# Patient Record
Sex: MALE | Race: WHITE | NOT HISPANIC OR LATINO | Employment: FULL TIME | URBAN - METROPOLITAN AREA
[De-identification: names, ages, dates, MRNs, and addresses within clinical notes are randomized per-mention and may not be internally consistent; named-entity substitution may affect disease eponyms.]

---

## 2017-01-03 ENCOUNTER — ONCOLOGY VISIT (OUTPATIENT)
Dept: ONCOLOGY | Facility: CLINIC | Age: 57
End: 2017-01-03
Attending: INTERNAL MEDICINE
Payer: COMMERCIAL

## 2017-01-03 VITALS
SYSTOLIC BLOOD PRESSURE: 128 MMHG | BODY MASS INDEX: 31.17 KG/M2 | OXYGEN SATURATION: 97 % | RESPIRATION RATE: 17 BRPM | HEART RATE: 109 BPM | DIASTOLIC BLOOD PRESSURE: 79 MMHG | WEIGHT: 220.4 LBS | TEMPERATURE: 98.5 F

## 2017-01-03 DIAGNOSIS — C20 MALIGNANT NEOPLASM OF RECTUM (H): Primary | ICD-10-CM

## 2017-01-03 PROCEDURE — 99211 OFF/OP EST MAY X REQ PHY/QHP: CPT

## 2017-01-03 PROCEDURE — 99205 OFFICE O/P NEW HI 60 MIN: CPT | Performed by: INTERNAL MEDICINE

## 2017-01-03 ASSESSMENT — PAIN SCALES - GENERAL: PAINLEVEL: MILD PAIN (2)

## 2017-01-03 NOTE — PROGRESS NOTES
"Louie Greco is a 56 year old male who presents for:  Chief Complaint   Patient presents with     Oncology Clinic Visit     Hx of rectal ca         Initial Vitals:  /79 mmHg  Pulse 109  Temp(Src) 98.5  F (36.9  C) (Oral)  Resp 17  Wt 99.973 kg (220 lb 6.4 oz)  SpO2 97% Estimated body mass index is 31.17 kg/(m^2) as calculated from the following:    Height as of 12/8/16: 1.791 m (5' 10.5\").    Weight as of this encounter: 99.973 kg (220 lb 6.4 oz).. There is no height on file to calculate BSA. BP completed using cuff size: regular  Mild Pain (2) No LMP for male patient. Allergies and medications reviewed.     Medications: Medication refills not needed today.  Pharmacy name entered into [employer]: [facility] [address] [place], [place] - [address]    Comments: None    6 minutes for nursing intake (face to face time)   Gabriella Queen CMA            "

## 2017-01-03 NOTE — PROGRESS NOTES
[facility]    Hematology/Oncology New Patient Note      Today's Date: 01/03/2017    Reason for Consult: rectal cancer      HISTORY OF PRESENT ILLNESS: Louie Greco is a 56 year old male who presents with rectal cancer.  He started having rectal bleeding around beginning of 2016.  He underwent colonoscopy on 7/27/16, which showed a malignant tumor in the rectum involving half of the lumen with oozing, as well as three 4-mm polyps in the ascending and transverse colon.  Pathology showed moderately differentiated adenocarcinoma, ascending colon with sessile serrated adenoma, others were tubular adenomas.  Mismatch repair expression with normal protein expression.  Staging scans showed the rectal mass, indeterminate focus in the left liver thought to be cyst, and multiple bilateral renal cysts.  MRI showed a distal rectal mass measuring 2.7 x 2.4 cm extending to the most proximal region of the anal canal.  There are a few tiny subcentimeter perirectal fat lymph nodes.  He was staged clinically [id number] (stage IIIA).  He was seen by Dr. Lee at [facility], and started neoadjuvant chemoradiation with capecitabine on 8/8/16 and completed on 9/15/16.  He was then seen by Dr. Radford, colorectal surgery at [facility].  His post chemoradiation MRI showed improvement in the size of the tumor and response in the lymph nodes.  On 12/8/16, he underwent flexible sigmoidoscopy and there was no evidence of tumor.  There was only some anterior scarring in the lumen.  Multiple biopsies were taken, which showed no evidence of carcinoma.  At that point, Dr. Radford spoke with the patient's wife, that there appears to be a complete response in the lumen, and that the patient would wish to placed on the watch and wait protocol.  The patient had expressed wishes not to have an APR, and it would not have been possible to grossly clear a margin for LAR.    Louie was advised to  follow-up with medical oncology.  He says that he feels well now.  He still has some fissures from his radiation treatment and from the biopsies.  He takes stool softeners, which helps.  Appetite is good.  He and his wife had many questions regarding his cancer and treatment and surveillance going forward.    He notes that he had a brother who passed away at a young age of 53 from a metastatic cancer, but unknown what type, and passed away within 2 months of diagnosis.  He denies other family history of cancer in the immediate family.  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          REVIEW OF SYSTEMS:   14 point ROS was reviewed and is negative other than as noted above in HPI.       HOME MEDICATIONS:  Current Outpatient Prescriptions   Medication Sig Dispense Refill     IBUPROFEN PO        acetaminophen (TYLENOL) 325 MG tablet Take 2 tablets (650 mg) by mouth every 4 hours as needed for other (mild pain) 100 tablet 0     hydrocortisone 0.5 % ointment Apply topically 2 times daily       diltiazem 2% in PLO cream, FV COMPOUNDED, 2% GEL To anal opening three times daily.  Use a pea-sized amount.  Store at room temperature. 60 g 0         ALLERGIES:  Allergies   Allergen Reactions     Amoxicillin      Ampicillin Diarrhea     Demerol [Meperidine]      Nausea          PAST MEDICAL HISTORY:  Past Medical History   Diagnosis Date     Rectal cancer (H)      low rectal cancer     Childhood asthma      Nephrolithiasis      1990         PAST SURGICAL HISTORY:  Past Surgical History   Procedure Laterality Date     Vasectomy       Colonoscopy N/A 7/27/2016     Procedure: COMBINED COLONOSCOPY, SINGLE OR MULTIPLE BIOPSY/POLYPECTOMY BY BIOPSY;  Surgeon: Chelsea Thompson MD;  Location:  GI     Hc tooth extraction w/forcep       Sigmoidoscopy flexible N/A 12/8/2016     Procedure: SIGMOIDOSCOPY FLEXIBLE;  Surgeon: Felicitas Radford MD;  Location:  OR         SOCIAL HISTORY:  Social History      Social History     Marital Status: Single     Spouse Name: N/A     Number of Children: N/A     Years of Education: N/A     Occupational History     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 No     Social History Narrative    Dad  at age  78   from [place], COPD, & HTN    Mom alive in her 70's.     for 30 years    Two adult children. 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             FAMILY HISTORY:  Family History   Problem Relation Age of Onset     CANCER Brother      primary of unknown origin     Chronic Obstructive Pulmonary Disease Father      Hypertension Father      Family History Negative Mother      Family History Negative Brother      Glaucoma No family hx of      Macular Degeneration No family hx of      DIABETES Maternal Grandfather      Hypertension Paternal Grandmother      Hyperlipidemia Father      Hyperlipidemia Paternal Grandmother      Other Cancer Brother          PHYSICAL EXAM:  Vital signs:  /79 mmHg  Pulse 109  Temp(Src) 98.5  F (36.9  C) (Oral)  Resp 17  Wt 99.973 kg (220 lb 6.4 oz)  SpO2 97%   ECO  GENERAL/CONSTITUTIONAL: No acute distress. Accompanied by wife.  EYES: No scleral icterus.  LYMPH: No anterior cervical, posterior cervical, or supraclavicular adenopathy.   RESPIRATORY: Clear to auscultation bilaterally. No crackles or wheezing.   CARDIOVASCULAR: Regular rate and rhythm without murmurs, gallops, or rubs.  GASTROINTESTINAL: No tenderness. The patient has normal bowel sounds. No guarding.  No distention.  MUSCULOSKELETAL: Warm and well-perfused, no cyanosis,  clubbing, or edema.  NEUROLOGIC: Alert, oriented, answers questions appropriately.  INTEGUMENTARY: No jaundice.  GAIT: Steady, does not use assistive device      LABS:  None today.      PATHOLOGY:  7/27/16:  INTERPRETATION:   <<NORMAL MISMATCH REPAIR PROTEIN EXPRESSION:     hMLH1    hMSH2    hMSH6    hPMS2   Present   Present    Present   Present     Diagnosis:  Normal pattern of mismatch repair protein expression (see   comment).     FINAL DIAGNOSIS:   A: Rectum, biopsy   - Moderately differentiated adenocarcinoma consistent with rectal   primary     B: Large intestine right/ascending colon, polypectomy   - Sessile serrated adenoma without cytologic dysplasia     C: Large intestine transverse colon, polypectomy   - Tubular adenoma without high grade dysplasia or malignancy     12/8/16:  FINAL DIAGNOSIS:   A. RECTUM, PROXIMAL SCAR, BIOPSY:   - Fragments of rectal mucosa with ulceration and granulation tissue   consistent with therapy-related changes, see comment   - No evidence of carcinoma     B. RECTUM, MID SCAR, BIOPSY:   - Ulcerated rectal mucosa with reactive changes   - No evidence of carcinoma     C. RECTUM, DISTAL SCAR, BIOPSY:   - Ulcerated mucosa and granulation tissue with reactive changes   - Strips of acutely inflamed anal squamous mucosa   - No evidence of carcinoma         IMAGING:  MRI pelvis 7/27/16:  FINDINGS: There is a lobulated rectal mass in transverse plane  measuring 2.7 x 2.4 cm series 801 image 17, and craniocaudal length of  4.8 cm series 601 image 16. The mass involves the distal rectum and  extends to the most superior aspect of the anal canal. The mass does  not show any clear areas of complete extension of disease through the  rectal wall. There is no associated colonic obstruction. There are a  few tiny subcentimeter lymph nodes just superior to the level of the  mass in the perirectal fat. For example, one of these is 0.5 x 0.4 cm  posteriorly towards the left series 801 image  7.  There are a few others in this region ranging in size from 0.2-0.3  cm in size. There are no suspicious pelvic sidewall or inguinal lymph  nodes. Bilateral left greater than right fat-containing inguinal  hernias. This measures approximately 3.5 cm on the left, and 2.8 cm on  the right. No herniated bowel. Prominent prostate. Prostatic  hypertrophy changes of the prostate transition zone. The prostate is  5.5 x 3.8 cm series 801 image 13. There are numerous renal cysts  identified. Low T2 signal lesion off of the lower left kidney could be  a hemorrhagic cyst. The renal cysts are suboptimally evaluated,  however.                                                                       IMPRESSION:  1. Prominent lobulated distal rectal mass extending to the most  proximal region of the anal canal. There is no clear tumor extension  through the rectal wall on the provided images.  2. A few tiny subcentimeter perirectal fat lymph nodes are technically  indeterminate.     CT c/a/p 7/27/16:  1. Rectal mass again identified consistent with the primary  malignancy.  2. Indeterminant and is very ill-defined hypodense focus within the  left liver. Cannot exclude a solid hepatic mass, including the  possibility of metastatic disease. Recommend liver MRI for further  assessment. A PET/CT could also provide further assessment.  3. A few small indeterminant pulmonary nodules. Focal irregular  opacity at the lingula is noted and is technically indeterminant,  though could represent focal scarring. A PET/CT could provide further  assessment.  4. Mild areas of bony lucency at the bilateral iliac bones is not  convincing for an aggressive process, but is felt to be technically  indeterminant. Further attention to this region on imaging followup  recommended.  5. Multiple bilateral renal cysts. An exophytic dense lesion off the  lower left kidney could just be a hemorrhagic or proteinaceous cyst.  There are other examples as well. These  are indeterminant but could be  hemorrhagic cysts, but solid lesion is not excluded. Recommend renal  MRI for further assessment.     PET-CT 7/29/16:  1. Hypermetabolic mass centered over the rectum consistent with patient's known rectal malignancy.  2. Overall, no suspicious findings for metastatic disease.  3. Multiple circumscribe low dense lesions in the liver have relative low uptake radiotracer most compatible with benign lesions such as cysts.  4. The lucency within the iliac bones has no significant abnormal uptake and is likely benign in etiology.  5. The multiple hyperdense and cystic lesions within the kidneys demonstrate no significant abnormal uptake.  6. The small opacity in the inferior lingula has low uptake of the radiotracer in is favored to represent atelectasis or scarring.  7. Some mild asymmetric uptake within the left hilum is identified which is felt to be related to a normal variant of blood pool uptake.  Findings of prior granulomatous disease in the left lower lobe are noted.    MRI pelvis 11/1/16:  1. There has been a marked response to treatment, with a corresponding  tumor regression grade of 1.  The previously seen tumor at the left  and anterior distal rectum now has appearance that is entirely from  fibrotic without clear evidence of residual tumor. No convincing  evidence of levator ani or anal sphincter involvement, though the  tumor does extending inferiorly to the level of the puborectalis sling  2. Unchanged small lymph nodes without suspicious characteristics  measuring up to 4 mm. These are indeterminate but favored as benign.      ASSESSMENT/PLAN:  Louie Greco is a 56 year old male with:    1) Rectal cancer: clinical stage [id number] (stage IIIA), s/p chemoradiation with Xeloda, and appears to have achieved complete response on imaging and biopsies.  He opted not to undergo surgery and expressed desire not to undergo APR, as he does not want a colostomy bag.  It was felt  "reasonable to go on the watch and wait protocol with the [facility].  We discussed his pathology and clinical situation in detail today.  This is not considered the standard of care, but there is data that in select patients who do achieve a complete response after chemoradiation and not undergo surgical resection can have good outcomes, although there is no randomized controlled trial.  He will need to go on a strict monitoring protocol with frequent endoscopies and imaging.  With his initial stage and lymph node involvement, I will likely recommend \"adjuvant\" chemotherapy with FOLFOX for 4 months, but considering this is not considered standard of care, I will further consult with my colleagues at the [facility].  Will bring patient back to discuss final plan, labs, and likely to start chemotherapy sometime next week.    I spent a total of 60 minutes with the patient, with over >50% of the time in counseling and/or coordination of care.         Agueda Manley MD  Hematology/Oncology  [facility]      "

## 2017-01-06 DIAGNOSIS — C20 RECTAL CANCER (H): Primary | ICD-10-CM

## 2017-01-10 ENCOUNTER — TELEPHONE (OUTPATIENT)
Dept: ONCOLOGY | Facility: CLINIC | Age: 57
End: 2017-01-10

## 2017-01-10 NOTE — TELEPHONE ENCOUNTER
Patient called stating he has questions regarding a port a cath, care of it and his future chemotherapy. Patient will receive 8 treatments of Folfox q 2 weeks. Patient will be coming in tomorrow to receive education from care coordinator Petrona at 0230 PM. Juani Mcgrath RN

## 2017-01-12 ENCOUNTER — CARE COORDINATION (OUTPATIENT)
Dept: ONCOLOGY | Facility: CLINIC | Age: 57
End: 2017-01-12

## 2017-01-12 NOTE — PROGRESS NOTES
"Chemotherapy Education FOLFOX  Patient is a chemotherapy teaching of FOLFOX here today for chemotherapy education, accompanied by grandson.  Pt has a cancer diagnosis of rectal cancer   Reviewed the following with the patient and their support person:  Treatment Goal/Regimen/Duration; and rationale for strict adherence, specific medication names including pre-treatment medications and at home scheduled or as needed medications, delivery methods, and side effects and management; including skin changes/hand-foot syndrome, anemia, neutropenia, thrombocytopenia, diarrhea/constipation, hair loss syndrome, memory changes/ \"chemobrain\", mouth sores, taste changes, neuropathy, fatigue, infertility, myelosuppression, and risk of extravasation or infiltration.  Infection prevention, and monitoring of lab values, what lab tests and what changes of these values meant, along with the possibility of hydration or blood product transfusion, or the need to defer or hold treatment.    General Chemotherapy Information, including ways it is excreted from the body and cleaning and containment of vomitus or other bodily fluid, use of the bathroom, sexual health and intimacy, what to do if needing to miss a treatment, when to call a provider and the need for staff to wear protective equipment.  Importance of Central line care (port) or IV site care.  Proper use of the take home infusion pump, troubleshooting, and who to call if there is a malfunction.  Written Information: written information including the \"Getting Ready for Chemotherapy: What to Expect, Before, During, and After your Treatment\" booklet, specific drug information guides printed from [employer], [employer] booklets \"Eating Hints: Before, During, and After Cancer Treatment\" and \"Chemotherapy and You\".  Also, a folder with information on when to contact the provider, various programs offered at [facility], and our business card with contact information given; Oncology " Clinic, RN Case Manager, and the after hours Nurse Advise Line.  No barriers to learning identified. Patient and family verbalized understanding of all written and verbal information. All questions answered to patients satisfaction.     Bottle Pump Infuser: Patient education sheet given to patient regarding how to use, care for, and monitor the infuser pump.  Patient also instructed to check to ensure balloon is deflating throughout the day. Model of bottle infuser shown to patient to allow for questions and for patient to become familiar with infuser pump.  Other Concerns:   Pt instructed to call with further questions or concerns.  Patient states understanding and is in agreement with this plan.  Copy of appointments, and after visit summary (AVS) given to patient. Patient discharged to [facility] in stable condition.    Face to Face Time with Patient: 40    Samantha Torres

## 2017-01-16 ENCOUNTER — HOSPITAL ENCOUNTER (OUTPATIENT)
Facility: CLINIC | Age: 57
Setting detail: SPECIMEN
Discharge: HOME | End: 2017-01-16
Attending: INTERNAL MEDICINE | Admitting: INTERNAL MEDICINE
Payer: COMMERCIAL

## 2017-01-16 ENCOUNTER — INFUSION THERAPY VISIT (OUTPATIENT)
Dept: INFUSION THERAPY | Facility: CLINIC | Age: 57
End: 2017-01-16
Attending: INTERNAL MEDICINE
Payer: COMMERCIAL

## 2017-01-16 ENCOUNTER — APPOINTMENT (OUTPATIENT)
Dept: INTERVENTIONAL RADIOLOGY/VASCULAR | Facility: CLINIC | Age: 57
End: 2017-01-16
Attending: INTERNAL MEDICINE
Payer: COMMERCIAL

## 2017-01-16 ENCOUNTER — HOSPITAL ENCOUNTER (OUTPATIENT)
Facility: CLINIC | Age: 57
Discharge: HOME | End: 2017-01-16
Attending: RADIOLOGY | Admitting: RADIOLOGY
Payer: COMMERCIAL

## 2017-01-16 VITALS
BODY MASS INDEX: 30.49 KG/M2 | HEART RATE: 77 BPM | RESPIRATION RATE: 16 BRPM | SYSTOLIC BLOOD PRESSURE: 125 MMHG | HEIGHT: 70 IN | WEIGHT: 213 LBS | OXYGEN SATURATION: 95 % | TEMPERATURE: 96.3 F | DIASTOLIC BLOOD PRESSURE: 68 MMHG

## 2017-01-16 VITALS
SYSTOLIC BLOOD PRESSURE: 117 MMHG | HEART RATE: 91 BPM | WEIGHT: 217.6 LBS | RESPIRATION RATE: 16 BRPM | TEMPERATURE: 96.6 F | DIASTOLIC BLOOD PRESSURE: 67 MMHG | HEIGHT: 70 IN | BODY MASS INDEX: 31.15 KG/M2 | OXYGEN SATURATION: 94 %

## 2017-01-16 DIAGNOSIS — C20 MALIGNANT NEOPLASM OF RECTUM (H): Primary | ICD-10-CM

## 2017-01-16 DIAGNOSIS — C20 RECTAL CANCER (H): ICD-10-CM

## 2017-01-16 LAB
ALBUMIN SERPL-MCNC: 3.5 G/DL (ref 3.4–5)
ALP SERPL-CCNC: 65 U/L (ref 40–150)
ALT SERPL W P-5'-P-CCNC: 29 U/L (ref 0–70)
ANION GAP SERPL CALCULATED.3IONS-SCNC: 7 MMOL/L (ref 3–14)
APTT PPP: 31 SEC (ref 22–37)
AST SERPL W P-5'-P-CCNC: 18 U/L (ref 0–45)
BASOPHILS # BLD AUTO: 0 10E9/L (ref 0–0.2)
BASOPHILS NFR BLD AUTO: 0.9 %
BILIRUB SERPL-MCNC: 0.5 MG/DL (ref 0.2–1.3)
BUN SERPL-MCNC: 22 MG/DL (ref 7–30)
CALCIUM SERPL-MCNC: 8.2 MG/DL (ref 8.5–10.1)
CHLORIDE SERPL-SCNC: 108 MMOL/L (ref 94–109)
CO2 SERPL-SCNC: 27 MMOL/L (ref 20–32)
CREAT SERPL-MCNC: 0.81 MG/DL (ref 0.66–1.25)
DIFFERENTIAL METHOD BLD: ABNORMAL
EOSINOPHIL # BLD AUTO: 0.3 10E9/L (ref 0–0.7)
EOSINOPHIL NFR BLD AUTO: 9.3 %
ERYTHROCYTE [DISTWIDTH] IN BLOOD BY AUTOMATED COUNT: 12.3 % (ref 10–15)
ERYTHROCYTE [DISTWIDTH] IN BLOOD BY AUTOMATED COUNT: 12.4 % (ref 10–15)
GFR SERPL CREATININE-BSD FRML MDRD: ABNORMAL ML/MIN/1.7M2
GLUCOSE SERPL-MCNC: 105 MG/DL (ref 70–99)
HCT VFR BLD AUTO: 42.1 % (ref 40–53)
HCT VFR BLD AUTO: 43.5 % (ref 40–53)
HGB BLD-MCNC: 14.2 G/DL (ref 13.3–17.7)
HGB BLD-MCNC: 15.1 G/DL (ref 13.3–17.7)
IMM GRANULOCYTES # BLD: 0 10E9/L (ref 0–0.4)
IMM GRANULOCYTES NFR BLD: 0 %
INR PPP: 0.92 (ref 0.86–1.14)
LYMPHOCYTES # BLD AUTO: 0.5 10E9/L (ref 0.8–5.3)
LYMPHOCYTES NFR BLD AUTO: 14.6 %
MCH RBC QN AUTO: 29.6 PG (ref 26.5–33)
MCH RBC QN AUTO: 30.3 PG (ref 26.5–33)
MCHC RBC AUTO-ENTMCNC: 33.7 G/DL (ref 31.5–36.5)
MCHC RBC AUTO-ENTMCNC: 34.7 G/DL (ref 31.5–36.5)
MCV RBC AUTO: 87 FL (ref 78–100)
MCV RBC AUTO: 88 FL (ref 78–100)
MONOCYTES # BLD AUTO: 0.4 10E9/L (ref 0–1.3)
MONOCYTES NFR BLD AUTO: 13.1 %
NEUTROPHILS # BLD AUTO: 2.1 10E9/L (ref 1.6–8.3)
NEUTROPHILS NFR BLD AUTO: 62.1 %
NRBC # BLD AUTO: 0 10*3/UL
NRBC BLD AUTO-RTO: 0 /100
PLATELET # BLD AUTO: 181 10E9/L (ref 150–450)
PLATELET # BLD AUTO: 207 10E9/L (ref 150–450)
POTASSIUM SERPL-SCNC: 4.1 MMOL/L (ref 3.4–5.3)
PROT SERPL-MCNC: 6.5 G/DL (ref 6.8–8.8)
RBC # BLD AUTO: 4.8 10E12/L (ref 4.4–5.9)
RBC # BLD AUTO: 4.98 10E12/L (ref 4.4–5.9)
SODIUM SERPL-SCNC: 142 MMOL/L (ref 133–144)
WBC # BLD AUTO: 3.4 10E9/L (ref 4–11)
WBC # BLD AUTO: 4 10E9/L (ref 4–11)

## 2017-01-16 PROCEDURE — C1769 GUIDE WIRE: HCPCS

## 2017-01-16 PROCEDURE — 27210905 ZZH KIT CR7

## 2017-01-16 PROCEDURE — 27211193 ZZ H WOUND GLUE CR1

## 2017-01-16 PROCEDURE — 85025 COMPLETE CBC W/AUTO DIFF WBC: CPT | Performed by: INTERNAL MEDICINE

## 2017-01-16 PROCEDURE — 80053 COMPREHEN METABOLIC PANEL: CPT | Performed by: INTERNAL MEDICINE

## 2017-01-16 PROCEDURE — 85610 PROTHROMBIN TIME: CPT | Performed by: RADIOLOGY

## 2017-01-16 PROCEDURE — 25000125 ZZHC RX 250: Performed by: INTERNAL MEDICINE

## 2017-01-16 PROCEDURE — 96367 TX/PROPH/DG ADDL SEQ IV INF: CPT

## 2017-01-16 PROCEDURE — 85730 THROMBOPLASTIN TIME PARTIAL: CPT | Performed by: RADIOLOGY

## 2017-01-16 PROCEDURE — 96411 CHEMO IV PUSH ADDL DRUG: CPT

## 2017-01-16 PROCEDURE — 96415 CHEMO IV INFUSION ADDL HR: CPT

## 2017-01-16 PROCEDURE — 85027 COMPLETE CBC AUTOMATED: CPT | Performed by: RADIOLOGY

## 2017-01-16 PROCEDURE — S0077 INJECTION, CLINDAMYCIN PHOSP: HCPCS | Performed by: RADIOLOGY

## 2017-01-16 PROCEDURE — 96368 THER/DIAG CONCURRENT INF: CPT

## 2017-01-16 PROCEDURE — 25000125 ZZHC RX 250

## 2017-01-16 PROCEDURE — 25000125 ZZHC RX 250: Performed by: RADIOLOGY

## 2017-01-16 PROCEDURE — 36561 INSERT TUNNELED CV CATH: CPT | Mod: LT

## 2017-01-16 PROCEDURE — 96413 CHEMO IV INFUSION 1 HR: CPT

## 2017-01-16 PROCEDURE — 27210886 ZZH ACCESSORY CR5

## 2017-01-16 PROCEDURE — 40000854 ZZH STATISTIC SIMPLE TUBE INSERTION/CHARGE, PORT, CATH, FISTULOGRAM

## 2017-01-16 PROCEDURE — 25000128 H RX IP 250 OP 636: Mod: JW | Performed by: INTERNAL MEDICINE

## 2017-01-16 PROCEDURE — C1788 PORT, INDWELLING, IMP: HCPCS

## 2017-01-16 PROCEDURE — 96416 CHEMO PROLONG INFUSE W/PUMP: CPT

## 2017-01-16 RX ORDER — LIDOCAINE 40 MG/G
CREAM TOPICAL
Status: DISCONTINUED | OUTPATIENT
Start: 2017-01-16 | End: 2017-01-16 | Stop reason: HOSPADM

## 2017-01-16 RX ORDER — FLUMAZENIL 0.1 MG/ML
0.2 INJECTION, SOLUTION INTRAVENOUS
Status: DISCONTINUED | OUTPATIENT
Start: 2017-01-16 | End: 2017-01-16 | Stop reason: HOSPADM

## 2017-01-16 RX ORDER — ONDANSETRON 2 MG/ML
INJECTION INTRAMUSCULAR; INTRAVENOUS
Status: COMPLETED
Start: 2017-01-16 | End: 2017-01-16

## 2017-01-16 RX ORDER — HEPARIN SODIUM 1000 [USP'U]/ML
INJECTION, SOLUTION INTRAVENOUS; SUBCUTANEOUS
Status: DISCONTINUED
Start: 2017-01-16 | End: 2017-01-16 | Stop reason: HOSPADM

## 2017-01-16 RX ORDER — PROCHLORPERAZINE MALEATE 10 MG
10 TABLET ORAL EVERY 6 HOURS PRN
Qty: 30 TABLET | Refills: 2 | Status: SHIPPED | OUTPATIENT
Start: 2017-01-16 | End: 2017-06-30

## 2017-01-16 RX ORDER — CLINDAMYCIN PHOSPHATE 900 MG/50ML
900 INJECTION, SOLUTION INTRAVENOUS
Status: COMPLETED | OUTPATIENT
Start: 2017-01-16 | End: 2017-01-16

## 2017-01-16 RX ORDER — LIDOCAINE HYDROCHLORIDE 10 MG/ML
1-30 INJECTION, SOLUTION EPIDURAL; INFILTRATION; INTRACAUDAL; PERINEURAL
Status: DISCONTINUED | OUTPATIENT
Start: 2017-01-16 | End: 2017-01-16 | Stop reason: HOSPADM

## 2017-01-16 RX ORDER — NALOXONE HYDROCHLORIDE 0.4 MG/ML
.1-.4 INJECTION, SOLUTION INTRAMUSCULAR; INTRAVENOUS; SUBCUTANEOUS
Status: DISCONTINUED | OUTPATIENT
Start: 2017-01-16 | End: 2017-01-16 | Stop reason: HOSPADM

## 2017-01-16 RX ORDER — HEPARIN SODIUM (PORCINE) LOCK FLUSH IV SOLN 100 UNIT/ML 100 UNIT/ML
500 SOLUTION INTRAVENOUS ONCE
Status: COMPLETED | OUTPATIENT
Start: 2017-01-16 | End: 2017-01-16

## 2017-01-16 RX ORDER — LIDOCAINE HYDROCHLORIDE 10 MG/ML
1-30 INJECTION, SOLUTION EPIDURAL; INFILTRATION; INTRACAUDAL; PERINEURAL
Status: COMPLETED | OUTPATIENT
Start: 2017-01-16 | End: 2017-01-16

## 2017-01-16 RX ORDER — FLUOROURACIL 50 MG/ML
400 INJECTION, SOLUTION INTRAVENOUS ONCE
Status: COMPLETED | OUTPATIENT
Start: 2017-01-16 | End: 2017-01-16

## 2017-01-16 RX ORDER — FENTANYL CITRATE 50 UG/ML
INJECTION, SOLUTION INTRAMUSCULAR; INTRAVENOUS
Status: COMPLETED
Start: 2017-01-16 | End: 2017-01-16

## 2017-01-16 RX ORDER — FENTANYL CITRATE 50 UG/ML
25-50 INJECTION, SOLUTION INTRAMUSCULAR; INTRAVENOUS EVERY 5 MIN PRN
Status: DISCONTINUED | OUTPATIENT
Start: 2017-01-16 | End: 2017-01-16 | Stop reason: HOSPADM

## 2017-01-16 RX ORDER — ONDANSETRON 8 MG/1
8 TABLET, FILM COATED ORAL EVERY 8 HOURS PRN
Qty: 10 TABLET | Refills: 2 | Status: SHIPPED | OUTPATIENT
Start: 2017-01-16 | End: 2017-06-30

## 2017-01-16 RX ORDER — LORAZEPAM 0.5 MG/1
0.5 TABLET ORAL EVERY 4 HOURS PRN
Qty: 30 TABLET | Refills: 2 | Status: SHIPPED | OUTPATIENT
Start: 2017-01-16 | End: 2017-06-30

## 2017-01-16 RX ORDER — ONDANSETRON 2 MG/ML
4 INJECTION INTRAMUSCULAR; INTRAVENOUS ONCE
Status: COMPLETED | OUTPATIENT
Start: 2017-01-16 | End: 2017-01-16

## 2017-01-16 RX ADMIN — DEXTROSE MONOHYDRATE 250 ML: 50 INJECTION, SOLUTION INTRAVENOUS at 12:04

## 2017-01-16 RX ADMIN — FENTANYL CITRATE 50 MCG: 50 INJECTION, SOLUTION INTRAMUSCULAR; INTRAVENOUS at 09:27

## 2017-01-16 RX ADMIN — CLINDAMYCIN PHOSPHATE 900 MG: 18 INJECTION, SOLUTION INTRAVENOUS at 08:33

## 2017-01-16 RX ADMIN — MIDAZOLAM HYDROCHLORIDE 1 MG: 1 INJECTION, SOLUTION INTRAMUSCULAR; INTRAVENOUS at 09:38

## 2017-01-16 RX ADMIN — LEUCOVORIN CALCIUM 800 MG: 200 INJECTION, POWDER, LYOPHILIZED, FOR SOLUTION INTRAMUSCULAR; INTRAVENOUS at 12:28

## 2017-01-16 RX ADMIN — HEPARIN SODIUM (PORCINE) LOCK FLUSH IV SOLN 100 UNIT/ML 500 UNITS: 100 SOLUTION at 09:42

## 2017-01-16 RX ADMIN — MIDAZOLAM HYDROCHLORIDE 1 MG: 1 INJECTION, SOLUTION INTRAMUSCULAR; INTRAVENOUS at 09:12

## 2017-01-16 RX ADMIN — ONDANSETRON HYDROCHLORIDE 4 MG: 2 SOLUTION INTRAMUSCULAR; INTRAVENOUS at 09:20

## 2017-01-16 RX ADMIN — FLUOROURACIL 890 MG: 50 INJECTION, SOLUTION INTRAVENOUS at 14:47

## 2017-01-16 RX ADMIN — FENTANYL CITRATE 50 MCG: 50 INJECTION, SOLUTION INTRAMUSCULAR; INTRAVENOUS at 09:11

## 2017-01-16 RX ADMIN — LIDOCAINE HYDROCHLORIDE 200 MG: 10 INJECTION, SOLUTION EPIDURAL; INFILTRATION; INTRACAUDAL; PERINEURAL at 09:44

## 2017-01-16 RX ADMIN — DEXAMETHASONE SODIUM PHOSPHATE: 10 INJECTION, SOLUTION INTRAMUSCULAR; INTRAVENOUS at 12:04

## 2017-01-16 RX ADMIN — OXALIPLATIN 190 MG: 100 INJECTION, SOLUTION, CONCENTRATE INTRAVENOUS at 12:26

## 2017-01-16 RX ADMIN — ONDANSETRON 4 MG: 2 INJECTION INTRAMUSCULAR; INTRAVENOUS at 09:20

## 2017-01-16 ASSESSMENT — PAIN SCALES - GENERAL: PAINLEVEL: NO PAIN (1)

## 2017-01-16 NOTE — PROGRESS NOTES
Interventional Radiology Pre-Procedure Sedation Assessment   Time of Assessment: 8:55 AM    Expected Level: Moderate Sedation    Indication: Sedation is required for the following type of Procedure: Port a catheter placement    Sedation and procedural consent: Risks, benefits and alternatives were discussed with Patient and Spouse, Nikos LEAL Intake: Appropriately NPO for procedure    ASA Class: Class 2 - MILD SYSTEMIC DISEASE, NO ACUTE PROBLEMS, NO FUNCTIONAL LIMITATIONS.    Mallampati: Grade 2:  Soft palate, base of uvula, tonsillar pillars, and portion of posterior pharyngeal wall visible    Lungs: Lungs Clear with good breath sounds bilaterally    Heart: Normal heart sounds and rate    History and physical reviewed and no updates needed. I have reviewed the lab findings, diagnostic data, medications, and the plan for sedation. I have determined this patient to be an appropriate candidate for the planned sedation and procedure and have reassessed the patient IMMEDIATELY PRIOR to sedation and procedure.    Leonila Hanson, QUINTON CNP

## 2017-01-16 NOTE — PROGRESS NOTES
States tolerated port placement well. Denies any c/o. Good appetite, taking PO fluids well, denies nausea. Right port left accessed from IR for chemo treatment now. Discharge instructions given to pt. Pt ambulatory and appears steady on feet. Discharged per ambulatory with wife to oncologist's office in [facility] for chemo.

## 2017-01-16 NOTE — PROGRESS NOTES
Interventional Radiology Intra-procedural Nursing Note    Patient Name: Louie Greco  Medical Record Number: [id number]  Today's Date: January 16, 2017    Start Time: 0910  End of procedure time: 0950  Procedure: Port Placement   Report given to: Caresuites RN  Time pt departs:  1000  :     Other Notes:  Pt tolerated procedure well.  Right Chest Power port placed per Dr. Walter and in good position.  Port Heparin locked.  Pt remains accessed, pt to go to clinic for treatment following procedure. Pt tolerated procedure well.  VSS.  See MD note for further details.  Transition of care complete.      Sara A. Schoenbauer

## 2017-01-16 NOTE — PROGRESS NOTES
Admitted to [facility] for post placement. Denies any c/o. Plan of care explained. Wife here. Pt states is going to oncologist after this procedure for chemo treatment. Discharge instructions reviewed and given to pt and wife. Very pleasant, cooperative. Serene here to consent pt.

## 2017-01-16 NOTE — PROCEDURES
RADIOLOGY PROCEDURE NOTE  Patient name: Louie Greco  MRN: [id number]  : 1960    Pre-procedure diagnosis: Rectal cancer  Post-procedure diagnosis: Same    Procedure Date/Time: 2017  9:53 AM  Procedure: Right IJ power port and catheter with tip in RA.  Accessed and heparinized.  Ready for immediate use.  No complications.  Estimated blood loss: 15 ml  Specimen(s) collected with description: None  The patient tolerated the procedure well with no immediate complications.  Significant findings:  Please see above.    See imaging dictation for procedural details.    Provider name: Calvin Walter  Assistant(s):None

## 2017-01-16 NOTE — PROGRESS NOTES
"Infusion Nursing Note:  Louie Greco presents today for C1,D1 folfox.    Patient seen by provider today: No    Note: Port placed today. First day of chemo-med handouts given to patient..    Intravenous Access:  Implanted Port.    Treatment Conditions:  HGB     14.2   1/16/2017  WBC      3.4   1/16/2017   ANEU      2.1   1/16/2017  PLT      181   1/16/2017     NA      142   1/16/2017                POTASSIUM      4.1   1/16/2017        No results found for this basename: mag         CR     0.81   1/16/2017                FRANDY      8.2   1/16/2017             BILITOTAL      0.5   1/16/2017        ALBUMIN      3.5   1/16/2017                 ALT       29   1/16/2017        AST       18   1/16/2017  Results reviewed, labs MET treatment parameters, ok to proceed with treatment.      Post Infusion Assessment:  Patient tolerated infusion without incident.  Blood return noted pre and post infusion.  Blood return noted during administration every 2 cc.  Site patent and intact, free from redness, edema or discomfort.  No evidence of extravasations.    Discharge Plan:   Discharge instructions reviewed with: Patient.  Patient and/or family verbalized understanding of discharge instructions and all questions answered.  Copy of AVS reviewed with patient and/or family.  Patient will return 1/18 for next appointment for pump disconnect.  Patient discharged in stable condition accompanied by: wife.  Departure Mode: Ambulatory  Prior to discharge: Port is secured in place with tegaderm and flushed with 10cc NS with positive blood return noted.  Continuous home infusion Dosi-Fuser pump connected.    All connectors secured in place and clamps taped open.    Pump started, \"running\" noted on display (CADD): YES and Not Applicable.  Patient instructed to call our clinic or [facility] with any questions or concerns at home.  Patient verbalized understanding.    Patient set up for pump disconnect at our clinic on 1/18.  "     .    Tracey Lozada RN

## 2017-01-17 ENCOUNTER — TELEPHONE (OUTPATIENT)
Dept: SURGERY | Facility: CLINIC | Age: 57
End: 2017-01-17

## 2017-01-17 ENCOUNTER — TELEPHONE (OUTPATIENT)
Dept: ONCOLOGY | Facility: CLINIC | Age: 57
End: 2017-01-17

## 2017-01-17 DIAGNOSIS — R06.6 HICCUPS: Primary | ICD-10-CM

## 2017-01-17 RX ORDER — METOCLOPRAMIDE 10 MG/1
10 TABLET ORAL
Qty: 30 TABLET | Refills: 0 | Status: SHIPPED | OUTPATIENT
Start: 2017-01-17 | End: 2017-06-30

## 2017-01-17 NOTE — TELEPHONE ENCOUNTER
Patient called stating that he is experiencing constant  hiccups since his Folfox yesterday. Consulted with [facility] and Dr. Manley and hiccups are secondary to corticosteroids that patient received prior to hi Folfox. Called patient and he is aware that hiccups should subside after the steroid wears off. Script for Reglan sent to [facility] in [place] to relieve the symptoms of the hiccups. Juani Mcgrath RN

## 2017-01-17 NOTE — TELEPHONE ENCOUNTER
Louie is calling Dr. Radford. He reports he had surgery yesterday at [facility] with Dr. Manley. He had a few hiccups last evening, but since up at 4:30 this morning has had steady hiccups except for an occasional 10-15 minute breaks after eating.  He would like to know what anesthesia he got?  Also what he can do for this? His ribs are already sore.  He reports in the past after getting wisdom teeth out X2 he had prolonged hiccups for 5 days!  He thinks Dr. Radford may have more experience with this than Dr. Manley.  Hasn't called them yet.    Will route to Lorie, Dr. Radford's clinic nurse.  He should contact Dr. Manley's office too.  Caller agrees with this plan.

## 2017-01-17 NOTE — TELEPHONE ENCOUNTER
Spoke with pt & let him know triage RN had spoken to me.  I suggested he call Dr. Manley's office as she placed the order for the port and they often place orders for ports.  He states he did speak with Dr. Skelton's nurse who was going to speak with Dr. Skelton about these hiccups.  Also suggested the IR dept may be helpful too.  (Pt was hiccupping intermittently throughout conversation).

## 2017-01-18 ENCOUNTER — INFUSION THERAPY VISIT (OUTPATIENT)
Dept: INFUSION THERAPY | Facility: CLINIC | Age: 57
End: 2017-01-18
Attending: INTERNAL MEDICINE
Payer: COMMERCIAL

## 2017-01-18 VITALS
DIASTOLIC BLOOD PRESSURE: 78 MMHG | HEART RATE: 89 BPM | SYSTOLIC BLOOD PRESSURE: 129 MMHG | RESPIRATION RATE: 16 BRPM | TEMPERATURE: 97.8 F

## 2017-01-18 DIAGNOSIS — C20 MALIGNANT NEOPLASM OF RECTUM (H): Primary | ICD-10-CM

## 2017-01-18 PROCEDURE — 25000125 ZZHC RX 250: Performed by: INTERNAL MEDICINE

## 2017-01-18 PROCEDURE — 96523 IRRIG DRUG DELIVERY DEVICE: CPT

## 2017-01-18 RX ORDER — HEPARIN SODIUM (PORCINE) LOCK FLUSH IV SOLN 100 UNIT/ML 100 UNIT/ML
5 SOLUTION INTRAVENOUS EVERY 8 HOURS
Status: DISCONTINUED | OUTPATIENT
Start: 2017-01-18 | End: 2017-01-18 | Stop reason: HOSPADM

## 2017-01-18 RX ADMIN — SODIUM CHLORIDE, PRESERVATIVE FREE 5 ML: 5 INJECTION INTRAVENOUS at 14:32

## 2017-01-18 ASSESSMENT — PAIN SCALES - GENERAL: PAINLEVEL: NO PAIN (0)

## 2017-01-18 NOTE — PROGRESS NOTES
Infusion Nursing Note:  Louie Greco presents today for pump takedown.    Patient seen by provider today: No    Note: 5FU pump empty, disconnected and flushed per protocol.    Intravenous Access:  Implanted Port.    Treatment Conditions:  Not Applicable.      Post Infusion Assessment:  Patient tolerated infusion without incident.  Blood return noted pre and post infusion.  Site patent and intact, free from redness, edema or discomfort.  No evidence of extravasations.  Access discontinued per protocol.    Discharge Plan:   Patient declined prescription refills.  Discharge instructions reviewed with: Patient.  Patient and/or family verbalized understanding of discharge instructions and all questions answered.  AVS to patient via [employer].  Patient will return 1/31/17 for next appointment.   Patient discharged in stable condition accompanied by: self.  Departure Mode: Ambulatory.    Kaylie Timmons RN

## 2017-01-19 NOTE — TELEPHONE ENCOUNTER
I called and spoke with patient to follow up on side effects on chemotherapy. He states hiccups have resolved and that he missed a day of work do to the hiccups. He notes cold sensitivity and is drinking luke warm water and protecting himself from cold temperatures. He is eating  Well, denies pain, vomiting , and or diarrhea. I informed him if he develops nausea that is not controlled with medication, pain, or 4 or more episodes of vomiting / diarrhea and or develops shortness of breath to contact our office. He agreed and verbalized understanding. Samantha Torres

## 2017-01-20 ENCOUNTER — INFUSION THERAPY VISIT (OUTPATIENT)
Dept: INFUSION THERAPY | Facility: CLINIC | Age: 57
End: 2017-01-20
Attending: INTERNAL MEDICINE
Payer: COMMERCIAL

## 2017-01-20 VITALS
TEMPERATURE: 97.3 F | HEART RATE: 86 BPM | SYSTOLIC BLOOD PRESSURE: 131 MMHG | DIASTOLIC BLOOD PRESSURE: 86 MMHG | RESPIRATION RATE: 16 BRPM

## 2017-01-20 DIAGNOSIS — C20 MALIGNANT NEOPLASM OF RECTUM (H): Primary | ICD-10-CM

## 2017-01-20 PROCEDURE — 25000128 H RX IP 250 OP 636: Performed by: INTERNAL MEDICINE

## 2017-01-20 PROCEDURE — 25000125 ZZHC RX 250: Performed by: INTERNAL MEDICINE

## 2017-01-20 PROCEDURE — 96360 HYDRATION IV INFUSION INIT: CPT

## 2017-01-20 RX ORDER — HEPARIN SODIUM (PORCINE) LOCK FLUSH IV SOLN 100 UNIT/ML 100 UNIT/ML
500 SOLUTION INTRAVENOUS ONCE
Status: COMPLETED | OUTPATIENT
Start: 2017-01-20 | End: 2017-01-20

## 2017-01-20 RX ADMIN — SODIUM CHLORIDE, PRESERVATIVE FREE 500 UNITS: 5 INJECTION INTRAVENOUS at 15:46

## 2017-01-20 RX ADMIN — SODIUM CHLORIDE 1000 ML: 9 INJECTION, SOLUTION INTRAVENOUS at 14:39

## 2017-01-20 ASSESSMENT — PAIN SCALES - GENERAL: PAINLEVEL: NO PAIN (0)

## 2017-01-20 NOTE — TELEPHONE ENCOUNTER
Patient called stating he felt like he needed IVF's as he is not able to drink fluids like he should. He has been placed on the schedule for today at 2:30p and orders have been placed. Samantha Torres

## 2017-01-20 NOTE — PROGRESS NOTES
Infusion Nursing Note:  Louie Greco presents today for IVF.    Patient seen by provider today: No   present during visit today: Not Applicable.    Note: N/A.    Intravenous Access:  Implanted Port.    Treatment Conditions:  Not Applicable.      Post Infusion Assessment:  Patient tolerated infusion without incident.  Blood return noted pre and post infusion.  Site patent and intact, free from redness, edema or discomfort.  No evidence of extravasations.  Access discontinued per protocol.    Discharge Plan:   AVS to patient via MYCHART.  Patient will return 1/31/17 for next appointment.   Patient discharged in stable condition accompanied by: self.  Departure Mode: Ambulatory.    MARVIN Valle RN (discharge)

## 2017-01-31 ENCOUNTER — ONCOLOGY VISIT (OUTPATIENT)
Dept: ONCOLOGY | Facility: CLINIC | Age: 57
End: 2017-01-31
Attending: INTERNAL MEDICINE
Payer: COMMERCIAL

## 2017-01-31 ENCOUNTER — INFUSION THERAPY VISIT (OUTPATIENT)
Dept: INFUSION THERAPY | Facility: CLINIC | Age: 57
End: 2017-01-31
Attending: INTERNAL MEDICINE
Payer: COMMERCIAL

## 2017-01-31 ENCOUNTER — HOSPITAL ENCOUNTER (OUTPATIENT)
Facility: CLINIC | Age: 57
Setting detail: SPECIMEN
Discharge: HOME | End: 2017-01-31
Attending: INTERNAL MEDICINE | Admitting: INTERNAL MEDICINE
Payer: COMMERCIAL

## 2017-01-31 VITALS
OXYGEN SATURATION: 97 % | TEMPERATURE: 97.1 F | RESPIRATION RATE: 16 BRPM | SYSTOLIC BLOOD PRESSURE: 137 MMHG | HEART RATE: 69 BPM | HEIGHT: 70 IN | WEIGHT: 212 LBS | BODY MASS INDEX: 30.35 KG/M2 | DIASTOLIC BLOOD PRESSURE: 90 MMHG

## 2017-01-31 VITALS
TEMPERATURE: 97.1 F | RESPIRATION RATE: 16 BRPM | BODY MASS INDEX: 30.35 KG/M2 | SYSTOLIC BLOOD PRESSURE: 130 MMHG | HEIGHT: 70 IN | DIASTOLIC BLOOD PRESSURE: 85 MMHG | WEIGHT: 212 LBS | HEART RATE: 85 BPM | OXYGEN SATURATION: 97 %

## 2017-01-31 DIAGNOSIS — C20 MALIGNANT NEOPLASM OF RECTUM (H): Primary | ICD-10-CM

## 2017-01-31 LAB
ALBUMIN SERPL-MCNC: 3.6 G/DL (ref 3.4–5)
ALP SERPL-CCNC: 65 U/L (ref 40–150)
ALT SERPL W P-5'-P-CCNC: 26 U/L (ref 0–70)
ANION GAP SERPL CALCULATED.3IONS-SCNC: 5 MMOL/L (ref 3–14)
AST SERPL W P-5'-P-CCNC: 18 U/L (ref 0–45)
BASOPHILS # BLD AUTO: 0 10E9/L (ref 0–0.2)
BASOPHILS NFR BLD AUTO: 1.5 %
BILIRUB SERPL-MCNC: 0.5 MG/DL (ref 0.2–1.3)
BUN SERPL-MCNC: 17 MG/DL (ref 7–30)
CALCIUM SERPL-MCNC: 8.5 MG/DL (ref 8.5–10.1)
CHLORIDE SERPL-SCNC: 106 MMOL/L (ref 94–109)
CO2 SERPL-SCNC: 27 MMOL/L (ref 20–32)
CREAT SERPL-MCNC: 0.79 MG/DL (ref 0.66–1.25)
DIFFERENTIAL METHOD BLD: ABNORMAL
EOSINOPHIL # BLD AUTO: 0.1 10E9/L (ref 0–0.7)
EOSINOPHIL NFR BLD AUTO: 5 %
ERYTHROCYTE [DISTWIDTH] IN BLOOD BY AUTOMATED COUNT: 12.7 % (ref 10–15)
GFR SERPL CREATININE-BSD FRML MDRD: NORMAL ML/MIN/1.7M2
GLUCOSE SERPL-MCNC: 99 MG/DL (ref 70–99)
HCT VFR BLD AUTO: 41.8 % (ref 40–53)
HGB BLD-MCNC: 14.5 G/DL (ref 13.3–17.7)
IMM GRANULOCYTES # BLD: 0 10E9/L (ref 0–0.4)
IMM GRANULOCYTES NFR BLD: 0 %
LYMPHOCYTES # BLD AUTO: 0.6 10E9/L (ref 0.8–5.3)
LYMPHOCYTES NFR BLD AUTO: 22.8 %
MCH RBC QN AUTO: 29.5 PG (ref 26.5–33)
MCHC RBC AUTO-ENTMCNC: 34.7 G/DL (ref 31.5–36.5)
MCV RBC AUTO: 85 FL (ref 78–100)
MONOCYTES # BLD AUTO: 0.4 10E9/L (ref 0–1.3)
MONOCYTES NFR BLD AUTO: 16.6 %
NEUTROPHILS # BLD AUTO: 1.4 10E9/L (ref 1.6–8.3)
NEUTROPHILS NFR BLD AUTO: 54.1 %
NRBC # BLD AUTO: 0 10*3/UL
NRBC BLD AUTO-RTO: 0 /100
PLATELET # BLD AUTO: 162 10E9/L (ref 150–450)
POTASSIUM SERPL-SCNC: 4 MMOL/L (ref 3.4–5.3)
PROT SERPL-MCNC: 6.8 G/DL (ref 6.8–8.8)
RBC # BLD AUTO: 4.92 10E12/L (ref 4.4–5.9)
SODIUM SERPL-SCNC: 138 MMOL/L (ref 133–144)
WBC # BLD AUTO: 2.6 10E9/L (ref 4–11)

## 2017-01-31 PROCEDURE — 99211 OFF/OP EST MAY X REQ PHY/QHP: CPT | Mod: 25

## 2017-01-31 PROCEDURE — 96375 TX/PRO/DX INJ NEW DRUG ADDON: CPT

## 2017-01-31 PROCEDURE — 25000128 H RX IP 250 OP 636: Performed by: INTERNAL MEDICINE

## 2017-01-31 PROCEDURE — 25000125 ZZHC RX 250: Performed by: INTERNAL MEDICINE

## 2017-01-31 PROCEDURE — 85025 COMPLETE CBC W/AUTO DIFF WBC: CPT | Performed by: INTERNAL MEDICINE

## 2017-01-31 PROCEDURE — 96413 CHEMO IV INFUSION 1 HR: CPT

## 2017-01-31 PROCEDURE — 99214 OFFICE O/P EST MOD 30 MIN: CPT | Performed by: INTERNAL MEDICINE

## 2017-01-31 PROCEDURE — 96368 THER/DIAG CONCURRENT INF: CPT

## 2017-01-31 PROCEDURE — 96415 CHEMO IV INFUSION ADDL HR: CPT

## 2017-01-31 PROCEDURE — 96416 CHEMO PROLONG INFUSE W/PUMP: CPT

## 2017-01-31 PROCEDURE — 80053 COMPREHEN METABOLIC PANEL: CPT | Performed by: INTERNAL MEDICINE

## 2017-01-31 PROCEDURE — 96411 CHEMO IV PUSH ADDL DRUG: CPT

## 2017-01-31 PROCEDURE — 96367 TX/PROPH/DG ADDL SEQ IV INF: CPT

## 2017-01-31 RX ORDER — METHYLPREDNISOLONE SODIUM SUCCINATE 125 MG/2ML
50 INJECTION, POWDER, LYOPHILIZED, FOR SOLUTION INTRAMUSCULAR; INTRAVENOUS ONCE
Status: COMPLETED | OUTPATIENT
Start: 2017-01-31 | End: 2017-01-31

## 2017-01-31 RX ORDER — FLUOROURACIL 50 MG/ML
400 INJECTION, SOLUTION INTRAVENOUS ONCE
Status: COMPLETED | OUTPATIENT
Start: 2017-01-31 | End: 2017-01-31

## 2017-01-31 RX ADMIN — LEUCOVORIN CALCIUM 800 MG: 100 INJECTION, POWDER, LYOPHILIZED, FOR SOLUTION INTRAMUSCULAR; INTRAVENOUS at 11:58

## 2017-01-31 RX ADMIN — FLUOROURACIL 890 MG: 50 INJECTION, SOLUTION INTRAVENOUS at 14:01

## 2017-01-31 RX ADMIN — DEXTROSE MONOHYDRATE 250 ML: 50 INJECTION, SOLUTION INTRAVENOUS at 11:39

## 2017-01-31 RX ADMIN — OXALIPLATIN 190 MG: 100 INJECTION, SOLUTION, CONCENTRATE INTRAVENOUS at 11:58

## 2017-01-31 RX ADMIN — ONDANSETRON HYDROCHLORIDE 8 MG: 2 INJECTION, SOLUTION INTRAVENOUS at 11:39

## 2017-01-31 RX ADMIN — METHYLPREDNISOLONE SODIUM SUCCINATE 50 MG: 125 INJECTION, POWDER, FOR SOLUTION INTRAMUSCULAR; INTRAVENOUS at 11:38

## 2017-01-31 ASSESSMENT — PAIN SCALES - GENERAL
PAINLEVEL: NO PAIN (0)
PAINLEVEL: NO PAIN (0)

## 2017-01-31 NOTE — PATIENT INSTRUCTIONS
-chemotherapy today  -schedule IV fluids for Friday, 2/3/17  -return to clinic in 2 weeks, and next cycle of chemotherapy

## 2017-01-31 NOTE — PROGRESS NOTES
[facility]    Hematology/Oncology Established Patient Note      Today's Date: 01/31/2017    Reason for Follow-up: rectal cancer      HISTORY OF PRESENT ILLNESS: Louie Greco is a 56 year old male who presents with rectal cancer.  He started having rectal bleeding around beginning of 2016.  He underwent colonoscopy on 7/27/16, which showed a malignant tumor in the rectum involving half of the lumen with oozing, as well as three 4-mm polyps in the ascending and transverse colon.  Pathology showed moderately differentiated adenocarcinoma, ascending colon with sessile serrated adenoma, others were tubular adenomas.  Mismatch repair expression with normal protein expression.  Staging scans showed the rectal mass, indeterminate focus in the left liver thought to be cyst, and multiple bilateral renal cysts.  MRI showed a distal rectal mass measuring 2.7 x 2.4 cm extending to the most proximal region of the anal canal.  There are a few tiny subcentimeter perirectal fat lymph nodes.  He was staged clinically [id number] (stage IIIA).  He was seen by Dr. Lee at [facility], and started neoadjuvant chemoradiation with capecitabine on 8/8/16 and completed on 9/15/16.  He was then seen by Dr. Radford, colorectal surgery at [facility].  His post chemoradiation MRI showed improvement in the size of the tumor and response in the lymph nodes.  On 12/8/16, he underwent flexible sigmoidoscopy and there was no evidence of tumor.  There was only some anterior scarring in the lumen.  Multiple biopsies were taken, which showed no evidence of carcinoma.  At that point, Dr. Radford spoke with the patient's wife, that there appears to be a complete response in the lumen, and that the patient would wish to placed on the watch and wait protocol.  The patient had expressed wishes not to have an APR, and it would not have been possible to grossly clear a margin for LAR.      He had port  placed on 1/16/17.    Current Chemotherapy:  Leucovorin 350 mg/m2 IV IV on day 1  Oxaliplatin 85 mg/m2 IV on day 1  Fluorouracil 400 mg/m2 IV on day 1  Fluorouracil 2400 mg/m2 CIV over 46 hours  Every 2 week cycles    C1D1: 1/16/17  C2D1: 1/31/17      INTERIM HISTORY: Louie comes in for follow-up today.  He says that after the first cycle of chemotherapy, he had hiccups for 3 days, but was unsure if it was from chemotherapy or from sedation from his port placement the same day.  He was prescribed Reglan, but he chose not to take it due to possible side effects.  He says that 4 days after chemotherapy, he was lethargic and felt ill that morning.  That afternoon, he came in for IV fluids, and felt much better.  He did not have any problems the rest of last week.  He denies nausea.        REVIEW OF SYSTEMS:   14 point ROS was reviewed and is negative other than as noted above in HPI.       HOME MEDICATIONS:  Current Outpatient Prescriptions   Medication Sig Dispense Refill     metoclopramide (REGLAN) 10 MG tablet Take 1 tablet (10 mg) by mouth 4 times daily (before meals and nightly) 30 tablet 0     LORazepam (ATIVAN) 0.5 MG tablet Take 1 tablet (0.5 mg) by mouth every 4 hours as needed (Anxiety, Nausea/Vomiting or Sleep) 30 tablet 2     prochlorperazine (COMPAZINE) 10 MG tablet Take 1 tablet (10 mg) by mouth every 6 hours as needed (Nausea/Vomiting) 30 tablet 2     ondansetron (ZOFRAN) 8 MG tablet Take 1 tablet (8 mg) by mouth every 8 hours as needed (Nausea/Vomiting) 10 tablet 2     IBUPROFEN PO        acetaminophen (TYLENOL) 325 MG tablet Take 2 tablets (650 mg) by mouth every 4 hours as needed for other (mild pain) 100 tablet 0     hydrocortisone 0.5 % ointment Apply topically 2 times daily       diltiazem 2% in PLO cream, FV COMPOUNDED, 2% GEL To anal opening three times daily.  Use a pea-sized amount.  Store at room temperature. 60 g 0         ALLERGIES:  Allergies   Allergen Reactions     Amoxicillin       Ampicillin Diarrhea     Demerol [Meperidine]      Nausea          PAST MEDICAL HISTORY:  Past Medical History   Diagnosis Date     Rectal cancer (H)      low rectal cancer     Childhood asthma      Nephrolithiasis               PAST SURGICAL HISTORY:  Past Surgical History   Procedure Laterality Date     Vasectomy       Colonoscopy N/A 2016     Procedure: COMBINED COLONOSCOPY, SINGLE OR MULTIPLE BIOPSY/POLYPECTOMY BY BIOPSY;  Surgeon: Chelsea Thompson MD;  Location:  GI     Hc tooth extraction w/forcep       Sigmoidoscopy flexible N/A 2016     Procedure: SIGMOIDOSCOPY FLEXIBLE;  Surgeon: Felicitas Radford MD;  Location:  OR         SOCIAL HISTORY:  Social History     Social History     Marital Status: Single     Spouse Name: N/A     Number of Children: N/A     Years of Education: N/A     Occupational History     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 No     Social History Narrative    Dad  at age  78   from [place], COPD, & HTN    Mom alive in her 70's.     for 30 years    Two adult children. 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         He notes that he had a brother who passed away at a young age of 53 from a metastatic cancer, but unknown what type, and passed away within 2 months of diagnosis.  He denies other family history of cancer in the immediate family.  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      FAMILY HISTORY:  Family History   Problem Relation Age of Onset     CANCER Brother      primary of unknown origin     Chronic Obstructive Pulmonary  "Disease Father      Hypertension Father      Family History Negative Mother      Family History Negative Brother      Glaucoma No family hx of      Macular Degeneration No family hx of      DIABETES Maternal Grandfather      Hypertension Paternal Grandmother      Hyperlipidemia Father      Hyperlipidemia Paternal Grandmother      Other Cancer Brother          PHYSICAL EXAM:  Vital signs:  /85 mmHg  Pulse 85  Temp(Src) 97.1  F (36.2  C) (Oral)  Resp 16  Ht 1.778 m (5' 10\")  Wt 96.163 kg (212 lb)  BMI 30.42 kg/m2  SpO2 97%   ECO  GENERAL/CONSTITUTIONAL: No acute distress. Accompanied by wife.  EYES: No scleral icterus.  LYMPH: No anterior cervical, posterior cervical, or supraclavicular adenopathy.   RESPIRATORY: Clear to auscultation bilaterally. No crackles or wheezing.   CARDIOVASCULAR: Regular rate and rhythm without murmurs, gallops, or rubs.  GASTROINTESTINAL: No tenderness. The patient has normal bowel sounds. No guarding.  No distention.  MUSCULOSKELETAL: Warm and well-perfused, no cyanosis, clubbing, or edema.  NEUROLOGIC: Alert, oriented, answers questions appropriately.  INTEGUMENTARY: No jaundice.  GAIT: Steady, does not use assistive device  Port in place at right upper chest.    LABS:  CBC RESULTS:   Recent Labs   Lab Test  17   1015   WBC  2.6*   RBC  4.92   HGB  14.5   HCT  41.8   MCV  85   MCH  29.5   MCHC  34.7   RDW  12.7   PLT  162     Recent Labs   Lab Test  17   1015  17   1119   NA  138  142   POTASSIUM  4.0  4.1   CHLORIDE  106  108   CO2  27  27   ANIONGAP  5  7   GLC  99  105*   BUN  17  22   CR  0.79  0.81   FRANDY  8.5  8.2*     AST       18   2017  ALT       26   2017  BILITOTAL      0.5   2017  ALBUMIN      3.6   2017  PROTTOTAL      6.8   2017   ALKPHOS       65   2017        PATHOLOGY:  16:  INTERPRETATION:   <<NORMAL MISMATCH REPAIR PROTEIN EXPRESSION:     hMLH1    hMSH2    hMSH6    hPMS2   Present   Present    Present "   Present     Diagnosis:  Normal pattern of mismatch repair protein expression (see   comment).     FINAL DIAGNOSIS:   A: Rectum, biopsy   - Moderately differentiated adenocarcinoma consistent with rectal   primary     B: Large intestine right/ascending colon, polypectomy   - Sessile serrated adenoma without cytologic dysplasia     C: Large intestine transverse colon, polypectomy   - Tubular adenoma without high grade dysplasia or malignancy     12/8/16:  FINAL DIAGNOSIS:   A. RECTUM, PROXIMAL SCAR, BIOPSY:   - Fragments of rectal mucosa with ulceration and granulation tissue   consistent with therapy-related changes, see comment   - No evidence of carcinoma     B. RECTUM, MID SCAR, BIOPSY:   - Ulcerated rectal mucosa with reactive changes   - No evidence of carcinoma     C. RECTUM, DISTAL SCAR, BIOPSY:   - Ulcerated mucosa and granulation tissue with reactive changes   - Strips of acutely inflamed anal squamous mucosa   - No evidence of carcinoma         IMAGING:  Prior imaging:    MRI pelvis 7/27/16:  FINDINGS: There is a lobulated rectal mass in transverse plane  measuring 2.7 x 2.4 cm series 801 image 17, and craniocaudal length of  4.8 cm series 601 image 16. The mass involves the distal rectum and  extends to the most superior aspect of the anal canal. The mass does  not show any clear areas of complete extension of disease through the  rectal wall. There is no associated colonic obstruction. There are a  few tiny subcentimeter lymph nodes just superior to the level of the  mass in the perirectal fat. For example, one of these is 0.5 x 0.4 cm  posteriorly towards the left series 801 image  7. There are a few others in this region ranging in size from 0.2-0.3  cm in size. There are no suspicious pelvic sidewall or inguinal lymph  nodes. Bilateral left greater than right fat-containing inguinal  hernias. This measures approximately 3.5 cm on the left, and 2.8 cm on  the right. No herniated bowel. Prominent  prostate. Prostatic  hypertrophy changes of the prostate transition zone. The prostate is  5.5 x 3.8 cm series 801 image 13. There are numerous renal cysts  identified. Low T2 signal lesion off of the lower left kidney could be  a hemorrhagic cyst. The renal cysts are suboptimally evaluated,  however.                                                                       IMPRESSION:  1. Prominent lobulated distal rectal mass extending to the most  proximal region of the anal canal. There is no clear tumor extension  through the rectal wall on the provided images.  2. A few tiny subcentimeter perirectal fat lymph nodes are technically  indeterminate.     CT c/a/p 7/27/16:  1. Rectal mass again identified consistent with the primary  malignancy.  2. Indeterminant and is very ill-defined hypodense focus within the  left liver. Cannot exclude a solid hepatic mass, including the  possibility of metastatic disease. Recommend liver MRI for further  assessment. A PET/CT could also provide further assessment.  3. A few small indeterminant pulmonary nodules. Focal irregular  opacity at the lingula is noted and is technically indeterminant,  though could represent focal scarring. A PET/CT could provide further  assessment.  4. Mild areas of bony lucency at the bilateral iliac bones is not  convincing for an aggressive process, but is felt to be technically  indeterminant. Further attention to this region on imaging followup  recommended.  5. Multiple bilateral renal cysts. An exophytic dense lesion off the  lower left kidney could just be a hemorrhagic or proteinaceous cyst.  There are other examples as well. These are indeterminant but could be  hemorrhagic cysts, but solid lesion is not excluded. Recommend renal  MRI for further assessment.     PET-CT 7/29/16:  1. Hypermetabolic mass centered over the rectum consistent with patient's known rectal malignancy.  2. Overall, no suspicious findings for metastatic disease.  3.  Multiple circumscribe low dense lesions in the liver have relative low uptake radiotracer most compatible with benign lesions such as cysts.  4. The lucency within the iliac bones has no significant abnormal uptake and is likely benign in etiology.  5. The multiple hyperdense and cystic lesions within the kidneys demonstrate no significant abnormal uptake.  6. The small opacity in the inferior lingula has low uptake of the radiotracer in is favored to represent atelectasis or scarring.  7. Some mild asymmetric uptake within the left hilum is identified which is felt to be related to a normal variant of blood pool uptake.  Findings of prior granulomatous disease in the left lower lobe are noted.    MRI pelvis 11/1/16:  1. There has been a marked response to treatment, with a corresponding  tumor regression grade of 1.  The previously seen tumor at the left  and anterior distal rectum now has appearance that is entirely from  fibrotic without clear evidence of residual tumor. No convincing  evidence of levator ani or anal sphincter involvement, though the  tumor does extending inferiorly to the level of the puborectalis sling  2. Unchanged small lymph nodes without suspicious characteristics  measuring up to 4 mm. These are indeterminate but favored as benign.      ASSESSMENT/PLAN:  Louie Greco is a 56 year old male with:    1) Rectal cancer: clinical stage [id number] (stage IIIA), s/p chemoradiation with Xeloda, and appears to have achieved complete response on imaging and biopsies.  He opted not to undergo surgery and expressed desire not to undergo APR, as he does not want a colostomy bag.  It was felt reasonable to go on the watch and wait protocol with the [facility].      He will complete 4 additional months (8 cycles) of chemotherapy with FOLFOX.  Will discuss with Dr. Radford regarding endoscopic surveillance, as per the watch and wait protocol.      He tolerated the first cycle of chemotherapy  well.    -C2D1 of FOLFOX today  -schedule IV fluids later this week  -RTC in 2 weeks    2) Hiccups: Occurred for 3 days after chemotherapy.  May have been due to dexamethasone, although he says he has similar symptoms after receiving sedation for dental procedure previously.    -switched steroids to methylprednisolone  -if symptoms recur, can consider cutting back on steroid dose   -he has Reglan, but he chose not to take it due to possible side effects      I spent a total of 25 minutes with the patient, with over >50% of the time in counseling and/or coordination of care.         Agueda Manley MD  Hematology/Oncology  [facility]

## 2017-01-31 NOTE — PROGRESS NOTES
"Louie Greco is a 56 year old male who presents for:  Chief Complaint   Patient presents with     Oncology Clinic Visit     Ret Rectal Ca        Initial Vitals:  /85 mmHg  Pulse 85  Temp(Src) 97.1  F (36.2  C) (Oral)  Resp 16  Ht 1.778 m (5' 10\")  Wt 96.163 kg (212 lb)  BMI 30.42 kg/m2  SpO2 97% Estimated body mass index is 30.42 kg/(m^2) as calculated from the following:    Height as of this encounter: 1.778 m (5' 10\").    Weight as of this encounter: 96.163 kg (212 lb).. Body surface area is 2.18 meters squared. BP completed using cuff size: regular  No Pain (0) No LMP for male patient. Allergies and medications reviewed.     Medications: Medication refills not needed today.  Pharmacy name entered into [employer]:    [facility], [place] - [address] [facility], [place] - [address]    Comments: None    6 minutes for nursing intake (face to face time)   Gabriella Queen CMA    DISCHARGE PLAN:  1.) Patient to be scheduled for pump takedown 2/2/17 and IV fluids 2/3/17.  2.) Patient to be scheduled for C3D1 Folfox with an exam with Dr. Manley 2/14/17.  Next appointments: See patient instruction section  Departure Mode: Ambulatory  Accompanied by: wife  8 minutes for nursing discharge (face to face time)   Juani Mcgrath RN            "

## 2017-01-31 NOTE — PROGRESS NOTES
"Infusion Nursing Note:  Louie Greco presents today for C2D1 Folfox.    Patient seen by provider today: Yes: Dr. Manley   present during visit today: Not Applicable.    Note: N/A.    Intravenous Access:  Labs drawn without difficulty.  Implanted Port.    Treatment Conditions:  HGB     14.5   1/31/2017  WBC      2.6   1/31/2017   ANEU      1.4   1/31/2017  PLT      162   1/31/2017     NA      138   1/31/2017                POTASSIUM      4.0   1/31/2017        No results found for this basename: mag         CR     0.79   1/31/2017                FRANDY      8.5   1/31/2017             BILITOTAL      0.5   1/31/2017        ALBUMIN      3.6   1/31/2017                 ALT       26   1/31/2017        AST       18   1/31/2017  Results reviewed, labs MET treatment parameters, ok to proceed with treatment.      Post Infusion Assessment:  Patient tolerated infusion without incident.  Blood return noted pre and post infusion.  Site patent and intact, free from redness, edema or discomfort.  No evidence of extravasations.    Discharge Plan:   AVS to patient via MYCHART.  Patient will return 2/2/17 for next appointment.   Patient discharged in stable condition accompanied by: self.  Departure Mode: Ambulatory.    Prior to discharge: Port is secured in place with tegaderm and flushed with 10cc NS with positive blood return noted.  Continuous home infusion Dosi-Fuser pump connected.    All connectors secured in place and clamps taped open.    Pump started, \"running\" noted on display (CADD): Not Applicable.  Patient instructed to call our clinic or [facility] with any questions or concerns at home.  Patient verbalized understanding.    Patient set up for pump disconnect at our clinic on 2/2/17.          Chelsea Cm RN                        "

## 2017-01-31 NOTE — MR AVS SNAPSHOT
After Visit Summary   1/31/2017    Louie Greco    MRN: [id number]           Patient Information     Date Of Birth          1960        Visit Information        Provider Department      1/31/2017 10:15 AM Agueda Manley MD [facility] [place], operated [facility]        Care Instructions    -chemotherapy today  -schedule IV fluids for Friday, 2/3/17  -return to clinic in 2 weeks, and next cycle of chemotherapy        Follow-ups after your visit        Your next 10 appointments already scheduled     Feb 02, 2017  2:00 PM   Level 1 with [facility] CHAIR 6   [facility] ([facility])    [facility] [place]  [address]  [place] [address]   [phone]            Feb 03, 2017  2:30 PM   Level 2 with [place] CHAIR 6   [facility] ([facility])    [facility] [place]  [address]  [place] [address]   [phone]            Feb 14, 2017  9:30 AM   Level 5 with NORTH CHAIR 5   [facility] ([facility])    [facility] [place]  [address]  [place] [address] [phone]            Feb 14, 2017  9:45 AM   Return Visit with Agueda Manley MD   [facility] [place] ([facility])    [facility]  [address]  [place] [address]   [phone]            Mar 06, 2017  3:15 PM   RETURN RETINA with Yara Smalls MD   [facility] ([facility])    [address]  [address]  [address]  [facility] [id number] [phone]              Who to contact     If you have questions or need follow up information about today's clinic visit or your schedule please contact [facility] directly at [phone].  Normal or non-critical lab and imaging results will be communicated to you by Ksenia  "letter or phone within 4 business days after the clinic has received the results. If you do not hear from us within 7 days, please contact the clinic through hhgregg or phone. If you have a critical or abnormal lab result, we will notify you by phone as soon as possible.  Submit refill requests through hhgregg or call your pharmacy and they will forward the refill request to us. Please allow 3 business days for your refill to be completed.          Additional Information About Your Visit        [employer] Information     hhgregg gives you secure access to your electronic health record. If you see a primary care provider, you can also send messages to your care team and make appointments. If you have questions, please call your primary care clinic.  If you do not have a primary care provider, please call [phone] and they will assist you.        Care EveryWhere ID     This is your Care EveryWhere ID. This could be used by other organizations to access your [place] medical records  [id number]        Your Vitals Were     Pulse Temperature Respirations Height BMI (Body Mass Index) Pulse Oximetry    85 97.1  F (36.2  C) (Oral) 16 1.778 m (5' 10\") 30.42 kg/m2 97%       Blood Pressure from Last 3 Encounters:   01/31/17 130/85   01/31/17 130/85   01/20/17 131/86    Weight from Last 3 Encounters:   01/31/17 96.163 kg (212 lb)   01/31/17 96.163 kg (212 lb)   01/16/17 98.703 kg (217 lb 9.6 oz)              Today, you had the following     No orders found for display       Primary Care Provider Office Phone # Fax #    Ayden Peralta [phone]       [facility] [address] [facility] [address]        Thank you!     Thank you for choosing [facility]  for your care. Our goal is always to provide you with excellent care. Hearing back from our patients is one way we can continue to improve our services. Please take a few minutes to complete the written survey that you may " receive in the mail after your visit with us. Thank you!             Your Updated Medication List - Protect others around you: Learn how to safely use, store and throw away your medicines at www.disposemymeds.org.          This list is accurate as of: 1/31/17 12:00 PM.  Always use your most recent med list.                   Brand Name Dispense Instructions for use    acetaminophen 325 MG tablet    TYLENOL    100 tablet    Take 2 tablets (650 mg) by mouth every 4 hours as needed for other (mild pain)       diltiazem 2% in PLO cream (FV COMPOUNDED) 2% Gel     60 g    To anal opening three times daily.  Use a pea-sized amount.  Store at room temperature.       hydrocortisone 0.5 % ointment      Apply topically 2 times daily       IBUPROFEN PO          LORazepam 0.5 MG tablet    ATIVAN    30 tablet    Take 1 tablet (0.5 mg) by mouth every 4 hours as needed (Anxiety, Nausea/Vomiting or Sleep)       metoclopramide 10 MG tablet    REGLAN    30 tablet    Take 1 tablet (10 mg) by mouth 4 times daily (before meals and nightly)       ondansetron 8 MG tablet    ZOFRAN    10 tablet    Take 1 tablet (8 mg) by mouth every 8 hours as needed (Nausea/Vomiting)       prochlorperazine 10 MG tablet    COMPAZINE    30 tablet    Take 1 tablet (10 mg) by mouth every 6 hours as needed (Nausea/Vomiting)

## 2017-01-31 NOTE — Clinical Note
"January 31, 2017      RE: Louie Greco  [address] [place] [address]      Louie Greco is a 56 year old male who presents for:  Chief Complaint   Patient presents with     Oncology Clinic Visit     Ret Rectal Ca        Initial Vitals:  /85 mmHg  Pulse 85  Temp(Src) 97.1  F (36.2  C) (Oral)  Resp 16  Ht 1.778 m (5' 10\")  Wt 96.163 kg (212 lb)  BMI 30.42 kg/m2  SpO2 97% Estimated body mass index is 30.42 kg/(m^2) as calculated from the following:    Height as of this encounter: 1.778 m (5' 10\").    Weight as of this encounter: 96.163 kg (212 lb).. Body surface area is 2.18 meters squared. BP completed using cuff size: regular  No Pain (0) No LMP for male patient. Allergies and medications reviewed.     Medications: Medication refills not needed today.  Pharmacy name entered into [employer]:    [facility] [address] [place] [address] - [address] PHARMACY [facility] [place], [place] - [address]    Comments: None    6 minutes for nursing intake (face to face time)   Gabriella Queen CMA              [facility]    Hematology/Oncology Established Patient Note      Today's Date: 01/31/2017    Reason for Follow-up: rectal cancer      HISTORY OF PRESENT ILLNESS: Louie Greco is a 56 year old male who presents with rectal cancer.  He started having rectal bleeding around beginning of 2016.  He underwent colonoscopy on 7/27/16, which showed a malignant tumor in the rectum involving half of the lumen with oozing, as well as three 4-mm polyps in the ascending and transverse colon.  Pathology showed moderately differentiated adenocarcinoma, ascending colon with sessile serrated adenoma, others were tubular adenomas.  Mismatch repair expression with normal protein expression.  Staging scans showed the rectal mass, indeterminate focus in the left liver thought to be cyst, and multiple bilateral renal cysts.  MRI showed a distal rectal mass measuring 2.7 x 2.4 cm " extending to the most proximal region of the anal canal.  There are a few tiny subcentimeter perirectal fat lymph nodes.  He was staged clinically [id number] (stage IIIA).  He was seen by Dr. Lee at [facility], and started neoadjuvant chemoradiation with capecitabine on 8/8/16 and completed on 9/15/16.  He was then seen by Dr. Radford, colorectal surgery at [facility].  His post chemoradiation MRI showed improvement in the size of the tumor and response in the lymph nodes.  On 12/8/16, he underwent flexible sigmoidoscopy and there was no evidence of tumor.  There was only some anterior scarring in the lumen.  Multiple biopsies were taken, which showed no evidence of carcinoma.  At that point, Dr. Radford spoke with the patient's wife, that there appears to be a complete response in the lumen, and that the patient would wish to placed on the watch and wait protocol.  The patient had expressed wishes not to have an APR, and it would not have been possible to grossly clear a margin for LAR.      He had port placed on 1/16/17.    Current Chemotherapy:  Leucovorin 350 mg/m2 IV IV on day 1  Oxaliplatin 85 mg/m2 IV on day 1  Fluorouracil 400 mg/m2 IV on day 1  Fluorouracil 2400 mg/m2 CIV over 46 hours  Every 2 week cycles    C1D1: 1/16/17  C2D1: 1/31/17      INTERIM HISTORY: Louie comes in for follow-up today.  He says that after the first cycle of chemotherapy, he had hiccups for 3 days, but was unsure if it was from chemotherapy or from sedation from his port placement the same day.  He was prescribed Reglan, but he chose not to take it due to possible side effects.  He says that 4 days after chemotherapy, he was lethargic and felt ill that morning.  That afternoon, he came in for IV fluids, and felt much better.  He did not have any problems the rest of last week.  He denies nausea.        REVIEW OF SYSTEMS:   14 point ROS was reviewed and is negative other than as noted above in HPI.        HOME MEDICATIONS:  Current Outpatient Prescriptions   Medication Sig Dispense Refill     metoclopramide (REGLAN) 10 MG tablet Take 1 tablet (10 mg) by mouth 4 times daily (before meals and nightly) 30 tablet 0     LORazepam (ATIVAN) 0.5 MG tablet Take 1 tablet (0.5 mg) by mouth every 4 hours as needed (Anxiety, Nausea/Vomiting or Sleep) 30 tablet 2     prochlorperazine (COMPAZINE) 10 MG tablet Take 1 tablet (10 mg) by mouth every 6 hours as needed (Nausea/Vomiting) 30 tablet 2     ondansetron (ZOFRAN) 8 MG tablet Take 1 tablet (8 mg) by mouth every 8 hours as needed (Nausea/Vomiting) 10 tablet 2     IBUPROFEN PO        acetaminophen (TYLENOL) 325 MG tablet Take 2 tablets (650 mg) by mouth every 4 hours as needed for other (mild pain) 100 tablet 0     hydrocortisone 0.5 % ointment Apply topically 2 times daily       diltiazem 2% in PLO cream, FV COMPOUNDED, 2% GEL To anal opening three times daily.  Use a pea-sized amount.  Store at room temperature. 60 g 0         ALLERGIES:  Allergies   Allergen Reactions     Amoxicillin      Ampicillin Diarrhea     Demerol [Meperidine]      Nausea          PAST MEDICAL HISTORY:  Past Medical History   Diagnosis Date     Rectal cancer (H)      low rectal cancer     Childhood asthma      Nephrolithiasis      1990         PAST SURGICAL HISTORY:  Past Surgical History   Procedure Laterality Date     Vasectomy       Colonoscopy N/A 7/27/2016     Procedure: COMBINED COLONOSCOPY, SINGLE OR MULTIPLE BIOPSY/POLYPECTOMY BY BIOPSY;  Surgeon: Chelsea Thompson MD;  Location:  GI     Hc tooth extraction w/forcep       Sigmoidoscopy flexible N/A 12/8/2016     Procedure: SIGMOIDOSCOPY FLEXIBLE;  Surgeon: Felicitas Radford MD;  Location:  OR         SOCIAL HISTORY:  Social History     Social History     Marital Status: Single     Spouse Name: N/A     Number of Children: N/A     Years of Education: N/A     Occupational History     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 No     Social History Narrative    Dad  at age  78   from B[place], COPD, & HTN    Mom alive in her 70's.     for 30 years    Two adult children. 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         He notes that he had a brother who passed away at a young age of 53 from a metastatic cancer, but unknown what type, and passed away within 2 months of diagnosis.  He denies other family history of cancer in the immediate family.  Louie works as a  at a [employer] school.      FAMILY HISTORY:  Family History   Problem Relation Age of Onset     CANCER Brother      primary of unknown origin     Chronic Obstructive Pulmonary Disease Father      Hypertension Father      Family History Negative Mother      Family History Negative Brother      Glaucoma No family hx of      Macular Degeneration No family hx of      DIABETES Maternal Grandfather      Hypertension Paternal Grandmother      Hyperlipidemia Father      Hyperlipidemia Paternal Grandmother      Other Cancer Brother          PHYSICAL EXAM:  Vital signs:  /85 mmHg  Pulse 85  Temp(Src) 97.1  F (36.2  C) (Oral)  Resp 16  Ht 1.778 m (5' 10\")  Wt 96.163 kg (212 lb)  BMI 30.42 kg/m2  SpO2 97%   ECO  GENERAL/CONSTITUTIONAL: No acute distress. Accompanied by wife.  EYES: No scleral icterus.  LYMPH: No anterior cervical, posterior cervical, or supraclavicular adenopathy.   RESPIRATORY: Clear to auscultation bilaterally. No crackles or wheezing.   CARDIOVASCULAR: Regular rate and rhythm without murmurs, gallops, or rubs.  GASTROINTESTINAL: No " tenderness. The patient has normal bowel sounds. No guarding.  No distention.  MUSCULOSKELETAL: Warm and well-perfused, no cyanosis, clubbing, or edema.  NEUROLOGIC: Alert, oriented, answers questions appropriately.  INTEGUMENTARY: No jaundice.  GAIT: Steady, does not use assistive device  Port in place at right upper chest.    LABS:  CBC RESULTS:   Recent Labs   Lab Test  01/31/17   1015   WBC  2.6*   RBC  4.92   HGB  14.5   HCT  41.8   MCV  85   MCH  29.5   MCHC  34.7   RDW  12.7   PLT  162     Recent Labs   Lab Test  01/31/17   1015  01/16/17   1119   NA  138  142   POTASSIUM  4.0  4.1   CHLORIDE  106  108   CO2  27  27   ANIONGAP  5  7   GLC  99  105*   BUN  17  22   CR  0.79  0.81   FRANDY  8.5  8.2*     AST       18   1/31/2017  ALT       26   1/31/2017  BILITOTAL      0.5   1/31/2017  ALBUMIN      3.6   1/31/2017  PROTTOTAL      6.8   1/31/2017   ALKPHOS       65   1/31/2017        PATHOLOGY:  7/27/16:  INTERPRETATION:   <<NORMAL MISMATCH REPAIR PROTEIN EXPRESSION:     hMLH1    hMSH2    hMSH6    hPMS2   Present   Present    Present   Present     Diagnosis:  Normal pattern of mismatch repair protein expression (see   comment).     FINAL DIAGNOSIS:   A: Rectum, biopsy   - Moderately differentiated adenocarcinoma consistent with rectal   primary     B: Large intestine right/ascending colon, polypectomy   - Sessile serrated adenoma without cytologic dysplasia     C: Large intestine transverse colon, polypectomy   - Tubular adenoma without high grade dysplasia or malignancy     12/8/16:  FINAL DIAGNOSIS:   A. RECTUM, PROXIMAL SCAR, BIOPSY:   - Fragments of rectal mucosa with ulceration and granulation tissue   consistent with therapy-related changes, see comment   - No evidence of carcinoma     B. RECTUM, MID SCAR, BIOPSY:   - Ulcerated rectal mucosa with reactive changes   - No evidence of carcinoma     C. RECTUM, DISTAL SCAR, BIOPSY:   - Ulcerated mucosa and granulation tissue with reactive changes   - Strips of  acutely inflamed anal squamous mucosa   - No evidence of carcinoma         IMAGING:  Prior imaging:    MRI pelvis 7/27/16:  FINDINGS: There is a lobulated rectal mass in transverse plane  measuring 2.7 x 2.4 cm series 801 image 17, and craniocaudal length of  4.8 cm series 601 image 16. The mass involves the distal rectum and  extends to the most superior aspect of the anal canal. The mass does  not show any clear areas of complete extension of disease through the  rectal wall. There is no associated colonic obstruction. There are a  few tiny subcentimeter lymph nodes just superior to the level of the  mass in the perirectal fat. For example, one of these is 0.5 x 0.4 cm  posteriorly towards the left series 801 image  7. There are a few others in this region ranging in size from 0.2-0.3  cm in size. There are no suspicious pelvic sidewall or inguinal lymph  nodes. Bilateral left greater than right fat-containing inguinal  hernias. This measures approximately 3.5 cm on the left, and 2.8 cm on  the right. No herniated bowel. Prominent prostate. Prostatic  hypertrophy changes of the prostate transition zone. The prostate is  5.5 x 3.8 cm series 801 image 13. There are numerous renal cysts  identified. Low T2 signal lesion off of the lower left kidney could be  a hemorrhagic cyst. The renal cysts are suboptimally evaluated,  however.                                                                       IMPRESSION:  1. Prominent lobulated distal rectal mass extending to the most  proximal region of the anal canal. There is no clear tumor extension  through the rectal wall on the provided images.  2. A few tiny subcentimeter perirectal fat lymph nodes are technically  indeterminate.     CT c/a/p 7/27/16:  1. Rectal mass again identified consistent with the primary  malignancy.  2. Indeterminant and is very ill-defined hypodense focus within the  left liver. Cannot exclude a solid hepatic mass, including the  possibility  of metastatic disease. Recommend liver MRI for further  assessment. A PET/CT could also provide further assessment.  3. A few small indeterminant pulmonary nodules. Focal irregular  opacity at the lingula is noted and is technically indeterminant,  though could represent focal scarring. A PET/CT could provide further  assessment.  4. Mild areas of bony lucency at the bilateral iliac bones is not  convincing for an aggressive process, but is felt to be technically  indeterminant. Further attention to this region on imaging followup  recommended.  5. Multiple bilateral renal cysts. An exophytic dense lesion off the  lower left kidney could just be a hemorrhagic or proteinaceous cyst.  There are other examples as well. These are indeterminant but could be  hemorrhagic cysts, but solid lesion is not excluded. Recommend renal  MRI for further assessment.     PET-CT 7/29/16:  1. Hypermetabolic mass centered over the rectum consistent with patient's known rectal malignancy.  2. Overall, no suspicious findings for metastatic disease.  3. Multiple circumscribe low dense lesions in the liver have relative low uptake radiotracer most compatible with benign lesions such as cysts.  4. The lucency within the iliac bones has no significant abnormal uptake and is likely benign in etiology.  5. The multiple hyperdense and cystic lesions within the kidneys demonstrate no significant abnormal uptake.  6. The small opacity in the inferior lingula has low uptake of the radiotracer in is favored to represent atelectasis or scarring.  7. Some mild asymmetric uptake within the left hilum is identified which is felt to be related to a normal variant of blood pool uptake.  Findings of prior granulomatous disease in the left lower lobe are noted.    MRI pelvis 11/1/16:  1. There has been a marked response to treatment, with a corresponding  tumor regression grade of 1.  The previously seen tumor at the left  and anterior distal rectum now has  appearance that is entirely from  fibrotic without clear evidence of residual tumor. No convincing  evidence of levator ani or anal sphincter involvement, though the  tumor does extending inferiorly to the level of the puborectalis sling  2. Unchanged small lymph nodes without suspicious characteristics  measuring up to 4 mm. These are indeterminate but favored as benign.      ASSESSMENT/PLAN:  Louie Greco is a 56 year old male with:    1) Rectal cancer: clinical stage [id number] (stage IIIA), s/p chemoradiation with Xeloda, and appears to have achieved complete response on imaging and biopsies.  He opted not to undergo surgery and expressed desire not to undergo APR, as he does not want a colostomy bag.  It was felt reasonable to go on the watch and wait protocol with the [facility].      He will complete 4 additional months (8 cycles) of chemotherapy with FOLFOX.  Will discuss with Dr. Radford regarding endoscopic surveillance, as per the watch and wait protocol.      He tolerated the first cycle of chemotherapy well.    -C2D1 of FOLFOX today  -schedule IV fluids later this week  -RTC in 2 weeks    2) Hiccups: Occurred for 3 days after chemotherapy.  May have been due to dexamethasone, although he says he has similar symptoms after receiving sedation for dental procedure previously.    -switched steroids to methylprednisolone  -if symptoms recur, can consider cutting back on steroid dose   -he has Reglan, but he chose not to take it due to possible side effects      I spent a total of 25 minutes with the patient, with over >50% of the time in counseling and/or coordination of care.         Agueda Manley MD  Hematology/Oncology  [facility]        Agueda Manley MD

## 2017-02-02 ENCOUNTER — INFUSION THERAPY VISIT (OUTPATIENT)
Dept: INFUSION THERAPY | Facility: CLINIC | Age: 57
End: 2017-02-02
Attending: INTERNAL MEDICINE
Payer: COMMERCIAL

## 2017-02-02 VITALS
DIASTOLIC BLOOD PRESSURE: 85 MMHG | SYSTOLIC BLOOD PRESSURE: 145 MMHG | RESPIRATION RATE: 16 BRPM | TEMPERATURE: 97.1 F | HEART RATE: 87 BPM

## 2017-02-02 DIAGNOSIS — C20 MALIGNANT NEOPLASM OF RECTUM (H): Primary | ICD-10-CM

## 2017-02-02 PROCEDURE — 25000128 H RX IP 250 OP 636: Performed by: INTERNAL MEDICINE

## 2017-02-02 PROCEDURE — 96360 HYDRATION IV INFUSION INIT: CPT

## 2017-02-02 RX ORDER — HEPARIN SODIUM (PORCINE) LOCK FLUSH IV SOLN 100 UNIT/ML 100 UNIT/ML
5 SOLUTION INTRAVENOUS ONCE
Status: COMPLETED | OUTPATIENT
Start: 2017-02-02 | End: 2017-02-02

## 2017-02-02 RX ADMIN — SODIUM CHLORIDE, PRESERVATIVE FREE 5 ML: 5 INJECTION INTRAVENOUS at 15:58

## 2017-02-02 RX ADMIN — SODIUM CHLORIDE 1000 ML: 9 INJECTION, SOLUTION INTRAVENOUS at 14:52

## 2017-02-02 ASSESSMENT — PAIN SCALES - GENERAL: PAINLEVEL: NO PAIN (0)

## 2017-02-02 NOTE — PROGRESS NOTES
Infusion Nursing Note:  Louie Greco presents today for Pump Disconnect and IVF.    Patient seen by provider today: No   present during visit today: Not Applicable.    Note: Pt tolerated pump infusion well with no c/o; Dorsi infuser pump disconnected; positive blood return noted.     Intravenous Access:  Implanted Port.    Treatment Conditions:  Not Applicable.      Post Infusion Assessment:  Patient tolerated infusion without incident.  Blood return noted pre and post infusion.  Site patent and intact, free from redness, edema or discomfort.  No evidence of extravasations.  Access discontinued per protocol.    Discharge Plan:   [facility]  Patient will return 2/3/17 for next appointment.   Patient discharged in stable condition accompanied by: self.  Departure Mode: Ambulatory.    Chelsea Cm RN

## 2017-02-14 ENCOUNTER — ONCOLOGY VISIT (OUTPATIENT)
Dept: ONCOLOGY | Facility: CLINIC | Age: 57
End: 2017-02-14
Attending: INTERNAL MEDICINE
Payer: COMMERCIAL

## 2017-02-14 ENCOUNTER — HOSPITAL ENCOUNTER (OUTPATIENT)
Facility: CLINIC | Age: 57
Setting detail: SPECIMEN
Discharge: HOME | End: 2017-02-14
Attending: INTERNAL MEDICINE | Admitting: INTERNAL MEDICINE
Payer: COMMERCIAL

## 2017-02-14 ENCOUNTER — INFUSION THERAPY VISIT (OUTPATIENT)
Dept: INFUSION THERAPY | Facility: CLINIC | Age: 57
End: 2017-02-14
Attending: INTERNAL MEDICINE
Payer: COMMERCIAL

## 2017-02-14 VITALS
SYSTOLIC BLOOD PRESSURE: 117 MMHG | HEIGHT: 70 IN | WEIGHT: 213.6 LBS | DIASTOLIC BLOOD PRESSURE: 80 MMHG | RESPIRATION RATE: 18 BRPM | TEMPERATURE: 97.9 F | OXYGEN SATURATION: 96 % | HEART RATE: 92 BPM | BODY MASS INDEX: 30.58 KG/M2

## 2017-02-14 VITALS
WEIGHT: 213.6 LBS | SYSTOLIC BLOOD PRESSURE: 133 MMHG | HEART RATE: 78 BPM | HEIGHT: 70 IN | RESPIRATION RATE: 18 BRPM | OXYGEN SATURATION: 96 % | DIASTOLIC BLOOD PRESSURE: 92 MMHG | TEMPERATURE: 98 F | BODY MASS INDEX: 30.58 KG/M2

## 2017-02-14 DIAGNOSIS — C20 MALIGNANT NEOPLASM OF RECTUM (H): Primary | ICD-10-CM

## 2017-02-14 LAB
ALBUMIN SERPL-MCNC: 3.6 G/DL (ref 3.4–5)
ALP SERPL-CCNC: 65 U/L (ref 40–150)
ALT SERPL W P-5'-P-CCNC: 35 U/L (ref 0–70)
ANION GAP SERPL CALCULATED.3IONS-SCNC: 5 MMOL/L (ref 3–14)
AST SERPL W P-5'-P-CCNC: 28 U/L (ref 0–45)
BASOPHILS # BLD AUTO: 0 10E9/L (ref 0–0.2)
BASOPHILS NFR BLD AUTO: 0.8 %
BILIRUB SERPL-MCNC: 0.6 MG/DL (ref 0.2–1.3)
BUN SERPL-MCNC: 20 MG/DL (ref 7–30)
CALCIUM SERPL-MCNC: 8.3 MG/DL (ref 8.5–10.1)
CEA SERPL-MCNC: 1 UG/L (ref 0–2.5)
CHLORIDE SERPL-SCNC: 106 MMOL/L (ref 94–109)
CO2 SERPL-SCNC: 28 MMOL/L (ref 20–32)
CREAT SERPL-MCNC: 0.78 MG/DL (ref 0.66–1.25)
DIFFERENTIAL METHOD BLD: ABNORMAL
EOSINOPHIL # BLD AUTO: 0.1 10E9/L (ref 0–0.7)
EOSINOPHIL NFR BLD AUTO: 5 %
ERYTHROCYTE [DISTWIDTH] IN BLOOD BY AUTOMATED COUNT: 13.5 % (ref 10–15)
GFR SERPL CREATININE-BSD FRML MDRD: ABNORMAL ML/MIN/1.7M2
GLUCOSE SERPL-MCNC: 113 MG/DL (ref 70–99)
HCT VFR BLD AUTO: 39.4 % (ref 40–53)
HGB BLD-MCNC: 13.7 G/DL (ref 13.3–17.7)
IMM GRANULOCYTES # BLD: 0 10E9/L (ref 0–0.4)
IMM GRANULOCYTES NFR BLD: 0 %
LYMPHOCYTES # BLD AUTO: 0.5 10E9/L (ref 0.8–5.3)
LYMPHOCYTES NFR BLD AUTO: 20.2 %
MCH RBC QN AUTO: 29.5 PG (ref 26.5–33)
MCHC RBC AUTO-ENTMCNC: 34.8 G/DL (ref 31.5–36.5)
MCV RBC AUTO: 85 FL (ref 78–100)
MONOCYTES # BLD AUTO: 0.5 10E9/L (ref 0–1.3)
MONOCYTES NFR BLD AUTO: 19 %
NEUTROPHILS # BLD AUTO: 1.3 10E9/L (ref 1.6–8.3)
NEUTROPHILS NFR BLD AUTO: 55 %
NRBC # BLD AUTO: 0 10*3/UL
NRBC BLD AUTO-RTO: 0 /100
PLATELET # BLD AUTO: 111 10E9/L (ref 150–450)
POTASSIUM SERPL-SCNC: 4 MMOL/L (ref 3.4–5.3)
PROT SERPL-MCNC: 6.4 G/DL (ref 6.8–8.8)
RBC # BLD AUTO: 4.65 10E12/L (ref 4.4–5.9)
SODIUM SERPL-SCNC: 139 MMOL/L (ref 133–144)
WBC # BLD AUTO: 2.4 10E9/L (ref 4–11)

## 2017-02-14 PROCEDURE — 25000125 ZZHC RX 250: Performed by: INTERNAL MEDICINE

## 2017-02-14 PROCEDURE — 96415 CHEMO IV INFUSION ADDL HR: CPT

## 2017-02-14 PROCEDURE — 80053 COMPREHEN METABOLIC PANEL: CPT | Performed by: INTERNAL MEDICINE

## 2017-02-14 PROCEDURE — 85025 COMPLETE CBC W/AUTO DIFF WBC: CPT | Performed by: INTERNAL MEDICINE

## 2017-02-14 PROCEDURE — 96375 TX/PRO/DX INJ NEW DRUG ADDON: CPT

## 2017-02-14 PROCEDURE — 25000128 H RX IP 250 OP 636: Performed by: INTERNAL MEDICINE

## 2017-02-14 PROCEDURE — 96413 CHEMO IV INFUSION 1 HR: CPT

## 2017-02-14 PROCEDURE — 99214 OFFICE O/P EST MOD 30 MIN: CPT | Performed by: INTERNAL MEDICINE

## 2017-02-14 PROCEDURE — 96416 CHEMO PROLONG INFUSE W/PUMP: CPT

## 2017-02-14 PROCEDURE — 96411 CHEMO IV PUSH ADDL DRUG: CPT

## 2017-02-14 PROCEDURE — 96367 TX/PROPH/DG ADDL SEQ IV INF: CPT

## 2017-02-14 PROCEDURE — 82378 CARCINOEMBRYONIC ANTIGEN: CPT | Performed by: INTERNAL MEDICINE

## 2017-02-14 PROCEDURE — 96368 THER/DIAG CONCURRENT INF: CPT

## 2017-02-14 RX ORDER — METHYLPREDNISOLONE SODIUM SUCCINATE 125 MG/2ML
50 INJECTION, POWDER, LYOPHILIZED, FOR SOLUTION INTRAMUSCULAR; INTRAVENOUS ONCE
Status: COMPLETED | OUTPATIENT
Start: 2017-02-14 | End: 2017-02-14

## 2017-02-14 RX ORDER — FLUOROURACIL 50 MG/ML
400 INJECTION, SOLUTION INTRAVENOUS ONCE
Status: COMPLETED | OUTPATIENT
Start: 2017-02-14 | End: 2017-02-14

## 2017-02-14 RX ADMIN — DEXTROSE MONOHYDRATE 250 ML: 50 INJECTION, SOLUTION INTRAVENOUS at 11:12

## 2017-02-14 RX ADMIN — METHYLPREDNISOLONE SODIUM SUCCINATE 50 MG: 125 INJECTION, POWDER, FOR SOLUTION INTRAMUSCULAR; INTRAVENOUS at 11:54

## 2017-02-14 RX ADMIN — ONDANSETRON HYDROCHLORIDE 8 MG: 2 INJECTION, SOLUTION INTRAVENOUS at 11:12

## 2017-02-14 RX ADMIN — FLUOROURACIL 890 MG: 50 INJECTION, SOLUTION INTRAVENOUS at 15:01

## 2017-02-14 RX ADMIN — LEUCOVORIN CALCIUM 800 MG: 200 INJECTION, POWDER, LYOPHILIZED, FOR SOLUTION INTRAMUSCULAR; INTRAVENOUS at 12:01

## 2017-02-14 RX ADMIN — OXALIPLATIN 190 MG: 5 INJECTION, SOLUTION, CONCENTRATE INTRAVENOUS at 12:01

## 2017-02-14 ASSESSMENT — PAIN SCALES - GENERAL: PAINLEVEL: NO PAIN (0)

## 2017-02-14 NOTE — LETTER
February 14, 2017      RE: Louie Greco  [address] [address]      [facility]    Hematology/Oncology Established Patient Note      Today's Date: 02/14/2017    Reason for Follow-up: rectal cancer      HISTORY OF PRESENT ILLNESS: Louie Greco is a 56 year old male who presents with rectal cancer.  He started having rectal bleeding around beginning of 2016.  He underwent colonoscopy on 7/27/16, which showed a malignant tumor in the rectum involving half of the lumen with oozing, as well as three 4-mm polyps in the ascending and transverse colon.  Pathology showed moderately differentiated adenocarcinoma, ascending colon with sessile serrated adenoma, others were tubular adenomas.  Mismatch repair expression with normal protein expression.  Staging scans showed the rectal mass, indeterminate focus in the left liver thought to be cyst, and multiple bilateral renal cysts.  MRI showed a distal rectal mass measuring 2.7 x 2.4 cm extending to the most proximal region of the anal canal.  There are a few tiny subcentimeter perirectal fat lymph nodes.  He was staged clinically [id number] (stage IIIA).  He was seen by Dr. Lee at [facility], and started neoadjuvant chemoradiation with capecitabine on 8/8/16 and completed on 9/15/16.  He was then seen by Dr. Radford, colorectal surgery at [facility].  His post chemoradiation MRI showed improvement in the size of the tumor and response in the lymph nodes.  On 12/8/16, he underwent flexible sigmoidoscopy and there was no evidence of tumor.  There was only some anterior scarring in the lumen.  Multiple biopsies were taken, which showed no evidence of carcinoma.  At that point, Dr. Radford spoke with the patient's wife, that there appears to be a complete response in the lumen, and that the patient would wish to placed on the watch and wait protocol.  The patient had expressed wishes not to have an APR, and it would  not have been possible to grossly clear a margin for LAR.      He had port placed on 1/16/17.    Current Chemotherapy:  Leucovorin 350 mg/m2 IV IV on day 1  Oxaliplatin 85 mg/m2 IV on day 1  Fluorouracil 400 mg/m2 IV on day 1  Fluorouracil 2400 mg/m2 CIV over 46 hours  Every 2 week cycles    C1D1: 1/16/17  C2D1: 1/31/17  C3D1: 2/14/17  C4D1: anticipated for 2/28/17      INTERIM HISTORY: Louie comes in for follow-up today.  He did not have hiccups with this last cycle.  He notes a bit more queasiness though, but did not need to take any anti-nausea medications.  He notes less neuropathy this time.  His appetite is good.          REVIEW OF SYSTEMS:   14 point ROS was reviewed and is negative other than as noted above in HPI.       HOME MEDICATIONS:  Current Outpatient Prescriptions   Medication Sig Dispense Refill     Docusate Sodium (COLACE PO)        IBUPROFEN PO        metoclopramide (REGLAN) 10 MG tablet Take 1 tablet (10 mg) by mouth 4 times daily (before meals and nightly) (Patient not taking: Reported on 2/14/2017) 30 tablet 0     LORazepam (ATIVAN) 0.5 MG tablet Take 1 tablet (0.5 mg) by mouth every 4 hours as needed (Anxiety, Nausea/Vomiting or Sleep) (Patient not taking: Reported on 2/14/2017) 30 tablet 2     prochlorperazine (COMPAZINE) 10 MG tablet Take 1 tablet (10 mg) by mouth every 6 hours as needed (Nausea/Vomiting) (Patient not taking: Reported on 2/14/2017) 30 tablet 2     ondansetron (ZOFRAN) 8 MG tablet Take 1 tablet (8 mg) by mouth every 8 hours as needed (Nausea/Vomiting) (Patient not taking: Reported on 2/14/2017) 10 tablet 2     acetaminophen (TYLENOL) 325 MG tablet Take 2 tablets (650 mg) by mouth every 4 hours as needed for other (mild pain) (Patient not taking: Reported on 2/14/2017) 100 tablet 0     hydrocortisone 0.5 % ointment Apply topically 2 times daily Reported on 2/14/2017       diltiazem 2% in PLO cream, FV COMPOUNDED, 2% GEL To anal opening three times daily.  Use a pea-sized  amount.  Store at room temperature. (Patient not taking: Reported on 2017) 60 g 0         ALLERGIES:  Allergies   Allergen Reactions     Amoxicillin      Ampicillin Diarrhea     Demerol [Meperidine]      Nausea          PAST MEDICAL HISTORY:  Past Medical History   Diagnosis Date     Childhood asthma      Nephrolithiasis           Rectal cancer (H)      low rectal cancer         PAST SURGICAL HISTORY:  Past Surgical History   Procedure Laterality Date     Vasectomy       Colonoscopy N/A 2016     Procedure: COMBINED COLONOSCOPY, SINGLE OR MULTIPLE BIOPSY/POLYPECTOMY BY BIOPSY;  Surgeon: Chelsea Thompson MD;  Location:  GI     Hc tooth extraction w/forcep       Sigmoidoscopy flexible N/A 2016     Procedure: SIGMOIDOSCOPY FLEXIBLE;  Surgeon: Felicitas Radford MD;  Location:  OR         SOCIAL HISTORY:  Social History     Social History     Marital status: Single     Spouse name: N/A     Number of children: N/A     Years of education: N/A     Occupational History     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 No     Social History Narrative    Dad  at age  78   from BENNETT, COPD, & HTN    Mom alive in her 70's.     for 30 years    Two adult children. 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         He notes that he had a brother who passed away at a young age of 53 from a metastatic cancer, but unknown what type, and passed away within 2 months of diagnosis.  He denies other family history of cancer in the immediate family.  Louie works as a  at a [employer]  "school.      FAMILY HISTORY:  Family History   Problem Relation Age of Onset     CANCER Brother      primary of unknown origin     Chronic Obstructive Pulmonary Disease Father      Hypertension Father      Family History Negative Mother      Family History Negative Brother      Glaucoma No family hx of      Macular Degeneration No family hx of      DIABETES Maternal Grandfather      Hypertension Paternal Grandmother      Hyperlipidemia Father      Hyperlipidemia Paternal Grandmother      Other Cancer Brother          PHYSICAL EXAM:  Vital signs:  /80 (BP Location: Right arm)  Pulse 92  Temp 97.9  F (36.6  C) (Oral)  Resp 18  Ht 1.778 m (5' 10\")  Wt 96.9 kg (213 lb 9.6 oz)  SpO2 96%  BMI 30.65 kg/m2   ECO  GENERAL/CONSTITUTIONAL: No acute distress. Accompanied by daughter.  EYES: No scleral icterus.  LYMPH: No anterior cervical, posterior cervical, or supraclavicular adenopathy.   RESPIRATORY: Clear to auscultation bilaterally. No crackles or wheezing.   CARDIOVASCULAR: Regular rate and rhythm without murmurs, gallops, or rubs.  GASTROINTESTINAL: No tenderness. The patient has normal bowel sounds. No guarding.  No distention.  MUSCULOSKELETAL: Warm and well-perfused, no cyanosis, clubbing, or edema.  NEUROLOGIC: Alert, oriented, answers questions appropriately.  INTEGUMENTARY: No jaundice.  GAIT: Steady, does not use assistive device  Port in place at right upper chest.    LABS:  .CBC RESULTS:   Recent Labs   Lab Test  17   0955   WBC  2.4*   RBC  4.65   HGB  13.7   HCT  39.4*   MCV  85   MCH  29.5   MCHC  34.8   RDW  13.5   PLT  111*     Recent Labs   Lab Test  17   0955  17   1015   NA  139  138   POTASSIUM  4.0  4.0   CHLORIDE  106  106   CO2  28  27   ANIONGAP  5  5   GLC  113*  99   BUN  20  17   CR  0.78  0.79   FRANDY  8.3*  8.5     Lab Results   Component Value Date    AST 28 2017     Lab Results   Component Value Date    ALT 35 2017     No results found for: BILJEFFERY "   Lab Results   Component Value Date    BILITOTAL 0.6 02/14/2017     Lab Results   Component Value Date    ALBUMIN 3.6 02/14/2017     Lab Results   Component Value Date    PROTTOTAL 6.4 02/14/2017      Lab Results   Component Value Date    ALKPHOS 65 02/14/2017             ASSESSMENT/PLAN:  Louie Greco is a 56 year old male with:    1) Rectal cancer: clinical stage [id number] (stage IIIA), s/p chemoradiation with Xeloda, and appears to have achieved complete response on imaging and biopsies.  He opted not to undergo surgery and expressed desire not to undergo APR, as he does not want a colostomy bag.  It was felt reasonable to go on the watch and wait protocol with the [facility].      He will complete 4 additional months (8 cycles) of chemotherapy with FOLFOX.  Will discuss with Dr. Radford regarding endoscopic surveillance, as per the watch and wait protocol.      He is tolerating chemotherapy well so far.    -C3D1 of FOLFOX today  -schedule IV fluids later this week  -will plan for CT scans and MRI pelvis at the [facility] after 4 cycles of FOLFOX - scheduled for 3/13/17  -RTC in 2 weeks    2) Hiccups: Occurred for 3 days after cycle 1 of chemotherapy.  May have been due to dexamethasone, although he says he has similar symptoms after receiving sedation for dental procedure previously.  He did not get hiccups after cycle 2 of chemotherapy.  -switched steroids to methylprednisolone  -if symptoms recur, can consider cutting back on steroid dose   -he has Reglan, but he chose not to take it due to possible side effects    3) Chemotherapy-induced nausea: Mild, a bit more than cycle 1.  He has not needed to take his home anti-emetics though.  -he has Compazine and Zofran prn      I spent a total of 25 minutes with the patient, with over >50% of the time in counseling and/or coordination of care.         Agueda Manley MD  Hematology/Oncology  [facility]        Louie Greco is a 56  "year old male who presents for:  Chief Complaint   Patient presents with     Oncology Clinic Visit     Ret Rectal Ca         Initial Vitals:  There were no vitals taken for this visit. Estimated body mass index is 30.42 kg/(m^2) as calculated from the following:    Height as of 1/31/17: 1.778 m (5' 10\").    Weight as of 1/31/17: 96.2 kg (212 lb).. There is no height or weight on file to calculate BSA. BP completed using cuff size:   regular  Data Unavailable No LMP for male patient. Allergies and medications reviewed.     Medications: Medication refills not needed today.  Pharmacy name entered into [employer]:    [id number] [place], [place] - [address] AT [address] [place] [address] PHARMACY [facility] [place] - [address]    Comments: None    5   minutes for nursing intake (face to face time)   Gabriella Queen [facility]          Agueda Manley MD  "

## 2017-02-14 NOTE — PROGRESS NOTES
[facility]    Hematology/Oncology Established Patient Note      Today's Date: 02/14/2017    Reason for Follow-up: rectal cancer      HISTORY OF PRESENT ILLNESS: Louie Greco is a 56 year old male who presents with rectal cancer.  He started having rectal bleeding around beginning of 2016.  He underwent colonoscopy on 7/27/16, which showed a malignant tumor in the rectum involving half of the lumen with oozing, as well as three 4-mm polyps in the ascending and transverse colon.  Pathology showed moderately differentiated adenocarcinoma, ascending colon with sessile serrated adenoma, others were tubular adenomas.  Mismatch repair expression with normal protein expression.  Staging scans showed the rectal mass, indeterminate focus in the left liver thought to be cyst, and multiple bilateral renal cysts.  MRI showed a distal rectal mass measuring 2.7 x 2.4 cm extending to the most proximal region of the anal canal.  There are a few tiny subcentimeter perirectal fat lymph nodes.  He was staged clinically [id number] (stage IIIA).  He was seen by Dr. Lee at [facility], and started neoadjuvant chemoradiation with capecitabine on 8/8/16 and completed on 9/15/16.  He was then seen by Dr. Radford, colorectal surgery at [facility].  His post chemoradiation MRI showed improvement in the size of the tumor and response in the lymph nodes.  On 12/8/16, he underwent flexible sigmoidoscopy and there was no evidence of tumor.  There was only some anterior scarring in the lumen.  Multiple biopsies were taken, which showed no evidence of carcinoma.  At that point, Dr. Radford spoke with the patient's wife, that there appears to be a complete response in the lumen, and that the patient would wish to placed on the watch and wait protocol.  The patient had expressed wishes not to have an APR, and it would not have been possible to grossly clear a margin for LAR.      He had port  placed on 1/16/17.    Current Chemotherapy:  Leucovorin 350 mg/m2 IV IV on day 1  Oxaliplatin 85 mg/m2 IV on day 1  Fluorouracil 400 mg/m2 IV on day 1  Fluorouracil 2400 mg/m2 CIV over 46 hours  Every 2 week cycles    C1D1: 1/16/17  C2D1: 1/31/17  C3D1: 2/14/17  C4D1: anticipated for 2/28/17      INTERIM HISTORY: Louie comes in for follow-up today.  He did not have hiccups with this last cycle.  He notes a bit more queasiness though, but did not need to take any anti-nausea medications.  He notes less neuropathy this time.  His appetite is good.          REVIEW OF SYSTEMS:   14 point ROS was reviewed and is negative other than as noted above in HPI.       HOME MEDICATIONS:  Current Outpatient Prescriptions   Medication Sig Dispense Refill     Docusate Sodium (COLACE PO)        IBUPROFEN PO        metoclopramide (REGLAN) 10 MG tablet Take 1 tablet (10 mg) by mouth 4 times daily (before meals and nightly) (Patient not taking: Reported on 2/14/2017) 30 tablet 0     LORazepam (ATIVAN) 0.5 MG tablet Take 1 tablet (0.5 mg) by mouth every 4 hours as needed (Anxiety, Nausea/Vomiting or Sleep) (Patient not taking: Reported on 2/14/2017) 30 tablet 2     prochlorperazine (COMPAZINE) 10 MG tablet Take 1 tablet (10 mg) by mouth every 6 hours as needed (Nausea/Vomiting) (Patient not taking: Reported on 2/14/2017) 30 tablet 2     ondansetron (ZOFRAN) 8 MG tablet Take 1 tablet (8 mg) by mouth every 8 hours as needed (Nausea/Vomiting) (Patient not taking: Reported on 2/14/2017) 10 tablet 2     acetaminophen (TYLENOL) 325 MG tablet Take 2 tablets (650 mg) by mouth every 4 hours as needed for other (mild pain) (Patient not taking: Reported on 2/14/2017) 100 tablet 0     hydrocortisone 0.5 % ointment Apply topically 2 times daily Reported on 2/14/2017       diltiazem 2% in PLO cream, FV COMPOUNDED, 2% GEL To anal opening three times daily.  Use a pea-sized amount.  Store at room temperature. (Patient not taking: Reported on 2/14/2017)  60 g 0         ALLERGIES:  Allergies   Allergen Reactions     Amoxicillin      Ampicillin Diarrhea     Demerol [Meperidine]      Nausea          PAST MEDICAL HISTORY:  Past Medical History   Diagnosis Date     Childhood asthma      Nephrolithiasis           Rectal cancer (H)      low rectal cancer         PAST SURGICAL HISTORY:  Past Surgical History   Procedure Laterality Date     Vasectomy       Colonoscopy N/A 2016     Procedure: COMBINED COLONOSCOPY, SINGLE OR MULTIPLE BIOPSY/POLYPECTOMY BY BIOPSY;  Surgeon: Chelsea Thompson MD;  Location:  GI     Hc tooth extraction w/forcep       Sigmoidoscopy flexible N/A 2016     Procedure: SIGMOIDOSCOPY FLEXIBLE;  Surgeon: Felicitas Radford MD;  Location:  OR         SOCIAL HISTORY:  Social History     Social History     Marital status: Single     Spouse name: N/A     Number of children: N/A     Years of education: N/A     Occupational History     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 No     Social History Narrative    Dad  at age  78   from BENNETT, COPD, & HTN    Mom alive in her 70's.     for 30 years    Two adult children. 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         He notes that he had a brother who passed away at a young age of 53 from a metastatic cancer, but unknown what type, and passed away within 2 months of diagnosis.  He denies other family history of cancer in the immediate family.  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      FAMILY HISTORY:  Family History   Problem Relation Age of Onset      "CANCER Brother      primary of unknown origin     Chronic Obstructive Pulmonary Disease Father      Hypertension Father      Family History Negative Mother      Family History Negative Brother      Glaucoma No family hx of      Macular Degeneration No family hx of      DIABETES Maternal Grandfather      Hypertension Paternal Grandmother      Hyperlipidemia Father      Hyperlipidemia Paternal Grandmother      Other Cancer Brother          PHYSICAL EXAM:  Vital signs:  /80 (BP Location: Right arm)  Pulse 92  Temp 97.9  F (36.6  C) (Oral)  Resp 18  Ht 1.778 m (5' 10\")  Wt 96.9 kg (213 lb 9.6 oz)  SpO2 96%  BMI 30.65 kg/m2   ECO  GENERAL/CONSTITUTIONAL: No acute distress. Accompanied by daughter.  EYES: No scleral icterus.  LYMPH: No anterior cervical, posterior cervical, or supraclavicular adenopathy.   RESPIRATORY: Clear to auscultation bilaterally. No crackles or wheezing.   CARDIOVASCULAR: Regular rate and rhythm without murmurs, gallops, or rubs.  GASTROINTESTINAL: No tenderness. The patient has normal bowel sounds. No guarding.  No distention.  MUSCULOSKELETAL: Warm and well-perfused, no cyanosis, clubbing, or edema.  NEUROLOGIC: Alert, oriented, answers questions appropriately.  INTEGUMENTARY: No jaundice.  GAIT: Steady, does not use assistive device  Port in place at right upper chest.    LABS:  .CBC RESULTS:   Recent Labs   Lab Test  17   0955   WBC  2.4*   RBC  4.65   HGB  13.7   HCT  39.4*   MCV  85   MCH  29.5   MCHC  34.8   RDW  13.5   PLT  111*     Recent Labs   Lab Test  17   0955  17   1015   NA  139  138   POTASSIUM  4.0  4.0   CHLORIDE  106  106   CO2  28  27   ANIONGAP  5  5   GLC  113*  99   BUN  20  17   CR  0.78  0.79   FRANDY  8.3*  8.5     Lab Results   Component Value Date    AST 28 2017     Lab Results   Component Value Date    ALT 35 2017     No results found for: BILICONJ   Lab Results   Component Value Date    BILITOTAL 0.6 2017     Lab Results "   Component Value Date    ALBUMIN 3.6 02/14/2017     Lab Results   Component Value Date    PROTTOTAL 6.4 02/14/2017      Lab Results   Component Value Date    ALKPHOS 65 02/14/2017             ASSESSMENT/PLAN:  Louie Greco is a 56 year old male with:    1) Rectal cancer: clinical stage [id number] (stage IIIA), s/p chemoradiation with Xeloda, and appears to have achieved complete response on imaging and biopsies.  He opted not to undergo surgery and expressed desire not to undergo APR, as he does not want a colostomy bag.  It was felt reasonable to go on the watch and wait protocol with the [facility].      He will complete 4 additional months (8 cycles) of chemotherapy with FOLFOX.  Will discuss with Dr. Radford regarding endoscopic surveillance, as per the watch and wait protocol.      He is tolerating chemotherapy well so far.    -C3D1 of FOLFOX today  -schedule IV fluids later this week  -will plan for CT scans and MRI pelvis at the [place] after 4 cycles of FOLFOX - scheduled for 3/13/17  -RTC in 2 weeks    2) Hiccups: Occurred for 3 days after cycle 1 of chemotherapy.  May have been due to dexamethasone, although he says he has similar symptoms after receiving sedation for dental procedure previously.  He did not get hiccups after cycle 2 of chemotherapy.  -switched steroids to methylprednisolone  -if symptoms recur, can consider cutting back on steroid dose   -he has Reglan, but he chose not to take it due to possible side effects    3) Chemotherapy-induced nausea: Mild, a bit more than cycle 1.  He has not needed to take his home anti-emetics though.  -he has Compazine and Zofran prn      I spent a total of 25 minutes with the patient, with over >50% of the time in counseling and/or coordination of care.         Agueda Manley MD  Hematology/Oncology  [facility]

## 2017-02-14 NOTE — PROGRESS NOTES
"Infusion Nursing Note:  Louie Greco presents today for folfox.    Patient seen by provider today: Yes: Dr. Manley   present during visit today: Not Applicable.    Note: N/A.    Intravenous Access:  Implanted Port.    Treatment Conditions:  Lab Results   Component Value Date    HGB 13.7 02/14/2017     Lab Results   Component Value Date    WBC 2.4 02/14/2017      Lab Results   Component Value Date    ANEU 1.3 02/14/2017     Lab Results   Component Value Date     02/14/2017      Lab Results   Component Value Date     02/14/2017                   Lab Results   Component Value Date    POTASSIUM 4.0 02/14/2017           No results found for: MAG         Lab Results   Component Value Date    CR 0.78 02/14/2017                   Lab Results   Component Value Date    FRANDY 8.3 02/14/2017                Lab Results   Component Value Date    BILITOTAL 0.6 02/14/2017           Lab Results   Component Value Date    ALBUMIN 3.6 02/14/2017                    Lab Results   Component Value Date    ALT 35 02/14/2017           Lab Results   Component Value Date    AST 28 02/14/2017     Results reviewed, labs MET treatment parameters, ok to proceed with treatment.      Post Infusion Assessment:  Patient tolerated infusion without incident.  Blood return noted pre and post infusion.  Site patent and intact, free from redness, edema or discomfort.  No evidence of extravasations.    Prior to discharge: Port is secured in place with tegaderm and flushed with 10cc NS with positive blood return noted.  Continuous home infusion Dosi-Fuser pump connected.    All connectors secured in place and clamps taped open.    Pump started, \"running\" noted on display (CADD): Not Applicable.  Patient instructed to call our clinic or [facility] with any questions or concerns at home.  Patient verbalized understanding.    Patient set up for pump disconnect at our clinic on 2/16/17  2:30pm.        Discharge Plan:   AVS to " patient via MYCHART. Patient discharged in stable condition accompanied by: self.  Departure Mode: Ambulatory.    Emerson Hensley RN

## 2017-02-14 NOTE — PROGRESS NOTES
"Louie Greco is a 56 year old male who presents for:  Chief Complaint   Patient presents with     Oncology Clinic Visit     Ret Rectal Ca         Initial Vitals:  There were no vitals taken for this visit. Estimated body mass index is 30.42 kg/(m^2) as calculated from the following:    Height as of 1/31/17: 1.778 m (5' 10\").    Weight as of 1/31/17: 96.2 kg (212 lb).. There is no height or weight on file to calculate BSA. BP completed using cuff size:   regular  Data Unavailable No LMP for male patient. Allergies and medications reviewed.     Medications: Medication refills not needed today.  Pharmacy name entered into "[employer]:    [facility] [id number] [place] [address], [place] - [address] [facility] [place] [address]    Comments: None    5   minutes for nursing intake (face to face time)   Gabriella Queen CMA      DISCHARGE PLAN:  1.) Patient scheduled for pump takedown and IV fluids 2/16/17.  2.) Patient scheduled for q 2 week Folfox, and exam with Dr. Manley.   3.) CT chest/abd and MRI pelvis scheduled at the [facility] 3/13/17 prior to 3/14/17 exam with Dr. Manley.  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  Next appointments: See patient instruction section  Departure Mode: Ambulatory  Accompanied by: daughter  9 minutes for nursing discharge (face to face time)   Juani Mcgrath RN      "

## 2017-02-14 NOTE — MR AVS SNAPSHOT
After Visit Summary   2/14/2017    Louie Greco    MRN: [id number]           Patient Information     Date Of Birth          1960        Visit Information        Provider Department      2/14/2017 9:30 AM [place] CHAIR 5 [facility]        Today's Diagnoses     Malignant neoplasm of rectum (H)    -  1       Follow-ups after your visit        Your next 10 appointments already scheduled     Feb 28, 2017  9:00 AM CST   Level 5 with NORTH CHAIR 11   [facility] ([facility])    [facility]  [address]  [place] [address]   [phone]            Feb 28, 2017  9:15 AM CST   Return Visit with Agueda Manley MD   [facility] ([facility])    [facility]  [address]  [place] [address]   [phone]            Mar 06, 2017  3:15 PM CST   RETURN RETINA with Yara Smalls MD   [facility] ([facility])    [address]  [address] [facility] [address]  [address] [address]  [facility] [id number] [phone]            Mar 13, 2017  3:00 PM CDT   (Arrive by 2:45 PM)   CT ABDOMEN W CONTRAST with UCCT1   [facility] ([facility])    [address] [facility]  [address] [facility] [id number] [phone]           Please bring any scans or X-rays taken at other hospitals, if similar tests were done. Also bring a list of your medicines, including vitamins, minerals and over-the-counter drugs. It is safest to leave personal items at home.  Be sure to tell your doctor:   If you have any allergies.   If there s any chance you are pregnant.   If you are breastfeeding.   If you have any special needs.  You may have contrast for this exam. To prepare:   Do not eat or drink for 2 hours before your exam. If you need to take medicine, you may take it with small sips of water. (We  may ask you to take liquid medicine as well.)   The day before your exam, drink extra fluids at least six 8-ounce glasses (unless your doctor tells you to restrict your fluids).  Patients over 70 or patients with diabetes or kidney problems:   If you haven t had a blood test (creatinine test) within the last 30 days, go to your clinic or Diagnostic Imaging Department for this test.  If you have diabetes:   If your kidney function is normal, continue taking your metformin (Avandamet, Glucophage, Glucovance, Metaglip) on the day of your exam.   If your kidney function is abnormal, wait 48 hours before restarting this medicine.  You will have oral contrast for this exam:   You will drink the contrast at home. Get this from your clinic or Diagnostic Imaging Department. Please follow the directions given.  Please wear loose clothing, such as a sweat suit or jogging clothes. Avoid snaps, zippers and other metal. We may ask you to undress and put on a hospital gown.  If you have any questions, please call the Imaging Department where you will have your exam.            Mar 13, 2017  3:20 PM CDT   (Arrive by 3:05 PM)   CT CHEST W CONTRAST with UCCT1   [facility] CT ([facility])    [address] [address] [phone]           Please bring any scans or X-rays taken at other hospitals, if similar tests were done. Also bring a list of your medicines, including vitamins, minerals and over-the-counter drugs. It is safest to leave personal items at home.  Be sure to tell your doctor:   If you have any allergies.   If there s any chance you are pregnant.   If you are breastfeeding.   If you have any special needs.  You will have contrast for this exam. To prepare:   Do not eat or drink for 2 hours before your exam. If you need to take medicine, you may take it with small sips of water. (We may ask you to take liquid medicine as well.)   The day before your  exam, drink extra fluids at least six 8-ounce glasses (unless your doctor tells you to restrict your fluids).  Patients over 70 or patients with diabetes or kidney problems:   If you haven t had a blood test (creatinine test) within the last 30 days, go to your clinic or Diagnostic Imaging Department for this test.  If you have diabetes:   If your kidney function is normal, continue taking your metformin (Avandamet, Glucophage, Glucovance, Metaglip) on the day of your exam.   If your kidney function is abnormal, wait 48 hours before restarting this medicine.  Please wear loose clothing, such as a sweat suit or jogging clothes. Avoid snaps, zippers and other metal. We may ask you to undress and put on a hospital gown.  If you have any questions, please call the Imaging Department where you will have your exam.            Mar 13, 2017  4:00 PM CDT   (Arrive by 3:45 PM)   MR PELVIS W/O & W CONTRAST with [id number]   [facility] MRI ([facility])    [address] [address] [phone]           Take your medicines as usual, unless your doctor tells you not to. Bring a list of your current medicines to your exam (including vitamins, minerals and over-the-counter drugs).  You will be given intravenous contrast for this exam. To prepare:   The day before your exam, drink extra fluids at least six 8-ounce glasses (unless your doctor tells you to restrict your fluids).   Have a blood test (creatinine test) within 30 days of your exam. Go to your clinic or Diagnostic Imaging Department for this test.  The MRI machine uses a strong magnet. Please wear clothes without metal (snaps, zippers). A sweatsuit works well, or we may give you a hospital gown.  Please remove any body piercings and hair extensions before you arrive. You will also remove watches, jewelry, hairpins, wallets, dentures, partial dental plates and hearing aids. You may wear contact lenses,  and you may be able to wear your rings. We have a safe place to keep your personal items, but it is safer to leave them at home.   **IMPORTANT** THE INSTRUCTIONS BELOW ARE ONLY FOR THOSE PATIENTS WHO HAVE BEEN TOLD THEY WILL RECEIVE SEDATION OR GENERAL ANESTHESIA DURING THEIR MRI PROCEDURE:  IF YOU WILL RECEIVE SEDATION (take medicine to help you relax during your exam):   You must get the medicine from your doctor before you arrive. Bring the medicine to the exam. Do not take it at home.   Arrive one hour early. Bring someone who can take you home after the test. Your medicine will make you sleepy. After the exam, you may not drive, take a bus or take a taxi by yourself.   No eating 8 hours before your exam. You may have clear liquids up until 4 hours before your exam. (Clear liquids include water, clear tea, black coffee and fruit juice without pulp.)  IF YOU WILL RECEIVE ANESTHESIA (be asleep for your exam):   Arrive 1 1/2 hours early. Bring someone who can take you home after the test. You may not drive, take a bus or take a taxi by yourself.   No eating 8 hours before your exam. You may have clear liquids up until 4 hours before your exam. (Clear liquids include water, clear tea, black coffee and fruit juice without pulp.)  Please call the Imaging Department at your exam site with any questions.            Mar 14, 2017  9:00 AM CDT   Level 5 with [place] CHAIR 5   [facility] ([facility])    [facility]  [address]  [facility] [address]   [phone]            Mar 14, 2017  9:30 AM CDT   Return Visit with Agueda Manley MD   [facility] ([facility])    [facility]  [address]  [facility] [address]   [phone]              Future tests that were ordered for you today     Open Future Orders        Priority Expected Expires Ordered    CT Chest w Contrast  "Routine 3/13/2017 2/14/2018 2/14/2017    CT Abdomen w Contrast Routine 3/13/2017 2/14/2018 2/14/2017    MR Pelvis w/o & w Contrast Routine 3/13/2017 2/14/2018 2/14/2017            Who to contact     If you have questions or need follow up information about today's clinic visit or your schedule please contact [facility] directly at [phone].  Normal or non-critical lab and imaging results will be communicated to you by OpenLabelhart, letter or phone within 4 business days after the clinic has received the results. If you do not hear from us within 7 days, please contact the clinic through Bubblyt or phone. If you have a critical or abnormal lab result, we will notify you by phone as soon as possible.  Submit refill requests through InVision or call your pharmacy and they will forward the refill request to us. Please allow 3 business days for your refill to be completed.          Additional Information About Your Visit        InVision Information     InVision gives you secure access to your electronic health record. If you see a primary care provider, you can also send messages to your care team and make appointments. If you have questions, please call your primary care clinic.  If you do not have a primary care provider, please call [phone] and they will assist you.        Care EveryWhere ID     This is your Care EveryWhere ID. This could be used by other organizations to access your [place] medical records  [id number]        Your Vitals Were     Pulse Temperature Respirations Height Pulse Oximetry BMI (Body Mass Index)    92 97.9  F (36.6  C) (Oral) 18 1.778 m (5' 10\") 96% 30.65 kg/m2       Blood Pressure from Last 3 Encounters:   02/14/17 117/80   02/14/17 117/80   02/02/17 145/85    Weight from Last 3 Encounters:   02/14/17 96.9 kg (213 lb 9.6 oz)   02/14/17 96.9 kg (213 lb 9.6 oz)   01/31/17 96.2 kg (212 lb)              We Performed the Following     CBC with platelets " differential     CEA     Comprehensive metabolic panel     MD INSTRUCTION FOR PROTOCOL     Treatment Conditions        Primary Care Provider Office Phone # Fax #    Ayden Peralta [phone]       [facility] [address] [place] [address]        Thank you!     Thank you for choosing [facility]  for your care. Our goal is always to provide you with excellent care. Hearing back from our patients is one way we can continue to improve our services. Please take a few minutes to complete the written survey that you may receive in the mail after your visit with us. Thank you!             Your Updated Medication List - Protect others around you: Learn how to safely use, store and throw away your medicines at www.disposemymeds.org.          This list is accurate as of: 2/14/17 11:09 AM.  Always use your most recent med list.                   Brand Name Dispense Instructions for use    acetaminophen 325 MG tablet    TYLENOL    100 tablet    Take 2 tablets (650 mg) by mouth every 4 hours as needed for other (mild pain)       COLACE PO          diltiazem 2% in PLO cream (FV COMPOUNDED) 2% Gel     60 g    To anal opening three times daily.  Use a pea-sized amount.  Store at room temperature.       hydrocortisone 0.5 % ointment      Apply topically 2 times daily Reported on 2/14/2017       IBUPROFEN PO          LORazepam 0.5 MG tablet    ATIVAN    30 tablet    Take 1 tablet (0.5 mg) by mouth every 4 hours as needed (Anxiety, Nausea/Vomiting or Sleep)       metoclopramide 10 MG tablet    REGLAN    30 tablet    Take 1 tablet (10 mg) by mouth 4 times daily (before meals and nightly)       ondansetron 8 MG tablet    ZOFRAN    10 tablet    Take 1 tablet (8 mg) by mouth every 8 hours as needed (Nausea/Vomiting)       prochlorperazine 10 MG tablet    COMPAZINE    30 tablet    Take 1 tablet (10 mg) by mouth every 6 hours as needed (Nausea/Vomiting)

## 2017-02-14 NOTE — MR AVS SNAPSHOT
After Visit Summary   2/14/2017    Louie Greco    MRN: [id number]           Patient Information     Date Of Birth          1960        Visit Information        Provider Department      2/14/2017 10:00 AM Agueda Mnaley MD [facility]        Today's Diagnoses     Malignant neoplasm of rectum (H)    -  1      Care Instructions    -refer to genetic counseling  -chemotherapy today  -schedule chemotherapy on 2/28/17 and 3/14/17  -schedule CT chest/abdomen, and MRI pelvis 3T protocol at [place] on 3/13/17 (after 2:30 pm)  -clinic appointments and labs with Dr. Manley on 2/28/17 and 3/14/17        Follow-ups after your visit        Additional Services     CANCER RISK MGMT/CANCER GENETIC COUNSELING REFERRAL       Your provider has referred you to the Cancer Risk Management Program - Cancer Genetic Counseling.    Reason for Referral: rectal cancer    We have a sent a notice to a staff member of the Cancer Risk Management Program to give you a call to assist with scheduling your appointment.  You may also call  [phone] ([phone]) to initiate scheduling.    Please be aware that coverage of these services is subject to the terms and limitations of your health insurance plan.  Call member services at your health plan with any benefit or coverage questions.      Please bring the completed family history sheet to your appointment in addition to any available outside medical records documenting your cancer diagnosis.                  Your next 10 appointments already scheduled     Feb 16, 2017  2:30 PM CST   Level 2 with SOUTH CHAIR 7   [facility] ([facility])    [facility] [place]  [address]  [facility] [address]   [phone]            Feb 28, 2017  9:00 AM CST   Level 5 with NORTH CHAIR 11   [facility] ([facility])    [facility]  [place]  [address]  [place] [address]   [phone]            Feb 28, 2017  9:15 AM CST   Return Visit with Agueda Manley MD   [facility] ([facility])    [facility] [place]  [address]  [place] [address]   [phone]            Mar 06, 2017  3:15 PM CST   RETURN RETINA with Yara Smalls MD   [facility] ([facility])    [address]  [address]  [facility] [address]   [phone]            Mar 13, 2017  3:00 PM CDT   (Arrive by 2:45 PM)   CT ABDOMEN W CONTRAST with UCCT1   [facility] [place] [facility] ([facility])    [address]  1st Floor  [place] [id number] [phone]           Please bring any scans or X-rays taken at other hospitals, if similar tests were done. Also bring a list of your medicines, including vitamins, minerals and over-the-counter drugs. It is safest to leave personal items at home.  Be sure to tell your doctor:   If you have any allergies.   If there s any chance you are pregnant.   If you are breastfeeding.   If you have any special needs.  You may have contrast for this exam. To prepare:   Do not eat or drink for 2 hours before your exam. If you need to take medicine, you may take it with small sips of water. (We may ask you to take liquid medicine as well.)   The day before your exam, drink extra fluids at least six 8-ounce glasses (unless your doctor tells you to restrict your fluids).  Patients over 70 or patients with diabetes or kidney problems:   If you haven t had a blood test (creatinine test) within the last 30 days, go to your clinic or Diagnostic Imaging Department for this test.  If you have diabetes:   If your kidney function is normal, continue taking your metformin (Avandamet, Glucophage, Glucovance, Metaglip) on the day of your exam.   If your kidney function is abnormal, wait  48 hours before restarting this medicine.  You will have oral contrast for this exam:   You will drink the contrast at home. Get this from your clinic or Diagnostic Imaging Department. Please follow the directions given.  Please wear loose clothing, such as a sweat suit or jogging clothes. Avoid snaps, zippers and other metal. We may ask you to undress and put on a hospital gown.  If you have any questions, please call the Imaging Department where you will have your exam.            Mar 13, 2017  3:20 PM CDT   (Arrive by 3:05 PM)   CT CHEST W CONTRAST with UCCT1   [facility] CT ([facility] [place])    [address]  1st Floor  [facility] [address] [phone]           Please bring any scans or X-rays taken at other hospitals, if similar tests were done. Also bring a list of your medicines, including vitamins, minerals and over-the-counter drugs. It is safest to leave personal items at home.  Be sure to tell your doctor:   If you have any allergies.   If there s any chance you are pregnant.   If you are breastfeeding.   If you have any special needs.  You will have contrast for this exam. To prepare:   Do not eat or drink for 2 hours before your exam. If you need to take medicine, you may take it with small sips of water. (We may ask you to take liquid medicine as well.)   The day before your exam, drink extra fluids at least six 8-ounce glasses (unless your doctor tells you to restrict your fluids).  Patients over 70 or patients with diabetes or kidney problems:   If you haven t had a blood test (creatinine test) within the last 30 days, go to your clinic or Diagnostic Imaging Department for this test.  If you have diabetes:   If your kidney function is normal, continue taking your metformin (Avandamet, Glucophage, Glucovance, Metaglip) on the day of your exam.   If your kidney function is abnormal, wait 48 hours before restarting this medicine.  Please wear loose  clothing, such as a sweat suit or jogging clothes. Avoid snaps, zippers and other metal. We may ask you to undress and put on a hospital gown.  If you have any questions, please call the Imaging Department where you will have your exam.            Mar 13, 2017  4:00 PM CDT   (Arrive by 3:45 PM)   MR PELVIS W/O & W CONTRAST with [id number]   [facility] ([facility] [place])    [address]  [facility] [address] [phone]           Take your medicines as usual, unless your doctor tells you not to. Bring a list of your current medicines to your exam (including vitamins, minerals and over-the-counter drugs).  You will be given intravenous contrast for this exam. To prepare:   The day before your exam, drink extra fluids at least six 8-ounce glasses (unless your doctor tells you to restrict your fluids).   Have a blood test (creatinine test) within 30 days of your exam. Go to your clinic or Diagnostic Imaging Department for this test.  The MRI machine uses a strong magnet. Please wear clothes without metal (snaps, zippers). A sweatsuit works well, or we may give you a hospital gown.  Please remove any body piercings and hair extensions before you arrive. You will also remove watches, jewelry, hairpins, wallets, dentures, partial dental plates and hearing aids. You may wear contact lenses, and you may be able to wear your rings. We have a safe place to keep your personal items, but it is safer to leave them at home.   **IMPORTANT** THE INSTRUCTIONS BELOW ARE ONLY FOR THOSE PATIENTS WHO HAVE BEEN TOLD THEY WILL RECEIVE SEDATION OR GENERAL ANESTHESIA DURING THEIR MRI PROCEDURE:  IF YOU WILL RECEIVE SEDATION (take medicine to help you relax during your exam):   You must get the medicine from your doctor before you arrive. Bring the medicine to the exam. Do not take it at home.   Arrive one hour early. Bring someone who can take you home after the test. Your medicine  will make you sleepy. After the exam, you may not drive, take a bus or take a taxi by yourself.   No eating 8 hours before your exam. You may have clear liquids up until 4 hours before your exam. (Clear liquids include water, clear tea, black coffee and fruit juice without pulp.)  IF YOU WILL RECEIVE ANESTHESIA (be asleep for your exam):   Arrive 1 1/2 hours early. Bring someone who can take you home after the test. You may not drive, take a bus or take a taxi by yourself.   No eating 8 hours before your exam. You may have clear liquids up until 4 hours before your exam. (Clear liquids include water, clear tea, black coffee and fruit juice without pulp.)  Please call the Imaging Department at your exam site with any questions.            Mar 14, 2017  9:00 AM CDT   Level 5 with NORTH CHAIR 5   [facility] ([facility])    [facility]  [address]  [facility] [address]   [phone]            Mar 14, 2017  9:30 AM CDT   Return Visit with Agueda Manley MD   [facility] ([facility])    [facility]  [address]  [facility] [address]   [phone]            Mar 30, 2017  9:00 AM CDT   Return Visit with Roxann Guzman GC   Cancer Risk Management Program ([facility])    [facility]  [address]  [facility] [address]   [phone]              Future tests that were ordered for you today     Open Future Orders        Priority Expected Expires Ordered    CT Chest w Contrast Routine 3/13/2017 2/14/2018 2/14/2017    CT Abdomen w Contrast Routine 3/13/2017 2/14/2018 2/14/2017    MR Pelvis w/o & w Contrast Routine 3/13/2017 2/14/2018 2/14/2017            Who to contact     If you have questions or need follow up information about today's clinic visit or your schedule please contact [facility] directly at  "[phone].  Normal or non-critical lab and imaging results will be communicated to you by MyChart, letter or phone within 4 business days after the clinic has received the results. If you do not hear from us within 7 days, please contact the clinic through [employer] or phone. If you have a critical or abnormal lab result, we will notify you by phone as soon as possible.  Submit refill requests through [employer] or call your pharmacy and they will forward the refill request to us. Please allow 3 business days for your refill to be completed.          Additional Information About Your Visit        [employer] Information     [employer] gives you secure access to your electronic health record. If you see a primary care provider, you can also send messages to your care team and make appointments. If you have questions, please call your primary care clinic.  If you do not have a primary care provider, please call [phone] and they will assist you.        Care EveryWhere ID     This is your Care EveryWhere ID. This could be used by other organizations to access your [place] medical records  [id number]        Your Vitals Were     Pulse Temperature Respirations Height Pulse Oximetry BMI (Body Mass Index)    92 97.9  F (36.6  C) (Oral) 18 1.778 m (5' 10\") 96% 30.65 kg/m2       Blood Pressure from Last 3 Encounters:   02/14/17 117/80   02/14/17 117/80   02/02/17 145/85    Weight from Last 3 Encounters:   02/14/17 96.9 kg (213 lb 9.6 oz)   02/14/17 96.9 kg (213 lb 9.6 oz)   01/31/17 96.2 kg (212 lb)              We Performed the Following     CANCER RISK MGMT/CANCER GENETIC COUNSELING REFERRAL        Primary Care Provider Office Phone # Fax #    Ayden Peralta [phone]       [facility] [address] [place] [address]        Thank you!     Thank you for choosing [facility]  for your care. Our goal is always to provide you with excellent care. Hearing back from our " patients is one way we can continue to improve our services. Please take a few minutes to complete the written survey that you may receive in the mail after your visit with us. Thank you!             Your Updated Medication List - Protect others around you: Learn how to safely use, store and throw away your medicines at www.disposemymeds.org.          This list is accurate as of: 2/14/17 11:27 AM.  Always use your most recent med list.                   Brand Name Dispense Instructions for use    acetaminophen 325 MG tablet    TYLENOL    100 tablet    Take 2 tablets (650 mg) by mouth every 4 hours as needed for other (mild pain)       COLACE PO          diltiazem 2% in PLO cream (FV COMPOUNDED) 2% Gel     60 g    To anal opening three times daily.  Use a pea-sized amount.  Store at room temperature.       hydrocortisone 0.5 % ointment      Apply topically 2 times daily Reported on 2/14/2017       IBUPROFEN PO          LORazepam 0.5 MG tablet    ATIVAN    30 tablet    Take 1 tablet (0.5 mg) by mouth every 4 hours as needed (Anxiety, Nausea/Vomiting or Sleep)       metoclopramide 10 MG tablet    REGLAN    30 tablet    Take 1 tablet (10 mg) by mouth 4 times daily (before meals and nightly)       ondansetron 8 MG tablet    ZOFRAN    10 tablet    Take 1 tablet (8 mg) by mouth every 8 hours as needed (Nausea/Vomiting)       prochlorperazine 10 MG tablet    COMPAZINE    30 tablet    Take 1 tablet (10 mg) by mouth every 6 hours as needed (Nausea/Vomiting)

## 2017-02-16 ENCOUNTER — CARE COORDINATION (OUTPATIENT)
Dept: ONCOLOGY | Facility: CLINIC | Age: 57
End: 2017-02-16

## 2017-02-16 ENCOUNTER — INFUSION THERAPY VISIT (OUTPATIENT)
Dept: INFUSION THERAPY | Facility: CLINIC | Age: 57
End: 2017-02-16
Attending: INTERNAL MEDICINE
Payer: COMMERCIAL

## 2017-02-16 VITALS — HEART RATE: 86 BPM | SYSTOLIC BLOOD PRESSURE: 127 MMHG | TEMPERATURE: 97.8 F | DIASTOLIC BLOOD PRESSURE: 85 MMHG

## 2017-02-16 DIAGNOSIS — C20 MALIGNANT NEOPLASM OF RECTUM (H): Primary | ICD-10-CM

## 2017-02-16 PROCEDURE — 25000128 H RX IP 250 OP 636: Performed by: INTERNAL MEDICINE

## 2017-02-16 PROCEDURE — 96360 HYDRATION IV INFUSION INIT: CPT

## 2017-02-16 RX ORDER — HEPARIN SODIUM (PORCINE) LOCK FLUSH IV SOLN 100 UNIT/ML 100 UNIT/ML
5 SOLUTION INTRAVENOUS ONCE
Status: COMPLETED | OUTPATIENT
Start: 2017-02-16 | End: 2017-02-16

## 2017-02-16 RX ORDER — HEPARIN SODIUM (PORCINE) LOCK FLUSH IV SOLN 100 UNIT/ML 100 UNIT/ML
5 SOLUTION INTRAVENOUS ONCE
Status: CANCELLED
Start: 2017-02-16 | End: 2017-02-16

## 2017-02-16 RX ADMIN — SODIUM CHLORIDE 1000 ML: 9 INJECTION, SOLUTION INTRAVENOUS at 14:57

## 2017-02-16 RX ADMIN — SODIUM CHLORIDE, PRESERVATIVE FREE 5 ML: 5 INJECTION INTRAVENOUS at 16:11

## 2017-02-16 NOTE — PROGRESS NOTES
I spoke with patient while he was here receiving IVF's and doing his pump takedown. He has noticed increased fatigue, and neuropathy with this 3rd round.   He is drinking fluids and eating well. Patient complains of cold sensitivity that last around 4-5 days. He does verbalize the symptoms to be cumulative. I informed him to definitely verbalize the neuropathy to his doctor and to monitor it if any changes to call my office. Samantha Torres

## 2017-02-16 NOTE — MR AVS SNAPSHOT
After Visit Summary   2/16/2017    Louie Greco    MRN: [id number]           Patient Information     Date Of Birth          1960        Visit Information        Provider Department      2/16/2017 2:30 PM SOUTH CHAIR 7 [facility]        Today's Diagnoses     Malignant neoplasm of rectum (H)    -  1       Follow-ups after your visit        Your next 10 appointments already scheduled     Feb 28, 2017  9:00 AM CST   Level 5 with NORTH CHAIR 11   [facility] ([facility])    [facility]  [address]  [place] [address]   [phone]            Feb 28, 2017  9:15 AM CST   Return Visit with Agueda Manley MD   [facility] ([facility])    [facility]  [address]  [place] [address]   [phone]            Mar 06, 2017  3:15 PM CST   RETURN RETINA with Yara Smalls MD   [facility] ([facility])    [address]  [address] [facility]  [address] [address]  [facility] [id number] [phone]            Mar 13, 2017  3:00 PM CDT   (Arrive by 2:45 PM)   CT ABDOMEN W CONTRAST with UCCT1   [facility] ([facility])    [address] [facility]  [address] [id number] [phone]           Please bring any scans or X-rays taken at other hospitals, if similar tests were done. Also bring a list of your medicines, including vitamins, minerals and over-the-counter drugs. It is safest to leave personal items at home.  Be sure to tell your doctor:   If you have any allergies.   If there s any chance you are pregnant.   If you are breastfeeding.   If you have any special needs.  You may have contrast for this exam. To prepare:   Do not eat or drink for 2 hours before your exam. If you need to take medicine, you may take it with small sips of water. (We  may ask you to take liquid medicine as well.)   The day before your exam, drink extra fluids at least six 8-ounce glasses (unless your doctor tells you to restrict your fluids).  Patients over 70 or patients with diabetes or kidney problems:   If you haven t had a blood test (creatinine test) within the last 30 days, go to your clinic or Diagnostic Imaging Department for this test.  If you have diabetes:   If your kidney function is normal, continue taking your metformin (Avandamet, Glucophage, Glucovance, Metaglip) on the day of your exam.   If your kidney function is abnormal, wait 48 hours before restarting this medicine.  You will have oral contrast for this exam:   You will drink the contrast at home. Get this from your clinic or Diagnostic Imaging Department. Please follow the directions given.  Please wear loose clothing, such as a sweat suit or jogging clothes. Avoid snaps, zippers and other metal. We may ask you to undress and put on a hospital gown.  If you have any questions, please call the Imaging Department where you will have your exam.            Mar 13, 2017  3:20 PM CDT   (Arrive by 3:05 PM)   CT CHEST W CONTRAST with UCCT1   [facility] CT ([facility])    [address] [address] [phone]           Please bring any scans or X-rays taken at other hospitals, if similar tests were done. Also bring a list of your medicines, including vitamins, minerals and over-the-counter drugs. It is safest to leave personal items at home.  Be sure to tell your doctor:   If you have any allergies.   If there s any chance you are pregnant.   If you are breastfeeding.   If you have any special needs.  You will have contrast for this exam. To prepare:   Do not eat or drink for 2 hours before your exam. If you need to take medicine, you may take it with small sips of water. (We may ask you to take liquid medicine as well.)   The day before your  exam, drink extra fluids at least six 8-ounce glasses (unless your doctor tells you to restrict your fluids).  Patients over 70 or patients with diabetes or kidney problems:   If you haven t had a blood test (creatinine test) within the last 30 days, go to your clinic or Diagnostic Imaging Department for this test.  If you have diabetes:   If your kidney function is normal, continue taking your metformin (Avandamet, Glucophage, Glucovance, Metaglip) on the day of your exam.   If your kidney function is abnormal, wait 48 hours before restarting this medicine.  Please wear loose clothing, such as a sweat suit or jogging clothes. Avoid snaps, zippers and other metal. We may ask you to undress and put on a hospital gown.  If you have any questions, please call the Imaging Department where you will have your exam.            Mar 13, 2017  4:00 PM CDT   (Arrive by 3:45 PM)   MR PELVIS W/O & W CONTRAST with [id number]   [facility] ([facility])    [address] [address] [phone]           Take your medicines as usual, unless your doctor tells you not to. Bring a list of your current medicines to your exam (including vitamins, minerals and over-the-counter drugs).  You will be given intravenous contrast for this exam. To prepare:   The day before your exam, drink extra fluids at least six 8-ounce glasses (unless your doctor tells you to restrict your fluids).   Have a blood test (creatinine test) within 30 days of your exam. Go to your clinic or Diagnostic Imaging Department for this test.  The MRI machine uses a strong magnet. Please wear clothes without metal (snaps, zippers). A sweatsuit works well, or we may give you a hospital gown.  Please remove any body piercings and hair extensions before you arrive. You will also remove watches, jewelry, hairpins, wallets, dentures, partial dental plates and hearing aids. You may wear contact lenses,  and you may be able to wear your rings. We have a safe place to keep your personal items, but it is safer to leave them at home.   **IMPORTANT** THE INSTRUCTIONS BELOW ARE ONLY FOR THOSE PATIENTS WHO HAVE BEEN TOLD THEY WILL RECEIVE SEDATION OR GENERAL ANESTHESIA DURING THEIR MRI PROCEDURE:  IF YOU WILL RECEIVE SEDATION (take medicine to help you relax during your exam):   You must get the medicine from your doctor before you arrive. Bring the medicine to the exam. Do not take it at home.   Arrive one hour early. Bring someone who can take you home after the test. Your medicine will make you sleepy. After the exam, you may not drive, take a bus or take a taxi by yourself.   No eating 8 hours before your exam. You may have clear liquids up until 4 hours before your exam. (Clear liquids include water, clear tea, black coffee and fruit juice without pulp.)  IF YOU WILL RECEIVE ANESTHESIA (be asleep for your exam):   Arrive 1 1/2 hours early. Bring someone who can take you home after the test. You may not drive, take a bus or take a taxi by yourself.   No eating 8 hours before your exam. You may have clear liquids up until 4 hours before your exam. (Clear liquids include water, clear tea, black coffee and fruit juice without pulp.)  Please call the Imaging Department at your exam site with any questions.            Mar 14, 2017  9:00 AM CDT   Level 5 with NORTH CHAIR 5   [facility] ([facility])    [facility]  [address]  [facility] [address]   [phone]            Mar 14, 2017  9:30 AM CDT   Return Visit with Agueda Manley MD   [facility] ([facility])    [facility]  [address]  [facility] [address]   [phone]            Mar 30, 2017  9:00 AM CDT   Return Visit with Roxann Guzman GC   [facility] ([facility])    [facility]  [address]  [place] [id number] [phone]              Who to contact     If you have questions or need follow up information about today's clinic visit or your schedule please contact [facility] directly at [phone].  Normal or non-critical lab and imaging results will be communicated to you by MyChart, letter or phone within 4 business days after the clinic has received the results. If you do not hear from us within 7 days, please contact the clinic through MyChart or phone. If you have a critical or abnormal lab result, we will notify you by phone as soon as possible.  Submit refill requests through [employer] or call your pharmacy and they will forward the refill request to us. Please allow 3 business days for your refill to be completed.          Additional Information About Your Visit        MyChart Information     [employer] gives you secure access to your electronic health record. If you see a primary care provider, you can also send messages to your care team and make appointments. If you have questions, please call your primary care clinic.  If you do not have a primary care provider, please call [phone] and they will assist you.        Care EveryWhere ID     This is your Care EveryWhere ID. This could be used by other organizations to access your [facility] medical records  [id number]        Your Vitals Were     Pulse Temperature                86 97.8  F (36.6  C) (Oral)           Blood Pressure from Last 3 Encounters:   02/16/17 127/85   02/14/17 117/80   02/14/17 (!) 133/92    Weight from Last 3 Encounters:   02/14/17 96.9 kg (213 lb 9.6 oz)   02/14/17 96.9 kg (213 lb 9.6 oz)   01/31/17 96.2 kg (212 lb)              Today, you had the following     No orders found for display       Primary Care Provider Office Phone # Fax #    Ayden Peralta [phone]       [facility] [address]  [facility] [address]        Thank you!     Thank you for choosing [facility]  for your care. Our goal is always to provide you with excellent care. Hearing back from our patients is one way we can continue to improve our services. Please take a few minutes to complete the written survey that you may receive in the mail after your visit with us. Thank you!             Your Updated Medication List - Protect others around you: Learn how to safely use, store and throw away your medicines at www.disposemymeds.org.          This list is accurate as of: 2/16/17  4:42 PM.  Always use your most recent med list.                   Brand Name Dispense Instructions for use    acetaminophen 325 MG tablet    TYLENOL    100 tablet    Take 2 tablets (650 mg) by mouth every 4 hours as needed for other (mild pain)       COLACE PO          diltiazem 2% in PLO cream (FV COMPOUNDED) 2% Gel     60 g    To anal opening three times daily.  Use a pea-sized amount.  Store at room temperature.       hydrocortisone 0.5 % ointment      Apply topically 2 times daily Reported on 2/14/2017       IBUPROFEN PO          LORazepam 0.5 MG tablet    ATIVAN    30 tablet    Take 1 tablet (0.5 mg) by mouth every 4 hours as needed (Anxiety, Nausea/Vomiting or Sleep)       metoclopramide 10 MG tablet    REGLAN    30 tablet    Take 1 tablet (10 mg) by mouth 4 times daily (before meals and nightly)       ondansetron 8 MG tablet    ZOFRAN    10 tablet    Take 1 tablet (8 mg) by mouth every 8 hours as needed (Nausea/Vomiting)       prochlorperazine 10 MG tablet    COMPAZINE    30 tablet    Take 1 tablet (10 mg) by mouth every 6 hours as needed (Nausea/Vomiting)

## 2017-02-17 ENCOUNTER — TELEPHONE (OUTPATIENT)
Dept: NURSING | Facility: CLINIC | Age: 57
End: 2017-02-17

## 2017-02-18 NOTE — TELEPHONE ENCOUNTER
Call Type: Triage Call    Presenting Problem: Pt has colon/rectal cancer and he currently is in  treatment.  He states that his wife came down with influenza and put  on tamiflu today.  Pt does not have any symptoms but it was  recommended that he be placed on the tamiflu also.  Paged vis  switchboard on call MD Dr. Yates at 10:45 P.M.  Triage Note:  Guideline Title: Medication Questions - Adult  Recommended Disposition: Call Dispensing Pharmacy or Provider  Immediately  Original Inclination: Would have called clinic  Override Disposition:  Intended Action: Call PCP/HCP  Physician Contacted: No  Requests refill of prescribed medication that does NOT have a valid refill; lack  of medication may cause clinical risk to patient if not available. ?  YES  Sign(s) or symptom(s) associated with a diagnosed condition or with a new illness  ? NO  Prescription ordered today and not available at pharmacy putting patient at  clinical risk ? NO  Recurrence of a symptom(s) or illness post prescribed medication treatment AND  provider instructed patient to call if symptom(s) returned. ? NO  Unable to obtain prescribed medication related to available resources AND  situation poses immediate clinical risk ? NO  Pharmacy calling to clarify prescription order. ? NO  Physician Instructions:  Care Advice: Have pharmacy phone number and prescription information  available when you speak with provider.  Ask pharmacist if they can do a partial refill.

## 2017-02-21 RX ORDER — SODIUM CHLORIDE 9 MG/ML
1000 INJECTION, SOLUTION INTRAVENOUS CONTINUOUS PRN
Status: CANCELLED
Start: 2017-02-28

## 2017-02-21 RX ORDER — MEPERIDINE HYDROCHLORIDE 25 MG/ML
25 INJECTION INTRAMUSCULAR; INTRAVENOUS; SUBCUTANEOUS EVERY 30 MIN PRN
Status: CANCELLED | OUTPATIENT
Start: 2017-02-28

## 2017-02-21 RX ORDER — METHYLPREDNISOLONE SODIUM SUCCINATE 125 MG/2ML
50 INJECTION, POWDER, LYOPHILIZED, FOR SOLUTION INTRAMUSCULAR; INTRAVENOUS ONCE
Status: CANCELLED
Start: 2017-02-28 | End: 2017-02-28

## 2017-02-21 RX ORDER — FLUOROURACIL 50 MG/ML
400 INJECTION, SOLUTION INTRAVENOUS ONCE
Status: CANCELLED | OUTPATIENT
Start: 2017-02-28

## 2017-02-21 RX ORDER — METHYLPREDNISOLONE SODIUM SUCCINATE 125 MG/2ML
125 INJECTION, POWDER, LYOPHILIZED, FOR SOLUTION INTRAMUSCULAR; INTRAVENOUS
Status: CANCELLED
Start: 2017-02-28

## 2017-02-21 RX ORDER — LORAZEPAM 2 MG/ML
0.5 INJECTION INTRAMUSCULAR EVERY 4 HOURS PRN
Status: CANCELLED
Start: 2017-02-28

## 2017-02-21 RX ORDER — ALBUTEROL SULFATE 0.83 MG/ML
2.5 SOLUTION RESPIRATORY (INHALATION)
Status: CANCELLED | OUTPATIENT
Start: 2017-02-28

## 2017-02-21 RX ORDER — DIPHENHYDRAMINE HYDROCHLORIDE 50 MG/ML
50 INJECTION INTRAMUSCULAR; INTRAVENOUS
Status: CANCELLED
Start: 2017-02-28

## 2017-02-21 RX ORDER — ALBUTEROL SULFATE 90 UG/1
1-2 AEROSOL, METERED RESPIRATORY (INHALATION)
Status: CANCELLED
Start: 2017-02-28

## 2017-02-21 RX ORDER — EPINEPHRINE 0.3 MG/.3ML
0.3 INJECTION SUBCUTANEOUS EVERY 5 MIN PRN
Status: CANCELLED | OUTPATIENT
Start: 2017-02-28

## 2017-02-28 ENCOUNTER — ONCOLOGY VISIT (OUTPATIENT)
Dept: ONCOLOGY | Facility: CLINIC | Age: 57
End: 2017-02-28
Attending: INTERNAL MEDICINE
Payer: COMMERCIAL

## 2017-02-28 ENCOUNTER — INFUSION THERAPY VISIT (OUTPATIENT)
Dept: INFUSION THERAPY | Facility: CLINIC | Age: 57
End: 2017-02-28
Attending: INTERNAL MEDICINE
Payer: COMMERCIAL

## 2017-02-28 ENCOUNTER — HOSPITAL ENCOUNTER (OUTPATIENT)
Facility: CLINIC | Age: 57
Setting detail: SPECIMEN
Discharge: HOME | End: 2017-02-28
Attending: INTERNAL MEDICINE | Admitting: INTERNAL MEDICINE
Payer: COMMERCIAL

## 2017-02-28 VITALS
SYSTOLIC BLOOD PRESSURE: 107 MMHG | TEMPERATURE: 98.3 F | WEIGHT: 212.2 LBS | BODY MASS INDEX: 30.45 KG/M2 | OXYGEN SATURATION: 97 % | HEART RATE: 105 BPM | DIASTOLIC BLOOD PRESSURE: 76 MMHG | RESPIRATION RATE: 16 BRPM

## 2017-02-28 VITALS
DIASTOLIC BLOOD PRESSURE: 79 MMHG | BODY MASS INDEX: 30.38 KG/M2 | HEIGHT: 70 IN | HEART RATE: 65 BPM | OXYGEN SATURATION: 97 % | SYSTOLIC BLOOD PRESSURE: 145 MMHG | TEMPERATURE: 97.1 F | RESPIRATION RATE: 16 BRPM | WEIGHT: 212.2 LBS

## 2017-02-28 DIAGNOSIS — C20 MALIGNANT NEOPLASM OF RECTUM (H): Primary | ICD-10-CM

## 2017-02-28 LAB
ALBUMIN SERPL-MCNC: 3.3 G/DL (ref 3.4–5)
ALP SERPL-CCNC: 72 U/L (ref 40–150)
ALT SERPL W P-5'-P-CCNC: 72 U/L (ref 0–70)
ANION GAP SERPL CALCULATED.3IONS-SCNC: 7 MMOL/L (ref 3–14)
AST SERPL W P-5'-P-CCNC: 49 U/L (ref 0–45)
BASOPHILS # BLD AUTO: 0 10E9/L (ref 0–0.2)
BASOPHILS NFR BLD AUTO: 1 %
BILIRUB SERPL-MCNC: 0.7 MG/DL (ref 0.2–1.3)
BUN SERPL-MCNC: 21 MG/DL (ref 7–30)
CALCIUM SERPL-MCNC: 8.4 MG/DL (ref 8.5–10.1)
CEA SERPL-MCNC: 1.1 UG/L (ref 0–2.5)
CHLORIDE SERPL-SCNC: 108 MMOL/L (ref 94–109)
CO2 SERPL-SCNC: 27 MMOL/L (ref 20–32)
CREAT SERPL-MCNC: 0.71 MG/DL (ref 0.66–1.25)
DIFFERENTIAL METHOD BLD: ABNORMAL
EOSINOPHIL # BLD AUTO: 0.1 10E9/L (ref 0–0.7)
EOSINOPHIL NFR BLD AUTO: 3.8 %
ERYTHROCYTE [DISTWIDTH] IN BLOOD BY AUTOMATED COUNT: 14.9 % (ref 10–15)
GFR SERPL CREATININE-BSD FRML MDRD: ABNORMAL ML/MIN/1.7M2
GLUCOSE SERPL-MCNC: 122 MG/DL (ref 70–99)
HCT VFR BLD AUTO: 37.9 % (ref 40–53)
HGB BLD-MCNC: 13.4 G/DL (ref 13.3–17.7)
IMM GRANULOCYTES # BLD: 0 10E9/L (ref 0–0.4)
IMM GRANULOCYTES NFR BLD: 0 %
LYMPHOCYTES # BLD AUTO: 0.5 10E9/L (ref 0.8–5.3)
LYMPHOCYTES NFR BLD AUTO: 22.4 %
MCH RBC QN AUTO: 29.9 PG (ref 26.5–33)
MCHC RBC AUTO-ENTMCNC: 35.4 G/DL (ref 31.5–36.5)
MCV RBC AUTO: 85 FL (ref 78–100)
MONOCYTES # BLD AUTO: 0.3 10E9/L (ref 0–1.3)
MONOCYTES NFR BLD AUTO: 12.9 %
NEUTROPHILS # BLD AUTO: 1.3 10E9/L (ref 1.6–8.3)
NEUTROPHILS NFR BLD AUTO: 59.9 %
NRBC # BLD AUTO: 0 10*3/UL
NRBC BLD AUTO-RTO: 0 /100
PLATELET # BLD AUTO: 107 10E9/L (ref 150–450)
POTASSIUM SERPL-SCNC: 3.5 MMOL/L (ref 3.4–5.3)
PROT SERPL-MCNC: 6.4 G/DL (ref 6.8–8.8)
RBC # BLD AUTO: 4.48 10E12/L (ref 4.4–5.9)
SODIUM SERPL-SCNC: 142 MMOL/L (ref 133–144)
WBC # BLD AUTO: 2.1 10E9/L (ref 4–11)

## 2017-02-28 PROCEDURE — 25000125 ZZHC RX 250: Performed by: INTERNAL MEDICINE

## 2017-02-28 PROCEDURE — 96375 TX/PRO/DX INJ NEW DRUG ADDON: CPT

## 2017-02-28 PROCEDURE — 96416 CHEMO PROLONG INFUSE W/PUMP: CPT

## 2017-02-28 PROCEDURE — 25000128 H RX IP 250 OP 636: Performed by: INTERNAL MEDICINE

## 2017-02-28 PROCEDURE — 96413 CHEMO IV INFUSION 1 HR: CPT

## 2017-02-28 PROCEDURE — 96411 CHEMO IV PUSH ADDL DRUG: CPT

## 2017-02-28 PROCEDURE — 99214 OFFICE O/P EST MOD 30 MIN: CPT | Performed by: INTERNAL MEDICINE

## 2017-02-28 PROCEDURE — 96368 THER/DIAG CONCURRENT INF: CPT

## 2017-02-28 PROCEDURE — 96367 TX/PROPH/DG ADDL SEQ IV INF: CPT

## 2017-02-28 PROCEDURE — 96415 CHEMO IV INFUSION ADDL HR: CPT

## 2017-02-28 PROCEDURE — 85025 COMPLETE CBC W/AUTO DIFF WBC: CPT | Performed by: INTERNAL MEDICINE

## 2017-02-28 PROCEDURE — 82378 CARCINOEMBRYONIC ANTIGEN: CPT | Performed by: INTERNAL MEDICINE

## 2017-02-28 PROCEDURE — 80053 COMPREHEN METABOLIC PANEL: CPT | Performed by: INTERNAL MEDICINE

## 2017-02-28 RX ORDER — FLUOROURACIL 50 MG/ML
400 INJECTION, SOLUTION INTRAVENOUS ONCE
Status: COMPLETED | OUTPATIENT
Start: 2017-02-28 | End: 2017-02-28

## 2017-02-28 RX ORDER — METHYLPREDNISOLONE SODIUM SUCCINATE 125 MG/2ML
50 INJECTION, POWDER, LYOPHILIZED, FOR SOLUTION INTRAMUSCULAR; INTRAVENOUS ONCE
Status: COMPLETED | OUTPATIENT
Start: 2017-02-28 | End: 2017-02-28

## 2017-02-28 RX ADMIN — METHYLPREDNISOLONE SODIUM SUCCINATE 50 MG: 125 INJECTION, POWDER, FOR SOLUTION INTRAMUSCULAR; INTRAVENOUS at 10:50

## 2017-02-28 RX ADMIN — FLUOROURACIL 890 MG: 50 INJECTION, SOLUTION INTRAVENOUS at 13:20

## 2017-02-28 RX ADMIN — OXALIPLATIN 190 MG: 5 INJECTION, SOLUTION, CONCENTRATE INTRAVENOUS at 11:11

## 2017-02-28 RX ADMIN — LEUCOVORIN CALCIUM 800 MG: 350 INJECTION, POWDER, LYOPHILIZED, FOR SOLUTION INTRAMUSCULAR; INTRAVENOUS at 11:12

## 2017-02-28 RX ADMIN — DEXTROSE MONOHYDRATE 250 ML: 50 INJECTION, SOLUTION INTRAVENOUS at 10:50

## 2017-02-28 RX ADMIN — ONDANSETRON HYDROCHLORIDE 8 MG: 2 INJECTION, SOLUTION INTRAVENOUS at 10:50

## 2017-02-28 ASSESSMENT — PAIN SCALES - GENERAL: PAINLEVEL: NO PAIN (0)

## 2017-02-28 NOTE — LETTER
February 28, 2017      RE: Louie Greco  [address] [address]      [facility]    Hematology/Oncology Established Patient Note      Today's Date: 02/28/2017    Reason for Follow-up: rectal cancer      HISTORY OF PRESENT ILLNESS: Louie Greco is a 56 year old male who presents with rectal cancer.  He started having rectal bleeding around beginning of 2016.  He underwent colonoscopy on 7/27/16, which showed a malignant tumor in the rectum involving half of the lumen with oozing, as well as three 4-mm polyps in the ascending and transverse colon.  Pathology showed moderately differentiated adenocarcinoma, ascending colon with sessile serrated adenoma, others were tubular adenomas.  Mismatch repair expression with normal protein expression.  Staging scans showed the rectal mass, indeterminate focus in the left liver thought to be cyst, and multiple bilateral renal cysts.  MRI showed a distal rectal mass measuring 2.7 x 2.4 cm extending to the most proximal region of the anal canal.  There are a few tiny subcentimeter perirectal fat lymph nodes.  He was staged clinically [id number] (stage IIIA).  He was seen by Dr. Lee at [facility], and started neoadjuvant chemoradiation with capecitabine on 8/8/16 and completed on 9/15/16.  He was then seen by Dr. Radford, colorectal surgery at [facility].  His post chemoradiation MRI showed improvement in the size of the tumor and response in the lymph nodes.  On 12/8/16, he underwent flexible sigmoidoscopy and there was no evidence of tumor.  There was only some anterior scarring in the lumen.  Multiple biopsies were taken, which showed no evidence of carcinoma.  At that point, Dr. Radford spoke with the patient's wife, that there appears to be a complete response in the lumen, and that the patient would wish to placed on the watch and wait protocol.  The patient had expressed wishes not to have an APR, and it would  not have been possible to grossly clear a margin for LAR.      He had port placed on 1/16/17.    Current Chemotherapy:  Leucovorin 350 mg/m2 IV IV on day 1  Oxaliplatin 85 mg/m2 IV on day 1  Fluorouracil 400 mg/m2 IV on day 1  Fluorouracil 2400 mg/m2 CIV over 46 hours  Every 2 week cycles    C1D1: 1/16/17  C2D1: 1/31/17  C3D1: 2/14/17  C4D1: 2/28/17  C5D1 anticipated for 3/14/17      INTERIM HISTORY: Louie comes in for follow-up today.  He notes more cold sensitivity with this last cycle, in his hands and feet.  It is worse during the first week after chemotherapy.  He notes more fatigue as well.  He has had some congestion and cold symptoms the last couple of weeks as well that has improved.  No mouth sores.  He notes a bit of queasiness after chemotherapy that did not require medications.  Appetite is good.  No abdominal pain.  He had a mild nosebleed that resolved.  He also noted mild bleeding in his stools, but attributes to feeling more constipated with chemotherapy.        REVIEW OF SYSTEMS:   14 point ROS was reviewed and is negative other than as noted above in HPI.       HOME MEDICATIONS:  Current Outpatient Prescriptions   Medication Sig Dispense Refill     Docusate Sodium (COLACE PO)        metoclopramide (REGLAN) 10 MG tablet Take 1 tablet (10 mg) by mouth 4 times daily (before meals and nightly) 30 tablet 0     LORazepam (ATIVAN) 0.5 MG tablet Take 1 tablet (0.5 mg) by mouth every 4 hours as needed (Anxiety, Nausea/Vomiting or Sleep) 30 tablet 2     prochlorperazine (COMPAZINE) 10 MG tablet Take 1 tablet (10 mg) by mouth every 6 hours as needed (Nausea/Vomiting) 30 tablet 2     ondansetron (ZOFRAN) 8 MG tablet Take 1 tablet (8 mg) by mouth every 8 hours as needed (Nausea/Vomiting) 10 tablet 2     IBUPROFEN PO        acetaminophen (TYLENOL) 325 MG tablet Take 2 tablets (650 mg) by mouth every 4 hours as needed for other (mild pain) 100 tablet 0     hydrocortisone 0.5 % ointment Apply topically 2 times  daily Reported on 2017       diltiazem 2% in PLO cream, FV COMPOUNDED, 2% GEL To anal opening three times daily.  Use a pea-sized amount.  Store at room temperature. 60 g 0         ALLERGIES:  Allergies   Allergen Reactions     Amoxicillin      Ampicillin Diarrhea     Demerol [Meperidine]      Nausea          PAST MEDICAL HISTORY:  Past Medical History   Diagnosis Date     Childhood asthma      Nephrolithiasis           Rectal cancer (H)      low rectal cancer         PAST SURGICAL HISTORY:  Past Surgical History   Procedure Laterality Date     Vasectomy       Colonoscopy N/A 2016     Procedure: COMBINED COLONOSCOPY, SINGLE OR MULTIPLE BIOPSY/POLYPECTOMY BY BIOPSY;  Surgeon: Chelsea Thompson MD;  Location:  GI     Hc tooth extraction w/forcep       Sigmoidoscopy flexible N/A 2016     Procedure: SIGMOIDOSCOPY FLEXIBLE;  Surgeon: Felicitas Radford MD;  Location:  OR         SOCIAL HISTORY:  Social History     Social History     Marital status: Single     Spouse name: N/A     Number of children: N/A     Years of education: N/A     Occupational History     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 No     Social History Narrative    Dad  at age  78   from BENNETT, COPD, & HTN    Mom alive in her 70's.     for 30 years    Two adult children. 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         He notes that he had a brother who passed away at a young age of 53 from a metastatic cancer, but unknown what type, and passed away within 2 months of diagnosis.  He denies other family  history of cancer in the immediate family.  Louie works as a  at a [employer] school.      FAMILY HISTORY:  Family History   Problem Relation Age of Onset     CANCER Brother      primary of unknown origin     Chronic Obstructive Pulmonary Disease Father      Hypertension Father      Family History Negative Mother      Family History Negative Brother      Glaucoma No family hx of      Macular Degeneration No family hx of      DIABETES Maternal Grandfather      Hypertension Paternal Grandmother      Hyperlipidemia Father      Hyperlipidemia Paternal Grandmother      Other Cancer Brother          PHYSICAL EXAM:  Vital signs:  /76 (BP Location: Right arm, Patient Position: Chair, Cuff Size: Adult Large)  Pulse 105  Temp 98.3  F (36.8  C) (Oral)  Resp 16  Wt 96.3 kg (212 lb 3.2 oz)  SpO2 97%  BMI 30.45 kg/m2   ECO  GENERAL/CONSTITUTIONAL: No acute distress. Accompanied by wife.  EYES: No scleral icterus.  LYMPH: No anterior cervical, posterior cervical, or supraclavicular adenopathy.   RESPIRATORY: Clear to auscultation bilaterally. No crackles or wheezing.   CARDIOVASCULAR: Regular rate and rhythm without murmurs, gallops, or rubs.  GASTROINTESTINAL: No tenderness. The patient has normal bowel sounds. No guarding.  No distention.  MUSCULOSKELETAL: Warm and well-perfused, no cyanosis, clubbing, or edema.  NEUROLOGIC: Alert, oriented, answers questions appropriately.  INTEGUMENTARY: No jaundice.  GAIT: Steady, does not use assistive device  Port in place at right upper chest.    LABS:  CBC RESULTS:   Recent Labs   Lab Test  17   0920   WBC  2.1*   RBC  4.48   HGB  13.4   HCT  37.9*   MCV  85   MCH  29.9   MCHC  35.4   RDW  14.9   PLT  107*     ANC 1300    Component      Latest Ref Rng & Units 2017   CEA      0 - 2.5 ug/L 1.0     Recent Labs   Lab Test  17   0920  17   0955   NA  142  139   POTASSIUM  3.5  4.0   CHLORIDE  108  106   CO2  27  28   ANIONGAP  7  5   GLC  122*   113*   BUN  21  20   CR  0.71  0.78   FRANDY  8.4*  8.3*     Lab Results   Component Value Date    AST 49 02/28/2017     Lab Results   Component Value Date    ALT 72 02/28/2017     No results found for: BILICONJ   Lab Results   Component Value Date    BILITOTAL 0.7 02/28/2017     Lab Results   Component Value Date    ALBUMIN 3.3 02/28/2017     Lab Results   Component Value Date    PROTTOTAL 6.4 02/28/2017      Lab Results   Component Value Date    ALKPHOS 72 02/28/2017         ASSESSMENT/PLAN:  Louie Greco is a 56 year old male with:    1) Rectal cancer: clinical stage [id number] (stage IIIA), s/p chemoradiation with Xeloda, and appears to have achieved complete response on imaging and biopsies.  He opted not to undergo surgery and expressed desire not to undergo APR, as he does not want a colostomy bag.  It was felt reasonable to go on the watch and wait protocol with the [facility].      He will complete 4 additional months (8 cycles) of chemotherapy with FOLFOX.  Will discuss with Dr. Radford regarding endoscopic surveillance, as per the watch and wait protocol.      He is tolerating chemotherapy well so far.    -C4D1 of FOLFOX today  -schedule IV fluids later this week  -will plan for CT scans and MRI pelvis at the [facility] after 4 cycles of FOLFOX - scheduled for 3/13/17  -RTC in 2 weeks     2) Hiccups: Occurred for 3 days after cycle 1 of chemotherapy.  May have been due to dexamethasone, although he says he has similar symptoms after receiving sedation for dental procedure previously.  He did not get hiccups after cycle 2 of chemotherapy.  -switched steroids to methylprednisolone  -if symptoms recur, can consider cutting back on steroid dose   -he has Reglan, but he chose not to take it due to possible side effects    3) Chemotherapy-induced nausea: Mild.  He has not needed to take his home anti-emetics though.  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  No pain.    -keep chemo at full doses for now; if worsens, can consider reducing dose of oxaliplatin    6) Elevated transaminases: slight elevation of AST and ALT, likely due to chemotherapy  -monitor for now      I spent a total of 25 minutes with the patient, with over >50% of the time in counseling and/or coordination of care.         Agueda Manley MD  Hematology/Oncology  [facility]        Louie Greco is a 56 year old male who presents for:  Chief Complaint   Patient presents with     Oncology Clinic Visit     ret rectal ca        Initial Vitals:  There were no vitals taken for this visit. Estimated body mass index is 30.65 kg/(m^2) as calculated from the following:    Height as of 2/14/17: 1.778 m (5' 10\").    Weight as of 2/14/17: 96.9 kg (213 lb 9.6 oz).. There is no height or weight on file to calculate BSA. BP completed using cuff size: regular  Data Unavailable No LMP for male patient. Allergies and medications reviewed.     Medications: Medication refills not needed today.  Pharmacy name entered into [employer]:    [id number], [place] - [address] AT [address] [place] [address] [facility] [place] [address]    Comments: none    6 minutes for nursing intake (face to face time)   Gabriella Queen [facility]          Agueda Manley MD  "

## 2017-02-28 NOTE — PROGRESS NOTES
"Louie Greco is a 56 year old male who presents for:  Chief Complaint   Patient presents with     Oncology Clinic Visit     ret rectal ca        Initial Vitals:  There were no vitals taken for this visit. Estimated body mass index is 30.65 kg/(m^2) as calculated from the following:    Height as of 2/14/17: 1.778 m (5' 10\").    Weight as of 2/14/17: 96.9 kg (213 lb 9.6 oz).. There is no height or weight on file to calculate BSA. BP completed using cuff size: regular  Data Unavailable No LMP for male patient. Allergies and medications reviewed.     Medications: Medication refills not needed today.  Pharmacy name entered into [employer]:    [id number]  [place], [place] - [address] [facility]  [place] - [address]    Comments: none    6 minutes for nursing intake (face to face time)   Gabriella Queen CMA      DISCHARGE PLAN:  1.) Patient to be scheduled for pump takedown, IV fluids on 3/2/17.   2.) Patient already scheduled for  CT scans, C5D1 Folfox, and follow up exam with Dr. Manley.   3.) Patient to be scheduled for C6D1 Folfox 3/28/17, no exam that day.   Next appointments: See patient instruction section  Departure Mode: Ambulatory  Accompanied by: wife   8minutes for nursing discharge (face to face time)   Juani Mcgrath RN      "

## 2017-02-28 NOTE — PROGRESS NOTES
[facility]    Hematology/Oncology Established Patient Note      Today's Date: 02/28/2017    Reason for Follow-up: rectal cancer      HISTORY OF PRESENT ILLNESS: Louie Greco is a 56 year old male who presents with rectal cancer.  He started having rectal bleeding around beginning of 2016.  He underwent colonoscopy on 7/27/16, which showed a malignant tumor in the rectum involving half of the lumen with oozing, as well as three 4-mm polyps in the ascending and transverse colon.  Pathology showed moderately differentiated adenocarcinoma, ascending colon with sessile serrated adenoma, others were tubular adenomas.  Mismatch repair expression with normal protein expression.  Staging scans showed the rectal mass, indeterminate focus in the left liver thought to be cyst, and multiple bilateral renal cysts.  MRI showed a distal rectal mass measuring 2.7 x 2.4 cm extending to the most proximal region of the anal canal.  There are a few tiny subcentimeter perirectal fat lymph nodes.  He was staged clinically [id number] (stage IIIA).  He was seen by Dr. Lee at [facility], and started neoadjuvant chemoradiation with capecitabine on 8/8/16 and completed on 9/15/16.  He was then seen by Dr. Radford, colorectal surgery at [facility].  His post chemoradiation MRI showed improvement in the size of the tumor and response in the lymph nodes.  On 12/8/16, he underwent flexible sigmoidoscopy and there was no evidence of tumor.  There was only some anterior scarring in the lumen.  Multiple biopsies were taken, which showed no evidence of carcinoma.  At that point, Dr. Radford spoke with the patient's wife, that there appears to be a complete response in the lumen, and that the patient would wish to placed on the watch and wait protocol.  The patient had expressed wishes not to have an APR, and it would not have been possible to grossly clear a margin for LAR.      He had port  placed on 1/16/17.    Current Chemotherapy:  Leucovorin 350 mg/m2 IV IV on day 1  Oxaliplatin 85 mg/m2 IV on day 1  Fluorouracil 400 mg/m2 IV on day 1  Fluorouracil 2400 mg/m2 CIV over 46 hours  Every 2 week cycles    C1D1: 1/16/17  C2D1: 1/31/17  C3D1: 2/14/17  C4D1: 2/28/17  C5D1 anticipated for 3/14/17      INTERIM HISTORY: Louie comes in for follow-up today.  He notes more cold sensitivity with this last cycle, in his hands and feet.  It is worse during the first week after chemotherapy.  He notes more fatigue as well.  He has had some congestion and cold symptoms the last couple of weeks as well that has improved.  No mouth sores.  He notes a bit of queasiness after chemotherapy that did not require medications.  Appetite is good.  No abdominal pain.  He had a mild nosebleed that resolved.  He also noted mild bleeding in his stools, but attributes to feeling more constipated with chemotherapy.        REVIEW OF SYSTEMS:   14 point ROS was reviewed and is negative other than as noted above in HPI.       HOME MEDICATIONS:  Current Outpatient Prescriptions   Medication Sig Dispense Refill     Docusate Sodium (COLACE PO)        metoclopramide (REGLAN) 10 MG tablet Take 1 tablet (10 mg) by mouth 4 times daily (before meals and nightly) 30 tablet 0     LORazepam (ATIVAN) 0.5 MG tablet Take 1 tablet (0.5 mg) by mouth every 4 hours as needed (Anxiety, Nausea/Vomiting or Sleep) 30 tablet 2     prochlorperazine (COMPAZINE) 10 MG tablet Take 1 tablet (10 mg) by mouth every 6 hours as needed (Nausea/Vomiting) 30 tablet 2     ondansetron (ZOFRAN) 8 MG tablet Take 1 tablet (8 mg) by mouth every 8 hours as needed (Nausea/Vomiting) 10 tablet 2     IBUPROFEN PO        acetaminophen (TYLENOL) 325 MG tablet Take 2 tablets (650 mg) by mouth every 4 hours as needed for other (mild pain) 100 tablet 0     hydrocortisone 0.5 % ointment Apply topically 2 times daily Reported on 2/14/2017       diltiazem 2% in PLO cream, FV COMPOUNDED,  2% GEL To anal opening three times daily.  Use a pea-sized amount.  Store at room temperature. 60 g 0         ALLERGIES:  Allergies   Allergen Reactions     Amoxicillin      Ampicillin Diarrhea     Demerol [Meperidine]      Nausea          PAST MEDICAL HISTORY:  Past Medical History   Diagnosis Date     Childhood asthma      Nephrolithiasis           Rectal cancer (H)      low rectal cancer         PAST SURGICAL HISTORY:  Past Surgical History   Procedure Laterality Date     Vasectomy       Colonoscopy N/A 2016     Procedure: COMBINED COLONOSCOPY, SINGLE OR MULTIPLE BIOPSY/POLYPECTOMY BY BIOPSY;  Surgeon: Chelsea Thompson MD;  Location:  GI     Hc tooth extraction w/forcep       Sigmoidoscopy flexible N/A 2016     Procedure: SIGMOIDOSCOPY FLEXIBLE;  Surgeon: Felicitas Radford MD;  Location:  OR         SOCIAL HISTORY:  Social History     Social History     Marital status: Single     Spouse name: N/A     Number of children: N/A     Years of education: N/A     Occupational History     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 No     Social History Narrative    Dad  at age  78   from BENNETT, COPD, & HTN    Mom alive in her 70's.     for 30 years    Two adult children. 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         He notes that he had a brother who passed away at a young age of 53 from a metastatic cancer, but unknown what type, and passed away within 2 months of diagnosis.  He denies other family history of cancer in the immediate family.  Louie works as a   at a [employer].      FAMILY HISTORY:  Family History   Problem Relation Age of Onset     CANCER Brother      primary of unknown origin     Chronic Obstructive Pulmonary Disease Father      Hypertension Father      Family History Negative Mother      Family History Negative Brother      Glaucoma No family hx of      Macular Degeneration No family hx of      DIABETES Maternal Grandfather      Hypertension Paternal Grandmother      Hyperlipidemia Father      Hyperlipidemia Paternal Grandmother      Other Cancer Brother          PHYSICAL EXAM:  Vital signs:  /76 (BP Location: Right arm, Patient Position: Chair, Cuff Size: Adult Large)  Pulse 105  Temp 98.3  F (36.8  C) (Oral)  Resp 16  Wt 96.3 kg (212 lb 3.2 oz)  SpO2 97%  BMI 30.45 kg/m2   ECO  GENERAL/CONSTITUTIONAL: No acute distress. Accompanied by wife.  EYES: No scleral icterus.  LYMPH: No anterior cervical, posterior cervical, or supraclavicular adenopathy.   RESPIRATORY: Clear to auscultation bilaterally. No crackles or wheezing.   CARDIOVASCULAR: Regular rate and rhythm without murmurs, gallops, or rubs.  GASTROINTESTINAL: No tenderness. The patient has normal bowel sounds. No guarding.  No distention.  MUSCULOSKELETAL: Warm and well-perfused, no cyanosis, clubbing, or edema.  NEUROLOGIC: Alert, oriented, answers questions appropriately.  INTEGUMENTARY: No jaundice.  GAIT: Steady, does not use assistive device  Port in place at right upper chest.    LABS:  CBC RESULTS:   Recent Labs   Lab Test  17   0920   WBC  2.1*   RBC  4.48   HGB  13.4   HCT  37.9*   MCV  85   MCH  29.9   MCHC  35.4   RDW  14.9   PLT  107*     ANC 1300    Component      Latest Ref Rng & Units 2017   CEA      0 - 2.5 ug/L 1.0     Recent Labs   Lab Test  17   0920  17   0955   NA  142  139   POTASSIUM  3.5  4.0   CHLORIDE  108  106   CO2  27  28   ANIONGAP  7  5   GLC  122*  113*   BUN  21  20   CR  0.71  0.78   FRANDY  8.4*  8.3*     Lab Results   Component  Value Date    AST 49 02/28/2017     Lab Results   Component Value Date    ALT 72 02/28/2017     No results found for: BILICONJ   Lab Results   Component Value Date    BILITOTAL 0.7 02/28/2017     Lab Results   Component Value Date    ALBUMIN 3.3 02/28/2017     Lab Results   Component Value Date    PROTTOTAL 6.4 02/28/2017      Lab Results   Component Value Date    ALKPHOS 72 02/28/2017         ASSESSMENT/PLAN:  Louie Greco is a 56 year old male with:    1) Rectal cancer: clinical stage [id number] (stage IIIA), s/p chemoradiation with Xeloda, and appears to have achieved complete response on imaging and biopsies.  He opted not to undergo surgery and expressed desire not to undergo APR, as he does not want a colostomy bag.  It was felt reasonable to go on the watch and wait protocol with the [facility].      He will complete 4 additional months (8 cycles) of chemotherapy with FOLFOX.  Will discuss with Dr. Radford regarding endoscopic surveillance, as per the watch and wait protocol.      He is tolerating chemotherapy well so far.    -C4D1 of FOLFOX today  -schedule IV fluids later this week  -will plan for CT scans and MRI pelvis at the [facility] after 4 cycles of FOLFOX - scheduled for 3/13/17  -RTC in 2 weeks     2) Hiccups: Occurred for 3 days after cycle 1 of chemotherapy.  May have been due to dexamethasone, although he says he has similar symptoms after receiving sedation for dental procedure previously.  He did not get hiccups after cycle 2 of chemotherapy.  -switched steroids to methylprednisolone  -if symptoms recur, can consider cutting back on steroid dose   -he has Reglan, but he chose not to take it due to possible side effects    3) Chemotherapy-induced nausea: Mild.  He has not needed to take his home anti-emetics though.  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  No pain.     -keep chemo at full doses for now; if worsens, can consider reducing dose of oxaliplatin    6) Elevated transaminases: slight elevation of AST and ALT, likely due to chemotherapy  -monitor for now      I spent a total of 25 minutes with the patient, with over >50% of the time in counseling and/or coordination of care.         Agueda Manley MD  Hematology/Oncology  [facility]

## 2017-02-28 NOTE — PROGRESS NOTES
"Infusion Nursing Note:  Louie Greco presents today for C4D1 Folfox  Patient seen by provider today: Yes: exam with Dr. Manley   present during visit today: Not Applicable.    Note: N/A.    Intravenous Access:  Implanted Port.    Treatment Conditions:  Lab Results   Component Value Date    HGB 13.4 02/28/2017     Lab Results   Component Value Date    WBC 2.1 02/28/2017      Lab Results   Component Value Date    ANEU 1.3 02/28/2017     Lab Results   Component Value Date     02/28/2017      Lab Results   Component Value Date     02/28/2017                   Lab Results   Component Value Date    POTASSIUM 3.5 02/28/2017           No results found for: MAG         Lab Results   Component Value Date    CR 0.71 02/28/2017                   Lab Results   Component Value Date    FRANDY 8.4 02/28/2017                Lab Results   Component Value Date    BILITOTAL 0.7 02/28/2017           Lab Results   Component Value Date    ALBUMIN 3.3 02/28/2017                    Lab Results   Component Value Date    ALT 72 02/28/2017           Lab Results   Component Value Date    AST 49 02/28/2017     Results reviewed, labs MET treatment parameters, ok to proceed with treatment.      Post Infusion Assessment:  Patient tolerated infusion without incident.  Blood return noted pre and post infusion.  Site patent and intact, free from redness, edema or discomfort.  No evidence of extravasations.    Discharge Plan:   [facility]  Patient will return 3/2/17 for next appointment.   Patient discharged in stable condition accompanied by: self and wife.  Departure Mode: Ambulatory.    Prior to discharge: Port is secured in place with tegaderm and flushed with 10cc NS with positive blood return noted.  Continuous home infusion Dosi-Fuser pump connected.    All connectors secured in place and clamps taped open.    Pump started, \"running\" noted on display (CADD): Not Applicable.  Patient instructed to call our " clinic or [facility] with any questions or concerns at home.  Patient verbalized understanding.    Patient set up for pump disconnect at our clinic on 3/2/17.      Paloma Farrell RN

## 2017-02-28 NOTE — PATIENT INSTRUCTIONS
-C4 of chemotherapy today, pending labs  -return to clinic in 2 weeks with scan results and chemotherapy as scheduled

## 2017-02-28 NOTE — MR AVS SNAPSHOT
After Visit Summary   2/28/2017    Louie Greco    MRN: [id number]           Patient Information     Date Of Birth          1960        Visit Information        Provider Department      2/28/2017 9:00 AM  INFUSION CHAIR 17 [facility]        Today's Diagnoses     Malignant neoplasm of rectum (H)    -  1       Follow-ups after your visit        Your next 10 appointments already scheduled     Mar 02, 2017  2:30 PM CST   Level 2 with  INFUSION CHAIR 14   [facility] ([facility])    [facility] [place]  [address]  [facility] [address]   [phone]            Mar 06, 2017  3:15 PM CST   RETURN RETINA with Yara Smalls MD   [facility] Clinic ([facility])    [address]  [address]  [address] [address]  [facility] [id number] [phone]            Mar 13, 2017  3:00 PM CDT   (Arrive by 2:45 PM)   CT ABDOMEN W CONTRAST with UCCT1   [facility] CT ([facility])    [address]  [address] [facility] [address] [phone]           Please bring any scans or X-rays taken at other hospitals, if similar tests were done. Also bring a list of your medicines, including vitamins, minerals and over-the-counter drugs. It is safest to leave personal items at home.  Be sure to tell your doctor:   If you have any allergies.   If there s any chance you are pregnant.   If you are breastfeeding.   If you have any special needs.  You may have contrast for this exam. To prepare:   Do not eat or drink for 2 hours before your exam. If you need to take medicine, you may take it with small sips of water. (We may ask you to take liquid medicine as well.)   The day before your exam, drink extra fluids at least six 8-ounce glasses (unless your doctor tells you to restrict your fluids).  Patients over 70 or patients with diabetes or  kidney problems:   If you haven t had a blood test (creatinine test) within the last 30 days, go to your clinic or Diagnostic Imaging Department for this test.  If you have diabetes:   If your kidney function is normal, continue taking your metformin (Avandamet, Glucophage, Glucovance, Metaglip) on the day of your exam.   If your kidney function is abnormal, wait 48 hours before restarting this medicine.  You will have oral contrast for this exam:   You will drink the contrast at home. Get this from your clinic or Diagnostic Imaging Department. Please follow the directions given.  Please wear loose clothing, such as a sweat suit or jogging clothes. Avoid snaps, zippers and other metal. We may ask you to undress and put on a hospital gown.  If you have any questions, please call the Imaging Department where you will have your exam.            Mar 13, 2017  3:20 PM CDT   (Arrive by 3:05 PM)   CT CHEST W CONTRAST with UCCT1   [facility] CT ([facility])    [address] [address] [phone]           Please bring any scans or X-rays taken at other hospitals, if similar tests were done. Also bring a list of your medicines, including vitamins, minerals and over-the-counter drugs. It is safest to leave personal items at home.  Be sure to tell your doctor:   If you have any allergies.   If there s any chance you are pregnant.   If you are breastfeeding.   If you have any special needs.  You will have contrast for this exam. To prepare:   Do not eat or drink for 2 hours before your exam. If you need to take medicine, you may take it with small sips of water. (We may ask you to take liquid medicine as well.)   The day before your exam, drink extra fluids at least six 8-ounce glasses (unless your doctor tells you to restrict your fluids).  Patients over 70 or patients with diabetes or kidney problems:   If you haven t had a blood test (creatinine  test) within the last 30 days, go to your clinic or Diagnostic Imaging Department for this test.  If you have diabetes:   If your kidney function is normal, continue taking your metformin (Avandamet, Glucophage, Glucovance, Metaglip) on the day of your exam.   If your kidney function is abnormal, wait 48 hours before restarting this medicine.  Please wear loose clothing, such as a sweat suit or jogging clothes. Avoid snaps, zippers and other metal. We may ask you to undress and put on a hospital gown.  If you have any questions, please call the Imaging Department where you will have your exam.            Mar 13, 2017  4:00 PM CDT   (Arrive by 3:45 PM)   MR PELVIS W/O & W CONTRAST with [id number]   [facility] MRI ([facility] [place])    [address] [place] [address] [phone]           Take your medicines as usual, unless your doctor tells you not to. Bring a list of your current medicines to your exam (including vitamins, minerals and over-the-counter drugs).  You will be given intravenous contrast for this exam. To prepare:   The day before your exam, drink extra fluids at least six 8-ounce glasses (unless your doctor tells you to restrict your fluids).   Have a blood test (creatinine test) within 30 days of your exam. Go to your clinic or Diagnostic Imaging Department for this test.  The MRI machine uses a strong magnet. Please wear clothes without metal (snaps, zippers). A sweatsuit works well, or we may give you a hospital gown.  Please remove any body piercings and hair extensions before you arrive. You will also remove watches, jewelry, hairpins, wallets, dentures, partial dental plates and hearing aids. You may wear contact lenses, and you may be able to wear your rings. We have a safe place to keep your personal items, but it is safer to leave them at home.   **IMPORTANT** THE INSTRUCTIONS BELOW ARE ONLY FOR THOSE PATIENTS WHO HAVE BEEN TOLD THEY  WILL RECEIVE SEDATION OR GENERAL ANESTHESIA DURING THEIR MRI PROCEDURE:  IF YOU WILL RECEIVE SEDATION (take medicine to help you relax during your exam):   You must get the medicine from your doctor before you arrive. Bring the medicine to the exam. Do not take it at home.   Arrive one hour early. Bring someone who can take you home after the test. Your medicine will make you sleepy. After the exam, you may not drive, take a bus or take a taxi by yourself.   No eating 8 hours before your exam. You may have clear liquids up until 4 hours before your exam. (Clear liquids include water, clear tea, black coffee and fruit juice without pulp.)  IF YOU WILL RECEIVE ANESTHESIA (be asleep for your exam):   Arrive 1 1/2 hours early. Bring someone who can take you home after the test. You may not drive, take a bus or take a taxi by yourself.   No eating 8 hours before your exam. You may have clear liquids up until 4 hours before your exam. (Clear liquids include water, clear tea, black coffee and fruit juice without pulp.)  Please call the Imaging Department at your exam site with any questions.            Mar 14, 2017  9:00 AM CDT   Level 5 with  INFUSION CHAIR 5   [facility] ([facility])    [facility]  [address]  [facility] [address]   [phone]            Mar 14, 2017  9:30 AM CDT   Return Visit with Agueda Manley MD   [facility] ([facility])    [facility]  [address]  [facility] [address]   [phone]            Mar 28, 2017  8:30 AM CDT   Level 5 with  INFUSION CHAIR 9   [facility] ([facility])    [facility]  [address]  [facility] [address]   [phone]            Mar 30, 2017  9:00 AM CDT   Return Visit with Roxann Guzman GC   [facility] ([place]  "[facility])    [facility] [place]  [address]  [place] [address] [phone]              Who to contact     If you have questions or need follow up information about today's clinic visit or your schedule please contact [facility] directly at [phone].  Normal or non-critical lab and imaging results will be communicated to you by MyChart, letter or phone within 4 business days after the clinic has received the results. If you do not hear from us within 7 days, please contact the clinic through MyScreenhart or phone. If you have a critical or abnormal lab result, we will notify you by phone as soon as possible.  Submit refill requests through Placeword or call your pharmacy and they will forward the refill request to us. Please allow 3 business days for your refill to be completed.          Additional Information About Your Visit        MyScreenharComic Rocket Information     Placeword gives you secure access to your electronic health record. If you see a primary care provider, you can also send messages to your care team and make appointments. If you have questions, please call your primary care clinic.  If you do not have a primary care provider, please call [phone] and they will assist you.        Care EveryWhere ID     This is your Care EveryWhere ID. This could be used by other organizations to access your [facility] medical records  [id number]        Your Vitals Were     Pulse Temperature Respirations Height Pulse Oximetry BMI (Body Mass Index)    65 97.1  F (36.2  C) (Oral) 16 1.778 m (5' 10\") 97% 30.45 kg/m2       Blood Pressure from Last 3 Encounters:   02/28/17 107/76   02/28/17 145/79   02/16/17 127/85    Weight from Last 3 Encounters:   02/28/17 96.3 kg (212 lb 3.2 oz)   02/28/17 96.3 kg (212 lb 3.2 oz)   02/14/17 96.9 kg (213 lb 9.6 oz)              We Performed the Following     CBC with platelets differential     CEA     Comprehensive metabolic " panel     MD INSTRUCTION FOR PROTOCOL     Treatment Conditions        Primary Care Provider Office Phone # Fax #    Ayden Peralta [phone]       [facility] [address] [facility] [address]        Thank you!     Thank you for choosing [facility]  for your care. Our goal is always to provide you with excellent care. Hearing back from our patients is one way we can continue to improve our services. Please take a few minutes to complete the written survey that you may receive in the mail after your visit with us. Thank you!             Your Updated Medication List - Protect others around you: Learn how to safely use, store and throw away your medicines at www.disposemymeds.org.          This list is accurate as of: 2/28/17  3:13 PM.  Always use your most recent med list.                   Brand Name Dispense Instructions for use    acetaminophen 325 MG tablet    TYLENOL    100 tablet    Take 2 tablets (650 mg) by mouth every 4 hours as needed for other (mild pain)       COLACE PO          diltiazem 2% in PLO cream (FV COMPOUNDED) 2% Gel     60 g    To anal opening three times daily.  Use a pea-sized amount.  Store at room temperature.       hydrocortisone 0.5 % ointment      Apply topically 2 times daily Reported on 2/14/2017       IBUPROFEN PO          LORazepam 0.5 MG tablet    ATIVAN    30 tablet    Take 1 tablet (0.5 mg) by mouth every 4 hours as needed (Anxiety, Nausea/Vomiting or Sleep)       metoclopramide 10 MG tablet    REGLAN    30 tablet    Take 1 tablet (10 mg) by mouth 4 times daily (before meals and nightly)       ondansetron 8 MG tablet    ZOFRAN    10 tablet    Take 1 tablet (8 mg) by mouth every 8 hours as needed (Nausea/Vomiting)       prochlorperazine 10 MG tablet    COMPAZINE    30 tablet    Take 1 tablet (10 mg) by mouth every 6 hours as needed (Nausea/Vomiting)

## 2017-03-02 ENCOUNTER — INFUSION THERAPY VISIT (OUTPATIENT)
Dept: INFUSION THERAPY | Facility: CLINIC | Age: 57
End: 2017-03-02
Attending: INTERNAL MEDICINE
Payer: COMMERCIAL

## 2017-03-02 VITALS — HEART RATE: 79 BPM | TEMPERATURE: 98.2 F | SYSTOLIC BLOOD PRESSURE: 136 MMHG | DIASTOLIC BLOOD PRESSURE: 89 MMHG

## 2017-03-02 DIAGNOSIS — C20 MALIGNANT NEOPLASM OF RECTUM (H): Primary | ICD-10-CM

## 2017-03-02 PROCEDURE — 96360 HYDRATION IV INFUSION INIT: CPT

## 2017-03-02 PROCEDURE — 25000128 H RX IP 250 OP 636: Performed by: INTERNAL MEDICINE

## 2017-03-02 PROCEDURE — 96523 IRRIG DRUG DELIVERY DEVICE: CPT

## 2017-03-02 RX ORDER — HEPARIN SODIUM (PORCINE) LOCK FLUSH IV SOLN 100 UNIT/ML 100 UNIT/ML
5 SOLUTION INTRAVENOUS ONCE
Status: CANCELLED
Start: 2017-03-02 | End: 2017-03-02

## 2017-03-02 RX ORDER — HEPARIN SODIUM (PORCINE) LOCK FLUSH IV SOLN 100 UNIT/ML 100 UNIT/ML
5 SOLUTION INTRAVENOUS ONCE
Status: COMPLETED | OUTPATIENT
Start: 2017-03-02 | End: 2017-03-02

## 2017-03-02 RX ADMIN — HEPARIN 5 ML: 100 SYRINGE at 16:23

## 2017-03-02 RX ADMIN — SODIUM CHLORIDE 1000 ML: 9 INJECTION, SOLUTION INTRAVENOUS at 15:08

## 2017-03-02 ASSESSMENT — PAIN SCALES - GENERAL: PAINLEVEL: NO PAIN (0)

## 2017-03-02 NOTE — MR AVS SNAPSHOT
After Visit Summary   3/2/2017    Louie Greco    MRN: [id number]           Patient Information     Date Of Birth          1960        Visit Information        Provider Department      3/2/2017 2:30 PM  INFUSION CHAIR 14 [facility]        Today's Diagnoses     Malignant neoplasm of rectum (H)    -  1       Follow-ups after your visit        Your next 10 appointments already scheduled     Mar 06, 2017  3:15 PM CST   RETURN RETINA with Yara Smalls MD   Eye Clinic ([facility])    [address]  [address]  9th [address] Clin 9a  [facility] [id number] [phone]            Mar 13, 2017  3:00 PM CDT   (Arrive by 2:45 PM)   CT ABDOMEN W CONTRAST with UCCT1   [facility] [place] [facility] ([facility])    [address]  1st Floor  [facility] [address] [phone]           Please bring any scans or X-rays taken at other hospitals, if similar tests were done. Also bring a list of your medicines, including vitamins, minerals and over-the-counter drugs. It is safest to leave personal items at home.  Be sure to tell your doctor:   If you have any allergies.   If there s any chance you are pregnant.   If you are breastfeeding.   If you have any special needs.  You may have contrast for this exam. To prepare:   Do not eat or drink for 2 hours before your exam. If you need to take medicine, you may take it with small sips of water. (We may ask you to take liquid medicine as well.)   The day before your exam, drink extra fluids at least six 8-ounce glasses (unless your doctor tells you to restrict your fluids).  Patients over 70 or patients with diabetes or kidney problems:   If you haven t had a blood test (creatinine test) within the last 30 days, go to your clinic or Diagnostic Imaging Department for this test.  If you have diabetes:   If your kidney function is normal, continue taking your  metformin (Avandamet, Glucophage, Glucovance, Metaglip) on the day of your exam.   If your kidney function is abnormal, wait 48 hours before restarting this medicine.  You will have oral contrast for this exam:   You will drink the contrast at home. Get this from your clinic or Diagnostic Imaging Department. Please follow the directions given.  Please wear loose clothing, such as a sweat suit or jogging clothes. Avoid snaps, zippers and other metal. We may ask you to undress and put on a hospital gown.  If you have any questions, please call the Imaging Department where you will have your exam.            Mar 13, 2017  3:20 PM CDT   (Arrive by 3:05 PM)   CT CHEST W CONTRAST with UCCT1   [facility] [place] [address] ([facility])    [address] [address] [phone]           Please bring any scans or X-rays taken at other hospitals, if similar tests were done. Also bring a list of your medicines, including vitamins, minerals and over-the-counter drugs. It is safest to leave personal items at home.  Be sure to tell your doctor:   If you have any allergies.   If there s any chance you are pregnant.   If you are breastfeeding.   If you have any special needs.  You will have contrast for this exam. To prepare:   Do not eat or drink for 2 hours before your exam. If you need to take medicine, you may take it with small sips of water. (We may ask you to take liquid medicine as well.)   The day before your exam, drink extra fluids at least six 8-ounce glasses (unless your doctor tells you to restrict your fluids).  Patients over 70 or patients with diabetes or kidney problems:   If you haven t had a blood test (creatinine test) within the last 30 days, go to your clinic or Diagnostic Imaging Department for this test.  If you have diabetes:   If your kidney function is normal, continue taking your metformin (Avandamet, Glucophage, Glucovance, Metaglip) on the day  of your exam.   If your kidney function is abnormal, wait 48 hours before restarting this medicine.  Please wear loose clothing, such as a sweat suit or jogging clothes. Avoid snaps, zippers and other metal. We may ask you to undress and put on a hospital gown.  If you have any questions, please call the Imaging Department where you will have your exam.            Mar 13, 2017  4:00 PM CDT   (Arrive by 3:45 PM)   MR PELVIS W/O & W CONTRAST with [id number]   [facility] MRI ([facility] [place])    [address] [address] [phone]           Take your medicines as usual, unless your doctor tells you not to. Bring a list of your current medicines to your exam (including vitamins, minerals and over-the-counter drugs).  You will be given intravenous contrast for this exam. To prepare:   The day before your exam, drink extra fluids at least six 8-ounce glasses (unless your doctor tells you to restrict your fluids).   Have a blood test (creatinine test) within 30 days of your exam. Go to your clinic or Diagnostic Imaging Department for this test.  The MRI machine uses a strong magnet. Please wear clothes without metal (snaps, zippers). A sweatsuit works well, or we may give you a hospital gown.  Please remove any body piercings and hair extensions before you arrive. You will also remove watches, jewelry, hairpins, wallets, dentures, partial dental plates and hearing aids. You may wear contact lenses, and you may be able to wear your rings. We have a safe place to keep your personal items, but it is safer to leave them at home.   **IMPORTANT** THE INSTRUCTIONS BELOW ARE ONLY FOR THOSE PATIENTS WHO HAVE BEEN TOLD THEY WILL RECEIVE SEDATION OR GENERAL ANESTHESIA DURING THEIR MRI PROCEDURE:  IF YOU WILL RECEIVE SEDATION (take medicine to help you relax during your exam):   You must get the medicine from your doctor before you arrive. Bring the medicine to the  exam. Do not take it at home.   Arrive one hour early. Bring someone who can take you home after the test. Your medicine will make you sleepy. After the exam, you may not drive, take a bus or take a taxi by yourself.   No eating 8 hours before your exam. You may have clear liquids up until 4 hours before your exam. (Clear liquids include water, clear tea, black coffee and fruit juice without pulp.)  IF YOU WILL RECEIVE ANESTHESIA (be asleep for your exam):   Arrive 1 1/2 hours early. Bring someone who can take you home after the test. You may not drive, take a bus or take a taxi by yourself.   No eating 8 hours before your exam. You may have clear liquids up until 4 hours before your exam. (Clear liquids include water, clear tea, black coffee and fruit juice without pulp.)  Please call the Imaging Department at your exam site with any questions.            Mar 14, 2017  9:00 AM CDT   Level 5 with  INFUSION CHAIR 5   [facility] ([facility])    [facility] [place] [id number]  [facility] [address]   [phone]            Mar 14, 2017  9:30 AM CDT   Return Visit with Agueda Manley MD   [facility] ([facility])    [facility]  [address]  [facility] [address]   [phone]            Mar 28, 2017  8:30 AM CDT   Level 5 with  INFUSION CHAIR 9   [facility] ([facility])    [facility]  [address]  [place] [address]   [phone]            Mar 30, 2017  9:00 AM CDT   Return Visit with Roxann Guzman GC   Cancer Risk Management Program ([facility])    [facility]  [address]  [facility] [address]   [phone]              Who to contact     If you have questions or need follow up information about today's clinic visit or your schedule  please contact [facility] directly at [phone].  Normal or non-critical lab and imaging results will be communicated to you by MyChart, letter or phone within 4 business days after the clinic has received the results. If you do not hear from us within 7 days, please contact the clinic through Fundbasehart or phone. If you have a critical or abnormal lab result, we will notify you by phone as soon as possible.  Submit refill requests through Scout or call your pharmacy and they will forward the refill request to us. Please allow 3 business days for your refill to be completed.          Additional Information About Your Visit        FundbaseharOneShift Information     Scout gives you secure access to your electronic health record. If you see a primary care provider, you can also send messages to your care team and make appointments. If you have questions, please call your primary care clinic.  If you do not have a primary care provider, please call [phone] and they will assist you.        Care EveryWhere ID     This is your Care EveryWhere ID. This could be used by other organizations to access your [place] medical records  [id number]        Your Vitals Were     Pulse Temperature                79 98.2  F (36.8  C) (Oral)           Blood Pressure from Last 3 Encounters:   03/02/17 136/89   02/28/17 107/76   02/28/17 145/79    Weight from Last 3 Encounters:   02/28/17 96.3 kg (212 lb 3.2 oz)   02/28/17 96.3 kg (212 lb 3.2 oz)   02/14/17 96.9 kg (213 lb 9.6 oz)              Today, you had the following     No orders found for display       Primary Care Provider Office Phone # Fax #    Ayden Peralta [phone]       [facility] [address] [facility] [address]        Thank you!     Thank you for choosing [facility]  for your care. Our goal is always to provide you with excellent care. Hearing back from our patients  is one way we can continue to improve our services. Please take a few minutes to complete the written survey that you may receive in the mail after your visit with us. Thank you!             Your Updated Medication List - Protect others around you: Learn how to safely use, store and throw away your medicines at www.disposemymeds.org.          This list is accurate as of: 3/2/17  4:27 PM.  Always use your most recent med list.                   Brand Name Dispense Instructions for use    acetaminophen 325 MG tablet    TYLENOL    100 tablet    Take 2 tablets (650 mg) by mouth every 4 hours as needed for other (mild pain)       COLACE PO          diltiazem 2% in PLO cream (FV COMPOUNDED) 2% Gel     60 g    To anal opening three times daily.  Use a pea-sized amount.  Store at room temperature.       hydrocortisone 0.5 % ointment      Apply topically 2 times daily Reported on 2/14/2017       IBUPROFEN PO          LORazepam 0.5 MG tablet    ATIVAN    30 tablet    Take 1 tablet (0.5 mg) by mouth every 4 hours as needed (Anxiety, Nausea/Vomiting or Sleep)       metoclopramide 10 MG tablet    REGLAN    30 tablet    Take 1 tablet (10 mg) by mouth 4 times daily (before meals and nightly)       ondansetron 8 MG tablet    ZOFRAN    10 tablet    Take 1 tablet (8 mg) by mouth every 8 hours as needed (Nausea/Vomiting)       prochlorperazine 10 MG tablet    COMPAZINE    30 tablet    Take 1 tablet (10 mg) by mouth every 6 hours as needed (Nausea/Vomiting)

## 2017-03-07 RX ORDER — LORAZEPAM 2 MG/ML
0.5 INJECTION INTRAMUSCULAR EVERY 4 HOURS PRN
Status: CANCELLED
Start: 2017-03-21

## 2017-03-07 RX ORDER — EPINEPHRINE 1 MG/ML
0.3 INJECTION INTRAMUSCULAR; INTRAVENOUS; SUBCUTANEOUS EVERY 5 MIN PRN
Status: CANCELLED | OUTPATIENT
Start: 2017-03-21

## 2017-03-07 RX ORDER — ALBUTEROL SULFATE 90 UG/1
1-2 AEROSOL, METERED RESPIRATORY (INHALATION)
Status: CANCELLED
Start: 2017-03-21

## 2017-03-07 RX ORDER — METHYLPREDNISOLONE SODIUM SUCCINATE 125 MG/2ML
50 INJECTION, POWDER, LYOPHILIZED, FOR SOLUTION INTRAMUSCULAR; INTRAVENOUS ONCE
Status: CANCELLED
Start: 2017-03-21 | End: 2017-03-14

## 2017-03-07 RX ORDER — FLUOROURACIL 50 MG/ML
400 INJECTION, SOLUTION INTRAVENOUS ONCE
Status: CANCELLED | OUTPATIENT
Start: 2017-03-21

## 2017-03-07 RX ORDER — SODIUM CHLORIDE 9 MG/ML
1000 INJECTION, SOLUTION INTRAVENOUS CONTINUOUS PRN
Status: CANCELLED
Start: 2017-03-21

## 2017-03-07 RX ORDER — ALBUTEROL SULFATE 0.83 MG/ML
2.5 SOLUTION RESPIRATORY (INHALATION)
Status: CANCELLED | OUTPATIENT
Start: 2017-03-21

## 2017-03-07 RX ORDER — METHYLPREDNISOLONE SODIUM SUCCINATE 125 MG/2ML
125 INJECTION, POWDER, LYOPHILIZED, FOR SOLUTION INTRAMUSCULAR; INTRAVENOUS
Status: CANCELLED
Start: 2017-03-21

## 2017-03-07 RX ORDER — MEPERIDINE HYDROCHLORIDE 50 MG/ML
25 INJECTION INTRAMUSCULAR; INTRAVENOUS; SUBCUTANEOUS EVERY 30 MIN PRN
Status: CANCELLED | OUTPATIENT
Start: 2017-03-21

## 2017-03-07 RX ORDER — DIPHENHYDRAMINE HYDROCHLORIDE 50 MG/ML
50 INJECTION INTRAMUSCULAR; INTRAVENOUS
Status: CANCELLED
Start: 2017-03-21

## 2017-03-07 RX ORDER — EPINEPHRINE 0.3 MG/.3ML
0.3 INJECTION SUBCUTANEOUS EVERY 5 MIN PRN
Status: CANCELLED | OUTPATIENT
Start: 2017-03-21

## 2017-03-09 ENCOUNTER — DOCUMENTATION ONLY (OUTPATIENT)
Dept: OTHER | Facility: CLINIC | Age: 57
End: 2017-03-09

## 2017-03-09 DIAGNOSIS — Z71.89 ADVANCED DIRECTIVES, COUNSELING/DISCUSSION: Chronic | ICD-10-CM

## 2017-03-13 ENCOUNTER — INFUSION THERAPY VISIT (OUTPATIENT)
Dept: ONCOLOGY | Facility: CLINIC | Age: 57
End: 2017-03-13
Attending: INTERNAL MEDICINE
Payer: COMMERCIAL

## 2017-03-13 DIAGNOSIS — C20 MALIGNANT NEOPLASM OF RECTUM (H): Primary | ICD-10-CM

## 2017-03-13 PROCEDURE — 96523 IRRIG DRUG DELIVERY DEVICE: CPT

## 2017-03-13 PROCEDURE — 25000128 H RX IP 250 OP 636: Mod: ZF | Performed by: INTERNAL MEDICINE

## 2017-03-13 RX ORDER — HEPARIN SODIUM (PORCINE) LOCK FLUSH IV SOLN 100 UNIT/ML 100 UNIT/ML
5 SOLUTION INTRAVENOUS ONCE
Status: CANCELLED
Start: 2017-03-13 | End: 2017-03-13

## 2017-03-13 RX ORDER — HEPARIN SODIUM (PORCINE) LOCK FLUSH IV SOLN 100 UNIT/ML 100 UNIT/ML
5 SOLUTION INTRAVENOUS ONCE
Status: COMPLETED | OUTPATIENT
Start: 2017-03-13 | End: 2017-03-13

## 2017-03-13 RX ADMIN — SODIUM CHLORIDE, PRESERVATIVE FREE 5 ML: 5 INJECTION INTRAVENOUS at 17:15

## 2017-03-13 NOTE — MR AVS SNAPSHOT
After Visit Summary   3/13/2017    Louie Greco    MRN: [id number]           Patient Information     Date Of Birth          1960        Visit Information        Provider Department      3/13/2017 5:00 PM UC 10 ATC; UC ONCOLOGY INFUSION [facility]        Today's Diagnoses     Malignant neoplasm of rectum (H)    -  1       Follow-ups after your visit        Your next 10 appointments already scheduled     Mar 14, 2017  9:00 AM CDT   Level 5 with SH INFUSION CHAIR 5   [facility] ([facility])    [facility]  [address]  [place] [address]   [phone]            Mar 14, 2017  9:30 AM CDT   Return Visit with Agueda Manley MD   [facility] ([facility])    [facility]  [address]  [place] [address]   [phone]            Mar 28, 2017  8:30 AM CDT   Level 5 with  INFUSION CHAIR 9   [facility] ([facility])    [facility]  [address]  [place] [address]   [phone]            Mar 30, 2017  9:00 AM CDT   Return Visit with Roxann Guzman,    Cancer Risk Management Program ([facility])    [facility]  [address]  [place] [address]   [phone]              Who to contact     If you have questions or need follow up information about today's clinic visit or your schedule please contact [facility] directly at [phone].  Normal or non-critical lab and imaging results will be communicated to you by MyChart, letter or phone within 4 business days after the clinic has received the results. If you do not hear from us within 7 days, please contact the clinic through MyChart or phone. If you have a critical or abnormal lab result, we will notify you by phone as soon as  possible.  Submit refill requests through Visual [employer] or call your pharmacy and they will forward the refill request to us. Please allow 3 business days for your refill to be completed.          Additional Information About Your Visit        Plumbrhart Information     [employer] gives you secure access to your electronic health record. If you see a primary care provider, you can also send messages to your care team and make appointments. If you have questions, please call your primary care clinic.  If you do not have a primary care provider, please call [phone] and they will assist you.        Care EveryWhere ID     This is your Care EveryWhere ID. This could be used by other organizations to access your [place] medical records  [id number]         Blood Pressure from Last 3 Encounters:   03/02/17 136/89   02/28/17 107/76   02/28/17 145/79    Weight from Last 3 Encounters:   02/28/17 96.3 kg (212 lb 3.2 oz)   02/28/17 96.3 kg (212 lb 3.2 oz)   02/14/17 96.9 kg (213 lb 9.6 oz)              Today, you had the following     No orders found for display       Primary Care Provider Office Phone # Fax #    Ayden Peralta [phone]       [facility] [address] [place] [address]        Thank you!     Thank you for choosing [facility]  for your care. Our goal is always to provide you with excellent care. Hearing back from our patients is one way we can continue to improve our services. Please take a few minutes to complete the written survey that you may receive in the mail after your visit with us. Thank you!             Your Updated Medication List - Protect others around you: Learn how to safely use, store and throw away your medicines at www.disposemymeds.org.          This list is accurate as of: 3/13/17  6:43 PM.  Always use your most recent med list.                   Brand Name Dispense Instructions for use    acetaminophen 325 MG tablet    TYLENOL    100  tablet    Take 2 tablets (650 mg) by mouth every 4 hours as needed for other (mild pain)       COLACE PO          diltiazem 2% in PLO cream (FV COMPOUNDED) 2% Gel     60 g    To anal opening three times daily.  Use a pea-sized amount.  Store at room temperature.       hydrocortisone 0.5 % ointment      Apply topically 2 times daily Reported on 2/14/2017       IBUPROFEN PO          LORazepam 0.5 MG tablet    ATIVAN    30 tablet    Take 1 tablet (0.5 mg) by mouth every 4 hours as needed (Anxiety, Nausea/Vomiting or Sleep)       metoclopramide 10 MG tablet    REGLAN    30 tablet    Take 1 tablet (10 mg) by mouth 4 times daily (before meals and nightly)       ondansetron 8 MG tablet    ZOFRAN    10 tablet    Take 1 tablet (8 mg) by mouth every 8 hours as needed (Nausea/Vomiting)       prochlorperazine 10 MG tablet    COMPAZINE    30 tablet    Take 1 tablet (10 mg) by mouth every 6 hours as needed (Nausea/Vomiting)

## 2017-03-14 ENCOUNTER — INFUSION THERAPY VISIT (OUTPATIENT)
Dept: INFUSION THERAPY | Facility: CLINIC | Age: 57
End: 2017-03-14
Attending: INTERNAL MEDICINE
Payer: COMMERCIAL

## 2017-03-14 ENCOUNTER — HOSPITAL ENCOUNTER (OUTPATIENT)
Facility: CLINIC | Age: 57
Setting detail: SPECIMEN
Discharge: HOME | End: 2017-03-14
Attending: INTERNAL MEDICINE | Admitting: INTERNAL MEDICINE
Payer: COMMERCIAL

## 2017-03-14 ENCOUNTER — ONCOLOGY VISIT (OUTPATIENT)
Dept: ONCOLOGY | Facility: CLINIC | Age: 57
End: 2017-03-14
Attending: INTERNAL MEDICINE
Payer: COMMERCIAL

## 2017-03-14 VITALS
OXYGEN SATURATION: 97 % | WEIGHT: 209.2 LBS | BODY MASS INDEX: 29.95 KG/M2 | HEART RATE: 88 BPM | TEMPERATURE: 97.7 F | DIASTOLIC BLOOD PRESSURE: 81 MMHG | RESPIRATION RATE: 16 BRPM | SYSTOLIC BLOOD PRESSURE: 126 MMHG | HEIGHT: 70 IN

## 2017-03-14 VITALS
OXYGEN SATURATION: 97 % | SYSTOLIC BLOOD PRESSURE: 126 MMHG | TEMPERATURE: 97.7 F | WEIGHT: 209.2 LBS | HEART RATE: 88 BPM | RESPIRATION RATE: 16 BRPM | DIASTOLIC BLOOD PRESSURE: 81 MMHG | BODY MASS INDEX: 30.02 KG/M2

## 2017-03-14 DIAGNOSIS — C20 MALIGNANT NEOPLASM OF RECTUM (H): Primary | ICD-10-CM

## 2017-03-14 LAB
ALBUMIN SERPL-MCNC: 3.5 G/DL (ref 3.4–5)
ALP SERPL-CCNC: 78 U/L (ref 40–150)
ALT SERPL W P-5'-P-CCNC: 72 U/L (ref 0–70)
ANION GAP SERPL CALCULATED.3IONS-SCNC: 9 MMOL/L (ref 3–14)
AST SERPL W P-5'-P-CCNC: 49 U/L (ref 0–45)
BASOPHILS # BLD AUTO: 0 10E9/L (ref 0–0.2)
BASOPHILS NFR BLD AUTO: 1 %
BILIRUB SERPL-MCNC: 0.9 MG/DL (ref 0.2–1.3)
BUN SERPL-MCNC: 23 MG/DL (ref 7–30)
CALCIUM SERPL-MCNC: 8.5 MG/DL (ref 8.5–10.1)
CEA SERPL-MCNC: 1 UG/L (ref 0–2.5)
CHLORIDE SERPL-SCNC: 105 MMOL/L (ref 94–109)
CO2 SERPL-SCNC: 27 MMOL/L (ref 20–32)
CREAT SERPL-MCNC: 0.75 MG/DL (ref 0.66–1.25)
DIFFERENTIAL METHOD BLD: ABNORMAL
EOSINOPHIL # BLD AUTO: 0 10E9/L (ref 0–0.7)
EOSINOPHIL NFR BLD AUTO: 2 %
ERYTHROCYTE [DISTWIDTH] IN BLOOD BY AUTOMATED COUNT: 16.9 % (ref 10–15)
GFR SERPL CREATININE-BSD FRML MDRD: ABNORMAL ML/MIN/1.7M2
GLUCOSE SERPL-MCNC: 95 MG/DL (ref 70–99)
HCT VFR BLD AUTO: 37.7 % (ref 40–53)
HGB BLD-MCNC: 13.2 G/DL (ref 13.3–17.7)
IMM GRANULOCYTES # BLD: 0 10E9/L (ref 0–0.4)
IMM GRANULOCYTES NFR BLD: 0 %
LYMPHOCYTES # BLD AUTO: 0.8 10E9/L (ref 0.8–5.3)
LYMPHOCYTES NFR BLD AUTO: 39.1 %
MCH RBC QN AUTO: 30.1 PG (ref 26.5–33)
MCHC RBC AUTO-ENTMCNC: 35 G/DL (ref 31.5–36.5)
MCV RBC AUTO: 86 FL (ref 78–100)
MONOCYTES # BLD AUTO: 0.3 10E9/L (ref 0–1.3)
MONOCYTES NFR BLD AUTO: 15.3 %
NEUTROPHILS # BLD AUTO: 0.9 10E9/L (ref 1.6–8.3)
NEUTROPHILS NFR BLD AUTO: 42.6 %
NRBC # BLD AUTO: 0 10*3/UL
NRBC BLD AUTO-RTO: 0 /100
PLATELET # BLD AUTO: 105 10E9/L (ref 150–450)
POTASSIUM SERPL-SCNC: 3.7 MMOL/L (ref 3.4–5.3)
PROT SERPL-MCNC: 6.7 G/DL (ref 6.8–8.8)
RBC # BLD AUTO: 4.38 10E12/L (ref 4.4–5.9)
SODIUM SERPL-SCNC: 141 MMOL/L (ref 133–144)
WBC # BLD AUTO: 2 10E9/L (ref 4–11)

## 2017-03-14 PROCEDURE — 85025 COMPLETE CBC W/AUTO DIFF WBC: CPT | Performed by: INTERNAL MEDICINE

## 2017-03-14 PROCEDURE — 25000128 H RX IP 250 OP 636: Performed by: INTERNAL MEDICINE

## 2017-03-14 PROCEDURE — 99212 OFFICE O/P EST SF 10 MIN: CPT

## 2017-03-14 PROCEDURE — 80053 COMPREHEN METABOLIC PANEL: CPT | Performed by: INTERNAL MEDICINE

## 2017-03-14 PROCEDURE — 36591 DRAW BLOOD OFF VENOUS DEVICE: CPT

## 2017-03-14 PROCEDURE — 99214 OFFICE O/P EST MOD 30 MIN: CPT | Performed by: INTERNAL MEDICINE

## 2017-03-14 PROCEDURE — 82378 CARCINOEMBRYONIC ANTIGEN: CPT | Performed by: INTERNAL MEDICINE

## 2017-03-14 RX ORDER — HEPARIN SODIUM (PORCINE) LOCK FLUSH IV SOLN 100 UNIT/ML 100 UNIT/ML
5 SOLUTION INTRAVENOUS ONCE
Status: COMPLETED | OUTPATIENT
Start: 2017-03-14 | End: 2017-03-14

## 2017-03-14 RX ORDER — HEPARIN SODIUM (PORCINE) LOCK FLUSH IV SOLN 100 UNIT/ML 100 UNIT/ML
5 SOLUTION INTRAVENOUS ONCE
Status: CANCELLED
Start: 2017-03-14 | End: 2017-03-14

## 2017-03-14 RX ADMIN — SODIUM CHLORIDE, PRESERVATIVE FREE 5 ML: 5 INJECTION INTRAVENOUS at 10:51

## 2017-03-14 ASSESSMENT — PAIN SCALES - GENERAL
PAINLEVEL: NO PAIN (0)
PAINLEVEL: NO PAIN (0)

## 2017-03-14 NOTE — PROGRESS NOTES
"Louie Greco is a 56 year old male who presents for:  Chief Complaint   Patient presents with     Oncology Clinic Visit     Ret Rectal Ca         Initial Vitals:  /81 (BP Location: Right arm)  Pulse 88  Temp 97.7  F (36.5  C) (Oral)  Resp 16  Wt 94.9 kg (209 lb 3.2 oz)  SpO2 97%  BMI 30.02 kg/m2 Estimated body mass index is 30.02 kg/(m^2) as calculated from the following:    Height as of 2/28/17: 1.778 m (5' 10\").    Weight as of this encounter: 94.9 kg (209 lb 3.2 oz).. Body surface area is 2.16 meters squared. BP completed using cuff size: regular  Data Unavailable No LMP for male patient. Allergies and medications reviewed.     Medications: Medication refills not needed today.  Pharmacy name entered into Reply[employer]:    [id number], [place] - [address] PHARMACY [facility] [place], [place] - [address]    Comments: None    6 minutes for nursing intake (face to face time)   Gabriella Queen CMA    DISCHARGE PLAN:  1.) Folfox delayed today secondary to Neutropenia.   2.) Folfox rescheduled to next week 3/21/17, with pump takedown and fluids, Neulasta . Patient prefers Neulasta injection versus the On-pro at this time.   3.) Patient to be scheduled in 3 weeks for Folfox and exam with Dr. Manley.   Next appointments: See patient instruction section  Departure Mode: Ambulatory  Accompanied by: wife  12minutes for nursing discharge (face to face time)   Juani Mcgrath RN    Patient given information on Neulasta through [employer]. Patient instructed to take Claritin with the Neulasta for prevention of bone pain. Juani Mcgrath RN        "

## 2017-03-14 NOTE — PROGRESS NOTES
Infusion Nursing Note:  Louie Greco presents today for C5, D1 Folfox  Patient seen by provider today: Yes:    present during visit today: Not Applicable.    Note: Only change to report is MRI/Ct yesterday and awaiting results today.    Intravenous Access:  Labs drawn without difficulty.  Implanted Port.    Treatment Conditions:  Results reviewed, labs did NOT meet treatment parameters: No chemo today. Defer schedule. ANC too low. Will add Neulasta to plan..      Post Infusion Assessment:  Blood return noted pre and post infusion.  Site patent and intact, free from redness, edema or discomfort.  No evidence of extravasations.  Access discontinued per protocol.    Discharge Plan:   Discharge instructions reviewed with: Patient.  Patient and/or family verbalized understanding of discharge instructions and all questions answered.  Copy of AVS reviewed with patient and/or family.  Patient will make appts.-return in one week.  Patient discharged in stable condition accompanied by: self and wife.  Departure Mode: Ambulatory.    Tracey Lozada RN

## 2017-03-14 NOTE — MR AVS SNAPSHOT
After Visit Summary   3/14/2017    Louie Greco    MRN: [id number]           Patient Information     Date Of Birth          1960        Visit Information        Provider Department      3/14/2017 9:00 AM SH INFUSION CHAIR 5 [facility]        Today's Diagnoses     Malignant neoplasm of rectum (H)    -  1       Follow-ups after your visit        Your next 10 appointments already scheduled     Mar 21, 2017  9:00 AM CDT   Level 5 with SH INFUSION CHAIR 8   [facility] ([facility])    [place] [id number]  [place] [address]   [phone]            Mar 23, 2017  2:00 PM CDT   Level 2 with SH INFUSION CHAIR 5   [facility] ([facility])    [facility]  [address]  [place] [address]   [phone]            Mar 24, 2017  2:00 PM CDT   Level 1 with SH INFUSION CHAIR 7   [facility] ([facility])    [facility] [place] [id number]  [place] [address]   [phone]            Mar 30, 2017  9:00 AM CDT   Return Visit with Roxann Guzman GC   Cancer Risk Management Program ([facility])    [facility]  [address]  [place] [address]   [phone]            Apr 04, 2017  8:30 AM CDT   Level 5 with SH INFUSION CHAIR 6   [facility] ([facility])    [facility]  [address]  [place] [address]   [phone]            Apr 04, 2017  9:00 AM CDT   Return Visit with Agueda Manley MD   [facility] ([facility] [place])    [facility]  [address]  [place] [address]   [phone]            Apr 06, 2017  2:30 PM CDT   Level 2 with SH INFUSION  "CHAIR 7   [facility] ([facility])    [facility] [place]  [address]  [place] [id number] [phone]            Apr 07, 2017  2:00 PM CDT   Level 1 with SH INFUSION CHAIR 4   [facility] ([facility])    [facility] [place]  [address]  [place] [address] [phone]              Who to contact     If you have questions or need follow up information about today's clinic visit or your schedule please contact [facility] directly at [phone].  Normal or non-critical lab and imaging results will be communicated to you by [employer], letter or phone within 4 business days after the clinic has received the results. If you do not hear from us within 7 days, please contact the clinic through [employer] or phone. If you have a critical or abnormal lab result, we will notify you by phone as soon as possible.  Submit refill requests through [employer] or call your pharmacy and they will forward the refill request to us. Please allow 3 business days for your refill to be completed.          Additional Information About Your Visit        [employer] Information     [employer] gives you secure access to your electronic health record. If you see a primary care provider, you can also send messages to your care team and make appointments. If you have questions, please call your primary care clinic.  If you do not have a primary care provider, please call [phone] and they will assist you.        Care EveryWhere ID     This is your Care EveryWhere ID. This could be used by other organizations to access your [facility] medical records  [id number]        Your Vitals Were     Pulse Temperature Respirations Height Pulse Oximetry BMI (Body Mass Index)    88 97.7  F (36.5  C) (Oral) 16 1.777 m (5' 9.96\") 97% 30.05 kg/m2       Blood Pressure from Last 3 " Encounters:   03/14/17 126/81   03/14/17 126/81   03/02/17 136/89    Weight from Last 3 Encounters:   03/14/17 94.9 kg (209 lb 3.2 oz)   03/14/17 94.9 kg (209 lb 3.2 oz)   02/28/17 96.3 kg (212 lb 3.2 oz)              We Performed the Following     CBC with platelets differential     CEA     Comprehensive metabolic panel        Primary Care Provider Office Phone # Fax #    Ayden Peralta [phone]       [facility] [address] [place] [phone]        Thank you!     Thank you for choosing [facility]  for your care. Our goal is always to provide you with excellent care. Hearing back from our patients is one way we can continue to improve our services. Please take a few minutes to complete the written survey that you may receive in the mail after your visit with us. Thank you!             Your Updated Medication List - Protect others around you: Learn how to safely use, store and throw away your medicines at www.disposemymeds.org.          This list is accurate as of: 3/14/17 11:38 AM.  Always use your most recent med list.                   Brand Name Dispense Instructions for use    acetaminophen 325 MG tablet    TYLENOL    100 tablet    Take 2 tablets (650 mg) by mouth every 4 hours as needed for other (mild pain)       COLACE PO          diltiazem 2% in PLO cream (FV COMPOUNDED) 2% Gel     60 g    To anal opening three times daily.  Use a pea-sized amount.  Store at room temperature.       hydrocortisone 0.5 % ointment      Apply topically 2 times daily Reported on 2/14/2017       IBUPROFEN PO          LORazepam 0.5 MG tablet    ATIVAN    30 tablet    Take 1 tablet (0.5 mg) by mouth every 4 hours as needed (Anxiety, Nausea/Vomiting or Sleep)       metoclopramide 10 MG tablet    REGLAN    30 tablet    Take 1 tablet (10 mg) by mouth 4 times daily (before meals and nightly)       ondansetron 8 MG tablet    ZOFRAN    10 tablet    Take 1  tablet (8 mg) by mouth every 8 hours as needed (Nausea/Vomiting)       prochlorperazine 10 MG tablet    COMPAZINE    30 tablet    Take 1 tablet (10 mg) by mouth every 6 hours as needed (Nausea/Vomiting)

## 2017-03-14 NOTE — PROGRESS NOTES
[facility]    Hematology/Oncology Established Patient Note      Today's Date: 03/14/2017    Reason for Follow-up: rectal cancer      HISTORY OF PRESENT ILLNESS: Louie Greco is a 56 year old male who presents with rectal cancer.  He started having rectal bleeding around beginning of 2016.  He underwent colonoscopy on 7/27/16, which showed a malignant tumor in the rectum involving half of the lumen with oozing, as well as three 4-mm polyps in the ascending and transverse colon.  Pathology showed moderately differentiated adenocarcinoma, ascending colon with sessile serrated adenoma, others were tubular adenomas.  Mismatch repair expression with normal protein expression.  Staging scans showed the rectal mass, indeterminate focus in the left liver thought to be cyst, and multiple bilateral renal cysts.  MRI showed a distal rectal mass measuring 2.7 x 2.4 cm extending to the most proximal region of the anal canal.  There are a few tiny subcentimeter perirectal fat lymph nodes.  He was staged clinically [id number] (stage IIIA).  He was seen by Dr. Lee at [facility], and started neoadjuvant chemoradiation with capecitabine on 8/8/16 and completed on 9/15/16.  He was then seen by Dr. Radford, colorectal surgery at [facility].  His post chemoradiation MRI showed improvement in the size of the tumor and response in the lymph nodes.  On 12/8/16, he underwent flexible sigmoidoscopy and there was no evidence of tumor.  There was only some anterior scarring in the lumen.  Multiple biopsies were taken, which showed no evidence of carcinoma.  At that point, Dr. Radford spoke with the patient's wife, that there appears to be a complete response in the lumen, and that the patient would wish to placed on the watch and wait protocol.  The patient had expressed wishes not to have an APR, and it would not have been possible to grossly clear a margin for LAR.      He had port  placed on 1/16/17.    Current Chemotherapy:  Leucovorin 350 mg/m2 IV IV on day 1  Oxaliplatin 85 mg/m2 IV on day 1  Fluorouracil 400 mg/m2 IV on day 1  Fluorouracil 2400 mg/m2 CIV over 46 hours  Every 2 week cycles    C1D1: 1/16/17  C2D1: 1/31/17  C3D1: 2/14/17  C4D1: 2/28/17  C5D1: delayed by 1 week to 3/21/17 due to neutropenia; plan to add Neulasta with subsequent cycles  C6D1: anticipated for 4/4/17        INTERIM HISTORY: Louie comes in for follow-up today.  He notes more queasiness with each cycle, but still is not having to take anti-emetics.  She notes more fatigue though.          REVIEW OF SYSTEMS:   14 point ROS was reviewed and is negative other than as noted above in HPI.       HOME MEDICATIONS:  Current Outpatient Prescriptions   Medication Sig Dispense Refill     Docusate Sodium (COLACE PO)        metoclopramide (REGLAN) 10 MG tablet Take 1 tablet (10 mg) by mouth 4 times daily (before meals and nightly) (Patient not taking: Reported on 3/14/2017) 30 tablet 0     LORazepam (ATIVAN) 0.5 MG tablet Take 1 tablet (0.5 mg) by mouth every 4 hours as needed (Anxiety, Nausea/Vomiting or Sleep) (Patient not taking: Reported on 3/14/2017) 30 tablet 2     prochlorperazine (COMPAZINE) 10 MG tablet Take 1 tablet (10 mg) by mouth every 6 hours as needed (Nausea/Vomiting) (Patient not taking: Reported on 3/14/2017) 30 tablet 2     ondansetron (ZOFRAN) 8 MG tablet Take 1 tablet (8 mg) by mouth every 8 hours as needed (Nausea/Vomiting) (Patient not taking: Reported on 3/14/2017) 10 tablet 2     IBUPROFEN PO        acetaminophen (TYLENOL) 325 MG tablet Take 2 tablets (650 mg) by mouth every 4 hours as needed for other (mild pain) (Patient not taking: Reported on 3/14/2017) 100 tablet 0     hydrocortisone 0.5 % ointment Apply topically 2 times daily Reported on 2/14/2017       diltiazem 2% in PLO cream, FV COMPOUNDED, 2% GEL To anal opening three times daily.  Use a pea-sized amount.  Store at room temperature. 60 g 0          ALLERGIES:  Allergies   Allergen Reactions     Amoxicillin      Ampicillin Diarrhea     Demerol [Meperidine]      Nausea          PAST MEDICAL HISTORY:  Past Medical History   Diagnosis Date     Childhood asthma      Nephrolithiasis           Rectal cancer (H)      low rectal cancer         PAST SURGICAL HISTORY:  Past Surgical History   Procedure Laterality Date     Vasectomy       Colonoscopy N/A 2016     Procedure: COMBINED COLONOSCOPY, SINGLE OR MULTIPLE BIOPSY/POLYPECTOMY BY BIOPSY;  Surgeon: Chelsea Thompson MD;  Location:  GI     Hc tooth extraction w/forcep       Sigmoidoscopy flexible N/A 2016     Procedure: SIGMOIDOSCOPY FLEXIBLE;  Surgeon: Felicitas Radford MD;  Location:  OR         SOCIAL HISTORY:  Social History     Social History     Marital status: Single     Spouse name: N/A     Number of children: N/A     Years of education: N/A     Occupational History     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 No     Social History Narrative    Dad  at age  78   from [place], COPD, & HTN    Mom alive in her 70's.     for 30 years    Two adult children. 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         He notes that he had a brother who passed away at a young age of 53 from a metastatic cancer, but unknown what type, and passed away within 2 months of diagnosis.  He denies other family history of cancer in the immediate family.  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      FAMILY HISTORY:  Family History   Problem Relation Age of Onset     CANCER  Brother      primary of unknown origin     Chronic Obstructive Pulmonary Disease Father      Hypertension Father      Family History Negative Mother      Family History Negative Brother      Glaucoma No family hx of      Macular Degeneration No family hx of      DIABETES Maternal Grandfather      Hypertension Paternal Grandmother      Hyperlipidemia Father      Hyperlipidemia Paternal Grandmother      Other Cancer Brother          PHYSICAL EXAM:  Vital signs:  /81 (BP Location: Right arm)  Pulse 88  Temp 97.7  F (36.5  C) (Oral)  Resp 16  Wt 94.9 kg (209 lb 3.2 oz)  SpO2 97%  BMI 30.02 kg/m2   ECO  GENERAL/CONSTITUTIONAL: No acute distress. Accompanied by wife.  EYES: No scleral icterus.  LYMPH: No anterior cervical, posterior cervical, or supraclavicular adenopathy.   RESPIRATORY: Clear to auscultation bilaterally. No crackles or wheezing.   CARDIOVASCULAR: Regular rate and rhythm without murmurs, gallops, or rubs.  GASTROINTESTINAL: No tenderness. The patient has normal bowel sounds. No guarding.  No distention.  MUSCULOSKELETAL: Warm and well-perfused, no cyanosis, clubbing, or edema.  NEUROLOGIC: Alert, oriented, answers questions appropriately.  INTEGUMENTARY: No jaundice.  GAIT: Steady, does not use assistive device  Port in place at right upper chest.    LABS:  CBC RESULTS:   Recent Labs   Lab Test  17   0912   WBC  2.0*   RBC  4.38*   HGB  13.2*   HCT  37.7*   MCV  86   MCH  30.1   MCHC  35.0   RDW  16.9*   PLT  105*     Recent Labs   Lab Test  17   0912  17   0920   NA  141  142   POTASSIUM  3.7  3.5   CHLORIDE  105  108   CO2  27  27   ANIONGAP  9  7   GLC  95  122*   BUN  23  21   CR  0.75  0.71   FRANDY  8.5  8.4*     Lab Results   Component Value Date    AST 49 2017     Lab Results   Component Value Date    ALT 72 2017     No results found for: BILICONJ   Lab Results   Component Value Date    BILITOTAL 0.9 2017     Lab Results   Component Value Date    ALBUMIN  3.5 03/14/2017     Lab Results   Component Value Date    PROTTOTAL 6.7 03/14/2017      Lab Results   Component Value Date    ALKPHOS 78 03/14/2017       IMAGING:  CT C/A/P 3/13/17:  1. No evidence of residual or recurrent disease in chest, abdomen or  pelvis.  2. Multiple small pulmonary nodules, stable since 7/27/2016 and are  likely benign. Attention on follow-up imaging is recommended.  3. Previously described hypodensity in the left lobe of liver is not  conspicuous on the current exam. Suspect this to be due to focal  hepatic steatosis in view of its location adjacent to fissure for  ligamentum teres.    MRI pelvis 3/13/17:  1. No clear evidence of residual or recurrent rectal tumor, with a  corresponding tumor regression grade of 1, unchanged since 11/1/2016.   The tumor previously seen on 7/27/2016 at the left and anterior distal  rectum has an entirely fibrosed appearance. No convincing evidence of  levator ani or anal sphincter involvement, though the tumor does  extending inferiorly to the level of the puborectalis sling  2. Decreased small lymph nodes without suspicious characteristics  measuring up to 4 mm. These are indeterminate but favored as benign.      ASSESSMENT/PLAN:  Louie Greco is a 56 year old male with:    1) Rectal cancer: clinical stage [id number] (stage IIIA), s/p chemoradiation with Xeloda, and appears to have achieved complete response on imaging and biopsies.  He opted not to undergo surgery and expressed desire not to undergo APR, as he does not want a colostomy bag.  It was felt reasonable to go on the watch and wait protocol with the [facility].      He will complete 4 additional months (8 cycles) of chemotherapy with FOLFOX.  Will discuss with Dr. Radford regarding endoscopic surveillance, as per the watch and wait protocol.      He is tolerating chemotherapy well so far.  CT scans and MRI pelvis on 3/13/17 show no evidence of residual or recurrent rectal tumor.    Due  to neutropenia, will delay chemotherapy by 1 week to 3/21/17.    -C5D1 of FOLFOX to be delayed to 3/21/17; will plan to add Neulasta On-Pro  -RTC on 4/4/17, same day as cycle 6 of chemotherapy    2) Hiccups: Occurred for 3 days after cycle 1 of chemotherapy.  May have been due to dexamethasone, although he says he has similar symptoms after receiving sedation for dental procedure previously.  He did not get hiccups after cycle 2 of chemotherapy.  -switched steroids to methylprednisolone  -if symptoms recur, can consider cutting back on steroid dose   -he has Reglan, but he chose not to take it due to possible side effects    3) Chemotherapy-induced nausea: Mild.  He has not needed to take his home anti-emetics though.  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  No pain.    -keep chemo at full doses for now; if worsens, can consider reducing dose of oxaliplatin    6) Elevated transaminases: slight elevation of AST and ALT, likely due to chemotherapy  -monitor for now      I spent a total of 25 minutes with the patient, with over >50% of the time in counseling and/or coordination of care.         Agueda Manley MD  Hematology/Oncology  [facility]

## 2017-03-14 NOTE — LETTER
March 14, 2017      RE: Louie Greco  [address] [address]      [facility]    Hematology/Oncology Established Patient Note      Today's Date: 03/14/2017    Reason for Follow-up: rectal cancer      HISTORY OF PRESENT ILLNESS: Louie Greco is a 56 year old male who presents with rectal cancer.  He started having rectal bleeding around beginning of 2016.  He underwent colonoscopy on 7/27/16, which showed a malignant tumor in the rectum involving half of the lumen with oozing, as well as three 4-mm polyps in the ascending and transverse colon.  Pathology showed moderately differentiated adenocarcinoma, ascending colon with sessile serrated adenoma, others were tubular adenomas.  Mismatch repair expression with normal protein expression.  Staging scans showed the rectal mass, indeterminate focus in the left liver thought to be cyst, and multiple bilateral renal cysts.  MRI showed a distal rectal mass measuring 2.7 x 2.4 cm extending to the most proximal region of the anal canal.  There are a few tiny subcentimeter perirectal fat lymph nodes.  He was staged clinically B1Y3hC3 (stage IIIA).  He was seen by Dr. Lee at [facility], and started neoadjuvant chemoradiation with capecitabine on 8/8/16 and completed on 9/15/16.  He was then seen by Dr. Radford, colorectal surgery at [facility].  His post chemoradiation MRI showed improvement in the size of the tumor and response in the lymph nodes.  On 12/8/16, he underwent flexible sigmoidoscopy and there was no evidence of tumor.  There was only some anterior scarring in the lumen.  Multiple biopsies were taken, which showed no evidence of carcinoma.  At that point, Dr. Radford spoke with the patient's wife, that there appears to be a complete response in the lumen, and that the patient would wish to placed on the watch and wait protocol.  The patient had expressed wishes not to have an APR, and it would not  have been possible to grossly clear a margin for LAR.      He had port placed on 1/16/17.    Current Chemotherapy:  Leucovorin 350 mg/m2 IV IV on day 1  Oxaliplatin 85 mg/m2 IV on day 1  Fluorouracil 400 mg/m2 IV on day 1  Fluorouracil 2400 mg/m2 CIV over 46 hours  Every 2 week cycles    C1D1: 1/16/17  C2D1: 1/31/17  C3D1: 2/14/17  C4D1: 2/28/17  C5D1: delayed by 1 week to 3/21/17 due to neutropenia; plan to add Neulasta with subsequent cycles  C6D1: anticipated for 4/4/17        INTERIM HISTORY: Louie comes in for follow-up today.  He notes more queasiness with each cycle, but still is not having to take anti-emetics.  She notes more fatigue though.          REVIEW OF SYSTEMS:   14 point ROS was reviewed and is negative other than as noted above in HPI.       HOME MEDICATIONS:  Current Outpatient Prescriptions   Medication Sig Dispense Refill     Docusate Sodium (COLACE PO)        metoclopramide (REGLAN) 10 MG tablet Take 1 tablet (10 mg) by mouth 4 times daily (before meals and nightly) (Patient not taking: Reported on 3/14/2017) 30 tablet 0     LORazepam (ATIVAN) 0.5 MG tablet Take 1 tablet (0.5 mg) by mouth every 4 hours as needed (Anxiety, Nausea/Vomiting or Sleep) (Patient not taking: Reported on 3/14/2017) 30 tablet 2     prochlorperazine (COMPAZINE) 10 MG tablet Take 1 tablet (10 mg) by mouth every 6 hours as needed (Nausea/Vomiting) (Patient not taking: Reported on 3/14/2017) 30 tablet 2     ondansetron (ZOFRAN) 8 MG tablet Take 1 tablet (8 mg) by mouth every 8 hours as needed (Nausea/Vomiting) (Patient not taking: Reported on 3/14/2017) 10 tablet 2     IBUPROFEN PO        acetaminophen (TYLENOL) 325 MG tablet Take 2 tablets (650 mg) by mouth every 4 hours as needed for other (mild pain) (Patient not taking: Reported on 3/14/2017) 100 tablet 0     hydrocortisone 0.5 % ointment Apply topically 2 times daily Reported on 2/14/2017       diltiazem 2% in PLO cream, FV COMPOUNDED, 2% GEL To anal opening three  times daily.  Use a pea-sized amount.  Store at room temperature. 60 g 0         ALLERGIES:  Allergies   Allergen Reactions     Amoxicillin      Ampicillin Diarrhea     Demerol [Meperidine]      Nausea          PAST MEDICAL HISTORY:  Past Medical History   Diagnosis Date     Childhood asthma      Nephrolithiasis           Rectal cancer (H)      low rectal cancer         PAST SURGICAL HISTORY:  Past Surgical History   Procedure Laterality Date     Vasectomy       Colonoscopy N/A 2016     Procedure: COMBINED COLONOSCOPY, SINGLE OR MULTIPLE BIOPSY/POLYPECTOMY BY BIOPSY;  Surgeon: Chelsea Thompson MD;  Location:  GI     Hc tooth extraction w/forcep       Sigmoidoscopy flexible N/A 2016     Procedure: SIGMOIDOSCOPY FLEXIBLE;  Surgeon: Felicitas Radford MD;  Location:  OR         SOCIAL HISTORY:  Social History     Social History     Marital status: Single     Spouse name: N/A     Number of children: N/A     Years of education: N/A     Occupational History     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 No     Social History Narrative    Dad  at age  78   from B[place], COPD, & HTN    Mom alive in her 70's.     for 30 years    Two adult children. 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         He notes that he had a brother who passed away at a young age of 53 from a metastatic cancer, but unknown what type, and passed away within 2 months of diagnosis.  He denies other family history of cancer in the immediate family.  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       FAMILY HISTORY:  Family History   Problem Relation Age of Onset     CANCER Brother      primary of unknown origin     Chronic Obstructive Pulmonary Disease Father      Hypertension Father      Family History Negative Mother      Family History Negative Brother      Glaucoma No family hx of      Macular Degeneration No family hx of      DIABETES Maternal Grandfather      Hypertension Paternal Grandmother      Hyperlipidemia Father      Hyperlipidemia Paternal Grandmother      Other Cancer Brother          PHYSICAL EXAM:  Vital signs:  /81 (BP Location: Right arm)  Pulse 88  Temp 97.7  F (36.5  C) (Oral)  Resp 16  Wt 94.9 kg (209 lb 3.2 oz)  SpO2 97%  BMI 30.02 kg/m2   ECO  GENERAL/CONSTITUTIONAL: No acute distress. Accompanied by wife.  EYES: No scleral icterus.  LYMPH: No anterior cervical, posterior cervical, or supraclavicular adenopathy.   RESPIRATORY: Clear to auscultation bilaterally. No crackles or wheezing.   CARDIOVASCULAR: Regular rate and rhythm without murmurs, gallops, or rubs.  GASTROINTESTINAL: No tenderness. The patient has normal bowel sounds. No guarding.  No distention.  MUSCULOSKELETAL: Warm and well-perfused, no cyanosis, clubbing, or edema.  NEUROLOGIC: Alert, oriented, answers questions appropriately.  INTEGUMENTARY: No jaundice.  GAIT: Steady, does not use assistive device  Port in place at right upper chest.    LABS:  CBC RESULTS:   Recent Labs   Lab Test  17   0912   WBC  2.0*   RBC  4.38*   HGB  13.2*   HCT  37.7*   MCV  86   MCH  30.1   MCHC  35.0   RDW  16.9*   PLT  105*     Recent Labs   Lab Test  17   0912  17   0920   NA  141  142   POTASSIUM  3.7  3.5   CHLORIDE  105  108   CO2  27  27   ANIONGAP  9  7   GLC  95  122*   BUN  23  21   CR  0.75  0.71   FRANDY  8.5  8.4*     Lab Results   Component Value Date    AST 49 2017     Lab Results   Component Value Date    ALT 72 2017     No results found for: BILICONJ   Lab Results   Component Value Date     BILITOTAL 0.9 03/14/2017     Lab Results   Component Value Date    ALBUMIN 3.5 03/14/2017     Lab Results   Component Value Date    PROTTOTAL 6.7 03/14/2017      Lab Results   Component Value Date    ALKPHOS 78 03/14/2017       IMAGING:  CT C/A/P 3/13/17:  1. No evidence of residual or recurrent disease in chest, abdomen or  pelvis.  2. Multiple small pulmonary nodules, stable since 7/27/2016 and are  likely benign. Attention on follow-up imaging is recommended.  3. Previously described hypodensity in the left lobe of liver is not  conspicuous on the current exam. Suspect this to be due to focal  hepatic steatosis in view of its location adjacent to fissure for  ligamentum teres.    MRI pelvis 3/13/17:  1. No clear evidence of residual or recurrent rectal tumor, with a  corresponding tumor regression grade of 1, unchanged since 11/1/2016.   The tumor previously seen on 7/27/2016 at the left and anterior distal  rectum has an entirely fibrosed appearance. No convincing evidence of  levator ani or anal sphincter involvement, though the tumor does  extending inferiorly to the level of the puborectalis sling  2. Decreased small lymph nodes without suspicious characteristics  measuring up to 4 mm. These are indeterminate but favored as benign.      ASSESSMENT/PLAN:  Louie Greco is a 56 year old male with:    1) Rectal cancer: clinical stage [id number] (stage IIIA), s/p chemoradiation with Xeloda, and appears to have achieved complete response on imaging and biopsies.  He opted not to undergo surgery and expressed desire not to undergo APR, as he does not want a colostomy bag.  It was felt reasonable to go on the watch and wait protocol with the [facility].      He will complete 4 additional months (8 cycles) of chemotherapy with FOLFOX.  Will discuss with Dr. Radford regarding endoscopic surveillance, as per the watch and wait protocol.      He is tolerating chemotherapy well so far.  CT scans and MRI  "pelvis on 3/13/17 show no evidence of residual or recurrent rectal tumor.    Due to neutropenia, will delay chemotherapy by 1 week to 3/21/17.    -C5D1 of FOLFOX to be delayed to 3/21/17; will plan to add Neulasta On-Pro  -RTC on 4/4/17, same day as cycle 6 of chemotherapy    2) Hiccups: Occurred for 3 days after cycle 1 of chemotherapy.  May have been due to dexamethasone, although he says he has similar symptoms after receiving sedation for dental procedure previously.  He did not get hiccups after cycle 2 of chemotherapy.  -switched steroids to methylprednisolone  -if symptoms recur, can consider cutting back on steroid dose   -he has Reglan, but he chose not to take it due to possible side effects    3) Chemotherapy-induced nausea: Mild.  He has not needed to take his home anti-emetics though.  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  No pain.    -keep chemo at full doses for now; if worsens, can consider reducing dose of oxaliplatin    6) Elevated transaminases: slight elevation of AST and ALT, likely due to chemotherapy  -monitor for now      I spent a total of 25 minutes with the patient, with over >50% of the time in counseling and/or coordination of care.         Agueda Manley MD  Hematology/Oncology  [facility]        Louie Greco is a 56 year old male who presents for:  Chief Complaint   Patient presents with     Oncology Clinic Visit     Ret Rectal Ca         Initial Vitals:  /81 (BP Location: Right arm)  Pulse 88  Temp 97.7  F (36.5  C) (Oral)  Resp 16  Wt 94.9 kg (209 lb 3.2 oz)  SpO2 97%  BMI 30.02 kg/m2 Estimated body mass index is 30.02 kg/(m^2) as calculated from the following:    Height as of 2/28/17: 1.778 m (5' 10\").    Weight as of this encounter: 94.9 kg (209 lb 3.2 oz).. Body surface area is 2.16 meters squared. BP completed using cuff size: " regular  Data Unavailable No LMP for male patient. Allergies and medications reviewed.     Medications: Medication refills not needed today.  Pharmacy name entered into [employer]:    [id number]  [place], [place] - [address] [place] [place], [place] - [address]    Comments: None    6 minutes for nursing intake (face to face time)   Gabriella Queen CMA    DISCHARGE PLAN:  1.) Folfox delayed today secondary to Neutropenia.   2.) Folfox rescheduled to next week 3/21/17, with pump takedown and fluids, Neulasta . Patient prefers Neulasta injection versus the On-pro at this time.   3.) Patient to be scheduled in 3 weeks for Folfox and exam with Dr. Manley.   Next appointments: See patient instruction section  Departure Mode: Ambulatory  Accompanied by: wife  12minutes for nursing discharge (face to face time)   Juani Mcgrath RN    Patient given information on Neulasta through [employer]. Patient instructed to take Claritin with the Neulasta for prevention of bone pain. Juani Manley MD

## 2017-03-14 NOTE — MR AVS SNAPSHOT
After Visit Summary   3/14/2017    Louie Greco    MRN: [id number]           Patient Information     Date Of Birth          1960        Visit Information        Provider Department      3/14/2017 9:30 AM Agueda Manley MD [facility]        Today's Diagnoses     Malignant neoplasm of rectum (H)    -  1      Care Instructions    -delay chemotherapy by 1 week to 3/21/17  -add Neulasta On-Pro to the next cycle of chemotherapy  -return to clinic on 4/4/17        Follow-ups after your visit        Your next 10 appointments already scheduled     Mar 21, 2017  9:00 AM CDT   Level 5 with SH INFUSION CHAIR 8   [facility] ([facility])    [facility] [place] [id number]  [place] [address]   [phone]            Mar 23, 2017  2:00 PM CDT   Level 2 with SH INFUSION CHAIR 5   [facility] ([facility])    [facility] [place] [id number]  [place] [address]   [phone]            Mar 24, 2017  2:00 PM CDT   Level 1 with SH INFUSION CHAIR 7   [facility] ([facility])    [facility] [place] [id number]  [place] [address]   [phone]            Mar 30, 2017  9:00 AM CDT   Return Visit with Roxann Guzman GC   Cancer Risk Management Program ([facility])    [facility] [place] [id number]  [place] [address]   [phone]            Apr 04, 2017  8:30 AM CDT   Level 5 with SH INFUSION CHAIR 6   [facility] ([facility])    [facility]  [address]  [place] [address]   [phone]            Apr 04, 2017  9:00 AM CDT   Return Visit with Agueda Manley MD   [facility] ([facility])    [facility]  [facility]  [address]  [place] [address]   [phone]            Apr 06, 2017  2:30 PM CDT   Level 2 with SH INFUSION CHAIR 7   [facility] ([facility])    [facility] [place]  [address]  [place] [address]   [phone]            Apr 07, 2017  2:00 PM CDT   Level 1 with SH INFUSION CHAIR 4   [facility] ([facility])    [facility] [place] [address]  [place] [address]   [phone]              Who to contact     If you have questions or need follow up information about today's clinic visit or your schedule please contact [facility] directly at [phone].  Normal or non-critical lab and imaging results will be communicated to you by [employer], letter or phone within 4 business days after the clinic has received the results. If you do not hear from us within 7 days, please contact the clinic through [employer] or phone. If you have a critical or abnormal lab result, we will notify you by phone as soon as possible.  Submit refill requests through [employer] or call your pharmacy and they will forward the refill request to us. Please allow 3 business days for your refill to be completed.          Additional Information About Your Visit        [employer] Information     [employer] gives you secure access to your electronic health record. If you see a primary care provider, you can also send messages to your care team and make appointments. If you have questions, please call your primary care clinic.  If you do not have a primary care provider, please call [phone] and they will assist you.        Care EveryWhere ID     This is your Care EveryWhere ID. This could be used by other organizations to access your [facility] medical records  [id number]        Your Vitals Were     Pulse Temperature Respirations Pulse Oximetry BMI (Body Mass  Index)       88 97.7  F (36.5  C) (Oral) 16 97% 30.02 kg/m2        Blood Pressure from Last 3 Encounters:   03/14/17 126/81   03/14/17 126/81   03/02/17 136/89    Weight from Last 3 Encounters:   03/14/17 94.9 kg (209 lb 3.2 oz)   03/14/17 94.9 kg (209 lb 3.2 oz)   02/28/17 96.3 kg (212 lb 3.2 oz)              Today, you had the following     No orders found for display       Primary Care Provider Office Phone # Fax #    Ayden Peralta [phone]       [facility] [address] [place] [address]        Thank you!     Thank you for choosing [facility]  for your care. Our goal is always to provide you with excellent care. Hearing back from our patients is one way we can continue to improve our services. Please take a few minutes to complete the written survey that you may receive in the mail after your visit with us. Thank you!             Your Updated Medication List - Protect others around you: Learn how to safely use, store and throw away your medicines at www.disposemymeds.org.          This list is accurate as of: 3/14/17 12:02 PM.  Always use your most recent med list.                   Brand Name Dispense Instructions for use    acetaminophen 325 MG tablet    TYLENOL    100 tablet    Take 2 tablets (650 mg) by mouth every 4 hours as needed for other (mild pain)       COLACE PO          diltiazem 2% in PLO cream (FV COMPOUNDED) 2% Gel     60 g    To anal opening three times daily.  Use a pea-sized amount.  Store at room temperature.       hydrocortisone 0.5 % ointment      Apply topically 2 times daily Reported on 2/14/2017       IBUPROFEN PO          LORazepam 0.5 MG tablet    ATIVAN    30 tablet    Take 1 tablet (0.5 mg) by mouth every 4 hours as needed (Anxiety, Nausea/Vomiting or Sleep)       metoclopramide 10 MG tablet    REGLAN    30 tablet    Take 1 tablet (10 mg) by mouth 4 times daily (before meals and nightly)       ondansetron 8 MG tablet     ZOFRAN    10 tablet    Take 1 tablet (8 mg) by mouth every 8 hours as needed (Nausea/Vomiting)       prochlorperazine 10 MG tablet    COMPAZINE    30 tablet    Take 1 tablet (10 mg) by mouth every 6 hours as needed (Nausea/Vomiting)

## 2017-03-14 NOTE — PATIENT INSTRUCTIONS
-delay chemotherapy by 1 week to 3/21/17  -add Neulasta On-Pro to the next cycle of chemotherapy  -return to clinic on 4/4/17

## 2017-03-21 ENCOUNTER — HOSPITAL ENCOUNTER (OUTPATIENT)
Facility: CLINIC | Age: 57
Setting detail: SPECIMEN
Discharge: HOME | End: 2017-03-21
Attending: INTERNAL MEDICINE | Admitting: INTERNAL MEDICINE
Payer: COMMERCIAL

## 2017-03-21 ENCOUNTER — INFUSION THERAPY VISIT (OUTPATIENT)
Dept: INFUSION THERAPY | Facility: CLINIC | Age: 57
End: 2017-03-21
Attending: INTERNAL MEDICINE
Payer: COMMERCIAL

## 2017-03-21 VITALS
OXYGEN SATURATION: 96 % | WEIGHT: 211 LBS | HEART RATE: 90 BPM | DIASTOLIC BLOOD PRESSURE: 74 MMHG | SYSTOLIC BLOOD PRESSURE: 129 MMHG | BODY MASS INDEX: 30.21 KG/M2 | HEIGHT: 70 IN | TEMPERATURE: 97.5 F | RESPIRATION RATE: 16 BRPM

## 2017-03-21 DIAGNOSIS — D70.1 CHEMOTHERAPY-INDUCED NEUTROPENIA (H): ICD-10-CM

## 2017-03-21 DIAGNOSIS — T45.1X5A CHEMOTHERAPY-INDUCED NEUTROPENIA (H): ICD-10-CM

## 2017-03-21 DIAGNOSIS — C20 MALIGNANT NEOPLASM OF RECTUM (H): Primary | ICD-10-CM

## 2017-03-21 LAB
ALBUMIN SERPL-MCNC: 3.5 G/DL (ref 3.4–5)
ALP SERPL-CCNC: 82 U/L (ref 40–150)
ALT SERPL W P-5'-P-CCNC: 77 U/L (ref 0–70)
ANION GAP SERPL CALCULATED.3IONS-SCNC: 6 MMOL/L (ref 3–14)
AST SERPL W P-5'-P-CCNC: 44 U/L (ref 0–45)
BASOPHILS # BLD AUTO: 0 10E9/L (ref 0–0.2)
BASOPHILS NFR BLD AUTO: 0 %
BILIRUB SERPL-MCNC: 0.6 MG/DL (ref 0.2–1.3)
BUN SERPL-MCNC: 21 MG/DL (ref 7–30)
CALCIUM SERPL-MCNC: 8.6 MG/DL (ref 8.5–10.1)
CHLORIDE SERPL-SCNC: 107 MMOL/L (ref 94–109)
CO2 SERPL-SCNC: 27 MMOL/L (ref 20–32)
CREAT SERPL-MCNC: 0.77 MG/DL (ref 0.66–1.25)
DIFFERENTIAL METHOD BLD: ABNORMAL
EOSINOPHIL # BLD AUTO: 0.1 10E9/L (ref 0–0.7)
EOSINOPHIL NFR BLD AUTO: 6 %
ERYTHROCYTE [DISTWIDTH] IN BLOOD BY AUTOMATED COUNT: 17.6 % (ref 10–15)
GFR SERPL CREATININE-BSD FRML MDRD: ABNORMAL ML/MIN/1.7M2
GLUCOSE SERPL-MCNC: 100 MG/DL (ref 70–99)
HCT VFR BLD AUTO: 38.3 % (ref 40–53)
HGB BLD-MCNC: 13.4 G/DL (ref 13.3–17.7)
LYMPHOCYTES # BLD AUTO: 0.6 10E9/L (ref 0.8–5.3)
LYMPHOCYTES NFR BLD AUTO: 33 %
MCH RBC QN AUTO: 30.5 PG (ref 26.5–33)
MCHC RBC AUTO-ENTMCNC: 35 G/DL (ref 31.5–36.5)
MCV RBC AUTO: 87 FL (ref 78–100)
MONOCYTES # BLD AUTO: 0.5 10E9/L (ref 0–1.3)
MONOCYTES NFR BLD AUTO: 26 %
NEUTROPHILS # BLD AUTO: 0.6 10E9/L (ref 1.6–8.3)
NEUTROPHILS NFR BLD AUTO: 35 %
PLATELET # BLD AUTO: 160 10E9/L (ref 150–450)
POTASSIUM SERPL-SCNC: 3.8 MMOL/L (ref 3.4–5.3)
PROT SERPL-MCNC: 6.6 G/DL (ref 6.8–8.8)
RBC # BLD AUTO: 4.4 10E12/L (ref 4.4–5.9)
SODIUM SERPL-SCNC: 140 MMOL/L (ref 133–144)
WBC # BLD AUTO: 1.8 10E9/L (ref 4–11)

## 2017-03-21 PROCEDURE — 96372 THER/PROPH/DIAG INJ SC/IM: CPT

## 2017-03-21 PROCEDURE — 85004 AUTOMATED DIFF WBC COUNT: CPT | Performed by: INTERNAL MEDICINE

## 2017-03-21 PROCEDURE — 80053 COMPREHEN METABOLIC PANEL: CPT | Performed by: INTERNAL MEDICINE

## 2017-03-21 PROCEDURE — 85027 COMPLETE CBC AUTOMATED: CPT | Performed by: INTERNAL MEDICINE

## 2017-03-21 PROCEDURE — 25000128 H RX IP 250 OP 636: Performed by: INTERNAL MEDICINE

## 2017-03-21 RX ORDER — DIBUCAINE 0.28 G/28G
OINTMENT TOPICAL 3 TIMES DAILY PRN
COMMUNITY
End: 2020-08-31

## 2017-03-21 RX ORDER — HEPARIN SODIUM (PORCINE) LOCK FLUSH IV SOLN 100 UNIT/ML 100 UNIT/ML
5 SOLUTION INTRAVENOUS ONCE
Status: COMPLETED | OUTPATIENT
Start: 2017-03-21 | End: 2017-03-21

## 2017-03-21 RX ORDER — HEPARIN SODIUM (PORCINE) LOCK FLUSH IV SOLN 100 UNIT/ML 100 UNIT/ML
5 SOLUTION INTRAVENOUS ONCE
Status: CANCELLED
Start: 2017-03-21 | End: 2017-03-21

## 2017-03-21 RX ADMIN — SODIUM CHLORIDE, PRESERVATIVE FREE 5 ML: 5 INJECTION INTRAVENOUS at 11:09

## 2017-03-21 RX ADMIN — FILGRASTIM 480 MCG: 480 INJECTION, SOLUTION INTRAVENOUS; SUBCUTANEOUS at 11:09

## 2017-03-21 ASSESSMENT — PAIN SCALES - GENERAL: PAINLEVEL: NO PAIN (0)

## 2017-03-21 NOTE — PROGRESS NOTES
Infusion Nursing Note:  Louie Greco presents today for C5 D1 FOLFOX-DEFERRED  Patient seen by provider today: No   present during visit today: Not Applicable.    Note: Patient feeling well overall today, no new concerns. Reviewed ANC with Juani Mcgrath RN/Dr. Manley, orders as follows:    Neupogen 480mcg SQ daily x3 days  Defer chemotherapy 1 week to 3/28    Reviewed above with patient, he is agreeable. First neupogen given today, scheduled for next two days (patient declines at home self-injections). Reviewed common side effects of neupogen with patient and given printed info from [employer]. Also reviewed neutropenic precautions with patient, verbalized understanding. Updated schedule given to patient and treatment plan updated.    Intravenous Access:  Labs drawn without difficulty.  Implanted Port.    Treatment Conditions:  Lab Results   Component Value Date    HGB 13.4 03/21/2017     Lab Results   Component Value Date    WBC 1.8 03/21/2017      Lab Results   Component Value Date    ANEU 0.6 03/21/2017     Lab Results   Component Value Date     03/21/2017      Lab Results   Component Value Date     03/21/2017                   Lab Results   Component Value Date    POTASSIUM 3.8 03/21/2017              Lab Results   Component Value Date    CR 0.77 03/21/2017                   Lab Results   Component Value Date    FRANDY 8.6 03/21/2017                Lab Results   Component Value Date    BILITOTAL 0.6 03/21/2017           Lab Results   Component Value Date    ALBUMIN 3.5 03/21/2017                    Lab Results   Component Value Date    ALT 77 03/21/2017           Lab Results   Component Value Date    AST 44 03/21/2017     Results reviewed, labs did NOT meet treatment parameters: see above note.      Post Infusion Assessment:  Patient tolerated injection without incident. Neupogen given SQ in left upper arm.  Site patent and intact, free from redness, edema or discomfort.  No evidence of  extravasations.  Access discontinued per protocol.    Discharge Plan:   Discharge instructions reviewed with: Patient.  Patient and/or family verbalized understanding of discharge instructions and all questions answered.  AVS to patient via [employer].  Patient will return tomorrow for next appointment.   Patient discharged in stable condition accompanied by: wife and daughter.  Departure Mode: Ambulatory.  Nursing face to face time: 15 minutes    Lore Razo RN

## 2017-03-21 NOTE — MR AVS SNAPSHOT
After Visit Summary   3/21/2017    Louie Greco    MRN: [id number]           Patient Information     Date Of Birth          1960        Visit Information        Provider Department      3/21/2017 9:00 AM  INFUSION CHAIR 8 [facility]        Today's Diagnoses     Malignant neoplasm of rectum (H)    -  1    Chemotherapy-induced neutropenia (H)           Follow-ups after your visit        Your next 10 appointments already scheduled     Mar 22, 2017  2:30 PM CDT   Level 1 with SH INFUSION CHAIR 17   [facility] ([facility])    [facility]  [address]  [place] [address]   [phone]            Mar 23, 2017  2:30 PM CDT   Level 1 with SH INFUSION CHAIR 5   [facility] ([facility])    [facility]  [address]  [place] [address]   [phone]            Mar 28, 2017  9:00 AM CDT   Level 5 with  INFUSION CHAIR 6   [facility] ([facility])    [facility]  [address]  [place] [address]   [phone]            Mar 30, 2017  9:00 AM CDT   Return Visit with Roxann Guzman,    Cancer Risk Management Program ([facility])    [facility]  [address]  [place] [address]   [phone]            Mar 30, 2017  2:30 PM CDT   Level 2 with SH INFUSION CHAIR 10   [facility] ([facility])    [facility]  [address]  [place] [address]   [phone]            Mar 31, 2017  2:30 PM CDT   Level 1 with SH INFUSION CHAIR 10   [facility] ([facility])    [facility]  [address]  [place] [address]   [phone]             "Apr 04, 2017  9:00 AM CDT   Return Visit with Agueda Manley MD   [facility] ([facility])    [facility] [place]  [address]  [place] [address] [phone]              Who to contact     If you have questions or need follow up information about today's clinic visit or your schedule please contact [facility] AND [facility] directly at [phone].  Normal or non-critical lab and imaging results will be communicated to you by youcalchart, letter or phone within 4 business days after the clinic has received the results. If you do not hear from us within 7 days, please contact the clinic through [employer] or phone. If you have a critical or abnormal lab result, we will notify you by phone as soon as possible.  Submit refill requests through [employer] or call your pharmacy and they will forward the refill request to us. Please allow 3 business days for your refill to be completed.          Additional Information About Your Visit        youcalcharKAICORE Information     [employer] gives you secure access to your electronic health record. If you see a primary care provider, you can also send messages to your care team and make appointments. If you have questions, please call your primary care clinic.  If you do not have a primary care provider, please call [phone] and they will assist you.        Care EveryWhere ID     This is your Care EveryWhere ID. This could be used by other organizations to access your [facility] medical records  [id number]        Your Vitals Were     Pulse Temperature Respirations Height Pulse Oximetry BMI (Body Mass Index)    90 97.5  F (36.4  C) (Oral) 16 1.777 m (5' 9.96\") 96% 30.31 kg/m2       Blood Pressure from Last 3 Encounters:   03/21/17 129/74   03/14/17 126/81   03/14/17 126/81    Weight from Last 3 Encounters:   03/21/17 95.7 kg (211 lb)   03/14/17 94.9 kg (209 lb 3.2 oz)   03/14/17 94.9 kg (209 lb 3.2 oz)         "      We Performed the Following     CBC with platelets     Comprehensive metabolic panel     WBC Differential        Primary Care Provider Office Phone # Fax #    Ayden Peralta [phone]       [facility] [address] [facility] [address]        Thank you!     Thank you for choosing [facility] AND [facility]ENTER  for your care. Our goal is always to provide you with excellent care. Hearing back from our patients is one way we can continue to improve our services. Please take a few minutes to complete the written survey that you may receive in the mail after your visit with us. Thank you!             Your Updated Medication List - Protect others around you: Learn how to safely use, store and throw away your medicines at www.disposemymeds.org.          This list is accurate as of: 3/21/17 12:19 PM.  Always use your most recent med list.                   Brand Name Dispense Instructions for use    acetaminophen 325 MG tablet    TYLENOL    100 tablet    Take 2 tablets (650 mg) by mouth every 4 hours as needed for other (mild pain)       COLACE PO          dibucaine 1 % Oint ointment    NUPERCAINAL     3 times daily as needed for moderate pain       diltiazem 2% in PLO cream (FV COMPOUNDED) 2% Gel     60 g    To anal opening three times daily.  Use a pea-sized amount.  Store at room temperature.       hydrocortisone 0.5 % ointment      Apply topically 2 times daily Reported on 2/14/2017       IBUPROFEN PO          LORazepam 0.5 MG tablet    ATIVAN    30 tablet    Take 1 tablet (0.5 mg) by mouth every 4 hours as needed (Anxiety, Nausea/Vomiting or Sleep)       metoclopramide 10 MG tablet    REGLAN    30 tablet    Take 1 tablet (10 mg) by mouth 4 times daily (before meals and nightly)       ondansetron 8 MG tablet    ZOFRAN    10 tablet    Take 1 tablet (8 mg) by mouth every 8 hours as needed (Nausea/Vomiting)       prochlorperazine 10 MG tablet    COMPAZINE    30  tablet    Take 1 tablet (10 mg) by mouth every 6 hours as needed (Nausea/Vomiting)

## 2017-03-22 ENCOUNTER — INFUSION THERAPY VISIT (OUTPATIENT)
Dept: INFUSION THERAPY | Facility: CLINIC | Age: 57
End: 2017-03-22
Attending: INTERNAL MEDICINE
Payer: COMMERCIAL

## 2017-03-22 VITALS
DIASTOLIC BLOOD PRESSURE: 84 MMHG | TEMPERATURE: 97.6 F | RESPIRATION RATE: 16 BRPM | SYSTOLIC BLOOD PRESSURE: 142 MMHG | HEART RATE: 104 BPM

## 2017-03-22 DIAGNOSIS — D70.1 CHEMOTHERAPY-INDUCED NEUTROPENIA (H): Primary | ICD-10-CM

## 2017-03-22 DIAGNOSIS — T45.1X5A CHEMOTHERAPY-INDUCED NEUTROPENIA (H): Primary | ICD-10-CM

## 2017-03-22 DIAGNOSIS — C20 MALIGNANT NEOPLASM OF RECTUM (H): ICD-10-CM

## 2017-03-22 PROCEDURE — 96372 THER/PROPH/DIAG INJ SC/IM: CPT

## 2017-03-22 PROCEDURE — 25000128 H RX IP 250 OP 636: Performed by: INTERNAL MEDICINE

## 2017-03-22 RX ORDER — HEPARIN SODIUM (PORCINE) LOCK FLUSH IV SOLN 100 UNIT/ML 100 UNIT/ML
5 SOLUTION INTRAVENOUS ONCE
Status: CANCELLED
Start: 2017-03-22 | End: 2017-03-22

## 2017-03-22 RX ADMIN — FILGRASTIM 480 MCG: 480 INJECTION, SOLUTION INTRAVENOUS; SUBCUTANEOUS at 15:39

## 2017-03-22 ASSESSMENT — PAIN SCALES - GENERAL: PAINLEVEL: NO PAIN (0)

## 2017-03-22 NOTE — MR AVS SNAPSHOT
After Visit Summary   3/22/2017    Louie Greco    MRN: [id number]           Patient Information     Date Of Birth          1960        Visit Information        Provider Department      3/22/2017 2:30 PM  INFUSION CHAIR 17 [facility]        Today's Diagnoses     Chemotherapy-induced neutropenia (H)    -  1    Malignant neoplasm of rectum (H)           Follow-ups after your visit        Your next 10 appointments already scheduled     Mar 23, 2017  3:30 PM CDT   Level 1 with  INFUSION CHAIR 15   [facility] ([facility])    [place] [id number]  [place] [address]   [phone]            Mar 28, 2017  9:00 AM CDT   Level 5 with  INFUSION CHAIR 6   [facility] ([facility])    [facility]  [address]  [place] [address]   [phone]            Mar 30, 2017  9:00 AM CDT   Return Visit with Roxann Guzman GC   Cancer Risk Management Program ([facility])    [facility] [place] [address]  [place] [address]   [phone]            Mar 30, 2017  2:30 PM CDT   Level 2 with  INFUSION CHAIR 10   [facility] ([facility])    [facility]  [address]  [place] [address]   [phone]            Mar 31, 2017  2:30 PM CDT   Level 1 with  INFUSION CHAIR 10   [facility] ([facility])    [facility]  [address]  [place] [address]   [phone]            Apr 04, 2017  9:00 AM CDT   Return Visit with Agueda Manley MD   [facility] ([facility])    [facility]  [address]  [place] [address]   [phone]              Who  to contact     If you have questions or need follow up information about today's clinic visit or your schedule please contact [facility] AND [facility] directly at [phone].  Normal or non-critical lab and imaging results will be communicated to you by MyChart, letter or phone within 4 business days after the clinic has received the results. If you do not hear from us within 7 days, please contact the clinic through MyChart or phone. If you have a critical or abnormal lab result, we will notify you by phone as soon as possible.  Submit refill requests through [employer] or call your pharmacy and they will forward the refill request to us. Please allow 3 business days for your refill to be completed.          Additional Information About Your Visit        [employer] Information     [employer] gives you secure access to your electronic health record. If you see a primary care provider, you can also send messages to your care team and make appointments. If you have questions, please call your primary care clinic.  If you do not have a primary care provider, please call [phone] and they will assist you.        Care EveryWhere ID     This is your Care EveryWhere ID. This could be used by other organizations to access your [place] medical records  [id number]        Your Vitals Were     Pulse Temperature Respirations             104 97.6  F (36.4  C) (Oral) 16          Blood Pressure from Last 3 Encounters:   03/22/17 142/84   03/21/17 129/74   03/14/17 126/81    Weight from Last 3 Encounters:   03/21/17 95.7 kg (211 lb)   03/14/17 94.9 kg (209 lb 3.2 oz)   03/14/17 94.9 kg (209 lb 3.2 oz)              Today, you had the following     No orders found for display       Primary Care Provider Office Phone # Fax #    Ayden Peralta [phone]       [facility] [address] [place] [address]        Thank you!     Thank you for choosing [facility] AND  INFUSION CENTER  for your care. Our goal is always to provide you with excellent care. Hearing back from our patients is one way we can continue to improve our services. Please take a few minutes to complete the written survey that you may receive in the mail after your visit with us. Thank you!             Your Updated Medication List - Protect others around you: Learn how to safely use, store and throw away your medicines at www.disposemymeds.org.          This list is accurate as of: 3/22/17  3:49 PM.  Always use your most recent med list.                   Brand Name Dispense Instructions for use    acetaminophen 325 MG tablet    TYLENOL    100 tablet    Take 2 tablets (650 mg) by mouth every 4 hours as needed for other (mild pain)       COLACE PO          dibucaine 1 % Oint ointment    NUPERCAINAL     3 times daily as needed for moderate pain       diltiazem 2% in PLO cream (FV COMPOUNDED) 2% Gel     60 g    To anal opening three times daily.  Use a pea-sized amount.  Store at room temperature.       hydrocortisone 0.5 % ointment      Apply topically 2 times daily Reported on 2/14/2017       IBUPROFEN PO          LORazepam 0.5 MG tablet    ATIVAN    30 tablet    Take 1 tablet (0.5 mg) by mouth every 4 hours as needed (Anxiety, Nausea/Vomiting or Sleep)       metoclopramide 10 MG tablet    REGLAN    30 tablet    Take 1 tablet (10 mg) by mouth 4 times daily (before meals and nightly)       ondansetron 8 MG tablet    ZOFRAN    10 tablet    Take 1 tablet (8 mg) by mouth every 8 hours as needed (Nausea/Vomiting)       prochlorperazine 10 MG tablet    COMPAZINE    30 tablet    Take 1 tablet (10 mg) by mouth every 6 hours as needed (Nausea/Vomiting)

## 2017-03-22 NOTE — PROGRESS NOTES
Infusion Nursing Note:  Louie Greco presents today for neupogen.    Patient seen by provider today: No   present during visit today: Not Applicable.    Note: No new concerns since yesterday. Tolerated neupogen well.    Intravenous Access:  No Intravenous access/labs at this visit.    Treatment Conditions:  Not Applicable.      Post Infusion Assessment:  Patient tolerated injection without incident. Neupogen given SQ in left upper arm.  Site patent and intact, free from redness, edema or discomfort.    Discharge Plan:   Discharge instructions reviewed with: Patient.  Patient and/or family verbalized understanding of discharge instructions and all questions answered.  AVS to patient via [employer].  Patient will return tomorrow for next appointment.   Patient discharged in stable condition accompanied by: son.  Departure Mode: Ambulatory.    Lore Razo RN

## 2017-03-23 ENCOUNTER — INFUSION THERAPY VISIT (OUTPATIENT)
Dept: INFUSION THERAPY | Facility: CLINIC | Age: 57
End: 2017-03-23
Attending: INTERNAL MEDICINE
Payer: COMMERCIAL

## 2017-03-23 DIAGNOSIS — D70.1 CHEMOTHERAPY-INDUCED NEUTROPENIA (H): Primary | ICD-10-CM

## 2017-03-23 DIAGNOSIS — C20 MALIGNANT NEOPLASM OF RECTUM (H): ICD-10-CM

## 2017-03-23 DIAGNOSIS — T45.1X5A CHEMOTHERAPY-INDUCED NEUTROPENIA (H): Primary | ICD-10-CM

## 2017-03-23 PROCEDURE — 96372 THER/PROPH/DIAG INJ SC/IM: CPT

## 2017-03-23 PROCEDURE — 25000128 H RX IP 250 OP 636: Performed by: INTERNAL MEDICINE

## 2017-03-23 RX ADMIN — FILGRASTIM 480 MCG: 480 INJECTION, SOLUTION INTRAVENOUS; SUBCUTANEOUS at 15:25

## 2017-03-23 NOTE — PROGRESS NOTES
Infusion Nursing Note:  Louie Greco presents today for Neupogen.    Patient seen by provider today: No   present during visit today: Not Applicable.    Note: N/A.    Intravenous Access:  No Intravenous access/labs at this visit.    Treatment Conditions:  Not Applicable.      Post Infusion Assessment:  Patient tolerated injection without incident.  Site patent and intact, free from redness, edema or discomfort.  No evidence of extravasations.    Discharge Plan:   AVS to patient via MYCHART.  Patient will return 3/28/17 for next appointment.   Patient discharged in stable condition accompanied by: self.  Departure Mode: Ambulatory.    Chelsea Cm RN

## 2017-03-23 NOTE — MR AVS SNAPSHOT
After Visit Summary   3/23/2017    Louie Greco    MRN: [id number]           Patient Information     Date Of Birth          1960        Visit Information        Provider Department      3/23/2017 3:30 PM  INFUSION CHAIR 15 [facility]        Today's Diagnoses     Chemotherapy-induced neutropenia (H)    -  1    Malignant neoplasm of rectum (H)           Follow-ups after your visit        Your next 10 appointments already scheduled     Mar 23, 2017  3:30 PM CDT   Level 1 with  INFUSION CHAIR 15   [facility] ([facility])    [facility] [place] [address]  [place] [address]   [phone]            Mar 28, 2017  9:00 AM CDT   Level 5 with  INFUSION CHAIR 6   [facility] ([facility])    [facility] [place] [address]  [place] [address]   [phone]            Mar 30, 2017  9:00 AM CDT   Return Visit with Roxann Guzman GC   Cancer Risk Management Program ([facility])    [facility] [place] [address]  [place] [address]   [phone]            Mar 30, 2017  2:30 PM CDT   Level 2 with  INFUSION CHAIR 10   [facility] ([facility])    [facility]  [address]  [place] [address]   [phone]            Mar 31, 2017  2:30 PM CDT   Level 1 with SH INFUSION CHAIR 10   [facility] ([facility])    [facility]  [address]  [place] [address]   [phone]            Apr 04, 2017  9:00 AM CDT   Return Visit with Agueda Manley MD   [facility] ([facility])    [facility]  [address]  [place] [address]   [phone]              Who  to contact     If you have questions or need follow up information about today's clinic visit or your schedule please contact [facility] [place] directly at [phone].  Normal or non-critical lab and imaging results will be communicated to you by MyChart, letter or phone within 4 business days after the clinic has received the results. If you do not hear from us within 7 days, please contact the clinic through MyChart or phone. If you have a critical or abnormal lab result, we will notify you by phone as soon as possible.  Submit refill requests through [employer]ync or call your pharmacy and they will forward the refill request to us. Please allow 3 business days for your refill to be completed.          Additional Information About Your Visit        [employer] Information     [employer] gives you secure access to your electronic health record. If you see a primary care provider, you can also send messages to your care team and make appointments. If you have questions, please call your primary care clinic.  If you do not have a primary care provider, please call [phone] and they will assist you.        Care EveryWhere ID     This is your Care EveryWhere ID. This could be used by other organizations to access your [place] medical records  [id number]         Blood Pressure from Last 3 Encounters:   03/22/17 142/84   03/21/17 129/74   03/14/17 126/81    Weight from Last 3 Encounters:   03/21/17 95.7 kg (211 lb)   03/14/17 94.9 kg (209 lb 3.2 oz)   03/14/17 94.9 kg (209 lb 3.2 oz)              Today, you had the following     No orders found for display       Primary Care Provider Office Phone # Fax #    Ayden Peralta [phone]       [facility] [address] [place] [address]        Thank you!     Thank you for choosing [facility] [place] AND [facility]  for your care. Our goal is always to provide you with excellent care. Hearing back from  our patients is one way we can continue to improve our services. Please take a few minutes to complete the written survey that you may receive in the mail after your visit with us. Thank you!             Your Updated Medication List - Protect others around you: Learn how to safely use, store and throw away your medicines at www.disposemymeds.org.          This list is accurate as of: 3/23/17  3:28 PM.  Always use your most recent med list.                   Brand Name Dispense Instructions for use    acetaminophen 325 MG tablet    TYLENOL    100 tablet    Take 2 tablets (650 mg) by mouth every 4 hours as needed for other (mild pain)       COLACE PO          dibucaine 1 % Oint ointment    NUPERCAINAL     3 times daily as needed for moderate pain       diltiazem 2% in PLO cream (FV COMPOUNDED) 2% Gel     60 g    To anal opening three times daily.  Use a pea-sized amount.  Store at room temperature.       hydrocortisone 0.5 % ointment      Apply topically 2 times daily Reported on 2/14/2017       IBUPROFEN PO          LORazepam 0.5 MG tablet    ATIVAN    30 tablet    Take 1 tablet (0.5 mg) by mouth every 4 hours as needed (Anxiety, Nausea/Vomiting or Sleep)       metoclopramide 10 MG tablet    REGLAN    30 tablet    Take 1 tablet (10 mg) by mouth 4 times daily (before meals and nightly)       ondansetron 8 MG tablet    ZOFRAN    10 tablet    Take 1 tablet (8 mg) by mouth every 8 hours as needed (Nausea/Vomiting)       prochlorperazine 10 MG tablet    COMPAZINE    30 tablet    Take 1 tablet (10 mg) by mouth every 6 hours as needed (Nausea/Vomiting)

## 2017-03-27 RX ORDER — ALBUTEROL SULFATE 0.83 MG/ML
2.5 SOLUTION RESPIRATORY (INHALATION)
Status: CANCELLED | OUTPATIENT
Start: 2017-03-28

## 2017-03-27 RX ORDER — MEPERIDINE HYDROCHLORIDE 50 MG/ML
25 INJECTION INTRAMUSCULAR; INTRAVENOUS; SUBCUTANEOUS EVERY 30 MIN PRN
Status: CANCELLED | OUTPATIENT
Start: 2017-03-28

## 2017-03-27 RX ORDER — SODIUM CHLORIDE 9 MG/ML
1000 INJECTION, SOLUTION INTRAVENOUS CONTINUOUS PRN
Status: CANCELLED
Start: 2017-03-28

## 2017-03-27 RX ORDER — METHYLPREDNISOLONE SODIUM SUCCINATE 125 MG/2ML
50 INJECTION, POWDER, LYOPHILIZED, FOR SOLUTION INTRAMUSCULAR; INTRAVENOUS ONCE
Status: CANCELLED
Start: 2017-03-28 | End: 2017-03-28

## 2017-03-27 RX ORDER — FLUOROURACIL 50 MG/ML
400 INJECTION, SOLUTION INTRAVENOUS ONCE
Status: CANCELLED | OUTPATIENT
Start: 2017-03-28

## 2017-03-27 RX ORDER — METHYLPREDNISOLONE SODIUM SUCCINATE 125 MG/2ML
125 INJECTION, POWDER, LYOPHILIZED, FOR SOLUTION INTRAMUSCULAR; INTRAVENOUS
Status: CANCELLED
Start: 2017-03-28

## 2017-03-27 RX ORDER — LORAZEPAM 2 MG/ML
0.5 INJECTION INTRAMUSCULAR EVERY 4 HOURS PRN
Status: CANCELLED
Start: 2017-03-28

## 2017-03-27 RX ORDER — EPINEPHRINE 0.3 MG/.3ML
0.3 INJECTION SUBCUTANEOUS EVERY 5 MIN PRN
Status: CANCELLED | OUTPATIENT
Start: 2017-03-28

## 2017-03-27 RX ORDER — DIPHENHYDRAMINE HYDROCHLORIDE 50 MG/ML
50 INJECTION INTRAMUSCULAR; INTRAVENOUS
Status: CANCELLED
Start: 2017-03-28

## 2017-03-27 RX ORDER — EPINEPHRINE 1 MG/ML
0.3 INJECTION INTRAMUSCULAR; INTRAVENOUS; SUBCUTANEOUS EVERY 5 MIN PRN
Status: CANCELLED | OUTPATIENT
Start: 2017-03-28

## 2017-03-27 RX ORDER — ALBUTEROL SULFATE 90 UG/1
1-2 AEROSOL, METERED RESPIRATORY (INHALATION)
Status: CANCELLED
Start: 2017-03-28

## 2017-03-28 ENCOUNTER — INFUSION THERAPY VISIT (OUTPATIENT)
Dept: INFUSION THERAPY | Facility: CLINIC | Age: 57
End: 2017-03-28
Attending: INTERNAL MEDICINE
Payer: COMMERCIAL

## 2017-03-28 ENCOUNTER — HOSPITAL ENCOUNTER (OUTPATIENT)
Facility: CLINIC | Age: 57
Setting detail: SPECIMEN
Discharge: HOME | End: 2017-03-28
Attending: INTERNAL MEDICINE | Admitting: INTERNAL MEDICINE
Payer: COMMERCIAL

## 2017-03-28 VITALS
WEIGHT: 213 LBS | RESPIRATION RATE: 16 BRPM | DIASTOLIC BLOOD PRESSURE: 73 MMHG | BODY MASS INDEX: 30.49 KG/M2 | SYSTOLIC BLOOD PRESSURE: 124 MMHG | TEMPERATURE: 97.8 F | HEART RATE: 86 BPM | HEIGHT: 70 IN | OXYGEN SATURATION: 97 %

## 2017-03-28 DIAGNOSIS — C20 MALIGNANT NEOPLASM OF RECTUM (H): Primary | ICD-10-CM

## 2017-03-28 LAB
ALBUMIN SERPL-MCNC: 3.5 G/DL (ref 3.4–5)
ALP SERPL-CCNC: 134 U/L (ref 40–150)
ALT SERPL W P-5'-P-CCNC: 52 U/L (ref 0–70)
ANION GAP SERPL CALCULATED.3IONS-SCNC: 7 MMOL/L (ref 3–14)
AST SERPL W P-5'-P-CCNC: 28 U/L (ref 0–45)
BASOPHILS # BLD AUTO: 0 10E9/L (ref 0–0.2)
BASOPHILS NFR BLD AUTO: 0 %
BILIRUB SERPL-MCNC: 0.5 MG/DL (ref 0.2–1.3)
BUN SERPL-MCNC: 19 MG/DL (ref 7–30)
CALCIUM SERPL-MCNC: 8.9 MG/DL (ref 8.5–10.1)
CEA SERPL-MCNC: 0.9 UG/L (ref 0–2.5)
CHLORIDE SERPL-SCNC: 108 MMOL/L (ref 94–109)
CO2 SERPL-SCNC: 27 MMOL/L (ref 20–32)
CREAT SERPL-MCNC: 0.75 MG/DL (ref 0.66–1.25)
DIFFERENTIAL METHOD BLD: ABNORMAL
EOSINOPHIL # BLD AUTO: 0.2 10E9/L (ref 0–0.7)
EOSINOPHIL NFR BLD AUTO: 3 %
ERYTHROCYTE [DISTWIDTH] IN BLOOD BY AUTOMATED COUNT: 17.5 % (ref 10–15)
GFR SERPL CREATININE-BSD FRML MDRD: ABNORMAL ML/MIN/1.7M2
GLUCOSE SERPL-MCNC: 100 MG/DL (ref 70–99)
HCT VFR BLD AUTO: 40.9 % (ref 40–53)
HGB BLD-MCNC: 13.9 G/DL (ref 13.3–17.7)
LYMPHOCYTES # BLD AUTO: 1.1 10E9/L (ref 0.8–5.3)
LYMPHOCYTES NFR BLD AUTO: 14 %
MCH RBC QN AUTO: 30.2 PG (ref 26.5–33)
MCHC RBC AUTO-ENTMCNC: 34 G/DL (ref 31.5–36.5)
MCV RBC AUTO: 89 FL (ref 78–100)
MONOCYTES # BLD AUTO: 1.5 10E9/L (ref 0–1.3)
MONOCYTES NFR BLD AUTO: 19 %
MYELOCYTES # BLD: 0.1 10E9/L
MYELOCYTES NFR BLD MANUAL: 1 %
NEUTROPHILS # BLD AUTO: 5.1 10E9/L (ref 1.6–8.3)
NEUTROPHILS NFR BLD AUTO: 63 %
PLATELET # BLD AUTO: 110 10E9/L (ref 150–450)
POTASSIUM SERPL-SCNC: 4 MMOL/L (ref 3.4–5.3)
PROT SERPL-MCNC: 6.8 G/DL (ref 6.8–8.8)
RBC # BLD AUTO: 4.6 10E12/L (ref 4.4–5.9)
SODIUM SERPL-SCNC: 142 MMOL/L (ref 133–144)
WBC # BLD AUTO: 8.1 10E9/L (ref 4–11)

## 2017-03-28 PROCEDURE — 96416 CHEMO PROLONG INFUSE W/PUMP: CPT

## 2017-03-28 PROCEDURE — 96413 CHEMO IV INFUSION 1 HR: CPT

## 2017-03-28 PROCEDURE — 82378 CARCINOEMBRYONIC ANTIGEN: CPT | Performed by: INTERNAL MEDICINE

## 2017-03-28 PROCEDURE — 80053 COMPREHEN METABOLIC PANEL: CPT | Performed by: INTERNAL MEDICINE

## 2017-03-28 PROCEDURE — 96415 CHEMO IV INFUSION ADDL HR: CPT

## 2017-03-28 PROCEDURE — 96368 THER/DIAG CONCURRENT INF: CPT

## 2017-03-28 PROCEDURE — 25000125 ZZHC RX 250: Performed by: INTERNAL MEDICINE

## 2017-03-28 PROCEDURE — 96375 TX/PRO/DX INJ NEW DRUG ADDON: CPT

## 2017-03-28 PROCEDURE — 25000128 H RX IP 250 OP 636: Mod: JW | Performed by: INTERNAL MEDICINE

## 2017-03-28 PROCEDURE — 96367 TX/PROPH/DG ADDL SEQ IV INF: CPT

## 2017-03-28 PROCEDURE — 85025 COMPLETE CBC W/AUTO DIFF WBC: CPT | Performed by: INTERNAL MEDICINE

## 2017-03-28 PROCEDURE — 96411 CHEMO IV PUSH ADDL DRUG: CPT

## 2017-03-28 RX ORDER — METHYLPREDNISOLONE SODIUM SUCCINATE 125 MG/2ML
50 INJECTION, POWDER, LYOPHILIZED, FOR SOLUTION INTRAMUSCULAR; INTRAVENOUS ONCE
Status: COMPLETED | OUTPATIENT
Start: 2017-03-28 | End: 2017-03-28

## 2017-03-28 RX ORDER — FLUOROURACIL 50 MG/ML
400 INJECTION, SOLUTION INTRAVENOUS ONCE
Status: COMPLETED | OUTPATIENT
Start: 2017-03-28 | End: 2017-03-28

## 2017-03-28 RX ADMIN — FLUOROURACIL 890 MG: 50 INJECTION, SOLUTION INTRAVENOUS at 12:01

## 2017-03-28 RX ADMIN — ONDANSETRON HYDROCHLORIDE 8 MG: 2 INJECTION, SOLUTION INTRAVENOUS at 09:22

## 2017-03-28 RX ADMIN — DEXTROSE MONOHYDRATE 250 ML: 50 INJECTION, SOLUTION INTRAVENOUS at 09:22

## 2017-03-28 RX ADMIN — METHYLPREDNISOLONE SODIUM SUCCINATE 50 MG: 125 INJECTION, POWDER, FOR SOLUTION INTRAMUSCULAR; INTRAVENOUS at 09:40

## 2017-03-28 RX ADMIN — OXALIPLATIN 190 MG: 100 INJECTION, SOLUTION, CONCENTRATE INTRAVENOUS at 09:57

## 2017-03-28 RX ADMIN — LEUCOVORIN CALCIUM 800 MG: 350 INJECTION, POWDER, LYOPHILIZED, FOR SOLUTION INTRAMUSCULAR; INTRAVENOUS at 09:57

## 2017-03-28 ASSESSMENT — PAIN SCALES - GENERAL: PAINLEVEL: NO PAIN (0)

## 2017-03-28 NOTE — MR AVS SNAPSHOT
After Visit Summary   3/28/2017    Louie Greco    MRN: [id number]           Patient Information     Date Of Birth          1960        Visit Information        Provider Department      3/28/2017 7:30 AM  INFUSION CHAIR 13 [facility]        Today's Diagnoses     Malignant neoplasm of rectum (H)    -  1       Follow-ups after your visit        Your next 10 appointments already scheduled     Mar 30, 2017  9:00 AM CDT   Return Visit with Roxann Guzman GC   Cancer Risk Management Program ([facility])    [facility]  [address]  [place] [address]   [phone]            Mar 30, 2017 10:30 AM CDT   Level 2 with  INFUSION CHAIR 16   [facility] ([facility])    [facility]  [address]  [place] [address]   [phone]            Mar 31, 2017  2:30 PM CDT   Level 1 with  INFUSION CHAIR 10   [facility] ([facility])    [facility]  [address]  [place] [address]   [phone]            Apr 04, 2017  4:00 PM CDT   Return Visit with Agueda Manley MD   [facility] ([facility])    [facility]  [address]  [place] [address]   [phone]              Who to contact     If you have questions or need follow up information about today's clinic visit or your schedule please contact [facility] [place] directly at [phone].  Normal or non-critical lab and imaging results will be communicated to you by MyChart, letter or phone within 4 business days after the clinic has received the results. If you do not hear from us within 7 days, please contact the clinic through MyChart or phone. If you have a critical or abnormal lab result, we will notify you by phone  "as soon as possible.  Submit refill requests through aWhere or call your pharmacy and they will forward the refill request to us. Please allow 3 business days for your refill to be completed.          Additional Information About Your Visit        ArgantealharGetNotes Information     aWhere gives you secure access to your electronic health record. If you see a primary care provider, you can also send messages to your care team and make appointments. If you have questions, please call your primary care clinic.  If you do not have a primary care provider, please call [phone] and they will assist you.        Care EveryWhere ID     This is your Care EveryWhere ID. This could be used by other organizations to access your [place] medical records  [id number]        Your Vitals Were     Pulse Temperature Respirations Height Pulse Oximetry BMI (Body Mass Index)    86 97.8  F (36.6  C) (Oral) 16 1.777 m (5' 9.96\") 97% 30.6 kg/m2       Blood Pressure from Last 3 Encounters:   03/28/17 124/73   03/22/17 142/84   03/21/17 129/74    Weight from Last 3 Encounters:   03/28/17 96.6 kg (213 lb)   03/21/17 95.7 kg (211 lb)   03/14/17 94.9 kg (209 lb 3.2 oz)              We Performed the Following     CBC with platelets differential     CEA     Comprehensive metabolic panel     MD INSTRUCTION FOR PROTOCOL     Treatment Conditions        Primary Care Provider Office Phone # Fax #    Ayden Peralta [phone]       [facility] [address] [facility] [address]        Thank you!     Thank you for choosing [facility]  for your care. Our goal is always to provide you with excellent care. Hearing back from our patients is one way we can continue to improve our services. Please take a few minutes to complete the written survey that you may receive in the mail after your visit with us. Thank you!             Your Updated Medication List - Protect others around you: Learn how " to safely use, store and throw away your medicines at www.disposemymeds.org.          This list is accurate as of: 3/28/17 12:43 PM.  Always use your most recent med list.                   Brand Name Dispense Instructions for use    acetaminophen 325 MG tablet    TYLENOL    100 tablet    Take 2 tablets (650 mg) by mouth every 4 hours as needed for other (mild pain)       COLACE PO          dibucaine 1 % Oint ointment    NUPERCAINAL     3 times daily as needed for moderate pain       diltiazem 2% in PLO cream (FV COMPOUNDED) 2% Gel     60 g    To anal opening three times daily.  Use a pea-sized amount.  Store at room temperature.       hydrocortisone 0.5 % ointment      Apply topically 2 times daily Reported on 2/14/2017       IBUPROFEN PO          LORazepam 0.5 MG tablet    ATIVAN    30 tablet    Take 1 tablet (0.5 mg) by mouth every 4 hours as needed (Anxiety, Nausea/Vomiting or Sleep)       metoclopramide 10 MG tablet    REGLAN    30 tablet    Take 1 tablet (10 mg) by mouth 4 times daily (before meals and nightly)       ondansetron 8 MG tablet    ZOFRAN    10 tablet    Take 1 tablet (8 mg) by mouth every 8 hours as needed (Nausea/Vomiting)       prochlorperazine 10 MG tablet    COMPAZINE    30 tablet    Take 1 tablet (10 mg) by mouth every 6 hours as needed (Nausea/Vomiting)

## 2017-03-28 NOTE — PROGRESS NOTES
Infusion Nursing Note:  Louie Greco presents today for C5,D1 Folfox.    Patient seen by provider today: No   present during visit today: Not Applicable.    Note: No concerns or changes to report. Has missed last two chemos due to low ANC but has received Neupogen.    Intravenous Access:  Labs drawn without difficulty.  Implanted Port.    Treatment Conditions:  Lab Results   Component Value Date    HGB 13.9 03/28/2017     Lab Results   Component Value Date    WBC 8.1 03/28/2017      Lab Results   Component Value Date    ANEU 5.1 03/28/2017     Lab Results   Component Value Date     03/28/2017      Lab Results   Component Value Date     03/28/2017                   Lab Results   Component Value Date    POTASSIUM 4.0 03/28/2017           No results found for: MAG         Lab Results   Component Value Date    CR 0.75 03/28/2017                   Lab Results   Component Value Date    FRANDY 8.9 03/28/2017                Lab Results   Component Value Date    BILITOTAL 0.5 03/28/2017           Lab Results   Component Value Date    ALBUMIN 3.5 03/28/2017                    Lab Results   Component Value Date    ALT 52 03/28/2017           Lab Results   Component Value Date    AST 28 03/28/2017     Results reviewed, labs MET treatment parameters, ok to proceed with treatment.      Post Infusion Assessment:  Patient tolerated infusion without incident.  Blood return noted pre and post infusion.  Blood return noted during administration every 2 cc during Fluorouracil IV push.  Site patent and intact, free from redness, edema or discomfort.  No evidence of extravasations.    Discharge Plan:   Discharge instructions reviewed with: Patient.  Patient and/or family verbalized understanding of discharge instructions and all questions answered.  Copy of AVS reviewed with patient and/or family.  Patient will return 3/30 for next appointment.  Patient discharged in stable condition accompanied by: self.  Departure  "Mode: Ambulatory.  Prior to discharge: Port is secured in place with tegaderm and flushed with 10cc NS with positive blood return noted.  Continuous home infusion Dosi-Fuser pump connected.    All connectors secured in place and clamps taped open.    Pump started, \"running\" noted on display (CADD): YES.  Patient instructed to call our clinic or [facility] with any questions or concerns at home.  Patient verbalized understanding.    Patient set up for pump disconnect at our clinic on 3/30 at 10:30.            Tracey Lozada RN                        "

## 2017-03-30 ENCOUNTER — ONCOLOGY VISIT (OUTPATIENT)
Dept: ONCOLOGY | Facility: CLINIC | Age: 57
End: 2017-03-30
Attending: GENETIC COUNSELOR, MS
Payer: COMMERCIAL

## 2017-03-30 ENCOUNTER — INFUSION THERAPY VISIT (OUTPATIENT)
Dept: INFUSION THERAPY | Facility: CLINIC | Age: 57
End: 2017-03-30
Attending: INTERNAL MEDICINE
Payer: COMMERCIAL

## 2017-03-30 VITALS
TEMPERATURE: 97.6 F | SYSTOLIC BLOOD PRESSURE: 135 MMHG | RESPIRATION RATE: 14 BRPM | DIASTOLIC BLOOD PRESSURE: 86 MMHG | HEART RATE: 95 BPM

## 2017-03-30 DIAGNOSIS — Z83.719 FAMILY HISTORY OF COLONIC POLYPS: ICD-10-CM

## 2017-03-30 DIAGNOSIS — C20 MALIGNANT NEOPLASM OF RECTUM (H): Primary | ICD-10-CM

## 2017-03-30 PROCEDURE — 96040 ZZH GENETIC COUNSELING, EACH 30 MINUTES: CPT

## 2017-03-30 PROCEDURE — 25000128 H RX IP 250 OP 636: Performed by: INTERNAL MEDICINE

## 2017-03-30 RX ORDER — HEPARIN SODIUM (PORCINE) LOCK FLUSH IV SOLN 100 UNIT/ML 100 UNIT/ML
5 SOLUTION INTRAVENOUS ONCE
Status: COMPLETED | OUTPATIENT
Start: 2017-03-30 | End: 2017-03-30

## 2017-03-30 RX ORDER — HEPARIN SODIUM (PORCINE) LOCK FLUSH IV SOLN 100 UNIT/ML 100 UNIT/ML
5 SOLUTION INTRAVENOUS ONCE
Status: CANCELLED
Start: 2017-03-30 | End: 2017-03-30

## 2017-03-30 RX ADMIN — SODIUM CHLORIDE, PRESERVATIVE FREE 5 ML: 5 INJECTION INTRAVENOUS at 10:43

## 2017-03-30 ASSESSMENT — PAIN SCALES - GENERAL: PAINLEVEL: MODERATE PAIN (4)

## 2017-03-30 NOTE — MR AVS SNAPSHOT
After Visit Summary   3/30/2017    Louie Greco    MRN: [id number]           Patient Information     Date Of Birth          1960        Visit Information        Provider Department      3/30/2017 9:00 AM Roxann Guzman GC Cancer Risk Management Program        Today's Diagnoses     Malignant neoplasm of rectum (H)    -  1    Family history of colonic polyps           Follow-ups after your visit        Your next 10 appointments already scheduled     Apr 04, 2017  4:00 PM CDT   Return Visit with Agueda Manley MD   [facility] ([facility])    [facility]  [address]  [facility] [id number] [phone]              Who to contact     If you have questions or need follow up information about today's clinic visit or your schedule please contact CANCER RISK MANAGEMENT PROGRAM directly at [phone].  Normal or non-critical lab and imaging results will be communicated to you by MyChart, letter or phone within 4 business days after the clinic has received the results. If you do not hear from us within 7 days, please contact the clinic through Notice Kioskhart or phone. If you have a critical or abnormal lab result, we will notify you by phone as soon as possible.  Submit refill requests through Vistronix or call your pharmacy and they will forward the refill request to us. Please allow 3 business days for your refill to be completed.          Additional Information About Your Visit        MyChart Information     Vistronix gives you secure access to your electronic health record. If you see a primary care provider, you can also send messages to your care team and make appointments. If you have questions, please call your primary care clinic.  If you do not have a primary care provider, please call [phone] and they will assist you.        Care EveryWhere ID     This is your Care EveryWhere ID. This could be used by other organizations to access  your [place] medical records  [id number]         Blood Pressure from Last 3 Encounters:   03/30/17 135/86   03/28/17 124/73   03/22/17 142/84    Weight from Last 3 Encounters:   03/28/17 96.6 kg (213 lb)   03/21/17 95.7 kg (211 lb)   03/14/17 94.9 kg (209 lb 3.2 oz)              Today, you had the following     No orders found for display       Primary Care Provider Office Phone # Fax #    Ayden Peralta [phone]       [facility] [address] [facility] [address]        Thank you!     Thank you for choosing CANCER RISK MANAGEMENT PROGRAM  for your care. Our goal is always to provide you with excellent care. Hearing back from our patients is one way we can continue to improve our services. Please take a few minutes to complete the written survey that you may receive in the mail after your visit with us. Thank you!             Your Updated Medication List - Protect others around you: Learn how to safely use, store and throw away your medicines at www.disposemymeds.org.          This list is accurate as of: 3/30/17 11:59 PM.  Always use your most recent med list.                   Brand Name Dispense Instructions for use    acetaminophen 325 MG tablet    TYLENOL    100 tablet    Take 2 tablets (650 mg) by mouth every 4 hours as needed for other (mild pain)       COLACE PO          dibucaine 1 % Oint ointment    NUPERCAINAL     3 times daily as needed for moderate pain       diltiazem 2% in PLO cream (FV COMPOUNDED) 2% Gel     60 g    To anal opening three times daily.  Use a pea-sized amount.  Store at room temperature.       hydrocortisone 0.5 % ointment      Apply topically 2 times daily Reported on 2/14/2017       IBUPROFEN PO          LORazepam 0.5 MG tablet    ATIVAN    30 tablet    Take 1 tablet (0.5 mg) by mouth every 4 hours as needed (Anxiety, Nausea/Vomiting or Sleep)       metoclopramide 10 MG tablet    REGLAN    30 tablet    Take 1 tablet (10 mg) by mouth 4  times daily (before meals and nightly)       ondansetron 8 MG tablet    ZOFRAN    10 tablet    Take 1 tablet (8 mg) by mouth every 8 hours as needed (Nausea/Vomiting)       prochlorperazine 10 MG tablet    COMPAZINE    30 tablet    Take 1 tablet (10 mg) by mouth every 6 hours as needed (Nausea/Vomiting)

## 2017-03-30 NOTE — PROGRESS NOTES
Infusion Nursing Note:  Louie Greco presents today for chemo pump disconnect.    Patient seen by provider today: No   present during visit today: Not Applicable.    Note: Pt hurt his back yesterday and decided he didn't want to stay for fluids today he will stay for fluids tomorrow.     Intravenous Access:  Implanted Port.    Treatment Conditions:  Not Applicable.      Post Infusion Assessment:  Patient tolerated infusion without incident.  Blood return noted pre and post infusion.  Site patent and intact, free from redness, edema or discomfort.  No evidence of extravasations.  Access discontinued per protocol.    Discharge Plan:   Discharge instructions reviewed with: Patient.  Patient and/or family verbalized understanding of discharge instructions and all questions answered.  Copy of AVS reviewed with patient and/or family.  Patient will return 3- for next appointment.  Patient discharged in stable condition accompanied by: self.  Departure Mode: Ambulatory.    Alyssa Richardson RN

## 2017-03-30 NOTE — MR AVS SNAPSHOT
After Visit Summary   3/30/2017    Louie Greco    MRN: [id number]           Patient Information     Date Of Birth          1960        Visit Information        Provider Department      3/30/2017 10:30 AM  INFUSION CHAIR 16 [facility]        Today's Diagnoses     Malignant neoplasm of rectum (H)    -  1       Follow-ups after your visit        Follow-up notes from your care team     Return in 1 day (on 3/31/2017).      Your next 10 appointments already scheduled     Mar 31, 2017  2:30 PM CDT   Level 1 with  INFUSION CHAIR 10   [facility] ([facility])    [facility]  [address]  [place] [address] [phone]            Apr 04, 2017  4:00 PM CDT   Return Visit with Agueda Manley MD   [facility] ([facility])    [facility]  [address]  [facility] [address]   [phone]              Who to contact     If you have questions or need follow up information about today's clinic visit or your schedule please contact [facility] directly at [phone].  Normal or non-critical lab and imaging results will be communicated to you by ImageShackhart, letter or phone within 4 business days after the clinic has received the results. If you do not hear from us within 7 days, please contact the clinic through ImageShackhart or phone. If you have a critical or abnormal lab result, we will notify you by phone as soon as possible.  Submit refill requests through [employer] Hub or call your pharmacy and they will forward the refill request to us. Please allow 3 business days for your refill to be completed.          Additional Information About Your Visit        ImageShackhart Information     [employer] Hub gives you secure access to your electronic health record. If you see a primary care provider, you can also send messages  to your care team and make appointments. If you have questions, please call your primary care clinic.  If you do not have a primary care provider, please call [phone] and they will assist you.        Care EveryWhere ID     This is your Care EveryWhere ID. This could be used by other organizations to access your [place] medical records  [id number]        Your Vitals Were     Pulse Temperature Respirations             95 97.6  F (36.4  C) (Oral) 14          Blood Pressure from Last 3 Encounters:   03/30/17 135/86   03/28/17 124/73   03/22/17 142/84    Weight from Last 3 Encounters:   03/28/17 96.6 kg (213 lb)   03/21/17 95.7 kg (211 lb)   03/14/17 94.9 kg (209 lb 3.2 oz)              Today, you had the following     No orders found for display       Primary Care Provider Office Phone # Fax #    Ayden Peralta [phone]       [facility] [address] [place] [address]        Thank you!     Thank you for choosing [facility]  for your care. Our goal is always to provide you with excellent care. Hearing back from our patients is one way we can continue to improve our services. Please take a few minutes to complete the written survey that you may receive in the mail after your visit with us. Thank you!             Your Updated Medication List - Protect others around you: Learn how to safely use, store and throw away your medicines at www.disposemymeds.org.          This list is accurate as of: 3/30/17 10:47 AM.  Always use your most recent med list.                   Brand Name Dispense Instructions for use    acetaminophen 325 MG tablet    TYLENOL    100 tablet    Take 2 tablets (650 mg) by mouth every 4 hours as needed for other (mild pain)       COLACE PO          dibucaine 1 % Oint ointment    NUPERCAINAL     3 times daily as needed for moderate pain       diltiazem 2% in PLO cream (FV COMPOUNDED) 2% Gel     60 g    To anal opening  three times daily.  Use a pea-sized amount.  Store at room temperature.       hydrocortisone 0.5 % ointment      Apply topically 2 times daily Reported on 2/14/2017       IBUPROFEN PO          LORazepam 0.5 MG tablet    ATIVAN    30 tablet    Take 1 tablet (0.5 mg) by mouth every 4 hours as needed (Anxiety, Nausea/Vomiting or Sleep)       metoclopramide 10 MG tablet    REGLAN    30 tablet    Take 1 tablet (10 mg) by mouth 4 times daily (before meals and nightly)       ondansetron 8 MG tablet    ZOFRAN    10 tablet    Take 1 tablet (8 mg) by mouth every 8 hours as needed (Nausea/Vomiting)       prochlorperazine 10 MG tablet    COMPAZINE    30 tablet    Take 1 tablet (10 mg) by mouth every 6 hours as needed (Nausea/Vomiting)

## 2017-03-30 NOTE — LETTER
Cancer Risk Management  Program Locations    [facility]  [facility]  [facility] [place]  Mailing Address  Cancer Risk Management Program  [facility]  [address]  [facility] [address]  [place] [address]    New patient appointments  [phone]  April 3, 2017    Louie Doed  [address] [facility] [address]      Dear Louie,  It was a pleasure to meet you. This is a summary of your genetic counseling visit.    3/30/2017    Referring Provider: Agueda Manley MD    Presenting Information:   I met with Louie Greco today for genetic counseling at the Cancer Risk Management Program at the [facility] to discuss his personal history of rectal cancer and family history of other cancer.  He is here today to review this history, cancer screening recommendations, and available genetic testing options.    Personal History:  Louie is a 56 year old male.  He was diagnosed with rectal cancer (adenocarcinoma)  at his current age of 56. He had two colon polyps (sessile serrated adenoma and tubular adenoma) identified at the same time as his cancer. Louie had his first colonoscopy at the time of he was diagnosed with cancer.  His treatment included chemotherapy and radiation. No surgery was done. He is currently receiving chemotherapy.   Louie had IHC (immunohistochemistry) screening for Cunningham syndrome and it was normal. This screening test did not detect Cunningham syndrome.       He does not regularly do any other cancer screening at this time.     Family History: (Please see scanned pedigree for detailed family history information)    His brother  at age 53 of cancer, primary unknown.  He passed away 2 weeks after diagnosis of metastatic cancer. No additional information is available regarding his diagnosis of cancer.    His maternal great grandfather through his  maternal grandfather  of stomach cancer at age 63.    His maternal great grandmother through his maternal grandmother  at age 82 of cervical cancer.     There is no family history of cancer on the paternal side of the family.     His maternal ethnicity is English, [place]er, Guinean and Uzbek. His paternal ethnicity is English, Puritan.  There is no known Ashkenazi Adventist ancestry on either side of his family.     Discussion:    Louie's personal and family history of may be suggestive of a hereditary cancer syndrome. It is hard to know since no information is available about his brother who passed away from cancer.    We reviewed the features of sporadic, familial, and hereditary cancers.  In looking at Louie's family history, it is possible that a cancer susceptibility gene is present due to a hereditary colon cancer.    We discussed the natural history and genetics of Cunningham syndrome and some of the other colon cancer syndromes.As many of these genes present with overlapping features in a family, it would be reasonable for Louie to consider panel genetic testing to analyze multiple genes at once.  Genetic testing is available for 17 genes associated with increased risk for colon cancer: ColoNext (APC, BMPR1A, CDH1, CHEK2, GREM1, EPCAM, MLH1, MSH2, MSH6, MUTYH, PMS2, POLD1, POLE, PTEN, SMAD4, STK11, and TP53).  We discussed that some of the genes in the ColoNext panel are associated with specific hereditary cancer syndromes and have published management guidelines::  Cunningham syndrome (MLH1, MSH2, MSH6, PMS2, EPCAM), Familial Adenomatous Polyposis (APC), MUTYH Associated Polyposis (MUTYH), Juvenile Polyposis syndrome (SMAD4, BMPR1A), Peutz-Jeghers syndrome (STK11), Cowden syndrome (PTEN), Li Fraumeni syndrome (TP53), Hereditary Diffuse Gastric Cancer (CDH1).   The CHEK2, GREM1, POLD1, and POLE genes have also been associated with increased colon cancer risk and have published management guidelines.    Louie juli  provided with a detailed brochure from [employer] explaining the ColoNext testing. Topics included: inheritance pattern, cancer risks, cancer screening recommendations, and also risks, benefits and limitations of testing.    No testing was done at this appointment. Louie wanted me to check on insurance coverage for the test,     Medical Management: The information from genetic testing may determine additional cancer screening recommendations (such as more frequent colonoscopies, upper endoscopy )  as well as options for risk reducing surgeries for Louie and his relatives.  These recommendations will be discussed in detail once genetic testing is completed.    Plan:  1) Today Louie decided to have me check on insurance coverage for the ColoNext panel.  2) If insurance will cover the test, Louie will return to clinic to have his blood drawn.  3) My contact information was given to Louie if he needs to reach me.    Face to face time: 45 minutes    Please call me if you have any additional questions.    Sincerely,    Roxann Guzman MS [facility]  Genetic Counselor  [phone] (phone)

## 2017-03-30 NOTE — PROGRESS NOTES
3/30/2017    Referring Provider: Agueda Manley MD    Presenting Information:   I met with Louie Greco today for genetic counseling at the Cancer Risk Management Program at the [facility] to discuss his personal history of rectal cancer and family history of other cancer.  He is here today to review this history, cancer screening recommendations, and available genetic testing options.    Personal History:  Louie is a 56 year old male.  He was diagnosed with rectal cancer (adenocarcinoma)  at his current age of 56. He had two colon polyps (sessile serrated adenoma and tubular adenoma) identified at the same time as his cancer. Louie had his first colonoscopy at the time of he was diagnosed with cancer.  His treatment included chemotherapy and radiation. No surgery was done. He is currently receiving chemotherapy.   Louie had IHC (immunohistochemistry) screening for Cunningham syndrome and it was normal. This screening test did not detect Cunningham syndrome.       He does not regularly do any other cancer screening at this time.     Family History: (Please see scanned pedigree for detailed family history information)    His brother  at age 53 of cancer, primary unknown.  He passed away 2 weeks after diagnosis of metastatic cancer. No additional information is available regarding his diagnosis of cancer.    His maternal great grandfather through his maternal grandfather  of stomach cancer at age 63.    His maternal great grandmother through his maternal grandmother  at age 82 of cervical cancer.     There is no family history of cancer on the paternal side of the family.     His maternal ethnicity is English, Dan[place], [place] and [place] African. His paternal ethnicity is English, Puritan.  There is no known Ashkenazi Sikh ancestry on either side of his family.     Discussion:    Louie's personal and family history of may be suggestive of a hereditary cancer syndrome. It is hard to know since no information is available  about his brother who passed away from cancer.    We reviewed the features of sporadic, familial, and hereditary cancers.  In looking at Louie's family history, it is possible that a cancer susceptibility gene is present due to a hereditary colon cancer.    We discussed the natural history and genetics of Cunningham syndrome and some of the other colon cancer syndromes.As many of these genes present with overlapping features in a family, it would be reasonable for Louie to consider panel genetic testing to analyze multiple genes at once.  Genetic testing is available for 17 genes associated with increased risk for colon cancer: ColoNext (APC, BMPR1A, CDH1, CHEK2, GREM1, EPCAM, MLH1, MSH2, MSH6, MUTYH, PMS2, POLD1, POLE, PTEN, SMAD4, STK11, and TP53).  We discussed that some of the genes in the ColoNext panel are associated with specific hereditary cancer syndromes and have published management guidelines::  Cunningham syndrome (MLH1, MSH2, MSH6, PMS2, EPCAM), Familial Adenomatous Polyposis (APC), MUTYH Associated Polyposis (MUTYH), Juvenile Polyposis syndrome (SMAD4, BMPR1A), Peutz-Jeghers syndrome (STK11), Cowden syndrome (PTEN), Li Fraumeni syndrome (TP53), Hereditary Diffuse Gastric Cancer (CDH1).   The CHEK2, GREM1, POLD1, and POLE genes have also been associated with increased colon cancer risk and have published management guidelines.    Louie was provided with a detailed brochure from [employer] explaining the ColoNext testing. Topics included: inheritance pattern, cancer risks, cancer screening recommendations, and also risks, benefits and limitations of testing.    No testing was done at this appointment. Louie wanted me to check on insurance coverage for the test,     Medical Management: The information from genetic testing may determine additional cancer screening recommendations (such as more frequent colonoscopies, upper endoscopy )  as well as options for risk reducing surgeries for Louie and his relatives.   These recommendations will be discussed in detail once genetic testing is completed.    Plan:  1) Today Louie decided to have me check on insurance coverage for the ColoNext panel.  2) If insurance will cover the test, Louie will return to clinic to have his blood drawn.  3) My contact information was given to Louie if he needs to reach me.    Face to face time: 45 minutes    Roxann Guzman MS [facility]  Genetic Counselor  [phone]

## 2017-03-31 ENCOUNTER — INFUSION THERAPY VISIT (OUTPATIENT)
Dept: INFUSION THERAPY | Facility: CLINIC | Age: 57
End: 2017-03-31
Attending: INTERNAL MEDICINE
Payer: COMMERCIAL

## 2017-03-31 DIAGNOSIS — C20 MALIGNANT NEOPLASM OF RECTUM (H): Primary | ICD-10-CM

## 2017-03-31 PROCEDURE — 25000128 H RX IP 250 OP 636: Performed by: INTERNAL MEDICINE

## 2017-03-31 PROCEDURE — 96372 THER/PROPH/DIAG INJ SC/IM: CPT

## 2017-03-31 RX ADMIN — PEGFILGRASTIM 6 MG: 6 INJECTION SUBCUTANEOUS at 14:21

## 2017-03-31 NOTE — MR AVS SNAPSHOT
After Visit Summary   3/31/2017    Louie Greco    MRN: [id number]           Patient Information     Date Of Birth          1960        Visit Information        Provider Department      3/31/2017 2:30 PM  INFUSION CHAIR 10 [facility] [place]        Today's Diagnoses     Malignant neoplasm of rectum (H)    -  1       Follow-ups after your visit        Your next 10 appointments already scheduled     Mar 31, 2017  2:30 PM CDT   Level 1 with  INFUSION CHAIR 10   [facility] [place] [place] [facility] Center ([facility])    [facility]  [address]  [place] [address]   [phone]            Apr 04, 2017  4:00 PM CDT   Return Visit with Agueda Manley MD   [facility] [place])    [facility]  [address]  [place] [address]   [phone]              Who to contact     If you have questions or need follow up information about today's clinic visit or your schedule please contact [facility] AND [facility] directly at [phone].  Normal or non-critical lab and imaging results will be communicated to you by [employer], letter or phone within 4 business days after the clinic has received the results. If you do not hear from us within 7 days, please contact the clinic through Class[employer] or phone. If you have a critical or abnormal lab result, we will notify you by phone as soon as possible.  Submit refill requests through [employer] or call your pharmacy and they will forward the refill request to us. Please allow 3 business days for your refill to be completed.          Additional Information About Your Visit        MyChart Information     [employer] gives you secure access to your electronic health record. If you see a primary care provider, you can also send messages to your care team and make appointments. If you have questions, please call  your primary care clinic.  If you do not have a primary care provider, please call [phone] and they will assist you.        Care EveryWhere ID     This is your Care EveryWhere ID. This could be used by other organizations to access your [facility] medical records  [id number]         Blood Pressure from Last 3 Encounters:   03/30/17 135/86   03/28/17 124/73   03/22/17 142/84    Weight from Last 3 Encounters:   03/28/17 96.6 kg (213 lb)   03/21/17 95.7 kg (211 lb)   03/14/17 94.9 kg (209 lb 3.2 oz)              Today, you had the following     No orders found for display       Primary Care Provider Office Phone # Fax #    Ayden Peralta [phone]       [facility] [address] [place] [id number]        Thank you!     Thank you for choosing [facility] AND [facility]  for your care. Our goal is always to provide you with excellent care. Hearing back from our patients is one way we can continue to improve our services. Please take a few minutes to complete the written survey that you may receive in the mail after your visit with us. Thank you!             Your Updated Medication List - Protect others around you: Learn how to safely use, store and throw away your medicines at www.disposemymeds.org.          This list is accurate as of: 3/31/17  2:29 PM.  Always use your most recent med list.                   Brand Name Dispense Instructions for use    acetaminophen 325 MG tablet    TYLENOL    100 tablet    Take 2 tablets (650 mg) by mouth every 4 hours as needed for other (mild pain)       COLACE PO          dibucaine 1 % Oint ointment    NUPERCAINAL     3 times daily as needed for moderate pain       diltiazem 2% in PLO cream (FV COMPOUNDED) 2% Gel     60 g    To anal opening three times daily.  Use a pea-sized amount.  Store at room temperature.       hydrocortisone 0.5 % ointment      Apply topically 2 times daily Reported on 2/14/2017       IBUPROFEN  PO          LORazepam 0.5 MG tablet    ATIVAN    30 tablet    Take 1 tablet (0.5 mg) by mouth every 4 hours as needed (Anxiety, Nausea/Vomiting or Sleep)       metoclopramide 10 MG tablet    REGLAN    30 tablet    Take 1 tablet (10 mg) by mouth 4 times daily (before meals and nightly)       ondansetron 8 MG tablet    ZOFRAN    10 tablet    Take 1 tablet (8 mg) by mouth every 8 hours as needed (Nausea/Vomiting)       prochlorperazine 10 MG tablet    COMPAZINE    30 tablet    Take 1 tablet (10 mg) by mouth every 6 hours as needed (Nausea/Vomiting)

## 2017-03-31 NOTE — PROGRESS NOTES
Infusion Nursing Note:  Louie Greco presents today for Neulata and IVF.    Patient seen by provider today: No   present during visit today: Not Applicable.    Note: N/A.    Intravenous Access:  No Intravenous access/labs at this visit.    Treatment Conditions:  Not Applicable.      Post Infusion Assessment:  Patient tolerated injection without incident.  Site patent and intact, free from redness, edema or discomfort.  No evidence of extravasations.  Access discontinued per protocol.    Discharge Plan:   AVS to patient via MYCHART.  Patient will return in 1 week for next appointment.   Patient discharged in stable condition accompanied by: self.  Departure Mode: Ambulatory.    Chelsea Cm RN

## 2017-04-04 ENCOUNTER — ONCOLOGY VISIT (OUTPATIENT)
Dept: ONCOLOGY | Facility: CLINIC | Age: 57
End: 2017-04-04
Attending: INTERNAL MEDICINE
Payer: COMMERCIAL

## 2017-04-04 VITALS
TEMPERATURE: 98.6 F | OXYGEN SATURATION: 96 % | DIASTOLIC BLOOD PRESSURE: 77 MMHG | RESPIRATION RATE: 16 BRPM | WEIGHT: 219 LBS | BODY MASS INDEX: 31.46 KG/M2 | HEART RATE: 65 BPM | SYSTOLIC BLOOD PRESSURE: 132 MMHG

## 2017-04-04 DIAGNOSIS — C20 MALIGNANT NEOPLASM OF RECTUM (H): Primary | ICD-10-CM

## 2017-04-04 PROCEDURE — 99214 OFFICE O/P EST MOD 30 MIN: CPT | Performed by: INTERNAL MEDICINE

## 2017-04-04 PROCEDURE — 99211 OFF/OP EST MAY X REQ PHY/QHP: CPT

## 2017-04-04 ASSESSMENT — PAIN SCALES - GENERAL: PAINLEVEL: NO PAIN (0)

## 2017-04-04 NOTE — MR AVS SNAPSHOT
After Visit Summary   4/4/2017    Louie Greco    MRN: [id number]           Patient Information     Date Of Birth          1960        Visit Information        Provider Department      4/4/2017 4:00 PM Agueda Manley MD [facility]        Today's Diagnoses     Malignant neoplasm of rectum (H)    -  1      Care Instructions    -chemotherapy next week, 4/11/17 (FOLFOX)  -return to clinic on 4/25/17 and next cycle of chemotherapy        Follow-ups after your visit        Your next 10 appointments already scheduled     Apr 11, 2017  8:30 AM CDT   Level 5 with SH INFUSION CHAIR 12   [facility] and Infusion Center ([facility])    [facility]  [address]  [place] [address]   [phone]            Apr 13, 2017  2:30 PM CDT   Level 2 with SH INFUSION CHAIR 16   [facility] and Infusion Center ([facility])    [facility]  [address]  [place] [address]   [phone]            Apr 14, 2017  2:30 PM CDT   Level 1 with SH INFUSION CHAIR 4   [facility] and Infusion Center ([facility])    [facility]  [address]  [place] [address]   [phone]            Apr 25, 2017  8:30 AM CDT   Level 5 with SH INFUSION CHAIR 15   [facility] and Infusion Center ([facility])    [facility]  [address]  [place] [address]   [phone]            Apr 25, 2017  9:00 AM CDT   Return Visit with Agueda Manley MD   [facility] ([facility])    [facility]  [address]  [place] [address]   [phone]              Who to contact     If you have questions or need follow up information about today's clinic visit or your schedule please contact [facility] directly at  [phone].  Normal or non-critical lab and imaging results will be communicated to you by MyChart, letter or phone within 4 business days after the clinic has received the results. If you do not hear from us within 7 days, please contact the clinic through Fantexhart or phone. If you have a critical or abnormal lab result, we will notify you by phone as soon as possible.  Submit refill requests through [employer] or call your pharmacy and they will forward the refill request to us. Please allow 3 business days for your refill to be completed.          Additional Information About Your Visit        Fantexhart Information     [employer] gives you secure access to your electronic health record. If you see a primary care provider, you can also send messages to your care team and make appointments. If you have questions, please call your primary care clinic.  If you do not have a primary care provider, please call [phone] and they will assist you.        Care EveryWhere ID     This is your Care EveryWhere ID. This could be used by other organizations to access your [place] medical records  [id number]        Your Vitals Were     Pulse Temperature Respirations Pulse Oximetry BMI (Body Mass Index)       65 98.6  F (37  C) (Oral) 16 96% 31.46 kg/m2        Blood Pressure from Last 3 Encounters:   04/04/17 132/77   03/30/17 135/86   03/28/17 124/73    Weight from Last 3 Encounters:   04/04/17 99.3 kg (219 lb)   03/28/17 96.6 kg (213 lb)   03/21/17 95.7 kg (211 lb)              Today, you had the following     No orders found for display       Primary Care Provider Office Phone # Fax #    Ayden Peralta [phone]       [facility] [address] [place] [phone]        Thank you!     Thank you for choosing [facility]  for your care. Our goal is always to provide you with excellent care. Hearing back from our patients is one way we can continue to improve our services.  Please take a few minutes to complete the written survey that you may receive in the mail after your visit with us. Thank you!             Your Updated Medication List - Protect others around you: Learn how to safely use, store and throw away your medicines at www.disposemymeds.org.          This list is accurate as of: 4/4/17  6:09 PM.  Always use your most recent med list.                   Brand Name Dispense Instructions for use    acetaminophen 325 MG tablet    TYLENOL    100 tablet    Take 2 tablets (650 mg) by mouth every 4 hours as needed for other (mild pain)       COLACE PO          dibucaine 1 % Oint ointment    NUPERCAINAL     3 times daily as needed for moderate pain       diltiazem 2% in PLO cream (FV COMPOUNDED) 2% Gel     60 g    To anal opening three times daily.  Use a pea-sized amount.  Store at room temperature.       hydrocortisone 0.5 % ointment      Apply topically 2 times daily Reported on 2/14/2017       IBUPROFEN PO          LORazepam 0.5 MG tablet    ATIVAN    30 tablet    Take 1 tablet (0.5 mg) by mouth every 4 hours as needed (Anxiety, Nausea/Vomiting or Sleep)       metoclopramide 10 MG tablet    REGLAN    30 tablet    Take 1 tablet (10 mg) by mouth 4 times daily (before meals and nightly)       ondansetron 8 MG tablet    ZOFRAN    10 tablet    Take 1 tablet (8 mg) by mouth every 8 hours as needed (Nausea/Vomiting)       prochlorperazine 10 MG tablet    COMPAZINE    30 tablet    Take 1 tablet (10 mg) by mouth every 6 hours as needed (Nausea/Vomiting)

## 2017-04-04 NOTE — PATIENT INSTRUCTIONS
-chemotherapy next week, 4/11/17 (FOLFOX)  -return to clinic on 4/25/17 and next cycle of chemotherapy

## 2017-04-04 NOTE — PROGRESS NOTES
"Louie Greco is a 56 year old male who presents for:  Chief Complaint   Patient presents with     Oncology Clinic Visit     ret Rectal Ca        Initial Vitals:  /77  Pulse 65  Temp 98.6  F (37  C) (Oral)  Resp 16  Wt 99.3 kg (219 lb)  SpO2 96%  BMI 31.46 kg/m2 Estimated body mass index is 31.46 kg/(m^2) as calculated from the following:    Height as of 3/28/17: 1.777 m (5' 9.96\").    Weight as of this encounter: 99.3 kg (219 lb).. Body surface area is 2.21 meters squared. BP completed using cuff size: regular  No Pain (0) No LMP for male patient. Allergies and medications reviewed.     Medications: Medication refills not needed today.  Pharmacy name entered into [employer]:    [id number], [place] - [address] PHARMACY [facility], [place] - [address]    Comments: None    6 minutes for nursing intake (face to face time)   Gabriella Queen CMA   DISCHARGE PLAN:  1.) Patient will be contacted with his chemo schedule and follow up with Dr. Manley. Patient left prior to discharge.   Next appointments: See patient instruction section  Departure Mode: Ambulatory  Accompanied by: self  0 minutes for nursing discharge (face to face time)   Juani Mcgrath RN            "

## 2017-04-04 NOTE — PROGRESS NOTES
[facility]    Hematology/Oncology Established Patient Note      Today's Date: 04/04/2017    Reason for Follow-up: rectal cancer      HISTORY OF PRESENT ILLNESS: Louie Greco is a 56 year old male who presents with rectal cancer.  He started having rectal bleeding around beginning of 2016.  He underwent colonoscopy on 7/27/16, which showed a malignant tumor in the rectum involving half of the lumen with oozing, as well as three 4-mm polyps in the ascending and transverse colon.  Pathology showed moderately differentiated adenocarcinoma, ascending colon with sessile serrated adenoma, others were tubular adenomas.  Mismatch repair expression with normal protein expression.  Staging scans showed the rectal mass, indeterminate focus in the left liver thought to be cyst, and multiple bilateral renal cysts.  MRI showed a distal rectal mass measuring 2.7 x 2.4 cm extending to the most proximal region of the anal canal.  There are a few tiny subcentimeter perirectal fat lymph nodes.  He was staged clinically [id number] (stage IIIA).  He was seen by Dr. Lee at [facility], and started neoadjuvant chemoradiation with capecitabine on 8/8/16 and completed on 9/15/16.  He was then seen by Dr. Radford, colorectal surgery at [facility].  His post chemoradiation MRI showed improvement in the size of the tumor and response in the lymph nodes.  On 12/8/16, he underwent flexible sigmoidoscopy and there was no evidence of tumor.  There was only some anterior scarring in the lumen.  Multiple biopsies were taken, which showed no evidence of carcinoma.  At that point, Dr. Radford spoke with the patient's wife, that there appears to be a complete response in the lumen, and that the patient would wish to placed on the watch and wait protocol.  The patient had expressed wishes not to have an APR, and it would not have been possible to grossly clear a margin for LAR.      He had port  placed on 1/16/17.    Current Chemotherapy:  Leucovorin 350 mg/m2 IV IV on day 1  Oxaliplatin 85 mg/m2 IV on day 1  Fluorouracil 400 mg/m2 IV on day 1  Fluorouracil 2400 mg/m2 CIV over 46 hours  Every 2 week cycles    C1D1: 1/16/17  C2D1: 1/31/17  C3D1: 2/14/17  C4D1: 2/28/17  C5D1: delayed by 2 weeks to 3/28/17 due to neutropenia; Neulasta added with subsequent cycles  C6D1: anticipated for 4/11/17  C7D1: anticipated for 4/25/17        INTERIM HISTORY: Louie comes in for follow-up today.  He says that he felt the best he's felt after the last cycle and he thinks the Neulasta helped.  He denies nausea.  His energy level is much better.         REVIEW OF SYSTEMS:   14 point ROS was reviewed and is negative other than as noted above in HPI.       HOME MEDICATIONS:  Current Outpatient Prescriptions   Medication Sig Dispense Refill     dibucaine (NUPERCAINAL) 1 % OINT ointment 3 times daily as needed for moderate pain       Docusate Sodium (COLACE PO)        metoclopramide (REGLAN) 10 MG tablet Take 1 tablet (10 mg) by mouth 4 times daily (before meals and nightly) 30 tablet 0     LORazepam (ATIVAN) 0.5 MG tablet Take 1 tablet (0.5 mg) by mouth every 4 hours as needed (Anxiety, Nausea/Vomiting or Sleep) 30 tablet 2     prochlorperazine (COMPAZINE) 10 MG tablet Take 1 tablet (10 mg) by mouth every 6 hours as needed (Nausea/Vomiting) 30 tablet 2     ondansetron (ZOFRAN) 8 MG tablet Take 1 tablet (8 mg) by mouth every 8 hours as needed (Nausea/Vomiting) 10 tablet 2     IBUPROFEN PO        acetaminophen (TYLENOL) 325 MG tablet Take 2 tablets (650 mg) by mouth every 4 hours as needed for other (mild pain) 100 tablet 0     hydrocortisone 0.5 % ointment Apply topically 2 times daily Reported on 2/14/2017       diltiazem 2% in PLO cream, FV COMPOUNDED, 2% GEL To anal opening three times daily.  Use a pea-sized amount.  Store at room temperature. 60 g 0         ALLERGIES:  Allergies   Allergen Reactions     Amoxicillin       Ampicillin Diarrhea     Demerol [Meperidine]      Nausea          PAST MEDICAL HISTORY:  Past Medical History:   Diagnosis Date     Childhood asthma      Nephrolithiasis          Rectal cancer (H)     low rectal cancer         PAST SURGICAL HISTORY:  Past Surgical History:   Procedure Laterality Date     COLONOSCOPY N/A 2016    Procedure: COMBINED COLONOSCOPY, SINGLE OR MULTIPLE BIOPSY/POLYPECTOMY BY BIOPSY;  Surgeon: Chelsea Thompson MD;  Location: [facility]     HC TOOTH EXTRACTION W/FORCEP       SIGMOIDOSCOPY FLEXIBLE N/A 2016    Procedure: SIGMOIDOSCOPY FLEXIBLE;  Surgeon: Felicitas Radford MD;  Location: [facility] OR     VASECTOMY           SOCIAL HISTORY:  Social History     Social History     Marital status: Single     Spouse name: N/A     Number of children: N/A     Years of education: N/A     Occupational History     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 No     Social History Narrative    Dad  at age  78   from BENNETT, COPD, & HTN    Mom alive in her 70's.     for 30 years    Two adult children. 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         He notes that he had a brother who passed away at a young age of 53 from a metastatic cancer, but unknown what type, and passed away within 2 months of diagnosis.  He denies other family history of cancer in the immediate family.  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      FAMILY HISTORY:  Family History   Problem Relation Age of Onset     CANCER Brother      primary of unknown origin     Other Cancer Brother      Chronic  Obstructive Pulmonary Disease Father      Hypertension Father      Hyperlipidemia Father      Colon Polyps Mother      Number and type of polyps unknown     Family History Negative Brother      negative colonoscopy     DIABETES Maternal Grandfather      Hypertension Paternal Grandmother      Hyperlipidemia Paternal Grandmother      Stomach Cancer Other 63     maternal great grandfather     Glaucoma No family hx of      Macular Degeneration No family hx of          PHYSICAL EXAM:  Vital signs:  /77  Pulse 65  Temp 98.6  F (37  C) (Oral)  Resp 16  Wt 99.3 kg (219 lb)  SpO2 96%  BMI 31.46 kg/m2   ECO  GENERAL/CONSTITUTIONAL: No acute distress. Accompanied by wife.  EYES: No scleral icterus.  LYMPH: No anterior cervical, posterior cervical, or supraclavicular adenopathy.   RESPIRATORY: Clear to auscultation bilaterally. No crackles or wheezing.   CARDIOVASCULAR: Regular rate and rhythm without murmurs, gallops, or rubs.  GASTROINTESTINAL: No tenderness. The patient has normal bowel sounds. No guarding.  No distention.  MUSCULOSKELETAL: Warm and well-perfused, no cyanosis, clubbing, or edema.  NEUROLOGIC: Alert, oriented, answers questions appropriately.  INTEGUMENTARY: No jaundice.  GAIT: Steady, does not use assistive device  Port in place at right upper chest.    LABS:  None today.      IMAGING:  CT C/A/P 3/13/17:  1. No evidence of residual or recurrent disease in chest, abdomen or  pelvis.  2. Multiple small pulmonary nodules, stable since 2016 and are  likely benign. Attention on follow-up imaging is recommended.  3. Previously described hypodensity in the left lobe of liver is not  conspicuous on the current exam. Suspect this to be due to focal  hepatic steatosis in view of its location adjacent to fissure for  ligamentum teres.    MRI pelvis 3/13/17:  1. No clear evidence of residual or recurrent rectal tumor, with a  corresponding tumor regression grade of 1, unchanged since 2016.    The tumor previously seen on 7/27/2016 at the left and anterior distal  rectum has an entirely fibrosed appearance. No convincing evidence of  levator ani or anal sphincter involvement, though the tumor does  extending inferiorly to the level of the puborectalis sling  2. Decreased small lymph nodes without suspicious characteristics  measuring up to 4 mm. These are indeterminate but favored as benign.      ASSESSMENT/PLAN:  Louie Greco is a 56 year old male with:    1) Rectal cancer: clinical stage [id number] (stage IIIA), s/p chemoradiation with Xeloda, and appears to have achieved complete response on imaging and biopsies.  He opted not to undergo surgery and expressed desire not to undergo APR, as he does not want a colostomy bag.  It was felt reasonable to go on the watch and wait protocol with the [facility].      He will complete 4 additional months (8 cycles) of chemotherapy with FOLFOX.  Will discuss with Dr. Radford regarding endoscopic surveillance, as per the watch and wait protocol.      He is tolerating chemotherapy well so far.  CT scans and MRI pelvis on 3/13/17 show no evidence of residual or recurrent rectal tumor.    -C6D1 of FOLFOX to be 4/11/16, with Neulasta   -RTC on 4/25/17, same day as cycle 7 of chemotherapy    2) Hiccups: Occurred for 3 days after cycle 1 of chemotherapy.  May have been due to dexamethasone, although he says he has similar symptoms after receiving sedation for dental procedure previously.  He did not get hiccups after cycle 2 of chemotherapy.  -switched steroids to methylprednisolone  -if symptoms recur, can consider cutting back on steroid dose   -he has Reglan, but he chose not to take it due to possible side effects    3) Chemotherapy-induced nausea: Mild.  He has not needed to take his home anti-emetics though.  -he has Compazine and Zofran prn    4) Genetics: he had appointment on 3/30/17.  He is contemplating sending for genetic testing, but is  checking in insurance first.      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  No pain.    -keep chemo at full doses for now; if worsens, can consider reducing dose of oxaliplatin    6) Elevated transaminases: slight elevation of AST and ALT, likely due to chemotherapy.  It had normalized after chemotherapy was delayed for 2 weeks.  -monitor for now      I spent a total of 25 minutes with the patient, with over >50% of the time in counseling and/or coordination of care.         Agueda Manley MD  Hematology/Oncology  [facility]

## 2017-04-04 NOTE — LETTER
April 4, 2017      RE: Louie Greco  [address] [address]      [facility]    Hematology/Oncology Established Patient Note      Today's Date: 04/04/2017    Reason for Follow-up: rectal cancer      HISTORY OF PRESENT ILLNESS: Louie Greco is a 56 year old male who presents with rectal cancer.  He started having rectal bleeding around beginning of 2016.  He underwent colonoscopy on 7/27/16, which showed a malignant tumor in the rectum involving half of the lumen with oozing, as well as three 4-mm polyps in the ascending and transverse colon.  Pathology showed moderately differentiated adenocarcinoma, ascending colon with sessile serrated adenoma, others were tubular adenomas.  Mismatch repair expression with normal protein expression.  Staging scans showed the rectal mass, indeterminate focus in the left liver thought to be cyst, and multiple bilateral renal cysts.  MRI showed a distal rectal mass measuring 2.7 x 2.4 cm extending to the most proximal region of the anal canal.  There are a few tiny subcentimeter perirectal fat lymph nodes.  He was staged clinically [id number] (stage IIIA).  He was seen by Dr. Lee at [facility], and started neoadjuvant chemoradiation with capecitabine on 8/8/16 and completed on 9/15/16.  He was then seen by Dr. Radford, colorectal surgery at [facility].  His post chemoradiation MRI showed improvement in the size of the tumor and response in the lymph nodes.  On 12/8/16, he underwent flexible sigmoidoscopy and there was no evidence of tumor.  There was only some anterior scarring in the lumen.  Multiple biopsies were taken, which showed no evidence of carcinoma.  At that point, Dr. Radford spoke with the patient's wife, that there appears to be a complete response in the lumen, and that the patient would wish to placed on the watch and wait protocol.  The patient had expressed wishes not to have an APR, and it would not  have been possible to grossly clear a margin for LAR.      He had port placed on 1/16/17.    Current Chemotherapy:  Leucovorin 350 mg/m2 IV IV on day 1  Oxaliplatin 85 mg/m2 IV on day 1  Fluorouracil 400 mg/m2 IV on day 1  Fluorouracil 2400 mg/m2 CIV over 46 hours  Every 2 week cycles    C1D1: 1/16/17  C2D1: 1/31/17  C3D1: 2/14/17  C4D1: 2/28/17  C5D1: delayed by 2 weeks to 3/28/17 due to neutropenia; Neulasta added with subsequent cycles  C6D1: anticipated for 4/11/17  C7D1: anticipated for 4/25/17        INTERIM HISTORY: Louie comes in for follow-up today.  He says that he felt the best he's felt after the last cycle and he thinks the Neulasta helped.  He denies nausea.  His energy level is much better.         REVIEW OF SYSTEMS:   14 point ROS was reviewed and is negative other than as noted above in HPI.       HOME MEDICATIONS:  Current Outpatient Prescriptions   Medication Sig Dispense Refill     dibucaine (NUPERCAINAL) 1 % OINT ointment 3 times daily as needed for moderate pain       Docusate Sodium (COLACE PO)        metoclopramide (REGLAN) 10 MG tablet Take 1 tablet (10 mg) by mouth 4 times daily (before meals and nightly) 30 tablet 0     LORazepam (ATIVAN) 0.5 MG tablet Take 1 tablet (0.5 mg) by mouth every 4 hours as needed (Anxiety, Nausea/Vomiting or Sleep) 30 tablet 2     prochlorperazine (COMPAZINE) 10 MG tablet Take 1 tablet (10 mg) by mouth every 6 hours as needed (Nausea/Vomiting) 30 tablet 2     ondansetron (ZOFRAN) 8 MG tablet Take 1 tablet (8 mg) by mouth every 8 hours as needed (Nausea/Vomiting) 10 tablet 2     IBUPROFEN PO        acetaminophen (TYLENOL) 325 MG tablet Take 2 tablets (650 mg) by mouth every 4 hours as needed for other (mild pain) 100 tablet 0     hydrocortisone 0.5 % ointment Apply topically 2 times daily Reported on 2/14/2017       diltiazem 2% in PLO cream, FV COMPOUNDED, 2% GEL To anal opening three times daily.  Use a pea-sized amount.  Store at room temperature. 60 g 0          ALLERGIES:  Allergies   Allergen Reactions     Amoxicillin      Ampicillin Diarrhea     Demerol [Meperidine]      Nausea          PAST MEDICAL HISTORY:  Past Medical History:   Diagnosis Date     Childhood asthma      Nephrolithiasis          Rectal cancer (H)     low rectal cancer         PAST SURGICAL HISTORY:  Past Surgical History:   Procedure Laterality Date     COLONOSCOPY N/A 2016    Procedure: COMBINED COLONOSCOPY, SINGLE OR MULTIPLE BIOPSY/POLYPECTOMY BY BIOPSY;  Surgeon: Chelsea Thompson MD;  Location:  GI     HC TOOTH EXTRACTION W/FORCEP       SIGMOIDOSCOPY FLEXIBLE N/A 2016    Procedure: SIGMOIDOSCOPY FLEXIBLE;  Surgeon: Felicitas Radford MD;  Location: [facility] OR     VASECTOMY           SOCIAL HISTORY:  Social History     Social History     Marital status: Single     Spouse name: N/A     Number of children: N/A     Years of education: N/A     Occupational History     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 No     Social History Narrative    Dad  at age  78   from BENNETT, COPD, & HTN    Mom alive in her 70's.     for 30 years    Two adult children. 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         He notes that he had a brother who passed away at a young age of 53 from a metastatic cancer, but unknown what type, and passed away within 2 months of diagnosis.  He denies other family history of cancer in the immediate family.  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      FAMILY HISTORY:  Family History   Problem Relation Age of Onset     CANCER  Brother      primary of unknown origin     Other Cancer Brother      Chronic Obstructive Pulmonary Disease Father      Hypertension Father      Hyperlipidemia Father      Colon Polyps Mother      Number and type of polyps unknown     Family History Negative Brother      negative colonoscopy     DIABETES Maternal Grandfather      Hypertension Paternal Grandmother      Hyperlipidemia Paternal Grandmother      Stomach Cancer Other 63     maternal great grandfather     Glaucoma No family hx of      Macular Degeneration No family hx of          PHYSICAL EXAM:  Vital signs:  /77  Pulse 65  Temp 98.6  F (37  C) (Oral)  Resp 16  Wt 99.3 kg (219 lb)  SpO2 96%  BMI 31.46 kg/m2   ECO  GENERAL/CONSTITUTIONAL: No acute distress. Accompanied by wife.  EYES: No scleral icterus.  LYMPH: No anterior cervical, posterior cervical, or supraclavicular adenopathy.   RESPIRATORY: Clear to auscultation bilaterally. No crackles or wheezing.   CARDIOVASCULAR: Regular rate and rhythm without murmurs, gallops, or rubs.  GASTROINTESTINAL: No tenderness. The patient has normal bowel sounds. No guarding.  No distention.  MUSCULOSKELETAL: Warm and well-perfused, no cyanosis, clubbing, or edema.  NEUROLOGIC: Alert, oriented, answers questions appropriately.  INTEGUMENTARY: No jaundice.  GAIT: Steady, does not use assistive device  Port in place at right upper chest.    LABS:  None today.      IMAGING:  CT C/A/P 3/13/17:  1. No evidence of residual or recurrent disease in chest, abdomen or  pelvis.  2. Multiple small pulmonary nodules, stable since 2016 and are  likely benign. Attention on follow-up imaging is recommended.  3. Previously described hypodensity in the left lobe of liver is not  conspicuous on the current exam. Suspect this to be due to focal  hepatic steatosis in view of its location adjacent to fissure for  ligamentum teres.    MRI pelvis 3/13/17:  1. No clear evidence of residual or recurrent rectal tumor, with  a  corresponding tumor regression grade of 1, unchanged since 11/1/2016.   The tumor previously seen on 7/27/2016 at the left and anterior distal  rectum has an entirely fibrosed appearance. No convincing evidence of  levator ani or anal sphincter involvement, though the tumor does  extending inferiorly to the level of the puborectalis sling  2. Decreased small lymph nodes without suspicious characteristics  measuring up to 4 mm. These are indeterminate but favored as benign.      ASSESSMENT/PLAN:  Louie Greco is a 56 year old male with:    1) Rectal cancer: clinical stage [id number] (stage IIIA), s/p chemoradiation with Xeloda, and appears to have achieved complete response on imaging and biopsies.  He opted not to undergo surgery and expressed desire not to undergo APR, as he does not want a colostomy bag.  It was felt reasonable to go on the watch and wait protocol with the [facility].      He will complete 4 additional months (8 cycles) of chemotherapy with FOLFOX.  Will discuss with Dr. Radford regarding endoscopic surveillance, as per the watch and wait protocol.      He is tolerating chemotherapy well so far.  CT scans and MRI pelvis on 3/13/17 show no evidence of residual or recurrent rectal tumor.    -C6D1 of FOLFOX to be 4/11/16, with Neulasta   -RTC on 4/25/17, same day as cycle 7 of chemotherapy    2) Hiccups: Occurred for 3 days after cycle 1 of chemotherapy.  May have been due to dexamethasone, although he says he has similar symptoms after receiving sedation for dental procedure previously.  He did not get hiccups after cycle 2 of chemotherapy.  -switched steroids to methylprednisolone  -if symptoms recur, can consider cutting back on steroid dose   -he has Reglan, but he chose not to take it due to possible side effects    3) Chemotherapy-induced nausea: Mild.  He has not needed to take his home anti-emetics though.  -he has Compazine and Zofran prn    4) Genetics: he had appointment on  "3/30/17.  He is contemplating sending for genetic testing, but is checking in insurance first.      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  No pain.    -keep chemo at full doses for now; if worsens, can consider reducing dose of oxaliplatin    6) Elevated transaminases: slight elevation of AST and ALT, likely due to chemotherapy.  It had normalized after chemotherapy was delayed for 2 weeks.  -monitor for now      I spent a total of 25 minutes with the patient, with over >50% of the time in counseling and/or coordination of care.         Agueda Manley MD  Hematology/Oncology  [facility]        Louie Greco is a 56 year old male who presents for:  Chief Complaint   Patient presents with     Oncology Clinic Visit     ret Rectal Ca        Initial Vitals:  /77  Pulse 65  Temp 98.6  F (37  C) (Oral)  Resp 16  Wt 99.3 kg (219 lb)  SpO2 96%  BMI 31.46 kg/m2 Estimated body mass index is 31.46 kg/(m^2) as calculated from the following:    Height as of 3/28/17: 1.777 m (5' 9.96\").    Weight as of this encounter: 99.3 kg (219 lb).. Body surface area is 2.21 meters squared. BP completed using cuff size: regular  No Pain (0) No LMP for male patient. Allergies and medications reviewed.     Medications: Medication refills not needed today.  Pharmacy name entered into [employer]:    [id number], [place] - [address] AT [address] [place] [address] PHARMACY [facility], [place] - [address]    Comments: None    6 minutes for nursing intake (face to face time)   Gabriella Queen CMA   DISCHARGE PLAN:  1.) Patient will be contacted with his chemo schedule and follow up with Dr. Manley. Patient left prior to discharge.   Next appointments: See patient instruction section  Departure Mode: Ambulatory  Accompanied by: self  0 minutes for nursing discharge (face to face time)   MARVIN Cabrera MD  "

## 2017-04-07 RX ORDER — LORAZEPAM 2 MG/ML
0.5 INJECTION INTRAMUSCULAR EVERY 4 HOURS PRN
Status: CANCELLED
Start: 2017-04-11

## 2017-04-07 RX ORDER — METHYLPREDNISOLONE SODIUM SUCCINATE 125 MG/2ML
125 INJECTION, POWDER, LYOPHILIZED, FOR SOLUTION INTRAMUSCULAR; INTRAVENOUS
Status: CANCELLED
Start: 2017-04-11

## 2017-04-07 RX ORDER — DIPHENHYDRAMINE HYDROCHLORIDE 50 MG/ML
50 INJECTION INTRAMUSCULAR; INTRAVENOUS
Status: CANCELLED
Start: 2017-04-11

## 2017-04-07 RX ORDER — ALBUTEROL SULFATE 90 UG/1
1-2 AEROSOL, METERED RESPIRATORY (INHALATION)
Status: CANCELLED
Start: 2017-04-11

## 2017-04-07 RX ORDER — ALBUTEROL SULFATE 0.83 MG/ML
2.5 SOLUTION RESPIRATORY (INHALATION)
Status: CANCELLED | OUTPATIENT
Start: 2017-04-11

## 2017-04-07 RX ORDER — SODIUM CHLORIDE 9 MG/ML
1000 INJECTION, SOLUTION INTRAVENOUS CONTINUOUS PRN
Status: CANCELLED
Start: 2017-04-11

## 2017-04-07 RX ORDER — EPINEPHRINE 1 MG/ML
0.3 INJECTION INTRAMUSCULAR; INTRAVENOUS; SUBCUTANEOUS EVERY 5 MIN PRN
Status: CANCELLED | OUTPATIENT
Start: 2017-04-11

## 2017-04-07 RX ORDER — FLUOROURACIL 50 MG/ML
400 INJECTION, SOLUTION INTRAVENOUS ONCE
Status: CANCELLED | OUTPATIENT
Start: 2017-04-11

## 2017-04-07 RX ORDER — METHYLPREDNISOLONE SODIUM SUCCINATE 125 MG/2ML
50 INJECTION, POWDER, LYOPHILIZED, FOR SOLUTION INTRAMUSCULAR; INTRAVENOUS ONCE
Status: CANCELLED
Start: 2017-04-11 | End: 2017-04-11

## 2017-04-07 RX ORDER — EPINEPHRINE 0.3 MG/.3ML
0.3 INJECTION SUBCUTANEOUS EVERY 5 MIN PRN
Status: CANCELLED | OUTPATIENT
Start: 2017-04-11

## 2017-04-07 RX ORDER — MEPERIDINE HYDROCHLORIDE 50 MG/ML
25 INJECTION INTRAMUSCULAR; INTRAVENOUS; SUBCUTANEOUS EVERY 30 MIN PRN
Status: CANCELLED | OUTPATIENT
Start: 2017-04-11

## 2017-04-11 ENCOUNTER — INFUSION THERAPY VISIT (OUTPATIENT)
Dept: INFUSION THERAPY | Facility: CLINIC | Age: 57
End: 2017-04-11
Attending: INTERNAL MEDICINE
Payer: COMMERCIAL

## 2017-04-11 ENCOUNTER — HOSPITAL ENCOUNTER (OUTPATIENT)
Facility: CLINIC | Age: 57
Setting detail: SPECIMEN
Discharge: HOME | End: 2017-04-11
Attending: INTERNAL MEDICINE | Admitting: INTERNAL MEDICINE
Payer: COMMERCIAL

## 2017-04-11 VITALS
OXYGEN SATURATION: 97 % | TEMPERATURE: 97.9 F | HEART RATE: 89 BPM | HEIGHT: 70 IN | SYSTOLIC BLOOD PRESSURE: 136 MMHG | WEIGHT: 215.2 LBS | RESPIRATION RATE: 16 BRPM | DIASTOLIC BLOOD PRESSURE: 84 MMHG | BODY MASS INDEX: 30.81 KG/M2

## 2017-04-11 DIAGNOSIS — C20 MALIGNANT NEOPLASM OF RECTUM (H): Primary | ICD-10-CM

## 2017-04-11 LAB
ALBUMIN SERPL-MCNC: 3.4 G/DL (ref 3.4–5)
ALP SERPL-CCNC: 158 U/L (ref 40–150)
ALT SERPL W P-5'-P-CCNC: 44 U/L (ref 0–70)
ANION GAP SERPL CALCULATED.3IONS-SCNC: 8 MMOL/L (ref 3–14)
AST SERPL W P-5'-P-CCNC: 35 U/L (ref 0–45)
BASOPHILS # BLD AUTO: 0 10E9/L (ref 0–0.2)
BASOPHILS NFR BLD AUTO: 0.2 %
BILIRUB SERPL-MCNC: 0.5 MG/DL (ref 0.2–1.3)
BUN SERPL-MCNC: 18 MG/DL (ref 7–30)
CALCIUM SERPL-MCNC: 8.8 MG/DL (ref 8.5–10.1)
CEA SERPL-MCNC: 0.8 UG/L (ref 0–2.5)
CHLORIDE SERPL-SCNC: 108 MMOL/L (ref 94–109)
CO2 SERPL-SCNC: 26 MMOL/L (ref 20–32)
CREAT SERPL-MCNC: 0.72 MG/DL (ref 0.66–1.25)
DIFFERENTIAL METHOD BLD: ABNORMAL
EOSINOPHIL # BLD AUTO: 0.3 10E9/L (ref 0–0.7)
EOSINOPHIL NFR BLD AUTO: 2.2 %
ERYTHROCYTE [DISTWIDTH] IN BLOOD BY AUTOMATED COUNT: 17.5 % (ref 10–15)
GFR SERPL CREATININE-BSD FRML MDRD: ABNORMAL ML/MIN/1.7M2
GLUCOSE SERPL-MCNC: 103 MG/DL (ref 70–99)
HCT VFR BLD AUTO: 35.7 % (ref 40–53)
HGB BLD-MCNC: 12.4 G/DL (ref 13.3–17.7)
IMM GRANULOCYTES # BLD: 0.3 10E9/L (ref 0–0.4)
IMM GRANULOCYTES NFR BLD: 2.8 %
LYMPHOCYTES # BLD AUTO: 0.8 10E9/L (ref 0.8–5.3)
LYMPHOCYTES NFR BLD AUTO: 7.2 %
MCH RBC QN AUTO: 31.2 PG (ref 26.5–33)
MCHC RBC AUTO-ENTMCNC: 34.7 G/DL (ref 31.5–36.5)
MCV RBC AUTO: 90 FL (ref 78–100)
MONOCYTES # BLD AUTO: 0.9 10E9/L (ref 0–1.3)
MONOCYTES NFR BLD AUTO: 7.8 %
NEUTROPHILS # BLD AUTO: 9.3 10E9/L (ref 1.6–8.3)
NEUTROPHILS NFR BLD AUTO: 79.8 %
NRBC # BLD AUTO: 0 10*3/UL
NRBC BLD AUTO-RTO: 0 /100
PLATELET # BLD AUTO: 125 10E9/L (ref 150–450)
POTASSIUM SERPL-SCNC: 3.7 MMOL/L (ref 3.4–5.3)
PROT SERPL-MCNC: 6.7 G/DL (ref 6.8–8.8)
RBC # BLD AUTO: 3.97 10E12/L (ref 4.4–5.9)
SODIUM SERPL-SCNC: 142 MMOL/L (ref 133–144)
WBC # BLD AUTO: 11.6 10E9/L (ref 4–11)

## 2017-04-11 PROCEDURE — 96411 CHEMO IV PUSH ADDL DRUG: CPT

## 2017-04-11 PROCEDURE — 96375 TX/PRO/DX INJ NEW DRUG ADDON: CPT

## 2017-04-11 PROCEDURE — 25000125 ZZHC RX 250: Performed by: INTERNAL MEDICINE

## 2017-04-11 PROCEDURE — 25000128 H RX IP 250 OP 636: Performed by: INTERNAL MEDICINE

## 2017-04-11 PROCEDURE — 96367 TX/PROPH/DG ADDL SEQ IV INF: CPT

## 2017-04-11 PROCEDURE — 96415 CHEMO IV INFUSION ADDL HR: CPT

## 2017-04-11 PROCEDURE — 96413 CHEMO IV INFUSION 1 HR: CPT

## 2017-04-11 PROCEDURE — 80053 COMPREHEN METABOLIC PANEL: CPT | Performed by: INTERNAL MEDICINE

## 2017-04-11 PROCEDURE — 82378 CARCINOEMBRYONIC ANTIGEN: CPT | Performed by: INTERNAL MEDICINE

## 2017-04-11 PROCEDURE — 85025 COMPLETE CBC W/AUTO DIFF WBC: CPT | Performed by: INTERNAL MEDICINE

## 2017-04-11 PROCEDURE — 96368 THER/DIAG CONCURRENT INF: CPT

## 2017-04-11 PROCEDURE — 96416 CHEMO PROLONG INFUSE W/PUMP: CPT

## 2017-04-11 RX ORDER — METHYLPREDNISOLONE SODIUM SUCCINATE 125 MG/2ML
50 INJECTION, POWDER, LYOPHILIZED, FOR SOLUTION INTRAMUSCULAR; INTRAVENOUS ONCE
Status: COMPLETED | OUTPATIENT
Start: 2017-04-11 | End: 2017-04-11

## 2017-04-11 RX ORDER — FLUOROURACIL 50 MG/ML
400 INJECTION, SOLUTION INTRAVENOUS ONCE
Status: COMPLETED | OUTPATIENT
Start: 2017-04-11 | End: 2017-04-11

## 2017-04-11 RX ADMIN — OXALIPLATIN 190 MG: 5 INJECTION, SOLUTION, CONCENTRATE INTRAVENOUS at 10:02

## 2017-04-11 RX ADMIN — ONDANSETRON HYDROCHLORIDE 8 MG: 2 INJECTION, SOLUTION INTRAVENOUS at 09:45

## 2017-04-11 RX ADMIN — FLUOROURACIL 890 MG: 50 INJECTION, SOLUTION INTRAVENOUS at 12:09

## 2017-04-11 RX ADMIN — METHYLPREDNISOLONE SODIUM SUCCINATE 50 MG: 125 INJECTION, POWDER, FOR SOLUTION INTRAMUSCULAR; INTRAVENOUS at 09:43

## 2017-04-11 RX ADMIN — DEXTROSE MONOHYDRATE 250 ML: 50 INJECTION, SOLUTION INTRAVENOUS at 09:43

## 2017-04-11 RX ADMIN — LEUCOVORIN CALCIUM 800 MG: 350 INJECTION, POWDER, LYOPHILIZED, FOR SOLUTION INTRAMUSCULAR; INTRAVENOUS at 10:03

## 2017-04-11 ASSESSMENT — PAIN SCALES - GENERAL: PAINLEVEL: NO PAIN (0)

## 2017-04-11 NOTE — MR AVS SNAPSHOT
After Visit Summary   4/11/2017    Louie Greco    MRN: [id number]           Patient Information     Date Of Birth          1960        Visit Information        Provider Department      4/11/2017 8:30 AM  INFUSION CHAIR 12 [facility]        Today's Diagnoses     Malignant neoplasm of rectum (H)    -  1       Follow-ups after your visit        Your next 10 appointments already scheduled     Apr 13, 2017  2:30 PM CDT   Level 2 with  INFUSION CHAIR 16   [facility] ([facility])    [facility]  [address]  [place] [address]   [phone]            Apr 14, 2017  2:30 PM CDT   Level 1 with  INFUSION CHAIR 4   [facility] ([facility])    [facility]  [address]  [place] [address]   [phone]            Apr 25, 2017  8:30 AM CDT   Level 5 with  INFUSION CHAIR 15   [facility] ([facility])    [facility]  [address]  [place] [address]   [phone]            Apr 25, 2017  9:00 AM CDT   Return Visit with Agueda Manley MD   [facility] ([facility])    [facility]  [address]  [place] [address]   [phone]              Who to contact     If you have questions or need follow up information about today's clinic visit or your schedule please contact [facility] [place] directly at [phone].  Normal or non-critical lab and imaging results will be communicated to you by MyChart, letter or phone within 4 business days after the clinic has received the results. If you do not hear from us within 7 days, please contact the clinic through MyChart or phone. If you have a critical or abnormal lab result, we will notify  "you by phone as soon as possible.  Submit refill requests through Point or call your pharmacy and they will forward the refill request to us. Please allow 3 business days for your refill to be completed.          Additional Information About Your Visit        [email or web address] Information     Point gives you secure access to your electronic health record. If you see a primary care provider, you can also send messages to your care team and make appointments. If you have questions, please call your primary care clinic.  If you do not have a primary care provider, please call [phone] and they will assist you.        Care EveryWhere ID     This is your Care EveryWhere ID. This could be used by other organizations to access your [facility] medical records  [id number]        Your Vitals Were     Pulse Temperature Respirations Height Pulse Oximetry BMI (Body Mass Index)    89 97.9  F (36.6  C) (Oral) 16 1.777 m (5' 9.96\") 97% 30.91 kg/m2       Blood Pressure from Last 3 Encounters:   04/11/17 136/84   04/04/17 132/77   03/30/17 135/86    Weight from Last 3 Encounters:   04/11/17 97.6 kg (215 lb 3.2 oz)   04/04/17 99.3 kg (219 lb)   03/28/17 96.6 kg (213 lb)              We Performed the Following     CBC with platelets differential     CEA     Comprehensive metabolic panel     MD INSTRUCTION FOR PROTOCOL     Treatment Conditions        Primary Care Provider Office Phone # Fax #    Ayden Peralta [phone]       [facility] [address] [place] [address]        Thank you!     Thank you for choosing [facility] AND [facility]  for your care. Our goal is always to provide you with excellent care. Hearing back from our patients is one way we can continue to improve our services. Please take a few minutes to complete the written survey that you may receive in the mail after your visit with us. Thank you!             Your Updated Medication List - Protect others around " you: Learn how to safely use, store and throw away your medicines at www.disposemymeds.org.          This list is accurate as of: 4/11/17 11:59 PM.  Always use your most recent med list.                   Brand Name Dispense Instructions for use    acetaminophen 325 MG tablet    TYLENOL    100 tablet    Take 2 tablets (650 mg) by mouth every 4 hours as needed for other (mild pain)       COLACE PO          dibucaine 1 % Oint ointment    NUPERCAINAL     3 times daily as needed for moderate pain       diltiazem 2% in PLO cream (FV COMPOUNDED) 2% Gel     60 g    To anal opening three times daily.  Use a pea-sized amount.  Store at room temperature.       hydrocortisone 0.5 % ointment      Apply topically 2 times daily Reported on 2/14/2017       IBUPROFEN PO          LORazepam 0.5 MG tablet    ATIVAN    30 tablet    Take 1 tablet (0.5 mg) by mouth every 4 hours as needed (Anxiety, Nausea/Vomiting or Sleep)       metoclopramide 10 MG tablet    REGLAN    30 tablet    Take 1 tablet (10 mg) by mouth 4 times daily (before meals and nightly)       ondansetron 8 MG tablet    ZOFRAN    10 tablet    Take 1 tablet (8 mg) by mouth every 8 hours as needed (Nausea/Vomiting)       prochlorperazine 10 MG tablet    COMPAZINE    30 tablet    Take 1 tablet (10 mg) by mouth every 6 hours as needed (Nausea/Vomiting)

## 2017-04-11 NOTE — PROGRESS NOTES
"Infusion Nursing Note:  Louie Greco presents today for C6 D1 FOLFOX.    Patient seen by provider today: No   present during visit today: Not Applicable.    Note: Patient feeling well, denies any new concerns since exam with Dr. Manley on 4/4/17.    Prior to discharge: Port is secured in place with tegaderm and flushed with 10cc NS with positive blood return noted.  Continuous home infusion Dosi-Fuser pump connected.    All connectors secured in place and clamps taped open.    Pump started, \"running\" noted on display (CADD): Not Applicable. Dosi-fuser preset to run at 5.2ml/hr for 46 hours.  Patient instructed to call our clinic or [facility] with any questions or concerns at home.  Patient verbalized understanding.    Patient set up for pump disconnect at our clinic on 4/13/17 at 2:30pm (patient aware his pump will be completed at 1015am but needs a later appt due to work conflict).        Intravenous Access:  Labs drawn without difficulty.  Implanted Port.    Treatment Conditions:  Lab Results   Component Value Date    HGB 12.4 04/11/2017     Lab Results   Component Value Date    WBC 11.6 04/11/2017      Lab Results   Component Value Date    ANEU 9.3 04/11/2017     Lab Results   Component Value Date     04/11/2017      Lab Results   Component Value Date     04/11/2017                   Lab Results   Component Value Date    POTASSIUM 3.7 04/11/2017              Lab Results   Component Value Date    CR 0.72 04/11/2017                   Lab Results   Component Value Date    FRANDY 8.8 04/11/2017                Lab Results   Component Value Date    BILITOTAL 0.5 04/11/2017           Lab Results   Component Value Date    ALBUMIN 3.4 04/11/2017                    Lab Results   Component Value Date    ALT 44 04/11/2017           Lab Results   Component Value Date    AST 35 04/11/2017     Results reviewed, labs MET treatment parameters, ok to proceed with treatment.      Post Infusion " Assessment:  Patient tolerated infusion without incident.  Blood return noted pre and post infusion.  Blood return noted during administration every 2cc during fluorouracil push per protocol.  Site patent and intact, free from redness, edema or discomfort.  No evidence of extravasations.    Discharge Plan:   Discharge instructions reviewed with: Patient.  Patient and/or family verbalized understanding of discharge instructions and all questions answered.  AVS to patient via InfluAdsHART.  Patient will return 4/13 for next appointment.   Patient discharged in stable condition accompanied by: wife.  Departure Mode: Ambulatory.    Lore Razo RN

## 2017-04-13 ENCOUNTER — INFUSION THERAPY VISIT (OUTPATIENT)
Dept: INFUSION THERAPY | Facility: CLINIC | Age: 57
End: 2017-04-13
Attending: INTERNAL MEDICINE
Payer: COMMERCIAL

## 2017-04-13 DIAGNOSIS — C20 MALIGNANT NEOPLASM OF RECTUM (H): Primary | ICD-10-CM

## 2017-04-13 PROCEDURE — 96523 IRRIG DRUG DELIVERY DEVICE: CPT

## 2017-04-13 PROCEDURE — 25000128 H RX IP 250 OP 636: Performed by: INTERNAL MEDICINE

## 2017-04-13 RX ORDER — HEPARIN SODIUM (PORCINE) LOCK FLUSH IV SOLN 100 UNIT/ML 100 UNIT/ML
5 SOLUTION INTRAVENOUS ONCE
Status: CANCELLED
Start: 2017-04-13 | End: 2017-04-13

## 2017-04-13 RX ORDER — HEPARIN SODIUM (PORCINE) LOCK FLUSH IV SOLN 100 UNIT/ML 100 UNIT/ML
5 SOLUTION INTRAVENOUS ONCE
Status: COMPLETED | OUTPATIENT
Start: 2017-04-13 | End: 2017-04-13

## 2017-04-13 RX ADMIN — SODIUM CHLORIDE, PRESERVATIVE FREE 5 ML: 5 INJECTION INTRAVENOUS at 15:42

## 2017-04-13 NOTE — PROGRESS NOTES
Infusion Nursing Note:  Louie Greco presents today for 5Fu pump disconnect.    Patient seen by provider today: No   present during visit today: Not Applicable.    Note: N/A.    Intravenous Access:  Implanted Port.    Treatment Conditions:  Not Applicable.      Post Infusion Assessment:  Patient tolerated infusion without incident.  Blood return noted pre and post infusion.  Site patent and intact, free from redness, edema or discomfort.  No evidence of extravasations.  Access discontinued per protocol.    Discharge Plan:   Patient declined prescription refills.  Discharge instructions reviewed with: Patient.  Patient and/or family verbalized understanding of discharge instructions and all questions answered.  AVS to patient via CortriumT.  Patient will return 4/14/17 for next appointment.   Patient discharged in stable condition accompanied by: self.  Departure Mode: Ambulatory.    Kaylie Timmons RN

## 2017-04-13 NOTE — MR AVS SNAPSHOT
After Visit Summary   4/13/2017    Louie Greco    MRN: [id number]           Patient Information     Date Of Birth          1960        Visit Information        Provider Department      4/13/2017 2:30 PM  INFUSION CHAIR 16 [facility]        Today's Diagnoses     Malignant neoplasm of rectum (H)    -  1       Follow-ups after your visit        Your next 10 appointments already scheduled     Apr 14, 2017  2:30 PM CDT   Level 1 with  INFUSION CHAIR 4   [facility] ([facility])    [facility]  [address]  [place] [address]   [phone]            Apr 25, 2017  8:30 AM CDT   Level 5 with  INFUSION CHAIR 15   [facility] ([facility])    [facility]  [address]  [place] [address]   [phone]            Apr 25, 2017  9:00 AM CDT   Return Visit with Agueda Manley MD   [facility] ([facility])    [facility]  [address]  [place] [address]   [phone]              Who to contact     If you have questions or need follow up information about today's clinic visit or your schedule please contact [facility] [place] directly at [phone].  Normal or non-critical lab and imaging results will be communicated to you by MyChart, letter or phone within 4 business days after the clinic has received the results. If you do not hear from us within 7 days, please contact the clinic through MyChart or phone. If you have a critical or abnormal lab result, we will notify you by phone as soon as possible.  Submit refill requests through qcue or call your pharmacy and they will forward the refill request to us. Please allow 3 business days for your refill to be completed.          Additional Information About  Your Visit        iPipelinehart Information     [email or web address] gives you secure access to your electronic health record. If you see a primary care provider, you can also send messages to your care team and make appointments. If you have questions, please call your primary care clinic.  If you do not have a primary care provider, please call [phone] and they will assist you.        Care EveryWhere ID     This is your Care EveryWhere ID. This could be used by other organizations to access your [facility] medical records  [id number]         Blood Pressure from Last 3 Encounters:   04/11/17 136/84   04/04/17 132/77   03/30/17 135/86    Weight from Last 3 Encounters:   04/11/17 97.6 kg (215 lb 3.2 oz)   04/04/17 99.3 kg (219 lb)   03/28/17 96.6 kg (213 lb)              Today, you had the following     No orders found for display       Primary Care Provider Office Phone # Fax #    Ayden Peralta [phone]       [facility] [address] [place] [address]        Thank you!     Thank you for choosing [facility]  for your care. Our goal is always to provide you with excellent care. Hearing back from our patients is one way we can continue to improve our services. Please take a few minutes to complete the written survey that you may receive in the mail after your visit with us. Thank you!             Your Updated Medication List - Protect others around you: Learn how to safely use, store and throw away your medicines at www.disposemymeds.org.          This list is accurate as of: 4/13/17  3:41 PM.  Always use your most recent med list.                   Brand Name Dispense Instructions for use    acetaminophen 325 MG tablet    TYLENOL    100 tablet    Take 2 tablets (650 mg) by mouth every 4 hours as needed for other (mild pain)       COLACE PO          dibucaine 1 % Oint ointment    NUPERCAINAL     3 times daily as needed for moderate pain       diltiazem 2%  in PLO cream (FV COMPOUNDED) 2% Gel     60 g    To anal opening three times daily.  Use a pea-sized amount.  Store at room temperature.       hydrocortisone 0.5 % ointment      Apply topically 2 times daily Reported on 2/14/2017       IBUPROFEN PO          LORazepam 0.5 MG tablet    ATIVAN    30 tablet    Take 1 tablet (0.5 mg) by mouth every 4 hours as needed (Anxiety, Nausea/Vomiting or Sleep)       metoclopramide 10 MG tablet    REGLAN    30 tablet    Take 1 tablet (10 mg) by mouth 4 times daily (before meals and nightly)       ondansetron 8 MG tablet    ZOFRAN    10 tablet    Take 1 tablet (8 mg) by mouth every 8 hours as needed (Nausea/Vomiting)       prochlorperazine 10 MG tablet    COMPAZINE    30 tablet    Take 1 tablet (10 mg) by mouth every 6 hours as needed (Nausea/Vomiting)

## 2017-04-14 ENCOUNTER — INFUSION THERAPY VISIT (OUTPATIENT)
Dept: INFUSION THERAPY | Facility: CLINIC | Age: 57
End: 2017-04-14
Attending: INTERNAL MEDICINE
Payer: COMMERCIAL

## 2017-04-14 VITALS
SYSTOLIC BLOOD PRESSURE: 114 MMHG | DIASTOLIC BLOOD PRESSURE: 69 MMHG | HEART RATE: 93 BPM | TEMPERATURE: 97.6 F | RESPIRATION RATE: 14 BRPM

## 2017-04-14 DIAGNOSIS — C20 MALIGNANT NEOPLASM OF RECTUM (H): Primary | ICD-10-CM

## 2017-04-14 PROCEDURE — 96372 THER/PROPH/DIAG INJ SC/IM: CPT

## 2017-04-14 PROCEDURE — 25000128 H RX IP 250 OP 636: Performed by: INTERNAL MEDICINE

## 2017-04-14 RX ADMIN — PEGFILGRASTIM 6 MG: 6 INJECTION SUBCUTANEOUS at 15:30

## 2017-04-14 ASSESSMENT — PAIN SCALES - GENERAL: PAINLEVEL: NO PAIN (0)

## 2017-04-14 NOTE — PROGRESS NOTES
Infusion Nursing Note:  Louie Greco presents today for Neulasta.    Patient seen by provider today: No   present during visit today: Not Applicable.    Note: N/A.    Intravenous Access:  No Intravenous access/labs at this visit.    Treatment Conditions:  Not Applicable.      Post Infusion Assessment:  Patient tolerated injection without incident.  Site patent and intact, free from redness, edema or discomfort.    Discharge Plan:   Discharge instructions reviewed with: Patient.  Patient and/or family verbalized understanding of discharge instructions and all questions answered.  Copy of AVS reviewed with patient and/or family.  Patient will return 4/25/2017 for next appointment.  Patient discharged in stable condition accompanied by: self and grandson.  Departure Mode: Ambulatory.    Alyssa Richardson RN

## 2017-04-14 NOTE — MR AVS SNAPSHOT
After Visit Summary   4/14/2017    Louie Greco    MRN: [id number]           Patient Information     Date Of Birth          1960        Visit Information        Provider Department      4/14/2017 2:30 PM  INFUSION CHAIR 4 [facility]        Today's Diagnoses     Malignant neoplasm of rectum (H)    -  1       Follow-ups after your visit        Follow-up notes from your care team     Return in 11 days (on 4/25/2017).      Your next 10 appointments already scheduled     Apr 25, 2017  8:30 AM CDT   Level 5 with  INFUSION CHAIR 15   [facility] ([facility])    [facility]  [address]  [place] [address] [phone]            Apr 25, 2017  9:00 AM CDT   Return Visit with Agueda Manley MD   [facility] ([facility])    [facility]  [address]  [facility] [address] [phone]              Who to contact     If you have questions or need follow up information about today's clinic visit or your schedule please contact [facility] directly at [phone].  Normal or non-critical lab and imaging results will be communicated to you by Pieholehart, letter or phone within 4 business days after the clinic has received the results. If you do not hear from us within 7 days, please contact the clinic through Pieholehart or phone. If you have a critical or abnormal lab result, we will notify you by phone as soon as possible.  Submit refill requests through [employer] or call your pharmacy and they will forward the refill request to us. Please allow 3 business days for your refill to be completed.          Additional Information About Your Visit        MyChart Information     [employer] gives you secure access to your electronic health record. If you see a primary care provider, you can also send messages  to your care team and make appointments. If you have questions, please call your primary care clinic.  If you do not have a primary care provider, please call [phone] and they will assist you.        Care EveryWhere ID     This is your Care EveryWhere ID. This could be used by other organizations to access your [place] medical records  [id number]        Your Vitals Were     Pulse Temperature Respirations             93 97.6  F (36.4  C) (Oral) 14          Blood Pressure from Last 3 Encounters:   04/14/17 114/69   04/11/17 136/84   04/04/17 132/77    Weight from Last 3 Encounters:   04/11/17 97.6 kg (215 lb 3.2 oz)   04/04/17 99.3 kg (219 lb)   03/28/17 96.6 kg (213 lb)              Today, you had the following     No orders found for display       Primary Care Provider Office Phone # Fax #    Ayden Peralta [phone]       [facility] [address] [place] [address]        Thank you!     Thank you for choosing [facility]  for your care. Our goal is always to provide you with excellent care. Hearing back from our patients is one way we can continue to improve our services. Please take a few minutes to complete the written survey that you may receive in the mail after your visit with us. Thank you!             Your Updated Medication List - Protect others around you: Learn how to safely use, store and throw away your medicines at www.disposemymeds.org.          This list is accurate as of: 4/14/17  3:50 PM.  Always use your most recent med list.                   Brand Name Dispense Instructions for use    acetaminophen 325 MG tablet    TYLENOL    100 tablet    Take 2 tablets (650 mg) by mouth every 4 hours as needed for other (mild pain)       COLACE PO          dibucaine 1 % Oint ointment    NUPERCAINAL     3 times daily as needed for moderate pain       diltiazem 2% in PLO cream (FV COMPOUNDED) 2% Gel     60 g    To anal opening  three times daily.  Use a pea-sized amount.  Store at room temperature.       hydrocortisone 0.5 % ointment      Apply topically 2 times daily Reported on 2/14/2017       IBUPROFEN PO          LORazepam 0.5 MG tablet    ATIVAN    30 tablet    Take 1 tablet (0.5 mg) by mouth every 4 hours as needed (Anxiety, Nausea/Vomiting or Sleep)       metoclopramide 10 MG tablet    REGLAN    30 tablet    Take 1 tablet (10 mg) by mouth 4 times daily (before meals and nightly)       ondansetron 8 MG tablet    ZOFRAN    10 tablet    Take 1 tablet (8 mg) by mouth every 8 hours as needed (Nausea/Vomiting)       prochlorperazine 10 MG tablet    COMPAZINE    30 tablet    Take 1 tablet (10 mg) by mouth every 6 hours as needed (Nausea/Vomiting)

## 2017-04-24 RX ORDER — SODIUM CHLORIDE 9 MG/ML
1000 INJECTION, SOLUTION INTRAVENOUS CONTINUOUS PRN
Status: CANCELLED
Start: 2017-04-25

## 2017-04-24 RX ORDER — METHYLPREDNISOLONE SODIUM SUCCINATE 125 MG/2ML
50 INJECTION, POWDER, LYOPHILIZED, FOR SOLUTION INTRAMUSCULAR; INTRAVENOUS ONCE
Status: CANCELLED
Start: 2017-04-25 | End: 2017-04-25

## 2017-04-24 RX ORDER — EPINEPHRINE 1 MG/ML
0.3 INJECTION INTRAMUSCULAR; INTRAVENOUS; SUBCUTANEOUS EVERY 5 MIN PRN
Status: CANCELLED | OUTPATIENT
Start: 2017-04-25

## 2017-04-24 RX ORDER — MEPERIDINE HYDROCHLORIDE 50 MG/ML
25 INJECTION INTRAMUSCULAR; INTRAVENOUS; SUBCUTANEOUS EVERY 30 MIN PRN
Status: CANCELLED | OUTPATIENT
Start: 2017-04-25

## 2017-04-24 RX ORDER — ALBUTEROL SULFATE 90 UG/1
1-2 AEROSOL, METERED RESPIRATORY (INHALATION)
Status: CANCELLED
Start: 2017-04-25

## 2017-04-24 RX ORDER — FLUOROURACIL 50 MG/ML
400 INJECTION, SOLUTION INTRAVENOUS ONCE
Status: CANCELLED | OUTPATIENT
Start: 2017-04-25

## 2017-04-24 RX ORDER — EPINEPHRINE 0.3 MG/.3ML
0.3 INJECTION SUBCUTANEOUS EVERY 5 MIN PRN
Status: CANCELLED | OUTPATIENT
Start: 2017-04-25

## 2017-04-24 RX ORDER — METHYLPREDNISOLONE SODIUM SUCCINATE 125 MG/2ML
125 INJECTION, POWDER, LYOPHILIZED, FOR SOLUTION INTRAMUSCULAR; INTRAVENOUS
Status: CANCELLED
Start: 2017-04-25

## 2017-04-24 RX ORDER — LORAZEPAM 2 MG/ML
0.5 INJECTION INTRAMUSCULAR EVERY 4 HOURS PRN
Status: CANCELLED
Start: 2017-04-25

## 2017-04-24 RX ORDER — ALBUTEROL SULFATE 0.83 MG/ML
2.5 SOLUTION RESPIRATORY (INHALATION)
Status: CANCELLED | OUTPATIENT
Start: 2017-04-25

## 2017-04-24 RX ORDER — DIPHENHYDRAMINE HYDROCHLORIDE 50 MG/ML
50 INJECTION INTRAMUSCULAR; INTRAVENOUS
Status: CANCELLED
Start: 2017-04-25

## 2017-04-25 ENCOUNTER — INFUSION THERAPY VISIT (OUTPATIENT)
Dept: INFUSION THERAPY | Facility: CLINIC | Age: 57
End: 2017-04-25
Attending: INTERNAL MEDICINE
Payer: COMMERCIAL

## 2017-04-25 ENCOUNTER — ONCOLOGY VISIT (OUTPATIENT)
Dept: ONCOLOGY | Facility: CLINIC | Age: 57
End: 2017-04-25
Attending: INTERNAL MEDICINE
Payer: COMMERCIAL

## 2017-04-25 ENCOUNTER — HOSPITAL ENCOUNTER (OUTPATIENT)
Facility: CLINIC | Age: 57
Setting detail: SPECIMEN
Discharge: HOME | End: 2017-04-25
Attending: INTERNAL MEDICINE | Admitting: INTERNAL MEDICINE
Payer: COMMERCIAL

## 2017-04-25 VITALS
HEART RATE: 85 BPM | BODY MASS INDEX: 30.51 KG/M2 | SYSTOLIC BLOOD PRESSURE: 115 MMHG | WEIGHT: 212.4 LBS | OXYGEN SATURATION: 95 % | RESPIRATION RATE: 16 BRPM | TEMPERATURE: 97.7 F | DIASTOLIC BLOOD PRESSURE: 69 MMHG

## 2017-04-25 VITALS
TEMPERATURE: 97.7 F | SYSTOLIC BLOOD PRESSURE: 114 MMHG | HEART RATE: 65 BPM | OXYGEN SATURATION: 95 % | DIASTOLIC BLOOD PRESSURE: 75 MMHG | RESPIRATION RATE: 16 BRPM | WEIGHT: 212.4 LBS | BODY MASS INDEX: 30.41 KG/M2 | HEIGHT: 70 IN

## 2017-04-25 DIAGNOSIS — C20 MALIGNANT NEOPLASM OF RECTUM (H): Primary | ICD-10-CM

## 2017-04-25 LAB
ALBUMIN SERPL-MCNC: 3.5 G/DL (ref 3.4–5)
ALP SERPL-CCNC: 178 U/L (ref 40–150)
ALT SERPL W P-5'-P-CCNC: 61 U/L (ref 0–70)
ANION GAP SERPL CALCULATED.3IONS-SCNC: 6 MMOL/L (ref 3–14)
AST SERPL W P-5'-P-CCNC: 44 U/L (ref 0–45)
BASOPHILS # BLD AUTO: 0 10E9/L (ref 0–0.2)
BASOPHILS NFR BLD AUTO: 0.2 %
BILIRUB SERPL-MCNC: 0.7 MG/DL (ref 0.2–1.3)
BUN SERPL-MCNC: 18 MG/DL (ref 7–30)
CALCIUM SERPL-MCNC: 8.5 MG/DL (ref 8.5–10.1)
CEA SERPL-MCNC: 1.1 UG/L (ref 0–2.5)
CHLORIDE SERPL-SCNC: 107 MMOL/L (ref 94–109)
CO2 SERPL-SCNC: 27 MMOL/L (ref 20–32)
CREAT SERPL-MCNC: 0.64 MG/DL (ref 0.66–1.25)
DIFFERENTIAL METHOD BLD: ABNORMAL
EOSINOPHIL # BLD AUTO: 0.2 10E9/L (ref 0–0.7)
EOSINOPHIL NFR BLD AUTO: 1.6 %
ERYTHROCYTE [DISTWIDTH] IN BLOOD BY AUTOMATED COUNT: 17.9 % (ref 10–15)
GFR SERPL CREATININE-BSD FRML MDRD: ABNORMAL ML/MIN/1.7M2
GLUCOSE SERPL-MCNC: 116 MG/DL (ref 70–99)
HCT VFR BLD AUTO: 36.2 % (ref 40–53)
HGB BLD-MCNC: 12.2 G/DL (ref 13.3–17.7)
IMM GRANULOCYTES # BLD: 0.5 10E9/L (ref 0–0.4)
IMM GRANULOCYTES NFR BLD: 3.7 %
LYMPHOCYTES # BLD AUTO: 0.8 10E9/L (ref 0.8–5.3)
LYMPHOCYTES NFR BLD AUTO: 6.6 %
MCH RBC QN AUTO: 31.3 PG (ref 26.5–33)
MCHC RBC AUTO-ENTMCNC: 33.7 G/DL (ref 31.5–36.5)
MCV RBC AUTO: 93 FL (ref 78–100)
MONOCYTES # BLD AUTO: 0.8 10E9/L (ref 0–1.3)
MONOCYTES NFR BLD AUTO: 6.5 %
NEUTROPHILS # BLD AUTO: 9.9 10E9/L (ref 1.6–8.3)
NEUTROPHILS NFR BLD AUTO: 81.4 %
NRBC # BLD AUTO: 0 10*3/UL
NRBC BLD AUTO-RTO: 0 /100
PLATELET # BLD AUTO: 82 10E9/L (ref 150–450)
POTASSIUM SERPL-SCNC: 3.7 MMOL/L (ref 3.4–5.3)
PROT SERPL-MCNC: 6.9 G/DL (ref 6.8–8.8)
RBC # BLD AUTO: 3.9 10E12/L (ref 4.4–5.9)
SODIUM SERPL-SCNC: 140 MMOL/L (ref 133–144)
WBC # BLD AUTO: 12.1 10E9/L (ref 4–11)

## 2017-04-25 PROCEDURE — 96367 TX/PROPH/DG ADDL SEQ IV INF: CPT

## 2017-04-25 PROCEDURE — 25000128 H RX IP 250 OP 636: Performed by: INTERNAL MEDICINE

## 2017-04-25 PROCEDURE — 96368 THER/DIAG CONCURRENT INF: CPT

## 2017-04-25 PROCEDURE — 82378 CARCINOEMBRYONIC ANTIGEN: CPT | Performed by: INTERNAL MEDICINE

## 2017-04-25 PROCEDURE — 96411 CHEMO IV PUSH ADDL DRUG: CPT

## 2017-04-25 PROCEDURE — 99215 OFFICE O/P EST HI 40 MIN: CPT | Performed by: INTERNAL MEDICINE

## 2017-04-25 PROCEDURE — 96375 TX/PRO/DX INJ NEW DRUG ADDON: CPT

## 2017-04-25 PROCEDURE — 80053 COMPREHEN METABOLIC PANEL: CPT | Performed by: INTERNAL MEDICINE

## 2017-04-25 PROCEDURE — 25000125 ZZHC RX 250: Performed by: INTERNAL MEDICINE

## 2017-04-25 PROCEDURE — 96416 CHEMO PROLONG INFUSE W/PUMP: CPT

## 2017-04-25 PROCEDURE — 96413 CHEMO IV INFUSION 1 HR: CPT

## 2017-04-25 PROCEDURE — 85025 COMPLETE CBC W/AUTO DIFF WBC: CPT | Performed by: INTERNAL MEDICINE

## 2017-04-25 PROCEDURE — 96415 CHEMO IV INFUSION ADDL HR: CPT

## 2017-04-25 RX ORDER — EPINEPHRINE 0.3 MG/.3ML
0.3 INJECTION SUBCUTANEOUS EVERY 5 MIN PRN
Status: CANCELLED | OUTPATIENT
Start: 2017-05-09

## 2017-04-25 RX ORDER — ALBUTEROL SULFATE 0.83 MG/ML
2.5 SOLUTION RESPIRATORY (INHALATION)
Status: CANCELLED | OUTPATIENT
Start: 2017-05-09

## 2017-04-25 RX ORDER — SODIUM CHLORIDE 9 MG/ML
1000 INJECTION, SOLUTION INTRAVENOUS CONTINUOUS PRN
Status: CANCELLED
Start: 2017-05-09

## 2017-04-25 RX ORDER — FLUOROURACIL 50 MG/ML
400 INJECTION, SOLUTION INTRAVENOUS ONCE
Status: CANCELLED | OUTPATIENT
Start: 2017-05-09

## 2017-04-25 RX ORDER — DIPHENHYDRAMINE HYDROCHLORIDE 50 MG/ML
50 INJECTION INTRAMUSCULAR; INTRAVENOUS
Status: CANCELLED
Start: 2017-05-09

## 2017-04-25 RX ORDER — METHYLPREDNISOLONE SODIUM SUCCINATE 40 MG/ML
50 INJECTION, POWDER, LYOPHILIZED, FOR SOLUTION INTRAMUSCULAR; INTRAVENOUS ONCE
Status: COMPLETED | OUTPATIENT
Start: 2017-04-25 | End: 2017-04-25

## 2017-04-25 RX ORDER — FLUOROURACIL 50 MG/ML
400 INJECTION, SOLUTION INTRAVENOUS ONCE
Status: COMPLETED | OUTPATIENT
Start: 2017-04-25 | End: 2017-04-25

## 2017-04-25 RX ORDER — LORAZEPAM 2 MG/ML
0.5 INJECTION INTRAMUSCULAR EVERY 4 HOURS PRN
Status: CANCELLED
Start: 2017-05-09

## 2017-04-25 RX ORDER — MEPERIDINE HYDROCHLORIDE 50 MG/ML
25 INJECTION INTRAMUSCULAR; INTRAVENOUS; SUBCUTANEOUS EVERY 30 MIN PRN
Status: CANCELLED | OUTPATIENT
Start: 2017-05-09

## 2017-04-25 RX ORDER — METHYLPREDNISOLONE SODIUM SUCCINATE 125 MG/2ML
125 INJECTION, POWDER, LYOPHILIZED, FOR SOLUTION INTRAMUSCULAR; INTRAVENOUS
Status: CANCELLED
Start: 2017-05-09

## 2017-04-25 RX ORDER — METHYLPREDNISOLONE SODIUM SUCCINATE 125 MG/2ML
50 INJECTION, POWDER, LYOPHILIZED, FOR SOLUTION INTRAMUSCULAR; INTRAVENOUS ONCE
Status: CANCELLED
Start: 2017-05-09 | End: 2017-05-09

## 2017-04-25 RX ORDER — EPINEPHRINE 1 MG/ML
0.3 INJECTION INTRAMUSCULAR; INTRAVENOUS; SUBCUTANEOUS EVERY 5 MIN PRN
Status: CANCELLED | OUTPATIENT
Start: 2017-05-09

## 2017-04-25 RX ORDER — ALBUTEROL SULFATE 90 UG/1
1-2 AEROSOL, METERED RESPIRATORY (INHALATION)
Status: CANCELLED
Start: 2017-05-09

## 2017-04-25 RX ADMIN — ONDANSETRON HYDROCHLORIDE 8 MG: 2 INJECTION, SOLUTION INTRAVENOUS at 09:48

## 2017-04-25 RX ADMIN — OXALIPLATIN 190 MG: 5 INJECTION, SOLUTION, CONCENTRATE INTRAVENOUS at 10:11

## 2017-04-25 RX ADMIN — METHYLPREDNISOLONE SODIUM SUCCINATE 50 MG: 40 INJECTION, POWDER, LYOPHILIZED, FOR SOLUTION INTRAMUSCULAR; INTRAVENOUS at 10:07

## 2017-04-25 RX ADMIN — LEUCOVORIN CALCIUM 800 MG: 350 INJECTION, POWDER, LYOPHILIZED, FOR SOLUTION INTRAMUSCULAR; INTRAVENOUS at 10:12

## 2017-04-25 RX ADMIN — FLUOROURACIL 890 MG: 50 INJECTION, SOLUTION INTRAVENOUS at 12:11

## 2017-04-25 RX ADMIN — DEXTROSE MONOHYDRATE 250 ML: 50 INJECTION, SOLUTION INTRAVENOUS at 09:48

## 2017-04-25 ASSESSMENT — PAIN SCALES - GENERAL
PAINLEVEL: NO PAIN (0)
PAINLEVEL: NO PAIN (0)

## 2017-04-25 NOTE — PROGRESS NOTES
"Louie Greco is a 56 year old male who presents for:  Chief Complaint   Patient presents with     Oncology Clinic Visit     Malignant neoplasm of rectum         Initial Vitals:  /69 (BP Location: Right arm)  Pulse 85  Temp 97.7  F (36.5  C) (Oral)  Resp 16  Wt 96.3 kg (212 lb 6.4 oz)  SpO2 95%  BMI 30.51 kg/m2 Estimated body mass index is 30.51 kg/(m^2) as calculated from the following:    Height as of 4/11/17: 1.777 m (5' 9.96\").    Weight as of this encounter: 96.3 kg (212 lb 6.4 oz).. Body surface area is 2.18 meters squared. BP completed using cuff size: large  No Pain (0) No LMP for male patient. Allergies and medications reviewed.     Medications: Medication refills not needed today.  Pharmacy name entered into [employer]:    [id number] [place], [place] - [address] [facility]ARM[facility], [place] - [address]    Comments: Follow up Malignant neoplasm of rectum     5 minutes for nursing intake (face to face time)   Taylor Adams MA    DISCHARGE PLAN:  1.) Patient discharged to infusion for Folfox, report given to Ashvin WONG.   2.) Patient to be scheduled for Folfox in 2 weeks, no exam needed on 5/9/17.   3.) Patient to be scheduled with surgeon at the [facility],Dr. Radford after his last chemo cycle.   4.) Patient to be scheduled for CT C/A/P and MRI pelvis at the [facility] on 6/5/17.   5.) Patient to be scheduled for an exam with Dr. Manley 6/6/17.   4.)   Next appointments: See patient instruction section  Departure Mode: Ambulatory  Accompanied by: wife  8  minutes for nursing discharge (face to face time)   Juani Mcgrath RN      "

## 2017-04-25 NOTE — MR AVS SNAPSHOT
After Visit Summary   4/25/2017    Louie Greco    MRN: [id number]           Patient Information     Date Of Birth          1960        Visit Information        Provider Department      4/25/2017 9:00 AM Agueda Manley MD [facility]        Today's Diagnoses     Malignant neoplasm of rectum (H)    -  1      Care Instructions    -chemotherapy today  -last cycle of chemotherapy to be on 5/9/17  -schedule CT scan and MRI pelvis, labs at the [facility] on 6/5/17  -return to clinic on 6/6/17 to review results  -make appointment with Dr. Radford after you are finished with chemotherapy        Follow-ups after your visit        Your next 10 appointments already scheduled     Apr 27, 2017  2:30 PM CDT   Level 2 with  INFUSION CHAIR 14   [facility] ([facility])    [facility]  [address]  [facility] [address]   [phone]            Apr 28, 2017  2:30 PM CDT   Level 1 with  INFUSION CHAIR 14   [facility] ([facility])    [facility]  [address]  [place] [address]   [phone]            May 09, 2017  8:30 AM CDT   Level 5 with  INFUSION CHAIR 15   [facility] ([facility])    [facility]  [address]  [place] [address]   [phone]            Jun 05, 2017  3:40 PM CDT   (Arrive by 3:25 PM)   CT CHEST/ABDOMEN/PELVIS W CONTRAST with [id number]   [facility] ([facility])    [address] [address] [phone]           Please bring any scans or X-rays taken at other hospitals, if similar tests were done. Also bring a list of your medicines, including vitamins, minerals and over-the-counter drugs. It is safest to leave personal items at home.  Be sure to  tell your doctor:   If you have any allergies.   If there s any chance you are pregnant.   If you are breastfeeding.   If you have any special needs.  You may have contrast for this exam. To prepare:   Do not eat or drink for 2 hours before your exam. If you need to take medicine, you may take it with small sips of water. (We may ask you to take liquid medicine as well.)   The day before your exam, drink extra fluids at least six 8-ounce glasses (unless your doctor tells you to restrict your fluids).  Patients over 70 or patients with diabetes or kidney problems:   If you haven t had a blood test (creatinine test) within the last 30 days, go to your clinic or Diagnostic Imaging Department for this test.  If you have diabetes:   If your kidney function is normal, continue taking your metformin (Avandamet, Glucophage, Glucovance, Metaglip) on the day of your exam.   If your kidney function is abnormal, wait 48 hours before restarting this medicine.  You will have oral contrast for this exam:   You will drink the contrast at home. Get this from your clinic or Diagnostic Imaging Department. Please follow the directions given.  Please wear loose clothing, such as a sweat suit or jogging clothes. Avoid snaps, zippers and other metal. We may ask you to undress and put on a hospital gown.  If you have any questions, please call the Imaging Department where you will have your exam.            Jun 05, 2017  4:00 PM CDT   (Arrive by 3:45 PM)   MR PELVIS W/O & W CONTRAST with [id number]   [facility] ([facility])    [address] [address] [phone]           Take your medicines as usual, unless your doctor tells you not to. Bring a list of your current medicines to your exam (including vitamins, minerals and over-the-counter drugs).  You will be given intravenous contrast for this exam. To prepare:   The day before your exam, drink extra fluids at  least six 8-ounce glasses (unless your doctor tells you to restrict your fluids).   Have a blood test (creatinine test) within 30 days of your exam. Go to your clinic or Diagnostic Imaging Department for this test.  The MRI machine uses a strong magnet. Please wear clothes without metal (snaps, zippers). A sweatsuit works well, or we may give you a hospital gown.  Please remove any body piercings and hair extensions before you arrive. You will also remove watches, jewelry, hairpins, wallets, dentures, partial dental plates and hearing aids. You may wear contact lenses, and you may be able to wear your rings. We have a safe place to keep your personal items, but it is safer to leave them at home.   **IMPORTANT** THE INSTRUCTIONS BELOW ARE ONLY FOR THOSE PATIENTS WHO HAVE BEEN TOLD THEY WILL RECEIVE SEDATION OR GENERAL ANESTHESIA DURING THEIR MRI PROCEDURE:  IF YOU WILL RECEIVE SEDATION (take medicine to help you relax during your exam):   You must get the medicine from your doctor before you arrive. Bring the medicine to the exam. Do not take it at home.   Arrive one hour early. Bring someone who can take you home after the test. Your medicine will make you sleepy. After the exam, you may not drive, take a bus or take a taxi by yourself.   No eating 8 hours before your exam. You may have clear liquids up until 4 hours before your exam. (Clear liquids include water, clear tea, black coffee and fruit juice without pulp.)  IF YOU WILL RECEIVE ANESTHESIA (be asleep for your exam):   Arrive 1 1/2 hours early. Bring someone who can take you home after the test. You may not drive, take a bus or take a taxi by yourself.   No eating 8 hours before your exam. You may have clear liquids up until 4 hours before your exam. (Clear liquids include water, clear tea, black coffee and fruit juice without pulp.)  Please call the Imaging Department at your exam site with any questions.            Jun 09, 2017  4:00 PM CDT   Return Visit  with Agueda Manley MD   [facility] ([facility])    [facility] [place]  [address]  [place] [id number] [phone]              Future tests that were ordered for you today     Open Future Orders        Priority Expected Expires Ordered    CBC with platelets differential Routine 6/5/2017 4/25/2018 4/25/2017    Comprehensive metabolic panel Routine 6/5/2017 4/25/2018 4/25/2017    CEA Routine 6/5/2017 4/25/2018 4/25/2017    CT Chest/Abdomen/Pelvis w Contrast Routine 6/5/2017 4/25/2018 4/25/2017    MR Pelvis w/o & w Contrast Routine 6/5/2017 4/25/2018 4/25/2017            Who to contact     If you have questions or need follow up information about today's clinic visit or your schedule please contact [facility] directly at [phone].  Normal or non-critical lab and imaging results will be communicated to you by MemoryMergehart, letter or phone within 4 business days after the clinic has received the results. If you do not hear from us within 7 days, please contact the clinic through Digital Signal or phone. If you have a critical or abnormal lab result, we will notify you by phone as soon as possible.  Submit refill requests through Digital Signal or call your pharmacy and they will forward the refill request to us. Please allow 3 business days for your refill to be completed.          Additional Information About Your Visit        MemoryMergeharSmartAsset Information     Digital Signal gives you secure access to your electronic health record. If you see a primary care provider, you can also send messages to your care team and make appointments. If you have questions, please call your primary care clinic.  If you do not have a primary care provider, please call [phone] and they will assist you.        Care EveryWhere ID     This is your Care EveryWhere ID. This could be used by other organizations to access your [facility] medical records  [id number]        Your Vitals Were      Pulse Temperature Respirations Pulse Oximetry BMI (Body Mass Index)       85 97.7  F (36.5  C) (Oral) 16 95% 30.51 kg/m2        Blood Pressure from Last 3 Encounters:   04/25/17 115/69   04/25/17 115/69   04/14/17 114/69    Weight from Last 3 Encounters:   04/25/17 96.3 kg (212 lb 6.4 oz)   04/25/17 96.3 kg (212 lb 6.4 oz)   04/11/17 97.6 kg (215 lb 3.2 oz)               Primary Care Provider Office Phone # Fax #    Ayden Peralta [phone]       [facility] [address] [facility] [address]        Thank you!     Thank you for choosing [facility]  for your care. Our goal is always to provide you with excellent care. Hearing back from our patients is one way we can continue to improve our services. Please take a few minutes to complete the written survey that you may receive in the mail after your visit with us. Thank you!             Your Updated Medication List - Protect others around you: Learn how to safely use, store and throw away your medicines at www.disposemymeds.org.          This list is accurate as of: 4/25/17 10:06 AM.  Always use your most recent med list.                   Brand Name Dispense Instructions for use    acetaminophen 325 MG tablet    TYLENOL    100 tablet    Take 2 tablets (650 mg) by mouth every 4 hours as needed for other (mild pain)       COLACE PO          dibucaine 1 % Oint ointment    NUPERCAINAL     3 times daily as needed for moderate pain       diltiazem 2% in PLO cream (FV COMPOUNDED) 2% Gel     60 g    To anal opening three times daily.  Use a pea-sized amount.  Store at room temperature.       hydrocortisone 0.5 % ointment      Apply topically 2 times daily Reported on 2/14/2017       IBUPROFEN PO          LORazepam 0.5 MG tablet    ATIVAN    30 tablet    Take 1 tablet (0.5 mg) by mouth every 4 hours as needed (Anxiety, Nausea/Vomiting or Sleep)       metoclopramide 10 MG tablet    REGLAN    30 tablet    Take 1 tablet (10  mg) by mouth 4 times daily (before meals and nightly)       ondansetron 8 MG tablet    ZOFRAN    10 tablet    Take 1 tablet (8 mg) by mouth every 8 hours as needed (Nausea/Vomiting)       prochlorperazine 10 MG tablet    COMPAZINE    30 tablet    Take 1 tablet (10 mg) by mouth every 6 hours as needed (Nausea/Vomiting)

## 2017-04-25 NOTE — MR AVS SNAPSHOT
After Visit Summary   4/25/2017    Louie Greco    MRN: [id number]           Patient Information     Date Of Birth          1960        Visit Information        Provider Department      4/25/2017 8:30 AM  INFUSION CHAIR 15 [facility]        Today's Diagnoses     Malignant neoplasm of rectum (H)    -  1       Follow-ups after your visit        Your next 10 appointments already scheduled     Apr 27, 2017  2:30 PM CDT   Level 2 with  INFUSION CHAIR 14   [facility] ([facility])    [facility] [place]  [address]  [place] [address]   [phone]            Apr 28, 2017  2:30 PM CDT   Level 1 with  INFUSION CHAIR 14   [facility] ([facility])    [facility] [place]  [address]  [place] [address]   [phone]            May 09, 2017  8:30 AM CDT   Level 5 with  INFUSION CHAIR 15   [facility] ([facility])    [facility] [place]  [address]  [place] [address]   [phone]            Jun 05, 2017  3:40 PM CDT   (Arrive by 3:25 PM)   CT CHEST/ABDOMEN/PELVIS W CONTRAST with [id number]   [facility] ([facility])    [address] [address] [phone]           Please bring any scans or X-rays taken at other hospitals, if similar tests were done. Also bring a list of your medicines, including vitamins, minerals and over-the-counter drugs. It is safest to leave personal items at home.  Be sure to tell your doctor:   If you have any allergies.   If there s any chance you are pregnant.   If you are breastfeeding.   If you have any special needs.  You may have contrast for this exam. To prepare:   Do not eat or drink for 2 hours before your exam. If you need to take  medicine, you may take it with small sips of water. (We may ask you to take liquid medicine as well.)   The day before your exam, drink extra fluids at least six 8-ounce glasses (unless your doctor tells you to restrict your fluids).  Patients over 70 or patients with diabetes or kidney problems:   If you haven t had a blood test (creatinine test) within the last 30 days, go to your clinic or Diagnostic Imaging Department for this test.  If you have diabetes:   If your kidney function is normal, continue taking your metformin (Avandamet, Glucophage, Glucovance, Metaglip) on the day of your exam.   If your kidney function is abnormal, wait 48 hours before restarting this medicine.  You will have oral contrast for this exam:   You will drink the contrast at home. Get this from your clinic or Diagnostic Imaging Department. Please follow the directions given.  Please wear loose clothing, such as a sweat suit or jogging clothes. Avoid snaps, zippers and other metal. We may ask you to undress and put on a hospital gown.  If you have any questions, please call the Imaging Department where you will have your exam.            Jun 05, 2017  4:00 PM CDT   (Arrive by 3:45 PM)   MR PELVIS W/O & W CONTRAST with [id number]   [facility] MRI ([facility])    [address] [address] [phone]           Take your medicines as usual, unless your doctor tells you not to. Bring a list of your current medicines to your exam (including vitamins, minerals and over-the-counter drugs).  You will be given intravenous contrast for this exam. To prepare:   The day before your exam, drink extra fluids at least six 8-ounce glasses (unless your doctor tells you to restrict your fluids).   Have a blood test (creatinine test) within 30 days of your exam. Go to your clinic or Diagnostic Imaging Department for this test.  The MRI machine uses a strong magnet. Please wear clothes  without metal (snaps, zippers). A sweatsuit works well, or we may give you a hospital gown.  Please remove any body piercings and hair extensions before you arrive. You will also remove watches, jewelry, hairpins, wallets, dentures, partial dental plates and hearing aids. You may wear contact lenses, and you may be able to wear your rings. We have a safe place to keep your personal items, but it is safer to leave them at home.   **IMPORTANT** THE INSTRUCTIONS BELOW ARE ONLY FOR THOSE PATIENTS WHO HAVE BEEN TOLD THEY WILL RECEIVE SEDATION OR GENERAL ANESTHESIA DURING THEIR MRI PROCEDURE:  IF YOU WILL RECEIVE SEDATION (take medicine to help you relax during your exam):   You must get the medicine from your doctor before you arrive. Bring the medicine to the exam. Do not take it at home.   Arrive one hour early. Bring someone who can take you home after the test. Your medicine will make you sleepy. After the exam, you may not drive, take a bus or take a taxi by yourself.   No eating 8 hours before your exam. You may have clear liquids up until 4 hours before your exam. (Clear liquids include water, clear tea, black coffee and fruit juice without pulp.)  IF YOU WILL RECEIVE ANESTHESIA (be asleep for your exam):   Arrive 1 1/2 hours early. Bring someone who can take you home after the test. You may not drive, take a bus or take a taxi by yourself.   No eating 8 hours before your exam. You may have clear liquids up until 4 hours before your exam. (Clear liquids include water, clear tea, black coffee and fruit juice without pulp.)  Please call the Imaging Department at your exam site with any questions.            Jun 06, 2017  2:00 PM CDT   Return Visit with Agueda Manley MD   [facility] ([facility])    [facility]  [address]  [facility] [address]   [phone]              Future tests that were ordered for you today     Open Future Orders         "Priority Expected Expires Ordered    CBC with platelets differential Routine 6/5/2017 4/25/2018 4/25/2017    Comprehensive metabolic panel Routine 6/5/2017 4/25/2018 4/25/2017    CEA Routine 6/5/2017 4/25/2018 4/25/2017    CT Chest/Abdomen/Pelvis w Contrast Routine 6/5/2017 4/25/2018 4/25/2017    MR Pelvis w/o & w Contrast Routine 6/5/2017 4/25/2018 4/25/2017            Who to contact     If you have questions or need follow up information about today's clinic visit or your schedule please contact [facility] directly at [phone].  Normal or non-critical lab and imaging results will be communicated to you by Eteloshart, letter or phone within 4 business days after the clinic has received the results. If you do not hear from us within 7 days, please contact the clinic through Ringpayt or phone. If you have a critical or abnormal lab result, we will notify you by phone as soon as possible.  Submit refill requests through Fly Victor or call your pharmacy and they will forward the refill request to us. Please allow 3 business days for your refill to be completed.          Additional Information About Your Visit        EtelosharCombined Power Information     Fly Victor gives you secure access to your electronic health record. If you see a primary care provider, you can also send messages to your care team and make appointments. If you have questions, please call your primary care clinic.  If you do not have a primary care provider, please call [phone] and they will assist you.        Care EveryWhere ID     This is your Care EveryWhere ID. This could be used by other organizations to access your [facility] medical records  [id number]        Your Vitals Were     Pulse Temperature Respirations Height Pulse Oximetry BMI (Body Mass Index)    65 97.7  F (36.5  C) (Oral) 16 1.777 m (5' 9.96\") 95% 30.51 kg/m2       Blood Pressure from Last 3 Encounters:   04/25/17 115/69   04/25/17 114/75   04/14/17 114/69    " Weight from Last 3 Encounters:   04/25/17 96.3 kg (212 lb 6.4 oz)   04/25/17 96.3 kg (212 lb 6.4 oz)   04/11/17 97.6 kg (215 lb 3.2 oz)              We Performed the Following     CBC with platelets differential     CEA     Comprehensive metabolic panel     MD INSTRUCTION FOR PROTOCOL     Treatment Conditions        Primary Care Provider Office Phone # Fax #    Ayden Peralta [phone]       [facility] [address] [place] [address]        Thank you!     Thank you for choosing [facility]  for your care. Our goal is always to provide you with excellent care. Hearing back from our patients is one way we can continue to improve our services. Please take a few minutes to complete the written survey that you may receive in the mail after your visit with us. Thank you!             Your Updated Medication List - Protect others around you: Learn how to safely use, store and throw away your medicines at www.disposemymeds.org.          This list is accurate as of: 4/25/17 12:29 PM.  Always use your most recent med list.                   Brand Name Dispense Instructions for use    acetaminophen 325 MG tablet    TYLENOL    100 tablet    Take 2 tablets (650 mg) by mouth every 4 hours as needed for other (mild pain)       COLACE PO          dibucaine 1 % Oint ointment    NUPERCAINAL     3 times daily as needed for moderate pain       diltiazem 2% in PLO cream (FV COMPOUNDED) 2% Gel     60 g    To anal opening three times daily.  Use a pea-sized amount.  Store at room temperature.       hydrocortisone 0.5 % ointment      Apply topically 2 times daily Reported on 2/14/2017       IBUPROFEN PO          LORazepam 0.5 MG tablet    ATIVAN    30 tablet    Take 1 tablet (0.5 mg) by mouth every 4 hours as needed (Anxiety, Nausea/Vomiting or Sleep)       metoclopramide 10 MG tablet    REGLAN    30 tablet    Take 1 tablet (10 mg) by mouth 4 times daily (before  meals and nightly)       ondansetron 8 MG tablet    ZOFRAN    10 tablet    Take 1 tablet (8 mg) by mouth every 8 hours as needed (Nausea/Vomiting)       prochlorperazine 10 MG tablet    COMPAZINE    30 tablet    Take 1 tablet (10 mg) by mouth every 6 hours as needed (Nausea/Vomiting)

## 2017-04-25 NOTE — LETTER
April 25, 2017      RE: Louie Greco  [address] [address]      [facility]    Hematology/Oncology Established Patient Note      Today's Date: 04/25/2017    Reason for Follow-up: rectal cancer      HISTORY OF PRESENT ILLNESS: Louie Greco is a 56 year old male who presents with rectal cancer.  He started having rectal bleeding around beginning of 2016.  He underwent colonoscopy on 7/27/16, which showed a malignant tumor in the rectum involving half of the lumen with oozing, as well as three 4-mm polyps in the ascending and transverse colon.  Pathology showed moderately differentiated adenocarcinoma, ascending colon with sessile serrated adenoma, others were tubular adenomas.  Mismatch repair expression with normal protein expression.  Staging scans showed the rectal mass, indeterminate focus in the left liver thought to be cyst, and multiple bilateral renal cysts.  MRI showed a distal rectal mass measuring 2.7 x 2.4 cm extending to the most proximal region of the anal canal.  There are a few tiny subcentimeter perirectal fat lymph nodes.  He was staged clinically [id number] (stage IIIA).  He was seen by Dr. Lee at [facility], and started neoadjuvant chemoradiation with capecitabine on 8/8/16 and completed on 9/15/16.  He was then seen by Dr. Radford, colorectal surgery at [facility].  His post chemoradiation MRI showed improvement in the size of the tumor and response in the lymph nodes.  On 12/8/16, he underwent flexible sigmoidoscopy and there was no evidence of tumor.  There was only some anterior scarring in the lumen.  Multiple biopsies were taken, which showed no evidence of carcinoma.  At that point, Dr. Radford spoke with the patient's wife, that there appears to be a complete response in the lumen, and that the patient would wish to placed on the watch and wait protocol.  The patient had expressed wishes not to have an APR, and it would not  have been possible to grossly clear a margin for LAR.      He had port placed on 1/16/17.    Current Chemotherapy:  Leucovorin 350 mg/m2 IV IV on day 1  Oxaliplatin 85 mg/m2 IV on day 1  Fluorouracil 400 mg/m2 IV on day 1  Fluorouracil 2400 mg/m2 CIV over 46 hours  Every 2 week cycles    C1D1: 1/16/17  C2D1: 1/31/17  C3D1: 2/14/17  C4D1: 2/28/17  C5D1: delayed by 2 weeks to 3/28/17 due to neutropenia; Neulasta added with subsequent cycles  C6D1: 4/11/17  C7D1: 4/25/17  C8D1: anticipated for 5/9/17        INTERIM HISTORY: Louie comes in for follow-up today.  He feels well.  He says that the last rounds of chemotherapy gone fine.  He has not needed to use any nausea medications.  He has numbness/tingling on his hands and feet but are tolerable.           REVIEW OF SYSTEMS:   14 point ROS was reviewed and is negative other than as noted above in HPI.       HOME MEDICATIONS:  Current Outpatient Prescriptions   Medication Sig Dispense Refill     dibucaine (NUPERCAINAL) 1 % OINT ointment 3 times daily as needed for moderate pain       Docusate Sodium (COLACE PO)        metoclopramide (REGLAN) 10 MG tablet Take 1 tablet (10 mg) by mouth 4 times daily (before meals and nightly) 30 tablet 0     LORazepam (ATIVAN) 0.5 MG tablet Take 1 tablet (0.5 mg) by mouth every 4 hours as needed (Anxiety, Nausea/Vomiting or Sleep) 30 tablet 2     prochlorperazine (COMPAZINE) 10 MG tablet Take 1 tablet (10 mg) by mouth every 6 hours as needed (Nausea/Vomiting) 30 tablet 2     ondansetron (ZOFRAN) 8 MG tablet Take 1 tablet (8 mg) by mouth every 8 hours as needed (Nausea/Vomiting) 10 tablet 2     IBUPROFEN PO        acetaminophen (TYLENOL) 325 MG tablet Take 2 tablets (650 mg) by mouth every 4 hours as needed for other (mild pain) 100 tablet 0     hydrocortisone 0.5 % ointment Apply topically 2 times daily Reported on 2/14/2017       diltiazem 2% in PLO cream, FV COMPOUNDED, 2% GEL To anal opening three times daily.  Use a pea-sized amount.   Store at room temperature. 60 g 0         ALLERGIES:  Allergies   Allergen Reactions     Amoxicillin      Ampicillin Diarrhea     Demerol [Meperidine]      Nausea          PAST MEDICAL HISTORY:  Past Medical History:   Diagnosis Date     Childhood asthma      Nephrolithiasis          Rectal cancer (H)     low rectal cancer         PAST SURGICAL HISTORY:  Past Surgical History:   Procedure Laterality Date     COLONOSCOPY N/A 2016    Procedure: COMBINED COLONOSCOPY, SINGLE OR MULTIPLE BIOPSY/POLYPECTOMY BY BIOPSY;  Surgeon: Chelsea Thompson MD;  Location:  GI     [facility] TOOTH EXTRACTION W/FORCEP       SIGMOIDOSCOPY FLEXIBLE N/A 2016    Procedure: SIGMOIDOSCOPY FLEXIBLE;  Surgeon: Felicitas Radford MD;  Location: [facility] OR     VASECTOMY           SOCIAL HISTORY:  Social History     Social History     Marital status: Single     Spouse name: N/A     Number of children: N/A     Years of education: N/A     Occupational History     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 No     Social History Narrative    Dad  at age  78   from [place], COPD, & HTN    Mom alive in her 70's.     for 30 years    Two adult children. 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         He notes that he had a brother who passed away at a young age of 53 from a metastatic cancer, but unknown what type, and passed away within 2 months of diagnosis.  He denies other family history of cancer in the immediate family.  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      FAMILY HISTORY:  Family History   Problem  Relation Age of Onset     CANCER Brother      primary of unknown origin     Other Cancer Brother      Chronic Obstructive Pulmonary Disease Father      Hypertension Father      Hyperlipidemia Father      Colon Polyps Mother      Number and type of polyps unknown     Family History Negative Brother      negative colonoscopy     DIABETES Maternal Grandfather      Hypertension Paternal Grandmother      Hyperlipidemia Paternal Grandmother      Stomach Cancer Other 63     maternal great grandfather     Glaucoma No family hx of      Macular Degeneration No family hx of          PHYSICAL EXAM:  Vital signs:  /69 (BP Location: Right arm)  Pulse 85  Temp 97.7  F (36.5  C) (Oral)  Resp 16  Wt 96.3 kg (212 lb 6.4 oz)  SpO2 95%  BMI 30.51 kg/m2   ECO  GENERAL/CONSTITUTIONAL: No acute distress. Accompanied by wife.  EYES: No scleral icterus.  LYMPH: No anterior cervical, posterior cervical, or supraclavicular adenopathy.   RESPIRATORY: Clear to auscultation bilaterally. No crackles or wheezing.   CARDIOVASCULAR: Regular rate and rhythm without murmurs, gallops, or rubs.  GASTROINTESTINAL: No tenderness. The patient has normal bowel sounds. No guarding.  No distention.  MUSCULOSKELETAL: Warm and well-perfused, no cyanosis, clubbing, or edema.  NEUROLOGIC: Alert, oriented, answers questions appropriately.  INTEGUMENTARY: No jaundice.  GAIT: Steady, does not use assistive device  Port in place at right upper chest.    LABS:  CBC RESULTS:   Recent Labs   Lab Test  17   0840   WBC  12.1*   RBC  3.90*   HGB  12.2*   HCT  36.2*   MCV  93   MCH  31.3   MCHC  33.7   RDW  17.9*   PLT  82*     Recent Labs   Lab Test  17   0840  17   0904   NA  140  142   POTASSIUM  3.7  3.7   CHLORIDE  107  108   CO2  27  26   ANIONGAP  6  8   GLC  116*  103*   BUN  18  18   CR  0.64*  0.72   FRANDY  8.5  8.8     Lab Results   Component Value Date    AST 44 2017     Lab Results   Component Value Date    ALT 61  04/25/2017     No results found for: BILICONJ   Lab Results   Component Value Date    BILITOTAL 0.7 04/25/2017     Lab Results   Component Value Date    ALBUMIN 3.5 04/25/2017     Lab Results   Component Value Date    PROTTOTAL 6.9 04/25/2017      Lab Results   Component Value Date    ALKPHOS 178 04/25/2017     Component      Latest Ref Rng & Units 2/14/2017 2/28/2017 3/14/2017 3/28/2017   CEA      0 - 2.5 ug/L 1.0 1.1 1.0 0.9     Component      Latest Ref Rng & Units 4/11/2017   CEA      0 - 2.5 ug/L 0.8         IMAGING:  CT C/A/P 3/13/17:  1. No evidence of residual or recurrent disease in chest, abdomen or  pelvis.  2. Multiple small pulmonary nodules, stable since 7/27/2016 and are  likely benign. Attention on follow-up imaging is recommended.  3. Previously described hypodensity in the left lobe of liver is not  conspicuous on the current exam. Suspect this to be due to focal  hepatic steatosis in view of its location adjacent to fissure for  ligamentum teres.    MRI pelvis 3/13/17:  1. No clear evidence of residual or recurrent rectal tumor, with a  corresponding tumor regression grade of 1, unchanged since 11/1/2016.   The tumor previously seen on 7/27/2016 at the left and anterior distal  rectum has an entirely fibrosed appearance. No convincing evidence of  levator ani or anal sphincter involvement, though the tumor does  extending inferiorly to the level of the puborectalis sling  2. Decreased small lymph nodes without suspicious characteristics  measuring up to 4 mm. These are indeterminate but favored as benign.      ASSESSMENT/PLAN:  Louie Greco is a 56 year old male with:    1) Rectal cancer: clinical stage D0L0wP2 (stage IIIA), s/p chemoradiation with Xeloda, and appears to have achieved complete response on imaging and biopsies.  He opted not to undergo surgery and expressed desire not to undergo APR, as he does not want a colostomy bag.  It was felt reasonable to go on the watch and wait protocol  with the [facility].      He will complete 4 additional months (8 cycles) of chemotherapy with FOLFOX.  Will discuss with Dr. Radford regarding endoscopic surveillance, as per the watch and wait protocol.      He is tolerating chemotherapy well so far.  CT scans and MRI pelvis on 3/13/17 show no evidence of residual or recurrent rectal tumor.    -C7D1 of FOLFOX today, with Neulasta   -C8D1 to be on 5/9/17  -CT c/a/p and MRI pelvis to be on 6/5/17 at the [place], with labs  -RTC on 6/6/17 to review results  -he will make appointment with Dr. Radford's office and discuss endoscopic surveillance    2) Hiccups: Occurred for 3 days after cycle 1 of chemotherapy.  May have been due to dexamethasone, although he says he has similar symptoms after receiving sedation for dental procedure previously.  He did not get hiccups after cycle 2 of chemotherapy.  -switched steroids to methylprednisolone  -if symptoms recur, can consider cutting back on steroid dose   -he has Reglan, but he chose not to take it due to possible side effects    3) Chemotherapy-induced nausea: Mild.  He has not needed to take his home anti-emetics though.  -he has Compazine and Zofran prn    4) Genetics: he had appointment on 3/30/17.  He is contemplating sending for genetic testing, but is checking on insurance first.      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  No pain.    -keep chemo at full doses for now; if worsens, can consider reducing dose of oxaliplatin    6) Elevated transaminases: slight elevation of AST and ALT, likely due to chemotherapy.  It had normalized after chemotherapy was delayed for 2 weeks.  -transaminases remain normal today  -monitor for now      I spent a total of 25 minutes with the patient, with over >50% of the time in counseling and/or coordination of care.         Agueda Manley MD  Hematology/Oncology  [facility]        Louie Greco is a 56  "year old male who presents for:  Chief Complaint   Patient presents with     Oncology Clinic Visit     Malignant neoplasm of rectum         Initial Vitals:  /69 (BP Location: Right arm)  Pulse 85  Temp 97.7  F (36.5  C) (Oral)  Resp 16  Wt 96.3 kg (212 lb 6.4 oz)  SpO2 95%  BMI 30.51 kg/m2 Estimated body mass index is 30.51 kg/(m^2) as calculated from the following:    Height as of 4/11/17: 1.777 m (5' 9.96\").    Weight as of this encounter: 96.3 kg (212 lb 6.4 oz).. Body surface area is 2.18 meters squared. BP completed using cuff size: large  No Pain (0) No LMP for male patient. Allergies and medications reviewed.     Medications: Medication refills not needed today.  Pharmacy name entered into Fio:    [id number]  [place], [place] - [address] AT [address] [place] [address] [facility] [place] [facility] - [id number]    Comments: Follow up Malignant neoplasm of rectum     5 minutes for nursing intake (face to face time)   TIMOTHY Jimenez MD  "

## 2017-04-25 NOTE — PROGRESS NOTES
"Infusion Nursing Note:  Louie Greco presents today for C7D1 Folfox    Patient seen by provider today: Yes: Dr. Manley   present during visit today: Not Applicable.    Note: N/A.    Intravenous Access:  Implanted Port.    Treatment Conditions:  Lab Results   Component Value Date    HGB 12.2 04/25/2017     Lab Results   Component Value Date    WBC 12.1 04/25/2017      Lab Results   Component Value Date    ANEU 9.9 04/25/2017     Lab Results   Component Value Date    PLT 82 04/25/2017      Lab Results   Component Value Date     04/25/2017                   Lab Results   Component Value Date    POTASSIUM 3.7 04/25/2017           No results found for: MAG         Lab Results   Component Value Date    CR 0.64 04/25/2017                   Lab Results   Component Value Date    FRANDY 8.5 04/25/2017                Lab Results   Component Value Date    BILITOTAL 0.7 04/25/2017           Lab Results   Component Value Date    ALBUMIN 3.5 04/25/2017                    Lab Results   Component Value Date    ALT 61 04/25/2017           Lab Results   Component Value Date    AST 44 04/25/2017     Results reviewed, labs MET treatment parameters, ok to proceed with treatment.      Post Infusion Assessment:  Patient tolerated infusion without incident.  Blood return noted pre and post infusion.  Blood return noted during administration every 3-5 cc.  Site patent and intact, free from redness, edema or discomfort.  No evidence of extravasations.    Prior to discharge: Port is secured in place with tegaderm and flushed with 10cc NS with positive blood return noted.  Continuous home infusion CADD pump connected.    All connectors secured in place and clamps taped open.    Pump started, \"running\" noted on display (CADD): Not Applicable.  Patient instructed to call our clinic or [facility] with any questions or concerns at home.  Patient verbalized understanding.    Patient set up for pump disconnect at our clinic on " 4/24/17.          Discharge Plan:   Discharge instructions reviewed with: Patient and Family.  Patient and/or family verbalized understanding of discharge instructions and all questions answered.  AVS to patient via VIRxSYST.  Patient will return 4/27/17 for next appointment.   Patient discharged in stable condition accompanied by: wife.  Departure Mode: Ambulatory.    MARVIN Gayle RN

## 2017-04-25 NOTE — PATIENT INSTRUCTIONS
-chemotherapy today  -last cycle of chemotherapy to be on 5/9/17  -schedule CT scan and MRI pelvis, labs at the [facility] on 6/5/17  -return to clinic on 6/6/17 to review results  -make appointment with Dr. Radford after you are finished with chemotherapy

## 2017-04-25 NOTE — PROGRESS NOTES
[facility]    Hematology/Oncology Established Patient Note      Today's Date: 04/25/2017    Reason for Follow-up: rectal cancer      HISTORY OF PRESENT ILLNESS: Louie Greco is a 56 year old male who presents with rectal cancer.  He started having rectal bleeding around beginning of 2016.  He underwent colonoscopy on 7/27/16, which showed a malignant tumor in the rectum involving half of the lumen with oozing, as well as three 4-mm polyps in the ascending and transverse colon.  Pathology showed moderately differentiated adenocarcinoma, ascending colon with sessile serrated adenoma, others were tubular adenomas.  Mismatch repair expression with normal protein expression.  Staging scans showed the rectal mass, indeterminate focus in the left liver thought to be cyst, and multiple bilateral renal cysts.  MRI showed a distal rectal mass measuring 2.7 x 2.4 cm extending to the most proximal region of the anal canal.  There are a few tiny subcentimeter perirectal fat lymph nodes.  He was staged clinically X2L6hL3 (stage IIIA).  He was seen by Dr. Lee at [facility], and started neoadjuvant chemoradiation with capecitabine on 8/8/16 and completed on 9/15/16.  He was then seen by Dr. Radford, colorectal surgery at [facility].  His post chemoradiation MRI showed improvement in the size of the tumor and response in the lymph nodes.  On 12/8/16, he underwent flexible sigmoidoscopy and there was no evidence of tumor.  There was only some anterior scarring in the lumen.  Multiple biopsies were taken, which showed no evidence of carcinoma.  At that point, Dr. Radford spoke with the patient's wife, that there appears to be a complete response in the lumen, and that the patient would wish to placed on the watch and wait protocol.  The patient had expressed wishes not to have an APR, and it would not have been possible to grossly clear a margin for LAR.      He had port  placed on 1/16/17.    Current Chemotherapy:  Leucovorin 350 mg/m2 IV IV on day 1  Oxaliplatin 85 mg/m2 IV on day 1  Fluorouracil 400 mg/m2 IV on day 1  Fluorouracil 2400 mg/m2 CIV over 46 hours  Every 2 week cycles    C1D1: 1/16/17  C2D1: 1/31/17  C3D1: 2/14/17  C4D1: 2/28/17  C5D1: delayed by 2 weeks to 3/28/17 due to neutropenia; Neulasta added with subsequent cycles  C6D1: 4/11/17  C7D1: 4/25/17  C8D1: anticipated for 5/9/17        INTERIM HISTORY: Louie comes in for follow-up today.  He feels well.  He says that the last rounds of chemotherapy gone fine.  He has not needed to use any nausea medications.  He has numbness/tingling on his hands and feet but are tolerable.           REVIEW OF SYSTEMS:   14 point ROS was reviewed and is negative other than as noted above in HPI.       HOME MEDICATIONS:  Current Outpatient Prescriptions   Medication Sig Dispense Refill     dibucaine (NUPERCAINAL) 1 % OINT ointment 3 times daily as needed for moderate pain       Docusate Sodium (COLACE PO)        metoclopramide (REGLAN) 10 MG tablet Take 1 tablet (10 mg) by mouth 4 times daily (before meals and nightly) 30 tablet 0     LORazepam (ATIVAN) 0.5 MG tablet Take 1 tablet (0.5 mg) by mouth every 4 hours as needed (Anxiety, Nausea/Vomiting or Sleep) 30 tablet 2     prochlorperazine (COMPAZINE) 10 MG tablet Take 1 tablet (10 mg) by mouth every 6 hours as needed (Nausea/Vomiting) 30 tablet 2     ondansetron (ZOFRAN) 8 MG tablet Take 1 tablet (8 mg) by mouth every 8 hours as needed (Nausea/Vomiting) 10 tablet 2     IBUPROFEN PO        acetaminophen (TYLENOL) 325 MG tablet Take 2 tablets (650 mg) by mouth every 4 hours as needed for other (mild pain) 100 tablet 0     hydrocortisone 0.5 % ointment Apply topically 2 times daily Reported on 2/14/2017       diltiazem 2% in PLO cream, FV COMPOUNDED, 2% GEL To anal opening three times daily.  Use a pea-sized amount.  Store at room temperature. 60 g 0         ALLERGIES:  Allergies    Allergen Reactions     Amoxicillin      Ampicillin Diarrhea     Demerol [Meperidine]      Nausea          PAST MEDICAL HISTORY:  Past Medical History:   Diagnosis Date     Childhood asthma      Nephrolithiasis          Rectal cancer (H)     low rectal cancer         PAST SURGICAL HISTORY:  Past Surgical History:   Procedure Laterality Date     COLONOSCOPY N/A 2016    Procedure: COMBINED COLONOSCOPY, SINGLE OR MULTIPLE BIOPSY/POLYPECTOMY BY BIOPSY;  Surgeon: Chelsea Thompson MD;  Location:  GI     HC TOOTH EXTRACTION W/FORCEP       SIGMOIDOSCOPY FLEXIBLE N/A 2016    Procedure: SIGMOIDOSCOPY FLEXIBLE;  Surgeon: Felicitas Radford MD;  Location: [facility] OR     VASECTOMY           SOCIAL HISTORY:  Social History     Social History     Marital status: Single     Spouse name: N/A     Number of children: N/A     Years of education: N/A     Occupational History     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 No     Social History Narrative    Dad  at age  78   from [place], COPD, & HTN    Mom alive in her 70's.     for 30 years    Two adult children. 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         He notes that he had a brother who passed away at a young age of 53 from a metastatic cancer, but unknown what type, and passed away within 2 months of diagnosis.  He denies other family history of cancer in the immediate family.  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      FAMILY HISTORY:  Family History   Problem Relation Age of Onset     CANCER Brother      primary of unknown  origin     Other Cancer Brother      Chronic Obstructive Pulmonary Disease Father      Hypertension Father      Hyperlipidemia Father      Colon Polyps Mother      Number and type of polyps unknown     Family History Negative Brother      negative colonoscopy     DIABETES Maternal Grandfather      Hypertension Paternal Grandmother      Hyperlipidemia Paternal Grandmother      Stomach Cancer Other 63     maternal great grandfather     Glaucoma No family hx of      Macular Degeneration No family hx of          PHYSICAL EXAM:  Vital signs:  /69 (BP Location: Right arm)  Pulse 85  Temp 97.7  F (36.5  C) (Oral)  Resp 16  Wt 96.3 kg (212 lb 6.4 oz)  SpO2 95%  BMI 30.51 kg/m2   ECO  GENERAL/CONSTITUTIONAL: No acute distress. Accompanied by wife.  EYES: No scleral icterus.  LYMPH: No anterior cervical, posterior cervical, or supraclavicular adenopathy.   RESPIRATORY: Clear to auscultation bilaterally. No crackles or wheezing.   CARDIOVASCULAR: Regular rate and rhythm without murmurs, gallops, or rubs.  GASTROINTESTINAL: No tenderness. The patient has normal bowel sounds. No guarding.  No distention.  MUSCULOSKELETAL: Warm and well-perfused, no cyanosis, clubbing, or edema.  NEUROLOGIC: Alert, oriented, answers questions appropriately.  INTEGUMENTARY: No jaundice.  GAIT: Steady, does not use assistive device  Port in place at right upper chest.    LABS:  CBC RESULTS:   Recent Labs   Lab Test  17   0840   WBC  12.1*   RBC  3.90*   HGB  12.2*   HCT  36.2*   MCV  93   MCH  31.3   MCHC  33.7   RDW  17.9*   PLT  82*     Recent Labs   Lab Test  17   0840  17   0904   NA  140  142   POTASSIUM  3.7  3.7   CHLORIDE  107  108   CO2  27  26   ANIONGAP  6  8   GLC  116*  103*   BUN  18  18   CR  0.64*  0.72   FRANDY  8.5  8.8     Lab Results   Component Value Date    AST 44 2017     Lab Results   Component Value Date    ALT 61 2017     No results found for: BILICONTRISHA   Lab Results   Component  Value Date    BILITOTAL 0.7 04/25/2017     Lab Results   Component Value Date    ALBUMIN 3.5 04/25/2017     Lab Results   Component Value Date    PROTTOTAL 6.9 04/25/2017      Lab Results   Component Value Date    ALKPHOS 178 04/25/2017     Component      Latest Ref Rng & Units 2/14/2017 2/28/2017 3/14/2017 3/28/2017   CEA      0 - 2.5 ug/L 1.0 1.1 1.0 0.9     Component      Latest Ref Rng & Units 4/11/2017   CEA      0 - 2.5 ug/L 0.8         IMAGING:  CT C/A/P 3/13/17:  1. No evidence of residual or recurrent disease in chest, abdomen or  pelvis.  2. Multiple small pulmonary nodules, stable since 7/27/2016 and are  likely benign. Attention on follow-up imaging is recommended.  3. Previously described hypodensity in the left lobe of liver is not  conspicuous on the current exam. Suspect this to be due to focal  hepatic steatosis in view of its location adjacent to fissure for  ligamentum teres.    MRI pelvis 3/13/17:  1. No clear evidence of residual or recurrent rectal tumor, with a  corresponding tumor regression grade of 1, unchanged since 11/1/2016.   The tumor previously seen on 7/27/2016 at the left and anterior distal  rectum has an entirely fibrosed appearance. No convincing evidence of  levator ani or anal sphincter involvement, though the tumor does  extending inferiorly to the level of the puborectalis sling  2. Decreased small lymph nodes without suspicious characteristics  measuring up to 4 mm. These are indeterminate but favored as benign.      ASSESSMENT/PLAN:  Louie Greco is a 56 year old male with:    1) Rectal cancer: clinical stage [id number] (stage IIIA), s/p chemoradiation with Xeloda, and appears to have achieved complete response on imaging and biopsies.  He opted not to undergo surgery and expressed desire not to undergo APR, as he does not want a colostomy bag.  It was felt reasonable to go on the watch and wait protocol with the [facility].      He will complete 4 additional months  (8 cycles) of chemotherapy with FOLFOX.  Will discuss with Dr. Radford regarding endoscopic surveillance, as per the watch and wait protocol.      He is tolerating chemotherapy well so far.  CT scans and MRI pelvis on 3/13/17 show no evidence of residual or recurrent rectal tumor.    -C7D1 of FOLFOX today, with Neulasta   -C8D1 to be on 5/9/17  -CT c/a/p and MRI pelvis to be on 6/5/17 at the [facility], with labs  -RTC on 6/6/17 to review results  -he will make appointment with Dr. Radford's office and discuss endoscopic surveillance    2) Hiccups: Occurred for 3 days after cycle 1 of chemotherapy.  May have been due to dexamethasone, although he says he has similar symptoms after receiving sedation for dental procedure previously.  He did not get hiccups after cycle 2 of chemotherapy.  -switched steroids to methylprednisolone  -if symptoms recur, can consider cutting back on steroid dose   -he has Reglan, but he chose not to take it due to possible side effects    3) Chemotherapy-induced nausea: Mild.  He has not needed to take his home anti-emetics though.  -he has Compazine and Zofran prn    4) Genetics: he had appointment on 3/30/17.  He is contemplating sending for genetic testing, but is checking on insurance first.      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  No pain.    -keep chemo at full doses for now; if worsens, can consider reducing dose of oxaliplatin    6) Elevated transaminases: slight elevation of AST and ALT, likely due to chemotherapy.  It had normalized after chemotherapy was delayed for 2 weeks.  -transaminases remain normal today  -monitor for now      I spent a total of 25 minutes with the patient, with over >50% of the time in counseling and/or coordination of care.         Agueda Manley MD  Hematology/Oncology  [facility]

## 2017-04-27 ENCOUNTER — INFUSION THERAPY VISIT (OUTPATIENT)
Dept: INFUSION THERAPY | Facility: CLINIC | Age: 57
End: 2017-04-27
Attending: INTERNAL MEDICINE
Payer: COMMERCIAL

## 2017-04-27 VITALS
DIASTOLIC BLOOD PRESSURE: 81 MMHG | RESPIRATION RATE: 16 BRPM | TEMPERATURE: 98 F | SYSTOLIC BLOOD PRESSURE: 142 MMHG | HEART RATE: 89 BPM

## 2017-04-27 DIAGNOSIS — C20 MALIGNANT NEOPLASM OF RECTUM (H): Primary | ICD-10-CM

## 2017-04-27 PROCEDURE — 40000269 ZZH STATISTIC NO CHARGE FACILITY FEE

## 2017-04-27 PROCEDURE — 25000128 H RX IP 250 OP 636: Performed by: INTERNAL MEDICINE

## 2017-04-27 RX ORDER — HEPARIN SODIUM (PORCINE) LOCK FLUSH IV SOLN 100 UNIT/ML 100 UNIT/ML
5 SOLUTION INTRAVENOUS ONCE
Status: CANCELLED
Start: 2017-04-27 | End: 2017-04-27

## 2017-04-27 RX ORDER — HEPARIN SODIUM (PORCINE) LOCK FLUSH IV SOLN 100 UNIT/ML 100 UNIT/ML
5 SOLUTION INTRAVENOUS ONCE
Status: COMPLETED | OUTPATIENT
Start: 2017-04-27 | End: 2017-04-27

## 2017-04-27 RX ADMIN — SODIUM CHLORIDE, PRESERVATIVE FREE 5 ML: 5 INJECTION INTRAVENOUS at 15:56

## 2017-04-27 RX ADMIN — SODIUM CHLORIDE 1000 ML: 9 INJECTION, SOLUTION INTRAVENOUS at 14:55

## 2017-04-27 ASSESSMENT — PAIN SCALES - GENERAL: PAINLEVEL: NO PAIN (0)

## 2017-04-27 NOTE — MR AVS SNAPSHOT
After Visit Summary   4/27/2017    Louie Greco    MRN: [id number]           Patient Information     Date Of Birth          1960        Visit Information        Provider Department      4/27/2017 2:30 PM  INFUSION CHAIR 14 [facility]        Today's Diagnoses     Malignant neoplasm of rectum (H)    -  1       Follow-ups after your visit        Follow-up notes from your care team     Return in 1 day (on 4/28/2017).      Your next 10 appointments already scheduled     Apr 28, 2017  2:30 PM CDT   Level 1 with  INFUSION CHAIR 14   [facility] ([facility])    [facility]  [address]  [place] [address]   [phone]            May 09, 2017  8:30 AM CDT   Level 5 with  INFUSION CHAIR 15   [facility] ([facility])    [facility]  [address]  [facility] [address]   [phone]            Jun 05, 2017  3:40 PM CDT   (Arrive by 3:25 PM)   CT CHEST/ABDOMEN/PELVIS W CONTRAST with [id number]   [facility] ([facility])    [address] [address] [phone]           Please bring any scans or X-rays taken at other hospitals, if similar tests were done. Also bring a list of your medicines, including vitamins, minerals and over-the-counter drugs. It is safest to leave personal items at home.  Be sure to tell your doctor:   If you have any allergies.   If there s any chance you are pregnant.   If you are breastfeeding.   If you have any special needs.  You may have contrast for this exam. To prepare:   Do not eat or drink for 2 hours before your exam. If you need to take medicine, you may take it with small sips of water. (We may ask you to take liquid medicine as well.)   The day before your exam, drink extra fluids at least six 8-ounce  glasses (unless your doctor tells you to restrict your fluids).  Patients over 70 or patients with diabetes or kidney problems:   If you haven t had a blood test (creatinine test) within the last 30 days, go to your clinic or Diagnostic Imaging Department for this test.  If you have diabetes:   If your kidney function is normal, continue taking your metformin (Avandamet, Glucophage, Glucovance, Metaglip) on the day of your exam.   If your kidney function is abnormal, wait 48 hours before restarting this medicine.  You will have oral contrast for this exam:   You will drink the contrast at home. Get this from your clinic or Diagnostic Imaging Department. Please follow the directions given.  Please wear loose clothing, such as a sweat suit or jogging clothes. Avoid snaps, zippers and other metal. We may ask you to undress and put on a hospital gown.  If you have any questions, please call the Imaging Department where you will have your exam.            Jun 05, 2017  4:00 PM CDT   (Arrive by 3:45 PM)   MR PELVIS W/O & W CONTRAST with [id number]   [facility] ([facility])    [address] [place] [address] [phone]           Take your medicines as usual, unless your doctor tells you not to. Bring a list of your current medicines to your exam (including vitamins, minerals and over-the-counter drugs).  You will be given intravenous contrast for this exam. To prepare:   The day before your exam, drink extra fluids at least six 8-ounce glasses (unless your doctor tells you to restrict your fluids).   Have a blood test (creatinine test) within 30 days of your exam. Go to your clinic or Diagnostic Imaging Department for this test.  The MRI machine uses a strong magnet. Please wear clothes without metal (snaps, zippers). A sweatsuit works well, or we may give you a hospital gown.  Please remove any body piercings and hair extensions before you arrive. You  will also remove watches, jewelry, hairpins, wallets, dentures, partial dental plates and hearing aids. You may wear contact lenses, and you may be able to wear your rings. We have a safe place to keep your personal items, but it is safer to leave them at home.   **IMPORTANT** THE INSTRUCTIONS BELOW ARE ONLY FOR THOSE PATIENTS WHO HAVE BEEN TOLD THEY WILL RECEIVE SEDATION OR GENERAL ANESTHESIA DURING THEIR MRI PROCEDURE:  IF YOU WILL RECEIVE SEDATION (take medicine to help you relax during your exam):   You must get the medicine from your doctor before you arrive. Bring the medicine to the exam. Do not take it at home.   Arrive one hour early. Bring someone who can take you home after the test. Your medicine will make you sleepy. After the exam, you may not drive, take a bus or take a taxi by yourself.   No eating 8 hours before your exam. You may have clear liquids up until 4 hours before your exam. (Clear liquids include water, clear tea, black coffee and fruit juice without pulp.)  IF YOU WILL RECEIVE ANESTHESIA (be asleep for your exam):   Arrive 1 1/2 hours early. Bring someone who can take you home after the test. You may not drive, take a bus or take a taxi by yourself.   No eating 8 hours before your exam. You may have clear liquids up until 4 hours before your exam. (Clear liquids include water, clear tea, black coffee and fruit juice without pulp.)  Please call the Imaging Department at your exam site with any questions.            Jun 06, 2017  2:00 PM CDT   Return Visit with Agueda Manley MD   [facility] ([facility])    [facility]  [address]  [facility] [address]   [phone]              Who to contact     If you have questions or need follow up information about today's clinic visit or your schedule please contact [facility] AND [facility] directly at [phone].  Normal or non-critical lab and imaging  results will be communicated to you by MyChart, letter or phone within 4 business days after the clinic has received the results. If you do not hear from us within 7 days, please contact the clinic through [facility] or phone. If you have a critical or abnormal lab result, we will notify you by phone as soon as possible.  Submit refill requests through [facility] or call your pharmacy and they will forward the refill request to us. Please allow 3 business days for your refill to be completed.          Additional Information About Your Visit        [employer] Information     [facility] gives you secure access to your electronic health record. If you see a primary care provider, you can also send messages to your care team and make appointments. If you have questions, please call your primary care clinic.  If you do not have a primary care provider, please call [phone] and they will assist you.        Care EveryWhere ID     This is your Care EveryWhere ID. This could be used by other organizations to access your [facility] medical records  [id number]        Your Vitals Were     Pulse Temperature Respirations             89 98  F (36.7  C) 16          Blood Pressure from Last 3 Encounters:   04/27/17 142/81   04/25/17 115/69   04/25/17 114/75    Weight from Last 3 Encounters:   04/25/17 96.3 kg (212 lb 6.4 oz)   04/25/17 96.3 kg (212 lb 6.4 oz)   04/11/17 97.6 kg (215 lb 3.2 oz)              Today, you had the following     No orders found for display       Primary Care Provider Office Phone # Fax #    Ayden Peralta [phone]       [facility] [address] [facility] [address]        Thank you!     Thank you for choosing [facility] AND [facility]  for your care. Our goal is always to provide you with excellent care. Hearing back from our patients is one way we can continue to improve our services. Please take a few minutes to complete the written survey that you may  receive in the mail after your visit with us. Thank you!             Your Updated Medication List - Protect others around you: Learn how to safely use, store and throw away your medicines at www.disposemymeds.org.          This list is accurate as of: 4/27/17  4:04 PM.  Always use your most recent med list.                   Brand Name Dispense Instructions for use    acetaminophen 325 MG tablet    TYLENOL    100 tablet    Take 2 tablets (650 mg) by mouth every 4 hours as needed for other (mild pain)       COLACE PO          dibucaine 1 % Oint ointment    NUPERCAINAL     3 times daily as needed for moderate pain       diltiazem 2% in PLO cream (FV COMPOUNDED) 2% Gel     60 g    To anal opening three times daily.  Use a pea-sized amount.  Store at room temperature.       hydrocortisone 0.5 % ointment      Apply topically 2 times daily Reported on 2/14/2017       IBUPROFEN PO          LORazepam 0.5 MG tablet    ATIVAN    30 tablet    Take 1 tablet (0.5 mg) by mouth every 4 hours as needed (Anxiety, Nausea/Vomiting or Sleep)       metoclopramide 10 MG tablet    REGLAN    30 tablet    Take 1 tablet (10 mg) by mouth 4 times daily (before meals and nightly)       ondansetron 8 MG tablet    ZOFRAN    10 tablet    Take 1 tablet (8 mg) by mouth every 8 hours as needed (Nausea/Vomiting)       prochlorperazine 10 MG tablet    COMPAZINE    30 tablet    Take 1 tablet (10 mg) by mouth every 6 hours as needed (Nausea/Vomiting)

## 2017-04-27 NOTE — PROGRESS NOTES
Infusion Nursing Note:  Louie Greco presents today for unhook chemo pump and IVF.    Patient seen by provider today: No   present during visit today: Not Applicable.    Note: N/A.    Intravenous Access:  Implanted Port.    Treatment Conditions:  Not Applicable.      Post Infusion Assessment:  Patient tolerated infusion without incident.  Blood return noted pre and post infusion.  Site patent and intact, free from redness, edema or discomfort.  No evidence of extravasations.  Access discontinued per protocol.    Discharge Plan:   AVS to patient via MYCHART.  Patient will return 4/28/17 for next appointment.   Patient discharged in stable condition accompanied by: self.  Departure Mode: Ambulatory.    Ashvin Martinez RN

## 2017-04-28 ENCOUNTER — INFUSION THERAPY VISIT (OUTPATIENT)
Dept: INFUSION THERAPY | Facility: CLINIC | Age: 57
End: 2017-04-28
Attending: INTERNAL MEDICINE
Payer: COMMERCIAL

## 2017-04-28 VITALS — DIASTOLIC BLOOD PRESSURE: 71 MMHG | TEMPERATURE: 98 F | SYSTOLIC BLOOD PRESSURE: 122 MMHG | HEART RATE: 88 BPM

## 2017-04-28 DIAGNOSIS — C20 MALIGNANT NEOPLASM OF RECTUM (H): Primary | ICD-10-CM

## 2017-04-28 PROCEDURE — 96372 THER/PROPH/DIAG INJ SC/IM: CPT

## 2017-04-28 PROCEDURE — 25000128 H RX IP 250 OP 636: Performed by: INTERNAL MEDICINE

## 2017-04-28 RX ADMIN — PEGFILGRASTIM 6 MG: 6 INJECTION SUBCUTANEOUS at 15:44

## 2017-04-28 ASSESSMENT — PAIN SCALES - GENERAL: PAINLEVEL: NO PAIN (0)

## 2017-04-28 NOTE — PROGRESS NOTES
Infusion Nursing Note:  Louie Greco presents today for Neulasta injection   Patient seen by provider today: No   present during visit today: Not Applicable.    Note: Neulasta Injection on Left Arm.    Intravenous Access:  No Intravenous access/labs at this visit.    Treatment Conditions:  Not Applicable.      Post Infusion Assessment:  Patient tolerated injection without incident.  Site patent and intact, free from redness, edema or discomfort.    Discharge Plan:   Patient and/or family verbalized understanding of discharge instructions and all questions answered.  Copy of AVS reviewed with patient and/or family.  Patient will return 5/9/2017 for next appointment.  Patient discharged in stable condition accompanied by: self.  Departure Mode: Ambulatory.    Roberta De Jesus RN

## 2017-04-28 NOTE — MR AVS SNAPSHOT
After Visit Summary   4/28/2017    Louie Greco    MRN: [id number]           Patient Information     Date Of Birth          1960        Visit Information        Provider Department      4/28/2017 2:30 PM  INFUSION CHAIR 14 [facility]        Today's Diagnoses     Malignant neoplasm of rectum (H)    -  1       Follow-ups after your visit        Your next 10 appointments already scheduled     May 09, 2017  8:30 AM CDT   Level 5 with  INFUSION CHAIR 15   [facility] ([facility])    [facility]  [address]  [facility] [address]   [phone]            Jun 05, 2017  3:40 PM CDT   (Arrive by 3:25 PM)   CT CHEST/ABDOMEN/PELVIS W CONTRAST with UCCT2   [facility] [place] [facility] ([facility])    [address] [address] [phone]           Please bring any scans or X-rays taken at other hospitals, if similar tests were done. Also bring a list of your medicines, including vitamins, minerals and over-the-counter drugs. It is safest to leave personal items at home.  Be sure to tell your doctor:   If you have any allergies.   If there s any chance you are pregnant.   If you are breastfeeding.   If you have any special needs.  You may have contrast for this exam. To prepare:   Do not eat or drink for 2 hours before your exam. If you need to take medicine, you may take it with small sips of water. (We may ask you to take liquid medicine as well.)   The day before your exam, drink extra fluids at least six 8-ounce glasses (unless your doctor tells you to restrict your fluids).  Patients over 70 or patients with diabetes or kidney problems:   If you haven t had a blood test (creatinine test) within the last 30 days, go to your clinic or Diagnostic Imaging Department for this test.  If you have diabetes:   If your kidney function is  normal, continue taking your metformin (Avandamet, Glucophage, Glucovance, Metaglip) on the day of your exam.   If your kidney function is abnormal, wait 48 hours before restarting this medicine.  You will have oral contrast for this exam:   You will drink the contrast at home. Get this from your clinic or Diagnostic Imaging Department. Please follow the directions given.  Please wear loose clothing, such as a sweat suit or jogging clothes. Avoid snaps, zippers and other metal. We may ask you to undress and put on a hospital gown.  If you have any questions, please call the Imaging Department where you will have your exam.            Jun 05, 2017  4:00 PM CDT   (Arrive by 3:45 PM)   MR PELVIS W/O & W CONTRAST with [id number]   [facility] MRI ([facility] [place])    [address] [place] [address] [phone]           Take your medicines as usual, unless your doctor tells you not to. Bring a list of your current medicines to your exam (including vitamins, minerals and over-the-counter drugs).  You will be given intravenous contrast for this exam. To prepare:   The day before your exam, drink extra fluids at least six 8-ounce glasses (unless your doctor tells you to restrict your fluids).   Have a blood test (creatinine test) within 30 days of your exam. Go to your clinic or Diagnostic Imaging Department for this test.  The MRI machine uses a strong magnet. Please wear clothes without metal (snaps, zippers). A sweatsuit works well, or we may give you a hospital gown.  Please remove any body piercings and hair extensions before you arrive. You will also remove watches, jewelry, hairpins, wallets, dentures, partial dental plates and hearing aids. You may wear contact lenses, and you may be able to wear your rings. We have a safe place to keep your personal items, but it is safer to leave them at home.   **IMPORTANT** THE INSTRUCTIONS BELOW ARE ONLY FOR THOSE  PATIENTS WHO HAVE BEEN TOLD THEY WILL RECEIVE SEDATION OR GENERAL ANESTHESIA DURING THEIR MRI PROCEDURE:  IF YOU WILL RECEIVE SEDATION (take medicine to help you relax during your exam):   You must get the medicine from your doctor before you arrive. Bring the medicine to the exam. Do not take it at home.   Arrive one hour early. Bring someone who can take you home after the test. Your medicine will make you sleepy. After the exam, you may not drive, take a bus or take a taxi by yourself.   No eating 8 hours before your exam. You may have clear liquids up until 4 hours before your exam. (Clear liquids include water, clear tea, black coffee and fruit juice without pulp.)  IF YOU WILL RECEIVE ANESTHESIA (be asleep for your exam):   Arrive 1 1/2 hours early. Bring someone who can take you home after the test. You may not drive, take a bus or take a taxi by yourself.   No eating 8 hours before your exam. You may have clear liquids up until 4 hours before your exam. (Clear liquids include water, clear tea, black coffee and fruit juice without pulp.)  Please call the Imaging Department at your exam site with any questions.            Jun 06, 2017  3:00 PM CDT   Return Visit with Agueda Manley MD   [facility] ([facility])    [facility]  [address] [id number] [phone]              Who to contact     If you have questions or need follow up information about today's clinic visit or your schedule please contact [facility] AND [facility] CENTER directly at [phone].  Normal or non-critical lab and imaging results will be communicated to you by MyChart, letter or phone within 4 business days after the clinic has received the results. If you do not hear from us within 7 days, please contact the clinic through MyChart or phone. If you have a critical or abnormal lab result, we will notify you by phone as soon as  possible.  Submit refill requests through [employer] or call your pharmacy and they will forward the refill request to us. Please allow 3 business days for your refill to be completed.          Additional Information About Your Visit        [employer] Information     [employer] gives you secure access to your electronic health record. If you see a primary care provider, you can also send messages to your care team and make appointments. If you have questions, please call your primary care clinic.  If you do not have a primary care provider, please call [phone] and they will assist you.        Care EveryWhere ID     This is your Care EveryWhere ID. This could be used by other organizations to access your [place] medical records  [id number]        Your Vitals Were     Pulse Temperature                88 98  F (36.7  C) (Oral)           Blood Pressure from Last 3 Encounters:   04/28/17 122/71   04/27/17 142/81   04/25/17 115/69    Weight from Last 3 Encounters:   04/25/17 96.3 kg (212 lb 6.4 oz)   04/25/17 96.3 kg (212 lb 6.4 oz)   04/11/17 97.6 kg (215 lb 3.2 oz)              Today, you had the following     No orders found for display       Primary Care Provider Office Phone # Fax #    Ayden Peralta [phone]       [facility] [address] [facility] [address]        Thank you!     Thank you for choosing [facility] CANCER [facility] AND [facility] CENTER  for your care. Our goal is always to provide you with excellent care. Hearing back from our patients is one way we can continue to improve our services. Please take a few minutes to complete the written survey that you may receive in the mail after your visit with us. Thank you!             Your Updated Medication List - Protect others around you: Learn how to safely use, store and throw away your medicines at www.disposemymeds.org.          This list is accurate as of: 4/28/17  3:51 PM.  Always use your most recent med list.                    Brand Name Dispense Instructions for use    acetaminophen 325 MG tablet    TYLENOL    100 tablet    Take 2 tablets (650 mg) by mouth every 4 hours as needed for other (mild pain)       COLACE PO          dibucaine 1 % Oint ointment    NUPERCAINAL     3 times daily as needed for moderate pain       diltiazem 2% in PLO cream (FV COMPOUNDED) 2% Gel     60 g    To anal opening three times daily.  Use a pea-sized amount.  Store at room temperature.       hydrocortisone 0.5 % ointment      Apply topically 2 times daily Reported on 2/14/2017       IBUPROFEN PO          LORazepam 0.5 MG tablet    ATIVAN    30 tablet    Take 1 tablet (0.5 mg) by mouth every 4 hours as needed (Anxiety, Nausea/Vomiting or Sleep)       metoclopramide 10 MG tablet    REGLAN    30 tablet    Take 1 tablet (10 mg) by mouth 4 times daily (before meals and nightly)       ondansetron 8 MG tablet    ZOFRAN    10 tablet    Take 1 tablet (8 mg) by mouth every 8 hours as needed (Nausea/Vomiting)       prochlorperazine 10 MG tablet    COMPAZINE    30 tablet    Take 1 tablet (10 mg) by mouth every 6 hours as needed (Nausea/Vomiting)

## 2017-05-09 ENCOUNTER — INFUSION THERAPY VISIT (OUTPATIENT)
Dept: INFUSION THERAPY | Facility: CLINIC | Age: 57
End: 2017-05-09
Attending: INTERNAL MEDICINE
Payer: COMMERCIAL

## 2017-05-09 ENCOUNTER — HOSPITAL ENCOUNTER (OUTPATIENT)
Facility: CLINIC | Age: 57
Setting detail: SPECIMEN
Discharge: HOME | End: 2017-05-09
Attending: INTERNAL MEDICINE | Admitting: INTERNAL MEDICINE
Payer: COMMERCIAL

## 2017-05-09 VITALS
DIASTOLIC BLOOD PRESSURE: 86 MMHG | TEMPERATURE: 97.9 F | HEART RATE: 87 BPM | HEIGHT: 70 IN | WEIGHT: 209 LBS | SYSTOLIC BLOOD PRESSURE: 131 MMHG | BODY MASS INDEX: 29.92 KG/M2 | RESPIRATION RATE: 20 BRPM

## 2017-05-09 DIAGNOSIS — C20 MALIGNANT NEOPLASM OF RECTUM (H): Primary | ICD-10-CM

## 2017-05-09 LAB
ALBUMIN SERPL-MCNC: 3.5 G/DL (ref 3.4–5)
ALP SERPL-CCNC: 204 U/L (ref 40–150)
ALT SERPL W P-5'-P-CCNC: 53 U/L (ref 0–70)
ANION GAP SERPL CALCULATED.3IONS-SCNC: 6 MMOL/L (ref 3–14)
AST SERPL W P-5'-P-CCNC: 47 U/L (ref 0–45)
BASOPHILS # BLD AUTO: 0 10E9/L (ref 0–0.2)
BASOPHILS NFR BLD AUTO: 0.2 %
BILIRUB SERPL-MCNC: 0.7 MG/DL (ref 0.2–1.3)
BUN SERPL-MCNC: 12 MG/DL (ref 7–30)
CALCIUM SERPL-MCNC: 8.7 MG/DL (ref 8.5–10.1)
CEA SERPL-MCNC: 1.2 UG/L (ref 0–2.5)
CHLORIDE SERPL-SCNC: 107 MMOL/L (ref 94–109)
CO2 SERPL-SCNC: 28 MMOL/L (ref 20–32)
CREAT SERPL-MCNC: 0.66 MG/DL (ref 0.66–1.25)
DIFFERENTIAL METHOD BLD: ABNORMAL
EOSINOPHIL # BLD AUTO: 0.2 10E9/L (ref 0–0.7)
EOSINOPHIL NFR BLD AUTO: 1.3 %
ERYTHROCYTE [DISTWIDTH] IN BLOOD BY AUTOMATED COUNT: 17.9 % (ref 10–15)
GFR SERPL CREATININE-BSD FRML MDRD: ABNORMAL ML/MIN/1.7M2
GLUCOSE SERPL-MCNC: 105 MG/DL (ref 70–99)
HCT VFR BLD AUTO: 34.7 % (ref 40–53)
HGB BLD-MCNC: 11.7 G/DL (ref 13.3–17.7)
IMM GRANULOCYTES # BLD: 0.4 10E9/L (ref 0–0.4)
IMM GRANULOCYTES NFR BLD: 3.9 %
LYMPHOCYTES # BLD AUTO: 0.8 10E9/L (ref 0.8–5.3)
LYMPHOCYTES NFR BLD AUTO: 7.5 %
MCH RBC QN AUTO: 32 PG (ref 26.5–33)
MCHC RBC AUTO-ENTMCNC: 33.7 G/DL (ref 31.5–36.5)
MCV RBC AUTO: 95 FL (ref 78–100)
MONOCYTES # BLD AUTO: 1 10E9/L (ref 0–1.3)
MONOCYTES NFR BLD AUTO: 9 %
NEUTROPHILS # BLD AUTO: 8.7 10E9/L (ref 1.6–8.3)
NEUTROPHILS NFR BLD AUTO: 78.1 %
NRBC # BLD AUTO: 0 10*3/UL
NRBC BLD AUTO-RTO: 0 /100
PLATELET # BLD AUTO: 94 10E9/L (ref 150–450)
POTASSIUM SERPL-SCNC: 3.6 MMOL/L (ref 3.4–5.3)
PROT SERPL-MCNC: 6.8 G/DL (ref 6.8–8.8)
RBC # BLD AUTO: 3.66 10E12/L (ref 4.4–5.9)
SODIUM SERPL-SCNC: 141 MMOL/L (ref 133–144)
WBC # BLD AUTO: 11.1 10E9/L (ref 4–11)

## 2017-05-09 PROCEDURE — 80053 COMPREHEN METABOLIC PANEL: CPT | Performed by: INTERNAL MEDICINE

## 2017-05-09 PROCEDURE — 85025 COMPLETE CBC W/AUTO DIFF WBC: CPT | Performed by: INTERNAL MEDICINE

## 2017-05-09 PROCEDURE — 96375 TX/PRO/DX INJ NEW DRUG ADDON: CPT

## 2017-05-09 PROCEDURE — 25000128 H RX IP 250 OP 636: Performed by: INTERNAL MEDICINE

## 2017-05-09 PROCEDURE — 82378 CARCINOEMBRYONIC ANTIGEN: CPT | Performed by: INTERNAL MEDICINE

## 2017-05-09 PROCEDURE — 96411 CHEMO IV PUSH ADDL DRUG: CPT

## 2017-05-09 PROCEDURE — 96413 CHEMO IV INFUSION 1 HR: CPT

## 2017-05-09 PROCEDURE — 25000125 ZZHC RX 250: Performed by: INTERNAL MEDICINE

## 2017-05-09 PROCEDURE — 96367 TX/PROPH/DG ADDL SEQ IV INF: CPT

## 2017-05-09 PROCEDURE — 96415 CHEMO IV INFUSION ADDL HR: CPT

## 2017-05-09 PROCEDURE — 96416 CHEMO PROLONG INFUSE W/PUMP: CPT

## 2017-05-09 PROCEDURE — 96368 THER/DIAG CONCURRENT INF: CPT

## 2017-05-09 RX ORDER — METHYLPREDNISOLONE SODIUM SUCCINATE 125 MG/2ML
50 INJECTION, POWDER, LYOPHILIZED, FOR SOLUTION INTRAMUSCULAR; INTRAVENOUS ONCE
Status: COMPLETED | OUTPATIENT
Start: 2017-05-09 | End: 2017-05-09

## 2017-05-09 RX ORDER — FLUOROURACIL 50 MG/ML
400 INJECTION, SOLUTION INTRAVENOUS ONCE
Status: COMPLETED | OUTPATIENT
Start: 2017-05-09 | End: 2017-05-09

## 2017-05-09 RX ADMIN — LEUCOVORIN CALCIUM 800 MG: 350 INJECTION, POWDER, LYOPHILIZED, FOR SOLUTION INTRAMUSCULAR; INTRAVENOUS at 10:09

## 2017-05-09 RX ADMIN — OXALIPLATIN 190 MG: 100 INJECTION, SOLUTION, CONCENTRATE INTRAVENOUS at 10:09

## 2017-05-09 RX ADMIN — ONDANSETRON HYDROCHLORIDE 8 MG: 2 INJECTION, SOLUTION INTRAVENOUS at 09:46

## 2017-05-09 RX ADMIN — DEXTROSE MONOHYDRATE 250 ML: 50 INJECTION, SOLUTION INTRAVENOUS at 09:46

## 2017-05-09 RX ADMIN — METHYLPREDNISOLONE SODIUM SUCCINATE 50 MG: 125 INJECTION, POWDER, FOR SOLUTION INTRAMUSCULAR; INTRAVENOUS at 09:46

## 2017-05-09 RX ADMIN — FLUOROURACIL 890 MG: 50 INJECTION, SOLUTION INTRAVENOUS at 12:20

## 2017-05-09 ASSESSMENT — PAIN SCALES - GENERAL: PAINLEVEL: NO PAIN (0)

## 2017-05-09 NOTE — PROGRESS NOTES
"Infusion Nursing Note:  Louie Greco presents today for C8D1 m-folfox 6  Patient seen by provider today: No   present during visit today: Not Applicable.    Note: N/A.    Intravenous Access:  Implanted Port.    Treatment Conditions:  Lab Results   Component Value Date    HGB 11.7 05/09/2017     Lab Results   Component Value Date    WBC 11.1 05/09/2017      Lab Results   Component Value Date    ANEU 8.7 05/09/2017     Lab Results   Component Value Date    PLT 94 05/09/2017      Lab Results   Component Value Date     05/09/2017                   Lab Results   Component Value Date    POTASSIUM 3.6 05/09/2017           No results found for: MAG         Lab Results   Component Value Date    CR 0.66 05/09/2017                   Lab Results   Component Value Date    FRANDY 8.7 05/09/2017                Lab Results   Component Value Date    BILITOTAL 0.7 05/09/2017           Lab Results   Component Value Date    ALBUMIN 3.5 05/09/2017                    Lab Results   Component Value Date    ALT 53 05/09/2017           Lab Results   Component Value Date    AST 47 05/09/2017     Results reviewed, labs MET treatment parameters, ok to proceed with treatment.      Post Infusion Assessment:  Patient tolerated infusion without incident.  Blood return noted pre and post infusion.  Blood return noted during administration every 2-3 cc's with 5Fu push.  Site patent and intact, free from redness, edema or discomfort.  No evidence of extravasations.    Prior to discharge: Port is secured in place with tegaderm and flushed with 10cc NS with positive blood return noted.  Continuous home infusion Dosi-Fuser pump connected.    All connectors secured in place and clamps taped open.    Pump started, \"running\" noted on display (CADD): Not Applicable.  Patient instructed to call our clinic or [facility] with any questions or concerns at home.  Patient verbalized understanding.    Patient set up for pump disconnect at " our clinic on 5/11/17 at 2:30pm (per pt request).            Discharge Plan:   AVS to patient via MYCHART.  Patient will return 5/11/17 for next appointment.   Patient discharged in stable condition accompanied by: wife and daughter.  Departure Mode: Ambulatory.    Ashvin Martinez RN

## 2017-05-09 NOTE — MR AVS SNAPSHOT
After Visit Summary   5/9/2017    Louie Greco    MRN: [id number]           Patient Information     Date Of Birth          1960        Visit Information        Provider Department      5/9/2017 8:30 AM  INFUSION CHAIR 15 [facility]        Today's Diagnoses     Malignant neoplasm of rectum (H)    -  1       Follow-ups after your visit        Follow-up notes from your care team     Return in 2 days (on 5/11/2017).      Your next 10 appointments already scheduled     May 11, 2017  2:30 PM CDT   Level 2 with  INFUSION CHAIR 7   [facility] ([facility])    [facility]  [address]  [place] [address]   [phone]            May 12, 2017  2:30 PM CDT   Level 1 with  INFUSION CHAIR 12   [facility] ([facility])    [facility]  [address]  [facility] [address]   [phone]            Jun 05, 2017  3:40 PM CDT   (Arrive by 3:25 PM)   CT CHEST/ABDOMEN/PELVIS W CONTRAST with UCCT2   [facility] ([facility])    [address] [address] [phone]           Please bring any scans or X-rays taken at other hospitals, if similar tests were done. Also bring a list of your medicines, including vitamins, minerals and over-the-counter drugs. It is safest to leave personal items at home.  Be sure to tell your doctor:   If you have any allergies.   If there s any chance you are pregnant.   If you are breastfeeding.   If you have any special needs.  You may have contrast for this exam. To prepare:   Do not eat or drink for 2 hours before your exam. If you need to take medicine, you may take it with small sips of water. (We may ask you to take liquid medicine as well.)   The day before your exam, drink extra fluids at least six 8-ounce  glasses (unless your doctor tells you to restrict your fluids).  Patients over 70 or patients with diabetes or kidney problems:   If you haven t had a blood test (creatinine test) within the last 30 days, go to your clinic or Diagnostic Imaging Department for this test.  If you have diabetes:   If your kidney function is normal, continue taking your metformin (Avandamet, Glucophage, Glucovance, Metaglip) on the day of your exam.   If your kidney function is abnormal, wait 48 hours before restarting this medicine.  You will have oral contrast for this exam:   You will drink the contrast at home. Get this from your clinic or Diagnostic Imaging Department. Please follow the directions given.  Please wear loose clothing, such as a sweat suit or jogging clothes. Avoid snaps, zippers and other metal. We may ask you to undress and put on a hospital gown.  If you have any questions, please call the Imaging Department where you will have your exam.            Jun 05, 2017  4:00 PM CDT   (Arrive by 3:45 PM)   MR PELVIS W/O & W CONTRAST with [id number]   [facility] ([facility] [place])    [address] [place] [address] [phone]           Take your medicines as usual, unless your doctor tells you not to. Bring a list of your current medicines to your exam (including vitamins, minerals and over-the-counter drugs).  You will be given intravenous contrast for this exam. To prepare:   The day before your exam, drink extra fluids at least six 8-ounce glasses (unless your doctor tells you to restrict your fluids).   Have a blood test (creatinine test) within 30 days of your exam. Go to your clinic or Diagnostic Imaging Department for this test.  The MRI machine uses a strong magnet. Please wear clothes without metal (snaps, zippers). A sweatsuit works well, or we may give you a hospital gown.  Please remove any body piercings and hair extensions before you arrive. You  will also remove watches, jewelry, hairpins, wallets, dentures, partial dental plates and hearing aids. You may wear contact lenses, and you may be able to wear your rings. We have a safe place to keep your personal items, but it is safer to leave them at home.   **IMPORTANT** THE INSTRUCTIONS BELOW ARE ONLY FOR THOSE PATIENTS WHO HAVE BEEN TOLD THEY WILL RECEIVE SEDATION OR GENERAL ANESTHESIA DURING THEIR MRI PROCEDURE:  IF YOU WILL RECEIVE SEDATION (take medicine to help you relax during your exam):   You must get the medicine from your doctor before you arrive. Bring the medicine to the exam. Do not take it at home.   Arrive one hour early. Bring someone who can take you home after the test. Your medicine will make you sleepy. After the exam, you may not drive, take a bus or take a taxi by yourself.   No eating 8 hours before your exam. You may have clear liquids up until 4 hours before your exam. (Clear liquids include water, clear tea, black coffee and fruit juice without pulp.)  IF YOU WILL RECEIVE ANESTHESIA (be asleep for your exam):   Arrive 1 1/2 hours early. Bring someone who can take you home after the test. You may not drive, take a bus or take a taxi by yourself.   No eating 8 hours before your exam. You may have clear liquids up until 4 hours before your exam. (Clear liquids include water, clear tea, black coffee and fruit juice without pulp.)  Please call the Imaging Department at your exam site with any questions.            Jun 06, 2017  3:00 PM CDT   Return Visit with Agueda Manley MD   [facility] ([facility])    [facility]  [address]  [facility] [address]   [phone]              Who to contact     If you have questions or need follow up information about today's clinic visit or your schedule please contact [facility] CANCER CLINIC AND [facility] CENTER directly at [phone].  Normal or non-critical lab and imaging  "results will be communicated to you by MyChart, letter or phone within 4 business days after the clinic has received the results. If you do not hear from us within 7 days, please contact the clinic through [employer] or phone. If you have a critical or abnormal lab result, we will notify you by phone as soon as possible.  Submit refill requests through [employer] or call your pharmacy and they will forward the refill request to us. Please allow 3 business days for your refill to be completed.          Additional Information About Your Visit        [employer] Information     [employer] gives you secure access to your electronic health record. If you see a primary care provider, you can also send messages to your care team and make appointments. If you have questions, please call your primary care clinic.  If you do not have a primary care provider, please call [phone] and they will assist you.        Care EveryWhere ID     This is your Care EveryWhere ID. This could be used by other organizations to access your [place] medical records  [id number]        Your Vitals Were     Pulse Temperature Respirations Height BMI (Body Mass Index)       81 97.9  F (36.6  C) (Oral) 16 1.777 m (5' 9.96\") 30.02 kg/m2        Blood Pressure from Last 3 Encounters:   05/09/17 114/73   04/28/17 122/71   04/27/17 142/81    Weight from Last 3 Encounters:   05/09/17 94.8 kg (209 lb)   04/25/17 96.3 kg (212 lb 6.4 oz)   04/25/17 96.3 kg (212 lb 6.4 oz)              We Performed the Following     CBC with platelets differential     CEA     Comprehensive metabolic panel     Treatment Conditions        Primary Care Provider Office Phone # Fax #    Ayden Peralta [phone]       [facility] [address] [facility] [address]        Thank you!     Thank you for choosing [facility] CANCER [facility] AND [facility]  for your care. Our goal is always to provide you with excellent care. Hearing back from our " patients is one way we can continue to improve our services. Please take a few minutes to complete the written survey that you may receive in the mail after your visit with us. Thank you!             Your Updated Medication List - Protect others around you: Learn how to safely use, store and throw away your medicines at www.disposemymeds.org.          This list is accurate as of: 5/9/17 10:07 AM.  Always use your most recent med list.                   Brand Name Dispense Instructions for use    acetaminophen 325 MG tablet    TYLENOL    100 tablet    Take 2 tablets (650 mg) by mouth every 4 hours as needed for other (mild pain)       COLACE PO          dibucaine 1 % Oint ointment    NUPERCAINAL     3 times daily as needed for moderate pain       diltiazem 2% in PLO cream (FV COMPOUNDED) 2% Gel     60 g    To anal opening three times daily.  Use a pea-sized amount.  Store at room temperature.       hydrocortisone 0.5 % ointment      Apply topically 2 times daily Reported on 2/14/2017       IBUPROFEN PO          LORazepam 0.5 MG tablet    ATIVAN    30 tablet    Take 1 tablet (0.5 mg) by mouth every 4 hours as needed (Anxiety, Nausea/Vomiting or Sleep)       metoclopramide 10 MG tablet    REGLAN    30 tablet    Take 1 tablet (10 mg) by mouth 4 times daily (before meals and nightly)       ondansetron 8 MG tablet    ZOFRAN    10 tablet    Take 1 tablet (8 mg) by mouth every 8 hours as needed (Nausea/Vomiting)       prochlorperazine 10 MG tablet    COMPAZINE    30 tablet    Take 1 tablet (10 mg) by mouth every 6 hours as needed (Nausea/Vomiting)

## 2017-05-11 ENCOUNTER — INFUSION THERAPY VISIT (OUTPATIENT)
Dept: INFUSION THERAPY | Facility: CLINIC | Age: 57
End: 2017-05-11
Attending: INTERNAL MEDICINE
Payer: COMMERCIAL

## 2017-05-11 VITALS
HEART RATE: 83 BPM | SYSTOLIC BLOOD PRESSURE: 131 MMHG | DIASTOLIC BLOOD PRESSURE: 77 MMHG | RESPIRATION RATE: 14 BRPM | TEMPERATURE: 98.2 F

## 2017-05-11 DIAGNOSIS — C20 MALIGNANT NEOPLASM OF RECTUM (H): Primary | ICD-10-CM

## 2017-05-11 PROCEDURE — 96360 HYDRATION IV INFUSION INIT: CPT

## 2017-05-11 PROCEDURE — 40000269 ZZH STATISTIC NO CHARGE FACILITY FEE

## 2017-05-11 PROCEDURE — 25000128 H RX IP 250 OP 636: Performed by: INTERNAL MEDICINE

## 2017-05-11 RX ORDER — HEPARIN SODIUM (PORCINE) LOCK FLUSH IV SOLN 100 UNIT/ML 100 UNIT/ML
5 SOLUTION INTRAVENOUS ONCE
Status: CANCELLED
Start: 2017-05-11 | End: 2017-05-11

## 2017-05-11 RX ORDER — HEPARIN SODIUM (PORCINE) LOCK FLUSH IV SOLN 100 UNIT/ML 100 UNIT/ML
5 SOLUTION INTRAVENOUS ONCE
Status: COMPLETED | OUTPATIENT
Start: 2017-05-11 | End: 2017-05-11

## 2017-05-11 RX ADMIN — SODIUM CHLORIDE 1000 ML: 9 INJECTION, SOLUTION INTRAVENOUS at 14:58

## 2017-05-11 RX ADMIN — SODIUM CHLORIDE, PRESERVATIVE FREE 5 ML: 5 INJECTION INTRAVENOUS at 15:56

## 2017-05-11 ASSESSMENT — PAIN SCALES - GENERAL: PAINLEVEL: NO PAIN (0)

## 2017-05-11 NOTE — MR AVS SNAPSHOT
After Visit Summary   5/11/2017    Louie Greco    MRN: [id number]           Patient Information     Date Of Birth          1960        Visit Information        Provider Department      5/11/2017 2:30 PM  INFUSION CHAIR 7 [facility]        Today's Diagnoses     Malignant neoplasm of rectum (H)    -  1       Follow-ups after your visit        Follow-up notes from your care team     Return in 1 day (on 5/12/2017).      Your next 10 appointments already scheduled     May 12, 2017  2:30 PM CDT   Level 1 with  INFUSION CHAIR 12   [facility] ([facility])    [facility]  [address]  [facility] [address]   [phone]            Jun 05, 2017  3:40 PM CDT   (Arrive by 3:25 PM)   CT CHEST/ABDOMEN/PELVIS W CONTRAST with [id number]   [facility] ([facility])    [address] [address] [phone]           Please bring any scans or X-rays taken at other hospitals, if similar tests were done. Also bring a list of your medicines, including vitamins, minerals and over-the-counter drugs. It is safest to leave personal items at home.  Be sure to tell your doctor:   If you have any allergies.   If there s any chance you are pregnant.   If you are breastfeeding.   If you have any special needs.  You may have contrast for this exam. To prepare:   Do not eat or drink for 2 hours before your exam. If you need to take medicine, you may take it with small sips of water. (We may ask you to take liquid medicine as well.)   The day before your exam, drink extra fluids at least six 8-ounce glasses (unless your doctor tells you to restrict your fluids).  Patients over 70 or patients with diabetes or kidney problems:   If you haven t had a blood test (creatinine test) within the last 30 days, go to your clinic or Diagnostic Imaging  Department for this test.  If you have diabetes:   If your kidney function is normal, continue taking your metformin (Avandamet, Glucophage, Glucovance, Metaglip) on the day of your exam.   If your kidney function is abnormal, wait 48 hours before restarting this medicine.  You will have oral contrast for this exam:   You will drink the contrast at home. Get this from your clinic or Diagnostic Imaging Department. Please follow the directions given.  Please wear loose clothing, such as a sweat suit or jogging clothes. Avoid snaps, zippers and other metal. We may ask you to undress and put on a hospital gown.  If you have any questions, please call the Imaging Department where you will have your exam.            Jun 05, 2017  4:00 PM CDT   (Arrive by 3:45 PM)   MR PELVIS W/O & W CONTRAST with [id number]   [facility] MRI ([facility] [place])    [address] [place] [address] [phone]           Take your medicines as usual, unless your doctor tells you not to. Bring a list of your current medicines to your exam (including vitamins, minerals and over-the-counter drugs).  You will be given intravenous contrast for this exam. To prepare:   The day before your exam, drink extra fluids at least six 8-ounce glasses (unless your doctor tells you to restrict your fluids).   Have a blood test (creatinine test) within 30 days of your exam. Go to your clinic or Diagnostic Imaging Department for this test.  The MRI machine uses a strong magnet. Please wear clothes without metal (snaps, zippers). A sweatsuit works well, or we may give you a hospital gown.  Please remove any body piercings and hair extensions before you arrive. You will also remove watches, jewelry, hairpins, wallets, dentures, partial dental plates and hearing aids. You may wear contact lenses, and you may be able to wear your rings. We have a safe place to keep your personal items, but it is safer to  leave them at home.   **IMPORTANT** THE INSTRUCTIONS BELOW ARE ONLY FOR THOSE PATIENTS WHO HAVE BEEN TOLD THEY WILL RECEIVE SEDATION OR GENERAL ANESTHESIA DURING THEIR MRI PROCEDURE:  IF YOU WILL RECEIVE SEDATION (take medicine to help you relax during your exam):   You must get the medicine from your doctor before you arrive. Bring the medicine to the exam. Do not take it at home.   Arrive one hour early. Bring someone who can take you home after the test. Your medicine will make you sleepy. After the exam, you may not drive, take a bus or take a taxi by yourself.   No eating 8 hours before your exam. You may have clear liquids up until 4 hours before your exam. (Clear liquids include water, clear tea, black coffee and fruit juice without pulp.)  IF YOU WILL RECEIVE ANESTHESIA (be asleep for your exam):   Arrive 1 1/2 hours early. Bring someone who can take you home after the test. You may not drive, take a bus or take a taxi by yourself.   No eating 8 hours before your exam. You may have clear liquids up until 4 hours before your exam. (Clear liquids include water, clear tea, black coffee and fruit juice without pulp.)  Please call the Imaging Department at your exam site with any questions.            Jun 06, 2017  3:00 PM CDT   Return Visit with Agueda Manley MD   [facility]    [facility]  [address] [facility] [address]  [facility] [id number] [phone]              Who to contact     If you have questions or need follow up information about today's clinic visit or your schedule please contact [facility] AND [facility] directly at [phone].  Normal or non-critical lab and imaging results will be communicated to you by MyChart, letter or phone within 4 business days after the clinic has received the results. If you do not hear from us within 7 days, please contact the clinic through MyChart or phone. If you have a  critical or abnormal lab result, we will notify you by phone as soon as possible.  Submit refill requests through [employer] or call your pharmacy and they will forward the refill request to us. Please allow 3 business days for your refill to be completed.          Additional Information About Your Visit        [employer] Information     [employer] gives you secure access to your electronic health record. If you see a primary care provider, you can also send messages to your care team and make appointments. If you have questions, please call your primary care clinic.  If you do not have a primary care provider, please call [phone] and they will assist you.        Care EveryWhere ID     This is your Care EveryWhere ID. This could be used by other organizations to access your [place] medical records  [id number]        Your Vitals Were     Pulse Temperature Respirations             83 98.2  F (36.8  C) (Oral) 14          Blood Pressure from Last 3 Encounters:   05/11/17 131/77   05/09/17 131/86   04/28/17 122/71    Weight from Last 3 Encounters:   05/09/17 94.8 kg (209 lb)   04/25/17 96.3 kg (212 lb 6.4 oz)   04/25/17 96.3 kg (212 lb 6.4 oz)              Today, you had the following     No orders found for display       Primary Care Provider Office Phone # Fax #    Ayden Peralta [phone]       [facility] [address] [facility] [address]        Thank you!     Thank you for choosing [facility]ENTER  for your care. Our goal is always to provide you with excellent care. Hearing back from our patients is one way we can continue to improve our services. Please take a few minutes to complete the written survey that you may receive in the mail after your visit with us. Thank you!             Your Updated Medication List - Protect others around you: Learn how to safely use, store and throw away your medicines at www.disposemymeds.org.          This list is  accurate as of: 5/11/17  4:03 PM.  Always use your most recent med list.                   Brand Name Dispense Instructions for use    acetaminophen 325 MG tablet    TYLENOL    100 tablet    Take 2 tablets (650 mg) by mouth every 4 hours as needed for other (mild pain)       COLACE PO          dibucaine 1 % Oint ointment    NUPERCAINAL     3 times daily as needed for moderate pain       diltiazem 2% in PLO cream (FV COMPOUNDED) 2% Gel     60 g    To anal opening three times daily.  Use a pea-sized amount.  Store at room temperature.       hydrocortisone 0.5 % ointment      Apply topically 2 times daily Reported on 2/14/2017       IBUPROFEN PO          LORazepam 0.5 MG tablet    ATIVAN    30 tablet    Take 1 tablet (0.5 mg) by mouth every 4 hours as needed (Anxiety, Nausea/Vomiting or Sleep)       metoclopramide 10 MG tablet    REGLAN    30 tablet    Take 1 tablet (10 mg) by mouth 4 times daily (before meals and nightly)       ondansetron 8 MG tablet    ZOFRAN    10 tablet    Take 1 tablet (8 mg) by mouth every 8 hours as needed (Nausea/Vomiting)       prochlorperazine 10 MG tablet    COMPAZINE    30 tablet    Take 1 tablet (10 mg) by mouth every 6 hours as needed (Nausea/Vomiting)

## 2017-05-11 NOTE — PROGRESS NOTES
Infusion Nursing Note:  Louie Greco presents today for pump disconnect and IVF's.    Patient seen by provider today: No   present during visit today: Not Applicable.    Note: This is his last treatment .    Intravenous Access:  Implanted Port.    Treatment Conditions:  Not Applicable.      Post Infusion Assessment:  Patient tolerated infusion without incident.  Blood return noted pre and post infusion.  Site patent and intact, free from redness, edema or discomfort.  No evidence of extravasations.  Access discontinued per protocol.    Discharge Plan:   Discharge instructions reviewed with: Patient.  Patient and/or family verbalized understanding of discharge instructions and all questions answered.  Copy of AVS reviewed with patient and/or family.  Patient will return 5/12/2017for next appointment.  Patient discharged in stable condition accompanied by: self.  Departure Mode: Ambulatory.    Alyssa Richardson RN

## 2017-05-12 ENCOUNTER — INFUSION THERAPY VISIT (OUTPATIENT)
Dept: INFUSION THERAPY | Facility: CLINIC | Age: 57
End: 2017-05-12
Attending: INTERNAL MEDICINE
Payer: COMMERCIAL

## 2017-05-12 VITALS — HEART RATE: 65 BPM | TEMPERATURE: 97.8 F | DIASTOLIC BLOOD PRESSURE: 80 MMHG | SYSTOLIC BLOOD PRESSURE: 137 MMHG

## 2017-05-12 DIAGNOSIS — C20 MALIGNANT NEOPLASM OF RECTUM (H): Primary | ICD-10-CM

## 2017-05-12 PROCEDURE — 96372 THER/PROPH/DIAG INJ SC/IM: CPT

## 2017-05-12 PROCEDURE — 25000128 H RX IP 250 OP 636: Performed by: INTERNAL MEDICINE

## 2017-05-12 RX ADMIN — PEGFILGRASTIM 6 MG: 6 INJECTION SUBCUTANEOUS at 15:35

## 2017-05-12 ASSESSMENT — PAIN SCALES - GENERAL: PAINLEVEL: NO PAIN (0)

## 2017-05-12 NOTE — PROGRESS NOTES
Infusion Nursing Note:  Louie Greco presents today for Neulasta.    Patient seen by provider today: No   present during visit today: Not Applicable.    Note: N/A.    Intravenous Access:  No Intravenous access/labs at this visit.    Treatment Conditions:  Not Applicable.      Post Infusion Assessment:  Patient tolerated injection without incident.  Site patent and intact, free from redness, edema or discomfort.  No evidence of extravasations.    Discharge Plan:   AVS to patient via MYCHART.  Patient will return 6/6/17 for next appointment.   Patient discharged in stable condition accompanied by: self and grandson.  Departure Mode: Ambulatory.    Chelsea Cm RN

## 2017-05-12 NOTE — MR AVS SNAPSHOT
After Visit Summary   5/12/2017    Louie Greco    MRN: [id number]           Patient Information     Date Of Birth          1960        Visit Information        Provider Department      5/12/2017 3:30 PM  INFUSION CHAIR 12 [facility]        Today's Diagnoses     Malignant neoplasm of rectum (H)    -  1       Follow-ups after your visit        Your next 10 appointments already scheduled     Jun 05, 2017  3:40 PM CDT   (Arrive by 3:25 PM)   CT CHEST/ABDOMEN/PELVIS W CONTRAST with UCCT2   [facility] [place] [facility] ([facility])    [address] [address] [phone]           Please bring any scans or X-rays taken at other hospitals, if similar tests were done. Also bring a list of your medicines, including vitamins, minerals and over-the-counter drugs. It is safest to leave personal items at home.  Be sure to tell your doctor:   If you have any allergies.   If there s any chance you are pregnant.   If you are breastfeeding.   If you have any special needs.  You may have contrast for this exam. To prepare:   Do not eat or drink for 2 hours before your exam. If you need to take medicine, you may take it with small sips of water. (We may ask you to take liquid medicine as well.)   The day before your exam, drink extra fluids at least six 8-ounce glasses (unless your doctor tells you to restrict your fluids).  Patients over 70 or patients with diabetes or kidney problems:   If you haven t had a blood test (creatinine test) within the last 30 days, go to your clinic or Diagnostic Imaging Department for this test.  If you have diabetes:   If your kidney function is normal, continue taking your metformin (Avandamet, Glucophage, Glucovance, Metaglip) on the day of your exam.   If your kidney function is abnormal, wait 48 hours before restarting this medicine.  You will have oral contrast for this exam:   You will  drink the contrast at home. Get this from your clinic or Diagnostic Imaging Department. Please follow the directions given.  Please wear loose clothing, such as a sweat suit or jogging clothes. Avoid snaps, zippers and other metal. We may ask you to undress and put on a hospital gown.  If you have any questions, please call the Imaging Department where you will have your exam.            Jun 05, 2017  4:00 PM CDT   (Arrive by 3:45 PM)   MR PELVIS W/O & W CONTRAST with [id number]   [facility] MRI ([facility])    [address] [address] [phone]           Take your medicines as usual, unless your doctor tells you not to. Bring a list of your current medicines to your exam (including vitamins, minerals and over-the-counter drugs).  You will be given intravenous contrast for this exam. To prepare:   The day before your exam, drink extra fluids at least six 8-ounce glasses (unless your doctor tells you to restrict your fluids).   Have a blood test (creatinine test) within 30 days of your exam. Go to your clinic or Diagnostic Imaging Department for this test.  The MRI machine uses a strong magnet. Please wear clothes without metal (snaps, zippers). A sweatsuit works well, or we may give you a hospital gown.  Please remove any body piercings and hair extensions before you arrive. You will also remove watches, jewelry, hairpins, wallets, dentures, partial dental plates and hearing aids. You may wear contact lenses, and you may be able to wear your rings. We have a safe place to keep your personal items, but it is safer to leave them at home.   **IMPORTANT** THE INSTRUCTIONS BELOW ARE ONLY FOR THOSE PATIENTS WHO HAVE BEEN TOLD THEY WILL RECEIVE SEDATION OR GENERAL ANESTHESIA DURING THEIR MRI PROCEDURE:  IF YOU WILL RECEIVE SEDATION (take medicine to help you relax during your exam):   You must get the medicine from your doctor before you arrive.  Bring the medicine to the exam. Do not take it at home.   Arrive one hour early. Bring someone who can take you home after the test. Your medicine will make you sleepy. After the exam, you may not drive, take a bus or take a taxi by yourself.   No eating 8 hours before your exam. You may have clear liquids up until 4 hours before your exam. (Clear liquids include water, clear tea, black coffee and fruit juice without pulp.)  IF YOU WILL RECEIVE ANESTHESIA (be asleep for your exam):   Arrive 1 1/2 hours early. Bring someone who can take you home after the test. You may not drive, take a bus or take a taxi by yourself.   No eating 8 hours before your exam. You may have clear liquids up until 4 hours before your exam. (Clear liquids include water, clear tea, black coffee and fruit juice without pulp.)  Please call the Imaging Department at your exam site with any questions.            Jun 06, 2017  3:00 PM CDT   Return Visit with Agueda Manley MD   [facility] ([facility])    [facility]  [address]  [facility] [address] [phone]              Who to contact     If you have questions or need follow up information about today's clinic visit or your schedule please contact [facility] AND [facility]ENTER directly at [phone].  Normal or non-critical lab and imaging results will be communicated to you by MyChart, letter or phone within 4 business days after the clinic has received the results. If you do not hear from us within 7 days, please contact the clinic through [employer] or phone. If you have a critical or abnormal lab result, we will notify you by phone as soon as possible.  Submit refill requests through [employer] or call your pharmacy and they will forward the refill request to us. Please allow 3 business days for your refill to be completed.          Additional Information About Your Visit        [employer] Information      [employer] gives you secure access to your electronic health record. If you see a primary care provider, you can also send messages to your care team and make appointments. If you have questions, please call your primary care clinic.  If you do not have a primary care provider, please call [phone] and they will assist you.        Care EveryWhere ID     This is your Care EveryWhere ID. This could be used by other organizations to access your [place] medical records  [id number]        Your Vitals Were     Pulse Temperature                65 97.8  F (36.6  C) (Oral)           Blood Pressure from Last 3 Encounters:   05/12/17 137/80   05/11/17 131/77   05/09/17 131/86    Weight from Last 3 Encounters:   05/09/17 94.8 kg (209 lb)   04/25/17 96.3 kg (212 lb 6.4 oz)   04/25/17 96.3 kg (212 lb 6.4 oz)              Today, you had the following     No orders found for display       Primary Care Provider Office Phone # Fax #    Ayden Peralta [phone]       [facility] [address] [facility] [address]        Thank you!     Thank you for choosing [facility]ENTER  for your care. Our goal is always to provide you with excellent care. Hearing back from our patients is one way we can continue to improve our services. Please take a few minutes to complete the written survey that you may receive in the mail after your visit with us. Thank you!             Your Updated Medication List - Protect others around you: Learn how to safely use, store and throw away your medicines at www.disposemymeds.org.          This list is accurate as of: 5/12/17  3:41 PM.  Always use your most recent med list.                   Brand Name Dispense Instructions for use    acetaminophen 325 MG tablet    TYLENOL    100 tablet    Take 2 tablets (650 mg) by mouth every 4 hours as needed for other (mild pain)       COLACE PO          dibucaine 1 % Oint ointment    NUPERCAINAL     3  times daily as needed for moderate pain       diltiazem 2% in PLO cream (FV COMPOUNDED) 2% Gel     60 g    To anal opening three times daily.  Use a pea-sized amount.  Store at room temperature.       hydrocortisone 0.5 % ointment      Apply topically 2 times daily Reported on 2/14/2017       IBUPROFEN PO          LORazepam 0.5 MG tablet    ATIVAN    30 tablet    Take 1 tablet (0.5 mg) by mouth every 4 hours as needed (Anxiety, Nausea/Vomiting or Sleep)       metoclopramide 10 MG tablet    REGLAN    30 tablet    Take 1 tablet (10 mg) by mouth 4 times daily (before meals and nightly)       ondansetron 8 MG tablet    ZOFRAN    10 tablet    Take 1 tablet (8 mg) by mouth every 8 hours as needed (Nausea/Vomiting)       prochlorperazine 10 MG tablet    COMPAZINE    30 tablet    Take 1 tablet (10 mg) by mouth every 6 hours as needed (Nausea/Vomiting)

## 2017-05-16 DIAGNOSIS — C20 MALIGNANT NEOPLASM OF RECTUM (H): Primary | ICD-10-CM

## 2017-05-17 ENCOUNTER — TELEPHONE (OUTPATIENT)
Dept: SURGERY | Facility: CLINIC | Age: 57
End: 2017-05-17

## 2017-05-17 NOTE — TELEPHONE ENCOUNTER
----- Message from Chio Berry RN sent at 5/16/2017  9:08 AM CDT -----  Hussein Ospina,    I placed orders for EUA/flex sig.  He has imaging 6/5 so I don't know if there is something early on 6/6 for GMM???    He sees his onc afternoon of 6/6.      Lorie Joyner  ----- Message -----     From: Chio Berry RN     Sent: 5/16/2017       To: Chio Berry RN    Look into his w & w protocol.

## 2017-05-17 NOTE — TELEPHONE ENCOUNTER
Per task, I called Louie to discuss scheduling his surgery. He did not answer so I left my name and number for him to return my call.

## 2017-05-19 ENCOUNTER — TELEPHONE (OUTPATIENT)
Dept: SURGERY | Facility: CLINIC | Age: 57
End: 2017-05-19

## 2017-05-19 NOTE — TELEPHONE ENCOUNTER
Louie returned my call and left a message asking me to call him after 2:30pm. I called him today and there was no answer I left a message offering 06/07/2017 for procedure start time. He called me back and said that week will not work for him. We discussed when would work for him and it sounds like the week of June 19 would work better. He specifically mentioned June 20. I told him that I would have to ask Dr. Radford about this to make sure the timing would work. He also let me know that the best time to reach him would be after 2:45 pm. I also explained what type of procedure he would be having.

## 2017-06-05 ENCOUNTER — CARE COORDINATION (OUTPATIENT)
Dept: SURGERY | Facility: CLINIC | Age: 57
End: 2017-06-05

## 2017-06-05 ENCOUNTER — TELEPHONE (OUTPATIENT)
Dept: ONCOLOGY | Facility: CLINIC | Age: 57
End: 2017-06-05

## 2017-06-05 ENCOUNTER — INFUSION THERAPY VISIT (OUTPATIENT)
Dept: ONCOLOGY | Facility: CLINIC | Age: 57
End: 2017-06-05
Attending: INTERNAL MEDICINE
Payer: COMMERCIAL

## 2017-06-05 DIAGNOSIS — C20 MALIGNANT NEOPLASM OF RECTUM (H): Primary | ICD-10-CM

## 2017-06-05 PROCEDURE — 96523 IRRIG DRUG DELIVERY DEVICE: CPT

## 2017-06-05 PROCEDURE — 25000128 H RX IP 250 OP 636: Mod: ZF | Performed by: INTERNAL MEDICINE

## 2017-06-05 RX ORDER — HEPARIN SODIUM (PORCINE) LOCK FLUSH IV SOLN 100 UNIT/ML 100 UNIT/ML
500 SOLUTION INTRAVENOUS ONCE
Status: COMPLETED | OUTPATIENT
Start: 2017-06-05 | End: 2017-06-05

## 2017-06-05 RX ADMIN — HEPARIN SODIUM (PORCINE) LOCK FLUSH IV SOLN 100 UNIT/ML 500 UNITS: 100 SOLUTION at 17:41

## 2017-06-05 NOTE — PROGRESS NOTES
Pt left  msg & had question re PO contrast for CT today (states in the past he hasn't had to drink anything).  Called pt back, had to lv  msg.  Asked him to call Barbara in cancer center as her name & number on the appt encounter for today's CT scan.  Let him know often times we do have patients do both oral and IV contrast.

## 2017-06-05 NOTE — MR AVS SNAPSHOT
After Visit Summary   6/5/2017    Louie Greco    MRN: [id number]           Patient Information     Date Of Birth          1960        Visit Information        Provider Department      6/5/2017 5:00 PM UC 21 ATC; [facility] ONCOLOGY [facility]        Today's Diagnoses     Malignant neoplasm of rectum (H)    -  1       Follow-ups after your visit        Your next 10 appointments already scheduled     Jun 06, 2017  3:00 PM CDT   Return Visit with Agueda Manley MD   [facility] ([facility])    [facility]  [address]  [facility] [address]   [phone]            Jul 05, 2017   Procedure with Felicitas Radford MD   [facility] ([facility])    [address] [place] [address]  5th [facility] [address] [phone]           Located in the Clinics and Surgery Center at [address] [place] [address].   parking is very convenient and highly recommended.  is a $6 flat rate fee.  Both  and self parkers should enter the main arrival plaza from [facility]; parking attendants will direct you based on your parking preference.              Who to contact     If you have questions or need follow up information about today's clinic visit or your schedule please contact [facility] directly at [phone].  Normal or non-critical lab and imaging results will be communicated to you by MyChart, letter or phone within 4 business days after the clinic has received the results. If you do not hear from us within 7 days, please contact the clinic through MyChart or phone. If you have a critical or abnormal lab result, we will notify you by phone as soon as possible.  Submit refill requests through [employer] or call your pharmacy and they will forward the refill request to us. Please allow 3 business days for your refill to  be completed.          Additional Information About Your Visit        [facility] Information     [employer] gives you secure access to your electronic health record. If you see a primary care provider, you can also send messages to your care team and make appointments. If you have questions, please call your primary care clinic.  If you do not have a primary care provider, please call [phone] and they will assist you.        Care EveryWhere ID     This is your Care EveryWhere ID. This could be used by other organizations to access your [facility] medical records  [id number]         Blood Pressure from Last 3 Encounters:   05/12/17 137/80   05/11/17 131/77   05/09/17 131/86    Weight from Last 3 Encounters:   05/09/17 94.8 kg (209 lb)   04/25/17 96.3 kg (212 lb 6.4 oz)   04/25/17 96.3 kg (212 lb 6.4 oz)              Today, you had the following     No orders found for display       Primary Care Provider Office Phone # Fax #    Ayden Peralta [phone]       [facility] [address] [place] [id number]        Thank you!     Thank you for choosing [facility]  for your care. Our goal is always to provide you with excellent care. Hearing back from our patients is one way we can continue to improve our services. Please take a few minutes to complete the written survey that you may receive in the mail after your visit with us. Thank you!             Your Updated Medication List - Protect others around you: Learn how to safely use, store and throw away your medicines at www.disposemymeds.org.          This list is accurate as of: 6/5/17  5:43 PM.  Always use your most recent med list.                   Brand Name Dispense Instructions for use    acetaminophen 325 MG tablet    TYLENOL    100 tablet    Take 2 tablets (650 mg) by mouth every 4 hours as needed for other (mild pain)       COLACE PO          dibucaine 1 % Oint ointment    NUPERCAINAL     3 times daily  as needed for moderate pain       diltiazem 2% in PLO cream (FV COMPOUNDED) 2% Gel     60 g    To anal opening three times daily.  Use a pea-sized amount.  Store at room temperature.       hydrocortisone 0.5 % ointment      Apply topically 2 times daily Reported on 2/14/2017       IBUPROFEN PO          LORazepam 0.5 MG tablet    ATIVAN    30 tablet    Take 1 tablet (0.5 mg) by mouth every 4 hours as needed (Anxiety, Nausea/Vomiting or Sleep)       metoclopramide 10 MG tablet    REGLAN    30 tablet    Take 1 tablet (10 mg) by mouth 4 times daily (before meals and nightly)       ondansetron 8 MG tablet    ZOFRAN    10 tablet    Take 1 tablet (8 mg) by mouth every 8 hours as needed (Nausea/Vomiting)       prochlorperazine 10 MG tablet    COMPAZINE    30 tablet    Take 1 tablet (10 mg) by mouth every 6 hours as needed (Nausea/Vomiting)

## 2017-06-05 NOTE — TELEPHONE ENCOUNTER
Received voicemail from Marcie-Op Coordinator for [place]Rect[facility] at the [facility], Anai, stating that Pt called her asking about whether or not he is to take the oral contrast for his CT CAP (& MRI).    Attempted to reach Pt but had to [facility] for him to call back.    Attempted to reach Anai but had to [facility] for her of details: that we give Pt the oral contrast instructions in the same ziplock bag as the Omnipaque po contrast & review them with Pt at clinic exam appt discharge scheduling desk.  Reviewed the specific Omnipaque instructions in the VM message including NPO for 2 hours prior to the CT.  Mix Omnipaque with 20 oz water--drink half of mixture 2hrs prior & second half 1hr prior to CT CAP.     Awaiting return call.

## 2017-06-05 NOTE — TELEPHONE ENCOUNTER
Pt called back & stated that he has not had to drink oral contrast for his previous CT CAP at the [facility].  Explained to Pt that it is standard with CT CAP w & w/o contrast orders at [facility] that we do routinely give the Pt po Omnipaque contrast with instructions.    Gave Pt phone # for at the [facility] Radiology Dept: [phone] to check with them as today's CT CAP & MRI's are being done there.

## 2017-06-06 ENCOUNTER — TELEPHONE (OUTPATIENT)
Dept: ONCOLOGY | Facility: CLINIC | Age: 57
End: 2017-06-06

## 2017-06-06 DIAGNOSIS — C20 MALIGNANT NEOPLASM OF RECTUM (H): Primary | ICD-10-CM

## 2017-06-06 DIAGNOSIS — Z91.041 ALLERGY TO CONTRAST MEDIA (USED FOR DIAGNOSTIC X-RAYS): ICD-10-CM

## 2017-06-06 RX ORDER — METHYLPREDNISOLONE 32 MG/1
TABLET ORAL
Qty: 2 TABLET | Refills: 0 | Status: SHIPPED | OUTPATIENT
Start: 2017-06-06 | End: 2017-08-11

## 2017-06-06 NOTE — TELEPHONE ENCOUNTER
Patient arrived in clinic today for an exam with Dr. Manley. Patient's CT at the [facility] was not completed secondary to patient experiencing a sneeze and eye irritation . Medrol was ordered for patient prior to the CT scan. CT scan will be rescheduled at the [facility] and patient will be rescheduled to see Dr. Manley this Friday 6/9/17. Juani Mcgrath RN

## 2017-06-09 ENCOUNTER — ONCOLOGY VISIT (OUTPATIENT)
Dept: ONCOLOGY | Facility: CLINIC | Age: 57
End: 2017-06-09
Attending: INTERNAL MEDICINE
Payer: COMMERCIAL

## 2017-06-09 ENCOUNTER — HOSPITAL ENCOUNTER (OUTPATIENT)
Facility: CLINIC | Age: 57
Setting detail: SPECIMEN
Discharge: HOME | End: 2017-06-09
Attending: INTERNAL MEDICINE | Admitting: INTERNAL MEDICINE
Payer: COMMERCIAL

## 2017-06-09 VITALS
RESPIRATION RATE: 16 BRPM | WEIGHT: 201 LBS | HEART RATE: 95 BPM | DIASTOLIC BLOOD PRESSURE: 64 MMHG | SYSTOLIC BLOOD PRESSURE: 100 MMHG | TEMPERATURE: 98.3 F | BODY MASS INDEX: 28.87 KG/M2 | OXYGEN SATURATION: 94 %

## 2017-06-09 DIAGNOSIS — C20 MALIGNANT NEOPLASM OF RECTUM (H): Primary | ICD-10-CM

## 2017-06-09 LAB
ALBUMIN SERPL-MCNC: 3.5 G/DL (ref 3.4–5)
ALP SERPL-CCNC: 160 U/L (ref 40–150)
ALT SERPL W P-5'-P-CCNC: 55 U/L (ref 0–70)
ANION GAP SERPL CALCULATED.3IONS-SCNC: 8 MMOL/L (ref 3–14)
AST SERPL W P-5'-P-CCNC: 38 U/L (ref 0–45)
BASOPHILS # BLD AUTO: 0 10E9/L (ref 0–0.2)
BASOPHILS NFR BLD AUTO: 0.5 %
BILIRUB SERPL-MCNC: 0.6 MG/DL (ref 0.2–1.3)
BUN SERPL-MCNC: 23 MG/DL (ref 7–30)
CALCIUM SERPL-MCNC: 8.9 MG/DL (ref 8.5–10.1)
CEA SERPL-MCNC: 0.8 UG/L (ref 0–2.5)
CHLORIDE SERPL-SCNC: 109 MMOL/L (ref 94–109)
CO2 SERPL-SCNC: 26 MMOL/L (ref 20–32)
CREAT SERPL-MCNC: 0.79 MG/DL (ref 0.66–1.25)
DIFFERENTIAL METHOD BLD: ABNORMAL
EOSINOPHIL # BLD AUTO: 0.1 10E9/L (ref 0–0.7)
EOSINOPHIL NFR BLD AUTO: 2.4 %
ERYTHROCYTE [DISTWIDTH] IN BLOOD BY AUTOMATED COUNT: 14.9 % (ref 10–15)
GFR SERPL CREATININE-BSD FRML MDRD: ABNORMAL ML/MIN/1.7M2
GLUCOSE SERPL-MCNC: 99 MG/DL (ref 70–99)
HCT VFR BLD AUTO: 32.9 % (ref 40–53)
HGB BLD-MCNC: 11.3 G/DL (ref 13.3–17.7)
IMM GRANULOCYTES # BLD: 0 10E9/L (ref 0–0.4)
IMM GRANULOCYTES NFR BLD: 0.3 %
LYMPHOCYTES # BLD AUTO: 0.4 10E9/L (ref 0.8–5.3)
LYMPHOCYTES NFR BLD AUTO: 9.3 %
MCH RBC QN AUTO: 33.6 PG (ref 26.5–33)
MCHC RBC AUTO-ENTMCNC: 34.3 G/DL (ref 31.5–36.5)
MCV RBC AUTO: 98 FL (ref 78–100)
MONOCYTES # BLD AUTO: 0.6 10E9/L (ref 0–1.3)
MONOCYTES NFR BLD AUTO: 15.1 %
NEUTROPHILS # BLD AUTO: 2.7 10E9/L (ref 1.6–8.3)
NEUTROPHILS NFR BLD AUTO: 72.4 %
NRBC # BLD AUTO: 0 10*3/UL
NRBC BLD AUTO-RTO: 0 /100
PLATELET # BLD AUTO: 161 10E9/L (ref 150–450)
POTASSIUM SERPL-SCNC: 3.7 MMOL/L (ref 3.4–5.3)
PROT SERPL-MCNC: 6.7 G/DL (ref 6.8–8.8)
RBC # BLD AUTO: 3.36 10E12/L (ref 4.4–5.9)
SODIUM SERPL-SCNC: 143 MMOL/L (ref 133–144)
WBC # BLD AUTO: 3.8 10E9/L (ref 4–11)

## 2017-06-09 PROCEDURE — 80053 COMPREHEN METABOLIC PANEL: CPT | Performed by: INTERNAL MEDICINE

## 2017-06-09 PROCEDURE — 85025 COMPLETE CBC W/AUTO DIFF WBC: CPT | Performed by: INTERNAL MEDICINE

## 2017-06-09 PROCEDURE — 99214 OFFICE O/P EST MOD 30 MIN: CPT | Performed by: INTERNAL MEDICINE

## 2017-06-09 PROCEDURE — 36591 DRAW BLOOD OFF VENOUS DEVICE: CPT

## 2017-06-09 PROCEDURE — 82378 CARCINOEMBRYONIC ANTIGEN: CPT | Performed by: INTERNAL MEDICINE

## 2017-06-09 PROCEDURE — 99211 OFF/OP EST MAY X REQ PHY/QHP: CPT

## 2017-06-09 ASSESSMENT — PAIN SCALES - GENERAL: PAINLEVEL: NO PAIN (0)

## 2017-06-09 NOTE — PROGRESS NOTES
"Oncology Rooming Note    June 9, 2017 3:23 PM   Louie Greco is a 56 year old male who presents for:    Chief Complaint   Patient presents with     Oncology Clinic Visit     Malignant neoplasm of rectum      Initial Vitals: /64 (BP Location: Right arm, Patient Position: Chair, Cuff Size: Adult Large)  Pulse 95  Temp 98.3  F (36.8  C) (Oral)  Resp 16  Wt 91.2 kg (201 lb)  SpO2 94%  BMI 28.87 kg/m2 Estimated body mass index is 28.87 kg/(m^2) as calculated from the following:    Height as of 5/9/17: 1.777 m (5' 9.96\").    Weight as of this encounter: 91.2 kg (201 lb). Body surface area is 2.12 meters squared.  No Pain (0) Comment: Data Unavailable   No LMP for male patient.  Allergies reviewed: Yes  Medications reviewed: Yes    Medications: Medication refills not needed today.  Pharmacy name entered into Md7:    [id number], [place] - [address] PHARMACY [facility] [place], [place] - [address]    Clinical concerns: None         5 minutes for nursing intake (face to face time)     Taylor Adams MA        DISCHARGE PLAN:  1.) Labs drawn via port without difficulty, 19 3/4 non coring needle, SD--IVAD Deaccess--Flush with 20cc NS and 500 units Heparin.  2.) Patient to be scheduled for labs and follow up with Dr. Manley in 3 months.  Next appointments: See patient instruction section  Departure Mode: Ambulatory  Accompanied by: self  15 minutes for nursing discharge (face to face time)   Juani Mcgrath RN  "

## 2017-06-09 NOTE — PROGRESS NOTES
[facility]    Hematology/Oncology Established Patient Note      Today's Date: 06/09/2017    Reason for Follow-up: rectal cancer      HISTORY OF PRESENT ILLNESS: Louie Greco is a 56 year old male who presents with rectal cancer.  He started having rectal bleeding around beginning of 2016.  He underwent colonoscopy on 7/27/16, which showed a malignant tumor in the rectum involving half of the lumen with oozing, as well as three 4-mm polyps in the ascending and transverse colon.  Pathology showed moderately differentiated adenocarcinoma, ascending colon with sessile serrated adenoma, others were tubular adenomas.  Mismatch repair expression with normal protein expression.  Staging scans showed the rectal mass, indeterminate focus in the left liver thought to be cyst, and multiple bilateral renal cysts.  MRI showed a distal rectal mass measuring 2.7 x 2.4 cm extending to the most proximal region of the anal canal.  There are a few tiny subcentimeter perirectal fat lymph nodes.  He was staged clinically [id number] (stage IIIA).  He was seen by Dr. Lee at [facility], and started neoadjuvant chemoradiation with capecitabine on 8/8/16 and completed on 9/15/16.  He was then seen by Dr. Radford, colorectal surgery at [facility].  His post chemoradiation MRI showed improvement in the size of the tumor and response in the lymph nodes.  On 12/8/16, he underwent flexible sigmoidoscopy and there was no evidence of tumor.  There was only some anterior scarring in the lumen.  Multiple biopsies were taken, which showed no evidence of carcinoma.  At that point, Dr. Radford spoke with the patient's wife, that there appears to be a complete response in the lumen, and that the patient would wish to placed on the watch and wait protocol.  The patient had expressed wishes not to have an APR, and it would not have been possible to grossly clear a margin for LAR.    He had port placed  on 1/16/17.      He completed FOLFOX x 8 cycles 1/16/17-5/9/17.        INTERIM HISTORY: Louie comes in for follow-up today.  He feels well.  He says that when he went for CT scan earlier this week, he was unable to do CT scan due to him experiencing a sneeze and eye irritation previously.  Therefore, Medrol was ordered prior to the CT scan, and he was able to get it done later in the week.  Currently he feels well.  He still has neuropathy in his hands.  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  He is traveling to [place], [place] with his wife this weekend.      REVIEW OF SYSTEMS:   14 point ROS was reviewed and is negative other than as noted above in HPI.       HOME MEDICATIONS:  Current Outpatient Prescriptions   Medication Sig Dispense Refill     methylPREDNISolone (MEDROL) 32 MG tablet Take 12 hours prior to CT scan and 2 hours prior to CT scan 2 tablet 0     dibucaine (NUPERCAINAL) 1 % OINT ointment 3 times daily as needed for moderate pain       Docusate Sodium (COLACE PO)        metoclopramide (REGLAN) 10 MG tablet Take 1 tablet (10 mg) by mouth 4 times daily (before meals and nightly) 30 tablet 0     LORazepam (ATIVAN) 0.5 MG tablet Take 1 tablet (0.5 mg) by mouth every 4 hours as needed (Anxiety, Nausea/Vomiting or Sleep) 30 tablet 2     prochlorperazine (COMPAZINE) 10 MG tablet Take 1 tablet (10 mg) by mouth every 6 hours as needed (Nausea/Vomiting) 30 tablet 2     ondansetron (ZOFRAN) 8 MG tablet Take 1 tablet (8 mg) by mouth every 8 hours as needed (Nausea/Vomiting) 10 tablet 2     IBUPROFEN PO        acetaminophen (TYLENOL) 325 MG tablet Take 2 tablets (650 mg) by mouth every 4 hours as needed for other (mild pain) 100 tablet 0     hydrocortisone 0.5 % ointment Apply topically 2 times daily Reported on 2/14/2017       diltiazem 2% in PLO cream, FV COMPOUNDED, 2% GEL To anal opening three times daily.  Use a pea-sized amount.  Store at room temperature. 60 g 0         ALLERGIES:  Allergies   Allergen  Reactions     Amoxicillin      Ampicillin Diarrhea     Demerol [Meperidine]      Nausea          PAST MEDICAL HISTORY:  Past Medical History:   Diagnosis Date     Childhood asthma      Nephrolithiasis          Rectal cancer (H)     low rectal cancer         PAST SURGICAL HISTORY:  Past Surgical History:   Procedure Laterality Date     COLONOSCOPY N/A 2016    Procedure: COMBINED COLONOSCOPY, SINGLE OR MULTIPLE BIOPSY/POLYPECTOMY BY BIOPSY;  Surgeon: Chelsea Thompson MD;  Location:  GI     HC TOOTH EXTRACTION W/FORCEP       SIGMOIDOSCOPY FLEXIBLE N/A 2016    Procedure: SIGMOIDOSCOPY FLEXIBLE;  Surgeon: Felicitas Radford MD;  Location: [facility] OR     VASECTOMY           SOCIAL HISTORY:  Social History     Social History     Marital status: Single     Spouse name: N/A     Number of children: N/A     Years of education: N/A     Occupational History     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 No     Social History Narrative    Dad  at age  78   from [place], COPD, & HTN    Mom alive in her 70's.     for 30 years    Two adult children. 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         He notes that he had a brother who passed away at a young age of 53 from a metastatic cancer, but unknown what type, and passed away within 2 months of diagnosis.  He denies other family history of cancer in the immediate family.  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      FAMILY HISTORY:  Family History   Problem Relation Age of Onset     CANCER Brother      primary of unknown origin      Other Cancer Brother      Chronic Obstructive Pulmonary Disease Father      Hypertension Father      Hyperlipidemia Father      Colon Polyps Mother      Number and type of polyps unknown     Family History Negative Brother      negative colonoscopy     DIABETES Maternal Grandfather      Hypertension Paternal Grandmother      Hyperlipidemia Paternal Grandmother      Stomach Cancer Other 63     maternal great grandfather     Glaucoma No family hx of      Macular Degeneration No family hx of          PHYSICAL EXAM:  Vital signs:  /64 (BP Location: Right arm, Patient Position: Chair, Cuff Size: Adult Large)  Pulse 95  Temp 98.3  F (36.8  C) (Oral)  Resp 16  Wt 91.2 kg (201 lb)  SpO2 94%  BMI 28.87 kg/m2   ECO  GENERAL/CONSTITUTIONAL: No acute distress.  EYES: No scleral icterus.  LYMPH: No anterior cervical, posterior cervical, or supraclavicular adenopathy.   RESPIRATORY: Clear to auscultation bilaterally. No crackles or wheezing.   CARDIOVASCULAR: Regular rate and rhythm without murmurs, gallops, or rubs.  GASTROINTESTINAL: No tenderness. The patient has normal bowel sounds. No guarding.  No distention.  MUSCULOSKELETAL: Warm and well-perfused, no cyanosis, clubbing, or edema.  NEUROLOGIC: Alert, oriented, answers questions appropriately.  INTEGUMENTARY: No jaundice.  GAIT: Steady, does not use assistive device  Port in place at right upper chest.      LABS:  CBC RESULTS:   Recent Labs   Lab Test  17   1607   WBC  3.8*   RBC  3.36*   HGB  11.3*   HCT  32.9*   MCV  98   MCH  33.6*   MCHC  34.3   RDW  14.9   PLT  161     Recent Labs   Lab Test  17   1607  17   0845   NA  143  141   POTASSIUM  3.7  3.6   CHLORIDE  109  107   CO2  26  28   ANIONGAP  8  6   GLC  99  105*   BUN  23  12   CR  0.79  0.66   FRANDY  8.9  8.7     Lab Results   Component Value Date    AST 38 2017     Lab Results   Component Value Date    ALT 55 2017     No results found for: MARIO   Lab Results    Component Value Date    BILITOTAL 0.6 06/09/2017     Lab Results   Component Value Date    ALBUMIN 3.5 06/09/2017     Lab Results   Component Value Date    PROTTOTAL 6.7 06/09/2017      Lab Results   Component Value Date    ALKPHOS 160 06/09/2017           IMAGING:  CT c/a/p 6/7/17:  FINDINGS:     Lines and tubes: Right IJ Port-A-Cath tip terminates at the cavoatrial  junction.     Chest:   Mediastinum: No thyroid nodules; hypertrophy of the right thyroid  gland. Central tracheobronchial tree is patent. Heart size is normal.  Trace pericardial effusion.  Normal thoracic vasculature. No thoracic  lymphadenopathy.   Lungs: No consolidation. No pleural effusion or pneumothorax. Stable  large calcified granuloma with an adjacent cluster of noncalcified  pulmonary nodules multiple sub-4 mm pulmonary nodules, unchanged from  prior. No new conspicuous nodules. Mild subsegmental bibasilar  atelectasis. Mild fibrotic changes in the lingula.     Abdomen and pelvis:   Liver: Multiple nonconspicuous, hypodense cysts with fluid density  throughout the liver, stable from prior. No suspicious liver lesions.  Portal veins appear patent.  Gallbladder: The gallbladder is contracted, limiting evaluation.  Spleen: Normal size. Small splenule at the splenic hilum.  Pancreas: Fatty infiltration of the pancreas. No suspicious pancreatic  lesions. The pancreatic duct is not dilated.  Adrenal glands: No adrenal nodules. Nodular thickening of the right  adrenal gland.  Kidneys: No hydronephrosis or obstructing renal stones. Multiple fluid  attenuating cysts are present.  In the inferior pole of left kidney  there is a 4.3 cm density,which has increased in size from 7/27/2016  at which time was 3.7 cm high. It measures 53 Hounsfield units.  The  differential favors hemorrhagic proteinaceous cyst, although slow  growing malignancy is not excluded.   Bladder / Pelvic organs: The bladder is decompressed with a thickened  wall. Prostate  hypertrophy. Coarse prostate calcifications.  Bowel: The mesorectal fat plane and fascia appears normal. No bowel  wall thickening. Hyperdense material within the tip of the appendix,  likely tiny stones versus colonic debris. No evidence of acute  appendicitis.  Lymph nodes: No retroperitoneal, mesenteric, or pelvic  lymphadenopathy.  Peritoneum / Retroperitoneum: No free fluid or air within the abdomen.  Vessels: No infrarenal aortic aneurysm.      Bones and soft tissues: Small irregularity in the anterolateral left  seventh rib (series 3, image 13), stable in appearance with 7/27/2016  and not FDG avid on the PET/CT dated 7/29/2016. Suspicious features.  No suspicious osseous lesions. Mild bilateral gynecomastia.          IMPRESSION: In this patient with a history of rectal cancer:  1. No evidence of malignant recurrence or metastatic disease in the  chest, abdomen or pelvis.  2. Stable hepatic cysts without suspicious features.   3. Stable sub-4 mm pulmonary nodules throughout the lungs.  4. Slowly growing probable proteinaceous or hemorrhagic cyst inferior  pole of left kidney. Although malignancy cannot be excluded. Attention  on follow-up examinations.    MRI pelvis 6/5/17  FINDINGS:     LOCATION/SIZE:  On prior exam dated 3/13/2017 there was a treated tumor identified in  the lower rectum. Previously this treated tumor measured 2.6 cm, and  currently it measures the same. This is best seen on series 5, image  24 and series 6, image 31. The treated tumor again appears entirely  fibrotic with a T2 hypointense signal and is seen along the left  anterolateral aspect of the lower rectum. There is associated delay in  enhancement. No focal nodular enhancement is identified. No restricted  diffusion signal.     TREATMENT RESPONSE:   Approximately 0% of the current mass demonstrates tumour signal  intensity, with the remainder appearing to represent fibrosis.  This  corresponds to a tumour response grade of 1.       EXTENT:  The tumor does not clearly involve the anal sphincters or levator ani  muscle. The treated tumor does extend inferiorly to the puborectalis  sling, however without clear tumor signal involving musculature at  this time. There is unchanged persistent mucosal edema of the mid and  lower rectum seen along the posterior and right lateral wall, likely  representing post therapy changes.     CIRCUMFERENTIAL RESECTION MARGIN (CRM):  In terms of the resection margin, there is not involvement of the  mesorectal fascia      LYMPH NODES:  A few less than 5 mm mesorectal fascia lymph nodes have not  significantly changed and do not have suspicious features.   For example there is a stable left mesorectal fascia lymph node  measuring 0.3 cm on image 9, series 6).      VEINS:  There is not evidence of extramural venous extension.      MISCELLANEOUS FINDINGS:  Enlargement of the prostate gland. Urinary bladder is partially  distended. Other visualized bowel loops appear unremarkable.  There is heterogeneous signal intensity and enhancement of the  visualized pelvic bones and proximal femurs with patchy areas of T2  hypointensity.         IMPRESSION:   1. No evidence of residual or recurrent tumor corresponding to tumor  regression grade of 1 unchanged since 3/13/2017. Treated fibrotic  tumor is again noted along the left anterolateral aspect of the lower  rectum.  2. Stable few tiny mesorectal fascia and lymph nodes without  suspicious features.  3. Heterogeneous marrow signal intensity and enhancement which may  represent marrow reconversion.         ASSESSMENT/PLAN:  Louie Greco is a 56 year old male with:    1) Rectal cancer: clinical stage [id number] (stage IIIA), s/p chemoradiation with Xeloda, and appears to have achieved complete response on imaging and biopsies.  He opted not to undergo surgery and expressed desire not to undergo APR, as he does not want a colostomy bag.  It was felt reasonable to go on the watch  and wait protocol with the [facility].  He has completed 4 additional months (8 cycles) of chemotherapy with FOLFOX.  He will now go on surveillance, as per the watch and wait protocol.    Post-treatment CT scans and MRI pelvis show no evidence of residual or recurrent tumor.      -he has follow-up and sigmoidoscopy with Dr. Radford on 7/5/17  -will check labs and CEA today  -next T3 MRI pelvis would be in 6 months (every 6 months x 2 years)  -next CT c/a/p would be in 1 year (annually for 5 years)  -endoscopic surveillance with Dr. Radford as per protocol  -RTC in 3 months with labs and CEA    2) Genetics: he had appointment on 3/30/17.  He is contemplating sending for genetic testing, but is checking on insurance first.      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  He is now done with chemotherapy.  He still has neuropathy, but we discussed that it may take several months, even a year or two to improve.        4) Elevated transaminases: slight elevation of AST and ALT, likely due to chemotherapy.  He is having his labs repeated today, and the transaminases have normalized.      5) Liver cysts: seen on CT  -monitor    6) Pulmonary nodules: stable sub-4 mm pulmonary nodules  -monitor on future scans    7) Probable kidney cyst:  -monitor on future scans    8) Mild leukopenia and anemia: likely secondary to chemotherapy and recovering.  His platelet count has normalized.  -monitor      Agueda Manley MD  Hematology/Oncology  [facility]

## 2017-06-09 NOTE — LETTER
"June 9, 2017      RE: Louie Greco  [address] [address]      Oncology Rooming Note    June 9, 2017 3:23 PM   Louie Greco is a 56 year old male who presents for:    Chief Complaint   Patient presents with     Oncology Clinic Visit     Malignant neoplasm of rectum      Initial Vitals: /64 (BP Location: Right arm, Patient Position: Chair, Cuff Size: Adult Large)  Pulse 95  Temp 98.3  F (36.8  C) (Oral)  Resp 16  Wt 91.2 kg (201 lb)  SpO2 94%  BMI 28.87 kg/m2 Estimated body mass index is 28.87 kg/(m^2) as calculated from the following:    Height as of 5/9/17: 1.777 m (5' 9.96\").    Weight as of this encounter: 91.2 kg (201 lb). Body surface area is 2.12 meters squared.  No Pain (0) Comment: Data Unavailable   No LMP for male patient.  Allergies reviewed: Yes  Medications reviewed: Yes    Medications: Medication refills not needed today.  Pharmacy name entered into Fluxome:    [id number], [place] - [address] PHARMACY [facility], [place] - [address]    Clinical concerns: None         5 minutes for nursing intake (face to face time)     Taylor Adams MA              [facility]    Hematology/Oncology Established Patient Note      Today's Date: 06/09/2017    Reason for Follow-up: rectal cancer      HISTORY OF PRESENT ILLNESS: Louie Greco is a 56 year old male who presents with rectal cancer.  He started having rectal bleeding around beginning of 2016.  He underwent colonoscopy on 7/27/16, which showed a malignant tumor in the rectum involving half of the lumen with oozing, as well as three 4-mm polyps in the ascending and transverse colon.  Pathology showed moderately differentiated adenocarcinoma, ascending colon with sessile serrated adenoma, others were tubular adenomas.  Mismatch repair expression with normal protein expression.  Staging scans showed the rectal mass, indeterminate focus in the left liver " thought to be cyst, and multiple bilateral renal cysts.  MRI showed a distal rectal mass measuring 2.7 x 2.4 cm extending to the most proximal region of the anal canal.  There are a few tiny subcentimeter perirectal fat lymph nodes.  He was staged clinically [id number] (stage IIIA).  He was seen by Dr. Lee at [facility], and started neoadjuvant chemoradiation with capecitabine on 8/8/16 and completed on 9/15/16.  He was then seen by Dr. Radford, colorectal surgery at [facility].  His post chemoradiation MRI showed improvement in the size of the tumor and response in the lymph nodes.  On 12/8/16, he underwent flexible sigmoidoscopy and there was no evidence of tumor.  There was only some anterior scarring in the lumen.  Multiple biopsies were taken, which showed no evidence of carcinoma.  At that point, Dr. Radford spoke with the patient's wife, that there appears to be a complete response in the lumen, and that the patient would wish to placed on the watch and wait protocol.  The patient had expressed wishes not to have an APR, and it would not have been possible to grossly clear a margin for LAR.    He had port placed on 1/16/17.      He completed FOLFOX x 8 cycles 1/16/17-5/9/17.        INTERIM HISTORY: Louie comes in for follow-up today.  He feels well.  He says that when he went for CT scan earlier this week, he was unable to do CT scan due to him experiencing a sneeze and eye irritation previously.  Therefore, Medrol was ordered prior to the CT scan, and he was able to get it done later in the week.  Currently he feels well.  He still has neuropathy in his hands.  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  He is traveling to [place], [place] with his wife this weekend.      REVIEW OF SYSTEMS:   14 point ROS was reviewed and is negative other than as noted above in HPI.       HOME MEDICATIONS:  Current Outpatient Prescriptions   Medication Sig Dispense Refill     methylPREDNISolone  (MEDROL) 32 MG tablet Take 12 hours prior to CT scan and 2 hours prior to CT scan 2 tablet 0     dibucaine (NUPERCAINAL) 1 % OINT ointment 3 times daily as needed for moderate pain       Docusate Sodium (COLACE PO)        metoclopramide (REGLAN) 10 MG tablet Take 1 tablet (10 mg) by mouth 4 times daily (before meals and nightly) 30 tablet 0     LORazepam (ATIVAN) 0.5 MG tablet Take 1 tablet (0.5 mg) by mouth every 4 hours as needed (Anxiety, Nausea/Vomiting or Sleep) 30 tablet 2     prochlorperazine (COMPAZINE) 10 MG tablet Take 1 tablet (10 mg) by mouth every 6 hours as needed (Nausea/Vomiting) 30 tablet 2     ondansetron (ZOFRAN) 8 MG tablet Take 1 tablet (8 mg) by mouth every 8 hours as needed (Nausea/Vomiting) 10 tablet 2     IBUPROFEN PO        acetaminophen (TYLENOL) 325 MG tablet Take 2 tablets (650 mg) by mouth every 4 hours as needed for other (mild pain) 100 tablet 0     hydrocortisone 0.5 % ointment Apply topically 2 times daily Reported on 2/14/2017       diltiazem 2% in PLO cream, FV COMPOUNDED, 2% GEL To anal opening three times daily.  Use a pea-sized amount.  Store at room temperature. 60 g 0         ALLERGIES:  Allergies   Allergen Reactions     Amoxicillin      Ampicillin Diarrhea     Demerol [Meperidine]      Nausea          PAST MEDICAL HISTORY:  Past Medical History:   Diagnosis Date     Childhood asthma      Nephrolithiasis     1990     Rectal cancer (H)     low rectal cancer         PAST SURGICAL HISTORY:  Past Surgical History:   Procedure Laterality Date     COLONOSCOPY N/A 7/27/2016    Procedure: COMBINED COLONOSCOPY, SINGLE OR MULTIPLE BIOPSY/POLYPECTOMY BY BIOPSY;  Surgeon: Chelsea Thompson MD;  Location:  GI     HC TOOTH EXTRACTION W/FORCEP       SIGMOIDOSCOPY FLEXIBLE N/A 12/8/2016    Procedure: SIGMOIDOSCOPY FLEXIBLE;  Surgeon: Felicitas Radford MD;  Location: [facility] OR     VASECTOMY           SOCIAL HISTORY:  Social History     Social History     Marital status: Single      Spouse name: N/A     Number of children: N/A     Years of education: N/A     Occupational History     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 No     Social History Narrative    Dad  at age  78   from B[place], COPD, & HTN    Mom alive in her 70's.     for 30 years    Two adult children. 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         He notes that he had a brother who passed away at a young age of 53 from a metastatic cancer, but unknown what type, and passed away within 2 months of diagnosis.  He denies other family history of cancer in the immediate family.  Louie works as a  at a [employer].      FAMILY HISTORY:  Family History   Problem Relation Age of Onset     CANCER Brother      primary of unknown origin     Other Cancer Brother      Chronic Obstructive Pulmonary Disease Father      Hypertension Father      Hyperlipidemia Father      Colon Polyps Mother      Number and type of polyps unknown     Family History Negative Brother      negative colonoscopy     DIABETES Maternal Grandfather      Hypertension Paternal Grandmother      Hyperlipidemia Paternal Grandmother      Stomach Cancer Other 63     maternal great grandfather     Glaucoma No family hx of      Macular Degeneration No family hx of          PHYSICAL EXAM:  Vital signs:  /64 (BP Location: Right arm, Patient Position: Chair, Cuff Size: Adult Large)  Pulse 95  Temp 98.3  F (36.8  C) (Oral)  Resp 16  Wt 91.2 kg (201 lb)  SpO2 94%  BMI 28.87 kg/m2   ECO  GENERAL/CONSTITUTIONAL: No acute distress.  EYES: No  scleral icterus.  LYMPH: No anterior cervical, posterior cervical, or supraclavicular adenopathy.   RESPIRATORY: Clear to auscultation bilaterally. No crackles or wheezing.   CARDIOVASCULAR: Regular rate and rhythm without murmurs, gallops, or rubs.  GASTROINTESTINAL: No tenderness. The patient has normal bowel sounds. No guarding.  No distention.  MUSCULOSKELETAL: Warm and well-perfused, no cyanosis, clubbing, or edema.  NEUROLOGIC: Alert, oriented, answers questions appropriately.  INTEGUMENTARY: No jaundice.  GAIT: Steady, does not use assistive device  Port in place at right upper chest.      LABS:  CBC RESULTS:   Recent Labs   Lab Test  06/09/17   1607   WBC  3.8*   RBC  3.36*   HGB  11.3*   HCT  32.9*   MCV  98   MCH  33.6*   MCHC  34.3   RDW  14.9   PLT  161     Recent Labs   Lab Test  06/09/17   1607  05/09/17   0845   NA  143  141   POTASSIUM  3.7  3.6   CHLORIDE  109  107   CO2  26  28   ANIONGAP  8  6   GLC  99  105*   BUN  23  12   CR  0.79  0.66   FRANDY  8.9  8.7     Lab Results   Component Value Date    AST 38 06/09/2017     Lab Results   Component Value Date    ALT 55 06/09/2017     No results found for: BILICONJ   Lab Results   Component Value Date    BILITOTAL 0.6 06/09/2017     Lab Results   Component Value Date    ALBUMIN 3.5 06/09/2017     Lab Results   Component Value Date    PROTTOTAL 6.7 06/09/2017      Lab Results   Component Value Date    ALKPHOS 160 06/09/2017           IMAGING:  CT c/a/p 6/7/17:  FINDINGS:     Lines and tubes: Right IJ Port-A-Cath tip terminates at the cavoatrial  junction.     Chest:   Mediastinum: No thyroid nodules; hypertrophy of the right thyroid  gland. Central tracheobronchial tree is patent. Heart size is normal.  Trace pericardial effusion.  Normal thoracic vasculature. No thoracic  lymphadenopathy.   Lungs: No consolidation. No pleural effusion or pneumothorax. Stable  large calcified granuloma with an adjacent cluster of noncalcified  pulmonary nodules multiple  sub-4 mm pulmonary nodules, unchanged from  prior. No new conspicuous nodules. Mild subsegmental bibasilar  atelectasis. Mild fibrotic changes in the lingula.     Abdomen and pelvis:   Liver: Multiple nonconspicuous, hypodense cysts with fluid density  throughout the liver, stable from prior. No suspicious liver lesions.  Portal veins appear patent.  Gallbladder: The gallbladder is contracted, limiting evaluation.  Spleen: Normal size. Small splenule at the splenic hilum.  Pancreas: Fatty infiltration of the pancreas. No suspicious pancreatic  lesions. The pancreatic duct is not dilated.  Adrenal glands: No adrenal nodules. Nodular thickening of the right  adrenal gland.  Kidneys: No hydronephrosis or obstructing renal stones. Multiple fluid  attenuating cysts are present.  In the inferior pole of left kidney  there is a 4.3 cm density,which has increased in size from 7/27/2016  at which time was 3.7 cm high. It measures 53 Hounsfield units.  The  differential favors hemorrhagic proteinaceous cyst, although slow  growing malignancy is not excluded.   Bladder / Pelvic organs: The bladder is decompressed with a thickened  wall. Prostate hypertrophy. Coarse prostate calcifications.  Bowel: The mesorectal fat plane and fascia appears normal. No bowel  wall thickening. Hyperdense material within the tip of the appendix,  likely tiny stones versus colonic debris. No evidence of acute  appendicitis.  Lymph nodes: No retroperitoneal, mesenteric, or pelvic  lymphadenopathy.  Peritoneum / Retroperitoneum: No free fluid or air within the abdomen.  Vessels: No infrarenal aortic aneurysm.      Bones and soft tissues: Small irregularity in the anterolateral left  seventh rib (series 3, image 13), stable in appearance with 7/27/2016  and not FDG avid on the PET/CT dated 7/29/2016. Suspicious features.  No suspicious osseous lesions. Mild bilateral gynecomastia.          IMPRESSION: In this patient with a history of rectal  cancer:  1. No evidence of malignant recurrence or metastatic disease in the  chest, abdomen or pelvis.  2. Stable hepatic cysts without suspicious features.   3. Stable sub-4 mm pulmonary nodules throughout the lungs.  4. Slowly growing probable proteinaceous or hemorrhagic cyst inferior  pole of left kidney. Although malignancy cannot be excluded. Attention  on follow-up examinations.    MRI pelvis 6/5/17  FINDINGS:     LOCATION/SIZE:  On prior exam dated 3/13/2017 there was a treated tumor identified in  the lower rectum. Previously this treated tumor measured 2.6 cm, and  currently it measures the same. This is best seen on series 5, image  24 and series 6, image 31. The treated tumor again appears entirely  fibrotic with a T2 hypointense signal and is seen along the left  anterolateral aspect of the lower rectum. There is associated delay in  enhancement. No focal nodular enhancement is identified. No restricted  diffusion signal.     TREATMENT RESPONSE:   Approximately 0% of the current mass demonstrates tumour signal  intensity, with the remainder appearing to represent fibrosis.  This  corresponds to a tumour response grade of 1.      EXTENT:  The tumor does not clearly involve the anal sphincters or levator ani  muscle. The treated tumor does extend inferiorly to the puborectalis  sling, however without clear tumor signal involving musculature at  this time. There is unchanged persistent mucosal edema of the mid and  lower rectum seen along the posterior and right lateral wall, likely  representing post therapy changes.     CIRCUMFERENTIAL RESECTION MARGIN (CRM):  In terms of the resection margin, there is not involvement of the  mesorectal fascia      LYMPH NODES:  A few less than 5 mm mesorectal fascia lymph nodes have not  significantly changed and do not have suspicious features.   For example there is a stable left mesorectal fascia lymph node  measuring 0.3 cm on image 9, series 6).      VEINS:  There  is not evidence of extramural venous extension.      MISCELLANEOUS FINDINGS:  Enlargement of the prostate gland. Urinary bladder is partially  distended. Other visualized bowel loops appear unremarkable.  There is heterogeneous signal intensity and enhancement of the  visualized pelvic bones and proximal femurs with patchy areas of T2  hypointensity.         IMPRESSION:   1. No evidence of residual or recurrent tumor corresponding to tumor  regression grade of 1 unchanged since 3/13/2017. Treated fibrotic  tumor is again noted along the left anterolateral aspect of the lower  rectum.  2. Stable few tiny mesorectal fascia and lymph nodes without  suspicious features.  3. Heterogeneous marrow signal intensity and enhancement which may  represent marrow reconversion.         ASSESSMENT/PLAN:  Louie Greco is a 56 year old male with:    1) Rectal cancer: clinical stage [id number] (stage IIIA), s/p chemoradiation with Xeloda, and appears to have achieved complete response on imaging and biopsies.  He opted not to undergo surgery and expressed desire not to undergo APR, as he does not want a colostomy bag.  It was felt reasonable to go on the watch and wait protocol with the [facility].  He has completed 4 additional months (8 cycles) of chemotherapy with FOLFOX.  He will now go on surveillance, as per the watch and wait protocol.    Post-treatment CT scans and MRI pelvis show no evidence of residual or recurrent tumor.      -he has follow-up and sigmoidoscopy with Dr. Radford on 7/5/17  -will check labs and CEA today  -next T3 MRI pelvis would be in 6 months (every 6 months x 2 years)  -next CT c/a/p would be in 1 year (annually for 5 years)  -endoscopic surveillance with Dr. Radford as per protocol  -RTC in 3 months with labs and CEA    2) Genetics: he had appointment on 3/30/17.  He is contemplating sending for genetic testing, but is checking on insurance first.      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  He is now done with chemotherapy.  He still has neuropathy, but we discussed that it may take several months, even a year or two to improve.        4) Elevated transaminases: slight elevation of AST and ALT, likely due to chemotherapy.  He is having his labs repeated today, and the transaminases have normalized.      5) Liver cysts: seen on CT  -monitor    6) Pulmonary nodules: stable sub-4 mm pulmonary nodules  -monitor on future scans    7) Probable kidney cyst:  -monitor on future scans    8) Mild leukopenia and anemia: likely secondary to chemotherapy and recovering.  His platelet count has normalized.  -monitor      Agueda Manley MD  Hematology/Oncology  [facility]        Agueda Manley MD

## 2017-06-09 NOTE — MR AVS SNAPSHOT
After Visit Summary   6/9/2017    Louie Greco    MRN: [id number]           Patient Information     Date Of Birth          1960        Visit Information        Provider Department      6/9/2017 3:00 PM Agueda Manley MD [facility]        Today's Diagnoses     Malignant neoplasm of rectum (H)    -  1      Care Instructions    -labs today  -return to clinic in 3 months with labs a day prior          Follow-ups after your visit        Your next 10 appointments already scheduled     Jul 05, 2017   Procedure with Felicitas Radford MD   [facility] ([facility])    [address] [place] [address]  5th Floor  [employer] [address]   [phone]           Located in the Clinics and Surgery Center at [address] [place] [address].   parking is very convenient and highly recommended.  is a $6 flat rate fee.  Both  and self parkers should enter the main arrival plaza from [facility]; parking attendants will direct you based on your parking preference.            Sep 12, 2017  4:00 PM CDT   Level 1 with  INFUSION CHAIR 17   [facility] ([facility])    [facility]  [address]  [facility] [address]   [phone]            Sep 15, 2017  4:00 PM CDT   Return Visit with Agueda Manley MD   [facility] ([facility])    [facility]  [address]  [facility] [address]   [phone]              Future tests that were ordered for you today     Open Future Orders        Priority Expected Expires Ordered    CBC with platelets differential Routine 9/9/2017 6/9/2018 6/9/2017    Comprehensive metabolic panel Routine 9/9/2017 6/9/2018 6/9/2017    CEA Routine 9/9/2017 6/9/2018 6/9/2017            Who to contact     If you have questions or need follow up information about  today's clinic visit or your schedule please contact [facility] directly at [phone].  Normal or non-critical lab and imaging results will be communicated to you by MyChart, letter or phone within 4 business days after the clinic has received the results. If you do not hear from us within 7 days, please contact the clinic through [email or web address] or phone. If you have a critical or abnormal lab result, we will notify you by phone as soon as possible.  Submit refill requests through [employer] or call your pharmacy and they will forward the refill request to us. Please allow 3 business days for your refill to be completed.          Additional Information About Your Visit        [email or web address] [employer] Information     [employer] gives you secure access to your electronic health record. If you see a primary care provider, you can also send messages to your care team and make appointments. If you have questions, please call your primary care clinic.  If you do not have a primary care provider, please call [phone] and they will assist you.        Care EveryWhere ID     This is your Care EveryWhere ID. This could be used by other organizations to access your [facility] medical records  [id number]        Your Vitals Were     Pulse Temperature Respirations Pulse Oximetry BMI (Body Mass Index)       95 98.3  F (36.8  C) (Oral) 16 94% 28.87 kg/m2        Blood Pressure from Last 3 Encounters:   06/09/17 100/64   05/12/17 137/80   05/11/17 131/77    Weight from Last 3 Encounters:   06/09/17 91.2 kg (201 lb)   05/09/17 94.8 kg (209 lb)   04/25/17 96.3 kg (212 lb 6.4 oz)               Primary Care Provider Office Phone # Fax #    Ayden Peralta [phone]       [facility] [address] [facility] [address]        Thank you!     Thank you for choosing [facility]  for your care. Our goal is always to provide you with excellent care. Hearing back from our patients is one way we can  continue to improve our services. Please take a few minutes to complete the written survey that you may receive in the mail after your visit with us. Thank you!             Your Updated Medication List - Protect others around you: Learn how to safely use, store and throw away your medicines at www.disposemymeds.org.          This list is accurate as of: 6/9/17  4:10 PM.  Always use your most recent med list.                   Brand Name Dispense Instructions for use    acetaminophen 325 MG tablet    TYLENOL    100 tablet    Take 2 tablets (650 mg) by mouth every 4 hours as needed for other (mild pain)       COLACE PO          dibucaine 1 % Oint ointment    NUPERCAINAL     3 times daily as needed for moderate pain       diltiazem 2% in PLO cream (FV COMPOUNDED) 2% Gel     60 g    To anal opening three times daily.  Use a pea-sized amount.  Store at room temperature.       hydrocortisone 0.5 % ointment      Apply topically 2 times daily Reported on 2/14/2017       IBUPROFEN PO          LORazepam 0.5 MG tablet    ATIVAN    30 tablet    Take 1 tablet (0.5 mg) by mouth every 4 hours as needed (Anxiety, Nausea/Vomiting or Sleep)       methylPREDNISolone 32 MG tablet    MEDROL    2 tablet    Take 12 hours prior to CT scan and 2 hours prior to CT scan       metoclopramide 10 MG tablet    REGLAN    30 tablet    Take 1 tablet (10 mg) by mouth 4 times daily (before meals and nightly)       ondansetron 8 MG tablet    ZOFRAN    10 tablet    Take 1 tablet (8 mg) by mouth every 8 hours as needed (Nausea/Vomiting)       prochlorperazine 10 MG tablet    COMPAZINE    30 tablet    Take 1 tablet (10 mg) by mouth every 6 hours as needed (Nausea/Vomiting)

## 2017-06-12 ENCOUNTER — MEDICAL CORRESPONDENCE (OUTPATIENT)
Dept: HEALTH INFORMATION MANAGEMENT | Facility: CLINIC | Age: 57
End: 2017-06-12

## 2017-06-12 ENCOUNTER — HOME INFUSION (PRE-WILLOW HOME INFUSION) (OUTPATIENT)
Dept: PHARMACY | Facility: CLINIC | Age: 57
End: 2017-06-12

## 2017-06-13 ENCOUNTER — TELEPHONE (OUTPATIENT)
Dept: ONCOLOGY | Facility: CLINIC | Age: 57
End: 2017-06-13

## 2017-06-13 DIAGNOSIS — C20 MALIGNANT NEOPLASM OF RECTUM (H): Primary | ICD-10-CM

## 2017-06-13 NOTE — TELEPHONE ENCOUNTER
Patient called clinic 6/12/17 stating that he would like to have his port removed since he has completed his chemotherapy treatment. Patient's port was inserted through Interventional Radiology so order will be placed to have it removed through Interventional Radiology. Juani Mcgrath RN

## 2017-06-28 ENCOUNTER — CARE COORDINATION (OUTPATIENT)
Dept: SURGERY | Facility: CLINIC | Age: 57
End: 2017-06-28

## 2017-06-28 NOTE — PROGRESS NOTES
Pt had questions about prep for flex sig on 7/5.  Spoke to him, answered his questions.  Per RN in PAC, Dr. Manley's exam on 6/9/17 will suffice for preop.

## 2017-06-30 ENCOUNTER — CARE COORDINATION (OUTPATIENT)
Dept: SURGERY | Facility: CLINIC | Age: 57
End: 2017-06-30

## 2017-06-30 NOTE — PROGRESS NOTES
"Pt asked about Suprep vs Fleets enema in preparation for flex sig next Wed.  Let him know we just need the rectum clear of feces, that Suprep would be \"overkill\".  He states he wasn't sure if the tip of Fleets would hurt and if he would be able to hold urges to have BMs on the way to [facility].  He has never done Fleets before so doesn't know how these will be for him.      Explained to pt how Fleets enemas usually work.  Suggested to him that he purchase lubricant to add to enema tip, that he did not have to get every drop of the fluid from the bottle, and to do one the evening before to see how it goes for him.  If ok, to do 2nd one when he wakes up AM of procedure.  He is ok with doing this.    "

## 2017-07-03 ENCOUNTER — ANESTHESIA EVENT (OUTPATIENT)
Dept: SURGERY | Facility: AMBULATORY SURGERY CENTER | Age: 57
End: 2017-07-03

## 2017-07-05 ENCOUNTER — ANESTHESIA (OUTPATIENT)
Dept: SURGERY | Facility: AMBULATORY SURGERY CENTER | Age: 57
End: 2017-07-05

## 2017-07-05 ENCOUNTER — SURGERY (OUTPATIENT)
Age: 57
End: 2017-07-05

## 2017-07-05 ENCOUNTER — HOSPITAL ENCOUNTER (OUTPATIENT)
Facility: AMBULATORY SURGERY CENTER | Age: 57
End: 2017-07-05
Attending: COLON & RECTAL SURGERY

## 2017-07-05 VITALS
BODY MASS INDEX: 28.7 KG/M2 | RESPIRATION RATE: 16 BRPM | OXYGEN SATURATION: 95 % | DIASTOLIC BLOOD PRESSURE: 67 MMHG | SYSTOLIC BLOOD PRESSURE: 107 MMHG | HEART RATE: 75 BPM | HEIGHT: 71 IN | WEIGHT: 205 LBS | TEMPERATURE: 97.4 F

## 2017-07-05 RX ORDER — ACETAMINOPHEN 325 MG/1
975 TABLET ORAL ONCE
Status: DISCONTINUED | OUTPATIENT
Start: 2017-07-05 | End: 2017-07-06 | Stop reason: HOSPADM

## 2017-07-05 RX ORDER — ONDANSETRON 4 MG/1
4 TABLET, ORALLY DISINTEGRATING ORAL EVERY 30 MIN PRN
Status: DISCONTINUED | OUTPATIENT
Start: 2017-07-05 | End: 2017-07-06 | Stop reason: HOSPADM

## 2017-07-05 RX ORDER — ONDANSETRON 2 MG/ML
4 INJECTION INTRAMUSCULAR; INTRAVENOUS EVERY 30 MIN PRN
Status: DISCONTINUED | OUTPATIENT
Start: 2017-07-05 | End: 2017-07-06 | Stop reason: HOSPADM

## 2017-07-05 RX ORDER — FENTANYL CITRATE 50 UG/ML
INJECTION, SOLUTION INTRAMUSCULAR; INTRAVENOUS PRN
Status: DISCONTINUED | OUTPATIENT
Start: 2017-07-05 | End: 2017-07-05

## 2017-07-05 RX ORDER — SODIUM CHLORIDE, SODIUM LACTATE, POTASSIUM CHLORIDE, CALCIUM CHLORIDE 600; 310; 30; 20 MG/100ML; MG/100ML; MG/100ML; MG/100ML
INJECTION, SOLUTION INTRAVENOUS CONTINUOUS
Status: DISCONTINUED | OUTPATIENT
Start: 2017-07-05 | End: 2017-07-06 | Stop reason: HOSPADM

## 2017-07-05 RX ORDER — HEPARIN SODIUM (PORCINE) LOCK FLUSH IV SOLN 100 UNIT/ML 100 UNIT/ML
5 SOLUTION INTRAVENOUS
Status: DISCONTINUED | OUTPATIENT
Start: 2017-07-05 | End: 2017-07-06 | Stop reason: HOSPADM

## 2017-07-05 RX ORDER — PROPOFOL 10 MG/ML
INJECTION, EMULSION INTRAVENOUS CONTINUOUS PRN
Status: DISCONTINUED | OUTPATIENT
Start: 2017-07-05 | End: 2017-07-05

## 2017-07-05 RX ORDER — NALOXONE HYDROCHLORIDE 0.4 MG/ML
.1-.4 INJECTION, SOLUTION INTRAMUSCULAR; INTRAVENOUS; SUBCUTANEOUS
Status: DISCONTINUED | OUTPATIENT
Start: 2017-07-05 | End: 2017-07-06 | Stop reason: HOSPADM

## 2017-07-05 RX ORDER — KETAMINE HYDROCHLORIDE 10 MG/ML
INJECTION, SOLUTION INTRAMUSCULAR; INTRAVENOUS PRN
Status: DISCONTINUED | OUTPATIENT
Start: 2017-07-05 | End: 2017-07-05

## 2017-07-05 RX ORDER — KETOROLAC TROMETHAMINE 30 MG/ML
30 INJECTION, SOLUTION INTRAMUSCULAR; INTRAVENOUS EVERY 6 HOURS PRN
Status: DISCONTINUED | OUTPATIENT
Start: 2017-07-05 | End: 2017-07-06 | Stop reason: HOSPADM

## 2017-07-05 RX ORDER — FENTANYL CITRATE 50 UG/ML
25-50 INJECTION, SOLUTION INTRAMUSCULAR; INTRAVENOUS
Status: DISCONTINUED | OUTPATIENT
Start: 2017-07-05 | End: 2017-07-06 | Stop reason: HOSPADM

## 2017-07-05 RX ORDER — PROPOFOL 10 MG/ML
INJECTION, EMULSION INTRAVENOUS PRN
Status: DISCONTINUED | OUTPATIENT
Start: 2017-07-05 | End: 2017-07-05

## 2017-07-05 RX ORDER — LIDOCAINE 40 MG/G
CREAM TOPICAL
Status: DISCONTINUED | OUTPATIENT
Start: 2017-07-05 | End: 2017-07-06 | Stop reason: HOSPADM

## 2017-07-05 RX ADMIN — HEPARIN SODIUM (PORCINE) LOCK FLUSH IV SOLN 100 UNIT/ML 5 ML: 100 SOLUTION at 08:15

## 2017-07-05 RX ADMIN — ONDANSETRON 4 MG: 2 INJECTION INTRAMUSCULAR; INTRAVENOUS at 07:49

## 2017-07-05 RX ADMIN — PROPOFOL 40 MG: 10 INJECTION, EMULSION INTRAVENOUS at 07:43

## 2017-07-05 RX ADMIN — PROPOFOL 75 MCG/KG/MIN: 10 INJECTION, EMULSION INTRAVENOUS at 07:23

## 2017-07-05 RX ADMIN — SODIUM CHLORIDE, SODIUM LACTATE, POTASSIUM CHLORIDE, CALCIUM CHLORIDE: 600; 310; 30; 20 INJECTION, SOLUTION INTRAVENOUS at 07:14

## 2017-07-05 RX ADMIN — KETAMINE HYDROCHLORIDE 30 MG: 10 INJECTION, SOLUTION INTRAMUSCULAR; INTRAVENOUS at 07:24

## 2017-07-05 RX ADMIN — PROPOFOL 40 MG: 10 INJECTION, EMULSION INTRAVENOUS at 07:23

## 2017-07-05 RX ADMIN — FENTANYL CITRATE 50 MCG: 50 INJECTION, SOLUTION INTRAMUSCULAR; INTRAVENOUS at 07:23

## 2017-07-05 NOTE — IP AVS SNAPSHOT
MRN:[id number]                      After Visit Summary   7/5/2017    Louie Greco    MRN: [id number]           Thank you!     Thank you for choosing [facility] for your care. Our goal is always to provide you with excellent care. Hearing back from our patients is one way we can continue to improve our services. Please take a few minutes to complete the written survey that you may receive in the mail after you visit with us. Thank you!        Patient Information     Date Of Birth          1960        About your hospital stay     You were admitted on:  July 5, 2017 [facility]    You were discharged on:  July 5, 2017       Who to Call     For medical emergencies, please call 911.  For non-urgent questions about your medical care, please call your primary care provider or clinic, [phone]  For questions related to your surgery, please call your surgery clinic        Attending Provider     Provider Specialty    Felicitas Radford MD Colon and Rectal Surgery       Primary Care Provider Office Phone # Fax #    Ayden Peralta [phone]      Your next 10 appointments already scheduled     Jul 10, 2017 10:00 AM CDT   IR PORT REMOVAL RIGHT with SHIR1   [facility] ([facility])    [address] [place] [id number] [phone]           1. Your doctor will need to do a history and physical within 7 days before this procedure. 2. Your doctor decide will which medications should not be taken the morning of the exam. 3. Laboratory tests are to be obtained by your doctor prior to the exam (Basic Metabolic Panel, CBCP, PTT and INR) (No labs needed if you are having a tunneled catheter exchange or removal) 4. If you have allergies to x-ray contrast or iodine, contact your doctor or a Radiology nurse prior to the exam day for instructions. 5. Someone will need  to drive you to and from the hospital. 6. If you are or may be pregnant, contact your doctor or a Radiology nurse prior to the day of the exam. 7. If you have diabetes, check with your doctor or a Radiology nurse to see if your insulin needs to be adjusted for the exam. 8. If you are taking a medication called Glucophage or Glucovance; these medications need to be held the day of the exam and for approximately 48 hours following. A blood sample must be drawn so your creatinine level can be checked before resuming this medication. 9. If you are taking Coumadin (to thin you blood) please contact your doctor or a Radiology nurse at least 3 days before the exam for special instructions. 10. You should not have received contrast within 48 hours of this exam. 11. The day before your exam you may eat your regular diet and are encouraged to drink at least 2 quarts of clear liquids. Drink no alcoholic beverages for 24 hours prior to the exam. 12. If you have a colostomy you will need to irrigate it with tap water at 8PM the evening before and again at 6AM the morning of the exam. 13. Do not smoke for 24 hours prior to the procedure. 14. Birth to 4 years: - Breast feeding must be stopped 4 hours prior to exam - Solid food or formula must be stopped 6 hours prior to exam - Tube feedings must be stopped 6 hours prior to exam 15. 4-10 years old: - Nothing to eat or drink 6 hours prior to exam 16. 10+ years old: - Nothing to eat or drink 8 hours prior to exam 17. The morning of the exam you may brush your teeth and take medications as directed with a sip of water. 18. When discharged, you cannot drive until morning, and an adult must be with you until then. You should stay in the [facility] overnight. 19. Bring a list of all drugs you are taking; include supplements and over-the-counter medications. Wear comfortable clothes and leave your valuables at home.            Sep 12, 2017  4:00 PM CDT   Level 1 with SH INFUSION  CHAIR 17   [facility] ([facility])    [facility] [place]  [address]  [place] [address]   [phone]            Sep 15, 2017  4:00 PM CDT   Return Visit with Agueda Manley MD   [facility] ([facility])    [facility] [place] [address]  [place] [address]   [phone]              Further instructions from your care team       Anorectal Surgery Instructions    What can I expect after anorectal surgery?  Most anorectal procedures are done as outpatient surgery, and you go home the same day as the procedure. A few surgical procedures will require that you stay in the hospital for about one to three days. No matter where the procedure is done or how long or short it takes, these recommendations will help you heal and feel more comfortable.    Medicines:  The anal area is very sensitive; you can expect to have some pain for up to 2-4 weeks after the procedure. Your doctor will give you a prescription for one or more pain medications.    Take naprosyn 500 mg twice a day OR ibuprofen 600 mg four times a day     Take this on a regular basis (not as needed) following your surgery.     The drugs are best taken with food.  Do not take if it causes stomach upset or if you have a history of ulcers or gastritis. You can stop the naprosyn (or ibuprofen) or reduce the dose when you are feeling better.    DO NOT use naprosyn, ibuprofen, or other similar agents (eg. Advil or Aleve) if you have inflammatory bowel disease (Ulcerative Colitis or Crohn's disease) or if your doctor as advised you against using these medications    Take acetominaphen (Tylenol) 650-1000 mg four times a day.     Take this on a regular basis (not as needed) following surgery for pain control.     Take the lower dose if you are >65 years old or have liver disease. The maximum dose of acetominaphen is 4000 mg a  day. You can stop the acetaminophen or reduce the dose when you are feeling better.    It is important to realize that many narcotic pain relievers (including vicodin, percocet, tylenol #3) also have acetaminophen, and excessive doses of acetaminophen can be dangerous, so do not take these in addition to acetominaphen.  You may take narcotics that don't contain acetominaphen such as oxycodone.      Take oxycodone AS NEEDED in addition to the acetominaphen and naprosyn.      Because narcotics have side effects (including constipation), you should reduce your use of these medications as tolerated as your pain improves.    *In general, the best strategy is to take (if you are able to tolerate it) the tylenol and naprosen on a regular basis until your pain has largely gone away. You can take the narcotic pain medicine as needed in addition to the tylenol and ibuprofen. As your pain begins to lessen, you should cut back on your narcotic use while continuing to take your regular tylenol and naprosyn doses.      Refilling prescriptions. If you need additional pain medication, please call the triage nurse at [phone] during normal business hours (8 a.m. to 4 p.m., Monday though Friday) or have your pharmacy fax a refill request to [phone]. If you call after hours or on the weekends, the doctor on call may not know you personally and may not renew narcotic pain medication by phone. Call your primary care provider for all other medication refills.    Perineal care:  Tub baths:    If possible, take a tub bath immediately after each bowel movement.     Baths should be take at least 3 times daily for the first week to 10 days following your procedure. You should soak in the tub for 10 to 15 minutes each time with water as warm as you can tolerate.     Even after you go back to work, it is a good idea to sit in the tub in the morning, after returning from work, and again in the evening before  bedtime.    Bleeding/Infection:    You can expect to have some bleeding after bowel movements, but it should stop soon after you wipe.     Use a wet cloth or perianal pad (Tucks or Preparation H pads) to gently wipe the area after each bowel movement.    Do not rub the anal area or use a lot of pressure.    Using a spray bottle filled with warm water helps loosen any remaining stool. Blot gently with a soft dry cloth or tissue paper.    Infection around the anal opening is not very common. The anal area has excellent blood supply, which helps the area to heal. Bloody discharge after bowel movements is normal and may last 2 to 4 weeks after your surgery. However, if you bleed between bowel movements and cannot get it to stop, call the triage nurse immediately [phone].    Bowel function:  Take a fiber supplement such as Metamucil, which is over the counter. It is important to drink six to eight glasses of water or juice everyday when using fiber products.    If you do not have a bowel movement after 1-2 days:    Take Milk of Magnesia-2 tablespoons.       If there are no results, repeat this or add over the counter Miralax.      If you still do not have results, contact the clinic.     If there are no results, repeat this. Stop taking Milk of Magnesia or other laxatives if you begin to have diarrhea.    * Constipation will cause you to strain when you have a bowel movement. The hard stool will be difficult to pass, will increase pain and bleeding, and will slow down healing.  Try to avoid constipation and/or diarrhea as this can make the pain and bleeding worse.    * It is important to have regular bowel movements at least every other day and to keep your stool soft.  A high fiber diet, including at least four servings of fruits or vegetables daily, will help to keep your bowel movements regular and soft.    Activity:  After your procedure, there are no restrictions on your activity     except restrictions  surrounding being on narcotics and in pain, such as no heavy machine operating or driving.     You may walk, climb stairs, ride in a car, and sit as tolerated.     It is helpful to avoid sitting in one position for long periods (2 or more hours).    After some surgeries, you may be told not to perform any lifting (more than 10 pounds) for several weeks after surgery.    When to call:  When do I need to call the doctor or triage nurse?    If you experience any of the problems listed here, call our triage nurse during business hours ([phone]).     The nurse will help you with your problem or have the doctor call you.     After hours and on weekends, please call the main hospital number ([phone]) and ask for the colon and rectal surgery person on call.     Some is available to help you 24 hours a day, seven days a week.    Call for:   ? Fever greater than 101 degrees   ? Chills   ? Foul-smelling drainage   ? Nausea and vomiting   ? Diarrhea - greater than 3 water stools in 24 hours   ? Constipation - no bowel movement after 3 days   ? Severe bleeding that does not stop soon after a bowel movement   ? Problems with the incision, including increased pain, swelling, or redness    [facility]  Home Care Following Anesthesia  For 24 hours after surgery:  1. Get plenty of rest.  A responsible adult must stay with you for at least 24 hours after you leave the surgery center.  2. Do not drive or use heavy equipment.  If you have weakness or tingling, don't drive or use heavy equipment until this feeling goes away.   3. Do not drink alcohol.   4. Avoid strenuous or risky activities.  Ask for help when climbing stairs.  5. You may feel lightheaded.  IF so, sit for a few minutes before standing.  Have someone help you get up.   6. If you have nausea (feel sick to your stomach): Drink only clear liquids such as apple juice, ginger ale, broth or 7-Up.  Rest may also help.  Be sure to  drink enough fluids.  Move to a regular diet as you feel able.   7. You may have a slight fever.  Call the doctor if your fever is over 100 F (37.7 C) (taken under the tongue) or lasts longer than 24 hours.  8. You may have a dry mouth, a sore throat, muscle aches or trouble sleeping. These should go away after 24 hours.  9. Do not make important or legal decisions.           Tips for taking pain medications  To get the best pain relief possible, remember these points:    Take pain medications as directed, before pain becomes severe.    Pain medication can upset your stomach: taking it with food may help.    Constipation is a common side effect of pain medication. Drink plenty of  fluids.    Eat foods high in fiber. Take a stool softener if recommended by your doctor or pharmacist.    Do not drink alcohol, drive or operate machinery while taking pain medications.    Ask about other ways to control pain, such as with heat, ice or relaxation.    Call a doctor for any of the followin. Signs of infection (fever, growing tenderness at the surgery site, a large amount of drainage or bleeding, severe pain, foul-smelling drainage, redness, swelling).  2. It has been over 8 to 10 hours since surgery and you are still not able to urinate (pass water).  3. Headache for over 24 hours.  Your doctor is:       Dr. Felicitas Radford, Colon Rectal: [phone]               Or dial [phone] and ask for the resident on call for:  Colon Rectal  For emergency care, call the:  [facility]:  [phone] (TTY for hearing impaired: [phone])                  Pending Results     No orders found from 7/3/2017 to 2017.            Admission Information     Date & Time Provider Department Dept. Phone    2017 Felicitas Radford MD [facility] [phone]      Your Vitals Were     Blood Pressure Pulse Temperature Respirations Height Weight    118/90 76 98.3  F (36.8  " C) (Oral) 16 1.791 m (5' 10.5\") 93 kg (205 lb)    Pulse Oximetry BMI (Body Mass Index)                93% 29 kg/m2          [employer] Information     [employer] gives you secure access to your electronic health record. If you see a primary care provider, you can also send messages to your care team and make appointments. If you have questions, please call your primary care clinic.  If you do not have a primary care provider, please call [phone] and they will assist you.      [employer] is an electronic gateway that provides easy, online access to your medical records. With [employer], you can request a clinic appointment, read your test results, renew a prescription or communicate with your care team.     To access your existing account, please contact your [facility] or call [phone] for assistance.        Care EveryWhere ID     This is your Care EveryWhere ID. This could be used by other organizations to access your [facility] medical records  [id number]        Equal Access to Services     BLAISE EDDY AH: Hadii trent daniel hadasho Somario albertoali, waaxda luqadaha, qaybta kaalmada adeegyada, jb pedroza . So [facility] [phone].    ATENCIÓN: Si habla español, tiene a mena disposición servicios gratuitos de asistencia lingüística. Llame al [phone].    We comply with applicable federal civil rights laws and [place] laws. We do not discriminate on the basis of race, color, national origin, age, disability sex, sexual orientation or gender identity.               Review of your medicines      UNREVIEWED medicines. Ask your doctor about these medicines        Dose / Directions    acetaminophen 325 MG tablet   Commonly known as:  TYLENOL   Used for:  Rectal cancer (H)        Dose:  650 mg   Take 2 tablets (650 mg) by mouth every 4 hours as needed for other (mild pain)   Quantity:  100 tablet   Refills:  0       COLACE PO        Dose:  2 capsule   Take 2 capsules by mouth 2 " times daily Unsure of dosage.   Refills:  0       dibucaine 1 % Oint ointment   Commonly known as:  NUPERCAINAL        3 times daily as needed for moderate pain   Refills:  0       diltiazem 2% in PLO cream (FV COMPOUNDED) 2% Gel        To anal opening three times daily.  Use a pea-sized amount.  Store at room temperature.   Quantity:  60 g   Refills:  0       hydrocortisone 0.5 % ointment        Apply topically 2 times daily as needed Reported on 2/14/2017   Refills:  0       IBUPROFEN PO        Dose:  200 mg   Take 200 mg by mouth every 6 hours as needed   Refills:  0       methylPREDNISolone 32 MG tablet   Commonly known as:  MEDROL   Used for:  Malignant neoplasm of rectum (H), Allergy to contrast media (used for diagnostic x-rays)        Take 12 hours prior to CT scan and 2 hours prior to CT scan   Quantity:  2 tablet   Refills:  0                Protect others around you: Learn how to safely use, store and throw away your medicines at www.disposemymeds.org.             Medication List: This is a list of all your medications and when to take them. Check marks below indicate your daily home schedule. Keep this list as a reference.      Medications           Morning Afternoon Evening Bedtime As Needed    acetaminophen 325 MG tablet   Commonly known as:  TYLENOL   Take 2 tablets (650 mg) by mouth every 4 hours as needed for other (mild pain)                                COLACE PO   Take 2 capsules by mouth 2 times daily Unsure of dosage.                                dibucaine 1 % Oint ointment   Commonly known as:  NUPERCAINAL   3 times daily as needed for moderate pain                                diltiazem 2% in PLO cream (FV COMPOUNDED) 2% Gel   To anal opening three times daily.  Use a pea-sized amount.  Store at room temperature.                                hydrocortisone 0.5 % ointment   Apply topically 2 times daily as needed Reported on 2/14/2017                                IBUPROFEN PO   Take  200 mg by mouth every 6 hours as needed                                methylPREDNISolone 32 MG tablet   Commonly known as:  MEDROL   Take 12 hours prior to CT scan and 2 hours prior to CT scan

## 2017-07-05 NOTE — IP AVS SNAPSHOT
[facility]    [address] [place] [address]    Phone:  [phone]    Fax:  [phone]                                       After Visit Summary   7/5/2017    Louie Greco    MRN: [id number]           After Visit Summary Signature Page     I have received my discharge instructions, and my questions have been answered. I have discussed any challenges I see with this plan with the nurse or doctor.    ..........................................................................................................................................  Patient/Patient Representative Signature      ..........................................................................................................................................  Patient Representative Print Name and Relationship to Patient    ..................................................               ................................................  Date                                            Time    ..........................................................................................................................................  Reviewed by Signature/Title    ...................................................              ..............................................  Date                                                            Time

## 2017-07-05 NOTE — ANESTHESIA CARE TRANSFER NOTE
Patient: Louie Greco    Procedure(s):  Examination Under Anesthesia, flex sigmoidoscopy with biopsies and formalin application - Wound Class: II-Clean Contaminated   - Wound Class: II-Clean Contaminated    Diagnosis: Surveillance of Rectal Cancer  Diagnosis Additional Information: No value filed.    Anesthesia Type:   MAC     Note:  Airway :Room Air  Patient transferred to:Phase II  Comments: VSS/WNL. Responds to questions.      Vitals: (Last set prior to Anesthesia Care Transfer)    CRNA VITALS  7/5/2017 0727 - 7/5/2017 0757      7/5/2017             Resp Rate (set): 10                Electronically Signed By: QUINTON Hager CRNA  July 5, 2017  7:57 AM

## 2017-07-05 NOTE — ANESTHESIA PREPROCEDURE EVALUATION
Anesthesia Evaluation     . Pt has had prior anesthetic. Type: General    History of anesthetic complications    hiccups      ROS/MED HX    ENT/Pulmonary:     (+)asthma (childhood) , . .    Neurologic:  - neg neurologic ROS     Cardiovascular:  - neg cardiovascular ROS       METS/Exercise Tolerance:  >4 METS   Hematologic:  - neg hematologic  ROS       Musculoskeletal:  - neg musculoskeletal ROS       GI/Hepatic: Comment: Rectal cancer        Renal/Genitourinary:  - ROS Renal section negative       Endo:  - neg endo ROS       Psychiatric:         Infectious Disease:  - neg infectious disease ROS       Malignancy:         Other:                     Physical Exam  Normal systems: dental    Airway   Mallampati: I  TM distance: >3 FB  Neck ROM: full    Dental     Cardiovascular   Rhythm and rate: regular and normal      Pulmonary    breath sounds clear to auscultation                    Anesthesia Plan      History & Physical Review  History and physical reviewed and following examination; no interval change.    ASA Status:  3 .    NPO Status:  > 2 hours    Plan for MAC with Intravenous induction. Maintenance will be TIVA.  Reason for MAC:  Deep or markedly invasive procedure (G8)  PONV prophylaxis:  Ondansetron (or other 5HT-3) and Dexamethasone or Solumedrol  MAC, ketamine propofol.  Port in place will take care to position comfortably      Postoperative Care  Postoperative pain management:  Oral pain medications and Multi-modal analgesia.      Consents  Anesthetic plan, risks, benefits and alternatives discussed with:  Patient..                          .

## 2017-07-05 NOTE — DISCHARGE INSTRUCTIONS
Anorectal Surgery Instructions    What can I expect after anorectal surgery?  Most anorectal procedures are done as outpatient surgery, and you go home the same day as the procedure. A few surgical procedures will require that you stay in the hospital for about one to three days. No matter where the procedure is done or how long or short it takes, these recommendations will help you heal and feel more comfortable.    Medicines:  The anal area is very sensitive; you can expect to have some pain for up to 2-4 weeks after the procedure. Your doctor will give you a prescription for one or more pain medications.    Take naprosyn 500 mg twice a day OR ibuprofen 600 mg four times a day     Take this on a regular basis (not as needed) following your surgery.     The drugs are best taken with food.  Do not take if it causes stomach upset or if you have a history of ulcers or gastritis. You can stop the naprosyn (or ibuprofen) or reduce the dose when you are feeling better.    DO NOT use naprosyn, ibuprofen, or other similar agents (eg. Advil or Aleve) if you have inflammatory bowel disease (Ulcerative Colitis or Crohn's disease) or if your doctor as advised you against using these medications    Take acetominaphen (Tylenol) 650-1000 mg four times a day.     Take this on a regular basis (not as needed) following surgery for pain control.     Take the lower dose if you are >65 years old or have liver disease. The maximum dose of acetominaphen is 4000 mg a day. You can stop the acetaminophen or reduce the dose when you are feeling better.    It is important to realize that many narcotic pain relievers (including vicodin, percocet, tylenol #3) also have acetaminophen, and excessive doses of acetaminophen can be dangerous, so do not take these in addition to acetominaphen.  You may take narcotics that don't contain acetominaphen such as oxycodone.      Take oxycodone AS NEEDED in addition to the acetominaphen and naprosyn.       Because narcotics have side effects (including constipation), you should reduce your use of these medications as tolerated as your pain improves.    *In general, the best strategy is to take (if you are able to tolerate it) the tylenol and naprosen on a regular basis until your pain has largely gone away. You can take the narcotic pain medicine as needed in addition to the tylenol and ibuprofen. As your pain begins to lessen, you should cut back on your narcotic use while continuing to take your regular tylenol and naprosyn doses.      Refilling prescriptions. If you need additional pain medication, please call the triage nurse at [phone] during normal business hours (8 a.m. to 4 p.m., Monday though Friday) or have your pharmacy fax a refill request to [phone]. If you call after hours or on the weekends, the doctor on call may not know you personally and may not renew narcotic pain medication by phone. Call your primary care provider for all other medication refills.    Perineal care:  Tub baths:    If possible, take a tub bath immediately after each bowel movement.     Baths should be take at least 3 times daily for the first week to 10 days following your procedure. You should soak in the tub for 10 to 15 minutes each time with water as warm as you can tolerate.     Even after you go back to work, it is a good idea to sit in the tub in the morning, after returning from work, and again in the evening before bedtime.    Bleeding/Infection:    You can expect to have some bleeding after bowel movements, but it should stop soon after you wipe.     Use a wet cloth or perianal pad (Tucks or Preparation H pads) to gently wipe the area after each bowel movement.    Do not rub the anal area or use a lot of pressure.    Using a spray bottle filled with warm water helps loosen any remaining stool. Blot gently with a soft dry cloth or tissue paper.    Infection around the anal opening is not very common. The anal  area has excellent blood supply, which helps the area to heal. Bloody discharge after bowel movements is normal and may last 2 to 4 weeks after your surgery. However, if you bleed between bowel movements and cannot get it to stop, call the triage nurse immediately [phone].    Bowel function:  Take a fiber supplement such as Metamucil, which is over the counter. It is important to drink six to eight glasses of water or juice everyday when using fiber products.    If you do not have a bowel movement after 1-2 days:    Take Milk of Magnesia-2 tablespoons.       If there are no results, repeat this or add over the counter Miralax.      If you still do not have results, contact the clinic.     If there are no results, repeat this. Stop taking Milk of Magnesia or other laxatives if you begin to have diarrhea.    * Constipation will cause you to strain when you have a bowel movement. The hard stool will be difficult to pass, will increase pain and bleeding, and will slow down healing.  Try to avoid constipation and/or diarrhea as this can make the pain and bleeding worse.    * It is important to have regular bowel movements at least every other day and to keep your stool soft.  A high fiber diet, including at least four servings of fruits or vegetables daily, will help to keep your bowel movements regular and soft.    Activity:  After your procedure, there are no restrictions on your activity     except restrictions surrounding being on narcotics and in pain, such as no heavy machine operating or driving.     You may walk, climb stairs, ride in a car, and sit as tolerated.     It is helpful to avoid sitting in one position for long periods (2 or more hours).    After some surgeries, you may be told not to perform any lifting (more than 10 pounds) for several weeks after surgery.    When to call:  When do I need to call the doctor or triage nurse?    If you experience any of the problems listed here, call our triage  nurse during business hours ([phone]).     The nurse will help you with your problem or have the doctor call you.     After hours and on weekends, please call the main hospital number ([phone]) and ask for the colon and rectal surgery person on call.     Some is available to help you 24 hours a day, seven days a week.    Call for:   ? Fever greater than 101 degrees   ? Chills   ? Foul-smelling drainage   ? Nausea and vomiting   ? Diarrhea - greater than 3 water stools in 24 hours   ? Constipation - no bowel movement after 3 days   ? Severe bleeding that does not stop soon after a bowel movement   ? Problems with the incision, including increased pain, swelling, or redness    [facility]  Home Care Following Anesthesia  For 24 hours after surgery:  1. Get plenty of rest.  A responsible adult must stay with you for at least 24 hours after you leave the surgery center.  2. Do not drive or use heavy equipment.  If you have weakness or tingling, don't drive or use heavy equipment until this feeling goes away.   3. Do not drink alcohol.   4. Avoid strenuous or risky activities.  Ask for help when climbing stairs.  5. You may feel lightheaded.  IF so, sit for a few minutes before standing.  Have someone help you get up.   6. If you have nausea (feel sick to your stomach): Drink only clear liquids such as apple juice, ginger ale, broth or 7-Up.  Rest may also help.  Be sure to drink enough fluids.  Move to a regular diet as you feel able.   7. You may have a slight fever.  Call the doctor if your fever is over 100 F (37.7 C) (taken under the tongue) or lasts longer than 24 hours.  8. You may have a dry mouth, a sore throat, muscle aches or trouble sleeping. These should go away after 24 hours.  9. Do not make important or legal decisions.           Tips for taking pain medications  To get the best pain relief possible, remember these points:    Take pain medications as  directed, before pain becomes severe.    Pain medication can upset your stomach: taking it with food may help.    Constipation is a common side effect of pain medication. Drink plenty of  fluids.    Eat foods high in fiber. Take a stool softener if recommended by your doctor or pharmacist.    Do not drink alcohol, drive or operate machinery while taking pain medications.    Ask about other ways to control pain, such as with heat, ice or relaxation.    Call a doctor for any of the followin. Signs of infection (fever, growing tenderness at the surgery site, a large amount of drainage or bleeding, severe pain, foul-smelling drainage, redness, swelling).  2. It has been over 8 to 10 hours since surgery and you are still not able to urinate (pass water).  3. Headache for over 24 hours.  Your doctor is:       Dr. Felicitas Radford, Colon Rectal: [phone]               Or dial [phone] and ask for the resident on call for:  Colon Rectal  For emergency care, call the:  [facility]:  [phone] (TTY for hearing impaired: [phone])

## 2017-07-05 NOTE — ANESTHESIA POSTPROCEDURE EVALUATION
Patient: Louie Greco    Procedure(s):  Examination Under Anesthesia, flex sigmoidoscopy with biopsies and formalin application - Wound Class: II-Clean Contaminated   - Wound Class: II-Clean Contaminated    Diagnosis:Surveillance of Rectal Cancer  Diagnosis Additional Information: No value filed.    Anesthesia Type:  MAC    Note:  Anesthesia Post Evaluation    Patient location during evaluation: bedside  Patient participation: Able to fully participate in evaluation  Level of consciousness: awake  Pain management: adequate  Airway patency: patent  Cardiovascular status: acceptable  Respiratory status: acceptable  Hydration status: acceptable  PONV: controlled     Anesthetic complications: None          Last vitals:  Vitals:    07/05/17 0607 07/05/17 0755 07/05/17 0810   BP: 118/90 107/69 107/67   Pulse: 76 87 75   Resp: 16 16 16   Temp: 36.8  C (98.3  F) 36.3  C (97.4  F) 36.3  C (97.4  F)   SpO2: 93% 91% 95%         Electronically Signed By: Philippe Canales MD, MD  July 5, 2017  8:39 AM

## 2017-07-06 LAB — COPATH REPORT: NORMAL

## 2017-07-07 ENCOUNTER — CARE COORDINATION (OUTPATIENT)
Dept: SURGERY | Facility: CLINIC | Age: 57
End: 2017-07-07

## 2017-07-07 NOTE — PROGRESS NOTES
Informed pt of negative path from 7/5/17 flex sig.  Per [place] protocol, will schedule next flex sig early 9/2017.  Let pt know Anai would be in touch to schedule.

## 2017-07-08 NOTE — OP NOTE
SURGEON: Felicitas Radford MD     ASSISTANT: None    PREOPERATIVE DIAGNOSIS: Low rectal cancer on watch and wait protocol.    POSTOPERATIVE DIAGNOSIS: Low rectal cancer on watch and wait protocol. Radiation proctitis.    PROCEDURE: Examination under anesthesia, biopsy of rectum at scar. Flexible sigmoidoscopy. Application of formalin.    INDICATIONS: Louie Greco is a 57 year old male who has a low rectal cancer (originally P4H0bS6 (stage IIIA)) who has had a complete response and has opted for watch and wait protocol under close surveillance. He has fully completed chemoradiotherapy 9/15/16 and then full systemic chemotherapy. His post chemoradiation MRI showed improvement in the size of the tumor and response in the lymph nodes. On 12/8/16, he underwent flexible sigmoidoscopy and there was no evidence of tumor.  There was only some anterior scarring in the lumen and multiple biopsies showed no evidence of carcinoma. Adjuvant chemotherapy consisted of FOLFOX x 8 cycles 1/16/17-5/9/17. He is now here for his first 2 month examination under anesthesia and flexible sigmoidoscopy to assess the primary. The risks and benefits were thoroughly discussed with the patient and he agreed to proceed.     DESCRIPTION OF PROCEDURE: The patient was brought to the operating room, placed prone on the operating room table. Deep sedation was induced with intravenous medicines with deep sedation and monitored anesthesia care. We prepped and draped the area in the usual sterile fashion. We began the procedure with a timeout and performed an anal block using a mixture 50/50 of bupivacaine and lidocaine with epinephrine. A total of 40 mL were infused and following this, we performed anoscopy which demonstrated friability of the anal canal even with the small anoscope. There was some scarring along the anterior aspect of the distal rectum just proximal to the dentate line especially along the left anterior aspect. There was some  subtle nodularity corresponding to the scarring but no mass. Flexible sigmoidoscopy was performed and there were some vascular changes including telangiectasias distally most consistent with radiation proctitis. The same scarring on anoscopy could also be clearly seen. I first biopsied the rectum using the Tischler forceps and got good bites over the left anterior area. I then applied 10% formalin to the rectal lumen using the large proctoswabs in two separate applications. At the end the case, all instruments and sponge counts were correct x2. The patient was emerged from anesthesia and taken to postoperative anesthesia care unit in good condition.     SPECIMEN: Rectum.     ESTIMATED BLOOD LOSS: Minimal.     DRAINS: None.     URINE OUTPUT: Not measured.     AROLDO NAIK MD   Colon and Rectal Surgery Staff  [facility]

## 2017-07-10 ENCOUNTER — APPOINTMENT (OUTPATIENT)
Dept: INTERVENTIONAL RADIOLOGY/VASCULAR | Facility: CLINIC | Age: 57
End: 2017-07-10
Attending: INTERNAL MEDICINE
Payer: COMMERCIAL

## 2017-07-10 ENCOUNTER — HOSPITAL ENCOUNTER (OUTPATIENT)
Facility: CLINIC | Age: 57
Discharge: HOME | End: 2017-07-10
Attending: RADIOLOGY | Admitting: RADIOLOGY
Payer: COMMERCIAL

## 2017-07-10 VITALS
OXYGEN SATURATION: 94 % | RESPIRATION RATE: 20 BRPM | DIASTOLIC BLOOD PRESSURE: 68 MMHG | SYSTOLIC BLOOD PRESSURE: 109 MMHG | HEART RATE: 94 BPM | TEMPERATURE: 97.6 F

## 2017-07-10 DIAGNOSIS — C20 MALIGNANT NEOPLASM OF RECTUM (H): ICD-10-CM

## 2017-07-10 DIAGNOSIS — C20 RECTAL CANCER (H): Primary | ICD-10-CM

## 2017-07-10 LAB
APTT PPP: 31 SEC (ref 22–37)
ERYTHROCYTE [DISTWIDTH] IN BLOOD BY AUTOMATED COUNT: 12.8 % (ref 10–15)
HCT VFR BLD AUTO: 38.1 % (ref 40–53)
HGB BLD-MCNC: 12.8 G/DL (ref 13.3–17.7)
INR PPP: 1.02 (ref 0.86–1.14)
MCH RBC QN AUTO: 31.4 PG (ref 26.5–33)
MCHC RBC AUTO-ENTMCNC: 33.6 G/DL (ref 31.5–36.5)
MCV RBC AUTO: 94 FL (ref 78–100)
PLATELET # BLD AUTO: 170 10E9/L (ref 150–450)
RBC # BLD AUTO: 4.07 10E12/L (ref 4.4–5.9)
WBC # BLD AUTO: 6.7 10E9/L (ref 4–11)

## 2017-07-10 PROCEDURE — 85610 PROTHROMBIN TIME: CPT | Performed by: RADIOLOGY

## 2017-07-10 PROCEDURE — S0077 INJECTION, CLINDAMYCIN PHOSP: HCPCS | Performed by: RADIOLOGY

## 2017-07-10 PROCEDURE — 40000854 ZZH STATISTIC SIMPLE TUBE INSERTION/CHARGE, PORT, CATH, FISTULOGRAM

## 2017-07-10 PROCEDURE — 27211193 ZZ H WOUND GLUE CR1

## 2017-07-10 PROCEDURE — 85027 COMPLETE CBC AUTOMATED: CPT | Performed by: RADIOLOGY

## 2017-07-10 PROCEDURE — 77001 FLUOROGUIDE FOR VEIN DEVICE: CPT

## 2017-07-10 PROCEDURE — 99152 MOD SED SAME PHYS/QHP 5/>YRS: CPT

## 2017-07-10 PROCEDURE — 27210905 ZZH KIT CR7

## 2017-07-10 PROCEDURE — 25000128 H RX IP 250 OP 636

## 2017-07-10 PROCEDURE — 25000125 ZZHC RX 250: Performed by: RADIOLOGY

## 2017-07-10 PROCEDURE — 85730 THROMBOPLASTIN TIME PARTIAL: CPT | Performed by: RADIOLOGY

## 2017-07-10 RX ORDER — CLINDAMYCIN PHOSPHATE 900 MG/50ML
900 INJECTION, SOLUTION INTRAVENOUS
Status: COMPLETED | OUTPATIENT
Start: 2017-07-10 | End: 2017-07-10

## 2017-07-10 RX ORDER — FLUMAZENIL 0.1 MG/ML
0.2 INJECTION, SOLUTION INTRAVENOUS
Status: DISCONTINUED | OUTPATIENT
Start: 2017-07-10 | End: 2017-07-10 | Stop reason: HOSPADM

## 2017-07-10 RX ORDER — FENTANYL CITRATE 50 UG/ML
25-50 INJECTION, SOLUTION INTRAMUSCULAR; INTRAVENOUS EVERY 5 MIN PRN
Status: DISCONTINUED | OUTPATIENT
Start: 2017-07-10 | End: 2017-07-10 | Stop reason: HOSPADM

## 2017-07-10 RX ORDER — NALOXONE HYDROCHLORIDE 0.4 MG/ML
.1-.4 INJECTION, SOLUTION INTRAMUSCULAR; INTRAVENOUS; SUBCUTANEOUS
Status: DISCONTINUED | OUTPATIENT
Start: 2017-07-10 | End: 2017-07-10 | Stop reason: HOSPADM

## 2017-07-10 RX ORDER — ACETAMINOPHEN 325 MG/1
650-975 TABLET ORAL
Status: DISCONTINUED | OUTPATIENT
Start: 2017-07-10 | End: 2017-07-10 | Stop reason: HOSPADM

## 2017-07-10 RX ORDER — LIDOCAINE HYDROCHLORIDE 10 MG/ML
1-30 INJECTION, SOLUTION EPIDURAL; INFILTRATION; INTRACAUDAL; PERINEURAL
Status: DISCONTINUED | OUTPATIENT
Start: 2017-07-10 | End: 2017-07-10 | Stop reason: HOSPADM

## 2017-07-10 RX ORDER — LIDOCAINE 40 MG/G
CREAM TOPICAL
Status: DISCONTINUED | OUTPATIENT
Start: 2017-07-10 | End: 2017-07-10 | Stop reason: HOSPADM

## 2017-07-10 RX ORDER — FENTANYL CITRATE 50 UG/ML
INJECTION, SOLUTION INTRAMUSCULAR; INTRAVENOUS
Status: COMPLETED
Start: 2017-07-10 | End: 2017-07-10

## 2017-07-10 RX ADMIN — FENTANYL CITRATE 50 MCG: 50 INJECTION, SOLUTION INTRAMUSCULAR; INTRAVENOUS at 10:22

## 2017-07-10 RX ADMIN — CLINDAMYCIN PHOSPHATE 900 MG: 18 INJECTION, SOLUTION INTRAVENOUS at 10:01

## 2017-07-10 RX ADMIN — MIDAZOLAM HYDROCHLORIDE 1 MG: 1 INJECTION, SOLUTION INTRAMUSCULAR; INTRAVENOUS at 10:22

## 2017-07-10 NOTE — IP AVS SNAPSHOT
[place] [address]    [place] [address]    Phone:  [phone]                                       After Visit Summary   7/10/2017    Louie Greco    MRN: [id number]           After Visit Summary Signature Page     I have received my discharge instructions, and my questions have been answered. I have discussed any challenges I see with this plan with the nurse or doctor.    ..........................................................................................................................................  Patient/Patient Representative Signature      ..........................................................................................................................................  Patient Representative Print Name and Relationship to Patient    ..................................................               ................................................  Date                                            Time    ..........................................................................................................................................  Reviewed by Signature/Title    ...................................................              ..............................................  Date                                                            Time

## 2017-07-10 NOTE — PROGRESS NOTES
Care Suites Arrival    Reason for Visit: port removal  Arrival interventions:none    Pt resting in bed/recyliner, denies additional needs at this time, call light in reach.  Waiting for procedure.

## 2017-07-10 NOTE — IP AVS SNAPSHOT
MRN:[id number]                      After Visit Summary   7/10/2017    Louie Greco    MRN: [id number]           Visit Information        Department      7/10/2017  9:07 AM [facility]          Review of your medicines      UNREVIEWED medicines. Ask your doctor about these medicines        Dose / Directions    acetaminophen 325 MG tablet   Commonly known as:  TYLENOL   Used for:  Rectal cancer (H)        Dose:  650 mg   Take 2 tablets (650 mg) by mouth every 4 hours as needed for other (mild pain)   Quantity:  100 tablet   Refills:  0       COLACE PO        Dose:  2 capsule   Take 2 capsules by mouth 2 times daily Unsure of dosage.   Refills:  0       dibucaine 1 % Oint ointment   Commonly known as:  NUPERCAINAL        3 times daily as needed for moderate pain   Refills:  0       diltiazem 2% in PLO cream (FV COMPOUNDED) 2% Gel        To anal opening three times daily.  Use a pea-sized amount.  Store at room temperature.   Quantity:  60 g   Refills:  0       hydrocortisone 0.5 % ointment        Apply topically 2 times daily as needed Reported on 2/14/2017   Refills:  0       IBUPROFEN PO        Dose:  200 mg   Take 200 mg by mouth every 6 hours as needed   Refills:  0       methylPREDNISolone 32 MG tablet   Commonly known as:  MEDROL   Used for:  Malignant neoplasm of rectum (H), Allergy to contrast media (used for diagnostic x-rays)        Take 12 hours prior to CT scan and 2 hours prior to CT scan   Quantity:  2 tablet   Refills:  0       VITAMIN D (CHOLECALCIFEROL) PO        Dose:  2000 Units   Take 2,000 Units by mouth   Refills:  0                Protect others around you: Learn how to safely use, store and throw away your medicines at www.disposemymeds.org.         Follow-ups after your visit        Your next 10 appointments already scheduled     Jul 10, 2017 10:00 AM CDT   IR PORT REMOVAL RIGHT with SHIR1   [facility] Interventional Radiology ([facility]  [address] [address] [phone]           1. Your doctor will need to do a history and physical within 7 days before this procedure. 2. Your doctor decide will which medications should not be taken the morning of the exam. 3. Laboratory tests are to be obtained by your doctor prior to the exam (Basic Metabolic Panel, CBCP, PTT and INR) (No labs needed if you are having a tunneled catheter exchange or removal) 4. If you have allergies to x-ray contrast or iodine, contact your doctor or a Radiology nurse prior to the exam day for instructions. 5. Someone will need to drive you to and from the hospital. 6. If you are or may be pregnant, contact your doctor or a Radiology nurse prior to the day of the exam. 7. If you have diabetes, check with your doctor or a Radiology nurse to see if your insulin needs to be adjusted for the exam. 8. If you are taking a medication called Glucophage or Glucovance; these medications need to be held the day of the exam and for approximately 48 hours following. A blood sample must be drawn so your creatinine level can be checked before resuming this medication. 9. If you are taking Coumadin (to thin you blood) please contact your doctor or a Radiology nurse at least 3 days before the exam for special instructions. 10. You should not have received contrast within 48 hours of this exam. 11. The day before your exam you may eat your regular diet and are encouraged to drink at least 2 quarts of clear liquids. Drink no alcoholic beverages for 24 hours prior to the exam. 12. If you have a colostomy you will need to irrigate it with tap water at 8PM the evening before and again at 6AM the morning of the exam. 13. Do not smoke for 24 hours prior to the procedure. 14. Birth to 4 years: - Breast feeding must be stopped 4 hours prior to exam - Solid food or formula must be stopped 6 hours prior to exam - Tube feedings must be stopped 6 hours prior to exam 15.  4-10 years old: - Nothing to eat or drink 6 hours prior to exam 16. 10+ years old: - Nothing to eat or drink 8 hours prior to exam 17. The morning of the exam you may brush your teeth and take medications as directed with a sip of water. 18. When discharged, you cannot drive until morning, and an adult must be with you until then. You should stay in the [facility] overnight. 19. Bring a list of all drugs you are taking; include supplements and over-the-counter medications. Wear comfortable clothes and leave your valuables at home.            Jul 21, 2017  9:00 AM CDT   (Arrive by 8:45 AM)   New Patient Visit with Anson Perez MD   [facility] ([facility])    [address]  [address] Floor  [place] [address]   [phone]            Sep 12, 2017  4:00 PM CDT   Level 1 with  INFUSION CHAIR 17   [facility] ([facility])    [place] [id number]  [facility] [address]   [phone]            Sep 15, 2017  4:00 PM CDT   Return Visit with Agueda Manley MD   [facility] ([facility])    [place] [id number]  [facility] [address]   [phone]               Care Instructions        Further instructions from your care team         Port Removal Discharge Instructions     After you go home:      Have an adult stay with you for the first 6 hours    You may resume your normal diet       For 24 hours - due to the sedation you received:    Relax and take it easy    Do NOT make any important or legal decisions    Do NOT drive or operate machines at home or at work    Do NOT drink alcohol    Care of Puncture Site:      Keep the dressings on your site clean & dry for 3 days. Change it only if it gets wet or dirty.    You may shower after the dressing comes off in 3 days    Do not remove the small white strips of tape,  if present. Allow them to fall off on their own.     You may cover the wound with a bandaid after the dressing is removed if needed for comfort      Activity       Avoid heavy lifting (greater than 10 pounds) or the overuse of your shoulder for 3 days    Bleeding:      If you start bleeding from the incision site in your chest or have swelling in your neck, sit down and press on the site for 5-10 minutes.     If bleeding has not stopped after 10 minutes, call your provider.        Call 911 right away if you have heavy bleeding or bleeding that does not stop.      Medicines:      You may resume all medications    For minor pain, you may take Acetaminophen (Tylenol) or Ibuprofen (Advil)          Call the provider who ordered this procedure if:      You have swelling in your neck or over your port site    The incision area is red, swollen, hot or tender    You have chills or a fever greater than 101 F (38 C)    Any questions or concerns    Call  911 or go to the Emergency Room if you have:      Severe chest pain or trouble breathing    Bleeding that you cannot control    If you have questions call:          [facility] Radiology Dept @ [phone]    The provider who performed your procedure was ____Dr. Valladares_____________.     Additional Information About Your Visit        [email or web address] Information     [employer] gives you secure access to your electronic health record. If you see a primary care provider, you can also send messages to your care team and make appointments. If you have questions, please call your primary care clinic.  If you do not have a primary care provider, please call [phone] and they will assist you.        Care EveryWhere ID     This is your Care EveryWhere ID. This could be used by other organizations to access your [facility] medical records  [id number]        Your Vitals Were     Blood Pressure Pulse Temperature Respirations Pulse Oximetry       126/80 (BP Location: Left arm) 94 97.6  F  (36.4  C) (Oral) 18 98%        Primary Care Provider Office Phone # Fax #    Ayden Peralta [phone]      Equal Access to Services     BLAISE NUNU : Hadii trent daniel kevino Somario albertoali, waaxda luqadaha, qaybta kaalmada ademarcel, jb lópezkatherine coats. So [facility] [phone].    ATENCIÓN: Si habla español, tiene a mena disposición servicios gratuitos de asistencia lingüística. Llame al [phone].    We comply with applicable federal civil rights laws and [place] laws. We do not discriminate on the basis of race, color, national origin, age, disability sex, sexual orientation or gender identity.            Thank you!     Thank you for choosing [facility] for your care. Our goal is always to provide you with excellent care. Hearing back from our patients is one way we can continue to improve our services. Please take a few minutes to complete the written survey that you may receive in the mail after you visit with us. Thank you!             Medication List: This is a list of all your medications and when to take them. Check marks below indicate your daily home schedule. Keep this list as a reference.      Medications           Morning Afternoon Evening Bedtime As Needed    acetaminophen 325 MG tablet   Commonly known as:  TYLENOL   Take 2 tablets (650 mg) by mouth every 4 hours as needed for other (mild pain)                                COLACE PO   Take 2 capsules by mouth 2 times daily Unsure of dosage.                                dibucaine 1 % Oint ointment   Commonly known as:  NUPERCAINAL   3 times daily as needed for moderate pain                                diltiazem 2% in PLO cream (FV COMPOUNDED) 2% Gel   To anal opening three times daily.  Use a pea-sized amount.  Store at room temperature.                                hydrocortisone 0.5 % ointment   Apply topically 2 times daily as needed Reported on 2/14/2017                                IBUPROFEN PO   Take  200 mg by mouth every 6 hours as needed                                methylPREDNISolone 32 MG tablet   Commonly known as:  MEDROL   Take 12 hours prior to CT scan and 2 hours prior to CT scan                                VITAMIN D (CHOLECALCIFEROL) PO   Take 2,000 Units by mouth

## 2017-07-10 NOTE — PROGRESS NOTES
Care Suites Discharge Summary    Discharge Criteria:   Discharge Criteria met per MD orders: Yes.   Vital signs stable.     Pt demonstrates ability to ambulate safely: Yes.  (See discharge questionnaire for additional information)    Discharge instructions & education:   Discharge instructions reviewed with patient . Patient verbalizes  understanding.   Additional patient education provided:  none.     Medications:   Patient will be discharging on new medications- No. Patient verbalizes reason for use, start date, and side effects NA.    Items returned to patient:   Home and hospital acquired medications returned to patient NA   Listed belongings gathered and returned to patient: Yes    Patient discharged to home via wheelchair with wife driving.    Elvia Rawls

## 2017-07-10 NOTE — PROCEDURES
RADIOLOGY PROCEDURE NOTE  Patient name: Louie Greco  MRN: [id number]  : 1960    Pre-procedure diagnosis: completion of therapy  Post-procedure diagnosis: Same    Procedure Date/Time: July 10, 2017  10:42 AM  Procedure: port removal  Estimated blood loss: None  Specimen(s) collected with description: port  The patient tolerated the procedure well with no immediate complications.  Significant findings:none    See imaging dictation for procedural details.    Provider name: Ayden Valladares  Assistant(s):None

## 2017-07-10 NOTE — PROGRESS NOTES
Pt returned to [facility] via cart from IR. Denies pain at port removal site. Pt has baseline rectal pain from recent procedure that is unchanged.  Declines po solids--tolerating po fluids.  Family at bedside. No additional questions at this time.   Port removal site without bleeding or hematoma.

## 2017-07-10 NOTE — PROGRESS NOTES
Pre procedure plan of care reviewed with pt and spouse.  All questions answered and pt appears to accept and understand.  D/C instructions reviewed prior to sedation.

## 2017-07-11 NOTE — PROGRESS NOTES
This is a recent snapshot of the patient's [facility] medical record.  For current drug dose and complete information and questions, call [phone] or In Basket pool, [facility] home infusion ([id number])  CSN Number:  [id number]

## 2017-07-12 ENCOUNTER — CARE COORDINATION (OUTPATIENT)
Dept: SURGERY | Facility: CLINIC | Age: 57
End: 2017-07-12

## 2017-07-12 ENCOUNTER — TELEPHONE (OUTPATIENT)
Dept: SURGERY | Facility: CLINIC | Age: 57
End: 2017-07-12

## 2017-07-12 DIAGNOSIS — K62.89 ANAL PAIN: Primary | ICD-10-CM

## 2017-07-12 RX ORDER — LIDOCAINE 5 G/100G
1 CREAM RECTAL; TOPICAL 3 TIMES DAILY PRN
Qty: 1 TUBE | Refills: 0 | Status: SHIPPED | OUTPATIENT
Start: 2017-07-12 | End: 2020-08-31

## 2017-07-12 NOTE — TELEPHONE ENCOUNTER
I called Louie to discuss scheduling his next flex sig with Dr. Radford. He is unsure if he would be able to make Wednesday 08/30/2017 or Wednesday 09/13/2017 work because he did not have his calendar with him. I have sent him a [employer] message with the Watch and Wait protocol and these two dates. He anticipates contacting me today to let me know what will work. He requested the watch and wait protocol because he wants to know what the expectations are and what he needs to have done in the future. I discussed this request with ROYAL Henriquez before sending it.

## 2017-07-12 NOTE — PROGRESS NOTES
Pt states his rectum is painful and ibuprofen isn't effective enough.  He is asking for a topical analgesic.  Let him know I would relay this information to Dr. Radford and our CNP Lazara, as I will be out of the office tomorrow.

## 2017-07-14 ENCOUNTER — CARE COORDINATION (OUTPATIENT)
Dept: SURGERY | Facility: CLINIC | Age: 57
End: 2017-07-14

## 2017-07-14 NOTE — PROGRESS NOTES
RX for Lidocaine cream was sent to his [facility] pharmacy.  Called pt to see if he knew it was sent; had to lv  msg with this information.

## 2017-07-16 ENCOUNTER — HOSPITAL ENCOUNTER (INPATIENT)
Facility: CLINIC | Age: 57
LOS: 3 days | Discharge: HOME | DRG: 854 | End: 2017-07-20
Attending: EMERGENCY MEDICINE | Admitting: INTERNAL MEDICINE
Payer: COMMERCIAL

## 2017-07-16 ENCOUNTER — APPOINTMENT (OUTPATIENT)
Dept: CT IMAGING | Facility: CLINIC | Age: 57
DRG: 854 | End: 2017-07-16
Attending: EMERGENCY MEDICINE
Payer: COMMERCIAL

## 2017-07-16 DIAGNOSIS — Z79.2 LONG TERM (CURRENT) USE OF ANTIBIOTICS: ICD-10-CM

## 2017-07-16 DIAGNOSIS — L03.311 CELLULITIS OF ABDOMINAL WALL: ICD-10-CM

## 2017-07-16 DIAGNOSIS — A41.9 SEPSIS, DUE TO UNSPECIFIED ORGANISM: ICD-10-CM

## 2017-07-16 DIAGNOSIS — G89.18 ACUTE POST-OPERATIVE PAIN: Primary | ICD-10-CM

## 2017-07-16 LAB
ALBUMIN UR-MCNC: 100 MG/DL
ANION GAP SERPL CALCULATED.3IONS-SCNC: 9 MMOL/L (ref 3–14)
APPEARANCE UR: CLEAR
BASOPHILS # BLD AUTO: 0 10E9/L (ref 0–0.2)
BASOPHILS NFR BLD AUTO: 0 %
BILIRUB UR QL STRIP: NEGATIVE
BUN SERPL-MCNC: 22 MG/DL (ref 7–30)
CALCIUM SERPL-MCNC: 8.2 MG/DL (ref 8.5–10.1)
CHLORIDE SERPL-SCNC: 100 MMOL/L (ref 94–109)
CO2 SERPL-SCNC: 24 MMOL/L (ref 20–32)
COLOR UR AUTO: YELLOW
CREAT SERPL-MCNC: 0.88 MG/DL (ref 0.66–1.25)
DIFFERENTIAL METHOD BLD: ABNORMAL
EOSINOPHIL # BLD AUTO: 0 10E9/L (ref 0–0.7)
EOSINOPHIL NFR BLD AUTO: 0 %
ERYTHROCYTE [DISTWIDTH] IN BLOOD BY AUTOMATED COUNT: 12.9 % (ref 10–15)
GFR SERPL CREATININE-BSD FRML MDRD: 89 ML/MIN/1.7M2
GLUCOSE SERPL-MCNC: 115 MG/DL (ref 70–99)
GLUCOSE UR STRIP-MCNC: NEGATIVE MG/DL
HCT VFR BLD AUTO: 34.8 % (ref 40–53)
HGB BLD-MCNC: 11.7 G/DL (ref 13.3–17.7)
HGB UR QL STRIP: ABNORMAL
IMM GRANULOCYTES # BLD: 0.1 10E9/L (ref 0–0.4)
IMM GRANULOCYTES NFR BLD: 0.5 %
KETONES UR STRIP-MCNC: 5 MG/DL
LEUKOCYTE ESTERASE UR QL STRIP: ABNORMAL
LYMPHOCYTES # BLD AUTO: 0.4 10E9/L (ref 0.8–5.3)
LYMPHOCYTES NFR BLD AUTO: 3.7 %
MCH RBC QN AUTO: 31.2 PG (ref 26.5–33)
MCHC RBC AUTO-ENTMCNC: 33.6 G/DL (ref 31.5–36.5)
MCV RBC AUTO: 93 FL (ref 78–100)
MONOCYTES # BLD AUTO: 0.3 10E9/L (ref 0–1.3)
MONOCYTES NFR BLD AUTO: 3.2 %
MUCOUS THREADS #/AREA URNS LPF: PRESENT /LPF
NEUTROPHILS # BLD AUTO: 9.3 10E9/L (ref 1.6–8.3)
NEUTROPHILS NFR BLD AUTO: 92.6 %
NITRATE UR QL: NEGATIVE
NRBC # BLD AUTO: 0 10*3/UL
NRBC BLD AUTO-RTO: 0 /100
PH UR STRIP: 6 PH (ref 5–7)
PLATELET # BLD AUTO: 181 10E9/L (ref 150–450)
POTASSIUM SERPL-SCNC: 4 MMOL/L (ref 3.4–5.3)
RBC # BLD AUTO: 3.75 10E12/L (ref 4.4–5.9)
RBC #/AREA URNS AUTO: 2 /HPF (ref 0–2)
SODIUM SERPL-SCNC: 133 MMOL/L (ref 133–144)
SP GR UR STRIP: 1.02 (ref 1–1.03)
URN SPEC COLLECT METH UR: ABNORMAL
UROBILINOGEN UR STRIP-MCNC: 2 MG/DL (ref 0–2)
WBC # BLD AUTO: 10 10E9/L (ref 4–11)
WBC #/AREA URNS AUTO: 13 /HPF (ref 0–2)

## 2017-07-16 PROCEDURE — 25000128 H RX IP 250 OP 636: Performed by: EMERGENCY MEDICINE

## 2017-07-16 PROCEDURE — 25000125 ZZHC RX 250: Performed by: EMERGENCY MEDICINE

## 2017-07-16 PROCEDURE — 87086 URINE CULTURE/COLONY COUNT: CPT | Performed by: EMERGENCY MEDICINE

## 2017-07-16 PROCEDURE — 87040 BLOOD CULTURE FOR BACTERIA: CPT | Performed by: EMERGENCY MEDICINE

## 2017-07-16 PROCEDURE — 93005 ELECTROCARDIOGRAM TRACING: CPT

## 2017-07-16 PROCEDURE — 25000132 ZZH RX MED GY IP 250 OP 250 PS 637: Performed by: EMERGENCY MEDICINE

## 2017-07-16 PROCEDURE — 85025 COMPLETE CBC W/AUTO DIFF WBC: CPT | Performed by: EMERGENCY MEDICINE

## 2017-07-16 PROCEDURE — 80048 BASIC METABOLIC PNL TOTAL CA: CPT | Performed by: EMERGENCY MEDICINE

## 2017-07-16 PROCEDURE — 74177 CT ABD & PELVIS W/CONTRAST: CPT

## 2017-07-16 PROCEDURE — 81001 URINALYSIS AUTO W/SCOPE: CPT | Performed by: EMERGENCY MEDICINE

## 2017-07-16 PROCEDURE — 99285 EMERGENCY DEPT VISIT HI MDM: CPT | Mod: 25

## 2017-07-16 PROCEDURE — 96361 HYDRATE IV INFUSION ADD-ON: CPT

## 2017-07-16 RX ORDER — LIDOCAINE 40 MG/G
CREAM TOPICAL
Status: DISCONTINUED | OUTPATIENT
Start: 2017-07-16 | End: 2017-07-17

## 2017-07-16 RX ORDER — IBUPROFEN 600 MG/1
600 TABLET, FILM COATED ORAL ONCE
Status: COMPLETED | OUTPATIENT
Start: 2017-07-16 | End: 2017-07-16

## 2017-07-16 RX ORDER — ACETAMINOPHEN 325 MG/1
975 TABLET ORAL ONCE
Status: COMPLETED | OUTPATIENT
Start: 2017-07-16 | End: 2017-07-16

## 2017-07-16 RX ORDER — IOPAMIDOL 755 MG/ML
107 INJECTION, SOLUTION INTRAVASCULAR ONCE
Status: COMPLETED | OUTPATIENT
Start: 2017-07-16 | End: 2017-07-16

## 2017-07-16 RX ADMIN — SODIUM CHLORIDE 1000 ML: 9 INJECTION, SOLUTION INTRAVENOUS at 22:23

## 2017-07-16 RX ADMIN — SODIUM CHLORIDE 1000 ML: 9 INJECTION, SOLUTION INTRAVENOUS at 23:55

## 2017-07-16 RX ADMIN — ACETAMINOPHEN 975 MG: 325 TABLET, FILM COATED ORAL at 22:42

## 2017-07-16 RX ADMIN — SODIUM CHLORIDE 73 ML: 9 INJECTION, SOLUTION INTRAVENOUS at 23:33

## 2017-07-16 RX ADMIN — IOPAMIDOL 107 ML: 755 INJECTION, SOLUTION INTRAVENOUS at 23:33

## 2017-07-16 RX ADMIN — IBUPROFEN 600 MG: 600 TABLET ORAL at 22:42

## 2017-07-16 ASSESSMENT — ENCOUNTER SYMPTOMS
CONSTIPATION: 0
ABDOMINAL PAIN: 1
DIZZINESS: 1
NAUSEA: 1
DIFFICULTY URINATING: 0
DYSURIA: 0
CHILLS: 1
DIARRHEA: 1
VOMITING: 0
FATIGUE: 1
RHINORRHEA: 0
HEMATURIA: 0
FEVER: 1
COUGH: 0
NUMBNESS: 1

## 2017-07-16 NOTE — IP AVS SNAPSHOT
MRN:[id number]                      After Visit Summary   7/16/2017    Louie Greco    MRN: [id number]           Thank you!     Thank you for choosing [facility] for your care. Our goal is always to provide you with excellent care. Hearing back from our patients is one way we can continue to improve our services. Please take a few minutes to complete the written survey that you may receive in the mail after you visit with us. Thank you!        Patient Information     Date Of Birth          1960        Designated Caregiver       Most Recent Value    Caregiver    Will someone help with your care after discharge? no      About your hospital stay     You were admitted on:  July 17, 2017 You last received care in the:  [place] [address] [facility]    You were discharged on:  July 20, 2017        Reason for your hospital stay       Abdominal pain                  Who to Call     For medical emergencies, please call 911.  For non-urgent questions about your medical care, please call your primary care provider or clinic, None  For questions related to your surgery, please call your surgery clinic        Attending Provider     Provider Specialty    Silviano Pelayo MD Emergency Medicine    Horton, Brett Hendrickson MD Internal Medicine       Primary Care Provider    Physician No Ref-Primary      After Care Instructions     Activity       Your activity upon discharge: activity as tolerated            Diet       Follow this diet upon discharge: Orders Placed This Encounter      Advance Diet as Tolerated: Regular Diet Adult            Discharge Instructions       Avoid lifting over 20 lbs and strenuous physical activity for 3 weeks after surgery.  May resume a regular balanced diet as tolerated.  It is ok to shower, but avoid submersing the incision for 2 weeks after surgery.  May apply ice to operative site as needed for comfort; alternating 10 minutes on/off.                  Follow-up  Appointments     Follow-up and recommended labs and tests        Follow up with Dr. Ferguson or Physician Assistant at [facility] in 1- 2 weeks.  Call [phone] to schedule an appointment.            Follow-up and recommended labs and tests        Follow up with primary care provider, Physician No Ref-Primary, within 7 days for hospital follow- up.  No follow up labs or test are needed.                  Your next 10 appointments already scheduled     Jul 21, 2017  9:00 AM CDT   (Arrive by 8:45 AM)   New Patient Visit with Anson Perez MD   [facility] ([facility])    [address] [place] [address]  2nd Floor  [facility] [address] [phone]            Aug 24, 2017  3:30 PM CDT   [facility] Visit with Philippe Healy MD   [facility]    [address] [facility] [id number] [phone]           Bring a current list of meds and any records pertaining to this visit.  For Physicals, please bring immunization records and any forms needing to be filled out.  Please arrive 10 minutes early to complete paperwork.            Sep 12, 2017  4:00 PM CDT   Level 1 with  INFUSION CHAIR [address] [place] [address] [facility] ([facility])    [facility]  [address] [address] [phone]            Sep 13, 2017   Procedure with Felicitas Radford MD   [facility] ([facility])    [address] [place] [address]  5th Floor  [facility] [id number] [phone]           Located in the Clinics and Surgery Center at [address] [place] [address].   parking is very convenient and highly recommended.  is a $6 flat rate fee.  Both  and self parkers should enter the main arrival plaza from [facility]; parking attendants will direct you based on your parking preference.            Sep 15, 2017   4:00 PM CDT   Return Visit with Agueda Manley MD   [facility] ([facility])    [facility]  [address]  [place] [address] [phone]              Further instructions from your care team       Discharge Instructions following   Appendectomy/Cholecystectomy  [facility]    Diet:    Regular diet as tolerated.  Drink plenty of fluids, especially water.  Activities:    No heavy lifting (greater than 10-15 lbs) or strenuous activity until approved by surgeon.  Bathing/Incision Care    Ok to shower.  Do not submerge incision (no tub baths or swimming) until it s fully healed.  Pat incisions dry.  If present, tape dressing (Steri-Strips) will fall off on their own in 7-10 days.  No lotion, powders, or perfumes near or on the incision.  What to expect:    For laparoscopic surgery, you may have shoulder discomfort due to the gas used in surgery.  This should resolve in 24-48 hours.  Short, frequent walks may help with this.  Call your surgeon if you have these signs or symptoms:    Fever greater than 101 oF or chills.    Severe nausea, vomiting, or constipation.    Signs of infection at incision site: redness, swelling, warmth, foul-smelling drainage.    Increased pain that does not improve with pain medicine.    With any questions or concerns.  Follow up with doctor as ordered.      Pending Results     Date and Time Order Name Status Description    7/17/2017 1251 Fluid Culture Aerobic Bacterial Preliminary     7/17/2017 1251 Anaerobic bacterial culture Preliminary     7/16/2017 2225 Blood culture Preliminary     7/16/2017 2225 Blood culture Preliminary             Statement of Approval     Ordered          07/20/17 1201  I have reviewed and agree with all the recommendations and orders detailed in this document.  EFFECTIVE NOW     Approved and electronically signed by:  Heber Luis MD         "     Admission Information     Date & Time Provider Department Dept. Phone    7/16/2017 Horton, Brett Hendrickson MD [place] [address] [facility] [phone]      Your Vitals Were     Blood Pressure Pulse Temperature Respirations Height Weight    120/73 (BP Location: Right arm) 81 98.1  F (36.7  C) (Oral) 16 1.778 m (5' 10\") 100.8 kg (222 lb 3.6 oz)    Pulse Oximetry BMI (Body Mass Index)                96% 31.89 kg/m2          MyChart Information     [facility] gives you secure access to your electronic health record. If you see a primary care provider, you can also send messages to your care team and make appointments. If you have questions, please call your primary care clinic.  If you do not have a primary care provider, please call [phone] and they will assist you.        Care EveryWhere ID     This is your Care EveryWhere ID. This could be used by other organizations to access your [place] medical records  [id number]        Equal Access to Services     BLAISE EDDY : Hadii trent browno Sokartik, waaxda luqadaha, qaybta kaalmada adetanneryamarilin, jb pedroza . So [facility] [phone].    ATENCIÓN: Si habla español, tiene a mena disposición servicios gratuitos de asistencia lingüística. Llame al [phone].    We comply with applicable federal civil rights laws and [place] laws. We do not discriminate on the basis of race, color, national origin, age, disability sex, sexual orientation or gender identity.               Review of your medicines      START taking        Dose / Directions    levofloxacin 500 MG tablet   Commonly known as:  LEVAQUIN   Indication:  Infection Within the Abdomen, Skin and Soft Tissue Infection   Used for:  Sepsis, due to unspecified organism (H)        Dose:  500 mg   Take 1 tablet (500 mg) by mouth daily for 7 days   Quantity:  7 tablet   Refills:  0       oxyCODONE 5 MG IR tablet   Commonly known as:  ROXICODONE   Used for:  Acute post-operative " pain   Notes to Patient:  You may fill this prescription if needed for pain        Dose:  5-10 mg   Take 1-2 tablets (5-10 mg) by mouth every 3 hours as needed for moderate to severe pain   Quantity:  15 tablet   Refills:  0       saccharomyces boulardii 250 MG capsule   Commonly known as:  FLORASTOR   Used for:  Long term (current) use of antibiotics   Notes to Patient:  Not taken here, may resume as normal        Dose:  250 mg   Take 1 capsule (250 mg) by mouth 2 times daily   Quantity:  14 capsule   Refills:  0         CONTINUE these medicines which may have CHANGED, or have new prescriptions. If we are uncertain of the size of tablets/capsules you have at home, strength may be listed as something that might have changed.        Dose / Directions    ibuprofen 200 MG capsule   This may have changed:    - medication strength  - how much to take   Used for:  Acute post-operative pain   Notes to Patient:  You may take ibuprofen at any time.        Dose:  200-400 mg   Take 200-400 mg by mouth every 6 hours as needed   Quantity:  120 capsule   Refills:  0         CONTINUE these medicines which have NOT CHANGED        Dose / Directions    acetaminophen 325 MG tablet   Commonly known as:  TYLENOL   Used for:  Rectal cancer (H)   Notes to Patient:  You may take tylenol again at any time.        Dose:  650 mg   Take 2 tablets (650 mg) by mouth every 4 hours as needed for other (mild pain)   Quantity:  100 tablet   Refills:  0       ASPIRIN PO   Notes to Patient:  Not taken here, may resume as normal        Take by mouth as needed for moderate pain   Refills:  0       COLACE PO        Dose:  2 capsule   Take 2 capsules by mouth 2 times daily Unsure of dosage.   Refills:  0       dibucaine 1 % Oint ointment   Commonly known as:  NUPERCAINAL   Notes to Patient:  Not taken here, may resume as normal        3 times daily as needed for moderate pain   Refills:  0       hydrocortisone 0.5 % ointment   Notes to Patient:  Not taken  here, may resume as normal        Apply topically 2 times daily as needed Reported on 2/14/2017   Refills:  0       lidocaine (Anorectal) 5 % Crea   Used for:  Anal pain   Notes to Patient:  Not taken here, may resume as normal        Dose:  1 Application   Externally apply 1 Application topically 3 times daily as needed   Quantity:  1 Tube   Refills:  0       methylPREDNISolone 32 MG tablet   Commonly known as:  MEDROL   Used for:  Malignant neoplasm of rectum (H), Allergy to contrast media (used for diagnostic x-rays)        Take 12 hours prior to CT scan and 2 hours prior to CT scan   Quantity:  2 tablet   Refills:  0       VITAMIN D (CHOLECALCIFEROL) PO   Notes to Patient:  Not taken here, may resume as normal        Dose:  2000 Units   Take 2,000 Units by mouth daily   Refills:  0            Where to get your medicines      These medications were sent to [facility] [place], [place] - [address]  [address], [place] [address]     Phone:  [phone]     levofloxacin 500 MG tablet    saccharomyces boulardii 250 MG capsule         Some of these will need a paper prescription and others can be bought over the counter. Ask your nurse if you have questions.     Bring a paper prescription for each of these medications     oxyCODONE 5 MG IR tablet       You don't need a prescription for these medications     ibuprofen 200 MG capsule                Protect others around you: Learn how to safely use, store and throw away your medicines at www.disposemymeds.org.             Medication List: This is a list of all your medications and when to take them. Check marks below indicate your daily home schedule. Keep this list as a reference.      Medications           Morning Afternoon Evening Bedtime As Needed    acetaminophen 325 MG tablet   Commonly known as:  TYLENOL   Take 2 tablets (650 mg) by mouth every 4 hours as needed for other (mild pain)   Last time this was given:  975 mg on  7/20/2017  7:00 AM   Notes to Patient:  You may take tylenol again at any time.                                   ASPIRIN PO   Take by mouth as needed for moderate pain   Notes to Patient:  Not taken here, may resume as normal                                   COLACE PO   Take 2 capsules by mouth 2 times daily Unsure of dosage.   Last time this was given:  200 mg on 7/20/2017  7:00 AM   Next Dose Due:  Last dose this morning at 7am, take next dose tonight                                      dibucaine 1 % Oint ointment   Commonly known as:  NUPERCAINAL   3 times daily as needed for moderate pain   Notes to Patient:  Not taken here, may resume as normal                                   hydrocortisone 0.5 % ointment   Apply topically 2 times daily as needed Reported on 2/14/2017   Notes to Patient:  Not taken here, may resume as normal                                   ibuprofen 200 MG capsule   Take 200-400 mg by mouth every 6 hours as needed   Last time this was given:  400 mg on 7/20/2017 12:23 AM   Notes to Patient:  You may take ibuprofen at any time.                                   levofloxacin 500 MG tablet   Commonly known as:  LEVAQUIN   Take 1 tablet (500 mg) by mouth daily for 7 days   Last time this was given:  500 mg on 7/20/2017  9:51 AM   Next Dose Due:  Tomorrow, Friday, 7/21/17 in the morning                                   lidocaine (Anorectal) 5 % Crea   Externally apply 1 Application topically 3 times daily as needed   Notes to Patient:  Not taken here, may resume as normal                                   methylPREDNISolone 32 MG tablet   Commonly known as:  MEDROL   Take 12 hours prior to CT scan and 2 hours prior to CT scan                                oxyCODONE 5 MG IR tablet   Commonly known as:  ROXICODONE   Take 1-2 tablets (5-10 mg) by mouth every 3 hours as needed for moderate to severe pain   Notes to Patient:  You may fill this prescription if needed for pain                                    saccharomyces boulardii 250 MG capsule   Commonly known as:  FLORASTOR   Take 1 capsule (250 mg) by mouth 2 times daily   Notes to Patient:  Not taken here, may resume as normal                                      VITAMIN D (CHOLECALCIFEROL) PO   Take 2,000 Units by mouth daily   Notes to Patient:  Not taken here, may resume as normal

## 2017-07-16 NOTE — IP AVS SNAPSHOT
[place] [address] [facility]    [address] [place] [address]    Phone:  [phone]                                       After Visit Summary   7/16/2017    Louie Greco    MRN: [id number]           After Visit Summary Signature Page     I have received my discharge instructions, and my questions have been answered. I have discussed any challenges I see with this plan with the nurse or doctor.    ..........................................................................................................................................  Patient/Patient Representative Signature      ..........................................................................................................................................  Patient Representative Print Name and Relationship to Patient    ..................................................               ................................................  Date                                            Time    ..........................................................................................................................................  Reviewed by Signature/Title    ...................................................              ..............................................  Date                                                            Time

## 2017-07-17 ENCOUNTER — APPOINTMENT (OUTPATIENT)
Dept: GENERAL RADIOLOGY | Facility: CLINIC | Age: 57
DRG: 854 | End: 2017-07-17
Attending: INTERNAL MEDICINE
Payer: COMMERCIAL

## 2017-07-17 ENCOUNTER — ANESTHESIA (OUTPATIENT)
Dept: SURGERY | Facility: CLINIC | Age: 57
DRG: 854 | End: 2017-07-17
Payer: COMMERCIAL

## 2017-07-17 ENCOUNTER — ANESTHESIA EVENT (OUTPATIENT)
Dept: SURGERY | Facility: CLINIC | Age: 57
DRG: 854 | End: 2017-07-17
Payer: COMMERCIAL

## 2017-07-17 PROBLEM — K37 APPENDICITIS: Status: ACTIVE | Noted: 2017-07-17

## 2017-07-17 LAB
C DIFF TOX B STL QL: NORMAL
CAMPYLOBACTER GROUP BY NAT: NOT DETECTED
CREAT SERPL-MCNC: 0.78 MG/DL (ref 0.66–1.25)
CRP SERPL-MCNC: 166 MG/L (ref 0–8)
ENTERIC PATHOGEN COMMENT: NORMAL
ERYTHROCYTE [DISTWIDTH] IN BLOOD BY AUTOMATED COUNT: 13 % (ref 10–15)
ERYTHROCYTE [SEDIMENTATION RATE] IN BLOOD BY WESTERGREN METHOD: 34 MM/H (ref 0–20)
GFR SERPL CREATININE-BSD FRML MDRD: NORMAL ML/MIN/1.7M2
GRAM STN SPEC: NORMAL
HCT VFR BLD AUTO: 35.1 % (ref 40–53)
HGB BLD-MCNC: 11.7 G/DL (ref 13.3–17.7)
INTERPRETATION ECG - MUSE: NORMAL
LACTATE BLD-SCNC: 0.8 MMOL/L (ref 0.7–2.1)
MAGNESIUM SERPL-MCNC: 2 MG/DL (ref 1.6–2.3)
MCH RBC QN AUTO: 31.1 PG (ref 26.5–33)
MCHC RBC AUTO-ENTMCNC: 33.3 G/DL (ref 31.5–36.5)
MCV RBC AUTO: 93 FL (ref 78–100)
MICRO REPORT STATUS: NORMAL
NOROVIRUS I AND II BY NAT: NOT DETECTED
PLATELET # BLD AUTO: 122 10E9/L (ref 150–450)
PROCALCITONIN SERPL-MCNC: 2.36 NG/ML
RBC # BLD AUTO: 3.76 10E12/L (ref 4.4–5.9)
ROTAVIRUS A BY NAT: NOT DETECTED
SALMONELLA SPECIES BY NAT: NOT DETECTED
SHIGA TOXIN 1 GENE BY NAT: NOT DETECTED
SHIGA TOXIN 2 GENE BY NAT: NOT DETECTED
SHIGELLA SP+EIEC IPAH STL QL NAA+PROBE: NOT DETECTED
SPECIMEN SOURCE: NORMAL
SPECIMEN SOURCE: NORMAL
VIBRIO GROUP BY NAT: NOT DETECTED
WBC # BLD AUTO: 7.8 10E9/L (ref 4–11)
YERSINIA ENTEROCOLITICA BY NAT: NOT DETECTED

## 2017-07-17 PROCEDURE — 36415 COLL VENOUS BLD VENIPUNCTURE: CPT | Performed by: INTERNAL MEDICINE

## 2017-07-17 PROCEDURE — 25000128 H RX IP 250 OP 636: Performed by: EMERGENCY MEDICINE

## 2017-07-17 PROCEDURE — 86140 C-REACTIVE PROTEIN: CPT | Performed by: INTERNAL MEDICINE

## 2017-07-17 PROCEDURE — S0077 INJECTION, CLINDAMYCIN PHOSP: HCPCS | Performed by: NURSE ANESTHETIST, CERTIFIED REGISTERED

## 2017-07-17 PROCEDURE — 87075 CULTR BACTERIA EXCEPT BLOOD: CPT | Performed by: SURGERY

## 2017-07-17 PROCEDURE — 88304 TISSUE EXAM BY PATHOLOGIST: CPT | Mod: 26 | Performed by: SURGERY

## 2017-07-17 PROCEDURE — 25000128 H RX IP 250 OP 636: Performed by: INTERNAL MEDICINE

## 2017-07-17 PROCEDURE — 36000058 ZZH SURGERY LEVEL 3 EA 15 ADDTL MIN: Performed by: SURGERY

## 2017-07-17 PROCEDURE — 25000125 ZZHC RX 250: Performed by: SURGERY

## 2017-07-17 PROCEDURE — 99223 1ST HOSP IP/OBS HIGH 75: CPT | Mod: AI | Performed by: INTERNAL MEDICINE

## 2017-07-17 PROCEDURE — 27210794 ZZH OR GENERAL SUPPLY STERILE: Performed by: SURGERY

## 2017-07-17 PROCEDURE — 96365 THER/PROPH/DIAG IV INF INIT: CPT

## 2017-07-17 PROCEDURE — 21400002 ZZH R&B CCU CICU CRITICAL

## 2017-07-17 PROCEDURE — 36000056 ZZH SURGERY LEVEL 3 1ST 30 MIN: Performed by: SURGERY

## 2017-07-17 PROCEDURE — 87506 IADNA-DNA/RNA PROBE TQ 6-11: CPT | Performed by: INTERNAL MEDICINE

## 2017-07-17 PROCEDURE — 25000132 ZZH RX MED GY IP 250 OP 250 PS 637: Performed by: ANESTHESIOLOGY

## 2017-07-17 PROCEDURE — 85652 RBC SED RATE AUTOMATED: CPT | Performed by: INTERNAL MEDICINE

## 2017-07-17 PROCEDURE — 88304 TISSUE EXAM BY PATHOLOGIST: CPT | Performed by: SURGERY

## 2017-07-17 PROCEDURE — 99207 ZZC APP CREDIT; MD BILLING SHARED VISIT: CPT | Performed by: INTERNAL MEDICINE

## 2017-07-17 PROCEDURE — 40000884 ZZH STATISTIC STEP DOWN HRS NIGHT

## 2017-07-17 PROCEDURE — 40000885 ZZH STATISTIC STEP DOWN HRS EVENING

## 2017-07-17 PROCEDURE — 37000008 ZZH ANESTHESIA TECHNICAL FEE, 1ST 30 MIN: Performed by: SURGERY

## 2017-07-17 PROCEDURE — 25000125 ZZHC RX 250: Performed by: NURSE ANESTHETIST, CERTIFIED REGISTERED

## 2017-07-17 PROCEDURE — 83735 ASSAY OF MAGNESIUM: CPT | Performed by: INTERNAL MEDICINE

## 2017-07-17 PROCEDURE — 71000012 ZZH RECOVERY PHASE 1 LEVEL 1 FIRST HR: Performed by: SURGERY

## 2017-07-17 PROCEDURE — 87070 CULTURE OTHR SPECIMN AEROBIC: CPT | Performed by: SURGERY

## 2017-07-17 PROCEDURE — 37000009 ZZH ANESTHESIA TECHNICAL FEE, EACH ADDTL 15 MIN: Performed by: SURGERY

## 2017-07-17 PROCEDURE — 44970 LAPAROSCOPY APPENDECTOMY: CPT | Mod: AS | Performed by: PHYSICIAN ASSISTANT

## 2017-07-17 PROCEDURE — 0DTJ4ZZ RESECTION OF APPENDIX, PERCUTANEOUS ENDOSCOPIC APPROACH: ICD-10-PCS | Performed by: SURGERY

## 2017-07-17 PROCEDURE — 82565 ASSAY OF CREATININE: CPT | Performed by: INTERNAL MEDICINE

## 2017-07-17 PROCEDURE — 40000169 ZZH STATISTIC PRE-PROCEDURE ASSESSMENT I: Performed by: SURGERY

## 2017-07-17 PROCEDURE — 25000125 ZZHC RX 250: Performed by: INTERNAL MEDICINE

## 2017-07-17 PROCEDURE — 83605 ASSAY OF LACTIC ACID: CPT | Performed by: EMERGENCY MEDICINE

## 2017-07-17 PROCEDURE — 87493 C DIFF AMPLIFIED PROBE: CPT | Performed by: INTERNAL MEDICINE

## 2017-07-17 PROCEDURE — 84145 PROCALCITONIN (PCT): CPT | Performed by: INTERNAL MEDICINE

## 2017-07-17 PROCEDURE — 71010 XR CHEST PORT 1 VW: CPT

## 2017-07-17 PROCEDURE — 85027 COMPLETE CBC AUTOMATED: CPT | Performed by: INTERNAL MEDICINE

## 2017-07-17 PROCEDURE — 87205 SMEAR GRAM STAIN: CPT | Performed by: SURGERY

## 2017-07-17 PROCEDURE — 25000128 H RX IP 250 OP 636: Performed by: NURSE ANESTHETIST, CERTIFIED REGISTERED

## 2017-07-17 PROCEDURE — 25000566 ZZH SEVOFLURANE, EA 15 MIN: Performed by: SURGERY

## 2017-07-17 RX ORDER — POTASSIUM CL/LIDO/0.9 % NACL 10MEQ/0.1L
10 INTRAVENOUS SOLUTION, PIGGYBACK (ML) INTRAVENOUS
Status: DISCONTINUED | OUTPATIENT
Start: 2017-07-17 | End: 2017-07-20 | Stop reason: HOSPADM

## 2017-07-17 RX ORDER — CEFTRIAXONE 2 G/1
2 INJECTION, POWDER, FOR SOLUTION INTRAMUSCULAR; INTRAVENOUS EVERY 24 HOURS
Status: DISCONTINUED | OUTPATIENT
Start: 2017-07-18 | End: 2017-07-20

## 2017-07-17 RX ORDER — NALOXONE HYDROCHLORIDE 0.4 MG/ML
.1-.4 INJECTION, SOLUTION INTRAMUSCULAR; INTRAVENOUS; SUBCUTANEOUS
Status: DISCONTINUED | OUTPATIENT
Start: 2017-07-17 | End: 2017-07-20 | Stop reason: HOSPADM

## 2017-07-17 RX ORDER — CLINDAMYCIN PHOSPHATE 900 MG/50ML
INJECTION, SOLUTION INTRAVENOUS PRN
Status: DISCONTINUED | OUTPATIENT
Start: 2017-07-17 | End: 2017-07-17

## 2017-07-17 RX ORDER — ONDANSETRON 2 MG/ML
INJECTION INTRAMUSCULAR; INTRAVENOUS PRN
Status: DISCONTINUED | OUTPATIENT
Start: 2017-07-17 | End: 2017-07-17

## 2017-07-17 RX ORDER — METOPROLOL TARTRATE 1 MG/ML
2.5 INJECTION, SOLUTION INTRAVENOUS EVERY 4 HOURS PRN
Status: DISCONTINUED | OUTPATIENT
Start: 2017-07-17 | End: 2017-07-20 | Stop reason: HOSPADM

## 2017-07-17 RX ORDER — POLYETHYLENE GLYCOL 3350 17 G/17G
17 POWDER, FOR SOLUTION ORAL DAILY PRN
Status: DISCONTINUED | OUTPATIENT
Start: 2017-07-17 | End: 2017-07-20 | Stop reason: HOSPADM

## 2017-07-17 RX ORDER — DEXAMETHASONE SODIUM PHOSPHATE 4 MG/ML
INJECTION, SOLUTION INTRA-ARTICULAR; INTRALESIONAL; INTRAMUSCULAR; INTRAVENOUS; SOFT TISSUE PRN
Status: DISCONTINUED | OUTPATIENT
Start: 2017-07-17 | End: 2017-07-17

## 2017-07-17 RX ORDER — LIDOCAINE HYDROCHLORIDE 20 MG/ML
INJECTION, SOLUTION INFILTRATION; PERINEURAL PRN
Status: DISCONTINUED | OUTPATIENT
Start: 2017-07-17 | End: 2017-07-17

## 2017-07-17 RX ORDER — POTASSIUM CHLORIDE 29.8 MG/ML
20 INJECTION INTRAVENOUS
Status: DISCONTINUED | OUTPATIENT
Start: 2017-07-17 | End: 2017-07-20 | Stop reason: HOSPADM

## 2017-07-17 RX ORDER — FENTANYL CITRATE 50 UG/ML
INJECTION, SOLUTION INTRAMUSCULAR; INTRAVENOUS PRN
Status: DISCONTINUED | OUTPATIENT
Start: 2017-07-17 | End: 2017-07-17

## 2017-07-17 RX ORDER — ACETAMINOPHEN 500 MG
1000 TABLET ORAL ONCE
Status: COMPLETED | OUTPATIENT
Start: 2017-07-17 | End: 2017-07-17

## 2017-07-17 RX ORDER — AMOXICILLIN 250 MG
1-2 CAPSULE ORAL 2 TIMES DAILY PRN
Status: DISCONTINUED | OUTPATIENT
Start: 2017-07-17 | End: 2017-07-20 | Stop reason: HOSPADM

## 2017-07-17 RX ORDER — NEOSTIGMINE METHYLSULFATE 1 MG/ML
VIAL (ML) INJECTION PRN
Status: DISCONTINUED | OUTPATIENT
Start: 2017-07-17 | End: 2017-07-17

## 2017-07-17 RX ORDER — BISACODYL 10 MG
10 SUPPOSITORY, RECTAL RECTAL DAILY PRN
Status: DISCONTINUED | OUTPATIENT
Start: 2017-07-17 | End: 2017-07-20 | Stop reason: HOSPADM

## 2017-07-17 RX ORDER — LIDOCAINE 40 MG/G
CREAM TOPICAL
Status: DISCONTINUED | OUTPATIENT
Start: 2017-07-17 | End: 2017-07-17

## 2017-07-17 RX ORDER — GLYCOPYRROLATE 0.2 MG/ML
INJECTION, SOLUTION INTRAMUSCULAR; INTRAVENOUS PRN
Status: DISCONTINUED | OUTPATIENT
Start: 2017-07-17 | End: 2017-07-17

## 2017-07-17 RX ORDER — ACETAMINOPHEN 325 MG/1
650 TABLET ORAL EVERY 4 HOURS PRN
Status: DISCONTINUED | OUTPATIENT
Start: 2017-07-17 | End: 2017-07-17

## 2017-07-17 RX ORDER — DOCUSATE SODIUM 100 MG/1
200 CAPSULE, LIQUID FILLED ORAL 2 TIMES DAILY PRN
Status: DISCONTINUED | OUTPATIENT
Start: 2017-07-17 | End: 2017-07-20 | Stop reason: HOSPADM

## 2017-07-17 RX ORDER — MAGNESIUM SULFATE HEPTAHYDRATE 40 MG/ML
4 INJECTION, SOLUTION INTRAVENOUS EVERY 4 HOURS PRN
Status: DISCONTINUED | OUTPATIENT
Start: 2017-07-17 | End: 2017-07-20 | Stop reason: HOSPADM

## 2017-07-17 RX ORDER — NITROGLYCERIN 0.4 MG/1
0.4 TABLET SUBLINGUAL EVERY 5 MIN PRN
Status: DISCONTINUED | OUTPATIENT
Start: 2017-07-17 | End: 2017-07-18

## 2017-07-17 RX ORDER — PROCHLORPERAZINE 25 MG
25 SUPPOSITORY, RECTAL RECTAL EVERY 12 HOURS PRN
Status: DISCONTINUED | OUTPATIENT
Start: 2017-07-17 | End: 2017-07-20 | Stop reason: HOSPADM

## 2017-07-17 RX ORDER — DIBUCAINE 0.28 G/28G
OINTMENT TOPICAL 3 TIMES DAILY PRN
Status: DISCONTINUED | OUTPATIENT
Start: 2017-07-17 | End: 2017-07-20 | Stop reason: HOSPADM

## 2017-07-17 RX ORDER — LIDOCAINE 40 MG/G
CREAM TOPICAL
Status: DISCONTINUED | OUTPATIENT
Start: 2017-07-17 | End: 2017-07-18

## 2017-07-17 RX ORDER — OXYCODONE HYDROCHLORIDE 5 MG/1
5-10 TABLET ORAL
Status: DISCONTINUED | OUTPATIENT
Start: 2017-07-17 | End: 2017-07-20 | Stop reason: HOSPADM

## 2017-07-17 RX ORDER — POTASSIUM CHLORIDE 7.45 MG/ML
10 INJECTION INTRAVENOUS
Status: DISCONTINUED | OUTPATIENT
Start: 2017-07-17 | End: 2017-07-20 | Stop reason: HOSPADM

## 2017-07-17 RX ORDER — LEVALBUTEROL INHALATION SOLUTION 0.31 MG/3ML
1.25 SOLUTION RESPIRATORY (INHALATION) EVERY 6 HOURS PRN
Status: DISCONTINUED | OUTPATIENT
Start: 2017-07-17 | End: 2017-07-17

## 2017-07-17 RX ORDER — HYDROMORPHONE HYDROCHLORIDE 1 MG/ML
.3-.5 INJECTION, SOLUTION INTRAMUSCULAR; INTRAVENOUS; SUBCUTANEOUS
Status: DISCONTINUED | OUTPATIENT
Start: 2017-07-17 | End: 2017-07-20 | Stop reason: HOSPADM

## 2017-07-17 RX ORDER — ACETAMINOPHEN 325 MG/1
975 TABLET ORAL EVERY 4 HOURS PRN
Status: DISCONTINUED | OUTPATIENT
Start: 2017-07-17 | End: 2017-07-20 | Stop reason: HOSPADM

## 2017-07-17 RX ORDER — SODIUM CHLORIDE, SODIUM LACTATE, POTASSIUM CHLORIDE, CALCIUM CHLORIDE 600; 310; 30; 20 MG/100ML; MG/100ML; MG/100ML; MG/100ML
INJECTION, SOLUTION INTRAVENOUS CONTINUOUS
Status: DISCONTINUED | OUTPATIENT
Start: 2017-07-17 | End: 2017-07-17

## 2017-07-17 RX ORDER — LIDOCAINE 5 G/100G
1 CREAM RECTAL; TOPICAL 3 TIMES DAILY PRN
Status: DISCONTINUED | OUTPATIENT
Start: 2017-07-17 | End: 2017-07-20 | Stop reason: HOSPADM

## 2017-07-17 RX ORDER — POTASSIUM CHLORIDE 1.5 G/1.58G
20-40 POWDER, FOR SOLUTION ORAL
Status: DISCONTINUED | OUTPATIENT
Start: 2017-07-17 | End: 2017-07-20 | Stop reason: HOSPADM

## 2017-07-17 RX ORDER — LEVALBUTEROL INHALATION SOLUTION 0.31 MG/3ML
1.25 SOLUTION RESPIRATORY (INHALATION) EVERY 4 HOURS PRN
Status: DISCONTINUED | OUTPATIENT
Start: 2017-07-17 | End: 2017-07-17

## 2017-07-17 RX ORDER — SODIUM CHLORIDE 9 MG/ML
INJECTION, SOLUTION INTRAVENOUS CONTINUOUS
Status: DISCONTINUED | OUTPATIENT
Start: 2017-07-17 | End: 2017-07-18

## 2017-07-17 RX ORDER — SODIUM CHLORIDE, SODIUM LACTATE, POTASSIUM CHLORIDE, CALCIUM CHLORIDE 600; 310; 30; 20 MG/100ML; MG/100ML; MG/100ML; MG/100ML
INJECTION, SOLUTION INTRAVENOUS CONTINUOUS PRN
Status: DISCONTINUED | OUTPATIENT
Start: 2017-07-17 | End: 2017-07-17

## 2017-07-17 RX ORDER — PROPOFOL 10 MG/ML
INJECTION, EMULSION INTRAVENOUS PRN
Status: DISCONTINUED | OUTPATIENT
Start: 2017-07-17 | End: 2017-07-17

## 2017-07-17 RX ORDER — ONDANSETRON 2 MG/ML
4 INJECTION INTRAMUSCULAR; INTRAVENOUS EVERY 6 HOURS PRN
Status: DISCONTINUED | OUTPATIENT
Start: 2017-07-17 | End: 2017-07-20 | Stop reason: HOSPADM

## 2017-07-17 RX ORDER — LEVALBUTEROL INHALATION SOLUTION 0.31 MG/3ML
0.31 SOLUTION RESPIRATORY (INHALATION) EVERY 4 HOURS PRN
Status: DISCONTINUED | OUTPATIENT
Start: 2017-07-17 | End: 2017-07-17

## 2017-07-17 RX ORDER — PROCHLORPERAZINE MALEATE 5 MG
5-10 TABLET ORAL EVERY 6 HOURS PRN
Status: DISCONTINUED | OUTPATIENT
Start: 2017-07-17 | End: 2017-07-20 | Stop reason: HOSPADM

## 2017-07-17 RX ORDER — CEFTRIAXONE 2 G/1
2 INJECTION, POWDER, FOR SOLUTION INTRAMUSCULAR; INTRAVENOUS ONCE
Status: COMPLETED | OUTPATIENT
Start: 2017-07-17 | End: 2017-07-17

## 2017-07-17 RX ORDER — LEVALBUTEROL 1.25 MG/.5ML
1.25 SOLUTION, CONCENTRATE RESPIRATORY (INHALATION) EVERY 6 HOURS PRN
Status: DISCONTINUED | OUTPATIENT
Start: 2017-07-17 | End: 2017-07-20 | Stop reason: HOSPADM

## 2017-07-17 RX ORDER — POTASSIUM CHLORIDE 1500 MG/1
20-40 TABLET, EXTENDED RELEASE ORAL
Status: DISCONTINUED | OUTPATIENT
Start: 2017-07-17 | End: 2017-07-20 | Stop reason: HOSPADM

## 2017-07-17 RX ORDER — ONDANSETRON 4 MG/1
4 TABLET, ORALLY DISINTEGRATING ORAL EVERY 6 HOURS PRN
Status: DISCONTINUED | OUTPATIENT
Start: 2017-07-17 | End: 2017-07-20 | Stop reason: HOSPADM

## 2017-07-17 RX ADMIN — VANCOMYCIN HYDROCHLORIDE 2000 MG: 1 INJECTION, POWDER, LYOPHILIZED, FOR SOLUTION INTRAVENOUS at 04:41

## 2017-07-17 RX ADMIN — ROCURONIUM BROMIDE 40 MG: 10 INJECTION INTRAVENOUS at 12:05

## 2017-07-17 RX ADMIN — GLYCOPYRROLATE 0.7 MG: 0.2 INJECTION, SOLUTION INTRAMUSCULAR; INTRAVENOUS at 13:14

## 2017-07-17 RX ADMIN — CLINDAMYCIN PHOSPHATE 900 MG: 18 INJECTION, SOLUTION INTRAVENOUS at 12:22

## 2017-07-17 RX ADMIN — FENTANYL CITRATE 50 MCG: 50 INJECTION, SOLUTION INTRAMUSCULAR; INTRAVENOUS at 11:53

## 2017-07-17 RX ADMIN — PHENYLEPHRINE HYDROCHLORIDE 100 MCG: 10 INJECTION, SOLUTION INTRAMUSCULAR; INTRAVENOUS; SUBCUTANEOUS at 12:15

## 2017-07-17 RX ADMIN — SODIUM CHLORIDE, POTASSIUM CHLORIDE, SODIUM LACTATE AND CALCIUM CHLORIDE: 600; 310; 30; 20 INJECTION, SOLUTION INTRAVENOUS at 13:16

## 2017-07-17 RX ADMIN — LIDOCAINE HYDROCHLORIDE 100 MG: 20 INJECTION, SOLUTION INFILTRATION; PERINEURAL at 11:53

## 2017-07-17 RX ADMIN — PROPOFOL 120 MG: 10 INJECTION, EMULSION INTRAVENOUS at 11:53

## 2017-07-17 RX ADMIN — PHENYLEPHRINE HYDROCHLORIDE 100 MCG: 10 INJECTION, SOLUTION INTRAMUSCULAR; INTRAVENOUS; SUBCUTANEOUS at 12:00

## 2017-07-17 RX ADMIN — SODIUM CHLORIDE 1000 ML: 9 INJECTION, SOLUTION INTRAVENOUS at 02:54

## 2017-07-17 RX ADMIN — NEOSTIGMINE METHYLSULFATE 5 MG: 1 INJECTION INTRAMUSCULAR; INTRAVENOUS; SUBCUTANEOUS at 13:14

## 2017-07-17 RX ADMIN — PHENYLEPHRINE HYDROCHLORIDE 100 MCG: 10 INJECTION, SOLUTION INTRAMUSCULAR; INTRAVENOUS; SUBCUTANEOUS at 12:11

## 2017-07-17 RX ADMIN — ROCURONIUM BROMIDE 10 MG: 10 INJECTION INTRAVENOUS at 12:20

## 2017-07-17 RX ADMIN — PHENYLEPHRINE HYDROCHLORIDE 100 MCG: 10 INJECTION, SOLUTION INTRAMUSCULAR; INTRAVENOUS; SUBCUTANEOUS at 12:22

## 2017-07-17 RX ADMIN — PHENYLEPHRINE HYDROCHLORIDE 100 MCG: 10 INJECTION, SOLUTION INTRAMUSCULAR; INTRAVENOUS; SUBCUTANEOUS at 12:08

## 2017-07-17 RX ADMIN — PHENYLEPHRINE HYDROCHLORIDE 100 MCG: 10 INJECTION, SOLUTION INTRAMUSCULAR; INTRAVENOUS; SUBCUTANEOUS at 12:13

## 2017-07-17 RX ADMIN — CEFTRIAXONE 2 G: 2 INJECTION, POWDER, FOR SOLUTION INTRAMUSCULAR; INTRAVENOUS at 01:19

## 2017-07-17 RX ADMIN — SODIUM CHLORIDE: 9 INJECTION, SOLUTION INTRAVENOUS at 09:07

## 2017-07-17 RX ADMIN — DEXTROSE AND SODIUM CHLORIDE: 5; 900 INJECTION, SOLUTION INTRAVENOUS at 04:40

## 2017-07-17 RX ADMIN — PHENYLEPHRINE HYDROCHLORIDE 100 MCG: 10 INJECTION, SOLUTION INTRAMUSCULAR; INTRAVENOUS; SUBCUTANEOUS at 12:24

## 2017-07-17 RX ADMIN — FENTANYL CITRATE 50 MCG: 50 INJECTION, SOLUTION INTRAMUSCULAR; INTRAVENOUS at 12:29

## 2017-07-17 RX ADMIN — DEXAMETHASONE SODIUM PHOSPHATE 4 MG: 4 INJECTION, SOLUTION INTRA-ARTICULAR; INTRALESIONAL; INTRAMUSCULAR; INTRAVENOUS; SOFT TISSUE at 12:27

## 2017-07-17 RX ADMIN — ONDANSETRON 4 MG: 2 INJECTION INTRAMUSCULAR; INTRAVENOUS at 13:11

## 2017-07-17 RX ADMIN — PHENYLEPHRINE HYDROCHLORIDE 100 MCG: 10 INJECTION, SOLUTION INTRAMUSCULAR; INTRAVENOUS; SUBCUTANEOUS at 12:20

## 2017-07-17 RX ADMIN — MIDAZOLAM HYDROCHLORIDE 1 MG: 1 INJECTION, SOLUTION INTRAMUSCULAR; INTRAVENOUS at 11:47

## 2017-07-17 RX ADMIN — SODIUM CHLORIDE, POTASSIUM CHLORIDE, SODIUM LACTATE AND CALCIUM CHLORIDE: 600; 310; 30; 20 INJECTION, SOLUTION INTRAVENOUS at 12:00

## 2017-07-17 RX ADMIN — PHENYLEPHRINE HYDROCHLORIDE 100 MCG: 10 INJECTION, SOLUTION INTRAMUSCULAR; INTRAVENOUS; SUBCUTANEOUS at 12:17

## 2017-07-17 RX ADMIN — SODIUM CHLORIDE, POTASSIUM CHLORIDE, SODIUM LACTATE AND CALCIUM CHLORIDE: 600; 310; 30; 20 INJECTION, SOLUTION INTRAVENOUS at 11:47

## 2017-07-17 RX ADMIN — VANCOMYCIN HYDROCHLORIDE 2000 MG: 5 INJECTION, POWDER, LYOPHILIZED, FOR SOLUTION INTRAVENOUS at 18:16

## 2017-07-17 RX ADMIN — SUCCINYLCHOLINE CHLORIDE 100 MG: 20 INJECTION, SOLUTION INTRAMUSCULAR; INTRAVENOUS at 11:53

## 2017-07-17 RX ADMIN — PHENYLEPHRINE HYDROCHLORIDE 100 MCG: 10 INJECTION, SOLUTION INTRAMUSCULAR; INTRAVENOUS; SUBCUTANEOUS at 12:03

## 2017-07-17 RX ADMIN — ACETAMINOPHEN 1000 MG: 500 TABLET, FILM COATED ORAL at 11:24

## 2017-07-17 RX ADMIN — MIDAZOLAM HYDROCHLORIDE 1 MG: 1 INJECTION, SOLUTION INTRAMUSCULAR; INTRAVENOUS at 11:51

## 2017-07-17 RX ADMIN — FENTANYL CITRATE 50 MCG: 50 INJECTION, SOLUTION INTRAMUSCULAR; INTRAVENOUS at 13:06

## 2017-07-17 NOTE — PROVIDER NOTIFICATION
KARISHMA Luis @ 17:42 on 7/17/17.  Clarification--Patient should be on Vancomcyin for sepsis with goal trough 15 - 20.  Bianca Roberts [facility]

## 2017-07-17 NOTE — OP NOTE
Preop Dx: Acute appendicitis  Postop Dx: Same  Procedure: Laparoscopic Appendectomy  Surgeon: Bryson Ferguson MD.  Assistant: Héctor Iverson PA-C  Anesthesia : General  EBL: minimal    Events:  After induction of general anesthesia, Louie Greco abdomen was prepped and draped in the usual sterile fashion.  Using a Antoni technique on the periumbilical location the abdomen was entered and pneumoperitoneum established.  Abdominal exploration revealed no evidence of injury.  Inflammatory process noted in the right lower quadrant.  Two 5-mm trocars where placed in the lower abdomen.  Some cloudy fluid was noted in the right lower quadrant, this was sample for gram stain and cultures.  The small bowel, stomach, liver, gallbladder and colon were inspected and no additional pathology noted.  Bilateral inguinal hernias noted.  The appendix was identified and a window created in the mesoappendix at the level of the cecal junction; thru this opening endo TONY staplers where used to control and transect the appendix and its blood supply independently.  Specimen placed in endobag, removed from abdomen and sent to pathology.  Staple line hemostasis was secured with clip application.  Copious irrigation performed until effluent was clear and free of debris.  Trocars removed under direct visualization, no bleeding noted.  Pneumoperitoneum released, and abdominal fascia closed at the umbilical port site with interrupted 0 - vycril  sutures. Skin approximated with 4-0 suture, steri-strips and sterile dressing applied.  Counts reported correct.  No immediate complications.

## 2017-07-17 NOTE — PHARMACY-ADMISSION MEDICATION HISTORY
Admission medication history interview status for the 7/16/2017  admission is complete. See EPIC admission navigator for prior to admission medications     Medication history source reliability:Good    Actions taken by pharmacist (provider contacted, etc): spoke to pt     Additional medication history information not noted on PTA med list :None    Medication reconciliation/reorder completed by provider prior to medication history? Yes    Time spent in this activity: 10 minutes    Prior to Admission medications    Medication Sig Last Dose Taking? Auth Provider   ASPIRIN PO Take by mouth as needed for moderate pain prn med Yes Unknown, Entered By History   lidocaine, Anorectal, 5 % CREA Externally apply 1 Application topically 3 times daily as needed prn med Yes Lazara Castañeda, APRN CNP   VITAMIN D, CHOLECALCIFEROL, PO Take 2,000 Units by mouth daily  Past Week at Unknown time Yes Reported, Patient   dibucaine (NUPERCAINAL) 1 % OINT ointment 3 times daily as needed for moderate pain prn med Yes Reported, Patient   Docusate Sodium (COLACE PO) Take 2 capsules by mouth 2 times daily Unsure of dosage. 7/16/2017 at am Yes Reported, Patient   IBUPROFEN PO Take 200 mg by mouth every 6 hours as needed  prn med Yes Reported, Patient   acetaminophen (TYLENOL) 325 MG tablet Take 2 tablets (650 mg) by mouth every 4 hours as needed for other (mild pain) prn med Yes Jarod Salcedo MD   hydrocortisone 0.5 % ointment Apply topically 2 times daily as needed Reported on 2/14/2017 prn med Yes Reported, Patient   methylPREDNISolone (MEDROL) 32 MG tablet Take 12 hours prior to CT scan and 2 hours prior to CT scan   Agueda Manley MD Beth Mulder, PharmD

## 2017-07-17 NOTE — PROGRESS NOTES
Surgery Note    Consult received. Concern for possible early appendicitis in patient who recently completed chemotherapy and radiation for rectal cancer (FOLFOX) and presented with fevers. He also has cellulitis on his abdominal wall. CT reviewed and shows mild angel appendiceal inflammation. This could be early appendicitis; however, pt without abdominal pain per notes. I recommend admission to Hospitalist service, treatment of cellulitis, consider further workup for fever and surgery will perform serial abdominal exams, will possibly proceed with laparoscopic appendectomy tomorrow pending exam.   - NPO, IVF  - abx    Chelsea Silveira MD  [facility]  [phone]

## 2017-07-17 NOTE — ANESTHESIA POSTPROCEDURE EVALUATION
Patient: Louie Greco    Procedure(s):  LAPAROSCOPIC APPENDECTOMY - Wound Class: III-Contaminated    Diagnosis:Appendicitis  Diagnosis Additional Information: No value filed.    Anesthesia Type:  General, ETT, RSI    Note:  Anesthesia Post Evaluation    Patient location during evaluation: PACU  Patient participation: Able to fully participate in evaluation  Level of consciousness: awake  Pain management: adequate  Airway patency: patent  Cardiovascular status: acceptable  Respiratory status: acceptable  Hydration status: acceptable  PONV: controlled     Anesthetic complications: None          Last vitals:  Vitals:    07/17/17 1606 07/17/17 1610 07/17/17 1710   BP: 95/63 95/66 112/62   Pulse:      Resp: 18 18 20   Temp: 36.7  C (98.1  F)     SpO2: 94% 95% 94%         Electronically Signed By: Marcella Delaney MD  July 17, 2017  6:28 PM

## 2017-07-17 NOTE — BRIEF OP NOTE
[facility] Brief Operative Note    Pre-operative diagnosis: Acute Appendicitis   Post-operative diagnosis same   Procedure: Procedure(s):  LAPAROSCOPIC EXPLORATION  LAPAROSCOPIC APPENDECTOMY - Wound Class: III-Contaminated   Surgeon(s): Surgeon(s) and Role:     * Bryson Ferguson MD - Primary     * Héctor Iverson PA-C   Estimated blood loss: 3 mL    Specimens:   ID Type Source Tests Collected by Time Destination   1 : Peritoneal fluid Swab  Fluid Other ANAEROBIC BACTERIAL CULTURE, FLUID CULTURE AEROBIC BACTERIAL, GRAM STAIN Bryson Ferguson MD 7/17/2017 12:49 PM    A : Appendix Tissue Appendix SURGICAL PATHOLOGY EXAM Bryson Ferguson MD 7/17/2017  1:03 PM       Findings: Turbid fluid in LLQ  Appendix abnormal - injected, mild inflammation.   Small meckels  No other intraabdominal pathology encountered  No complications     Héctor Iverson PA-C  Office: [phone]  Pager: [phone]

## 2017-07-17 NOTE — ED NOTES
"[facility]  ED Nurse Handoff Report    ED Chief complaint: Fever (2x multiple surgery in past month. Noticed fever, chills, Bowel incont, problems finding words, and dizziness)      ED Diagnosis:   Final diagnoses:   None       Code Status: Full Code    Allergies:   Allergies   Allergen Reactions     Amoxicillin      Ampicillin Diarrhea     Demerol [Meperidine]      Nausea        Activity level - Baseline/Home:  Independent    Activity Level - Current:   Stand with Assist     Needed?: No    Isolation: No  Infection: Not Applicable    Bariatric?: No    Vital Signs:   Vitals:    07/16/17 2223 07/16/17 2230 07/16/17 2245 07/16/17 2259   BP:  115/64     Pulse:       Resp:       Temp: 103.4  F (39.7  C)      TempSrc: Oral      SpO2:   95% 96%   Weight:       Height:           Cardiac Rhythm: ,   Cardiac  Cardiac Rhythm: Sinus tachycardia    Pain level: 0-10 Pain Scale: 6    Is this patient confused?: No    Patient Report: Initial Complaint: Fever  Focused Assessment: Louie Greco is a 57 year old male who presents to the emergency department today for evaluation of a fever. The patient has a history of rectal cancer in which he was treated with radiation and chemotherapy. He then went in for a sigmoidoscopy nine days ago in which the tumor was not found. He notes tingling and numbness in his right leg for the past week as well, and the numbness is different from which he's had in the past. He has a history of neuropathy. Additionally, since being on chemotherapy the patient has had diarrhea although this morning he \"didn't make it\" which is new for him. He notes his bowels are dark as well. Furthermore, he reports dizziness, mild right sided abdominal pain, and unable to walk secondary to generalized weakness and fatigue. Earlier tonight, he measured his temperature at home to be 103.9 and in addition to his other symptoms he presents to the ED for further evaluation. Upon presentation, his " initial temperature is100.1 then raised to 103.4 and reports associated chills and nausea. On further examination, the patient has a warm raised redness area on his abdomen. He is unaware of this and unsure when it would have started. He denies cough, rhinorrhea, vomiting, and urinary symptoms.   Tests Performed: See Epic.  Abnormal Results: CT indicating possible appendicitis.  Treatments provided: IV fluid 1L bolus x2, 975 mg tylenol, 600 mg ibuprofen    Family Comments: Daughter at bedside.    OBS brochure/video discussed/provided to patient: N/A    ED Medications:   Medications   lidocaine 1 % 1 mL (not administered)   lidocaine (LMX4) cream (not administered)   sodium chloride (PF) 0.9% PF flush 3 mL (not administered)   sodium chloride (PF) 0.9% PF flush 3 mL (not administered)   0.9% sodium chloride BOLUS (0 mLs Intravenous Stopped 7/16/17 2348)   0.9% sodium chloride BOLUS (1,000 mLs Intravenous New Bag 7/16/17 2355)   acetaminophen (TYLENOL) tablet 975 mg (975 mg Oral Given 7/16/17 2242)   ibuprofen (ADVIL/MOTRIN) tablet 600 mg (600 mg Oral Given 7/16/17 2242)   iopamidol (ISOVUE-370) solution 107 mL (107 mLs Intravenous Given 7/16/17 2333)   sodium chloride 0.9 % for CT scan flush dose 73 mL (73 mLs Intravenous Given 7/16/17 2333)       Drips infusing?:  No      ED NURSE PHONE NUMBER: [phone]

## 2017-07-17 NOTE — ED NOTES
Assisted patient to restroom, patient notes pain in abd increased when getting out of bed, no worse with walking.  Unsteady gait, x1 assist.  Urine sample collected and sent.

## 2017-07-17 NOTE — H&P
[facility]    History and Physical  Hospitalist       Date of Admission:  7/16/2017    Assessment & Plan   Louie Greco is a 57 year old male with a medical history complicated by low colorectal surgery status post radiation and chemotherapy completion with recent negative sigmoidoscopy and subsequent port removal who presents with approximately 7 days of feeling generally unwell and some subjective fevers and chills without rigors (prior to port removal), now with interim development of nausea to the point of almost vomiting, change in baseline soft stools to diarrhea, lower abdominal pain, and measured fever to 103 with CT findings concerning for possible appendicitis and possible hemorrhagic renal cyst, laboratory studies notable for abnormal urinalysis, and skin exam notable for what appears to be a cellulitis versus shingles lesion on anterior abdomen.    Sepsis, suspect from early appendicitis: CT with thickening of the appendix, patient with right lower quadrant abdominal discomfort which is worsening over the past 12 hours, nausea, tachycardia, diarrhea, and fever. No leukocytosis, though absolute neutrophilia. Last chemotherapy >1 month ago. Patient does have abdominal erythema concerning for cellulitis versus shingles left of umbilicus, though this is nontender and was actually an incidental finding in the emergency department. Note 6/7/17 CT scan demonstrating possible fecaliths at tip of appendix with no evidence of wall thickening or appendicitis. As patient's symptoms are primarily intra-abdominal including nausea, diarrhea, right lower quadrant pain not at site of questionable cellulitis, I anticipate that patient will require surgical intervention of what appears to be early appendicitis noted on CT scan. Also consider early psoas abscess/inflammation following port removal as possibility, though not noted on CT.  -Gen. surgery consulted, aware of patient from the emergency department  and discussion with Dr. Pelayo, appreciate assistance  -Colorectal surgery has been consulted at request of patient's daughter given patient's history of rectal cancer. If patient requires definitive intervention of appendicitis, request surgical services discuss amongst themselves who would be operating service or if colorectal would like to be present for visualization of external colon.  -Blood culture ×2 pending  -Urine culture pending  -Given unclear nature of patient's sepsis, covering roughly with antibiotics Vancomycin, pharmacy to dose, Ceftriaxone 2 g q.24 hours. Patient with penicillin allergy of Diarrhea. As above, anticipate patient may require intra-abdominal intervention by surgery.  -C. difficile as well as enteric stool panel sent. CT findings and right lower quadrant abdominal pain could also be secondary to an ileitis.  -Appreciate serial exams by surgery.  -ESR and CRP added on  -Repeat CBC in a.m.    Abdominal rash: Cellulitis versus shingles. This was an incidental finding in the emergency department. Patient reports no pain associated with this. No vesicle formation at this time, though of unclear duration. I would think an asymptomatic cellulitis is unlikely to cause a fever of 103, though this is certainly possible with zoster.  -Contact isolation  -Monitor rash for any evidence of vesicle formation, would initiate treatment with valacyclovir if this develops further, though have not initiated at this time as patient is asymptomatic and it is unclear if this represents zoster.  -Patient is covered fairly broadly with vancomycin and ceftriaxone at this time for potential of bacterial skin infection, though again, this seems less likely as patient's primary symptoms are intra-abdominal.     Rectal cancer, currently in remission and on surveillance: Follows with Dr Radford (colorectal surgery) and Dr. Jesus Medina (Onc) (primarily Dr Manley). Moderately differentiated adenocarcinoma,  ascending colon with sessile serrated adenoma, others were tubular adenomas. Underwent chemotherapy as well as radiation therapy with Xeloda. Additional 8 cycles FOLFOX chemotherapy completed as of early June 2017 and has now had port removed from right anterior chest as of July 10. Last sigmoidoscopy July 5, 2017 with no evidence of malignancy.     Renal cysts: Patient with multiple bilateral renal cysts with a left inferior cyst which appears to be hyperdense and suggestive of a hemorrhagic proteinaceous cyst. This was noted on prior CT imaging as well.   -Consider outpatient follow-up with MRI versus serial CTs, though will defer to patient's primary oncologist. Patient is scheduled for q.6 month MRI of pelvis, could consider MRI pelvis and kidney at that time versus ongoing monitoring with serial CT scans as had been previously recommended by oncology.     DVT Prophylaxis: Pneumatic Compression Devices, if no operative intervention, would convert to chemoprophylaxis.    Code Status: Full Code    Disposition: Expected discharge in likely 3 days pending resolution of fevers, potential surgical plan    Brett Hendrickson [place]    Primary Care Physician   Dr Radford (colorectal surgery)  Venkat Manley and Jesus (Onc)    Chief Complaint   lightheadedness    History is obtained from the patient, chart review, discussion with Dr. Pelayo in the emergency apartment, patient's daughter at bedside.    History of Present Illness   Louie Greco is a 57 year old male who presents with nausea to the point where he almost had episodes of emesis, right lower quadrant abdominal discomfort, diarrhea with urgency and incontinence which was notable from his baseline loose mucousy stools (s/p radiation for rectal cancer), fever to 103, and lightheadedness associated with standing found to have tachycardia, fever, left shift without leukocytosis, CT findings concerning for early appendicitis which is an acute change from CT 1 month  ago.    Patient with recent diagnosis of rectal cancer for which he has followed with colorectal surgery and Dr. Manley of [place]. Did not undergo resection, though completed radiation and so low to therapy followed by 8 cycles of FOLFOX which was completed early June 2017. Currently thought to be in remission. Patient underwent sigmoidoscopy 7/5/17 with no evidence of malignancy noted, states that he felt somewhat generally unwell with malaise, possible low-grade fevers and chills without rigors, though no measured fevers, several days after this sigmoidoscopy (around the 8th of July). Underwent right anterior chest wall port removal 7/10. Afebrile at that time. States that he continued to have some general malaise. Over the past 36 hours, however, patient has had interim development of additional symptoms. Patient states that as of 7/15 evening, he developed nausea to the point of nearly vomiting, though he states that he did not, subsequently abdominal pain over the course of 7/16. Patient's daughter, who is present at time of history, states that patient is a quite healthy, and looked unwell as of 7/16 a.m. and appeared to have some mental slowing. Daughter felt patient may have been limping as he left the breakfast table, though patient states that he was having significant lightheadedness when standing resulting in a staggering step. Did have development of lower abdominal pain throughout the day, though he did not believe that that was affecting his gait. Over the past 12 hours, patient believes that his right lower abdominal pain has been worsening. Nausea continues to persist to the point where he was unable to eat 7/17 evening. Patient otherwise states that he has been able to tolerate oral intake including fluids. Describes diarrhea over the course of 7/16 which was different from his typical loose and mucousy stools following radiation as it was more watery, associated with fecal urgency to the  point where he was incontinent of stool. No change in discomfort associated with bowel movement. Patient does not describe periumbilical pain radiating to right lower quadrant, believes his pain has been in right lower quadrant since he initially noted it. Fever of 103+ in the emergency department the first time that patient's temperature had been recorded since periprocedural vitals taken 7/10.    Patient actually presented to the emergency department as he was becoming more lightheaded when standing. No loss of consciousness or fall.    In the emergency department, patient was noted to have a left periumbilical area of erythema by EKG tech. patient was unaware of this. Nontender to palpation. Patient does have a history of neuropathy associated with his prior chemotherapy, though no report of abdominal neuropathy. No prior history of shingles, though did have chickenpox as a child. Has not received the shingles vaccine historically per his report. No vesicle formation.    No recent sick contacts.     Abdominal pain 3-5 out of 10 in the right lower quadrant without radiation. No back pain. No respiratory symptoms.    Gen. surgery was consulted by Dr. Pelayo in the emergency department given CT findings concerning for appendicitis. No plan for surgery currently, and hospitalist service was contacted for admission.    Past Medical History    I have reviewed this patient's medical history and updated it with pertinent information if needed.   Past Medical History:   Diagnosis Date     Childhood asthma      Nephrolithiasis     1990     Rectal cancer (H)     low rectal cancer     Past Surgical History   I have reviewed this patient's surgical history and updated it with pertinent information if needed.  Past Surgical History:   Procedure Laterality Date     COLONOSCOPY N/A 7/27/2016    Procedure: COMBINED COLONOSCOPY, SINGLE OR MULTIPLE BIOPSY/POLYPECTOMY BY BIOPSY;  Surgeon: Chelsea Thompson MD;  Location:  GI      EXAM UNDER ANESTHESIA ANUS N/A 7/5/2017    Procedure: EXAM UNDER ANESTHESIA ANUS;  Examination Under Anesthesia, flex sigmoidoscopy with biopsies and formalin application;  Surgeon: Felicitas Radford MD;  Location: [facility] OR     HC TOOTH EXTRACTION W/FORCEP       SIGMOIDOSCOPY FLEXIBLE N/A 12/8/2016    Procedure: SIGMOIDOSCOPY FLEXIBLE;  Surgeon: Felicitas Radford MD;  Location: [facility] OR     SIGMOIDOSCOPY FLEXIBLE N/A 7/5/2017    Procedure: SIGMOIDOSCOPY FLEXIBLE;;  Surgeon: Felicitas Radford MD;  Location: [facility] OR     VASCULAR SURGERY      Right chest port     VASECTOMY         Prior to Admission Medications   Prior to Admission Medications   Prescriptions Last Dose Informant Patient Reported? Taking?   Docusate Sodium (COLACE PO)   Yes No   Sig: Take 2 capsules by mouth 2 times daily Unsure of dosage.   IBUPROFEN PO   Yes No   Sig: Take 200 mg by mouth every 6 hours as needed    VITAMIN D, CHOLECALCIFEROL, PO   Yes No   Sig: Take 2,000 Units by mouth   acetaminophen (TYLENOL) 325 MG tablet   No No   Sig: Take 2 tablets (650 mg) by mouth every 4 hours as needed for other (mild pain)   dibucaine (NUPERCAINAL) 1 % OINT ointment   Yes No   Sig: 3 times daily as needed for moderate pain   diltiazem 2% in PLO cream, FV COMPOUNDED, 2% GEL   No Yes   Sig: To anal opening three times daily.  Use a pea-sized amount.  Store at room temperature.   Patient taking differently: To anal opening three times daily as needed.  Use a pea-sized amount.  Store at room temperature.   hydrocortisone 0.5 % ointment   Yes No   Sig: Apply topically 2 times daily as needed Reported on 2/14/2017   lidocaine, Anorectal, 5 % CREA   No No   Sig: Externally apply 1 Application topically 3 times daily as needed   methylPREDNISolone (MEDROL) 32 MG tablet   No No   Sig: Take 12 hours prior to CT scan and 2 hours prior to CT scan      Facility-Administered Medications: None     Allergies   Allergies   Allergen Reactions      Amoxicillin      Ampicillin Diarrhea     Demerol [Meperidine]      Nausea        Social History   I have reviewed this patient's social history and updated it with pertinent information if needed. Louie Greco  reports that he has never smoked. He has never used smokeless tobacco. He reports that he drinks alcohol. He reports that he does not use illicit drugs.    Family History   I have reviewed this patient's family history and updated it with pertinent information if needed.   Family History   Problem Relation Age of Onset     CANCER Brother      primary of unknown origin     Other Cancer Brother      Chronic Obstructive Pulmonary Disease Father      Hypertension Father      Hyperlipidemia Father      Colon Polyps Mother      Number and type of polyps unknown     Family History Negative Brother      negative colonoscopy     DIABETES Maternal Grandfather      Hypertension Paternal Grandmother      Hyperlipidemia Paternal Grandmother      Stomach Cancer Other 63     maternal great grandfather     Glaucoma No family hx of      Macular Degeneration No family hx of        Review of Systems   The 10 point Review of Systems is negative other than noted in the HPI or here.   Nausea without emesis  Diarrhea  Patient states that he feels slightly bloated, and daughter notes that his abdomen is slightly more distended than baseline as well.     Physical Exam   Temp: 97.9  F (36.6  C) Temp src: Oral BP: 101/57 Pulse: 97   Resp: 16 SpO2: 95 % O2 Device: None (Room air)    Vital Signs with Ranges  Temp:  [97.9  F (36.6  C)-103.4  F (39.7  C)] 97.9  F (36.6  C)  Pulse:  [] 97  Resp:  [16] 16  BP: (101-124)/(57-68) 101/57  SpO2:  [95 %-98 %] 95 %  211 lbs 6.4 oz    Constitutional: no acute distress, alert, conversant. Does not appear toxic  Eyes: no scleral icterus or injection  HEENT: moist mucous membranes  Respiratory: breath sounds with bibasilar crackles on inspiration, no wheezes, good air movement throughout lung  "fields.   Cardiovascular: Regular rhythm, tachycardia to 100 range, no murmur   GI: abdomen soft, tender to palpation in the right lower quadrant with associated guarding, no peritoneal signs, normoactive bowel sounds, no masses  Lymph/Hematologic: no lower extremity swelling  Genitourinary: not examined  Skin:  patient with patchy erythematous rash left of umbilicus though slightly circles umbilicus. Approximately 3\" x 7\". No vesicle formation, nontender, minimal blanching, not warm. Minimally raised.  Musculoskeletal : muscular tone intact in all extremities. Able to lay flat without significant abdominal discomfort  Neurologic: mental status grossly intact, no focal deficits, alert  Psychiatric: normal affe, very pleasant     Data   Data reviewed today:  I personally reviewed CT abdomen and pelvis demonstrating thickened appendix with periappendiceal fat stranding, multiple renal cysts including left lower lobe cyst which is hyperdense as compared to surrounding simple appearing cysts, some subtle/minimal fat stranding of left abdominal wall at site of rash.    Recent Labs  Lab 07/16/17  2213 07/10/17  0943   WBC 10.0 6.7   HGB 11.7* 12.8*   MCV 93 94    170   INR  --  1.02     --    POTASSIUM 4.0  --    CHLORIDE 100  --    CO2 24  --    BUN 22  --    CR 0.88  --    ANIONGAP 9  --    FRANDY 8.2*  --    *  --        Recent Results (from the past 24 hour(s))   CT Abdomen Pelvis w Contrast    Narrative    CT ABDOMEN PELVIS W CONTRAST   7/16/2017 11:37 PM     HISTORY: Fever and chills. Right lower quadrant pain.    TECHNIQUE: 107mL Isovue-370 IV were administered. After contrast  administration, volumetric helical sections were acquired from the  lung bases to the ischial tuberosities. Coronal images were also  reconstructed. Radiation dose for this scan was reduced using  automated exposure control, adjustment of the mA and/or kV according  to patient size, or iterative reconstruction " technique.    COMPARISON: CT of the chest, abdomen, and pelvis performed 6/7/2017.     FINDINGS: The appendix is mildly thickened, measuring up to 0.9 cm,  and there is mild periappendiceal stranding. No fluid collections are  identified to suggest abscess. No free fluid in the pelvis. No bowel  obstruction. No convincing evidence for colitis or diverticulitis.  Mild atherosclerotic aortoiliac calcification. Scattered hepatic and  bilateral renal cysts are again noted. Hyperdense 4.3 cm lesion in the  lower pole of the left kidney is unchanged and remains indeterminate.  The liver, gallbladder, spleen, adrenal glands, and pancreas are  otherwise unremarkable. Mild diffuse bladder wall thickening is  nonspecific and could be related to lack of distention, although  cystitis could also have this appearance. Calcified granuloma in the  left lower lobe of the lung is unchanged. Mild patchy opacities at the  left lung base could represent atelectasis, although mild pneumonia  could also have this appearance. Degenerative changes are noted at the  lumbosacral interspace.      Impression    IMPRESSION:   1. The appendix is mildly thickened at 0.9 cm, and there is mild  periappendiceal fat stranding. An early or mild appendicitis could  have this appearance. Please clinically correlate.  2. Mild diffuse bladder wall thickening could be related to lack of  distention, although cystitis could also have this appearance.  3. Mild patchy opacity at the left lung base could represent  atelectasis, although a mild pneumonia cannot be excluded.   4. Hyperdense 4.3 cm lesion in the lower pole of the left kidney is  unchanged and may represent a hemorrhagic or proteinaceous renal cyst,  although solid renal neoplasm cannot be excluded. Consider renal MRI  for further characterization.

## 2017-07-17 NOTE — PROGRESS NOTES
Patient's wife updated about patient's condition and regarding delay of transfer due to staffing issue in [facility].

## 2017-07-17 NOTE — PLAN OF CARE
Problem: Goal Outcome Summary  Goal: Goal Outcome Summary  Outcome: No Change  Patient is A&Ox4. VSS. Denied pain. Was having diarrhea overnight with c-diff stool sample sent down to lab. NPO for possible laprascopic appendectomy. Has incision on upper right chest where port was removed, bruises scattered, and raised reddened area on abdomen marked for cellulitis vs shingles rule out. Patient on enteric/contact precautions d/t rule outs. SBA. Surgery and colorectal surgery consulted. LS clear. Will continue to monitor.

## 2017-07-17 NOTE — CONSULTS
[facility]  Colon and Rectal Surgery Consult Note  Name: Louie Greco    MRN: [id number]  YOB: 1960    Age: 57 year old  Date of admission: 7/16/2017  Primary care provider: No Ref-Primary, Physician     Requesting Physician:  Dr. Brett Horton  Reason for consult:  Possible appendicitis, h/o recent low rectal cancer           History of Present Illness:   Louie Greco is a 57 year old male, seen at the request of Dr. Brett Horton, who presents with 1 week history of feeling unwell, including fevers to 103, nausea, watery diarrhea with urgency, lower abdominal pain, and abdominal rash.  He has a recent history of low rectal cancer diagnosed in 2016 with chemorads completed 9/15/16 and 8 cycles of FOLFOX from 1/16-5/9/17.  He follows with Dr. Radford of colorectal at [facility] and Dr. Manley of oncology.  He had a flexible sigmoidoscopy on 7/5/17 that showed no residual tumor.  His chest port was removed 7/10.  Per family, plan is for close surveillance with frequent imaging and scopes.    History is provided mostly by patient's wife and daughter.  Over the past several days, he has had fevers with lower abdominal pain and nausea, but no emesis.  Abdominal pain worsened over past day and he measured a temp of 103 at home, leading patient to present to ED.  At baseline, he has loose, mucousy stools 2-3 times per day since undergoing radiation for his rectal cancer.  This has changed to more watery diarrhea with urgency and occasional incontinence in the past 1-2 days.  He denies blood in the stool or pain with bowel movements.  He has also felt more fatigued and lightheaded with walking.  In the ED, he was febrile and tachycardic with CT showing mild appendiceal thickening and periappendiceal fat stranding c/w early appendicitis.  He was also found to have a rash on left abdomen.  Patient states he was unaware of the rash until seeing it in the ED.  It is not itchy or painful.  No known  exposures.    Colonoscopy History:  7/2016 with rectal mass; flexible sigmoidoscopy 7/5/17 with no evidence of tumor s/p chemorads    Past abdominal surgery: None            Past Medical History:     Past Medical History:   Diagnosis Date     Childhood asthma      Nephrolithiasis     1990     Rectal cancer (H)     low rectal cancer             Past Surgical History:     Past Surgical History:   Procedure Laterality Date     COLONOSCOPY N/A 7/27/2016    Procedure: COMBINED COLONOSCOPY, SINGLE OR MULTIPLE BIOPSY/POLYPECTOMY BY BIOPSY;  Surgeon: Chelsea Thompson MD;  Location: [facility]     EXAM UNDER ANESTHESIA ANUS N/A 7/5/2017    Procedure: EXAM UNDER ANESTHESIA ANUS;  Examination Under Anesthesia, flex sigmoidoscopy with biopsies and formalin application;  Surgeon: Felicitas Radford MD;  Location: [facility]     HC TOOTH EXTRACTION W/FORCEP       SIGMOIDOSCOPY FLEXIBLE N/A 12/8/2016    Procedure: SIGMOIDOSCOPY FLEXIBLE;  Surgeon: Felicitas Radford MD;  Location: [facility]     SIGMOIDOSCOPY FLEXIBLE N/A 7/5/2017    Procedure: SIGMOIDOSCOPY FLEXIBLE;;  Surgeon: Felicitas Radford MD;  Location: [facility]     VASCULAR SURGERY      Right chest port     VASECTOMY                 Social History:     Social History   Substance Use Topics     Smoking status: Never Smoker     Smokeless tobacco: Never Used     Alcohol use 0.0 oz/week      Comment: Very moderate             Family History:     Family History   Problem Relation Age of Onset     CANCER Brother      primary of unknown origin     Other Cancer Brother      Chronic Obstructive Pulmonary Disease Father      Hypertension Father      Hyperlipidemia Father      Colon Polyps Mother      Number and type of polyps unknown     Family History Negative Brother      negative colonoscopy     DIABETES Maternal Grandfather      Hypertension Paternal Grandmother      Hyperlipidemia Paternal Grandmother      Stomach Cancer Other 63     maternal great grandfather      "Glaucoma No family hx of      Macular Degeneration No family hx of              Allergies:     Allergies   Allergen Reactions     Amoxicillin      Ampicillin Diarrhea     Demerol [Meperidine]      Nausea              Medications:       [START ON 7/18/2017] cefTRIAXone  2 g Intravenous Q24H     sodium chloride (PF)  3 mL Intracatheter Q8H             Review of Systems:   A comprehensive greater than 10 system review of systems was carried out.  Pertinent positives and negatives are noted above.  Otherwise negative for contributory info.            Physical Exam:     Blood pressure 141/78, pulse 97, temperature 100.2  F (37.9  C), temperature source Oral, resp. rate 18, height 1.778 m (5' 10\"), weight 95.9 kg (211 lb 6.4 oz), SpO2 92 %.    Intake/Output Summary (Last 24 hours) at 07/17/17 1006  Last data filed at 07/17/17 0700   Gross per 24 hour   Intake             1406 ml   Output              600 ml   Net              806 ml     Exam:  General - Awake alert and oriented, appears stated age  Pulm - Somewhat labored breathing with increased respiratory effort, switched from nasal cannula to oxy-mask  CVS - Tachycardic and reg rhythm, no peripheral edema  Abd - Soft, mild lower abd tenderness, distended. No guarding or rigidity. No peritoneal signs. Rectal exam was not performed.   Neuro - CN II-XII grossly intact  Musculoskeletal - extremities with no clubbing, cyanosis or edema; able to ambulate  Psych - responsive, alert, cooperative; oriented x3; appropriate mood and affect  External/skin - slightly raised erythematous patchy rash on left abdomen; non-tender; borders marked             Data Reviewed:     Results for orders placed or performed during the hospital encounter of 07/16/17   CT Abdomen Pelvis w Contrast    Narrative    CT ABDOMEN PELVIS W CONTRAST   7/16/2017 11:37 PM     HISTORY: Fever and chills. Right lower quadrant pain.    TECHNIQUE: 107mL Isovue-370 IV were administered. After " contrast  administration, volumetric helical sections were acquired from the  lung bases to the ischial tuberosities. Coronal images were also  reconstructed. Radiation dose for this scan was reduced using  automated exposure control, adjustment of the mA and/or kV according  to patient size, or iterative reconstruction technique.    COMPARISON: CT of the chest, abdomen, and pelvis performed 6/7/2017.     FINDINGS: The appendix is mildly thickened, measuring up to 0.9 cm,  and there is mild periappendiceal stranding. No fluid collections are  identified to suggest abscess. No free fluid in the pelvis. No bowel  obstruction. No convincing evidence for colitis or diverticulitis.  Mild atherosclerotic aortoiliac calcification. Scattered hepatic and  bilateral renal cysts are again noted. Hyperdense 4.3 cm lesion in the  lower pole of the left kidney is unchanged and remains indeterminate.  The liver, gallbladder, spleen, adrenal glands, and pancreas are  otherwise unremarkable. Mild diffuse bladder wall thickening is  nonspecific and could be related to lack of distention, although  cystitis could also have this appearance. Calcified granuloma in the  left lower lobe of the lung is unchanged. Mild patchy opacities at the  left lung base could represent atelectasis, although mild pneumonia  could also have this appearance. Degenerative changes are noted at the  lumbosacral interspace.      Impression    IMPRESSION:   1. The appendix is mildly thickened at 0.9 cm, and there is mild  periappendiceal fat stranding. An early or mild appendicitis could  have this appearance. Please clinically correlate.  2. Mild diffuse bladder wall thickening could be related to lack of  distention, although cystitis could also have this appearance.  3. Mild patchy opacity at the left lung base could represent  atelectasis, although a mild pneumonia cannot be excluded.   4. Hyperdense 4.3 cm lesion in the lower pole of the left kidney  is  unchanged and may represent a hemorrhagic or proteinaceous renal cyst,  although solid renal neoplasm cannot be excluded. Consider renal MRI  for further characterization.    AVIS EL MD   XR Chest Port 1 View    Narrative    XR CHEST PORT 1 VW 7/17/2017 10:15 AM    COMPARISON: CT dated 6/7/2017    HISTORY: Shortness of breath      Impression    IMPRESSION: Cardiac silhouette and pulmonary vasculature are within  normal limits. No slight granuloma in the left base. Mild left basilar  atelectasis/consolidation. No pleural effusion or pneumothorax.    FABRIZIO CASANOVA         Recent Labs  Lab 07/17/17  0705 07/16/17  2213   WBC 7.8 10.0   HGB 11.7* 11.7*   HCT 35.1* 34.8*   MCV 93 93   * 181       Recent Labs  Lab 07/16/17  2213      POTASSIUM 4.0   CHLORIDE 100   CO2 24   ANIONGAP 9   *   BUN 22   CR 0.88   GFRESTIMATED 89   GFRESTBLACK >90African American GFR Calc   FRANDY 8.2*     No results for input(s): INR in the last 168 hours.    Recent Labs  Lab 07/16/17  2310   COLOR Yellow   APPEARANCE Clear   URINEGLC Negative   URINEBILI Negative   URINEKETONE 5*   SG 1.024   UBLD Small*   URINEPH 6.0   PROTEIN 100*   NITRITE Negative   LEUKEST Trace*   RBCU 2   WBCU 13*         Assessment and Plan:   Louie Greco is a 57 year old with recent history of low rectal cancer s/p chemorads (done 9/2016) and 8 cycles of FOLFOX (done 5/9/17) who presents with fever, abdominal pain, watery diarrhea and abdominal rash.  CT with mild appendiceal thickening and periappendiceal fat stranding.  Per chart review and Dr. Radford's note from flexible sigmoidoscopy on 7/5/17, no visible rectal tumor and patient felt to be in remission at this time.  Surgical plan for possible appendicitis per general surgery.    Plan:  1. Admit to hospitalist  2. Surgery: Per general surgery.    3. Diet: Per general surgery/hospitalist  4. IV Fluids: Per hospitalist  5. Antibiotics:  Per hospitalist  6. Medications:  Per  hospitalist  7. I&O s:  strict I&O s  8. Labs:   - Reviewed: yes  - Ordered: none   9. Imaging:   - Dr. Thrasher and myself have personally viewed: CT abd/pelvis  - Ordered:  None  10. Activity:  OOB ambulate  11. DVT prophylaxis: SCD s,   12. This plan has been discussed with Dr. Thrasher    Patient specific identified risk factors considered as part of today s evaluation include:  Completion of FOLFOX chemo in May 2017 and chest port removal 7/10/17    Additional history obtained from chart review, patient's family.  Time spent on consultation: 40 minutes, greater than 50% spent on counseling and/or coordination of care.          Araceli Carl PA-C  [facility]  [phone]

## 2017-07-17 NOTE — PHARMACY-VANCOMYCIN DOSING SERVICE
Pharmacy Vancomycin Initial Note  Date of Service 2017  Patient's  1960  57 year old, male    Indication: Sepsis    Current estimated CrCl = Estimated Creatinine Clearance: 107.7 mL/min (based on Cr of 0.88).    Creatinine for last 3 days  2017: 10:13 PM Creatinine 0.88 mg/dL    Recent Vancomycin Level(s) for last 3 days  No results found for requested labs within last 72 hours.      Vancomycin IV Administrations (past 72 hours)                   vancomycin (VANCOCIN) 2,000 mg in NaCl 0.9 % 500 mL intermittent infusion (mg) 2,000 mg New Bag 17 0441                Nephrotoxins and other renal medications (Future)    Start     Dose/Rate Route Frequency Ordered Stop    17 1800  vancomycin (VANCOCIN) 2,000 mg in NaCl 0.9 % 500 mL intermittent infusion      2,000 mg Intravenous EVERY 12 HOURS 17 1746            Contrast Orders - past 72 hours (72h ago through future)    Start     Dose/Rate Route Frequency Ordered Stop    17 2328  iopamidol (ISOVUE-370) solution 107 mL      107 mL Intravenous ONCE 17 2327 17 2333                Plan:  1.  Start vancomycin  2000 mg IV q12h.   2.  Goal Trough Level: 15-20 mg/L   3.  Pharmacy will check trough levels as appropriate in 1-3 Days.    4. Serum creatinine levels will be ordered daily for the first week of therapy and at least twice weekly for subsequent weeks.    5. [place] method utilized to dose vancomycin therapy: Method 2    Bianca Roberts

## 2017-07-17 NOTE — ANESTHESIA CARE TRANSFER NOTE
Patient: Louie Greco    Procedure(s):  LAPAROSCOPIC APPENDECTOMY - Wound Class: III-Contaminated    Diagnosis: Appendicitis  Diagnosis Additional Information: No value filed.    Anesthesia Type:   General, ETT, RSI     Note:  Airway :Face Mask  Patient transferred to:PACU        Vitals: (Last set prior to Anesthesia Care Transfer)    CRNA VITALS  7/17/2017 1302 - 7/17/2017 1337      7/17/2017             Pulse: 119    Ht Rate: 118    SpO2: 94 %    Resp Rate (set):                 Electronically Signed By: QUINTON Holland CRNA  July 17, 2017  1:37 PM

## 2017-07-17 NOTE — ED PROVIDER NOTES
"  History     Chief Complaint:  Fever     HPI   Louie Greco is a 57 year old male who presents to the emergency department today for evaluation of a fever. The patient has a history of rectal cancer in which he was treated with radiation and chemotherapy. He stopped chemotherapy a month ago in June 2017 and is now receiving infusions. He then went in for a sigmoidoscopy nine days ago in which the tumor was not found. He notes tingling and numbness in his right leg for the past week as well, and the numbness is different from which he's had in the past. He has a history of neuropathy. Additionally, since being on chemotherapy the patient has had diarrhea although this morning he \"didn't make it\" which is new for him. He notes his bowels are dark as well. Furthermore, he reports dizziness, mild right sided abdominal pain, and unable to walk secondary to generalized weakness and fatigue. Earlier tonight, he measured his temperature at home to be 103.9 and in addition to his other symptoms he presents to the ED for further evaluation. Upon presentation, his initial temperature is 100.1 then raised to 103.4 and reports associated chills and nausea. On further examination, the patient has a warm raised redness area on his abdomen. He is unaware of this and unsure when it would have started. He denies cough, rhinorrhea, vomiting, and urinary symptoms.     Allergies:  Amoxicillin  Ampicillin (diarrhea)  Demerol [Meperidine] (nausea)     Medications:    Colace  Acetaminophen  ibuprofen  Vitamin D    Past Medical History:    Ca, rectum  Chemotherapy-induced neutropenia  Asthma (childhood)  Nephrolithiasis  Pes anserinus tendinitis   Plantar fasciitis   Neuropathy     Past Surgical History:    Vasectomy  Right chest port placement  Rectal biopsy     Family History:    Cancer (brother)  COPD (father)  Colon polyps (mother)  Ca, gastric (maternal great grandfather)  Hypertension (father, paternal grandmother)  Hyperlipidemia " "(father, paternal grandmother)    Social History:  Non-smoker. Moderate alcohol consumption.  Marital Status:      Presents with family.     Review of Systems   Constitutional: Positive for chills, fatigue and fever.   HENT: Negative for rhinorrhea.    Respiratory: Negative for cough.    Gastrointestinal: Positive for abdominal pain, diarrhea and nausea. Negative for constipation and vomiting.   Genitourinary: Negative for difficulty urinating, dysuria and hematuria.   Musculoskeletal: Positive for gait problem.   Neurological: Positive for dizziness and numbness.   All other systems reviewed and are negative.    Physical Exam     Patient Vitals for the past 24 hrs:   BP Temp Temp src Pulse Resp SpO2 Height Weight   07/16/17 2223 - 103.4  F (39.7  C) Oral - - - - -   07/16/17 2147 124/68 100.1  F (37.8  C) Oral 118 16 98 % 1.803 m (5' 11\") 96.7 kg (213 lb 3.2 oz)      Physical Exam  SKIN:  Warm, dry.  No jaundice.  Hand sized area of blanching erythema of the left lower abdominal wall.  No soft tissue crepitus.  HEMATOLOGIC/IMMUNOLOGIC/LYMPHATIC:  No pallor.  EYES:  Conjunctivae normal.  CARDIOVASCULAR:  Tachycardic rate and regular rhythm.  No murmur.  RESPIRATORY:  No respiratory distress, breath sounds equal and normal.  GASTROINTESTINAL:  Soft tender lower abdomen with active bowel sounds.  No mass or distension.  MUSCULOSKELETAL:  Painless ROM of torso.  NEUROLOGIC:  Alert, conversant.  PSYCHIATRIC:  Normal mood.    Emergency Department Course     ECG:  Completed at 2230.  Read at 2241  Sinus tachycardia  Nonspecific T wave abnormality  Abnormal ECG   Rate 110 bpm. LA interval 128. QRS duration 82. QT/QTc 314/424. P-R-T axes 47 -3 0.    Imaging:  Radiology findings were communicated with the patient and family who voiced understanding of the findings.  CT Abdomen Pelvis w Contrast  IMPRESSION:   1. The appendix is mildly thickened at 0.9 cm, and there is mild  periappendiceal fat stranding. An early or " mild appendicitis could  have this appearance. Please clinically correlate.  2. Mild diffuse bladder wall thickening could be related to lack of  distention, although cystitis could also have this appearance.  3. Mild patchy opacity at the left lung base could represent  atelectasis, although a mild pneumonia cannot be excluded.   4. Hyperdense 4.3 cm lesion in the lower pole of the left kidney is  unchanged and may represent a hemorrhagic or proteinaceous renal cyst,  although solid renal neoplasm cannot be excluded. Consider renal MRI  for further characterization.  Report per radiology     Laboratory:  Laboratory findings were communicated with the patient and family who voiced understanding of the findings.  UA: clear yellow urine, urineketon 5, small blood, protein albumin 100, leukocyte esterase trace, WBC 13, mucous present o/w WNL  CBC: HGB 11.7(L) o/w WNL. (WBC 10.0, )   BMP: Glucose 115(H), Calcium 8.2(L) o/w WNL (Creatinine 0.88)  Lactic Acid: 0.8    Blood cultures: Pending  Urine Culture Aerobic Bacterial: Pending    Interventions:  2223 NS 1,000mL IV  2242 Tylenol 975mg PO  2242 Ibuprofen 600mg PO  2355 NS 1,000mL IV  0119 Rocephin 2g IV     Emergency Department Course:  Nursing notes and vitals reviewed.  IV was inserted and blood was drawn for laboratory testing, results above.  The patient was sent for a CT Abdomen Pelvis w Contrast while in the emergency department, results above.   The patient provided a urine sample here in the emergency department. This was sent for laboratory testing, findings above.  2200: I performed an exam of the patient as documented above.   0021: Patient rechecked and updated.   0035: I spoke with Dr. Silveira of the General Surgery service regarding patient's presentation, findings, and plan of care. She recommends plan for admission and consult in the morning for further investigation of his appendix.  0040: I spoke with Dr. Horton of the hospitalist service  regarding patient's presentation, findings, and plan of care.  I discussed the treatment plan with the patient. They expressed understanding of this plan and consented to admission. I discussed the patient with Dr. Horton, who will admit the patient to a monitored bed for further evaluation and treatment.  I personally reviewed the laboratory and imaging results with the Patient and answered all related questions prior to admission.     Impression & Plan      Medical Decision Making:  This patient presents with a febrile illness with signs of cellulitis to the left lower abdomen. Evaluation revealed findings per imaging that suggested the possibility of appendicitis. I spoke with the on-call general surgeon and she was not convinced at this point in the context of how the imaging looks and the patient's presentation. She advised hospitalist admission for surgical consult in the morning for serial exams to determine the need for surgery within the next 12 hours.     Diagnosis:    ICD-10-CM    1. Cellulitis of abdominal wall L03.311 Urine Culture   2. Sepsis, due to unspecified organism (H) A41.9      Disposition:   Admitted to inpatient [facility] bed under the supervision of Dr. Horton    Scribe Disclosure:  GINGER, Alma Dickinson, am serving as a scribe at 10:13 PM on 7/16/2017 to document services personally performed by Silviano Pelayo MD, based on my observations and the provider's statements to me.    7/16/2017    EMERGENCY DEPARTMENT       Silviano Pelayo MD  07/17/17 [id number]

## 2017-07-17 NOTE — ANESTHESIA PREPROCEDURE EVALUATION
Procedure: Procedure(s):  LAPAROSCOPIC APPENDECTOMY  Preop diagnosis: Appendicitis    Allergies   Allergen Reactions     Amoxicillin      Ampicillin Diarrhea     Demerol [Meperidine]      Nausea      Past Medical History:   Diagnosis Date     Childhood asthma      Nephrolithiasis     1990     Rectal cancer (H)     low rectal cancer     Past Surgical History:   Procedure Laterality Date     COLONOSCOPY N/A 7/27/2016    Procedure: COMBINED COLONOSCOPY, SINGLE OR MULTIPLE BIOPSY/POLYPECTOMY BY BIOPSY;  Surgeon: Chelsea Thompson MD;  Location:  GI     EXAM UNDER ANESTHESIA ANUS N/A 7/5/2017    Procedure: EXAM UNDER ANESTHESIA ANUS;  Examination Under Anesthesia, flex sigmoidoscopy with biopsies and formalin application;  Surgeon: Felicitas Radford MD;  Location: [facility]     HC TOOTH EXTRACTION W/FORCEP       SIGMOIDOSCOPY FLEXIBLE N/A 12/8/2016    Procedure: SIGMOIDOSCOPY FLEXIBLE;  Surgeon: Felicitas Radford MD;  Location: [facility] OR     SIGMOIDOSCOPY FLEXIBLE N/A 7/5/2017    Procedure: SIGMOIDOSCOPY FLEXIBLE;;  Surgeon: Felicitas Radford MD;  Location: [facility] OR     VASCULAR SURGERY      Right chest port     VASECTOMY       Prior to Admission medications    Medication Sig Start Date End Date Taking? Authorizing Provider   ASPIRIN PO Take by mouth as needed for moderate pain   Yes Unknown, Entered By History   lidocaine, Anorectal, 5 % CREA Externally apply 1 Application topically 3 times daily as needed 7/12/17  Yes Lazara Castañeda APRN CNP   VITAMIN D, CHOLECALCIFEROL, PO Take 2,000 Units by mouth daily    Yes Reported, Patient   dibucaine (NUPERCAINAL) 1 % OINT ointment 3 times daily as needed for moderate pain   Yes Reported, Patient   Docusate Sodium (COLACE PO) Take 2 capsules by mouth 2 times daily Unsure of dosage.   Yes Reported, Patient   IBUPROFEN PO Take 200 mg by mouth every 6 hours as needed    Yes Reported, Patient   acetaminophen (TYLENOL) 325 MG tablet Take 2  tablets (650 mg) by mouth every 4 hours as needed for other (mild pain) 12/8/16  Yes Jarod Salcedo MD   hydrocortisone 0.5 % ointment Apply topically 2 times daily as needed Reported on 2/14/2017   Yes Reported, Patient   methylPREDNISolone (MEDROL) 32 MG tablet Take 12 hours prior to CT scan and 2 hours prior to CT scan 6/6/17   Agueda Manley MD     Current Facility-Administered Medications Ordered in Epic   Medication Dose Route Frequency Last Rate Last Dose     [Auto Hold] dibucaine (NUPERCAINAL) 1 % ointment   Rectal TID PRN         [Auto Hold] diltiazem 2% in PLO cream (FV COMPOUNDED)   Topical TID PRN         [Auto Hold] docusate sodium (COLACE) capsule 200 mg  200 mg Oral BID PRN         [Auto Hold] lidocaine (Anorectal) 5 % CREA 1 Application  1 Application Apply externally TID PRN         [Auto Hold] cefTRIAXone (ROCEPHIN) 2 g vial to attach to  ml bag for ADULTS or NS 50 ml bag for PEDS  2 g Intravenous Q24H         [Auto Hold] naloxone (NARCAN) injection 0.1-0.4 mg  0.1-0.4 mg Intravenous Q2 Min PRN         [Auto Hold] lidocaine 1 % 1 mL  1 mL Other Q1H PRN         [Auto Hold] lidocaine (LMX4) cream   Topical Q1H PRN         [Auto Hold] sodium chloride (PF) 0.9% PF flush 3 mL  3 mL Intracatheter Q1H PRN         [Auto Hold] sodium chloride (PF) 0.9% PF flush 3 mL  3 mL Intracatheter Q8H   3 mL at 07/17/17 0254     [Auto Hold] potassium chloride SA (K-DUR/KLOR-CON M) CR tablet 20-40 mEq  20-40 mEq Oral Q2H PRN         [Auto Hold] potassium chloride (KLOR-CON) Packet 20-40 mEq  20-40 mEq Oral or Feeding Tube Q2H PRN         [Auto Hold] potassium chloride 10 mEq in 100 mL intermittent infusion  10 mEq Intravenous Q1H PRN         [Auto Hold] potassium chloride 10 mEq in 100 mL intermittent infusion with 10 mg lidocaine  10 mEq Intravenous Q1H PRN         [Auto Hold] potassium chloride 20 mEq in 50 mL intermittent infusion  20 mEq Intravenous Q1H PRN         [Auto Hold] magnesium sulfate 4 g  in 100 mL sterile water (premade)  4 g Intravenous Q4H PRN         [Auto Hold] oxyCODONE (ROXICODONE) IR tablet 5-10 mg  5-10 mg Oral Q3H PRN         [Auto Hold] senna-docusate (SENOKOT-S;PERICOLACE) 8.6-50 MG per tablet 1-2 tablet  1-2 tablet Oral BID PRN         [Auto Hold] polyethylene glycol (MIRALAX/GLYCOLAX) Packet 17 g  17 g Oral Daily PRN         [Auto Hold] bisacodyl (DULCOLAX) Suppository 10 mg  10 mg Rectal Daily PRN         [Auto Hold] ondansetron (ZOFRAN-ODT) ODT tab 4 mg  4 mg Oral Q6H PRN        Or     [Auto Hold] ondansetron (ZOFRAN) injection 4 mg  4 mg Intravenous Q6H PRN         [Auto Hold] prochlorperazine (COMPAZINE) injection 5-10 mg  5-10 mg Intravenous Q6H PRN        Or     [Auto Hold] prochlorperazine (COMPAZINE) tablet 5-10 mg  5-10 mg Oral Q6H PRN        Or     [Auto Hold] prochlorperazine (COMPAZINE) Suppository 25 mg  25 mg Rectal Q12H PRN         [Auto Hold] HYDROmorphone (PF) (DILAUDID) injection 0.3-0.5 mg  0.3-0.5 mg Intravenous Q2H PRN         0.9% sodium chloride infusion   Intravenous Continuous 100 mL/hr at 07/17/17 0907       [Auto Hold] metoprolol (LOPRESSOR) injection 2.5 mg  2.5 mg Intravenous Q4H PRN         [Auto Hold] acetaminophen (TYLENOL) tablet 975 mg  975 mg Oral Q4H PRN         [Auto Hold] levalbuterol (XOPENEX CONC) neb solution 1.25 mg  1.25 mg Nebulization Q6H PRN         acetaminophen (TYLENOL) tablet 1,000 mg  1,000 mg Oral Once         iopamidol (ISOVUE-370) solution 128 mL  128 mL Intravenous Once         No current Epic-ordered outpatient prescriptions on file.     Wt Readings from Last 1 Encounters:   07/17/17 95.9 kg (211 lb 6.4 oz)     Temp Readings from Last 1 Encounters:   07/17/17 37.9  C (100.2  F) (Oral)     BP Readings from Last 6 Encounters:   07/17/17 141/78   07/10/17 109/68   07/05/17 107/67   06/09/17 100/64   05/12/17 137/80   05/11/17 131/77     Pulse Readings from Last 4 Encounters:   07/17/17 97   07/10/17 94   07/05/17 75   06/09/17 95      Resp Readings from Last 1 Encounters:   07/17/17 18     SpO2 Readings from Last 1 Encounters:   07/17/17 92%     Recent Labs   Lab Test  07/16/17   2213  06/09/17   1607   NA  133  143   POTASSIUM  4.0  3.7   CHLORIDE  100  109   CO2  24  26   ANIONGAP  9  8   GLC  115*  99   BUN  22  23   CR  0.88  0.79   FRANDY  8.2*  8.9     Recent Labs   Lab Test  07/17/17   0705  07/16/17   2213   WBC  7.8  10.0   HGB  11.7*  11.7*   PLT  122*  181     Recent Labs   Lab Test  07/10/17   0943  01/16/17   0800   INR  1.02  0.92        Anesthesia Evaluation     .             ROS/MED HX    ENT/Pulmonary: Comment: SOB  On 4L facemask    (+)asthma (as a child) , . .   (-) sleep apnea   Neurologic:     (+)neuropathy     Cardiovascular:  - neg cardiovascular ROS       METS/Exercise Tolerance:     Hematologic:         Musculoskeletal:         GI/Hepatic:     (+) GERD      (-) liver disease   Renal/Genitourinary:     (+) Nephrolithiasis ,    (-) renal disease   Endo:         Psychiatric:         Infectious Disease:         Malignancy:   (+) Malignancy (rectal) History of GI  GI CA status post Chemo and Radiation,         Other:                     Physical Exam  Normal systems: dental    Airway   Mallampati: I  TM distance: >3 FB  Neck ROM: full    Dental     Cardiovascular   Rhythm and rate: regular      Pulmonary    breath sounds clear to auscultation                    Anesthesia Plan      History & Physical Review  History and physical reviewed and following examination; no interval change.    ASA Status:  3 .    NPO Status:  > 8 hours    Plan for General, ETT and RSI   PONV prophylaxis:  Ondansetron (or other 5HT-3) and Dexamethasone or Solumedrol  Additional equipment: Videolaryngoscope      Postoperative Care      Consents  Anesthetic plan, risks, benefits and alternatives discussed with:  Patient..                          .

## 2017-07-17 NOTE — OR NURSING
DR. White and Dr. Agustin by to see patient.  Doctors assessed prior rash on abdomen.  No change noted.   Due to patients tachycardia, increased RR, and temp doctors would like patient to go to [facility].  Will implement and monitor.

## 2017-07-17 NOTE — PROGRESS NOTES
AM ADMISSION  Chart, labs, imaging and vitals reviewed. Called to see the patient who's oxygen requirement increased  Patient was re-assessed.   Patient found to febrile, tachycardiac, tachypnea and increasing oxygen requirement to keep saturation >90% and distended abdomen.     -On IV antibiotics  -He did received 3L of IVF boluses  -Start CXR was ordered  -D/C D5W and change fluid to  ML/H  -Remote history of asthma  -Will give bronchodilator nebs and monitor response  -Encourage IS use and deep cough  -Supplement oxygen to keep saturation >90%  -Monitor closely   -Surgery to follow  - Will follow      Adis Luis MD  Internal medicine Hospitalist:   Pager [phone]    7/17/2017

## 2017-07-17 NOTE — PROGRESS NOTES
Telephone report given to [facility] RN.  Patient will be transported to [facility] via cart accompanied by NA and RN.  Belongings: none. Family : wife is going to [facility].

## 2017-07-18 LAB
ALBUMIN SERPL-MCNC: 2.4 G/DL (ref 3.4–5)
ALBUMIN UR-MCNC: 30 MG/DL
ALP SERPL-CCNC: 120 U/L (ref 40–150)
ALT SERPL W P-5'-P-CCNC: 43 U/L (ref 0–70)
ANION GAP SERPL CALCULATED.3IONS-SCNC: 8 MMOL/L (ref 3–14)
APPEARANCE UR: ABNORMAL
AST SERPL W P-5'-P-CCNC: 33 U/L (ref 0–45)
BACTERIA #/AREA URNS HPF: ABNORMAL /HPF
BACTERIA SPEC CULT: NO GROWTH
BILIRUB SERPL-MCNC: 0.8 MG/DL (ref 0.2–1.3)
BILIRUB UR QL STRIP: NEGATIVE
BUN SERPL-MCNC: 18 MG/DL (ref 7–30)
CALCIUM SERPL-MCNC: 7.9 MG/DL (ref 8.5–10.1)
CHLORIDE SERPL-SCNC: 110 MMOL/L (ref 94–109)
CO2 SERPL-SCNC: 22 MMOL/L (ref 20–32)
COLOR UR AUTO: YELLOW
COPATH REPORT: NORMAL
CREAT SERPL-MCNC: 0.84 MG/DL (ref 0.66–1.25)
ERYTHROCYTE [DISTWIDTH] IN BLOOD BY AUTOMATED COUNT: 13.2 % (ref 10–15)
GFR SERPL CREATININE-BSD FRML MDRD: ABNORMAL ML/MIN/1.7M2
GLUCOSE SERPL-MCNC: 122 MG/DL (ref 70–99)
GLUCOSE UR STRIP-MCNC: NEGATIVE MG/DL
HCT VFR BLD AUTO: 29 % (ref 40–53)
HGB BLD-MCNC: 9.6 G/DL (ref 13.3–17.7)
HGB UR QL STRIP: ABNORMAL
KETONES UR STRIP-MCNC: NEGATIVE MG/DL
LEUKOCYTE ESTERASE UR QL STRIP: NEGATIVE
Lab: NORMAL
MCH RBC QN AUTO: 31.2 PG (ref 26.5–33)
MCHC RBC AUTO-ENTMCNC: 33.1 G/DL (ref 31.5–36.5)
MCV RBC AUTO: 94 FL (ref 78–100)
MICRO REPORT STATUS: NORMAL
MUCOUS THREADS #/AREA URNS LPF: PRESENT /LPF
NITRATE UR QL: NEGATIVE
PH UR STRIP: 6.5 PH (ref 5–7)
PLATELET # BLD AUTO: 122 10E9/L (ref 150–450)
POTASSIUM SERPL-SCNC: 4.2 MMOL/L (ref 3.4–5.3)
PROT SERPL-MCNC: 6 G/DL (ref 6.8–8.8)
RBC # BLD AUTO: 3.08 10E12/L (ref 4.4–5.9)
RBC #/AREA URNS AUTO: 2 /HPF (ref 0–2)
SODIUM SERPL-SCNC: 140 MMOL/L (ref 133–144)
SP GR UR STRIP: 1.02 (ref 1–1.03)
SPECIMEN SOURCE: NORMAL
URN SPEC COLLECT METH UR: ABNORMAL
UROBILINOGEN UR STRIP-MCNC: NORMAL MG/DL (ref 0–2)
VANCOMYCIN SERPL-MCNC: 11.9 MG/L
WBC # BLD AUTO: 9.1 10E9/L (ref 4–11)
WBC #/AREA URNS AUTO: 3 /HPF (ref 0–2)

## 2017-07-18 PROCEDURE — 36415 COLL VENOUS BLD VENIPUNCTURE: CPT | Performed by: PHYSICIAN ASSISTANT

## 2017-07-18 PROCEDURE — 25000132 ZZH RX MED GY IP 250 OP 250 PS 637: Performed by: INTERNAL MEDICINE

## 2017-07-18 PROCEDURE — 85027 COMPLETE CBC AUTOMATED: CPT | Performed by: PHYSICIAN ASSISTANT

## 2017-07-18 PROCEDURE — 25000128 H RX IP 250 OP 636: Performed by: INTERNAL MEDICINE

## 2017-07-18 PROCEDURE — 40000886 ZZH STATISTIC STEP DOWN HRS DAY

## 2017-07-18 PROCEDURE — 21400002 ZZH R&B CCU CICU CRITICAL

## 2017-07-18 PROCEDURE — 36415 COLL VENOUS BLD VENIPUNCTURE: CPT | Performed by: INTERNAL MEDICINE

## 2017-07-18 PROCEDURE — 81001 URINALYSIS AUTO W/SCOPE: CPT | Performed by: SURGERY

## 2017-07-18 PROCEDURE — 25000132 ZZH RX MED GY IP 250 OP 250 PS 637: Performed by: SURGERY

## 2017-07-18 PROCEDURE — 25000125 ZZHC RX 250: Performed by: PHYSICIAN ASSISTANT

## 2017-07-18 PROCEDURE — 80053 COMPREHEN METABOLIC PANEL: CPT | Performed by: PHYSICIAN ASSISTANT

## 2017-07-18 PROCEDURE — 99232 SBSQ HOSP IP/OBS MODERATE 35: CPT | Performed by: INTERNAL MEDICINE

## 2017-07-18 PROCEDURE — 80202 ASSAY OF VANCOMYCIN: CPT | Performed by: INTERNAL MEDICINE

## 2017-07-18 RX ORDER — IBUPROFEN 400 MG/1
400 TABLET, FILM COATED ORAL EVERY 6 HOURS PRN
Status: DISCONTINUED | OUTPATIENT
Start: 2017-07-18 | End: 2017-07-20 | Stop reason: HOSPADM

## 2017-07-18 RX ORDER — SIMETHICONE 80 MG
160 TABLET,CHEWABLE ORAL 4 TIMES DAILY PRN
Status: DISCONTINUED | OUTPATIENT
Start: 2017-07-18 | End: 2017-07-20 | Stop reason: HOSPADM

## 2017-07-18 RX ADMIN — DOCUSATE SODIUM 200 MG: 100 CAPSULE, LIQUID FILLED ORAL at 08:25

## 2017-07-18 RX ADMIN — SODIUM CHLORIDE: 9 INJECTION, SOLUTION INTRAVENOUS at 05:20

## 2017-07-18 RX ADMIN — PANTOPRAZOLE SODIUM 40 MG: 40 INJECTION, POWDER, FOR SOLUTION INTRAVENOUS at 06:23

## 2017-07-18 RX ADMIN — CEFTRIAXONE 2 G: 2 INJECTION, POWDER, FOR SOLUTION INTRAMUSCULAR; INTRAVENOUS at 00:54

## 2017-07-18 RX ADMIN — VANCOMYCIN HYDROCHLORIDE 2000 MG: 5 INJECTION, POWDER, LYOPHILIZED, FOR SOLUTION INTRAVENOUS at 06:30

## 2017-07-18 RX ADMIN — DOCUSATE SODIUM 200 MG: 100 CAPSULE, LIQUID FILLED ORAL at 21:44

## 2017-07-18 RX ADMIN — VANCOMYCIN HYDROCHLORIDE 2250 MG: 5 INJECTION, POWDER, LYOPHILIZED, FOR SOLUTION INTRAVENOUS at 19:04

## 2017-07-18 RX ADMIN — SIMETHICONE CHEW TAB 80 MG 160 MG: 80 TABLET ORAL at 20:44

## 2017-07-18 RX ADMIN — IBUPROFEN 400 MG: 400 TABLET ORAL at 21:44

## 2017-07-18 NOTE — PLAN OF CARE
Problem: Goal Outcome Summary  Goal: Goal Outcome Summary  Outcome: No Change  Progressing per POC.  LS clear, +BS, +flatus.  Incision CDI.  Pt cont to have localized rash with no changes to left LQ.  Kory PO.   Denies any pain.  Voiding well but dk kelli with burning of urination.  UA sent.  [facility] status.  Tele=ST

## 2017-07-18 NOTE — PROGRESS NOTES
COLON & RECTAL SURGERY  PROGRESS NOTE    July 18, 2017  Post-op Day # 1 appendectomy    SUBJECTIVE:   The patient is doing well. Pain controlled. Looking forward to eating breakfast this morning. No questions for colorectal surgery.     OBJECTIVE:  Temp:  [98.1  F (36.7  C)-101  F (38.3  C)] 99.1  F (37.3  C)  Heart Rate:  [] 98  Resp:  [14-27] 27  BP: ()/(51-81) 107/69  SpO2:  [88 %-98 %] 92 %    Intake/Output Summary (Last 24 hours) at 07/18/17 0750  Last data filed at 07/18/17 0638   Gross per 24 hour   Intake             3928 ml   Output             1003 ml   Net             2925 ml       GENERAL:  Awake, alert, no acute distress,   HEAD - Nomocephalic atraumatic  SCLERA anicteric  EXTREMITIES warm and well perfused      LABS:  Lab Results   Component Value Date    WBC 9.1 07/18/2017     Lab Results   Component Value Date    HGB 9.6 07/18/2017     Lab Results   Component Value Date    HCT 29.0 07/18/2017     Lab Results   Component Value Date     07/18/2017     Last Basic Metabolic Panel:  Lab Results   Component Value Date     07/18/2017      Lab Results   Component Value Date    POTASSIUM 4.2 07/18/2017     Lab Results   Component Value Date    CHLORIDE 110 07/18/2017     Lab Results   Component Value Date    FRANDY 7.9 07/18/2017     Lab Results   Component Value Date    CO2 22 07/18/2017     Lab Results   Component Value Date    BUN 18 07/18/2017     Lab Results   Component Value Date    CR 0.84 07/18/2017     Lab Results   Component Value Date     07/18/2017       ASSESSMENT/PLAN:POD#1 appendectomy by general surgery. H/o of rectal cancer s/p chemorads followed by Dr. Radford at the [facility].   1. Post op cares per general surgery.   2. Have notified Dr. Radford of the patient's status. Patient should follow up at the [facility] for further questions and care about rectal cancer.   3. Will sign off.     Agueda Worthy PA-C  [facility]  Phone: [phone]

## 2017-07-18 NOTE — PROGRESS NOTES
[facility]    Hospitalist Progress note  Heber Luis MD    7/18/2017     ASSESSMENT AN PLAN: Louie Greco is a 57 year old male with a medical history complicated by low colorectal surgery status post radiation and chemotherapy completion with recent negative sigmoidoscopy and subsequent port removal who presents with approximately 7 days of feeling generally unwell and some subjective fevers and chills without rigors (prior to port removal), now with interim development of nausea to the point of almost vomiting, change in baseline soft stools to diarrhea, lower abdominal pain, and measured fever to 103 with CT findings concerning for possible appendicitis and possible hemorrhagic renal cyst, laboratory studies notable for abnormal urinalysis, and skin exam notable for what appears to be a cellulitis versus shingles lesion on anterior abdomen.     Sepsis, suspect intra-abdominal process: CT with thickening of the appendix, patient with right lower quadrant abdominal discomfort which is worsening over the past 12 hours, nausea, tachycardia, diarrhea, and fever. No leukocytosis, though absolute neutrophilia. Last chemotherapy >1 month ago.   -- Hx of recent sigmoidoscopy.   -Gen. surgery consulted, aware of patient from the emergency department and discussion with Dr. Pelayo, appreciate assistance  -Colorectal and general surgery consulted  -- On IV Vancomycin, Ceftriaxone.   --- s/p exp lap. Concerning finding greenish intra-abdominal fluid.     Abdominal rash: Cellulitis. Significant improvement.  -- Continue antibiotics  -- D/C contact isolation       Rectal cancer, currently in remission and on surveillance: Follows with Dr Radford (colorectal surgery) and Dr. Jesus Medina (Onc) (primarily Dr Manley). Moderately differentiated adenocarcinoma, ascending colon with sessile serrated adenoma, others were tubular adenomas. Underwent chemotherapy as well as radiation therapy with  "Xeloda. Additional 8 cycles FOLFOX chemotherapy completed as of early June 2017 and has now had port removed from right anterior chest as of July 10. Last sigmoidoscopy July 5, 2017 with no evidence of malignancy.      Renal cysts: Patient with multiple bilateral renal cysts with a left inferior cyst which appears to be hyperdense and suggestive of a hemorrhagic proteinaceous cyst. This was noted on prior CT imaging as well.   -Consider outpatient follow-up with MRI versus serial CTs, though will defer to patient's primary oncologist. Patient is scheduled for q.6 month MRI of pelvis, could consider MRI pelvis and kidney at that time versus ongoing monitoring with serial CT scans as had been previously recommended by oncology.      DVT Prophylaxis: Pneumatic Compression Devices, if no operative intervention, would convert to chemoprophylaxis.     Code Status: Full Code    Disposition: Depends on the clinical course.      Heber Luis MD  [facility] Hospitalist  Pager # [phone]  7/18/2017      SUBJECTIVE: Patient seen and examine. He look much better today. Less SOB and tachycardia and fever. Abdominal pain report no change.       OBJECTIVE:    /64  Pulse 97  Temp 99.4  F (37.4  C) (Oral)  Resp 16  Ht 1.778 m (5' 10\")  Wt 108 kg (238 lb 1.6 oz)  SpO2 92%  BMI 34.16 kg/m2     GENERAL:  NAD.   HEENT:  Head is normocephalic and atraumatic. PERRL. EOMI. No scleral icterus. No conjunctival erythema. No nasal discharge.  Moist mucous membranes. Pharynx unremarkable.  NECK:  Supple. Non-tender. No LAD or masses.  No carotid bruits.  LUNGS:  Clear to auscultation bilaterally. No wheezing, rhonchi or rales.   HEART:  RRR. S1 S2. No murmurs.  ABD:  Soft, ND, NT, + BS. No masses or HSM.   SKIN:  No ulcers, rashes or lesions.   EXT:  No calf tenderness. Pulses equal throughout.   MSK:  No deformities.  PSYCH: Appropriate mood and affect. Judgement and thought intact.       Labs:    Most Recent 3 " CBC's:  Recent Labs   Lab Test  07/18/17   0630  07/17/17   0705  07/16/17   2213   WBC  9.1  7.8  10.0   HGB  9.6*  11.7*  11.7*   MCV  94  93  93   PLT  122*  122*  181      Most Recent 3 BMP's:  Recent Labs   Lab Test  07/18/17   0630  07/17/17   0705  07/16/17   2213  06/09/17   1607   NA  140   --   133  143   POTASSIUM  4.2   --   4.0  3.7   CHLORIDE  110*   --   100  109   CO2  22   --   24  26   BUN  18   --   22  23   CR  0.84  0.78  0.88  0.79   ANIONGAP  8   --   9  8   FRANDY  7.9*   --   8.2*  8.9   GLC  122*   --   115*  99     Most Recent 3 Troponin's:No lab results found.    Invalid input(s): TROP, TROPONINIES  Most Recent 3 INR's:  Recent Labs   Lab Test  07/10/17   0943  01/16/17   0800  11/23/16   1217   INR  1.02  0.92  1.11     Most Recent 2 LFT's:  Recent Labs   Lab Test  07/18/17   0630  06/09/17   1607   AST  33  38   ALT  43  55   ALKPHOS  120  160*   BILITOTAL  0.8  0.6     Most Recent Cholesterol Panel:No lab results found.  Most Recent 6 Bacteria Isolates From Any Culture (See EPIC Reports for Culture Details):  Recent Labs   Lab Test  07/17/17   1249  07/16/17   2356  07/16/17   2310  07/16/17   2213   CULT  Culture negative monitoring continues  Culture negative monitoring continues  No growth after 1 day  No growth  No growth after 1 day     Most Recent TSH, T4 and HgbA1c: No lab results found.    Prescriptions Prior to Admission   Medication Sig Dispense Refill Last Dose     ASPIRIN PO Take by mouth as needed for moderate pain   prn med     lidocaine, Anorectal, 5 % CREA Externally apply 1 Application topically 3 times daily as needed 1 Tube 0 prn med     VITAMIN D, CHOLECALCIFEROL, PO Take 2,000 Units by mouth daily    Past Week at Unknown time     dibucaine (NUPERCAINAL) 1 % OINT ointment 3 times daily as needed for moderate pain   prn med     Docusate Sodium (COLACE PO) Take 2 capsules by mouth 2 times daily Unsure of dosage.   7/16/2017 at am     IBUPROFEN PO Take 200 mg by mouth  every 6 hours as needed    prn med     acetaminophen (TYLENOL) 325 MG tablet Take 2 tablets (650 mg) by mouth every 4 hours as needed for other (mild pain) 100 tablet 0 prn med     hydrocortisone 0.5 % ointment Apply topically 2 times daily as needed Reported on 2/14/2017   prn med     methylPREDNISolone (MEDROL) 32 MG tablet Take 12 hours prior to CT scan and 2 hours prior to CT scan 2 tablet 0 prn       Scheduled medications:    cefTRIAXone  2 g Intravenous Q24H     pantoprazole  40 mg Intravenous QAM AC     sodium chloride (PF)  3 mL Intracatheter Q8H     vancomycin (VANCOCIN) IV  2,000 mg Intravenous Q12H       I/O last 3 completed shifts:  In: 5334 [P.O.:960; I.V.:4374]  Out: 1003 [Urine:1000; Blood:3]  Data   Data reviewed today:  I personally reviewed no images or EKG's today.    Recent Labs  Lab 07/18/17  0630 07/17/17  0705 07/16/17  2213   WBC 9.1 7.8 10.0   HGB 9.6* 11.7* 11.7*   MCV 94 93 93   * 122* 181     --  133   POTASSIUM 4.2  --  4.0   CHLORIDE 110*  --  100   CO2 22  --  24   BUN 18  --  22   CR 0.84 0.78 0.88   ANIONGAP 8  --  9   FRANDY 7.9*  --  8.2*   *  --  115*   ALBUMIN 2.4*  --   --    PROTTOTAL 6.0*  --   --    BILITOTAL 0.8  --   --    ALKPHOS 120  --   --    ALT 43  --   --    AST 33  --   --        No results found for this or any previous visit (from the past 24 hour(s)).

## 2017-07-18 NOTE — PLAN OF CARE
Problem: Goal Outcome Summary  Goal: Goal Outcome Summary  Outcome: Improving  A&O. T max 99.2, HR 80s-110s, RR 20s, O2 sats stable on 1-2L. C/o 2-3/10 pain, declines interventions. Tele SR. Up SBA. Ambulated in halls x 2. Tolerating regular diet. +BS. +flatus. 4 BMs this shift, mostly mucous. Bladder scanned for 389 mL, then voided 100 mL, and 200 mL. Rash to LLQ with 1 or 2 blisters that appear to be starting to form. IV abx.

## 2017-07-18 NOTE — PLAN OF CARE
Problem: Goal Outcome Summary  Goal: Goal Outcome Summary  Outcome: No Change  Soft BP. Afebrile. Tele:SR. Unable to wean off 2LPM. LS diminished with fine crackles LLL. Reports passing flatus. Tolerating regular diet. Denies nausea. Adequate urine output via BR. CMS intact. Rash remains within marked borders. Incisions c/d/i. Declined pain intervention. Little sleep due to frequent monitoring. Remains on [facility].

## 2017-07-18 NOTE — PROGRESS NOTES
"Surgery    Abdominal pain feels about the same.   Denies incisional pain.   + bowel function. Stools more solid.   Mild dyspnea, but feels better with walking.  Abd rash without pruritis or pain.   Walking.   Tolerating diet.   No nausea.     Gen:  Awake, Alert, NAD  Blood pressure 100/67, pulse 97, temperature 99.1  F (37.3  C), temperature source Oral, resp. rate 16, height 1.778 m (5' 10\"), weight 108 kg (238 lb 1.6 oz), SpO2 89-92 %.  Resp - non labored.  Abdomen - soft, non tender. Incisions healing appropriately without erythema or purulent drainage. Rash unchanged from yesterday.   Extremities - no lower extremity edema.    Hgb 9.6 stable  Wbc 9.1  C. Diff neg  Path/abdominal fluid cx - pending.    S/P laparoscopic appendectomy POD1    - doing well from surgical standpoint.   - Primary indication of intra-abdominal process was the turbid/purulent appearing fluid in the RLQ. Appendiceal inflammation was less than anticipated however this may be due to relatively recent treatment for rectal CA. Significance of the abdominal rash in unclear - he remains on contact precautions.   - pain controlled.  - ADAT  - Ambulate.   - Encourage IS use.   - Will follow along.     Héctor Iverson PA-C  Office: [phone]  Pager: [phone]    "

## 2017-07-18 NOTE — PHARMACY-VANCOMYCIN DOSING SERVICE
Pharmacy Vancomycin Note  Date of Service 2017  Patient's  1960   57 year old, male    Indication: Sepsis  Goal Trough Level: 15-20 mg/L  Day of Therapy: 2  Current Vancomycin regimen:  2000 mg IV q12h    Current estimated CrCl = Estimated Creatinine Clearance: 119.4 mL/min (based on Cr of 0.84).    Creatinine for last 3 days  2017: 10:13 PM Creatinine 0.88 mg/dL  2017:  7:05 AM Creatinine 0.78 mg/dL  2017:  6:30 AM Creatinine 0.84 mg/dL    Recent Vancomycin Levels (past 3 days)  2017:  5:17 PM Vancomycin Level 11.9 mg/L    Vancomycin IV Administrations (past 72 hours)                   vancomycin (VANCOCIN) 2,000 mg in NaCl 0.9 % 500 mL intermittent infusion (mg) 2,000 mg New Bag 17 0630     2,000 mg New Bag 17 1816    vancomycin (VANCOCIN) 2,000 mg in NaCl 0.9 % 500 mL intermittent infusion (mg) 2,000 mg New Bag 17 0441                Nephrotoxins and other renal medications (Future)    Start     Dose/Rate Route Frequency Ordered Stop    17 1845  vancomycin (VANCOCIN) 2,250 mg in NaCl 0.9 % 500 mL intermittent infusion      2,250 mg Intravenous EVERY 12 HOURS 17 1832      17 1621  ibuprofen (ADVIL/MOTRIN) tablet 400 mg      400 mg Oral EVERY 6 HOURS PRN 17 1622               Contrast Orders - past 72 hours (72h ago through future)    Start     Dose/Rate Route Frequency Ordered Stop    17 2328  iopamidol (ISOVUE-370) solution 107 mL      107 mL Intravenous ONCE 17 2327 17 2333          Interpretation of levels and current regimen:  Trough level is  Subtherapeutic    Has serum creatinine changed > 50% in last 72 hours: No    Urine output:  good urine output per RN    Renal Function: Fairly Stable    Plan:  1.  Increase Dose to 2250 mg q12h  2.  Pharmacy will check trough levels as appropriate in 1-3 Days.    3. Serum creatinine levels will be ordered daily for the first week of therapy and at least twice weekly for  subsequent weeks.      Bianca Roberts        .

## 2017-07-19 ENCOUNTER — TELEPHONE (OUTPATIENT)
Dept: SURGERY | Facility: CLINIC | Age: 57
End: 2017-07-19

## 2017-07-19 LAB
CREAT SERPL-MCNC: 0.77 MG/DL (ref 0.66–1.25)
GFR SERPL CREATININE-BSD FRML MDRD: NORMAL ML/MIN/1.7M2

## 2017-07-19 PROCEDURE — 82565 ASSAY OF CREATININE: CPT | Performed by: INTERNAL MEDICINE

## 2017-07-19 PROCEDURE — 36415 COLL VENOUS BLD VENIPUNCTURE: CPT | Performed by: INTERNAL MEDICINE

## 2017-07-19 PROCEDURE — 25000132 ZZH RX MED GY IP 250 OP 250 PS 637: Performed by: INTERNAL MEDICINE

## 2017-07-19 PROCEDURE — 25000132 ZZH RX MED GY IP 250 OP 250 PS 637: Performed by: SURGERY

## 2017-07-19 PROCEDURE — 25000128 H RX IP 250 OP 636: Performed by: INTERNAL MEDICINE

## 2017-07-19 PROCEDURE — 25000125 ZZHC RX 250: Performed by: PHYSICIAN ASSISTANT

## 2017-07-19 PROCEDURE — 99232 SBSQ HOSP IP/OBS MODERATE 35: CPT | Performed by: INTERNAL MEDICINE

## 2017-07-19 PROCEDURE — 12000007 ZZH R&B INTERMEDIATE

## 2017-07-19 RX ADMIN — ACETAMINOPHEN 650 MG: 325 TABLET, FILM COATED ORAL at 21:12

## 2017-07-19 RX ADMIN — SIMETHICONE CHEW TAB 80 MG 160 MG: 80 TABLET ORAL at 07:05

## 2017-07-19 RX ADMIN — PANTOPRAZOLE SODIUM 40 MG: 40 INJECTION, POWDER, FOR SOLUTION INTRAVENOUS at 07:05

## 2017-07-19 RX ADMIN — ACETAMINOPHEN 975 MG: 325 TABLET, FILM COATED ORAL at 13:46

## 2017-07-19 RX ADMIN — SIMETHICONE CHEW TAB 80 MG 160 MG: 80 TABLET ORAL at 01:15

## 2017-07-19 RX ADMIN — IBUPROFEN 400 MG: 400 TABLET ORAL at 18:27

## 2017-07-19 RX ADMIN — IBUPROFEN 400 MG: 400 TABLET ORAL at 10:34

## 2017-07-19 RX ADMIN — ACETAMINOPHEN 975 MG: 325 TABLET, FILM COATED ORAL at 00:41

## 2017-07-19 RX ADMIN — CEFTRIAXONE 2 G: 2 INJECTION, POWDER, FOR SOLUTION INTRAMUSCULAR; INTRAVENOUS at 00:28

## 2017-07-19 RX ADMIN — VANCOMYCIN HYDROCHLORIDE 2250 MG: 5 INJECTION, POWDER, LYOPHILIZED, FOR SOLUTION INTRAVENOUS at 18:29

## 2017-07-19 RX ADMIN — SIMETHICONE CHEW TAB 80 MG 160 MG: 80 TABLET ORAL at 18:44

## 2017-07-19 RX ADMIN — VANCOMYCIN HYDROCHLORIDE 2250 MG: 5 INJECTION, POWDER, LYOPHILIZED, FOR SOLUTION INTRAVENOUS at 07:05

## 2017-07-19 RX ADMIN — ACETAMINOPHEN 975 MG: 325 TABLET, FILM COATED ORAL at 07:05

## 2017-07-19 RX ADMIN — DOCUSATE SODIUM 200 MG: 100 CAPSULE, LIQUID FILLED ORAL at 18:44

## 2017-07-19 RX ADMIN — IBUPROFEN 400 MG: 400 TABLET ORAL at 04:33

## 2017-07-19 NOTE — PLAN OF CARE
Problem: Individualization  Goal: Patient Preferences  Outcome: No Change  Vss, a-febrile, taking tylenol and Ibuprofen for pain, abdomen distended, passing gas, postive bowel sounds, lap sites CDI, no drainage, up in the hallway independently, rash on abdomen improving, transverse incision CDI, stay one more day for anti-biotics, DC to home tomorrow.

## 2017-07-19 NOTE — PLAN OF CARE
Problem: Goal Outcome Summary  Goal: Goal Outcome Summary  Outcome: No Change  VSS, pain managed with tylenol and ibuprofen, gas discomfort managed with mylicon. BS+, unable to pass flatus. Lap sites D/I, incision D/I with steri strips. No change to rash RL abdomen. Voidng. Kory clears.

## 2017-07-19 NOTE — PROGRESS NOTES
Surgery    Patient seen and examined with PA-Student  LEANNA with few bacteria - on abx.  Appears well.  Home soon.  Agree with student note below.    Héctor Iverson PA-C        Surgery- PA Student     Louie Greco is a 58 yo M POD # 2 from a laparoscopic appendectomy. He is well appearing and in no distress. His appetite is improving and he is able to eat small portions throughout the day.  He denies nausea. He report flatulence, bowel movements and normal voiding. He reports surgical pain that has been managed with medication.    T 98.1 F, oral   RR 16  HR 87  /78    Gen: A&O x 3  Cardio: regular rate and rhythm, no rubs murmurs or gallops  Pulm: Clear to auscultation, no rhonchi, rales or wheezing   Abdomen: bowel sounds normal, distended. Rash on LLQ remains erythematous and the same size.   Surgical wound: covered with clean dressing    Plan:   Pain management with tylenol, dilaudid and oxycodone  Continue Rocephin, Vanco  Encourage ambulation and IS use   Continue to monitor    JHON AlvaradoS

## 2017-07-19 NOTE — DISCHARGE INSTRUCTIONS
Discharge Instructions following   Appendectomy/Cholecystectomy  [facility]    Diet:    Regular diet as tolerated.  Drink plenty of fluids, especially water.  Activities:    No heavy lifting (greater than 10-15 lbs) or strenuous activity until approved by surgeon.  Bathing/Incision Care    Ok to shower.  Do not submerge incision (no tub baths or swimming) until it s fully healed.  Pat incisions dry.  If present, tape dressing (Steri-Strips) will fall off on their own in 7-10 days.  No lotion, powders, or perfumes near or on the incision.  What to expect:    For laparoscopic surgery, you may have shoulder discomfort due to the gas used in surgery.  This should resolve in 24-48 hours.  Short, frequent walks may help with this.  Call your surgeon if you have these signs or symptoms:    Fever greater than 101 oF or chills.    Severe nausea, vomiting, or constipation.    Signs of infection at incision site: redness, swelling, warmth, foul-smelling drainage.    Increased pain that does not improve with pain medicine.    With any questions or concerns.  Follow up with doctor as ordered.

## 2017-07-19 NOTE — TELEPHONE ENCOUNTER
I returned a call to Louie. He left a message asking me to call him to discuss his next flex sig. I called him and he let me know he is in the hospital. We spoke briefly about why he was there and I gave him 11/24/2017 as a possible date in November for him. He will call me once he is home to confirm the date.

## 2017-07-19 NOTE — PLAN OF CARE
Problem: Goal Outcome Summary  Goal: Goal Outcome Summary  A&O, up independently. Regular diet, tolerating well.O2 sats 88-95, wearing 2L for sleep. Rash on LLQ unchanged. VSS, HR tachy, CMS intact. IMC status d/c. D/c pending IV antibiotics. Nursing continue to monitor.

## 2017-07-19 NOTE — PROGRESS NOTES
[facility]    Hospitalist Progress note  Heber Luis MD    7/19/2017     ASSESSMENT AN PLAN: Louie Greco is a 57 year old male with a medical history complicated by low colorectal surgery status post radiation and chemotherapy completion with recent negative sigmoidoscopy and subsequent port removal who presents with approximately 7 days of feeling generally unwell and some subjective fevers and chills without rigors (prior to port removal), now with interim development of nausea to the point of almost vomiting, change in baseline soft stools to diarrhea, lower abdominal pain, and measured fever to 103 with CT findings concerning for possible appendicitis and possible hemorrhagic renal cyst, laboratory studies notable for abnormal urinalysis, and skin exam notable for what appears to be a cellulitis versus shingles lesion on anterior abdomen.     Sepsis, suspect intra-abdominal process: CT with thickening of the appendix, patient with right lower quadrant abdominal discomfort, nausea, tachycardia, diarrhea, and fever. No leukocytosis, though absolute neutrophilia. Last chemotherapy >1 month ago.   -- Hx of recent sigmoidoscopy.   -Gen. surgery consulted, aware of patient from the emergency department and discussion with Dr. Pelayo, appreciate assistance  -Colorectal and general surgery consulted  --On IV Vancomycin, Ceftriaxone and will continue for one more day  --S/P exp lap. Concerning finding greenish intra-abdominal fluid. Awaiting culture result    Abdominal wall Cellulitis. Significant improvement after starting abx.   -- Continue antibiotics  -- D/C contact isolation       Rectal cancer, currently in remission and on surveillance: Follows with Dr Radford (colorectal surgery) and Dr. Jesus Medina (Onc) (primarily Dr Manley). Moderately differentiated adenocarcinoma, ascending colon with sessile serrated adenoma, others were tubular adenomas. Underwent chemotherapy as well  "as radiation therapy with Xeloda. Additional 8 cycles FOLFOX chemotherapy completed as of early June 2017 and has now had port removed from right anterior chest as of July 10. Last sigmoidoscopy July 5, 2017 with no evidence of malignancy.      Renal cysts: Patient with multiple bilateral renal cysts with a left inferior cyst which appears to be hyperdense and suggestive of a hemorrhagic proteinaceous cyst. This was noted on prior CT imaging as well.   -Consider outpatient follow-up with MRI versus serial CTs, though will defer to patient's primary oncologist. Patient is scheduled for q.6 month MRI of pelvis, could consider MRI pelvis and kidney at that time versus ongoing monitoring with serial CT scans as had been previously recommended by oncology.      DVT Prophylaxis: Pneumatic Compression Devices, if no operative intervention, would convert to chemoprophylaxis.     Code Status: Full Code    Disposition: Depends on the clinical course.      Heber Luis MD  [facility]ist  Pager # [phone]  7/19/2017      SUBJECTIVE: Patient seen and examine. He look much better today. Less abdominal pan, diarrhea and bloating. Tolerating po intake.       OBJECTIVE:    /75  Pulse 97  Temp 97.9  F (36.6  C) (Oral)  Resp 16  Ht 1.778 m (5' 10\")  Wt 101.2 kg (223 lb 1.7 oz)  SpO2 94%  BMI 32.01 kg/m2     GENERAL:  NAD.   HEENT:  Head is normocephalic and atraumatic. PERRL. EOMI. No scleral icterus. No conjunctival erythema. No nasal discharge.  Moist mucous membranes. Pharynx unremarkable.  NECK:  Supple. Non-tender. No LAD or masses.  No carotid bruits.  LUNGS:  Clear to auscultation bilaterally. No wheezing, rhonchi or rales.   HEART:  RRR. S1 S2. No murmurs.  ABD:  Soft, ND, NT, + BS. No masses or HSM.   SKIN:  No ulcers, rashes or lesions.   EXT:  No calf tenderness. Pulses equal throughout.   MSK:  No deformities.  PSYCH: Appropriate mood and affect. Judgement and thought intact. "       Labs:    Most Recent 3 CBC's:  Recent Labs   Lab Test  07/18/17   0630  07/17/17   0705  07/16/17   2213   WBC  9.1  7.8  10.0   HGB  9.6*  11.7*  11.7*   MCV  94  93  93   PLT  122*  122*  181      Most Recent 3 BMP's:  Recent Labs   Lab Test  07/19/17   0725  07/18/17   0630  07/17/17   0705  07/16/17   2213  06/09/17   1607   NA   --   140   --   133  143   POTASSIUM   --   4.2   --   4.0  3.7   CHLORIDE   --   110*   --   100  109   CO2   --   22   --   24  26   BUN   --   18   --   22  23   CR  0.77  0.84  0.78  0.88  0.79   ANIONGAP   --   8   --   9  8   FRANDY   --   7.9*   --   8.2*  8.9   GLC   --   122*   --   115*  99     Most Recent 3 Troponin's:No lab results found.    Invalid input(s): TROP, TROPONINIES  Most Recent 3 INR's:  Recent Labs   Lab Test  07/10/17   0943  01/16/17   0800  11/23/16   1217   INR  1.02  0.92  1.11     Most Recent 2 LFT's:  Recent Labs   Lab Test  07/18/17   0630  06/09/17   1607   AST  33  38   ALT  43  55   ALKPHOS  120  160*   BILITOTAL  0.8  0.6     Most Recent Cholesterol Panel:No lab results found.  Most Recent 6 Bacteria Isolates From Any Culture (See EPIC Reports for Culture Details):  Recent Labs   Lab Test  07/17/17   1249  07/16/17   2356  07/16/17   2310  07/16/17   2213   CULT  Culture negative monitoring continues  Culture negative monitoring continues  No growth after 2 days  No growth  No growth after 2 days     Most Recent TSH, T4 and HgbA1c: No lab results found.    Prescriptions Prior to Admission   Medication Sig Dispense Refill Last Dose     ASPIRIN PO Take by mouth as needed for moderate pain   prn med     lidocaine, Anorectal, 5 % CREA Externally apply 1 Application topically 3 times daily as needed 1 Tube 0 prn med     VITAMIN D, CHOLECALCIFEROL, PO Take 2,000 Units by mouth daily    Past Week at Unknown time     dibucaine (NUPERCAINAL) 1 % OINT ointment 3 times daily as needed for moderate pain   prn med     Docusate Sodium (COLACE PO) Take 2  capsules by mouth 2 times daily Unsure of dosage.   7/16/2017 at am     IBUPROFEN PO Take 200 mg by mouth every 6 hours as needed    prn med     acetaminophen (TYLENOL) 325 MG tablet Take 2 tablets (650 mg) by mouth every 4 hours as needed for other (mild pain) 100 tablet 0 prn med     hydrocortisone 0.5 % ointment Apply topically 2 times daily as needed Reported on 2/14/2017   prn med     methylPREDNISolone (MEDROL) 32 MG tablet Take 12 hours prior to CT scan and 2 hours prior to CT scan 2 tablet 0 prn       Scheduled medications:    vancomycin (VANCOCIN) IV  2,250 mg Intravenous Q12H     cefTRIAXone  2 g Intravenous Q24H     pantoprazole  40 mg Intravenous QAM AC       I/O last 3 completed shifts:  In: 660 [P.O.:660]  Out: 1750 [Urine:1750]  Data   Data reviewed today:  I personally reviewed no images or EKG's today.    Recent Labs  Lab 07/19/17  0725 07/18/17  0630 07/17/17  0705 07/16/17  2213   WBC  --  9.1 7.8 10.0   HGB  --  9.6* 11.7* 11.7*   MCV  --  94 93 93   PLT  --  122* 122* 181   NA  --  140  --  133   POTASSIUM  --  4.2  --  4.0   CHLORIDE  --  110*  --  100   CO2  --  22  --  24   BUN  --  18  --  22   CR 0.77 0.84 0.78 0.88   ANIONGAP  --  8  --  9   FRANDY  --  7.9*  --  8.2*   GLC  --  122*  --  115*   ALBUMIN  --  2.4*  --   --    PROTTOTAL  --  6.0*  --   --    BILITOTAL  --  0.8  --   --    ALKPHOS  --  120  --   --    ALT  --  43  --   --    AST  --  33  --   --        No results found for this or any previous visit (from the past 24 hour(s)).

## 2017-07-20 VITALS
HEIGHT: 70 IN | OXYGEN SATURATION: 96 % | DIASTOLIC BLOOD PRESSURE: 73 MMHG | RESPIRATION RATE: 16 BRPM | SYSTOLIC BLOOD PRESSURE: 120 MMHG | WEIGHT: 222.22 LBS | HEART RATE: 81 BPM | TEMPERATURE: 98.1 F | BODY MASS INDEX: 31.81 KG/M2

## 2017-07-20 LAB
CREAT SERPL-MCNC: 0.68 MG/DL (ref 0.66–1.25)
GFR SERPL CREATININE-BSD FRML MDRD: NORMAL ML/MIN/1.7M2
VANCOMYCIN SERPL-MCNC: 25.9 MG/L

## 2017-07-20 PROCEDURE — 25000128 H RX IP 250 OP 636: Performed by: INTERNAL MEDICINE

## 2017-07-20 PROCEDURE — 80202 ASSAY OF VANCOMYCIN: CPT | Performed by: INTERNAL MEDICINE

## 2017-07-20 PROCEDURE — 99239 HOSP IP/OBS DSCHRG MGMT >30: CPT | Performed by: INTERNAL MEDICINE

## 2017-07-20 PROCEDURE — 25000132 ZZH RX MED GY IP 250 OP 250 PS 637: Performed by: INTERNAL MEDICINE

## 2017-07-20 PROCEDURE — S0164 INJECTION PANTROPRAZOLE: HCPCS | Performed by: PHYSICIAN ASSISTANT

## 2017-07-20 PROCEDURE — 36415 COLL VENOUS BLD VENIPUNCTURE: CPT | Performed by: INTERNAL MEDICINE

## 2017-07-20 PROCEDURE — 25000125 ZZHC RX 250: Performed by: PHYSICIAN ASSISTANT

## 2017-07-20 PROCEDURE — 82565 ASSAY OF CREATININE: CPT | Performed by: INTERNAL MEDICINE

## 2017-07-20 PROCEDURE — 25000132 ZZH RX MED GY IP 250 OP 250 PS 637: Performed by: SURGERY

## 2017-07-20 RX ORDER — OXYCODONE HYDROCHLORIDE 5 MG/1
5-10 TABLET ORAL
Qty: 15 TABLET | Refills: 0 | Status: SHIPPED | OUTPATIENT
Start: 2017-07-20 | End: 2017-08-24

## 2017-07-20 RX ORDER — PANTOPRAZOLE SODIUM 40 MG/1
40 TABLET, DELAYED RELEASE ORAL EVERY MORNING
Status: DISCONTINUED | OUTPATIENT
Start: 2017-07-21 | End: 2017-07-20 | Stop reason: HOSPADM

## 2017-07-20 RX ORDER — SACCHAROMYCES BOULARDII 250 MG
250 CAPSULE ORAL 2 TIMES DAILY
Qty: 14 CAPSULE | Refills: 0 | Status: SHIPPED | OUTPATIENT
Start: 2017-07-20 | End: 2017-08-24

## 2017-07-20 RX ORDER — LEVOFLOXACIN 500 MG/1
500 TABLET, FILM COATED ORAL DAILY
Status: DISCONTINUED | OUTPATIENT
Start: 2017-07-20 | End: 2017-07-20 | Stop reason: HOSPADM

## 2017-07-20 RX ORDER — LEVOFLOXACIN 500 MG/1
500 TABLET, FILM COATED ORAL DAILY
Qty: 7 TABLET | Refills: 0 | Status: SHIPPED | OUTPATIENT
Start: 2017-07-20 | End: 2017-07-27

## 2017-07-20 RX ORDER — OMEGA-3 FATTY ACIDS/FISH OIL 300-1000MG
200-400 CAPSULE ORAL EVERY 6 HOURS PRN
Qty: 120 CAPSULE | Refills: 0 | COMMUNITY
Start: 2017-07-20 | End: 2020-08-31

## 2017-07-20 RX ADMIN — IBUPROFEN 400 MG: 400 TABLET ORAL at 00:23

## 2017-07-20 RX ADMIN — ACETAMINOPHEN 975 MG: 325 TABLET, FILM COATED ORAL at 07:00

## 2017-07-20 RX ADMIN — SIMETHICONE CHEW TAB 80 MG 160 MG: 80 TABLET ORAL at 07:00

## 2017-07-20 RX ADMIN — VANCOMYCIN HYDROCHLORIDE 2250 MG: 5 INJECTION, POWDER, LYOPHILIZED, FOR SOLUTION INTRAVENOUS at 06:51

## 2017-07-20 RX ADMIN — LEVOFLOXACIN 500 MG: 500 TABLET, FILM COATED ORAL at 09:51

## 2017-07-20 RX ADMIN — PANTOPRAZOLE SODIUM 40 MG: 40 INJECTION, POWDER, FOR SOLUTION INTRAVENOUS at 06:51

## 2017-07-20 RX ADMIN — CEFTRIAXONE 2 G: 2 INJECTION, POWDER, FOR SOLUTION INTRAMUSCULAR; INTRAVENOUS at 00:23

## 2017-07-20 RX ADMIN — DOCUSATE SODIUM 200 MG: 100 CAPSULE, LIQUID FILLED ORAL at 07:00

## 2017-07-20 ASSESSMENT — ENCOUNTER SYMPTOMS
HALLUCINATIONS: 0
FLANK PAIN: 0
DYSURIA: 0
INCREASED ENERGY: 1
POSTURAL DYSPNEA: 1
NAUSEA: 0
FEVER: 0
HEADACHES: 0
HEARTBURN: 0
NUMBNESS: 1
WEAKNESS: 1
JAUNDICE: 0
COUGH DISTURBING SLEEP: 0
CONSTIPATION: 0
DIFFICULTY URINATING: 0
TINGLING: 1
EYE IRRITATION: 0
VOMITING: 0
RESPIRATORY PAIN: 0
ABDOMINAL PAIN: 1
EYE WATERING: 0
WHEEZING: 0
DIZZINESS: 0
DIARRHEA: 0
BLOOD IN STOOL: 0
DYSPNEA ON EXERTION: 1
NAIL CHANGES: 1
FATIGUE: 1
HEMOPTYSIS: 0
HEMATURIA: 0
SKIN CHANGES: 0
SPEECH CHANGE: 1
DOUBLE VISION: 0
PARALYSIS: 0
RECTAL BLEEDING: 0
POOR WOUND HEALING: 0
WEIGHT LOSS: 0
DISTURBANCES IN COORDINATION: 0
EYE PAIN: 0
ALTERED TEMPERATURE REGULATION: 0
RECTAL PAIN: 1
EYE REDNESS: 0
WEIGHT GAIN: 1
POLYPHAGIA: 0
SNORES LOUDLY: 1
BOWEL INCONTINENCE: 1
POLYDIPSIA: 0
CHILLS: 0
SPUTUM PRODUCTION: 0
SEIZURES: 0
BLOATING: 1
DECREASED APPETITE: 1
LOSS OF CONSCIOUSNESS: 0
NIGHT SWEATS: 0
COUGH: 0
MEMORY LOSS: 0
SHORTNESS OF BREATH: 1

## 2017-07-20 NOTE — PROVIDER NOTIFICATION
Notified Pharmacist that critical vanco level was called to floor. Pharmacist instructed to stop the vancomycin that was infusing.

## 2017-07-20 NOTE — PROGRESS NOTES
Feels better nathaniel day, good bowel function  Pain well controlled  Fluid cultures negative so far - gram stain no organism  abd soft, incisions CDI, rash improved  S/p lap appy - appendiceal adenoma, cloudy peritoneal fluid  Could transition to oral antibiotics to complete a week  Home later today if OK all teams  F/U with me in 1-2 weeks

## 2017-07-20 NOTE — PROGRESS NOTES
Medical oncology new patient visit:  July 21, 2017    Diagnosis: [id number] (stage IIIA) adenocarcinoma of the rectum.  Negative for microsatellite instability.    Oncology History:  Patient developed rectal bleeding in early 2016, and underwent a colonoscopy on July 27, 2016. This revealed a malignant tumor in the rectum involving half of the lumen, there were also three  4mm polyps in the ascending and transverse colon.  Pathology showed this to be moderately different adenocarcinoma in the rectum; in the ascending colon, there was sessile suited adenoma, the others were tubular adenomas. Mismatch repair proteins were intact.  Staging MRI and CT scans were performed. Patient was stage clinically as T2 N1b M0 (Stage IIIA).   He sought care with Dr. Lee at [facility], and initiated neoadjuvant intent chemoradiation with 5-FU from August 8, 2016 through September 15, 2016.  Evaluation by colorectal surgeon Dr. Dugals Tuttle here at [facility], post chemoradiation MRI revealed good response in the primary tumor as well as in the surrounding lymph nodes.  December 8, 2016 flexible sigmoidoscopy revealed no pathologic evidence of residual tumor, just anterior scarring in lumen. Considering absence of visible tumor, the watch and wait protocol was discussed, including the fact that it would not be possible to achieve a grossly clear margin with LAR. Thus, the patient opted not to proceed with surgery.  He transferred his care to Dr. Agueda Manley, my colleague at [facility], and he underwent eight cycles of FOLFOX chemotherapy treatment January 16 through May 9, 2017.  March 30, 2017 cancer genetics evaluation completed.  June 5, 2017 MRI pelvis again confirmed no evidence of residual or recurrent tumor, corresponding tumor regression grade 1  June 7, 2017 CT scan showed no evidence of malignant recurrent or metastatic disease in the chest, abdomen or pelvis, there are stable subcm pulmonary  nodules throughout the lungs, of unclear significance. Liver cysts and kinesis were stable as well.  Patient followed up with Dr. Manley June 9, 2017, and the watch and wait protocol was implemented. 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      Interim History/History Of Present Illness:  58 yo gentleman with [id number] (stage IIIA) adenocarcinoma of the rectum.    He was recently hospitalized for appendicitis. He had sx of fever and abdominal pain.  He recently recently underwent an appendectomy as well as drain of peritoneal fluid collection on July 17.   Pathology notable for polyp and no evidence of infection.   Cultures of the peritoneal fluid are still in process.  Currently without growth to date.  He is finishing a course of antibiotics and currently on levaquin with planned stop date of July 27.   During that hospitalization he also had an abdominal wall cellulitis and that has significantly improved on the antibiotics.  Reportedly unclear exactly what was the cause of this.  He is currently feeling much better.  Still has some pain over her abdominal incision sites.  He is having bowel movements are normal.  He denies any more fever, chills, sweats.  He notes no chest pain, but notes a little bit of shortness of breath and that is worse with exertion and has been going on just a little bit since the surgery.  Of note he is at what up quite a bit in weight and this is due to being given quite a bit of fluids associated with the infection.   He denies any nausea, vomiting.  He does have some residual sensory neuropathy in his hands and feet from chemotherapy and intermittently has some difficulty with buttoning his shirts and his wife has to help him out with that.    He is currently being followed on the watch and wait protocol for rectal cancer.  His primary oncologist is Dr. Manley.   He was sent here because the surgeon had requested an appointment earlier with   Ana who is more familiar with this protocol.    Review Of Systems:  Full 12-point ROS reviewed. Pertinent symptoms reviewed above per HPI.    Past medical history:  Past Medical History:   Diagnosis Date     Childhood asthma      Nephrolithiasis          Rectal cancer (H)     low rectal cancer     Past surgical history  Past Surgical History:   Procedure Laterality Date     COLONOSCOPY N/A 2016    Procedure: COMBINED COLONOSCOPY, SINGLE OR MULTIPLE BIOPSY/POLYPECTOMY BY BIOPSY;  Surgeon: Chelsea Thompson MD;  Location:  GI     EXAM UNDER ANESTHESIA ANUS N/A 2017    Procedure: EXAM UNDER ANESTHESIA ANUS;  Examination Under Anesthesia, flex sigmoidoscopy with biopsies and formalin application;  Surgeon: Felicitas Radford MD;  Location: [facility] OR     HC TOOTH EXTRACTION W/FORCEP       LAPAROSCOPIC APPENDECTOMY N/A 2017    Procedure: LAPAROSCOPIC APPENDECTOMY;  LAPAROSCOPIC APPENDECTOMY;  Surgeon: Bryson Ferguson MD;  Location: [facility] OR     SIGMOIDOSCOPY FLEXIBLE N/A 2016    Procedure: SIGMOIDOSCOPY FLEXIBLE;  Surgeon: Felicitas Radford MD;  Location: [facility] OR     SIGMOIDOSCOPY FLEXIBLE N/A 2017    Procedure: SIGMOIDOSCOPY FLEXIBLE;;  Surgeon: Felicitas Radford MD;  Location: [facility] OR     VASCULAR SURGERY      Right chest port     VASECTOMY       Social history  He is a .  He is .  He denies any significant history of smoking or alcohol use. No h/o drug use.    Family history   His brother passed away from metastatic cancer, unknown type.  He  at age 53.  No other known family history of cancers in the immediatefamily    Allergies:  Allergies as of 2017 - Jan as Reviewed 2017   Allergen Reaction Noted     Ampicillin Diarrhea 2016     Demerol [meperidine]  2016       Current Medications:  Current Outpatient Prescriptions   Medication Sig Dispense Refill     ibuprofen 200 MG capsule Take 200-400 mg by mouth every 6 hours as  "needed 120 capsule 0     levofloxacin (LEVAQUIN) 500 MG tablet Take 1 tablet (500 mg) by mouth daily for 7 days 7 tablet 0     saccharomyces boulardii (FLORASTOR) 250 MG capsule Take 1 capsule (250 mg) by mouth 2 times daily 14 capsule 0     ASPIRIN PO Take by mouth as needed for moderate pain       VITAMIN D, CHOLECALCIFEROL, PO Take 2,000 Units by mouth daily        methylPREDNISolone (MEDROL) 32 MG tablet Take 12 hours prior to CT scan and 2 hours prior to CT scan 2 tablet 0     dibucaine (NUPERCAINAL) 1 % OINT ointment 3 times daily as needed for moderate pain       Docusate Sodium (COLACE PO) Take 2 capsules by mouth 2 times daily Unsure of dosage.       hydrocortisone 0.5 % ointment Apply topically 2 times daily as needed Reported on 2/14/2017       oxyCODONE (ROXICODONE) 5 MG IR tablet Take 1-2 tablets (5-10 mg) by mouth every 3 hours as needed for moderate to severe pain (Patient not taking: Reported on 7/21/2017) 15 tablet 0     lidocaine, Anorectal, 5 % CREA Externally apply 1 Application topically 3 times daily as needed (Patient not taking: Reported on 7/21/2017) 1 Tube 0        Physical Exam:  /76 (BP Location: Right arm, Patient Position: Chair, Cuff Size: Adult Large)  Pulse 81  Temp 98.5  F (36.9  C) (Oral)  Resp 16  Ht 1.778 m (5' 10\")  Wt 100.7 kg (222 lb 1.6 oz)  SpO2 95%  BMI 31.87 kg/m2  GENERAL APPEARANCE: healthy, alert and no distress  HEENT: Mouth without ulcers or lesions  NECK: no cervical adenopathy  RESP: lungs clear to auscultation - no rales, rhonchi or wheezes     CARDIOVASCULAR: regular rates and rhythm, normal S1 S2, no S3 or S4 and no murmur  ABDOMEN:  soft, has some tenderness to palpation near incision sites, no HSM or masses and bowel sounds normal.   Surgical scars near her umbilicus and left lower quadrant are well healed and without evidence of drainage.  MUSCULOSKELETAL: extremities normal- no gross deformities noted, no evidence of inflammation in joints, " "  SKIN:   Mild erythema approximately 1 inch in length and slightly curved and 2 of these areas near his left lower quadrant.  Very thin diameter.    PSYCHIATRIC: mentation appears normal and affect normal\"}    Laboratory/Imaging Studies  Oncology Visit on 06/09/2017   Component Date Value Ref Range Status     WBC 06/09/2017 3.8* 4.0 - 11.0 10e9/L Final     RBC Count 06/09/2017 3.36* 4.4 - 5.9 10e12/L Final     Hemoglobin 06/09/2017 11.3* 13.3 - 17.7 g/dL Final     Hematocrit 06/09/2017 32.9* 40.0 - 53.0 % Final     MCV 06/09/2017 98  78 - 100 fl Final     MCH 06/09/2017 33.6* 26.5 - 33.0 pg Final     MCHC 06/09/2017 34.3  31.5 - 36.5 g/dL Final     RDW 06/09/2017 14.9  10.0 - 15.0 % Final     Platelet Count 06/09/2017 161  150 - 450 10e9/L Final     Diff Method 06/09/2017 Automated Method   Final     % Neutrophils 06/09/2017 72.4  % Final     % Lymphocytes 06/09/2017 9.3  % Final     % Monocytes 06/09/2017 15.1  % Final     % Eosinophils 06/09/2017 2.4  % Final     % Basophils 06/09/2017 0.5  % Final     % Immature Granulocytes 06/09/2017 0.3  % Final     Nucleated RBCs 06/09/2017 0  0 /100 Final     Absolute Neutrophil 06/09/2017 2.7  1.6 - 8.3 10e9/L Final     Absolute Lymphocytes 06/09/2017 0.4* 0.8 - 5.3 10e9/L Final     Absolute Monocytes 06/09/2017 0.6  0.0 - 1.3 10e9/L Final     Absolute Eosinophils 06/09/2017 0.1  0.0 - 0.7 10e9/L Final     Absolute Basophils 06/09/2017 0.0  0.0 - 0.2 10e9/L Final     Abs Immature Granulocytes 06/09/2017 0.0  0 - 0.4 10e9/L Final     Absolute Nucleated RBC 06/09/2017 0.0   Final     Sodium 06/09/2017 143  133 - 144 mmol/L Final     Potassium 06/09/2017 3.7  3.4 - 5.3 mmol/L Final     Chloride 06/09/2017 109  94 - 109 mmol/L Final     Carbon Dioxide 06/09/2017 26  20 - 32 mmol/L Final     Anion Gap 06/09/2017 8  3 - 14 mmol/L Final     Glucose 06/09/2017 99  70 - 99 mg/dL Final     Urea Nitrogen 06/09/2017 23  7 - 30 mg/dL Final     Creatinine 06/09/2017 0.79  0.66 - 1.25 " mg/dL Final     GFR Estimate 06/09/2017   >60 mL/min/1.7m2 Final                    Value:>90  Non  GFR Calc       GFR Estimate If Black 06/09/2017   >60 mL/min/1.7m2 Final                    Value:>90   GFR Calc       Calcium 06/09/2017 8.9  8.5 - 10.1 mg/dL Final     Bilirubin Total 06/09/2017 0.6  0.2 - 1.3 mg/dL Final     Albumin 06/09/2017 3.5  3.4 - 5.0 g/dL Final     Protein Total 06/09/2017 6.7* 6.8 - 8.8 g/dL Final     Alkaline Phosphatase 06/09/2017 160* 40 - 150 U/L Final     ALT 06/09/2017 55  0 - 70 U/L Final     AST 06/09/2017 38  0 - 45 U/L Final     CEA 06/09/2017 0.8  0 - 2.5 ug/L Final    Assay Method:  Chemiluminescence using Siemens Centaur XP      Results for NAKIA WEBER (MRN [id number]) as of 7/20/2017 09:16   Ref. Range 5/9/2017 08:45 6/9/2017 16:07   CEA Latest Ref Range: 0 - 2.5 ug/L 1.2 0.8       RADIOLOGY:  CT abdomen pelvis 7/16  IMPRESSION:   1. The appendix is mildly thickened at 0.9 cm, and there is mild  periappendiceal fat stranding. An early or mild appendicitis could  have this appearance. Please clinically correlate.  2. Mild diffuse bladder wall thickening could be related to lack of  distention, although cystitis could also have this appearance.  3. Mild patchy opacity at the left lung base could represent  atelectasis, although a mild pneumonia cannot be excluded.   4. Hyperdense 4.3 cm lesion in the lower pole of the left kidney is  unchanged and may represent a hemorrhagic or proteinaceous renal cyst,  although solid renal neoplasm cannot be excluded. Consider renal MRI  for further characterization.    PATHOLOGY  7/5 rectal bx  RECTAL BIOPSY:   - Benign colorectal mucosa with minimal glandular distortion otherwise   no significant histologic abnormality   - Negative for dysplasia and malignancy     ASSESSMENT/PLAN:  58 yo gentleman with [id number] (stage IIIA) adenocarcinoma of the rectum    # Rectal cancer: clinical stage [id number] (stage  IIIA), s/p chemoradiation with Xeloda, and appears to have achieved complete response on imaging and biopsies.  He opted not to undergo surgery and expressed desire not to undergo APR, as he does not want a colostomy bag. He has completed 4 additional months (8 cycles) of chemotherapy with FOLFOX.  He is currently on active surveillance as per the watch and wait protocol. Post-treatment CT scans and MRI pelvis show no evidence of residual or recurrent tumor.   Most recent rectal biopsy as above on July 5 was negative for any evidence of cancer.     Protocol watch-and-wait as outlined below  Follow-up in clinic (with obligatory endoscopic evaluation [Flex Sig])  -Every 2 months for 6 months after finishing CRT, then:  -Every 3 months for 3 years from CRT, then:  -Every 6 months for 5 years from CRT, then:  -Every year for 10 years from CRT.    Radiological studies  -T3 MRI of pelvis at 4 months after finishing CRT, and then every 6 months for  2 years.  -CT chest, abdomen and pelvis every year for 5 years, after finishing CRT.  -CEA at every clinic or endoscopic visit.    Recs  -he has follow-up and sigmoidoscopy with Dr. Radford on 9/13/17  -next T3 MRI pelvis would be in 6 months (every 6 months x 2 years)  -Pet/ct scan in 6 months  -endoscopic surveillance with Dr. Radford as per protocol    RTC in 6 months with labs and CEA    Ranulfo Gilliland MD   [id number]  [facility] Onc Fellow      MEDICAL ONCOLOGY ATTENDING PHYSICIAN ADDENDUM:    I have seen and evaluated this patient with the medical oncology fellow, Dr Gilliland. I have reviewed and edited his note, and agree with the assessment and plan stated above.     Mr. Greco presents today with his wife, on the kind recommendation of Dr. Felicitas Radford. His primary oncologist is Dr. Agueda Manley at [facility]. Please see the above details in the note. Briefly, the patient underwent neoadjuvant intensive chemoradiation for his localized stage IIIa rectal  adenocarcinoma last August and September 2016, achieving a complete clinical and pathologic response as assessed by flexible sigmoidoscopy in December 2016. He subsequently enrolled in the watch and wait approach and [facility], and underwent FOLFOX chemotherapy through May 9, 2017. 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 He was hospitalized earlier this week for appendicitis, and had appendectomy 4 days ago. The laparoscopy scars seem to have healed well, with no overlying fluctuance, erythema, or tenderness. Remainder of physical exam is unremarkable. He describes having some bloating, but is having regular bowel movements. We'll proceed and continue with the watch and wait protocol, with Dr. Duglas Tuttle performing a flexible sigmoidoscopy next couple of months, and today I have ordered surveillance scans including MRI of the pelvis and PET/CT to be performed 6 months from now, approximately early December 2017, and follow-up with me within one week to review results. We will also do history of physical at time, and check a CEA as well as CBC. All the above was described in detail to the patient and his wife, and they provided verbal consent to proceeding with this plan.    I spent 60 minutes in consultation, including H&P and Discussion. >50% of time was spent in counseling and in coordination of care.    Anson Perez MD, PhD    The above was transcribed using a voice recognition software.  While I reviewed and edited the transcription, I may miss errors.  Please let me know of any of serious errors and I will addend the note.         Answers for HPI/ROS submitted by the patient on 7/20/2017   General Symptoms: Yes  Skin Symptoms: Yes  HENT Symptoms: No  EYE SYMPTOMS: Yes  HEART SYMPTOMS: No  LUNG SYMPTOMS: Yes  INTESTINAL SYMPTOMS: Yes  URINARY SYMPTOMS: Yes  REPRODUCTIVE SYMPTOMS: No  SKELETAL SYMPTOMS:  No  BLOOD SYMPTOMS: No  NERVOUS SYSTEM SYMPTOMS: Yes  MENTAL HEALTH SYMPTOMS: No  Fever: No  Loss of appetite: Yes  Weight loss: No  Weight gain: Yes  Fatigue: Yes  Night sweats: No  Chills: No  Increased stress: No  Excessive hunger: No  Excessive thirst: No  Feeling hot or cold when others believe the temperature is normal: No  Loss of height: No  Post-operative complications: Yes  Surgical site pain: No  Hallucinations: No  Change in or Loss of Energy: Yes  Hyperactivity: No  Confusion: No  Changes in hair: No  Changes in moles/birth marks: No  Itching: No  Rashes: Yes  Changes in nails: Yes  Change in facial hair: No  Warts: No  Non-healing sores: No  Scarring: No  Flaking of skin: No  Color changes of hands/feet in cold : No  Sun sensitivity: No  Skin thickening: No  Eye pain: No  Vision loss: No  Dry eyes: No  Watery eyes: No  Eye bulging: No  Double vision: No  Flashing of lights: No  Spots: No  Floaters: Yes  Redness: No  Crossed eyes: No  Tunnel Vision: No  Yellowing of eyes: No  Eye irritation: No  Cough: No  Sputum or phlegm: No  Coughing up blood: No  Difficulty breating or shortness of breath: Yes  Snoring: Yes  Wheezing: No  Difficulty breathing on exertion: Yes  Respiratory pain: No  Nighttime Cough: No  Difficulty breathing when lying flat: Yes  Heart burn or indigestion: No  Nausea: No  Vomiting: No  Abdominal pain: Yes  Bloating: Yes  Constipation: No  Diarrhea: No  Blood in stool: No  Black stools: No  Rectal or Anal pain: Yes  Fecal incontinence: Yes  Rectal bleeding: No  Yellowing of skin or eyes: No  Vomit with blood: No  Change in stools: Yes  Hemorrhoids: No  Trouble holding urine or incontinence: No  Pain or burning: No  Trouble starting or stopping: No  Increased frequency of urination: No  Blood in urine: No  Decreased frequency of urination: No  Frequent nighttime urination: Yes  Flank pain: No  Difficulty emptying bladder: No  Trouble with coordination: No  Dizziness or trouble with  balance: No  Fainting or black-out spells: No  Memory loss: No  Headache: No  Seizures: No  Speech problems: Yes  Tingling: Yes  Weakness: Yes  Difficulty walking: No  Paralysis: No  Numbness: Yes

## 2017-07-20 NOTE — DISCHARGE SUMMARY
[facility]  Discharge Summary        Louie Greco MRN# [id number]   YOB: 1960 Age: 57 year old     Date of Admission:  7/16/2017  Date of Discharge:  7/20/2017  Admitting Physician:  Brett Horton MD  Discharge Physician: Heber Luis MD  Discharging Service: Hospitalist     Primary Provider: Dr Donovan Ref-Primary, Physician  Primary Care Physician Phone Number: None         Discharge Diagnoses:         Cellulitis of abdominal wall  Sepsis, due to unspecified organism (H)  Acute post-operative pain  Acute appendicitis        Problem Oriented Hospital Course (Providers):    Louie Greco was admitted on 7/16/2017 by Brett Horton MD and I would refer you to their history and physical.  The following problems were addressed during his hospitalization:  Sepsis, suspect intra-abdominal process: CT with thickening of the appendix, patient with right lower quadrant abdominal discomfort, nausea, tachycardia, diarrhea, and fever. No leukocytosis, though absolute neutrophilia. Last chemotherapy >1 month ago.   -- Hx of recent sigmoidoscopy.   -Gen. surgery consulted  --started on IV Vancomycin, Ceftriaxone and continued the same  --S/P exp lap. Concerning finding greenish intra-abdominal fluid. Culture no growth todate  -- IV antibiotics stopped and changed to po Levofloxacin per surgery will continue for 7 days  -- Probiotics was added  -- Out patient follow up as per surgery recommendations     Abdominal wall Cellulitis. Significant improvement after starting abx.       Rectal cancer, currently in remission and on surveillance: Follows with Dr Radford (colorectal surgery) and Dr. Jesus Medina (Onc) (primarily Dr Manley). Moderately differentiated adenocarcinoma, ascending colon with sessile serrated adenoma, others were tubular adenomas. Underwent chemotherapy as well as radiation therapy with Xeloda. Additional 8 cycles FOLFOX chemotherapy completed as of early June  2017 and has now had port removed from right anterior chest as of July 10. Last sigmoidoscopy July 5, 2017 with no evidence of malignancy.       Renal cysts: Patient with multiple bilateral renal cysts with a left inferior cyst which appears to be hyperdense and suggestive of a hemorrhagic proteinaceous cyst. This was noted on prior CT imaging as well.   -Consider outpatient follow-up with MRI versus serial CTs, though will defer to patient's primary oncologist. Patient is scheduled for q.6 month MRI of pelvis, could consider MRI pelvis and kidney at that time versus ongoing monitoring with serial CT scans as had been previously recommended by oncology.         Patient was seen prior to discharge and found to be stable. afebrile and tolerating po intake. He was advised to follow with PCP and surgery. To come to ED if fever or abdominal pain.        Code Status:      Full Code        Brief Hospital Stay Summary Sent Home With Patient in AVS:        Reason for your hospital stay       Abdominal pain                        Important Results:      See below.        Pending Results:        Unresulted Labs Ordered in the Past 30 Days of this Admission     Date and Time Order Name Status Description    7/17/2017 1251 Fluid Culture Aerobic Bacterial Preliminary     7/17/2017 1251 Anaerobic bacterial culture Preliminary     7/16/2017 2225 Blood culture Preliminary     7/16/2017 2225 Blood culture Preliminary             Discharge Instructions and Follow-Up:      Follow-up Appointments     Follow-up and recommended labs and tests        Follow up with Dr. Ferguson or Physician Assistant at [facility]   in 1- 2 weeks.  Call [phone] to schedule an appointment.            Follow-up and recommended labs and tests        Follow up with primary care provider, Physician No Ref-Primary, within 7   days for hospital follow- up.  No follow up labs or test are needed.                      Discharge Disposition:       Discharged to home        Discharge Medications:        Current Discharge Medication List      START taking these medications    Details   oxyCODONE (ROXICODONE) 5 MG IR tablet Take 1-2 tablets (5-10 mg) by mouth every 3 hours as needed for moderate to severe pain  Qty: 15 tablet, Refills: 0    Associated Diagnoses: Acute post-operative pain      levofloxacin (LEVAQUIN) 500 MG tablet Take 1 tablet (500 mg) by mouth daily for 7 days  Qty: 7 tablet, Refills: 0    Associated Diagnoses: Sepsis, due to unspecified organism (H)      saccharomyces boulardii (FLORASTOR) 250 MG capsule Take 1 capsule (250 mg) by mouth 2 times daily  Qty: 14 capsule, Refills: 0    Associated Diagnoses: Long term (current) use of antibiotics         CONTINUE these medications which have CHANGED    Details   ibuprofen 200 MG capsule Take 200-400 mg by mouth every 6 hours as needed  Qty: 120 capsule, Refills: 0    Associated Diagnoses: Acute post-operative pain         CONTINUE these medications which have NOT CHANGED    Details   ASPIRIN PO Take by mouth as needed for moderate pain      lidocaine, Anorectal, 5 % CREA Externally apply 1 Application topically 3 times daily as needed  Qty: 1 Tube, Refills: 0    Associated Diagnoses: Anal pain      VITAMIN D, CHOLECALCIFEROL, PO Take 2,000 Units by mouth daily       dibucaine (NUPERCAINAL) 1 % OINT ointment 3 times daily as needed for moderate pain      Docusate Sodium (COLACE PO) Take 2 capsules by mouth 2 times daily Unsure of dosage.      acetaminophen (TYLENOL) 325 MG tablet Take 2 tablets (650 mg) by mouth every 4 hours as needed for other (mild pain)  Qty: 100 tablet, Refills: 0    Associated Diagnoses: Rectal cancer (H)      hydrocortisone 0.5 % ointment Apply topically 2 times daily as needed Reported on 2/14/2017      methylPREDNISolone (MEDROL) 32 MG tablet Take 12 hours prior to CT scan and 2 hours prior to CT scan  Qty: 2 tablet, Refills: 0    Associated Diagnoses: Malignant neoplasm  "of rectum (H); Allergy to contrast media (used for diagnostic x-rays)         STOP taking these medications       diltiazem 2% in PLO cream, FV COMPOUNDED, 2% GEL Comments:   Reason for Stopping:                 Allergies:         Allergies   Allergen Reactions     Ampicillin Diarrhea     Demerol [Meperidine]      Nausea            Consultations This Hospital Stay:      Consultation during this admission received from surgery        Condition and Physical on Discharge:      Discharge condition: Stable   Vitals: Heart Rate: 77, Blood pressure 120/73, pulse 81, temperature 98.1  F (36.7  C), temperature source Oral, resp. rate 16, height 1.778 m (5' 10\"), weight 100.8 kg (222 lb 3.6 oz), SpO2 96 %.     Constitutional: Awake, alert. No apparent distress   Lungs: Clear to auscultation bilaterally, no crackles or wheezing   Cardiovascular: Regular HR, normal S1 and S2, and no murmur noted   Abdomen: Normal bowel sounds, soft, non-distended, non-tender   Skin: No rashes, no cyanosis.   Other: LE: no edema                  Discharge Time:      Greater than 30 minutes.        Image Results From This Hospital Stay (For Non-EPIC Providers):        Results for orders placed or performed during the hospital encounter of 07/16/17   CT Abdomen Pelvis w Contrast    Narrative    CT ABDOMEN PELVIS W CONTRAST   7/16/2017 11:37 PM     HISTORY: Fever and chills. Right lower quadrant pain.    TECHNIQUE: 107mL Isovue-370 IV were administered. After contrast  administration, volumetric helical sections were acquired from the  lung bases to the ischial tuberosities. Coronal images were also  reconstructed. Radiation dose for this scan was reduced using  automated exposure control, adjustment of the mA and/or kV according  to patient size, or iterative reconstruction technique.    COMPARISON: CT of the chest, abdomen, and pelvis performed 6/7/2017.     FINDINGS: The appendix is mildly thickened, measuring up to 0.9 cm,  and there is mild " periappendiceal stranding. No fluid collections are  identified to suggest abscess. No free fluid in the pelvis. No bowel  obstruction. No convincing evidence for colitis or diverticulitis.  Mild atherosclerotic aortoiliac calcification. Scattered hepatic and  bilateral renal cysts are again noted. Hyperdense 4.3 cm lesion in the  lower pole of the left kidney is unchanged and remains indeterminate.  The liver, gallbladder, spleen, adrenal glands, and pancreas are  otherwise unremarkable. Mild diffuse bladder wall thickening is  nonspecific and could be related to lack of distention, although  cystitis could also have this appearance. Calcified granuloma in the  left lower lobe of the lung is unchanged. Mild patchy opacities at the  left lung base could represent atelectasis, although mild pneumonia  could also have this appearance. Degenerative changes are noted at the  lumbosacral interspace.      Impression    IMPRESSION:   1. The appendix is mildly thickened at 0.9 cm, and there is mild  periappendiceal fat stranding. An early or mild appendicitis could  have this appearance. Please clinically correlate.  2. Mild diffuse bladder wall thickening could be related to lack of  distention, although cystitis could also have this appearance.  3. Mild patchy opacity at the left lung base could represent  atelectasis, although a mild pneumonia cannot be excluded.   4. Hyperdense 4.3 cm lesion in the lower pole of the left kidney is  unchanged and may represent a hemorrhagic or proteinaceous renal cyst,  although solid renal neoplasm cannot be excluded. Consider renal MRI  for further characterization.    AVIS EL MD   XR Chest Port 1 View    Narrative    XR CHEST PORT 1 VW 7/17/2017 10:15 AM    COMPARISON: CT dated 6/7/2017    HISTORY: Shortness of breath      Impression    IMPRESSION: Cardiac silhouette and pulmonary vasculature are within  normal limits. No slight granuloma in the left base. Mild left  basilar  atelectasis/consolidation. No pleural effusion or pneumothorax.    FABRIZIO JOCELYNE           Most Recent Lab Results In EPIC (For Non-EPIC Providers):    Most Recent 3 CBC's:  Recent Labs   Lab Test  07/18/17   0630  07/17/17   0705  07/16/17   2213   WBC  9.1  7.8  10.0   HGB  9.6*  11.7*  11.7*   MCV  94  93  93   PLT  122*  122*  181      Most Recent 3 BMP's:  Recent Labs   Lab Test  07/20/17   0700  07/19/17   0725  07/18/17   0630   07/16/17   2213  06/09/17   1607   NA   --    --   140   --   133  143   POTASSIUM   --    --   4.2   --   4.0  3.7   CHLORIDE   --    --   110*   --   100  109   CO2   --    --   22   --   24  26   BUN   --    --   18   --   22  23   CR  0.68  0.77  0.84   < >  0.88  0.79   ANIONGAP   --    --   8   --   9  8   FRANDY   --    --   7.9*   --   8.2*  8.9   GLC   --    --   122*   --   115*  99    < > = values in this interval not displayed.     Most Recent 3 Troponin's:No lab results found.    Invalid input(s): TROP, TROPONINIES  Most Recent 3 INR's:  Recent Labs   Lab Test  07/10/17   0943  01/16/17   0800  11/23/16   1217   INR  1.02  0.92  1.11     Most Recent 2 LFT's:  Recent Labs   Lab Test  07/18/17   0630  06/09/17   1607   AST  33  38   ALT  43  55   ALKPHOS  120  160*   BILITOTAL  0.8  0.6     Most Recent Cholesterol Panel:No lab results found.  Most Recent 6 Bacteria Isolates From Any Culture (See EPIC Reports for Culture Details):  Recent Labs   Lab Test  07/17/17   1249  07/16/17   2356  07/16/17   2310  07/16/17   2213   CULT  Culture negative monitoring continues  Culture negative monitoring continues  No growth after 3 days  No growth  No growth after 3 days     Most Recent TSH, T4 and HgbA1c: No lab results found.         Adis Luis MD  Internal medicine Hospitalist:   Pager [phone]    7/20/2017

## 2017-07-21 ENCOUNTER — ONCOLOGY VISIT (OUTPATIENT)
Dept: ONCOLOGY | Facility: CLINIC | Age: 57
End: 2017-07-21
Attending: INTERNAL MEDICINE
Payer: COMMERCIAL

## 2017-07-21 ENCOUNTER — TELEPHONE (OUTPATIENT)
Dept: INTERNAL MEDICINE | Facility: CLINIC | Age: 57
End: 2017-07-21

## 2017-07-21 VITALS
OXYGEN SATURATION: 95 % | BODY MASS INDEX: 31.8 KG/M2 | SYSTOLIC BLOOD PRESSURE: 137 MMHG | HEART RATE: 81 BPM | RESPIRATION RATE: 16 BRPM | DIASTOLIC BLOOD PRESSURE: 76 MMHG | HEIGHT: 70 IN | TEMPERATURE: 98.5 F | WEIGHT: 222.1 LBS

## 2017-07-21 DIAGNOSIS — C20 MALIGNANT NEOPLASM OF RECTUM (H): Primary | ICD-10-CM

## 2017-07-21 PROCEDURE — 99212 OFFICE O/P EST SF 10 MIN: CPT | Mod: ZF

## 2017-07-21 PROCEDURE — 99215 OFFICE O/P EST HI 40 MIN: CPT | Mod: GC | Performed by: INTERNAL MEDICINE

## 2017-07-21 ASSESSMENT — PAIN SCALES - GENERAL: PAINLEVEL: MILD PAIN (2)

## 2017-07-21 NOTE — NURSING NOTE
"Oncology Rooming Note    July 21, 2017 8:58 AM   Louie Greco is a 57 year old male who presents for:    Chief Complaint   Patient presents with     Oncology Clinic Visit     Return-Rectal Ca     Initial Vitals: /76 (BP Location: Right arm, Patient Position: Chair, Cuff Size: Adult Large)  Pulse 81  Temp 98.5  F (36.9  C) (Oral)  Resp 16  Ht 1.778 m (5' 10\")  Wt 100.7 kg (222 lb 1.6 oz)  SpO2 95%  BMI 31.87 kg/m2 Estimated body mass index is 31.87 kg/(m^2) as calculated from the following:    Height as of this encounter: 1.778 m (5' 10\").    Weight as of this encounter: 100.7 kg (222 lb 1.6 oz). Body surface area is 2.23 meters squared.  Mild Pain (2) Comment: Data Unavailable   No LMP for male patient.  Allergies reviewed: Yes  Medications reviewed: Yes    Medications: Medication refills not needed today.  Pharmacy name entered into [facility]:    [id number]  [place], [place] - [address] AT [address] [place] [address] [facility] [place]  [place], [place] - [address]    Clinical concerns: Patient was discharged from [facility]    6 minutes for nursing intake (face to face time)     Amanda Negron LPN            "

## 2017-07-21 NOTE — LETTER
7/21/2017       RE: Louie Greco  [address] [address]     Dear Colleague,    Thank you for referring your patient, Louie Greco, to the [facility]. Please see a copy of my visit note below.    Medical oncology new patient visit:  July 21, 2017    Diagnosis: [id number] (stage IIIA) adenocarcinoma of the rectum.  Negative for microsatellite instability.    Oncology History:  Patient developed rectal bleeding in early 2016, and underwent a colonoscopy on July 27, 2016. This revealed a malignant tumor in the rectum involving half of the lumen, there were also three  4mm polyps in the ascending and transverse colon.  Pathology showed this to be moderately different adenocarcinoma in the rectum; in the ascending colon, there was sessile suited adenoma, the others were tubular adenomas. Mismatch repair proteins were intact.  Staging MRI and CT scans were performed. Patient was stage clinically as T2 N1b M0 (Stage IIIA).   He sought care with Dr. Lee at [place] [facility], and initiated neoadjuvant intent chemoradiation with 5-FU from August 8, 2016 through September 15, 2016.  Evaluation by colorectal surgeon Dr. Duglas Tuttle here at [facility], post chemoradiation MRI revealed good response in the primary tumor as well as in the surrounding lymph nodes.  December 8, 2016 flexible sigmoidoscopy revealed no pathologic evidence of residual tumor, just anterior scarring in lumen. Considering absence of visible tumor, the watch and wait protocol was discussed, including the fact that it would not be possible to achieve a grossly clear margin with LAR. Thus, the patient opted not to proceed with surgery.  He transferred his care to Dr. Agueda Manley, my colleague at [facility], and he underwent eight cycles of FOLFOX chemotherapy treatment January 16 through May 9, 2017.  March 30, 2017 cancer genetics evaluation completed.  June 5, 2017 MRI pelvis again confirmed no  evidence of residual or recurrent tumor, corresponding tumor regression grade 1  June 7, 2017 CT scan showed no evidence of malignant recurrent or metastatic disease in the chest, abdomen or pelvis, there are stable subcm pulmonary nodules throughout the lungs, of unclear significance. Liver cysts and kinesis were stable as well.  Patient followed up with Dr. Manley June 9, 2017, and the watch and wait protocol was implemented. 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      Interim History/History Of Present Illness:  58 yo gentleman with [id number] (stage IIIA) adenocarcinoma of the rectum.    He was recently hospitalized for appendicitis. He had sx of fever and abdominal pain.  He recently recently underwent an appendectomy as well as drain of peritoneal fluid collection on July 17.   Pathology notable for polyp and no evidence of infection.   Cultures of the peritoneal fluid are still in process.  Currently without growth to date.  He is finishing a course of antibiotics and currently on levaquin with planned stop date of July 27.   During that hospitalization he also had an abdominal wall cellulitis and that has significantly improved on the antibiotics.  Reportedly unclear exactly what was the cause of this.  He is currently feeling much better.  Still has some pain over her abdominal incision sites.  He is having bowel movements are normal.  He denies any more fever, chills, sweats.  He notes no chest pain, but notes a little bit of shortness of breath and that is worse with exertion and has been going on just a little bit since the surgery.  Of note he is at what up quite a bit in weight and this is due to being given quite a bit of fluids associated with the infection.   He denies any nausea, vomiting.  He does have some residual sensory neuropathy in his hands and feet from chemotherapy and intermittently has some difficulty with buttoning his shirts and his wife has to  help him out with that.    He is currently being followed on the watch and wait protocol for rectal cancer.  His primary oncologist is Dr. Manley.   He was sent here because the surgeon had requested an appointment earlier with Dr. Perez who is more familiar with this protocol.    Review Of Systems:  Full 12-point ROS reviewed. Pertinent symptoms reviewed above per HPI.    Past medical history:  Past Medical History:   Diagnosis Date     Childhood asthma      Nephrolithiasis          Rectal cancer (H)     low rectal cancer     Past surgical history  Past Surgical History:   Procedure Laterality Date     COLONOSCOPY N/A 2016    Procedure: COMBINED COLONOSCOPY, SINGLE OR MULTIPLE BIOPSY/POLYPECTOMY BY BIOPSY;  Surgeon: Cheslea Thompson MD;  Location:  GI     EXAM UNDER ANESTHESIA ANUS N/A 2017    Procedure: EXAM UNDER ANESTHESIA ANUS;  Examination Under Anesthesia, flex sigmoidoscopy with biopsies and formalin application;  Surgeon: Felicitas Radford MD;  Location:  OR     HC TOOTH EXTRACTION W/FORCEP       LAPAROSCOPIC APPENDECTOMY N/A 2017    Procedure: LAPAROSCOPIC APPENDECTOMY;  LAPAROSCOPIC APPENDECTOMY;  Surgeon: Bryson Ferguson MD;  Location:  OR     SIGMOIDOSCOPY FLEXIBLE N/A 2016    Procedure: SIGMOIDOSCOPY FLEXIBLE;  Surgeon: Felicitas Radford MD;  Location: [facility] OR     SIGMOIDOSCOPY FLEXIBLE N/A 2017    Procedure: SIGMOIDOSCOPY FLEXIBLE;;  Surgeon: Felicitas Radford MD;  Location:  OR     VASCULAR SURGERY      Right chest port     VASECTOMY       Social history  He is a .  He is .  He denies any significant history of smoking or alcohol use. No h/o drug use.    Family history   His brother passed away from metastatic cancer, unknown type.  He  at age 53.  No other known family history of cancers in the immediatefamily    Allergies:  Allergies as of 2017 - Jan as Reviewed 2017   Allergen Reaction Noted      "Ampicillin Diarrhea 07/11/2016     Demerol [meperidine]  09/05/2016       Current Medications:  Current Outpatient Prescriptions   Medication Sig Dispense Refill     ibuprofen 200 MG capsule Take 200-400 mg by mouth every 6 hours as needed 120 capsule 0     levofloxacin (LEVAQUIN) 500 MG tablet Take 1 tablet (500 mg) by mouth daily for 7 days 7 tablet 0     saccharomyces boulardii (FLORASTOR) 250 MG capsule Take 1 capsule (250 mg) by mouth 2 times daily 14 capsule 0     ASPIRIN PO Take by mouth as needed for moderate pain       VITAMIN D, CHOLECALCIFEROL, PO Take 2,000 Units by mouth daily        methylPREDNISolone (MEDROL) 32 MG tablet Take 12 hours prior to CT scan and 2 hours prior to CT scan 2 tablet 0     dibucaine (NUPERCAINAL) 1 % OINT ointment 3 times daily as needed for moderate pain       Docusate Sodium (COLACE PO) Take 2 capsules by mouth 2 times daily Unsure of dosage.       hydrocortisone 0.5 % ointment Apply topically 2 times daily as needed Reported on 2/14/2017       oxyCODONE (ROXICODONE) 5 MG IR tablet Take 1-2 tablets (5-10 mg) by mouth every 3 hours as needed for moderate to severe pain (Patient not taking: Reported on 7/21/2017) 15 tablet 0     lidocaine, Anorectal, 5 % CREA Externally apply 1 Application topically 3 times daily as needed (Patient not taking: Reported on 7/21/2017) 1 Tube 0        Physical Exam:  /76 (BP Location: Right arm, Patient Position: Chair, Cuff Size: Adult Large)  Pulse 81  Temp 98.5  F (36.9  C) (Oral)  Resp 16  Ht 1.778 m (5' 10\")  Wt 100.7 kg (222 lb 1.6 oz)  SpO2 95%  BMI 31.87 kg/m2  GENERAL APPEARANCE: healthy, alert and no distress  HEENT: Mouth without ulcers or lesions  NECK: no cervical adenopathy  RESP: lungs clear to auscultation - no rales, rhonchi or wheezes     CARDIOVASCULAR: regular rates and rhythm, normal S1 S2, no S3 or S4 and no murmur  ABDOMEN:  soft, has some tenderness to palpation near incision sites, no HSM or masses and bowel " "sounds normal.   Surgical scars near her umbilicus and left lower quadrant are well healed and without evidence of drainage.  MUSCULOSKELETAL: extremities normal- no gross deformities noted, no evidence of inflammation in joints,   SKIN:   Mild erythema approximately 1 inch in length and slightly curved and 2 of these areas near his left lower quadrant.  Very thin diameter.    PSYCHIATRIC: mentation appears normal and affect normal\"}    Laboratory/Imaging Studies  Oncology Visit on 06/09/2017   Component Date Value Ref Range Status     WBC 06/09/2017 3.8* 4.0 - 11.0 10e9/L Final     RBC Count 06/09/2017 3.36* 4.4 - 5.9 10e12/L Final     Hemoglobin 06/09/2017 11.3* 13.3 - 17.7 g/dL Final     Hematocrit 06/09/2017 32.9* 40.0 - 53.0 % Final     MCV 06/09/2017 98  78 - 100 fl Final     MCH 06/09/2017 33.6* 26.5 - 33.0 pg Final     MCHC 06/09/2017 34.3  31.5 - 36.5 g/dL Final     RDW 06/09/2017 14.9  10.0 - 15.0 % Final     Platelet Count 06/09/2017 161  150 - 450 10e9/L Final     Diff Method 06/09/2017 Automated Method   Final     % Neutrophils 06/09/2017 72.4  % Final     % Lymphocytes 06/09/2017 9.3  % Final     % Monocytes 06/09/2017 15.1  % Final     % Eosinophils 06/09/2017 2.4  % Final     % Basophils 06/09/2017 0.5  % Final     % Immature Granulocytes 06/09/2017 0.3  % Final     Nucleated RBCs 06/09/2017 0  0 /100 Final     Absolute Neutrophil 06/09/2017 2.7  1.6 - 8.3 10e9/L Final     Absolute Lymphocytes 06/09/2017 0.4* 0.8 - 5.3 10e9/L Final     Absolute Monocytes 06/09/2017 0.6  0.0 - 1.3 10e9/L Final     Absolute Eosinophils 06/09/2017 0.1  0.0 - 0.7 10e9/L Final     Absolute Basophils 06/09/2017 0.0  0.0 - 0.2 10e9/L Final     Abs Immature Granulocytes 06/09/2017 0.0  0 - 0.4 10e9/L Final     Absolute Nucleated RBC 06/09/2017 0.0   Final     Sodium 06/09/2017 143  133 - 144 mmol/L Final     Potassium 06/09/2017 3.7  3.4 - 5.3 mmol/L Final     Chloride 06/09/2017 109  94 - 109 mmol/L Final     Carbon Dioxide " 06/09/2017 26  20 - 32 mmol/L Final     Anion Gap 06/09/2017 8  3 - 14 mmol/L Final     Glucose 06/09/2017 99  70 - 99 mg/dL Final     Urea Nitrogen 06/09/2017 23  7 - 30 mg/dL Final     Creatinine 06/09/2017 0.79  0.66 - 1.25 mg/dL Final     GFR Estimate 06/09/2017   >60 mL/min/1.7m2 Final                    Value:>90  Non  GFR Calc       GFR Estimate If Black 06/09/2017   >60 mL/min/1.7m2 Final                    Value:>90   GFR Calc       Calcium 06/09/2017 8.9  8.5 - 10.1 mg/dL Final     Bilirubin Total 06/09/2017 0.6  0.2 - 1.3 mg/dL Final     Albumin 06/09/2017 3.5  3.4 - 5.0 g/dL Final     Protein Total 06/09/2017 6.7* 6.8 - 8.8 g/dL Final     Alkaline Phosphatase 06/09/2017 160* 40 - 150 U/L Final     ALT 06/09/2017 55  0 - 70 U/L Final     AST 06/09/2017 38  0 - 45 U/L Final     CEA 06/09/2017 0.8  0 - 2.5 ug/L Final    Assay Method:  Chemiluminescence using Siemens Centaur XP      Results for NAKIA WEBER (MRN [id number]) as of 7/20/2017 09:16   Ref. Range 5/9/2017 08:45 6/9/2017 16:07   CEA Latest Ref Range: 0 - 2.5 ug/L 1.2 0.8       RADIOLOGY:  CT abdomen pelvis 7/16  IMPRESSION:   1. The appendix is mildly thickened at 0.9 cm, and there is mild  periappendiceal fat stranding. An early or mild appendicitis could  have this appearance. Please clinically correlate.  2. Mild diffuse bladder wall thickening could be related to lack of  distention, although cystitis could also have this appearance.  3. Mild patchy opacity at the left lung base could represent  atelectasis, although a mild pneumonia cannot be excluded.   4. Hyperdense 4.3 cm lesion in the lower pole of the left kidney is  unchanged and may represent a hemorrhagic or proteinaceous renal cyst,  although solid renal neoplasm cannot be excluded. Consider renal MRI  for further characterization.    PATHOLOGY  7/5 rectal bx  RECTAL BIOPSY:   - Benign colorectal mucosa with minimal glandular distortion otherwise   no  significant histologic abnormality   - Negative for dysplasia and malignancy     ASSESSMENT/PLAN:  58 yo gentleman with [id number] (stage IIIA) adenocarcinoma of the rectum    # Rectal cancer: clinical stage [id number] (stage IIIA), s/p chemoradiation with Xeloda, and appears to have achieved complete response on imaging and biopsies.  He opted not to undergo surgery and expressed desire not to undergo APR, as he does not want a colostomy bag. He has completed 4 additional months (8 cycles) of chemotherapy with FOLFOX.  He  is currently on active surveillance as per the watch and wait protocol. Post-treatment CT scans and MRI pelvis show no evidence of residual or recurrent tumor.   Most recent rectal biopsy as above on July 5 was negative for any evidence of cancer.     Protocol watch-and-wait as outlined below  Follow-up in clinic (with obligatory endoscopic evaluation [Flex Sig])  -Every 2 months for 6 months after finishing CRT, then:  -Every 3 months for 3 years from CRT, then:  -Every 6 months for 5 years from CRT, then:  -Every year for 10 years from CRT.    Radiological studies  -T3 MRI of pelvis at 4 months after finishing CRT, and then every 6 months for  2 years.  -CT chest, abdomen and pelvis every year for 5 years, after finishing CRT.  -CEA at every clinic or endoscopic visit.    Recs  -he has follow-up and sigmoidoscopy with Dr. Radford on 9/13/17  -next T3 MRI pelvis would be in 6 months (every 6 months x 2 years)  -Pet/ct scan in 6 months  -endoscopic surveillance with Dr. Radford as per protocol    RTC in 6 months with labs and CEA    Ranulfo Gilliland MD   PGY5  [facility] Onc Fellow      MEDICAL ONCOLOGY ATTENDING PHYSICIAN ADDENDUM:    I have seen and evaluated this patient with the medical oncology fellow, Dr Gilliland. I have reviewed and edited his note, and agree with the assessment and plan stated above.     Mr. Greco presents today with his wife, on the kind recommendation of Dr. Polk  Malina. His primary oncologist is Dr. Agueda Manley at [facility]. Please see the above details in the note. Briefly, the patient underwent neoadjuvant intensive chemoradiation for his localized stage IIIa rectal adenocarcinoma last August and September 2016, achieving a complete clinical and pathologic response as assessed by flexible sigmoidoscopy in December 2016. He subsequently enrolled in the watch and wait approach and protocol per the [place] multidisciplinary colorectal cancer team, and underwent FOLFOX chemotherapy through May 9, 2017. 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 He was hospitalized earlier this week for appendicitis, and had appendectomy 4 days ago. The laparoscopy scars seem to have healed well, with no overlying fluctuance, erythema, or tenderness. Remainder of physical exam is unremarkable. He describes having some bloating, but is having regular bowel movements. We'll proceed and continue with the watch and wait protocol, with Dr. Duglas Tuttle performing a flexible sigmoidoscopy next couple of months, and today I have ordered surveillance scans including MRI of the pelvis and PET/CT to be performed 6 months from now, approximately early December 2017, and follow-up with me within one week to review results. We will also do history of physical at time, and check a CEA as well as CBC. All the above was described in detail to the patient and his wife, and they provided verbal consent to proceeding with this plan.    I spent 60 minutes in consultation, including H&P and Discussion. >50% of time was spent in counseling and in coordination of care.    Anson Perez MD, PhD

## 2017-07-21 NOTE — MR AVS SNAPSHOT
After Visit Summary   7/21/2017    Louie Greco    MRN: [id number]           Patient Information     Date Of Birth          1960        Visit Information        Provider Department      7/21/2017 9:00 AM Anson Perez MD [facility]        Today's Diagnoses     Malignant neoplasm of rectum (H)    -  1       Follow-ups after your visit        Follow-up notes from your care team     Return in about 6 months (around 1/21/2018).      Your next 10 appointments already scheduled     Aug 24, 2017  3:30 PM CDT   Office Visit with Philippe Healy MD   [facility] ([facility])    [address] [facility] [id number] [phone]           Bring a current list of meds and any records pertaining to this visit. For Physicals, please bring immunization records and any forms needing to be filled out. Please arrive 10 minutes early to complete paperwork.            Sep 13, 2017   Procedure with Felicitas Radford MD   [facility] ([facility])    [address] [place] [address]  [address] [facility] [address] [phone]           Located in the Clinics and Surgery Center at [address] [place] [address].   parking is very convenient and highly recommended.  is a $6 flat rate fee.  Both  and self parkers should enter the main arrival plaza from [facility]; parking attendants will direct you based on your parking preference.            Sep 15, 2017  4:00 PM CDT   Return Visit with Agueda Manley MD   [facility] ([facility])    [facility]  [address] [address] [phone]              Who to contact     If you have questions or need follow up information about today's clinic visit or your schedule please contact [facility] directly at [phone].  Normal or non-critical lab and  "imaging results will be communicated to you by MyChart, letter or phone within 4 business days after the clinic has received the results. If you do not hear from us within 7 days, please contact the clinic through [employer] or phone. If you have a critical or abnormal lab result, we will notify you by phone as soon as possible.  Submit refill requests through [employer] or call your pharmacy and they will forward the refill request to us. Please allow 3 business days for your refill to be completed.          Additional Information About Your Visit        [employer] Information     [employer] gives you secure access to your electronic health record. If you see a primary care provider, you can also send messages to your care team and make appointments. If you have questions, please call your primary care clinic.  If you do not have a primary care provider, please call [phone] and they will assist you.        Care EveryWhere ID     This is your Care EveryWhere ID. This could be used by other organizations to access your [facility] medical records  [id number]        Your Vitals Were     Pulse Temperature Respirations Height Pulse Oximetry BMI (Body Mass Index)    81 98.5  F (36.9  C) (Oral) 16 1.778 m (5' 10\") 95% 31.87 kg/m2       Blood Pressure from Last 3 Encounters:   07/26/17 118/78   07/21/17 137/76   07/20/17 120/73    Weight from Last 3 Encounters:   07/21/17 100.7 kg (222 lb 1.6 oz)   07/20/17 100.8 kg (222 lb 3.6 oz)   07/05/17 93 kg (205 lb)                 Today's Medication Changes          These changes are accurate as of: 7/21/17 11:59 PM.  If you have any questions, ask your nurse or doctor.               Stop taking these medicines if you haven't already. Please contact your care team if you have questions.     acetaminophen 325 MG tablet   Commonly known as:  TYLENOL   Stopped by:  Anson Perez MD                    Primary Care Provider    Physician No Ref-Primary       No address on file        Equal Access " to Services     BLAISE EDDY : João Alatorre, wahuyen cullen, qaalexsrinivasan kahaiderjb gonzalez. So [facility] [phone].    ATENCIÓN: Si habla radha, tiene a mena disposición servicios gratuitos de asistencia lingüística. Llame al [phone].    We comply with applicable federal civil rights laws and [place] laws. We do not discriminate on the basis of race, color, national origin, age, disability sex, sexual orientation or gender identity.            Thank you!     Thank you for choosing [facility]  for your care. Our goal is always to provide you with excellent care. Hearing back from our patients is one way we can continue to improve our services. Please take a few minutes to complete the written survey that you may receive in the mail after your visit with us. Thank you!             Your Updated Medication List - Protect others around you: Learn how to safely use, store and throw away your medicines at www.disposemymeds.org.          This list is accurate as of: 7/21/17 11:59 PM.  Always use your most recent med list.                   Brand Name Dispense Instructions for use Diagnosis    ASPIRIN PO      Take by mouth as needed for moderate pain        COLACE PO      Take 2 capsules by mouth 2 times daily Unsure of dosage.        dibucaine 1 % Oint ointment    NUPERCAINAL     3 times daily as needed for moderate pain        hydrocortisone 0.5 % ointment      Apply topically 2 times daily as needed Reported on 2/14/2017        ibuprofen 200 MG capsule     120 capsule    Take 200-400 mg by mouth every 6 hours as needed    Acute post-operative pain       levofloxacin 500 MG tablet    LEVAQUIN    7 tablet    Take 1 tablet (500 mg) by mouth daily for 7 days    Sepsis, due to unspecified organism (H)       lidocaine (Anorectal) 5 % Crea     1 Tube    Externally apply 1 Application topically 3 times daily as needed    Anal pain        methylPREDNISolone 32 MG tablet    MEDROL    2 tablet    Take 12 hours prior to CT scan and 2 hours prior to CT scan    Malignant neoplasm of rectum (H), Allergy to contrast media (used for diagnostic x-rays)       oxyCODONE 5 MG IR tablet    ROXICODONE    15 tablet    Take 1-2 tablets (5-10 mg) by mouth every 3 hours as needed for moderate to severe pain    Acute post-operative pain       saccharomyces boulardii 250 MG capsule    FLORASTOR    14 capsule    Take 1 capsule (250 mg) by mouth 2 times daily    Long term (current) use of antibiotics       VITAMIN D (CHOLECALCIFEROL) PO      Take 2,000 Units by mouth daily

## 2017-07-21 NOTE — PLAN OF CARE
Problem: Goal Outcome Summary  Goal: Goal Outcome Summary  Outcome: Completed Date Met:  07/20/17  VSS. A&O. Up indep. Voiding adequately. Kory reg diet. Pain control with ibuprofen and tylenol PRN. Incision intact, rash remains on abdomen but improving. Abx changed to PO. DC instructions, meds and follow up reviewed with pt and wife. Questions answered. DC to home at 1400.

## 2017-07-22 LAB
BACTERIA SPEC CULT: NO GROWTH
MICRO REPORT STATUS: NORMAL
SPECIMEN SOURCE: NORMAL

## 2017-07-23 LAB
BACTERIA SPEC CULT: NO GROWTH
BACTERIA SPEC CULT: NO GROWTH
Lab: NORMAL
Lab: NORMAL
MICRO REPORT STATUS: NORMAL
MICRO REPORT STATUS: NORMAL
SPECIMEN SOURCE: NORMAL
SPECIMEN SOURCE: NORMAL

## 2017-07-24 LAB
BACTERIA SPEC CULT: NORMAL
Lab: NORMAL
MICRO REPORT STATUS: NORMAL
SPECIMEN SOURCE: NORMAL

## 2017-07-24 NOTE — TELEPHONE ENCOUNTER
Patient is establishing care with a PCP closer to home. Says things are going ok at home. No longer IM patient here.

## 2017-07-26 ENCOUNTER — OFFICE VISIT (OUTPATIENT)
Dept: SURGERY | Facility: CLINIC | Age: 57
End: 2017-07-26
Payer: COMMERCIAL

## 2017-07-26 ENCOUNTER — TELEPHONE (OUTPATIENT)
Dept: ONCOLOGY | Facility: CLINIC | Age: 57
End: 2017-07-26

## 2017-07-26 VITALS — HEART RATE: 97 BPM | SYSTOLIC BLOOD PRESSURE: 118 MMHG | DIASTOLIC BLOOD PRESSURE: 78 MMHG

## 2017-07-26 DIAGNOSIS — Z09 SURGERY FOLLOW-UP EXAMINATION: Primary | ICD-10-CM

## 2017-07-26 PROCEDURE — 99024 POSTOP FOLLOW-UP VISIT: CPT | Performed by: SURGERY

## 2017-07-26 NOTE — MR AVS SNAPSHOT
After Visit Summary   7/26/2017    Louie Greco    MRN: [id number]           Patient Information     Date Of Birth          1960        Visit Information        Provider Department      7/26/2017 1:15 PM Bryson Ferguson MD [facility] General Surgery      Today's Diagnoses     Surgery follow-up examination    -  1       Follow-ups after your visit        Your next 10 appointments already scheduled     Aug 24, 2017  3:30 PM CDT   Office Visit with Philippe Healy MD   [facility] ([facility])    [address] [facility] [address] [phone]           Bring a current list of meds and any records pertaining to this visit. For Physicals, please bring immunization records and any forms needing to be filled out. Please arrive 10 minutes early to complete paperwork.            Sep 12, 2017  4:00 PM CDT   Level 1 with  INFUSION CHAIR 17   [facility] ([facility])    [facility]  [address] [facility] [address]  [facility] [address] [phone]            Sep 13, 2017   Procedure with Felicitas Radford MD   [facility] ([facility])    [address] [place] [address]   [phone]           Located in the Clinics and Surgery Center at [address] [place] [address].   parking is very convenient and highly recommended.  is a $6 flat rate fee.  Both  and self parkers should enter the main arrival plaza from [facility]; parking attendants will direct you based on your parking preference.            Sep 15, 2017  4:00 PM CDT   Return Visit with Agueda Manley MD   [facility] ([facility])    [facility]  [address] [facility] [address]  [facility] [address] [phone]              Who to contact     If  you have questions or need follow up information about today's clinic visit or your schedule please contact [facility] directly at [phone].  Normal or non-critical lab and imaging results will be communicated to you by MyChart, letter or phone within 4 business days after the clinic has received the results. If you do not hear from us within 7 days, please contact the clinic through [employer] or phone. If you have a critical or abnormal lab result, we will notify you by phone as soon as possible.  Submit refill requests through [employer] or call your pharmacy and they will forward the refill request to us. Please allow 3 business days for your refill to be completed.          Additional Information About Your Visit        FingoorooharBuildingeye Information     [employer] gives you secure access to your electronic health record. If you see a primary care provider, you can also send messages to your care team and make appointments. If you have questions, please call your primary care clinic.  If you do not have a primary care provider, please call [phone] and they will assist you.        Care EveryWhere ID     This is your Care EveryWhere ID. This could be used by other organizations to access your [place] medical records  [id number]        Your Vitals Were     Pulse                   97            Blood Pressure from Last 3 Encounters:   07/26/17 118/78   07/21/17 137/76   07/20/17 120/73    Weight from Last 3 Encounters:   07/21/17 222 lb 1.6 oz (100.7 kg)   07/20/17 222 lb 3.6 oz (100.8 kg)   07/05/17 205 lb (93 kg)              Today, you had the following     No orders found for display       Primary Care Provider    Physician No Ref-Primary       No address on file        Equal Access to Services     [facility]: Hadii trent Alatorre, waluanneda lules, qaybta ronaldaljb maldonado. [facility] [phone].    ATENCIÓN: Si habla español, tiene a mena disposición  servicios gratuitos de asistencia lingüística. Keaton salguero [phone].    We comply with applicable federal civil rights laws and [place] laws. We do not discriminate on the basis of race, color, national origin, age, disability sex, sexual orientation or gender identity.            Thank you!     Thank you for choosing [facility]  for your care. Our goal is always to provide you with excellent care. Hearing back from our patients is one way we can continue to improve our services. Please take a few minutes to complete the written survey that you may receive in the mail after your visit with us. Thank you!             Your Updated Medication List - Protect others around you: Learn how to safely use, store and throw away your medicines at www.disposemymeds.org.          This list is accurate as of: 7/26/17  2:08 PM.  Always use your most recent med list.                   Brand Name Dispense Instructions for use Diagnosis    ASPIRIN PO      Take by mouth as needed for moderate pain        COLACE PO      Take 2 capsules by mouth 2 times daily Unsure of dosage.        dibucaine 1 % Oint ointment    NUPERCAINAL     3 times daily as needed for moderate pain        hydrocortisone 0.5 % ointment      Apply topically 2 times daily as needed Reported on 2/14/2017        ibuprofen 200 MG capsule     120 capsule    Take 200-400 mg by mouth every 6 hours as needed    Acute post-operative pain       levofloxacin 500 MG tablet    LEVAQUIN    7 tablet    Take 1 tablet (500 mg) by mouth daily for 7 days    Sepsis, due to unspecified organism (H)       lidocaine (Anorectal) 5 % Crea     1 Tube    Externally apply 1 Application topically 3 times daily as needed    Anal pain       methylPREDNISolone 32 MG tablet    MEDROL    2 tablet    Take 12 hours prior to CT scan and 2 hours prior to CT scan    Malignant neoplasm of rectum (H), Allergy to contrast media (used for diagnostic x-rays)       oxyCODONE 5 MG IR tablet     ROXICODONE    15 tablet    Take 1-2 tablets (5-10 mg) by mouth every 3 hours as needed for moderate to severe pain    Acute post-operative pain       saccharomyces boulardii 250 MG capsule    FLORASTOR    14 capsule    Take 1 capsule (250 mg) by mouth 2 times daily    Long term (current) use of antibiotics       VITAMIN D (CHOLECALCIFEROL) PO      Take 2,000 Units by mouth daily

## 2017-07-26 NOTE — TELEPHONE ENCOUNTER
7/26/17  I left a message on Louie's VM that [employer] has determined that the cost of genetic testing for his colon cancer test was approved and that he needs to return to clinic for an appointment. I asked him to return my call.    Roxann Guzman MS [facility]  Genetic Counselor  [phone]

## 2017-07-27 ENCOUNTER — ONCOLOGY VISIT (OUTPATIENT)
Dept: ONCOLOGY | Facility: CLINIC | Age: 57
End: 2017-07-27
Attending: GENETIC COUNSELOR, MS
Payer: COMMERCIAL

## 2017-07-27 DIAGNOSIS — Z80.0 FAMILY HISTORY OF MALIGNANT NEOPLASM OF GASTROINTESTINAL TRACT: ICD-10-CM

## 2017-07-27 DIAGNOSIS — C20 MALIGNANT NEOPLASM OF RECTUM (H): Primary | ICD-10-CM

## 2017-07-27 DIAGNOSIS — Z80.9 FAMILY HX-MALIGNANCY: ICD-10-CM

## 2017-07-27 LAB — MISCELLANEOUS TEST: NORMAL

## 2017-07-27 PROCEDURE — 36415 COLL VENOUS BLD VENIPUNCTURE: CPT

## 2017-07-27 PROCEDURE — 40000072 ZZH STATISTIC GENETIC COUNSELING, < 16 MIN: Performed by: GENETIC COUNSELOR, MS

## 2017-07-27 NOTE — MR AVS SNAPSHOT
After Visit Summary   7/27/2017    Louie Greco    MRN: [id number]           Patient Information     Date Of Birth          1960        Visit Information        Provider Department      7/27/2017 1:30 PM Roxann Guzman [facility]        Today's Diagnoses     Malignant neoplasm of rectum (H)    -  1    Family history of malignant neoplasm of gastrointestinal tract        Family hx-malignancy           Follow-ups after your visit        Your next 10 appointments already scheduled     Aug 24, 2017  3:30 PM CDT   Office Visit with Philippe Healy MD   [facility] ([facility])    [address] [facility] [id number] [phone]           Bring a current list of meds and any records pertaining to this visit. For Physicals, please bring immunization records and any forms needing to be filled out. Please arrive 10 minutes early to complete paperwork.            Sep 13, 2017   Procedure with Felicitas Radford MD   [facility] ([facility])    [address] [place] [address]  [address] [facility] [address] [phone]           Located in the Clinics and Surgery Center at [address] [place], [place] [id number].   parking is very convenient and highly recommended.  is a $6 flat rate fee.  Both  and self parkers should enter the main arrival plaza from [facility]; parking attendants will direct you based on your parking preference.            Sep 15, 2017  4:00 PM CDT   Return Visit with Agueda Manley MD   [facility] ([facility])    [facility]  [address] [address] [phone]              Who to contact     If you have questions or need follow up information about today's clinic visit or your schedule please contact [facility] directly at [phone].  Normal or non-critical  lab and imaging results will be communicated to you by MyChart, letter or phone within 4 business days after the clinic has received the results. If you do not hear from us within 7 days, please contact the clinic through MumumÃ­ot or phone. If you have a critical or abnormal lab result, we will notify you by phone as soon as possible.  Submit refill requests through [employer] or call your pharmacy and they will forward the refill request to us. Please allow 3 business days for your refill to be completed.          Additional Information About Your Visit        Neverwarehart Information     [employer] gives you secure access to your electronic health record. If you see a primary care provider, you can also send messages to your care team and make appointments. If you have questions, please call your primary care clinic.  If you do not have a primary care provider, please call [phone] and they will assist you.        Care EveryWhere ID     This is your Care EveryWhere ID. This could be used by other organizations to access your [place] medical records  [id number]         Blood Pressure from Last 3 Encounters:   07/26/17 118/78   07/21/17 137/76   07/20/17 120/73    Weight from Last 3 Encounters:   07/21/17 100.7 kg (222 lb 1.6 oz)   07/20/17 100.8 kg (222 lb 3.6 oz)   07/05/17 93 kg (205 lb)              We Performed the Following     115 network disks ColoNext # [id number]: Laboratory Miscellaneous Order        Primary Care Provider    Physician No Ref-Primary       No address on file        Equal Access to Services     [facility]: Hadii trent Alatorre, waaxda lunancyadaha, qaybta kaalmada marc, jb coats. So [facility] [phone].    ATENCIÓN: Si habla español, tiene a mena disposición servicios gratuitos de asistencia lingüística. Llame al [phone].    We comply with applicable federal civil rights laws and [place] laws. We do not discriminate on the basis of race, color, national  origin, age, disability sex, sexual orientation or gender identity.            Thank you!     Thank you for choosing [facility]  for your care. Our goal is always to provide you with excellent care. Hearing back from our patients is one way we can continue to improve our services. Please take a few minutes to complete the written survey that you may receive in the mail after your visit with us. Thank you!             Your Updated Medication List - Protect others around you: Learn how to safely use, store and throw away your medicines at www.disposemymeds.org.          This list is accurate as of: 7/27/17  1:46 PM.  Always use your most recent med list.                   Brand Name Dispense Instructions for use Diagnosis    ASPIRIN PO      Take by mouth as needed for moderate pain        COLACE PO      Take 2 capsules by mouth 2 times daily Unsure of dosage.        dibucaine 1 % Oint ointment    NUPERCAINAL     3 times daily as needed for moderate pain        hydrocortisone 0.5 % ointment      Apply topically 2 times daily as needed Reported on 2/14/2017        ibuprofen 200 MG capsule     120 capsule    Take 200-400 mg by mouth every 6 hours as needed    Acute post-operative pain       levofloxacin 500 MG tablet    LEVAQUIN    7 tablet    Take 1 tablet (500 mg) by mouth daily for 7 days    Sepsis, due to unspecified organism (H)       lidocaine (Anorectal) 5 % Crea     1 Tube    Externally apply 1 Application topically 3 times daily as needed    Anal pain       methylPREDNISolone 32 MG tablet    MEDROL    2 tablet    Take 12 hours prior to CT scan and 2 hours prior to CT scan    Malignant neoplasm of rectum (H), Allergy to contrast media (used for diagnostic x-rays)       oxyCODONE 5 MG IR tablet    ROXICODONE    15 tablet    Take 1-2 tablets (5-10 mg) by mouth every 3 hours as needed for moderate to severe pain    Acute post-operative pain       saccharomyces boulardii 250 MG capsule     FLORASTOR    14 capsule    Take 1 capsule (250 mg) by mouth 2 times daily    Long term (current) use of antibiotics       VITAMIN D (CHOLECALCIFEROL) PO      Take 2,000 Units by mouth daily

## 2017-07-27 NOTE — PROGRESS NOTES
Medical Assistant Note:  Louie Greco presents today for labs.    Patient seen by provider today: Yes   present during visit today: Not Applicable.    Concerns: No Concerns.    Procedure:  Lab draw site: RAC, Needle type: BF, Gauge: 21.    Post Assessment:  Labs drawn without difficulty: Yes.    Discharge Plan:  Departure Mode: Ambulatory.    Face to Face Time: 5 minutes.    Bonnie Rodas MA

## 2017-07-27 NOTE — PROGRESS NOTES
7/27/2017    Referring Provider: Agueda Manley MD    Presenting Information:   Louie Greco came in to clinic for a follow-up visit today. He had previously been seen in the Cancer Risk Management Program at [facility] on 3/30/17. Louie came to clinic today to have his blood drawn for the [facility]oNext panel at [facility]. This panel looks for mutations in genes associated with colon cancer. At the initial appointment, Louie wanted me to verify the cost of the test before he had his blood drawn.  The [facility] said that there would be no out of pocket cost to Louie for this test.     Discussion:    We previously reviewed the details of Louie's family and personal history. See note from previous appointment for details.     Louie was recently hospitalized for appendicitis (July 2017). A sessile serrated adenoma was identified in his appendix.      Consent was obtained for the ColoNext panel of genes (APC, BMPR1A, CDH1, CHEK2, GREM1, EPCAM, MLH1, MSH2, MSH6, MUTYH, PMS2, POLD1, POLE, PTEN, SMAD4, STK11, and TP53) and genetic testing was sent to [facility].     The information from this test may determine the frequency of colon  cancer screening recommendations as well as potential screening for other cancers.  These recommendations will be discussed in detail once genetic testing is completed. The type and frequency of screening recommendations is dependent on the gene with a mutation.    Plan:  1. All of Louie's questions were answered.   2. Louie signed a consent form at the conclusion of this visit and had his blood drawn for the ColoNext test for hereditary colon cancer.  3.  I will call Louie to discuss the results.   Turn around time: 4-5 weeks.    Face to face time 6 minutes.    Roxann Guzman MS [facility]  Genetic Counselor  [phone]

## 2017-07-28 ENCOUNTER — CARE COORDINATION (OUTPATIENT)
Dept: SURGERY | Facility: CLINIC | Age: 57
End: 2017-07-28

## 2017-07-28 DIAGNOSIS — C20 RECTAL CANCER (H): Primary | ICD-10-CM

## 2017-07-28 RX ORDER — SODIUM, POTASSIUM,MAG SULFATES 17.5-3.13G
1 SOLUTION, RECONSTITUTED, ORAL ORAL 2 TIMES DAILY
Qty: 2 BOTTLE | Refills: 0 | Status: SHIPPED | OUTPATIENT
Start: 2017-07-28 | End: 2017-11-30

## 2017-08-01 NOTE — PROGRESS NOTES
First postoperative visit for Mr. Greco after laparoscopic abdominal exploration and appendectomy.  He is doing quite well.  He is returning to work as scheduled in the upcoming weeks.    On exam his incisions are healing nicely, there is no evidence of infection or hernia.  The left lower quadrant abdominal rash is almost gone.    Status post laparoscopic appendectomy   Completely resected serrated adenoma was found within the appendix.  He is having his ongoing follow-up for rectal cancer.  He is also having ongoing follow-up for previous colorectal polyps, he understands the importance of this.    He will return to see us as needed, if question was please call us.    Please route or send letter to:  Primary Care Provider (PCP), Referring Provider, Include Op Note, Include Path and Include Progress Note

## 2017-08-11 ENCOUNTER — TELEPHONE (OUTPATIENT)
Dept: ONCOLOGY | Facility: CLINIC | Age: 57
End: 2017-08-11

## 2017-08-11 ENCOUNTER — DOCUMENTATION ONLY (OUTPATIENT)
Dept: SURGERY | Facility: CLINIC | Age: 57
End: 2017-08-11

## 2017-08-11 DIAGNOSIS — C20 MALIGNANT NEOPLASM OF RECTUM (H): ICD-10-CM

## 2017-08-11 DIAGNOSIS — Z91.041 ALLERGY TO CONTRAST MEDIA (USED FOR DIAGNOSTIC X-RAYS): ICD-10-CM

## 2017-08-11 RX ORDER — METHYLPREDNISOLONE 32 MG/1
TABLET ORAL
Qty: 2 TABLET | Refills: 1 | Status: SHIPPED | OUTPATIENT
Start: 2017-08-11 | End: 2017-09-15

## 2017-08-11 NOTE — TELEPHONE ENCOUNTER
Patient called left message on his voice mail that he can  solumedrol prior to his scan/MRI at [facility]. Juani Mcgrath RN

## 2017-08-11 NOTE — PROGRESS NOTES
I let Louie know in a [email or web address] message that he could contact Dr. Healy's office to schedule a pre-op. I called their clinic to see if he had done that. They updated his appointment with Dr. Healy to include a pre-op.

## 2017-08-15 ENCOUNTER — TELEPHONE (OUTPATIENT)
Dept: ONCOLOGY | Facility: CLINIC | Age: 57
End: 2017-08-15

## 2017-08-15 NOTE — TELEPHONE ENCOUNTER
"8/15/2017    Referring Provider: Agueda Manley MD    Presenting Information:  I spoke to Louie by phone today to discuss his genetic testing results. His blood was drawn on 7/27/17. The ColoNext Panel for hereditary colon cancer was ordered  from [facility]. This testing was done because of Louie's personal history of rectal cancer at age 56 and family history of other cancers.    Genetic Testing Result: NEGATIVE  Louie is negative for mutations in the APC, BMPR1A, CDH1, CHEK2, EPCAM, GREM1, MLH1, MSH2, MSH6, MUTYH, PMS2, POLD1, POLE, PTEN, SMAD4, STK11, and TP53 genes.    No mutations were found in any of the 17 genes analyzed.  This test involved sequencing and deletion/duplication analysis of all genes with the exceptions of EPCAM and GREM1 (deletions and duplications only).    Testing did not detect an identifiable mutation associated with Cunningham syndrome (MLH1, MSH2, MSH6, PMS2, EPCAM), Familial Adenomatous Polyposis (APC), Hereditary Diffuse Gastric Cancer (CDH1), Juvenile Polyposis syndrome (BMPR1A, SMAD4), Cowden syndrome (PTEN), Li Fraumeni syndrome (TP53), Peutz-Jeghers syndrome (STK11), MUTYH Associated Polyposis (MUTYH).     A copy of the test report can be found in the Laboratory tab, dated 7/27/17, and named \"[facility]\". The report is scanned in as a linked document.    Interpretation:  We discussed several different interpretations of this negative test result.    1. One explanation may be that there is a different gene or combination of genes and environment that are associated with the cancer in Louie and  his relatives.    2. There is also a small possibility that there is a mutation in one of these genes, and we could not find it with our current testing methods.     3. It is also possible that Makennas cancer is not associated with a hereditary component.    Screening:  Based on this negative test result, it is important for Louie and his relatives to refer back to the family " history for appropriate cancer screening.      Louie's daughter should begin her colon cancer screening at age 40 because of Louie's diagnosis of rectal cancer.     Inheritance:  We reviewed the autosomal dominant inheritance of the APC, BMPR1A, CDH1, CHEK2, EPCAM, GREM1, MLH1, MSH2, MSH6, MUTYH, PMS2, POLD1, POLE, PTEN, SMAD4, STK11, and TP53 genes.  We discussed that Louie did not pass on an identifiable mutation in these genes to his daughter based on this test result.  Mutations in these gene do not skip generations.      Summary:  We do not have an explanation for Louie's rectal cancer or his family history of cancer.   Genetic testing is rapidly advancing, and new cancer susceptibility genes will most likely be identified in the future.  Therefore, I encouraged Louie to contact me if there are changes in his personal or family history.  This may change how we assess his cancer risk, screening, and the testing we would offer.    Plan:  1.  I will mail Louie  a copy of his test results.  2. He plans to follow-up with Dr. Manley.  3. He should contact me  if his family history changes.    If Louie has any further questions, I encouraged him to contact me at .  Roxann Guzman MS [facility]  Genetic Counselor  [phone]

## 2017-08-20 LAB — LAB SCANNED RESULT: NORMAL

## 2017-08-24 ENCOUNTER — OFFICE VISIT (OUTPATIENT)
Dept: FAMILY MEDICINE | Facility: CLINIC | Age: 57
End: 2017-08-24
Payer: COMMERCIAL

## 2017-08-24 VITALS
DIASTOLIC BLOOD PRESSURE: 66 MMHG | WEIGHT: 201 LBS | RESPIRATION RATE: 18 BRPM | OXYGEN SATURATION: 97 % | HEIGHT: 70 IN | SYSTOLIC BLOOD PRESSURE: 110 MMHG | BODY MASS INDEX: 28.77 KG/M2 | HEART RATE: 89 BPM | TEMPERATURE: 97.8 F

## 2017-08-24 DIAGNOSIS — C20 MALIGNANT NEOPLASM OF RECTUM (H): ICD-10-CM

## 2017-08-24 DIAGNOSIS — Z12.5 SCREENING FOR PROSTATE CANCER: ICD-10-CM

## 2017-08-24 DIAGNOSIS — Z01.818 PREOP GENERAL PHYSICAL EXAM: Primary | ICD-10-CM

## 2017-08-24 DIAGNOSIS — N28.89 RENAL MASS: ICD-10-CM

## 2017-08-24 DIAGNOSIS — Z13.220 SCREENING FOR LIPID DISORDERS: ICD-10-CM

## 2017-08-24 PROCEDURE — 99214 OFFICE O/P EST MOD 30 MIN: CPT | Performed by: INTERNAL MEDICINE

## 2017-08-24 NOTE — NURSING NOTE
"Chief Complaint   Patient presents with     Pre-Op Exam       Initial /66 (BP Location: Right arm, Patient Position: Chair, Cuff Size: Adult Large)  Pulse 89  Temp 97.8  F (36.6  C) (Oral)  Resp 18  Ht 5' 10\" (1.778 m)  Wt 201 lb (91.2 kg)  SpO2 97%  BMI 28.84 kg/m2 Estimated body mass index is 28.84 kg/(m^2) as calculated from the following:    Height as of this encounter: 5' 10\" (1.778 m).    Weight as of this encounter: 201 lb (91.2 kg).  Medication Reconciliation: complete   MaryJ ane Branch CMA (AAMA)      "

## 2017-08-24 NOTE — PROGRESS NOTES
[address] [address]  [phone]  Dept: [phone]    PRE-OP EVALUATION:  Today's date: 2017    Louie Greco (: 1960) presents for pre-operative evaluation assessment as requested by Dr. Ardon.  He requires evaluation and anesthesia risk assessment prior to undergoing surgery/procedure for treatment of Colonoscopy Under Anesthesia .  Proposed procedure: Colonoscopy under Anesthesia    Date of Surgery/ Procedure: 2017  Time of Surgery/ Procedure: 7:15  Hospital/Surgical Facility:  OR  Primary Physician: No Ref-Primary, Physician  Type of Anesthesia Anticipated: to be determined    Patient has a Health Care Directive or Living Will:  YES     1. NO - Do you have a history of heart attack, stroke, stent, bypass or surgery on an artery in the head, neck, heart or legs?  2. NO - Do you ever have any pain or discomfort in your chest?  3. NO - Do you have a history of  Heart Failure?  4. NO - Are you troubled by shortness of breath when: walking on the level, up a slight hill or at night?  5. NO - Do you currently have a cold, bronchitis or other respiratory infection?  6. NO - Do you have a cough, shortness of breath or wheezing?  7. NO - Do you sometimes get pains in the calves of your legs when you walk?  8. NO - Do you or anyone in your family have previous history of blood clots?  9. NO - Do you or does anyone in your family have a serious bleeding problem such as prolonged bleeding following surgeries or cuts?  10. NO - Have you ever had problems with anemia or been told to take iron pills?  11. NO - Have you had any abnormal blood loss such as black, tarry or bloody stools, or abnormal vaginal bleeding?  12. NO - Have you ever had a blood transfusion?  13. NO - Have you or any of your relatives ever had problems with anesthesia?  14. NO - Do you have sleep apnea, excessive snoring or daytime drowsiness?  15. NO - Do you have any prosthetic heart  valves?  16. NO - Do you have prosthetic joints?  17. NO - Is there any chance that you may be pregnant?        HPI:                                                      Brief HPI related to upcoming procedure: Needs anesthesia for colonoscopy due radiation therapy in the past.    He can perform 4 METS of physical activity without chest pain or dyspnea.    MEDICAL HISTORY:                                                    Patient Active Problem List    Diagnosis Date Noted     Appendicitis 07/17/2017     Priority: Medium     Chemotherapy-induced neutropenia (H) 03/21/2017     Priority: Medium     Advance Care Planning 03/09/2017     Priority: Medium     Advance Care Planning 3/9/2017: Receipt of ACP document:  Received: invalid HCD document dated 12/8/2016.  Document not previously scanned. Validation form completed indicating invalid document. Copy sent to client with information and facilitation resources. Validation form sent to be scanned as notation of invalid document received.  Code Status needs to be updated to reflect choices. Confirmed/documented designated decision maker(s).  Added by Araceli Horne MSW Advance Care Planning Liaison with [employer].       Malignant neoplasm of rectum (H) 01/03/2017     Priority: Medium     Rectal bleeding 07/19/2016     Priority: Medium     Plantar fasciitis 11/04/2010     Priority: Medium     Pes anserinus tendinitis 02/08/2010     Priority: Medium      Past Medical History:   Diagnosis Date     Childhood asthma      Nephrolithiasis     1990     Rectal cancer (H)     low rectal cancer     Past Surgical History:   Procedure Laterality Date     COLONOSCOPY N/A 7/27/2016    Procedure: COMBINED COLONOSCOPY, SINGLE OR MULTIPLE BIOPSY/POLYPECTOMY BY BIOPSY;  Surgeon: Chelsea Thompson MD;  Location:  GI     EXAM UNDER ANESTHESIA ANUS N/A 7/5/2017    Procedure: EXAM UNDER ANESTHESIA ANUS;  Examination Under Anesthesia, flex sigmoidoscopy with biopsies and formalin  application;  Surgeon: Felicitas Radford MD;  [facility]     HC TOOTH EXTRACTION W/FORCEP       LAPAROSCOPIC APPENDECTOMY N/A 7/17/2017    Procedure: LAPAROSCOPIC APPENDECTOMY;  LAPAROSCOPIC APPENDECTOMY;  Surgeon: Bryson Ferguson MD;  Location: [facility] OR     SIGMOIDOSCOPY FLEXIBLE N/A 12/8/2016    Procedure: SIGMOIDOSCOPY FLEXIBLE;  Surgeon: Felicitas Radford MD;  [facility] OR     SIGMOIDOSCOPY FLEXIBLE N/A 7/5/2017    Procedure: SIGMOIDOSCOPY FLEXIBLE;;  Surgeon: Felicitas Radford MD;  [facility]: [facility] OR     VASCULAR SURGERY      Right chest port     VASECTOMY       Current Outpatient Prescriptions   Medication Sig Dispense Refill     methylPREDNISolone (MEDROL) 32 MG tablet Take 12 hours prior to CT scan and 2 hours prior to CT scan 2 tablet 1     Na Sulfate-K Sulfate-Mg Sulf (SUPREP BOWEL PREP KIT) solution Take 177 mLs (1 Bottle) by mouth 2 times daily 2 Bottle 0     ibuprofen 200 MG capsule Take 200-400 mg by mouth every 6 hours as needed 120 capsule 0     ASPIRIN PO Take by mouth as needed for moderate pain       lidocaine, Anorectal, 5 % CREA Externally apply 1 Application topically 3 times daily as needed 1 Tube 0     VITAMIN D, CHOLECALCIFEROL, PO Take 2,000 Units by mouth daily        dibucaine (NUPERCAINAL) 1 % OINT ointment 3 times daily as needed for moderate pain       hydrocortisone 0.5 % ointment Apply topically 2 times daily as needed Reported on 2/14/2017         Allergies   Allergen Reactions     Amoxicillin      Ampicillin Diarrhea     Demerol [Meperidine]      Nausea           Social History   Substance Use Topics     Smoking status: Never Smoker     Smokeless tobacco: Never Used     Alcohol use 0.0 oz/week      Comment: Very moderate     History   Drug Use No       REVIEW OF SYSTEMS:                                                    Constitutional, neuro, ENT, endocrine, pulmonary, cardiac, gastrointestinal (recent appendectomy; polyp was noted in appendix which is  "why he is having colonoscopy), genitourinary, musculoskeletal, integument and psychiatric systems are negative, except as otherwise noted.      EXAM:                                                    /66 (BP Location: Right arm, Patient Position: Chair, Cuff Size: Adult Large)  Pulse 89  Temp 97.8  F (36.6  C) (Oral)  Resp 18  Ht 5' 10\" (1.778 m)  Wt 201 lb (91.2 kg)  SpO2 97%  BMI 28.84 kg/m2    GENERAL APPEARANCE: healthy, alert and no distress     EYES: EOMI,  PERRL     HENT: ear canals and TM's normal and nose and mouth without ulcers or lesions     NECK: no adenopathy, no asymmetry, masses, or scars and thyroid normal to palpation     RESP: lungs clear to auscultation - no rales, rhonchi or wheezes     CV: regular rates and rhythm, normal S1 S2, no S3 or S4 and no murmur, click or rub     ABDOMEN:  soft, nontender, no HSM or masses and bowel sounds normal; no rash on abdomen     MS: extremities normal- no gross deformities noted, no evidence of inflammation in joints, FROM in all extremities.     SKIN: no suspicious lesions or rashes     NEURO: Normal strength and tone, sensory exam grossly normal, mentation intact and speech normal     PSYCH: mentation appears normal. and affect normal/bright     LYMPHATICS: No axillary, cervical, or supraclavicular nodes    DIAGNOSTICS:                                                    No labs or EKG required for low risk surgery (cataract, skin procedure, breast biopsy, etc)    Recent Labs   Lab Test  07/20/17   0700  07/19/17   0725  07/18/17   0630  07/17/17   0705  07/16/17   2213  07/10/17   0943   01/16/17   0800   HGB   --    --   9.6*  11.7*  11.7*  12.8*   < >  15.1   PLT   --    --   122*  122*  181  170   < >  207   INR   --    --    --    --    --   1.02   --   0.92   NA   --    --   140   --   133   --    < >   --    POTASSIUM   --    --   4.2   --   4.0   --    < >   --    CR  0.68  0.77  0.84  0.78  0.88   --    < >   --     < > = values in this " interval not displayed.        IMPRESSION:                                                    Reason for surgery/procedure: Colonoscopy for surveillance for colon cancer   Diagnosis/reason for consult: Preoperative evaluation     The proposed surgical procedure is considered LOW risk.    REVISED CARDIAC RISK INDEX  The patient has the following serious cardiovascular risks for perioperative complications such as (MI, PE, VFib and 3  AV Block):  No serious cardiac risks  INTERPRETATION: 0 risks: Class I (very low risk - 0.4% complication rate)    The patient has the following additional risks for perioperative complications:  No identified additional risks      ICD-10-CM    1. Preop general physical exam Z01.818    2. Malignant neoplasm of rectum (H) C20    3. Screening for lipid disorders Z13.220 **Lipid panel reflex to direct LDL FUTURE anytime   4. Screening for prostate cancer Z12.5 PSA, screen   5. Renal mass N28.89 MR Abdomen w/o & w Contrast     He will have screening labs with his next lab draw at the [facility]  I recommended an MRI of the left kidney cyst and I ordered one  RECOMMENDATIONS:                                                      --Patient is to take all scheduled medications on the day of surgery    APPROVAL GIVEN to proceed with proposed procedure, without further diagnostic evaluation       Signed Electronically by: Philippe Healy MD    Copy of this evaluation report is provided to requesting physician.    [place] Preop Guidelines

## 2017-08-24 NOTE — MR AVS SNAPSHOT
After Visit Summary   8/24/2017    Louie Greco    MRN: [id number]           Patient Information     Date Of Birth          1960        Visit Information        Provider Department      8/24/2017 3:30 PM Philippe Healy MD [facility]        Today's Diagnoses     Preop general physical exam    -  1    Malignant neoplasm of rectum (H)        Screening for lipid disorders        Screening for prostate cancer        Renal mass          Care Instructions      Before Your Surgery      Call your surgeon if there is any change in your health. This includes signs of a cold or flu (such as a sore throat, runny nose, cough, rash or fever).    Do not smoke, drink alcohol or take over the counter medicine (unless your surgeon or primary care doctor tells you to) for the 24 hours before and after surgery.    If you take prescribed drugs: Follow your doctor s orders about which medicines to take and which to stop until after surgery.    Eating and drinking prior to surgery: follow the instructions from your surgeon    Take a shower or bath the night before surgery. Use the soap your surgeon gave you to gently clean your skin. If you do not have soap from your surgeon, use your regular soap. Do not shave or scrub the surgery site.  Wear clean pajamas and have clean sheets on your bed.           Follow-ups after your visit        Follow-up notes from your care team     Return in about 1 year (around 8/24/2018) for Routine Visit.      Your next 10 appointments already scheduled     Sep 13, 2017  6:30 AM CDT   LAB with ACUTE CARE LAB [facility], [place], Lab ([facility], [facility])    [address] [place] [id number]              Patient must bring picture ID. Patient should be prepared to give a urine specimen  Please do not eat 10-12 hours before your appointment if you are coming in fasting for labs on lipids, cholesterol, or glucose (sugar). Pregnant women  should follow their Care Team instructions. Water with medications is okay. Do not drink coffee or other fluids. If you have concerns about taking  your medications, please ask at office or if scheduling via [employer], send a message by clicking on Secure Messaging, Message Your Care Team.            Sep 13, 2017   Procedure with Felicitas Radford MD   [facility] ([facility])    [address]  5th Floor  [facility] [address]   [phone]           Located in the Clinics and Surgery Center at [address] [place] [address], [place] [address].   parking is very convenient and highly recommended.  is a $6 flat rate fee.  Both  and self parkers should enter the main arrival plaza from [facility]; parking attendants will direct you based on your parking preference.            Sep 15, 2017  4:00 PM CDT   Return Visit with Agueda Manley MD   [facility] ([facility])    [facility]  [address] [facility] [address]  [facility] [address]   [phone]            Jan 26, 2018  2:15 PM CST   PE NPET ONCOLOGY (EYES TO THIGHS) with UUPET1   [facility] PET CT ([facility])    [address] [facility] [address] [facility] [id number] [phone]           Tell your doctor:   If there is any chance you may be pregnant or if you are breastfeeding.   If you have problems lying in small spaces (claustrophobia). If you do, your doctor may give you medicine to help you relax. If you have diabetes:   Have your exam early in the morning. Your blood glucose will go up as the day goes by.   Your glucose level must be 180 or less at the start of the exam. Please take any medicines you need to ensure this blood glucose level. 24 hours before your scan: Don t do any heavy exercise. (No jogging, aerobics or other workouts.) Exercise will make your pictures less accurate. 6  hours before your scan:   Stop all food and liquids (except water).   Do not chew gum or suck on mints.   If you need to take medicine with food, you may take it with a few crackers.  Please call your Imaging Department at your exam site with any questions.            Jan 26, 2018  5:00 PM CST   MR PELVIS W/O & W CONTRAST with UUMR1   [facility] [place], MRI ([facility])    [address] [facility] [facility] [id number] [phone]           Take your medicines as usual, unless your doctor tells you not to. Bring a list of your current medicines to your exam (including vitamins, minerals and over-the-counter drugs).  You will be given intravenous contrast for this exam. To prepare:   The day before your exam, drink extra fluids at least six 8-ounce glasses (unless your doctor tells you to restrict your fluids).   Have a blood test (creatinine test) within 30 days of your exam. Go to your clinic or Diagnostic Imaging Department for this test.  The MRI machine uses a strong magnet. Please wear clothes without metal (snaps, zippers). A sweatsuit works well, or we may give you a hospital gown.  Please remove any body piercings and hair extensions before you arrive. You will also remove watches, jewelry, hairpins, wallets, dentures, partial dental plates and hearing aids. You may wear contact lenses, and you may be able to wear your rings. We have a safe place to keep your personal items, but it is safer to leave them at home.   **IMPORTANT** THE INSTRUCTIONS BELOW ARE ONLY FOR THOSE PATIENTS WHO HAVE BEEN TOLD THEY WILL RECEIVE SEDATION OR GENERAL ANESTHESIA DURING THEIR MRI PROCEDURE:  IF YOU WILL RECEIVE SEDATION (take medicine to help you relax during your exam):   You must get the medicine from your doctor before you arrive. Bring the medicine to the exam. Do not take it at home.   Arrive one hour early. Bring someone who can take you home after the test. Your  medicine will make you sleepy. After the exam, you may not drive, take a bus or take a taxi by yourself.   No eating 8 hours before your exam. You may have clear liquids up until 4 hours before your exam. (Clear liquids include water, clear tea, black coffee and fruit juice without pulp.)  IF YOU WILL RECEIVE ANESTHESIA (be asleep for your exam):   Arrive 1 1/2 hours early. Bring someone who can take you home after the test. You may not drive, take a bus or take a taxi by yourself.   No eating 8 hours before your exam. You may have clear liquids up until 4 hours before your exam. (Clear liquids include water, clear tea, black coffee and fruit juice without pulp.)  Please call the Imaging Department at your exam site with any questions.            Jan 26, 2018  6:30 PM CST   LAB with ACUTE CARE LAB [facility] [place], Lab ([facility])    [address] [facility] [address]              Patient must bring picture ID. Patient should be prepared to give a urine specimen  Please do not eat 10-12 hours before your appointment if you are coming in fasting for labs on lipids, cholesterol, or glucose (sugar). Pregnant women should follow their Care Team instructions. Water with medications is okay. Do not drink coffee or other fluids. If you have concerns about taking  your medications, please ask at office or if scheduling via [employer], send a message by clicking on Secure Messaging, Message Your Care Team.            Feb 02, 2018  4:00 PM CST   Return Visit with Agueda Manley MD   [facility] ([facility])    [facility]  [address] [place] [address]  [facility] [address]   [phone]              Future tests that were ordered for you today     Open Future Orders        Priority Expected Expires Ordered    MR Abdomen w/o & w Contrast Routine  8/24/2018 8/24/2017    PSA, screen Routine  8/24/2018 8/24/2017     "**Lipid panel reflex to direct LDL FUTURE anytime Routine 8/24/2017 8/24/2018 8/24/2017            Who to contact     If you have questions or need follow up information about today's clinic visit or your schedule please contact [facility] directly at [phone].  Normal or non-critical lab and imaging results will be communicated to you by MyChart, letter or phone within 4 business days after the clinic has received the results. If you do not hear from us within 7 days, please contact the clinic through [employer] or phone. If you have a critical or abnormal lab result, we will notify you by phone as soon as possible.  Submit refill requests through [employer] or call your pharmacy and they will forward the refill request to us. Please allow 3 business days for your refill to be completed.          Additional Information About Your Visit        MyChart Information     [employer] gives you secure access to your electronic health record. If you see a primary care provider, you can also send messages to your care team and make appointments. If you have questions, please call your primary care clinic.  If you do not have a primary care provider, please call [phone] and they will assist you.        Care EveryWhere ID     This is your Care EveryWhere ID. This could be used by other organizations to access your [facility] medical records  [id number]        Your Vitals Were     Pulse Temperature Respirations Height Pulse Oximetry BMI (Body Mass Index)    89 97.8  F (36.6  C) (Oral) 18 5' 10\" (1.778 m) 97% 28.84 kg/m2       Blood Pressure from Last 3 Encounters:   08/24/17 110/66   07/26/17 118/78   07/21/17 137/76    Weight from Last 3 Encounters:   08/24/17 201 lb (91.2 kg)   07/21/17 222 lb 1.6 oz (100.7 kg)   07/20/17 222 lb 3.6 oz (100.8 kg)               Primary Care Provider    Physician No Ref-Primary       No address on file        Equal Access to Services     BLAISE EDDY AH: João Alatorre, " waluanneda lunancyadaha, qaybta kaalmada marc, jb lópezaaarmond ah. So [facility] [phone].    ATENCIÓN: Si jessica garcia, tiene a mena disposición servicios gratuitos de asistencia lingüística. Keaton al [phone].    We comply with applicable federal civil rights laws and [place] laws. We do not discriminate on the basis of race, color, national origin, age, disability sex, sexual orientation or gender identity.            Thank you!     Thank you for choosing [facility]  for your care. Our goal is always to provide you with excellent care. Hearing back from our patients is one way we can continue to improve our services. Please take a few minutes to complete the written survey that you may receive in the mail after your visit with us. Thank you!             Your Updated Medication List - Protect others around you: Learn how to safely use, store and throw away your medicines at www.disposemymeds.org.          This list is accurate as of: 8/24/17  4:30 PM.  Always use your most recent med list.                   Brand Name Dispense Instructions for use Diagnosis    ASPIRIN PO      Take by mouth as needed for moderate pain        dibucaine 1 % Oint ointment    NUPERCAINAL     3 times daily as needed for moderate pain        hydrocortisone 0.5 % ointment      Apply topically 2 times daily as needed Reported on 2/14/2017        ibuprofen 200 MG capsule     120 capsule    Take 200-400 mg by mouth every 6 hours as needed    Acute post-operative pain       lidocaine (Anorectal) 5 % Crea     1 Tube    Externally apply 1 Application topically 3 times daily as needed    Anal pain       methylPREDNISolone 32 MG tablet    MEDROL    2 tablet    Take 12 hours prior to CT scan and 2 hours prior to CT scan    Malignant neoplasm of rectum (H), Allergy to contrast media (used for diagnostic x-rays)       Na Sulfate-K Sulfate-Mg Sulf solution    SUPREP BOWEL PREP KIT    2 Bottle    Take 177 mLs (1  Bottle) by mouth 2 times daily    Rectal cancer (H)       VITAMIN D (CHOLECALCIFEROL) PO      Take 2,000 Units by mouth daily

## 2017-08-25 ENCOUNTER — HOSPITAL ENCOUNTER (OUTPATIENT)
Dept: MRI IMAGING | Facility: CLINIC | Age: 57
Discharge: HOME | End: 2017-08-25
Attending: INTERNAL MEDICINE | Admitting: INTERNAL MEDICINE
Payer: COMMERCIAL

## 2017-08-25 DIAGNOSIS — N28.89 RENAL MASS: ICD-10-CM

## 2017-08-25 PROCEDURE — A9585 GADOBUTROL INJECTION: HCPCS | Performed by: INTERNAL MEDICINE

## 2017-08-25 PROCEDURE — 25000128 H RX IP 250 OP 636: Performed by: INTERNAL MEDICINE

## 2017-08-25 PROCEDURE — 74183 MRI ABD W/O CNTR FLWD CNTR: CPT

## 2017-08-25 RX ORDER — GADOBUTROL 604.72 MG/ML
20 INJECTION INTRAVENOUS ONCE
Status: COMPLETED | OUTPATIENT
Start: 2017-08-25 | End: 2017-08-25

## 2017-08-25 RX ADMIN — GADOBUTROL 20 ML: 604.72 INJECTION INTRAVENOUS at 20:09

## 2017-08-29 NOTE — PROGRESS NOTES
The following letter pertains to your most recent diagnostic tests:    Good news! Your kidney cysts that were noted incidentally on your recent CT scan are demonstrated to be non-cancerous and harmless cysts on MRI.  You don't have to worry about these.              Sincerely,    Dr. Healy

## 2017-09-08 ENCOUNTER — TELEPHONE (OUTPATIENT)
Dept: ONCOLOGY | Facility: CLINIC | Age: 57
End: 2017-09-08

## 2017-09-08 NOTE — TELEPHONE ENCOUNTER
Fernie for the patient to call clinic.  He needs labs prior to seeing Dr. Manley next Friday 09/15/17.  Taylor Adams MA

## 2017-09-11 NOTE — TELEPHONE ENCOUNTER
The patient called back he will have labs drawn at the hospital when he gets his colonoscopy.  Taylor Adams MA

## 2017-09-12 ENCOUNTER — ANESTHESIA EVENT (OUTPATIENT)
Dept: SURGERY | Facility: AMBULATORY SURGERY CENTER | Age: 57
End: 2017-09-12

## 2017-09-13 ENCOUNTER — HOSPITAL ENCOUNTER (OUTPATIENT)
Facility: AMBULATORY SURGERY CENTER | Age: 57
End: 2017-09-13
Attending: COLON & RECTAL SURGERY

## 2017-09-13 ENCOUNTER — ANESTHESIA (OUTPATIENT)
Dept: SURGERY | Facility: AMBULATORY SURGERY CENTER | Age: 57
End: 2017-09-13

## 2017-09-13 ENCOUNTER — SURGERY (OUTPATIENT)
Age: 57
End: 2017-09-13

## 2017-09-13 ENCOUNTER — TELEPHONE (OUTPATIENT)
Dept: ONCOLOGY | Facility: CLINIC | Age: 57
End: 2017-09-13

## 2017-09-13 VITALS
HEIGHT: 71 IN | DIASTOLIC BLOOD PRESSURE: 76 MMHG | RESPIRATION RATE: 16 BRPM | WEIGHT: 200 LBS | SYSTOLIC BLOOD PRESSURE: 115 MMHG | OXYGEN SATURATION: 95 % | HEART RATE: 83 BPM | BODY MASS INDEX: 28 KG/M2 | TEMPERATURE: 97.6 F

## 2017-09-13 DIAGNOSIS — Z13.220 SCREENING FOR LIPID DISORDERS: ICD-10-CM

## 2017-09-13 DIAGNOSIS — C20 MALIGNANT NEOPLASM OF RECTUM (H): ICD-10-CM

## 2017-09-13 DIAGNOSIS — Z12.5 SCREENING FOR PROSTATE CANCER: ICD-10-CM

## 2017-09-13 LAB
ALBUMIN SERPL-MCNC: 4 G/DL (ref 3.4–5)
ALP SERPL-CCNC: 102 U/L (ref 40–150)
ALT SERPL W P-5'-P-CCNC: 47 U/L (ref 0–70)
ANION GAP SERPL CALCULATED.3IONS-SCNC: 7 MMOL/L (ref 3–14)
AST SERPL W P-5'-P-CCNC: 33 U/L (ref 0–45)
BASOPHILS # BLD AUTO: 0 10E9/L (ref 0–0.2)
BASOPHILS NFR BLD AUTO: 0.7 %
BILIRUB SERPL-MCNC: 2 MG/DL (ref 0.2–1.3)
BUN SERPL-MCNC: 16 MG/DL (ref 7–30)
CALCIUM SERPL-MCNC: 9.2 MG/DL (ref 8.5–10.1)
CEA SERPL-MCNC: 0.7 UG/L (ref 0–2.5)
CHLORIDE SERPL-SCNC: 104 MMOL/L (ref 94–109)
CHOLEST SERPL-MCNC: 154 MG/DL
CO2 SERPL-SCNC: 25 MMOL/L (ref 20–32)
CREAT SERPL-MCNC: 0.74 MG/DL (ref 0.66–1.25)
DIFFERENTIAL METHOD BLD: ABNORMAL
EOSINOPHIL # BLD AUTO: 0.2 10E9/L (ref 0–0.7)
EOSINOPHIL NFR BLD AUTO: 5 %
ERYTHROCYTE [DISTWIDTH] IN BLOOD BY AUTOMATED COUNT: 13.3 % (ref 10–15)
GFR SERPL CREATININE-BSD FRML MDRD: >90 ML/MIN/1.7M2
GLUCOSE SERPL-MCNC: 97 MG/DL (ref 70–99)
HCT VFR BLD AUTO: 41.9 % (ref 40–53)
HDLC SERPL-MCNC: 44 MG/DL
HGB BLD-MCNC: 14.4 G/DL (ref 13.3–17.7)
IMM GRANULOCYTES # BLD: 0 10E9/L (ref 0–0.4)
IMM GRANULOCYTES NFR BLD: 0.2 %
LDLC SERPL CALC-MCNC: 95 MG/DL
LYMPHOCYTES # BLD AUTO: 0.6 10E9/L (ref 0.8–5.3)
LYMPHOCYTES NFR BLD AUTO: 13.3 %
MCH RBC QN AUTO: 29.3 PG (ref 26.5–33)
MCHC RBC AUTO-ENTMCNC: 34.4 G/DL (ref 31.5–36.5)
MCV RBC AUTO: 85 FL (ref 78–100)
MONOCYTES # BLD AUTO: 0.6 10E9/L (ref 0–1.3)
MONOCYTES NFR BLD AUTO: 13.6 %
NEUTROPHILS # BLD AUTO: 2.8 10E9/L (ref 1.6–8.3)
NEUTROPHILS NFR BLD AUTO: 67.2 %
NONHDLC SERPL-MCNC: 111 MG/DL
NRBC # BLD AUTO: 0 10*3/UL
NRBC BLD AUTO-RTO: 0 /100
PLATELET # BLD AUTO: 168 10E9/L (ref 150–450)
POTASSIUM SERPL-SCNC: 3.8 MMOL/L (ref 3.4–5.3)
PROT SERPL-MCNC: 7.7 G/DL (ref 6.8–8.8)
PSA SERPL-ACNC: 5.46 UG/L (ref 0–4)
RBC # BLD AUTO: 4.91 10E12/L (ref 4.4–5.9)
SODIUM SERPL-SCNC: 136 MMOL/L (ref 133–144)
TRIGL SERPL-MCNC: 77 MG/DL
WBC # BLD AUTO: 4.2 10E9/L (ref 4–11)

## 2017-09-13 PROCEDURE — 80061 LIPID PANEL: CPT | Performed by: INTERNAL MEDICINE

## 2017-09-13 PROCEDURE — 82378 CARCINOEMBRYONIC ANTIGEN: CPT | Performed by: INTERNAL MEDICINE

## 2017-09-13 PROCEDURE — 80053 COMPREHEN METABOLIC PANEL: CPT | Performed by: INTERNAL MEDICINE

## 2017-09-13 PROCEDURE — 85025 COMPLETE CBC W/AUTO DIFF WBC: CPT | Performed by: INTERNAL MEDICINE

## 2017-09-13 PROCEDURE — G0103 PSA SCREENING: HCPCS | Performed by: INTERNAL MEDICINE

## 2017-09-13 RX ORDER — GABAPENTIN 300 MG/1
300 CAPSULE ORAL ONCE
Status: COMPLETED | OUTPATIENT
Start: 2017-09-13 | End: 2017-09-13

## 2017-09-13 RX ORDER — SODIUM CHLORIDE, SODIUM LACTATE, POTASSIUM CHLORIDE, CALCIUM CHLORIDE 600; 310; 30; 20 MG/100ML; MG/100ML; MG/100ML; MG/100ML
INJECTION, SOLUTION INTRAVENOUS CONTINUOUS
Status: DISCONTINUED | OUTPATIENT
Start: 2017-09-13 | End: 2017-09-14 | Stop reason: HOSPADM

## 2017-09-13 RX ORDER — ACETAMINOPHEN 325 MG/1
975 TABLET ORAL ONCE
Status: COMPLETED | OUTPATIENT
Start: 2017-09-13 | End: 2017-09-13

## 2017-09-13 RX ORDER — LIDOCAINE HYDROCHLORIDE 20 MG/ML
INJECTION, SOLUTION INFILTRATION; PERINEURAL PRN
Status: DISCONTINUED | OUTPATIENT
Start: 2017-09-13 | End: 2017-09-13

## 2017-09-13 RX ORDER — ONDANSETRON 2 MG/ML
4 INJECTION INTRAMUSCULAR; INTRAVENOUS EVERY 30 MIN PRN
Status: DISCONTINUED | OUTPATIENT
Start: 2017-09-13 | End: 2017-09-14 | Stop reason: HOSPADM

## 2017-09-13 RX ORDER — ERYTHROMYCIN 5 MG/G
1 OINTMENT OPHTHALMIC AT BEDTIME
COMMUNITY
End: 2018-01-07

## 2017-09-13 RX ORDER — CHLORHEXIDINE GLUCONATE 40 MG/ML
SOLUTION TOPICAL ONCE
Status: DISCONTINUED | OUTPATIENT
Start: 2017-09-13 | End: 2017-09-14 | Stop reason: HOSPADM

## 2017-09-13 RX ORDER — LIDOCAINE 40 MG/G
CREAM TOPICAL
Status: DISCONTINUED | OUTPATIENT
Start: 2017-09-13 | End: 2017-09-14 | Stop reason: HOSPADM

## 2017-09-13 RX ORDER — KETAMINE HYDROCHLORIDE 10 MG/ML
INJECTION, SOLUTION INTRAMUSCULAR; INTRAVENOUS PRN
Status: DISCONTINUED | OUTPATIENT
Start: 2017-09-13 | End: 2017-09-13

## 2017-09-13 RX ORDER — FENTANYL CITRATE 50 UG/ML
25-50 INJECTION, SOLUTION INTRAMUSCULAR; INTRAVENOUS EVERY 5 MIN PRN
Status: DISCONTINUED | OUTPATIENT
Start: 2017-09-13 | End: 2017-09-14 | Stop reason: HOSPADM

## 2017-09-13 RX ORDER — NALOXONE HYDROCHLORIDE 0.4 MG/ML
.1-.4 INJECTION, SOLUTION INTRAMUSCULAR; INTRAVENOUS; SUBCUTANEOUS
Status: DISCONTINUED | OUTPATIENT
Start: 2017-09-13 | End: 2017-09-14 | Stop reason: HOSPADM

## 2017-09-13 RX ORDER — ACETAMINOPHEN 325 MG/1
975 TABLET ORAL ONCE
Status: DISCONTINUED | OUTPATIENT
Start: 2017-09-13 | End: 2017-09-14 | Stop reason: HOSPADM

## 2017-09-13 RX ORDER — PROPOFOL 10 MG/ML
INJECTION, EMULSION INTRAVENOUS PRN
Status: DISCONTINUED | OUTPATIENT
Start: 2017-09-13 | End: 2017-09-13

## 2017-09-13 RX ORDER — ONDANSETRON 4 MG/1
4 TABLET, ORALLY DISINTEGRATING ORAL EVERY 30 MIN PRN
Status: DISCONTINUED | OUTPATIENT
Start: 2017-09-13 | End: 2017-09-14 | Stop reason: HOSPADM

## 2017-09-13 RX ORDER — GLYCOPYRROLATE 0.2 MG/ML
INJECTION, SOLUTION INTRAMUSCULAR; INTRAVENOUS PRN
Status: DISCONTINUED | OUTPATIENT
Start: 2017-09-13 | End: 2017-09-13

## 2017-09-13 RX ORDER — GRANISETRON HYDROCHLORIDE 1 MG/ML
1 INJECTION INTRAVENOUS ONCE
Status: DISCONTINUED | OUTPATIENT
Start: 2017-09-13 | End: 2017-09-14 | Stop reason: HOSPADM

## 2017-09-13 RX ADMIN — PROPOFOL 20 MG: 10 INJECTION, EMULSION INTRAVENOUS at 07:49

## 2017-09-13 RX ADMIN — PROPOFOL 20 MG: 10 INJECTION, EMULSION INTRAVENOUS at 07:37

## 2017-09-13 RX ADMIN — PROPOFOL 20 MG: 10 INJECTION, EMULSION INTRAVENOUS at 07:35

## 2017-09-13 RX ADMIN — SODIUM CHLORIDE, SODIUM LACTATE, POTASSIUM CHLORIDE, CALCIUM CHLORIDE: 600; 310; 30; 20 INJECTION, SOLUTION INTRAVENOUS at 07:24

## 2017-09-13 RX ADMIN — GABAPENTIN 300 MG: 300 CAPSULE ORAL at 07:11

## 2017-09-13 RX ADMIN — PROPOFOL 20 MG: 10 INJECTION, EMULSION INTRAVENOUS at 07:58

## 2017-09-13 RX ADMIN — PROPOFOL 20 MG: 10 INJECTION, EMULSION INTRAVENOUS at 07:48

## 2017-09-13 RX ADMIN — PROPOFOL 20 MG: 10 INJECTION, EMULSION INTRAVENOUS at 07:38

## 2017-09-13 RX ADMIN — KETAMINE HYDROCHLORIDE 30 MG: 10 INJECTION, SOLUTION INTRAMUSCULAR; INTRAVENOUS at 07:29

## 2017-09-13 RX ADMIN — PROPOFOL 20 MG: 10 INJECTION, EMULSION INTRAVENOUS at 07:39

## 2017-09-13 RX ADMIN — PROPOFOL 20 MG: 10 INJECTION, EMULSION INTRAVENOUS at 07:32

## 2017-09-13 RX ADMIN — PROPOFOL 20 MG: 10 INJECTION, EMULSION INTRAVENOUS at 07:56

## 2017-09-13 RX ADMIN — PROPOFOL 20 MG: 10 INJECTION, EMULSION INTRAVENOUS at 07:44

## 2017-09-13 RX ADMIN — PROPOFOL 20 MG: 10 INJECTION, EMULSION INTRAVENOUS at 07:34

## 2017-09-13 RX ADMIN — ACETAMINOPHEN 975 MG: 325 TABLET ORAL at 07:11

## 2017-09-13 RX ADMIN — PROPOFOL 20 MG: 10 INJECTION, EMULSION INTRAVENOUS at 07:33

## 2017-09-13 RX ADMIN — PROPOFOL 20 MG: 10 INJECTION, EMULSION INTRAVENOUS at 07:30

## 2017-09-13 RX ADMIN — PROPOFOL 20 MG: 10 INJECTION, EMULSION INTRAVENOUS at 07:50

## 2017-09-13 RX ADMIN — PROPOFOL 20 MG: 10 INJECTION, EMULSION INTRAVENOUS at 07:41

## 2017-09-13 RX ADMIN — PROPOFOL 20 MG: 10 INJECTION, EMULSION INTRAVENOUS at 07:43

## 2017-09-13 RX ADMIN — PROPOFOL 20 MG: 10 INJECTION, EMULSION INTRAVENOUS at 07:46

## 2017-09-13 RX ADMIN — PROPOFOL 20 MG: 10 INJECTION, EMULSION INTRAVENOUS at 07:31

## 2017-09-13 RX ADMIN — PROPOFOL 20 MG: 10 INJECTION, EMULSION INTRAVENOUS at 07:47

## 2017-09-13 RX ADMIN — PROPOFOL 20 MG: 10 INJECTION, EMULSION INTRAVENOUS at 07:53

## 2017-09-13 RX ADMIN — GLYCOPYRROLATE 0.2 MG: 0.2 INJECTION, SOLUTION INTRAMUSCULAR; INTRAVENOUS at 07:29

## 2017-09-13 RX ADMIN — PROPOFOL 20 MG: 10 INJECTION, EMULSION INTRAVENOUS at 07:51

## 2017-09-13 RX ADMIN — PROPOFOL 20 MG: 10 INJECTION, EMULSION INTRAVENOUS at 07:55

## 2017-09-13 RX ADMIN — LIDOCAINE HYDROCHLORIDE 60 MG: 20 INJECTION, SOLUTION INFILTRATION; PERINEURAL at 07:30

## 2017-09-13 NOTE — ANESTHESIA POSTPROCEDURE EVALUATION
Patient: Louie Greco    Procedure(s):  Examination Under Anesthesia Anus, Colonoscopy, application of formalin - Wound Class: II-Clean Contaminated   - Wound Class: II-Clean Contaminated    Diagnosis:Rectal Cancer Surveillance  Diagnosis Additional Information: No value filed.    Anesthesia Type:  MAC    Note:  Anesthesia Post Evaluation    Patient location during evaluation: Phase 2  Patient participation: Able to fully participate in evaluation  Level of consciousness: awake and alert  Pain management: adequate  Airway patency: patent  Cardiovascular status: acceptable  Respiratory status: acceptable  Hydration status: acceptable  PONV: none     Anesthetic complications: None          Last vitals:  Vitals:    09/13/17 0807 09/13/17 0815 09/13/17 0830   BP: 113/77 114/77 115/76   Pulse:      Resp: 16 16 16   Temp: 36.1  C (97  F)  36.4  C (97.6  F)   SpO2: 95% 94% 95%         Electronically Signed By: Theodore Adams MD  September 13, 2017  9:44 AM

## 2017-09-13 NOTE — TELEPHONE ENCOUNTER
Patient called he already has a script sent to [facility] in [place] for Solumedrol that he takes 32 mg 12 hours and 2 hours prior to his scans to prevent reaction to the dye. Juani Mcgrath RN

## 2017-09-13 NOTE — IP AVS SNAPSHOT
MRN:[id number]                      After Visit Summary   9/13/2017    Louie Greco    MRN: [id number]           Thank you!     Thank you for choosing [facility] for your care. Our goal is always to provide you with excellent care. Hearing back from our patients is one way we can continue to improve our services. Please take a few minutes to complete the written survey that you may receive in the mail after you visit with us. Thank you!        Patient Information     Date Of Birth          1960        About your hospital stay     You were admitted on:  September 13, 2017 [facility]    You were discharged on:  September 13, 2017       Who to Call     For medical emergencies, please call 911.  For non-urgent questions about your medical care, please call your primary care provider or clinic, [phone]  For questions related to your surgery, please call your surgery clinic        Attending Provider     Provider Specialty    Felicitas Radford MD Colon and Rectal Surgery       Primary Care Provider Office Phone # Fax #    Philippe Healy [phone]      Your next 10 appointments already scheduled     Sep 15, 2017  4:00 PM CDT   Return Visit with Agueda Manley MD   [facility] ([facility])    [facility]  [address] [address] [phone]            Jan 26, 2018  2:15 PM CST   PE NPET ONCOLOGY (EYES TO THIGHS) with UUPET1   [facility] PET [facility] ([facility])    [address] [facility] [address] [facility] [address] [phone]           Tell your doctor:   If there is any chance you may be pregnant or if you are breastfeeding.   If you have problems lying in small spaces (claustrophobia). If you do, your doctor may give you medicine to help you relax. If you have diabetes:   Have your exam  early in the morning. Your blood glucose will go up as the day goes by.   Your glucose level must be 180 or less at the start of the exam. Please take any medicines you need to ensure this blood glucose level. 24 hours before your scan: Don t do any heavy exercise. (No jogging, aerobics or other workouts.) Exercise will make your pictures less accurate. 6 hours before your scan:   Stop all food and liquids (except water).   Do not chew gum or suck on mints.   If you need to take medicine with food, you may take it with a few crackers.  Please call your Imaging Department at your exam site with any questions.            Jan 26, 2018  5:00 PM CST   MR PELVIS W/O & W CONTRAST with UUMR1   [facility], [place], [facility] ([facility])    [address] [address] [phone]           Take your medicines as usual, unless your doctor tells you not to. Bring a list of your current medicines to your exam (including vitamins, minerals and over-the-counter drugs).  You will be given intravenous contrast for this exam. To prepare:   The day before your exam, drink extra fluids at least six 8-ounce glasses (unless your doctor tells you to restrict your fluids).   Have a blood test (creatinine test) within 30 days of your exam. Go to your clinic or Diagnostic Imaging Department for this test.  The MRI machine uses a strong magnet. Please wear clothes without metal (snaps, zippers). A sweatsuit works well, or we may give you a hospital gown.  Please remove any body piercings and hair extensions before you arrive. You will also remove watches, jewelry, hairpins, wallets, dentures, partial dental plates and hearing aids. You may wear contact lenses, and you may be able to wear your rings. We have a safe place to keep your personal items, but it is safer to leave them at home.   **IMPORTANT** THE INSTRUCTIONS BELOW ARE ONLY FOR THOSE PATIENTS WHO HAVE BEEN TOLD THEY  WILL RECEIVE SEDATION OR GENERAL ANESTHESIA DURING THEIR MRI PROCEDURE:  IF YOU WILL RECEIVE SEDATION (take medicine to help you relax during your exam):   You must get the medicine from your doctor before you arrive. Bring the medicine to the exam. Do not take it at home.   Arrive one hour early. Bring someone who can take you home after the test. Your medicine will make you sleepy. After the exam, you may not drive, take a bus or take a taxi by yourself.   No eating 8 hours before your exam. You may have clear liquids up until 4 hours before your exam. (Clear liquids include water, clear tea, black coffee and fruit juice without pulp.)  IF YOU WILL RECEIVE ANESTHESIA (be asleep for your exam):   Arrive 1 1/2 hours early. Bring someone who can take you home after the test. You may not drive, take a bus or take a taxi by yourself.   No eating 8 hours before your exam. You may have clear liquids up until 4 hours before your exam. (Clear liquids include water, clear tea, black coffee and fruit juice without pulp.)  Please call the Imaging Department at your exam site with any questions.            Jan 26, 2018  6:30 PM CST   LAB with ACUTE CARE LAB [facility] [place] ([facility], [facility])    [address] [facility] [address]              Patient must bring picture ID. Patient should be prepared to give a urine specimen  Please do not eat 10-12 hours before your appointment if you are coming in fasting for labs on lipids, cholesterol, or glucose (sugar). Pregnant women should follow their Care Team instructions. Water with medications is okay. Do not drink coffee or other fluids. If you have concerns about taking  your medications, please ask at office or if scheduling via [employer], send a message by clicking on Secure Messaging, Message Your Care Team.            Feb 02, 2018  4:00 PM CST   Return Visit with Agueda Manley MD   [facility] [place]  ([facility])    [facility] [place]  [address]  [place] [address] [phone]              Further instructions from your care team       Anorectal Surgery Instructions    What can I expect after anorectal surgery?  Most anorectal procedures are done as outpatient surgery, and you go home the same day as the procedure. A few surgical procedures will require that you stay in the hospital for about one to three days. No matter where the procedure is done or how long or short it takes, these recommendations will help you heal and feel more comfortable.    Medicines:  The anal area is very sensitive; you can expect to have some pain for up to 2-4 weeks after the procedure. Your doctor will give you a prescription for one or more pain medications.    Take naprosyn 500 mg twice a day OR ibuprofen 600 mg four times a day     Take this on a regular basis (not as needed) following your surgery.     The drugs are best taken with food.  Do not take if it causes stomach upset or if you have a history of ulcers or gastritis. You can stop the naprosyn (or ibuprofen) or reduce the dose when you are feeling better.    DO NOT use naprosyn, ibuprofen, or other similar agents (eg. Advil or Aleve) if you have inflammatory bowel disease (Ulcerative Colitis or Crohn's disease) or if your doctor as advised you against using these medications    Take acetominaphen (Tylenol) 650-1000 mg four times a day.     Take this on a regular basis (not as needed) following surgery for pain control.     Take the lower dose if you are >65 years old or have liver disease. The maximum dose of acetominaphen is 4000 mg a day. You can stop the acetaminophen or reduce the dose when you are feeling better.    It is important to realize that many narcotic pain relievers (including vicodin, percocet, tylenol #3) also have acetaminophen, and excessive doses of acetaminophen can be dangerous, so do not take these in  addition to acetominaphen.  You may take narcotics that don't contain acetominaphen such as oxycodone.      Take oxycodone AS NEEDED in addition to the acetominaphen and naprosyn.      Because narcotics have side effects (including constipation), you should reduce your use of these medications as tolerated as your pain improves.    *In general, the best strategy is to take (if you are able to tolerate it) the tylenol and naprosen on a regular basis until your pain has largely gone away. You can take the narcotic pain medicine as needed in addition to the tylenol and ibuprofen. As your pain begins to lessen, you should cut back on your narcotic use while continuing to take your regular tylenol and naprosyn doses.      Refilling prescriptions. If you need additional pain medication, please call the triage nurse at [phone] during normal business hours (8 a.m. to 4 p.m., Monday though Friday) or have your pharmacy fax a refill request to [phone]. If you call after hours or on the weekends, the doctor on call may not know you personally and may not renew narcotic pain medication by phone. Call your primary care provider for all other medication refills.    Perineal care:  Tub baths:    If possible, take a tub bath immediately after each bowel movement.     Baths should be take at least 3 times daily for the first week to 10 days following your procedure. You should soak in the tub for 10 to 15 minutes each time with water as warm as you can tolerate.     Even after you go back to work, it is a good idea to sit in the tub in the morning, after returning from work, and again in the evening before bedtime.    Bleeding/Infection:    You can expect to have some bleeding after bowel movements, but it should stop soon after you wipe.     Use a wet cloth or perianal pad (Tucks or Preparation H pads) to gently wipe the area after each bowel movement.    Do not rub the anal area or use a lot of pressure.    Using a spray  bottle filled with warm water helps loosen any remaining stool. Blot gently with a soft dry cloth or tissue paper.    Infection around the anal opening is not very common. The anal area has excellent blood supply, which helps the area to heal. Bloody discharge after bowel movements is normal and may last 2 to 4 weeks after your surgery. However, if you bleed between bowel movements and cannot get it to stop, call the triage nurse immediately [phone].    Bowel function:  Take a fiber supplement such as Metamucil, which is over the counter. It is important to drink six to eight glasses of water or juice everyday when using fiber products.    If you do not have a bowel movement after 1-2 days:    Take Milk of Magnesia-2 tablespoons.       If there are no results, repeat this or add over the counter Miralax.      If you still do not have results, contact the clinic.     If there are no results, repeat this. Stop taking Milk of Magnesia or other laxatives if you begin to have diarrhea.    * Constipation will cause you to strain when you have a bowel movement. The hard stool will be difficult to pass, will increase pain and bleeding, and will slow down healing.  Try to avoid constipation and/or diarrhea as this can make the pain and bleeding worse.    * It is important to have regular bowel movements at least every other day and to keep your stool soft.  A high fiber diet, including at least four servings of fruits or vegetables daily, will help to keep your bowel movements regular and soft.    Activity:  After your procedure, there are no restrictions on your activity     except restrictions surrounding being on narcotics and in pain, such as no heavy machine operating or driving.     You may walk, climb stairs, ride in a car, and sit as tolerated.     It is helpful to avoid sitting in one position for long periods (2 or more hours).    After some surgeries, you may be told not to perform any lifting (more than 10  pounds) for several weeks after surgery.    When to call:  When do I need to call the doctor or triage nurse?    If you experience any of the problems listed here, call our triage nurse during business hours ([phone]).     The nurse will help you with your problem or have the doctor call you.     After hours and on weekends, please call the main hospital number ([phone]) and ask for the colon and rectal surgery person on call.     Some is available to help you 24 hours a day, seven days a week.    Call for:   ? Fever greater than 101 degrees   ? Chills   ? Foul-smelling drainage   ? Nausea and vomiting   ? Diarrhea - greater than 3 water stools in 24 hours   ? Constipation - no bowel movement after 3 days   ? Severe bleeding that does not stop soon after a bowel movement   ? Problems with the incision, including increased pain, swelling, or redness  [facility]  Home Care Following Anesthesia  For 24 hours after surgery:  1. Get plenty of rest.  A responsible adult must stay with you for at least 24 hours after you leave the surgery center.  2. Do not drive or use heavy equipment.  If you have weakness or tingling, don't drive or use heavy equipment until this feeling goes away.   3. Do not drink alcohol.   4. Avoid strenuous or risky activities.  Ask for help when climbing stairs.  5. You may feel lightheaded.  IF so, sit for a few minutes before standing.  Have someone help you get up.   6. If you have nausea (feel sick to your stomach): Drink only clear liquids such as apple juice, ginger ale, broth or 7-Up.  Rest may also help.  Be sure to drink enough fluids.  Move to a regular diet as you feel able.   7. You may have a slight fever.  Call the doctor if your fever is over 100 F (37.7 C) (taken under the tongue) or lasts longer than 24 hours.  8. You may have a dry mouth, a sore throat, muscle aches or trouble sleeping. These should go away after 24 hours.  9. Do  not make important or legal decisions.               Tips for taking pain medications  To get the best pain relief possible, remember these points:    Take pain medications as directed, before pain becomes severe.    Pain medication can upset your stomach: taking it with food may help.    Constipation is a common side effect of pain medication. Drink plenty of  fluids.    Eat foods high in fiber. Take a stool softener if recommended by your doctor or pharmacist.    Do not drink alcohol, drive or operate machinery while taking pain medications.    Ask about other ways to control pain, such as with heat, ice or relaxation.    Tylenol/Acetaminophen Consumption  To help encourage the safe use of acetaminophen, the makers of TYLENOL  have lowered the maximum daily dose for single-ingredient Extra Strength TYLENOL  (acetaminophen) products sold in the [place] from 8 pills per day (4,000 mg) to 6 pills per day (3,000 mg). The dosing interval has also changed from 2 pills every 4-6 hours to 2 pills every 6 hours.    If you feel your pain relief is insufficient, you may take Tylenol/Acetaminophen in addition to your narcotic pain medication.     Be careful not to exceed 3,000 mg of Tylenol/Acetaminophen in a 24 hour period from all sources.    If you are taking extra strength Tylenol/acetaminophen (500 mg), the maximum dose is 6 tablets in 24 hours.    If you are taking regular strength acetaminophen (325 mg), the maximum dose is 9 tablets in 24 hours.    Call a doctor for any of the followin. Signs of infection (fever, growing tenderness at the surgery site, a large amount of drainage or bleeding, severe pain, foul-smelling drainage, redness, swelling).  2. It has been over 8 to 10 hours since surgery and you are still not able to urinate (pass water).  3. Headache for over 24 hours.  4. Numbness, tingling or weakness the day after surgery (if you had spinal anesthesia).  Your doctor is:  Dr. Felicitas Radford, Colon  "Rectal: [phone]               Or dial [phone] and ask for the resident on call for:  Colon Rectal  For emergency care, call the:  [facility]:  [phone] (TTY for hearing impaired: [phone])                    Pending Results     Date and Time Order Name Status Description    9/13/2017 0644 CEA In process             Admission Information     Date & Time Provider Department Dept. Phone    9/13/2017 Felicitas Radford MD [facility] [phone]      Your Vitals Were     Blood Pressure Pulse Temperature Respirations Height Weight    114/77 83 97  F (36.1  C) (Oral) 16 1.791 m (5' 10.5\") 90.7 kg (200 lb)    Pulse Oximetry BMI (Body Mass Index)                94% 28.29 kg/m2          [employer] Information     [employer] gives you secure access to your electronic health record. If you see a primary care provider, you can also send messages to your care team and make appointments. If you have questions, please call your primary care clinic.  If you do not have a primary care provider, please call [phone] and they will assist you.      [employer] is an electronic gateway that provides easy, online access to your medical records. With [employer], you can request a clinic appointment, read your test results, renew a prescription or communicate with your care team.     To access your existing account, please contact your [facility] or call [phone] for assistance.        Care EveryWhere ID     This is your Care EveryWhere ID. This could be used by other organizations to access your [place] medical records  [id number]        Equal Access to Services     [facility]: Hadii trent daniel hadasho Soomaali, waaxda luqadaha, qaybta kaalmada lakeshayada, jb coats. So [facility] [phone].    ATENCIÓN: Si habla español, tiene a mena disposición servicios gratuitos de asistencia lingüística. Llame al " [phone].    We comply with applicable federal civil rights laws and [place] laws. We do not discriminate on the basis of race, color, national origin, age, disability sex, sexual orientation or gender identity.               Review of your medicines      UNREVIEWED medicines. Ask your doctor about these medicines        Dose / Directions    ASPIRIN PO        Take by mouth as needed for moderate pain   Refills:  0       dibucaine 1 % Oint ointment   Commonly known as:  NUPERCAINAL        3 times daily as needed for moderate pain   Refills:  0       erythromycin ophthalmic ointment   Commonly known as:  ROMYCIN        Dose:  1 Application   1 Application At Bedtime   Refills:  0       hydrocortisone 0.5 % ointment        Apply topically 2 times daily as needed Reported on 2/14/2017   Refills:  0       ibuprofen 200 MG capsule   Used for:  Acute post-operative pain        Dose:  200-400 mg   Take 200-400 mg by mouth every 6 hours as needed   Quantity:  120 capsule   Refills:  0       lidocaine (Anorectal) 5 % Crea   Used for:  Anal pain        Dose:  1 Application   Externally apply 1 Application topically 3 times daily as needed   Quantity:  1 Tube   Refills:  0       methylPREDNISolone 32 MG tablet   Commonly known as:  MEDROL   Used for:  Malignant neoplasm of rectum (H), Allergy to contrast media (used for diagnostic x-rays)        Take 12 hours prior to CT scan and 2 hours prior to CT scan   Quantity:  2 tablet   Refills:  1       Na Sulfate-K Sulfate-Mg Sulf solution   Commonly known as:  SUPREP BOWEL PREP KIT   Used for:  Rectal cancer (H)        Dose:  1 Bottle   Take 177 mLs (1 Bottle) by mouth 2 times daily   Quantity:  2 Bottle   Refills:  0       VITAMIN D (CHOLECALCIFEROL) PO        Dose:  2000 Units   Take 2,000 Units by mouth daily   Refills:  0                Protect others around you: Learn how to safely use, store and throw away your medicines at www.disposemymeds.org.             Medication  List: This is a list of all your medications and when to take them. Check marks below indicate your daily home schedule. Keep this list as a reference.      Medications           Morning Afternoon Evening Bedtime As Needed    ASPIRIN PO   Take by mouth as needed for moderate pain                                dibucaine 1 % Oint ointment   Commonly known as:  NUPERCAINAL   3 times daily as needed for moderate pain                                erythromycin ophthalmic ointment   Commonly known as:  ROMYCIN   1 Application At Bedtime                                hydrocortisone 0.5 % ointment   Apply topically 2 times daily as needed Reported on 2/14/2017                                ibuprofen 200 MG capsule   Take 200-400 mg by mouth every 6 hours as needed                                lidocaine (Anorectal) 5 % Crea   Externally apply 1 Application topically 3 times daily as needed                                methylPREDNISolone 32 MG tablet   Commonly known as:  MEDROL   Take 12 hours prior to CT scan and 2 hours prior to CT scan                                Na Sulfate-K Sulfate-Mg Sulf solution   Commonly known as:  SUPREP BOWEL PREP KIT   Take 177 mLs (1 Bottle) by mouth 2 times daily                                VITAMIN D (CHOLECALCIFEROL) PO   Take 2,000 Units by mouth daily

## 2017-09-13 NOTE — ANESTHESIA CARE TRANSFER NOTE
Patient: Louie Greco    Procedure(s):  Examination Under Anesthesia Anus, Colonoscopy, application of formalin - Wound Class: II-Clean Contaminated   - Wound Class: II-Clean Contaminated    Diagnosis: Rectal Cancer Surveillance  Diagnosis Additional Information: No value filed.    Anesthesia Type:   MAC     Note:  Airway :Room Air  Patient transferred to:Phase II  Comments: Arrive Phase II, Stable, Airway Intact  113/77, 97,16,95,98;2  All questions answered.      Vitals: (Last set prior to Anesthesia Care Transfer)    CRNA VITALS  9/13/2017 0740 - 9/13/2017 0810      9/13/2017             Resp Rate (set): 10                Electronically Signed By: QUINTON Lehman CRNA  September 13, 2017  8:10 AM

## 2017-09-13 NOTE — DISCHARGE INSTRUCTIONS
Anorectal Surgery Instructions    What can I expect after anorectal surgery?  Most anorectal procedures are done as outpatient surgery, and you go home the same day as the procedure. A few surgical procedures will require that you stay in the hospital for about one to three days. No matter where the procedure is done or how long or short it takes, these recommendations will help you heal and feel more comfortable.    Medicines:  The anal area is very sensitive; you can expect to have some pain for up to 2-4 weeks after the procedure. Your doctor will give you a prescription for one or more pain medications.    Take naprosyn 500 mg twice a day OR ibuprofen 600 mg four times a day     Take this on a regular basis (not as needed) following your surgery.     The drugs are best taken with food.  Do not take if it causes stomach upset or if you have a history of ulcers or gastritis. You can stop the naprosyn (or ibuprofen) or reduce the dose when you are feeling better.    DO NOT use naprosyn, ibuprofen, or other similar agents (eg. Advil or Aleve) if you have inflammatory bowel disease (Ulcerative Colitis or Crohn's disease) or if your doctor as advised you against using these medications    Take acetominaphen (Tylenol) 650-1000 mg four times a day.     Take this on a regular basis (not as needed) following surgery for pain control.     Take the lower dose if you are >65 years old or have liver disease. The maximum dose of acetominaphen is 4000 mg a day. You can stop the acetaminophen or reduce the dose when you are feeling better.    It is important to realize that many narcotic pain relievers (including vicodin, percocet, tylenol #3) also have acetaminophen, and excessive doses of acetaminophen can be dangerous, so do not take these in addition to acetominaphen.  You may take narcotics that don't contain acetominaphen such as oxycodone.      Take oxycodone AS NEEDED in addition to the acetominaphen and naprosyn.       Because narcotics have side effects (including constipation), you should reduce your use of these medications as tolerated as your pain improves.    *In general, the best strategy is to take (if you are able to tolerate it) the tylenol and naprosen on a regular basis until your pain has largely gone away. You can take the narcotic pain medicine as needed in addition to the tylenol and ibuprofen. As your pain begins to lessen, you should cut back on your narcotic use while continuing to take your regular tylenol and naprosyn doses.      Refilling prescriptions. If you need additional pain medication, please call the triage nurse at [phone] during normal business hours (8 a.m. to 4 p.m., Monday though Friday) or have your pharmacy fax a refill request to [phone]. If you call after hours or on the weekends, the doctor on call may not know you personally and may not renew narcotic pain medication by phone. Call your primary care provider for all other medication refills.    Perineal care:  Tub baths:    If possible, take a tub bath immediately after each bowel movement.     Baths should be take at least 3 times daily for the first week to 10 days following your procedure. You should soak in the tub for 10 to 15 minutes each time with water as warm as you can tolerate.     Even after you go back to work, it is a good idea to sit in the tub in the morning, after returning from work, and again in the evening before bedtime.    Bleeding/Infection:    You can expect to have some bleeding after bowel movements, but it should stop soon after you wipe.     Use a wet cloth or perianal pad (Tucks or Preparation H pads) to gently wipe the area after each bowel movement.    Do not rub the anal area or use a lot of pressure.    Using a spray bottle filled with warm water helps loosen any remaining stool. Blot gently with a soft dry cloth or tissue paper.    Infection around the anal opening is not very common. The anal  area has excellent blood supply, which helps the area to heal. Bloody discharge after bowel movements is normal and may last 2 to 4 weeks after your surgery. However, if you bleed between bowel movements and cannot get it to stop, call the triage nurse immediately [phone].    Bowel function:  Take a fiber supplement such as Metamucil, which is over the counter. It is important to drink six to eight glasses of water or juice everyday when using fiber products.    If you do not have a bowel movement after 1-2 days:    Take Milk of Magnesia-2 tablespoons.       If there are no results, repeat this or add over the counter Miralax.      If you still do not have results, contact the clinic.     If there are no results, repeat this. Stop taking Milk of Magnesia or other laxatives if you begin to have diarrhea.    * Constipation will cause you to strain when you have a bowel movement. The hard stool will be difficult to pass, will increase pain and bleeding, and will slow down healing.  Try to avoid constipation and/or diarrhea as this can make the pain and bleeding worse.    * It is important to have regular bowel movements at least every other day and to keep your stool soft.  A high fiber diet, including at least four servings of fruits or vegetables daily, will help to keep your bowel movements regular and soft.    Activity:  After your procedure, there are no restrictions on your activity     except restrictions surrounding being on narcotics and in pain, such as no heavy machine operating or driving.     You may walk, climb stairs, ride in a car, and sit as tolerated.     It is helpful to avoid sitting in one position for long periods (2 or more hours).    After some surgeries, you may be told not to perform any lifting (more than 10 pounds) for several weeks after surgery.    When to call:  When do I need to call the doctor or triage nurse?    If you experience any of the problems listed here, call our triage  nurse during business hours ([phone]).     The nurse will help you with your problem or have the doctor call you.     After hours and on weekends, please call the main hospital number ([phone]) and ask for the colon and rectal surgery person on call.     Some is available to help you 24 hours a day, seven days a week.    Call for:   ? Fever greater than 101 degrees   ? Chills   ? Foul-smelling drainage   ? Nausea and vomiting   ? Diarrhea - greater than 3 water stools in 24 hours   ? Constipation - no bowel movement after 3 days   ? Severe bleeding that does not stop soon after a bowel movement   ? Problems with the incision, including increased pain, swelling, or redness  [facility]  Home Care Following Anesthesia  For 24 hours after surgery:  1. Get plenty of rest.  A responsible adult must stay with you for at least 24 hours after you leave the surgery center.  2. Do not drive or use heavy equipment.  If you have weakness or tingling, don't drive or use heavy equipment until this feeling goes away.   3. Do not drink alcohol.   4. Avoid strenuous or risky activities.  Ask for help when climbing stairs.  5. You may feel lightheaded.  IF so, sit for a few minutes before standing.  Have someone help you get up.   6. If you have nausea (feel sick to your stomach): Drink only clear liquids such as apple juice, ginger ale, broth or 7-Up.  Rest may also help.  Be sure to drink enough fluids.  Move to a regular diet as you feel able.   7. You may have a slight fever.  Call the doctor if your fever is over 100 F (37.7 C) (taken under the tongue) or lasts longer than 24 hours.  8. You may have a dry mouth, a sore throat, muscle aches or trouble sleeping. These should go away after 24 hours.  9. Do not make important or legal decisions.               Tips for taking pain medications  To get the best pain relief possible, remember these points:    Take pain medications as  directed, before pain becomes severe.    Pain medication can upset your stomach: taking it with food may help.    Constipation is a common side effect of pain medication. Drink plenty of  fluids.    Eat foods high in fiber. Take a stool softener if recommended by your doctor or pharmacist.    Do not drink alcohol, drive or operate machinery while taking pain medications.    Ask about other ways to control pain, such as with heat, ice or relaxation.    Tylenol/Acetaminophen Consumption  To help encourage the safe use of acetaminophen, the makers of TYLENOL  have lowered the maximum daily dose for single-ingredient Extra Strength TYLENOL  (acetaminophen) products sold in the [place] from 8 pills per day (4,000 mg) to 6 pills per day (3,000 mg). The dosing interval has also changed from 2 pills every 4-6 hours to 2 pills every 6 hours.    If you feel your pain relief is insufficient, you may take Tylenol/Acetaminophen in addition to your narcotic pain medication.     Be careful not to exceed 3,000 mg of Tylenol/Acetaminophen in a 24 hour period from all sources.    If you are taking extra strength Tylenol/acetaminophen (500 mg), the maximum dose is 6 tablets in 24 hours.    If you are taking regular strength acetaminophen (325 mg), the maximum dose is 9 tablets in 24 hours.    Call a doctor for any of the followin. Signs of infection (fever, growing tenderness at the surgery site, a large amount of drainage or bleeding, severe pain, foul-smelling drainage, redness, swelling).  2. It has been over 8 to 10 hours since surgery and you are still not able to urinate (pass water).  3. Headache for over 24 hours.  4. Numbness, tingling or weakness the day after surgery (if you had spinal anesthesia).  Your doctor is:  Dr. Felicitas Radford, Colon Rectal: [phone]               Or dial [phone] and ask for the resident on call for:  Colon Rectal  For emergency care, call the:  [facility]:   [phone] (TTY for hearing impaired: [phone])

## 2017-09-13 NOTE — ANESTHESIA PREPROCEDURE EVALUATION
Anesthesia Evaluation     . Pt has had prior anesthetic. Type: General    No history of anesthetic complications          ROS/MED HX    ENT/Pulmonary:     (+)asthma (childhood) , . .    Neurologic:  - neg neurologic ROS     Cardiovascular:  - neg cardiovascular ROS       METS/Exercise Tolerance:  >4 METS   Hematologic:  - neg hematologic  ROS       Musculoskeletal:  - neg musculoskeletal ROS       GI/Hepatic: Comment: Rectal cancer        Renal/Genitourinary:  - ROS Renal section negative       Endo:  - neg endo ROS       Psychiatric:         Infectious Disease:  - neg infectious disease ROS       Malignancy:         Other:                     Physical Exam  Normal systems: dental    Airway   Mallampati: II  TM distance: >3 FB  Neck ROM: full    Dental     Cardiovascular   Rhythm and rate: regular and normal      Pulmonary    breath sounds clear to auscultation    Other findings: New implant - R upper molar.                    Anesthesia Plan      History & Physical Review  History and physical reviewed and following examination; no interval change.    ASA Status:  3 .    NPO Status:  > 2 hours    Plan for MAC with Intravenous induction. Maintenance will be TIVA.  Reason for MAC:  Deep or markedly invasive procedure (G8)  PONV prophylaxis:  Ondansetron (or other 5HT-3) and Dexamethasone or Solumedrol  MAC, ketamine propofol.  Port in place will take care to position comfortably      Postoperative Care  Postoperative pain management:  Oral pain medications and Multi-modal analgesia.      Consents  Anesthetic plan, risks, benefits and alternatives discussed with:  Patient..          Theodore Adams MD  6:53 AM September 13, 2017                     .

## 2017-09-13 NOTE — IP AVS SNAPSHOT
[facility]    [address] [place] [id number]    Phone:  [phone]    Fax:  [phone]                                       After Visit Summary   9/13/2017    Louie Greco    MRN: [id number]           After Visit Summary Signature Page     I have received my discharge instructions, and my questions have been answered. I have discussed any challenges I see with this plan with the nurse or doctor.    ..........................................................................................................................................  Patient/Patient Representative Signature      ..........................................................................................................................................  Patient Representative Print Name and Relationship to Patient    ..................................................               ................................................  Date                                            Time    ..........................................................................................................................................  Reviewed by Signature/Title    ...................................................              ..............................................  Date                                                            Time

## 2017-09-13 NOTE — LETTER
[facility]  [address] [facility]  [address]  [place]  [address]  Tel: [phone]    September 18, 2017    Louie Greco  [address] [address]        Dear Mr. Greco,    The following letter pertains to your most recent diagnostic tests:     -Your cholesterol panel looks healthy.     -Your PSA is a little high and should be rechecked in 6 weeks or so.  If the PSA continues to rise, then we should have you see a urologist about it, but if the PSA is stable or lower, then we can simply continue to monitor the test.  Most dangerous prostate cancers present with PSA greater than 10.           Follow up:  Schedule a lab appointment in 6 weeks or so to recheck the PSA     If you have any further questions or problems, please contact our office.      Sincerely,    Philippe Healy MD/ANAHY          Enclosure: Lab Results  Results for orders placed or performed in visit on 09/13/17   CBC with platelets differential   Result Value Ref Range    WBC 4.2 4.0 - 11.0 10e9/L    RBC Count 4.91 4.4 - 5.9 10e12/L    Hemoglobin 14.4 13.3 - 17.7 g/dL    Hematocrit 41.9 40.0 - 53.0 %    MCV 85 78 - 100 fl    MCH 29.3 26.5 - 33.0 pg    MCHC 34.4 31.5 - 36.5 g/dL    RDW 13.3 10.0 - 15.0 %    Platelet Count 168 150 - 450 10e9/L    Diff Method Automated Method     % Neutrophils 67.2 %    % Lymphocytes 13.3 %    % Monocytes 13.6 %    % Eosinophils 5.0 %    % Basophils 0.7 %    % Immature Granulocytes 0.2 %    Nucleated RBCs 0 0 /100    Absolute Neutrophil 2.8 1.6 - 8.3 10e9/L    Absolute Lymphocytes 0.6 (L) 0.8 - 5.3 10e9/L    Absolute Monocytes 0.6 0.0 - 1.3 10e9/L    Absolute Eosinophils 0.2 0.0 - 0.7 10e9/L    Absolute Basophils 0.0 0.0 - 0.2 10e9/L    Abs Immature Granulocytes 0.0 0 - 0.4 10e9/L    Absolute Nucleated RBC 0.0    Comprehensive metabolic panel   Result Value Ref Range    Sodium 136 133 - 144 mmol/L    Potassium 3.8 3.4 - 5.3 mmol/L    Chloride 104 94 - 109 mmol/L    Carbon Dioxide 25 20 - 32 mmol/L     Anion Gap 7 3 - 14 mmol/L    Glucose 97 70 - 99 mg/dL    Urea Nitrogen 16 7 - 30 mg/dL    Creatinine 0.74 0.66 - 1.25 mg/dL    GFR Estimate >90 >60 mL/min/1.7m2    GFR Estimate If Black >90 >60 mL/min/1.7m2    Calcium 9.2 8.5 - 10.1 mg/dL    Bilirubin Total 2.0 (H) 0.2 - 1.3 mg/dL    Albumin 4.0 3.4 - 5.0 g/dL    Protein Total 7.7 6.8 - 8.8 g/dL    Alkaline Phosphatase 102 40 - 150 U/L    ALT 47 0 - 70 U/L    AST 33 0 - 45 U/L   CEA   Result Value Ref Range    CEA 0.7 0 - 2.5 ug/L   PSA, screen   Result Value Ref Range    PSA 5.46 (H) 0 - 4 ug/L   **Lipid panel reflex to direct LDL FUTURE anytime   Result Value Ref Range    Cholesterol 154 <200 mg/dL    Triglycerides 77 <150 mg/dL    HDL Cholesterol 44 >39 mg/dL    LDL Cholesterol Calculated 95 <100 mg/dL    Non HDL Cholesterol 111 <130 mg/dL

## 2017-09-13 NOTE — OP NOTE
SURGEON: Felicitas Radford MD      ASSISTANT: None     PREOPERATIVE DIAGNOSIS: Low rectal cancer on watch and wait protocol. One year follow-up colonoscopy after initial rectal cancer diagnosis. Radiation proctitis previously treated for formalin.     POSTOPERATIVE DIAGNOSIS: Low rectal cancer on watch and wait protocol. One year follow-up colonoscopy after initial rectal cancer diagnosis. Radiation proctitis previously treated for formalin.     PROCEDURE: Colonoscopy with CO2. Examination under anesthesia. Application of formalin.     INDICATIONS: Louie Greco is a 57 year old male who has a low rectal cancer (originally [id number] (stage IIIA)) who has had a complete response and has opted for watch and wait protocol under close surveillance. He has fully completed chemoradiotherapy 9/15/16 and then full systemic chemotherapy. His post chemoradiation MRI showed improvement in the size of the tumor and response in the lymph nodes. On 12/8/16, he underwent flexible sigmoidoscopy and there was no evidence of tumor.  There was only some anterior scarring in the lumen and multiple biopsies showed no evidence of carcinoma. Adjuvant chemotherapy consisted of FOLFOX x 8 cycles 1/16/17-5/9/17. He is now here for his second examination under anesthesia and also a colonoscopy as a one year follow-up after rectal cancer diagnosis. Also, on appendectomy done recently, he had a polyp in the specimen. The risks and benefits were thoroughly discussed with the patient and he agreed to proceed.      DESCRIPTION OF PROCEDURE: The patient was brought to the operating room, placed prone on the operating room table. Deep sedation was induced with intravenous medicines with deep sedation and monitored anesthesia care. We began the procedure with a timeout.   Colonoscopy was performed and the scope was advanced to the cecum and the appendiceal orifice was identified. The preparation was excellent. There were no polyps or other  mucosal changes. There were some vascular changes including telangiectasias distally most consistent with radiation proctitis. We performed anoscopy which demonstrated friability of the anal canal even with the small anoscope. There was some scarring along the anterior aspect of the distal rectum just proximal to the dentate line especially along the left anterior aspect. There was some subtle nodularity corresponding to the scarring but no mass. The some scarring on anoscopy could also be clearly seen. I then applied 10% formalin to the rectal lumen using the large proctoswabs in two separate applications. At the end the case, all instruments and sponge counts were correct x2. The patient was emerged from anesthesia and taken to postoperative anesthesia care unit in good condition.      SPECIMEN: None.      ESTIMATED BLOOD LOSS: Minimal.      DRAINS: None.      URINE OUTPUT: Not measured.      AROLDO NAIK MD   Colon and Rectal Surgery Staff  [facility]

## 2017-09-14 ENCOUNTER — TELEPHONE (OUTPATIENT)
Dept: SURGERY | Facility: CLINIC | Age: 57
End: 2017-09-14

## 2017-09-15 ENCOUNTER — ONCOLOGY VISIT (OUTPATIENT)
Dept: ONCOLOGY | Facility: CLINIC | Age: 57
End: 2017-09-15
Attending: INTERNAL MEDICINE
Payer: COMMERCIAL

## 2017-09-15 VITALS
OXYGEN SATURATION: 100 % | HEART RATE: 87 BPM | BODY MASS INDEX: 28.63 KG/M2 | TEMPERATURE: 98.5 F | WEIGHT: 202.4 LBS | DIASTOLIC BLOOD PRESSURE: 75 MMHG | RESPIRATION RATE: 16 BRPM | SYSTOLIC BLOOD PRESSURE: 112 MMHG

## 2017-09-15 DIAGNOSIS — C20 MALIGNANT NEOPLASM OF RECTUM (H): Primary | ICD-10-CM

## 2017-09-15 PROCEDURE — 99214 OFFICE O/P EST MOD 30 MIN: CPT | Performed by: INTERNAL MEDICINE

## 2017-09-15 PROCEDURE — 99211 OFF/OP EST MAY X REQ PHY/QHP: CPT

## 2017-09-15 ASSESSMENT — PAIN SCALES - GENERAL: PAINLEVEL: NO PAIN (0)

## 2017-09-15 NOTE — NURSING NOTE
DISCHARGE PLAN:  Patient already scheduled for future follow up with Dr. he  Next appointments: See patient instruction section  Departure Mode: Ambulatory  Accompanied by: self  5 minutes for nursing discharge (face to face time)   Juani Mcgrath RN

## 2017-09-15 NOTE — LETTER
9/15/2017        RE: Louie Greco  [address] [address]        [facility]    Hematology/Oncology Established Patient Note      Today's Date: 09/15/2017    Reason for Follow-up: rectal cancer      HISTORY OF PRESENT ILLNESS: Louie Greco is a 57 year old male who presents with rectal cancer.  He started having rectal bleeding around beginning of 2016.  He underwent colonoscopy on 7/27/16, which showed a malignant tumor in the rectum involving half of the lumen with oozing, as well as three 4-mm polyps in the ascending and transverse colon.  Pathology showed moderately differentiated adenocarcinoma, ascending colon with sessile serrated adenoma, others were tubular adenomas.  Mismatch repair expression with normal protein expression.  Staging scans showed the rectal mass, indeterminate focus in the left liver thought to be cyst, and multiple bilateral renal cysts.  MRI showed a distal rectal mass measuring 2.7 x 2.4 cm extending to the most proximal region of the anal canal.  There are a few tiny subcentimeter perirectal fat lymph nodes.  He was staged clinically [id number] (stage IIIA).  He was seen by Dr. Lee at [facility], and started neoadjuvant chemoradiation with capecitabine on 8/8/16 and completed on 9/15/16.  He was then seen by Dr. Radford, colorectal surgery at [facility].  His post chemoradiation MRI showed improvement in the size of the tumor and response in the lymph nodes.  On 12/8/16, he underwent flexible sigmoidoscopy and there was no evidence of tumor.  There was only some anterior scarring in the lumen.  Multiple biopsies were taken, which showed no evidence of carcinoma.  At that point, Dr. Radford spoke with the patient's wife, that there appears to be a complete response in the lumen, and that the patient would wish to placed on the watch and wait protocol.  The patient had expressed wishes not to have an APR, and it  would not have been possible to grossly clear a margin for LAR.    He had port placed on 1/16/17.  It has been removed.    He completed FOLFOX x 8 cycles 1/16/17-5/9/17.        INTERIM HISTORY: Louie comes in for follow-up today.  He was admitted 7/16/17-7/20/17 with sepsis, abdominal wall cellulitis.  He underwent surgery with laparoscopic abdominal exploration and appendectomy.  Pathology found sessile serrated adenoma without dysplasia or malignancy.  He also recently had a colonoscopy with Dr. Radford.        REVIEW OF SYSTEMS:   14 point ROS was reviewed and is negative other than as noted above in HPI.       HOME MEDICATIONS:  Current Outpatient Prescriptions   Medication Sig Dispense Refill     erythromycin (ROMYCIN) ophthalmic ointment 1 Application At Bedtime       Na Sulfate-K Sulfate-Mg Sulf (SUPREP BOWEL PREP KIT) solution Take 177 mLs (1 Bottle) by mouth 2 times daily 2 Bottle 0     ibuprofen 200 MG capsule Take 200-400 mg by mouth every 6 hours as needed 120 capsule 0     ASPIRIN PO Take by mouth as needed for moderate pain       lidocaine, Anorectal, 5 % CREA Externally apply 1 Application topically 3 times daily as needed 1 Tube 0     VITAMIN D, CHOLECALCIFEROL, PO Take 2,000 Units by mouth daily        dibucaine (NUPERCAINAL) 1 % OINT ointment 3 times daily as needed for moderate pain       hydrocortisone 0.5 % ointment Apply topically 2 times daily as needed Reported on 2/14/2017           ALLERGIES:  Allergies   Allergen Reactions     Amoxicillin      Ampicillin Diarrhea     Demerol [Meperidine]      Nausea          PAST MEDICAL HISTORY:  Past Medical History:   Diagnosis Date     Childhood asthma      Nephrolithiasis     1990     Rectal cancer (H)     low rectal cancer         PAST SURGICAL HISTORY:  Past Surgical History:   Procedure Laterality Date     COLONOSCOPY N/A 7/27/2016    Procedure: COMBINED COLONOSCOPY, SINGLE OR MULTIPLE BIOPSY/POLYPECTOMY BY BIOPSY;  Surgeon: Chelsea Thompson  MD Manuela;  Location:  GI     COLONOSCOPY N/A 2017    Procedure: COLONOSCOPY;;  Surgeon: Felicitas Radford MD;  Location: [facility] OR     EXAM UNDER ANESTHESIA ANUS N/A 2017    Procedure: EXAM UNDER ANESTHESIA ANUS;  Examination Under Anesthesia, flex sigmoidoscopy with biopsies and formalin application;  Surgeon: Felicitas Radford MD;  Location: [facility] OR     EXAM UNDER ANESTHESIA ANUS N/A 2017    Procedure: EXAM UNDER ANESTHESIA ANUS;  Examination Under Anesthesia Anus, Colonoscopy, application of formalin;  Surgeon: Felicitas Radford MD;  Location: [facility] OR     HC TOOTH EXTRACTION W/FORCEP       LAPAROSCOPIC APPENDECTOMY N/A 2017    Procedure: LAPAROSCOPIC APPENDECTOMY;  LAPAROSCOPIC APPENDECTOMY;  Surgeon: Bryson Ferguson MD;  Location: [facility] OR     SIGMOIDOSCOPY FLEXIBLE N/A 2016    Procedure: SIGMOIDOSCOPY FLEXIBLE;  Surgeon: Felicitas Radford MD;  Location: [facility] OR     SIGMOIDOSCOPY FLEXIBLE N/A 2017    Procedure: SIGMOIDOSCOPY FLEXIBLE;;  Surgeon: Felicitas Radford MD;  Location: [facility]     VASCULAR SURGERY      Right chest port     VASECTOMY           SOCIAL HISTORY:  Social History     Social History     Marital status:      Spouse name: N/A     Number of children: N/A     Years of education: N/A     Occupational History     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 No     Social History Narrative    Dad  at age  78   from [place], COPD, & HTN    Mom alive in her 70's.     for 30 years    Two adult children. 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         He notes that he had a brother who passed away at a young age of 53 from a metastatic cancer, but unknown what type, and passed away within 2 months of diagnosis.  He denies other family history of cancer in the immediate family.  Louie works as a  at a [employer] school.      FAMILY HISTORY:  Family History   Problem Relation Age of Onset     CANCER Brother      primary of unknown origin     Other Cancer Brother      Chronic Obstructive Pulmonary Disease Father      Hypertension Father      Hyperlipidemia Father      Colon Polyps Mother      Number and type of polyps unknown     Family History Negative Brother      negative colonoscopy     DIABETES Maternal Grandfather      Hypertension Paternal Grandmother      Hyperlipidemia Paternal Grandmother      Stomach Cancer Other 63     maternal great grandfather     Glaucoma No family hx of      Macular Degeneration No family hx of          PHYSICAL EXAM:  Vital signs:  /75 (BP Location: Left arm, Patient Position: Chair, Cuff Size: Adult Large)  Pulse 87  Temp 98.5  F (36.9  C) (Oral)  Resp 16  Wt 91.8 kg (202 lb 6.4 oz)  SpO2 100%  BMI 28.63 kg/m2   ECO  GENERAL/CONSTITUTIONAL: No acute distress.  EYES: No scleral icterus.  LYMPH: No anterior cervical, posterior cervical, or supraclavicular adenopathy.   RESPIRATORY: Clear to auscultation bilaterally. No crackles or wheezing.   CARDIOVASCULAR: Regular rate and rhythm without murmurs, gallops, or rubs.  GASTROINTESTINAL: No tenderness. The patient has normal bowel sounds. No guarding.  No distention.  MUSCULOSKELETAL: Warm and well-perfused, no cyanosis, clubbing, or edema.  NEUROLOGIC: Alert, oriented, answers questions appropriately.  INTEGUMENTARY: No jaundice.  GAIT: Steady, does not use assistive device      LABS:  CBC RESULTS:   Recent Labs   Lab Test  17   0700   WBC  4.2   RBC  4.91   HGB  14.4   HCT  41.9   MCV  85   MCH  29.3   MCHC  34.4   RDW  13.3    PLT  168     Recent Labs   Lab Test  09/13/17   0700  07/20/17   0700   07/18/17   0630   NA  136   --    --   140   POTASSIUM  3.8   --    --   4.2   CHLORIDE  104   --    --   110*   CO2  25   --    --   22   ANIONGAP  7   --    --   8   GLC  97   --    --   122*   BUN  16   --    --   18   CR  0.74  0.68   < >  0.84   FRANDY  9.2   --    --   7.9*    < > = values in this interval not displayed.     Lab Results   Component Value Date    AST 33 09/13/2017     Lab Results   Component Value Date    ALT 47 09/13/2017     No results found for: BILICONJ   Lab Results   Component Value Date    BILITOTAL 2.0 09/13/2017     Lab Results   Component Value Date    ALBUMIN 4.0 09/13/2017     Lab Results   Component Value Date    PROTTOTAL 7.7 09/13/2017      Lab Results   Component Value Date    ALKPHOS 102 09/13/2017     CEA 0.7      IMAGING:  CT c/a/p 6/7/17:  FINDINGS:     Lines and tubes: Right IJ Port-A-Cath tip terminates at the cavoatrial  junction.     Chest:   Mediastinum: No thyroid nodules; hypertrophy of the right thyroid  gland. Central tracheobronchial tree is patent. Heart size is normal.  Trace pericardial effusion.  Normal thoracic vasculature. No thoracic  lymphadenopathy.   Lungs: No consolidation. No pleural effusion or pneumothorax. Stable  large calcified granuloma with an adjacent cluster of noncalcified  pulmonary nodules multiple sub-4 mm pulmonary nodules, unchanged from  prior. No new conspicuous nodules. Mild subsegmental bibasilar  atelectasis. Mild fibrotic changes in the lingula.     Abdomen and pelvis:   Liver: Multiple nonconspicuous, hypodense cysts with fluid density  throughout the liver, stable from prior. No suspicious liver lesions.  Portal veins appear patent.  Gallbladder: The gallbladder is contracted, limiting evaluation.  Spleen: Normal size. Small splenule at the splenic hilum.  Pancreas: Fatty infiltration of the pancreas. No suspicious pancreatic  lesions. The pancreatic duct is  not dilated.  Adrenal glands: No adrenal nodules. Nodular thickening of the right  adrenal gland.  Kidneys: No hydronephrosis or obstructing renal stones. Multiple fluid  attenuating cysts are present.  In the inferior pole of left kidney  there is a 4.3 cm density,which has increased in size from 7/27/2016  at which time was 3.7 cm high. It measures 53 Hounsfield units.  The  differential favors hemorrhagic proteinaceous cyst, although slow  growing malignancy is not excluded.   Bladder / Pelvic organs: The bladder is decompressed with a thickened  wall. Prostate hypertrophy. Coarse prostate calcifications.  Bowel: The mesorectal fat plane and fascia appears normal. No bowel  wall thickening. Hyperdense material within the tip of the appendix,  likely tiny stones versus colonic debris. No evidence of acute  appendicitis.  Lymph nodes: No retroperitoneal, mesenteric, or pelvic  lymphadenopathy.  Peritoneum / Retroperitoneum: No free fluid or air within the abdomen.  Vessels: No infrarenal aortic aneurysm.      Bones and soft tissues: Small irregularity in the anterolateral left  seventh rib (series 3, image 13), stable in appearance with 7/27/2016  and not FDG avid on the PET/CT dated 7/29/2016. Suspicious features.  No suspicious osseous lesions. Mild bilateral gynecomastia.          IMPRESSION: In this patient with a history of rectal cancer:  1. No evidence of malignant recurrence or metastatic disease in the  chest, abdomen or pelvis.  2. Stable hepatic cysts without suspicious features.   3. Stable sub-4 mm pulmonary nodules throughout the lungs.  4. Slowly growing probable proteinaceous or hemorrhagic cyst inferior  pole of left kidney. Although malignancy cannot be excluded. Attention  on follow-up examinations.    MRI pelvis 6/5/17  FINDINGS:     LOCATION/SIZE:  On prior exam dated 3/13/2017 there was a treated tumor identified in  the lower rectum. Previously this treated tumor measured 2.6 cm,  and  currently it measures the same. This is best seen on series 5, image  24 and series 6, image 31. The treated tumor again appears entirely  fibrotic with a T2 hypointense signal and is seen along the left  anterolateral aspect of the lower rectum. There is associated delay in  enhancement. No focal nodular enhancement is identified. No restricted  diffusion signal.     TREATMENT RESPONSE:   Approximately 0% of the current mass demonstrates tumour signal  intensity, with the remainder appearing to represent fibrosis.  This  corresponds to a tumour response grade of 1.      EXTENT:  The tumor does not clearly involve the anal sphincters or levator ani  muscle. The treated tumor does extend inferiorly to the puborectalis  sling, however without clear tumor signal involving musculature at  this time. There is unchanged persistent mucosal edema of the mid and  lower rectum seen along the posterior and right lateral wall, likely  representing post therapy changes.     CIRCUMFERENTIAL RESECTION MARGIN (CRM):  In terms of the resection margin, there is not involvement of the  mesorectal fascia      LYMPH NODES:  A few less than 5 mm mesorectal fascia lymph nodes have not  significantly changed and do not have suspicious features.   For example there is a stable left mesorectal fascia lymph node  measuring 0.3 cm on image 9, series 6).      VEINS:  There is not evidence of extramural venous extension.      MISCELLANEOUS FINDINGS:  Enlargement of the prostate gland. Urinary bladder is partially  distended. Other visualized bowel loops appear unremarkable.  There is heterogeneous signal intensity and enhancement of the  visualized pelvic bones and proximal femurs with patchy areas of T2  hypointensity.         IMPRESSION:   1. No evidence of residual or recurrent tumor corresponding to tumor  regression grade of 1 unchanged since 3/13/2017. Treated fibrotic  tumor is again noted along the left anterolateral aspect of the  lower  rectum.  2. Stable few tiny mesorectal fascia and lymph nodes without  suspicious features.  3. Heterogeneous marrow signal intensity and enhancement which may  represent marrow reconversion.     MRI abdomen 8/25/17:  1. There are two left renal cysts with areas of T1 hyperintensity and  no associated enhancement, likely representing hemorrhagic or  proteinaceous renal cysts.  2. Multiple simple-appearing bilateral renal and hepatic cysts are  again noted.      ASSESSMENT/PLAN:  Louie Greco is a 57 year old male with:    1) Rectal cancer: clinical stage [id number] (stage IIIA), s/p chemoradiation with Xeloda, and appears to have achieved complete response on imaging and biopsies.  He opted not to undergo surgery and expressed desire not to undergo APR, as he does not want a colostomy bag.  It was felt reasonable to go on the watch and wait protocol with the [facility].  He has completed 4 additional months (8 cycles) of chemotherapy with FOLFOX.  He will now go on surveillance, as per the watch and wait protocol.    Post-treatment CT scans and MRI pelvis show no evidence of residual or recurrent tumor.      -he had colonoscopy with Dr. Radford on 9/13/17.  There were no polyps or other mucosal changes.  There were some findings consistent with radiation proctitis.  There was some scarring seen but no mass.    -next T3 MRI pelvis would be in January 2018 (every 6 months x 2 years).  He also has a PET scan then.  -CT c/a/p annually for 5 years  -endoscopic surveillance with Dr. Radford as per protocol  -RTC as already scheduled in February 2018 with labs and CEA    2) Genetics: He underwent genetic testing, which was negative.    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  He is now done with chemotherapy, but it has persisted, maybe even worsened.  He says that he has tried gabapentin in the past, but didn't feel helpful.  I discussed Lyrica, but he wants to wait and observe for now as it  "feels tolerable.          4) Elevated bilirubin: mild.  He is asymptomatic.    -monitor for now    5) Liver cysts: seen on CT  -monitor    6) Pulmonary nodules: stable sub-4 mm pulmonary nodules  -monitor on future scans    7) Kidney cyst:  -monitor on future scans.  He also had an MRI done, which showed the multiple cysts.      8) Appendix polyp: He is now s/p appendectomy.  Pathology found sessile serrated adenoma without dysplasia or malignancy.       Agueda Manley MD  Hematology/Oncology  [facility]        Oncology Rooming Note    September 15, 2017 4:09 PM   Louie Greco is a 57 year old male who presents for:    Chief Complaint   Patient presents with     Oncology Clinic Visit     Initial Vitals: /75 (BP Location: Left arm, Patient Position: Chair, Cuff Size: Adult Large)  Pulse 87  Temp 98.5  F (36.9  C) (Oral)  Resp 16  Wt 91.8 kg (202 lb 6.4 oz)  SpO2 100%  BMI 28.63 kg/m2 Estimated body mass index is 28.63 kg/(m^2) as calculated from the following:    Height as of 9/13/17: 1.791 m (5' 10.5\").    Weight as of this encounter: 91.8 kg (202 lb 6.4 oz). Body surface area is 2.14 meters squared.  No Pain (0) Comment: Data Unavailable   No LMP for male patient.  Allergies reviewed: Yes  Medications reviewed: Yes    Medications: Medication refills not needed today.  Pharmacy name entered into [employer]:    [facility] [address] [place] [address] - [phone] AT [address] [place] [address] PHARMACY [facility] [address]    Clinical concerns: None     5 minutes for nursing intake (face to face time)     Bonnie Rodas [facility]                  Sincerely,        Agueda Manley MD    "

## 2017-09-15 NOTE — PROGRESS NOTES
[facility]    Hematology/Oncology Established Patient Note      Today's Date: 09/15/2017    Reason for Follow-up: rectal cancer      HISTORY OF PRESENT ILLNESS: Louie Greco is a 57 year old male who presents with rectal cancer.  He started having rectal bleeding around beginning of 2016.  He underwent colonoscopy on 7/27/16, which showed a malignant tumor in the rectum involving half of the lumen with oozing, as well as three 4-mm polyps in the ascending and transverse colon.  Pathology showed moderately differentiated adenocarcinoma, ascending colon with sessile serrated adenoma, others were tubular adenomas.  Mismatch repair expression with normal protein expression.  Staging scans showed the rectal mass, indeterminate focus in the left liver thought to be cyst, and multiple bilateral renal cysts.  MRI showed a distal rectal mass measuring 2.7 x 2.4 cm extending to the most proximal region of the anal canal.  There are a few tiny subcentimeter perirectal fat lymph nodes.  He was staged clinically [id number] (stage IIIA).  He was seen by Dr. Lee at [facility], and started neoadjuvant chemoradiation with capecitabine on 8/8/16 and completed on 9/15/16.  He was then seen by Dr. Radford, colorectal surgery at [facility].  His post chemoradiation MRI showed improvement in the size of the tumor and response in the lymph nodes.  On 12/8/16, he underwent flexible sigmoidoscopy and there was no evidence of tumor.  There was only some anterior scarring in the lumen.  Multiple biopsies were taken, which showed no evidence of carcinoma.  At that point, Dr. Radford spoke with the patient's wife, that there appears to be a complete response in the lumen, and that the patient would wish to placed on the watch and wait protocol.  The patient had expressed wishes not to have an APR, and it would not have been possible to grossly clear a margin for LAR.    He had port placed  on 1/16/17.  It has been removed.    He completed FOLFOX x 8 cycles 1/16/17-5/9/17.        INTERIM HISTORY: Louie comes in for follow-up today.  He was admitted 7/16/17-7/20/17 with sepsis, abdominal wall cellulitis.  He underwent surgery with laparoscopic abdominal exploration and appendectomy.  Pathology found sessile serrated adenoma without dysplasia or malignancy.  He also recently had a colonoscopy with Dr. Radford.        REVIEW OF SYSTEMS:   14 point ROS was reviewed and is negative other than as noted above in HPI.       HOME MEDICATIONS:  Current Outpatient Prescriptions   Medication Sig Dispense Refill     erythromycin (ROMYCIN) ophthalmic ointment 1 Application At Bedtime       Na Sulfate-K Sulfate-Mg Sulf (SUPREP BOWEL PREP KIT) solution Take 177 mLs (1 Bottle) by mouth 2 times daily 2 Bottle 0     ibuprofen 200 MG capsule Take 200-400 mg by mouth every 6 hours as needed 120 capsule 0     ASPIRIN PO Take by mouth as needed for moderate pain       lidocaine, Anorectal, 5 % CREA Externally apply 1 Application topically 3 times daily as needed 1 Tube 0     VITAMIN D, CHOLECALCIFEROL, PO Take 2,000 Units by mouth daily        dibucaine (NUPERCAINAL) 1 % OINT ointment 3 times daily as needed for moderate pain       hydrocortisone 0.5 % ointment Apply topically 2 times daily as needed Reported on 2/14/2017           ALLERGIES:  Allergies   Allergen Reactions     Amoxicillin      Ampicillin Diarrhea     Demerol [Meperidine]      Nausea          PAST MEDICAL HISTORY:  Past Medical History:   Diagnosis Date     Childhood asthma      Nephrolithiasis     1990     Rectal cancer (H)     low rectal cancer         PAST SURGICAL HISTORY:  Past Surgical History:   Procedure Laterality Date     COLONOSCOPY N/A 7/27/2016    Procedure: COMBINED COLONOSCOPY, SINGLE OR MULTIPLE BIOPSY/POLYPECTOMY BY BIOPSY;  Surgeon: Chelsea Thompson MD;  Location:  GI     COLONOSCOPY N/A 9/13/2017    Procedure: COLONOSCOPY;;   Surgeon: Felicitas Radford MD;  Location: [facility] OR     EXAM UNDER ANESTHESIA ANUS N/A 2017    Procedure: EXAM UNDER ANESTHESIA ANUS;  Examination Under Anesthesia, flex sigmoidoscopy with biopsies and formalin application;  Surgeon: Felicitas Radford MD;  Location: [facility] OR     EXAM UNDER ANESTHESIA ANUS N/A 2017    Procedure: EXAM UNDER ANESTHESIA ANUS;  Examination Under Anesthesia Anus, Colonoscopy, application of formalin;  Surgeon: Felicitas Radford MD;  Location: [facility] OR     HC TOOTH EXTRACTION W/FORCEP       LAPAROSCOPIC APPENDECTOMY N/A 2017    Procedure: LAPAROSCOPIC APPENDECTOMY;  LAPAROSCOPIC APPENDECTOMY;  Surgeon: Bryson Ferguson MD;  Location: [facility] OR     SIGMOIDOSCOPY FLEXIBLE N/A 2016    Procedure: SIGMOIDOSCOPY FLEXIBLE;  Surgeon: Felicitas Radford MD;  Location: [facility] OR     SIGMOIDOSCOPY FLEXIBLE N/A 2017    Procedure: SIGMOIDOSCOPY FLEXIBLE;;  Surgeon: Felicitas Radford MD;  Location: [facility] OR     VASCULAR SURGERY      Right chest port     VASECTOMY           SOCIAL HISTORY:  Social History     Social History     Marital status:      Spouse name: N/A     Number of children: N/A     Years of education: N/A     Occupational History     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 No     Social History Narrative    Dad  at age  78   from [place], COPD, & HTN    Mom alive in her 70's.     for 30 years    Two adult children. 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         He notes that he had a brother who passed away at a young age of 53  from a metastatic cancer, but unknown what type, and passed away within 2 months of diagnosis.  He denies other family history of cancer in the immediate family.  Louie works as a  at a [employer] school.      FAMILY HISTORY:  Family History   Problem Relation Age of Onset     CANCER Brother      primary of unknown origin     Other Cancer Brother      Chronic Obstructive Pulmonary Disease Father      Hypertension Father      Hyperlipidemia Father      Colon Polyps Mother      Number and type of polyps unknown     Family History Negative Brother      negative colonoscopy     DIABETES Maternal Grandfather      Hypertension Paternal Grandmother      Hyperlipidemia Paternal Grandmother      Stomach Cancer Other 63     maternal great grandfather     Glaucoma No family hx of      Macular Degeneration No family hx of          PHYSICAL EXAM:  Vital signs:  /75 (BP Location: Left arm, Patient Position: Chair, Cuff Size: Adult Large)  Pulse 87  Temp 98.5  F (36.9  C) (Oral)  Resp 16  Wt 91.8 kg (202 lb 6.4 oz)  SpO2 100%  BMI 28.63 kg/m2   ECO  GENERAL/CONSTITUTIONAL: No acute distress.  EYES: No scleral icterus.  LYMPH: No anterior cervical, posterior cervical, or supraclavicular adenopathy.   RESPIRATORY: Clear to auscultation bilaterally. No crackles or wheezing.   CARDIOVASCULAR: Regular rate and rhythm without murmurs, gallops, or rubs.  GASTROINTESTINAL: No tenderness. The patient has normal bowel sounds. No guarding.  No distention.  MUSCULOSKELETAL: Warm and well-perfused, no cyanosis, clubbing, or edema.  NEUROLOGIC: Alert, oriented, answers questions appropriately.  INTEGUMENTARY: No jaundice.  GAIT: Steady, does not use assistive device      LABS:  CBC RESULTS:   Recent Labs   Lab Test  17   0700   WBC  4.2   RBC  4.91   HGB  14.4   HCT  41.9   MCV  85   MCH  29.3   MCHC  34.4   RDW  13.3   PLT  168     Recent Labs   Lab Test  17   0700  17   0700   17   0630    NA  136   --    --   140   POTASSIUM  3.8   --    --   4.2   CHLORIDE  104   --    --   110*   CO2  25   --    --   22   ANIONGAP  7   --    --   8   GLC  97   --    --   122*   BUN  16   --    --   18   CR  0.74  0.68   < >  0.84   FRANDY  9.2   --    --   7.9*    < > = values in this interval not displayed.     Lab Results   Component Value Date    AST 33 09/13/2017     Lab Results   Component Value Date    ALT 47 09/13/2017     No results found for: BILICONJ   Lab Results   Component Value Date    BILITOTAL 2.0 09/13/2017     Lab Results   Component Value Date    ALBUMIN 4.0 09/13/2017     Lab Results   Component Value Date    PROTTOTAL 7.7 09/13/2017      Lab Results   Component Value Date    ALKPHOS 102 09/13/2017     CEA 0.7      IMAGING:  CT c/a/p 6/7/17:  FINDINGS:     Lines and tubes: Right IJ Port-A-Cath tip terminates at the cavoatrial  junction.     Chest:   Mediastinum: No thyroid nodules; hypertrophy of the right thyroid  gland. Central tracheobronchial tree is patent. Heart size is normal.  Trace pericardial effusion.  Normal thoracic vasculature. No thoracic  lymphadenopathy.   Lungs: No consolidation. No pleural effusion or pneumothorax. Stable  large calcified granuloma with an adjacent cluster of noncalcified  pulmonary nodules multiple sub-4 mm pulmonary nodules, unchanged from  prior. No new conspicuous nodules. Mild subsegmental bibasilar  atelectasis. Mild fibrotic changes in the lingula.     Abdomen and pelvis:   Liver: Multiple nonconspicuous, hypodense cysts with fluid density  throughout the liver, stable from prior. No suspicious liver lesions.  Portal veins appear patent.  Gallbladder: The gallbladder is contracted, limiting evaluation.  Spleen: Normal size. Small splenule at the splenic hilum.  Pancreas: Fatty infiltration of the pancreas. No suspicious pancreatic  lesions. The pancreatic duct is not dilated.  Adrenal glands: No adrenal nodules. Nodular thickening of the right  adrenal  gland.  Kidneys: No hydronephrosis or obstructing renal stones. Multiple fluid  attenuating cysts are present.  In the inferior pole of left kidney  there is a 4.3 cm density,which has increased in size from 7/27/2016  at which time was 3.7 cm high. It measures 53 Hounsfield units.  The  differential favors hemorrhagic proteinaceous cyst, although slow  growing malignancy is not excluded.   Bladder / Pelvic organs: The bladder is decompressed with a thickened  wall. Prostate hypertrophy. Coarse prostate calcifications.  Bowel: The mesorectal fat plane and fascia appears normal. No bowel  wall thickening. Hyperdense material within the tip of the appendix,  likely tiny stones versus colonic debris. No evidence of acute  appendicitis.  Lymph nodes: No retroperitoneal, mesenteric, or pelvic  lymphadenopathy.  Peritoneum / Retroperitoneum: No free fluid or air within the abdomen.  Vessels: No infrarenal aortic aneurysm.      Bones and soft tissues: Small irregularity in the anterolateral left  seventh rib (series 3, image 13), stable in appearance with 7/27/2016  and not FDG avid on the PET/CT dated 7/29/2016. Suspicious features.  No suspicious osseous lesions. Mild bilateral gynecomastia.          IMPRESSION: In this patient with a history of rectal cancer:  1. No evidence of malignant recurrence or metastatic disease in the  chest, abdomen or pelvis.  2. Stable hepatic cysts without suspicious features.   3. Stable sub-4 mm pulmonary nodules throughout the lungs.  4. Slowly growing probable proteinaceous or hemorrhagic cyst inferior  pole of left kidney. Although malignancy cannot be excluded. Attention  on follow-up examinations.    MRI pelvis 6/5/17  FINDINGS:     LOCATION/SIZE:  On prior exam dated 3/13/2017 there was a treated tumor identified in  the lower rectum. Previously this treated tumor measured 2.6 cm, and  currently it measures the same. This is best seen on series 5, image  24 and series 6, image 31.  The treated tumor again appears entirely  fibrotic with a T2 hypointense signal and is seen along the left  anterolateral aspect of the lower rectum. There is associated delay in  enhancement. No focal nodular enhancement is identified. No restricted  diffusion signal.     TREATMENT RESPONSE:   Approximately 0% of the current mass demonstrates tumour signal  intensity, with the remainder appearing to represent fibrosis.  This  corresponds to a tumour response grade of 1.      EXTENT:  The tumor does not clearly involve the anal sphincters or levator ani  muscle. The treated tumor does extend inferiorly to the puborectalis  sling, however without clear tumor signal involving musculature at  this time. There is unchanged persistent mucosal edema of the mid and  lower rectum seen along the posterior and right lateral wall, likely  representing post therapy changes.     CIRCUMFERENTIAL RESECTION MARGIN (CRM):  In terms of the resection margin, there is not involvement of the  mesorectal fascia      LYMPH NODES:  A few less than 5 mm mesorectal fascia lymph nodes have not  significantly changed and do not have suspicious features.   For example there is a stable left mesorectal fascia lymph node  measuring 0.3 cm on image 9, series 6).      VEINS:  There is not evidence of extramural venous extension.      MISCELLANEOUS FINDINGS:  Enlargement of the prostate gland. Urinary bladder is partially  distended. Other visualized bowel loops appear unremarkable.  There is heterogeneous signal intensity and enhancement of the  visualized pelvic bones and proximal femurs with patchy areas of T2  hypointensity.         IMPRESSION:   1. No evidence of residual or recurrent tumor corresponding to tumor  regression grade of 1 unchanged since 3/13/2017. Treated fibrotic  tumor is again noted along the left anterolateral aspect of the lower  rectum.  2. Stable few tiny mesorectal fascia and lymph nodes without  suspicious features.  3.  Heterogeneous marrow signal intensity and enhancement which may  represent marrow reconversion.     MRI abdomen 8/25/17:  1. There are two left renal cysts with areas of T1 hyperintensity and  no associated enhancement, likely representing hemorrhagic or  proteinaceous renal cysts.  2. Multiple simple-appearing bilateral renal and hepatic cysts are  again noted.      ASSESSMENT/PLAN:  Louie Greco is a 57 year old male with:    1) Rectal cancer: clinical stage [id number] (stage IIIA), s/p chemoradiation with Xeloda, and appears to have achieved complete response on imaging and biopsies.  He opted not to undergo surgery and expressed desire not to undergo APR, as he does not want a colostomy bag.  It was felt reasonable to go on the watch and wait protocol with the [facility].  He has completed 4 additional months (8 cycles) of chemotherapy with FOLFOX.  He will now go on surveillance, as per the watch and wait protocol.    Post-treatment CT scans and MRI pelvis show no evidence of residual or recurrent tumor.      -he had colonoscopy with Dr. Radford on 9/13/17.  There were no polyps or other mucosal changes.  There were some findings consistent with radiation proctitis.  There was some scarring seen but no mass.    -next T3 MRI pelvis would be in January 2018 (every 6 months x 2 years).  He also has a PET scan then.  -CT c/a/p annually for 5 years  -endoscopic surveillance with Dr. Radford as per protocol  -RTC as already scheduled in February 2018 with labs and CEA    2) Genetics: He underwent genetic testing, which was negative.    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  He is now done with chemotherapy, but it has persisted, maybe even worsened.  He says that he has tried gabapentin in the past, but didn't feel helpful.  I discussed Lyrica, but he wants to wait and observe for now as it feels tolerable.          4) Elevated bilirubin: mild.  He is asymptomatic.    -monitor for now    5) Liver  cysts: seen on CT  -monitor    6) Pulmonary nodules: stable sub-4 mm pulmonary nodules  -monitor on future scans    7) Kidney cyst:  -monitor on future scans.  He also had an MRI done, which showed the multiple cysts.      8) Appendix polyp: He is now s/p appendectomy.  Pathology found sessile serrated adenoma without dysplasia or malignancy.       Agueda Manley MD  Hematology/Oncology  [facility]

## 2017-09-15 NOTE — MR AVS SNAPSHOT
After Visit Summary   9/15/2017    Louie Greco    MRN: [id number]           Patient Information     Date Of Birth          1960        Visit Information        Provider Department      9/15/2017 4:00 PM Agueda Manley MD [facility]        Today's Diagnoses     Malignant neoplasm of rectum (H)    -  1       Follow-ups after your visit        Your next 10 appointments already scheduled     Jan 26, 2018  2:15 PM CST   PE NPET ONCOLOGY (EYES TO THIGHS) with UUPET1   [facility] PET CT ([facility])    [address] [facility] [address] [phone]           Tell your doctor:   If there is any chance you may be pregnant or if you are breastfeeding.   If you have problems lying in small spaces (claustrophobia). If you do, your doctor may give you medicine to help you relax. If you have diabetes:   Have your exam early in the morning. Your blood glucose will go up as the day goes by.   Your glucose level must be 180 or less at the start of the exam. Please take any medicines you need to ensure this blood glucose level. 24 hours before your scan: Don t do any heavy exercise. (No jogging, aerobics or other workouts.) Exercise will make your pictures less accurate. 6 hours before your scan:   Stop all food and liquids (except water).   Do not chew gum or suck on mints.   If you need to take medicine with food, you may take it with a few crackers.  Please call your Imaging Department at your exam site with any questions.            Jan 26, 2018  5:00 PM CST   MR PELVIS W/O & W CONTRAST with UUMR1   [facility], MRI ([facility])    [address] [facility] [id number] [phone]           Take your medicines as usual, unless your doctor tells you not to. Bring a list of your current medicines to your exam (including vitamins, minerals and over-the-counter  drugs).  You will be given intravenous contrast for this exam. To prepare:   The day before your exam, drink extra fluids at least six 8-ounce glasses (unless your doctor tells you to restrict your fluids).   Have a blood test (creatinine test) within 30 days of your exam. Go to your clinic or Diagnostic Imaging Department for this test.  The MRI machine uses a strong magnet. Please wear clothes without metal (snaps, zippers). A sweatsuit works well, or we may give you a hospital gown.  Please remove any body piercings and hair extensions before you arrive. You will also remove watches, jewelry, hairpins, wallets, dentures, partial dental plates and hearing aids. You may wear contact lenses, and you may be able to wear your rings. We have a safe place to keep your personal items, but it is safer to leave them at home.   **IMPORTANT** THE INSTRUCTIONS BELOW ARE ONLY FOR THOSE PATIENTS WHO HAVE BEEN TOLD THEY WILL RECEIVE SEDATION OR GENERAL ANESTHESIA DURING THEIR MRI PROCEDURE:  IF YOU WILL RECEIVE SEDATION (take medicine to help you relax during your exam):   You must get the medicine from your doctor before you arrive. Bring the medicine to the exam. Do not take it at home.   Arrive one hour early. Bring someone who can take you home after the test. Your medicine will make you sleepy. After the exam, you may not drive, take a bus or take a taxi by yourself.   No eating 8 hours before your exam. You may have clear liquids up until 4 hours before your exam. (Clear liquids include water, clear tea, black coffee and fruit juice without pulp.)  IF YOU WILL RECEIVE ANESTHESIA (be asleep for your exam):   Arrive 1 1/2 hours early. Bring someone who can take you home after the test. You may not drive, take a bus or take a taxi by yourself.   No eating 8 hours before your exam. You may have clear liquids up until 4 hours before your exam. (Clear liquids include water, clear tea, black coffee and fruit juice without pulp.)   Please call the Imaging Department at your exam site with any questions.            Jan 26, 2018  6:30 PM CST   LAB with ACUTE CARE LAB [facility] [place], Lab ([facility])    [address] [facility] [address]              Patient must bring picture ID. Patient should be prepared to give a urine specimen  Please do not eat 10-12 hours before your appointment if you are coming in fasting for labs on lipids, cholesterol, or glucose (sugar). Pregnant women should follow their Care Team instructions. Water with medications is okay. Do not drink coffee or other fluids. If you have concerns about taking  your medications, please ask at office or if scheduling via [employer], send a message by clicking on Secure Messaging, Message Your Care Team.            Feb 02, 2018  4:00 PM CST   Return Visit with Agueda Manley MD   [facility] ([facility])    [facility]  [address] [facility] [address]  [facility] [id number] [phone]              Who to contact     If you have questions or need follow up information about today's clinic visit or your schedule please contact [facility] directly at [phone].  Normal or non-critical lab and imaging results will be communicated to you by MyChart, letter or phone within 4 business days after the clinic has received the results. If you do not hear from us within 7 days, please contact the clinic through [employer] or phone. If you have a critical or abnormal lab result, we will notify you by phone as soon as possible.  Submit refill requests through [employer] or call your pharmacy and they will forward the refill request to us. Please allow 3 business days for your refill to be completed.          Additional Information About Your Visit        [employer] Information     [employer] gives you secure access to your electronic health record. If you see a primary care provider,  you can also send messages to your care team and make appointments. If you have questions, please call your primary care clinic.  If you do not have a primary care provider, please call [phone] and they will assist you.        Care EveryWhere ID     This is your Care EveryWhere ID. This could be used by other organizations to access your [facility] medical records  [id number]        Your Vitals Were     Pulse Temperature Respirations Pulse Oximetry BMI (Body Mass Index)       87 98.5  F (36.9  C) (Oral) 16 100% 28.63 kg/m2        Blood Pressure from Last 3 Encounters:   09/15/17 112/75   09/13/17 115/76   08/24/17 110/66    Weight from Last 3 Encounters:   09/15/17 91.8 kg (202 lb 6.4 oz)   09/13/17 90.7 kg (200 lb)   08/24/17 91.2 kg (201 lb)              Today, you had the following     No orders found for display         Today's Medication Changes          These changes are accurate as of: 9/15/17  4:53 PM.  If you have any questions, ask your nurse or doctor.               Stop taking these medicines if you haven't already. Please contact your care team if you have questions.     methylPREDNISolone 32 MG tablet   Commonly known as:  MEDROL                    Primary Care Provider Office Phone # Fax #    Philippe Healy [phone]       [facility] [address] [facility] [address]  [facility] [address]        Equal Access to Services     BLAISE EDDY AH: Hadii trent ku hadasho Somario albertoali, waaxda luqadaha, qaybta kaalmada marc, jb coats. So [facility] [phone].    ATENCIÓN: Si habla español, tiene a mena disposición servicios gratuitos de asistencia lingüística. Llame al [phone].    We comply with applicable federal civil rights laws and [place] laws. We do not discriminate on the basis of race, color, national origin, age, disability sex, sexual orientation or gender identity.            Thank you!     Thank you for choosing [facility]  for  your care. Our goal is always to provide you with excellent care. Hearing back from our patients is one way we can continue to improve our services. Please take a few minutes to complete the written survey that you may receive in the mail after your visit with us. Thank you!             Your Updated Medication List - Protect others around you: Learn how to safely use, store and throw away your medicines at www.disposemymeds.org.          This list is accurate as of: 9/15/17  4:53 PM.  Always use your most recent med list.                   Brand Name Dispense Instructions for use Diagnosis    ASPIRIN PO      Take by mouth as needed for moderate pain        dibucaine 1 % Oint ointment    NUPERCAINAL     3 times daily as needed for moderate pain        erythromycin ophthalmic ointment    ROMYCIN     1 Application At Bedtime        hydrocortisone 0.5 % ointment      Apply topically 2 times daily as needed Reported on 2/14/2017        ibuprofen 200 MG capsule     120 capsule    Take 200-400 mg by mouth every 6 hours as needed    Acute post-operative pain       lidocaine (Anorectal) 5 % Crea     1 Tube    Externally apply 1 Application topically 3 times daily as needed    Anal pain       Na Sulfate-K Sulfate-Mg Sulf solution    SUPREP BOWEL PREP KIT    2 Bottle    Take 177 mLs (1 Bottle) by mouth 2 times daily    Rectal cancer (H)       VITAMIN D (CHOLECALCIFEROL) PO      Take 2,000 Units by mouth daily

## 2017-09-15 NOTE — PROGRESS NOTES
"Oncology Rooming Note    September 15, 2017 4:09 PM   Louie Greco is a 57 year old male who presents for:    Chief Complaint   Patient presents with     Oncology Clinic Visit     Initial Vitals: /75 (BP Location: Left arm, Patient Position: Chair, Cuff Size: Adult Large)  Pulse 87  Temp 98.5  F (36.9  C) (Oral)  Resp 16  Wt 91.8 kg (202 lb 6.4 oz)  SpO2 100%  BMI 28.63 kg/m2 Estimated body mass index is 28.63 kg/(m^2) as calculated from the following:    Height as of 9/13/17: 1.791 m (5' 10.5\").    Weight as of this encounter: 91.8 kg (202 lb 6.4 oz). Body surface area is 2.14 meters squared.  No Pain (0) Comment: Data Unavailable   No LMP for male patient.  Allergies reviewed: Yes  Medications reviewed: Yes    Medications: Medication refills not needed today.  Pharmacy name entered into [facility]:    [id number]  [place], [place] - [address] [facility] - [address]    Clinical concerns: None     5 minutes for nursing intake (face to face time)     Bonnie Rodas CMA              "

## 2017-09-16 NOTE — PROGRESS NOTES
The following letter pertains to your most recent diagnostic tests:    -Your cholesterol panel looks healthy.     -Your PSA is a little high and should be rechecked in 6 weeks or so.  If the PSA continues to rise, then we should have you see a urologist about it, but if the PSA is stable or lower, then we can simply continue to monitor the test.  Most dangerous prostate cancers present with PSA greater than 10.           Follow up:  Schedule a lab appointment in 6 weeks or so to recheck the PSA       Sincerely,    Dr. Healy

## 2017-09-19 DIAGNOSIS — Z12.12 SCREENING FOR MALIGNANT NEOPLASM OF THE RECTUM: Primary | ICD-10-CM

## 2017-09-22 ENCOUNTER — TELEPHONE (OUTPATIENT)
Dept: SURGERY | Facility: CLINIC | Age: 57
End: 2017-09-22

## 2017-09-22 NOTE — TELEPHONE ENCOUNTER
I called Louie to let him know that I would be scheduling his next flex sig for the watch and wait protocol on 11/24/2017. I left a message for him to call me to confirm.

## 2017-09-27 ENCOUNTER — TELEPHONE (OUTPATIENT)
Dept: SURGERY | Facility: CLINIC | Age: 57
End: 2017-09-27

## 2017-09-27 NOTE — TELEPHONE ENCOUNTER
I returned Louie's call and left a message offering 12/08/2017 for his procedure. I let him know that I would leave the current scheduled procedure on 11/24/2017 until I hear from him about 12/08/2017.

## 2017-10-19 DIAGNOSIS — Z12.5 SCREENING FOR PROSTATE CANCER: ICD-10-CM

## 2017-10-19 LAB — PSA SERPL-ACNC: 5 UG/L (ref 0–4)

## 2017-10-19 PROCEDURE — G0103 PSA SCREENING: HCPCS | Performed by: INTERNAL MEDICINE

## 2017-10-21 NOTE — PROGRESS NOTES
The following letter pertains to your most recent diagnostic tests:    Good news! You follow up PSA level is improving toward the normal range.  This makes a dangerous prostate cancer seem very unlikely.  I recommend another check in 2 months to continue to monitor the trend.  You can schedule a lab appointment for that purpose.        Sincerely,    Dr. Healy

## 2017-11-30 ENCOUNTER — OFFICE VISIT (OUTPATIENT)
Dept: FAMILY MEDICINE | Facility: CLINIC | Age: 57
End: 2017-11-30
Payer: COMMERCIAL

## 2017-11-30 VITALS
TEMPERATURE: 97 F | BODY MASS INDEX: 28.49 KG/M2 | DIASTOLIC BLOOD PRESSURE: 79 MMHG | OXYGEN SATURATION: 98 % | WEIGHT: 199 LBS | HEART RATE: 79 BPM | SYSTOLIC BLOOD PRESSURE: 119 MMHG | HEIGHT: 70 IN

## 2017-11-30 DIAGNOSIS — R97.20 ELEVATED PROSTATE SPECIFIC ANTIGEN (PSA): ICD-10-CM

## 2017-11-30 DIAGNOSIS — Z01.818 PREOP GENERAL PHYSICAL EXAM: Primary | ICD-10-CM

## 2017-11-30 DIAGNOSIS — N52.9 MALE ERECTILE DISORDER: ICD-10-CM

## 2017-11-30 DIAGNOSIS — C20 RECTAL CANCER (H): ICD-10-CM

## 2017-11-30 PROCEDURE — 99214 OFFICE O/P EST MOD 30 MIN: CPT | Performed by: INTERNAL MEDICINE

## 2017-11-30 RX ORDER — SODIUM, POTASSIUM,MAG SULFATES 17.5-3.13G
1 SOLUTION, RECONSTITUTED, ORAL ORAL 2 TIMES DAILY
Qty: 2 BOTTLE | Refills: 0 | Status: SHIPPED | OUTPATIENT
Start: 2017-11-30 | End: 2018-01-15

## 2017-11-30 RX ORDER — SILDENAFIL CITRATE 20 MG/1
20 TABLET ORAL DAILY PRN
Qty: 30 TABLET | Refills: 3 | Status: SHIPPED | OUTPATIENT
Start: 2017-11-30 | End: 2018-02-02

## 2017-11-30 NOTE — PROGRESS NOTES
[address] [address]  [phone]  Dept: [phone]    PRE-OP EVALUATION:  Today's date: 2017    Louie Greco (: 1960) presents for pre-operative evaluation assessment as requested by Felicitas Chatterjee MD.  He requires evaluation and anesthesia risk assessment prior to undergoing surgery/procedure for treatment of Rectal Cancer .  Proposed procedure: Examination Under Anesthesia Anus, Colonoscopy    Date of Surgery/ Procedure: 2017  Time of Surgery/ Procedure: 7:15 AM  Hospital/Surgical Facility:  OR  Primary Physician: Philippe Healy  Type of Anesthesia Anticipated: to be determined    Patient has a Health Care Directive or Living Will:  YES on file    1. NO - Do you have a history of heart attack, stroke, stent, bypass or surgery on an artery in the head, neck, heart or legs?  2. NO - Do you ever have any pain or discomfort in your chest?  3. NO - Do you have a history of  Heart Failure?  4. NO - Are you troubled by shortness of breath when: walking on the level, up a slight hill or at night?  5. NO - Do you currently have a cold, bronchitis or other respiratory infection?  6. NO - Do you have a cough, shortness of breath or wheezing?  7. NO - Do you sometimes get pains in the calves of your legs when you walk?  8. NO - Do you or anyone in your family have previous history of blood clots?  9. NO - Do you or does anyone in your family have a serious bleeding problem such as prolonged bleeding following surgeries or cuts?  10. NO - Have you ever had problems with anemia or been told to take iron pills?  11. YES - HAVE YOU HAD ANY ABNORMAL BLOOD LOSS SUCH AS BLACK, TARRY OR BLOODY STOOLS, OR ABNORMAL VAGINAL BLEEDING? Yes, but none since diagnosis and treatment of rectal cancer  12. NO - Have you ever had a blood transfusion?  13. NO - Have you or any of your relatives ever had problems with anesthesia?  14. NO - Do you have sleep  apnea, excessive snoring or daytime drowsiness?  15. NO - Do you have any prosthetic heart valves?  16. NO - Do you have prosthetic joints?  17. NO - Is there any chance that you may be pregnant?        HPI:                                                      Brief HPI related to upcoming procedure: This patient requires sedation for colonoscopy for surveillance of his rectal cancer. He otherwise feels well and has no complaints other than since radiation therapy, he has had trouble maintaining erections. He reports good libido. He does get spontaneous morning erections. He can perform 4 METS of physical activity without chest pain or dyspnea.      MEDICAL HISTORY:                                                    Patient Active Problem List    Diagnosis Date Noted     Appendicitis 07/17/2017     Priority: Medium     Chemotherapy-induced neutropenia (H) 03/21/2017     Priority: Medium     Advance Care Planning 03/09/2017     Priority: Medium     Advance Care Planning 3/9/2017: Receipt of ACP document:  Received: invalid HCD document dated 12/8/2016.  Document not previously scanned. Validation form completed indicating invalid document. Copy sent to client with information and facilitation resources. Validation form sent to be scanned as notation of invalid document received.  Code Status needs to be updated to reflect choices. Confirmed/documented designated decision maker(s).  Added by Araceli Horne [facility] Advance Care Planning Liaison with [employer].       Malignant neoplasm of rectum (H) 01/03/2017     Priority: Medium     Rectal bleeding 07/19/2016     Priority: Medium     Plantar fasciitis 11/04/2010     Priority: Medium     Pes anserinus tendinitis 02/08/2010     Priority: Medium      Past Medical History:   Diagnosis Date     Childhood asthma      Nephrolithiasis     1990     Rectal cancer (H)     low rectal cancer     Past Surgical History:   Procedure Laterality Date     COLONOSCOPY N/A 7/27/2016     Procedure: COMBINED COLONOSCOPY, SINGLE OR MULTIPLE BIOPSY/POLYPECTOMY BY BIOPSY;  Surgeon: Chelsea Thompson MD;  Location: [facility]     COLONOSCOPY N/A 9/13/2017    Procedure: COLONOSCOPY;;  Surgeon: Felicitas Radford MD;  Location: [facility]     EXAM UNDER ANESTHESIA ANUS N/A 7/5/2017    Procedure: EXAM UNDER ANESTHESIA ANUS;  Examination Under Anesthesia, flex sigmoidoscopy with biopsies and formalin application;  Surgeon: Felicitas Radford MD;  Location: [facility]     EXAM UNDER ANESTHESIA ANUS N/A 9/13/2017    Procedure: EXAM UNDER ANESTHESIA ANUS;  Examination Under Anesthesia Anus, Colonoscopy, application of formalin;  Surgeon: Felicitas Radford MD;  Location: [facility] OR     HC TOOTH EXTRACTION W/FORCEP       LAPAROSCOPIC APPENDECTOMY N/A 7/17/2017    Procedure: LAPAROSCOPIC APPENDECTOMY;  LAPAROSCOPIC APPENDECTOMY;  Surgeon: Bryson Ferguson MD;  Location: [facility] OR     SIGMOIDOSCOPY FLEXIBLE N/A 12/8/2016    Procedure: SIGMOIDOSCOPY FLEXIBLE;  Surgeon: Felicitas Radford MD;  [facility]     SIGMOIDOSCOPY FLEXIBLE N/A 7/5/2017    Procedure: SIGMOIDOSCOPY FLEXIBLE;;  Surgeon: Felicitas Radford MD;  Location: [facility]     VASCULAR SURGERY      Right chest port     VASECTOMY       Current Outpatient Prescriptions   Medication Sig Dispense Refill     Na Sulfate-K Sulfate-Mg Sulf (SUPREP BOWEL PREP KIT) solution Take 177 mLs (1 Bottle) by mouth 2 times daily 2 Bottle 0     sildenafil (REVATIO) 20 MG tablet Take 1 tablet (20 mg) by mouth daily as needed 30 tablet 3     erythromycin (ROMYCIN) ophthalmic ointment 1 Application At Bedtime       ibuprofen 200 MG capsule Take 200-400 mg by mouth every 6 hours as needed 120 capsule 0     ASPIRIN PO Take by mouth as needed for moderate pain       lidocaine, Anorectal, 5 % CREA Externally apply 1 Application topically 3 times daily as needed 1 Tube 0     VITAMIN D, CHOLECALCIFEROL, PO Take 2,000 Units by mouth daily        dibucaine  "(NUPERCAINAL) 1 % OINT ointment 3 times daily as needed for moderate pain       hydrocortisone 0.5 % ointment Apply topically 2 times daily as needed Reported on 2/14/2017           Allergies   Allergen Reactions     Amoxicillin      Ampicillin Diarrhea     Demerol [Meperidine]      Nausea         Social History   Substance Use Topics     Smoking status: Never Smoker     Smokeless tobacco: Never Used     Alcohol use 0.0 oz/week      Comment: Very moderate     History   Drug Use No       REVIEW OF SYSTEMS:                                                    Constitutional, neuro, ENT, endocrine, pulmonary, cardiac, gastrointestinal, genitourinary, musculoskeletal, integument and psychiatric systems are negative, except as otherwise noted.      EXAM:                                                    /79 (BP Location: Right arm, Cuff Size: Adult Large)  Pulse 79  Temp 97  F (36.1  C) (Tympanic)  Ht 5' 10\" (1.778 m)  Wt 199 lb (90.3 kg)  SpO2 98%  BMI 28.55 kg/m2    GENERAL APPEARANCE: healthy, alert and no distress     EYES: EOMI,  PERRL     HENT: ear canals and TM's normal and nose and mouth without ulcers or lesions     NECK: no adenopathy, no asymmetry, masses, or scars and thyroid normal to palpation     RESP: lungs clear to auscultation - no rales, rhonchi or wheezes     CV: regular rates and rhythm, normal S1 S2, no S3 or S4 and no murmur, click or rub     ABDOMEN:  soft, nontender, no HSM or masses and bowel sounds normal     MS: extremities normal- no gross deformities noted, no evidence of inflammation in joints, FROM in all extremities.     SKIN: no suspicious lesions or rashes     NEURO: Normal strength and tone, sensory exam grossly normal, mentation intact and speech normal     PSYCH: mentation appears normal. and affect normal/bright     LYMPHATICS: No cervical, or supraclavicular nodes    DIAGNOSTICS:                                                    No labs or EKG required for low risk " surgery (cataract, skin procedure, breast biopsy, etc)    Recent Labs   Lab Test  09/13/17   0700  07/20/17   0700   07/18/17   0630   07/10/17   0943   01/16/17   0800   HGB  14.4   --    --   9.6*   < >  12.8*   < >  15.1   PLT  168   --    --   122*   < >  170   < >  207   INR   --    --    --    --    --   1.02   --   0.92   NA  136   --    --   140   < >   --    < >   --    POTASSIUM  3.8   --    --   4.2   < >   --    < >   --    CR  0.74  0.68   < >  0.84   < >   --    < >   --     < > = values in this interval not displayed.        IMPRESSION:                                                    Reason for surgery/procedure: Rectal cancer  Diagnosis/reason for consult: Preoperative evaluation     The proposed surgical procedure is considered LOW risk.    REVISED CARDIAC RISK INDEX  The patient has the following serious cardiovascular risks for perioperative complications such as (MI, PE, VFib and 3  AV Block):  No serious cardiac risks  INTERPRETATION: 0 risks: Class I (very low risk - 0.4% complication rate)    The patient has the following additional risks for perioperative complications:  No identified additional risks      ICD-10-CM    1. Preop general physical exam Z01.818    2. Rectal cancer (H) C20 Na Sulfate-K Sulfate-Mg Sulf (SUPREP BOWEL PREP KIT) solution   3. Male erectile disorder N52.9 sildenafil (REVATIO) 20 MG tablet     He will return to clinic for a follow-up PSA as previously directed in late December or early January.  RECOMMENDATIONS:                                                          --Patient is to take all scheduled medications on the day of surgerybelow.    APPROVAL GIVEN to proceed with proposed procedure, without further diagnostic evaluation       Signed Electronically by: Philippe Healy MD    Copy of this evaluation report is provided to requesting physician.    [facility] Preop Guidelines

## 2017-11-30 NOTE — MR AVS SNAPSHOT
After Visit Summary   11/30/2017    Louie Greco    MRN: [id number]           Patient Information     Date Of Birth          1960        Visit Information        Provider Department      11/30/2017 6:30 PM Philippe Healy MD [facility]        Today's Diagnoses     Preop general physical exam    -  1    Rectal cancer (H)        Male erectile disorder        Elevated prostate specific antigen (PSA)          Care Instructions      Before Your Surgery      Call your surgeon if there is any change in your health. This includes signs of a cold or flu (such as a sore throat, runny nose, cough, rash or fever).    Do not smoke, drink alcohol or take over the counter medicine (unless your surgeon or primary care doctor tells you to) for the 24 hours before and after surgery.    If you take prescribed drugs: Follow your doctor s orders about which medicines to take and which to stop until after surgery.    Eating and drinking prior to surgery: follow the instructions from your surgeon    Take a shower or bath the night before surgery. Use the soap your surgeon gave you to gently clean your skin. If you do not have soap from your surgeon, use your regular soap. Do not shave or scrub the surgery site.  Wear clean pajamas and have clean sheets on your bed.           Follow-ups after your visit        Your next 10 appointments already scheduled     Dec 08, 2017   Procedure with Felicitas Radford MD   [facility] ([facility])    [address] [place], [place] [id number] [phone]           Located in the Clinics and Surgery Center at [address] [place] [address].   parking is very convenient and highly recommended.  is a $6 flat rate fee.  Both  and self parkers should enter the main arrival plaza from [facility]; parking attendants will direct you based on your parking preference.             Jan 26, 2018  2:15 PM CST   PE NPET ONCOLOGY (EYES TO THIGHS) with UUPET1   [facility] PET CT ([facility])    [address] [place] [id number] [phone]           Tell your doctor:   If there is any chance you may be pregnant or if you are breastfeeding.   If you have problems lying in small spaces (claustrophobia). If you do, your doctor may give you medicine to help you relax. If you have diabetes:   Have your exam early in the morning. Your blood glucose will go up as the day goes by.   Your glucose level must be 180 or less at the start of the exam. Please take any medicines you need to ensure this blood glucose level. 24 hours before your scan: Don t do any heavy exercise. (No jogging, aerobics or other workouts.) Exercise will make your pictures less accurate. 6 hours before your scan:   Stop all food and liquids (except water).   Do not chew gum or suck on mints.   If you need to take medicine with food, you may take it with a few crackers.  Please call your Imaging Department at your exam site with any questions.            Jan 26, 2018  5:00 PM CST   MR PELVIS W/O & W CONTRAST with UUMR1   [facility], MRI ([facility])    [address] [facility] [id number] [phone]           Take your medicines as usual, unless your doctor tells you not to. Bring a list of your current medicines to your exam (including vitamins, minerals and over-the-counter drugs).  You will be given intravenous contrast for this exam. To prepare:   The day before your exam, drink extra fluids at least six 8-ounce glasses (unless your doctor tells you to restrict your fluids).   Have a blood test (creatinine test) within 30 days of your exam. Go to your clinic or Diagnostic Imaging Department for this test.  The MRI machine uses a strong magnet. Please wear clothes without metal (snaps, zippers). A  sweatsuit works well, or we may give you a hospital gown.  Please remove any body piercings and hair extensions before you arrive. You will also remove watches, jewelry, hairpins, wallets, dentures, partial dental plates and hearing aids. You may wear contact lenses, and you may be able to wear your rings. We have a safe place to keep your personal items, but it is safer to leave them at home.   **IMPORTANT** THE INSTRUCTIONS BELOW ARE ONLY FOR THOSE PATIENTS WHO HAVE BEEN TOLD THEY WILL RECEIVE SEDATION OR GENERAL ANESTHESIA DURING THEIR MRI PROCEDURE:  IF YOU WILL RECEIVE SEDATION (take medicine to help you relax during your exam):   You must get the medicine from your doctor before you arrive. Bring the medicine to the exam. Do not take it at home.   Arrive one hour early. Bring someone who can take you home after the test. Your medicine will make you sleepy. After the exam, you may not drive, take a bus or take a taxi by yourself.   No eating 8 hours before your exam. You may have clear liquids up until 4 hours before your exam. (Clear liquids include water, clear tea, black coffee and fruit juice without pulp.)  IF YOU WILL RECEIVE ANESTHESIA (be asleep for your exam):   Arrive 1 1/2 hours early. Bring someone who can take you home after the test. You may not drive, take a bus or take a taxi by yourself.   No eating 8 hours before your exam. You may have clear liquids up until 4 hours before your exam. (Clear liquids include water, clear tea, black coffee and fruit juice without pulp.)  Please call the Imaging Department at your exam site with any questions.            Jan 26, 2018  6:30 PM CST   LAB with ACUTE CARE LAB [facility] [place], Lab ([facility])    [address] [facility] [address]              Please do not eat 10-12 hours before your appointment if you are coming in fasting for labs on lipids, cholesterol, or glucose (sugar). This does not  "apply to pregnant women. Water, hot tea and black coffee (with nothing added) are okay. Do not drink other fluids, diet soda or chew gum.            Feb 02, 2018  4:00 PM CST   Return Visit with Agueda Manley MD   [facility] ([facility])    [facility] [place]  [address]  [facility] [address] [phone]              Who to contact     If you have questions or need follow up information about today's clinic visit or your schedule please contact [facility] directly at [phone].  Normal or non-critical lab and imaging results will be communicated to you by [employer], letter or phone within 4 business days after the clinic has received the results. If you do not hear from us within 7 days, please contact the clinic through [employer] or phone. If you have a critical or abnormal lab result, we will notify you by phone as soon as possible.  Submit refill requests through [employer] or call your pharmacy and they will forward the refill request to us. Please allow 3 business days for your refill to be completed.          Additional Information About Your Visit        [employer] [facility] Information     [employer] gives you secure access to your electronic health record. If you see a primary care provider, you can also send messages to your care team and make appointments. If you have questions, please call your primary care clinic.  If you do not have a primary care provider, please call [phone] and they will assist you.        Care EveryWhere ID     This is your Care EveryWhere ID. This could be used by other organizations to access your [place] medical records  [id number]        Your Vitals Were     Pulse Temperature Height Pulse Oximetry BMI (Body Mass Index)       79 97  F (36.1  C) (Tympanic) 5' 10\" (1.778 m) 98% 28.55 kg/m2        Blood Pressure from Last 3 Encounters:   11/30/17 119/79   09/15/17 112/75   09/13/17 115/76    Weight from Last 3 " Encounters:   11/30/17 199 lb (90.3 kg)   09/15/17 202 lb 6.4 oz (91.8 kg)   09/13/17 200 lb (90.7 kg)              Today, you had the following     No orders found for display         Today's Medication Changes          These changes are accurate as of: 11/30/17  7:35 PM.  If you have any questions, ask your nurse or doctor.               Start taking these medicines.        Dose/Directions    sildenafil 20 MG tablet   Commonly known as:  REVATIO   Used for:  Male erectile disorder   Started by:  Philippe Healy MD        Dose:  20 mg   Take 1 tablet (20 mg) by mouth daily as needed   Quantity:  30 tablet   Refills:  3            Where to get your medicines      These medications were sent to [facility] [id number] [place] [address] [address] & [facility]  [address] [place] [address] [facility] [address]    Hours:  24-hours Phone:  [phone]     Na Sulfate-K Sulfate-Mg Sulf solution         Some of these will need a paper prescription and others can be bought over the counter.  Ask your nurse if you have questions.     Bring a paper prescription for each of these medications     sildenafil 20 MG tablet                Primary Care Provider Office Phone # Fax #    Philippe Healy [phone]       [facility] [address]  [facility] [address]        Equal Access to Services     BLAISE EDDY AH: Hadii aad ku hadasho Soomaali, waaxda luqadaha, qaybta kaalmada adeegyada, jb coats. So [facility] [phone].    ATENCIÓN: Si habla español, tiene a mena disposición servicios gratuitos de asistencia lingüística. Llame al [phone].    We comply with applicable federal civil rights laws and [place] laws. We do not discriminate on the basis of race, color, national origin, age, disability, sex, sexual orientation, or gender identity.            Thank you!     Thank you for choosing [facility]  for your care. Our goal is always to  provide you with excellent care. Hearing back from our patients is one way we can continue to improve our services. Please take a few minutes to complete the written survey that you may receive in the mail after your visit with us. Thank you!             Your Updated Medication List - Protect others around you: Learn how to safely use, store and throw away your medicines at www.disposemymeds.org.          This list is accurate as of: 11/30/17  7:35 PM.  Always use your most recent med list.                   Brand Name Dispense Instructions for use Diagnosis    ASPIRIN PO      Take by mouth as needed for moderate pain        dibucaine 1 % Oint ointment    NUPERCAINAL     3 times daily as needed for moderate pain        erythromycin ophthalmic ointment    ROMYCIN     1 Application At Bedtime        hydrocortisone 0.5 % ointment      Apply topically 2 times daily as needed Reported on 2/14/2017        ibuprofen 200 MG capsule     120 capsule    Take 200-400 mg by mouth every 6 hours as needed    Acute post-operative pain       lidocaine (Anorectal) 5 % Crea     1 Tube    Externally apply 1 Application topically 3 times daily as needed    Anal pain       Na Sulfate-K Sulfate-Mg Sulf solution    SUPREP BOWEL PREP KIT    2 Bottle    Take 177 mLs (1 Bottle) by mouth 2 times daily    Rectal cancer (H)       sildenafil 20 MG tablet    REVATIO    30 tablet    Take 1 tablet (20 mg) by mouth daily as needed    Male erectile disorder       VITAMIN D (CHOLECALCIFEROL) PO      Take 2,000 Units by mouth daily

## 2017-12-01 ENCOUNTER — TELEPHONE (OUTPATIENT)
Dept: SURGERY | Facility: CLINIC | Age: 57
End: 2017-12-01

## 2017-12-01 NOTE — TELEPHONE ENCOUNTER
Due to provider unavailability, patient's procedure has moved to 12/13/17 at 7:15 am.  Called patient and left a message informing him of this.  Requested a call back to confirm new date and time.  Provided my direct number.

## 2017-12-01 NOTE — NURSING NOTE
"Chief Complaint   Patient presents with     Pre-Op Exam       Initial /79 (BP Location: Right arm, Cuff Size: Adult Large)  Pulse 79  Temp 97  F (36.1  C) (Tympanic)  Ht 5' 10\" (1.778 m)  Wt 199 lb (90.3 kg)  SpO2 98%  BMI 28.55 kg/m2 Estimated body mass index is 28.55 kg/(m^2) as calculated from the following:    Height as of this encounter: 5' 10\" (1.778 m).    Weight as of this encounter: 199 lb (90.3 kg).  Medication Reconciliation: complete  Bonnie Keenan MA      "

## 2017-12-05 ENCOUNTER — TELEPHONE (OUTPATIENT)
Dept: SURGERY | Facility: CLINIC | Age: 57
End: 2017-12-05

## 2017-12-05 NOTE — TELEPHONE ENCOUNTER
Patient left a message confirming new surgery date and time.   Left message for patient asking if he'd like me to send out a new packet.  Provided my direct number.

## 2017-12-12 ENCOUNTER — ANESTHESIA EVENT (OUTPATIENT)
Dept: SURGERY | Facility: AMBULATORY SURGERY CENTER | Age: 57
End: 2017-12-12

## 2017-12-13 ENCOUNTER — ANESTHESIA (OUTPATIENT)
Dept: SURGERY | Facility: AMBULATORY SURGERY CENTER | Age: 57
End: 2017-12-13

## 2017-12-13 ENCOUNTER — SURGERY (OUTPATIENT)
Age: 57
End: 2017-12-13

## 2017-12-13 ENCOUNTER — HOSPITAL ENCOUNTER (OUTPATIENT)
Facility: AMBULATORY SURGERY CENTER | Age: 57
End: 2017-12-13
Attending: COLON & RECTAL SURGERY

## 2017-12-13 VITALS
TEMPERATURE: 98.1 F | BODY MASS INDEX: 28.49 KG/M2 | WEIGHT: 199 LBS | DIASTOLIC BLOOD PRESSURE: 72 MMHG | SYSTOLIC BLOOD PRESSURE: 124 MMHG | HEIGHT: 70 IN | RESPIRATION RATE: 14 BRPM | OXYGEN SATURATION: 98 % | HEART RATE: 92 BPM

## 2017-12-13 RX ORDER — ONDANSETRON 2 MG/ML
4 INJECTION INTRAMUSCULAR; INTRAVENOUS EVERY 30 MIN PRN
Status: DISCONTINUED | OUTPATIENT
Start: 2017-12-13 | End: 2017-12-14 | Stop reason: HOSPADM

## 2017-12-13 RX ORDER — NALOXONE HYDROCHLORIDE 0.4 MG/ML
.1-.4 INJECTION, SOLUTION INTRAMUSCULAR; INTRAVENOUS; SUBCUTANEOUS
Status: DISCONTINUED | OUTPATIENT
Start: 2017-12-13 | End: 2017-12-14 | Stop reason: HOSPADM

## 2017-12-13 RX ORDER — GLYCOPYRROLATE 0.2 MG/ML
INJECTION, SOLUTION INTRAMUSCULAR; INTRAVENOUS PRN
Status: DISCONTINUED | OUTPATIENT
Start: 2017-12-13 | End: 2017-12-13

## 2017-12-13 RX ORDER — LIDOCAINE HYDROCHLORIDE 20 MG/ML
INJECTION, SOLUTION INFILTRATION; PERINEURAL PRN
Status: DISCONTINUED | OUTPATIENT
Start: 2017-12-13 | End: 2017-12-13

## 2017-12-13 RX ORDER — PROPOFOL 10 MG/ML
INJECTION, EMULSION INTRAVENOUS CONTINUOUS PRN
Status: DISCONTINUED | OUTPATIENT
Start: 2017-12-13 | End: 2017-12-13

## 2017-12-13 RX ORDER — SODIUM CHLORIDE, SODIUM LACTATE, POTASSIUM CHLORIDE, CALCIUM CHLORIDE 600; 310; 30; 20 MG/100ML; MG/100ML; MG/100ML; MG/100ML
INJECTION, SOLUTION INTRAVENOUS CONTINUOUS
Status: DISCONTINUED | OUTPATIENT
Start: 2017-12-13 | End: 2017-12-14 | Stop reason: HOSPADM

## 2017-12-13 RX ORDER — KETAMINE HYDROCHLORIDE 10 MG/ML
INJECTION, SOLUTION INTRAMUSCULAR; INTRAVENOUS PRN
Status: DISCONTINUED | OUTPATIENT
Start: 2017-12-13 | End: 2017-12-13

## 2017-12-13 RX ORDER — PROPOFOL 10 MG/ML
INJECTION, EMULSION INTRAVENOUS PRN
Status: DISCONTINUED | OUTPATIENT
Start: 2017-12-13 | End: 2017-12-13

## 2017-12-13 RX ORDER — LIDOCAINE HYDROCHLORIDE AND EPINEPHRINE 10; 10 MG/ML; UG/ML
INJECTION, SOLUTION INFILTRATION; PERINEURAL PRN
Status: DISCONTINUED | OUTPATIENT
Start: 2017-12-13 | End: 2017-12-13 | Stop reason: HOSPADM

## 2017-12-13 RX ORDER — GABAPENTIN 300 MG/1
300 CAPSULE ORAL ONCE
Status: COMPLETED | OUTPATIENT
Start: 2017-12-13 | End: 2017-12-13

## 2017-12-13 RX ORDER — ONDANSETRON 4 MG/1
4 TABLET, ORALLY DISINTEGRATING ORAL EVERY 30 MIN PRN
Status: DISCONTINUED | OUTPATIENT
Start: 2017-12-13 | End: 2017-12-14 | Stop reason: HOSPADM

## 2017-12-13 RX ORDER — ACETAMINOPHEN 325 MG/1
975 TABLET ORAL ONCE
Status: COMPLETED | OUTPATIENT
Start: 2017-12-13 | End: 2017-12-13

## 2017-12-13 RX ORDER — BUPIVACAINE HYDROCHLORIDE 2.5 MG/ML
INJECTION, SOLUTION EPIDURAL; INFILTRATION; INTRACAUDAL PRN
Status: DISCONTINUED | OUTPATIENT
Start: 2017-12-13 | End: 2017-12-13 | Stop reason: HOSPADM

## 2017-12-13 RX ADMIN — LIDOCAINE HYDROCHLORIDE 60 MG: 20 INJECTION, SOLUTION INFILTRATION; PERINEURAL at 07:26

## 2017-12-13 RX ADMIN — KETAMINE HYDROCHLORIDE 20 MG: 10 INJECTION, SOLUTION INTRAMUSCULAR; INTRAVENOUS at 07:39

## 2017-12-13 RX ADMIN — ACETAMINOPHEN 975 MG: 325 TABLET ORAL at 06:28

## 2017-12-13 RX ADMIN — PROPOFOL 125 MCG/KG/MIN: 10 INJECTION, EMULSION INTRAVENOUS at 07:25

## 2017-12-13 RX ADMIN — SODIUM CHLORIDE, SODIUM LACTATE, POTASSIUM CHLORIDE, CALCIUM CHLORIDE: 600; 310; 30; 20 INJECTION, SOLUTION INTRAVENOUS at 07:11

## 2017-12-13 RX ADMIN — PROPOFOL 20 MG: 10 INJECTION, EMULSION INTRAVENOUS at 07:28

## 2017-12-13 RX ADMIN — KETAMINE HYDROCHLORIDE 30 MG: 10 INJECTION, SOLUTION INTRAMUSCULAR; INTRAVENOUS at 07:26

## 2017-12-13 RX ADMIN — SODIUM CHLORIDE, SODIUM LACTATE, POTASSIUM CHLORIDE, CALCIUM CHLORIDE: 600; 310; 30; 20 INJECTION, SOLUTION INTRAVENOUS at 06:29

## 2017-12-13 RX ADMIN — GABAPENTIN 300 MG: 300 CAPSULE ORAL at 06:28

## 2017-12-13 RX ADMIN — BUPIVACAINE HYDROCHLORIDE 20 ML: 2.5 INJECTION, SOLUTION EPIDURAL; INFILTRATION; INTRACAUDAL at 08:15

## 2017-12-13 RX ADMIN — LIDOCAINE HYDROCHLORIDE AND EPINEPHRINE 20 ML: 10; 10 INJECTION, SOLUTION INFILTRATION; PERINEURAL at 08:13

## 2017-12-13 RX ADMIN — GLYCOPYRROLATE 0.2 MG: 0.2 INJECTION, SOLUTION INTRAMUSCULAR; INTRAVENOUS at 07:23

## 2017-12-13 NOTE — IP AVS SNAPSHOT
MRN:[id number]                      After Visit Summary   12/13/2017    Louie Greco    MRN: [id number]           Thank you!     Thank you for choosing [facility] for your care. Our goal is always to provide you with excellent care. Hearing back from our patients is one way we can continue to improve our services. Please take a few minutes to complete the written survey that you may receive in the mail after you visit with us. Thank you!        Patient Information     Date Of Birth          1960        About your hospital stay     You were admitted on:  December 13, 2017 [facility]    You were discharged on:  December 13, 2017       Who to Call     For medical emergencies, please call 911.  For non-urgent questions about your medical care, please call your primary care provider or clinic, [phone]  For questions related to your surgery, please call your surgery clinic        Attending Provider     Provider Specialty    Felicitas Radford MD Colon and Rectal Surgery       Primary Care Provider Office Phone # Fax #    Philippe Healy [phone]      Your next 10 appointments already scheduled     Jan 26, 2018  2:15 PM CST   PE NPET ONCOLOGY (EYES TO THIGHS) with UUPET1   [facility] PET CT ([facility])    [address] [address] [phone]           Tell your doctor:   If there is any chance you may be pregnant or if you are breastfeeding.   If you have problems lying in small spaces (claustrophobia). If you do, your doctor may give you medicine to help you relax. If you have diabetes:   Have your exam early in the morning. Your blood glucose will go up as the day goes by.   Your glucose level must be 180 or less at the start of the exam. Please take any medicines you need to ensure this blood glucose level. 24 hours before your scan:  Don t do any heavy exercise. (No jogging, aerobics or other workouts.) Exercise will make your pictures less accurate. 6 hours before your scan:   Stop all food and liquids (except water).   Do not chew gum or suck on mints.   If you need to take medicine with food, you may take it with a few crackers.  Please call your Imaging Department at your exam site with any questions.            Jan 26, 2018  5:00 PM CST   MR PELVIS W/O & W CONTRAST with UUMR1   [facility], [place], [facility] ([facility])    [address] [id number] [phone]           Take your medicines as usual, unless your doctor tells you not to. Bring a list of your current medicines to your exam (including vitamins, minerals and over-the-counter drugs).  You will be given intravenous contrast for this exam. To prepare:   The day before your exam, drink extra fluids at least six 8-ounce glasses (unless your doctor tells you to restrict your fluids).   Have a blood test (creatinine test) within 30 days of your exam. Go to your clinic or Diagnostic Imaging Department for this test.  The MRI machine uses a strong magnet. Please wear clothes without metal (snaps, zippers). A sweatsuit works well, or we may give you a hospital gown.  Please remove any body piercings and hair extensions before you arrive. You will also remove watches, jewelry, hairpins, wallets, dentures, partial dental plates and hearing aids. You may wear contact lenses, and you may be able to wear your rings. We have a safe place to keep your personal items, but it is safer to leave them at home.   **IMPORTANT** THE INSTRUCTIONS BELOW ARE ONLY FOR THOSE PATIENTS WHO HAVE BEEN TOLD THEY WILL RECEIVE SEDATION OR GENERAL ANESTHESIA DURING THEIR MRI PROCEDURE:  IF YOU WILL RECEIVE SEDATION (take medicine to help you relax during your exam):   You must get the medicine from your doctor before you arrive. Bring the medicine to  the exam. Do not take it at home.   Arrive one hour early. Bring someone who can take you home after the test. Your medicine will make you sleepy. After the exam, you may not drive, take a bus or take a taxi by yourself.   No eating 8 hours before your exam. You may have clear liquids up until 4 hours before your exam. (Clear liquids include water, clear tea, black coffee and fruit juice without pulp.)  IF YOU WILL RECEIVE ANESTHESIA (be asleep for your exam):   Arrive 1 1/2 hours early. Bring someone who can take you home after the test. You may not drive, take a bus or take a taxi by yourself.   No eating 8 hours before your exam. You may have clear liquids up until 4 hours before your exam. (Clear liquids include water, clear tea, black coffee and fruit juice without pulp.)  Please call the Imaging Department at your exam site with any questions.            Jan 26, 2018  6:30 PM CST   LAB with ACUTE CARE LAB [facility] [place], Lab ([facility], [facility])    [address] [address]              Please do not eat 10-12 hours before your appointment if you are coming in fasting for labs on lipids, cholesterol, or glucose (sugar). This does not apply to pregnant women. Water, hot tea and black coffee (with nothing added) are okay. Do not drink other fluids, diet soda or chew gum.            Feb 02, 2018  4:00 PM CST   Return Visit with Agueda Manley MD   [facility] ([facility])    [facility]  [address]  [facility] [address]   [phone]              Further instructions from your care team       Anorectal Surgery Instructions    What can I expect after anorectal surgery?  Most anorectal procedures are done as outpatient surgery, and you go home the same day as the procedure. A few surgical procedures will require that you stay in the hospital for about one to three days. No matter  where the procedure is done or how long or short it takes, these recommendations will help you heal and feel more comfortable.    Medicines:  The anal area is very sensitive; you can expect to have some pain for up to 2-4 weeks after the procedure. Your doctor will give you a prescription for one or more pain medications.    Take naprosyn 500 mg twice a day OR ibuprofen 600 mg four times a day     Take this on a regular basis (not as needed) following your surgery.     The drugs are best taken with food.  Do not take if it causes stomach upset or if you have a history of ulcers or gastritis. You can stop the naprosyn (or ibuprofen) or reduce the dose when you are feeling better.    DO NOT use naprosyn, ibuprofen, or other similar agents (eg. Advil or Aleve) if you have inflammatory bowel disease (Ulcerative Colitis or Crohn's disease) or if your doctor as advised you against using these medications    Take acetominaphen (Tylenol) 650-1000 mg four times a day.     Take this on a regular basis (not as needed) following surgery for pain control.     Take the lower dose if you are >65 years old or have liver disease. The maximum dose of acetominaphen is 4000 mg a day. You can stop the acetaminophen or reduce the dose when you are feeling better.    It is important to realize that many narcotic pain relievers (including vicodin, percocet, tylenol #3) also have acetaminophen, and excessive doses of acetaminophen can be dangerous, so do not take these in addition to acetominaphen.  You may take narcotics that don't contain acetominaphen such as oxycodone.      Take oxycodone AS NEEDED in addition to the acetominaphen and naprosyn.      Because narcotics have side effects (including constipation), you should reduce your use of these medications as tolerated as your pain improves.    *In general, the best strategy is to take (if you are able to tolerate it) the tylenol and naprosen on a regular basis until your pain has  largely gone away. You can take the narcotic pain medicine as needed in addition to the tylenol and ibuprofen. As your pain begins to lessen, you should cut back on your narcotic use while continuing to take your regular tylenol and naprosyn doses.      Refilling prescriptions. If you need additional pain medication, please call the triage nurse at [phone] during normal business hours (8 a.m. to 4 p.m., Monday though Friday) or have your pharmacy fax a refill request to [phone]. If you call after hours or on the weekends, the doctor on call may not know you personally and may not renew narcotic pain medication by phone. Call your primary care provider for all other medication refills.    Perineal care:  Tub baths:    If possible, take a tub bath immediately after each bowel movement.     Baths should be take at least 3 times daily for the first week to 10 days following your procedure. You should soak in the tub for 10 to 15 minutes each time with water as warm as you can tolerate.     Even after you go back to work, it is a good idea to sit in the tub in the morning, after returning from work, and again in the evening before bedtime.    Bleeding/Infection:    You can expect to have some bleeding after bowel movements, but it should stop soon after you wipe.     Use a wet cloth or perianal pad (Tucks or Preparation H pads) to gently wipe the area after each bowel movement.    Do not rub the anal area or use a lot of pressure.    Using a spray bottle filled with warm water helps loosen any remaining stool. Blot gently with a soft dry cloth or tissue paper.    Infection around the anal opening is not very common. The anal area has excellent blood supply, which helps the area to heal. Bloody discharge after bowel movements is normal and may last 2 to 4 weeks after your surgery. However, if you bleed between bowel movements and cannot get it to stop, call the triage nurse immediately [phone].    Bowel  function:  Take a fiber supplement such as Metamucil, which is over the counter. It is important to drink six to eight glasses of water or juice everyday when using fiber products.    If you do not have a bowel movement after 1-2 days:    Take Milk of Magnesia-2 tablespoons.       If there are no results, repeat this or add over the counter Miralax.      If you still do not have results, contact the clinic.     If there are no results, repeat this. Stop taking Milk of Magnesia or other laxatives if you begin to have diarrhea.    * Constipation will cause you to strain when you have a bowel movement. The hard stool will be difficult to pass, will increase pain and bleeding, and will slow down healing.  Try to avoid constipation and/or diarrhea as this can make the pain and bleeding worse.    * It is important to have regular bowel movements at least every other day and to keep your stool soft.  A high fiber diet, including at least four servings of fruits or vegetables daily, will help to keep your bowel movements regular and soft.    Activity:  After your procedure, there are no restrictions on your activity     except restrictions surrounding being on narcotics and in pain, such as no heavy machine operating or driving.     You may walk, climb stairs, ride in a car, and sit as tolerated.     It is helpful to avoid sitting in one position for long periods (2 or more hours).    After some surgeries, you may be told not to perform any lifting (more than 10 pounds) for several weeks after surgery.    When to call:  When do I need to call the doctor or triage nurse?    If you experience any of the problems listed here, call our triage nurse during business hours ([phone]).     The nurse will help you with your problem or have the doctor call you.     After hours and on weekends, please call the main hospital number ([phone]) and ask for the colon and rectal surgery person on call.     Some is available to help  you 24 hours a day, seven days a week.    Call for:   ? Fever greater than 101 degrees   ? Chills   ? Foul-smelling drainage   ? Nausea and vomiting   ? Diarrhea - greater than 3 water stools in 24 hours   ? Constipation - no bowel movement after 3 days   ? Severe bleeding that does not stop soon after a bowel movement   ? Problems with the incision, including increased pain, swelling, or redness    [facility]  Home Care Following Anesthesia  For 24 hours after surgery:  1. Get plenty of rest.  A responsible adult must stay with you for at least 24 hours after you leave the surgery center.  2. Do not drive or use heavy equipment.  If you have weakness or tingling, don't drive or use heavy equipment until this feeling goes away.   3. Do not drink alcohol.   4. Avoid strenuous or risky activities.  Ask for help when climbing stairs.  5. You may feel lightheaded.  IF so, sit for a few minutes before standing.  Have someone help you get up.   6. If you have nausea (feel sick to your stomach): Drink only clear liquids such as apple juice, ginger ale, broth or 7-Up.  Rest may also help.  Be sure to drink enough fluids.  Move to a regular diet as you feel able.   7. You may have a slight fever.  Call the doctor if your fever is over 100 F (37.7 C) (taken under the tongue) or lasts longer than 24 hours.  8. You may have a dry mouth, a sore throat, muscle aches or trouble sleeping. These should go away after 24 hours.  9. Do not make important or legal decisions.               Tips for taking pain medications  To get the best pain relief possible, remember these points:    Take pain medications as directed, before pain becomes severe.    Pain medication can upset your stomach: taking it with food may help.    Constipation is a common side effect of pain medication. Drink plenty of  fluids.    Eat foods high in fiber. Take a stool softener if recommended by your doctor or  pharmacist.    Do not drink alcohol, drive or operate machinery while taking pain medications.    Ask about other ways to control pain, such as with heat, ice or relaxation.    Tylenol/Acetaminophen Consumption  To help encourage the safe use of acetaminophen, the makers of TYLENOL  have lowered the maximum daily dose for single-ingredient Extra Strength TYLENOL  (acetaminophen) products sold in the [place] from 8 pills per day (4,000 mg) to 6 pills per day (3,000 mg). The dosing interval has also changed from 2 pills every 4-6 hours to 2 pills every 6 hours.    If you feel your pain relief is insufficient, you may take Tylenol/Acetaminophen in addition to your narcotic pain medication.     Be careful not to exceed 3,000 mg of Tylenol/Acetaminophen in a 24 hour period from all sources.    If you are taking extra strength Tylenol/acetaminophen (500 mg), the maximum dose is 6 tablets in 24 hours.    If you are taking regular strength acetaminophen (325 mg), the maximum dose is 9 tablets in 24 hours.    Call a doctor for any of the followin. Signs of infection (fever, growing tenderness at the surgery site, a large amount of drainage or bleeding, severe pain, foul-smelling drainage, redness, swelling).  2. It has been over 8 to 10 hours since surgery and you are still not able to urinate (pass water).  3. Headache for over 24 hours.  4. Numbness, tingling or weakness the day after surgery (if you had spinal anesthesia).  Your doctor is:       Dr. Felicitas Radford, Colon Rectal: [phone]               Or dial [phone] and ask for the resident on call for:  Colon Rectal  For emergency care, call the:  [facility]:  [phone] (TTY for hearing impaired: [phone])                Pending Results     No orders found from 2017 to 2017.            Admission Information     Date & Time Provider Department Dept. Phone    2017 Felicitas Radford MD [facility]  "Surgery and Procedure Center [phone]      Your Vitals Were     Blood Pressure Pulse Temperature Respirations Height Weight    107/76 92 98.1  F (36.7  C) (Oral) 18 1.778 m (5' 10\") 90.3 kg (199 lb)    Pulse Oximetry BMI (Body Mass Index)                95% 28.55 kg/m2          [employer] Information     [employer] gives you secure access to your electronic health record. If you see a primary care provider, you can also send messages to your care team and make appointments. If you have questions, please call your primary care clinic.  If you do not have a primary care provider, please call [phone] and they will assist you.      [employer] is an electronic gateway that provides easy, online access to your medical records. With [employer], you can request a clinic appointment, read your test results, renew a prescription or communicate with your care team.     To access your existing account, please contact your [facility] or call [phone] for assistance.        Care EveryWhere ID     This is your Care EveryWhere ID. This could be used by other organizations to access your [place] medical records  [id number]        Equal Access to Services     BLAISE EDDY : Haddawit Alatorre, annia cullen, aj tran, jb coats. So [facility] [phone].    ATENCIÓN: Si habla español, tiene a mena disposición servicios gratuitos de asistencia lingüística. Keaton al [phone].    We comply with applicable federal civil rights laws and [place] laws. We do not discriminate on the basis of race, color, national origin, age, disability, sex, sexual orientation, or gender identity.               Review of your medicines      CONTINUE these medicines which have NOT CHANGED        Dose / Directions    ASPIRIN PO        Take by mouth as needed for moderate pain   Refills:  0       dibucaine 1 % Oint ointment   Commonly known as:  NUPERCAINAL        " 3 times daily as needed for moderate pain   Refills:  0       erythromycin ophthalmic ointment   Commonly known as:  ROMYCIN        Dose:  1 Application   1 Application At Bedtime   Refills:  0       hydrocortisone 0.5 % ointment        Apply topically 2 times daily as needed Reported on 2/14/2017   Refills:  0       ibuprofen 200 MG capsule   Used for:  Acute post-operative pain        Dose:  200-400 mg   Take 200-400 mg by mouth every 6 hours as needed   Quantity:  120 capsule   Refills:  0       lidocaine (Anorectal) 5 % Crea   Used for:  Anal pain        Dose:  1 Application   Externally apply 1 Application topically 3 times daily as needed   Quantity:  1 Tube   Refills:  0       Na Sulfate-K Sulfate-Mg Sulf solution   Commonly known as:  SUPREP BOWEL PREP KIT   Used for:  Rectal cancer (H)        Dose:  1 Bottle   Take 177 mLs (1 Bottle) by mouth 2 times daily   Quantity:  2 Bottle   Refills:  0       sildenafil 20 MG tablet   Commonly known as:  REVATIO   Used for:  Male erectile disorder        Dose:  20 mg   Take 1 tablet (20 mg) by mouth daily as needed   Quantity:  30 tablet   Refills:  3       VITAMIN D (CHOLECALCIFEROL) PO        Dose:  2000 Units   Take 2,000 Units by mouth daily   Refills:  0                Protect others around you: Learn how to safely use, store and throw away your medicines at www.disposemymeds.org.             Medication List: This is a list of all your medications and when to take them. Check marks below indicate your daily home schedule. Keep this list as a reference.      Medications           Morning Afternoon Evening Bedtime As Needed    ASPIRIN PO   Take by mouth as needed for moderate pain                                dibucaine 1 % Oint ointment   Commonly known as:  NUPERCAINAL   3 times daily as needed for moderate pain                                erythromycin ophthalmic ointment   Commonly known as:  ROMYCIN   1 Application At Bedtime                                 hydrocortisone 0.5 % ointment   Apply topically 2 times daily as needed Reported on 2/14/2017                                ibuprofen 200 MG capsule   Take 200-400 mg by mouth every 6 hours as needed                                lidocaine (Anorectal) 5 % Crea   Externally apply 1 Application topically 3 times daily as needed                                Na Sulfate-K Sulfate-Mg Sulf solution   Commonly known as:  SUPREP BOWEL PREP KIT   Take 177 mLs (1 Bottle) by mouth 2 times daily                                sildenafil 20 MG tablet   Commonly known as:  REVATIO   Take 1 tablet (20 mg) by mouth daily as needed                                VITAMIN D (CHOLECALCIFEROL) PO   Take 2,000 Units by mouth daily

## 2017-12-13 NOTE — ANESTHESIA CARE TRANSFER NOTE
Patient: Louie Greco    Procedure(s):  Examination Under Anesthesia Anus, Colonoscopy   - Wound Class: II-Clean Contaminated    Diagnosis: Rectal Cancer Surveillance  Diagnosis Additional Information: No value filed.    Anesthesia Type:   MAC     Note:  Airway :Room Air  Patient transferred to:Phase II  Comments: Arrive Phase II, Stable, Airway Intact  115/72, 95,20,95%  All questions answered.  Handoff Report: Identifed the Patient, Identified the Reponsible Provider, Reviewed the pertinent medical history, Discussed the surgical course, Reviewed Intra-OP anesthesia mangement and issues during anesthesia, Set expectations for post-procedure period and Allowed opportunity for questions and acknowledgement of understanding      Vitals: (Last set prior to Anesthesia Care Transfer)    CRNA VITALS  12/13/2017 0729 - 12/13/2017 0802      12/13/2017             Pulse: 116    SpO2: (!)  88 %    Resp Rate (set): 10                Electronically Signed By: QUINTON Fleming CRNA  December 13, 2017  8:02 AM

## 2017-12-13 NOTE — ANESTHESIA PREPROCEDURE EVALUATION
Anesthesia Evaluation     . Pt has had prior anesthetic. Type: General and MAC    No history of anesthetic complications          ROS/MED HX    ENT/Pulmonary:     (+)asthma (childhood) , . .    Neurologic:  - neg neurologic ROS     Cardiovascular:  - neg cardiovascular ROS   (+) ----. : . . . :. . Previous cardiac testing date:results:date: results:ECG reviewed date:07/16/17 results:Sinus tachy (110) date: results:          METS/Exercise Tolerance:  >4 METS   Hematologic:  - neg hematologic  ROS       Musculoskeletal:  - neg musculoskeletal ROS       GI/Hepatic: Comment: Rectal cancer        Renal/Genitourinary:  - ROS Renal section negative       Endo:  - neg endo ROS       Psychiatric:         Infectious Disease:  - neg infectious disease ROS       Malignancy:         Other:                     Physical Exam  Normal systems: cardiovascular, pulmonary and dental    Airway   Mallampati: I  TM distance: >3 FB  Neck ROM: full    Dental     Cardiovascular       Pulmonary     Other findings: 07/17/17; 1154; Mask Ventilation: Not attempted (RSI); Ease of Intubation: Easy; Airway Size: 8;  Cuffed;  Oral;  Blade Type: Glidescope;  Blade Size: 4;  Place by: ;  Insertion Attempts: 1;  Secured at (cm)to lip: 23 cm;  End Tidal CO2: Present;  Dentition: Intact                Anesthesia Plan      History & Physical Review  History and physical reviewed and following examination; no interval change.    ASA Status:  2 .        Plan for MAC with Propofol induction. Maintenance will be TIVA.  Reason for MAC:  Deep or markedly invasive procedure (G8)  PONV prophylaxis:  Dexamethasone or Solumedrol and Ondansetron (or other 5HT-3)       Postoperative Care  Postoperative pain management:  Oral pain medications.      Consents                          .

## 2017-12-13 NOTE — ANESTHESIA POSTPROCEDURE EVALUATION
Patient: Louie Greco    Procedure(s):  Examination Under Anesthesia Anus, Colonoscopy   - Wound Class: II-Clean Contaminated    Diagnosis:Rectal Cancer Surveillance  Diagnosis Additional Information: No value filed.    Anesthesia Type:  MAC    Note:  Anesthesia Post Evaluation    Patient location during evaluation: PACU  Patient participation: Able to fully participate in evaluation  Level of consciousness: awake and alert  Pain management: adequate  Airway patency: patent  Cardiovascular status: hemodynamically stable  Respiratory status: acceptable  Hydration status: stable  PONV: none     Anesthetic complications: None          Last vitals:  Vitals:    12/13/17 0610   BP: 107/76   Pulse: 92   Resp: 18   Temp: 36.7  C (98.1  F)   SpO2: 95%         Electronically Signed By: Hans Miller MD  December 13, 2017  7:59 AM

## 2017-12-13 NOTE — ADDENDUM NOTE
Addendum  created 12/13/17 1050 by Asael Jaquez APRN CRNA    Anesthesia Intra Flowsheets edited

## 2017-12-13 NOTE — DISCHARGE INSTRUCTIONS
Anorectal Surgery Instructions    What can I expect after anorectal surgery?  Most anorectal procedures are done as outpatient surgery, and you go home the same day as the procedure. A few surgical procedures will require that you stay in the hospital for about one to three days. No matter where the procedure is done or how long or short it takes, these recommendations will help you heal and feel more comfortable.    Medicines:  The anal area is very sensitive; you can expect to have some pain for up to 2-4 weeks after the procedure. Your doctor will give you a prescription for one or more pain medications.    Take naprosyn 500 mg twice a day OR ibuprofen 600 mg four times a day     Take this on a regular basis (not as needed) following your surgery.     The drugs are best taken with food.  Do not take if it causes stomach upset or if you have a history of ulcers or gastritis. You can stop the naprosyn (or ibuprofen) or reduce the dose when you are feeling better.    DO NOT use naprosyn, ibuprofen, or other similar agents (eg. Advil or Aleve) if you have inflammatory bowel disease (Ulcerative Colitis or Crohn's disease) or if your doctor as advised you against using these medications    Take acetominaphen (Tylenol) 650-1000 mg four times a day.     Take this on a regular basis (not as needed) following surgery for pain control.     Take the lower dose if you are >65 years old or have liver disease. The maximum dose of acetominaphen is 4000 mg a day. You can stop the acetaminophen or reduce the dose when you are feeling better.    It is important to realize that many narcotic pain relievers (including vicodin, percocet, tylenol #3) also have acetaminophen, and excessive doses of acetaminophen can be dangerous, so do not take these in addition to acetominaphen.  You may take narcotics that don't contain acetominaphen such as oxycodone.      Take oxycodone AS NEEDED in addition to the acetominaphen and naprosyn.       Because narcotics have side effects (including constipation), you should reduce your use of these medications as tolerated as your pain improves.    *In general, the best strategy is to take (if you are able to tolerate it) the tylenol and naprosen on a regular basis until your pain has largely gone away. You can take the narcotic pain medicine as needed in addition to the tylenol and ibuprofen. As your pain begins to lessen, you should cut back on your narcotic use while continuing to take your regular tylenol and naprosyn doses.      Refilling prescriptions. If you need additional pain medication, please call the triage nurse at [phone] during normal business hours (8 a.m. to 4 p.m., Monday though Friday) or have your pharmacy fax a refill request to [phone]. If you call after hours or on the weekends, the doctor on call may not know you personally and may not renew narcotic pain medication by phone. Call your primary care provider for all other medication refills.    Perineal care:  Tub baths:    If possible, take a tub bath immediately after each bowel movement.     Baths should be take at least 3 times daily for the first week to 10 days following your procedure. You should soak in the tub for 10 to 15 minutes each time with water as warm as you can tolerate.     Even after you go back to work, it is a good idea to sit in the tub in the morning, after returning from work, and again in the evening before bedtime.    Bleeding/Infection:    You can expect to have some bleeding after bowel movements, but it should stop soon after you wipe.     Use a wet cloth or perianal pad (Tucks or Preparation H pads) to gently wipe the area after each bowel movement.    Do not rub the anal area or use a lot of pressure.    Using a spray bottle filled with warm water helps loosen any remaining stool. Blot gently with a soft dry cloth or tissue paper.    Infection around the anal opening is not very common. The anal  area has excellent blood supply, which helps the area to heal. Bloody discharge after bowel movements is normal and may last 2 to 4 weeks after your surgery. However, if you bleed between bowel movements and cannot get it to stop, call the triage nurse immediately [phone].    Bowel function:  Take a fiber supplement such as Metamucil, which is over the counter. It is important to drink six to eight glasses of water or juice everyday when using fiber products.    If you do not have a bowel movement after 1-2 days:    Take Milk of Magnesia-2 tablespoons.       If there are no results, repeat this or add over the counter Miralax.      If you still do not have results, contact the clinic.     If there are no results, repeat this. Stop taking Milk of Magnesia or other laxatives if you begin to have diarrhea.    * Constipation will cause you to strain when you have a bowel movement. The hard stool will be difficult to pass, will increase pain and bleeding, and will slow down healing.  Try to avoid constipation and/or diarrhea as this can make the pain and bleeding worse.    * It is important to have regular bowel movements at least every other day and to keep your stool soft.  A high fiber diet, including at least four servings of fruits or vegetables daily, will help to keep your bowel movements regular and soft.    Activity:  After your procedure, there are no restrictions on your activity     except restrictions surrounding being on narcotics and in pain, such as no heavy machine operating or driving.     You may walk, climb stairs, ride in a car, and sit as tolerated.     It is helpful to avoid sitting in one position for long periods (2 or more hours).    After some surgeries, you may be told not to perform any lifting (more than 10 pounds) for several weeks after surgery.    When to call:  When do I need to call the doctor or triage nurse?    If you experience any of the problems listed here, call our triage  nurse during business hours ([phone]).     The nurse will help you with your problem or have the doctor call you.     After hours and on weekends, please call the main hospital number ([phone]) and ask for the colon and rectal surgery person on call.     Some is available to help you 24 hours a day, seven days a week.    Call for:   ? Fever greater than 101 degrees   ? Chills   ? Foul-smelling drainage   ? Nausea and vomiting   ? Diarrhea - greater than 3 water stools in 24 hours   ? Constipation - no bowel movement after 3 days   ? Severe bleeding that does not stop soon after a bowel movement   ? Problems with the incision, including increased pain, swelling, or redness    [facility]  Home Care Following Anesthesia  For 24 hours after surgery:  1. Get plenty of rest.  A responsible adult must stay with you for at least 24 hours after you leave the surgery center.  2. Do not drive or use heavy equipment.  If you have weakness or tingling, don't drive or use heavy equipment until this feeling goes away.   3. Do not drink alcohol.   4. Avoid strenuous or risky activities.  Ask for help when climbing stairs.  5. You may feel lightheaded.  IF so, sit for a few minutes before standing.  Have someone help you get up.   6. If you have nausea (feel sick to your stomach): Drink only clear liquids such as apple juice, ginger ale, broth or 7-Up.  Rest may also help.  Be sure to drink enough fluids.  Move to a regular diet as you feel able.   7. You may have a slight fever.  Call the doctor if your fever is over 100 F (37.7 C) (taken under the tongue) or lasts longer than 24 hours.  8. You may have a dry mouth, a sore throat, muscle aches or trouble sleeping. These should go away after 24 hours.  9. Do not make important or legal decisions.               Tips for taking pain medications  To get the best pain relief possible, remember these points:    Take pain medications as  directed, before pain becomes severe.    Pain medication can upset your stomach: taking it with food may help.    Constipation is a common side effect of pain medication. Drink plenty of  fluids.    Eat foods high in fiber. Take a stool softener if recommended by your doctor or pharmacist.    Do not drink alcohol, drive or operate machinery while taking pain medications.    Ask about other ways to control pain, such as with heat, ice or relaxation.    Tylenol/Acetaminophen Consumption  To help encourage the safe use of acetaminophen, the makers of TYLENOL  have lowered the maximum daily dose for single-ingredient Extra Strength TYLENOL  (acetaminophen) products sold in the [place] from 8 pills per day (4,000 mg) to 6 pills per day (3,000 mg). The dosing interval has also changed from 2 pills every 4-6 hours to 2 pills every 6 hours.    If you feel your pain relief is insufficient, you may take Tylenol/Acetaminophen in addition to your narcotic pain medication.     Be careful not to exceed 3,000 mg of Tylenol/Acetaminophen in a 24 hour period from all sources.    If you are taking extra strength Tylenol/acetaminophen (500 mg), the maximum dose is 6 tablets in 24 hours.    If you are taking regular strength acetaminophen (325 mg), the maximum dose is 9 tablets in 24 hours.    Call a doctor for any of the followin. Signs of infection (fever, growing tenderness at the surgery site, a large amount of drainage or bleeding, severe pain, foul-smelling drainage, redness, swelling).  2. It has been over 8 to 10 hours since surgery and you are still not able to urinate (pass water).  3. Headache for over 24 hours.  4. Numbness, tingling or weakness the day after surgery (if you had spinal anesthesia).  Your doctor is:       Dr. Felicitas Radford, Colon Rectal: [phone]               Or dial [phone] and ask for the resident on call for:  Colon Rectal  For emergency care, call the:  [facility]  Department:  [phone] (TTY for hearing impaired: [phone])

## 2017-12-13 NOTE — IP AVS SNAPSHOT
[facility]    [address] [place] [id number]    Phone:  [phone]    Fax:  [phone]                                       After Visit Summary   12/13/2017    Louie Greco    MRN: [id number]           After Visit Summary Signature Page     I have received my discharge instructions, and my questions have been answered. I have discussed any challenges I see with this plan with the nurse or doctor.    ..........................................................................................................................................  Patient/Patient Representative Signature      ..........................................................................................................................................  Patient Representative Print Name and Relationship to Patient    ..................................................               ................................................  Date                                            Time    ..........................................................................................................................................  Reviewed by Signature/Title    ...................................................              ..............................................  Date                                                            Time

## 2017-12-14 ENCOUNTER — TELEPHONE (OUTPATIENT)
Dept: SURGERY | Facility: CLINIC | Age: 57
End: 2017-12-14

## 2017-12-14 LAB — COPATH REPORT: NORMAL

## 2017-12-21 ENCOUNTER — TELEPHONE (OUTPATIENT)
Dept: SURGERY | Facility: CLINIC | Age: 57
End: 2017-12-21

## 2017-12-21 ENCOUNTER — MYC MEDICAL ADVICE (OUTPATIENT)
Dept: SURGERY | Facility: CLINIC | Age: 57
End: 2017-12-21

## 2017-12-21 NOTE — TELEPHONE ENCOUNTER
After speaking with Dr. Radford this RN relayed the  pathology results below by this RN patient verbalized understanding.        FINAL DIAGNOSIS:   RECTUM, BIOPSY:   - Colonic mucosa with rare reactive stromal cells in submucosa   consistent with radiation effects   - Squamous epithelium with mild chronic inflammation   - No evidence of malignancy     I have personally reviewed all specimens and/or slides, including the   listed special stains, and used them with my medical judgement to   determine or confirm the final diagnosis.     Electronically signed out by:     Maira Arellano M.D., PhD, [facility]

## 2017-12-21 NOTE — TELEPHONE ENCOUNTER
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

## 2017-12-25 NOTE — OP NOTE
SURGEON: Aroldo Radford MD     ASSISTANT: None    PREOPERATIVE DIAGNOSIS: Rectal cancer with watch and wait protocol. Need for surveillance.     POSTOPERATIVE DIAGNOSIS: Rectal cancer with watch and wait protocol. Need for surveillance.     PROCEDURE: Examination under anesthesia with rectal biopsies and CO2 colonoscopy.    INDICATIONS: Louie Greco is a 57 year old male who was found to have a rectal cancer and had a complete response to chemoradiotherapy. He is now on a watch and wait protocol and it has been a year so we are proceeding with colonoscopy. The risks and benefits of surgery were thoroughly discussed with the patient and he agreed to proceed.     DESCRIPTION OF PROCEDURE: The patient was brought to the operating room, placed prone on the operating room table. Deep sedation was induced with intravenous medicines with deep sedation and monitored anesthesia care. We prepped and draped the area in the usual sterile fashion. We began the procedure with a timeout and performed an anal block using a mixture 50/50 of bupivacaine and lidocaine with epinephrine. A total of 40 mL were infused and following this, we performed anoscopy which demonstrated an area of distal scarring and some minimal radiation changes. The scarring was biopsied and sent for permanent section. There was no nodularity or induration. A colonoscopy with CO2 was then performed and the colonoscopy was advanced to the cecum without difficulty. There was diverticulosis and no signs of divertciulitis. There were no polyps or other lesions. On retroflexion, there were some mild radiation changes, but no signs of disease or additional lesions. At the end the case, all instruments and sponge counts were correct x2. The patient was emerged from anesthesia and taken to postoperative anesthesia care unit in good condition.     SPECIMEN: rectal biopsy.     ESTIMATED BLOOD LOSS: Minimal.     URINE OUTPUT: Not measured.     AROLDO VALLES  MD GUMARO   Colon and Rectal Surgery Staff  [facility]

## 2018-01-07 ENCOUNTER — OFFICE VISIT (OUTPATIENT)
Dept: URGENT CARE | Facility: URGENT CARE | Age: 58
End: 2018-01-07
Payer: COMMERCIAL

## 2018-01-07 VITALS
SYSTOLIC BLOOD PRESSURE: 120 MMHG | TEMPERATURE: 98 F | OXYGEN SATURATION: 98 % | HEART RATE: 82 BPM | DIASTOLIC BLOOD PRESSURE: 80 MMHG

## 2018-01-07 DIAGNOSIS — B02.9 HERPES ZOSTER WITHOUT COMPLICATION: Primary | ICD-10-CM

## 2018-01-07 PROCEDURE — 99213 OFFICE O/P EST LOW 20 MIN: CPT | Performed by: FAMILY MEDICINE

## 2018-01-07 RX ORDER — VALACYCLOVIR HYDROCHLORIDE 1 G/1
1000 TABLET, FILM COATED ORAL 3 TIMES DAILY
Qty: 21 TABLET | Refills: 0 | Status: SHIPPED | OUTPATIENT
Start: 2018-01-07 | End: 2018-01-15

## 2018-01-07 NOTE — NURSING NOTE
"Chief Complaint   Patient presents with     Urgent Care     Derm Problem     Rashes on R leg and R foot- patches of small rashes with pustules, did not noticed it until this morning, itchess       Initial /80 (BP Location: Right arm, Patient Position: Chair, Cuff Size: Adult Regular)  Pulse 82  Temp 98  F (36.7  C) (Oral)  SpO2 98% Estimated body mass index is 28.55 kg/(m^2) as calculated from the following:    Height as of 12/13/17: 5' 10\" (1.778 m).    Weight as of 12/13/17: 199 lb (90.3 kg).  Medication Reconciliation: complete     Nay Davila CMA (AAMA)        "

## 2018-01-07 NOTE — MR AVS SNAPSHOT
After Visit Summary   1/7/2018    Louie Greco    MRN: [id number]           Patient Information     Date Of Birth          1960        Visit Information        Provider Department      1/7/2018 11:30 AM Eddie Thompson MD [facility]        Today's Diagnoses     Herpes zoster without complication    -  1      Care Instructions    Take full course of Valtrex for probable shingles.      Shingles  Shingles is a viral infection caused by the same virus as chicken pox. Anyone who has had chicken pox may get shingles later in life. The virus stays in the body, but remains dormant (asleep). Shingles often occurs in older persons or persons with lowered immunity. But it can affect anyone at any age.  Shingles starts as a tingling patch of skin on one side of the body. Small, painful blisters may then appear. The rash does not spread to the rest of the body.  Exposure to shingles cannot cause shingles. However, it can cause chicken pox in anyone who has not had chicken pox or has not been vaccinated. The contagious period ends when all blisters have crusted over (generally about 2 weeks after the illness begins).  After the blisters heal, the affected skin may be sensitive or painful for months (neuralgia). This often gradually goes away.  A shingles vaccine is available. This can help prevent shingles or make it less painful. It is generally recommended for adults over the age of 60 who have had chicken pox in the past, but who have never had shingles. Adults over 60 who have had neither chicken pox nor shingles can prevent both diseases with the chicken pox vaccine. Ask your healthcare provider about these vaccines.  Home care    Medicines may be prescribed to help relieve pain. Take these medicines as directed. Ask your healthcare provider or pharmacist before using over-the-counter medicines for helping treat pain and itching.    In certain cases, antiviral medicines may be prescribed to  reduce pain, shorten the illness, and prevent neuralgia. Take these medicines as directed.    Compresses made from a solution of cool water mixed with cornstarch or baking soda may help relieve pain and itching.     Gently wash skin daily with soap and water to help prevent infection.  Be certain to rinse off all of the soap, which can be irritating.    Trim fingernails and try not to scratch. Scratching the sores may leave scars.    Stay home from work or school until all blisters have formed a crust and you are no longer contagious.  Follow-up care  Follow up with your healthcare provider or as directed by our staff.  When to seek medical advice    Fever of 100.4 F (38 C) or higher, or as directed by your healthcare provider    Affected skin is on the face or neck and any of the following occur:    Headache    Eye pain    Changes in vision    Sores near the eye    Weakness of facial muscles    Pain, redness, or swelling of a joint    Signs of skin infection: colored drainage from the sores, warmth, increasing redness, or increasing pain  Date Last Reviewed: 9/25/2015 2000-2017 [employer]. [address] [place] [address]. All rights reserved. This information is not intended as a substitute for professional medical care. Always follow your healthcare professional's instructions.                Follow-ups after your visit        Your next 10 appointments already scheduled     Jan 26, 2018  2:15 PM CST   PE NPET ONCOLOGY (EYES TO THIGHS) with UUPET1   [facility] [place] [facility] ([facility])    [address] [facility] [address] [phone]           Tell your doctor:   If there is any chance you may be pregnant or if you are breastfeeding.   If you have problems lying in small spaces (claustrophobia). If you do, your doctor may give you medicine to help you relax. If you have diabetes:   Have your exam early in the morning. Your blood  glucose will go up as the day goes by.   Your glucose level must be 180 or less at the start of the exam. Please take any medicines you need to ensure this blood glucose level. 24 hours before your scan: Don t do any heavy exercise. (No jogging, aerobics or other workouts.) Exercise will make your pictures less accurate. 6 hours before your scan:   Stop all food and liquids (except water).   Do not chew gum or suck on mints.   If you need to take medicine with food, you may take it with a few crackers.  Please call your Imaging Department at your exam site with any questions.            Jan 26, 2018  5:00 PM CST   MR PELVIS W/O & W CONTRAST with UUMR2   [facility] [place], [facility] ([facility], [facility])    [address] [facility] [address] [phone]           Take your medicines as usual, unless your doctor tells you not to. Bring a list of your current medicines to your exam (including vitamins, minerals and over-the-counter drugs).  You will be given intravenous contrast for this exam. To prepare:   The day before your exam, drink extra fluids at least six 8-ounce glasses (unless your doctor tells you to restrict your fluids).   Have a blood test (creatinine test) within 30 days of your exam. Go to your clinic or Diagnostic Imaging Department for this test.  The MRI machine uses a strong magnet. Please wear clothes without metal (snaps, zippers). A sweatsuit works well, or we may give you a hospital gown.  Please remove any body piercings and hair extensions before you arrive. You will also remove watches, jewelry, hairpins, wallets, dentures, partial dental plates and hearing aids. You may wear contact lenses, and you may be able to wear your rings. We have a safe place to keep your personal items, but it is safer to leave them at home.   **IMPORTANT** THE INSTRUCTIONS BELOW ARE ONLY FOR THOSE PATIENTS WHO HAVE BEEN TOLD THEY WILL RECEIVE SEDATION OR GENERAL  ANESTHESIA DURING THEIR MRI PROCEDURE:  IF YOU WILL RECEIVE SEDATION (take medicine to help you relax during your exam):   You must get the medicine from your doctor before you arrive. Bring the medicine to the exam. Do not take it at home.   Arrive one hour early. Bring someone who can take you home after the test. Your medicine will make you sleepy. After the exam, you may not drive, take a bus or take a taxi by yourself.   No eating 8 hours before your exam. You may have clear liquids up until 4 hours before your exam. (Clear liquids include water, clear tea, black coffee and fruit juice without pulp.)  IF YOU WILL RECEIVE ANESTHESIA (be asleep for your exam):   Arrive 1 1/2 hours early. Bring someone who can take you home after the test. You may not drive, take a bus or take a taxi by yourself.   No eating 8 hours before your exam. You may have clear liquids up until 4 hours before your exam. (Clear liquids include water, clear tea, black coffee and fruit juice without pulp.)  Please call the Imaging Department at your exam site with any questions.            Jan 26, 2018  6:30 PM CST   LAB with ACUTE CARE LAB [facility] [place], Lab ([facility], [facility])    [address] [facility] [address]              Please do not eat 10-12 hours before your appointment if you are coming in fasting for labs on lipids, cholesterol, or glucose (sugar). This does not apply to pregnant women. Water, hot tea and black coffee (with nothing added) are okay. Do not drink other fluids, diet soda or chew gum.            Feb 02, 2018  4:00 PM CST   Return Visit with Agueda Manley MD   [facility] ([facility])    [facility]  [address]  [facility] [id number] [phone]              Who to contact     If you have questions or need follow up information about today's clinic visit or your schedule please contact  [facility] directly at [phone].  Normal or non-critical lab and imaging results will be communicated to you by MyChart, letter or phone within 4 business days after the clinic has received the results. If you do not hear from us within 7 days, please contact the clinic through [employer] or phone. If you have a critical or abnormal lab result, we will notify you by phone as soon as possible.  Submit refill requests through [employer] or call your pharmacy and they will forward the refill request to us. Please allow 3 business days for your refill to be completed.          Additional Information About Your Visit        [employer] Information     [employer] gives you secure access to your electronic health record. If you see a primary care provider, you can also send messages to your care team and make appointments. If you have questions, please call your primary care clinic.  If you do not have a primary care provider, please call [phone] and they will assist you.        Care EveryWhere ID     This is your Care EveryWhere ID. This could be used by other organizations to access your [facility] medical records  [id number]        Your Vitals Were     Pulse Temperature Pulse Oximetry             82 98  F (36.7  C) (Oral) 98%          Blood Pressure from Last 3 Encounters:   01/07/18 120/80   12/13/17 124/72   11/30/17 119/79    Weight from Last 3 Encounters:   12/13/17 199 lb (90.3 kg)   11/30/17 199 lb (90.3 kg)   09/15/17 202 lb 6.4 oz (91.8 kg)              Today, you had the following     No orders found for display         Today's Medication Changes          These changes are accurate as of: 1/7/18  1:17 PM.  If you have any questions, ask your nurse or doctor.               Start taking these medicines.        Dose/Directions    valACYclovir 1000 mg tablet   Commonly known as:  VALTREX   Used for:  Herpes zoster without complication   Started by:  Eddie Thompson MD        Dose:  1000 mg   Take 1 tablet  (1,000 mg) by mouth 3 times daily   Quantity:  21 tablet   Refills:  0            Where to get your medicines      These medications were sent to [facility] - [place], [place] - [address] AT [address] [place] [address] & [facility]  [address] [place] [address] [address]    Hours:  24-hours Phone:  [phone]     valACYclovir 1000 mg tablet                Primary Care Provider Office Phone # Fax #    Philippe EDELMIRA Healy [phone]       [facility] [address]  [place] [address]        Equal Access to Services     [facility]: Hadii aad ku hadasho Soomaali, waaxda luqadaha, qaybta kaalmada adeegyada, waxay idiin hayaan lakesha pedroza . So [facility] [phone].    ATENCIÓN: Si habla español, tiene a mena disposición servicios gratuitos de asistencia lingüística. Ll[facility] [phone].    We comply with applicable federal civil rights laws and [place] laws. We do not discriminate on the basis of race, color, national origin, age, disability, sex, sexual orientation, or gender identity.            Thank you!     Thank you for choosing [facility]  for your care. Our goal is always to provide you with excellent care. Hearing back from our patients is one way we can continue to improve our services. Please take a few minutes to complete the written survey that you may receive in the mail after your visit with us. Thank you!             Your Updated Medication List - Protect others around you: Learn how to safely use, store and throw away your medicines at www.disposemymeds.org.          This list is accurate as of: 1/7/18  1:17 PM.  Always use your most recent med list.                   Brand Name Dispense Instructions for use Diagnosis    ASPIRIN PO      Take by mouth as needed for moderate pain        dibucaine 1 % Oint ointment    NUPERCAINAL     3 times daily as needed for moderate pain        hydrocortisone 0.5 % ointment      Apply topically 2 times daily  as needed Reported on 2/14/2017        ibuprofen 200 MG capsule     120 capsule    Take 200-400 mg by mouth every 6 hours as needed    Acute post-operative pain       lidocaine (Anorectal) 5 % Crea     1 Tube    Externally apply 1 Application topically 3 times daily as needed    Anal pain       Na Sulfate-K Sulfate-Mg Sulf solution    SUPREP BOWEL PREP KIT    2 Bottle    Take 177 mLs (1 Bottle) by mouth 2 times daily    Rectal cancer (H)       sildenafil 20 MG tablet    REVATIO    30 tablet    Take 1 tablet (20 mg) by mouth daily as needed    Male erectile disorder       valACYclovir 1000 mg tablet    VALTREX    21 tablet    Take 1 tablet (1,000 mg) by mouth 3 times daily    Herpes zoster without complication       VITAMIN D (CHOLECALCIFEROL) PO      Take 2,000 Units by mouth daily

## 2018-01-07 NOTE — PROGRESS NOTES
SUBJECTIVE:  Louie Greco is a 57 year old male who presents to the clinic today for a rash.  Onset of rash was 1 day(s) ago.   Rash is sudden onset.  Location of the rash: right foot and leg.  Quality/symptoms of rash: itching, red and blistering   Symptoms are mild and rash seems to be worsening.  Previous history of a similar rash? No  Recent exposure history: none known    Has neuropathy from chemotherapy and does not necessarily feel pain.  Patient states that family pointed out that he had a rash on his foot.  Endorsed mild itchiness.  No changes in lotions, soaps, or detergents.  Patient with history of rectal cancer, undergone chemotherapy already.  Patient had colonoscopy recently and told that everything looked good.  Denies any fever.  Patient did have chickenpox when younger.    Past Medical History:   Diagnosis Date     Childhood asthma      Nephrolithiasis     1990     Rectal cancer (H)     low rectal cancer     Current Outpatient Prescriptions   Medication Sig Dispense Refill     Na Sulfate-K Sulfate-Mg Sulf (SUPREP BOWEL PREP KIT) solution Take 177 mLs (1 Bottle) by mouth 2 times daily 2 Bottle 0     sildenafil (REVATIO) 20 MG tablet Take 1 tablet (20 mg) by mouth daily as needed 30 tablet 3     ibuprofen 200 MG capsule Take 200-400 mg by mouth every 6 hours as needed 120 capsule 0     ASPIRIN PO Take by mouth as needed for moderate pain       lidocaine, Anorectal, 5 % CREA Externally apply 1 Application topically 3 times daily as needed 1 Tube 0     VITAMIN D, CHOLECALCIFEROL, PO Take 2,000 Units by mouth daily        dibucaine (NUPERCAINAL) 1 % OINT ointment 3 times daily as needed for moderate pain       hydrocortisone 0.5 % ointment Apply topically 2 times daily as needed Reported on 2/14/2017       Social History   Substance Use Topics     Smoking status: Never Smoker     Smokeless tobacco: Never Used     Alcohol use 0.0 oz/week      Comment: Very moderate       ROS:  CONSTITUTIONAL:NEGATIVE for  fever, chills, change in weight  INTEGUMENTARY/SKIN: POSITIVE for pruritis and rash   ENT/MOUTH: NEGATIVE for ear, mouth and throat problems  RESP:NEGATIVE for significant cough or SOB  CV: NEGATIVE for chest pain, palpitations or peripheral edema  GI: NEGATIVE for nausea, abdominal pain, heartburn, or change in bowel habits    EXAM:   /80 (BP Location: Right arm, Patient Position: Chair, Cuff Size: Adult Regular)  Pulse 82  Temp 98  F (36.7  C) (Oral)  SpO2 98%  GENERAL: alert, no acute distress.  SKIN: Rash description:  Right foot and lower leg - 3 clustered patches with vesicular/papules, mildly redden.  No pustules, no scaling.  Extremities: no peripheral edema or tenderness, peripheral pulses normal  PSYCH: mentation appears normal and affect normal/bright    ASSESSMENT/PLAN:  (B02.9) Herpes zoster without complication  (primary encounter diagnosis)  Comment: right leg/foot  Plan: valACYclovir (VALTREX) 1000 mg tablet            Reviewed that probable shingles, RX Valtrex given.  Encourage to monitor for skin infection, discussed possible post shingles neuropathy.    Follow up with primary provider if no resolution of symptoms.    Eddie Thompson MD  January 7, 2018 1:55 PM

## 2018-01-07 NOTE — PATIENT INSTRUCTIONS
Take full course of Valtrex for probable shingles.      Shingles  Shingles is a viral infection caused by the same virus as chicken pox. Anyone who has had chicken pox may get shingles later in life. The virus stays in the body, but remains dormant (asleep). Shingles often occurs in older persons or persons with lowered immunity. But it can affect anyone at any age.  Shingles starts as a tingling patch of skin on one side of the body. Small, painful blisters may then appear. The rash does not spread to the rest of the body.  Exposure to shingles cannot cause shingles. However, it can cause chicken pox in anyone who has not had chicken pox or has not been vaccinated. The contagious period ends when all blisters have crusted over (generally about 2 weeks after the illness begins).  After the blisters heal, the affected skin may be sensitive or painful for months (neuralgia). This often gradually goes away.  A shingles vaccine is available. This can help prevent shingles or make it less painful. It is generally recommended for adults over the age of 60 who have had chicken pox in the past, but who have never had shingles. Adults over 60 who have had neither chicken pox nor shingles can prevent both diseases with the chicken pox vaccine. Ask your healthcare provider about these vaccines.  Home care    Medicines may be prescribed to help relieve pain. Take these medicines as directed. Ask your healthcare provider or pharmacist before using over-the-counter medicines for helping treat pain and itching.    In certain cases, antiviral medicines may be prescribed to reduce pain, shorten the illness, and prevent neuralgia. Take these medicines as directed.    Compresses made from a solution of cool water mixed with cornstarch or baking soda may help relieve pain and itching.     Gently wash skin daily with soap and water to help prevent infection.  Be certain to rinse off all of the soap, which can be irritating.    Trim  fingernails and try not to scratch. Scratching the sores may leave scars.    Stay home from work or school until all blisters have formed a crust and you are no longer contagious.  Follow-up care  Follow up with your healthcare provider or as directed by our staff.  When to seek medical advice    Fever of 100.4 F (38 C) or higher, or as directed by your healthcare provider    Affected skin is on the face or neck and any of the following occur:    Headache    Eye pain    Changes in vision    Sores near the eye    Weakness of facial muscles    Pain, redness, or swelling of a joint    Signs of skin infection: colored drainage from the sores, warmth, increasing redness, or increasing pain  Date Last Reviewed: 9/25/2015 2000-2017 [employer]. [address] [place] [address], [place] [address]. All rights reserved. This information is not intended as a substitute for professional medical care. Always follow your healthcare professional's instructions.

## 2018-01-12 ENCOUNTER — TELEPHONE (OUTPATIENT)
Dept: FAMILY MEDICINE | Facility: CLINIC | Age: 58
End: 2018-01-12

## 2018-01-12 NOTE — TELEPHONE ENCOUNTER
Reason for Call:  Other Shingles not getting better     Detailed comments: The patient was seen in  in [place] on Sunday 1/7/2018  He was diagnosed with Shingles and put on an Anti-viral   Which he started taking on Sunday 1/7 he said since then it is not going away it seems to be getting worst   He said he is not even sure if it is Shingles  Wants Dr Healy to take a look at it   Sooner than first available 1/26/2018    Phone Number Patient can be reached at: Home number on file [phone] (home)    Best Time: anytime    Can we leave a detailed message on this number? YES    Call taken on 1/12/2018 at 2:50 PM by Janelle Martinez

## 2018-01-15 ENCOUNTER — OFFICE VISIT (OUTPATIENT)
Dept: FAMILY MEDICINE | Facility: CLINIC | Age: 58
End: 2018-01-15
Payer: COMMERCIAL

## 2018-01-15 VITALS
WEIGHT: 207 LBS | HEART RATE: 93 BPM | OXYGEN SATURATION: 96 % | HEIGHT: 70 IN | BODY MASS INDEX: 29.63 KG/M2 | SYSTOLIC BLOOD PRESSURE: 126 MMHG | DIASTOLIC BLOOD PRESSURE: 78 MMHG | TEMPERATURE: 97.3 F

## 2018-01-15 DIAGNOSIS — B02.9 HERPES ZOSTER WITHOUT COMPLICATION: Primary | ICD-10-CM

## 2018-01-15 DIAGNOSIS — Z11.59 NEED FOR HEPATITIS C SCREENING TEST: ICD-10-CM

## 2018-01-15 DIAGNOSIS — R97.20 ELEVATED PROSTATE SPECIFIC ANTIGEN (PSA): ICD-10-CM

## 2018-01-15 DIAGNOSIS — C20 MALIGNANT NEOPLASM OF RECTUM (H): ICD-10-CM

## 2018-01-15 PROCEDURE — 99213 OFFICE O/P EST LOW 20 MIN: CPT | Performed by: INTERNAL MEDICINE

## 2018-01-15 NOTE — PROGRESS NOTES
SUBJECTIVE:   Louie Greco is a 57 year old male who presents to clinic today for the following health issues:    ED/UC Followup:    Facility:  [facility]  Date of visit: 1/7/2018  Reason for visit: Shingles - R Leg and Foot  Current Status: Has improved somewhat, but still wants it re evaluated.     57-year-old man with rectal cancer presented to urgent care 1 week ago with mildly painful rash on foot.  Was diagnosed with shingles and treated with Valtrex.  His symptoms were not improving until 2 days ago when his lesions began to dry up and become more crusty.  He denies associated fevers, chills, severe pain, new focal neurological symptoms.  The rash is not itchy.    Problem list and histories reviewed & adjusted, as indicated.  Additional history:     Patient Active Problem List   Diagnosis     Rectal bleeding     Pes anserinus tendinitis     Plantar fasciitis     Malignant neoplasm of rectum (H)     Advance Care Planning     Chemotherapy-induced neutropenia (H)     Appendicitis     Elevated prostate specific antigen (PSA)     Past Surgical History:   Procedure Laterality Date     COLONOSCOPY N/A 7/27/2016    Procedure: COMBINED COLONOSCOPY, SINGLE OR MULTIPLE BIOPSY/POLYPECTOMY BY BIOPSY;  Surgeon: Chelsea Thompson MD;  Location:  GI     COLONOSCOPY N/A 9/13/2017    Procedure: COLONOSCOPY;;  Surgeon: Felicitas Radford MD;  [facility]     COLONOSCOPY N/A 12/13/2017    Procedure: COLONOSCOPY;;  Surgeon: Felicitas Radford MD;  [facility]     EXAM UNDER ANESTHESIA ANUS N/A 7/5/2017    Procedure: EXAM UNDER ANESTHESIA ANUS;  Examination Under Anesthesia, flex sigmoidoscopy with biopsies and formalin application;  Surgeon: Felicitas Radford MD;  [facility]     EXAM UNDER ANESTHESIA ANUS N/A 9/13/2017    Procedure: EXAM UNDER ANESTHESIA ANUS;  Examination Under Anesthesia Anus, Colonoscopy, application of formalin;  Surgeon: Felicitas Radford MD;  [facility]     EXAM UNDER ANESTHESIA ANUS N/A 12/13/2017    Procedure: EXAM UNDER ANESTHESIA ANUS;  Examination Under Anesthesia Anus, Colonoscopy;  Surgeon: Felicitas Radford MD;  [facility]     HC TOOTH EXTRACTION W/FORCEP       LAPAROSCOPIC APPENDECTOMY N/A 7/17/2017    Procedure: LAPAROSCOPIC APPENDECTOMY;  LAPAROSCOPIC APPENDECTOMY;  Surgeon: Bryson Ferguson MD;  Location: [facility] OR     SIGMOIDOSCOPY FLEXIBLE N/A 12/8/2016    Procedure: SIGMOIDOSCOPY FLEXIBLE;  Surgeon: Felicitas Radford MD;  [facility]     SIGMOIDOSCOPY FLEXIBLE N/A 7/5/2017    Procedure: SIGMOIDOSCOPY FLEXIBLE;;  Surgeon: Felicitas Radford MD;  [facility]     VASCULAR SURGERY      Right chest port     VASECTOMY         Social History   Substance Use Topics     Smoking status: Never Smoker     Smokeless tobacco: Never Used     Alcohol use 0.0 oz/week      Comment: Very moderate     Family History   Problem Relation Age of Onset     CANCER Brother      primary of unknown origin     Other Cancer Brother      Chronic Obstructive Pulmonary Disease Father      Hypertension Father      Hyperlipidemia Father      Colon Polyps Mother      Number and type of polyps unknown     Family History Negative Brother      negative colonoscopy     DIABETES Maternal Grandfather      Hypertension Paternal Grandmother      Hyperlipidemia Paternal Grandmother      Stomach Cancer Other 63     maternal great grandfather     Glaucoma No family hx of      Macular Degeneration No family hx of          Current Outpatient Prescriptions   Medication Sig Dispense Refill     diltiazem 2% in PLO cream, FV COMPOUNDED, 2% GEL Apply topically 2 times daily       sildenafil (REVATIO) 20 MG tablet Take 1 tablet (20 mg) by mouth daily as needed 30 tablet 3     ibuprofen 200 MG capsule Take 200-400 mg by mouth every 6 hours as needed 120 capsule 0     ASPIRIN PO Take by mouth as needed for moderate pain       lidocaine, Anorectal, 5 % CREA Externally  "apply 1 Application topically 3 times daily as needed 1 Tube 0     VITAMIN D, CHOLECALCIFEROL, PO Take 2,000 Units by mouth daily        dibucaine (NUPERCAINAL) 1 % OINT ointment 3 times daily as needed for moderate pain       hydrocortisone 0.5 % ointment Apply topically 2 times daily as needed Reported on 2/14/2017       Allergies   Allergen Reactions     Ampicillin Diarrhea     Demerol [Meperidine]      Nausea          Reviewed and updated as needed this visit by clinical staff       Reviewed and updated as needed this visit by Provider         ROS:  Constitutional, HEENT, cardiovascular, pulmonary, gi and gu systems are negative, except as otherwise noted.      OBJECTIVE:   /78 (BP Location: Right arm, Cuff Size: Adult Large)  Pulse 93  Temp 97.3  F (36.3  C) (Tympanic)  Ht 5' 10\" (1.778 m)  Wt 207 lb (93.9 kg)  SpO2 96%  BMI 29.7 kg/m2  Body mass index is 29.7 kg/(m^2).  General: This is a well-appearing middle-aged man in no acute distress.  SKIN:  He has a vesicular rash with crusted skin lesions in a dermatomal distribution overlying the dorsal aspect of his foot.    Diagnostic Test Results:  none     ASSESSMENT/PLAN:       1. Herpes zoster without complication  Improving with appropriate treatment, no significantly symptomatic, continue to monitor for resolution, shingrix in one year     2. Malignant neoplasm of rectum (H)  - Comprehensive metabolic panel; Future  - CBC with platelets; Future  - CEA; Future    3. Elevated prostate specific antigen (PSA)  He declines recheck of PSA today due to recent colonoscopy with biopsies, he is concerned it may yield a falsely elevated PSA  Future order is placed, he will return in Feb for the test     4. Need for hepatitis C screening test  - Hepatitis C Screen Reflex to HCV RNA Quant and Genotype; Future      FUTURE APPOINTMENTS:       - Follow-up visit in pending labs and symptoms     Philippe Healy MD  [facility]  "

## 2018-01-15 NOTE — MR AVS SNAPSHOT
After Visit Summary   1/15/2018    Louie Greco    MRN: [id number]           Patient Information     Date Of Birth          1960        Visit Information        Provider Department      1/15/2018 4:30 PM Philippe Healy MD [facility]        Today's Diagnoses     Herpes zoster without complication    -  1    Malignant neoplasm of rectum (H)        Elevated prostate specific antigen (PSA)        Need for hepatitis C screening test           Follow-ups after your visit        Your next 10 appointments already scheduled     Jan 26, 2018  2:15 PM CST   PE NPET ONCOLOGY (EYES TO THIGHS) with UUPET1   [facility] PET CT ([facility], [facility])    [address] [facility] [address] [phone]           Tell your doctor:   If there is any chance you may be pregnant or if you are breastfeeding.   If you have problems lying in small spaces (claustrophobia). If you do, your doctor may give you medicine to help you relax. If you have diabetes:   Have your exam early in the morning. Your blood glucose will go up as the day goes by.   Your glucose level must be 180 or less at the start of the exam. Please take any medicines you need to ensure this blood glucose level. 24 hours before your scan: Don t do any heavy exercise. (No jogging, aerobics or other workouts.) Exercise will make your pictures less accurate. 6 hours before your scan:   Stop all food and liquids (except water).   Do not chew gum or suck on mints.   If you need to take medicine with food, you may take it with a few crackers.  Please call your Imaging Department at your exam site with any questions.            Jan 26, 2018  5:00 PM CST   MR PELVIS W/O & W CONTRAST with UUMR2   [facility], MRI ([facility], [facility])    [address] [address] [phone]           Take your medicines as usual, unless your  doctor tells you not to. Bring a list of your current medicines to your exam (including vitamins, minerals and over-the-counter drugs).  You will be given intravenous contrast for this exam. To prepare:   The day before your exam, drink extra fluids at least six 8-ounce glasses (unless your doctor tells you to restrict your fluids).   Have a blood test (creatinine test) within 30 days of your exam. Go to your clinic or Diagnostic Imaging Department for this test.  The MRI machine uses a strong magnet. Please wear clothes without metal (snaps, zippers). A sweatsuit works well, or we may give you a hospital gown.  Please remove any body piercings and hair extensions before you arrive. You will also remove watches, jewelry, hairpins, wallets, dentures, partial dental plates and hearing aids. You may wear contact lenses, and you may be able to wear your rings. We have a safe place to keep your personal items, but it is safer to leave them at home.   **IMPORTANT** THE INSTRUCTIONS BELOW ARE ONLY FOR THOSE PATIENTS WHO HAVE BEEN TOLD THEY WILL RECEIVE SEDATION OR GENERAL ANESTHESIA DURING THEIR MRI PROCEDURE:  IF YOU WILL RECEIVE SEDATION (take medicine to help you relax during your exam):   You must get the medicine from your doctor before you arrive. Bring the medicine to the exam. Do not take it at home.   Arrive one hour early. Bring someone who can take you home after the test. Your medicine will make you sleepy. After the exam, you may not drive, take a bus or take a taxi by yourself.   No eating 8 hours before your exam. You may have clear liquids up until 4 hours before your exam. (Clear liquids include water, clear tea, black coffee and fruit juice without pulp.)  IF YOU WILL RECEIVE ANESTHESIA (be asleep for your exam):   Arrive 1 1/2 hours early. Bring someone who can take you home after the test. You may not drive, take a bus or take a taxi by yourself.   No eating 8 hours before your exam. You may have clear  liquids up until 4 hours before your exam. (Clear liquids include water, clear tea, black coffee and fruit juice without pulp.)  Please call the Imaging Department at your exam site with any questions.            Jan 26, 2018  6:30 PM CST   LAB with ACUTE CARE LAB [facility] [place], Lab ([facility], [facility])    [address] [facility] [place] [address]              Please do not eat 10-12 hours before your appointment if you are coming in fasting for labs on lipids, cholesterol, or glucose (sugar). This does not apply to pregnant women. Water, hot tea and black coffee (with nothing added) are okay. Do not drink other fluids, diet soda or chew gum.            Feb 02, 2018  4:00 PM CST   Return Visit with Agueda Manley MD   [facility] ([facility])    [facility] [place]  [address]  [facility] [id number] [phone]              Future tests that were ordered for you today     Open Future Orders        Priority Expected Expires Ordered    Comprehensive metabolic panel Routine  1/15/2019 1/15/2018    CBC with platelets Routine  1/15/2019 1/15/2018    CEA Routine  1/15/2019 1/15/2018    Hepatitis C Screen Reflex to HCV RNA Quant and Genotype Routine  1/15/2019 1/15/2018            Who to contact     If you have questions or need follow up information about today's clinic visit or your schedule please contact [facility] directly at [phone].  Normal or non-critical lab and imaging results will be communicated to you by MyChart, letter or phone within 4 business days after the clinic has received the results. If you do not hear from us within 7 days, please contact the clinic through MyChart or phone. If you have a critical or abnormal lab result, we will notify you by phone as soon as possible.  Submit refill requests through [employer] or call your pharmacy and they will forward the refill request to  "us. Please allow 3 business days for your refill to be completed.          Additional Information About Your Visit        MyChart Information     [employer] gives you secure access to your electronic health record. If you see a primary care provider, you can also send messages to your care team and make appointments. If you have questions, please call your primary care clinic.  If you do not have a primary care provider, please call [phone] and they will assist you.        Care EveryWhere ID     This is your Care EveryWhere ID. This could be used by other organizations to access your [place] medical records  [id number]        Your Vitals Were     Pulse Temperature Height Pulse Oximetry BMI (Body Mass Index)       93 97.3  F (36.3  C) (Tympanic) 5' 10\" (1.778 m) 96% 29.7 kg/m2        Blood Pressure from Last 3 Encounters:   01/15/18 126/78   01/07/18 120/80   12/13/17 124/72    Weight from Last 3 Encounters:   01/15/18 207 lb (93.9 kg)   12/13/17 199 lb (90.3 kg)   11/30/17 199 lb (90.3 kg)               Primary Care Provider Office Phone # Fax #    Philippe HICKEY MD [place] [phone]       [facility] [address] [facility] [address]  [facility] [address]        Equal Access to Services     BLAISE EDDY : Hadii aad ku hadasho Soomaali, waaxda luqadaha, qaybta kaalmada adeegyada, waxay cheli haykatherine pedroza . So [facility] [phone].    ATENCIÓN: Si habla español, tiene a mena disposición servicios gratuitos de asistencia lingüística. Llame al [phone].    We comply with applicable federal civil rights laws and [place] laws. We do not discriminate on the basis of race, color, national origin, age, disability, sex, sexual orientation, or gender identity.            Thank you!     Thank you for choosing [facility]  for your care. Our goal is always to provide you with excellent care. Hearing back from our patients is one way we can continue to improve our services. Please take a " few minutes to complete the written survey that you may receive in the mail after your visit with us. Thank you!             Your Updated Medication List - Protect others around you: Learn how to safely use, store and throw away your medicines at www.disposemymeds.org.          This list is accurate as of: 1/15/18  5:58 PM.  Always use your most recent med list.                   Brand Name Dispense Instructions for use Diagnosis    ASPIRIN PO      Take by mouth as needed for moderate pain        dibucaine 1 % Oint ointment    NUPERCAINAL     3 times daily as needed for moderate pain        diltiazem 2% in PLO cream (FV COMPOUNDED) 2% Gel      Apply topically 2 times daily    Need for hepatitis C screening test       hydrocortisone 0.5 % ointment      Apply topically 2 times daily as needed Reported on 2/14/2017        ibuprofen 200 MG capsule     120 capsule    Take 200-400 mg by mouth every 6 hours as needed    Acute post-operative pain       lidocaine (Anorectal) 5 % Crea     1 Tube    Externally apply 1 Application topically 3 times daily as needed    Anal pain       sildenafil 20 MG tablet    REVATIO    30 tablet    Take 1 tablet (20 mg) by mouth daily as needed    Male erectile disorder       VITAMIN D (CHOLECALCIFEROL) PO      Take 2,000 Units by mouth daily

## 2018-01-22 DIAGNOSIS — Z12.5 SCREENING FOR PROSTATE CANCER: ICD-10-CM

## 2018-01-22 DIAGNOSIS — Z11.59 NEED FOR HEPATITIS C SCREENING TEST: ICD-10-CM

## 2018-01-22 LAB
CEA SERPL-MCNC: 0.6 UG/L (ref 0–2.5)
ERYTHROCYTE [DISTWIDTH] IN BLOOD BY AUTOMATED COUNT: 13.9 % (ref 10–15)
HCT VFR BLD AUTO: 40.5 % (ref 40–53)
HGB BLD-MCNC: 14 G/DL (ref 13.3–17.7)
MCH RBC QN AUTO: 30.7 PG (ref 26.5–33)
MCHC RBC AUTO-ENTMCNC: 34.6 G/DL (ref 31.5–36.5)
MCV RBC AUTO: 89 FL (ref 78–100)
PLATELET # BLD AUTO: 205 10E9/L (ref 150–450)
RBC # BLD AUTO: 4.56 10E12/L (ref 4.4–5.9)
WBC # BLD AUTO: 4.8 10E9/L (ref 4–11)

## 2018-01-22 PROCEDURE — G0103 PSA SCREENING: HCPCS | Performed by: INTERNAL MEDICINE

## 2018-01-22 PROCEDURE — 82378 CARCINOEMBRYONIC ANTIGEN: CPT | Performed by: INTERNAL MEDICINE

## 2018-01-22 PROCEDURE — 80053 COMPREHEN METABOLIC PANEL: CPT | Performed by: INTERNAL MEDICINE

## 2018-01-22 PROCEDURE — 86803 HEPATITIS C AB TEST: CPT | Performed by: INTERNAL MEDICINE

## 2018-01-22 PROCEDURE — 36415 COLL VENOUS BLD VENIPUNCTURE: CPT | Performed by: INTERNAL MEDICINE

## 2018-01-22 PROCEDURE — 85027 COMPLETE CBC AUTOMATED: CPT | Performed by: INTERNAL MEDICINE

## 2018-01-23 LAB
ALBUMIN SERPL-MCNC: 4.1 G/DL (ref 3.4–5)
ALP SERPL-CCNC: 83 U/L (ref 40–150)
ALT SERPL W P-5'-P-CCNC: 42 U/L (ref 0–70)
ANION GAP SERPL CALCULATED.3IONS-SCNC: 11 MMOL/L (ref 3–14)
AST SERPL W P-5'-P-CCNC: 31 U/L (ref 0–45)
BILIRUB SERPL-MCNC: 0.7 MG/DL (ref 0.2–1.3)
BUN SERPL-MCNC: 23 MG/DL (ref 7–30)
CALCIUM SERPL-MCNC: 8.9 MG/DL (ref 8.5–10.1)
CHLORIDE SERPL-SCNC: 105 MMOL/L (ref 94–109)
CO2 SERPL-SCNC: 24 MMOL/L (ref 20–32)
CREAT SERPL-MCNC: 0.76 MG/DL (ref 0.66–1.25)
GFR SERPL CREATININE-BSD FRML MDRD: >90 ML/MIN/1.7M2
GLUCOSE SERPL-MCNC: 81 MG/DL (ref 70–99)
HCV AB SERPL QL IA: NONREACTIVE
POTASSIUM SERPL-SCNC: 4.1 MMOL/L (ref 3.4–5.3)
PROT SERPL-MCNC: 7.2 G/DL (ref 6.8–8.8)
PSA SERPL-ACNC: 3.78 UG/L (ref 0–4)
SODIUM SERPL-SCNC: 140 MMOL/L (ref 133–144)

## 2018-01-23 NOTE — PROGRESS NOTES
The following letter pertains to your most recent diagnostic tests:    -Your hepatitis C screening test is negative.  You do not have hepatitis C.     -Your follow up prostate specific antigen (PSA) test result returned normal.  Good news!     -CEA levels appear stable    -Your complete blood counts including your hemoglobin returned normal for you.         Bottom line:  Labs look good.            Sincerely,    Dr. Healy

## 2018-01-26 ENCOUNTER — HOSPITAL ENCOUNTER (OUTPATIENT)
Dept: MRI IMAGING | Facility: CLINIC | Age: 58
End: 2018-01-26
Attending: INTERNAL MEDICINE
Payer: COMMERCIAL

## 2018-01-26 ENCOUNTER — HOSPITAL ENCOUNTER (OUTPATIENT)
Dept: PET IMAGING | Facility: CLINIC | Age: 58
Discharge: HOME | End: 2018-01-26
Attending: INTERNAL MEDICINE | Admitting: INTERNAL MEDICINE
Payer: COMMERCIAL

## 2018-01-26 DIAGNOSIS — C20 MALIGNANT NEOPLASM OF RECTUM (H): ICD-10-CM

## 2018-01-26 LAB — GLUCOSE BLDC GLUCOMTR-MCNC: 93 MG/DL (ref 70–99)

## 2018-01-26 PROCEDURE — A9575 INJ GADOTERATE MEGLUMI 0.1ML: HCPCS | Performed by: INTERNAL MEDICINE

## 2018-01-26 PROCEDURE — 25000128 H RX IP 250 OP 636: Performed by: INTERNAL MEDICINE

## 2018-01-26 PROCEDURE — 34300033 ZZH RX 343: Performed by: INTERNAL MEDICINE

## 2018-01-26 PROCEDURE — 82962 GLUCOSE BLOOD TEST: CPT

## 2018-01-26 PROCEDURE — 72197 MRI PELVIS W/O & W/DYE: CPT

## 2018-01-26 PROCEDURE — 78816 PET IMAGE W/CT FULL BODY: CPT | Mod: PS

## 2018-01-26 PROCEDURE — 25500064 ZZH RX 255 OP 636: Performed by: INTERNAL MEDICINE

## 2018-01-26 PROCEDURE — A9552 F18 FDG: HCPCS | Performed by: INTERNAL MEDICINE

## 2018-01-26 RX ORDER — IOPAMIDOL 755 MG/ML
45-150 INJECTION, SOLUTION INTRAVASCULAR ONCE
Status: COMPLETED | OUTPATIENT
Start: 2018-01-26 | End: 2018-01-26

## 2018-01-26 RX ORDER — GADOTERATE MEGLUMINE 376.9 MG/ML
10 INJECTION INTRAVENOUS ONCE
Status: COMPLETED | OUTPATIENT
Start: 2018-01-26 | End: 2018-01-26

## 2018-01-26 RX ADMIN — IOPAMIDOL 127 ML: 755 INJECTION, SOLUTION INTRAVENOUS at 15:43

## 2018-01-26 RX ADMIN — FLUDEOXYGLUCOSE F-18 13.13 MCI.: 500 INJECTION, SOLUTION INTRAVENOUS at 14:30

## 2018-01-26 RX ADMIN — GADOTERATE MEGLUMINE 20 ML: 376.9 INJECTION INTRAVENOUS at 17:10

## 2018-02-02 ENCOUNTER — ONCOLOGY VISIT (OUTPATIENT)
Dept: ONCOLOGY | Facility: CLINIC | Age: 58
End: 2018-02-02
Attending: INTERNAL MEDICINE
Payer: COMMERCIAL

## 2018-02-02 VITALS
SYSTOLIC BLOOD PRESSURE: 117 MMHG | RESPIRATION RATE: 16 BRPM | HEART RATE: 82 BPM | OXYGEN SATURATION: 96 % | BODY MASS INDEX: 28.93 KG/M2 | WEIGHT: 201.6 LBS | TEMPERATURE: 97.7 F | DIASTOLIC BLOOD PRESSURE: 76 MMHG

## 2018-02-02 DIAGNOSIS — C20 MALIGNANT NEOPLASM OF RECTUM (H): Primary | ICD-10-CM

## 2018-02-02 PROCEDURE — 99215 OFFICE O/P EST HI 40 MIN: CPT | Performed by: INTERNAL MEDICINE

## 2018-02-02 PROCEDURE — G0463 HOSPITAL OUTPT CLINIC VISIT: HCPCS

## 2018-02-02 ASSESSMENT — PAIN SCALES - GENERAL: PAINLEVEL: NO PAIN (0)

## 2018-02-02 NOTE — LETTER
2/2/2018         RE: Louie Greco  [address] [address]        Dear Colleague,    Thank you for referring your patient, Louie Greco, to the [facility]. Please see a copy of my visit note below.    [facility]    Hematology/Oncology Established Patient Note      Today's Date: 02/02/2018    Reason for Follow-up: rectal cancer      HISTORY OF PRESENT ILLNESS: Louie Greco is a 57 year old male who presents with rectal cancer.  He started having rectal bleeding around beginning of 2016.  He underwent colonoscopy on 7/27/16, which showed a malignant tumor in the rectum involving half of the lumen with oozing, as well as three 4-mm polyps in the ascending and transverse colon.  Pathology showed moderately differentiated adenocarcinoma, ascending colon with sessile serrated adenoma, others were tubular adenomas.  Mismatch repair expression with normal protein expression.  Staging scans showed the rectal mass, indeterminate focus in the left liver thought to be cyst, and multiple bilateral renal cysts.  MRI showed a distal rectal mass measuring 2.7 x 2.4 cm extending to the most proximal region of the anal canal.  There are a few tiny subcentimeter perirectal fat lymph nodes.  He was staged clinically [id number] (stage IIIA).  He was seen by Dr. Lee at [facility], and started neoadjuvant chemoradiation with capecitabine on 8/8/16 and completed on 9/15/16.  He was then seen by Dr. Radford, colorectal surgery at [facility].  His post chemoradiation MRI showed improvement in the size of the tumor and response in the lymph nodes.  On 12/8/16, he underwent flexible sigmoidoscopy and there was no evidence of tumor.  There was only some anterior scarring in the lumen.  Multiple biopsies were taken, which showed no evidence of carcinoma.  At that point, Dr. Radford spoke with the patient's wife, that there appears to be a complete response in the  lumen, and that the patient would wish to placed on the watch and wait protocol.  The patient had expressed wishes not to have an APR, and it would not have been possible to grossly clear a margin for LAR.    He had port placed on 1/16/17.  It has been removed.    He completed FOLFOX x 8 cycles 1/16/17-5/9/17.      He was admitted 7/16/17-7/20/17 with sepsis, abdominal wall cellulitis.  He underwent surgery with laparoscopic abdominal exploration and appendectomy.  Pathology found sessile serrated adenoma without dysplasia or malignancy.        INTERIM HISTORY: Louie comes in for follow-up today.   He has overall been feeling pretty good.  He last had a colonoscopy in December 2017.  He says that every time he has one, it is very painful and it takes 3 days to recover.  He says that he has frequent stools, and he has to take fiber and stool softeners to maintain normal bowel habits.  He was found to have shingles last month.  That is improved now.        REVIEW OF SYSTEMS:   14 point ROS was reviewed and is negative other than as noted above in HPI.       HOME MEDICATIONS:  Current Outpatient Prescriptions   Medication Sig Dispense Refill     diltiazem 2% in PLO cream, FV COMPOUNDED, 2% GEL Apply topically 2 times daily       ibuprofen 200 MG capsule Take 200-400 mg by mouth every 6 hours as needed 120 capsule 0     ASPIRIN PO Take by mouth as needed for moderate pain       lidocaine, Anorectal, 5 % CREA Externally apply 1 Application topically 3 times daily as needed 1 Tube 0     VITAMIN D, CHOLECALCIFEROL, PO Take 2,000 Units by mouth daily        dibucaine (NUPERCAINAL) 1 % OINT ointment 3 times daily as needed for moderate pain       hydrocortisone 0.5 % ointment Apply topically 2 times daily as needed Reported on 2/14/2017           ALLERGIES:  Allergies   Allergen Reactions     Ampicillin Diarrhea     Demerol [Meperidine]      Nausea          PAST MEDICAL HISTORY:  Past Medical History:   Diagnosis Date      Childhood asthma      Nephrolithiasis     1990     Rectal cancer (H)     low rectal cancer         PAST SURGICAL HISTORY:  Past Surgical History:   Procedure Laterality Date     COLONOSCOPY N/A 7/27/2016    Procedure: COMBINED COLONOSCOPY, SINGLE OR MULTIPLE BIOPSY/POLYPECTOMY BY BIOPSY;  Surgeon: Chelsea Thompson MD;  Location: [facility]     COLONOSCOPY N/A 9/13/2017    Procedure: COLONOSCOPY;;  Surgeon: Felicitas Radford MD;  [facility]     COLONOSCOPY N/A 12/13/2017    Procedure: COLONOSCOPY;;  Surgeon: Felicitas Radford MD;  [facility]     EXAM UNDER ANESTHESIA ANUS N/A 7/5/2017    Procedure: EXAM UNDER ANESTHESIA ANUS;  Examination Under Anesthesia, flex sigmoidoscopy with biopsies and formalin application;  Surgeon: Felicitas Radford MD;  [facility]     EXAM UNDER ANESTHESIA ANUS N/A 9/13/2017    Procedure: EXAM UNDER ANESTHESIA ANUS;  Examination Under Anesthesia Anus, Colonoscopy, application of formalin;  Surgeon: Felicitas Radford MD;  [facility]     EXAM UNDER ANESTHESIA ANUS N/A 12/13/2017    Procedure: EXAM UNDER ANESTHESIA ANUS;  Examination Under Anesthesia Anus, Colonoscopy;  Surgeon: Felicitas Radford MD;  [facility]     HC TOOTH EXTRACTION W/FORCEP       LAPAROSCOPIC APPENDECTOMY N/A 7/17/2017    Procedure: LAPAROSCOPIC APPENDECTOMY;  LAPAROSCOPIC APPENDECTOMY;  Surgeon: Bryson Ferguson MD;  Location: [facility]     SIGMOIDOSCOPY FLEXIBLE N/A 12/8/2016    Procedure: SIGMOIDOSCOPY FLEXIBLE;  Surgeon: Felicitas Radford MD;  [facility]     SIGMOIDOSCOPY FLEXIBLE N/A 7/5/2017    Procedure: SIGMOIDOSCOPY FLEXIBLE;;  Surgeon: Felicitas Radford MD;  [facility]     VASCULAR SURGERY      Right chest port     VASECTOMY           SOCIAL HISTORY:  Social History     Social History     Marital status:      Spouse name: N/A     Number of children: N/A     Years of education: N/A     Occupational History      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 No     Social History Narrative    Dad  at age  78   from [place], COPD, & HTN    Mom alive in her 70's.     for 30 years    Two adult children. 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         He notes that he had a brother who passed away at a young age of 53 from a metastatic cancer, but unknown what type, and passed away within 2 months of diagnosis.  He denies other family history of cancer in the immediate family.  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      FAMILY HISTORY:  Family History   Problem Relation Age of Onset     CANCER Brother      primary of unknown origin     Other Cancer Brother      Chronic Obstructive Pulmonary Disease Father      Hypertension Father      Hyperlipidemia Father      Colon Polyps Mother      Number and type of polyps unknown     Family History Negative Brother      negative colonoscopy     DIABETES Maternal Grandfather      Hypertension Paternal Grandmother      Hyperlipidemia Paternal Grandmother      Stomach Cancer Other 63     maternal great grandfather     Glaucoma No family hx of      Macular Degeneration No family hx of          PHYSICAL EXAM:  Vital signs:  /76 (BP Location: Right arm, Patient Position: Sitting, Cuff Size: Adult Large)  Pulse 82  Temp 97.7  F (36.5  C) (Oral)  Resp 16  Wt 91.4 kg (201 lb 9.6 oz)  SpO2 96%  BMI 28.93 kg/m2   ECO  GENERAL/CONSTITUTIONAL: No acute distress.  EYES: No scleral icterus.  LYMPH: No anterior cervical, posterior cervical, or supraclavicular  adenopathy.   RESPIRATORY: Clear to auscultation bilaterally. No crackles or wheezing.   CARDIOVASCULAR: Regular rate and rhythm without murmurs, gallops, or rubs.  GASTROINTESTINAL: No tenderness. The patient has normal bowel sounds. No guarding.  No distention.  MUSCULOSKELETAL: Warm and well-perfused, no cyanosis, clubbing, or edema.  NEUROLOGIC: Alert, oriented, answers questions appropriately.  INTEGUMENTARY: No jaundice.  GAIT: Steady, does not use assistive device      LABS:  CBC RESULTS:   Recent Labs   Lab Test  01/22/18   1557   WBC  4.8   RBC  4.56   HGB  14.0   HCT  40.5   MCV  89   MCH  30.7   MCHC  34.6   RDW  13.9   PLT  205     Recent Labs   Lab Test  01/22/18   1557  09/13/17   0700   NA  140  136   POTASSIUM  4.1  3.8   CHLORIDE  105  104   CO2  24  25   ANIONGAP  11  7   GLC  81  97   BUN  23  16   CR  0.76  0.74   FRANDY  8.9  9.2     Lab Results   Component Value Date    AST 31 01/22/2018     Lab Results   Component Value Date    ALT 42 01/22/2018     No results found for: BILICONJ   Lab Results   Component Value Date    BILITOTAL 0.7 01/22/2018     Lab Results   Component Value Date    ALBUMIN 4.1 01/22/2018     Lab Results   Component Value Date    PROTTOTAL 7.2 01/22/2018      Lab Results   Component Value Date    ALKPHOS 83 01/22/2018     Component      Latest Ref Rng & Units 4/25/2017 5/9/2017 6/9/2017 9/13/2017   CEA      0 - 2.5 ug/L 1.1 1.2 0.8 0.7     Component      Latest Ref Rng & Units 1/22/2018   CEA      0 - 2.5 ug/L 0.6         IMAGING:  PET-CT 1/26/18:  1. No evidence of locally recurrent or metastatic disease in the neck,  chest, abdomen, or pelvis.  2. Unchanged hepatic and renal cysts, including  proteinaceous/hemorrhagic cyst in the inferior left renal pole, as  characterized on MRI to be 8/5/2017.  3. Unchanged sub-4 mm pulmonary nodules.  4. Mild prostatomegaly.  5. Likely artifactual benign focal uptake in left hilum, attention on  f/u.      MRI pelvis 1/26/18:  1. Stable  treated fibrotic tumor in the lower rectum, left  anterolateral aspect with no evidence for residual tumor signal  corresponding to tumor regression grade of 1. Unchanged since  3/13/2017.   2. Stable tiny pelvic lymph nodes without suspicious features.  3. Stable heterogeneous bone marrow signal without suspicious lesions.      ASSESSMENT/PLAN:  Louie Greco is a 57 year old male with:    1) Rectal cancer: clinical stage [id number] (stage IIIA), s/p chemoradiation with Xeloda, and appears to have achieved complete response on imaging and biopsies.  He opted not to undergo surgery and expressed desire not to undergo APR, as he does not want a colostomy bag.  It was felt reasonable to go on the watch and wait protocol with the [facility].  He has completed 4 additional months (8 cycles) of chemotherapy with FOLFOX.  He will now go on surveillance, as per the watch and wait protocol.    We reviewed PET-CT and MRI pelvis from 1/26/18.  There is no evidence of locally recurrent or metastatic disease.  There is stable treated fibrotic tumor in the rectum with no evidence for residual tumor.      -he had colonoscopy with Dr. Radford on 12/13/17.  There was scarring and radiation changes.  There was no evidence of disease recurrence.  Biopsy showed no evidence of malignancy.  -next T3 MRI pelvis would be in July 2018 (every 6 months x 2 years).  He also has a PET scan then.  -CT c/a/p annually for 5 years  -endoscopic surveillance with Dr. Radford as per protocol - next one would be due in March 2018  -RTC in 6 months with labs/CEA    2) Genetics: He underwent genetic testing, which was negative.    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            4) Elevated bilirubin: Recent labs now show normal bilirubin  -monitor for now    5) Liver cysts: seen on CT, stable  -monitor    6) Pulmonary nodules: stable sub-4 mm pulmonary nodules  -monitor on future scans    7) Kidney cyst: stable  -monitor on future  "scans.  He also had an MRI done, which showed the multiple cysts.      8) Appendix polyp: He is now s/p appendectomy.  Pathology found sessile serrated adenoma without dysplasia or malignancy.     9) Shingles: was treated by PCP with Valtrex in January 2018      I spent a total of 40 minutes with the patient, with over >50% of the time in counseling and/or coordination of care.       Agueda Manley MD  Hematology/Oncology  [facility]        Oncology Rooming Note    February 2, 2018 3:55 PM   Louie Greco is a 57 year old male who presents for:    Chief Complaint   Patient presents with     Oncology Clinic Visit     Malignant neoplasm of rectum      Initial Vitals: /76 (BP Location: Right arm, Patient Position: Sitting, Cuff Size: Adult Large)  Pulse 82  Temp 97.7  F (36.5  C) (Oral)  Resp 16  Wt 91.4 kg (201 lb 9.6 oz)  SpO2 96%  BMI 28.93 kg/m2 Estimated body mass index is 28.93 kg/(m^2) as calculated from the following:    Height as of 1/15/18: 1.778 m (5' 10\").    Weight as of this encounter: 91.4 kg (201 lb 9.6 oz). Body surface area is 2.12 meters squared.  No Pain (0) Comment: Data Unavailable   No LMP for male patient.  Allergies reviewed: Yes  Medications reviewed: Yes    Medications: Medication refills not needed today.  Pharmacy name entered into [employer]:    [facility] [address] [place], [place] - [address] PHARMACY [facility] [address]    Clinical concerns: None                      4 minutes for nursing intake (face to face time)     Taylor Adams MA              Again, thank you for allowing me to participate in the care of your patient.        Sincerely,        Agueda Manley MD    "

## 2018-02-02 NOTE — PROGRESS NOTES
"Oncology Rooming Note    February 2, 2018 3:55 PM   Louie Greco is a 57 year old male who presents for:    Chief Complaint   Patient presents with     Oncology Clinic Visit     Malignant neoplasm of rectum      Initial Vitals: /76 (BP Location: Right arm, Patient Position: Sitting, Cuff Size: Adult Large)  Pulse 82  Temp 97.7  F (36.5  C) (Oral)  Resp 16  Wt 91.4 kg (201 lb 9.6 oz)  SpO2 96%  BMI 28.93 kg/m2 Estimated body mass index is 28.93 kg/(m^2) as calculated from the following:    Height as of 1/15/18: 1.778 m (5' 10\").    Weight as of this encounter: 91.4 kg (201 lb 9.6 oz). Body surface area is 2.12 meters squared.  No Pain (0) Comment: Data Unavailable   No LMP for male patient.  Allergies reviewed: Yes  Medications reviewed: Yes    Medications: Medication refills not needed today.  Pharmacy name entered into [facility]:    [facility] [id number] [place] [address] - [address] PHARMACY [facility] - [address]    Clinical concerns: None                      4 minutes for nursing intake (face to face time)     Taylor Adams MA            "

## 2018-02-02 NOTE — MR AVS SNAPSHOT
After Visit Summary   2/2/2018    Louie Greco    MRN: [id number]           Patient Information     Date Of Birth          1960        Visit Information        Provider Department      2/2/2018 4:00 PM Agueda Manley MD [facility]        Today's Diagnoses     Malignant neoplasm of rectum (H)    -  1      Care Instructions    -schedule MRI pelvis and PET-CT scan and labs at the [facility] in 6 months, prior to the next appointment  -return to clinic in 6 months          Follow-ups after your visit        Your next 10 appointments already scheduled     Jul 25, 2018 11:00 AM CDT   (Arrive by 10:45 AM)   MR PELVIS W/O & W CONTRAST with UUMR1   [facility], [place], MRI ([facility])    [address] [facility] [address] [phone]           Take your medicines as usual, unless your doctor tells you not to. Bring a list of your current medicines to your exam (including vitamins, minerals and over-the-counter drugs).  You will be given intravenous contrast for this exam. To prepare:   The day before your exam, drink extra fluids at least six 8-ounce glasses (unless your doctor tells you to restrict your fluids).   Have a blood test (creatinine test) within 30 days of your exam. Go to your clinic or Diagnostic Imaging Department for this test.  The MRI machine uses a strong magnet. Please wear clothes without metal (snaps, zippers). A sweatsuit works well, or we may give you a hospital gown.  Please remove any body piercings and hair extensions before you arrive. You will also remove watches, jewelry, hairpins, wallets, dentures, partial dental plates and hearing aids. You may wear contact lenses, and you may be able to wear your rings. We have a safe place to keep your personal items, but it is safer to leave them at home.   **IMPORTANT** THE INSTRUCTIONS BELOW ARE ONLY FOR THOSE PATIENTS WHO HAVE BEEN TOLD THEY WILL  RECEIVE SEDATION OR GENERAL ANESTHESIA DURING THEIR MRI PROCEDURE:  IF YOU WILL RECEIVE SEDATION (take medicine to help you relax during your exam):   You must get the medicine from your doctor before you arrive. Bring the medicine to the exam. Do not take it at home.   Arrive one hour early. Bring someone who can take you home after the test. Your medicine will make you sleepy. After the exam, you may not drive, take a bus or take a taxi by yourself.   No eating 8 hours before your exam. You may have clear liquids up until 4 hours before your exam. (Clear liquids include water, clear tea, black coffee and fruit juice without pulp.)  IF YOU WILL RECEIVE ANESTHESIA (be asleep for your exam):   Arrive 1 1/2 hours early. Bring someone who can take you home after the test. You may not drive, take a bus or take a taxi by yourself.   No eating 8 hours before your exam. You may have clear liquids up until 4 hours before your exam. (Clear liquids include water, clear tea, black coffee and fruit juice without pulp.)  Please call the Imaging Department at your exam site with any questions.            Jul 25, 2018 12:00 PM CDT   (Arrive by 11:45 AM)   PE NPET ONCOLOGY (EYES TO THIGHS) with UUPET1   [facility], [place] PET [place] ([facility] [place])    [address] [facility] [address] [phone]           Tell your doctor:   If there is any chance you may be pregnant or if you are breastfeeding.   If you have problems lying in small spaces (claustrophobia). If you do, your doctor may give you medicine to help you relax. If you have diabetes:   Have your exam early in the morning. Your blood glucose will go up as the day goes by.   Your glucose level must be 180 or less at the start of the exam. Please take any medicines you need to ensure this blood glucose level. 24 hours before your scan: Don t do any heavy exercise. (No jogging, aerobics or other workouts.) Exercise will make  your pictures less accurate. 6 hours before your scan:   Stop all food and liquids (except water).   Do not chew gum or suck on mints.   If you need to take medicine with food, you may take it with a few crackers.  Please call your Imaging Department at your exam site with any questions.            Jul 31, 2018 10:30 AM CDT   Return Visit with Agueda Manley MD   [facility] ([facility])    [facility]  [address]  [facility] [address]   [phone]              Future tests that were ordered for you today     Open Future Orders        Priority Expected Expires Ordered    CBC with platelets differential Routine 8/2/2018 2/2/2019 2/2/2018    Comprehensive metabolic panel Routine 8/2/2018 2/2/2019 2/2/2018    CEA Routine 8/2/2018 2/2/2019 2/2/2018    PET Oncology (Eyes to Thighs) Routine 8/2/2018 2/2/2019 2/2/2018    MR Pelvis w/o & w Contrast Routine 8/2/2018 2/2/2019 2/2/2018            Who to contact     If you have questions or need follow up information about today's clinic visit or your schedule please contact [facility] directly at [phone].  Normal or non-critical lab and imaging results will be communicated to you by Vidablehart, letter or phone within 4 business days after the clinic has received the results. If you do not hear from us within 7 days, please contact the clinic through MyChart or phone. If you have a critical or abnormal lab result, we will notify you by phone as soon as possible.  Submit refill requests through [employer] or call your pharmacy and they will forward the refill request to us. Please allow 3 business days for your refill to be completed.          Additional Information About Your Visit        VidableharZoomTilt Information     [employer] gives you secure access to your electronic health record. If you see a primary care provider, you can also send messages to your care team and make appointments. If you have  questions, please call your primary care clinic.  If you do not have a primary care provider, please call [phone] and they will assist you.        Care EveryWhere ID     This is your Care EveryWhere ID. This could be used by other organizations to access your [facility] medical records  [id number]        Your Vitals Were     Pulse Temperature Respirations Pulse Oximetry BMI (Body Mass Index)       82 97.7  F (36.5  C) (Oral) 16 96% 28.93 kg/m2        Blood Pressure from Last 3 Encounters:   02/02/18 117/76   01/15/18 126/78   01/07/18 120/80    Weight from Last 3 Encounters:   02/02/18 91.4 kg (201 lb 9.6 oz)   01/15/18 93.9 kg (207 lb)   12/13/17 90.3 kg (199 lb)                 Today's Medication Changes          These changes are accurate as of 2/2/18  4:42 PM.  If you have any questions, ask your nurse or doctor.               Stop taking these medicines if you haven't already. Please contact your care team if you have questions.     sildenafil 20 MG tablet   Commonly known as:  REVATIO                    Primary Care Provider Office Phone # Fax #    Philippe Healy [phone]       [address] [address]        Equal Access to Services     BLAISE EDDY : Hadii aad ku hadasho Soomaali, waaxda luqadaha, qaybta kaalmada adeegyada, jb bower haykatherine pedroza . So [facility] [phone].    ATENCIÓN: Si habla español, tiene a mena disposición servicios gratuitos de asistencia lingüística. Llame al [phone].    We comply with applicable federal civil rights laws and [place] laws. We do not discriminate on the basis of race, color, national origin, age, disability, sex, sexual orientation, or gender identity.            Thank you!     Thank you for choosing [facility]  for your care. Our goal is always to provide you with excellent care. Hearing back from our patients is one way we can continue to improve our services. Please  take a few minutes to complete the written survey that you may receive in the mail after your visit with us. Thank you!             Your Updated Medication List - Protect others around you: Learn how to safely use, store and throw away your medicines at www.disposemymeds.org.          This list is accurate as of 2/2/18  4:42 PM.  Always use your most recent med list.                   Brand Name Dispense Instructions for use Diagnosis    ASPIRIN PO      Take by mouth as needed for moderate pain        dibucaine 1 % Oint ointment    NUPERCAINAL     3 times daily as needed for moderate pain        diltiazem 2% in PLO cream (FV COMPOUNDED) 2% Gel      Apply topically 2 times daily    Need for hepatitis C screening test       hydrocortisone 0.5 % ointment      Apply topically 2 times daily as needed Reported on 2/14/2017        ibuprofen 200 MG capsule     120 capsule    Take 200-400 mg by mouth every 6 hours as needed    Acute post-operative pain       lidocaine (Anorectal) 5 % Crea     1 Tube    Externally apply 1 Application topically 3 times daily as needed    Anal pain       VITAMIN D (CHOLECALCIFEROL) PO      Take 2,000 Units by mouth daily

## 2018-02-02 NOTE — PROGRESS NOTES
[facility]    Hematology/Oncology Established Patient Note      Today's Date: 02/02/2018    Reason for Follow-up: rectal cancer      HISTORY OF PRESENT ILLNESS: Louie Greco is a 57 year old male who presents with rectal cancer.  He started having rectal bleeding around beginning of 2016.  He underwent colonoscopy on 7/27/16, which showed a malignant tumor in the rectum involving half of the lumen with oozing, as well as three 4-mm polyps in the ascending and transverse colon.  Pathology showed moderately differentiated adenocarcinoma, ascending colon with sessile serrated adenoma, others were tubular adenomas.  Mismatch repair expression with normal protein expression.  Staging scans showed the rectal mass, indeterminate focus in the left liver thought to be cyst, and multiple bilateral renal cysts.  MRI showed a distal rectal mass measuring 2.7 x 2.4 cm extending to the most proximal region of the anal canal.  There are a few tiny subcentimeter perirectal fat lymph nodes.  He was staged clinically [id number] (stage IIIA).  He was seen by Dr. Lee at [facility], and started neoadjuvant chemoradiation with capecitabine on 8/8/16 and completed on 9/15/16.  He was then seen by Dr. Radford, colorectal surgery at [facility].  His post chemoradiation MRI showed improvement in the size of the tumor and response in the lymph nodes.  On 12/8/16, he underwent flexible sigmoidoscopy and there was no evidence of tumor.  There was only some anterior scarring in the lumen.  Multiple biopsies were taken, which showed no evidence of carcinoma.  At that point, Dr. Radford spoke with the patient's wife, that there appears to be a complete response in the lumen, and that the patient would wish to placed on the watch and wait protocol.  The patient had expressed wishes not to have an APR, and it would not have been possible to grossly clear a margin for LAR.    He had port placed  on 1/16/17.  It has been removed.    He completed FOLFOX x 8 cycles 1/16/17-5/9/17.      He was admitted 7/16/17-7/20/17 with sepsis, abdominal wall cellulitis.  He underwent surgery with laparoscopic abdominal exploration and appendectomy.  Pathology found sessile serrated adenoma without dysplasia or malignancy.        INTERIM HISTORY: Louie comes in for follow-up today.   He has overall been feeling pretty good.  He last had a colonoscopy in December 2017.  He says that every time he has one, it is very painful and it takes 3 days to recover.  He says that he has frequent stools, and he has to take fiber and stool softeners to maintain normal bowel habits.  He was found to have shingles last month.  That is improved now.        REVIEW OF SYSTEMS:   14 point ROS was reviewed and is negative other than as noted above in HPI.       HOME MEDICATIONS:  Current Outpatient Prescriptions   Medication Sig Dispense Refill     diltiazem 2% in PLO cream, FV COMPOUNDED, 2% GEL Apply topically 2 times daily       ibuprofen 200 MG capsule Take 200-400 mg by mouth every 6 hours as needed 120 capsule 0     ASPIRIN PO Take by mouth as needed for moderate pain       lidocaine, Anorectal, 5 % CREA Externally apply 1 Application topically 3 times daily as needed 1 Tube 0     VITAMIN D, CHOLECALCIFEROL, PO Take 2,000 Units by mouth daily        dibucaine (NUPERCAINAL) 1 % OINT ointment 3 times daily as needed for moderate pain       hydrocortisone 0.5 % ointment Apply topically 2 times daily as needed Reported on 2/14/2017           ALLERGIES:  Allergies   Allergen Reactions     Ampicillin Diarrhea     Demerol [Meperidine]      Nausea          PAST MEDICAL HISTORY:  Past Medical History:   Diagnosis Date     Childhood asthma      Nephrolithiasis     1990     Rectal cancer (H)     low rectal cancer         PAST SURGICAL HISTORY:  Past Surgical History:   Procedure Laterality Date     COLONOSCOPY N/A 7/27/2016    Procedure: COMBINED  COLONOSCOPY, SINGLE OR MULTIPLE BIOPSY/POLYPECTOMY BY BIOPSY;  Surgeon: Chelsea Thompson MD;  Location: [facility]     COLONOSCOPY N/A 9/13/2017    Procedure: COLONOSCOPY;;  Surgeon: Felicitas Radford MD;  Location: [facility]     COLONOSCOPY N/A 12/13/2017    Procedure: COLONOSCOPY;;  Surgeon: Felicitas Radford MD;  [facility]     EXAM UNDER ANESTHESIA ANUS N/A 7/5/2017    Procedure: EXAM UNDER ANESTHESIA ANUS;  Examination Under Anesthesia, flex sigmoidoscopy with biopsies and formalin application;  Surgeon: Felicitas Radford MD;  [facility]     EXAM UNDER ANESTHESIA ANUS N/A 9/13/2017    Procedure: EXAM UNDER ANESTHESIA ANUS;  Examination Under Anesthesia Anus, Colonoscopy, application of formalin;  Surgeon: Felicitas Radford MD;  [facility]     EXAM UNDER ANESTHESIA ANUS N/A 12/13/2017    Procedure: EXAM UNDER ANESTHESIA ANUS;  Examination Under Anesthesia Anus, Colonoscopy;  Surgeon: Felicitas Radford MD;  [facility]     HC TOOTH EXTRACTION W/FORCEP       LAPAROSCOPIC APPENDECTOMY N/A 7/17/2017    Procedure: LAPAROSCOPIC APPENDECTOMY;  LAPAROSCOPIC APPENDECTOMY;  Surgeon: Bryson Ferguson MD;  Location: [facility]     SIGMOIDOSCOPY FLEXIBLE N/A 12/8/2016    Procedure: SIGMOIDOSCOPY FLEXIBLE;  Surgeon: Felicitas Radford MD;  [facility]: [facility]     SIGMOIDOSCOPY FLEXIBLE N/A 7/5/2017    Procedure: SIGMOIDOSCOPY FLEXIBLE;;  Surgeon: Felicitas Radford MD;  [facility]     VASCULAR SURGERY      Right chest port     VASECTOMY           SOCIAL HISTORY:  Social History     Social History     Marital status:      Spouse name: N/A     Number of children: N/A     Years of education: N/A     Occupational History     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 No     Social History Narrative    Dad  at age  78   from [place], COPD, & HTN    Mom alive in her 70's.     for 30 years    Two adult children. 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         He notes that he had a brother who passed away at a young age of 53 from a metastatic cancer, but unknown what type, and passed away within 2 months of diagnosis.  He denies other family history of cancer in the immediate family.  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      FAMILY HISTORY:  Family History   Problem Relation Age of Onset     CANCER Brother      primary of unknown origin     Other Cancer Brother      Chronic Obstructive Pulmonary Disease Father      Hypertension Father      Hyperlipidemia Father      Colon Polyps Mother      Number and type of polyps unknown     Family History Negative Brother      negative colonoscopy     DIABETES Maternal Grandfather      Hypertension Paternal Grandmother      Hyperlipidemia Paternal Grandmother      Stomach Cancer Other 63     maternal great grandfather     Glaucoma No family hx of      Macular Degeneration No family hx of          PHYSICAL EXAM:  Vital signs:  /76 (BP Location: Right arm, Patient Position: Sitting, Cuff Size: Adult Large)  Pulse 82  Temp 97.7  F (36.5  C) (Oral)  Resp 16  Wt 91.4 kg (201 lb 9.6 oz)  SpO2 96%  BMI 28.93 kg/m2   ECO  GENERAL/CONSTITUTIONAL: No acute distress.  EYES: No scleral icterus.  LYMPH: No anterior cervical, posterior cervical, or supraclavicular adenopathy.   RESPIRATORY: Clear to auscultation bilaterally. No crackles or wheezing.   CARDIOVASCULAR: Regular rate and rhythm without murmurs, gallops, or rubs.  GASTROINTESTINAL: No tenderness. The patient has normal bowel  sounds. No guarding.  No distention.  MUSCULOSKELETAL: Warm and well-perfused, no cyanosis, clubbing, or edema.  NEUROLOGIC: Alert, oriented, answers questions appropriately.  INTEGUMENTARY: No jaundice.  GAIT: Steady, does not use assistive device      LABS:  CBC RESULTS:   Recent Labs   Lab Test  01/22/18   1557   WBC  4.8   RBC  4.56   HGB  14.0   HCT  40.5   MCV  89   MCH  30.7   MCHC  34.6   RDW  13.9   PLT  205     Recent Labs   Lab Test  01/22/18   1557  09/13/17   0700   NA  140  136   POTASSIUM  4.1  3.8   CHLORIDE  105  104   CO2  24  25   ANIONGAP  11  7   GLC  81  97   BUN  23  16   CR  0.76  0.74   FRANDY  8.9  9.2     Lab Results   Component Value Date    AST 31 01/22/2018     Lab Results   Component Value Date    ALT 42 01/22/2018     No results found for: BILICONJ   Lab Results   Component Value Date    BILITOTAL 0.7 01/22/2018     Lab Results   Component Value Date    ALBUMIN 4.1 01/22/2018     Lab Results   Component Value Date    PROTTOTAL 7.2 01/22/2018      Lab Results   Component Value Date    ALKPHOS 83 01/22/2018     Component      Latest Ref Rng & Units 4/25/2017 5/9/2017 6/9/2017 9/13/2017   CEA      0 - 2.5 ug/L 1.1 1.2 0.8 0.7     Component      Latest Ref Rng & Units 1/22/2018   CEA      0 - 2.5 ug/L 0.6         IMAGING:  PET-CT 1/26/18:  1. No evidence of locally recurrent or metastatic disease in the neck,  chest, abdomen, or pelvis.  2. Unchanged hepatic and renal cysts, including  proteinaceous/hemorrhagic cyst in the inferior left renal pole, as  characterized on MRI to be 8/5/2017.  3. Unchanged sub-4 mm pulmonary nodules.  4. Mild prostatomegaly.  5. Likely artifactual benign focal uptake in left hilum, attention on  f/u.      MRI pelvis 1/26/18:  1. Stable treated fibrotic tumor in the lower rectum, left  anterolateral aspect with no evidence for residual tumor signal  corresponding to tumor regression grade of 1. Unchanged since  3/13/2017.   2. Stable tiny pelvic lymph nodes  without suspicious features.  3. Stable heterogeneous bone marrow signal without suspicious lesions.      ASSESSMENT/PLAN:  Louie Greco is a 57 year old male with:    1) Rectal cancer: clinical stage [id number] (stage IIIA), s/p chemoradiation with Xeloda, and appears to have achieved complete response on imaging and biopsies.  He opted not to undergo surgery and expressed desire not to undergo APR, as he does not want a colostomy bag.  It was felt reasonable to go on the watch and wait protocol with the [facility].  He has completed 4 additional months (8 cycles) of chemotherapy with FOLFOX.  He will now go on surveillance, as per the watch and wait protocol.    We reviewed PET-CT and MRI pelvis from 1/26/18.  There is no evidence of locally recurrent or metastatic disease.  There is stable treated fibrotic tumor in the rectum with no evidence for residual tumor.      -he had colonoscopy with Dr. Radford on 12/13/17.  There was scarring and radiation changes.  There was no evidence of disease recurrence.  Biopsy showed no evidence of malignancy.  -next T3 MRI pelvis would be in July 2018 (every 6 months x 2 years).  He also has a PET scan then.  -CT c/a/p annually for 5 years  -endoscopic surveillance with Dr. Radford as per protocol - next one would be due in March 2018  -RTC in 6 months with labs/CEA    2) Genetics: He underwent genetic testing, which was negative.    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            4) Elevated bilirubin: Recent labs now show normal bilirubin  -monitor for now    5) Liver cysts: seen on CT, stable  -monitor    6) Pulmonary nodules: stable sub-4 mm pulmonary nodules  -monitor on future scans    7) Kidney cyst: stable  -monitor on future scans.  He also had an MRI done, which showed the multiple cysts.      8) Appendix polyp: He is now s/p appendectomy.  Pathology found sessile serrated adenoma without dysplasia or malignancy.     9) Shingles: was treated by  PCP with Valtrex in January 2018      I spent a total of 40 minutes with the patient, with over >50% of the time in counseling and/or coordination of care.       Agueda Manley MD  Hematology/Oncology  [facility]

## 2018-02-02 NOTE — PATIENT INSTRUCTIONS
-schedule MRI pelvis and PET-CT scan and labs at the [facility] in 6 months, prior to the next appointment  Scheduled/willie  *pt will have labs drawn @ primary or at [facility]  -return to clinic in 6 months  Scheduled/willie      AVS printed & given to patient/willie

## 2018-02-14 ENCOUNTER — HOSPITAL ENCOUNTER (EMERGENCY)
Facility: CLINIC | Age: 58
Discharge: HOME | End: 2018-02-15
Attending: EMERGENCY MEDICINE | Admitting: EMERGENCY MEDICINE
Payer: COMMERCIAL

## 2018-02-14 ENCOUNTER — APPOINTMENT (OUTPATIENT)
Dept: CT IMAGING | Facility: CLINIC | Age: 58
End: 2018-02-14
Attending: EMERGENCY MEDICINE
Payer: COMMERCIAL

## 2018-02-14 DIAGNOSIS — N20.1 CALCULUS OF URETER: ICD-10-CM

## 2018-02-14 LAB
ALBUMIN SERPL-MCNC: 3.9 G/DL (ref 3.4–5)
ALBUMIN UR-MCNC: ABNORMAL MG/DL
ALP SERPL-CCNC: 79 U/L (ref 40–150)
ALT SERPL W P-5'-P-CCNC: 52 U/L (ref 0–70)
ANION GAP SERPL CALCULATED.3IONS-SCNC: 7 MMOL/L (ref 3–14)
APPEARANCE UR: CLEAR
AST SERPL W P-5'-P-CCNC: 31 U/L (ref 0–45)
BASOPHILS # BLD AUTO: 0 10E9/L (ref 0–0.2)
BASOPHILS NFR BLD AUTO: 0.4 %
BILIRUB SERPL-MCNC: 1 MG/DL (ref 0.2–1.3)
BILIRUB UR QL STRIP: NEGATIVE
BUN SERPL-MCNC: 27 MG/DL (ref 7–30)
CALCIUM SERPL-MCNC: 9 MG/DL (ref 8.5–10.1)
CHLORIDE SERPL-SCNC: 106 MMOL/L (ref 94–109)
CO2 SERPL-SCNC: 26 MMOL/L (ref 20–32)
COLOR UR AUTO: YELLOW
CREAT SERPL-MCNC: 0.91 MG/DL (ref 0.66–1.25)
DIFFERENTIAL METHOD BLD: ABNORMAL
EOSINOPHIL # BLD AUTO: 0.3 10E9/L (ref 0–0.7)
EOSINOPHIL NFR BLD AUTO: 5.4 %
ERYTHROCYTE [DISTWIDTH] IN BLOOD BY AUTOMATED COUNT: 13.2 % (ref 10–15)
GFR SERPL CREATININE-BSD FRML MDRD: 86 ML/MIN/1.7M2
GLUCOSE SERPL-MCNC: 106 MG/DL (ref 70–99)
GLUCOSE UR STRIP-MCNC: NEGATIVE MG/DL
HCT VFR BLD AUTO: 41.4 % (ref 40–53)
HGB BLD-MCNC: 14.5 G/DL (ref 13.3–17.7)
HGB UR QL STRIP: ABNORMAL
IMM GRANULOCYTES # BLD: 0 10E9/L (ref 0–0.4)
IMM GRANULOCYTES NFR BLD: 0 %
KETONES UR STRIP-MCNC: NEGATIVE MG/DL
LEUKOCYTE ESTERASE UR QL STRIP: NEGATIVE
LIPASE SERPL-CCNC: 192 U/L (ref 73–393)
LYMPHOCYTES # BLD AUTO: 0.7 10E9/L (ref 0.8–5.3)
LYMPHOCYTES NFR BLD AUTO: 13.9 %
MCH RBC QN AUTO: 30.7 PG (ref 26.5–33)
MCHC RBC AUTO-ENTMCNC: 35 G/DL (ref 31.5–36.5)
MCV RBC AUTO: 88 FL (ref 78–100)
MONOCYTES # BLD AUTO: 0.6 10E9/L (ref 0–1.3)
MONOCYTES NFR BLD AUTO: 12.2 %
MUCOUS THREADS #/AREA URNS LPF: PRESENT /LPF
NEUTROPHILS # BLD AUTO: 3.5 10E9/L (ref 1.6–8.3)
NEUTROPHILS NFR BLD AUTO: 68.1 %
NITRATE UR QL: NEGATIVE
NRBC # BLD AUTO: 0 10*3/UL
NRBC BLD AUTO-RTO: 0 /100
PH UR STRIP: 6 PH (ref 5–7)
PLATELET # BLD AUTO: 176 10E9/L (ref 150–450)
POTASSIUM SERPL-SCNC: 3.9 MMOL/L (ref 3.4–5.3)
PROT SERPL-MCNC: 7.1 G/DL (ref 6.8–8.8)
RBC # BLD AUTO: 4.72 10E12/L (ref 4.4–5.9)
RBC #/AREA URNS AUTO: ABNORMAL /HPF
SODIUM SERPL-SCNC: 139 MMOL/L (ref 133–144)
SOURCE: ABNORMAL
SP GR UR STRIP: 1.02 (ref 1–1.03)
UROBILINOGEN UR STRIP-ACNC: 1 EU/DL (ref 0.2–1)
WBC # BLD AUTO: 5.2 10E9/L (ref 4–11)
WBC #/AREA URNS AUTO: ABNORMAL /HPF

## 2018-02-14 PROCEDURE — 96375 TX/PRO/DX INJ NEW DRUG ADDON: CPT

## 2018-02-14 PROCEDURE — 85025 COMPLETE CBC W/AUTO DIFF WBC: CPT | Performed by: EMERGENCY MEDICINE

## 2018-02-14 PROCEDURE — 25000128 H RX IP 250 OP 636: Performed by: EMERGENCY MEDICINE

## 2018-02-14 PROCEDURE — 74176 CT ABD & PELVIS W/O CONTRAST: CPT

## 2018-02-14 PROCEDURE — 96361 HYDRATE IV INFUSION ADD-ON: CPT

## 2018-02-14 PROCEDURE — 80053 COMPREHEN METABOLIC PANEL: CPT | Performed by: EMERGENCY MEDICINE

## 2018-02-14 PROCEDURE — 81001 URINALYSIS AUTO W/SCOPE: CPT | Performed by: EMERGENCY MEDICINE

## 2018-02-14 PROCEDURE — 83690 ASSAY OF LIPASE: CPT | Performed by: EMERGENCY MEDICINE

## 2018-02-14 PROCEDURE — 96374 THER/PROPH/DIAG INJ IV PUSH: CPT

## 2018-02-14 PROCEDURE — 99285 EMERGENCY DEPT VISIT HI MDM: CPT | Mod: 25

## 2018-02-14 RX ORDER — HYDROMORPHONE HYDROCHLORIDE 1 MG/ML
0.5 INJECTION, SOLUTION INTRAMUSCULAR; INTRAVENOUS; SUBCUTANEOUS
Status: DISCONTINUED | OUTPATIENT
Start: 2018-02-14 | End: 2018-02-15 | Stop reason: HOSPADM

## 2018-02-14 RX ORDER — KETOROLAC TROMETHAMINE 30 MG/ML
30 INJECTION, SOLUTION INTRAMUSCULAR; INTRAVENOUS ONCE
Status: COMPLETED | OUTPATIENT
Start: 2018-02-14 | End: 2018-02-14

## 2018-02-14 RX ADMIN — HYDROMORPHONE HYDROCHLORIDE 0.5 MG: 1 INJECTION, SOLUTION INTRAMUSCULAR; INTRAVENOUS; SUBCUTANEOUS at 22:53

## 2018-02-14 RX ADMIN — SODIUM CHLORIDE 1000 ML: 9 INJECTION, SOLUTION INTRAVENOUS at 22:53

## 2018-02-14 RX ADMIN — KETOROLAC TROMETHAMINE 30 MG: 30 INJECTION, SOLUTION INTRAMUSCULAR at 22:55

## 2018-02-14 ASSESSMENT — ENCOUNTER SYMPTOMS
DIFFICULTY URINATING: 0
HEMATURIA: 0
FREQUENCY: 0
SORE THROAT: 0
CHILLS: 0
DYSURIA: 0
ABDOMINAL PAIN: 1
FEVER: 0
COUGH: 0
RHINORRHEA: 0

## 2018-02-14 NOTE — ED AVS SNAPSHOT
Emergency Department    [address] [facility] [address] [address]    Phone:  [phone]    Fax:  [phone]                                       Louie Greco   MRN: [id number]    Department:   Emergency Department   Date of Visit:  2/14/2018           Patient Information     Date Of Birth          1960        Your diagnoses for this visit were:     Calculus of ureter        You were seen by Oscar Abraham MD.      Follow-up Information     Follow up with [facility]    Contact information:    [address]  [facility] [id number] [phone]          Follow up with  Emergency Department.    Specialty:  EMERGENCY MEDICINE    Why:  As needed    Contact information:    [address] [facility] [address] [phone]        Discharge Instructions       Take the below medications as prescribed.      New Prescriptions    MORPHINE 5 MG SOLU-TAB    Take 1 tablet (5 mg) by mouth every 4 hours as needed for breakthrough pain, shortness of breath / dyspnea or moderate to severe pain    ONDANSETRON (ZOFRAN ODT) 4 MG ODT TAB    Take 1 tablet (4 mg) by mouth every 6 hours as needed    TAMSULOSIN (FLOMAX) 0.4 MG CAPSULE    Take 1 capsule (0.4 mg) by mouth daily for 10 doses      Take ibuprofen 600 to 800 mg by mouth every 6 to 8 hours as needed for pain or fever      Discharge Instructions  Kidney Stones    Kidney stones are a common problem that can cause a lot of pain but fortunately are usually not dangerous. Kidney stones form in the kidney and then can cause a blockage (obstruction) of the flow of urine from the kidney which leads to pain. Most patients can manage kidney stones at home (without a hospital stay).  However, sometimes your condition may be worse than it seemed at first, or may get worse with time. Most kidney stones will pass on their own, but occasionally stones may need to be removed by an urologist.    Generally, every Emergency Department visit  should have a follow-up clinic visit with either a primary or a specialty clinic/provider. Please follow-up as instructed by your emergency provider today.      Return to the Emergency Department if:    Your pain is not controlled despite the medications provided or recommended.    You are vomiting (throwing up) and cannot keep fluids or medications down.    You develop a fever (>100.4 F).    You feel much more ill or develop new symptoms.  What can I do to help myself?    Be sure to drink plenty of fluids.    If instructed to do so, strain your urine (pee) with the urine strainer you were provided with today. Your stone may look like a grain of sand or a small pebble. Collect any stones in the cup provided and bring to your follow-up appointment.    Staying active is good, and may help the stone to pass. You may do whatever you feel up to doing without restrictions.   Treatment:    Non-steroidal anti-inflammatory drugs (NSAIDs). This includes prescription medicines like Toradol  (ketorolac) and non-prescription medicines like Advil  (ibuprofen) and Nuprin  (ibuprofen) and Naproxen. These pain relievers are very effective for kidney stones.    Nausea (sick to your stomach) medication.  Nausea and vomiting are common with kidney stones, so your provider may send you home with medicine for this.     Flomax  (tamsulosin). This medicine is sometimes used for men with prostate problems, but also can help kidney stones to pass. Its effectiveness is controversial or questionable so it is prescribed in certain situations. This medicine can lower blood pressure, and you may feel faint/lightheaded, especially when you first stand up. Be sure to get up gradually, sit down if you feel faint, and avoid activity where feeling faint would be dangerous, such as climbing ladders.  If you were given a prescription for medicine here today, be sure to read all of the information (including the package insert) that comes with your  prescription.  This will include important information about the medicine, its side effects, and any warnings that you need to know about.  The pharmacist who fills the prescription can provide more information and answer questions you may have about the medicine.  If you have questions or concerns that the pharmacist cannot address, please call or return to the Emergency Department.   Remember that you can always come back to the Emergency Department if you are not able to see your regular provider in the amount of time listed above, if you get any new symptoms, or if there is anything that worries you.          Future Appointments        Provider Department Dept Phone Center    7/25/2018 11:00 AM [facility] MRI ROOM 1 [facility] [place], [phone] [place]    7/25/2018 12:00 PM [facility] PET ROOM [facility] [place] PET [phone] [facility]    7/31/2018 10:30 AM Agueda Manley MD [facility] [phone] [facility]      24 Hour Appointment Hotline       To make an appointment at any [facility], call [phone] ([phone]). If you don't have a family doctor or clinic, we will help you find one. [facility] clinics are conveniently located to serve the needs of you and your family.             Review of your medicines      START taking        Dose / Directions Last dose taken    morphine 5 MG solu-tab   Dose:  5 mg   Quantity:  15 tablet        Take 1 tablet (5 mg) by mouth every 4 hours as needed for breakthrough pain, shortness of breath / dyspnea or moderate to severe pain   Refills:  0        ondansetron 4 MG ODT tab   Commonly known as:  ZOFRAN ODT   Dose:  4 mg   Quantity:  20 tablet        Take 1 tablet (4 mg) by mouth every 6 hours as needed   Refills:  0        tamsulosin 0.4 MG capsule   Commonly known as:  FLOMAX   Dose:  0.4 mg   Quantity:  10 capsule        Take 1 capsule (0.4 mg) by mouth daily for 10 doses   Refills:  0          Our records  show that you are taking the medicines listed below. If these are incorrect, please call your family doctor or clinic.        Dose / Directions Last dose taken    ASPIRIN PO        Take by mouth as needed for moderate pain   Refills:  0        dibucaine 1 % Oint ointment   Commonly known as:  NUPERCAINAL        3 times daily as needed for moderate pain   Refills:  0        diltiazem 2% in PLO cream (FV COMPOUNDED) 2% Gel        Apply topically 2 times daily   Refills:  0        hydrocortisone 0.5 % ointment        Apply topically 2 times daily as needed Reported on 2/14/2017   Refills:  0        ibuprofen 200 MG capsule   Dose:  200-400 mg   Quantity:  120 capsule        Take 200-400 mg by mouth every 6 hours as needed   Refills:  0        lidocaine (Anorectal) 5 % Crea   Dose:  1 Application   Quantity:  1 Tube        Externally apply 1 Application topically 3 times daily as needed   Refills:  0        VITAMIN D (CHOLECALCIFEROL) PO   Dose:  2000 Units        Take 2,000 Units by mouth daily   Refills:  0                Prescriptions were sent or printed at these locations (3 Prescriptions)                   Other Prescriptions                Printed at Department/Unit printer (3 of 3)         morphine 5 MG solu-tab               ondansetron (ZOFRAN ODT) 4 MG ODT tab               tamsulosin (FLOMAX) 0.4 MG capsule                Procedures and tests performed during your visit     *UA reflex to Microscopic    Abd/pelvis CT no contrast - Stone Protocol    CBC with platelets differential    Comprehensive metabolic panel    Lipase    Urine Microscopic      Orders Needing Specimen Collection     None      Pending Results     Date and Time Order Name Status Description    2/14/2018 2244 Abd/pelvis CT no contrast - Stone Protocol Preliminary             Pending Culture Results     No orders found for last 3 day(s).            Pending Results Instructions     If you had any lab results that were not finalized at the time  of your Discharge, you can call the ED Lab Result RN at [phone]. You will be contacted by this team for any positive Lab results or changes in treatment. The nurses are available 7 days a week from 10A to 6:30P.  You can leave a message 24 hours per day and they will return your call.        Test Results From Your Hospital Stay        2/14/2018 10:06 PM      Component Results     Component Value Ref Range & Units Status    WBC 5.2 4.0 - 11.0 10e9/L Final    RBC Count 4.72 4.4 - 5.9 10e12/L Final    Hemoglobin 14.5 13.3 - 17.7 g/dL Final    Hematocrit 41.4 40.0 - 53.0 % Final    MCV 88 78 - 100 fl Final    MCH 30.7 26.5 - 33.0 pg Final    MCHC 35.0 31.5 - 36.5 g/dL Final    RDW 13.2 10.0 - 15.0 % Final    Platelet Count 176 150 - 450 10e9/L Final    Diff Method Automated Method  Final    % Neutrophils 68.1 % Final    % Lymphocytes 13.9 % Final    % Monocytes 12.2 % Final    % Eosinophils 5.4 % Final    % Basophils 0.4 % Final    % Immature Granulocytes 0.0 % Final    Nucleated RBCs 0 0 /100 Final    Absolute Neutrophil 3.5 1.6 - 8.3 10e9/L Final    Absolute Lymphocytes 0.7 (L) 0.8 - 5.3 10e9/L Final    Absolute Monocytes 0.6 0.0 - 1.3 10e9/L Final    Absolute Eosinophils 0.3 0.0 - 0.7 10e9/L Final    Absolute Basophils 0.0 0.0 - 0.2 10e9/L Final    Abs Immature Granulocytes 0.0 0 - 0.4 10e9/L Final    Absolute Nucleated RBC 0.0  Final         2/14/2018 10:18 PM      Component Results     Component Value Ref Range & Units Status    Sodium 139 133 - 144 mmol/L Final    Potassium 3.9 3.4 - 5.3 mmol/L Final    Chloride 106 94 - 109 mmol/L Final    Carbon Dioxide 26 20 - 32 mmol/L Final    Anion Gap 7 3 - 14 mmol/L Final    Glucose 106 (H) 70 - 99 mg/dL Final    Urea Nitrogen 27 7 - 30 mg/dL Final    Creatinine 0.91 0.66 - 1.25 mg/dL Final    GFR Estimate 86 >60 mL/min/1.7m2 Final    Non  GFR Calc    GFR Estimate If Black >90 >60 mL/min/1.7m2 Final    African American GFR Calc    Calcium 9.0 8.5 -  10.1 mg/dL Final    Bilirubin Total 1.0 0.2 - 1.3 mg/dL Final    Albumin 3.9 3.4 - 5.0 g/dL Final    Protein Total 7.1 6.8 - 8.8 g/dL Final    Alkaline Phosphatase 79 40 - 150 U/L Final    ALT 52 0 - 70 U/L Final    AST 31 0 - 45 U/L Final         2/14/2018 10:16 PM      Component Results     Component Value Ref Range & Units Status    Lipase 192 73 - 393 U/L Final         2/14/2018 10:51 PM      Component Results     Component Value Ref Range & Units Status    Color Urine Yellow  Final    Appearance Urine Clear  Final    Glucose Urine Negative NEG^Negative mg/dL Final    Bilirubin Urine Negative NEG^Negative Final    Ketones Urine Negative NEG^Negative mg/dL Final    Specific Gravity Urine 1.025 1.003 - 1.035 Final    Blood Urine Large (A) NEG^Negative Final    pH Urine 6.0 5.0 - 7.0 pH Final    Protein Albumin Urine Trace (A) NEG^Negative mg/dL Final    Urobilinogen Urine 1.0 0.2 - 1.0 EU/dL Final    Nitrite Urine Negative NEG^Negative Final    Leukocyte Esterase Urine Negative NEG^Negative Final    Source Midstream Urine  Final         2/15/2018 12:14 AM      Narrative     CT ABDOMEN AND PELVIS WITHOUT CONTRAST  2/14/2018 11:35 PM     HISTORY: Left lower quadrant pain.    COMPARISON: 7/16/2017.    TECHNIQUE: Without intravenous or oral contrast, helical sections were  acquired from the top of the diaphragm through the pubic symphysis.  Coronal reconstructions were generated. Radiation dose for this scan  was reduced using automated exposure control, adjustment of the mA  and/or kV according to the patient's size, or iterative reconstruction  technique. (Renal stone protocol).    FINDINGS:  Right urinary tract: 0.3 cm nonobstructing calculus in the inferior  pole of the kidney. No ureteral calculi. No dilatation of the  intrarenal collecting system or ureter. A few cysts scattered within  the kidney, the largest in the anterior aspect of the inferior pole  and measuring 5.3 cm in diameter.    Left urinary tract:  0.5 cm calculus in the most distal aspect of the  ureter at the ureterovesicular junction. Mild dilatation of the  intrarenal collecting system and ureter. A few tiny less than 0.4 cm  diameter nonobstructing renal calculi. Several cysts scattered within  the kidney, the largest a 4.5 cm cyst in the inferior pole that is of  intermediate attenuation and therefore likely a hemorrhagic cyst. Mild  perinephric stranding.    Urinary bladder: No additional visualized calculi. Mild enlargement of  the prostate gland.    Remainder of the abdomen and pelvis: A few small cysts scattered  within the liver. The liver, spleen, pancreas and adrenal glands are  otherwise unremarkable to the limits of a noncontrast CT scan. The  gallbladder is present. The small and large bowel are normal in  caliber. The appendix is not visualized. No bowel wall thickening,  pneumatosis or free intraperitoneal gas. No enlarged lymph nodes or  free fluid in the abdomen or pelvis. Small left inguinal hernia  containing fat. Atherosclerotic calcification in the abdominal aorta.    Scan through the lower chest is unremarkable.        Impression     IMPRESSION:  1. 0.5 cm distal left ureteral calculus at the ureterovesicular  junction, resulting in mild obstruction.  2. A few tiny nonobstructing bilateral renal calculi.         2/14/2018 10:51 PM      Component Results     Component Value Ref Range & Units Status    WBC Urine 2-5 (A) OTO2^O - 2 /HPF Final    RBC Urine 10-25 (A) OTO2^O - 2 /HPF Final    Mucous Urine Present (A) NEG^Negative /LPF Final                Clinical Quality Measure: Blood Pressure Screening     Your blood pressure was checked while you were in the emergency department today. The last reading we obtained was  BP: 116/84 . Please read the guidelines below about what these numbers mean and what you should do about them.  If your systolic blood pressure (the top number) is less than 120 and your diastolic blood pressure (the  bottom number) is less than 80, then your blood pressure is normal. There is nothing more that you need to do about it.  If your systolic blood pressure (the top number) is 120-139 or your diastolic blood pressure (the bottom number) is 80-89, your blood pressure may be higher than it should be. You should have your blood pressure rechecked within a year by a primary care provider.  If your systolic blood pressure (the top number) is 140 or greater or your diastolic blood pressure (the bottom number) is 90 or greater, you may have high blood pressure. High blood pressure is treatable, but if left untreated over time it can put you at risk for heart attack, stroke, or kidney failure. You should have your blood pressure rechecked by a primary care provider within the next 4 weeks.  If your provider in the emergency department today gave you specific instructions to follow-up with your doctor or provider even sooner than that, you should follow that instruction and not wait for up to 4 weeks for your follow-up visit.        Thank you for choosing [facility]       Thank you for choosing [facility] for your care. Our goal is always to provide you with excellent care. Hearing back from our patients is one way we can continue to improve our services. Please take a few minutes to complete the written survey that you may receive in the mail after you visit with us. Thank you!        [facility] Information     [facility] gives you secure access to your electronic health record. If you see a primary care provider, you can also send messages to your care team and make appointments. If you have questions, please call your primary care clinic.  If you do not have a primary care provider, please call [phone] and they will assist you.        Care EveryWhere ID     This is your Care EveryWhere ID. This could be used by other organizations to access your [facility] medical records  [id number]        Equal Access to Services     BLAISE EDDY  AH: João Alatorre, wahuyen lunancyadaha, qaalexta kajb flowers. So [facility] [phone].    ATENCIÓN: Si habla español, tiene a mena disposición servicios gratuitos de asistencia lingüística. Llame al [phone].    We comply with applicable federal civil rights laws and [place] laws. We do not discriminate on the basis of race, color, national origin, age, disability, sex, sexual orientation, or gender identity.            After Visit Summary       This is your record. Keep this with you and show to your community pharmacist(s) and doctor(s) at your next visit.

## 2018-02-15 VITALS
TEMPERATURE: 99.2 F | HEART RATE: 71 BPM | WEIGHT: 202 LBS | BODY MASS INDEX: 28.92 KG/M2 | OXYGEN SATURATION: 96 % | HEIGHT: 70 IN | DIASTOLIC BLOOD PRESSURE: 84 MMHG | SYSTOLIC BLOOD PRESSURE: 116 MMHG | RESPIRATION RATE: 14 BRPM

## 2018-02-15 RX ORDER — ONDANSETRON 4 MG/1
4 TABLET, ORALLY DISINTEGRATING ORAL EVERY 6 HOURS PRN
Qty: 20 TABLET | Refills: 0 | Status: SHIPPED | OUTPATIENT
Start: 2018-02-15 | End: 2018-03-05

## 2018-02-15 RX ORDER — MORPHINE SULFATE 30 MG/1
5 TABLET ORAL EVERY 4 HOURS PRN
Qty: 15 TABLET | Refills: 0 | Status: SHIPPED | OUTPATIENT
Start: 2018-02-15 | End: 2018-03-05

## 2018-02-15 RX ORDER — TAMSULOSIN HYDROCHLORIDE 0.4 MG/1
0.4 CAPSULE ORAL DAILY
Qty: 10 CAPSULE | Refills: 0 | Status: SHIPPED | OUTPATIENT
Start: 2018-02-15 | End: 2019-02-22

## 2018-02-15 NOTE — DISCHARGE INSTRUCTIONS
Take the below medications as prescribed.      New Prescriptions    MORPHINE 5 MG SOLU-TAB    Take 1 tablet (5 mg) by mouth every 4 hours as needed for breakthrough pain, shortness of breath / dyspnea or moderate to severe pain    ONDANSETRON (ZOFRAN ODT) 4 MG ODT TAB    Take 1 tablet (4 mg) by mouth every 6 hours as needed    TAMSULOSIN (FLOMAX) 0.4 MG CAPSULE    Take 1 capsule (0.4 mg) by mouth daily for 10 doses      Take ibuprofen 600 to 800 mg by mouth every 6 to 8 hours as needed for pain or fever      Discharge Instructions  Kidney Stones    Kidney stones are a common problem that can cause a lot of pain but fortunately are usually not dangerous. Kidney stones form in the kidney and then can cause a blockage (obstruction) of the flow of urine from the kidney which leads to pain. Most patients can manage kidney stones at home (without a hospital stay).  However, sometimes your condition may be worse than it seemed at first, or may get worse with time. Most kidney stones will pass on their own, but occasionally stones may need to be removed by an urologist.    Generally, every Emergency Department visit should have a follow-up clinic visit with either a primary or a specialty clinic/provider. Please follow-up as instructed by your emergency provider today.      Return to the Emergency Department if:    Your pain is not controlled despite the medications provided or recommended.    You are vomiting (throwing up) and cannot keep fluids or medications down.    You develop a fever (>100.4 F).    You feel much more ill or develop new symptoms.  What can I do to help myself?    Be sure to drink plenty of fluids.    If instructed to do so, strain your urine (pee) with the urine strainer you were provided with today. Your stone may look like a grain of sand or a small pebble. Collect any stones in the cup provided and bring to your follow-up appointment.    Staying active is good, and may help the stone to pass. You  may do whatever you feel up to doing without restrictions.   Treatment:    Non-steroidal anti-inflammatory drugs (NSAIDs). This includes prescription medicines like Toradol  (ketorolac) and non-prescription medicines like Advil  (ibuprofen) and Nuprin  (ibuprofen) and Naproxen. These pain relievers are very effective for kidney stones.    Nausea (sick to your stomach) medication.  Nausea and vomiting are common with kidney stones, so your provider may send you home with medicine for this.     Flomax  (tamsulosin). This medicine is sometimes used for men with prostate problems, but also can help kidney stones to pass. Its effectiveness is controversial or questionable so it is prescribed in certain situations. This medicine can lower blood pressure, and you may feel faint/lightheaded, especially when you first stand up. Be sure to get up gradually, sit down if you feel faint, and avoid activity where feeling faint would be dangerous, such as climbing ladders.  If you were given a prescription for medicine here today, be sure to read all of the information (including the package insert) that comes with your prescription.  This will include important information about the medicine, its side effects, and any warnings that you need to know about.  The pharmacist who fills the prescription can provide more information and answer questions you may have about the medicine.  If you have questions or concerns that the pharmacist cannot address, please call or return to the Emergency Department.   Remember that you can always come back to the Emergency Department if you are not able to see your regular provider in the amount of time listed above, if you get any new symptoms, or if there is anything that worries you.

## 2018-02-15 NOTE — ED PROVIDER NOTES
History     Chief Complaint:  Abdominal Pain    HPI   Louie Greco is a 57 year old male with a history of rectal cancer who presents with abdominal pain. The patient reports developing cramping left lower quadrant abdominal pain 3 days ago which initially subsided after a few hours. He noticed that the pain returned around noon today after he ate and again after dinner. After dinner, he stated that the cramping had developed into worsening pain, so he presents to the ED for further evaluation and treatment.  At worst, pain is mod to severe. He denies pain radiation to his back or leg. He notes darker urine but no other urinary changes or symptoms such as dysuria or hematuria. He has a history of rectal cancer and normally has a bowel movement 8 times per day but states only having 3 bowel movements today and none since noon. He is followed closely by his oncologist who he saw earlier this month; his scans were returned as normal at that time. He denies fever, chills, cough, runny nose, or sore throat. He notes no testicular pain. The patient notes known kidney stones on past imaging but they have been non obstructive and not painful in the past.     Allergies:  Ampicillin  Demerol [Meperidine]      Medications:    Aspirin     Past Medical History:    Nephrolithiasis  Rectal cancer    Past Surgical History:    Colonoscopy x3  Anus examination x3  Tooth extraction  Appendectomy  Sigmoidoscopy x2  Vascular surgery  Vasectomy    Family History:    Cancer - brother  COPD - father  Hypertension - father, paternal grandmother  Hyperlipidemia - paternal grandmother, father  Colon polyps - mother  Diabetes - maternal grandfather     Social History:  Smoking status: no  Alcohol use: occasionally  Drug Use: no   PCP: Philippe Healy   Patient presents with son in law.   Marital Status:        Review of Systems   Constitutional: Negative for chills and fever.   HENT: Negative for congestion, rhinorrhea and sore throat.   "  Respiratory: Negative for cough.    Gastrointestinal: Positive for abdominal pain.   Genitourinary: Negative for difficulty urinating, dysuria, frequency, hematuria, testicular pain and urgency.   All other systems reviewed and are negative.    Physical Exam     Patient Vitals for the past 24 hrs:   BP Temp Temp src Pulse Resp SpO2 Height Weight   02/15/18 0059 116/84 - - 71 14 96 % - -   02/14/18 2340 129/78 99.2  F (37.3  C) Oral 80 20 100 % - -   02/14/18 2132 (!) 151/99 98.9  F (37.2  C) Oral 98 16 97 % 1.778 m (5' 10\") 91.6 kg (202 lb)     Physical Exam  Constitutional: Well developed, nontox appearance  Head: Atraumatic.   Mouth/Throat: Oropharynx is clear and moist.   Neck:  no stridor  Eyes: no scleral icterus  Cardiovascular: RRR, 2+ bilat radial pulses  Pulmonary/Chest: nml resp effort, Clear BS bilat  Abdominal: ND, +BS, soft, LLQ tenderness, no rebound or guarding   : no CVA tenderness bilat, no testicular tenderness or swelling, intact cremasteric reflexes, no hernia palpated  Ext: Warm, well perfused, no edema  Neurological: A&O, symmetric facies, moves ext x4  Skin: Skin is warm and dry.   Psychiatric: Behavior is normal. Thought content normal.   Nursing note and vitals reviewed.    Emergency Department Course     Imaging:  Radiographic findings were communicated with the patient who voiced understanding of the findings.    CT-scan Abdomen/Pelvis w/o contrast:  1. 0.5 cm distal left ureteral calculus at the ureterovesicular  junction, resulting in mild obstruction.  2. A few tiny nonobstructing bilateral renal calculi.  Preliminary result per radiology.      Laboratory:  UA: Large blood, trace protein albumin, o/w WNL  Urine microscopic: 2-5 WBC, 10-25 RBC, mucous present    CBC: WNL (WBC 5.2, HGB 14.5, )   CMP: Glucose 106 (H), o/w WNL (Creatinine 0.91)  Lipase: 192    Interventions:  2253: NS 1L IV Bolus   2253: Dilaudid 0.5 mg IV  2255: Toradol 30 mg IV   The patient's symptoms were " improved with parenteral narcotics.    Emergency Department Course:  Past medical records, nursing notes, and vitals reviewed.  2306: I performed an exam of the patient and obtained history, as documented above. GCS 15.   IV inserted and blood drawn.  The patient was taken for CT, see imaging results above.    0040: I rechecked and updated the patient.    Impression & Plan      Medical Decision Making:  Louie Greco is a 57 year old male who presented with lower left quadrant abdominal pain concerning for renal colic/ureteral calculus.  Ddx also includes obstruction, diverticulitis, mass, intraabdominal infection. CT confirms a 0.5cm ureteral stone at the ureterovesicular junction.  Renal function is normal/baseline.  CT and lab workup show no other alternative etiology that could be causing his symptoms (e.g., AAA, appendicitis, pyelonephritis). There is no fever or convincing evidence of a urinary tract infection. On recheck, his pain is controlled with interventions in the ED and he is tolerating POs. I will prescribe supportive medications and Flomax to facilitate stone passage. I have advised him to return for uncontrolled pain, vomiting, fever, or any other concerning symptoms. I also advised to strain his urine to look for a stone and submit it to his primary doctor or urologist for lab analysis.  Finally, I have advised follow up with urology.  Recommendations given regarding return to the emergency department as needed for new or worsening symptoms.  Counseled on all results, disposition and diagnosis.  Pt understanding and agreeable to plan. Patient discharged in improved condition.      Critical Care time:  none    Diagnosis:    ICD-10-CM    1. Calculus of ureter N20.1        Disposition:  discharged to home    Discharge Medications:  New Prescriptions    MORPHINE 5 MG SOLU-TAB    Take 1 tablet (5 mg) by mouth every 4 hours as needed for breakthrough pain, shortness of breath / dyspnea or moderate to severe  pain    ONDANSETRON (ZOFRAN ODT) 4 MG ODT TAB    Take 1 tablet (4 mg) by mouth every 6 hours as needed    TAMSULOSIN (FLOMAX) 0.4 MG CAPSULE    Take 1 capsule (0.4 mg) by mouth daily for 10 doses     Elif Perales  2/14/2018    EMERGENCY DEPARTMENT  I, Elif Perales am serving as a scribe at 11:06 PM on 2/14/2018 to document services personally performed by Oscar Abraham MD based on my observations and the provider's statements to me.        Oscar Abraham MD  02/15/18 1387

## 2018-02-16 ENCOUNTER — ANESTHESIA EVENT (OUTPATIENT)
Dept: SURGERY | Facility: CLINIC | Age: 58
End: 2018-02-16

## 2018-02-16 ENCOUNTER — TELEPHONE (OUTPATIENT)
Dept: FAMILY MEDICINE | Facility: CLINIC | Age: 58
End: 2018-02-16

## 2018-02-16 ENCOUNTER — OFFICE VISIT (OUTPATIENT)
Dept: UROLOGY | Facility: CLINIC | Age: 58
End: 2018-02-16
Payer: COMMERCIAL

## 2018-02-16 ENCOUNTER — HOSPITAL ENCOUNTER (OUTPATIENT)
Facility: CLINIC | Age: 58
End: 2018-02-16
Attending: UROLOGY | Admitting: UROLOGY
Payer: COMMERCIAL

## 2018-02-16 ENCOUNTER — SURGERY (OUTPATIENT)
Age: 58
End: 2018-02-16

## 2018-02-16 ENCOUNTER — HOSPITAL ENCOUNTER (OUTPATIENT)
Facility: CLINIC | Age: 58
Discharge: HOME | End: 2018-02-16
Attending: UROLOGY | Admitting: UROLOGY
Payer: COMMERCIAL

## 2018-02-16 ENCOUNTER — HOSPITAL ENCOUNTER (OUTPATIENT)
Dept: GENERAL RADIOLOGY | Facility: CLINIC | Age: 58
End: 2018-02-16
Attending: UROLOGY
Payer: COMMERCIAL

## 2018-02-16 ENCOUNTER — ANESTHESIA (OUTPATIENT)
Dept: SURGERY | Facility: CLINIC | Age: 58
End: 2018-02-16
Payer: COMMERCIAL

## 2018-02-16 VITALS
SYSTOLIC BLOOD PRESSURE: 130 MMHG | DIASTOLIC BLOOD PRESSURE: 74 MMHG | HEIGHT: 70 IN | BODY MASS INDEX: 28.35 KG/M2 | HEART RATE: 90 BPM | WEIGHT: 198 LBS

## 2018-02-16 VITALS
BODY MASS INDEX: 28.35 KG/M2 | HEIGHT: 70 IN | WEIGHT: 198 LBS | TEMPERATURE: 97 F | RESPIRATION RATE: 16 BRPM | SYSTOLIC BLOOD PRESSURE: 125 MMHG | DIASTOLIC BLOOD PRESSURE: 74 MMHG | OXYGEN SATURATION: 98 %

## 2018-02-16 DIAGNOSIS — N20.0 CALCULUS OF KIDNEY: Primary | ICD-10-CM

## 2018-02-16 DIAGNOSIS — N20.0 KIDNEY STONE: Primary | ICD-10-CM

## 2018-02-16 PROCEDURE — 36000058 ZZH SURGERY LEVEL 3 EA 15 ADDTL MIN: Performed by: UROLOGY

## 2018-02-16 PROCEDURE — 25000128 H RX IP 250 OP 636: Performed by: NURSE ANESTHETIST, CERTIFIED REGISTERED

## 2018-02-16 PROCEDURE — 25000566 ZZH SEVOFLURANE, EA 15 MIN: Performed by: UROLOGY

## 2018-02-16 PROCEDURE — 25000125 ZZHC RX 250: Performed by: NURSE ANESTHETIST, CERTIFIED REGISTERED

## 2018-02-16 PROCEDURE — 36000056 ZZH SURGERY LEVEL 3 1ST 30 MIN: Performed by: UROLOGY

## 2018-02-16 PROCEDURE — 82365 CALCULUS SPECTROSCOPY: CPT | Performed by: UROLOGY

## 2018-02-16 PROCEDURE — 71000012 ZZH RECOVERY PHASE 1 LEVEL 1 FIRST HR: Performed by: UROLOGY

## 2018-02-16 PROCEDURE — 74420 UROGRAPHY RTRGR +-KUB: CPT | Mod: 26 | Performed by: UROLOGY

## 2018-02-16 PROCEDURE — 71000027 ZZH RECOVERY PHASE 2 EACH 15 MINS: Performed by: UROLOGY

## 2018-02-16 PROCEDURE — C2617 STENT, NON-COR, TEM W/O DEL: HCPCS | Performed by: UROLOGY

## 2018-02-16 PROCEDURE — 25000128 H RX IP 250 OP 636: Performed by: UROLOGY

## 2018-02-16 PROCEDURE — 37000008 ZZH ANESTHESIA TECHNICAL FEE, 1ST 30 MIN: Performed by: UROLOGY

## 2018-02-16 PROCEDURE — 40000277 XR SURGERY CARM FLUORO LESS THAN 5 MIN W STILLS

## 2018-02-16 PROCEDURE — 52356 CYSTO/URETERO W/LITHOTRIPSY: CPT | Mod: LT | Performed by: UROLOGY

## 2018-02-16 PROCEDURE — C1769 GUIDE WIRE: HCPCS | Performed by: UROLOGY

## 2018-02-16 PROCEDURE — 27210794 ZZH OR GENERAL SUPPLY STERILE: Performed by: UROLOGY

## 2018-02-16 PROCEDURE — 71000013 ZZH RECOVERY PHASE 1 LEVEL 1 EA ADDTL HR: Performed by: UROLOGY

## 2018-02-16 PROCEDURE — 40000170 ZZH STATISTIC PRE-PROCEDURE ASSESSMENT II: Performed by: UROLOGY

## 2018-02-16 PROCEDURE — 27210995 ZZH RX 272: Performed by: UROLOGY

## 2018-02-16 PROCEDURE — 99203 OFFICE O/P NEW LOW 30 MIN: CPT | Performed by: PHYSICIAN ASSISTANT

## 2018-02-16 PROCEDURE — 25800025 ZZH RX 258: Performed by: UROLOGY

## 2018-02-16 PROCEDURE — 37000009 ZZH ANESTHESIA TECHNICAL FEE, EACH ADDTL 15 MIN: Performed by: UROLOGY

## 2018-02-16 DEVICE — STENT URETERAL DBL PIGTAIL INLAY 6FRX26CM 778626
Type: IMPLANTABLE DEVICE | Site: URETER | Status: NON-FUNCTIONAL
Removed: 2021-06-23

## 2018-02-16 RX ORDER — CEFAZOLIN SODIUM 2 G/100ML
2 INJECTION, SOLUTION INTRAVENOUS
Status: COMPLETED | OUTPATIENT
Start: 2018-02-16 | End: 2018-02-16

## 2018-02-16 RX ORDER — ONDANSETRON 4 MG/1
4 TABLET, ORALLY DISINTEGRATING ORAL EVERY 30 MIN PRN
Status: DISCONTINUED | OUTPATIENT
Start: 2018-02-16 | End: 2018-02-16 | Stop reason: HOSPADM

## 2018-02-16 RX ORDER — MORPHINE SULFATE 30 MG/1
5 TABLET ORAL EVERY 4 HOURS PRN
Status: DISCONTINUED | OUTPATIENT
Start: 2018-02-16 | End: 2018-02-16 | Stop reason: CLARIF

## 2018-02-16 RX ORDER — SENNOSIDES 8.6 MG
1 TABLET ORAL DAILY PRN
COMMUNITY
End: 2018-03-05

## 2018-02-16 RX ORDER — ALBUTEROL SULFATE 0.83 MG/ML
2.5 SOLUTION RESPIRATORY (INHALATION) EVERY 4 HOURS PRN
Status: DISCONTINUED | OUTPATIENT
Start: 2018-02-16 | End: 2018-02-16 | Stop reason: HOSPADM

## 2018-02-16 RX ORDER — EPHEDRINE SULFATE 50 MG/ML
INJECTION, SOLUTION INTRAMUSCULAR; INTRAVENOUS; SUBCUTANEOUS PRN
Status: DISCONTINUED | OUTPATIENT
Start: 2018-02-16 | End: 2018-02-16

## 2018-02-16 RX ORDER — ONDANSETRON 2 MG/ML
INJECTION INTRAMUSCULAR; INTRAVENOUS PRN
Status: DISCONTINUED | OUTPATIENT
Start: 2018-02-16 | End: 2018-02-16

## 2018-02-16 RX ORDER — TAMSULOSIN HYDROCHLORIDE 0.4 MG/1
0.4 CAPSULE ORAL DAILY
Status: DISCONTINUED | OUTPATIENT
Start: 2018-02-16 | End: 2018-02-16 | Stop reason: CLARIF

## 2018-02-16 RX ORDER — FENTANYL CITRATE 50 UG/ML
25-50 INJECTION, SOLUTION INTRAMUSCULAR; INTRAVENOUS
Status: DISCONTINUED | OUTPATIENT
Start: 2018-02-16 | End: 2018-02-16 | Stop reason: HOSPADM

## 2018-02-16 RX ORDER — DEXAMETHASONE SODIUM PHOSPHATE 4 MG/ML
INJECTION, SOLUTION INTRA-ARTICULAR; INTRALESIONAL; INTRAMUSCULAR; INTRAVENOUS; SOFT TISSUE PRN
Status: DISCONTINUED | OUTPATIENT
Start: 2018-02-16 | End: 2018-02-16

## 2018-02-16 RX ORDER — CEFAZOLIN SODIUM 1 G/3ML
1 INJECTION, POWDER, FOR SOLUTION INTRAMUSCULAR; INTRAVENOUS SEE ADMIN INSTRUCTIONS
Status: DISCONTINUED | OUTPATIENT
Start: 2018-02-16 | End: 2018-02-16 | Stop reason: HOSPADM

## 2018-02-16 RX ORDER — HYDROMORPHONE HYDROCHLORIDE 1 MG/ML
.3-.5 INJECTION, SOLUTION INTRAMUSCULAR; INTRAVENOUS; SUBCUTANEOUS EVERY 10 MIN PRN
Status: DISCONTINUED | OUTPATIENT
Start: 2018-02-16 | End: 2018-02-16 | Stop reason: HOSPADM

## 2018-02-16 RX ORDER — PROPOFOL 10 MG/ML
INJECTION, EMULSION INTRAVENOUS PRN
Status: DISCONTINUED | OUTPATIENT
Start: 2018-02-16 | End: 2018-02-16

## 2018-02-16 RX ORDER — LABETALOL HYDROCHLORIDE 5 MG/ML
10 INJECTION, SOLUTION INTRAVENOUS
Status: DISCONTINUED | OUTPATIENT
Start: 2018-02-16 | End: 2018-02-16 | Stop reason: HOSPADM

## 2018-02-16 RX ORDER — OMEGA-3 FATTY ACIDS/FISH OIL 300-1000MG
200-400 CAPSULE ORAL EVERY 6 HOURS PRN
Status: DISCONTINUED | OUTPATIENT
Start: 2018-02-16 | End: 2018-02-16 | Stop reason: CLARIF

## 2018-02-16 RX ORDER — SODIUM CHLORIDE, SODIUM LACTATE, POTASSIUM CHLORIDE, CALCIUM CHLORIDE 600; 310; 30; 20 MG/100ML; MG/100ML; MG/100ML; MG/100ML
INJECTION, SOLUTION INTRAVENOUS CONTINUOUS
Status: DISCONTINUED | OUTPATIENT
Start: 2018-02-16 | End: 2018-02-16 | Stop reason: HOSPADM

## 2018-02-16 RX ORDER — GLYCOPYRROLATE 0.2 MG/ML
INJECTION, SOLUTION INTRAMUSCULAR; INTRAVENOUS PRN
Status: DISCONTINUED | OUTPATIENT
Start: 2018-02-16 | End: 2018-02-16

## 2018-02-16 RX ORDER — FENTANYL CITRATE 50 UG/ML
INJECTION, SOLUTION INTRAMUSCULAR; INTRAVENOUS PRN
Status: DISCONTINUED | OUTPATIENT
Start: 2018-02-16 | End: 2018-02-16

## 2018-02-16 RX ORDER — HYDROMORPHONE HYDROCHLORIDE 1 MG/ML
.3-.5 INJECTION, SOLUTION INTRAMUSCULAR; INTRAVENOUS; SUBCUTANEOUS
Status: DISCONTINUED | OUTPATIENT
Start: 2018-02-16 | End: 2018-02-16 | Stop reason: CLARIF

## 2018-02-16 RX ORDER — ONDANSETRON 4 MG/1
4 TABLET, ORALLY DISINTEGRATING ORAL EVERY 6 HOURS PRN
Status: DISCONTINUED | OUTPATIENT
Start: 2018-02-16 | End: 2018-02-16 | Stop reason: CLARIF

## 2018-02-16 RX ORDER — DIBUCAINE 0.28 G/28G
OINTMENT TOPICAL 3 TIMES DAILY PRN
Status: DISCONTINUED | OUTPATIENT
Start: 2018-02-16 | End: 2018-02-16 | Stop reason: CLARIF

## 2018-02-16 RX ORDER — SODIUM CHLORIDE, SODIUM LACTATE, POTASSIUM CHLORIDE, CALCIUM CHLORIDE 600; 310; 30; 20 MG/100ML; MG/100ML; MG/100ML; MG/100ML
INJECTION, SOLUTION INTRAVENOUS CONTINUOUS PRN
Status: DISCONTINUED | OUTPATIENT
Start: 2018-02-16 | End: 2018-02-16

## 2018-02-16 RX ORDER — LIDOCAINE 5 G/100G
1 CREAM RECTAL; TOPICAL 3 TIMES DAILY PRN
Status: DISCONTINUED | OUTPATIENT
Start: 2018-02-16 | End: 2018-02-16 | Stop reason: CLARIF

## 2018-02-16 RX ORDER — ONDANSETRON 2 MG/ML
4 INJECTION INTRAMUSCULAR; INTRAVENOUS EVERY 30 MIN PRN
Status: DISCONTINUED | OUTPATIENT
Start: 2018-02-16 | End: 2018-02-16 | Stop reason: HOSPADM

## 2018-02-16 RX ORDER — MAGNESIUM HYDROXIDE 1200 MG/15ML
LIQUID ORAL PRN
Status: DISCONTINUED | OUTPATIENT
Start: 2018-02-16 | End: 2018-02-16 | Stop reason: HOSPADM

## 2018-02-16 RX ORDER — NEOSTIGMINE METHYLSULFATE 1 MG/ML
VIAL (ML) INJECTION PRN
Status: DISCONTINUED | OUTPATIENT
Start: 2018-02-16 | End: 2018-02-16

## 2018-02-16 RX ORDER — PROPOFOL 10 MG/ML
INJECTION, EMULSION INTRAVENOUS CONTINUOUS PRN
Status: DISCONTINUED | OUTPATIENT
Start: 2018-02-16 | End: 2018-02-16

## 2018-02-16 RX ORDER — LIDOCAINE HYDROCHLORIDE 20 MG/ML
INJECTION, SOLUTION INFILTRATION; PERINEURAL PRN
Status: DISCONTINUED | OUTPATIENT
Start: 2018-02-16 | End: 2018-02-16

## 2018-02-16 RX ORDER — HYDRALAZINE HYDROCHLORIDE 20 MG/ML
2.5-5 INJECTION INTRAMUSCULAR; INTRAVENOUS EVERY 10 MIN PRN
Status: DISCONTINUED | OUTPATIENT
Start: 2018-02-16 | End: 2018-02-16 | Stop reason: HOSPADM

## 2018-02-16 RX ORDER — NALOXONE HYDROCHLORIDE 0.4 MG/ML
.1-.4 INJECTION, SOLUTION INTRAMUSCULAR; INTRAVENOUS; SUBCUTANEOUS
Status: DISCONTINUED | OUTPATIENT
Start: 2018-02-16 | End: 2018-02-16 | Stop reason: HOSPADM

## 2018-02-16 RX ADMIN — NEOSTIGMINE METHYLSULFATE 1 MG: 1 INJECTION INTRAMUSCULAR; INTRAVENOUS; SUBCUTANEOUS at 19:08

## 2018-02-16 RX ADMIN — DEXAMETHASONE SODIUM PHOSPHATE 4 MG: 4 INJECTION, SOLUTION INTRA-ARTICULAR; INTRALESIONAL; INTRAMUSCULAR; INTRAVENOUS; SOFT TISSUE at 18:39

## 2018-02-16 RX ADMIN — SUCCINYLCHOLINE CHLORIDE 100 MG: 20 INJECTION, SOLUTION INTRAMUSCULAR; INTRAVENOUS; PARENTERAL at 18:26

## 2018-02-16 RX ADMIN — PHENYLEPHRINE HYDROCHLORIDE 100 MCG: 10 INJECTION INTRAVENOUS at 18:30

## 2018-02-16 RX ADMIN — PROPOFOL 150 MG: 10 INJECTION, EMULSION INTRAVENOUS at 18:26

## 2018-02-16 RX ADMIN — ONDANSETRON 4 MG: 2 INJECTION INTRAMUSCULAR; INTRAVENOUS at 18:39

## 2018-02-16 RX ADMIN — Medication 5 MG: at 18:49

## 2018-02-16 RX ADMIN — SODIUM CHLORIDE 1000 ML: 900 IRRIGANT IRRIGATION at 18:38

## 2018-02-16 RX ADMIN — CEFAZOLIN SODIUM 2 G: 2 INJECTION, SOLUTION INTRAVENOUS at 18:33

## 2018-02-16 RX ADMIN — MIDAZOLAM 2 MG: 1 INJECTION INTRAMUSCULAR; INTRAVENOUS at 18:21

## 2018-02-16 RX ADMIN — PHENYLEPHRINE HYDROCHLORIDE 100 MCG: 10 INJECTION INTRAVENOUS at 18:36

## 2018-02-16 RX ADMIN — Medication 5 MG: at 18:42

## 2018-02-16 RX ADMIN — PROPOFOL 50 MCG/KG/MIN: 10 INJECTION, EMULSION INTRAVENOUS at 18:33

## 2018-02-16 RX ADMIN — GLYCOPYRROLATE 0.2 MG: 0.2 INJECTION, SOLUTION INTRAMUSCULAR; INTRAVENOUS at 19:08

## 2018-02-16 RX ADMIN — SODIUM CHLORIDE 3000 ML: 900 IRRIGANT IRRIGATION at 18:38

## 2018-02-16 RX ADMIN — LIDOCAINE HYDROCHLORIDE 100 MG: 20 INJECTION, SOLUTION INFILTRATION; PERINEURAL at 18:26

## 2018-02-16 RX ADMIN — SODIUM CHLORIDE, POTASSIUM CHLORIDE, SODIUM LACTATE AND CALCIUM CHLORIDE: 600; 310; 30; 20 INJECTION, SOLUTION INTRAVENOUS at 18:21

## 2018-02-16 RX ADMIN — ROCURONIUM BROMIDE 5 MG: 10 INJECTION INTRAVENOUS at 18:26

## 2018-02-16 RX ADMIN — FENTANYL CITRATE 100 MCG: 50 INJECTION, SOLUTION INTRAMUSCULAR; INTRAVENOUS at 18:26

## 2018-02-16 ASSESSMENT — ENCOUNTER SYMPTOMS
SEIZURES: 0
DYSRHYTHMIAS: 0

## 2018-02-16 ASSESSMENT — PAIN SCALES - GENERAL: PAINLEVEL: NO PAIN (0)

## 2018-02-16 NOTE — TELEPHONE ENCOUNTER
I don't do that procedure, but I did place a referral to [facility] urology    I would recommend Dr. Sena for the procedure    Is that what he is calling for?

## 2018-02-16 NOTE — IP AVS SNAPSHOT
[address]    [place] [address]    Phone:  [phone]                                       After Visit Summary   2/16/2018    Louie Greco    MRN: [id number]           After Visit Summary Signature Page     I have received my discharge instructions, and my questions have been answered. I have discussed any challenges I see with this plan with the nurse or doctor.    ..........................................................................................................................................  Patient/Patient Representative Signature      ..........................................................................................................................................  Patient Representative Print Name and Relationship to Patient    ..................................................               ................................................  Date                                            Time    ..........................................................................................................................................  Reviewed by Signature/Title    ...................................................              ..............................................  Date                                                            Time

## 2018-02-16 NOTE — NURSING NOTE
Chief Complaint   Patient presents with     Kidney Stone(s) Followup     Patient is here for a stone. He is taking flomax and morphine for the pain.      Mindy Miller LPN 3:03 PM February 16, 2018

## 2018-02-16 NOTE — MR AVS SNAPSHOT
After Visit Summary   2/16/2018    Louie Greco    MRN: [id number]           Patient Information     Date Of Birth          1960        Visit Information        Provider Department      2/16/2018 3:00 PM Azul Pedroza PA-C [facility] [place]        Today's Diagnoses     Kidney stone    -  1       Follow-ups after your visit        Your next 10 appointments already scheduled     Jul 25, 2018 11:00 AM CDT   (Arrive by 10:45 AM)   MR PELVIS W/O & W CONTRAST with UUMR1   [facility] [place] ([facility], [facility])    [address] [id number] [phone]           Take your medicines as usual, unless your doctor tells you not to. Bring a list of your current medicines to your exam (including vitamins, minerals and over-the-counter drugs).  You may or may not receive intravenous (IV) contrast for this exam pending the discretion of the Radiologist.  You do not need to do anything special to prepare.  The MRI machine uses a strong magnet. Please wear clothes without metal (snaps, zippers). A sweatsuit works well, or we may give you a hospital gown.  Please remove any body piercings and hair extensions before you arrive. You will also remove watches, jewelry, hairpins, wallets, dentures, partial dental plates and hearing aids. You may wear contact lenses, and you may be able to wear your rings. We have a safe place to keep your personal items, but it is safer to leave them at home.  **IMPORTANT** THE INSTRUCTIONS BELOW ARE ONLY FOR THOSE PATIENTS WHO HAVE BEEN PRESCRIBED SEDATION OR GENERAL ANESTHESIA DURING THEIR MRI PROCEDURE:  IF YOUR DOCTOR PRESCRIBED ORAL SEDATION (take medicine to help you relax during your exam):   You must get the medicine from your doctor (oral medication) before you arrive. Bring the medicine to the exam. Do not take it at home. You ll be told when to take it upon arriving for  your exam.   Arrive one hour early. Bring someone who can take you home after the test. Your medicine will make you sleepy. After the exam, you may not drive, take a bus or take a taxi by yourself.  IF YOUR DOCTOR PRESCRIBED IV SEDATION:   Arrive one hour early. Bring someone who can take you home after the test. Your medicine will make you sleepy. After the exam, you may not drive, take a bus or take a taxi by yourself.   No eating 6 hours before your exam. You may have clear liquids up until 4 hours before your exam. (Clear liquids include water, clear tea, black coffee and fruit juice without pulp.)  IF YOUR DOCTOR PRESCRIBED ANESTHESIA (be asleep for your exam):   Arrive 1 1/2 hours early. Bring someone who can take you home after the test. You may not drive, take a bus or take a taxi by yourself.   No eating 8 hours before your exam. You may have clear liquids up until 4 hours before your exam. (Clear liquids include water, clear tea, black coffee and fruit juice without pulp.)   You will spend four to five hours in the recovery room.  Please call the Imaging Department at your exam site with any questions.            Jul 25, 2018 12:00 PM CDT   (Arrive by 11:45 AM)   PE NPET ONCOLOGY (EYES TO THIGHS) with UUPET1   [facility] [place] PET CT ([facility] [place])    [address] [address] [phone]           Tell your doctor:   If there is any chance you may be pregnant or if you are breastfeeding.   If you have problems lying in small spaces (claustrophobia). If you do, your doctor may give you medicine to help you relax. If you have diabetes:   Have your exam early in the morning. Your blood glucose will go up as the day goes by.   Your glucose level must be 180 or less at the start of the exam. Please take any medicines you need to ensure this blood glucose level. 24 hours before your scan: Don t do any heavy exercise. (No jogging, aerobics or  other workouts.) Exercise will make your pictures less accurate. 6 hours before your scan:   Stop all food and liquids (except water).   Do not chew gum or suck on mints.   If you need to take medicine with food, you may take it with a few crackers.  Please call your Imaging Department at your exam site with any questions.            Jul 31, 2018 10:30 AM CDT   Return Visit with Agueda Manley MD   [facility] ([facility])    [facility] [place]  [address]  [facility] [address] [phone]              Who to contact     If you have questions or need follow up information about today's clinic visit or your schedule please contact [facility] [place] directly at [phone].  Normal or non-critical lab and imaging results will be communicated to you by MyChart, letter or phone within 4 business days after the clinic has received the results. If you do not hear from us within 7 days, please contact the clinic through [employer] or phone. If you have a critical or abnormal lab result, we will notify you by phone as soon as possible.  Submit refill requests through [employer] or call your pharmacy and they will forward the refill request to us. Please allow 3 business days for your refill to be completed.          Additional Information About Your Visit        [email or web address] Information     [employer] gives you secure access to your electronic health record. If you see a primary care provider, you can also send messages to your care team and make appointments. If you have questions, please call your primary care clinic.  If you do not have a primary care provider, please call [phone] and they will assist you.        Care EveryWhere ID     This is your Care EveryWhere ID. This could be used by other organizations to access your [facility] medical records  [id number]        Your Vitals Were     Pulse Height BMI (Body Mass Index)     "         90 1.778 m (5' 10\") 28.41 kg/m2          Blood Pressure from Last 3 Encounters:   02/16/18 130/74   02/15/18 116/84   02/02/18 117/76    Weight from Last 3 Encounters:   02/16/18 89.8 kg (198 lb)   02/14/18 91.6 kg (202 lb)   02/02/18 91.4 kg (201 lb 9.6 oz)              We Performed the Following     UA without Microscopic        Primary Care Provider Office Phone # Fax #    Philippe Healy [phone]       [facility] [address]  [facility] [address]        Equal Access to Services     BLAISE EDDY : Hadii aad ku hadasho Sokartik, waaxda luqadaha, qaybta kaalmada adeegyada, jb pedroza . So [facility] [phone].    ATENCIÓN: Si habla español, tiene a mena disposición servicios gratuitos de asistencia lingüística. Llame al [phone].    We comply with applicable federal civil rights laws and [place] laws. We do not discriminate on the basis of race, color, national origin, age, disability, sex, sexual orientation, or gender identity.            Thank you!     Thank you for choosing [facility]  for your care. Our goal is always to provide you with excellent care. Hearing back from our patients is one way we can continue to improve our services. Please take a few minutes to complete the written survey that you may receive in the mail after your visit with us. Thank you!             Your Updated Medication List - Protect others around you: Learn how to safely use, store and throw away your medicines at www.disposemymeds.org.          This list is accurate as of 2/16/18  3:42 PM.  Always use your most recent med list.                   Brand Name Dispense Instructions for use Diagnosis    ASPIRIN PO      Take by mouth as needed for moderate pain        dibucaine 1 % Oint ointment    NUPERCAINAL     3 times daily as needed for moderate pain        diltiazem 2% in PLO cream (FV COMPOUNDED) 2% Gel      " Apply topically 2 times daily    Need for hepatitis C screening test       hydrocortisone 0.5 % ointment      Apply topically 2 times daily as needed Reported on 2/14/2017        ibuprofen 200 MG capsule     120 capsule    Take 200-400 mg by mouth every 6 hours as needed    Acute post-operative pain       lidocaine (Anorectal) 5 % Crea     1 Tube    Externally apply 1 Application topically 3 times daily as needed    Anal pain       morphine 5 MG solu-tab     15 tablet    Take 1 tablet (5 mg) by mouth every 4 hours as needed for breakthrough pain, shortness of breath / dyspnea or moderate to severe pain        ondansetron 4 MG ODT tab    ZOFRAN ODT    20 tablet    Take 1 tablet (4 mg) by mouth every 6 hours as needed        tamsulosin 0.4 MG capsule    FLOMAX    10 capsule    Take 1 capsule (0.4 mg) by mouth daily for 10 doses        VITAMIN D (CHOLECALCIFEROL) PO      Take 2,000 Units by mouth daily

## 2018-02-16 NOTE — H&P
H&P: Urology    CC: left UVJ stone.    HPI: It is a pleasure to see . Louie Greco, a 57 year old male seen today in the urology clinic in consultation from Dr. Healy for evaluation of the above. This was first detected on CT in the ED on 2/14/18 after the patient presented complaining of acute renal colic symptoms. The stone measures 5 mm and is located in the left UVJ with associated hydronephrosis. UA in the ED was negative for infection. BMP revealed Cr and Ca to be normal. With pain under good control, the patient was discharged with a prescription for tamsulosin and instructions to follow up with urology.    Currently, the patient reports continued moderate to severe pain with MS q 3-4 hours.  Intermittent nausea.  The patient has been straining their urine with no captured stones thus far. Denies gross hematuria, dysuria, fevers, chills, N/V. No prior history of kidney stones.    RECENT IMAGING:  CT ABDOMEN AND PELVIS WITHOUT CONTRAST  2/14/2018 11:35 PM      HISTORY: Left lower quadrant pain.     COMPARISON: 7/16/2017.     TECHNIQUE: Without intravenous or oral contrast, helical sections were  acquired from the top of the diaphragm through the pubic symphysis.  Coronal reconstructions were generated. Radiation dose for this scan  was reduced using automated exposure control, adjustment of the mA  and/or kV according to the patient's size, or iterative reconstruction  technique. (Renal stone protocol).     FINDINGS:  Right urinary tract: 0.3 cm nonobstructing calculus in the inferior  pole of the kidney. No ureteral calculi. No dilatation of the  intrarenal collecting system or ureter. A few cysts scattered within  the kidney, the largest in the anterior aspect of the inferior pole  and measuring 5.3 cm in diameter.     Left urinary tract: 0.5 cm calculus in the most distal aspect of the  ureter at the ureterovesicular junction. Mild dilatation of the  intrarenal collecting system and ureter. A few tiny less  than 0.4 cm  diameter nonobstructing renal calculi. Several cysts scattered within  the kidney, the largest a 4.5 cm cyst in the inferior pole that is  intermediate attenuation and is therefore likely a hemorrhagic cyst.  Mild perinephric stranding.     Urinary bladder: No additional visualized calculi. Mild enlargement of  the prostate gland.     Remainder of the abdomen and pelvis: A few small cysts scattered  within the liver. The liver, spleen, pancreas and adrenal glands are  otherwise unremarkable to the limits of a noncontrast CT scan. The  gallbladder is present. The small and large bowel are normal in  caliber. The appendix is not visualized. No bowel wall thickening,  pneumatosis or free intraperitoneal gas. No enlarged lymph nodes or  free fluid in the abdomen or pelvis. Small left inguinal hernia  containing fat. Atherosclerotic calcification in the abdominal aorta.     Scan through the lower chest is unremarkable.         IMPRESSION:  1. 0.5 cm distal left ureteral calculus at the ureterovesicular  junction, resulting in mild obstruction.  2. A few tiny nonobstructing bilateral renal calculi.    Past Medical History:   Diagnosis Date     Childhood asthma      Epididymitis, bilateral     18 years old     Inguinal hernia      Mumps      Nephrolithiasis     1990     Rectal cancer (H)     low rectal cancer     Shingles        Past Surgical History:   Procedure Laterality Date     COLONOSCOPY N/A 7/27/2016    Procedure: COMBINED COLONOSCOPY, SINGLE OR MULTIPLE BIOPSY/POLYPECTOMY BY BIOPSY;  Surgeon: Chelsea Thompson MD;  Location: [facility]     COLONOSCOPY N/A 9/13/2017    Procedure: COLONOSCOPY;;  Surgeon: Felicitas Radford MD;  [facility]     COLONOSCOPY N/A 12/13/2017    Procedure: COLONOSCOPY;;  Surgeon: Felicitas Radford MD;  [facility]     EXAM UNDER ANESTHESIA ANUS N/A 7/5/2017    Procedure: EXAM UNDER ANESTHESIA ANUS;  Examination Under Anesthesia, flex sigmoidoscopy with  biopsies and formalin application;  Surgeon: Felicitas Radford MD;  [facility]     EXAM UNDER ANESTHESIA ANUS N/A 9/13/2017    Procedure: EXAM UNDER ANESTHESIA ANUS;  Examination Under Anesthesia Anus, Colonoscopy, application of formalin;  Surgeon: Felicitas Radford MD;  [facility]     EXAM UNDER ANESTHESIA ANUS N/A 12/13/2017    Procedure: EXAM UNDER ANESTHESIA ANUS;  Examination Under Anesthesia Anus, Colonoscopy;  Surgeon: Felicitas Radford MD;  [facility]     HC TOOTH EXTRACTION W/FORCEP       LAPAROSCOPIC APPENDECTOMY N/A 7/17/2017    Procedure: LAPAROSCOPIC APPENDECTOMY;  LAPAROSCOPIC APPENDECTOMY;  Surgeon: Bryson Ferguson MD;  [facility]     SIGMOIDOSCOPY FLEXIBLE N/A 12/8/2016    Procedure: SIGMOIDOSCOPY FLEXIBLE;  Surgeon: Felicitas Radford MD;  [facility]     SIGMOIDOSCOPY FLEXIBLE N/A 7/5/2017    Procedure: SIGMOIDOSCOPY FLEXIBLE;;  Surgeon: Felicitas Radford MD;  [facility]     TESTICLE SURGERY       VASCULAR SURGERY      Right chest port     VASECTOMY       VASECTOMY         Current Outpatient Prescriptions   Medication Sig Dispense Refill     morphine 5 MG solu-tab Take 1 tablet (5 mg) by mouth every 4 hours as needed for breakthrough pain, shortness of breath / dyspnea or moderate to severe pain 15 tablet 0     ondansetron (ZOFRAN ODT) 4 MG ODT tab Take 1 tablet (4 mg) by mouth every 6 hours as needed 20 tablet 0     tamsulosin (FLOMAX) 0.4 MG capsule Take 1 capsule (0.4 mg) by mouth daily for 10 doses 10 capsule 0     diltiazem 2% in PLO cream, FV COMPOUNDED, 2% GEL Apply topically 2 times daily       ibuprofen 200 MG capsule Take 200-400 mg by mouth every 6 hours as needed 120 capsule 0     ASPIRIN PO Take by mouth as needed for moderate pain       lidocaine, Anorectal, 5 % CREA Externally apply 1 Application topically 3 times daily as needed 1 Tube 0     VITAMIN D, CHOLECALCIFEROL, PO Take 2,000 Units by mouth daily         dibucaine (NUPERCAINAL) 1 % OINT ointment 3 times daily as needed for moderate pain       hydrocortisone 0.5 % ointment Apply topically 2 times daily as needed Reported on 2/14/2017         Allergies   Allergen Reactions     Ampicillin Diarrhea     Demerol [Meperidine]      Nausea        FAMILY HISTORY: There is no family h/o  malignancy.  There is no family h/o urolithiasis.     SOCIAL HISTORY: The patient does not smoke cigarettes. Denies EtOH and illicit drug abuse.     ROS: A comprehensive 14 point ROS was obtained and otherwise negative except for that outlined above in the HPI.    GENERAL PHYSICAL EXAM:   Vitals: Stable, afebrile, reviewed in EMR  GENERAL: Well groomed, well developed, well nourished male in NAD.  HEAD: Normocephalic. Atraumatic.  RESPIRATORY: No increased respiratory effort.   GI: Soft, NT  MS: Full ROM in extremities, gait normal, normal muscle tone  SKIN: Warm to touch, dry.  NEURO: Alert and oriented x 3.  PSYCH: Normal mood and affect, pleasant and agreeable during interview and exam.    UA today: unable      ASSESSMENT AND PLAN: 57 year old male with a left-sided UVJ calculus with associated  hydronephrosis.   -Wanting stone procedure due to constant pain and q3-4 hour MS use. Discussed with pt that he made need a 2 step procedure if the laser is needed and not available. Will admit to obs for pain control, NPO after midnight. Early AM KUB. Will update on call MD.   - Continue tamsulosin 0.4 mg daily and Home Meds.   - Continue to push fluids, start IVF  -During counseling for this visit, we covered the natural history of kidney stones, the risk of progression to symptomatic pain/infection, and the possibility of renal failure/kidney damage.  We covered treatment options including observation, medical expulsive therapy, ureteroscopy/laser/stent.    Azul Pedroza PA-C  [facility]    30 minutes were spent with the patient today, > 50% in counseling and coordination of care.

## 2018-02-16 NOTE — LETTER
2/16/2018       RE: Louie Greco  [address] [address]     Dear Colleague,    Thank you for referring your patient, Louie Greco, to the [facility] [place] at [facility]. Please see a copy of my visit note below.    CC: left UVJ stone.    HPI: It is a pleasure to see Mr. Louie Greco, a 57 year old male seen today in the urology clinic in consultation from Dr. Healy for evaluation of the above. This was first detected on CT in the ED on 2/14/18 after the patient presented complaining of acute renal colic symptoms. The stone measures 5 mm and is located in the left UVJ with associated hydronephrosis. UA in the ED was negative for infection. BMP revealed Cr and Ca to be normal. With pain under good control, the patient was discharged with a prescription for tamsulosin and instructions to follow up with urology.    Currently, the patient reports continued moderate to severe pain with MS q 3-4 hours.  Intermittent nausea.  The patient has been straining their urine with no captured stones thus far. Denies gross hematuria, dysuria, fevers, chills, N/V. No prior history of kidney stones.    RECENT IMAGING:  CT ABDOMEN AND PELVIS WITHOUT CONTRAST  2/14/2018 11:35 PM      HISTORY: Left lower quadrant pain.     COMPARISON: 7/16/2017.     TECHNIQUE: Without intravenous or oral contrast, helical sections were  acquired from the top of the diaphragm through the pubic symphysis.  Coronal reconstructions were generated. Radiation dose for this scan  was reduced using automated exposure control, adjustment of the mA  and/or kV according to the patient's size, or iterative reconstruction  technique. (Renal stone protocol).     FINDINGS:  Right urinary tract: 0.3 cm nonobstructing calculus in the inferior  pole of the kidney. No ureteral calculi. No dilatation of the  intrarenal collecting system or ureter. A few cysts scattered within  the kidney, the  largest in the anterior aspect of the inferior pole  and measuring 5.3 cm in diameter.     Left urinary tract: 0.5 cm calculus in the most distal aspect of the  ureter at the ureterovesicular junction. Mild dilatation of the  intrarenal collecting system and ureter. A few tiny less than 0.4 cm  diameter nonobstructing renal calculi. Several cysts scattered within  the kidney, the largest a 4.5 cm cyst in the inferior pole that is  intermediate attenuation and is therefore likely a hemorrhagic cyst.  Mild perinephric stranding.     Urinary bladder: No additional visualized calculi. Mild enlargement of  the prostate gland.     Remainder of the abdomen and pelvis: A few small cysts scattered  within the liver. The liver, spleen, pancreas and adrenal glands are  otherwise unremarkable to the limits of a noncontrast CT scan. The  gallbladder is present. The small and large bowel are normal in  caliber. The appendix is not visualized. No bowel wall thickening,  pneumatosis or free intraperitoneal gas. No enlarged lymph nodes or  free fluid in the abdomen or pelvis. Small left inguinal hernia  containing fat. Atherosclerotic calcification in the abdominal aorta.     Scan through the lower chest is unremarkable.         IMPRESSION:  1. 0.5 cm distal left ureteral calculus at the ureterovesicular  junction, resulting in mild obstruction.  2. A few tiny nonobstructing bilateral renal calculi.    Past Medical History:   Diagnosis Date     Childhood asthma      Epididymitis, bilateral     18 years old     Inguinal hernia      Mumps      Nephrolithiasis     1990     Rectal cancer (H)     low rectal cancer     Shingles        Past Surgical History:   Procedure Laterality Date     COLONOSCOPY N/A 7/27/2016    Procedure: COMBINED COLONOSCOPY, SINGLE OR MULTIPLE BIOPSY/POLYPECTOMY BY BIOPSY;  Surgeon: Chelsea Thompson MD;  Location:  [facility]     COLONOSCOPY N/A 9/13/2017    Procedure: COLONOSCOPY;;  Surgeon: Malina  Felicitas HIDALGO MD;  [facility]     COLONOSCOPY N/A 12/13/2017    Procedure: COLONOSCOPY;;  Surgeon: Felicitas Radford MD;  [facility]     EXAM UNDER ANESTHESIA ANUS N/A 7/5/2017    Procedure: EXAM UNDER ANESTHESIA ANUS;  Examination Under Anesthesia, flex sigmoidoscopy with biopsies and formalin application;  Surgeon: Felicitas Radford MD;  [facility]     EXAM UNDER ANESTHESIA ANUS N/A 9/13/2017    Procedure: EXAM UNDER ANESTHESIA ANUS;  Examination Under Anesthesia Anus, Colonoscopy, application of formalin;  Surgeon: Felicitas Radford MD;  [facility]     EXAM UNDER ANESTHESIA ANUS N/A 12/13/2017    Procedure: EXAM UNDER ANESTHESIA ANUS;  Examination Under Anesthesia Anus, Colonoscopy;  Surgeon: Felicitas Radford MD;  [facility]     HC TOOTH EXTRACTION W/FORCEP       LAPAROSCOPIC APPENDECTOMY N/A 7/17/2017    Procedure: LAPAROSCOPIC APPENDECTOMY;  LAPAROSCOPIC APPENDECTOMY;  Surgeon: Bryson Ferguson MD;  [facility]     SIGMOIDOSCOPY FLEXIBLE N/A 12/8/2016    Procedure: SIGMOIDOSCOPY FLEXIBLE;  Surgeon: Felicitas Radford MD;  [facility]     SIGMOIDOSCOPY FLEXIBLE N/A 7/5/2017    Procedure: SIGMOIDOSCOPY FLEXIBLE;;  Surgeon: Felicitas Radford MD;  [facility]     TESTICLE SURGERY       VASCULAR SURGERY      Right chest port     VASECTOMY       VASECTOMY         Current Outpatient Prescriptions   Medication Sig Dispense Refill     morphine 5 MG solu-tab Take 1 tablet (5 mg) by mouth every 4 hours as needed for breakthrough pain, shortness of breath / dyspnea or moderate to severe pain 15 tablet 0     ondansetron (ZOFRAN ODT) 4 MG ODT tab Take 1 tablet (4 mg) by mouth every 6 hours as needed 20 tablet 0     tamsulosin (FLOMAX) 0.4 MG capsule Take 1 capsule (0.4 mg) by mouth daily for 10 doses 10 capsule 0     diltiazem 2% in PLO cream, FV COMPOUNDED, 2% GEL Apply topically 2 times daily       ibuprofen 200 MG capsule Take 200-400  mg by mouth every 6 hours as needed 120 capsule 0     ASPIRIN PO Take by mouth as needed for moderate pain       lidocaine, Anorectal, 5 % CREA Externally apply 1 Application topically 3 times daily as needed 1 Tube 0     VITAMIN D, CHOLECALCIFEROL, PO Take 2,000 Units by mouth daily        dibucaine (NUPERCAINAL) 1 % OINT ointment 3 times daily as needed for moderate pain       hydrocortisone 0.5 % ointment Apply topically 2 times daily as needed Reported on 2/14/2017         Allergies   Allergen Reactions     Ampicillin Diarrhea     Demerol [Meperidine]      Nausea        FAMILY HISTORY: There is no family h/o  malignancy.  There is no family h/o urolithiasis.     SOCIAL HISTORY: The patient does not smoke cigarettes. Denies EtOH and illicit drug abuse.     ROS: A comprehensive 14 point ROS was obtained and otherwise negative except for that outlined above in the HPI.    GENERAL PHYSICAL EXAM:   Vitals: Stable, afebrile, reviewed in EMR  GENERAL: Well groomed, well developed, well nourished male in NAD.  HEAD: Normocephalic. Atraumatic.  RESPIRATORY: No increased respiratory effort.   GI: Soft, NT  MS: Full ROM in extremities, gait normal, normal muscle tone  SKIN: Warm to touch, dry.  NEURO: Alert and oriented x 3.  PSYCH: Normal mood and affect, pleasant and agreeable during interview and exam.    UA today: unable      ASSESSMENT AND PLAN: 57 year old male with a left-sided UVJ calculus with associated  hydronephrosis.   -Wanting stone procedure due to constant pain and q3-4 hour MS use. Discussed with pt that he made need a 2 step procedure if the laser is needed and not available. Will admit to obs for pain control, NPO after midnight. Early AM KUB. Will update on call MD. - Continue tamsulosin 0.4 mg daily.   - Continue to push fluids  -During counseling for this visit, we covered the natural history of kidney stones, the risk of progression to symptomatic pain/infection, and the possibility of renal  failure/kidney damage.  We covered treatment options including observation, medical expulsive therapy, ureteroscopy/laser/stent.    Azul Pedroza PA-C  [facility]rology    30 minutes were spent with the patient today, > 50% in counseling and coordination of care.

## 2018-02-16 NOTE — IP AVS SNAPSHOT
MRN:[id number]                      After Visit Summary   2/16/2018    Louie Greco    MRN: [id number]           Thank you!     Thank you for choosing [place] for your care. Our goal is always to provide you with excellent care. Hearing back from our patients is one way we can continue to improve our services. Please take a few minutes to complete the written survey that you may receive in the mail after you visit with us. Thank you!        Patient Information     Date Of Birth          1960        About your hospital stay     You were admitted on:  February 16, 2018 You last received care in the:  [facility]    You were discharged on:  February 16, 2018       Who to Call     For medical emergencies, please call 911.  For non-urgent questions about your medical care, please call your primary care provider or clinic, [phone]  For questions related to your surgery, please call your surgery clinic        Attending Provider     Provider Specialty    Bola Worthy MD Urology       Primary Care Provider Office Phone # Fax #    Philippe EDELMIRA Healy [phone]      Your next 10 appointments already scheduled     Jul 25, 2018 11:00 AM CDT   (Arrive by 10:45 AM)   MR PELVIS W/O & W CONTRAST with UUMR1   [facility], [place], [facility] ([facility], [facility])    [address] [facility] [address] [phone]           Take your medicines as usual, unless your doctor tells you not to. Bring a list of your current medicines to your exam (including vitamins, minerals and over-the-counter drugs).  You may or may not receive intravenous (IV) contrast for this exam pending the discretion of the Radiologist.  You do not need to do anything special to prepare.  The MRI machine uses a strong magnet. Please wear clothes without metal (snaps, zippers). A sweatsuit works well, or we may give you a hospital gown.   Please remove any body piercings and hair extensions before you arrive. You will also remove watches, jewelry, hairpins, wallets, dentures, partial dental plates and hearing aids. You may wear contact lenses, and you may be able to wear your rings. We have a safe place to keep your personal items, but it is safer to leave them at home.  **IMPORTANT** THE INSTRUCTIONS BELOW ARE ONLY FOR THOSE PATIENTS WHO HAVE BEEN PRESCRIBED SEDATION OR GENERAL ANESTHESIA DURING THEIR MRI PROCEDURE:  IF YOUR DOCTOR PRESCRIBED ORAL SEDATION (take medicine to help you relax during your exam):   You must get the medicine from your doctor (oral medication) before you arrive. Bring the medicine to the exam. Do not take it at home. You ll be told when to take it upon arriving for your exam.   Arrive one hour early. Bring someone who can take you home after the test. Your medicine will make you sleepy. After the exam, you may not drive, take a bus or take a taxi by yourself.  IF YOUR DOCTOR PRESCRIBED IV SEDATION:   Arrive one hour early. Bring someone who can take you home after the test. Your medicine will make you sleepy. After the exam, you may not drive, take a bus or take a taxi by yourself.   No eating 6 hours before your exam. You may have clear liquids up until 4 hours before your exam. (Clear liquids include water, clear tea, black coffee and fruit juice without pulp.)  IF YOUR DOCTOR PRESCRIBED ANESTHESIA (be asleep for your exam):   Arrive 1 1/2 hours early. Bring someone who can take you home after the test. You may not drive, take a bus or take a taxi by yourself.   No eating 8 hours before your exam. You may have clear liquids up until 4 hours before your exam. (Clear liquids include water, clear tea, black coffee and fruit juice without pulp.)   You will spend four to five hours in the recovery room.  Please call the Imaging Department at your exam site with any questions.            Jul 25, 2018 12:00 PM CDT   (Arrive by  11:45 AM)   PE NPET ONCOLOGY (EYES TO THIGHS) with UUPET1   [facility], [place] [facility] ([facility] [place])    [address] [facility] [address] [phone]           Tell your doctor:   If there is any chance you may be pregnant or if you are breastfeeding.   If you have problems lying in small spaces (claustrophobia). If you do, your doctor may give you medicine to help you relax. If you have diabetes:   Have your exam early in the morning. Your blood glucose will go up as the day goes by.   Your glucose level must be 180 or less at the start of the exam. Please take any medicines you need to ensure this blood glucose level. 24 hours before your scan: Don t do any heavy exercise. (No jogging, aerobics or other workouts.) Exercise will make your pictures less accurate. 6 hours before your scan:   Stop all food and liquids (except water).   Do not chew gum or suck on mints.   If you need to take medicine with food, you may take it with a few crackers.  Please call your Imaging Department at your exam site with any questions.            Jul 31, 2018 10:30 AM CDT   Return Visit with Agueda Manley MD   [facility] ([facility])    [facility]  [address] [place] [place] [id number] [phone]              Further instructions from your care team         Same Day Surgery Discharge Instructions for  Sedation and General Anesthesia       It's not unusual to feel dizzy, light-headed or faint for up to 24 hours after surgery or while taking pain medication.  If you have these symptoms: sit for a few minutes before standing and have someone assist you when you get up to walk or use the bathroom.      You should rest and relax for the next 24 hours. We recommend you make arrangements to have an adult stay with you for at least 24 hours after your discharge.  Avoid hazardous and strenuous activity.      DO NOT  DRIVE any vehicle or operate mechanical equipment for 24 hours following the end of your surgery.  Even though you may feel normal, your reactions may be affected by the medication you have received.      Do not drink alcoholic beverages for 24 hours following surgery.       Slowly progress to your regular diet as you feel able. It's not unusual to feel nauseated and/or vomit after receiving anesthesia.  If you develop these symptoms, drink clear liquids (apple juice, ginger ale, broth, 7-up, etc. ) until you feel better.  If your nausea and vomiting persists for 24 hours, please notify your surgeon.        All narcotic pain medications, along with inactivity and anesthesia, can cause constipation. Drinking plenty of liquids and increasing fiber intake will help.      For any questions of a medical nature, call your surgeon.      Do not make important decisions for 24 hours.      If you had general anesthesia, you may have a sore throat for a couple of days related to the breathing tube used during surgery.  You may use Cepacol lozenges to help with this discomfort.  If it worsens or if you develop a fever, contact your surgeon.       If you feel your pain is not well managed with the pain medications prescribed by your surgeon, please contact your surgeon's office to let them know so they can address your concerns.           Cystoscopy, Holmium Laser with Stent Placement Discharge Instructions    Holmium laser lithotripsy was used to break up your kidney stone(s). It is normal to have visible blood in the urine, burning, urgency and frequency following this procedure. These symptoms may last for a few days to weeks.     Diet:    To help pass stone fragments and clear the blood out of the urine, it is important to drink 6-8 glasses of fluids per day at home - at least 3-4 glasses should be water.       Return to the diet that you were on before the procedure, unless you are given specific diet instructions.     Activity:    Walk short distances and increase as your strength allows.    You may climb stairs.    Do not do strenuous exercise or heavy lifting until approved by surgeon.    Do not drive while taking narcotic pain medications.    Bathing:    You may take a shower.          Stent information    During surgery, a stent was placed in the ureter.  The ureter is the tube that drains urine from the kidney to the bladder.  The stent is placed to dilate (open) the ureter so the stone fragments can pass easily through the ureter or to decrease ureteral swelling after surgery, or to relieve an obstruction. The stent is made of rubber. The upper end of the stent curls in the kidney while the lower end rests in the bladder.    While the stent is in place you may experience the following symptoms:    Blood and/or small blood clots in urine.    Bladder spasm (frequency and urgency of urination).    Discomfort or aching in the back or side where the stent is.    Burning or discomfort at the end of urine stream.    To decrease these symptoms you should:    Take pain medication as prescribed.    Drink plenty of fluids.    If you experience pain at the end of urination try not emptying your bladder completely.    If having discomfort in back or side, decrease activity.    Call your physician if these signs/symptoms are present:    Pain that is not relieved by a short rest or ordered pain medications.    Temperature at or above 101.0 F or chills.    Inability or difficulty urinating.     Excessive blood in urine.    Any questions or concerns.      **If you have questions or concerns about your procedure,  call Dr. Worthy at [phone]**      PLAN: Return in 1-2 weeks for cystoscopy and ureteral stent removal in the office.     Bola Worthy MD  Urology  [facility]  Clinic Phone [phone]    Per Dr. Worthy it's ok to take Ibuprofen 400-600 mg every 6 hours for pain management for the next 2  "days.         Pending Results     Date and Time Order Name Status Description    2/16/2018 1908 XR Surgery MICHAELLE L/T 5 Min Fluoro w Stills In process             Admission Information     Date & Time Provider Department Dept. Phone    2/16/2018 Bola Worthy MD [facility] [phone]      Your Vitals Were     Blood Pressure Temperature Respirations Height Weight Pulse Oximetry    121/77 96.8  F (36  C) 17 1.778 m (5' 10\") 89.8 kg (198 lb) 94%    BMI (Body Mass Index)                   28.41 kg/m2           MyChart Information     [employer] gives you secure access to your electronic health record. If you see a primary care provider, you can also send messages to your care team and make appointments. If you have questions, please call your primary care clinic.  If you do not have a primary care provider, please call [phone] and they will assist you.        Care EveryWhere ID     This is your Care EveryWhere ID. This could be used by other organizations to access your [facility] medical records  [id number]        Equal Access to Services     [facility] : Hadii trent daniel hadkaden Sokartik, waaxda luqadaha, qaybta kaalmada marc, jb pedroza . So [facility] [phone].    ATENCIÓN: Si habla español, tiene a mena disposición servicios gratuitos de asistencia lingüística. Llame al [phone].    We comply with applicable federal civil rights laws and [place] laws. We do not discriminate on the basis of race, color, national origin, age, disability, sex, sexual orientation, or gender identity.               Review of your medicines      UNREVIEWED medicines. Ask your doctor about these medicines        Dose / Directions    ASPIRIN PO        Take by mouth as needed for moderate pain   Refills:  0       dibucaine 1 % Oint ointment   Commonly known as:  NUPERCAINAL        3 times daily as needed for moderate pain   Refills:  0       diltiazem 2% in PLO " cream (FV COMPOUNDED) 2% Gel   Used for:  Need for hepatitis C screening test        Apply topically 2 times daily   Refills:  0       hydrocortisone 0.5 % ointment        Apply topically 2 times daily as needed Reported on 2/14/2017   Refills:  0       ibuprofen 200 MG capsule   Used for:  Acute post-operative pain        Dose:  200-400 mg   Take 200-400 mg by mouth every 6 hours as needed   Quantity:  120 capsule   Refills:  0       lidocaine (Anorectal) 5 % Crea   Used for:  Anal pain        Dose:  1 Application   Externally apply 1 Application topically 3 times daily as needed   Quantity:  1 Tube   Refills:  0       morphine 5 MG solu-tab        Dose:  5 mg   Take 1 tablet (5 mg) by mouth every 4 hours as needed for breakthrough pain, shortness of breath / dyspnea or moderate to severe pain   Quantity:  15 tablet   Refills:  0       ondansetron 4 MG ODT tab   Commonly known as:  ZOFRAN ODT        Dose:  4 mg   Take 1 tablet (4 mg) by mouth every 6 hours as needed   Quantity:  20 tablet   Refills:  0       sennosides 8.6 MG tablet   Commonly known as:  SENOKOT        Dose:  1 tablet   Take 1 tablet by mouth daily as needed for constipation   Refills:  0       tamsulosin 0.4 MG capsule   Commonly known as:  FLOMAX        Dose:  0.4 mg   Take 1 capsule (0.4 mg) by mouth daily for 10 doses   Quantity:  10 capsule   Refills:  0       VITAMIN D (CHOLECALCIFEROL) PO        Dose:  2000 Units   Take 2,000 Units by mouth daily   Refills:  0                Protect others around you: Learn how to safely use, store and throw away your medicines at www.disposemymeds.org.             Medication List: This is a list of all your medications and when to take them. Check marks below indicate your daily home schedule. Keep this list as a reference.      Medications           Morning Afternoon Evening Bedtime As Needed    ASPIRIN PO   Take by mouth as needed for moderate pain                                dibucaine 1 % Oint  ointment   Commonly known as:  NUPERCAINAL   3 times daily as needed for moderate pain                                diltiazem 2% in PLO cream (FV COMPOUNDED) 2% Gel   Apply topically 2 times daily                                hydrocortisone 0.5 % ointment   Apply topically 2 times daily as needed Reported on 2/14/2017                                ibuprofen 200 MG capsule   Take 200-400 mg by mouth every 6 hours as needed                                lidocaine (Anorectal) 5 % Crea   Externally apply 1 Application topically 3 times daily as needed                                morphine 5 MG solu-tab   Take 1 tablet (5 mg) by mouth every 4 hours as needed for breakthrough pain, shortness of breath / dyspnea or moderate to severe pain                                ondansetron 4 MG ODT tab   Commonly known as:  ZOFRAN ODT   Take 1 tablet (4 mg) by mouth every 6 hours as needed                                sennosides 8.6 MG tablet   Commonly known as:  SENOKOT   Take 1 tablet by mouth daily as needed for constipation                                tamsulosin 0.4 MG capsule   Commonly known as:  FLOMAX   Take 1 capsule (0.4 mg) by mouth daily for 10 doses                                VITAMIN D (CHOLECALCIFEROL) PO   Take 2,000 Units by mouth daily

## 2018-02-16 NOTE — TELEPHONE ENCOUNTER
Patient is calling because he states he has a kidney stone that has not passed and would like to schedule the procedure. Patient would like a call back to discuss

## 2018-02-16 NOTE — PROGRESS NOTES
CC: left UVJ stone.    HPI: It is a pleasure to see . Louie Greco, a 57 year old male seen today in the urology clinic in consultation from Dr. Healy for evaluation of the above. This was first detected on CT in the ED on 2/14/18 after the patient presented complaining of acute renal colic symptoms. The stone measures 5 mm and is located in the left UVJ with associated hydronephrosis. UA in the ED was negative for infection. BMP revealed Cr and Ca to be normal. With pain under good control, the patient was discharged with a prescription for tamsulosin and instructions to follow up with urology.    Currently, the patient reports continued moderate to severe pain with MS q 3-4 hours.  Intermittent nausea.  The patient has been straining their urine with no captured stones thus far. Denies gross hematuria, dysuria, fevers, chills, N/V. No prior history of kidney stones.    RECENT IMAGING:  CT ABDOMEN AND PELVIS WITHOUT CONTRAST  2/14/2018 11:35 PM      HISTORY: Left lower quadrant pain.     COMPARISON: 7/16/2017.     TECHNIQUE: Without intravenous or oral contrast, helical sections were  acquired from the top of the diaphragm through the pubic symphysis.  Coronal reconstructions were generated. Radiation dose for this scan  was reduced using automated exposure control, adjustment of the mA  and/or kV according to the patient's size, or iterative reconstruction  technique. (Renal stone protocol).     FINDINGS:  Right urinary tract: 0.3 cm nonobstructing calculus in the inferior  pole of the kidney. No ureteral calculi. No dilatation of the  intrarenal collecting system or ureter. A few cysts scattered within  the kidney, the largest in the anterior aspect of the inferior pole  and measuring 5.3 cm in diameter.     Left urinary tract: 0.5 cm calculus in the most distal aspect of the  ureter at the ureterovesicular junction. Mild dilatation of the  intrarenal collecting system and ureter. A few tiny less than 0.4  cm  diameter nonobstructing renal calculi. Several cysts scattered within  the kidney, the largest a 4.5 cm cyst in the inferior pole that is  intermediate attenuation and is therefore likely a hemorrhagic cyst.  Mild perinephric stranding.     Urinary bladder: No additional visualized calculi. Mild enlargement of  the prostate gland.     Remainder of the abdomen and pelvis: A few small cysts scattered  within the liver. The liver, spleen, pancreas and adrenal glands are  otherwise unremarkable to the limits of a noncontrast CT scan. The  gallbladder is present. The small and large bowel are normal in  caliber. The appendix is not visualized. No bowel wall thickening,  pneumatosis or free intraperitoneal gas. No enlarged lymph nodes or  free fluid in the abdomen or pelvis. Small left inguinal hernia  containing fat. Atherosclerotic calcification in the abdominal aorta.     Scan through the lower chest is unremarkable.         IMPRESSION:  1. 0.5 cm distal left ureteral calculus at the ureterovesicular  junction, resulting in mild obstruction.  2. A few tiny nonobstructing bilateral renal calculi.    Past Medical History:   Diagnosis Date     Childhood asthma      Epididymitis, bilateral     18 years old     Inguinal hernia      Mumps      Nephrolithiasis     1990     Rectal cancer (H)     low rectal cancer     Shingles        Past Surgical History:   Procedure Laterality Date     COLONOSCOPY N/A 7/27/2016    Procedure: COMBINED COLONOSCOPY, SINGLE OR MULTIPLE BIOPSY/POLYPECTOMY BY BIOPSY;  Surgeon: Chelsea Thompson MD;  Location: [facility]     COLONOSCOPY N/A 9/13/2017    Procedure: COLONOSCOPY;;  Surgeon: Felicitas Radford MD;  [facility]     COLONOSCOPY N/A 12/13/2017    Procedure: COLONOSCOPY;;  Surgeon: Felicitas Radford MD;  [facility]     EXAM UNDER ANESTHESIA ANUS N/A 7/5/2017    Procedure: EXAM UNDER ANESTHESIA ANUS;  Examination Under Anesthesia, flex sigmoidoscopy with biopsies  and formalin application;  Surgeon: Felicitas Radford MD;  [facility]     EXAM UNDER ANESTHESIA ANUS N/A 9/13/2017    Procedure: EXAM UNDER ANESTHESIA ANUS;  Examination Under Anesthesia Anus, Colonoscopy, application of formalin;  Surgeon: Felicitas Radford MD;  [facility]     EXAM UNDER ANESTHESIA ANUS N/A 12/13/2017    Procedure: EXAM UNDER ANESTHESIA ANUS;  Examination Under Anesthesia Anus, Colonoscopy;  Surgeon: Felicitas Radford MD;  [facility]     HC TOOTH EXTRACTION W/FORCEP       LAPAROSCOPIC APPENDECTOMY N/A 7/17/2017    Procedure: LAPAROSCOPIC APPENDECTOMY;  LAPAROSCOPIC APPENDECTOMY;  Surgeon: Bryson Ferguson MD;  Location: [facility]     SIGMOIDOSCOPY FLEXIBLE N/A 12/8/2016    Procedure: SIGMOIDOSCOPY FLEXIBLE;  Surgeon: Felicitas Radford MD;  [facility]     SIGMOIDOSCOPY FLEXIBLE N/A 7/5/2017    Procedure: SIGMOIDOSCOPY FLEXIBLE;;  Surgeon: Felicitas Radford MD;  [facility]     TESTICLE SURGERY       VASCULAR SURGERY      Right chest port     VASECTOMY       VASECTOMY         Current Outpatient Prescriptions   Medication Sig Dispense Refill     morphine 5 MG solu-tab Take 1 tablet (5 mg) by mouth every 4 hours as needed for breakthrough pain, shortness of breath / dyspnea or moderate to severe pain 15 tablet 0     ondansetron (ZOFRAN ODT) 4 MG ODT tab Take 1 tablet (4 mg) by mouth every 6 hours as needed 20 tablet 0     tamsulosin (FLOMAX) 0.4 MG capsule Take 1 capsule (0.4 mg) by mouth daily for 10 doses 10 capsule 0     diltiazem 2% in PLO cream, FV COMPOUNDED, 2% GEL Apply topically 2 times daily       ibuprofen 200 MG capsule Take 200-400 mg by mouth every 6 hours as needed 120 capsule 0     ASPIRIN PO Take by mouth as needed for moderate pain       lidocaine, Anorectal, 5 % CREA Externally apply 1 Application topically 3 times daily as needed 1 Tube 0     VITAMIN D, CHOLECALCIFEROL, PO Take 2,000 Units by mouth daily         dibucaine (NUPERCAINAL) 1 % OINT ointment 3 times daily as needed for moderate pain       hydrocortisone 0.5 % ointment Apply topically 2 times daily as needed Reported on 2/14/2017         Allergies   Allergen Reactions     Ampicillin Diarrhea     Demerol [Meperidine]      Nausea        FAMILY HISTORY: There is no family h/o  malignancy.  There is no family h/o urolithiasis.     SOCIAL HISTORY: The patient does not smoke cigarettes. Denies EtOH and illicit drug abuse.     ROS: A comprehensive 14 point ROS was obtained and otherwise negative except for that outlined above in the HPI.    GENERAL PHYSICAL EXAM:   Vitals: Stable, afebrile, reviewed in EMR  GENERAL: Well groomed, well developed, well nourished male in NAD.  HEAD: Normocephalic. Atraumatic.  RESPIRATORY: No increased respiratory effort.   GI: Soft, NT  MS: Full ROM in extremities, gait normal, normal muscle tone  SKIN: Warm to touch, dry.  NEURO: Alert and oriented x 3.  PSYCH: Normal mood and affect, pleasant and agreeable during interview and exam.    UA today: unable      ASSESSMENT AND PLAN: 57 year old male with a left-sided UVJ calculus with associated  hydronephrosis.   -Wanting stone procedure due to constant pain and q3-4 hour MS use. Discussed with pt that he made need a 2 step procedure if the laser is needed and not available. Will admit to obs for pain control, NPO after midnight. Early AM KUB. Will update on call MD. - Continue tamsulosin 0.4 mg daily.   - Continue to push fluids  -During counseling for this visit, we covered the natural history of kidney stones, the risk of progression to symptomatic pain/infection, and the possibility of renal failure/kidney damage.  We covered treatment options including observation, medical expulsive therapy, ureteroscopy/laser/stent.    Azul Pedroza PA-C  [facility]    30 minutes were spent with the patient today, > 50% in counseling and coordination of care.

## 2018-02-16 NOTE — TELEPHONE ENCOUNTER
Patient was hoping to have Stone Busting procedure today.  Very frustrated that he has not passed kidney stone yet and he want to have procedure done today.    Informed him that he would need to meet with urology.  Also if pain is worse or not controlled that ER would be next step.  Patient did not want to go to ER.    Patient states pain is, for the most part, controlled with Morphine but he doesn't want to continue taking it all weekend.    He is frustrated that he is unable to pass kidney stone so far.    Denies any new symptoms such as fever, chills, blood in urine, nausea, or urinary issues.    Writer called [facility] urology and was able to schedule appt for him today at 3:00.  Patient agreeable to plan.    Mela Cohn RN

## 2018-02-17 NOTE — DISCHARGE INSTRUCTIONS
Same Day Surgery Discharge Instructions for  Sedation and General Anesthesia       It's not unusual to feel dizzy, light-headed or faint for up to 24 hours after surgery or while taking pain medication.  If you have these symptoms: sit for a few minutes before standing and have someone assist you when you get up to walk or use the bathroom.      You should rest and relax for the next 24 hours. We recommend you make arrangements to have an adult stay with you for at least 24 hours after your discharge.  Avoid hazardous and strenuous activity.      DO NOT DRIVE any vehicle or operate mechanical equipment for 24 hours following the end of your surgery.  Even though you may feel normal, your reactions may be affected by the medication you have received.      Do not drink alcoholic beverages for 24 hours following surgery.       Slowly progress to your regular diet as you feel able. It's not unusual to feel nauseated and/or vomit after receiving anesthesia.  If you develop these symptoms, drink clear liquids (apple juice, ginger ale, broth, 7-up, etc. ) until you feel better.  If your nausea and vomiting persists for 24 hours, please notify your surgeon.        All narcotic pain medications, along with inactivity and anesthesia, can cause constipation. Drinking plenty of liquids and increasing fiber intake will help.      For any questions of a medical nature, call your surgeon.      Do not make important decisions for 24 hours.      If you had general anesthesia, you may have a sore throat for a couple of days related to the breathing tube used during surgery.  You may use Cepacol lozenges to help with this discomfort.  If it worsens or if you develop a fever, contact your surgeon.       If you feel your pain is not well managed with the pain medications prescribed by your surgeon, please contact your surgeon's office to let them know so they can address your concerns.           Cystoscopy, Holmium Laser with Stent  Placement Discharge Instructions    Holmium laser lithotripsy was used to break up your kidney stone(s). It is normal to have visible blood in the urine, burning, urgency and frequency following this procedure. These symptoms may last for a few days to weeks.     Diet:    To help pass stone fragments and clear the blood out of the urine, it is important to drink 6-8 glasses of fluids per day at home - at least 3-4 glasses should be water.       Return to the diet that you were on before the procedure, unless you are given specific diet instructions.    Activity:    Walk short distances and increase as your strength allows.    You may climb stairs.    Do not do strenuous exercise or heavy lifting until approved by surgeon.    Do not drive while taking narcotic pain medications.    Bathing:    You may take a shower.          Stent information    During surgery, a stent was placed in the ureter.  The ureter is the tube that drains urine from the kidney to the bladder.  The stent is placed to dilate (open) the ureter so the stone fragments can pass easily through the ureter or to decrease ureteral swelling after surgery, or to relieve an obstruction. The stent is made of rubber. The upper end of the stent curls in the kidney while the lower end rests in the bladder.    While the stent is in place you may experience the following symptoms:    Blood and/or small blood clots in urine.    Bladder spasm (frequency and urgency of urination).    Discomfort or aching in the back or side where the stent is.    Burning or discomfort at the end of urine stream.    To decrease these symptoms you should:    Take pain medication as prescribed.    Drink plenty of fluids.    If you experience pain at the end of urination try not emptying your bladder completely.    If having discomfort in back or side, decrease activity.    Call your physician if these signs/symptoms are present:    Pain that is not relieved by a short rest or ordered  pain medications.    Temperature at or above 101.0 F or chills.    Inability or difficulty urinating.     Excessive blood in urine.    Any questions or concerns.      **If you have questions or concerns about your procedure,  call Dr. Worthy at [phone]**      PLAN: Return in 1-2 weeks for cystoscopy and ureteral stent removal in the office.     Bola Worthy MD  Urology  [facility]  Clinic Phone [phone]    Per Dr. Worthy it's ok to take Ibuprofen 400-600 mg every 6 hours for pain management for the next 2 days.

## 2018-02-17 NOTE — ANESTHESIA CARE TRANSFER NOTE
Patient: Louie Greco    Procedure(s):  Cysto, left ureteroscopy, holmium laser, retrogrades, stent placement - Wound Class: II-Clean Contaminated    Diagnosis: unknown  Diagnosis Additional Information: No value filed.    Anesthesia Type:   General, RSI, ETT     Note:  Airway :Face Mask  Patient transferred to:PACU  Comments: Transferred to PACU, spontaneous respirations, 10L oxygen via facemask.  All monitors and alarms on and functioning, VSS.  Patient awake, comfortable.  Report to PACU RN.Handoff Report: Identifed the Patient, Identified the Reponsible Provider, Reviewed the pertinent medical history, Discussed the surgical course, Reviewed Intra-OP anesthesia mangement and issues during anesthesia, Set expectations for post-procedure period and Allowed opportunity for questions and acknowledgement of understanding      Vitals: (Last set prior to Anesthesia Care Transfer)    CRNA VITALS  2/16/2018 1842 - 2/16/2018 1918      2/16/2018             NIBP: 149/84    Pulse: 117    NIBP Mean: 97    SpO2: 95 %    Resp Rate (observed): 8                Electronically Signed By: QUINTON Colvin CRNA  February 16, 2018  7:18 PM

## 2018-02-17 NOTE — ANESTHESIA POSTPROCEDURE EVALUATION
Patient: Louie Greco    Procedure(s):  Cysto, left ureteroscopy, holmium laser, retrogrades, stent placement - Wound Class: II-Clean Contaminated    Diagnosis:unknown  Diagnosis Additional Information: No value filed.    Anesthesia Type:  General, RSI, ETT    Note:  Anesthesia Post Evaluation    Patient location during evaluation: PACU  Patient participation: Able to fully participate in evaluation  Level of consciousness: awake  Pain management: adequate  Airway patency: patent  Cardiovascular status: acceptable  Respiratory status: acceptable  Hydration status: acceptable  PONV: none     Anesthetic complications: None          Last vitals:  Vitals:    02/16/18 2000 02/16/18 2010 02/16/18 2047   BP: 121/77 126/83 125/74   Resp: 17 16 16   Temp: 36  C (96.8  F) 36.1  C (97  F)    SpO2: 94% 95% 98%         Electronically Signed By: Meliza Hamilton  February 16, 2018  9:02 PM

## 2018-02-17 NOTE — OP NOTE
DATE OF SERVICE: 2/16/2018  PREOPERATIVE DIAGNOSIS: Left nephrolithiasis  POSTOPERATIVE DIAGNOSIS: Left nephrolithiasis    PROCEDURES PERFORMED:   1. Cystourethroscopy  2. Left ureteroscopy with holmium laser lithotripsy and stone extraction  3. Left retrograde pyelography with interpretation of intraoperative fluoroscopic imaging  4. Left ureteral stent placement    STAFF SURGEON: Bola Worthy MD  ANESTHESIA: General    ESTIMATED BLOOD LOSS: 1 cc  DRAINS/TUBES:  6 [place] x 26 cm double-J ureteral stent     COMPLICATIONS: None.   SPECIMEN: Stone for analysis  SIGNIFICANT FINDINGS: 5mm left distal ureteral stone. Fragmented and removed. Stone free at end of case.    BRIEF OPERATIVE INDICATIONS: Louie Greco is a 57 year old male who recently presented with left ureteral calculus. After a discussion of all risks, benefits, and alternatives, the patient elected to proceed with definitive stone management. The patient understands the potential need for more than one procedure to eliminate all stone burden.      DESCRIPTION OF PROCEDURE:  After informed consent was verified, the patient was transported to the operating room, placed supine on the table. After adequate anesthesia was induced, he was placed in dorsal lithotomy and prepped and draped in the usual sterile fashion. A timeout was taken to confirm correct patient, procedure and laterality. Pre-operative IV antibiotics were administered.    A 22 [place]an rigid cystoscope was inserted per a well-lubricated urethra. The anterior urethra was unremarkable. The ureteral orifices were orthotopic bilaterally. The left ureteral orifice was identified and cannulated with a Sensor wire and 6-Fr open-ended catheter. The wire passed without resistance into the upper pole.  A retrograde pyelogram showed mild hydronephrosis, but no other filling defects. Semirigid ureteroscope was advanced into distal ureter and a 200 micron laser fiber at settings of 0.6 J and 6 Hz was used to  fragment the stone. A few small pieces were fragmented off stone and majority was pulled out in one piece. Scope reinserted and no additional fragments were noted. Pullback ureteroscopy was performed and showed no retained stone fragments or ureteral injury. A 6 Fr x 26 cm double-J stent was advanced over the Sensor wire, and a good proximal curl was seen in the renal pelvis and the distal curl was seen in the bladder. The bladder was drained.   The patient tolerated the procedure well.  No apparent complications. He was transported to the postanesthesia care unit in stable condition.      PLAN: Return in 1-2 weeks for cystoscopy and ureteral stent removal in the office.    Bola Worthy MD  Urology  [facility]  Clinic Phone [phone]

## 2018-02-17 NOTE — ANESTHESIA PREPROCEDURE EVALUATION
Procedure: Procedure(s):  COMBINED CYSTOSCOPY, RETROGRADES, URETEROSCOPY, LASER HOLMIUM LITHOTRIPSY URETER(S), INSERT STENT  Preop diagnosis: unknown    Allergies   Allergen Reactions     Ampicillin Diarrhea     Demerol [Meperidine]      Nausea      Past Medical History:   Diagnosis Date     Childhood asthma      Epididymitis, bilateral     18 years old     Inguinal hernia      Mumps      Nephrolithiasis     1990     Rectal cancer (H)     low rectal cancer     Shingles      Past Surgical History:   Procedure Laterality Date     COLONOSCOPY N/A 7/27/2016    Procedure: COMBINED COLONOSCOPY, SINGLE OR MULTIPLE BIOPSY/POLYPECTOMY BY BIOPSY;  Surgeon: Chelsea Thompson MD;  Location: [facility]     COLONOSCOPY N/A 9/13/2017    Procedure: COLONOSCOPY;;  Surgeon: Felicitas Radford MD;  Location: [facility]     COLONOSCOPY N/A 12/13/2017    Procedure: COLONOSCOPY;;  Surgeon: Felicitas Radford MD;  Location: [facility]     EXAM UNDER ANESTHESIA ANUS N/A 7/5/2017    Procedure: EXAM UNDER ANESTHESIA ANUS;  Examination Under Anesthesia, flex sigmoidoscopy with biopsies and formalin application;  Surgeon: Felicitas Radford MD;  Location: [facility]     EXAM UNDER ANESTHESIA ANUS N/A 9/13/2017    Procedure: EXAM UNDER ANESTHESIA ANUS;  Examination Under Anesthesia Anus, Colonoscopy, application of formalin;  Surgeon: Felicitas Radford MD;  Location: [facility]     EXAM UNDER ANESTHESIA ANUS N/A 12/13/2017    Procedure: EXAM UNDER ANESTHESIA ANUS;  Examination Under Anesthesia Anus, Colonoscopy;  Surgeon: Felicitas Radford MD;  Location: [facility]     HC TOOTH EXTRACTION W/FORCEP       LAPAROSCOPIC APPENDECTOMY N/A 7/17/2017    Procedure: LAPAROSCOPIC APPENDECTOMY;  LAPAROSCOPIC APPENDECTOMY;  Surgeon: Bryson Ferguson MD;  Location:  OR     SIGMOIDOSCOPY FLEXIBLE N/A 12/8/2016    Procedure: SIGMOIDOSCOPY FLEXIBLE;  Surgeon: Felicitas Radford MD;  Location: [facility]     SIGMOIDOSCOPY FLEXIBLE N/A  7/5/2017    Procedure: SIGMOIDOSCOPY FLEXIBLE;;  Surgeon: Felicitas Radford MD;  [facility]     TESTICLE SURGERY       VASCULAR SURGERY      Right chest port     VASECTOMY       VASECTOMY       Prior to Admission medications    Medication Sig Start Date End Date Taking? Authorizing Provider   sennosides (SENOKOT) 8.6 MG tablet Take 1 tablet by mouth daily as needed for constipation   Yes Reported, Patient   morphine 5 MG solu-tab Take 1 tablet (5 mg) by mouth every 4 hours as needed for breakthrough pain, shortness of breath / dyspnea or moderate to severe pain 2/15/18  Yes Oscar Abraham MD   tamsulosin (FLOMAX) 0.4 MG capsule Take 1 capsule (0.4 mg) by mouth daily for 10 doses 2/15/18 2/25/18 Yes Oscar Abraham MD   diltiazem 2% in PLO cream, FV COMPOUNDED, 2% GEL Apply topically 2 times daily   Yes Reported, Patient   ibuprofen 200 MG capsule Take 200-400 mg by mouth every 6 hours as needed 7/20/17  Yes Heber Luis MD   ASPIRIN PO Take by mouth as needed for moderate pain   Yes Unknown, Entered By History   VITAMIN D, CHOLECALCIFEROL, PO Take 2,000 Units by mouth daily    Yes Reported, Patient   ondansetron (ZOFRAN ODT) 4 MG ODT tab Take 1 tablet (4 mg) by mouth every 6 hours as needed 2/15/18   Oscar Abraham MD   lidocaine, Anorectal, 5 % CREA Externally apply 1 Application topically 3 times daily as needed 7/12/17   Lazara Castañeda APRN CNP   dibucaine (NUPERCAINAL) 1 % OINT ointment 3 times daily as needed for moderate pain    Reported, Patient   hydrocortisone 0.5 % ointment Apply topically 2 times daily as needed Reported on 2/14/2017    Reported, Patient     No current Epic-ordered facility-administered medications on file.      Current Outpatient Prescriptions Ordered in Epic   Medication     sennosides (SENOKOT) 8.6 MG tablet     morphine 5 MG solu-tab     tamsulosin (FLOMAX) 0.4 MG capsule     diltiazem 2% in PLO cream, FV COMPOUNDED,  2% GEL     ibuprofen 200 MG capsule     ASPIRIN PO     VITAMIN D, CHOLECALCIFEROL, PO     ondansetron (ZOFRAN ODT) 4 MG ODT tab     lidocaine, Anorectal, 5 % CREA     dibucaine (NUPERCAINAL) 1 % OINT ointment     hydrocortisone 0.5 % ointment     Wt Readings from Last 1 Encounters:   02/16/18 89.8 kg (198 lb)     Temp Readings from Last 1 Encounters:   02/14/18 37.3  C (99.2  F) (Oral)     BP Readings from Last 6 Encounters:   02/16/18 130/74   02/15/18 116/84   02/02/18 117/76   01/15/18 126/78   01/07/18 120/80   12/13/17 124/72     Pulse Readings from Last 4 Encounters:   02/16/18 90   02/15/18 71   02/02/18 82   01/15/18 93     Resp Readings from Last 1 Encounters:   02/15/18 14     SpO2 Readings from Last 1 Encounters:   02/15/18 96%     Recent Labs   Lab Test  02/14/18   2143  01/22/18   1557   NA  139  140   POTASSIUM  3.9  4.1   CHLORIDE  106  105   CO2  26  24   ANIONGAP  7  11   GLC  106*  81   BUN  27  23   CR  0.91  0.76   FRANDY  9.0  8.9     Recent Labs   Lab Test  02/14/18   2143  01/22/18   1557   WBC  5.2  4.8   HGB  14.5  14.0   PLT  176  205     Recent Labs   Lab Test  07/10/17   0943  01/16/17   0800       Anesthesia Evaluation     .             ROS/MED HX    ENT/Pulmonary:     (+)Intermittent asthma , . .   (-) sleep apnea   Neurologic:      (-) seizures, CVA and migraines   Cardiovascular:        (-) hypertension, CAD, SANTIAGO, arrhythmias, valvular problems/murmurs and dyslipidemia   METS/Exercise Tolerance:     Hematologic:        (-) history of blood clots, anemia and other hematologic disorder   Musculoskeletal:  - neg musculoskeletal ROS       GI/Hepatic:         Renal/Genitourinary:     (+) Nephrolithiasis ,    (-) renal disease   Endo:      (-) Type I DM, Type II DM, thyroid disease and obesity   Psychiatric:  - neg psychiatric ROS       Infectious Disease:        (-) Recent Fever   Malignancy:   (+) Malignancy History of GI  GI CA status post Chemo,         Other:                     Physical  Exam  Normal systems: cardiovascular, pulmonary and dental    Airway   Mallampati: II  TM distance: >3 FB  Neck ROM: full    Dental     Cardiovascular   Rhythm and rate: regular and normal  (-) no murmur    Pulmonary    breath sounds clear to auscultation                    Anesthesia Plan      History & Physical Review      ASA Status:  2 .    NPO Status:  > 6 hours    Plan for General, RSI and ETT with Propofol induction. Maintenance will be Balanced.    PONV prophylaxis:  Ondansetron (or other 5HT-3) and Dexamethasone or Solumedrol       Postoperative Care  Postoperative pain management:  Multi-modal analgesia.      Consents  Anesthetic plan, risks, benefits and alternatives discussed with:  Patient..                          .

## 2018-02-19 ENCOUNTER — TELEPHONE (OUTPATIENT)
Dept: FAMILY MEDICINE | Facility: CLINIC | Age: 58
End: 2018-02-19

## 2018-02-19 LAB — COPATH REPORT: NORMAL

## 2018-02-19 NOTE — TELEPHONE ENCOUNTER
ED / Discharge Outreach Protocol    Patient Contact    Attempt # 1    Was call answered?  No.  Left message on voicemail with information to call me back.    Smita Stratton RN

## 2018-02-20 NOTE — TELEPHONE ENCOUNTER
ED / Discharge Outreach Protocol    Patient Contact    Attempt # 2    Was call answered?  No.  Unable to leave message.        Call your physician if these signs/symptoms are present:    Pain that is not relieved by a short rest or ordered pain medications.    Temperature at or above 101.0 F or chills.    Inability or difficulty urinating.     Excessive blood in urine.    Any questions or concerns.  **If you have questions or concerns about your procedure,  call Dr. Worthy at [phone]**        PLAN: Return in 1-2 weeks for cystoscopy and ureteral stent removal in the office.

## 2018-02-23 LAB
APPEARANCE STONE: NORMAL
COMPN STONE: NORMAL
NUMBER STONE: 1
SIZE STONE: NORMAL MM
WT STONE: 28 MG

## 2018-02-28 ENCOUNTER — TELEPHONE (OUTPATIENT)
Dept: UROLOGY | Facility: CLINIC | Age: 58
End: 2018-02-28

## 2018-02-28 NOTE — TELEPHONE ENCOUNTER
Louie has a indwelling stent in place. His urine was grossly bloody after surgery but then cleared. Today while working he urinating an dis grossly bloody again. He has no pain and no difficulty urinating.Adviesed him that he may see intermittent gross hematuria  while the stent in place. Instructed him to decrease activity and push water. If pain or trouble voiding he is to call back  Or if urine does not clear. Margarita Woodward

## 2018-03-05 ENCOUNTER — OFFICE VISIT (OUTPATIENT)
Dept: UROLOGY | Facility: CLINIC | Age: 58
End: 2018-03-05
Payer: COMMERCIAL

## 2018-03-05 VITALS
SYSTOLIC BLOOD PRESSURE: 126 MMHG | WEIGHT: 198 LBS | HEIGHT: 70 IN | DIASTOLIC BLOOD PRESSURE: 76 MMHG | HEART RATE: 88 BPM | BODY MASS INDEX: 28.35 KG/M2

## 2018-03-05 DIAGNOSIS — N20.1 URETERAL STONE: Primary | ICD-10-CM

## 2018-03-05 PROCEDURE — 52310 CYSTOSCOPY AND TREATMENT: CPT | Performed by: UROLOGY

## 2018-03-05 RX ORDER — CIPROFLOXACIN 500 MG/1
500 TABLET, FILM COATED ORAL ONCE
Qty: 1 TABLET | Refills: 0 | Status: SHIPPED | OUTPATIENT
Start: 2018-03-05 | End: 2019-02-22

## 2018-03-05 ASSESSMENT — PAIN SCALES - GENERAL: PAINLEVEL: NO PAIN (1)

## 2018-03-05 NOTE — MR AVS SNAPSHOT
"              After Visit Summary   3/5/2018    Louie Greco    MRN: [id number]           Patient Information     Date Of Birth          1960        Visit Information        Provider Department      3/5/2018 2:30 PM Bola Worthy MD; [facility] [place]        Today's Diagnoses     Ureteral stone    -  1      Care Instructions         AFTER YOUR CYSTOSCOPY         You have just completed a cystoscopy, or \"cysto\", which allowed your physician to learn more about your bladder (or to remove a stent placed after surgery). We suggest that you continue to avoid caffeine, fruit juice, and alcohol for the next 24 hours, however, you are encouraged to return to your normal activities.       A few things that are considered normal after your cystoscopy:    * small amount of bleeding (or spotting) that clears within the next 24 hours    * slight burning sensation with urination    * sensation to of needing to avoid more frequently    * the feeling of \"air\" in your urine    * mild discomfort that is relieved with Tylonol        Please contact our office promptly if you:    * develop a fever above 101 degrees    * are unable to urinate    * develop bright red blood that does not stop    * severe pain or swelling        And of course, please contact our office with any concerns or questions [phone]      Evi Sharma LPN      AFTER YOUR CYSTOSCOPY        You have just completed a cystoscopy, or \"cysto\", which allowed your physician to learn more about your bladder (or to remove a stent placed after surgery). We suggest that you continue to avoid caffeine, fruit juice, and alcohol for the next 24 hours, however, you are encouraged to return to your normal activities.         A few things that are considered normal after your cystoscopy:     * Small amount of bleeding (or spotting) that clears within the next 24 hours     * Slight burning sensation with urination     * " "Sensation to of needing to avoid more frequently     * The feeling of \"air\" in your urine     * Mild discomfort that is relieved with Tylenol        Please contact our office promptly if you:     * Develop a fever above 101 degrees     * Are unable to urinate     * Develop bright red blood that does not stop     * Severe pain or swelling         Please contact our office with any concerns or questions @[facility].          Follow-ups after your visit        Follow-up notes from your care team     Return if symptoms worsen or fail to improve.      Your next 10 appointments already scheduled     Mar 08, 2018  3:00 PM CST   Cystoscopy with Bola Worthy MD,  CYF   [facility] [place] ([facility])    [address] [facility]  [address] [place] [id number] [phone]            Jul 25, 2018 11:00 AM CDT   (Arrive by 10:45 AM)   MR PELVIS W/O & W CONTRAST with UUMR1   [facility], [place], [facility] ([facility])    [address] [facility] [id number] [phone]           Take your medicines as usual, unless your doctor tells you not to. Bring a list of your current medicines to your exam (including vitamins, minerals and over-the-counter drugs).  You may or may not receive intravenous (IV) contrast for this exam pending the discretion of the Radiologist.  You do not need to do anything special to prepare.  The MRI machine uses a strong magnet. Please wear clothes without metal (snaps, zippers). A sweatsuit works well, or we may give you a hospital gown.  Please remove any body piercings and hair extensions before you arrive. You will also remove watches, jewelry, hairpins, wallets, dentures, partial dental plates and hearing aids. You may wear contact lenses, and you may be able to wear your rings. We have a safe place to keep your personal items, but it is safer to leave them at home.  **IMPORTANT** THE " INSTRUCTIONS BELOW ARE ONLY FOR THOSE PATIENTS WHO HAVE BEEN PRESCRIBED SEDATION OR GENERAL ANESTHESIA DURING THEIR MRI PROCEDURE:  IF YOUR DOCTOR PRESCRIBED ORAL SEDATION (take medicine to help you relax during your exam):   You must get the medicine from your doctor (oral medication) before you arrive. Bring the medicine to the exam. Do not take it at home. You ll be told when to take it upon arriving for your exam.   Arrive one hour early. Bring someone who can take you home after the test. Your medicine will make you sleepy. After the exam, you may not drive, take a bus or take a taxi by yourself.  IF YOUR DOCTOR PRESCRIBED IV SEDATION:   Arrive one hour early. Bring someone who can take you home after the test. Your medicine will make you sleepy. After the exam, you may not drive, take a bus or take a taxi by yourself.   No eating 6 hours before your exam. You may have clear liquids up until 4 hours before your exam. (Clear liquids include water, clear tea, black coffee and fruit juice without pulp.)  IF YOUR DOCTOR PRESCRIBED ANESTHESIA (be asleep for your exam):   Arrive 1 1/2 hours early. Bring someone who can take you home after the test. You may not drive, take a bus or take a taxi by yourself.   No eating 8 hours before your exam. You may have clear liquids up until 4 hours before your exam. (Clear liquids include water, clear tea, black coffee and fruit juice without pulp.)   You will spend four to five hours in the recovery room.  Please call the Imaging Department at your exam site with any questions.            Jul 25, 2018 12:00 PM CDT   (Arrive by 11:45 AM)   PE NPET ONCOLOGY (EYES TO THIGHS) with UUPET1   [facility] [place] PET CT ([facility], [place])    [address] [facility] [address] [phone]           Tell your doctor:   If there is any chance you may be pregnant or if you are breastfeeding.   If you have problems lying in small spaces  (claustrophobia). If you do, your doctor may give you medicine to help you relax. If you have diabetes:   Have your exam early in the morning. Your blood glucose will go up as the day goes by.   Your glucose level must be 180 or less at the start of the exam. Please take any medicines you need to ensure this blood glucose level. 24 hours before your scan: Don t do any heavy exercise. (No jogging, aerobics or other workouts.) Exercise will make your pictures less accurate. 6 hours before your scan:   Stop all food and liquids (except water).   Do not chew gum or suck on mints.   If you need to take medicine with food, you may take it with a few crackers.  Please call your Imaging Department at your exam site with any questions.            Jul 31, 2018 10:30 AM CDT   Return Visit with Agueda Manley MD   [facility] ([facility])    [facility]  [address] [facility] [address]  [facility] [address] [phone]              Who to contact     If you have questions or need follow up information about today's clinic visit or your schedule please contact [facility] [place] directly at [phone].  Normal or non-critical lab and imaging results will be communicated to you by Axedahart, letter or phone within 4 business days after the clinic has received the results. If you do not hear from us within 7 days, please contact the clinic through Axedahart or phone. If you have a critical or abnormal lab result, we will notify you by phone as soon as possible.  Submit refill requests through LIKECHARITY or call your pharmacy and they will forward the refill request to us. Please allow 3 business days for your refill to be completed.          Additional Information About Your Visit        LIKECHARITY Information     LIKECHARITY gives you secure access to your electronic health record. If you see a primary care provider, you can also send messages to your care  "team and make appointments. If you have questions, please call your primary care clinic.  If you do not have a primary care provider, please call [phone] and they will assist you.        Care EveryWhere ID     This is your Care EveryWhere ID. This could be used by other organizations to access your [place] medical records  [id number]        Your Vitals Were     Pulse Height BMI (Body Mass Index)             88 1.778 m (5' 10\") 28.41 kg/m2          Blood Pressure from Last 3 Encounters:   03/05/18 126/76   02/16/18 125/74   02/16/18 130/74    Weight from Last 3 Encounters:   03/05/18 89.8 kg (198 lb)   02/16/18 89.8 kg (198 lb)   02/16/18 89.8 kg (198 lb)              We Performed the Following     CYSTOSCOPY W FOREIGN BODY REMOVAL, SIMPLE          Today's Medication Changes          These changes are accurate as of 3/5/18  3:38 PM.  If you have any questions, ask your nurse or doctor.               Start taking these medicines.        Dose/Directions    ciprofloxacin 500 MG tablet   Commonly known as:  CIPRO   Used for:  Ureteral stone   Started by:  Bola Worthy MD        Dose:  500 mg   Take 1 tablet (500 mg) by mouth once for 1 dose   Quantity:  1 tablet   Refills:  0            Where to get your medicines      [facility] [address] [place] [address] [address] AT [address] [place] [address] [address], [facility] [address]    Hours:  24-hours Phone:  [phone]     ciprofloxacin 500 MG tablet                Primary Care Provider Office Phone # Fax #    Philippe Healy [phone]       [facility] [place] [address]  [place] [address]        Equal Access to Services     BLAISE EDDY AH: João Alatorre, annia cullen, aj cardenasalhans tran, jb coats. So [facility] [phone].    ATENCIÓN: Si habla español, tiene a mena disposición servicios gratuitos de asistencia " lingüística. Keaton al [phone].    We comply with applicable federal civil rights laws and [place] laws. We do not discriminate on the basis of race, color, national origin, age, disability, sex, sexual orientation, or gender identity.            Thank you!     Thank you for choosing [facility] [place]  for your care. Our goal is always to provide you with excellent care. Hearing back from our patients is one way we can continue to improve our services. Please take a few minutes to complete the written survey that you may receive in the mail after your visit with us. Thank you!             Your Updated Medication List - Protect others around you: Learn how to safely use, store and throw away your medicines at www.disposemymeds.org.          This list is accurate as of 3/5/18  3:38 PM.  Always use your most recent med list.                   Brand Name Dispense Instructions for use Diagnosis    ASPIRIN PO      Take by mouth as needed for moderate pain        ciprofloxacin 500 MG tablet    CIPRO    1 tablet    Take 1 tablet (500 mg) by mouth once for 1 dose    Ureteral stone       dibucaine 1 % Oint ointment    NUPERCAINAL     3 times daily as needed for moderate pain        diltiazem 2% in PLO cream (FV COMPOUNDED) 2% Gel      Apply topically 2 times daily    Need for hepatitis C screening test       hydrocortisone 0.5 % ointment      Apply topically 2 times daily as needed Reported on 2/14/2017        ibuprofen 200 MG capsule     120 capsule    Take 200-400 mg by mouth every 6 hours as needed    Acute post-operative pain       lidocaine (Anorectal) 5 % Crea     1 Tube    Externally apply 1 Application topically 3 times daily as needed    Anal pain       VITAMIN D (CHOLECALCIFEROL) PO      Take 2,000 Units by mouth daily

## 2018-03-05 NOTE — PATIENT INSTRUCTIONS
"     AFTER YOUR CYSTOSCOPY         You have just completed a cystoscopy, or \"cysto\", which allowed your physician to learn more about your bladder (or to remove a stent placed after surgery). We suggest that you continue to avoid caffeine, fruit juice, and alcohol for the next 24 hours, however, you are encouraged to return to your normal activities.       A few things that are considered normal after your cystoscopy:    * small amount of bleeding (or spotting) that clears within the next 24 hours    * slight burning sensation with urination    * sensation to of needing to avoid more frequently    * the feeling of \"air\" in your urine    * mild discomfort that is relieved with Tylonol        Please contact our office promptly if you:    * develop a fever above 101 degrees    * are unable to urinate    * develop bright red blood that does not stop    * severe pain or swelling        And of course, please contact our office with any concerns or questions [phone]      Evi Sharma LPN      AFTER YOUR CYSTOSCOPY        You have just completed a cystoscopy, or \"cysto\", which allowed your physician to learn more about your bladder (or to remove a stent placed after surgery). We suggest that you continue to avoid caffeine, fruit juice, and alcohol for the next 24 hours, however, you are encouraged to return to your normal activities.         A few things that are considered normal after your cystoscopy:     * Small amount of bleeding (or spotting) that clears within the next 24 hours     * Slight burning sensation with urination     * Sensation to of needing to avoid more frequently     * The feeling of \"air\" in your urine     * Mild discomfort that is relieved with Tylenol        Please contact our office promptly if you:     * Develop a fever above 101 degrees     * Are unable to urinate     * Develop bright red blood that does not stop     * Severe pain or swelling         Please contact our office with any concerns " or questions @[facility] beds at the [facility].

## 2018-03-05 NOTE — NURSING NOTE
Prior to the start of the procedure and with procedural staff participation, I verbally confirmed the patient s identity using two indicators, relevant allergies, that the procedure was appropriate and matched the consent or emergent situation, and that the correct equipment/implants were available. Immediately prior to starting the procedure I conducted the Time Out with the procedural staff and re-confirmed the patient s name, procedure, and site/side. (The Joint Commission universal protocol was followed.)  yes    Sedation (Moderate or Deep): none    Evi Sharma LPN

## 2018-03-05 NOTE — PROGRESS NOTES
Louie Greco is a 57 year old male with an indwelling ureteral stent in need of removal.    CYSTOSCOPY PROCEDURE:  After sterile preparation and draping of the patient,  a 17-Tajik flexible cystoscope was introduced via the urethra.  It was passed without difficulty into the bladder.  The urethra was open without evidence of stricture.  The ureteral orifices were orthotopic.  The double J stent was seen coming out the left side.  It was grasped with an alligator forceps and extracted intact without difficulty.  The patient tolerated the procedure well    A/P Successful stent removal  Prophylactic antibiotic ordered  Stone prevention counseling provided  Follow-up prn    Watch for any new onset fevers, signs of UTI.  May expect some pain after removal.  If this is severe, or last many hours, you may need to return for replacement of stent.    Bola Worthy MD  Urology  [facility]

## 2018-03-30 DIAGNOSIS — C20 MALIGNANT NEOPLASM OF RECTUM (H): Primary | ICD-10-CM

## 2018-04-17 ENCOUNTER — TELEPHONE (OUTPATIENT)
Dept: SURGERY | Facility: CLINIC | Age: 58
End: 2018-04-17

## 2018-04-17 NOTE — TELEPHONE ENCOUNTER
Called patient to schedule OR procedure with Dr. Radford.  Patient is scheduled 5/11/18.  Patient will have local pre-op.  Patient states he cannot do two fleet enemas, as they don't work d/t scar tissue.  Message sent to RN for advisement.

## 2018-05-02 NOTE — PROGRESS NOTES
[address] [address]  [phone]  Dept: [phone]    PRE-OP EVALUATION:  Today's date: 5/3/2018    Louie Greco (: 1960) presents for pre-operative evaluation assessment as requested by Felicitas Chatterjee MD.  He requires evaluation and anesthesia risk assessment prior to undergoing Colonoscopy.    Proposed Surgery/ Procedure: Colonoscopy  Date of Surgery/ Procedure: 18  Time of Surgery/ Procedure: 9:10  Hospital/Surgical Facility: [facility]  Primary Physician: Philippe Healy  Type of Anesthesia Anticipated: general    Patient has a Health Care Directive or Living Will:  NO    1. NO - Do you have a history of heart attack, stroke, stent, bypass or surgery on an artery in the head, neck, heart or legs?  2. NO - Do you ever have any pain or discomfort in your chest?  3. NO - Do you have a history of  Heart Failure?  4. NO - Are you troubled by shortness of breath when: walking on the level, up a slight hill or at night?  5. NO - Do you currently have a cold, bronchitis or other respiratory infection?  6. NO - Do you have a cough, shortness of breath or wheezing?  7. NO - Do you sometimes get pains in the calves of your legs when you walk?  8. NO - Do you or anyone in your family have previous history of blood clots?  9. NO - Do you or does anyone in your family have a serious bleeding problem such as prolonged bleeding following surgeries or cuts?  10. NO - Have you ever had problems with anemia or been told to take iron pills?  11. NO - Have you had any abnormal blood loss such as black, tarry or bloody stools, or abnormal vaginal bleeding?  12. NO - Have you ever had a blood transfusion?  13. NO - Have you or any of your relatives ever had problems with anesthesia?  14. NO - Do you have sleep apnea, excessive snoring or daytime drowsiness?  15. NO - Do you have any prosthetic heart valves?  16. NO - Do you have prosthetic  joints?  17. NO - Is there any chance that you may be pregnant?      HPI:     HPI related to upcoming procedure: Patient Louie Greco is a very pleasant 57 year old male with a history of rectal cancer who presents to internal medicine clinic today for a pre op cardiac evaluation for upcoming colonoscopy procedure for treatment of rectal cancer. Patient has tolerated general anesthesia in the past for colonoscopy. Patient denies any known allergies to anesthesia agents. Patient denies any previous history of CAD, CVA or Type 2 Diabetes.         MEDICAL HISTORY:     Patient Active Problem List    Diagnosis Date Noted     Kidney stone 02/16/2018     Priority: Medium     Elevated prostate specific antigen (PSA) 11/30/2017     Priority: Medium     Appendicitis 07/17/2017     Priority: Medium     Chemotherapy-induced neutropenia (H) 03/21/2017     Priority: Medium     Advance Care Planning 03/09/2017     Priority: Medium     Advance Care Planning 3/9/2017: Receipt of ACP document:  Received: invalid HCD document dated 12/8/2016.  Document not previously scanned. Validation form completed indicating invalid document. Copy sent to client with information and facilitation resources. Validation form sent to be scanned as notation of invalid document received.  Code Status needs to be updated to reflect choices. Confirmed/documented designated decision maker(s).  Added by Araceli Horne [facility] Advance Care Planning Liaison with [employer].       Malignant neoplasm of rectum (H) 01/03/2017     Priority: Medium     Rectal bleeding 07/19/2016     Priority: Medium     Plantar fasciitis 11/04/2010     Priority: Medium     Pes anserinus tendinitis 02/08/2010     Priority: Medium      Past Medical History:   Diagnosis Date     Childhood asthma      Epididymitis, bilateral     18 years old     Inguinal hernia      Mumps      Nephrolithiasis     1990     Rectal cancer (H)     low rectal cancer     Shingles      Past Surgical History:    Procedure Laterality Date     COLONOSCOPY N/A 7/27/2016    Procedure: COMBINED COLONOSCOPY, SINGLE OR MULTIPLE BIOPSY/POLYPECTOMY BY BIOPSY;  Surgeon: Chelsea Thompson MD;  Location: [facility]     COLONOSCOPY N/A 9/13/2017    Procedure: COLONOSCOPY;;  Surgeon: Felicitas Radford MD;  [facility]     COLONOSCOPY N/A 12/13/2017    Procedure: COLONOSCOPY;;  Surgeon: Felicitas Radford MD;  [facility]     COMBINED CYSTOSCOPY, RETROGRADES, URETEROSCOPY, LASER HOLMIUM LITHOTRIPSY URETER(S), INSERT STENT Left 2/16/2018    Procedure: COMBINED CYSTOSCOPY, RETROGRADES, URETEROSCOPY, LASER HOLMIUM LITHOTRIPSY URETER(S), INSERT STENT;  Cysto, left ureteroscopy, holmium laser, retrogrades, stent placement;  Surgeon: Bola Worthy MD;  Location: [facility]     EXAM UNDER ANESTHESIA ANUS N/A 7/5/2017    Procedure: EXAM UNDER ANESTHESIA ANUS;  Examination Under Anesthesia, flex sigmoidoscopy with biopsies and formalin application;  Surgeon: Felicitas Radford MD;  [facility]     EXAM UNDER ANESTHESIA ANUS N/A 9/13/2017    Procedure: EXAM UNDER ANESTHESIA ANUS;  Examination Under Anesthesia Anus, Colonoscopy, application of formalin;  Surgeon: Felicitas Radford MD;  [facility]     EXAM UNDER ANESTHESIA ANUS N/A 12/13/2017    Procedure: EXAM UNDER ANESTHESIA ANUS;  Examination Under Anesthesia Anus, Colonoscopy;  Surgeon: Felicitas Radford MD;  [facility]     HC TOOTH EXTRACTION W/FORCEP       LAPAROSCOPIC APPENDECTOMY N/A 7/17/2017    Procedure: LAPAROSCOPIC APPENDECTOMY;  LAPAROSCOPIC APPENDECTOMY;  Surgeon: Bryson Ferguson MD;  [facility]     SIGMOIDOSCOPY FLEXIBLE N/A 12/8/2016    Procedure: SIGMOIDOSCOPY FLEXIBLE;  Surgeon: Felicitas Radford MD;  [facility]     SIGMOIDOSCOPY FLEXIBLE N/A 7/5/2017    Procedure: SIGMOIDOSCOPY FLEXIBLE;;  Surgeon: Felicitas Radford MD;  [facility]     TESTICLE SURGERY       VASCULAR  "SURGERY      Right chest port     VASECTOMY       VASECTOMY       Current Outpatient Prescriptions   Medication Sig Dispense Refill     ASPIRIN PO Take by mouth as needed for moderate pain       dibucaine (NUPERCAINAL) 1 % OINT ointment 3 times daily as needed for moderate pain       diltiazem 2% in PLO cream, FV COMPOUNDED, 2% GEL Apply topically 2 times daily       hydrocortisone 0.5 % ointment Apply topically 2 times daily as needed Reported on 2/14/2017       ibuprofen 200 MG capsule Take 200-400 mg by mouth every 6 hours as needed 120 capsule 0     lidocaine, Anorectal, 5 % CREA Externally apply 1 Application topically 3 times daily as needed 1 Tube 0     VITAMIN D, CHOLECALCIFEROL, PO Take 2,000 Units by mouth daily        OTC products: None, except as noted above    Allergies   Allergen Reactions     Ampicillin Diarrhea     Demerol [Meperidine]      Nausea       Latex Allergy: NO    Social History   Substance Use Topics     Smoking status: Never Smoker     Smokeless tobacco: Never Used     Alcohol use 0.0 oz/week      Comment: Very moderate     History   Drug Use No       REVIEW OF SYSTEMS:   Constitutional, neuro, ENT, endocrine, pulmonary, cardiac, gastrointestinal, genitourinary, musculoskeletal, integument and psychiatric systems are negative, except as otherwise noted.    EXAM:   /79 (BP Location: Left arm, Cuff Size: Adult Large)  Pulse 87  Temp 98.4  F (36.9  C) (Oral)  Ht 5' 10\" (1.778 m)  Wt 196 lb 14.4 oz (89.3 kg)  SpO2 95%  BMI 28.25 kg/m2    GENERAL APPEARANCE: healthy, alert and no distress     EYES: EOMI,  PERRL     HENT: ear canals and TM's normal and nose and mouth without ulcers or lesions     NECK: no adenopathy, no asymmetry, masses, or scars and thyroid normal to palpation     RESP: lungs clear to auscultation - no rales, rhonchi or wheezes     CV: regular rates and rhythm, normal S1 S2, no S3 or S4 and no murmur, click or rub     ABDOMEN:  soft, nontender, no HSM or masses " and bowel sounds normal     MS: extremities normal- no gross deformities noted, no evidence of inflammation in joints, FROM in all extremities.     SKIN: no suspicious lesions or rashes     NEURO: Normal strength and tone, sensory exam grossly normal, mentation intact and speech normal     PSYCH: mentation appears normal. and affect normal/bright     LYMPHATICS: No cervical adenopathy    DIAGNOSTICS:   No labs or EKG required for low risk colonoscopy procedure.    Recent Labs   Lab Test  02/14/18   2143  01/22/18   1557   07/10/17   0943   01/16/17   0800   HGB  14.5  14.0   < >  12.8*   < >  15.1   PLT  176  205   < >  170   < >  207   INR   --    --    --   1.02   --   0.92   NA  139  140   < >   --    < >   --    POTASSIUM  3.9  4.1   < >   --    < >   --    CR  0.91  0.76   < >   --    < >   --     < > = values in this interval not displayed.        IMPRESSION:   Reason for surgery/procedure:  rectal cancer  Diagnosis/reason for consult: pre op cardiac evaluation for upcoming colonoscopy procedure for treatment of rectal cancer    The proposed surgical procedure is considered LOW risk.    REVISED CARDIAC RISK INDEX  The patient has the following serious cardiovascular risks for perioperative complications such as (MI, PE, VFib and 3  AV Block):  No serious cardiac risks  INTERPRETATION: 0 risks: Class I (very low risk - 0.4% complication rate)    The patient has the following additional risks for perioperative complications:  No identified additional risks      RECOMMENDATIONS:       --Patient is to take all scheduled medications on the day of surgery EXCEPT for modifications listed below.    Anticoagulant or Antiplatelet Medication Use  ASPIRIN: Discontinue ASA 7-10 days prior to procedure to reduce bleeding risk.         APPROVAL GIVEN to proceed with proposed procedure, without further diagnostic evaluation       Signed Electronically by: Daphne Mo MD    Copy of this evaluation report is provided to  requesting physician.    [facility] Preop Guidelines    Revised Cardiac Risk Index

## 2018-05-03 ENCOUNTER — OFFICE VISIT (OUTPATIENT)
Dept: FAMILY MEDICINE | Facility: CLINIC | Age: 58
End: 2018-05-03
Payer: COMMERCIAL

## 2018-05-03 VITALS
WEIGHT: 196.9 LBS | DIASTOLIC BLOOD PRESSURE: 79 MMHG | OXYGEN SATURATION: 95 % | BODY MASS INDEX: 28.19 KG/M2 | HEIGHT: 70 IN | TEMPERATURE: 98.4 F | SYSTOLIC BLOOD PRESSURE: 113 MMHG | HEART RATE: 87 BPM

## 2018-05-03 DIAGNOSIS — Z23 NEED FOR VACCINATION WITH 13-POLYVALENT PNEUMOCOCCAL CONJUGATE VACCINE: ICD-10-CM

## 2018-05-03 DIAGNOSIS — Z01.818 PREOP GENERAL PHYSICAL EXAM: Primary | ICD-10-CM

## 2018-05-03 DIAGNOSIS — Z23 NEED FOR VACCINATION: ICD-10-CM

## 2018-05-03 DIAGNOSIS — C20 RECTAL CANCER (H): ICD-10-CM

## 2018-05-03 PROCEDURE — 99214 OFFICE O/P EST MOD 30 MIN: CPT | Mod: 25 | Performed by: INTERNAL MEDICINE

## 2018-05-03 PROCEDURE — 90471 IMMUNIZATION ADMIN: CPT | Performed by: INTERNAL MEDICINE

## 2018-05-03 PROCEDURE — 90670 PCV13 VACCINE IM: CPT | Performed by: INTERNAL MEDICINE

## 2018-05-03 RX ORDER — SODIUM, POTASSIUM,MAG SULFATES 17.5-3.13G
1 SOLUTION, RECONSTITUTED, ORAL ORAL 2 TIMES DAILY
Qty: 2 BOTTLE | Refills: 2 | Status: SHIPPED | OUTPATIENT
Start: 2018-05-03 | End: 2018-06-15

## 2018-05-03 RX ORDER — SODIUM, POTASSIUM,MAG SULFATES 17.5-3.13G
1 SOLUTION, RECONSTITUTED, ORAL ORAL 2 TIMES DAILY
Qty: 2 BOTTLE | Refills: 2 | Status: SHIPPED | OUTPATIENT
Start: 2018-05-03 | End: 2018-05-03

## 2018-05-03 NOTE — NURSING NOTE
Screening Questionnaire for Adult Immunization    Are you sick today?   No   Do you have allergies to medications, food, a vaccine component or latex?   Yes   Have you ever had a serious reaction after receiving a vaccination?   No   Do you have a long-term health problem with heart disease, lung disease, asthma, kidney disease, metabolic disease (e.g. diabetes), anemia, or other blood disorder?   Yes   Do you have cancer, leukemia, HIV/AIDS, or any other immune system problem?   No   In the past 3 months, have you taken medications that affect  your immune system, such as prednisone, other steroids, or anticancer drugs; drugs for the treatment of rheumatoid arthritis, Crohn s disease, or psoriasis; or have you had radiation treatments?   No   Have you had a seizure, or a brain or other nervous system problem?   No   During the past year, have you received a transfusion of blood or blood     products, or been given immune (gamma) globulin or antiviral drug?   No   For women: Are you pregnant or is there a chance you could become        pregnant during the next month?   No   Have you received any vaccinations in the past 4 weeks?   No     Immunization questionnaire was positive for at least one answer.  Notified Dr. Mo.        Per orders of Dr. Mo, injection of Prevnar 13 given by Fe Freed. Patient instructed to remain in clinic for 15 minutes afterwards, and to report any adverse reaction to me immediately.  Prior to injection verified patient identity using patient's name and date of birth.  Due to injection administration, patient instructed to remain in clinic for 15 minutes  afterwards, and to report any adverse reaction to me immediately.         Screening performed by Fe Freed on 5/3/2018 at 6:41 PM.

## 2018-05-03 NOTE — MR AVS SNAPSHOT
After Visit Summary   5/3/2018    Louie Greco    MRN: [id number]           Patient Information     Date Of Birth          1960        Visit Information        Provider Department      5/3/2018 5:40 PM Daphne Mo MD [facility]        Today's Diagnoses     Preop general physical exam    -  1    Rectal cancer (H)        Need for vaccination with 13-polyvalent pneumococcal conjugate vaccine        Need for vaccination          Care Instructions      Before Your Surgery      Call your surgeon if there is any change in your health. This includes signs of a cold or flu (such as a sore throat, runny nose, cough, rash or fever).    Do not smoke, drink alcohol or take over the counter medicine (unless your surgeon or primary care doctor tells you to) for the 24 hours before and after surgery.    If you take prescribed drugs: Follow your doctor s orders about which medicines to take and which to stop until after surgery.    Eating and drinking prior to surgery: follow the instructions from your surgeon    Take a shower or bath the night before surgery. Use the soap your surgeon gave you to gently clean your skin. If you do not have soap from your surgeon, use your regular soap. Do not shave or scrub the surgery site.  Wear clean pajamas and have clean sheets on your bed.           Follow-ups after your visit        Your next 10 appointments already scheduled     May 11, 2018   Procedure with Felicitas Radford MD   [facility] ([facility])    [address] [place], [place] [id number]   [phone]           Located in the Clinics and Surgery Center at [address] [place] [address].   parking is very convenient and highly recommended.  is a $6 flat rate fee.  Both  and self parkers should enter the main arrival plaza from [facility]; parking attendants will direct you based on your  parking preference.            Jul 25, 2018 11:00 AM CDT   MR PELVIS W/O & W CONTRAST with UUMR1   [facility] ([facility])    [address] [facility] [address] [phone]           Take your medicines as usual, unless your doctor tells you not to. Bring a list of your current medicines to your exam (including vitamins, minerals and over-the-counter drugs).  You may or may not receive intravenous (IV) contrast for this exam pending the discretion of the Radiologist.  You do not need to do anything special to prepare.  The MRI machine uses a strong magnet. Please wear clothes without metal (snaps, zippers). A sweatsuit works well, or we may give you a hospital gown.  Please remove any body piercings and hair extensions before you arrive. You will also remove watches, jewelry, hairpins, wallets, dentures, partial dental plates and hearing aids. You may wear contact lenses, and you may be able to wear your rings. We have a safe place to keep your personal items, but it is safer to leave them at home.  **IMPORTANT** THE INSTRUCTIONS BELOW ARE ONLY FOR THOSE PATIENTS WHO HAVE BEEN PRESCRIBED SEDATION OR GENERAL ANESTHESIA DURING THEIR MRI PROCEDURE:  IF YOUR DOCTOR PRESCRIBED ORAL SEDATION (take medicine to help you relax during your exam):   You must get the medicine from your doctor (oral medication) before you arrive. Bring the medicine to the exam. Do not take it at home. You ll be told when to take it upon arriving for your exam.   Arrive one hour early. Bring someone who can take you home after the test. Your medicine will make you sleepy. After the exam, you may not drive, take a bus or take a taxi by yourself.  IF YOUR DOCTOR PRESCRIBED IV SEDATION:   Arrive one hour early. Bring someone who can take you home after the test. Your medicine will make you sleepy. After the exam, you may not drive, take a bus or take a taxi by yourself.    No eating 6 hours before your exam. You may have clear liquids up until 4 hours before your exam. (Clear liquids include water, clear tea, black coffee and fruit juice without pulp.)  IF YOUR DOCTOR PRESCRIBED ANESTHESIA (be asleep for your exam):   Arrive 1 1/2 hours early. Bring someone who can take you home after the test. You may not drive, take a bus or take a taxi by yourself.   No eating 8 hours before your exam. You may have clear liquids up until 4 hours before your exam. (Clear liquids include water, clear tea, black coffee and fruit juice without pulp.)   You will spend four to five hours in the recovery room.  Please call the Imaging Department at your exam site with any questions.            Jul 25, 2018 12:00 PM CDT   PE NPET ONCOLOGY (EYES TO THIGHS) with UUPET1   [facility] ([facility])    [address] [facility] [address] [phone]           Tell your doctor:   If there is any chance you may be pregnant or if you are breastfeeding.   If you have problems lying in small spaces (claustrophobia). If you do, your doctor may give you medicine to help you relax. If you have diabetes:   Have your exam early in the morning. Your blood glucose will go up as the day goes by.   Your glucose level must be 180 or less at the start of the exam. Please take any medicines you need to ensure this blood glucose level. 24 hours before your scan: Don t do any heavy exercise. (No jogging, aerobics or other workouts.) Exercise will make your pictures less accurate. 6 hours before your scan:   Stop all food and liquids (except water).   Do not chew gum or suck on mints.   If you need to take medicine with food, you may take it with a few crackers.  Please call your Imaging Department at your exam site with any questions.            Jul 31, 2018 10:30 AM CDT   Return Visit with Agueda Manley MD   [facility])    [facility]  [address]  [place] [address] [phone]              Who to contact     If you have questions or need follow up information about today's clinic visit or your schedule please contact [facility] directly at [phone].  Normal or non-critical lab and imaging results will be communicated to you by MyChart, letter or phone within 4 business days after the clinic has received the results. If you do not hear from us within 7 days, please contact the clinic through [employer] or phone. If you have a critical or abnormal lab result, we will notify you by phone as soon as possible.  Submit refill requests through [employer] or call your pharmacy and they will forward the refill request to us. Please allow 3 business days for your refill to be completed.          Additional Information About Your Visit        [employer] Information     [employer] gives you secure access to your electronic health record. If you see a primary care provider, you can also send messages to your care team and make appointments. If you have questions, please call your primary care clinic.  If you do not have a primary care provider, please call [phone] and they will assist you.        Care EveryWhere ID     This is your Care EveryWhere ID. This could be used by other organizations to access your [facility] medical records  [id number]        Your Vitals Were     Pulse Temperature Height Pulse Oximetry BMI (Body Mass Index)       87 98.4  F (36.9  C) (Oral) 5' 10\" (1.778 m) 95% 28.25 kg/m2        Blood Pressure from Last 3 Encounters:   05/03/18 113/79   03/05/18 126/76   02/16/18 125/74    Weight from Last 3 Encounters:   05/03/18 196 lb 14.4 oz (89.3 kg)   03/05/18 198 lb (89.8 kg)   02/16/18 198 lb (89.8 kg)              We Performed the Following     1st  Administration  [48092]     Pneumococcal vaccine 13 valent PCV13 IM (Prevnar) [39500]          Today's " Medication Changes          These changes are accurate as of 5/3/18  8:27 PM.  If you have any questions, ask your nurse or doctor.               Start taking these medicines.        Dose/Directions    Na Sulfate-K Sulfate-Mg Sulf solution   Commonly known as:  SUPREP BOWEL PREP KIT   Used for:  Rectal cancer (H)   Started by:  Daphne Mo MD        Dose:  1 Bottle   Take 177 mLs (1 Bottle) by mouth 2 times daily   Quantity:  2 Bottle   Refills:  2            Where to get your medicines      Some of these will need a paper prescription and others can be bought over the counter.  Ask your nurse if you have questions.     Bring a paper prescription for each of these medications     Na Sulfate-K Sulfate-Mg Sulf solution                Primary Care Provider Office Phone # Fax #    Philippe Healy [phone] [address] [address]        Equal Access to Services     [facility]: Hadii trent daniel hadasho Soomaali, waaxda luqadaha, qaybta kaalmada adetanneryada, jb pedroza . So [facility] [phone].    ATENCIÓN: Si habla español, tiene a mena disposición servicios gratuitos de asistencia lingüística. Llame al [phone].    We comply with applicable federal civil rights laws and [place] laws. We do not discriminate on the basis of race, color, national origin, age, disability, sex, sexual orientation, or gender identity.            Thank you!     Thank you for choosing [facility]  for your care. Our goal is always to provide you with excellent care. Hearing back from our patients is one way we can continue to improve our services. Please take a few minutes to complete the written survey that you may receive in the mail after your visit with us. Thank you!             Your Updated Medication List - Protect others around you: Learn how to safely use, store and throw away your medicines at www.disposemymeds.org.          This list is  accurate as of 5/3/18  8:27 PM.  Always use your most recent med list.                   Brand Name Dispense Instructions for use Diagnosis    ASPIRIN PO      Take by mouth as needed for moderate pain        dibucaine 1 % Oint ointment    NUPERCAINAL     3 times daily as needed for moderate pain        diltiazem 2% in PLO cream (FV COMPOUNDED) 2% Gel      Apply topically 2 times daily    Need for hepatitis C screening test       hydrocortisone 0.5 % ointment      Apply topically 2 times daily as needed Reported on 2/14/2017        ibuprofen 200 MG capsule     120 capsule    Take 200-400 mg by mouth every 6 hours as needed    Acute post-operative pain       lidocaine (Anorectal) 5 % Crea     1 Tube    Externally apply 1 Application topically 3 times daily as needed    Anal pain       Na Sulfate-K Sulfate-Mg Sulf solution    SUPREP BOWEL PREP KIT    2 Bottle    Take 177 mLs (1 Bottle) by mouth 2 times daily    Rectal cancer (H)       VITAMIN D (CHOLECALCIFEROL) PO      Take 2,000 Units by mouth daily

## 2018-05-07 ENCOUNTER — TELEPHONE (OUTPATIENT)
Dept: SURGERY | Facility: CLINIC | Age: 58
End: 2018-05-07

## 2018-05-07 NOTE — TELEPHONE ENCOUNTER
----- Message from Caity Garcia sent at 5/7/2018  1:42 PM CDT -----  Regarding: Out Patient Flex Sig under General surgery   Hussein Gerard,     Can you touch base with this patient, he is circling the phone lines.  He has an appt for Friday 05/11 at 9:10 with [facility] and he states he  Is having a colonoscopy so he is being transferred everywhere but where he needs to be, he is frustrated.  He states that he has never received any information about this appt, no prep material he does not know where to go and at what time?  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     Thanks,     [employer]

## 2018-05-07 NOTE — TELEPHONE ENCOUNTER
Per task, called and spoke with patient.  Informed patient that his packet was sent out 5/2/18.  Patient states he has not received his packet yet.  I went over procedure details with patient.  sent copy of procedure packet via [employer].  Patient confirms he will contact me via [employer] with additional questions or concerns.

## 2018-05-10 ENCOUNTER — ANESTHESIA EVENT (OUTPATIENT)
Dept: SURGERY | Facility: AMBULATORY SURGERY CENTER | Age: 58
End: 2018-05-10

## 2018-05-11 ENCOUNTER — SURGERY (OUTPATIENT)
Age: 58
End: 2018-05-11

## 2018-05-11 ENCOUNTER — HOSPITAL ENCOUNTER (OUTPATIENT)
Facility: AMBULATORY SURGERY CENTER | Age: 58
End: 2018-05-11
Attending: COLON & RECTAL SURGERY
Payer: COMMERCIAL

## 2018-05-11 ENCOUNTER — ANESTHESIA (OUTPATIENT)
Dept: SURGERY | Facility: AMBULATORY SURGERY CENTER | Age: 58
End: 2018-05-11

## 2018-05-11 VITALS
RESPIRATION RATE: 18 BRPM | SYSTOLIC BLOOD PRESSURE: 102 MMHG | TEMPERATURE: 97.3 F | DIASTOLIC BLOOD PRESSURE: 63 MMHG | OXYGEN SATURATION: 95 % | WEIGHT: 195 LBS | BODY MASS INDEX: 25.84 KG/M2 | HEIGHT: 73 IN

## 2018-05-11 DIAGNOSIS — K60.2 ANAL FISSURE: Primary | ICD-10-CM

## 2018-05-11 RX ORDER — PROPOFOL 10 MG/ML
INJECTION, EMULSION INTRAVENOUS CONTINUOUS PRN
Status: DISCONTINUED | OUTPATIENT
Start: 2018-05-11 | End: 2018-05-11

## 2018-05-11 RX ORDER — ACETAMINOPHEN 325 MG/1
975 TABLET ORAL ONCE
Status: COMPLETED | OUTPATIENT
Start: 2018-05-11 | End: 2018-05-11

## 2018-05-11 RX ORDER — FENTANYL CITRATE 50 UG/ML
25-50 INJECTION, SOLUTION INTRAMUSCULAR; INTRAVENOUS
Status: DISCONTINUED | OUTPATIENT
Start: 2018-05-11 | End: 2018-05-11 | Stop reason: HOSPADM

## 2018-05-11 RX ORDER — GABAPENTIN 300 MG/1
300 CAPSULE ORAL ONCE
Status: COMPLETED | OUTPATIENT
Start: 2018-05-11 | End: 2018-05-11

## 2018-05-11 RX ORDER — KETAMINE HYDROCHLORIDE 10 MG/ML
INJECTION INTRAMUSCULAR; INTRAVENOUS PRN
Status: DISCONTINUED | OUTPATIENT
Start: 2018-05-11 | End: 2018-05-11

## 2018-05-11 RX ORDER — NALOXONE HYDROCHLORIDE 0.4 MG/ML
.1-.4 INJECTION, SOLUTION INTRAMUSCULAR; INTRAVENOUS; SUBCUTANEOUS
Status: DISCONTINUED | OUTPATIENT
Start: 2018-05-11 | End: 2018-05-12 | Stop reason: HOSPADM

## 2018-05-11 RX ORDER — OXYCODONE HCL 5 MG/5 ML
5 SOLUTION, ORAL ORAL EVERY 4 HOURS PRN
Status: DISCONTINUED | OUTPATIENT
Start: 2018-05-11 | End: 2018-05-12 | Stop reason: HOSPADM

## 2018-05-11 RX ORDER — LIDOCAINE 40 MG/G
CREAM TOPICAL
Status: DISCONTINUED | OUTPATIENT
Start: 2018-05-11 | End: 2018-05-11 | Stop reason: HOSPADM

## 2018-05-11 RX ORDER — BUPIVACAINE HYDROCHLORIDE 5 MG/ML
INJECTION, SOLUTION PERINEURAL PRN
Status: DISCONTINUED | OUTPATIENT
Start: 2018-05-11 | End: 2018-05-11 | Stop reason: HOSPADM

## 2018-05-11 RX ORDER — ACETAMINOPHEN 325 MG/1
975 TABLET ORAL ONCE
Status: DISCONTINUED | OUTPATIENT
Start: 2018-05-11 | End: 2018-05-11 | Stop reason: HOSPADM

## 2018-05-11 RX ORDER — GLYCOPYRROLATE 0.2 MG/ML
INJECTION, SOLUTION INTRAMUSCULAR; INTRAVENOUS PRN
Status: DISCONTINUED | OUTPATIENT
Start: 2018-05-11 | End: 2018-05-11

## 2018-05-11 RX ORDER — LIDOCAINE HYDROCHLORIDE 20 MG/ML
INJECTION, SOLUTION INFILTRATION; PERINEURAL PRN
Status: DISCONTINUED | OUTPATIENT
Start: 2018-05-11 | End: 2018-05-11

## 2018-05-11 RX ORDER — MENTHOL AND ZINC OXIDE .44; 20.625 G/100G; G/100G
OINTMENT TOPICAL 4 TIMES DAILY PRN
Qty: 71 G | Refills: 3 | Status: SHIPPED | OUTPATIENT
Start: 2018-05-11 | End: 2018-06-15

## 2018-05-11 RX ORDER — SODIUM CHLORIDE, SODIUM LACTATE, POTASSIUM CHLORIDE, CALCIUM CHLORIDE 600; 310; 30; 20 MG/100ML; MG/100ML; MG/100ML; MG/100ML
INJECTION, SOLUTION INTRAVENOUS CONTINUOUS
Status: DISCONTINUED | OUTPATIENT
Start: 2018-05-11 | End: 2018-05-11 | Stop reason: HOSPADM

## 2018-05-11 RX ORDER — ONDANSETRON 2 MG/ML
4 INJECTION INTRAMUSCULAR; INTRAVENOUS EVERY 30 MIN PRN
Status: DISCONTINUED | OUTPATIENT
Start: 2018-05-11 | End: 2018-05-12 | Stop reason: HOSPADM

## 2018-05-11 RX ORDER — LIDOCAINE HYDROCHLORIDE 10 MG/ML
INJECTION, SOLUTION INFILTRATION; PERINEURAL PRN
Status: DISCONTINUED | OUTPATIENT
Start: 2018-05-11 | End: 2018-05-11 | Stop reason: HOSPADM

## 2018-05-11 RX ORDER — SODIUM CHLORIDE, SODIUM LACTATE, POTASSIUM CHLORIDE, CALCIUM CHLORIDE 600; 310; 30; 20 MG/100ML; MG/100ML; MG/100ML; MG/100ML
INJECTION, SOLUTION INTRAVENOUS CONTINUOUS
Status: DISCONTINUED | OUTPATIENT
Start: 2018-05-11 | End: 2018-05-12 | Stop reason: HOSPADM

## 2018-05-11 RX ORDER — ONDANSETRON 2 MG/ML
INJECTION INTRAMUSCULAR; INTRAVENOUS PRN
Status: DISCONTINUED | OUTPATIENT
Start: 2018-05-11 | End: 2018-05-11

## 2018-05-11 RX ORDER — MEPERIDINE HYDROCHLORIDE 25 MG/ML
12.5 INJECTION INTRAMUSCULAR; INTRAVENOUS; SUBCUTANEOUS
Status: DISCONTINUED | OUTPATIENT
Start: 2018-05-11 | End: 2018-05-12 | Stop reason: HOSPADM

## 2018-05-11 RX ORDER — ONDANSETRON 4 MG/1
4 TABLET, ORALLY DISINTEGRATING ORAL EVERY 30 MIN PRN
Status: DISCONTINUED | OUTPATIENT
Start: 2018-05-11 | End: 2018-05-12 | Stop reason: HOSPADM

## 2018-05-11 RX ORDER — MENTHOL AND ZINC OXIDE .44; 20.625 G/100G; G/100G
OINTMENT TOPICAL 4 TIMES DAILY PRN
Qty: 71 G | Refills: 3 | Status: SHIPPED | OUTPATIENT
Start: 2018-05-11 | End: 2018-05-11

## 2018-05-11 RX ADMIN — KETAMINE HYDROCHLORIDE 50 MG: 10 INJECTION INTRAMUSCULAR; INTRAVENOUS at 09:03

## 2018-05-11 RX ADMIN — PROPOFOL 100 MCG/KG/MIN: 10 INJECTION, EMULSION INTRAVENOUS at 08:54

## 2018-05-11 RX ADMIN — SODIUM CHLORIDE, SODIUM LACTATE, POTASSIUM CHLORIDE, CALCIUM CHLORIDE: 600; 310; 30; 20 INJECTION, SOLUTION INTRAVENOUS at 08:14

## 2018-05-11 RX ADMIN — GLYCOPYRROLATE 0.1 MG: 0.2 INJECTION, SOLUTION INTRAMUSCULAR; INTRAVENOUS at 08:54

## 2018-05-11 RX ADMIN — GABAPENTIN 300 MG: 300 CAPSULE ORAL at 08:13

## 2018-05-11 RX ADMIN — BUPIVACAINE HYDROCHLORIDE 20 ML: 5 INJECTION, SOLUTION PERINEURAL at 09:12

## 2018-05-11 RX ADMIN — LIDOCAINE HYDROCHLORIDE 30 MG: 20 INJECTION, SOLUTION INFILTRATION; PERINEURAL at 08:49

## 2018-05-11 RX ADMIN — ONDANSETRON 4 MG: 2 INJECTION INTRAMUSCULAR; INTRAVENOUS at 08:54

## 2018-05-11 RX ADMIN — LIDOCAINE HYDROCHLORIDE 20 ML: 10 INJECTION, SOLUTION INFILTRATION; PERINEURAL at 09:11

## 2018-05-11 RX ADMIN — ACETAMINOPHEN 975 MG: 325 TABLET ORAL at 08:13

## 2018-05-11 NOTE — DISCHARGE INSTRUCTIONS
Anorectal Surgery Instructions    What can I expect after anorectal surgery?  Most anorectal procedures are done as outpatient surgery, and you go home the same day as the procedure. A few surgical procedures will require that you stay in the hospital for about one to three days. No matter where the procedure is done or how long or short it takes, these recommendations will help you heal and feel more comfortable.    Medicines:  The anal area is very sensitive; you can expect to have some pain for up to 2-4 weeks after the procedure. Your doctor will give you a prescription for one or more pain medications.    Take naprosyn 500 mg twice a day OR ibuprofen 600 mg four times a day     Take this on a regular basis (not as needed) following your surgery.     The drugs are best taken with food.  Do not take if it causes stomach upset or if you have a history of ulcers or gastritis. You can stop the naprosyn (or ibuprofen) or reduce the dose when you are feeling better.    DO NOT use naprosyn, ibuprofen, or other similar agents (eg. Advil or Aleve) if you have inflammatory bowel disease (Ulcerative Colitis or Crohn's disease) or if your doctor as advised you against using these medications    Take acetominaphen (Tylenol) 650-1000 mg four times a day.     Take this on a regular basis (not as needed) following surgery for pain control.     Take the lower dose if you are >65 years old or have liver disease. The maximum dose of acetominaphen is 4000 mg a day. You can stop the acetaminophen or reduce the dose when you are feeling better.    It is important to realize that many narcotic pain relievers (including vicodin, percocet, tylenol #3) also have acetaminophen, and excessive doses of acetaminophen can be dangerous, so do not take these in addition to acetominaphen.  You may take narcotics that don't contain acetominaphen such as oxycodone.      Take oxycodone AS NEEDED in addition to the acetominaphen and naprosyn.       Because narcotics have side effects (including constipation), you should reduce your use of these medications as tolerated as your pain improves.    *In general, the best strategy is to take (if you are able to tolerate it) the tylenol and naprosen on a regular basis until your pain has largely gone away. You can take the narcotic pain medicine as needed in addition to the tylenol and ibuprofen. As your pain begins to lessen, you should cut back on your narcotic use while continuing to take your regular tylenol and naprosyn doses.      Refilling prescriptions. If you need additional pain medication, please call the triage nurse at [phone] during normal business hours (8 a.m. to 4 p.m., Monday though Friday) or have your pharmacy fax a refill request to [phone]. If you call after hours or on the weekends, the doctor on call may not know you personally and may not renew narcotic pain medication by phone. Call your primary care provider for all other medication refills.    Perineal care:  External gauze dressing can be removed the morning after surgery. If you have an adhesive dressing stuck to the incision, DO NOT remove this.   Tub baths:    If possible, take a tub bath immediately after each bowel movement.     Baths should be take at least 3 times daily for the first week to 10 days following your procedure. You should soak in the tub for 10 to 15 minutes each time with water as warm as you can tolerate.     Even after you go back to work, it is a good idea to sit in the tub in the morning, after returning from work, and again in the evening before bedtime.    Bleeding/Infection:    You can expect to have some bleeding after bowel movements, but it should stop soon after you wipe.     Use a wet cloth or perianal pad (Tucks or Preparation H pads) to gently wipe the area after each bowel movement.    Do not rub the anal area or use a lot of pressure.    Using a spray bottle filled with warm water helps  loosen any remaining stool. Blot gently with a soft dry cloth or tissue paper.    Infection around the anal opening is not very common. The anal area has excellent blood supply, which helps the area to heal. Bloody discharge after bowel movements is normal and may last 2 to 4 weeks after your surgery. However, if you bleed between bowel movements and cannot get it to stop, call the triage nurse immediately [phone].    Bowel function:  Take a fiber supplement such as Metamucil, which is over the counter. It is important to drink six to eight glasses of water or juice everyday when using fiber products.    If you do not have a bowel movement after 1-2 days:    Take Milk of Magnesia-2 tablespoons.       If there are no results, repeat this or add over the counter Miralax.      If you still do not have results, contact the clinic.     If there are no results, repeat this. Stop taking Milk of Magnesia or other laxatives if you begin to have diarrhea.    * Constipation will cause you to strain when you have a bowel movement. The hard stool will be difficult to pass, will increase pain and bleeding, and will slow down healing.  Try to avoid constipation and/or diarrhea as this can make the pain and bleeding worse.    * It is important to have regular bowel movements at least every other day and to keep your stool soft.  A high fiber diet, including at least four servings of fruits or vegetables daily, will help to keep your bowel movements regular and soft.    Activity:  After your procedure, there are no restrictions on your activity     except restrictions surrounding being on narcotics and in pain, such as no heavy machine operating or driving.     You may walk, climb stairs, ride in a car, and sit as tolerated.     It is helpful to avoid sitting in one position for long periods (2 or more hours).    After some surgeries, you may be told not to perform any lifting (more than 10 pounds) for several weeks after  surgery.    When to call:  When do I need to call the doctor or triage nurse?    If you experience any of the problems listed here, call our triage nurse during business hours ([phone]).     The nurse will help you with your problem or have the doctor call you.     After hours and on weekends, please call the main hospital number ([phone]) and ask for the colon and rectal surgery person on call.     Some is available to help you 24 hours a day, seven days a week.    Call for:   ? Fever greater than 101 degrees   ? Chills   ? Foul-smelling drainage   ? Nausea and vomiting   ? Diarrhea - greater than 3 water stools in 24 hours   ? Constipation - no bowel movement after 3 days   ? Severe bleeding that does not stop soon after a bowel movement   ? Problems with the incision, including increased pain, swelling, or redness    [facility]  Home Care Following Anesthesia  For 24 hours after surgery:  1. Get plenty of rest.  A responsible adult must stay with you for at least 24 hours after you leave the surgery center.  2. Do not drive or use heavy equipment.  If you have weakness or tingling, don't drive or use heavy equipment until this feeling goes away.   3. Do not drink alcohol.   4. Avoid strenuous or risky activities.  Ask for help when climbing stairs.  5. You may feel lightheaded.  IF so, sit for a few minutes before standing.  Have someone help you get up.   6. If you have nausea (feel sick to your stomach): Drink only clear liquids such as apple juice, ginger ale, broth or 7-Up.  Rest may also help.  Be sure to drink enough fluids.  Move to a regular diet as you feel able.   7. You may have a slight fever.  Call the doctor if your fever is over 100 F (37.7 C) (taken under the tongue) or lasts longer than 24 hours.  8. You may have a dry mouth, a sore throat, muscle aches or trouble sleeping. These should go away after 24 hours.  9. Do not make important or legal  decisions.             Tips for taking pain medications  To get the best pain relief possible, remember these points:    Take pain medications as directed, before pain becomes severe.    Pain medication can upset your stomach: taking it with food may help.    Constipation is a common side effect of pain medication. Drink plenty of  fluids.    Eat foods high in fiber. Take a stool softener if recommended by your doctor or pharmacist.    Do not drink alcohol, drive or operate machinery while taking pain medications.    Ask about other ways to control pain, such as with heat, ice or relaxation.    Tylenol/Acetaminophen Consumption  To help encourage the safe use of acetaminophen, the makers of TYLENOL  have lowered the maximum daily dose for single-ingredient Extra Strength TYLENOL  (acetaminophen) products sold in the [place] from 8 pills per day (4,000 mg) to 6 pills per day (3,000 mg). The dosing interval has also changed from 2 pills every 4-6 hours to 2 pills every 6 hours.    If you feel your pain relief is insufficient, you may take Tylenol/Acetaminophen in addition to your narcotic pain medication.     Be careful not to exceed 3,000 mg of Tylenol/Acetaminophen in a 24 hour period from all sources.    If you are taking extra strength Tylenol/acetaminophen (500 mg), the maximum dose is 6 tablets in 24 hours.    If you are taking regular strength acetaminophen (325 mg), the maximum dose is 9 tablets in 24 hours.    Call a doctor for any of the followin. Signs of infection (fever, growing tenderness at the surgery site, a large amount of drainage or bleeding, severe pain, foul-smelling drainage, redness, swelling).  2. It has been over 8 to 10 hours since surgery and you are still not able to urinate (pass water).  3. Headache for over 24 hours.    Your doctor is:       Dr. Felicitas Radford, Colon Rectal: [phone]               Or dial [phone] and ask for the resident on call for:  Colon  Rectal  For emergency care, call the:  [place] [facility]:  [phone] (TTY for hearing impaired: [phone])

## 2018-05-11 NOTE — ANESTHESIA CARE TRANSFER NOTE
Patient: Louie Greco    Procedure(s):  Examination Under Anesthesia Anus, Interoperative Flexible Sigmoidoscopy, Application of Formalin   - Wound Class: II-Clean Contaminated    Diagnosis: Surveillance of Rectal Cancer  Diagnosis Additional Information: No value filed.    Anesthesia Type:   MAC     Note:  Airway :Room Air  Patient transferred to:Phase II  Comments: Arrive Phase II, Stable, Airway Intact  11/71, 89,24,93%,97.5  All questions answered.  Handoff Report: Identifed the Patient, Identified the Reponsible Provider, Reviewed the pertinent medical history, Discussed the surgical course, Reviewed Intra-OP anesthesia mangement and issues during anesthesia, Set expectations for post-procedure period and Allowed opportunity for questions and acknowledgement of understanding      Vitals: (Last set prior to Anesthesia Care Transfer)    [facility] VITALS  5/11/2018 0851 - 5/11/2018 0924      5/11/2018             Pulse: 62    SpO2: 91 %    Resp Rate (set): 10                Electronically Signed By: QUINTON Fleming CRNA  May 11, 2018  9:24 AM

## 2018-05-11 NOTE — OP NOTE
SURGEON: Felicitas Radford MD      ASSISTANT: Ayad Robin MD, Surgery Resident     PREOPERATIVE DIAGNOSIS: Rectal cancer with watch and wait protocol. Need for surveillance.      POSTOPERATIVE DIAGNOSIS: Rectal cancer with watch and wait protocol. Need for surveillance. Radiation proctitis.     PROCEDURE: Examination under anesthesia, flexible sigmoidoscopy, application of formalin.     INDICATIONS: Louie Greco is a 57 year old male who was found to have a rectal cancer and had a complete response to chemoradiotherapy. He is now on a watch and wait protocol and we are examining the area under anesthesia because of the friability and pain in the area. He also has some radiation proctitis that has responded to formalin in the past. The risks and benefits of surgery were thoroughly discussed with the patient and he agreed to proceed.      DESCRIPTION OF PROCEDURE: The patient was brought to the operating room, placed prone on the operating room table. Deep sedation was induced with intravenous medicines with deep sedation and monitored anesthesia care. We prepped and draped the area in the usual sterile fashion. We began the procedure with a timeout and performed an anal block using a mixture 50/50 of bupivacaine and lidocaine with epinephrine. A total of 40 mL were infused and following this, we performed digital rectal examination and anoscopy which demonstrated an area of distal anterior scarring and some minimal radiation changes. Despite using the small anoscope, there was tearing of the distal posterior anoderm. There were no palpable abnormalities. There was no nodularity or induration. A flexible sigmoidoscopywith CO2 was then performed and the scope was advanced to the descending colon without difficulty. There was diverticulosis and no signs of divertciulitis. There were no polyps or other lesions. On retroflexion, there were some mild radiation changes and the scar was visible, but no signs of  disease or additional lesions. The anoscope was reinserted and using large swabs, 10% formalin was applied to the distal rectum to treat the radiation proctitis. At the end the case, all instruments and sponge counts were correct x2. The patient was emerged from anesthesia and taken to postoperative anesthesia care unit in good condition.      SPECIMEN: None.     ESTIMATED BLOOD LOSS: Minimal.      URINE OUTPUT: Not measured.      AROLDO NAIK MD   Colon and Rectal Surgery Staff  [facility]

## 2018-05-11 NOTE — IP AVS SNAPSHOT
MRN:[id number]                      After Visit Summary   5/11/2018    Louie Greco    MRN: [id number]           Thank you!     Thank you for choosing [facility] for your care. Our goal is always to provide you with excellent care. Hearing back from our patients is one way we can continue to improve our services. Please take a few minutes to complete the written survey that you may receive in the mail after you visit with us. Thank you!        Patient Information     Date Of Birth          1960        About your hospital stay     You were admitted on:  May 11, 2018 [facility]    You were discharged on:  May 11, 2018       Who to Call     For medical emergencies, please call 911.  For non-urgent questions about your medical care, please call your primary care provider or clinic, [phone]  For questions related to your surgery, please call your surgery clinic        Attending Provider     Provider Specialty    Felicitas Radford MD Colon and Rectal Surgery       Primary Care Provider Office Phone # Fax #    Philippe Healy [phone]      After Care Instructions     Diet Instructions       Resume pre-procedure diet            Discharge Instructions       Follow up appointment as instructed by Surgeon and or RN  Continue watch and wait protocol                  Your next 10 appointments already scheduled     Jul 25, 2018 11:00 AM CDT   MR PELVIS W/O & W CONTRAST with UUMR1   [facility], [place], MRI ([facility], [place])    [address] [facility] [id number] [phone]           Take your medicines as usual, unless your doctor tells you not to. Bring a list of your current medicines to your exam (including vitamins, minerals and over-the-counter drugs).  You may or may not receive intravenous (IV) contrast for this exam pending the discretion of the  Radiologist.  You do not need to do anything special to prepare.  The MRI machine uses a strong magnet. Please wear clothes without metal (snaps, zippers). A sweatsuit works well, or we may give you a hospital gown.  Please remove any body piercings and hair extensions before you arrive. You will also remove watches, jewelry, hairpins, wallets, dentures, partial dental plates and hearing aids. You may wear contact lenses, and you may be able to wear your rings. We have a safe place to keep your personal items, but it is safer to leave them at home.  **IMPORTANT** THE INSTRUCTIONS BELOW ARE ONLY FOR THOSE PATIENTS WHO HAVE BEEN PRESCRIBED SEDATION OR GENERAL ANESTHESIA DURING THEIR MRI PROCEDURE:  IF YOUR DOCTOR PRESCRIBED ORAL SEDATION (take medicine to help you relax during your exam):   You must get the medicine from your doctor (oral medication) before you arrive. Bring the medicine to the exam. Do not take it at home. You ll be told when to take it upon arriving for your exam.   Arrive one hour early. Bring someone who can take you home after the test. Your medicine will make you sleepy. After the exam, you may not drive, take a bus or take a taxi by yourself.  IF YOUR DOCTOR PRESCRIBED IV SEDATION:   Arrive one hour early. Bring someone who can take you home after the test. Your medicine will make you sleepy. After the exam, you may not drive, take a bus or take a taxi by yourself.   No eating 6 hours before your exam. You may have clear liquids up until 4 hours before your exam. (Clear liquids include water, clear tea, black coffee and fruit juice without pulp.)  IF YOUR DOCTOR PRESCRIBED ANESTHESIA (be asleep for your exam):   Arrive 1 1/2 hours early. Bring someone who can take you home after the test. You may not drive, take a bus or take a taxi by yourself.   No eating 8 hours before your exam. You may have clear liquids up until 4 hours before your exam. (Clear liquids include water, clear tea, black  coffee and fruit juice without pulp.)   You will spend four to five hours in the recovery room.  Please call the Imaging Department at your exam site with any questions.            Jul 25, 2018 12:00 PM CDT   PE NPET ONCOLOGY (EYES TO THIGHS) with UUPET1   [facility], [place] [facility] ([facility], [facility])    [address] [facility] [address]   [phone]           Tell your doctor:   If there is any chance you may be pregnant or if you are breastfeeding.   If you have problems lying in small spaces (claustrophobia). If you do, your doctor may give you medicine to help you relax. If you have diabetes:   Have your exam early in the morning. Your blood glucose will go up as the day goes by.   Your glucose level must be 180 or less at the start of the exam. Please take any medicines you need to ensure this blood glucose level. 24 hours before your scan: Don t do any heavy exercise. (No jogging, aerobics or other workouts.) Exercise will make your pictures less accurate. 6 hours before your scan:   Stop all food and liquids (except water).   Do not chew gum or suck on mints.   If you need to take medicine with food, you may take it with a few crackers.  Please call your Imaging Department at your exam site with any questions.            Jul 31, 2018 10:30 AM CDT   Return Visit with Agueda Manley MD   [facility] ([facility])    [facility]  [address] [facility] [address]  [facility] [address]   [phone]              Further instructions from your care team       Anorectal Surgery Instructions    What can I expect after anorectal surgery?  Most anorectal procedures are done as outpatient surgery, and you go home the same day as the procedure. A few surgical procedures will require that you stay in the hospital for about one to three days. No matter where the procedure is done or how long or short it takes, these  recommendations will help you heal and feel more comfortable.    Medicines:  The anal area is very sensitive; you can expect to have some pain for up to 2-4 weeks after the procedure. Your doctor will give you a prescription for one or more pain medications.    Take naprosyn 500 mg twice a day OR ibuprofen 600 mg four times a day     Take this on a regular basis (not as needed) following your surgery.     The drugs are best taken with food.  Do not take if it causes stomach upset or if you have a history of ulcers or gastritis. You can stop the naprosyn (or ibuprofen) or reduce the dose when you are feeling better.    DO NOT use naprosyn, ibuprofen, or other similar agents (eg. Advil or Aleve) if you have inflammatory bowel disease (Ulcerative Colitis or Crohn's disease) or if your doctor as advised you against using these medications    Take acetominaphen (Tylenol) 650-1000 mg four times a day.     Take this on a regular basis (not as needed) following surgery for pain control.     Take the lower dose if you are >65 years old or have liver disease. The maximum dose of acetominaphen is 4000 mg a day. You can stop the acetaminophen or reduce the dose when you are feeling better.    It is important to realize that many narcotic pain relievers (including vicodin, percocet, tylenol #3) also have acetaminophen, and excessive doses of acetaminophen can be dangerous, so do not take these in addition to acetominaphen.  You may take narcotics that don't contain acetominaphen such as oxycodone.      Take oxycodone AS NEEDED in addition to the acetominaphen and naprosyn.      Because narcotics have side effects (including constipation), you should reduce your use of these medications as tolerated as your pain improves.    *In general, the best strategy is to take (if you are able to tolerate it) the tylenol and naprosen on a regular basis until your pain has largely gone away. You can take the narcotic pain medicine as needed  in addition to the tylenol and ibuprofen. As your pain begins to lessen, you should cut back on your narcotic use while continuing to take your regular tylenol and naprosyn doses.      Refilling prescriptions. If you need additional pain medication, please call the triage nurse at [phone] during normal business hours (8 a.m. to 4 p.m., Monday though Friday) or have your pharmacy fax a refill request to [phone]. If you call after hours or on the weekends, the doctor on call may not know you personally and may not renew narcotic pain medication by phone. Call your primary care provider for all other medication refills.    Perineal care:  External gauze dressing can be removed the morning after surgery. If you have an adhesive dressing stuck to the incision, DO NOT remove this.   Tub baths:    If possible, take a tub bath immediately after each bowel movement.     Baths should be take at least 3 times daily for the first week to 10 days following your procedure. You should soak in the tub for 10 to 15 minutes each time with water as warm as you can tolerate.     Even after you go back to work, it is a good idea to sit in the tub in the morning, after returning from work, and again in the evening before bedtime.    Bleeding/Infection:    You can expect to have some bleeding after bowel movements, but it should stop soon after you wipe.     Use a wet cloth or perianal pad (Tucks or Preparation H pads) to gently wipe the area after each bowel movement.    Do not rub the anal area or use a lot of pressure.    Using a spray bottle filled with warm water helps loosen any remaining stool. Blot gently with a soft dry cloth or tissue paper.    Infection around the anal opening is not very common. The anal area has excellent blood supply, which helps the area to heal. Bloody discharge after bowel movements is normal and may last 2 to 4 weeks after your surgery. However, if you bleed between bowel movements and cannot  get it to stop, call the triage nurse immediately [phone].    Bowel function:  Take a fiber supplement such as Metamucil, which is over the counter. It is important to drink six to eight glasses of water or juice everyday when using fiber products.    If you do not have a bowel movement after 1-2 days:    Take Milk of Magnesia-2 tablespoons.       If there are no results, repeat this or add over the counter Miralax.      If you still do not have results, contact the clinic.     If there are no results, repeat this. Stop taking Milk of Magnesia or other laxatives if you begin to have diarrhea.    * Constipation will cause you to strain when you have a bowel movement. The hard stool will be difficult to pass, will increase pain and bleeding, and will slow down healing.  Try to avoid constipation and/or diarrhea as this can make the pain and bleeding worse.    * It is important to have regular bowel movements at least every other day and to keep your stool soft.  A high fiber diet, including at least four servings of fruits or vegetables daily, will help to keep your bowel movements regular and soft.    Activity:  After your procedure, there are no restrictions on your activity     except restrictions surrounding being on narcotics and in pain, such as no heavy machine operating or driving.     You may walk, climb stairs, ride in a car, and sit as tolerated.     It is helpful to avoid sitting in one position for long periods (2 or more hours).    After some surgeries, you may be told not to perform any lifting (more than 10 pounds) for several weeks after surgery.    When to call:  When do I need to call the doctor or triage nurse?    If you experience any of the problems listed here, call our triage nurse during business hours ([phone]).     The nurse will help you with your problem or have the doctor call you.     After hours and on weekends, please call the main hospital number ([phone]) and ask for  the colon and rectal surgery person on call.     Some is available to help you 24 hours a day, seven days a week.    Call for:   ? Fever greater than 101 degrees   ? Chills   ? Foul-smelling drainage   ? Nausea and vomiting   ? Diarrhea - greater than 3 water stools in 24 hours   ? Constipation - no bowel movement after 3 days   ? Severe bleeding that does not stop soon after a bowel movement   ? Problems with the incision, including increased pain, swelling, or redness    [facility]  Home Care Following Anesthesia  For 24 hours after surgery:  1. Get plenty of rest.  A responsible adult must stay with you for at least 24 hours after you leave the surgery center.  2. Do not drive or use heavy equipment.  If you have weakness or tingling, don't drive or use heavy equipment until this feeling goes away.   3. Do not drink alcohol.   4. Avoid strenuous or risky activities.  Ask for help when climbing stairs.  5. You may feel lightheaded.  IF so, sit for a few minutes before standing.  Have someone help you get up.   6. If you have nausea (feel sick to your stomach): Drink only clear liquids such as apple juice, ginger ale, broth or 7-Up.  Rest may also help.  Be sure to drink enough fluids.  Move to a regular diet as you feel able.   7. You may have a slight fever.  Call the doctor if your fever is over 100 F (37.7 C) (taken under the tongue) or lasts longer than 24 hours.  8. You may have a dry mouth, a sore throat, muscle aches or trouble sleeping. These should go away after 24 hours.  9. Do not make important or legal decisions.             Tips for taking pain medications  To get the best pain relief possible, remember these points:    Take pain medications as directed, before pain becomes severe.    Pain medication can upset your stomach: taking it with food may help.    Constipation is a common side effect of pain medication. Drink plenty of  fluids.    Eat foods high in  fiber. Take a stool softener if recommended by your doctor or pharmacist.    Do not drink alcohol, drive or operate machinery while taking pain medications.    Ask about other ways to control pain, such as with heat, ice or relaxation.    Tylenol/Acetaminophen Consumption  To help encourage the safe use of acetaminophen, the makers of TYLENOL  have lowered the maximum daily dose for single-ingredient Extra Strength TYLENOL  (acetaminophen) products sold in the [place] from 8 pills per day (4,000 mg) to 6 pills per day (3,000 mg). The dosing interval has also changed from 2 pills every 4-6 hours to 2 pills every 6 hours.    If you feel your pain relief is insufficient, you may take Tylenol/Acetaminophen in addition to your narcotic pain medication.     Be careful not to exceed 3,000 mg of Tylenol/Acetaminophen in a 24 hour period from all sources.    If you are taking extra strength Tylenol/acetaminophen (500 mg), the maximum dose is 6 tablets in 24 hours.    If you are taking regular strength acetaminophen (325 mg), the maximum dose is 9 tablets in 24 hours.    Call a doctor for any of the followin. Signs of infection (fever, growing tenderness at the surgery site, a large amount of drainage or bleeding, severe pain, foul-smelling drainage, redness, swelling).  2. It has been over 8 to 10 hours since surgery and you are still not able to urinate (pass water).  3. Headache for over 24 hours.    Your doctor is:       Dr. Felicitas Radford, Colon Rectal: [phone]               Or dial [phone] and ask for the resident on call for:  Colon Rectal  For emergency care, call the:  [facility]:  [phone] (TTY for hearing impaired: [phone])                  Pending Results     No orders found from 2018 to 2018.            Admission Information     Date & Time Provider Department Dept. Phone    2018 Felicitas Radford MD [facility]  "[phone]      Your Vitals Were     Blood Pressure Temperature Respirations Height Weight Pulse Oximetry    102/63 97.3  F (36.3  C) (Temporal) 18 1.842 m (6' 0.5\") 88.5 kg (195 lb) 95%    BMI (Body Mass Index)                   26.08 kg/m2           [employer] Information     [employer] gives you secure access to your electronic health record. If you see a primary care provider, you can also send messages to your care team and make appointments. If you have questions, please call your primary care clinic.  If you do not have a primary care provider, please call [phone] and they will assist you.      [employer] is an electronic gateway that provides easy, online access to your medical records. With [employer], you can request a clinic appointment, read your test results, renew a prescription or communicate with your care team.     To access your existing account, please contact your [facility] or call [phone] for assistance.        Care EveryWhere ID     This is your Care EveryWhere ID. This could be used by other organizations to access your [facility] medical records  [id number]        Equal Access to Services     BLAISE EDDY : Haddawit Alatorre, annia cullen, aj tran, jb coats. So [facility] [phone].    ATENCIÓN: Si habla español, tiene a mena disposición servicios gratuitos de asistencia lingüística. Bradame al [phone].    We comply with applicable federal civil rights laws and [place] laws. We do not discriminate on the basis of race, color, national origin, age, disability, sex, sexual orientation, or gender identity.               Review of your medicines      CONTINUE these medicines which have NOT CHANGED        Dose / Directions    ASPIRIN PO        Take by mouth as needed for moderate pain   Refills:  0       dibucaine 1 % Oint ointment   Commonly known as:  NUPERCAINAL        3 times daily as needed for " moderate pain   Refills:  0       diltiazem 2% in PLO cream (FV COMPOUNDED) 2% Gel   Used for:  Need for hepatitis C screening test        Apply topically 2 times daily   Refills:  0       hydrocortisone 0.5 % ointment        Apply topically 2 times daily as needed Reported on 2/14/2017   Refills:  0       ibuprofen 200 MG capsule   Used for:  Acute post-operative pain        Dose:  200-400 mg   Take 200-400 mg by mouth every 6 hours as needed   Quantity:  120 capsule   Refills:  0       lidocaine (Anorectal) 5 % Crea   Used for:  Anal pain        Dose:  1 Application   Externally apply 1 Application topically 3 times daily as needed   Quantity:  1 Tube   Refills:  0       Na Sulfate-K Sulfate-Mg Sulf solution   Commonly known as:  SUPREP BOWEL PREP KIT   Used for:  Rectal cancer (H)        Dose:  1 Bottle   Take 177 mLs (1 Bottle) by mouth 2 times daily   Quantity:  2 Bottle   Refills:  2       VITAMIN D (CHOLECALCIFEROL) PO        Dose:  2000 Units   Take 2,000 Units by mouth daily   Refills:  0                Protect others around you: Learn how to safely use, store and throw away your medicines at www.disposemymeds.org.             Medication List: This is a list of all your medications and when to take them. Check marks below indicate your daily home schedule. Keep this list as a reference.      Medications           Morning Afternoon Evening Bedtime As Needed    ASPIRIN PO   Take by mouth as needed for moderate pain                                dibucaine 1 % Oint ointment   Commonly known as:  NUPERCAINAL   3 times daily as needed for moderate pain                                diltiazem 2% in PLO cream (FV COMPOUNDED) 2% Gel   Apply topically 2 times daily                                hydrocortisone 0.5 % ointment   Apply topically 2 times daily as needed Reported on 2/14/2017                                ibuprofen 200 MG capsule   Take 200-400 mg by mouth every 6 hours as needed                                 lidocaine (Anorectal) 5 % Crea   Externally apply 1 Application topically 3 times daily as needed                                Na Sulfate-K Sulfate-Mg Sulf solution   Commonly known as:  SUPREP BOWEL PREP KIT   Take 177 mLs (1 Bottle) by mouth 2 times daily                                VITAMIN D (CHOLECALCIFEROL) PO   Take 2,000 Units by mouth daily

## 2018-05-11 NOTE — ANESTHESIA POSTPROCEDURE EVALUATION
Patient: Louie Greco    Procedure(s):  Examination Under Anesthesia Anus, Interoperative Flexible Sigmoidoscopy, Application of Formalin   - Wound Class: II-Clean Contaminated    Diagnosis:Surveillance of Rectal Cancer  Diagnosis Additional Information: No value filed.    Anesthesia Type:  MAC    Note:  Anesthesia Post Evaluation    Patient location during evaluation: PACU  Patient participation: Able to fully participate in evaluation  Level of consciousness: awake and alert  Pain management: adequate  Airway patency: patent  Cardiovascular status: acceptable  Respiratory status: acceptable  Hydration status: acceptable  PONV: none             Last vitals:  Vitals:    05/11/18 0925 05/11/18 0930 05/11/18 0955   BP: 111/71 104/65 102/63   Resp: 24 24 18   Temp: 36.4  C (97.5  F)  36.3  C (97.3  F)   SpO2: 93% 93% 95%         Electronically Signed By: Shubham Teague MD  May 11, 2018  10:09 AM

## 2018-05-11 NOTE — IP AVS SNAPSHOT
[facility]    [address] [place] [address]    Phone:  [phone]    Fax:  [phone]                                       After Visit Summary   5/11/2018    Louie Greco    MRN: [id number]           After Visit Summary Signature Page     I have received my discharge instructions, and my questions have been answered. I have discussed any challenges I see with this plan with the nurse or doctor.    ..........................................................................................................................................  Patient/Patient Representative Signature      ..........................................................................................................................................  Patient Representative Print Name and Relationship to Patient    ..................................................               ................................................  Date                                            Time    ..........................................................................................................................................  Reviewed by Signature/Title    ...................................................              ..............................................  Date                                                            Time

## 2018-05-15 DIAGNOSIS — K60.2 ANAL FISSURE: ICD-10-CM

## 2018-05-15 NOTE — TELEPHONE ENCOUNTER
"Pharmacy request- follow up needed.    Lidocaine 2% Gel:   Fax received from [facility] that reads, \"the 2% gel and jelly are unavailable from the . May we change to the 2% viscous solution? If so, may we dispense a whole bottle of 100 mL? Please advise\".     [facility] phone #: [phone]   "

## 2018-06-15 ENCOUNTER — OFFICE VISIT (OUTPATIENT)
Dept: FAMILY MEDICINE | Facility: CLINIC | Age: 58
End: 2018-06-15
Payer: COMMERCIAL

## 2018-06-15 VITALS
WEIGHT: 199.1 LBS | HEART RATE: 102 BPM | DIASTOLIC BLOOD PRESSURE: 65 MMHG | HEIGHT: 71 IN | OXYGEN SATURATION: 96 % | SYSTOLIC BLOOD PRESSURE: 112 MMHG | BODY MASS INDEX: 27.87 KG/M2 | TEMPERATURE: 98 F

## 2018-06-15 DIAGNOSIS — L91.8 SKIN TAG: ICD-10-CM

## 2018-06-15 DIAGNOSIS — D23.5 BENIGN NEOPLASM OF SKIN OF TRUNK, EXCEPT SCROTUM: Primary | ICD-10-CM

## 2018-06-15 DIAGNOSIS — Z23 NEED FOR VACCINATION: ICD-10-CM

## 2018-06-15 DIAGNOSIS — M79.645 THUMB PAIN, LEFT: ICD-10-CM

## 2018-06-15 DIAGNOSIS — Z23 NEED FOR SHINGLES VACCINE: ICD-10-CM

## 2018-06-15 PROCEDURE — 99213 OFFICE O/P EST LOW 20 MIN: CPT | Mod: 25 | Performed by: INTERNAL MEDICINE

## 2018-06-15 PROCEDURE — 90750 HZV VACC RECOMBINANT IM: CPT | Performed by: INTERNAL MEDICINE

## 2018-06-15 PROCEDURE — 90471 IMMUNIZATION ADMIN: CPT | Performed by: INTERNAL MEDICINE

## 2018-06-15 NOTE — MR AVS SNAPSHOT
After Visit Summary   6/15/2018    Louie Greco    MRN: [id number]           Patient Information     Date Of Birth          1960        Visit Information        Provider Department      6/15/2018 3:40 PM Daphne Mo MD [facility] [place]        Today's Diagnoses     Benign neoplasm of skin of trunk, except scrotum    -  1    Skin tag        Thumb pain, left        Need for shingles vaccine           Follow-ups after your visit        Additional Services     SKIN CARE REFERRAL       Your provider has referred you to: [facility]: [facility] [phone] (Dr. Jan Iverson Jr.)   [email or web address]  [facility]: [facility] [place] [phone]  [email or web address]     Please be aware that coverage of these services is subject to the terms and limitations of your health insurance plan.  Please check with your insurance prior to the appointment to ensure appropriate coverage for any services considered cosmetic in nature or not medically necessary.    Please bring the following with you to your appointment:    (1) Any X-Rays, CTs or MRIs which have been performed.  Contact the facility where they were done to arrange for  prior to your scheduled appointment.  Any new CT, MRI or other procedures ordered by your specialist must be performed at a [facility] facility or coordinated by your clinic's referral office.  (2) List of current medications  (3) This referral request   (4) Any documents/labs given to you for this referral                  Your next 10 appointments already scheduled     Jul 25, 2018 11:00 AM CDT   MR PELVIS W/O & W CONTRAST with UUMR1   [facility], MRI ([facility])    [address] [address] [phone]           Take your medicines as usual, unless your doctor tells you not to.  Bring a list of your current medicines to your exam (including vitamins, minerals and over-the-counter drugs).  You may or may not receive intravenous (IV) contrast for this exam pending the discretion of the Radiologist.  You do not need to do anything special to prepare.  The MRI machine uses a strong magnet. Please wear clothes without metal (snaps, zippers). A sweatsuit works well, or we may give you a hospital gown.  Please remove any body piercings and hair extensions before you arrive. You will also remove watches, jewelry, hairpins, wallets, dentures, partial dental plates and hearing aids. You may wear contact lenses, and you may be able to wear your rings. We have a safe place to keep your personal items, but it is safer to leave them at home.  **IMPORTANT** THE INSTRUCTIONS BELOW ARE ONLY FOR THOSE PATIENTS WHO HAVE BEEN PRESCRIBED SEDATION OR GENERAL ANESTHESIA DURING THEIR MRI PROCEDURE:  IF YOUR DOCTOR PRESCRIBED ORAL SEDATION (take medicine to help you relax during your exam):   You must get the medicine from your doctor (oral medication) before you arrive. Bring the medicine to the exam. Do not take it at home. You ll be told when to take it upon arriving for your exam.   Arrive one hour early. Bring someone who can take you home after the test. Your medicine will make you sleepy. After the exam, you may not drive, take a bus or take a taxi by yourself.  IF YOUR DOCTOR PRESCRIBED IV SEDATION:   Arrive one hour early. Bring someone who can take you home after the test. Your medicine will make you sleepy. After the exam, you may not drive, take a bus or take a taxi by yourself.   No eating 6 hours before your exam. You may have clear liquids up until 4 hours before your exam. (Clear liquids include water, clear tea, black coffee and fruit juice without pulp.)  IF YOUR DOCTOR PRESCRIBED ANESTHESIA (be asleep for your exam):   Arrive 1 1/2 hours early. Bring someone who can take you home after the test.  You may not drive, take a bus or take a taxi by yourself.   No eating 8 hours before your exam. You may have clear liquids up until 4 hours before your exam. (Clear liquids include water, clear tea, black coffee and fruit juice without pulp.)   You will spend four to five hours in the recovery room.  Please call the Imaging Department at your exam site with any questions.            Jul 25, 2018 12:00 PM CDT   PE NPET ONCOLOGY (EYES TO THIGHS) with UUPET1   [facility] [place] PET CT ([facility])    [address] [address] [phone]           Tell your doctor:   If there is any chance you may be pregnant or if you are breastfeeding.   If you have problems lying in small spaces (claustrophobia). If you do, your doctor may give you medicine to help you relax. If you have diabetes:   Have your exam early in the morning. Your blood glucose will go up as the day goes by.   Your glucose level must be 180 or less at the start of the exam. Please take any medicines you need to ensure this blood glucose level. 24 hours before your scan: Don t do any heavy exercise. (No jogging, aerobics or other workouts.) Exercise will make your pictures less accurate. 6 hours before your scan:   Stop all food and liquids (except water).   Do not chew gum or suck on mints.   If you need to take medicine with food, you may take it with a few crackers.  Please call your Imaging Department at your exam site with any questions.            Jul 31, 2018 10:30 AM CDT   Return Visit with Agueda Manley MD   [facility] ([facility])    [facility] [place]  [address]  [facility] [address] [phone]              Who to contact     If you have questions or need follow up information about today's clinic visit or your schedule please contact [facility] [place] directly at [phone].  Normal or non-critical lab  "and imaging results will be communicated to you by MyChart, letter or phone within 4 business days after the clinic has received the results. If you do not hear from us within 7 days, please contact the clinic through [employer] or phone. If you have a critical or abnormal lab result, we will notify you by phone as soon as possible.  Submit refill requests through [employer] or call your pharmacy and they will forward the refill request to us. Please allow 3 business days for your refill to be completed.          Additional Information About Your Visit        [employer] Information     [employer] gives you secure access to your electronic health record. If you see a primary care provider, you can also send messages to your care team and make appointments. If you have questions, please call your primary care clinic.  If you do not have a primary care provider, please call [phone] and they will assist you.        Care EveryWhere ID     This is your Care EveryWhere ID. This could be used by other organizations to access your [place] medical records  [id number]        Your Vitals Were     Pulse Temperature Height Pulse Oximetry BMI (Body Mass Index)       102 98  F (36.7  C) (Oral) 5' 10.5\" (1.791 m) 96% 28.16 kg/m2        Blood Pressure from Last 3 Encounters:   06/15/18 112/65   05/11/18 102/63   05/03/18 113/79    Weight from Last 3 Encounters:   06/15/18 199 lb 1.6 oz (90.3 kg)   05/11/18 195 lb (88.5 kg)   05/03/18 196 lb 14.4 oz (89.3 kg)              We Performed the Following     SKIN CARE REFERRAL        Primary Care Provider Office Phone # Fax #    Philippe Healy [phone] [address]  [place] [address]        Equal Access to Services     BLAISE EDDY AH: João Alatorre, annia cullen, aj kaalmada marc, jb coats. So [facility] [phone].    ATENCIÓN: Si habla español, tiene a mena disposición servicios gratuitos de asistencia " lingüística. Keaton al [phone].    We comply with applicable federal civil rights laws and [place] laws. We do not discriminate on the basis of race, color, national origin, age, disability, sex, sexual orientation, or gender identity.            Thank you!     Thank you for choosing [facility]  for your care. Our goal is always to provide you with excellent care. Hearing back from our patients is one way we can continue to improve our services. Please take a few minutes to complete the written survey that you may receive in the mail after your visit with us. Thank you!             Your Updated Medication List - Protect others around you: Learn how to safely use, store and throw away your medicines at www.disposemymeds.org.          This list is accurate as of 6/15/18  4:21 PM.  Always use your most recent med list.                   Brand Name Dispense Instructions for use Diagnosis    ASPIRIN PO      Take by mouth as needed for moderate pain        dibucaine 1 % Oint ointment    NUPERCAINAL     3 times daily as needed for moderate pain        diltiazem 2% in PLO cream (FV COMPOUNDED) 2% Gel      Apply topically 2 times daily    Need for hepatitis C screening test       ibuprofen 200 MG capsule     120 capsule    Take 200-400 mg by mouth every 6 hours as needed    Acute post-operative pain       lidocaine (Anorectal) 5 % Crea     1 Tube    Externally apply 1 Application topically 3 times daily as needed    Anal pain       lidocaine 2 % topical gel    XYLOCAINE    30 mL    Apply topically 2 times daily as needed for moderate pain    Anal fissure       VITAMIN D (CHOLECALCIFEROL) PO      Take 2,000 Units by mouth daily

## 2018-06-15 NOTE — TELEPHONE ENCOUNTER
7/26/17  Louie returned the call and will come into clinic to have his blood drawn. Emme E2[facility] said that there was no cost to Louie for the test.  Roxann Guzman MS [facility]  Genetic Counselor  [phone]   07:30

## 2018-06-15 NOTE — NURSING NOTE
Screening Questionnaire for Adult Immunization    Are you sick today?   No   Do you have allergies to medications, food, a vaccine component or latex?   No   Have you ever had a serious reaction after receiving a vaccination?   No   Do you have a long-term health problem with heart disease, lung disease, asthma, kidney disease, metabolic disease (e.g. diabetes), anemia, or other blood disorder?   No   Do you have cancer, leukemia, HIV/AIDS, or any other immune system problem?   No   In the past 3 months, have you taken medications that affect  your immune system, such as prednisone, other steroids, or anticancer drugs; drugs for the treatment of rheumatoid arthritis, Crohn s disease, or psoriasis; or have you had radiation treatments?   No   Have you had a seizure, or a brain or other nervous system problem?   No   During the past year, have you received a transfusion of blood or blood     products, or been given immune (gamma) globulin or antiviral drug?   No   For women: Are you pregnant or is there a chance you could become        pregnant during the next month?   No   Have you received any vaccinations in the past 4 weeks?   No     Immunization questionnaire answers were all negative.        Per orders of Dr. Mo, injection of Shingrix (1st series) given by Oscar Pro. Patient instructed to remain in clinic for 15 minutes afterwards, and to report any adverse reaction to me immediately.       Screening performed by Oscar Pro on 6/15/2018 at 4:39 PM.

## 2018-06-15 NOTE — PROGRESS NOTES
SUBJECTIVE:   Louie Greco is a 58 year old male who presents to clinic today for the following health issues:      Chief Complaint   Patient presents with     Derm Problem     sebaceous cyst, mid-upper back        Thumb Pain    Duration:     Since: chronic            Specific cause: none    Description:      Location of pain: left thumb     Character of pain: dull     Pain radiation: none    Intensity: mild    History:      Pain interferes with job: yes     History of similar pain problems: no     Any previous MRI or X-rays: none     Therapies tried without relief: none    Alleviating factors:      Improved by: none    Precipitating factors:    Worsened by: none    Accompanying Signs & Symptoms: none            Current Medications:     Current Outpatient Prescriptions   Medication Sig Dispense Refill     ASPIRIN PO Take by mouth as needed for moderate pain       dibucaine (NUPERCAINAL) 1 % OINT ointment 3 times daily as needed for moderate pain       diltiazem 2% in PLO cream, FV COMPOUNDED, 2% GEL Apply topically 2 times daily       ibuprofen 200 MG capsule Take 200-400 mg by mouth every 6 hours as needed 120 capsule 0     lidocaine (XYLOCAINE) 2 % topical gel Apply topically 2 times daily as needed for moderate pain 30 mL 3     lidocaine, Anorectal, 5 % CREA Externally apply 1 Application topically 3 times daily as needed 1 Tube 0     VITAMIN D, CHOLECALCIFEROL, PO Take 2,000 Units by mouth daily            Allergies:      Allergies   Allergen Reactions     Ampicillin Diarrhea     Demerol [Meperidine]      Nausea             Past Medical History:     Past Medical History:   Diagnosis Date     Childhood asthma      Epididymitis, bilateral     18 years old     Inguinal hernia      Mumps      Nephrolithiasis     1990     Rectal cancer (H)     low rectal cancer     Shingles          Past Surgical History:     Past Surgical History:   Procedure Laterality Date     COLONOSCOPY N/A 7/27/2016    Procedure: COMBINED  COLONOSCOPY, SINGLE OR MULTIPLE BIOPSY/POLYPECTOMY BY BIOPSY;  Surgeon: Chelsea Thompson MD;  Location: [facility]     COLONOSCOPY N/A 9/13/2017    Procedure: COLONOSCOPY;;  Surgeon: Felicitas Radford MD;  [facility]     COLONOSCOPY N/A 12/13/2017    Procedure: COLONOSCOPY;;  Surgeon: Felicitas Radford MD;  [facility]     COMBINED CYSTOSCOPY, RETROGRADES, URETEROSCOPY, LASER HOLMIUM LITHOTRIPSY URETER(S), INSERT STENT Left 2/16/2018    Procedure: COMBINED CYSTOSCOPY, RETROGRADES, URETEROSCOPY, LASER HOLMIUM LITHOTRIPSY URETER(S), INSERT STENT;  Cysto, left ureteroscopy, holmium laser, retrogrades, stent placement;  Surgeon: Bola Worthy MD;  Location: [facility]     EXAM UNDER ANESTHESIA ANUS N/A 7/5/2017    Procedure: EXAM UNDER ANESTHESIA ANUS;  Examination Under Anesthesia, flex sigmoidoscopy with biopsies and formalin application;  Surgeon: Felicitas Radford MD;  [facility]     EXAM UNDER ANESTHESIA ANUS N/A 9/13/2017    Procedure: EXAM UNDER ANESTHESIA ANUS;  Examination Under Anesthesia Anus, Colonoscopy, application of formalin;  Surgeon: Felicitas Radford MD;  [facility]     EXAM UNDER ANESTHESIA ANUS N/A 12/13/2017    Procedure: EXAM UNDER ANESTHESIA ANUS;  Examination Under Anesthesia Anus, Colonoscopy;  Surgeon: Felicitas Radford MD;  [facility]     EXAM UNDER ANESTHESIA ANUS N/A 5/11/2018    Procedure: EXAM UNDER ANESTHESIA ANUS;  Examination Under Anesthesia Anus, Interoperative Flexible Sigmoidoscopy, Application of Formalin;  Surgeon: Felicitas Radford MD;  [facility]     HC TOOTH EXTRACTION W/FORCEP       LAPAROSCOPIC APPENDECTOMY N/A 7/17/2017    Procedure: LAPAROSCOPIC APPENDECTOMY;  LAPAROSCOPIC APPENDECTOMY;  Surgeon: Bryson Ferguson MD;  [facility]     SIGMOIDOSCOPY FLEXIBLE N/A 12/8/2016    Procedure: SIGMOIDOSCOPY FLEXIBLE;  Surgeon: Felicitas Radford MD;  [facility]      SIGMOIDOSCOPY FLEXIBLE N/A 2017    Procedure: SIGMOIDOSCOPY FLEXIBLE;;  Surgeon: Felicitas Radford MD;  [facility]     SIGMOIDOSCOPY FLEXIBLE N/A 2018    Procedure: SIGMOIDOSCOPY FLEXIBLE;;  Surgeon: Felicitas Radford MD;  [facility]     TESTICLE SURGERY       VASCULAR SURGERY      Right chest port     VASECTOMY       VASECTOMY           Family Medical History:     Family History   Problem Relation Age of Onset     CANCER Brother      primary of unknown origin     Other Cancer Brother      Chronic Obstructive Pulmonary Disease Father      Hypertension Father      Hyperlipidemia Father      Colon Polyps Mother      Number and type of polyps unknown     Family History Negative Brother      negative colonoscopy     DIABETES Maternal Grandfather      Hypertension Paternal Grandmother      Hyperlipidemia Paternal Grandmother      Stomach Cancer Other 63     maternal great grandfather     Glaucoma No family hx of      Macular Degeneration No family hx of          Social History:     Social History     Social History     Marital status:      Spouse name: N/A     Number of children: N/A     Years of education: N/A     Occupational History     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 No     Social History Narrative    Dad  at age  78   from [place], COPD, & HTN    Mom alive in her 70's.     for 30 years    Two adult children. 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               Review of System:     Constitutional: Negative for fever or chills  Skin: Positive for cyst on  "upper back and skin tags of the axilla  Ears/Nose/Throat: Negative for nasal congestion, sore throat  Respiratory: No shortness of breath, dyspnea on exertion, cough, or hemoptysis  Cardiovascular: Negative for chest pain  Gastrointestinal: Negative for nausea, vomiting  Genitourinary: Negative for dysuria, hematuria  Musculoskeletal: Positive for left thumb pain  Neurologic: Negative for headaches  Psychiatric: Negative for depression, anxiety  Hematologic/Lymphatic/Immunologic: Negative  Endocrine: Negative  Behavioral: Negative for tobacco use       Physical Exam:   /65  Pulse 102  Temp 98  F (36.7  C) (Oral)  Ht 5' 10.5\" (1.791 m)  Wt 199 lb 1.6 oz (90.3 kg)  SpO2 96%  BMI 28.16 kg/m2    GENERAL: alert and no distress  EYES: eyes grossly normal to inspection, and conjunctivae and sclerae normal  HENT: Normocephalic atraumatic. Nose and mouth without ulcers or lesions  NECK: supple  RESP: lungs clear to auscultation   CV: regular rate and rhythm, normal S1 S2  LYMPH: no peripheral edema   ABDOMEN: nondistended  MS: no gross musculoskeletal defects noted  SKIN: skin tag present of the right axilla and sebaceous cyst of the upper back present  NEURO: Alert & Oriented x 3.   PSYCH: mentation appears normal, affect normal        Diagnostic Test Results:     Lab Results   Component Value Date    WBC 5.2 02/14/2018     Lab Results   Component Value Date    RBC 4.72 02/14/2018     Lab Results   Component Value Date    HGB 14.5 02/14/2018     Lab Results   Component Value Date    HCT 41.4 02/14/2018     No components found for: MCT  Lab Results   Component Value Date    MCV 88 02/14/2018     Lab Results   Component Value Date    MCH 30.7 02/14/2018     Lab Results   Component Value Date    MCHC 35.0 02/14/2018     Lab Results   Component Value Date    RDW 13.2 02/14/2018     Lab Results   Component Value Date     02/14/2018     Last Basic Metabolic Panel:  Lab Results   Component Value Date     " 02/14/2018      Lab Results   Component Value Date    POTASSIUM 3.9 02/14/2018     Lab Results   Component Value Date    CHLORIDE 106 02/14/2018     Lab Results   Component Value Date    FRANDY 9.0 02/14/2018     Lab Results   Component Value Date    CO2 26 02/14/2018     Lab Results   Component Value Date    BUN 27 02/14/2018     Lab Results   Component Value Date    CR 0.91 02/14/2018     Lab Results   Component Value Date     02/14/2018         ASSESSMENT/PLAN:       (D23.5) Benign neoplasm of skin of trunk, except scrotum  (primary encounter diagnosis)  (L91.8) Skin tag  Comment: chronic skin cyst of the mid upper back with skin tag of the right axilla  Plan: I have ordered SKIN CARE REFERRAL with the [place] [facility] for further evaluation and management going forward.      (M79.272) Thumb pain, left  Comment: patient is complaining of chronic left thumb pains without recent new trauma or injury to the left thumb or hand, likely due to arthritis  Plan: I have ordered Ortho referral with the [facility] ortho specialist clinic hand clinic going forward.        Follow Up Plan:     Patient is instructed to return to Internal Medicine clinic for follow-up visit in 1 week.        Daphne Mo MD  Internal Medicine  [facility]

## 2018-06-21 DIAGNOSIS — C20 MALIGNANT NEOPLASM OF RECTUM (H): Primary | ICD-10-CM

## 2018-06-21 NOTE — PROGRESS NOTES
a. Does the patient take five or more prescription medications? No  b. Does patient have difficulty walking up two flights of stairs? No  c. Is patient s BMI 35 or greater? No  d. Is patient an insulin dependent diabetic? No  e. Does patient have a history of having a heart attack or a heart surgery of any kind? No  f. Does patient have a history of heart failure? No  g. Does the patient have a history of any type of transplant? No  h. Does the patient use inhalers on a daily basis? no  i. Does the patient have a history of pulmonary hypertension? No  Once the patient is evaluated by PAC contact (ex: Surgery schedulers name and number, RN's name and number, etc..);    Vicki Ramirez RNCC- Colon and Rectal Surgery  Name of planned surgery: EUA with flexible sigmoidoscopy

## 2018-06-28 ENCOUNTER — TELEPHONE (OUTPATIENT)
Dept: SURGERY | Facility: CLINIC | Age: 58
End: 2018-06-28

## 2018-06-28 NOTE — TELEPHONE ENCOUNTER
Called patient to schedule OR procedure with Dr. Radford in July.  Patient states he thought he was supposed to schedule every three months, which would push his procedure to August. Patient asked that we get clarification on timing before he schedules.  Confirmed with patient.  Message sent to Dr. Radford for scheduling clarification.

## 2018-07-09 ENCOUNTER — OFFICE VISIT (OUTPATIENT)
Dept: DERMATOLOGY | Facility: CLINIC | Age: 58
End: 2018-07-09
Payer: COMMERCIAL

## 2018-07-09 VITALS — DIASTOLIC BLOOD PRESSURE: 82 MMHG | OXYGEN SATURATION: 98 % | SYSTOLIC BLOOD PRESSURE: 126 MMHG | HEART RATE: 81 BPM

## 2018-07-09 DIAGNOSIS — L91.8 ST (SKIN TAG): ICD-10-CM

## 2018-07-09 DIAGNOSIS — D48.5 NEOPLASM OF UNCERTAIN BEHAVIOR OF SKIN: Primary | ICD-10-CM

## 2018-07-09 DIAGNOSIS — D22.9 MULTIPLE BENIGN NEVI: ICD-10-CM

## 2018-07-09 DIAGNOSIS — L72.0 EPIDERMAL CYST: ICD-10-CM

## 2018-07-09 DIAGNOSIS — I78.1 TELANGIECTASIA: ICD-10-CM

## 2018-07-09 PROCEDURE — 11100 HC DRAIN SKIN ABSCESS SIMPLE/SINGLE: CPT | Mod: 59 | Performed by: PHYSICIAN ASSISTANT

## 2018-07-09 PROCEDURE — 99203 OFFICE O/P NEW LOW 30 MIN: CPT | Mod: 25 | Performed by: PHYSICIAN ASSISTANT

## 2018-07-09 PROCEDURE — 10060 I&D ABSCESS SIMPLE/SINGLE: CPT | Mod: 59 | Performed by: PHYSICIAN ASSISTANT

## 2018-07-09 PROCEDURE — 88305 TISSUE EXAM BY PATHOLOGIST: CPT | Mod: TC | Performed by: PHYSICIAN ASSISTANT

## 2018-07-09 PROCEDURE — 11200 RMVL SKIN TAGS UP TO&INC 15: CPT | Mod: 51 | Performed by: PHYSICIAN ASSISTANT

## 2018-07-09 NOTE — LETTER
7/9/2018         RE: Louie Greco  [address] [address]        Dear Colleague,    Thank you for referring your patient, Louie Greco, to the [facility]. Please see a copy of my visit note below.    HPI:  I was asked to see pt by Dr. Mo. Louie Greco is a 58 year old year old male patient here today for cyst on back   Duration: years  Symptoms:  Painful at times     Previous treatments: drained at home, keeps refilling     Alleviating/aggravating factors: none    Associated symptoms: none  Additional findings:none  Patient has no other skin complaints today.  Remainder of the HPI, Meds, PMH, Allergies, FH, and SH was reviewed in chart.    Pertinent Hx:   Rectal cancer  Past Medical History:   Diagnosis Date     Childhood asthma      Epididymitis, bilateral     18 years old     Inguinal hernia      Mumps      Nephrolithiasis     1990     Rectal cancer (H)     low rectal cancer     Shingles        Past Surgical History:   Procedure Laterality Date     COLONOSCOPY N/A 7/27/2016    Procedure: COMBINED COLONOSCOPY, SINGLE OR MULTIPLE BIOPSY/POLYPECTOMY BY BIOPSY;  Surgeon: Chelsea Thompson MD;  Location:  GI     COLONOSCOPY N/A 9/13/2017    Procedure: COLONOSCOPY;;  Surgeon: Felicitas Radford MD;  [facility]     COLONOSCOPY N/A 12/13/2017    Procedure: COLONOSCOPY;;  Surgeon: Felicitas Radford MD;  [facility]     COMBINED CYSTOSCOPY, RETROGRADES, URETEROSCOPY, LASER HOLMIUM LITHOTRIPSY URETER(S), INSERT STENT Left 2/16/2018    Procedure: COMBINED CYSTOSCOPY, RETROGRADES, URETEROSCOPY, LASER HOLMIUM LITHOTRIPSY URETER(S), INSERT STENT;  Cysto, left ureteroscopy, holmium laser, retrogrades, stent placement;  Surgeon: Bola Worthy MD;  Location:  OR     EXAM UNDER ANESTHESIA ANUS N/A 7/5/2017    Procedure: EXAM UNDER ANESTHESIA ANUS;  Examination Under Anesthesia, flex sigmoidoscopy with biopsies and formalin application;  Surgeon:  Felicitas Radford MD;  [facility]     EXAM UNDER ANESTHESIA ANUS N/A 9/13/2017    Procedure: EXAM UNDER ANESTHESIA ANUS;  Examination Under Anesthesia Anus, Colonoscopy, application of formalin;  Surgeon: Felicitas Radford MD;  [facility]     EXAM UNDER ANESTHESIA ANUS N/A 12/13/2017    Procedure: EXAM UNDER ANESTHESIA ANUS;  Examination Under Anesthesia Anus, Colonoscopy;  Surgeon: Felicitas Radford MD;  [facility]     EXAM UNDER ANESTHESIA ANUS N/A 5/11/2018    Procedure: EXAM UNDER ANESTHESIA ANUS;  Examination Under Anesthesia Anus, Interoperative Flexible Sigmoidoscopy, Application of Formalin;  Surgeon: Felicitas Radford MD;  [facility]     HC TOOTH EXTRACTION W/FORCEP       LAPAROSCOPIC APPENDECTOMY N/A 7/17/2017    Procedure: LAPAROSCOPIC APPENDECTOMY;  LAPAROSCOPIC APPENDECTOMY;  Surgeon: Bryson Ferguson MD;  Location: [facility] OR     SIGMOIDOSCOPY FLEXIBLE N/A 12/8/2016    Procedure: SIGMOIDOSCOPY FLEXIBLE;  Surgeon: Felicitas Radford MD;  [facility] OR     SIGMOIDOSCOPY FLEXIBLE N/A 7/5/2017    Procedure: SIGMOIDOSCOPY FLEXIBLE;;  Surgeon: Felicitas Radford MD;  [facility]     SIGMOIDOSCOPY FLEXIBLE N/A 5/11/2018    Procedure: SIGMOIDOSCOPY FLEXIBLE;;  Surgeon: Felicitas Radford MD;  [facility]     TESTICLE SURGERY       VASCULAR SURGERY      Right chest port     VASECTOMY       VASECTOMY          Family History   Problem Relation Age of Onset     Cancer Brother      primary of unknown origin     Other Cancer Brother      Chronic Obstructive Pulmonary Disease Father      Hypertension Father      Hyperlipidemia Father      Colon Polyps Mother      Number and type of polyps unknown     Family History Negative Brother      negative colonoscopy     Diabetes Maternal Grandfather      Hypertension Paternal Grandmother      Hyperlipidemia Paternal Grandmother      Stomach Cancer Other 63     maternal great grandfather      Glaucoma No family hx of      Macular Degeneration No family hx of        Social History     Social History     Marital status:      Spouse name: N/A     Number of children: N/A     Years of education: N/A     Occupational History     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 No     Social History Narrative    Dad  at age  78   from [place], COPD, & HTN    Mom alive in her 70's.     for 30 years    Two adult children. 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           Outpatient Encounter Prescriptions as of 2018   Medication Sig Dispense Refill     ASPIRIN PO Take by mouth as needed for moderate pain       dibucaine (NUPERCAINAL) 1 % OINT ointment 3 times daily as needed for moderate pain       diltiazem 2% in PLO cream, FV COMPOUNDED, 2% GEL Apply topically 2 times daily       ibuprofen 200 MG capsule Take 200-400 mg by mouth every 6 hours as needed (Patient not taking: Reported on 2018) 120 capsule 0     lidocaine (XYLOCAINE) 2 % topical gel Apply topically 2 times daily as needed for moderate pain (Patient not taking: Reported on 2018) 30 mL 3     lidocaine, Anorectal, 5 % CREA Externally apply 1 Application topically 3 times daily as needed (Patient not taking: Reported on 2018) 1 Tube 0     VITAMIN D, CHOLECALCIFEROL, PO Take 2,000 Units by mouth daily        No facility-administered encounter medications on file as of 2018.        Review Of Systems:  Skin: As above  Eyes: negative  Ears/Nose/Throat: negative  Respiratory: No shortness of breath, dyspnea on exertion, cough, or  hemoptysis  Cardiovascular: negative  Gastrointestinal: negative  Genitourinary: negative  Musculoskeletal: negative  Neurologic: negative  Psychiatric: negative  Hematologic/Lymphatic/Immunologic: negative  Endocrine: negative      Objective:     /82  Pulse 81  SpO2 98%  Eyes: Conjunctivae/lids: Normal   ENT: Lips:  Normal  MSK: Normal  Cardiovascular: Peripheral edema none  Pulm: Breathing Normal  Neuro/Psych: Orientation: Normal; Mood/Affect: Normal, NAD, WDWN  Following areas examined:  Scalp, face, eyelids, lips, neck, chest, abdomen, back, and R&L upper and lower extremities (pt wearing socks).    Lopez skin type:II   Findings:    1)irregularly shaped light and medium brown macule 0.6cm on left medial mid back  2) firm smooth mobile nodule with central punctum on mid upper back  3) Skin colored smooth pedunculated papule on left axilla  4)Well circumscribed macules with symmetric color distribution on trunk and extremities.  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  No complications and routine wound care.  May grow back and will get a scar. Based on lesion type may need to completely remove lesion. Patient will be notified in 7-10 days of results. Wound care directions given.  2) epidermal cyst  I&D: Epidermal cyst was cleansed with surgical cleanser. It was infiltrated with buffered solution of 1% lidocaine with epinephrine. An incision was made with a number 15 blade over the top of the incision and yellow keratinous foul smelling drainage with minimal blood was expressed.   Dressing was applied. Patient was discharged in satisfactory condition.  3) irritated skin tag x 1  SNIP REMOVAL: After consent, anesthesia with LEC and prep, snip excision performed.  No complications and routine wound care.   May grow back and will get a scar. Wound care directions given.  4) benign nevi and telangiectasia  I discussed the specifics of tumor, prognosis, and genetics of benign lesions.  I explained that treatment of these lesions would be purely cosmetic and not medically neccessary.  I discussed with patient different removal options including excision, cryotherapy, cautery and /or laser.      Signs and Symptoms of non-melanoma skin cancer and ABCDEs of melanoma reviewed with patient. Patient encouraged to perform monthly self skin exams and educated on how to perform them. UV precautions reviewed with patient. Patient was asked about new or changing moles/lesions on body.       Follow up in yearly FBE      Again, thank you for allowing me to participate in the care of your patient.        Sincerely,        Margarita Brock PA-C

## 2018-07-09 NOTE — MR AVS SNAPSHOT
After Visit Summary   7/9/2018    Louie Greco    MRN: [id number]           Patient Information     Date Of Birth          1960        Visit Information        Provider Department      7/9/2018 11:20 AM Margarita Brock PA-C [facility]        Today's Diagnoses     Neoplasm of uncertain behavior of skin    -  1      Care Instructions                 Wound Care Instructions     FOR SUPERFICIAL WOUNDS     [facility] [phone]    [facility] [phone]          AFTER 24 HOURS YOU SHOULD REMOVE THE BANDAGE AND BEGIN DAILY DRESSING CHANGES AS FOLLOWS:     1) Remove Dressing.     2) Clean and dry the area with tap water using a Q-tip or sterile gauze pad.     3) Apply Vaseline, Aquaphor, Polysporin ointment or Bacitracin ointment over entire wound.  Do NOT use Neosporin ointment.     4) Cover the wound with a band-aid, or a sterile non-stick gauze pad and micropore paper tape      REPEAT THESE INSTRUCTIONS AT LEAST ONCE A DAY UNTIL THE WOUND HAS COMPLETELY HEALED.    It is an old wives tale that a wound heals better when it is exposed to air and allowed to dry out. The wound will heal faster with a better cosmetic result if it is kept moist with ointment and covered with a bandage.    **Do not let the wound dry out.**      Supplies Needed:      *Cotton tipped applicators (Q-tips)    *Polysporin Ointment or Bacitracin Ointment (NOT NEOSPORIN)    *Band-aids or non-stick gauze pads and micropore paper tape.      PATIENT INFORMATION:    During the healing process you will notice a number of changes. All wounds develop a small halo of redness surrounding the wound.  This means healing is occurring. Severe itching with extensive redness usually indicates sensitivity to the ointment or bandage tape used to dress the wound.  You should call our office if this develops.      Swelling  and/or discoloration around your surgical site is common,  particularly when performed around the eye.    All wounds normally drain.  The larger the wound the more drainage there will be.  After 7-10 days, you will notice the wound beginning to shrink and new skin will begin to grow.  The wound is healed when you can see skin has formed over the entire area.  A healed wound has a healthy, shiny look to the surface and is red to dark pink in color to normalize.  Wounds may take approximately 4-6 weeks to heal.  Larger wounds may take 6-8 weeks.  After the wound is healed you may discontinue dressing changes.    You may experience a sensation of tightness as your wound heals. This is normal and will gradually subside.    Your healed wound may be sensitive to temperature changes. This sensitivity improves with time, but if you re having a lot of discomfort, try to avoid temperature extremes.    Patients frequently experience itching after their wound appears to have healed because of the continue healing under the skin.  Plain Vaseline will help relieve the itching.        POSSIBLE COMPLICATIONS    BLEEDIN. Leave the bandage in place.  2. Use tightly rolled up gauze or a cloth to apply direct pressure over the bandage for 30  minutes.  3. Reapply pressure for an additional 30 minutes if necessary  4. Use additional gauze and tape to maintain pressure once the bleeding has stopped.    Proper skin care from [facility]:    -Eliminate harsh soaps as they strip the natural oils from the skin, often resulting in dry itchy skin ( i.e. Dial, Zest, Daniella Spring)  -Use mild soaps such as Cetaphil or Dove Sensitive Skin in the shower. You do not need to use soap on arms, legs, and trunk every time you shower unless visibly soiled.   -Avoid hot or cold showers.  -After showering, lightly dry off and apply moisturizing within 2-3 minutes. This will help trap moisture in the skin.   -Aggressive use of a moisturizer at least 1-2 times a day to the entire body (including  -Vanicream, Cetaphil, Aquaphor or Cerave) and moisturize hands after every washing.  -We recommend using moisturizers that come in a tub that needs to be scooped out, not a pump. This has more of an oil base. It will hold moisture in your skin much better than a water base moisturizer. The above recommended are non-pore clogging.       Wear a sunscreen with at least SPF 30 on your face, ears, neck and V of the chest daily. Wear sunscreen on other areas of the body if those areas are exposed to the sun throughout the day. Sunscreens can contain physical and/or chemical blockers. Physical blockers are less likely to clog pores, these include zinc oxide and titanium dioxide. Reapply every two hour and after swimming. Sunscreen examples include Neutrogena, CeraVe, Curtis Lizard, Elta MD and many others.    UV radiation  UVA radiation remains constant throughout the day and throughout the year. It is a longer wavelength than UVB and therefore penetrates deeper into the skin leading to immediate and delayed tanning, photoaging, and skin cancer. 70-80% of UVA and UVB radiation occurs between the hours of 10am-2pm.  UVB radiation  UVB radiation causes the most harmful effects and is more significant during the summer months. However, snow and ice can reflect UVB radiation leading to skin damage during the winter months as well. UVB radiation is responsible for tanning, burning, inflammation, delayed erythema (pinkness), pigmentation (brown spots), and skin cancer.   Just because you do not burn or are not developing a tan does not mean that you are not damaging your skin. A 15 minute drive to and from work for 30 years an lead to chronic sun damage of the skin. It is important to wear a broad spectrum (both UVA and UVB) sunscreen EVERY day with at least 30 SPF. Apply to face, ears, neck and v of the chest as this is where most of our sun exposure is. Reapply sunscreen every two hours if you plan on being outside.   Habif,  Nitin PARRY. Clinical Dermatology: A Color Guide to Diagnosis and Therapy. Elsevier, 2016.               Follow-ups after your visit        Your next 10 appointments already scheduled     Jul 25, 2018 11:00 AM CDT   MR PELVIS W/O & W CONTRAST with UUMR1   [facility], [place], MRI ([facility] [place])    [address] [address] [phone]           Take your medicines as usual, unless your doctor tells you not to. Bring a list of your current medicines to your exam (including vitamins, minerals and over-the-counter drugs).  You may or may not receive intravenous (IV) contrast for this exam pending the discretion of the Radiologist.  You do not need to do anything special to prepare.  The MRI machine uses a strong magnet. Please wear clothes without metal (snaps, zippers). A sweatsuit works well, or we may give you a hospital gown.  Please remove any body piercings and hair extensions before you arrive. You will also remove watches, jewelry, hairpins, wallets, dentures, partial dental plates and hearing aids. You may wear contact lenses, and you may be able to wear your rings. We have a safe place to keep your personal items, but it is safer to leave them at home.  **IMPORTANT** THE INSTRUCTIONS BELOW ARE ONLY FOR THOSE PATIENTS WHO HAVE BEEN PRESCRIBED SEDATION OR GENERAL ANESTHESIA DURING THEIR MRI PROCEDURE:  IF YOUR DOCTOR PRESCRIBED ORAL SEDATION (take medicine to help you relax during your exam):   You must get the medicine from your doctor (oral medication) before you arrive. Bring the medicine to the exam. Do not take it at home. You ll be told when to take it upon arriving for your exam.   Arrive one hour early. Bring someone who can take you home after the test. Your medicine will make you sleepy. After the exam, you may not drive, take a bus or take a taxi by yourself.  IF YOUR DOCTOR PRESCRIBED IV SEDATION:   Arrive one hour early. Bring  someone who can take you home after the test. Your medicine will make you sleepy. After the exam, you may not drive, take a bus or take a taxi by yourself.   No eating 6 hours before your exam. You may have clear liquids up until 4 hours before your exam. (Clear liquids include water, clear tea, black coffee and fruit juice without pulp.)  IF YOUR DOCTOR PRESCRIBED ANESTHESIA (be asleep for your exam):   Arrive 1 1/2 hours early. Bring someone who can take you home after the test. You may not drive, take a bus or take a taxi by yourself.   No eating 8 hours before your exam. You may have clear liquids up until 4 hours before your exam. (Clear liquids include water, clear tea, black coffee and fruit juice without pulp.)   You will spend four to five hours in the recovery room.  Please call the Imaging Department at your exam site with any questions.            Jul 25, 2018 12:00 PM CDT   PE NPET ONCOLOGY (EYES TO THIGHS) with UUPET1   [facility] [place] PET CT ([facility])    [address] [address] [phone]           Tell your doctor:   If there is any chance you may be pregnant or if you are breastfeeding.   If you have problems lying in small spaces (claustrophobia). If you do, your doctor may give you medicine to help you relax. If you have diabetes:   Have your exam early in the morning. Your blood glucose will go up as the day goes by.   Your glucose level must be 180 or less at the start of the exam. Please take any medicines you need to ensure this blood glucose level. 24 hours before your scan: Don t do any heavy exercise. (No jogging, aerobics or other workouts.) Exercise will make your pictures less accurate. 6 hours before your scan:   Stop all food and liquids (except water).   Do not chew gum or suck on mints.   If you need to take medicine with food, you may take it with a few crackers.  Please call your Imaging Department at your  exam site with any questions.            Jul 31, 2018 10:30 AM CDT   Return Visit with Agueda Manley MD   [facility] ([facility])    [facility] [place]  [address]  [place] [address] [phone]              Who to contact     If you have questions or need follow up information about today's clinic visit or your schedule please contact [facility] directly at [phone].  Normal or non-critical lab and imaging results will be communicated to you by [facility] [employer], letter or phone within 4 business days after the clinic has received the results. If you do not hear from us within 7 days, please contact the clinic through Fly[employer] or phone. If you have a critical or abnormal lab result, we will notify you by phone as soon as possible.  Submit refill requests through [facility] or call your pharmacy and they will forward the refill request to us. Please allow 3 business days for your refill to be completed.          Additional Information About Your Visit        [employer] Information     [facility] gives you secure access to your electronic health record. If you see a primary care provider, you can also send messages to your care team and make appointments. If you have questions, please call your primary care clinic.  If you do not have a primary care provider, please call [phone] and they will assist you.        Care EveryWhere ID     This is your Care EveryWhere ID. This could be used by other organizations to access your [facility] medical records  [id number]        Your Vitals Were     Pulse Pulse Oximetry                81 98%           Blood Pressure from Last 3 Encounters:   07/09/18 126/82   06/15/18 112/65   05/11/18 102/63    Weight from Last 3 Encounters:   06/15/18 90.3 kg (199 lb 1.6 oz)   05/11/18 88.5 kg (195 lb)   05/03/18 89.3 kg (196 lb 14.4 oz)              We Performed the Following     BIOPSY SKIN/SUBQ/MUC MEM,  SINGLE LESION     Dermatological path order and indications        Primary Care Provider Office Phone # Fax #    Philippe Healy [phone] [address] [address]        Equal Access to Services     BLAISE EDDY : Hadii aad ku hadasho Soomaali, waaxda luqadaha, qaybta kaalmada adeegyada, waxay idiin haymacarenan adeeg michelle lascottarmond coats. So [facility] [phone].    ATENCIÓN: Si habla español, tiene a mena disposición servicios gratuitos de asistencia lingüística. Llame al [phone].    We comply with applicable federal civil rights laws and [place] laws. We do not discriminate on the basis of race, color, national origin, age, disability, sex, sexual orientation, or gender identity.            Thank you!     Thank you for choosing [facility]  for your care. Our goal is always to provide you with excellent care. Hearing back from our patients is one way we can continue to improve our services. Please take a few minutes to complete the written survey that you may receive in the mail after your visit with us. Thank you!             Your Updated Medication List - Protect others around you: Learn how to safely use, store and throw away your medicines at www.disposemymeds.org.          This list is accurate as of 7/9/18 11:51 AM.  Always use your most recent med list.                   Brand Name Dispense Instructions for use Diagnosis    ASPIRIN PO      Take by mouth as needed for moderate pain        dibucaine 1 % Oint ointment    NUPERCAINAL     3 times daily as needed for moderate pain        diltiazem 2% in PLO cream (FV COMPOUNDED) 2% Gel      Apply topically 2 times daily    Need for hepatitis C screening test       ibuprofen 200 MG capsule     120 capsule    Take 200-400 mg by mouth every 6 hours as needed    Acute post-operative pain       lidocaine (Anorectal) 5 % Crea     1 Tube    Externally apply 1 Application topically 3 times daily as needed     Anal pain       lidocaine 2 % topical gel    XYLOCAINE    30 mL    Apply topically 2 times daily as needed for moderate pain    Anal fissure       VITAMIN D (CHOLECALCIFEROL) PO      Take 2,000 Units by mouth daily

## 2018-07-09 NOTE — PATIENT INSTRUCTIONS
Wound Care Instructions     FOR SUPERFICIAL WOUNDS     [facility] [phone]    [facility] [phone]          AFTER 24 HOURS YOU SHOULD REMOVE THE BANDAGE AND BEGIN DAILY DRESSING CHANGES AS FOLLOWS:     1) Remove Dressing.     2) Clean and dry the area with tap water using a Q-tip or sterile gauze pad.     3) Apply Vaseline, Aquaphor, Polysporin ointment or Bacitracin ointment over entire wound.  Do NOT use Neosporin ointment.     4) Cover the wound with a band-aid, or a sterile non-stick gauze pad and micropore paper tape      REPEAT THESE INSTRUCTIONS AT LEAST ONCE A DAY UNTIL THE WOUND HAS COMPLETELY HEALED.    It is an old wives tale that a wound heals better when it is exposed to air and allowed to dry out. The wound will heal faster with a better cosmetic result if it is kept moist with ointment and covered with a bandage.    **Do not let the wound dry out.**      Supplies Needed:      *Cotton tipped applicators (Q-tips)    *Polysporin Ointment or Bacitracin Ointment (NOT NEOSPORIN)    *Band-aids or non-stick gauze pads and micropore paper tape.      PATIENT INFORMATION:    During the healing process you will notice a number of changes. All wounds develop a small halo of redness surrounding the wound.  This means healing is occurring. Severe itching with extensive redness usually indicates sensitivity to the ointment or bandage tape used to dress the wound.  You should call our office if this develops.      Swelling  and/or discoloration around your surgical site is common, particularly when performed around the eye.    All wounds normally drain.  The larger the wound the more drainage there will be.  After 7-10 days, you will notice the wound beginning to shrink and new skin will begin to grow.  The wound is healed when you can see skin has formed over the entire area.  A healed wound has a healthy, shiny look to the surface and is red to dark pink in color to  normalize.  Wounds may take approximately 4-6 weeks to heal.  Larger wounds may take 6-8 weeks.  After the wound is healed you may discontinue dressing changes.    You may experience a sensation of tightness as your wound heals. This is normal and will gradually subside.    Your healed wound may be sensitive to temperature changes. This sensitivity improves with time, but if you re having a lot of discomfort, try to avoid temperature extremes.    Patients frequently experience itching after their wound appears to have healed because of the continue healing under the skin.  Plain Vaseline will help relieve the itching.        POSSIBLE COMPLICATIONS    BLEEDIN. Leave the bandage in place.  2. Use tightly rolled up gauze or a cloth to apply direct pressure over the bandage for 30  minutes.  3. Reapply pressure for an additional 30 minutes if necessary  4. Use additional gauze and tape to maintain pressure once the bleeding has stopped.    Proper skin care from [facility]:    -Eliminate harsh soaps as they strip the natural oils from the skin, often resulting in dry itchy skin ( i.e. Dial, Zest, [place] Spring)  -Use mild soaps such as Cetaphil or Dove Sensitive Skin in the shower. You do not need to use soap on arms, legs, and trunk every time you shower unless visibly soiled.   -Avoid hot or cold showers.  -After showering, lightly dry off and apply moisturizing within 2-3 minutes. This will help trap moisture in the skin.   -Aggressive use of a moisturizer at least 1-2 times a day to the entire body (including -Vanicream, Cetaphil, Aquaphor or Cerave) and moisturize hands after every washing.  -We recommend using moisturizers that come in a tub that needs to be scooped out, not a pump. This has more of an oil base. It will hold moisture in your skin much better than a water base moisturizer. The above recommended are non-pore clogging.       Wear a sunscreen with at least SPF 30 on your face, ears,  neck and V of the chest daily. Wear sunscreen on other areas of the body if those areas are exposed to the sun throughout the day. Sunscreens can contain physical and/or chemical blockers. Physical blockers are less likely to clog pores, these include zinc oxide and titanium dioxide. Reapply every two hour and after swimming. Sunscreen examples include Neutrogena, CeraVe, Blue Lizard, Elta MD and many others.    UV radiation  UVA radiation remains constant throughout the day and throughout the year. It is a longer wavelength than UVB and therefore penetrates deeper into the skin leading to immediate and delayed tanning, photoaging, and skin cancer. 70-80% of UVA and UVB radiation occurs between the hours of 10am-2pm.  UVB radiation  UVB radiation causes the most harmful effects and is more significant during the summer months. However, snow and ice can reflect UVB radiation leading to skin damage during the winter months as well. UVB radiation is responsible for tanning, burning, inflammation, delayed erythema (pinkness), pigmentation (brown spots), and skin cancer.   Just because you do not burn or are not developing a tan does not mean that you are not damaging your skin. A 15 minute drive to and from work for 30 years an lead to chronic sun damage of the skin. It is important to wear a broad spectrum (both UVA and UVB) sunscreen EVERY day with at least 30 SPF. Apply to face, ears, neck and v of the chest as this is where most of our sun exposure is. Reapply sunscreen every two hours if you plan on being outside.   Nitin Aparicio. Clinical Dermatology: A Color Guide to Diagnosis and Therapy. Else[employer], 2016.

## 2018-07-09 NOTE — PROGRESS NOTES
HPI:  I was asked to see pt by Dr. Mo. Louie Greco is a 58 year old year old male patient here today for cyst on back   Duration: years  Symptoms:  Painful at times     Previous treatments: drained at home, keeps refilling     Alleviating/aggravating factors: none    Associated symptoms: none  Additional findings:none  Patient has no other skin complaints today.  Remainder of the HPI, Meds, PMH, Allergies, FH, and SH was reviewed in chart.    Pertinent Hx:   Rectal cancer  Past Medical History:   Diagnosis Date     Childhood asthma      Epididymitis, bilateral     18 years old     Inguinal hernia      Mumps      Nephrolithiasis     1990     Rectal cancer (H)     low rectal cancer     Shingles        Past Surgical History:   Procedure Laterality Date     COLONOSCOPY N/A 7/27/2016    Procedure: COMBINED COLONOSCOPY, SINGLE OR MULTIPLE BIOPSY/POLYPECTOMY BY BIOPSY;  Surgeon: Chelsea Thompson MD;  Location: [facility]     COLONOSCOPY N/A 9/13/2017    Procedure: COLONOSCOPY;;  Surgeon: Felicitas Radford MD;  [facility]     COLONOSCOPY N/A 12/13/2017    Procedure: COLONOSCOPY;;  Surgeon: Felicitas Radford MD;  [facility]     COMBINED CYSTOSCOPY, RETROGRADES, URETEROSCOPY, LASER HOLMIUM LITHOTRIPSY URETER(S), INSERT STENT Left 2/16/2018    Procedure: COMBINED CYSTOSCOPY, RETROGRADES, URETEROSCOPY, LASER HOLMIUM LITHOTRIPSY URETER(S), INSERT STENT;  Cysto, left ureteroscopy, holmium laser, retrogrades, stent placement;  Surgeon: Bola Worthy MD;  Location: [facility]     EXAM UNDER ANESTHESIA ANUS N/A 7/5/2017    Procedure: EXAM UNDER ANESTHESIA ANUS;  Examination Under Anesthesia, flex sigmoidoscopy with biopsies and formalin application;  Surgeon: Felicitas Radford MD;  [facility]     EXAM UNDER ANESTHESIA ANUS N/A 9/13/2017    Procedure: EXAM UNDER ANESTHESIA ANUS;  Examination Under Anesthesia Anus, Colonoscopy, application of formalin;  Surgeon: Malina  Felicitas HIDALGO MD;  [facility]     EXAM UNDER ANESTHESIA ANUS N/A 12/13/2017    Procedure: EXAM UNDER ANESTHESIA ANUS;  Examination Under Anesthesia Anus, Colonoscopy;  Surgeon: Felicitas Radford MD;  [facility]     EXAM UNDER ANESTHESIA ANUS N/A 5/11/2018    Procedure: EXAM UNDER ANESTHESIA ANUS;  Examination Under Anesthesia Anus, Interoperative Flexible Sigmoidoscopy, Application of Formalin;  Surgeon: Felicitas Radford MD;  [facility]     HC TOOTH EXTRACTION W/FORCEP       LAPAROSCOPIC APPENDECTOMY N/A 7/17/2017    Procedure: LAPAROSCOPIC APPENDECTOMY;  LAPAROSCOPIC APPENDECTOMY;  Surgeon: Bryson Ferguson MD;  Location: [facility]     SIGMOIDOSCOPY FLEXIBLE N/A 12/8/2016    Procedure: SIGMOIDOSCOPY FLEXIBLE;  Surgeon: Felicitas Radford MD;  [facility]     SIGMOIDOSCOPY FLEXIBLE N/A 7/5/2017    Procedure: SIGMOIDOSCOPY FLEXIBLE;;  Surgeon: Felicitas Radford MD;  [facility]     SIGMOIDOSCOPY FLEXIBLE N/A 5/11/2018    Procedure: SIGMOIDOSCOPY FLEXIBLE;;  Surgeon: Felicitas Radford MD;  [facility]     TESTICLE SURGERY       VASCULAR SURGERY      Right chest port     VASECTOMY       VASECTOMY          Family History   Problem Relation Age of Onset     Cancer Brother      primary of unknown origin     Other Cancer Brother      Chronic Obstructive Pulmonary Disease Father      Hypertension Father      Hyperlipidemia Father      Colon Polyps Mother      Number and type of polyps unknown     Family History Negative Brother      negative colonoscopy     Diabetes Maternal Grandfather      Hypertension Paternal Grandmother      Hyperlipidemia Paternal Grandmother      Stomach Cancer Other 63     maternal great grandfather     Glaucoma No family hx of      Macular Degeneration No family hx of        Social History     Social History     Marital status:      Spouse name: N/A     Number of children: N/A     Years of education: N/A     Occupational  History     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 No     Social History Narrative    Dad  at age  78   from [place], COPD, & HTN    Mom alive in her 70's.     for 30 years    Two adult children. 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           Outpatient Encounter Prescriptions as of 2018   Medication Sig Dispense Refill     ASPIRIN PO Take by mouth as needed for moderate pain       dibucaine (NUPERCAINAL) 1 % OINT ointment 3 times daily as needed for moderate pain       diltiazem 2% in PLO cream, FV COMPOUNDED, 2% GEL Apply topically 2 times daily       ibuprofen 200 MG capsule Take 200-400 mg by mouth every 6 hours as needed (Patient not taking: Reported on 2018) 120 capsule 0     lidocaine (XYLOCAINE) 2 % topical gel Apply topically 2 times daily as needed for moderate pain (Patient not taking: Reported on 2018) 30 mL 3     lidocaine, Anorectal, 5 % CREA Externally apply 1 Application topically 3 times daily as needed (Patient not taking: Reported on 2018) 1 Tube 0     VITAMIN D, CHOLECALCIFEROL, PO Take 2,000 Units by mouth daily        No facility-administered encounter medications on file as of 2018.        Review Of Systems:  Skin: As above  Eyes: negative  Ears/Nose/Throat: negative  Respiratory: No shortness of breath, dyspnea on exertion, cough, or hemoptysis  Cardiovascular: negative  Gastrointestinal: negative  Genitourinary: negative  Musculoskeletal: negative  Neurologic: negative  Psychiatric: negative  Hematologic/Lymphatic/Immunologic: negative  Endocrine:  negative      Objective:     /82  Pulse 81  SpO2 98%  Eyes: Conjunctivae/lids: Normal   ENT: Lips:  Normal  MSK: Normal  Cardiovascular: Peripheral edema none  Pulm: Breathing Normal  Neuro/Psych: Orientation: Normal; Mood/Affect: Normal, NAD, WDWN  Following areas examined:  Scalp, face, eyelids, lips, neck, chest, abdomen, back, and R&L upper and lower extremities (pt wearing socks).    Lopez skin type:II   Findings:    1)irregularly shaped light and medium brown macule 0.6cm on left medial mid back  2) firm smooth mobile nodule with central punctum on mid upper back  3) Skin colored smooth pedunculated papule on left axilla  4)Well circumscribed macules with symmetric color distribution on trunk and extremities.  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  No complications and routine wound care.  May grow back and will get a scar. Based on lesion type may need to completely remove lesion. Patient will be notified in 7-10 days of results. Wound care directions given.  2) epidermal cyst  I&D: Epidermal cyst was cleansed with surgical cleanser. It was infiltrated with buffered solution of 1% lidocaine with epinephrine. An incision was made with a number 15 blade over the top of the incision and yellow keratinous foul smelling drainage with minimal blood was expressed.   Dressing was applied. Patient was discharged in satisfactory condition.  3) irritated skin tag x 1  SNIP REMOVAL: After consent, anesthesia with LEC and prep, snip excision performed.  No complications and routine wound care.  May grow back and will get a scar. Wound care directions given.  4) benign nevi and telangiectasia  I discussed the specifics of tumor, prognosis, and genetics of benign lesions.  I explained that treatment of these  lesions would be purely cosmetic and not medically neccessary.  I discussed with patient different removal options including excision, cryotherapy, cautery and /or laser.      Signs and Symptoms of non-melanoma skin cancer and ABCDEs of melanoma reviewed with patient. Patient encouraged to perform monthly self skin exams and educated on how to perform them. UV precautions reviewed with patient. Patient was asked about new or changing moles/lesions on body.       Follow up in yearly FBE

## 2018-07-16 ENCOUNTER — MYC MEDICAL ADVICE (OUTPATIENT)
Dept: SURGERY | Facility: CLINIC | Age: 58
End: 2018-07-16

## 2018-07-17 ENCOUNTER — MYC MEDICAL ADVICE (OUTPATIENT)
Dept: SURGERY | Facility: CLINIC | Age: 58
End: 2018-07-17

## 2018-07-17 LAB — COPATH REPORT: NORMAL

## 2018-07-17 NOTE — TELEPHONE ENCOUNTER
Patient called in. Stated he would like to move forward with the removal of his epidermal cyst. Educated patient on what the procedure entails and scheduled appointment. Patient voiced understanding.    MARVIN Davis-BSN  [facility]  [phone]

## 2018-07-17 NOTE — TELEPHONE ENCOUNTER
Called patient to finalize OR procedure.  Offered 7/20 or 7/27.  Patient states he needs to confirm with this wife, but would tentatively like to schedule for 7/20/18.  Informed patient he will need a pre-op physical prior to his procedure Friday.  Offered to schedule PAC.  Patient states he will get his pre-op done with his PCP.  Patient states he will send a xTV message, once he confirms date with his wife.  Confirmed with patient.

## 2018-07-18 ENCOUNTER — OFFICE VISIT (OUTPATIENT)
Dept: FAMILY MEDICINE | Facility: CLINIC | Age: 58
End: 2018-07-18
Payer: COMMERCIAL

## 2018-07-18 VITALS
BODY MASS INDEX: 28.7 KG/M2 | HEART RATE: 95 BPM | DIASTOLIC BLOOD PRESSURE: 70 MMHG | OXYGEN SATURATION: 96 % | HEIGHT: 71 IN | SYSTOLIC BLOOD PRESSURE: 117 MMHG | WEIGHT: 205 LBS | TEMPERATURE: 97.6 F

## 2018-07-18 DIAGNOSIS — R97.20 ELEVATED PROSTATE SPECIFIC ANTIGEN (PSA): ICD-10-CM

## 2018-07-18 DIAGNOSIS — Z01.818 PREOP GENERAL PHYSICAL EXAM: Primary | ICD-10-CM

## 2018-07-18 DIAGNOSIS — Z13.220 LIPID SCREENING: ICD-10-CM

## 2018-07-18 DIAGNOSIS — Z12.5 PROSTATE CANCER SCREENING: ICD-10-CM

## 2018-07-18 DIAGNOSIS — C20 MALIGNANT NEOPLASM OF RECTUM (H): ICD-10-CM

## 2018-07-18 PROCEDURE — 99214 OFFICE O/P EST MOD 30 MIN: CPT | Performed by: INTERNAL MEDICINE

## 2018-07-18 NOTE — NURSING NOTE
"Chief Complaint   Patient presents with     Pre-Op Exam        Initial /70 (BP Location: Right arm, Patient Position: Chair, Cuff Size: Adult Large)  Pulse 95  Temp 97.6  F (36.4  C) (Tympanic)  Ht 5' 10.5\" (1.791 m)  Wt 205 lb (93 kg)  SpO2 96%  BMI 29 kg/m2 Estimated body mass index is 29 kg/(m^2) as calculated from the following:    Height as of this encounter: 5' 10.5\" (1.791 m).    Weight as of this encounter: 205 lb (93 kg)..    BP completed using cuff size: large  MEDICATIONS REVIEWED  SOCIAL AND FAMILY HX REVIEWED  Na Bernstein CMA  "

## 2018-07-18 NOTE — MR AVS SNAPSHOT
After Visit Summary   7/18/2018    Louie Greco    MRN: [id number]           Patient Information     Date Of Birth          1960        Visit Information        Provider Department      7/18/2018 11:00 AM Philippe Healy MD [facility]        Today's Diagnoses     Preop general physical exam    -  1    Malignant neoplasm of rectum (H)        Prostate cancer screening        Elevated prostate specific antigen (PSA)        Lipid screening          Care Instructions      Before Your Surgery      Call your surgeon if there is any change in your health. This includes signs of a cold or flu (such as a sore throat, runny nose, cough, rash or fever).    Do not smoke, drink alcohol or take over the counter medicine (unless your surgeon or primary care doctor tells you to) for the 24 hours before and after surgery.    If you take prescribed drugs: Follow your doctor s orders about which medicines to take and which to stop until after surgery.    Eating and drinking prior to surgery: follow the instructions from your surgeon    Take a shower or bath the night before surgery. Use the soap your surgeon gave you to gently clean your skin. If you do not have soap from your surgeon, use your regular soap. Do not shave or scrub the surgery site.  Wear clean pajamas and have clean sheets on your bed.           Follow-ups after your visit        Your next 10 appointments already scheduled     Jul 19, 2018  9:00 AM CDT   PROCEDURE with Margarita Brock PA-C   [facility] [place] ([address] [address] [phone]            Jul 20, 2018   Procedure with Felicitas Radford MD   [facility] ([facility])    [address] [place] [address]  5th [facility] [address] [phone]           Located in the Clinics and Surgery Center at [address] [place] [address]  [id number].   parking is very convenient and highly recommended.  is a $6 flat rate fee.  Both  and self parkers should enter the main arrival plaza from [facility]; parking attendants will direct you based on your parking preference.            Jul 25, 2018 11:00 AM CDT   MR PELVIS BONE WO & W CONTRAST with UUMR2   [facility] [place], [facility] ([facility])    [address] [facility] [address]   [phone]           Take your medicines as usual, unless your doctor tells you not to. Bring a list of your current medicines to your exam (including vitamins, minerals and over-the-counter drugs).  You may or may not receive intravenous (IV) contrast for this exam pending the discretion of the Radiologist.  You do not need to do anything special to prepare.  The MRI machine uses a strong magnet. Please wear clothes without metal (snaps, zippers). A sweatsuit works well, or we may give you a hospital gown.  Please remove any body piercings and hair extensions before you arrive. You will also remove watches, jewelry, hairpins, wallets, dentures, partial dental plates and hearing aids. You may wear contact lenses, and you may be able to wear your rings. We have a safe place to keep your personal items, but it is safer to leave them at home.  **IMPORTANT** THE INSTRUCTIONS BELOW ARE ONLY FOR THOSE PATIENTS WHO HAVE BEEN PRESCRIBED SEDATION OR GENERAL ANESTHESIA DURING THEIR MRI PROCEDURE:  IF YOUR DOCTOR PRESCRIBED ORAL SEDATION (take medicine to help you relax during your exam):   You must get the medicine from your doctor (oral medication) before you arrive. Bring the medicine to the exam. Do not take it at home. You ll be told when to take it upon arriving for your exam.   Arrive one hour early. Bring someone who can take you home after the test. Your medicine will make you sleepy. After the exam, you may not drive, take a bus or take a taxi by  yourself.  IF YOUR DOCTOR PRESCRIBED IV SEDATION:   Arrive one hour early. Bring someone who can take you home after the test. Your medicine will make you sleepy. After the exam, you may not drive, take a bus or take a taxi by yourself.   No eating 6 hours before your exam. You may have clear liquids up until 4 hours before your exam. (Clear liquids include water, clear tea, black coffee and fruit juice without pulp.)  IF YOUR DOCTOR PRESCRIBED ANESTHESIA (be asleep for your exam):   Arrive 1 1/2 hours early. Bring someone who can take you home after the test. You may not drive, take a bus or take a taxi by yourself.   No eating 8 hours before your exam. You may have clear liquids up until 4 hours before your exam. (Clear liquids include water, clear tea, black coffee and fruit juice without pulp.)   You will spend four to five hours in the recovery room.  Please call the Imaging Department at your exam site with any questions.            Jul 25, 2018 12:00 PM CDT   PE NPET ONCOLOGY (EYES TO THIGHS) with UUPET1   [facility] [place] PET CT ([facility])    [address] [facility] [address] [phone]           Tell your doctor:   If there is any chance you may be pregnant or if you are breastfeeding.   If you have problems lying in small spaces (claustrophobia). If you do, your doctor may give you medicine to help you relax. If you have diabetes:   Have your exam early in the morning. Your blood glucose will go up as the day goes by.   Your glucose level must be 180 or less at the start of the exam. Please take any medicines you need to ensure this blood glucose level. 24 hours before your scan: Don t do any heavy exercise. (No jogging, aerobics or other workouts.) Exercise will make your pictures less accurate. 6 hours before your scan:   Stop all food and liquids (except water).   Do not chew gum or suck on mints.   If you need to take medicine with food,  you may take it with a few crackers.  Please call your Imaging Department at your exam site with any questions.            Jul 31, 2018 10:30 AM CDT   Return Visit with Agueda Manley MD   [facility] ([facility])    [facility]  [address]  [place] [address] [phone]              Future tests that were ordered for you today     Open Future Orders        Priority Expected Expires Ordered    Prostate spec antigen screen Routine  7/18/2019 7/18/2018    Lipid panel reflex to direct LDL Fasting Routine  7/18/2019 7/18/2018            Who to contact     If you have questions or need follow up information about today's clinic visit or your schedule please contact [facility] directly at [phone].  Normal or non-critical lab and imaging results will be communicated to you by [employer], letter or phone within 4 business days after the clinic has received the results. If you do not hear from us within 7 days, please contact the clinic through [employer] or phone. If you have a critical or abnormal lab result, we will notify you by phone as soon as possible.  Submit refill requests through [employer] or call your pharmacy and they will forward the refill request to us. Please allow 3 business days for your refill to be completed.          Additional Information About Your Visit        [employer] Information     [employer] gives you secure access to your electronic health record. If you see a primary care provider, you can also send messages to your care team and make appointments. If you have questions, please call your primary care clinic.  If you do not have a primary care provider, please call [phone] and they will assist you.        Care EveryWhere ID     This is your Care EveryWhere ID. This could be used by other organizations to access your [facility] medical records  [id number]        Your Vitals Were     Pulse Temperature Height  "Pulse Oximetry BMI (Body Mass Index)       95 97.6  F (36.4  C) (Tympanic) 5' 10.5\" (1.791 m) 96% 29 kg/m2        Blood Pressure from Last 3 Encounters:   07/18/18 117/70   07/09/18 126/82   06/15/18 112/65    Weight from Last 3 Encounters:   07/18/18 205 lb (93 kg)   06/15/18 199 lb 1.6 oz (90.3 kg)   05/11/18 195 lb (88.5 kg)               Primary Care Provider Office Phone # Fax #    Philippe EDELMIRA Healy [phone] [address] [facility] [address]  [facility] [address]        Equal Access to Services     NIGEL EDDY : Hadii trent browno Sokartik, waaxda luqadaha, qaybta kaalmada adeegyada, waxelsie pedroza . So [facility] [phone].    ATENCIÓN: Si habla español, tiene a mena disposición servicios gratuitos de asistencia lingüística. [facility] [phone].    We comply with applicable federal civil rights laws and [place] laws. We do not discriminate on the basis of race, color, national origin, age, disability, sex, sexual orientation, or gender identity.            Thank you!     Thank you for choosing [facility]  for your care. Our goal is always to provide you with excellent care. Hearing back from our patients is one way we can continue to improve our services. Please take a few minutes to complete the written survey that you may receive in the mail after your visit with us. Thank you!             Your Updated Medication List - Protect others around you: Learn how to safely use, store and throw away your medicines at www.disposemymeds.org.          This list is accurate as of 7/18/18 11:51 AM.  Always use your most recent med list.                   Brand Name Dispense Instructions for use Diagnosis    ASPIRIN PO      Take by mouth as needed for moderate pain        dibucaine 1 % Oint ointment    NUPERCAINAL     3 times daily as needed for moderate pain        diltiazem 2% in PLO cream (FV COMPOUNDED) 2% Gel      Apply topically 2 times daily    Need for hepatitis C " screening test       ibuprofen 200 MG capsule     120 capsule    Take 200-400 mg by mouth every 6 hours as needed    Acute post-operative pain       lidocaine (Anorectal) 5 % Crea     1 Tube    Externally apply 1 Application topically 3 times daily as needed    Anal pain       lidocaine 2 % topical gel    XYLOCAINE    30 mL    Apply topically 2 times daily as needed for moderate pain    Anal fissure       VITAMIN D (CHOLECALCIFEROL) PO      Take 2,000 Units by mouth daily

## 2018-07-18 NOTE — PROGRESS NOTES
[address] [address]  [phone]  Dept: [phone]    PRE-OP EVALUATION:  Today's date: 2018    Louie Greco (: 1960) presents for pre-operative evaluation assessment as requested by Dr. Radford.  He requires evaluation and anesthesia risk assessment prior to undergoing surgery/procedure for treatment of SIGMOIDOSCOPY FLEXIBLE .    Proposed Surgery/ Procedure: SIGMOIDOSCOPY FLEXIBLE  Date of Surgery/ Procedure: 18  Time of Surgery/ Procedure: 9:55  Hospital/Surgical Facility: [facility]  Fax number for surgical facility: In [facility]  Primary Physician: Philippe Healy  Type of Anesthesia Anticipated: General    Patient has a Health Care Directive or Living Will:  NO    1. NO - Do you have a history of heart attack, stroke, stent, bypass or surgery on an artery in the head, neck, heart or legs?  2. NO - Do you ever have any pain or discomfort in your chest?  3. NO - Do you have a history of  Heart Failure?  4. NO - Are you troubled by shortness of breath when: walking on the level, up a slight hill or at night?  5. NO - Do you currently have a cold, bronchitis or other respiratory infection?  6. NO - Do you have a cough, shortness of breath or wheezing?  7. NO - Do you sometimes get pains in the calves of your legs when you walk?  8. NO - Do you or anyone in your family have previous history of blood clots?  9. NO - Do you or does anyone in your family have a serious bleeding problem such as prolonged bleeding following surgeries or cuts?  10. NO - Have you ever had problems with anemia or been told to take iron pills?  11. NO - Have you had any abnormal blood loss such as black, tarry or bloody stools, or abnormal vaginal bleeding?  12. NO - Have you ever had a blood transfusion?  13. NO - Have you or any of your relatives ever had problems with anesthesia?  14. NO - Do you have sleep apnea, excessive snoring or daytime drowsiness?  15. NO - Do you have  any prosthetic heart valves?  16. NO - Do you have prosthetic joints?  17. NO - Is there any chance that you may be pregnant?      HPI:     HPI related to upcoming procedure: History of rectal cancer with scar tissue in ano-rectal area necessitating surveillance flexible sigmoidoscopy under sedation.   He feels well and is without specific complaints.  He can preform 4 METS of physical activity without chest pain or dyspnea.  He saw derm for cyst that was incised, but it continues to drain, so he is going to have it excised.  He saw hand surgery and was diagnosed with base of thumb arthritis that is tolerable for now.      MEDICAL HISTORY:     Patient Active Problem List    Diagnosis Date Noted     Kidney stone 02/16/2018     Priority: Medium     Elevated prostate specific antigen (PSA) 11/30/2017     Priority: Medium     Appendicitis 07/17/2017     Priority: Medium     Chemotherapy-induced neutropenia (H) 03/21/2017     Priority: Medium     Advance Care Planning 03/09/2017     Priority: Medium     Advance Care Planning 3/9/2017: Receipt of ACP document:  Received: invalid HCD document dated 12/8/2016.  Document not previously scanned. Validation form completed indicating invalid document. Copy sent to client with information and facilitation resources. Validation form sent to be scanned as notation of invalid document received.  Code Status needs to be updated to reflect choices. Confirmed/documented designated decision maker(s).  Added by Araceli Horne MSW Advance Care Planning Liaison with [employer].       Malignant neoplasm of rectum (H) 01/03/2017     Priority: Medium     Rectal bleeding 07/19/2016     Priority: Medium     Plantar fasciitis 11/04/2010     Priority: Medium     Pes anserinus tendinitis 02/08/2010     Priority: Medium      Past Medical History:   Diagnosis Date     Childhood asthma      Epididymitis, bilateral     18 years old     Inguinal hernia      Mumps      Nephrolithiasis     1990      Rectal cancer (H)     low rectal cancer     Shingles      Past Surgical History:   Procedure Laterality Date     COLONOSCOPY N/A 7/27/2016    Procedure: COMBINED COLONOSCOPY, SINGLE OR MULTIPLE BIOPSY/POLYPECTOMY BY BIOPSY;  Surgeon: Chelsea Thompson MD;  Location: [facility]     COLONOSCOPY N/A 9/13/2017    Procedure: COLONOSCOPY;;  Surgeon: Felicitas Radford MD;  [facility]     COLONOSCOPY N/A 12/13/2017    Procedure: COLONOSCOPY;;  Surgeon: Felicitas Radford MD;  [facility]     COMBINED CYSTOSCOPY, RETROGRADES, URETEROSCOPY, LASER HOLMIUM LITHOTRIPSY URETER(S), INSERT STENT Left 2/16/2018    Procedure: COMBINED CYSTOSCOPY, RETROGRADES, URETEROSCOPY, LASER HOLMIUM LITHOTRIPSY URETER(S), INSERT STENT;  Cysto, left ureteroscopy, holmium laser, retrogrades, stent placement;  Surgeon: Bola Worthy MD;  Location: [facility]     EXAM UNDER ANESTHESIA ANUS N/A 7/5/2017    Procedure: EXAM UNDER ANESTHESIA ANUS;  Examination Under Anesthesia, flex sigmoidoscopy with biopsies and formalin application;  Surgeon: Felicitas Radford MD;  [facility]     EXAM UNDER ANESTHESIA ANUS N/A 9/13/2017    Procedure: EXAM UNDER ANESTHESIA ANUS;  Examination Under Anesthesia Anus, Colonoscopy, application of formalin;  Surgeon: Felicitas Radford MD;  [facility]     EXAM UNDER ANESTHESIA ANUS N/A 12/13/2017    Procedure: EXAM UNDER ANESTHESIA ANUS;  Examination Under Anesthesia Anus, Colonoscopy;  Surgeon: Felicitas Radford MD;  [facility]     EXAM UNDER ANESTHESIA ANUS N/A 5/11/2018    Procedure: EXAM UNDER ANESTHESIA ANUS;  Examination Under Anesthesia Anus, Interoperative Flexible Sigmoidoscopy, Application of Formalin;  Surgeon: Felicitas Radford MD;  [facility]     HC TOOTH EXTRACTION W/FORCEP       LAPAROSCOPIC APPENDECTOMY N/A 7/17/2017    Procedure: LAPAROSCOPIC APPENDECTOMY;  LAPAROSCOPIC APPENDECTOMY;  Surgeon: Bryson Ferguson MD;   "Location: [facility]     SIGMOIDOSCOPY FLEXIBLE N/A 12/8/2016    Procedure: SIGMOIDOSCOPY FLEXIBLE;  Surgeon: Felicitas Radford MD;  [facility]     SIGMOIDOSCOPY FLEXIBLE N/A 7/5/2017    Procedure: SIGMOIDOSCOPY FLEXIBLE;;  Surgeon: Felicitas Radford MD;  [facility]     SIGMOIDOSCOPY FLEXIBLE N/A 5/11/2018    Procedure: SIGMOIDOSCOPY FLEXIBLE;;  Surgeon: Felicitas Radford MD;  [facility]     TESTICLE SURGERY       VASCULAR SURGERY      Right chest port     VASECTOMY       VASECTOMY       Current Outpatient Prescriptions   Medication Sig Dispense Refill     ASPIRIN PO Take by mouth as needed for moderate pain       dibucaine (NUPERCAINAL) 1 % OINT ointment 3 times daily as needed for moderate pain       diltiazem 2% in PLO cream, FV COMPOUNDED, 2% GEL Apply topically 2 times daily       ibuprofen 200 MG capsule Take 200-400 mg by mouth every 6 hours as needed 120 capsule 0     lidocaine (XYLOCAINE) 2 % topical gel Apply topically 2 times daily as needed for moderate pain 30 mL 3     lidocaine, Anorectal, 5 % CREA Externally apply 1 Application topically 3 times daily as needed 1 Tube 0     VITAMIN D, CHOLECALCIFEROL, PO Take 2,000 Units by mouth daily        OTC products: None, except as noted above    Allergies   Allergen Reactions     Ampicillin Diarrhea     Demerol [Meperidine]      Nausea       Latex Allergy: NO    Social History   Substance Use Topics     Smoking status: Never Smoker     Smokeless tobacco: Never Used     Alcohol use 0.0 oz/week      Comment: 1 drink a week     History   Drug Use No       REVIEW OF SYSTEMS:   Constitutional, neuro, ENT, endocrine, pulmonary, cardiac, gastrointestinal, genitourinary, musculoskeletal, integument and psychiatric systems are negative, except as otherwise noted.    EXAM:   /70 (BP Location: Right arm, Patient Position: Chair, Cuff Size: Adult Large)  Pulse 95  Temp 97.6  F (36.4  C) (Tympanic)  Ht 5' 10.5\" (1.791 m)  Wt " 205 lb (93 kg)  SpO2 96%  BMI 29 kg/m2    GENERAL APPEARANCE: healthy, alert and no distress     EYES: EOMI,  PERRL     HENT: ear canals and TM's normal and nose and mouth without ulcers or lesions     NECK: no adenopathy, no asymmetry, masses, or scars and thyroid normal to palpation     RESP: lungs clear to auscultation - no rales, rhonchi or wheezes     CV: regular rates and rhythm, normal S1 S2, no S3 or S4 and no murmur, click or rub     ABDOMEN:  soft, nontender, no HSM or masses and bowel sounds normal     MS: extremities normal- no gross deformities noted, no evidence of inflammation in joints, FROM in all extremities.     SKIN: no suspicious lesions or rashes     NEURO: Normal strength and tone, sensory exam grossly normal, mentation intact and speech normal     PSYCH: mentation appears normal. and affect normal/bright     LYMPHATICS: No cervical adenopathy    DIAGNOSTICS:   No labs or EKG required for low risk surgery (cataract, skin procedure, breast biopsy, etc)    Recent Labs   Lab Test  02/14/18   2143  01/22/18   1557   07/10/17   0943   01/16/17   0800   HGB  14.5  14.0   < >  12.8*   < >  15.1   PLT  176  205   < >  170   < >  207   INR   --    --    --   1.02   --   0.92   NA  139  140   < >   --    < >   --    POTASSIUM  3.9  4.1   < >   --    < >   --    CR  0.91  0.76   < >   --    < >   --     < > = values in this interval not displayed.        IMPRESSION:   Reason for surgery/procedure: Rectal cancer   Diagnosis/reason for consult: Preoperative evaluation     The proposed surgical procedure is considered LOW risk.    REVISED CARDIAC RISK INDEX  The patient has the following serious cardiovascular risks for perioperative complications such as (MI, PE, VFib and 3  AV Block):  No serious cardiac risks  INTERPRETATION: 0 risks: Class I (very low risk - 0.4% complication rate)    The patient has the following additional risks for perioperative complications:  No identified additional risks       ICD-10-CM    1. Preop general physical exam Z01.818    2. Malignant neoplasm of rectum (H) C20    3. Prostate cancer screening Z12.5 Prostate spec antigen screen   4. Elevated prostate specific antigen (PSA) R97.20    5. Lipid screening Z13.220 Lipid panel reflex to direct LDL Fasting       RECOMMENDATIONS:         --Patient is to take all scheduled medications on the day of surgery     APPROVAL GIVEN to proceed with proposed procedure, without further diagnostic evaluation       Signed Electronically by: Philippe Healy MD    Copy of this evaluation report is provided to requesting physician.    [facility] Preop Guidelines    Revised Cardiac Risk Index

## 2018-07-19 ENCOUNTER — ANESTHESIA EVENT (OUTPATIENT)
Dept: SURGERY | Facility: AMBULATORY SURGERY CENTER | Age: 58
End: 2018-07-19

## 2018-07-19 ENCOUNTER — OFFICE VISIT (OUTPATIENT)
Dept: FAMILY MEDICINE | Facility: CLINIC | Age: 58
End: 2018-07-19
Payer: COMMERCIAL

## 2018-07-19 VITALS — SYSTOLIC BLOOD PRESSURE: 118 MMHG | HEART RATE: 85 BPM | DIASTOLIC BLOOD PRESSURE: 70 MMHG

## 2018-07-19 DIAGNOSIS — D48.5 NEOPLASM OF UNCERTAIN BEHAVIOR OF SKIN: Primary | ICD-10-CM

## 2018-07-19 PROCEDURE — 11401 EXC TR-EXT B9+MARG 0.6-1 CM: CPT | Performed by: PHYSICIAN ASSISTANT

## 2018-07-19 PROCEDURE — 88305 TISSUE EXAM BY PATHOLOGIST: CPT | Mod: TC | Performed by: PHYSICIAN ASSISTANT

## 2018-07-19 NOTE — MR AVS SNAPSHOT
After Visit Summary   2018    Louie Greco    MRN: [id number]           Patient Information     Date Of Birth          1960        Visit Information        Provider Department      2018 9:00 AM Margarita Brock PA-C [facility]        Today's Diagnoses     Neoplasm of uncertain behavior of skin    -  1      Care Instructions      Sutured Wound Care     [facility]: [phone]    [facility]: [phone]          ? No strenuous activity for 48 hours. Resume moderate activity in 48 hours. No heavy exercising until you are seen for follow up in one week.     ? Take Tylenol as needed for discomfort.                         ? Do not drink alcoholic beverages for 48 hours.     ? Keep the pressure bandage in place for 24 hours. If the bandage becomes blood tinged or loose, reinforce it with gauze and tape.        (Refer to the reverse side of this page for management of bleeding).    ? Remove pressure bandage in 24 hours     ? Leave the flat bandage in place until your follow up appointment.    ? Keep the bandage dry. Wash around it carefully.    ? If the tape becomes soiled or starts to come off, reinforce it with additional paper tape.    ? Do not smoke for 3 weeks; smoking is detrimental to wound healing.    ? It is normal to have swelling and bruising around the surgical site. The bruising will fade in approximately 10-14 days. Elevate the area to reduce swelling.    ? Numbness, itchiness and sensitivity to temperature changes can occur after surgery and may take up to 18 months to normalize.      POSSIBLE COMPLICATIONS    BLEEDIN. Leave the bandage in place.  2. Use tightly rolled up gauze or a cloth to apply direct pressure over the bandage for 20   minutes.  3. Reapply pressure for an additional 20 minutes if necessary  4. Call the office or go to the nearest emergency room if pressure fails to stop the bleeding.  5. Use additional  gauze and tape to maintain pressure once the bleeding has stopped.        PAIN:    1. Post operative pain should slowly get better, never worse.  2. A severe increase in pain may indicate a problem. Call the office if this occurs.    In case of emergency phone: [phone]              Follow-ups after your visit        Your next 10 appointments already scheduled     Jul 20, 2018   Procedure with Felicitas Radford MD   [facility] ([facility] [place])    [address]  5th Floor  [facility] [address] [phone]           Located in the Clinics and Surgery Center at [address] [place] [address].   parking is very convenient and highly recommended.  is a $6 flat rate fee.  Both  and self parkers should enter the main arrival plaza from [facility]; parking attendants will direct you based on your parking preference.            Jul 25, 2018 11:00 AM CDT   MR PELVIS BONE WO & W CONTRAST with UUMR2   [facility], MRI ([facility])    [address] [id number] [phone]           Take your medicines as usual, unless your doctor tells you not to. Bring a list of your current medicines to your exam (including vitamins, minerals and over-the-counter drugs).  You may or may not receive intravenous (IV) contrast for this exam pending the discretion of the Radiologist.  You do not need to do anything special to prepare.  The MRI machine uses a strong magnet. Please wear clothes without metal (snaps, zippers). A sweatsuit works well, or we may give you a hospital gown.  Please remove any body piercings and hair extensions before you arrive. You will also remove watches, jewelry, hairpins, wallets, dentures, partial dental plates and hearing aids. You may wear contact lenses, and you may be able to wear your rings. We have a safe place to keep your  personal items, but it is safer to leave them at home.  **IMPORTANT** THE INSTRUCTIONS BELOW ARE ONLY FOR THOSE PATIENTS WHO HAVE BEEN PRESCRIBED SEDATION OR GENERAL ANESTHESIA DURING THEIR MRI PROCEDURE:  IF YOUR DOCTOR PRESCRIBED ORAL SEDATION (take medicine to help you relax during your exam):   You must get the medicine from your doctor (oral medication) before you arrive. Bring the medicine to the exam. Do not take it at home. You ll be told when to take it upon arriving for your exam.   Arrive one hour early. Bring someone who can take you home after the test. Your medicine will make you sleepy. After the exam, you may not drive, take a bus or take a taxi by yourself.  IF YOUR DOCTOR PRESCRIBED IV SEDATION:   Arrive one hour early. Bring someone who can take you home after the test. Your medicine will make you sleepy. After the exam, you may not drive, take a bus or take a taxi by yourself.   No eating 6 hours before your exam. You may have clear liquids up until 4 hours before your exam. (Clear liquids include water, clear tea, black coffee and fruit juice without pulp.)  IF YOUR DOCTOR PRESCRIBED ANESTHESIA (be asleep for your exam):   Arrive 1 1/2 hours early. Bring someone who can take you home after the test. You may not drive, take a bus or take a taxi by yourself.   No eating 8 hours before your exam. You may have clear liquids up until 4 hours before your exam. (Clear liquids include water, clear tea, black coffee and fruit juice without pulp.)   You will spend four to five hours in the recovery room.  Please call the Imaging Department at your exam site with any questions.            Jul 25, 2018 12:00 PM CDT   PE NPET ONCOLOGY (EYES TO THIGHS) with UUPET1   [facility] [place] PET CT ([facility])    [address] [address] [phone]           Tell your doctor:   If there is any chance you may be pregnant or if you are breastfeeding.    If you have problems lying in small spaces (claustrophobia). If you do, your doctor may give you medicine to help you relax. If you have diabetes:   Have your exam early in the morning. Your blood glucose will go up as the day goes by.   Your glucose level must be 180 or less at the start of the exam. Please take any medicines you need to ensure this blood glucose level. 24 hours before your scan: Don t do any heavy exercise. (No jogging, aerobics or other workouts.) Exercise will make your pictures less accurate. 6 hours before your scan:   Stop all food and liquids (except water).   Do not chew gum or suck on mints.   If you need to take medicine with food, you may take it with a few crackers.  Please call your Imaging Department at your exam site with any questions.            Jul 31, 2018 10:30 AM CDT   Return Visit with Aguead Manley MD   [facility] ([facility])    [facility]  [address] [facility] [address]  [facility] [address]   [phone]              Future tests that were ordered for you today     Open Future Orders        Priority Expected Expires Ordered    Prostate spec antigen screen Routine  7/18/2019 7/18/2018    Lipid panel reflex to direct LDL Fasting Routine  7/18/2019 7/18/2018            Who to contact     If you have questions or need follow up information about today's clinic visit or your schedule please contact [facility] directly at [phone].  Normal or non-critical lab and imaging results will be communicated to you by MyChart, letter or phone within 4 business days after the clinic has received the results. If you do not hear from us within 7 days, please contact the clinic through [employer] or phone. If you have a critical or abnormal lab result, we will notify you by phone as soon as possible.  Submit refill requests through [employer] or call your pharmacy and they will forward the refill request to us. Please  allow 3 business days for your refill to be completed.          Additional Information About Your Visit        MyChart Information     [employer] gives you secure access to your electronic health record. If you see a primary care provider, you can also send messages to your care team and make appointments. If you have questions, please call your primary care clinic.  If you do not have a primary care provider, please call [phone] and they will assist you.        Care EveryWhere ID     This is your Care EveryWhere ID. This could be used by other organizations to access your [facility] medical records  [id number]        Your Vitals Were     Pulse                   85            Blood Pressure from Last 3 Encounters:   07/19/18 118/70   07/18/18 117/70   07/09/18 126/82    Weight from Last 3 Encounters:   07/18/18 205 lb (93 kg)   06/15/18 199 lb 1.6 oz (90.3 kg)   05/11/18 195 lb (88.5 kg)              We Performed the Following     97442 - EXC BENIGN SKIN LESION TRUNK/ARM/LEG 0.6-1.0 CM     Dermatological path order and indications        Primary Care Provider Office Phone # Fax #    Philippe EDELMIRA Heayl [phone] [address]  [place] [address]        Equal Access to Services     BLAISE EDDY : Hadii aad ku hadasho Soomaali, waaxda luqadaha, qaybta kaalmada adeegyada, waxay cheli coats. So [facility] [phone].    ATENCIÓN: Si habla español, tiene a mena disposición servicios gratuitos de asistencia lingüística. Llame al [phone].    We comply with applicable federal civil rights laws and [place] laws. We do not discriminate on the basis of race, color, national origin, age, disability, sex, sexual orientation, or gender identity.            Thank you!     Thank you for choosing [facility]  for your care. Our goal is always to provide you with excellent care. Hearing back from our patients is one way we can continue to improve our services.  Please take a few minutes to complete the written survey that you may receive in the mail after your visit with us. Thank you!             Your Updated Medication List - Protect others around you: Learn how to safely use, store and throw away your medicines at www.disposemymeds.org.          This list is accurate as of 7/19/18  9:56 AM.  Always use your most recent med list.                   Brand Name Dispense Instructions for use Diagnosis    ASPIRIN PO      Take by mouth as needed for moderate pain        dibucaine 1 % Oint ointment    NUPERCAINAL     3 times daily as needed for moderate pain        diltiazem 2% in PLO cream (FV COMPOUNDED) 2% Gel      Apply topically 2 times daily    Need for hepatitis C screening test       ibuprofen 200 MG capsule     120 capsule    Take 200-400 mg by mouth every 6 hours as needed    Acute post-operative pain       lidocaine (Anorectal) 5 % Crea     1 Tube    Externally apply 1 Application topically 3 times daily as needed    Anal pain       lidocaine 2 % topical gel    XYLOCAINE    30 mL    Apply topically 2 times daily as needed for moderate pain    Anal fissure       VITAMIN D (CHOLECALCIFEROL) PO      Take 2,000 Units by mouth daily

## 2018-07-19 NOTE — PROGRESS NOTES
HPI:  Louie Greco is a 58 year old male patient here today for epidermal cyst removal on mid upper back  Duration: years?  Symptoms:  Growing, draining, and tender     Previous treatments: at-home draining     Alleviating/aggravating factors: pressure worsens pain    Associated symptoms: none  Additional findings:none  Patient has no other skin complaints today.  Remainder of the HPI, Meds, PMH, Allergies, FH, and SH was reviewed in chart.    Pertinent Hx:   Rectal cancer  Past Medical History:   Diagnosis Date     Childhood asthma      Epididymitis, bilateral     18 years old     Inguinal hernia      Mumps      Nephrolithiasis     1990     Rectal cancer (H)     low rectal cancer     Shingles        Past Surgical History:   Procedure Laterality Date     COLONOSCOPY N/A 7/27/2016    Procedure: COMBINED COLONOSCOPY, SINGLE OR MULTIPLE BIOPSY/POLYPECTOMY BY BIOPSY;  Surgeon: Chelsea Thompson MD;  Location: [facility]     COLONOSCOPY N/A 9/13/2017    Procedure: COLONOSCOPY;;  Surgeon: Felicitas Radford MD;  [facility]     COLONOSCOPY N/A 12/13/2017    Procedure: COLONOSCOPY;;  Surgeon: Felicitas Radford MD;  [facility]     COMBINED CYSTOSCOPY, RETROGRADES, URETEROSCOPY, LASER HOLMIUM LITHOTRIPSY URETER(S), INSERT STENT Left 2/16/2018    Procedure: COMBINED CYSTOSCOPY, RETROGRADES, URETEROSCOPY, LASER HOLMIUM LITHOTRIPSY URETER(S), INSERT STENT;  Cysto, left ureteroscopy, holmium laser, retrogrades, stent placement;  Surgeon: Bola Worthy MD;  Location: [facility] OR     EXAM UNDER ANESTHESIA ANUS N/A 7/5/2017    Procedure: EXAM UNDER ANESTHESIA ANUS;  Examination Under Anesthesia, flex sigmoidoscopy with biopsies and formalin application;  Surgeon: Felicitas Radford MD;  [facility]     EXAM UNDER ANESTHESIA ANUS N/A 9/13/2017    Procedure: EXAM UNDER ANESTHESIA ANUS;  Examination Under Anesthesia Anus, Colonoscopy, application of formalin;  Surgeon: Malina  Felicitas HIDALGO MD;  [facility]     EXAM UNDER ANESTHESIA ANUS N/A 12/13/2017    Procedure: EXAM UNDER ANESTHESIA ANUS;  Examination Under Anesthesia Anus, Colonoscopy;  Surgeon: Felicitas Radford MD;  [facility]     EXAM UNDER ANESTHESIA ANUS N/A 5/11/2018    Procedure: EXAM UNDER ANESTHESIA ANUS;  Examination Under Anesthesia Anus, Interoperative Flexible Sigmoidoscopy, Application of Formalin;  Surgeon: Felicitas Radford MD;  [facility]     HC TOOTH EXTRACTION W/FORCEP       LAPAROSCOPIC APPENDECTOMY N/A 7/17/2017    Procedure: LAPAROSCOPIC APPENDECTOMY;  LAPAROSCOPIC APPENDECTOMY;  Surgeon: Bryson Ferguson MD;  Location: [facility]     SIGMOIDOSCOPY FLEXIBLE N/A 12/8/2016    Procedure: SIGMOIDOSCOPY FLEXIBLE;  Surgeon: Felicitas Radford MD;  [facility]     SIGMOIDOSCOPY FLEXIBLE N/A 7/5/2017    Procedure: SIGMOIDOSCOPY FLEXIBLE;;  Surgeon: Felicitas Radford MD;  [facility]     SIGMOIDOSCOPY FLEXIBLE N/A 5/11/2018    Procedure: SIGMOIDOSCOPY FLEXIBLE;;  Surgeon: Felicitas Radford MD;  [facility]     TESTICLE SURGERY       VASCULAR SURGERY      Right chest port     VASECTOMY       VASECTOMY          Family History   Problem Relation Age of Onset     Cancer Brother      primary of unknown origin     Other Cancer Brother      Chronic Obstructive Pulmonary Disease Father      Hypertension Father      Hyperlipidemia Father      Colon Polyps Mother      Number and type of polyps unknown     Family History Negative Brother      negative colonoscopy     Diabetes Maternal Grandfather      Hypertension Paternal Grandmother      Hyperlipidemia Paternal Grandmother      Stomach Cancer Other 63     maternal great grandfather     Glaucoma No family hx of      Macular Degeneration No family hx of        Social History     Social History     Marital status:      Spouse name: N/A     Number of children: N/A     Years of education: N/A     Occupational  History     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 No     Social History Narrative    Dad  at age  78   from [place], COPD, & HTN    Mom alive in her 70's.     for 30 years    Two adult children. 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           Outpatient Encounter Prescriptions as of 2018   Medication Sig Dispense Refill     VITAMIN D, CHOLECALCIFEROL, PO Take 2,000 Units by mouth daily        ASPIRIN PO Take by mouth as needed for moderate pain       dibucaine (NUPERCAINAL) 1 % OINT ointment 3 times daily as needed for moderate pain       diltiazem 2% in PLO cream, FV COMPOUNDED, 2% GEL Apply topically 2 times daily       ibuprofen 200 MG capsule Take 200-400 mg by mouth every 6 hours as needed (Patient not taking: Reported on 2018) 120 capsule 0     lidocaine (XYLOCAINE) 2 % topical gel Apply topically 2 times daily as needed for moderate pain (Patient not taking: Reported on 2018) 30 mL 3     lidocaine, Anorectal, 5 % CREA Externally apply 1 Application topically 3 times daily as needed (Patient not taking: Reported on 2018) 1 Tube 0     No facility-administered encounter medications on file as of 2018.        Review Of Systems:  Skin: As above  Eyes: negative  Ears/Nose/Throat: negative  Respiratory: No shortness of breath, dyspnea on exertion, cough, or hemoptysis  Cardiovascular: negative  Gastrointestinal: negative  Genitourinary: negative  Musculoskeletal: negative  Neurologic: negative  Psychiatric: negative  Hematologic/Lymphatic/Immunologic: negative  Endocrine:  negative      Objective:     /70  Pulse 85  Eyes: Conjunctivae/lids: Normal   ENT: Lips:  Normal  MSK: Normal  Cardiovascular: Peripheral edema none  Pulm: Breathing Normal  Neuro/Psych: Orientation: Normal; Mood/Affect: Normal, NAD, WDWN  Following areas examined: face, neck, back  Lopez skin type:l   Findings:    1)Soft mobile smooth nodule on mid upper back 1.0cm    Assessment and Plan:  1) Neoplasm of uncertain behavior on mid upper back 1.0cm  Rule out cyst  EXCISIONAL BIOPSY AND COMPLEX:  After thorough discussion of PGACAC, consent obtained, anesthesia with LEC and prep, the margins of the lesion were identified and an elliptical incision was made encompassing the lesion with 2 mm margin.  The incisions were made through to include the mid-subcutaneous tissue.  The lesion was removed en bloc and submitted for derm pathologic review. Because of the full-thickness nature of the wound and to avoid standing cone deformities, a complex linear repair was planned.  The wound edges were widely undermined until adequate tissue mobility was obtained.  Hemostasis was achieved.  The wound edges were then closed in a layered fashion, using Vicryl for subcutaneous stitches and FAPG sutures for running top stitches.  Postoperative length was 2 cm.  Patient will be contacted with result.  EBL minimal; complications none; wound care routine.  The patient was discharged in good condition and will return in one week for wound evaluation.    Follow up in 1 week for bandage change.

## 2018-07-19 NOTE — LETTER
7/19/2018         RE: Louie Greco  [address] [address]        Dear Colleague,    Thank you for referring your patient, Louie Greco, to the [facility]. Please see a copy of my visit note below.    HPI:  Louie Greco is a 58 year old male patient here today for epidermal cyst removal on mid upper back  Duration: years?  Symptoms:  Growing, draining, and tender     Previous treatments: at-home draining     Alleviating/aggravating factors: pressure worsens pain    Associated symptoms: none  Additional findings:none  Patient has no other skin complaints today.  Remainder of the HPI, Meds, PMH, Allergies, FH, and SH was reviewed in chart.    Pertinent Hx:   Rectal cancer  Past Medical History:   Diagnosis Date     Childhood asthma      Epididymitis, bilateral     18 years old     Inguinal hernia      Mumps      Nephrolithiasis     1990     Rectal cancer (H)     low rectal cancer     Shingles        Past Surgical History:   Procedure Laterality Date     COLONOSCOPY N/A 7/27/2016    Procedure: COMBINED COLONOSCOPY, SINGLE OR MULTIPLE BIOPSY/POLYPECTOMY BY BIOPSY;  Surgeon: Chelsea Thompson MD;  Location:  GI     COLONOSCOPY N/A 9/13/2017    Procedure: COLONOSCOPY;;  Surgeon: Felicitas Radford MD;  Location: [facility]     COLONOSCOPY N/A 12/13/2017    Procedure: COLONOSCOPY;;  Surgeon: Felicitas Radford MD;  [facility]     COMBINED CYSTOSCOPY, RETROGRADES, URETEROSCOPY, LASER HOLMIUM LITHOTRIPSY URETER(S), INSERT STENT Left 2/16/2018    Procedure: COMBINED CYSTOSCOPY, RETROGRADES, URETEROSCOPY, LASER HOLMIUM LITHOTRIPSY URETER(S), INSERT STENT;  Cysto, left ureteroscopy, holmium laser, retrogrades, stent placement;  Surgeon: Bola Worthy MD;  Location:  OR     EXAM UNDER ANESTHESIA ANUS N/A 7/5/2017    Procedure: EXAM UNDER ANESTHESIA ANUS;  Examination Under Anesthesia, flex sigmoidoscopy with biopsies and formalin application;  Surgeon:  Felicitas Radford MD;  [facility]     EXAM UNDER ANESTHESIA ANUS N/A 9/13/2017    Procedure: EXAM UNDER ANESTHESIA ANUS;  Examination Under Anesthesia Anus, Colonoscopy, application of formalin;  Surgeon: Felicitas Radford MD;  [facility]     EXAM UNDER ANESTHESIA ANUS N/A 12/13/2017    Procedure: EXAM UNDER ANESTHESIA ANUS;  Examination Under Anesthesia Anus, Colonoscopy;  Surgeon: Felicitas Radford MD;  [facility]     EXAM UNDER ANESTHESIA ANUS N/A 5/11/2018    Procedure: EXAM UNDER ANESTHESIA ANUS;  Examination Under Anesthesia Anus, Interoperative Flexible Sigmoidoscopy, Application of Formalin;  Surgeon: Felicitas Radford MD;  [facility]     HC TOOTH EXTRACTION W/FORCEP       LAPAROSCOPIC APPENDECTOMY N/A 7/17/2017    Procedure: LAPAROSCOPIC APPENDECTOMY;  LAPAROSCOPIC APPENDECTOMY;  Surgeon: Bryson Ferguson MD;  Location: [facility]     SIGMOIDOSCOPY FLEXIBLE N/A 12/8/2016    Procedure: SIGMOIDOSCOPY FLEXIBLE;  Surgeon: Felicitas Radford MD;  Location: [facility]     SIGMOIDOSCOPY FLEXIBLE N/A 7/5/2017    Procedure: SIGMOIDOSCOPY FLEXIBLE;;  Surgeon: Felicitas Radford MD;  [facility]     SIGMOIDOSCOPY FLEXIBLE N/A 5/11/2018    Procedure: SIGMOIDOSCOPY FLEXIBLE;;  Surgeon: Felicitas Radford MD;  [facility]     TESTICLE SURGERY       VASCULAR SURGERY      Right chest port     VASECTOMY       VASECTOMY          Family History   Problem Relation Age of Onset     Cancer Brother      primary of unknown origin     Other Cancer Brother      Chronic Obstructive Pulmonary Disease Father      Hypertension Father      Hyperlipidemia Father      Colon Polyps Mother      Number and type of polyps unknown     Family History Negative Brother      negative colonoscopy     Diabetes Maternal Grandfather      Hypertension Paternal Grandmother      Hyperlipidemia Paternal Grandmother      Stomach Cancer Other 63     maternal great grandfather      Glaucoma No family hx of      Macular Degeneration No family hx of        Social History     Social History     Marital status:      Spouse name: N/A     Number of children: N/A     Years of education: N/A     Occupational History     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 No     Social History Narrative    Dad  at age  78   from [place], COPD, & HTN    Mom alive in her 70's.     for 30 years    Two adult children. 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           Outpatient Encounter Prescriptions as of 2018   Medication Sig Dispense Refill     VITAMIN D, CHOLECALCIFEROL, PO Take 2,000 Units by mouth daily        ASPIRIN PO Take by mouth as needed for moderate pain       dibucaine (NUPERCAINAL) 1 % OINT ointment 3 times daily as needed for moderate pain       diltiazem 2% in PLO cream, FV COMPOUNDED, 2% GEL Apply topically 2 times daily       ibuprofen 200 MG capsule Take 200-400 mg by mouth every 6 hours as needed (Patient not taking: Reported on 2018) 120 capsule 0     lidocaine (XYLOCAINE) 2 % topical gel Apply topically 2 times daily as needed for moderate pain (Patient not taking: Reported on 2018) 30 mL 3     lidocaine, Anorectal, 5 % CREA Externally apply 1 Application topically 3 times daily as needed (Patient not taking: Reported on 2018) 1 Tube 0     No facility-administered encounter medications on file as of 2018.        Review Of Systems:  Skin: As above  Eyes: negative  Ears/Nose/Throat: negative  Respiratory: No shortness of breath, dyspnea on exertion,  cough, or hemoptysis  Cardiovascular: negative  Gastrointestinal: negative  Genitourinary: negative  Musculoskeletal: negative  Neurologic: negative  Psychiatric: negative  Hematologic/Lymphatic/Immunologic: negative  Endocrine: negative      Objective:     /70  Pulse 85  Eyes: Conjunctivae/lids: Normal   ENT: Lips:  Normal  MSK: Normal  Cardiovascular: Peripheral edema none  Pulm: Breathing Normal  Neuro/Psych: Orientation: Normal; Mood/Affect: Normal, NAD, WDWN  Following areas examined: face, neck, back  Lopez skin type:l   Findings:    1)Soft mobile smooth nodule on mid upper back 1.0cm    Assessment and Plan:  1) Neoplasm of uncertain behavior on mid upper back 1.0cm  Rule out cyst  EXCISIONAL BIOPSY AND COMPLEX:  After thorough discussion of PGACAC, consent obtained, anesthesia with LEC and prep, the margins of the lesion were identified and an elliptical incision was made encompassing the lesion with 2 mm margin.  The incisions were made through to include the mid-subcutaneous tissue.  The lesion was removed en bloc and submitted for derm pathologic review. Because of the full-thickness nature of the wound and to avoid standing cone deformities, a complex linear repair was planned.  The wound edges were widely undermined until adequate tissue mobility was obtained.  Hemostasis was achieved.  The wound edges were then closed in a layered fashion, using Vicryl for subcutaneous stitches and FAPG sutures for running top stitches.  Postoperative length was 2 cm.  Patient will be contacted with result.  EBL minimal; complications none; wound care routine.  The patient was discharged in good condition and will return in one week for wound evaluation.    Follow up in 1 week for bandage change.       Again, thank you for allowing me to participate in the care of your patient.        Sincerely,        Margarita Brock PA-C

## 2018-07-19 NOTE — PATIENT INSTRUCTIONS
Sutured Wound Care     [facility]: [phone]    [facility]: [phone]          ? No strenuous activity for 48 hours. Resume moderate activity in 48 hours. No heavy exercising until you are seen for follow up in one week.     ? Take Tylenol as needed for discomfort.                         ? Do not drink alcoholic beverages for 48 hours.     ? Keep the pressure bandage in place for 24 hours. If the bandage becomes blood tinged or loose, reinforce it with gauze and tape.        (Refer to the reverse side of this page for management of bleeding).    ? Remove pressure bandage in 24 hours     ? Leave the flat bandage in place until your follow up appointment.    ? Keep the bandage dry. Wash around it carefully.    ? If the tape becomes soiled or starts to come off, reinforce it with additional paper tape.    ? Do not smoke for 3 weeks; smoking is detrimental to wound healing.    ? It is normal to have swelling and bruising around the surgical site. The bruising will fade in approximately 10-14 days. Elevate the area to reduce swelling.    ? Numbness, itchiness and sensitivity to temperature changes can occur after surgery and may take up to 18 months to normalize.      POSSIBLE COMPLICATIONS    BLEEDIN. Leave the bandage in place.  2. Use tightly rolled up gauze or a cloth to apply direct pressure over the bandage for 20   minutes.  3. Reapply pressure for an additional 20 minutes if necessary  4. Call the office or go to the nearest emergency room if pressure fails to stop the bleeding.  5. Use additional gauze and tape to maintain pressure once the bleeding has stopped.        PAIN:    1. Post operative pain should slowly get better, never worse.  2. A severe increase in pain may indicate a problem. Call the office if this occurs.    In case of emergency phone: [phone]

## 2018-07-20 ENCOUNTER — HOSPITAL ENCOUNTER (OUTPATIENT)
Facility: AMBULATORY SURGERY CENTER | Age: 58
End: 2018-07-20
Attending: COLON & RECTAL SURGERY
Payer: COMMERCIAL

## 2018-07-20 ENCOUNTER — ANESTHESIA (OUTPATIENT)
Dept: SURGERY | Facility: AMBULATORY SURGERY CENTER | Age: 58
End: 2018-07-20

## 2018-07-20 ENCOUNTER — SURGERY (OUTPATIENT)
Age: 58
End: 2018-07-20

## 2018-07-20 VITALS
BODY MASS INDEX: 28.7 KG/M2 | RESPIRATION RATE: 16 BRPM | HEIGHT: 71 IN | OXYGEN SATURATION: 97 % | DIASTOLIC BLOOD PRESSURE: 71 MMHG | WEIGHT: 205 LBS | TEMPERATURE: 98 F | SYSTOLIC BLOOD PRESSURE: 119 MMHG

## 2018-07-20 DIAGNOSIS — Z13.220 LIPID SCREENING: ICD-10-CM

## 2018-07-20 DIAGNOSIS — Z12.5 PROSTATE CANCER SCREENING: ICD-10-CM

## 2018-07-20 DIAGNOSIS — C20 MALIGNANT NEOPLASM OF RECTUM (H): ICD-10-CM

## 2018-07-20 LAB
ALBUMIN SERPL-MCNC: 4 G/DL (ref 3.4–5)
ALP SERPL-CCNC: 79 U/L (ref 40–150)
ALT SERPL W P-5'-P-CCNC: 38 U/L (ref 0–70)
ANION GAP SERPL CALCULATED.3IONS-SCNC: 7 MMOL/L (ref 3–14)
AST SERPL W P-5'-P-CCNC: 24 U/L (ref 0–45)
BASOPHILS # BLD AUTO: 0 10E9/L (ref 0–0.2)
BASOPHILS NFR BLD AUTO: 1 %
BILIRUB SERPL-MCNC: 1.4 MG/DL (ref 0.2–1.3)
BUN SERPL-MCNC: 15 MG/DL (ref 7–30)
CALCIUM SERPL-MCNC: 9 MG/DL (ref 8.5–10.1)
CEA SERPL-MCNC: 2.5 UG/L (ref 0–2.5)
CHLORIDE SERPL-SCNC: 104 MMOL/L (ref 94–109)
CHOLEST SERPL-MCNC: 143 MG/DL
CO2 SERPL-SCNC: 27 MMOL/L (ref 20–32)
CREAT SERPL-MCNC: 0.83 MG/DL (ref 0.66–1.25)
DIFFERENTIAL METHOD BLD: ABNORMAL
EOSINOPHIL # BLD AUTO: 0.2 10E9/L (ref 0–0.7)
EOSINOPHIL NFR BLD AUTO: 4.4 %
ERYTHROCYTE [DISTWIDTH] IN BLOOD BY AUTOMATED COUNT: 13.2 % (ref 10–15)
GFR SERPL CREATININE-BSD FRML MDRD: >90 ML/MIN/1.7M2
GLUCOSE SERPL-MCNC: 94 MG/DL (ref 70–99)
HCT VFR BLD AUTO: 45.5 % (ref 40–53)
HDLC SERPL-MCNC: 40 MG/DL
HGB BLD-MCNC: 15 G/DL (ref 13.3–17.7)
IMM GRANULOCYTES # BLD: 0 10E9/L (ref 0–0.4)
IMM GRANULOCYTES NFR BLD: 0.2 %
LDLC SERPL CALC-MCNC: 84 MG/DL
LYMPHOCYTES # BLD AUTO: 0.7 10E9/L (ref 0.8–5.3)
LYMPHOCYTES NFR BLD AUTO: 17.3 %
MCH RBC QN AUTO: 29.7 PG (ref 26.5–33)
MCHC RBC AUTO-ENTMCNC: 33 G/DL (ref 31.5–36.5)
MCV RBC AUTO: 90 FL (ref 78–100)
MONOCYTES # BLD AUTO: 0.5 10E9/L (ref 0–1.3)
MONOCYTES NFR BLD AUTO: 11.9 %
NEUTROPHILS # BLD AUTO: 2.6 10E9/L (ref 1.6–8.3)
NEUTROPHILS NFR BLD AUTO: 65.2 %
NONHDLC SERPL-MCNC: 103 MG/DL
NRBC # BLD AUTO: 0 10*3/UL
NRBC BLD AUTO-RTO: 0 /100
PLATELET # BLD AUTO: 183 10E9/L (ref 150–450)
POTASSIUM SERPL-SCNC: 4.1 MMOL/L (ref 3.4–5.3)
PROT SERPL-MCNC: 7.1 G/DL (ref 6.8–8.8)
PSA SERPL-ACNC: 3.74 UG/L (ref 0–4)
RBC # BLD AUTO: 5.05 10E12/L (ref 4.4–5.9)
SODIUM SERPL-SCNC: 138 MMOL/L (ref 133–144)
TRIGL SERPL-MCNC: 94 MG/DL
WBC # BLD AUTO: 4.1 10E9/L (ref 4–11)

## 2018-07-20 RX ORDER — ALBUTEROL SULFATE 0.83 MG/ML
2.5 SOLUTION RESPIRATORY (INHALATION) EVERY 4 HOURS PRN
Status: DISCONTINUED | OUTPATIENT
Start: 2018-07-20 | End: 2018-07-21 | Stop reason: HOSPADM

## 2018-07-20 RX ORDER — GLYCOPYRROLATE 0.2 MG/ML
INJECTION, SOLUTION INTRAMUSCULAR; INTRAVENOUS PRN
Status: DISCONTINUED | OUTPATIENT
Start: 2018-07-20 | End: 2018-07-20

## 2018-07-20 RX ORDER — KETAMINE HYDROCHLORIDE 10 MG/ML
INJECTION INTRAMUSCULAR; INTRAVENOUS PRN
Status: DISCONTINUED | OUTPATIENT
Start: 2018-07-20 | End: 2018-07-20

## 2018-07-20 RX ORDER — FENTANYL CITRATE 50 UG/ML
25-50 INJECTION, SOLUTION INTRAMUSCULAR; INTRAVENOUS
Status: DISCONTINUED | OUTPATIENT
Start: 2018-07-20 | End: 2018-07-21 | Stop reason: HOSPADM

## 2018-07-20 RX ORDER — SODIUM CHLORIDE, SODIUM LACTATE, POTASSIUM CHLORIDE, CALCIUM CHLORIDE 600; 310; 30; 20 MG/100ML; MG/100ML; MG/100ML; MG/100ML
INJECTION, SOLUTION INTRAVENOUS CONTINUOUS
Status: DISCONTINUED | OUTPATIENT
Start: 2018-07-20 | End: 2018-07-21 | Stop reason: HOSPADM

## 2018-07-20 RX ORDER — OXYCODONE HCL 5 MG/5 ML
5-10 SOLUTION, ORAL ORAL EVERY 4 HOURS PRN
Status: DISCONTINUED | OUTPATIENT
Start: 2018-07-20 | End: 2018-07-21 | Stop reason: HOSPADM

## 2018-07-20 RX ORDER — LIDOCAINE 40 MG/G
CREAM TOPICAL
Status: DISCONTINUED | OUTPATIENT
Start: 2018-07-20 | End: 2018-07-21 | Stop reason: HOSPADM

## 2018-07-20 RX ORDER — DEXAMETHASONE SODIUM PHOSPHATE 4 MG/ML
4 INJECTION, SOLUTION INTRA-ARTICULAR; INTRALESIONAL; INTRAMUSCULAR; INTRAVENOUS; SOFT TISSUE EVERY 10 MIN PRN
Status: DISCONTINUED | OUTPATIENT
Start: 2018-07-20 | End: 2018-07-21 | Stop reason: HOSPADM

## 2018-07-20 RX ORDER — KETOROLAC TROMETHAMINE 30 MG/ML
30 INJECTION, SOLUTION INTRAMUSCULAR; INTRAVENOUS EVERY 6 HOURS PRN
Status: DISCONTINUED | OUTPATIENT
Start: 2018-07-20 | End: 2018-07-21 | Stop reason: HOSPADM

## 2018-07-20 RX ORDER — NALOXONE HYDROCHLORIDE 0.4 MG/ML
.1-.4 INJECTION, SOLUTION INTRAMUSCULAR; INTRAVENOUS; SUBCUTANEOUS
Status: DISCONTINUED | OUTPATIENT
Start: 2018-07-20 | End: 2018-07-21 | Stop reason: HOSPADM

## 2018-07-20 RX ORDER — ONDANSETRON 4 MG/1
4 TABLET, ORALLY DISINTEGRATING ORAL EVERY 30 MIN PRN
Status: DISCONTINUED | OUTPATIENT
Start: 2018-07-20 | End: 2018-07-21 | Stop reason: HOSPADM

## 2018-07-20 RX ORDER — PROPOFOL 10 MG/ML
INJECTION, EMULSION INTRAVENOUS CONTINUOUS PRN
Status: DISCONTINUED | OUTPATIENT
Start: 2018-07-20 | End: 2018-07-20

## 2018-07-20 RX ORDER — HYDROMORPHONE HYDROCHLORIDE 1 MG/ML
.3-.5 INJECTION, SOLUTION INTRAMUSCULAR; INTRAVENOUS; SUBCUTANEOUS EVERY 10 MIN PRN
Status: DISCONTINUED | OUTPATIENT
Start: 2018-07-20 | End: 2018-07-21 | Stop reason: HOSPADM

## 2018-07-20 RX ORDER — ONDANSETRON 2 MG/ML
4 INJECTION INTRAMUSCULAR; INTRAVENOUS EVERY 30 MIN PRN
Status: DISCONTINUED | OUTPATIENT
Start: 2018-07-20 | End: 2018-07-21 | Stop reason: HOSPADM

## 2018-07-20 RX ORDER — GABAPENTIN 300 MG/1
300 CAPSULE ORAL ONCE
Status: COMPLETED | OUTPATIENT
Start: 2018-07-20 | End: 2018-07-20

## 2018-07-20 RX ORDER — ACETAMINOPHEN 325 MG/1
975 TABLET ORAL ONCE
Status: COMPLETED | OUTPATIENT
Start: 2018-07-20 | End: 2018-07-20

## 2018-07-20 RX ADMIN — GLYCOPYRROLATE 0.2 MG: 0.2 INJECTION, SOLUTION INTRAMUSCULAR; INTRAVENOUS at 11:03

## 2018-07-20 RX ADMIN — ACETAMINOPHEN 975 MG: 325 TABLET ORAL at 09:32

## 2018-07-20 RX ADMIN — SODIUM CHLORIDE, SODIUM LACTATE, POTASSIUM CHLORIDE, CALCIUM CHLORIDE: 600; 310; 30; 20 INJECTION, SOLUTION INTRAVENOUS at 09:34

## 2018-07-20 RX ADMIN — KETAMINE HYDROCHLORIDE 20 MG: 10 INJECTION INTRAMUSCULAR; INTRAVENOUS at 11:04

## 2018-07-20 RX ADMIN — GABAPENTIN 300 MG: 300 CAPSULE ORAL at 09:32

## 2018-07-20 RX ADMIN — PROPOFOL 125 MCG/KG/MIN: 10 INJECTION, EMULSION INTRAVENOUS at 11:03

## 2018-07-20 NOTE — IP AVS SNAPSHOT
[facility]    [address] [place] [address]    Phone:  [phone]    Fax:  [phone]                                       After Visit Summary   7/20/2018    Louie Greco    MRN: [id number]           After Visit Summary Signature Page     I have received my discharge instructions, and my questions have been answered. I have discussed any challenges I see with this plan with the nurse or doctor.    ..........................................................................................................................................  Patient/Patient Representative Signature      ..........................................................................................................................................  Patient Representative Print Name and Relationship to Patient    ..................................................               ................................................  Date                                            Time    ..........................................................................................................................................  Reviewed by Signature/Title    ...................................................              ..............................................  Date                                                            Time

## 2018-07-20 NOTE — IP AVS SNAPSHOT
MRN:[id number]                      After Visit Summary   7/20/2018    Louie Greco    MRN: [id number]           Thank you!     Thank you for choosing [facility] for your care. Our goal is always to provide you with excellent care. Hearing back from our patients is one way we can continue to improve our services. Please take a few minutes to complete the written survey that you may receive in the mail after you visit with us. Thank you!        Patient Information     Date Of Birth          1960        About your hospital stay     You were admitted on:  July 20, 2018 [facility]    You were discharged on:  July 20, 2018       Who to Call     For medical emergencies, please call 911.  For non-urgent questions about your medical care, please call your primary care provider or clinic, [phone]  For questions related to your surgery, please call your surgery clinic        Attending Provider     Provider Specialty    Felicitas Radford MD Colon and Rectal Surgery       Primary Care Provider Office Phone # Fax #    Philippe Healy [phone]      Your next 10 appointments already scheduled     Jul 25, 2018 11:00 AM CDT   MR PELVIS BONE WO & W CONTRAST with UUMR2   [facility], [facility], [facility] ([facility], [facility])    [address] [facility] [address] [phone]           Take your medicines as usual, unless your doctor tells you not to. Bring a list of your current medicines to your exam (including vitamins, minerals and over-the-counter drugs).  You may or may not receive intravenous (IV) contrast for this exam pending the discretion of the Radiologist.  You do not need to do anything special to prepare.  The MRI machine uses a strong magnet. Please wear clothes without metal (snaps, zippers). A sweatsuit works well, or we may give you a hospital gown.  Please  remove any body piercings and hair extensions before you arrive. You will also remove watches, jewelry, hairpins, wallets, dentures, partial dental plates and hearing aids. You may wear contact lenses, and you may be able to wear your rings. We have a safe place to keep your personal items, but it is safer to leave them at home.  **IMPORTANT** THE INSTRUCTIONS BELOW ARE ONLY FOR THOSE PATIENTS WHO HAVE BEEN PRESCRIBED SEDATION OR GENERAL ANESTHESIA DURING THEIR MRI PROCEDURE:  IF YOUR DOCTOR PRESCRIBED ORAL SEDATION (take medicine to help you relax during your exam):   You must get the medicine from your doctor (oral medication) before you arrive. Bring the medicine to the exam. Do not take it at home. You ll be told when to take it upon arriving for your exam.   Arrive one hour early. Bring someone who can take you home after the test. Your medicine will make you sleepy. After the exam, you may not drive, take a bus or take a taxi by yourself.  IF YOUR DOCTOR PRESCRIBED IV SEDATION:   Arrive one hour early. Bring someone who can take you home after the test. Your medicine will make you sleepy. After the exam, you may not drive, take a bus or take a taxi by yourself.   No eating 6 hours before your exam. You may have clear liquids up until 4 hours before your exam. (Clear liquids include water, clear tea, black coffee and fruit juice without pulp.)  IF YOUR DOCTOR PRESCRIBED ANESTHESIA (be asleep for your exam):   Arrive 1 1/2 hours early. Bring someone who can take you home after the test. You may not drive, take a bus or take a taxi by yourself.   No eating 8 hours before your exam. You may have clear liquids up until 4 hours before your exam. (Clear liquids include water, clear tea, black coffee and fruit juice without pulp.)   You will spend four to five hours in the recovery room.  Please call the Imaging Department at your exam site with any questions.            Jul 25, 2018 12:00 PM CDT   PE NPET ONCOLOGY  (EYES TO THIGHS) with UUPET1   [facility], [place] [facility] ([facility], [facility])    [address] [facility] [address] [phone]           Tell your doctor:   If there is any chance you may be pregnant or if you are breastfeeding.   If you have problems lying in small spaces (claustrophobia). If you do, your doctor may give you medicine to help you relax. If you have diabetes:   Have your exam early in the morning. Your blood glucose will go up as the day goes by.   Your glucose level must be 180 or less at the start of the exam. Please take any medicines you need to ensure this blood glucose level. 24 hours before your scan: Don t do any heavy exercise. (No jogging, aerobics or other workouts.) Exercise will make your pictures less accurate. 6 hours before your scan:   Stop all food and liquids (except water).   Do not chew gum or suck on mints.   If you need to take medicine with food, you may take it with a few crackers.  Please call your Imaging Department at your exam site with any questions.            Jul 26, 2018  9:30 AM CDT   Office Visit with Ec Skin Nurse   [facility] ([address] [address] [phone]           Bring a current list of meds and any records pertaining to this visit. For Physicals, please bring immunization records and any forms needing to be filled out. Please arrive 10 minutes early to complete paperwork.            Jul 31, 2018 10:30 AM CDT   Return Visit with Agueda Manley MD   [facility] ([facility])    [facility]  [address]  [facility] [address]   [phone]              Further instructions from your care team       [facility]  Home Care Following Anesthesia  For 24 hours after surgery:  1. Get plenty of rest.  A responsible adult must  "stay with you for at least 24 hours after you leave the surgery center.  2. Do not drive or use heavy equipment.  If you have weakness or tingling, don't drive or use heavy equipment until this feeling goes away.   3. Do not drink alcohol.   4. Avoid strenuous or risky activities.  Ask for help when climbing stairs.  5. You may feel lightheaded.  IF so, sit for a few minutes before standing.  Have someone help you get up.   6. If you have nausea (feel sick to your stomach): Drink only clear liquids such as apple juice, ginger ale, broth or 7-Up.  Rest may also help.  Be sure to drink enough fluids.  Move to a regular diet as you feel able.   7. You may have a slight fever.  Call the doctor if your fever is over 100 F (37.7 C) (taken under the tongue) or lasts longer than 24 hours.  8. You may have a dry mouth, a sore throat, muscle aches or trouble sleeping. These should go away after 24 hours.  9. Do not make important or legal decisions.        Today you received a Marcaine or bupivacaine block to numb the nerves near your surgery site.  This is a block using local anesthetic or \"numbing\" medication injected around the nerves to anesthetize or \"numb\" the area supplied by those nerves.  This block is injected into the muscle layer near your surgical site.  The medication may numb the location where you had surgery for 6-18 hours, but may last up to 24 hours.  If your surgical site is an arm or leg you should be careful with your affected limb, since it is possible to injure your limb without being aware of it due to the numbing.  Until full feeling returns, you should guard against bumping or hitting your limb, and avoid extreme hot or cold temperatures on the skin.  As the block wears off, the feeling will return as a tingling or prickly sensation near your surgical site.  You will experience more discomfort from your incision as the feeling returns.  You may want to take a pain pill (a narcotic or Tylenol if this " was prescribed by your surgeon) when you start to experience mild pain before the pain beccomes more severe.  If your pain medications do not control your pain you should notifiy your surgeon.    Tips for taking pain medications  To get the best pain relief possible, remember these points:    Take pain medications as directed, before pain becomes severe.    Pain medication can upset your stomach: taking it with food may help.    Constipation is a common side effect of pain medication. Drink plenty of  fluids.    Eat foods high in fiber. Take a stool softener if recommended by your doctor or pharmacist.    Do not drink alcohol, drive or operate machinery while taking pain medications.    Ask about other ways to control pain, such as with heat, ice or relaxation.    Tylenol/Acetaminophen Consumption  To help encourage the safe use of acetaminophen, the makers of TYLENOL  have lowered the maximum daily dose for single-ingredient Extra Strength TYLENOL  (acetaminophen) products sold in the [place] from 8 pills per day (4,000 mg) to 6 pills per day (3,000 mg). The dosing interval has also changed from 2 pills every 4-6 hours to 2 pills every 6 hours.    If you feel your pain relief is insufficient, you may take Tylenol/Acetaminophen in addition to your narcotic pain medication.     Be careful not to exceed 3,000 mg of Tylenol/Acetaminophen in a 24 hour period from all sources.    If you are taking extra strength Tylenol/acetaminophen (500 mg), the maximum dose is 6 tablets in 24 hours.    If you are taking regular strength acetaminophen (325 mg), the maximum dose is 9 tablets in 24 hours.    Call a doctor for any of the followin. Signs of infection (fever, growing tenderness at the surgery site, a large amount of drainage or bleeding, severe pain, foul-smelling drainage, redness, swelling).  2. It has been over 8 to 10 hours since surgery and you are still not able to urinate (pass water).  3. Headache for over 24  hours.  4. Numbness, tingling or weakness the day after surgery (if you had spinal anesthesia).  Your doctor is:       Dr. Felicitas Radford, Colon Rectal: [phone]               Or dial [phone] and ask for the resident on call for:  Prostate Urology  For emergency care, call the:  [facility]:  [phone] (TTY for hearing impaired: [phone])    Discharge Instructions after Colonoscopy or Sigmoidoscopy       Today you had a Sigmoidoscopy       Activity and Diet   You were given medicine for pain. You may be dizzy or sleepy.   For 24 hours:     Do not drive or use heavy equipment.     Do not make important decisions.     Do not drink any alcohol.   You may return to your normal diet and medicines.       Discomfort     Air was placed in your colon during the exam in order to see it. Walking helps to pass the air.     You may take Tylenol (acetaminophen) for pain unless your doctor has told you not to.       Follow-up   ____ We took small tissue samples or polyps to study. Your doctor will call you with the results within two weeks.       When to call:       Call right away if you have:     Unusual pain in belly or chest pain not relieved with passing air.     More than 1 to 2 Tablespoons of bleeding from your rectum.     Fever above 100.6  F (37.5  C).       If you have severe pain, bleeding, or shortness of breath, go to an emergency room.       If you have questions, call:   Monday to Friday, 7 a.m. to 4:30 p.m.   Endoscopy: [phone] (We may have to call you back)       After hours   Hospital: [phone] (Ask for the GI fellow on call)                 Pending Results     Date and Time Order Name Status Description    7/20/2018 0824 CEA In process     7/19/2018 1153 DERMATOPATHOLOGY In process             Admission Information     Date & Time Provider Department Dept. Phone    7/20/2018 Felicitas Radford MD [facility] [phone]     "  Your Vitals Were     Blood Pressure Temperature Respirations Height Weight Pulse Oximetry    98/57 97.7  F (36.5  C) (Temporal) 16 1.791 m (5' 10.5\") 93 kg (205 lb) 95%    BMI (Body Mass Index)                   29 kg/m2           MyChart Information     [facility] gives you secure access to your electronic health record. If you see a primary care provider, you can also send messages to your care team and make appointments. If you have questions, please call your primary care clinic.  If you do not have a primary care provider, please call [phone] and they will assist you.      [facility] is an electronic gateway that provides easy, online access to your medical records. With [facility], you can request a clinic appointment, read your test results, renew a prescription or communicate with your care team.     To access your existing account, please contact your [facility] or call [phone] for assistance.        Care EveryWhere ID     This is your Care EveryWhere ID. This could be used by other organizations to access your [facility] medical records  [id number]        Equal Access to Services     BLAISE EDDY : Hadii trent Alatorre, wahuyen cullen, qachastity tran, jb coats. So [facility] [phone].    ATENCIÓN: Si habla español, tiene a mena disposición servicios gratuitos de asistencia lingüística. Llrose al [phone].    We comply with applicable federal civil rights laws and [place] laws. We do not discriminate on the basis of race, color, national origin, age, disability, sex, sexual orientation, or gender identity.               Review of your medicines      CONTINUE these medicines which have NOT CHANGED        Dose / Directions    ASPIRIN PO        Take by mouth as needed for moderate pain   Refills:  0       dibucaine 1 % Oint ointment   Commonly known as:  NUPERCAINAL        3 times daily as needed for moderate pain "   Refills:  0       diltiazem 2% in PLO cream (FV COMPOUNDED) 2% Gel   Used for:  Need for hepatitis C screening test        Apply topically 2 times daily   Refills:  0       ibuprofen 200 MG capsule   Used for:  Acute post-operative pain        Dose:  200-400 mg   Take 200-400 mg by mouth every 6 hours as needed   Quantity:  120 capsule   Refills:  0       lidocaine (Anorectal) 5 % Crea   Used for:  Anal pain        Dose:  1 Application   Externally apply 1 Application topically 3 times daily as needed   Quantity:  1 Tube   Refills:  0       lidocaine 2 % topical gel   Commonly known as:  XYLOCAINE   Used for:  Anal fissure        Apply topically 2 times daily as needed for moderate pain   Quantity:  30 mL   Refills:  3       VITAMIN D (CHOLECALCIFEROL) PO        Dose:  2000 Units   Take 2,000 Units by mouth daily   Refills:  0                Protect others around you: Learn how to safely use, store and throw away your medicines at www.disposemymeds.org.             Medication List: This is a list of all your medications and when to take them. Check marks below indicate your daily home schedule. Keep this list as a reference.      Medications           Morning Afternoon Evening Bedtime As Needed    ASPIRIN PO   Take by mouth as needed for moderate pain                                dibucaine 1 % Oint ointment   Commonly known as:  NUPERCAINAL   3 times daily as needed for moderate pain                                diltiazem 2% in PLO cream (FV COMPOUNDED) 2% Gel   Apply topically 2 times daily                                ibuprofen 200 MG capsule   Take 200-400 mg by mouth every 6 hours as needed                                lidocaine (Anorectal) 5 % Crea   Externally apply 1 Application topically 3 times daily as needed                                lidocaine 2 % topical gel   Commonly known as:  XYLOCAINE   Apply topically 2 times daily as needed for moderate pain                                VITAMIN D  (CHOLECALCIFEROL) PO   Take 2,000 Units by mouth daily

## 2018-07-20 NOTE — ANESTHESIA POSTPROCEDURE EVALUATION
Patient: Louie Greco    Procedure(s):  Examination Under Anesthesia Anus, Flexible Sigmoidoscopy    - Wound Class: II-Clean Contaminated    Diagnosis:History of Rectal Cancer  Diagnosis Additional Information: No value filed.    Anesthesia Type:  MAC    Note:  Anesthesia Post Evaluation    Patient location during evaluation: Phase 2  Patient participation: Able to fully participate in evaluation  Level of consciousness: awake and alert  Pain management: adequate  Airway patency: patent  Cardiovascular status: acceptable  Respiratory status: acceptable  Hydration status: acceptable  PONV: none     Anesthetic complications: None          Last vitals:  Vitals:    07/20/18 0919 07/20/18 1118 07/20/18 1138   BP: 129/79 98/57 119/71   Resp: 16 16 16   Temp: 37.1  C (98.7  F) 36.5  C (97.7  F) 36.7  C (98  F)   SpO2: 96% 95% 97%         Electronically Signed By: Jaspreet Branch DO  July 20, 2018  2:15 PM

## 2018-07-20 NOTE — DISCHARGE INSTRUCTIONS
"[facility]  Home Care Following Anesthesia  For 24 hours after surgery:  1. Get plenty of rest.  A responsible adult must stay with you for at least 24 hours after you leave the surgery center.  2. Do not drive or use heavy equipment.  If you have weakness or tingling, don't drive or use heavy equipment until this feeling goes away.   3. Do not drink alcohol.   4. Avoid strenuous or risky activities.  Ask for help when climbing stairs.  5. You may feel lightheaded.  IF so, sit for a few minutes before standing.  Have someone help you get up.   6. If you have nausea (feel sick to your stomach): Drink only clear liquids such as apple juice, ginger ale, broth or 7-Up.  Rest may also help.  Be sure to drink enough fluids.  Move to a regular diet as you feel able.   7. You may have a slight fever.  Call the doctor if your fever is over 100 F (37.7 C) (taken under the tongue) or lasts longer than 24 hours.  8. You may have a dry mouth, a sore throat, muscle aches or trouble sleeping. These should go away after 24 hours.  9. Do not make important or legal decisions.        Today you received a Marcaine or bupivacaine block to numb the nerves near your surgery site.  This is a block using local anesthetic or \"numbing\" medication injected around the nerves to anesthetize or \"numb\" the area supplied by those nerves.  This block is injected into the muscle layer near your surgical site.  The medication may numb the location where you had surgery for 6-18 hours, but may last up to 24 hours.  If your surgical site is an arm or leg you should be careful with your affected limb, since it is possible to injure your limb without being aware of it due to the numbing.  Until full feeling returns, you should guard against bumping or hitting your limb, and avoid extreme hot or cold temperatures on the skin.  As the block wears off, the feeling will return as a tingling or prickly sensation near your " surgical site.  You will experience more discomfort from your incision as the feeling returns.  You may want to take a pain pill (a narcotic or Tylenol if this was prescribed by your surgeon) when you start to experience mild pain before the pain beccomes more severe.  If your pain medications do not control your pain you should notifiy your surgeon.    Tips for taking pain medications  To get the best pain relief possible, remember these points:    Take pain medications as directed, before pain becomes severe.    Pain medication can upset your stomach: taking it with food may help.    Constipation is a common side effect of pain medication. Drink plenty of  fluids.    Eat foods high in fiber. Take a stool softener if recommended by your doctor or pharmacist.    Do not drink alcohol, drive or operate machinery while taking pain medications.    Ask about other ways to control pain, such as with heat, ice or relaxation.    Tylenol/Acetaminophen Consumption  To help encourage the safe use of acetaminophen, the makers of TYLENOL  have lowered the maximum daily dose for single-ingredient Extra Strength TYLENOL  (acetaminophen) products sold in the [place] from 8 pills per day (4,000 mg) to 6 pills per day (3,000 mg). The dosing interval has also changed from 2 pills every 4-6 hours to 2 pills every 6 hours.    If you feel your pain relief is insufficient, you may take Tylenol/Acetaminophen in addition to your narcotic pain medication.     Be careful not to exceed 3,000 mg of Tylenol/Acetaminophen in a 24 hour period from all sources.    If you are taking extra strength Tylenol/acetaminophen (500 mg), the maximum dose is 6 tablets in 24 hours.    If you are taking regular strength acetaminophen (325 mg), the maximum dose is 9 tablets in 24 hours.    Call a doctor for any of the followin. Signs of infection (fever, growing tenderness at the surgery site, a large amount of drainage or bleeding, severe pain, foul-smelling  drainage, redness, swelling).  2. It has been over 8 to 10 hours since surgery and you are still not able to urinate (pass water).  3. Headache for over 24 hours.  4. Numbness, tingling or weakness the day after surgery (if you had spinal anesthesia).  Your doctor is:       Dr. Felicitas Radford, Colon Rectal: [phone]               Or dial [phone] and ask for the resident on call for:  Prostate Urology  For emergency care, call the:  [facility]:  [phone] (TTY for hearing impaired: [phone])    Discharge Instructions after Colonoscopy or Sigmoidoscopy       Today you had a Sigmoidoscopy       Activity and Diet   You were given medicine for pain. You may be dizzy or sleepy.   For 24 hours:     Do not drive or use heavy equipment.     Do not make important decisions.     Do not drink any alcohol.   You may return to your normal diet and medicines.       Discomfort     Air was placed in your colon during the exam in order to see it. Walking helps to pass the air.     You may take Tylenol (acetaminophen) for pain unless your doctor has told you not to.       Follow-up   ____ We took small tissue samples or polyps to study. Your doctor will call you with the results within two weeks.       When to call:       Call right away if you have:     Unusual pain in belly or chest pain not relieved with passing air.     More than 1 to 2 Tablespoons of bleeding from your rectum.     Fever above 100.6  F (37.5  C).       If you have severe pain, bleeding, or shortness of breath, go to an emergency room.       If you have questions, call:   Monday to Friday, 7 a.m. to 4:30 p.m.   Endoscopy: [phone] (We may have to call you back)       After hours   Hospital: [phone] (Ask for the GI fellow on call)

## 2018-07-20 NOTE — ANESTHESIA PREPROCEDURE EVALUATION
Anesthesia Evaluation     .             ROS/MED HX    ENT/Pulmonary:     (+)Intermittent asthma Treatment: Inhaler prn,  , . .    Neurologic:  - neg neurologic ROS     Cardiovascular:  - neg cardiovascular ROS       METS/Exercise Tolerance:     Hematologic:  - neg hematologic  ROS       Musculoskeletal:   (+) , , other musculoskeletal- Inguinal hernia      GI/Hepatic:  - neg GI/hepatic ROS       Renal/Genitourinary:     (+) Nephrolithiasis , Other Renal/ Genitourinary, Epididymitis      Endo:  - neg endo ROS       Psychiatric:  - neg psychiatric ROS       Infectious Disease:  - neg infectious disease ROS       Malignancy:   (+) Malignancy History of GI          Other:                     Physical Exam  Normal systems: cardiovascular, pulmonary and dental    Airway   Mallampati: I  TM distance: >3 FB  Neck ROM: full    Dental     Cardiovascular   Rhythm and rate: regular and normal      Pulmonary    breath sounds clear to auscultation                    Anesthesia Plan      History & Physical Review  History and physical reviewed and following examination; no interval change.    ASA Status:  2 .    NPO Status:  > 8 hours    Plan for MAC Reason for MAC:  Deep or markedly invasive procedure (G8)  PONV prophylaxis:  Ondansetron (or other 5HT-3)       Postoperative Care  Postoperative pain management:  Multi-modal analgesia.      Consents  Anesthetic plan, risks, benefits and alternatives discussed with:  Patient.  Use of blood products discussed: No .   .                          .

## 2018-07-21 NOTE — OP NOTE
SURGEON: Felicitas Radford MD       ASSISTANT: Zelda Pardo MD, Surgery Resident      PREOPERATIVE DIAGNOSIS: Rectal cancer with watch and wait protocol. Need for surveillance. Radiation proctitis.      POSTOPERATIVE DIAGNOSIS: Rectal cancer with watch and wait protocol. Need for surveillance. Radiation proctitis which appears improved.      PROCEDURE: Examination under anesthesia, flexible sigmoidoscopy.      INDICATIONS: Louie Greco is a 58 year old male who was found to have a rectal cancer and had a complete response to chemoradiotherapy. He is now on a watch and wait protocol and we are examining the area under anesthesia because of the friability and pain in the area. He also has some radiation proctitis that has responded to formalin in the past including his last examination May 2018.  His symptoms continue to improve including dates of a pain in the perianal area but he does continue to have some urgency with bowel movements and frequency with his bowel habits.  The risks and benefits of surgery were thoroughly discussed with the patient and he agreed to proceed.      DESCRIPTION OF PROCEDURE: The patient was brought to the operating room, placed in left lateral decubitus. Deep sedation was induced with intravenous medicines with deep sedation and monitored anesthesia care. We prepped and draped the area in the usual fashion. We performed digital rectal examination and anoscopy which demonstrated a moderately stenotic anal canal and an area of distal anterior scarring and some minimal radiation changes. Despite using the small anoscope, there was tearing of the distal posterior anoderm. There were no palpable abnormalities and the prostate by palpation  was normal. There was no nodularity or induration. A flexible sigmoidoscopywith CO2 was then performed and the scope was advanced to the descending colon without difficulty. There was diverticulosis and no signs of divertciulitis. There were no  polyps or other lesions. On retroflexion, there were some mild radiation changes and the scar was visible, but no signs of disease or additional lesions.  The radiation changes were significantly improved. At the end the case, all instruments and sponge counts were correct x2. The patient was emerged from anesthesia and taken to postoperative anesthesia care unit in good condition.       SPECIMEN: None.      ESTIMATED BLOOD LOSS: Minimal.       URINE OUTPUT: Not measured.       AROLDO NAIK MD   Colon and Rectal Surgery Staff  [facility]

## 2018-07-24 NOTE — PROGRESS NOTES
Infusion Nursing Note:  Louie Greco presents today for pump disconnect and IVF.    Patient seen by provider today: No   present during visit today: Not Applicable.    Note: N/A.    Intravenous Access:  Implanted Port.    Treatment Conditions:  Not Applicable.      Post Infusion Assessment:  Patient tolerated infusion without incident.  Site patent and intact, free from redness, edema or discomfort.  No evidence of extravasations.  Access discontinued per protocol.    Discharge Plan:   Discharge instructions reviewed with: Patient.  Patient and/or family verbalized understanding of discharge instructions and all questions answered.  Copy of AVS reviewed with patient and/or family.  Patient will return 2/28 for next appointment.  Patient discharged in stable condition accompanied by: self.  Departure Mode: Ambulatory.    Tracey Lozada RN                          None

## 2018-07-25 ENCOUNTER — HOSPITAL ENCOUNTER (OUTPATIENT)
Dept: PET IMAGING | Facility: CLINIC | Age: 58
End: 2018-07-25
Attending: INTERNAL MEDICINE
Payer: COMMERCIAL

## 2018-07-25 ENCOUNTER — HOSPITAL ENCOUNTER (OUTPATIENT)
Dept: MRI IMAGING | Facility: CLINIC | Age: 58
Discharge: HOME | End: 2018-07-25
Attending: INTERNAL MEDICINE | Admitting: INTERNAL MEDICINE
Payer: COMMERCIAL

## 2018-07-25 DIAGNOSIS — C20 MALIGNANT NEOPLASM OF RECTUM (H): ICD-10-CM

## 2018-07-25 LAB — GLUCOSE BLDC GLUCOMTR-MCNC: 60 MG/DL (ref 70–99)

## 2018-07-25 PROCEDURE — 25000128 H RX IP 250 OP 636: Performed by: INTERNAL MEDICINE

## 2018-07-25 PROCEDURE — 34300033 ZZH RX 343: Performed by: INTERNAL MEDICINE

## 2018-07-25 PROCEDURE — 82962 GLUCOSE BLOOD TEST: CPT

## 2018-07-25 PROCEDURE — A9585 GADOBUTROL INJECTION: HCPCS | Performed by: INTERNAL MEDICINE

## 2018-07-25 PROCEDURE — 74177 CT ABD & PELVIS W/CONTRAST: CPT

## 2018-07-25 PROCEDURE — 72197 MRI PELVIS W/O & W/DYE: CPT

## 2018-07-25 PROCEDURE — 25000128 H RX IP 250 OP 636: Performed by: PEDIATRICS

## 2018-07-25 PROCEDURE — A9552 F18 FDG: HCPCS | Performed by: INTERNAL MEDICINE

## 2018-07-25 RX ORDER — GADOBUTROL 604.72 MG/ML
10 INJECTION INTRAVENOUS ONCE
Status: COMPLETED | OUTPATIENT
Start: 2018-07-25 | End: 2018-07-25

## 2018-07-25 RX ORDER — IOPAMIDOL 755 MG/ML
45-150 INJECTION, SOLUTION INTRAVASCULAR ONCE
Status: COMPLETED | OUTPATIENT
Start: 2018-07-25 | End: 2018-07-25

## 2018-07-25 RX ADMIN — IOPAMIDOL 126 ML: 755 INJECTION, SOLUTION INTRAVENOUS at 13:02

## 2018-07-25 RX ADMIN — GADOBUTROL 10 ML: 604.72 INJECTION INTRAVENOUS at 11:00

## 2018-07-25 RX ADMIN — FLUDEOXYGLUCOSE F-18 12.73 MCI.: 500 INJECTION, SOLUTION INTRAVENOUS at 12:15

## 2018-07-25 RX ADMIN — GLUCAGON HYDROCHLORIDE 0.5 MG: 1 INJECTION, POWDER, FOR SOLUTION INTRAMUSCULAR; INTRAVENOUS; SUBCUTANEOUS at 11:09

## 2018-07-25 RX ADMIN — GLUCAGON HYDROCHLORIDE 0.5 MG: 1 INJECTION, POWDER, FOR SOLUTION INTRAMUSCULAR; INTRAVENOUS; SUBCUTANEOUS at 11:26

## 2018-07-26 ENCOUNTER — OFFICE VISIT (OUTPATIENT)
Dept: NURSING | Facility: CLINIC | Age: 58
End: 2018-07-26
Payer: COMMERCIAL

## 2018-07-26 DIAGNOSIS — Z48.00 ENCOUNTER FOR CHANGE OF DRESSING: Primary | ICD-10-CM

## 2018-07-26 PROCEDURE — 99207 ZZC NO CHARGE NURSE ONLY: CPT

## 2018-07-26 NOTE — NURSING NOTE
Patient returned to clinic for post surgery 1 week follow up bandage change. Patient has no complaints, denies pain. Bandage removed from  back, area cleansed with wound cleanser. Site is healing and wound edges approximating well. Reapplied new steri strips and paper tape.     Advised to watch for signs and sx of infection; spreading redness, drainage, odor, fever. Call or report promptly to clinic if any of these symptoms are present.  Patient verbalized understanding.     Sol Catalan RN BSN  [facility]  [phone]

## 2018-07-26 NOTE — PATIENT INSTRUCTIONS
WOUND CARE INSTRUCTIONS  for  ONE WEEK AFTER SURGERY              1. Leave flat bandage on your skin for one week after today s bandage change.  2. In one week when you remove the bandage, you may resume your regular skin care routine, including washing with mild soap and water, applying moisturizer, make-up and sunscreen.     3. If there are any open or bleeding areas at the incision/graft site you should begin to cover the area with a bandage daily as follows:     1. Clean and dry the area with plain tap water using a Q-tip or sterile gauze pad.  2. Apply Polysporin or Bacitracin ointment to the open area.  3. Cover the wound with a band-aid or a sterile non-stick gauze pad and micropore paper tape.                  *Once the bandages are removed, the scar will be red and firm (especially in the lip/chin area). This is normal and will fade in time. It might take 6-12 months for this to happen.      *Massaging the area will help the scar soften and fade quicker. Begin to massage the area one month after the bandages have been removed. To massage apply pressure directly and firmly over the scar with the fingertips and move in a circular motion. Massage the area for a few minutes several times a day. Continue to massage the site for several months.     *Approximately 6-8 weeks after surgery it is not uncommon to see the formation of  tender pimple-like  bump along the scar. This is normal. As the scar continues to mature and the stitches underneath the skin begin to dissolve, this might occur. Do not pick or squeeze, this will resolve on it s own. Should one break open producing a small amount of drainage, apply Polysporin or Bacitracin ointment a few times a day until the wound is completely healed.     *Numbness in the surgical area is expected. It might take 12-18 months for the feeling to return to normal. During this time sensations of itchiness, tingling and occasional sharp pains might be noted. These feelings  are normal and will subside once the nerves have completely healed.

## 2018-07-26 NOTE — MR AVS SNAPSHOT
After Visit Summary   7/26/2018    Louie Greco    MRN: [id number]           Patient Information     Date Of Birth          1960        Visit Information        Provider Department      7/26/2018 9:30 AM [facility] Instructions    WOUND CARE INSTRUCTIONS  for  ONE WEEK AFTER SURGERY              1. Leave flat bandage on your skin for one week after today s bandage change.  2. In one week when you remove the bandage, you may resume your regular skin care routine, including washing with mild soap and water, applying moisturizer, make-up and sunscreen.     3. If there are any open or bleeding areas at the incision/graft site you should begin to cover the area with a bandage daily as follows:     1. Clean and dry the area with plain tap water using a Q-tip or sterile gauze pad.  2. Apply Polysporin or Bacitracin ointment to the open area.  3. Cover the wound with a band-aid or a sterile non-stick gauze pad and micropore paper tape.                  *Once the bandages are removed, the scar will be red and firm (especially in the lip/chin area). This is normal and will fade in time. It might take 6-12 months for this to happen.      *Massaging the area will help the scar soften and fade quicker. Begin to massage the area one month after the bandages have been removed. To massage apply pressure directly and firmly over the scar with the fingertips and move in a circular motion. Massage the area for a few minutes several times a day. Continue to massage the site for several months.     *Approximately 6-8 weeks after surgery it is not uncommon to see the formation of  tender pimple-like  bump along the scar. This is normal. As the scar continues to mature and the stitches underneath the skin begin to dissolve, this might occur. Do not pick or squeeze, this will resolve on it s own. Should one break open producing a small amount of drainage, apply Polysporin or  Bacitracin ointment a few times a day until the wound is completely healed.     *Numbness in the surgical area is expected. It might take 12-18 months for the feeling to return to normal. During this time sensations of itchiness, tingling and occasional sharp pains might be noted. These feelings are normal and will subside once the nerves have completely healed.                       Follow-ups after your visit        Your next 10 appointments already scheduled     Jul 31, 2018 10:30 AM CDT   Return Visit with Agueda Manley MD   [facility] ([facility])    [facility]  [address]  [place] [address]   [phone]              Future tests that were ordered for you today     Open Future Orders        Priority Expected Expires Ordered    PET Oncology Whole Body Routine 8/2/2018 2/2/2019 2/2/2018            Who to contact     If you have questions or need follow up information about today's clinic visit or your schedule please contact [facility] [place] directly at [phone].  Normal or non-critical lab and imaging results will be communicated to you by [employer], letter or phone within 4 business days after the clinic has received the results. If you do not hear from us within 7 days, please contact the clinic through [employer] or phone. If you have a critical or abnormal lab result, we will notify you by phone as soon as possible.  Submit refill requests through [employer] or call your pharmacy and they will forward the refill request to us. Please allow 3 business days for your refill to be completed.          Additional Information About Your Visit        [employer] Information     [employer] gives you secure access to your electronic health record. If you see a primary care provider, you can also send messages to your care team and make appointments. If you have questions, please call your primary care clinic.  If you do not have a  primary care provider, please call [phone] and they will assist you.        Care EveryWhere ID     This is your Care EveryWhere ID. This could be used by other organizations to access your [facility] medical records  [id number]         Blood Pressure from Last 3 Encounters:   07/20/18 119/71   07/19/18 118/70   07/18/18 117/70    Weight from Last 3 Encounters:   07/20/18 205 lb (93 kg)   07/18/18 205 lb (93 kg)   06/15/18 199 lb 1.6 oz (90.3 kg)              Today, you had the following     No orders found for display       Primary Care Provider Office Phone # Fax #    Philippe Healy [phone] [address] [place] [address] [place] [address]        Equal Access to Services     [facility]: Hadii aad ku hadasho Soomaali, waaxda luqadaha, qaybta kaalmada adeegyada, jb pedroza . So [facility] [phone].    ATENCIÓN: Si habla español, tiene a mena disposición servicios gratuitos de asistencia lingüística. Keaton al [phone].    We comply with applicable federal civil rights laws and [place] laws. We do not discriminate on the basis of race, color, national origin, age, disability, sex, sexual orientation, or gender identity.            Thank you!     Thank you for choosing [facility]  for your care. Our goal is always to provide you with excellent care. Hearing back from our patients is one way we can continue to improve our services. Please take a few minutes to complete the written survey that you may receive in the mail after your visit with us. Thank you!             Your Updated Medication List - Protect others around you: Learn how to safely use, store and throw away your medicines at www.disposemymeds.org.          This list is accurate as of 7/26/18  9:36 AM.  Always use your most recent med list.                   Brand Name Dispense Instructions for use Diagnosis    ASPIRIN PO      Take by mouth as needed for moderate pain         dibucaine 1 % Oint ointment    NUPERCAINAL     3 times daily as needed for moderate pain        diltiazem 2% in PLO cream (FV COMPOUNDED) 2% Gel      Apply topically 2 times daily    Need for hepatitis C screening test       ibuprofen 200 MG capsule     120 capsule    Take 200-400 mg by mouth every 6 hours as needed    Acute post-operative pain       lidocaine (Anorectal) 5 % Crea     1 Tube    Externally apply 1 Application topically 3 times daily as needed    Anal pain       lidocaine 2 % topical gel    XYLOCAINE    30 mL    Apply topically 2 times daily as needed for moderate pain    Anal fissure       VITAMIN D (CHOLECALCIFEROL) PO      Take 2,000 Units by mouth daily

## 2018-07-27 ENCOUNTER — TELEPHONE (OUTPATIENT)
Dept: ONCOLOGY | Facility: CLINIC | Age: 58
End: 2018-07-27

## 2018-07-27 LAB — COPATH REPORT: NORMAL

## 2018-07-27 NOTE — TELEPHONE ENCOUNTER
Called patient, left message that his scan results look fine per Dr. Manley and that they will be released to [facility].

## 2018-07-30 ENCOUNTER — TELEPHONE (OUTPATIENT)
Dept: FAMILY MEDICINE | Facility: CLINIC | Age: 58
End: 2018-07-30

## 2018-07-30 NOTE — TELEPHONE ENCOUNTER
Left message on answering machine for patient to call back.    Sol Catalan,RN BSN  [facility]  [phone]      7/26/17  Notes Recorded by Margarita Brock PA-C on 7/27/2018 at 12:37 PM  Skin, mid upper back:   - Features consistent with dilated pore of Missy, inflamed - (see   description) (large pore that can drain keratin just like a cyst)     This is a benign lesion that does not need any additional treatment. Please let me know if you have any questions.

## 2018-07-31 ENCOUNTER — ONCOLOGY VISIT (OUTPATIENT)
Dept: ONCOLOGY | Facility: CLINIC | Age: 58
End: 2018-07-31
Attending: INTERNAL MEDICINE
Payer: COMMERCIAL

## 2018-07-31 VITALS
WEIGHT: 208 LBS | SYSTOLIC BLOOD PRESSURE: 126 MMHG | DIASTOLIC BLOOD PRESSURE: 74 MMHG | RESPIRATION RATE: 16 BRPM | TEMPERATURE: 98.1 F | HEART RATE: 74 BPM | BODY MASS INDEX: 29.42 KG/M2 | OXYGEN SATURATION: 96 %

## 2018-07-31 DIAGNOSIS — C20 MALIGNANT NEOPLASM OF RECTUM (H): Primary | ICD-10-CM

## 2018-07-31 PROCEDURE — 99214 OFFICE O/P EST MOD 30 MIN: CPT | Performed by: INTERNAL MEDICINE

## 2018-07-31 PROCEDURE — G0463 HOSPITAL OUTPT CLINIC VISIT: HCPCS

## 2018-07-31 ASSESSMENT — PAIN SCALES - GENERAL: PAINLEVEL: NO PAIN (0)

## 2018-07-31 NOTE — PROGRESS NOTES
"Oncology Rooming Note    July 31, 2018 10:30 AM   Louie Greco is a 58 year old male who presents for:    Chief Complaint   Patient presents with     Oncology Clinic Visit     Initial Vitals: /74 (BP Location: Right arm, Patient Position: Chair, Cuff Size: Adult Regular)  Pulse 74  Temp 98.1  F (36.7  C) (Oral)  Resp 16  Wt 94.3 kg (208 lb)  SpO2 96%  BMI 29.42 kg/m2 Estimated body mass index is 29.42 kg/(m^2) as calculated from the following:    Height as of 7/20/18: 1.791 m (5' 10.5\").    Weight as of this encounter: 94.3 kg (208 lb). Body surface area is 2.17 meters squared.  No Pain (0) Comment: Data Unavailable   No LMP for male patient.  Allergies reviewed: Yes  Medications reviewed: Yes    Medications: Medication refills not needed today.  Pharmacy name entered into [employer]:    [id number]  [place], [place] - [address] [facility] - [address]    Clinical concerns: None     5 minutes for nursing intake (face to face time)     Bonnie Rodas CMA              "

## 2018-07-31 NOTE — TELEPHONE ENCOUNTER
Sent results via [employer].    Sol Catalan,RN BSN  [facility]  [phone]

## 2018-07-31 NOTE — MR AVS SNAPSHOT
After Visit Summary   7/31/2018    Louie Greco    MRN: [id number]           Patient Information     Date Of Birth          1960        Visit Information        Provider Department      7/31/2018 10:30 AM Agueda Manley MD [facility]        Today's Diagnoses     Malignant neoplasm of rectum (H)    -  1      Care Instructions    -schedule MRI pelvis and labs at the [place] in 6 months, a few days prior to the next appointment  Scheduled - Maxine  -return to clinic with Dr. Manley in 6 months   Scheduled - Maxine    Patient declined AVS- Maxine          Follow-ups after your visit        Your next 10 appointments already scheduled     Dec 28, 2018  7:00 AM CST   MR PELVIS (INTRAPELVIC ORGANS) WO&W CONTRAST with [id number]   [facility] ([facility])    [address] [address] [phone]           Take your medicines as usual, unless your doctor tells you not to. Bring a list of your current medicines to your exam (including vitamins, minerals and over-the-counter drugs).  You may or may not receive intravenous (IV) contrast for this exam pending the discretion of the Radiologist.  You do not need to do anything special to prepare.  The MRI machine uses a strong magnet. Please wear clothes without metal (snaps, zippers). A sweatsuit works well, or we may give you a hospital gown.  Please remove any body piercings and hair extensions before you arrive. You will also remove watches, jewelry, hairpins, wallets, dentures, partial dental plates and hearing aids. You may wear contact lenses, and you may be able to wear your rings. We have a safe place to keep your personal items, but it is safer to leave them at home.  **IMPORTANT** THE INSTRUCTIONS BELOW ARE ONLY FOR THOSE PATIENTS WHO HAVE BEEN PRESCRIBED SEDATION OR GENERAL ANESTHESIA DURING THEIR MRI PROCEDURE:  IF YOUR DOCTOR PRESCRIBED ORAL SEDATION (take  medicine to help you relax during your exam):   You must get the medicine from your doctor (oral medication) before you arrive. Bring the medicine to the exam. Do not take it at home. You ll be told when to take it upon arriving for your exam.   Arrive one hour early. Bring someone who can take you home after the test. Your medicine will make you sleepy. After the exam, you may not drive, take a bus or take a taxi by yourself.  IF YOUR DOCTOR PRESCRIBED IV SEDATION:   Arrive one hour early. Bring someone who can take you home after the test. Your medicine will make you sleepy. After the exam, you may not drive, take a bus or take a taxi by yourself.   No eating 6 hours before your exam. You may have clear liquids up until 4 hours before your exam. (Clear liquids include water, clear tea, black coffee and fruit juice without pulp.)  IF YOUR DOCTOR PRESCRIBED ANESTHESIA (be asleep for your exam):   Arrive 1 1/2 hours early. Bring someone who can take you home after the test. You may not drive, take a bus or take a taxi by yourself.   No eating 8 hours before your exam. You may have clear liquids up until 4 hours before your exam. (Clear liquids include water, clear tea, black coffee and fruit juice without pulp.)   You will spend four to five hours in the recovery room.  Please call the Imaging Department at your exam site with any questions.            Jan 04, 2019 10:00 AM CST   Return Visit with Agueda Manley MD   [facility] ([facility])    [facility]  [address] [facility] [address]  [facility] [address]   [phone]              Future tests that were ordered for you today     Open Future Orders        Priority Expected Expires Ordered    MR Pelvis (Intrapelvic Organs) wo&w Contrast Routine  7/31/2019 7/31/2018    CBC with platelets differential Routine 1/31/2019 7/31/2019 7/31/2018    Comprehensive metabolic panel Routine 1/31/2019 7/31/2019 7/31/2018     CEA Routine 1/31/2019 7/31/2019 7/31/2018            Who to contact     If you have questions or need follow up information about today's clinic visit or your schedule please contact [facility] directly at [phone].  Normal or non-critical lab and imaging results will be communicated to you by MyChart, letter or phone within 4 business days after the clinic has received the results. If you do not hear from us within 7 days, please contact the clinic through [facility] or phone. If you have a critical or abnormal lab result, we will notify you by phone as soon as possible.  Submit refill requests through [employer] or call your pharmacy and they will forward the refill request to us. Please allow 3 business days for your refill to be completed.          Additional Information About Your Visit        [facility] Information     [employer] gives you secure access to your electronic health record. If you see a primary care provider, you can also send messages to your care team and make appointments. If you have questions, please call your primary care clinic.  If you do not have a primary care provider, please call [phone] and they will assist you.        Care EveryWhere ID     This is your Care EveryWhere ID. This could be used by other organizations to access your [place] medical records  [id number]        Your Vitals Were     Pulse Temperature Respirations Pulse Oximetry BMI (Body Mass Index)       74 98.1  F (36.7  C) (Oral) 16 96% 29.42 kg/m2        Blood Pressure from Last 3 Encounters:   07/31/18 126/74   07/20/18 119/71   07/19/18 118/70    Weight from Last 3 Encounters:   07/31/18 94.3 kg (208 lb)   07/20/18 93 kg (205 lb)   07/18/18 93 kg (205 lb)               Primary Care Provider Office Phone # Fax #    Philippe Healy [phone] [address] [facility]  [place] [phone]        Equal Access to Services     BLAISE EDDY AH: annia Rocha,  aj ronaldtaylor lucjb rod avilain hayaan adeeg kharash la'aan ah. So [facility] [phone].    ATENCIÓN: Si jessica garcia, tiene a mena disposición servicios gratuitos de asistencia lingüística. Keaton al [phone].    We comply with applicable federal civil rights laws and [place] laws. We do not discriminate on the basis of race, color, national origin, age, disability, sex, sexual orientation, or gender identity.            Thank you!     Thank you for choosing [facility]  for your care. Our goal is always to provide you with excellent care. Hearing back from our patients is one way we can continue to improve our services. Please take a few minutes to complete the written survey that you may receive in the mail after your visit with us. Thank you!             Your Updated Medication List - Protect others around you: Learn how to safely use, store and throw away your medicines at www.disposemymeds.org.          This list is accurate as of 7/31/18 12:11 PM.  Always use your most recent med list.                   Brand Name Dispense Instructions for use Diagnosis    ASPIRIN PO      Take by mouth as needed for moderate pain        dibucaine 1 % Oint ointment    NUPERCAINAL     3 times daily as needed for moderate pain        diltiazem 2% in PLO cream (FV COMPOUNDED) 2% Gel      Apply topically 2 times daily    Need for hepatitis C screening test       ibuprofen 200 MG capsule     120 capsule    Take 200-400 mg by mouth every 6 hours as needed    Acute post-operative pain       lidocaine (Anorectal) 5 % Crea     1 Tube    Externally apply 1 Application topically 3 times daily as needed    Anal pain       lidocaine 2 % topical gel    XYLOCAINE    30 mL    Apply topically 2 times daily as needed for moderate pain    Anal fissure       VITAMIN D (CHOLECALCIFEROL) PO      Take 2,000 Units by mouth daily

## 2018-07-31 NOTE — PATIENT INSTRUCTIONS
-schedule MRI pelvis and labs at the [facility] in 6 months, a few days prior to the next appointment  Scheduled - Maxine  -return to clinic with Dr. Manley in 6 months   Scheduled - Maxine    Patient declined AVS- Maxine

## 2018-07-31 NOTE — LETTER
"    7/31/2018         RE: Louie Greco  [address] [address]        Dear Colleague,    Thank you for referring your patient, Louie Greco, to the [facility]. Please see a copy of my visit note below.    Oncology Rooming Note    July 31, 2018 10:30 AM   Louie Greco is a 58 year old male who presents for:    Chief Complaint   Patient presents with     Oncology Clinic Visit     Initial Vitals: /74 (BP Location: Right arm, Patient Position: Chair, Cuff Size: Adult Regular)  Pulse 74  Temp 98.1  F (36.7  C) (Oral)  Resp 16  Wt 94.3 kg (208 lb)  SpO2 96%  BMI 29.42 kg/m2 Estimated body mass index is 29.42 kg/(m^2) as calculated from the following:    Height as of 7/20/18: 1.791 m (5' 10.5\").    Weight as of this encounter: 94.3 kg (208 lb). Body surface area is 2.17 meters squared.  No Pain (0) Comment: Data Unavailable   No LMP for male patient.  Allergies reviewed: Yes  Medications reviewed: Yes    Medications: Medication refills not needed today.  Pharmacy name entered into Ryla:    [facility] [address] [place] [address] - [address] [facility] [place] [facility] [address]    Clinical concerns: None     5 minutes for nursing intake (face to face time)     Bonnie Rodas CMA                [facility]    Hematology/Oncology Established Patient Note      Today's Date: 07/31/2018    Reason for Follow-up: rectal cancer      HISTORY OF PRESENT ILLNESS: Louie Greco is a 58 year old male who presents with rectal cancer.  He started having rectal bleeding around beginning of 2016.  He underwent colonoscopy on 7/27/16, which showed a malignant tumor in the rectum involving half of the lumen with oozing, as well as three 4-mm polyps in the ascending and transverse colon.  Pathology showed moderately differentiated adenocarcinoma, ascending colon with sessile serrated adenoma, others were tubular adenomas.  " Mismatch repair expression with normal protein expression.  Staging scans showed the rectal mass, indeterminate focus in the left liver thought to be cyst, and multiple bilateral renal cysts.  MRI showed a distal rectal mass measuring 2.7 x 2.4 cm extending to the most proximal region of the anal canal.  There are a few tiny subcentimeter perirectal fat lymph nodes.  He was staged clinically N2H1fH1 (stage IIIA).  He was seen by Dr. Lee at [facility], and started neoadjuvant chemoradiation with capecitabine on 8/8/16 and completed on 9/15/16.  He was then seen by Dr. Radford, colorectal surgery at [facility].  His post chemoradiation MRI showed improvement in the size of the tumor and response in the lymph nodes.  On 12/8/16, he underwent flexible sigmoidoscopy and there was no evidence of tumor.  There was only some anterior scarring in the lumen.  Multiple biopsies were taken, which showed no evidence of carcinoma.  At that point, Dr. Radford spoke with the patient's wife, that there appears to be a complete response in the lumen, and that the patient would wish to placed on the watch and wait protocol.  The patient had expressed wishes not to have an APR, and it would not have been possible to grossly clear a margin for LAR.    He had port placed on 1/16/17.  It has been removed.    He completed FOLFOX x 8 cycles 1/16/17-5/9/17.      He was admitted 7/16/17-7/20/17 with sepsis, abdominal wall cellulitis.  He underwent surgery with laparoscopic abdominal exploration and appendectomy.  Pathology found sessile serrated adenoma without dysplasia or malignancy.        INTERIM HISTORY: Louie comes in for follow-up today.   He is feeling well.  He denies any new symptoms.  He denies new bowel changes or pain.  He underwent flex sig with  Dr. Radford, and says that it has been less painful.  He underwent excision of a sebaceous cyst recently.        REVIEW OF SYSTEMS:   14 point  ROS was reviewed and is negative other than as noted above in HPI.       HOME MEDICATIONS:  Current Outpatient Prescriptions   Medication Sig Dispense Refill     ASPIRIN PO Take by mouth as needed for moderate pain       dibucaine (NUPERCAINAL) 1 % OINT ointment 3 times daily as needed for moderate pain       diltiazem 2% in PLO cream, FV COMPOUNDED, 2% GEL Apply topically 2 times daily       ibuprofen 200 MG capsule Take 200-400 mg by mouth every 6 hours as needed (Patient not taking: Reported on 7/19/2018) 120 capsule 0     lidocaine (XYLOCAINE) 2 % topical gel Apply topically 2 times daily as needed for moderate pain (Patient not taking: Reported on 7/19/2018) 30 mL 3     lidocaine, Anorectal, 5 % CREA Externally apply 1 Application topically 3 times daily as needed (Patient not taking: Reported on 7/19/2018) 1 Tube 0     VITAMIN D, CHOLECALCIFEROL, PO Take 2,000 Units by mouth daily            ALLERGIES:  Allergies   Allergen Reactions     Ampicillin Diarrhea     Demerol [Meperidine]      Nausea          PAST MEDICAL HISTORY:  Past Medical History:   Diagnosis Date     Childhood asthma      Epididymitis, bilateral     18 years old     Inguinal hernia      Mumps      Nephrolithiasis     1990     Rectal cancer (H)     low rectal cancer     Shingles          PAST SURGICAL HISTORY:  Past Surgical History:   Procedure Laterality Date     COLONOSCOPY N/A 7/27/2016    Procedure: COMBINED COLONOSCOPY, SINGLE OR MULTIPLE BIOPSY/POLYPECTOMY BY BIOPSY;  Surgeon: Chelsea Thompson MD;  Location: [facility]     COLONOSCOPY N/A 9/13/2017    Procedure: COLONOSCOPY;;  Surgeon: Felicitas Radford MD;  [facility]     COLONOSCOPY N/A 12/13/2017    Procedure: COLONOSCOPY;;  Surgeon: Felicitas Radford MD;  [facility]     COMBINED CYSTOSCOPY, RETROGRADES, URETEROSCOPY, LASER HOLMIUM LITHOTRIPSY URETER(S), INSERT STENT Left 2/16/2018    Procedure: COMBINED CYSTOSCOPY, RETROGRADES, URETEROSCOPY, LASER HOLMIUM  LITHOTRIPSY URETER(S), INSERT STENT;  Cysto, left ureteroscopy, holmium laser, retrogrades, stent placement;  Surgeon: Bola Worthy MD;  Location: [facility]     EXAM UNDER ANESTHESIA ANUS N/A 7/5/2017    Procedure: EXAM UNDER ANESTHESIA ANUS;  Examination Under Anesthesia, flex sigmoidoscopy with biopsies and formalin application;  Surgeon: Felicitas Radford MD;  [facility]     EXAM UNDER ANESTHESIA ANUS N/A 9/13/2017    Procedure: EXAM UNDER ANESTHESIA ANUS;  Examination Under Anesthesia Anus, Colonoscopy, application of formalin;  Surgeon: Felicitas Radford MD;  [facility]     EXAM UNDER ANESTHESIA ANUS N/A 12/13/2017    Procedure: EXAM UNDER ANESTHESIA ANUS;  Examination Under Anesthesia Anus, Colonoscopy;  Surgeon: Felicitas Radford MD;  [facility]     EXAM UNDER ANESTHESIA ANUS N/A 5/11/2018    Procedure: EXAM UNDER ANESTHESIA ANUS;  Examination Under Anesthesia Anus, Interoperative Flexible Sigmoidoscopy, Application of Formalin;  Surgeon: Felicitas Radford MD;  [facility]     EXAM UNDER ANESTHESIA ANUS N/A 7/20/2018    Procedure: EXAM UNDER ANESTHESIA ANUS;  Examination Under Anesthesia Anus, Flexible Sigmoidoscopy ;  Surgeon: Felicitas Radford MD;  [facility]     HC TOOTH EXTRACTION W/FORCEP       LAPAROSCOPIC APPENDECTOMY N/A 7/17/2017    Procedure: LAPAROSCOPIC APPENDECTOMY;  LAPAROSCOPIC APPENDECTOMY;  Surgeon: Bryson Ferguson MD;  [facility]     SIGMOIDOSCOPY FLEXIBLE N/A 12/8/2016    Procedure: SIGMOIDOSCOPY FLEXIBLE;  Surgeon: Felicitas Radford MD;  [facility]     SIGMOIDOSCOPY FLEXIBLE N/A 7/5/2017    Procedure: SIGMOIDOSCOPY FLEXIBLE;;  Surgeon: Felicitas Radford MD;  [facility]     SIGMOIDOSCOPY FLEXIBLE N/A 5/11/2018    Procedure: SIGMOIDOSCOPY FLEXIBLE;;  Surgeon: Felicitas Radford MD;  [facility]     SIGMOIDOSCOPY FLEXIBLE N/A 7/20/2018    Procedure: SIGMOIDOSCOPY  FLEXIBLE;;  Surgeon: Felicitas Radford MD;  [facility]     TESTICLE SURGERY       VASCULAR SURGERY      Right chest port     VASECTOMY       VASECTOMY           SOCIAL HISTORY:  Social History     Social History     Marital status:      Spouse name: N/A     Number of children: N/A     Years of education: N/A     Occupational History     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 No     Social History Narrative    Dad  at age  78   from [place], COPD, & HTN    Mom alive in her 70's.     for 30 years    Two adult children. 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         He notes that he had a brother who passed away at a young age of 53 from a metastatic cancer, but unknown what type, and passed away within 2 months of diagnosis.  He denies other family history of cancer in the immediate family.  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      FAMILY HISTORY:  Family History   Problem Relation Age of Onset     Cancer Brother      primary of unknown origin     Other Cancer Brother      Chronic Obstructive Pulmonary Disease Father      Hypertension Father      Hyperlipidemia Father      Colon Polyps Mother      Number and type of polyps unknown     Family History Negative Brother      negative colonoscopy     Diabetes Maternal Grandfather      Hypertension Paternal Grandmother      Hyperlipidemia Paternal Grandmother      Stomach Cancer Other 63     maternal great grandfather     Glaucoma No family hx of      Macular Degeneration No family hx of          PHYSICAL EXAM:  Vital  signs:  /74 (BP Location: Right arm, Patient Position: Chair, Cuff Size: Adult Regular)  Pulse 74  Temp 98.1  F (36.7  C) (Oral)  Resp 16  Wt 94.3 kg (208 lb)  SpO2 96%  BMI 29.42 kg/m2   ECO  GENERAL/CONSTITUTIONAL: No acute distress.  EYES: No scleral icterus.  LYMPH: No anterior cervical, posterior cervical, or supraclavicular adenopathy.   RESPIRATORY: Clear to auscultation bilaterally. No crackles or wheezing.   CARDIOVASCULAR: Regular rate and rhythm without murmurs, gallops, or rubs.  GASTROINTESTINAL: No tenderness. The patient has normal bowel sounds. No guarding.  No distention.  MUSCULOSKELETAL: Warm and well-perfused, no cyanosis, clubbing, or edema.  NEUROLOGIC: Alert, oriented, answers questions appropriately.  INTEGUMENTARY: No jaundice.  GAIT: Steady, does not use assistive device      LABS:  CBC RESULTS:   Recent Labs   Lab Test  18   0842   WBC  4.1   RBC  5.05   HGB  15.0   HCT  45.5   MCV  90   MCH  29.7   MCHC  33.0   RDW  13.2   PLT  183     Recent Labs   Lab Test  18   0842  18   2143   NA  138  139   POTASSIUM  4.1  3.9   CHLORIDE  104  106   CO2  27  26   ANIONGAP  7  7   GLC  94  106*   BUN  15  27   CR  0.83  0.91   FRANDY  9.0  9.0     Lab Results   Component Value Date    AST 24 2018     Lab Results   Component Value Date    ALT 38 2018     No results found for: BILICONJ   Lab Results   Component Value Date    BILITOTAL 1.4 2018     Lab Results   Component Value Date    ALBUMIN 4.0 2018     Lab Results   Component Value Date    PROTTOTAL 7.1 2018      Lab Results   Component Value Date    ALKPHOS 79 2018     CEA 2.5      IMAGING:  PET-CT 18:  IMPRESSION: In this patient with a history of rectal cancer status  post chemoradiation:  1. No evidence of local recurrence or metastatic disease .  2. Stable liver and kidney cysts.  3. Stable left lower lobe granuloma and accompanying tiny scattered  nodules less than 4  mm.  4. Unchanged prostatomegaly.  5. New tiny focus of mild FDG uptake with nodularity in the upper back  likely representing inflammatory sebaceous cyst.    MRI pelvis 7/25/18:  1. Stable treated fibrotic tumor in the lower rectum on the left  anterior lateral aspect. No evidence of recurrence, corresponding  tumor regression grade of mr-TRG1.    2. Decreased posttreatment changes in the mid lower rectum.   3. Unchanged tiny pelvic lymph nodes.  4. Stable heterogeneous bone marrow signal without focal lesion.      ASSESSMENT/PLAN:  Louie Greco is a 58 year old male with:    1) Rectal cancer: clinical stage [id number] (stage IIIA), s/p chemoradiation with Xeloda, and appears to have achieved complete response on imaging and biopsies.  He opted not to undergo surgery and expressed desire not to undergo APR, as he does not want a colostomy bag.  It was felt reasonable to go on the watch and wait protocol with the [facility].  He has completed 4 additional months (8 cycles) of chemotherapy with FOLFOX.  He will now go on surveillance, as per the watch and wait protocol.    We reviewed PET-CT and MRI pelvis from 7/25/18.  There is no evidence of locally recurrent or metastatic disease.  There is stable treated fibrotic tumor in the rectum with no evidence for recurrence.   There are post-treatment changes.  Unchanged tiny pelvic lymph nodes.  She has stable liver and kidney cysts, and stable left lower lobe granuloma tiny scattered nodules less than 4 mm.      -he had flex sig with Dr. Radford on 5/11/18 and 7/21/18.  -next T3 MRI pelvis would be in January 2019 (every 6 months x 2 years).    -CT c/a/p annually for 5 years; next PET scan in July 2019  -endoscopic surveillance with Dr. Radford as per protocol  -RTC in 6 months with labs/CEA and results of MRI pelvis    2) Genetics: He underwent genetic testing, which was negative.    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            4) Elevated  bilirubin: mild.  -monitor for now    5) Liver cysts: seen on CT, stable  -monitor    6) Pulmonary nodules: stable sub-4 mm pulmonary nodules  -monitor on future scans    7) Kidney cyst: stable  -monitor on future scans.  He also had an MRI done, which showed the multiple cysts.      8) Appendix polyp: He is now s/p appendectomy.  Pathology found sessile serrated adenoma without dysplasia or malignancy.       I spent a total of 25 minutes with the patient, with over >50% of the time in counseling and/or coordination of care.       Agueda Manley MD  Hematology/Oncology  [facility]        Again, thank you for allowing me to participate in the care of your patient.        Sincerely,        Agueda Manley MD

## 2018-07-31 NOTE — PROGRESS NOTES
[facility]    Hematology/Oncology Established Patient Note      Today's Date: 07/31/2018    Reason for Follow-up: rectal cancer      HISTORY OF PRESENT ILLNESS: Louie Greco is a 58 year old male who presents with rectal cancer.  He started having rectal bleeding around beginning of 2016.  He underwent colonoscopy on 7/27/16, which showed a malignant tumor in the rectum involving half of the lumen with oozing, as well as three 4-mm polyps in the ascending and transverse colon.  Pathology showed moderately differentiated adenocarcinoma, ascending colon with sessile serrated adenoma, others were tubular adenomas.  Mismatch repair expression with normal protein expression.  Staging scans showed the rectal mass, indeterminate focus in the left liver thought to be cyst, and multiple bilateral renal cysts.  MRI showed a distal rectal mass measuring 2.7 x 2.4 cm extending to the most proximal region of the anal canal.  There are a few tiny subcentimeter perirectal fat lymph nodes.  He was staged clinically [id number] (stage IIIA).  He was seen by Dr. Lee at [facility], and started neoadjuvant chemoradiation with capecitabine on 8/8/16 and completed on 9/15/16.  He was then seen by Dr. Radford, colorectal surgery at [facility].  His post chemoradiation MRI showed improvement in the size of the tumor and response in the lymph nodes.  On 12/8/16, he underwent flexible sigmoidoscopy and there was no evidence of tumor.  There was only some anterior scarring in the lumen.  Multiple biopsies were taken, which showed no evidence of carcinoma.  At that point, Dr. Radford spoke with the patient's wife, that there appears to be a complete response in the lumen, and that the patient would wish to placed on the watch and wait protocol.  The patient had expressed wishes not to have an APR, and it would not have been possible to grossly clear a margin for LAR.    He had port placed  on 1/16/17.  It has been removed.    He completed FOLFOX x 8 cycles 1/16/17-5/9/17.      He was admitted 7/16/17-7/20/17 with sepsis, abdominal wall cellulitis.  He underwent surgery with laparoscopic abdominal exploration and appendectomy.  Pathology found sessile serrated adenoma without dysplasia or malignancy.        INTERIM HISTORY: Louie comes in for follow-up today.   He is feeling well.  He denies any new symptoms.  He denies new bowel changes or pain.  He underwent flex sig with  Dr. Radford, and says that it has been less painful.  He underwent excision of a sebaceous cyst recently.        REVIEW OF SYSTEMS:   14 point ROS was reviewed and is negative other than as noted above in HPI.       HOME MEDICATIONS:  Current Outpatient Prescriptions   Medication Sig Dispense Refill     ASPIRIN PO Take by mouth as needed for moderate pain       dibucaine (NUPERCAINAL) 1 % OINT ointment 3 times daily as needed for moderate pain       diltiazem 2% in PLO cream, FV COMPOUNDED, 2% GEL Apply topically 2 times daily       ibuprofen 200 MG capsule Take 200-400 mg by mouth every 6 hours as needed (Patient not taking: Reported on 7/19/2018) 120 capsule 0     lidocaine (XYLOCAINE) 2 % topical gel Apply topically 2 times daily as needed for moderate pain (Patient not taking: Reported on 7/19/2018) 30 mL 3     lidocaine, Anorectal, 5 % CREA Externally apply 1 Application topically 3 times daily as needed (Patient not taking: Reported on 7/19/2018) 1 Tube 0     VITAMIN D, CHOLECALCIFEROL, PO Take 2,000 Units by mouth daily            ALLERGIES:  Allergies   Allergen Reactions     Ampicillin Diarrhea     Demerol [Meperidine]      Nausea          PAST MEDICAL HISTORY:  Past Medical History:   Diagnosis Date     Childhood asthma      Epididymitis, bilateral     18 years old     Inguinal hernia      Mumps      Nephrolithiasis     1990     Rectal cancer (H)     low rectal cancer     Shingles          PAST SURGICAL  HISTORY:  Past Surgical History:   Procedure Laterality Date     COLONOSCOPY N/A 7/27/2016    Procedure: COMBINED COLONOSCOPY, SINGLE OR MULTIPLE BIOPSY/POLYPECTOMY BY BIOPSY;  Surgeon: Chelsea Thompson MD;  [facility]     COLONOSCOPY N/A 9/13/2017    Procedure: COLONOSCOPY;;  Surgeon: Felicitas Radford MD;  [facility]     COLONOSCOPY N/A 12/13/2017    Procedure: COLONOSCOPY;;  Surgeon: Felicitas Radford MD;  [facility]     COMBINED CYSTOSCOPY, RETROGRADES, URETEROSCOPY, LASER HOLMIUM LITHOTRIPSY URETER(S), INSERT STENT Left 2/16/2018    Procedure: COMBINED CYSTOSCOPY, RETROGRADES, URETEROSCOPY, LASER HOLMIUM LITHOTRIPSY URETER(S), INSERT STENT;  Cysto, left ureteroscopy, holmium laser, retrogrades, stent placement;  Surgeon: Bola Worthy MD;  [facility]     EXAM UNDER ANESTHESIA ANUS N/A 7/5/2017    Procedure: EXAM UNDER ANESTHESIA ANUS;  Examination Under Anesthesia, flex sigmoidoscopy with biopsies and formalin application;  Surgeon: Felicitas Radford MD;  [facility]     EXAM UNDER ANESTHESIA ANUS N/A 9/13/2017    Procedure: EXAM UNDER ANESTHESIA ANUS;  Examination Under Anesthesia Anus, Colonoscopy, application of formalin;  Surgeon: Felicitas Radford MD;  [facility]     EXAM UNDER ANESTHESIA ANUS N/A 12/13/2017    Procedure: EXAM UNDER ANESTHESIA ANUS;  Examination Under Anesthesia Anus, Colonoscopy;  Surgeon: Felicitas Radford MD;  [facility]     EXAM UNDER ANESTHESIA ANUS N/A 5/11/2018    Procedure: EXAM UNDER ANESTHESIA ANUS;  Examination Under Anesthesia Anus, Interoperative Flexible Sigmoidoscopy, Application of Formalin;  Surgeon: Felicitas Radford MD;  [facility]     EXAM UNDER ANESTHESIA ANUS N/A 7/20/2018    Procedure: EXAM UNDER ANESTHESIA ANUS;  Examination Under Anesthesia Anus, Flexible Sigmoidoscopy ;  Surgeon: Felicitas Radford MD;  [facility]     HC TOOTH EXTRACTION  W/FORCEP       LAPAROSCOPIC APPENDECTOMY N/A 2017    Procedure: LAPAROSCOPIC APPENDECTOMY;  LAPAROSCOPIC APPENDECTOMY;  Surgeon: Bryson Ferguson MD;  [facility]     SIGMOIDOSCOPY FLEXIBLE N/A 2016    Procedure: SIGMOIDOSCOPY FLEXIBLE;  Surgeon: Felicitas Radford MD;  [facility]     SIGMOIDOSCOPY FLEXIBLE N/A 2017    Procedure: SIGMOIDOSCOPY FLEXIBLE;;  Surgeon: Felicitas Radford MD;  [facility]     SIGMOIDOSCOPY FLEXIBLE N/A 2018    Procedure: SIGMOIDOSCOPY FLEXIBLE;;  Surgeon: Felicitas Radford MD;  [facility]     SIGMOIDOSCOPY FLEXIBLE N/A 2018    Procedure: SIGMOIDOSCOPY FLEXIBLE;;  Surgeon: Felicitas Radford MD;  [facility]     TESTICLE SURGERY       VASCULAR SURGERY      Right chest port     VASECTOMY       VASECTOMY           SOCIAL HISTORY:  Social History     Social History     Marital status:      Spouse name: N/A     Number of children: N/A     Years of education: N/A     Occupational History     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 No     Social History Narrative    Dad  at age  78   from [place], COPD, & HTN    Mom alive in her 70's.     for 30 years    Two adult children. 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         He notes that he had a brother who passed away at a young age of 53 from a metastatic cancer, but unknown what type, and passed away within 2 months of diagnosis.  He denies other family history of cancer in the immediate family.  Louie works as a  at a [employer]  school.      FAMILY HISTORY:  Family History   Problem Relation Age of Onset     Cancer Brother      primary of unknown origin     Other Cancer Brother      Chronic Obstructive Pulmonary Disease Father      Hypertension Father      Hyperlipidemia Father      Colon Polyps Mother      Number and type of polyps unknown     Family History Negative Brother      negative colonoscopy     Diabetes Maternal Grandfather      Hypertension Paternal Grandmother      Hyperlipidemia Paternal Grandmother      Stomach Cancer Other 63     maternal great grandfather     Glaucoma No family hx of      Macular Degeneration No family hx of          PHYSICAL EXAM:  Vital signs:  /74 (BP Location: Right arm, Patient Position: Chair, Cuff Size: Adult Regular)  Pulse 74  Temp 98.1  F (36.7  C) (Oral)  Resp 16  Wt 94.3 kg (208 lb)  SpO2 96%  BMI 29.42 kg/m2   ECO  GENERAL/CONSTITUTIONAL: No acute distress.  EYES: No scleral icterus.  LYMPH: No anterior cervical, posterior cervical, or supraclavicular adenopathy.   RESPIRATORY: Clear to auscultation bilaterally. No crackles or wheezing.   CARDIOVASCULAR: Regular rate and rhythm without murmurs, gallops, or rubs.  GASTROINTESTINAL: No tenderness. The patient has normal bowel sounds. No guarding.  No distention.  MUSCULOSKELETAL: Warm and well-perfused, no cyanosis, clubbing, or edema.  NEUROLOGIC: Alert, oriented, answers questions appropriately.  INTEGUMENTARY: No jaundice.  GAIT: Steady, does not use assistive device      LABS:  CBC RESULTS:   Recent Labs   Lab Test  18   0842   WBC  4.1   RBC  5.05   HGB  15.0   HCT  45.5   MCV  90   MCH  29.7   MCHC  33.0   RDW  13.2   PLT  183     Recent Labs   Lab Test  18   0842  18   2143   NA  138  139   POTASSIUM  4.1  3.9   CHLORIDE  104  106   CO2  27  26   ANIONGAP  7  7   GLC  94  106*   BUN  15  27   CR  0.83  0.91   FRANDY  9.0  9.0     Lab Results   Component Value Date    AST 24 2018     Lab Results    Component Value Date    ALT 38 07/20/2018     No results found for: BILICONJ   Lab Results   Component Value Date    BILITOTAL 1.4 07/20/2018     Lab Results   Component Value Date    ALBUMIN 4.0 07/20/2018     Lab Results   Component Value Date    PROTTOTAL 7.1 07/20/2018      Lab Results   Component Value Date    ALKPHOS 79 07/20/2018     CEA 2.5      IMAGING:  PET-CT 7/25/18:  IMPRESSION: In this patient with a history of rectal cancer status  post chemoradiation:  1. No evidence of local recurrence or metastatic disease .  2. Stable liver and kidney cysts.  3. Stable left lower lobe granuloma and accompanying tiny scattered  nodules less than 4 mm.  4. Unchanged prostatomegaly.  5. New tiny focus of mild FDG uptake with nodularity in the upper back  likely representing inflammatory sebaceous cyst.    MRI pelvis 7/25/18:  1. Stable treated fibrotic tumor in the lower rectum on the left  anterior lateral aspect. No evidence of recurrence, corresponding  tumor regression grade of mr-TRG1.    2. Decreased posttreatment changes in the mid lower rectum.   3. Unchanged tiny pelvic lymph nodes.  4. Stable heterogeneous bone marrow signal without focal lesion.      ASSESSMENT/PLAN:  Louie Greco is a 58 year old male with:    1) Rectal cancer: clinical stage [id number] (stage IIIA), s/p chemoradiation with Xeloda, and appears to have achieved complete response on imaging and biopsies.  He opted not to undergo surgery and expressed desire not to undergo APR, as he does not want a colostomy bag.  It was felt reasonable to go on the watch and wait protocol with the [facility].  He has completed 4 additional months (8 cycles) of chemotherapy with FOLFOX.  He will now go on surveillance, as per the watch and wait protocol.    We reviewed PET-CT and MRI pelvis from 7/25/18.  There is no evidence of locally recurrent or metastatic disease.  There is stable treated fibrotic tumor in the rectum with no evidence for  recurrence.   There are post-treatment changes.  Unchanged tiny pelvic lymph nodes.  She has stable liver and kidney cysts, and stable left lower lobe granuloma tiny scattered nodules less than 4 mm.      -he had flex sig with Dr. Radford on 5/11/18 and 7/21/18.  -next T3 MRI pelvis would be in January 2019 (every 6 months x 2 years).    -CT c/a/p annually for 5 years; next PET scan in July 2019  -endoscopic surveillance with Dr. Radford as per protocol  -RTC in 6 months with labs/CEA and results of MRI pelvis    2) Genetics: He underwent genetic testing, which was negative.    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            4) Elevated bilirubin: mild.  -monitor for now    5) Liver cysts: seen on CT, stable  -monitor    6) Pulmonary nodules: stable sub-4 mm pulmonary nodules  -monitor on future scans    7) Kidney cyst: stable  -monitor on future scans.  He also had an MRI done, which showed the multiple cysts.      8) Appendix polyp: He is now s/p appendectomy.  Pathology found sessile serrated adenoma without dysplasia or malignancy.       I spent a total of 25 minutes with the patient, with over >50% of the time in counseling and/or coordination of care.       Agueda Manley MD  Hematology/Oncology  [facility]

## 2018-11-15 ENCOUNTER — OFFICE VISIT (OUTPATIENT)
Dept: FAMILY MEDICINE | Facility: CLINIC | Age: 58
End: 2018-11-15
Payer: COMMERCIAL

## 2018-11-15 VITALS
WEIGHT: 208 LBS | OXYGEN SATURATION: 96 % | HEART RATE: 81 BPM | BODY MASS INDEX: 29.12 KG/M2 | DIASTOLIC BLOOD PRESSURE: 72 MMHG | HEIGHT: 71 IN | TEMPERATURE: 98.1 F | SYSTOLIC BLOOD PRESSURE: 126 MMHG

## 2018-11-15 DIAGNOSIS — C20 MALIGNANT NEOPLASM OF RECTUM (H): ICD-10-CM

## 2018-11-15 DIAGNOSIS — Z01.818 PREOP GENERAL PHYSICAL EXAM: Primary | ICD-10-CM

## 2018-11-15 PROCEDURE — 99213 OFFICE O/P EST LOW 20 MIN: CPT | Performed by: INTERNAL MEDICINE

## 2018-11-15 NOTE — PROGRESS NOTES
[address] [address]  [phone]  Dept: [phone]    PRE-OP EVALUATION:  Today's date: 11/15/2018    Louie Greco (: 1960) presents for pre-operative evaluation assessment as requested by Dr. Radford.  He requires evaluation and anesthesia risk assessment prior to undergoing surgery/procedure for treatment of possible sigmoid biopsy .    Proposed Surgery/ Procedure: Examination under anesthesia, possible biopsies, flexible sigmoidoscopy  Date of Surgery/ Procedure: 18  Time of Surgery/ Procedure: 715 am  Hospital/Surgical Facility:  OR  Fax number for surgical facility:   Primary Physician: Philippe Healy  Type of Anesthesia Anticipated: Monitor anesthesia care    Patient has a Health Care Directive or Living Will:  NO    1. NO - Do you have a history of heart attack, stroke, stent, bypass or surgery on an artery in the head, neck, heart or legs?  2. NO - Do you ever have any pain or discomfort in your chest?  3. NO - Do you have a history of  Heart Failure?  4. NO - Are you troubled by shortness of breath when: walking on the level, up a slight hill or at night?  5. NO - Do you currently have a cold, bronchitis or other respiratory infection?  6. NO - Do you have a cough, shortness of breath or wheezing?  7. NO - Do you sometimes get pains in the calves of your legs when you walk?  8. NO - Do you or anyone in your family have previous history of blood clots?  9. NO - Do you or does anyone in your family have a serious bleeding problem such as prolonged bleeding following surgeries or cuts?  10. NO - Have you ever had problems with anemia or been told to take iron pills?  11. NO - Have you had any abnormal blood loss such as black, tarry or bloody stools, or abnormal vaginal bleeding?  12. NO - Have you ever had a blood transfusion?  13. NO - Have you or any of your relatives ever had problems with anesthesia?  14. NO - Do you have sleep apnea,  excessive snoring or daytime drowsiness?  15. NO - Do you have any prosthetic heart valves?  16. NO - Do you have prosthetic joints?  17. NO - Is there any chance that you may be pregnant?      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  Here for preoperative evaluation.  He exercises regularly without chest pains or shortness of breath.  He otherwise feels well and has no other specific complaints.          MEDICAL HISTORY:     Patient Active Problem List    Diagnosis Date Noted     Kidney stone 02/16/2018     Priority: Medium     Elevated prostate specific antigen (PSA) 11/30/2017     Priority: Medium     Appendicitis 07/17/2017     Priority: Medium     Chemotherapy-induced neutropenia (H) 03/21/2017     Priority: Medium     Advance Care Planning 03/09/2017     Priority: Medium     Advance Care Planning 3/9/2017: Receipt of ACP document:  Received: invalid HCD document dated 12/8/2016.  Document not previously scanned. Validation form completed indicating invalid document. Copy sent to client with information and facilitation resources. Validation form sent to be scanned as notation of invalid document received.  Code Status needs to be updated to reflect choices. Confirmed/documented designated decision maker(s).  Added by Araceli Horne MSW Advance Care Planning Liaison with [employer].       Malignant neoplasm of rectum (H) 01/03/2017     Priority: Medium     Rectal bleeding 07/19/2016     Priority: Medium     Plantar fasciitis 11/04/2010     Priority: Medium     Pes anserinus tendinitis 02/08/2010     Priority: Medium      Past Medical History:   Diagnosis Date     Childhood asthma      Epididymitis, bilateral     18 years old     Inguinal hernia      Mumps      Nephrolithiasis     1990     Rectal cancer (H)     low rectal cancer     Shingles      Past Surgical History:    Procedure Laterality Date     COLONOSCOPY N/A 7/27/2016    Procedure: COMBINED COLONOSCOPY, SINGLE OR MULTIPLE BIOPSY/POLYPECTOMY BY BIOPSY;  Surgeon: Chelsea Thompson MD;  [facility]     COLONOSCOPY N/A 9/13/2017    Procedure: COLONOSCOPY;;  Surgeon: Felicitas Radford MD;  [facility]     COLONOSCOPY N/A 12/13/2017    Procedure: COLONOSCOPY;;  Surgeon: Felicitas Radford MD;  [facility]     COMBINED CYSTOSCOPY, RETROGRADES, URETEROSCOPY, LASER HOLMIUM LITHOTRIPSY URETER(S), INSERT STENT Left 2/16/2018    Procedure: COMBINED CYSTOSCOPY, RETROGRADES, URETEROSCOPY, LASER HOLMIUM LITHOTRIPSY URETER(S), INSERT STENT;  Cysto, left ureteroscopy, holmium laser, retrogrades, stent placement;  Surgeon: Bola Worthy MD;  [facility]     EXAM UNDER ANESTHESIA ANUS N/A 7/5/2017    Procedure: EXAM UNDER ANESTHESIA ANUS;  Examination Under Anesthesia, flex sigmoidoscopy with biopsies and formalin application;  Surgeon: Felicitas Radford MD;  [facility]     EXAM UNDER ANESTHESIA ANUS N/A 9/13/2017    Procedure: EXAM UNDER ANESTHESIA ANUS;  Examination Under Anesthesia Anus, Colonoscopy, application of formalin;  Surgeon: Felicitas Radford MD;  [facility]     EXAM UNDER ANESTHESIA ANUS N/A 12/13/2017    Procedure: EXAM UNDER ANESTHESIA ANUS;  Examination Under Anesthesia Anus, Colonoscopy;  Surgeon: Felicitas Radford MD;  [facility]     EXAM UNDER ANESTHESIA ANUS N/A 5/11/2018    Procedure: EXAM UNDER ANESTHESIA ANUS;  Examination Under Anesthesia Anus, Interoperative Flexible Sigmoidoscopy, Application of Formalin;  Surgeon: Felicitas Radford MD;  [facility]     EXAM UNDER ANESTHESIA ANUS N/A 7/20/2018    Procedure: EXAM UNDER ANESTHESIA ANUS;  Examination Under Anesthesia Anus, Flexible Sigmoidoscopy ;  Surgeon: Felicitas Radford MD;  [facility]     HC TOOTH EXTRACTION W/FORCEP       LAPAROSCOPIC  APPENDECTOMY N/A 7/17/2017    Procedure: LAPAROSCOPIC APPENDECTOMY;  LAPAROSCOPIC APPENDECTOMY;  Surgeon: Bryson Ferguson MD;  [facility]     SIGMOIDOSCOPY FLEXIBLE N/A 12/8/2016    Procedure: SIGMOIDOSCOPY FLEXIBLE;  Surgeon: Felicitas Radford MD;  [facility]     SIGMOIDOSCOPY FLEXIBLE N/A 7/5/2017    Procedure: SIGMOIDOSCOPY FLEXIBLE;;  Surgeon: Felicitas Radford MD;  [facility]     SIGMOIDOSCOPY FLEXIBLE N/A 5/11/2018    Procedure: SIGMOIDOSCOPY FLEXIBLE;;  Surgeon: Felicitas Radford MD;  [facility]     SIGMOIDOSCOPY FLEXIBLE N/A 7/20/2018    Procedure: SIGMOIDOSCOPY FLEXIBLE;;  Surgeon: Felicitas Radford MD;  [facility]     TESTICLE SURGERY       VASCULAR SURGERY      Right chest port     VASECTOMY       VASECTOMY       Current Outpatient Prescriptions   Medication Sig Dispense Refill     ASPIRIN PO Take by mouth as needed for moderate pain       dibucaine (NUPERCAINAL) 1 % OINT ointment 3 times daily as needed for moderate pain       diltiazem 2% in PLO cream, FV COMPOUNDED, 2% GEL Apply topically 2 times daily       VITAMIN D, CHOLECALCIFEROL, PO Take 2,000 Units by mouth daily        ibuprofen 200 MG capsule Take 200-400 mg by mouth every 6 hours as needed (Patient not taking: Reported on 7/19/2018) 120 capsule 0     lidocaine (XYLOCAINE) 2 % topical gel Apply topically 2 times daily as needed for moderate pain (Patient not taking: Reported on 7/19/2018) 30 mL 3     lidocaine, Anorectal, 5 % CREA Externally apply 1 Application topically 3 times daily as needed (Patient not taking: Reported on 7/19/2018) 1 Tube 0         Allergies   Allergen Reactions     Ampicillin Diarrhea     Demerol [Meperidine]      Nausea         Social History   Substance Use Topics     Smoking status: Never Smoker     Smokeless tobacco: Never Used     Alcohol use 0.0 oz/week      Comment: 1 drink a week     History   Drug Use No       REVIEW OF SYSTEMS:   Constitutional,  "neuro, ENT, endocrine, pulmonary, cardiac, gastrointestinal, genitourinary, musculoskeletal, integument and psychiatric systems are negative, except as otherwise noted.    EXAM:   /72 (BP Location: Right arm, Patient Position: Sitting, Cuff Size: Adult Regular)  Pulse 81  Temp 98.1  F (36.7  C) (Oral)  Ht 5' 10.5\" (1.791 m)  Wt 208 lb (94.3 kg)  SpO2 96%  BMI 29.42 kg/m2    GENERAL APPEARANCE: healthy, alert and no distress     EYES: EOMI,  PERRL     HENT: ear canals and TM's normal and nose and mouth without ulcers or lesions     NECK: no adenopathy, no asymmetry, masses, or scars and thyroid normal to palpation     RESP: lungs clear to auscultation - no rales, rhonchi or wheezes     CV: regular rates and rhythm, normal S1 S2, no S3 or S4 and no murmur, click or rub     ABDOMEN:  soft, nontender, no HSM or masses and bowel sounds normal     MS: extremities normal- no gross deformities noted, no evidence of inflammation in joints, FROM in all extremities.     SKIN: no suspicious lesions or rashes     NEURO: Normal strength and tone, sensory exam grossly normal, mentation intact and speech normal     PSYCH: mentation appears normal. and affect normal/bright     LYMPHATICS: No cervical adenopathy    DIAGNOSTICS:   No labs or EKG required for low risk surgery (cataract, skin procedure, breast biopsy, etc)    Recent Labs   Lab Test  07/20/18   0842  02/14/18   2143   07/10/17   0943   01/16/17   0800   HGB  15.0  14.5   < >  12.8*   < >  15.1   PLT  183  176   < >  170   < >  207   INR   --    --    --   1.02   --   0.92   NA  138  139   < >   --    < >   --    POTASSIUM  4.1  3.9   < >   --    < >   --    CR  0.83  0.91   < >   --    < >   --     < > = values in this interval not displayed.        IMPRESSION:   Reason for surgery/procedure: Rectal cancer  Diagnosis/reason for consult: Preoperative evaluation    The proposed surgical procedure is considered LOW risk.    REVISED CARDIAC RISK INDEX  The " patient has the following serious cardiovascular risks for perioperative complications such as (MI, PE, VFib and 3  AV Block):  No serious cardiac risks  INTERPRETATION: 0 risks: Class I (very low risk - 0.4% complication rate)    The patient has the following additional risks for perioperative complications:  No identified additional risks      ICD-10-CM    1. Preop general physical exam Z01.818    2. Malignant neoplasm of rectum (H) C20        RECOMMENDATIONS:       Proceed with surveillance sigmoidoscopy under monitored anesthesia control as planned.  He does require every 3-4-month sigmoidoscopies in this fashion.  It is my opinion, that it is not necessary for him to return in 3-4 months for another preoperative evaluation in anticipation of his next procedure unless new symptoms or new health concerns arise.  He does plan to return to see me in July 2019 for routine preventative exam.    APPROVAL GIVEN to proceed with proposed procedure, without further diagnostic evaluation       Signed Electronically by: Philippe Healy MD    Copy of this evaluation report is provided to requesting physician.    [facility] Preop Guidelines    Revised Cardiac Risk Index

## 2018-11-15 NOTE — MR AVS SNAPSHOT
After Visit Summary   11/15/2018    Louie Greco    MRN: [id number]           Patient Information     Date Of Birth          1960        Visit Information        Provider Department      11/15/2018 5:00 PM Philippe Healy MD [facility]        Today's Diagnoses     Preop general physical exam    -  1    Malignant neoplasm of rectum (H)          Care Instructions      Before Your Surgery      Call your surgeon if there is any change in your health. This includes signs of a cold or flu (such as a sore throat, runny nose, cough, rash or fever).    Do not smoke, drink alcohol or take over the counter medicine (unless your surgeon or primary care doctor tells you to) for the 24 hours before and after surgery.    If you take prescribed drugs: Follow your doctor s orders about which medicines to take and which to stop until after surgery.    Eating and drinking prior to surgery: follow the instructions from your surgeon    Take a shower or bath the night before surgery. Use the soap your surgeon gave you to gently clean your skin. If you do not have soap from your surgeon, use your regular soap. Do not shave or scrub the surgery site.  Wear clean pajamas and have clean sheets on your bed.           Follow-ups after your visit        Your next 10 appointments already scheduled     Nov 30, 2018   Procedure with Felicitas Radford MD   [facility] ([facility])    [address] [place] [address]   [phone]           Located in the Clinics and Surgery Center at [address] [place] [address].   parking is very convenient and highly recommended.  is a $6 flat rate fee.  Both  and self parkers should enter the main arrival plaza from [facility]; parking attendants will direct you based on your parking preference.            Dec 28, 2018  7:00 AM CST   MR PELVIS (INTRAPELVIC ORGANS) WO&W  CONTRAST with [id number]   [facility] ([facility])    [address]  1st Floor  [facility] [address] [phone]           How do I prepare for my exam? (Food and drink instructions) **If you will be receiving sedation or general anesthesia, please see special notes below.**  How do I prepare for my exam? (Other instructions) Take your medicines as usual, unless your doctor tells you not to. You may or may not receive intravenous (IV) contrast for this exam pending the discretion of the Radiologist.  You do not need to do anything special to prepare.  **If you will be receiving sedation or general anesthesia, please see special notes below.**  What should I wear: The MRI machine uses a strong magnet. Please wear clothes without metal (snaps, zippers). A sweatsuit works well, or we may give you a hospital gown. Please remove any body piercings and hair extensions before you arrive. You will also remove watches, jewelry, hairpins, wallets, dentures, partial dental plates and hearing aids. You may wear contact lenses, and you may be able to wear your rings. We have a safe place to keep your personal items, but it is safer to leave them at home.  How long does the exam take: Most tests take 30 to 60 minutes.  HOWEVER, IF YOUR DOCTOR PRESCRIBES ANESTHESIA please plan on spending four to five hours in the recovery room.  What should I bring:  Bring a list of your current medicines to your exam (including vitamins, minerals and over-the-counter drugs).  Do I need a :  **If you will be receiving sedation or general anesthesia, please see special notes below.**  What should I do after the exam: No Restrictions, You may resume normal activities.  What is this test: MRI (magnetic resonance imaging) uses a strong magnet and radio waves to look inside the body. An MRA (magnetic resonance angiogram) does the same thing, but it lets us look at your blood vessels. A  computer turns the radio waves into pictures showing cross sections of the body, much like slices of bread. This helps us see any problems more clearly. You may receive fluid (called  contrast ) before or during your scan. The fluid helps us see the pictures better. We give the fluid through an IV (small needle in your arm).  Who should I call with questions:  Please call the Imaging Department at your exam site with any questions. Directions, parking instructions, and other information is available on our website, [email or web address].  How do I prepare if I m having sedation or anesthesia? **IMPORTANT** THE INSTRUCTIONS BELOW ARE ONLY FOR THOSE PATIENTS WHO HAVE BEEN TOLD THEY WILL RECEIVE SEDATION OR GENERAL ANESTHESIA DURING THEIR MRI PROCEDURE:  IF YOU WILL RECEIVE SEDATION (take medicine to help you relax during your exam): You must get the medicine from your doctor before you arrive. Bring the medicine to the exam. Do not take it at home. Arrive one hour early. Bring someone who can take you home after the test. Your medicine will make you sleepy. After the exam, you may not drive, take a bus or take a taxi by yourself. No eating 8 hours before your exam. You may have clear liquids up until 4 hours before your exam. (Clear liquids include water, clear tea, black coffee and fruit juice without pulp.)  IF YOU WILL RECEIVE ANESTHESIA (be asleep for your exam): Arrive 1 1/2 hours early. Bring someone who can take you home after the test. You may not drive, take a bus or take a taxi by yourself. No eating 8 hours before your exam. You may have clear liquids up until 4 hours before your exam. (Clear liquids include water, clear tea, black coffee and fruit juice without pulp.)            Jan 04, 2019 10:00 AM CST   Return Visit with Agueda Manley MD   [facility] ([facility])    [facility]  [address]  [facility] [address]   [phone]        "       Who to contact     If you have questions or need follow up information about today's clinic visit or your schedule please contact [facility] directly at [phone].  Normal or non-critical lab and imaging results will be communicated to you by MyChart, letter or phone within 4 business days after the clinic has received the results. If you do not hear from us within 7 days, please contact the clinic through MyChart or phone. If you have a critical or abnormal lab result, we will notify you by phone as soon as possible.  Submit refill requests through [employer] or call your pharmacy and they will forward the refill request to us. Please allow 3 business days for your refill to be completed.          Additional Information About Your Visit        [employer] Information     [employer] gives you secure access to your electronic health record. If you see a primary care provider, you can also send messages to your care team and make appointments. If you have questions, please call your primary care clinic.  If you do not have a primary care provider, please call [phone] and they will assist you.        Care EveryWhere ID     This is your Care EveryWhere ID. This could be used by other organizations to access your [place] medical records  [id number]        Your Vitals Were     Pulse Temperature Height Pulse Oximetry BMI (Body Mass Index)       81 98.1  F (36.7  C) (Oral) 5' 10.5\" (1.791 m) 96% 29.42 kg/m2        Blood Pressure from Last 3 Encounters:   11/15/18 126/72   07/31/18 126/74   07/20/18 119/71    Weight from Last 3 Encounters:   11/15/18 208 lb (94.3 kg)   07/31/18 208 lb (94.3 kg)   07/20/18 205 lb (93 kg)              Today, you had the following     No orders found for display       Primary Care Provider Office Phone # Fax #    Philippe Healy [phone] [address]  [place] [address]        Equal Access to Services     BLAISE EDDY AH: João gilbert " Celi, parisada luqadaha, qaalexta kataylor tran, jb avilain hayaan luctanner janiyaleelee laTeresakatherine pauly. So [facility] [phone].    ATENCIÓN: Si jessica garcia, tiene a mena disposición servicios gratuitos de asistencia lingüística. Keaton al [phone].    We comply with applicable federal civil rights laws and [place] laws. We do not discriminate on the basis of race, color, national origin, age, disability, sex, sexual orientation, or gender identity.            Thank you!     Thank you for choosing [facility]  for your care. Our goal is always to provide you with excellent care. Hearing back from our patients is one way we can continue to improve our services. Please take a few minutes to complete the written survey that you may receive in the mail after your visit with us. Thank you!             Your Updated Medication List - Protect others around you: Learn how to safely use, store and throw away your medicines at www.disposemymeds.org.          This list is accurate as of 11/15/18  5:51 PM.  Always use your most recent med list.                   Brand Name Dispense Instructions for use Diagnosis    ASPIRIN PO      Take by mouth as needed for moderate pain        dibucaine 1 % Oint ointment    NUPERCAINAL     3 times daily as needed for moderate pain        diltiazem 2% in PLO cream (FV COMPOUNDED) 2% Gel      Apply topically 2 times daily    Need for hepatitis C screening test       ibuprofen 200 MG capsule     120 capsule    Take 200-400 mg by mouth every 6 hours as needed    Acute post-operative pain       lidocaine (Anorectal) 5 % Crea     1 Tube    Externally apply 1 Application topically 3 times daily as needed    Anal pain       lidocaine 2 % topical gel    XYLOCAINE    30 mL    Apply topically 2 times daily as needed for moderate pain    Anal fissure       VITAMIN D (CHOLECALCIFEROL) PO      Take 2,000 Units by mouth daily

## 2018-11-29 ENCOUNTER — ANESTHESIA EVENT (OUTPATIENT)
Dept: SURGERY | Facility: AMBULATORY SURGERY CENTER | Age: 58
End: 2018-11-29

## 2018-11-30 ENCOUNTER — ANESTHESIA (OUTPATIENT)
Dept: SURGERY | Facility: AMBULATORY SURGERY CENTER | Age: 58
End: 2018-11-30

## 2018-11-30 ENCOUNTER — HOSPITAL ENCOUNTER (OUTPATIENT)
Facility: AMBULATORY SURGERY CENTER | Age: 58
End: 2018-11-30
Attending: COLON & RECTAL SURGERY
Payer: COMMERCIAL

## 2018-11-30 ENCOUNTER — OFFICE VISIT (OUTPATIENT)
Dept: FAMILY MEDICINE | Facility: CLINIC | Age: 58
End: 2018-11-30
Payer: COMMERCIAL

## 2018-11-30 VITALS
TEMPERATURE: 97.4 F | BODY MASS INDEX: 28 KG/M2 | RESPIRATION RATE: 16 BRPM | HEIGHT: 71 IN | DIASTOLIC BLOOD PRESSURE: 79 MMHG | OXYGEN SATURATION: 99 % | HEART RATE: 78 BPM | SYSTOLIC BLOOD PRESSURE: 130 MMHG | WEIGHT: 200 LBS

## 2018-11-30 VITALS
TEMPERATURE: 98.2 F | HEIGHT: 71 IN | WEIGHT: 208 LBS | DIASTOLIC BLOOD PRESSURE: 80 MMHG | BODY MASS INDEX: 29.12 KG/M2 | OXYGEN SATURATION: 95 % | SYSTOLIC BLOOD PRESSURE: 134 MMHG | HEART RATE: 103 BPM

## 2018-11-30 DIAGNOSIS — H60.393 INFECTIVE OTITIS EXTERNA, BILATERAL: Primary | ICD-10-CM

## 2018-11-30 DIAGNOSIS — H61.23 BILATERAL IMPACTED CERUMEN: ICD-10-CM

## 2018-11-30 PROCEDURE — 69210 REMOVE IMPACTED EAR WAX UNI: CPT | Mod: LT | Performed by: INTERNAL MEDICINE

## 2018-11-30 PROCEDURE — 99212 OFFICE O/P EST SF 10 MIN: CPT | Mod: 25 | Performed by: INTERNAL MEDICINE

## 2018-11-30 PROCEDURE — 69209 REMOVE IMPACTED EAR WAX UNI: CPT | Mod: RT | Performed by: INTERNAL MEDICINE

## 2018-11-30 RX ORDER — ACETAMINOPHEN 325 MG/1
650 TABLET ORAL
Status: DISCONTINUED | OUTPATIENT
Start: 2018-11-30 | End: 2018-12-01 | Stop reason: HOSPADM

## 2018-11-30 RX ORDER — DEXAMETHASONE SODIUM PHOSPHATE 4 MG/ML
INJECTION, SOLUTION INTRA-ARTICULAR; INTRALESIONAL; INTRAMUSCULAR; INTRAVENOUS; SOFT TISSUE PRN
Status: DISCONTINUED | OUTPATIENT
Start: 2018-11-30 | End: 2018-11-30

## 2018-11-30 RX ORDER — ONDANSETRON 4 MG/1
4 TABLET, ORALLY DISINTEGRATING ORAL EVERY 30 MIN PRN
Status: DISCONTINUED | OUTPATIENT
Start: 2018-11-30 | End: 2018-12-01 | Stop reason: HOSPADM

## 2018-11-30 RX ORDER — LIDOCAINE 40 MG/G
CREAM TOPICAL
Status: DISCONTINUED | OUTPATIENT
Start: 2018-11-30 | End: 2018-11-30 | Stop reason: HOSPADM

## 2018-11-30 RX ORDER — ONDANSETRON 2 MG/ML
4 INJECTION INTRAMUSCULAR; INTRAVENOUS EVERY 30 MIN PRN
Status: DISCONTINUED | OUTPATIENT
Start: 2018-11-30 | End: 2018-12-01 | Stop reason: HOSPADM

## 2018-11-30 RX ORDER — PROPOFOL 10 MG/ML
INJECTION, EMULSION INTRAVENOUS CONTINUOUS PRN
Status: DISCONTINUED | OUTPATIENT
Start: 2018-11-30 | End: 2018-11-30

## 2018-11-30 RX ORDER — KETOROLAC TROMETHAMINE 30 MG/ML
INJECTION, SOLUTION INTRAMUSCULAR; INTRAVENOUS PRN
Status: DISCONTINUED | OUTPATIENT
Start: 2018-11-30 | End: 2018-11-30

## 2018-11-30 RX ORDER — PROPOFOL 10 MG/ML
INJECTION, EMULSION INTRAVENOUS PRN
Status: DISCONTINUED | OUTPATIENT
Start: 2018-11-30 | End: 2018-11-30

## 2018-11-30 RX ORDER — SODIUM CHLORIDE, SODIUM LACTATE, POTASSIUM CHLORIDE, CALCIUM CHLORIDE 600; 310; 30; 20 MG/100ML; MG/100ML; MG/100ML; MG/100ML
INJECTION, SOLUTION INTRAVENOUS CONTINUOUS
Status: DISCONTINUED | OUTPATIENT
Start: 2018-11-30 | End: 2018-12-01 | Stop reason: HOSPADM

## 2018-11-30 RX ORDER — GABAPENTIN 300 MG/1
300 CAPSULE ORAL ONCE
Status: COMPLETED | OUTPATIENT
Start: 2018-11-30 | End: 2018-11-30

## 2018-11-30 RX ORDER — KETAMINE HYDROCHLORIDE 10 MG/ML
INJECTION, SOLUTION INTRAMUSCULAR; INTRAVENOUS PRN
Status: DISCONTINUED | OUTPATIENT
Start: 2018-11-30 | End: 2018-11-30

## 2018-11-30 RX ORDER — NALOXONE HYDROCHLORIDE 0.4 MG/ML
.1-.4 INJECTION, SOLUTION INTRAMUSCULAR; INTRAVENOUS; SUBCUTANEOUS
Status: DISCONTINUED | OUTPATIENT
Start: 2018-11-30 | End: 2018-12-01 | Stop reason: HOSPADM

## 2018-11-30 RX ORDER — FENTANYL CITRATE 50 UG/ML
25-50 INJECTION, SOLUTION INTRAMUSCULAR; INTRAVENOUS
Status: DISCONTINUED | OUTPATIENT
Start: 2018-11-30 | End: 2018-11-30 | Stop reason: HOSPADM

## 2018-11-30 RX ORDER — LIDOCAINE HYDROCHLORIDE 20 MG/ML
INJECTION, SOLUTION INFILTRATION; PERINEURAL PRN
Status: DISCONTINUED | OUTPATIENT
Start: 2018-11-30 | End: 2018-11-30

## 2018-11-30 RX ORDER — MEPERIDINE HYDROCHLORIDE 25 MG/ML
12.5 INJECTION INTRAMUSCULAR; INTRAVENOUS; SUBCUTANEOUS
Status: DISCONTINUED | OUTPATIENT
Start: 2018-11-30 | End: 2018-12-01 | Stop reason: HOSPADM

## 2018-11-30 RX ORDER — OXYCODONE HYDROCHLORIDE 5 MG/1
5 TABLET ORAL EVERY 4 HOURS PRN
Status: DISCONTINUED | OUTPATIENT
Start: 2018-11-30 | End: 2018-12-01 | Stop reason: HOSPADM

## 2018-11-30 RX ORDER — ACETAMINOPHEN 325 MG/1
975 TABLET ORAL ONCE
Status: COMPLETED | OUTPATIENT
Start: 2018-11-30 | End: 2018-11-30

## 2018-11-30 RX ORDER — ONDANSETRON 2 MG/ML
INJECTION INTRAMUSCULAR; INTRAVENOUS PRN
Status: DISCONTINUED | OUTPATIENT
Start: 2018-11-30 | End: 2018-11-30

## 2018-11-30 RX ORDER — NEOMYCIN SULFATE, POLYMYXIN B SULFATE AND HYDROCORTISONE 10; 3.5; 1 MG/ML; MG/ML; [USP'U]/ML
4 SUSPENSION/ DROPS AURICULAR (OTIC) 4 TIMES DAILY
Qty: 10 ML | Refills: 0 | Status: SHIPPED | OUTPATIENT
Start: 2018-11-30 | End: 2020-08-31

## 2018-11-30 RX ORDER — SODIUM CHLORIDE, SODIUM LACTATE, POTASSIUM CHLORIDE, CALCIUM CHLORIDE 600; 310; 30; 20 MG/100ML; MG/100ML; MG/100ML; MG/100ML
INJECTION, SOLUTION INTRAVENOUS CONTINUOUS
Status: DISCONTINUED | OUTPATIENT
Start: 2018-11-30 | End: 2018-11-30 | Stop reason: HOSPADM

## 2018-11-30 RX ADMIN — ONDANSETRON 4 MG: 2 INJECTION INTRAMUSCULAR; INTRAVENOUS at 07:41

## 2018-11-30 RX ADMIN — GABAPENTIN 300 MG: 300 CAPSULE ORAL at 06:26

## 2018-11-30 RX ADMIN — DEXAMETHASONE SODIUM PHOSPHATE 4 MG: 4 INJECTION, SOLUTION INTRA-ARTICULAR; INTRALESIONAL; INTRAMUSCULAR; INTRAVENOUS; SOFT TISSUE at 07:41

## 2018-11-30 RX ADMIN — ACETAMINOPHEN 975 MG: 325 TABLET ORAL at 06:26

## 2018-11-30 RX ADMIN — PROPOFOL 50 MG: 10 INJECTION, EMULSION INTRAVENOUS at 07:38

## 2018-11-30 RX ADMIN — PROPOFOL 150 MCG/KG/MIN: 10 INJECTION, EMULSION INTRAVENOUS at 07:38

## 2018-11-30 RX ADMIN — LIDOCAINE HYDROCHLORIDE 60 MG: 20 INJECTION, SOLUTION INFILTRATION; PERINEURAL at 07:38

## 2018-11-30 RX ADMIN — KETAMINE HYDROCHLORIDE 30 MG: 10 INJECTION, SOLUTION INTRAMUSCULAR; INTRAVENOUS at 07:38

## 2018-11-30 RX ADMIN — SODIUM CHLORIDE, SODIUM LACTATE, POTASSIUM CHLORIDE, CALCIUM CHLORIDE: 600; 310; 30; 20 INJECTION, SOLUTION INTRAVENOUS at 06:26

## 2018-11-30 RX ADMIN — KETOROLAC TROMETHAMINE 15 MG: 30 INJECTION, SOLUTION INTRAMUSCULAR; INTRAVENOUS at 07:55

## 2018-11-30 NOTE — IP AVS SNAPSHOT
[facility]    [address] [place] [address]    Phone:  [phone]    Fax:  [phone]                                       After Visit Summary   11/30/2018    Louie Greco    MRN: [id number]           After Visit Summary Signature Page     I have received my discharge instructions, and my questions have been answered. I have discussed any challenges I see with this plan with the nurse or doctor.    ..........................................................................................................................................  Patient/Patient Representative Signature      ..........................................................................................................................................  Patient Representative Print Name and Relationship to Patient    ..................................................               ................................................  Date                                   Time    ..........................................................................................................................................  Reviewed by Signature/Title    ...................................................              ..............................................  Date                                               Time          22EPIC Rev 08/18

## 2018-11-30 NOTE — IP AVS SNAPSHOT
MRN:[id number]                      After Visit Summary   11/30/2018    Louie Greco    MRN: [id number]           Thank you!     Thank you for choosing [facility] for your care. Our goal is always to provide you with excellent care. Hearing back from our patients is one way we can continue to improve our services. Please take a few minutes to complete the written survey that you may receive in the mail after you visit with us. Thank you!        Patient Information     Date Of Birth          1960        About your hospital stay     You were admitted on:  November 30, 2018 You last received care in the:  [facility]    You were discharged on:  November 30, 2018       Who to Call     For medical emergencies, please call 911.  For non-urgent questions about your medical care, please call your primary care provider or clinic, [phone]  For questions related to your surgery, please call your surgery clinic        Attending Provider     Provider Specialty    Felicitas Radford MD Colon and Rectal Surgery       Primary Care Provider Office Phone # Fax #    Philippe Healy [phone]      Your next 10 appointments already scheduled     Dec 28, 2018  7:00 AM CST   MR PELVIS (INTRAPELVIC ORGANS) WO&W CONTRAST with [id number]   [facility] [place] [facility] ([facility])    [address] [address] [phone]           How do I prepare for my exam? (Food and drink instructions) **If you will be receiving sedation or general anesthesia, please see special notes below.**  How do I prepare for my exam? (Other instructions) Take your medicines as usual, unless your doctor tells you not to. You may or may not receive intravenous (IV) contrast for this exam pending the discretion of the Radiologist.  You do not need to do anything special to prepare.  **If you will be receiving sedation or general  anesthesia, please see special notes below.**  What should I wear: The MRI machine uses a strong magnet. Please wear clothes without metal (snaps, zippers). A sweatsuit works well, or we may give you a hospital gown. Please remove any body piercings and hair extensions before you arrive. You will also remove watches, jewelry, hairpins, wallets, dentures, partial dental plates and hearing aids. You may wear contact lenses, and you may be able to wear your rings. We have a safe place to keep your personal items, but it is safer to leave them at home.  How long does the exam take: Most tests take 30 to 60 minutes.  HOWEVER, IF YOUR DOCTOR PRESCRIBES ANESTHESIA please plan on spending four to five hours in the recovery room.  What should I bring:  Bring a list of your current medicines to your exam (including vitamins, minerals and over-the-counter drugs).  Do I need a :  **If you will be receiving sedation or general anesthesia, please see special notes below.**  What should I do after the exam: No Restrictions, You may resume normal activities.  What is this test: MRI (magnetic resonance imaging) uses a strong magnet and radio waves to look inside the body. An MRA (magnetic resonance angiogram) does the same thing, but it lets us look at your blood vessels. A computer turns the radio waves into pictures showing cross sections of the body, much like slices of bread. This helps us see any problems more clearly. You may receive fluid (called  contrast ) before or during your scan. The fluid helps us see the pictures better. We give the fluid through an IV (small needle in your arm).  Who should I call with questions:  Please call the Imaging Department at your exam site with any questions. Directions, parking instructions, and other information is available on our website, [email or web address].  How do I prepare if I m having sedation or anesthesia? **IMPORTANT** THE INSTRUCTIONS BELOW ARE ONLY FOR THOSE PATIENTS  WHO HAVE BEEN TOLD THEY WILL RECEIVE SEDATION OR GENERAL ANESTHESIA DURING THEIR MRI PROCEDURE:  IF YOU WILL RECEIVE SEDATION (take medicine to help you relax during your exam): You must get the medicine from your doctor before you arrive. Bring the medicine to the exam. Do not take it at home. Arrive one hour early. Bring someone who can take you home after the test. Your medicine will make you sleepy. After the exam, you may not drive, take a bus or take a taxi by yourself. No eating 8 hours before your exam. You may have clear liquids up until 4 hours before your exam. (Clear liquids include water, clear tea, black coffee and fruit juice without pulp.)  IF YOU WILL RECEIVE ANESTHESIA (be asleep for your exam): Arrive 1 1/2 hours early. Bring someone who can take you home after the test. You may not drive, take a bus or take a taxi by yourself. No eating 8 hours before your exam. You may have clear liquids up until 4 hours before your exam. (Clear liquids include water, clear tea, black coffee and fruit juice without pulp.)            Jan 04, 2019 10:00 AM CST   Return Visit with Agueda Manley MD   [facility] ([facility])    [facility]  [address] [facility] [address]  [facility] [place] [address]   [phone]              Further instructions from your care team       Anorectal Surgery Instructions    What can I expect after anorectal surgery?  Most anorectal procedures are done as outpatient surgery, and you go home the same day as the procedure. A few surgical procedures will require that you stay in the hospital for about one to three days. No matter where the procedure is done or how long or short it takes, these recommendations will help you heal and feel more comfortable.    Medicines:  The anal area is very sensitive; you can expect to have some pain for up to 2-4 weeks after the procedure. Your doctor will give you a prescription for one or more pain  medications.    Take naprosyn 500 mg twice a day OR ibuprofen 600 mg four times a day     Take this on a regular basis (not as needed) following your surgery.     The drugs are best taken with food.  Do not take if it causes stomach upset or if you have a history of ulcers or gastritis. You can stop the naprosyn (or ibuprofen) or reduce the dose when you are feeling better.    DO NOT use naprosyn, ibuprofen, or other similar agents (eg. Advil or Aleve) if you have inflammatory bowel disease (Ulcerative Colitis or Crohn's disease) or if your doctor as advised you against using these medications    Take acetominaphen (Tylenol) 650-1000 mg four times a day.     Take this on a regular basis (not as needed) following surgery for pain control.     Take the lower dose if you are >65 years old or have liver disease. The maximum dose of acetominaphen is 4000 mg a day. You can stop the acetaminophen or reduce the dose when you are feeling better.    It is important to realize that many narcotic pain relievers (including vicodin, percocet, tylenol #3) also have acetaminophen, and excessive doses of acetaminophen can be dangerous, so do not take these in addition to acetominaphen.  You may take narcotics that don't contain acetominaphen such as oxycodone.      Take oxycodone AS NEEDED in addition to the acetominaphen and naprosyn.      Because narcotics have side effects (including constipation), you should reduce your use of these medications as tolerated as your pain improves.    *In general, the best strategy is to take (if you are able to tolerate it) the tylenol and naprosen on a regular basis until your pain has largely gone away. You can take the narcotic pain medicine as needed in addition to the tylenol and ibuprofen. As your pain begins to lessen, you should cut back on your narcotic use while continuing to take your regular tylenol and naprosyn doses.      Refilling prescriptions. If you need additional pain  medication, please call the triage nurse at [phone] during normal business hours (8 a.m. to 4 p.m., Monday though Friday) or have your pharmacy fax a refill request to [phone]. If you call after hours or on the weekends, the doctor on call may not know you personally and may not renew narcotic pain medication by phone. Call your primary care provider for all other medication refills.    Perineal care:  External gauze dressing can be removed the morning after surgery. If you have an adhesive dressing stuck to the incision, DO NOT remove this.   Tub baths:    If possible, take a tub bath immediately after each bowel movement.     Baths should be take at least 3 times daily for the first week to 10 days following your procedure. You should soak in the tub for 10 to 15 minutes each time with water as warm as you can tolerate.     Even after you go back to work, it is a good idea to sit in the tub in the morning, after returning from work, and again in the evening before bedtime.    Bleeding/Infection:    You can expect to have some bleeding after bowel movements, but it should stop soon after you wipe.     Use a wet cloth or perianal pad (Tucks or Preparation H pads) to gently wipe the area after each bowel movement.    Do not rub the anal area or use a lot of pressure.    Using a spray bottle filled with warm water helps loosen any remaining stool. Blot gently with a soft dry cloth or tissue paper.    Infection around the anal opening is not very common. The anal area has excellent blood supply, which helps the area to heal. Bloody discharge after bowel movements is normal and may last 2 to 4 weeks after your surgery. However, if you bleed between bowel movements and cannot get it to stop, call the triage nurse immediately [phone].    Bowel function:  Take a fiber supplement such as Metamucil, which is over the counter. It is important to drink six to eight glasses of water or juice everyday when using  fiber products.    If you do not have a bowel movement after 1-2 days:    Take Milk of Magnesia-2 tablespoons.       If there are no results, repeat this or add over the counter Miralax.      If you still do not have results, contact the clinic.     If there are no results, repeat this. Stop taking Milk of Magnesia or other laxatives if you begin to have diarrhea.    * Constipation will cause you to strain when you have a bowel movement. The hard stool will be difficult to pass, will increase pain and bleeding, and will slow down healing.  Try to avoid constipation and/or diarrhea as this can make the pain and bleeding worse.    * It is important to have regular bowel movements at least every other day and to keep your stool soft.  A high fiber diet, including at least four servings of fruits or vegetables daily, will help to keep your bowel movements regular and soft.    Activity:  After your procedure, there are no restrictions on your activity     except restrictions surrounding being on narcotics and in pain, such as no heavy machine operating or driving.     You may walk, climb stairs, ride in a car, and sit as tolerated.     It is helpful to avoid sitting in one position for long periods (2 or more hours).    After some surgeries, you may be told not to perform any lifting (more than 10 pounds) for several weeks after surgery.    When to call:  When do I need to call the doctor or triage nurse?    If you experience any of the problems listed here, call our triage nurse during business hours ([phone]).     The nurse will help you with your problem or have the doctor call you.     After hours and on weekends, please call the main hospital number ([phone]) and ask for the colon and rectal surgery person on call.     Some is available to help you 24 hours a day, seven days a week.    Call for:   ? Fever greater than 101 degrees   ? Chills   ? Foul-smelling drainage   ? Nausea and vomiting   ? Diarrhea -  greater than 3 water stools in 24 hours   ? Constipation - no bowel movement after 3 days   ? Severe bleeding that does not stop soon after a bowel movement   ? Problems with the incision, including increased pain, swelling, or redness    [facility]  Home Care Following Anesthesia  For 24 hours after surgery:  1. Get plenty of rest.  A responsible adult must stay with you for at least 24 hours after you leave the surgery center.  2. Do not drive or use heavy equipment.  If you have weakness or tingling, don't drive or use heavy equipment until this feeling goes away.   3. Do not drink alcohol.   4. Avoid strenuous or risky activities.  Ask for help when climbing stairs.  5. You may feel lightheaded.  IF so, sit for a few minutes before standing.  Have someone help you get up.   6. If you have nausea (feel sick to your stomach): Drink only clear liquids such as apple juice, ginger ale, broth or 7-Up.  Rest may also help.  Be sure to drink enough fluids.  Move to a regular diet as you feel able.   7. You may have a slight fever.  Call the doctor if your fever is over 100 F (37.7 C) (taken under the tongue) or lasts longer than 24 hours.  8. You may have a dry mouth, a sore throat, muscle aches or trouble sleeping. These should go away after 24 hours.  9. Do not make important or legal decisions.        Tips for taking pain medications  To get the best pain relief possible, remember these points:    Take pain medications as directed, before pain becomes severe.    Pain medication can upset your stomach: taking it with food may help.    Constipation is a common side effect of pain medication. Drink plenty of  fluids.    Eat foods high in fiber. Take a stool softener if recommended by your doctor or pharmacist.    Do not drink alcohol, drive or operate machinery while taking pain medications.    Ask about other ways to control pain, such as with heat, ice or  "relaxation.    Tylenol/Acetaminophen Consumption  To help encourage the safe use of acetaminophen, the makers of TYLENOL  have lowered the maximum daily dose for single-ingredient Extra Strength TYLENOL  (acetaminophen) products sold in the [place] from 8 pills per day (4,000 mg) to 6 pills per day (3,000 mg). The dosing interval has also changed from 2 pills every 4-6 hours to 2 pills every 6 hours.    If you feel your pain relief is insufficient, you may take Tylenol/Acetaminophen in addition to your narcotic pain medication.     Be careful not to exceed 3,000 mg of Tylenol/Acetaminophen in a 24 hour period from all sources.    If you are taking extra strength Tylenol/acetaminophen (500 mg), the maximum dose is 6 tablets in 24 hours.    If you are taking regular strength acetaminophen (325 mg), the maximum dose is 9 tablets in 24 hours.    Call a doctor for any of the followin. Signs of infection (fever, growing tenderness at the surgery site, a large amount of drainage or bleeding, severe pain, foul-smelling drainage, redness, swelling).  2. It has been over 8 to 10 hours since surgery and you are still not able to urinate (pass water).  3. Headache for over 24 hours.  Your doctor is:       Dr. Felicitas Radford, Colon Rectal: [phone]               Or dial [phone] and ask for the resident on call for:  Colon Rectal  For emergency care, call the:  [facility]:  [phone] (TTY for hearing impaired: [phone])                  Pending Results     Date and Time Order Name Status Description    2018 0753 Surgical pathology exam In process             Admission Information     Date & Time Provider Department Dept. Phone    2018 Felicitas Radford MD [facility] [phone]      Your Vitals Were     Blood Pressure Pulse Temperature Respirations Height Weight    130/79 78 97.4  F (36.3  C) (Temporal) 16 1.791 m (5' 10.5\") 90.7 kg " (200 lb)    Pulse Oximetry BMI (Body Mass Index)                99% 28.29 kg/m2          [email or web address] Information     [email or web address] gives you secure access to your electronic health record. If you see a primary care provider, you can also send messages to your care team and make appointments. If you have questions, please call your primary care clinic.  If you do not have a primary care provider, please call [phone] and they will assist you.      [email or web address] is an electronic gateway that provides easy, online access to your medical records. With [email or web address], you can request a clinic appointment, read your test results, renew a prescription or communicate with your care team.     To access your existing account, please contact your [facility] or call [phone] for assistance.        Care EveryWhere ID     This is your Care EveryWhere ID. This could be used by other organizations to access your [facility] medical records  [id number]        Equal Access to Services     BLAISE EDDY : João Alatorre, annia cullen, aj tran, jb pedroza . So [facility] [phone].    ATENCIÓN: Si habla español, tiene a mena disposición servicios gratuitos de asistencia lingüística. Llame al [phone].    We comply with applicable federal civil rights laws and [place] laws. We do not discriminate on the basis of race, color, national origin, age, disability, sex, sexual orientation, or gender identity.               Review of your medicines      CONTINUE these medicines which have NOT CHANGED        Dose / Directions    ASPIRIN PO        Take by mouth as needed for moderate pain   Refills:  0       dibucaine 1 % external ointment   Commonly known as:  NUPERCAINAL        3 times daily as needed for moderate pain   Refills:  0       diltiazem 2% in PLO cream (FV COMPOUNDED) 2% Gel   Used for:  Need for hepatitis C screening test        Apply topically 2 times  daily   Refills:  0       ibuprofen 200 MG capsule   Commonly known as:  ADVIL/MOTRIN   Used for:  Acute post-operative pain        Dose:  200-400 mg   Take 200-400 mg by mouth every 6 hours as needed   Quantity:  120 capsule   Refills:  0       lidocaine (Anorectal) 5 % Crea   Used for:  Anal pain        Dose:  1 Application   Externally apply 1 Application topically 3 times daily as needed   Quantity:  1 Tube   Refills:  0       lidocaine 2 % external gel   Commonly known as:  XYLOCAINE   Used for:  Anal fissure        Apply topically 2 times daily as needed for moderate pain   Quantity:  30 mL   Refills:  3       VITAMIN D (CHOLECALCIFEROL) PO        Dose:  2000 Units   Take 2,000 Units by mouth daily   Refills:  0                Protect others around you: Learn how to safely use, store and throw away your medicines at www.disposemymeds.org.             Medication List: This is a list of all your medications and when to take them. Check marks below indicate your daily home schedule. Keep this list as a reference.      Medications           Morning Afternoon Evening Bedtime As Needed    ASPIRIN PO   Take by mouth as needed for moderate pain                                dibucaine 1 % external ointment   Commonly known as:  NUPERCAINAL   3 times daily as needed for moderate pain                                diltiazem 2% in PLO cream (FV COMPOUNDED) 2% Gel   Apply topically 2 times daily                                ibuprofen 200 MG capsule   Commonly known as:  ADVIL/MOTRIN   Take 200-400 mg by mouth every 6 hours as needed                                lidocaine (Anorectal) 5 % Crea   Externally apply 1 Application topically 3 times daily as needed                                lidocaine 2 % external gel   Commonly known as:  XYLOCAINE   Apply topically 2 times daily as needed for moderate pain                                VITAMIN D (CHOLECALCIFEROL) PO   Take 2,000 Units by mouth daily

## 2018-11-30 NOTE — ANESTHESIA CARE TRANSFER NOTE
Patient: Louie Greco    Procedure(s):  Examination Under Anesthesia, application of formalin to rectum, polypectomy  Flexible Sigmoidoscopy    Diagnosis: Malignant Neoplasm of Rectum  Diagnosis Additional Information: No value filed.    Anesthesia Type:   No value filed.     Note:  Airway :Room Air  Patient transferred to:Phase II  Comments: Pt to Phase 2 recovery.   Awake, drowsy,  Oriented / verbally responding appropriately.   VSS  C/o minor discomfort at surgical site.          Vitals: (Last set prior to Anesthesia Care Transfer)    CRNA VITALS  11/30/2018 0732 - 11/30/2018 0813      11/30/2018             NIBP: 113/76    Pulse: 71    NIBP Mean: 87    SpO2: (!)  88 %    Resp Rate (observed): (!)  2    Resp Rate (set): 10                Electronically Signed By: QUINTON Bell CRNA  November 30, 2018  8:13 AM

## 2018-11-30 NOTE — ANESTHESIA POSTPROCEDURE EVALUATION
Anesthesia POST Procedure Evaluation    Patient: Louie Greco   MRN:     [id number] Gender:   male   Age:    58 year old :      1960        Preoperative Diagnosis: Malignant Neoplasm of Rectum   Procedure(s):  Examination Under Anesthesia, application of formalin to rectum, polypectomy  Flexible Sigmoidoscopy   Postop Comments: No value filed.       Anesthesia Type:  MAC    Reportable Event: NO     PAIN: Uncomplicated   Sign Out status: Comfortable, Well controlled pain     PONV: No PONV   Sign Out status:  No Nausea or Vomiting     Neuro/Psych: Uneventful perioperative course   Sign Out Status: Preoperative baseline; Age appropriate mentation     Airway/Resp.: Uneventful perioperative course   Sign Out Status: Non labored breathing, age appropriate RR; Resp. Status within EXPECTED Parameters     CV: Uneventful perioperative course   Sign Out status: Appropriate BP and perfusion indices; Appropriate HR/Rhythm     Disposition:   Sign Out in:  PACU  Disposition:  Phase II; Home  Recovery Course: Uneventful  Follow-Up: Not required           Last Anesthesia Record Vitals:  CRNA VITALS  2018 0732 - 2018 0832      2018             NIBP: 113/76    Pulse: 71    NIBP Mean: 87    SpO2: (!)  88 %    Resp Rate (observed): (!)  2    Resp Rate (set): 10          Last PACU/Preop Vitals:  Vitals:    18 0600 18 0806 18 0826   BP: 111/81 107/72 130/79   Pulse: 78     Resp: 16 16 16   Temp: 36.6  C (97.9  F) 36.4  C (97.5  F) 36.3  C (97.4  F)   SpO2: 95% 94% 99%         Electronically Signed By: Kerri White MD, 2018, 11:33 AM

## 2018-11-30 NOTE — PROGRESS NOTES
SUBJECTIVE:   Louie Greco is a 58 year old male who presents to clinic today for the following health issues:      Ear problem/wax      1 day history of severe plugging in right ear.  Moderate itching that preceded the plugging for several weeks.  Intermittent plugging in left ear are present as well without pain.  No fevers or chills.  No new nasal congestion or rhinorrhea.      Problem list and histories reviewed & adjusted, as indicated.  Additional history: as documented    Patient Active Problem List   Diagnosis     Rectal bleeding     Pes anserinus tendinitis     Plantar fasciitis     Malignant neoplasm of rectum (H)     Advance Care Planning     Chemotherapy-induced neutropenia (H)     Appendicitis     Elevated prostate specific antigen (PSA)     Kidney stone     Past Surgical History:   Procedure Laterality Date     COLONOSCOPY N/A 7/27/2016    Procedure: COMBINED COLONOSCOPY, SINGLE OR MULTIPLE BIOPSY/POLYPECTOMY BY BIOPSY;  Surgeon: Chelsea Thompson MD;  Location:  GI     COLONOSCOPY N/A 9/13/2017    Procedure: COLONOSCOPY;;  Surgeon: Felicitas Radford MD;  [facility]     COLONOSCOPY N/A 12/13/2017    Procedure: COLONOSCOPY;;  Surgeon: Felicitas Radford MD;  [facility]     COMBINED CYSTOSCOPY, RETROGRADES, URETEROSCOPY, LASER HOLMIUM LITHOTRIPSY URETER(S), INSERT STENT Left 2/16/2018    Procedure: COMBINED CYSTOSCOPY, RETROGRADES, URETEROSCOPY, LASER HOLMIUM LITHOTRIPSY URETER(S), INSERT STENT;  Cysto, left ureteroscopy, holmium laser, retrogrades, stent placement;  Surgeon: Bola Worthy MD;  Location:  OR     EXAM UNDER ANESTHESIA ANUS N/A 7/5/2017    Procedure: EXAM UNDER ANESTHESIA ANUS;  Examination Under Anesthesia, flex sigmoidoscopy with biopsies and formalin application;  Surgeon: Felicitas Radford MD;  Location:  OR     EXAM UNDER ANESTHESIA ANUS N/A 9/13/2017    Procedure: EXAM UNDER ANESTHESIA ANUS;  Examination Under Anesthesia  Anus, Colonoscopy, application of formalin;  Surgeon: Felicitas Radford MD;  [facility]     EXAM UNDER ANESTHESIA ANUS N/A 12/13/2017    Procedure: EXAM UNDER ANESTHESIA ANUS;  Examination Under Anesthesia Anus, Colonoscopy;  Surgeon: Felicitas Radford MD;  [facility]     EXAM UNDER ANESTHESIA ANUS N/A 5/11/2018    Procedure: EXAM UNDER ANESTHESIA ANUS;  Examination Under Anesthesia Anus, Interoperative Flexible Sigmoidoscopy, Application of Formalin;  Surgeon: Felicitas Radford MD;  [facility]     EXAM UNDER ANESTHESIA ANUS N/A 7/20/2018    Procedure: EXAM UNDER ANESTHESIA ANUS;  Examination Under Anesthesia Anus, Flexible Sigmoidoscopy ;  Surgeon: Felicitas Radford MD;  [facility]     HC TOOTH EXTRACTION W/FORCEP       LAPAROSCOPIC APPENDECTOMY N/A 7/17/2017    Procedure: LAPAROSCOPIC APPENDECTOMY;  LAPAROSCOPIC APPENDECTOMY;  Surgeon: Bryson Ferguson MD;  Location: [facility]     SIGMOIDOSCOPY FLEXIBLE N/A 12/8/2016    Procedure: SIGMOIDOSCOPY FLEXIBLE;  Surgeon: Felicitas Radford MD;  [facility]     SIGMOIDOSCOPY FLEXIBLE N/A 7/5/2017    Procedure: SIGMOIDOSCOPY FLEXIBLE;;  Surgeon: Felicitas Radford MD;  [facility]     SIGMOIDOSCOPY FLEXIBLE N/A 5/11/2018    Procedure: SIGMOIDOSCOPY FLEXIBLE;;  Surgeon: Felicitas Radford MD;  [facility]     SIGMOIDOSCOPY FLEXIBLE N/A 7/20/2018    Procedure: SIGMOIDOSCOPY FLEXIBLE;;  Surgeon: Felicitas Radford MD;  [facility]     TESTICLE SURGERY       VASCULAR SURGERY      Right chest port     VASECTOMY       VASECTOMY         Social History   Substance Use Topics     Smoking status: Never Smoker     Smokeless tobacco: Never Used     Alcohol use 0.0 oz/week      Comment: 1 drink a week     Family History   Problem Relation Age of Onset     Cancer Brother      primary of unknown origin     Other Cancer Brother      Chronic Obstructive Pulmonary Disease Father       "Hypertension Father      Hyperlipidemia Father      Colon Polyps Mother      Number and type of polyps unknown     Family History Negative Brother      negative colonoscopy     Diabetes Maternal Grandfather      Hypertension Paternal Grandmother      Hyperlipidemia Paternal Grandmother      Stomach Cancer Other 63     maternal great grandfather     Glaucoma No family hx of      Macular Degeneration No family hx of          Current Outpatient Prescriptions   Medication Sig Dispense Refill     ASPIRIN PO Take by mouth as needed for moderate pain       dibucaine (NUPERCAINAL) 1 % OINT ointment 3 times daily as needed for moderate pain       diltiazem 2% in PLO cream, FV COMPOUNDED, 2% GEL Apply topically 2 times daily       ibuprofen 200 MG capsule Take 200-400 mg by mouth every 6 hours as needed 120 capsule 0     lidocaine (XYLOCAINE) 2 % topical gel Apply topically 2 times daily as needed for moderate pain 30 mL 3     lidocaine, Anorectal, 5 % CREA Externally apply 1 Application topically 3 times daily as needed 1 Tube 0     neomycin-polymyxin-hydrocortisone (CORTISPORIN) 3.5-51064-9 otic suspension Place 4 drops into both ears 4 times daily 10 mL 0     VITAMIN D, CHOLECALCIFEROL, PO Take 2,000 Units by mouth daily        Allergies   Allergen Reactions     Ampicillin Diarrhea     Demerol [Meperidine]      Nausea        Reviewed and updated as needed this visit by clinical staff  Allergies  Meds       Reviewed and updated as needed this visit by Provider         ROS:      OBJECTIVE:     /80 (BP Location: Right arm, Patient Position: Sitting, Cuff Size: Adult Regular)  Pulse 103  Temp 98.2  F (36.8  C) (Oral)  Ht 5' 10.5\" (1.791 m)  Wt 208 lb (94.3 kg)  SpO2 95%  BMI 29.42 kg/m2  Body mass index is 29.42 kg/(m^2).  General: This is a well-appearing young man in no acute distress.  HEENT: There is a thick plug of cerumen or exudates in the right canal that occluded the canal, after lavage, the right " tympanic membrane appeared intact, there was erythema and exudate on the lining of the right external auditory canal.  There was a nonocclusive cerumen plug in the left external auditory canal that was removed using a curette by Dr. Healy.  Following cerumen removal, the left tympanic membrane was intact and normal.    Diagnostic Test Results:  none     ASSESSMENT/PLAN:         ICD-10-CM    1. Infective otitis externa, bilateral H60.393 neomycin-polymyxin-hydrocortisone (CORTISPORIN) 3.5-44637-8 otic suspension   2. Bilateral impacted cerumen H61.23 REMOVE IMPACTED CERUMEN     This patient has otitis externa in the right external auditory canal, he is at risk for external otitis in the left external canal due to removal of a large cerumen plug, his tympanic membranes are intact, he should use Cortisporin otic as above to treat the external otitis.          Philippe Healy MD  [employer]

## 2018-11-30 NOTE — DISCHARGE INSTRUCTIONS
Anorectal Surgery Instructions    What can I expect after anorectal surgery?  Most anorectal procedures are done as outpatient surgery, and you go home the same day as the procedure. A few surgical procedures will require that you stay in the hospital for about one to three days. No matter where the procedure is done or how long or short it takes, these recommendations will help you heal and feel more comfortable.    Medicines:  The anal area is very sensitive; you can expect to have some pain for up to 2-4 weeks after the procedure. Your doctor will give you a prescription for one or more pain medications.    Take naprosyn 500 mg twice a day OR ibuprofen 600 mg four times a day     Take this on a regular basis (not as needed) following your surgery.     The drugs are best taken with food.  Do not take if it causes stomach upset or if you have a history of ulcers or gastritis. You can stop the naprosyn (or ibuprofen) or reduce the dose when you are feeling better.    DO NOT use naprosyn, ibuprofen, or other similar agents (eg. Advil or Aleve) if you have inflammatory bowel disease (Ulcerative Colitis or Crohn's disease) or if your doctor as advised you against using these medications    Take acetominaphen (Tylenol) 650-1000 mg four times a day.     Take this on a regular basis (not as needed) following surgery for pain control.     Take the lower dose if you are >65 years old or have liver disease. The maximum dose of acetominaphen is 4000 mg a day. You can stop the acetaminophen or reduce the dose when you are feeling better.    It is important to realize that many narcotic pain relievers (including vicodin, percocet, tylenol #3) also have acetaminophen, and excessive doses of acetaminophen can be dangerous, so do not take these in addition to acetominaphen.  You may take narcotics that don't contain acetominaphen such as oxycodone.      Take oxycodone AS NEEDED in addition to the acetominaphen and naprosyn.       Because narcotics have side effects (including constipation), you should reduce your use of these medications as tolerated as your pain improves.    *In general, the best strategy is to take (if you are able to tolerate it) the tylenol and naprosen on a regular basis until your pain has largely gone away. You can take the narcotic pain medicine as needed in addition to the tylenol and ibuprofen. As your pain begins to lessen, you should cut back on your narcotic use while continuing to take your regular tylenol and naprosyn doses.      Refilling prescriptions. If you need additional pain medication, please call the triage nurse at [phone] during normal business hours (8 a.m. to 4 p.m., Monday though Friday) or have your pharmacy fax a refill request to [phone]. If you call after hours or on the weekends, the doctor on call may not know you personally and may not renew narcotic pain medication by phone. Call your primary care provider for all other medication refills.    Perineal care:  External gauze dressing can be removed the morning after surgery. If you have an adhesive dressing stuck to the incision, DO NOT remove this.   Tub baths:    If possible, take a tub bath immediately after each bowel movement.     Baths should be take at least 3 times daily for the first week to 10 days following your procedure. You should soak in the tub for 10 to 15 minutes each time with water as warm as you can tolerate.     Even after you go back to work, it is a good idea to sit in the tub in the morning, after returning from work, and again in the evening before bedtime.    Bleeding/Infection:    You can expect to have some bleeding after bowel movements, but it should stop soon after you wipe.     Use a wet cloth or perianal pad (Tucks or Preparation H pads) to gently wipe the area after each bowel movement.    Do not rub the anal area or use a lot of pressure.    Using a spray bottle filled with warm water helps  loosen any remaining stool. Blot gently with a soft dry cloth or tissue paper.    Infection around the anal opening is not very common. The anal area has excellent blood supply, which helps the area to heal. Bloody discharge after bowel movements is normal and may last 2 to 4 weeks after your surgery. However, if you bleed between bowel movements and cannot get it to stop, call the triage nurse immediately [phone].    Bowel function:  Take a fiber supplement such as Metamucil, which is over the counter. It is important to drink six to eight glasses of water or juice everyday when using fiber products.    If you do not have a bowel movement after 1-2 days:    Take Milk of Magnesia-2 tablespoons.       If there are no results, repeat this or add over the counter Miralax.      If you still do not have results, contact the clinic.     If there are no results, repeat this. Stop taking Milk of Magnesia or other laxatives if you begin to have diarrhea.    * Constipation will cause you to strain when you have a bowel movement. The hard stool will be difficult to pass, will increase pain and bleeding, and will slow down healing.  Try to avoid constipation and/or diarrhea as this can make the pain and bleeding worse.    * It is important to have regular bowel movements at least every other day and to keep your stool soft.  A high fiber diet, including at least four servings of fruits or vegetables daily, will help to keep your bowel movements regular and soft.    Activity:  After your procedure, there are no restrictions on your activity     except restrictions surrounding being on narcotics and in pain, such as no heavy machine operating or driving.     You may walk, climb stairs, ride in a car, and sit as tolerated.     It is helpful to avoid sitting in one position for long periods (2 or more hours).    After some surgeries, you may be told not to perform any lifting (more than 10 pounds) for several weeks after  surgery.    When to call:  When do I need to call the doctor or triage nurse?    If you experience any of the problems listed here, call our triage nurse during business hours ([phone]).     The nurse will help you with your problem or have the doctor call you.     After hours and on weekends, please call the main hospital number ([phone]) and ask for the colon and rectal surgery person on call.     Some is available to help you 24 hours a day, seven days a week.    Call for:   ? Fever greater than 101 degrees   ? Chills   ? Foul-smelling drainage   ? Nausea and vomiting   ? Diarrhea - greater than 3 water stools in 24 hours   ? Constipation - no bowel movement after 3 days   ? Severe bleeding that does not stop soon after a bowel movement   ? Problems with the incision, including increased pain, swelling, or redness    [facility]  Home Care Following Anesthesia  For 24 hours after surgery:  1. Get plenty of rest.  A responsible adult must stay with you for at least 24 hours after you leave the surgery center.  2. Do not drive or use heavy equipment.  If you have weakness or tingling, don't drive or use heavy equipment until this feeling goes away.   3. Do not drink alcohol.   4. Avoid strenuous or risky activities.  Ask for help when climbing stairs.  5. You may feel lightheaded.  IF so, sit for a few minutes before standing.  Have someone help you get up.   6. If you have nausea (feel sick to your stomach): Drink only clear liquids such as apple juice, ginger ale, broth or 7-Up.  Rest may also help.  Be sure to drink enough fluids.  Move to a regular diet as you feel able.   7. You may have a slight fever.  Call the doctor if your fever is over 100 F (37.7 C) (taken under the tongue) or lasts longer than 24 hours.  8. You may have a dry mouth, a sore throat, muscle aches or trouble sleeping. These should go away after 24 hours.  9. Do not make important or legal  decisions.        Tips for taking pain medications  To get the best pain relief possible, remember these points:    Take pain medications as directed, before pain becomes severe.    Pain medication can upset your stomach: taking it with food may help.    Constipation is a common side effect of pain medication. Drink plenty of  fluids.    Eat foods high in fiber. Take a stool softener if recommended by your doctor or pharmacist.    Do not drink alcohol, drive or operate machinery while taking pain medications.    Ask about other ways to control pain, such as with heat, ice or relaxation.    Tylenol/Acetaminophen Consumption  To help encourage the safe use of acetaminophen, the makers of TYLENOL  have lowered the maximum daily dose for single-ingredient Extra Strength TYLENOL  (acetaminophen) products sold in the [place] from 8 pills per day (4,000 mg) to 6 pills per day (3,000 mg). The dosing interval has also changed from 2 pills every 4-6 hours to 2 pills every 6 hours.    If you feel your pain relief is insufficient, you may take Tylenol/Acetaminophen in addition to your narcotic pain medication.     Be careful not to exceed 3,000 mg of Tylenol/Acetaminophen in a 24 hour period from all sources.    If you are taking extra strength Tylenol/acetaminophen (500 mg), the maximum dose is 6 tablets in 24 hours.    If you are taking regular strength acetaminophen (325 mg), the maximum dose is 9 tablets in 24 hours.    Call a doctor for any of the followin. Signs of infection (fever, growing tenderness at the surgery site, a large amount of drainage or bleeding, severe pain, foul-smelling drainage, redness, swelling).  2. It has been over 8 to 10 hours since surgery and you are still not able to urinate (pass water).  3. Headache for over 24 hours.  Your doctor is:       Dr. Felicitas Radford, Colon Rectal: [phone]               Or dial [phone] and ask for the resident on call for:  Colon Rectal  For  emergency care, call the:  [facility]:  [phone] (TTY for hearing impaired: [phone])

## 2018-11-30 NOTE — MR AVS SNAPSHOT
After Visit Summary   11/30/2018    Louie Greco    MRN: [id number]           Patient Information     Date Of Birth          1960        Visit Information        Provider Department      11/30/2018 4:45 PM Philippe Healy MD [facility]        Today's Diagnoses     Infective otitis externa, bilateral    -  1    Bilateral impacted cerumen           Follow-ups after your visit        Your next 10 appointments already scheduled     Dec 28, 2018  7:00 AM CST   MR PELVIS (INTRAPELVIC ORGANS) WO&W CONTRAST with [id number]   [facility] [place] [facility] ([facility] [place])    [address] [place] [address] [phone]           How do I prepare for my exam? (Food and drink instructions) **If you will be receiving sedation or general anesthesia, please see special notes below.**  How do I prepare for my exam? (Other instructions) Take your medicines as usual, unless your doctor tells you not to. You may or may not receive intravenous (IV) contrast for this exam pending the discretion of the Radiologist.  You do not need to do anything special to prepare.  **If you will be receiving sedation or general anesthesia, please see special notes below.**  What should I wear: The MRI machine uses a strong magnet. Please wear clothes without metal (snaps, zippers). A sweatsuit works well, or we may give you a hospital gown. Please remove any body piercings and hair extensions before you arrive. You will also remove watches, jewelry, hairpins, wallets, dentures, partial dental plates and hearing aids. You may wear contact lenses, and you may be able to wear your rings. We have a safe place to keep your personal items, but it is safer to leave them at home.  How long does the exam take: Most tests take 30 to 60 minutes.  HOWEVER, IF YOUR DOCTOR PRESCRIBES ANESTHESIA please plan on spending four to five hours in the recovery room.  What should I bring:  Bring a  list of your current medicines to your exam (including vitamins, minerals and over-the-counter drugs).  Do I need a :  **If you will be receiving sedation or general anesthesia, please see special notes below.**  What should I do after the exam: No Restrictions, You may resume normal activities.  What is this test: MRI (magnetic resonance imaging) uses a strong magnet and radio waves to look inside the body. An MRA (magnetic resonance angiogram) does the same thing, but it lets us look at your blood vessels. A computer turns the radio waves into pictures showing cross sections of the body, much like slices of bread. This helps us see any problems more clearly. You may receive fluid (called  contrast ) before or during your scan. The fluid helps us see the pictures better. We give the fluid through an IV (small needle in your arm).  Who should I call with questions:  Please call the Imaging Department at your exam site with any questions. Directions, parking instructions, and other information is available on our website, [email or web address].  How do I prepare if I m having sedation or anesthesia? **IMPORTANT** THE INSTRUCTIONS BELOW ARE ONLY FOR THOSE PATIENTS WHO HAVE BEEN TOLD THEY WILL RECEIVE SEDATION OR GENERAL ANESTHESIA DURING THEIR MRI PROCEDURE:  IF YOU WILL RECEIVE SEDATION (take medicine to help you relax during your exam): You must get the medicine from your doctor before you arrive. Bring the medicine to the exam. Do not take it at home. Arrive one hour early. Bring someone who can take you home after the test. Your medicine will make you sleepy. After the exam, you may not drive, take a bus or take a taxi by yourself. No eating 8 hours before your exam. You may have clear liquids up until 4 hours before your exam. (Clear liquids include water, clear tea, black coffee and fruit juice without pulp.)  IF YOU WILL RECEIVE ANESTHESIA (be asleep for your exam): Arrive 1 1/2 hours early. Bring someone  "who can take you home after the test. You may not drive, take a bus or take a taxi by yourself. No eating 8 hours before your exam. You may have clear liquids up until 4 hours before your exam. (Clear liquids include water, clear tea, black coffee and fruit juice without pulp.)            Jan 04, 2019 10:00 AM CST   Return Visit with Agueda Manley MD   [facility] ([facility])    [facility]  [address]  [facility] [address] [phone]              Who to contact     If you have questions or need follow up information about today's clinic visit or your schedule please contact [facility] directly at [phone].  Normal or non-critical lab and imaging results will be communicated to you by MyChart, letter or phone within 4 business days after the clinic has received the results. If you do not hear from us within 7 days, please contact the clinic through [employer] or phone. If you have a critical or abnormal lab result, we will notify you by phone as soon as possible.  Submit refill requests through [employer] or call your pharmacy and they will forward the refill request to us. Please allow 3 business days for your refill to be completed.          Additional Information About Your Visit        [employer] Information     [employer] gives you secure access to your electronic health record. If you see a primary care provider, you can also send messages to your care team and make appointments. If you have questions, please call your primary care clinic.  If you do not have a primary care provider, please call [phone] and they will assist you.        Care EveryWhere ID     This is your Care EveryWhere ID. This could be used by other organizations to access your [place] medical records  [id number]        Your Vitals Were     Pulse Temperature Height Pulse Oximetry BMI (Body Mass Index)       103 98.2  F (36.8  C) (Oral) 5' 10.5\" (1.791 " m) 95% 29.42 kg/m2        Blood Pressure from Last 3 Encounters:   11/30/18 134/80   11/30/18 130/79   11/15/18 126/72    Weight from Last 3 Encounters:   11/30/18 208 lb (94.3 kg)   11/30/18 200 lb (90.7 kg)   11/15/18 208 lb (94.3 kg)              We Performed the Following     REMOVE IMPACTED CERUMEN          Today's Medication Changes          These changes are accurate as of 11/30/18  6:29 PM.  If you have any questions, ask your nurse or doctor.               Start taking these medicines.        Dose/Directions    neomycin-polymyxin-hydrocortisone 3.5-72582-6 otic suspension   Commonly known as:  CORTISPORIN   Used for:  Infective otitis externa, bilateral   Started by:  Philippe Healy MD        Dose:  4 drop   Place 4 drops into both ears 4 times daily   Quantity:  10 mL   Refills:  0            Where to get your medicines      These medications were sent to [place], [place] - [address], [address]  [address], [address], [facility] [address]     Phone:  [phone]     neomycin-polymyxin-hydrocortisone 3.5-80715-3 otic suspension                Primary Care Provider Office Phone # Fax #    Philippe Healy [phone] [address]  [facility] [address]        Equal Access to Services     [facility] : Hadii aad ku hadasho Soomaali, waaxda luqadaha, qaybta kaalmada adeegyada, jb pedroza . So [facility] [phone].    ATENCIÓN: Si habla español, tiene a mena disposición servicios gratuitos de asistencia lingüística. Keaton al [phone].    We comply with applicable federal civil rights laws and [place] laws. We do not discriminate on the basis of race, color, national origin, age, disability, sex, sexual orientation, or gender identity.            Thank you!     Thank you for choosing [facility]  for your care. Our goal is always to provide you with excellent care. Hearing back from our patients is  one way we can continue to improve our services. Please take a few minutes to complete the written survey that you may receive in the mail after your visit with us. Thank you!             Your Updated Medication List - Protect others around you: Learn how to safely use, store and throw away your medicines at www.disposemymeds.org.          This list is accurate as of 11/30/18  6:29 PM.  Always use your most recent med list.                   Brand Name Dispense Instructions for use Diagnosis    ASPIRIN PO      Take by mouth as needed for moderate pain        dibucaine 1 % external ointment    NUPERCAINAL     3 times daily as needed for moderate pain        diltiazem 2% in PLO cream (FV COMPOUNDED) 2% Gel      Apply topically 2 times daily    Need for hepatitis C screening test       ibuprofen 200 MG capsule    ADVIL/MOTRIN    120 capsule    Take 200-400 mg by mouth every 6 hours as needed    Acute post-operative pain       lidocaine (Anorectal) 5 % Crea     1 Tube    Externally apply 1 Application topically 3 times daily as needed    Anal pain       lidocaine 2 % external gel    XYLOCAINE    30 mL    Apply topically 2 times daily as needed for moderate pain    Anal fissure       neomycin-polymyxin-hydrocortisone 3.5-37577-6 otic suspension    CORTISPORIN    10 mL    Place 4 drops into both ears 4 times daily    Infective otitis externa, bilateral       VITAMIN D (CHOLECALCIFEROL) PO      Take 2,000 Units by mouth daily

## 2018-11-30 NOTE — ANESTHESIA PREPROCEDURE EVALUATION
Anesthesia Pre-Procedure Evaluation    Patient: Louie Greco   MRN:     [id number] Gender:   male   Age:    58 year old :      1960        Preoperative Diagnosis: Malignant Neoplasm of Rectum   Procedure(s):  Examination Under Anesthesia, Possible Biopsies  Flexible Sigmoidoscopy     Past Medical History:   Diagnosis Date     Childhood asthma      Epididymitis, bilateral     18 years old     Inguinal hernia      Mumps      Nephrolithiasis          Rectal cancer (H)     low rectal cancer     Shingles       Past Surgical History:   Procedure Laterality Date     COLONOSCOPY N/A 2016    Procedure: COMBINED COLONOSCOPY, SINGLE OR MULTIPLE BIOPSY/POLYPECTOMY BY BIOPSY;  Surgeon: Chelsea Thompson MD;  Location: [facility]     COLONOSCOPY N/A 2017    Procedure: COLONOSCOPY;;  Surgeon: Felicitas Radford MD;  [facility]     COLONOSCOPY N/A 2017    Procedure: COLONOSCOPY;;  Surgeon: Felicitas Radford MD;  [facility]     COMBINED CYSTOSCOPY, RETROGRADES, URETEROSCOPY, LASER HOLMIUM LITHOTRIPSY URETER(S), INSERT STENT Left 2018    Procedure: COMBINED CYSTOSCOPY, RETROGRADES, URETEROSCOPY, LASER HOLMIUM LITHOTRIPSY URETER(S), INSERT STENT;  Cysto, left ureteroscopy, holmium laser, retrogrades, stent placement;  Surgeon: Bola Worthy MD;  Location: [facility]     EXAM UNDER ANESTHESIA ANUS N/A 2017    Procedure: EXAM UNDER ANESTHESIA ANUS;  Examination Under Anesthesia, flex sigmoidoscopy with biopsies and formalin application;  Surgeon: Felicitas Radford MD;  [facility]     EXAM UNDER ANESTHESIA ANUS N/A 2017    Procedure: EXAM UNDER ANESTHESIA ANUS;  Examination Under Anesthesia Anus, Colonoscopy, application of formalin;  Surgeon: Felicitas Radford MD;  [facility]     EXAM UNDER ANESTHESIA ANUS N/A 2017    Procedure: EXAM UNDER ANESTHESIA ANUS;  Examination Under Anesthesia Anus, Colonoscopy;  Surgeon: Malina  Felicitas HIDALGO MD;  [facility]     EXAM UNDER ANESTHESIA ANUS N/A 5/11/2018    Procedure: EXAM UNDER ANESTHESIA ANUS;  Examination Under Anesthesia Anus, Interoperative Flexible Sigmoidoscopy, Application of Formalin;  Surgeon: Felicitas Radford MD;  [facility]     EXAM UNDER ANESTHESIA ANUS N/A 7/20/2018    Procedure: EXAM UNDER ANESTHESIA ANUS;  Examination Under Anesthesia Anus, Flexible Sigmoidoscopy ;  Surgeon: Felicitas Radford MD;  [facility]     HC TOOTH EXTRACTION W/FORCEP       LAPAROSCOPIC APPENDECTOMY N/A 7/17/2017    Procedure: LAPAROSCOPIC APPENDECTOMY;  LAPAROSCOPIC APPENDECTOMY;  Surgeon: Bryson Ferguson MD;  [facility]     SIGMOIDOSCOPY FLEXIBLE N/A 12/8/2016    Procedure: SIGMOIDOSCOPY FLEXIBLE;  Surgeon: Felicitas Radford MD;  [facility]     SIGMOIDOSCOPY FLEXIBLE N/A 7/5/2017    Procedure: SIGMOIDOSCOPY FLEXIBLE;;  Surgeon: Felicitas Radford MD;  [facility]     SIGMOIDOSCOPY FLEXIBLE N/A 5/11/2018    Procedure: SIGMOIDOSCOPY FLEXIBLE;;  Surgeon: Felicitas Radford MD;  [facility]     SIGMOIDOSCOPY FLEXIBLE N/A 7/20/2018    Procedure: SIGMOIDOSCOPY FLEXIBLE;;  Surgeon: Felicitas Radford MD;  [facility]     TESTICLE SURGERY       VASCULAR SURGERY      Right chest port     VASECTOMY       VASECTOMY                 JZG F[facility] AN PHYSICAL EXAM    Lab Results   Component Value Date    WBC 4.1 07/20/2018    HGB 15.0 07/20/2018    HCT 45.5 07/20/2018     07/20/2018    .0 (H) 07/17/2017    SED 34 (H) 07/17/2017     07/20/2018    POTASSIUM 4.1 07/20/2018    CHLORIDE 104 07/20/2018    CO2 27 07/20/2018    BUN 15 07/20/2018    CR 0.83 07/20/2018    GLC 94 07/20/2018    FRANDY 9.0 07/20/2018    MAG 2.0 07/17/2017    ALBUMIN 4.0 07/20/2018    PROTTOTAL 7.1 07/20/2018    ALT 38 07/20/2018    AST 24 07/20/2018    ALKPHOS 79 07/20/2018    BILITOTAL 1.4 (H) 07/20/2018    LIPASE 192 02/14/2018    PTT 31  "07/10/2017    INR 1.02 07/10/2017       Preop Vitals  BP Readings from Last 3 Encounters:   11/15/18 126/72   07/31/18 126/74   07/20/18 119/71    Pulse Readings from Last 3 Encounters:   11/15/18 81   07/31/18 74   07/19/18 85      Resp Readings from Last 3 Encounters:   07/31/18 16   07/20/18 16   05/11/18 18    SpO2 Readings from Last 3 Encounters:   11/15/18 96%   07/31/18 96%   07/20/18 97%      Temp Readings from Last 1 Encounters:   11/15/18 36.7  C (98.1  F) (Oral)    Ht Readings from Last 1 Encounters:   11/15/18 1.791 m (5' 10.5\")      Wt Readings from Last 1 Encounters:   11/15/18 94.3 kg (208 lb)    Estimated body mass index is 29.42 kg/(m^2) as calculated from the following:    Height as of 11/15/18: 1.791 m (5' 10.5\").    Weight as of 11/15/18: 94.3 kg (208 lb).     LDA:  Peripheral IV 02/16/18 (Active)   Number of days:286       Peripheral IV 07/20/18 Right Hand (Active)   Number of days:132       Ureteral Drain/Stent Left ureter 6 fr (Active)   Number of days:286            Assessment:   ASA SCORE: 2    NPO Status: > 6 hours since completed [employer]   Documentation: H&P complete; Preop Testing complete; Consents complete   Proceeding: Proceed without further delay  Tobacco Use:  NO Active use of Tobacco/UNKNOWN Tobacco use status     Plan:   Anes. Type:  MAC      Induction:  IV (Standard)   Airway: Native Airway   Access/Monitoring: PIV   Maintenance: Propofol; IV   Emergence: Procedure Site   Logistics: Same Day Surgery     Postop Pain/Sedation Strategy:  Standard-Options: Opioids PRN     PONV Management:  Adult Risk Factors:, Non-Smoker, Postop Opioids  Prevention: Propofol Infusion; Ondansetron     CONSENT: Direct conversation   Plan and risks discussed with: Patient   Blood Products: Consented (ALL Blood Products)                         Kerri White MD  "

## 2018-12-03 LAB — COPATH REPORT: NORMAL

## 2018-12-14 NOTE — OP NOTE
SURGEON: Felicitas Radford MD     ASSISTANT: Abrahan Salgado MD Surgery Resident    PREOPERATIVE DIAGNOSIS: Rectal cancer with watch and wait protocol. Need for surveillance. Radiation proctitis.      POSTOPERATIVE DIAGNOSIS: Rectal cancer with watch and wait protocol. Need for surveillance. Radiation proctitis.      PROCEDURE: Examination under anesthesia, flexible sigmoidoscopy, polypectomy in rectum, application of formalin to rectum.      INDICATIONS: Louie Greco is a 58 year old male who was found to have a rectal cancer and had a complete response to chemoradiotherapy. He is now on a watch and wait protocol and we are examining the area under anesthesia because of the friability and pain in the area. He also has some radiation proctitis that has responded to formalin in the past.  He does continue to have some urgency with bowel movements and frequency with his bowel habits.  The risks and benefits of surgery were thoroughly discussed with the patient and he agreed to proceed.      DESCRIPTION OF PROCEDURE: The patient was brought to the operating room, placed in left lateral decubitus. Deep sedation was induced with intravenous medicines with deep sedation and monitored anesthesia care. We prepped and draped the area in the usual fashion. We performed digital rectal examination and anoscopy which demonstrated a moderately stenotic anal canal and an area of distal anterior scarring and some minimal radiation changes. There were no palpable abnormalities and the prostate by palpation  was normal. There was no nodularity or induration. A flexible sigmoidoscopy with CO2 was then performed and the scope was advanced to the descending colon without difficulty. There was diverticulosis and no signs of divertciulitis. There was a mid rectal polyp that was flat and likely hyperplastic that was removed with a biopsy forceps. There were no other polyps or other lesions. On retroflexion, there were some moderate  radiation changes and the scar was visible, but no signs of disease or additional lesions.  The radiation changes treated with an application of 10% formalin using large swabs with several applications. At the end the case, all instruments and sponge counts were correct x2. The patient was emerged from anesthesia and taken to postoperative anesthesia care unit in good condition.       SPECIMEN: Rectal polyp.      ESTIMATED BLOOD LOSS: Minimal.       URINE OUTPUT: Not measured.       AROLDO NAIK MD   Colon and Rectal Surgery Staff  [facility]

## 2018-12-28 ENCOUNTER — ANCILLARY PROCEDURE (OUTPATIENT)
Dept: MRI IMAGING | Facility: CLINIC | Age: 58
End: 2018-12-28
Attending: INTERNAL MEDICINE
Payer: COMMERCIAL

## 2018-12-28 DIAGNOSIS — C20 MALIGNANT NEOPLASM OF RECTUM (H): ICD-10-CM

## 2018-12-28 RX ORDER — GADOBUTROL 604.72 MG/ML
10 INJECTION INTRAVENOUS ONCE
Status: COMPLETED | OUTPATIENT
Start: 2018-12-28 | End: 2018-12-28

## 2018-12-28 RX ADMIN — GADOBUTROL 9.5 ML: 604.72 INJECTION INTRAVENOUS at 07:12

## 2018-12-28 NOTE — DISCHARGE INSTRUCTIONS
MRI Contrast Discharge Instructions    The IV contrast you received today will pass out of your body in your  urine. This will happen in the next 24 hours. You will not feel this process.  Your urine will not change color.    Drink at least 4 extra glasses of water or juice today (unless your doctor  has restricted your fluids). This reduces the stress on your kidneys.  You may take your regular medicines.    If you are on dialysis: It is best to have dialysis today.    If you have a reaction: Most reactions happen right away. If you have  any new symptoms after leaving the hospital (such as hives or swelling),  call your hospital at the correct number below. Or call your family doctor.  If you have breathing distress or wheezing, call 911.    Special instructions: ***    I have read and understand the above information.    Signature:______________________________________ Date:___________    Staff:__________________________________________ Date:___________     Time:__________    [facility] Departments:    ___[place]: [phone]  ___[facility]: [phone]  ___[place]: [phone] ___[facility]: [phone]  ___[facility]: [phone]  ___[facility]: [phone]  ___[phone]  ___[facility]: [phone]  ___[phone]

## 2019-01-04 ENCOUNTER — ONCOLOGY VISIT (OUTPATIENT)
Dept: ONCOLOGY | Facility: CLINIC | Age: 59
End: 2019-01-04
Attending: INTERNAL MEDICINE
Payer: COMMERCIAL

## 2019-01-04 ENCOUNTER — INFUSION THERAPY VISIT (OUTPATIENT)
Dept: INFUSION THERAPY | Facility: CLINIC | Age: 59
End: 2019-01-04
Attending: INTERNAL MEDICINE
Payer: COMMERCIAL

## 2019-01-04 ENCOUNTER — HOSPITAL ENCOUNTER (OUTPATIENT)
Facility: CLINIC | Age: 59
Setting detail: SPECIMEN
Discharge: HOME | End: 2019-01-04
Attending: INTERNAL MEDICINE | Admitting: INTERNAL MEDICINE
Payer: COMMERCIAL

## 2019-01-04 VITALS
HEART RATE: 78 BPM | HEIGHT: 71 IN | OXYGEN SATURATION: 95 % | TEMPERATURE: 98.6 F | SYSTOLIC BLOOD PRESSURE: 116 MMHG | WEIGHT: 209.3 LBS | DIASTOLIC BLOOD PRESSURE: 81 MMHG | BODY MASS INDEX: 29.3 KG/M2

## 2019-01-04 DIAGNOSIS — C20 MALIGNANT NEOPLASM OF RECTUM (H): ICD-10-CM

## 2019-01-04 DIAGNOSIS — C20 MALIGNANT NEOPLASM OF RECTUM (H): Primary | ICD-10-CM

## 2019-01-04 DIAGNOSIS — N52.9 MALE ERECTILE DISORDER: ICD-10-CM

## 2019-01-04 LAB
ALBUMIN SERPL-MCNC: 3.6 G/DL (ref 3.4–5)
ALP SERPL-CCNC: 69 U/L (ref 40–150)
ALT SERPL W P-5'-P-CCNC: 36 U/L (ref 0–70)
ANION GAP SERPL CALCULATED.3IONS-SCNC: 6 MMOL/L (ref 3–14)
AST SERPL W P-5'-P-CCNC: 18 U/L (ref 0–45)
BASOPHILS # BLD AUTO: 0 10E9/L (ref 0–0.2)
BASOPHILS NFR BLD AUTO: 0.7 %
BILIRUB SERPL-MCNC: 1 MG/DL (ref 0.2–1.3)
BUN SERPL-MCNC: 20 MG/DL (ref 7–30)
CALCIUM SERPL-MCNC: 8.8 MG/DL (ref 8.5–10.1)
CEA SERPL-MCNC: 1.7 UG/L (ref 0–2.5)
CHLORIDE SERPL-SCNC: 107 MMOL/L (ref 94–109)
CO2 SERPL-SCNC: 26 MMOL/L (ref 20–32)
CREAT SERPL-MCNC: 0.75 MG/DL (ref 0.66–1.25)
DIFFERENTIAL METHOD BLD: NORMAL
EOSINOPHIL # BLD AUTO: 0.2 10E9/L (ref 0–0.7)
EOSINOPHIL NFR BLD AUTO: 5.4 %
ERYTHROCYTE [DISTWIDTH] IN BLOOD BY AUTOMATED COUNT: 13.5 % (ref 10–15)
GFR SERPL CREATININE-BSD FRML MDRD: >90 ML/MIN/{1.73_M2}
GLUCOSE SERPL-MCNC: 90 MG/DL (ref 70–99)
HCT VFR BLD AUTO: 42.9 % (ref 40–53)
HGB BLD-MCNC: 14.5 G/DL (ref 13.3–17.7)
IMM GRANULOCYTES # BLD: 0 10E9/L (ref 0–0.4)
IMM GRANULOCYTES NFR BLD: 0.2 %
LYMPHOCYTES # BLD AUTO: 0.8 10E9/L (ref 0.8–5.3)
LYMPHOCYTES NFR BLD AUTO: 19.8 %
MCH RBC QN AUTO: 29.3 PG (ref 26.5–33)
MCHC RBC AUTO-ENTMCNC: 33.8 G/DL (ref 31.5–36.5)
MCV RBC AUTO: 87 FL (ref 78–100)
MONOCYTES # BLD AUTO: 0.5 10E9/L (ref 0–1.3)
MONOCYTES NFR BLD AUTO: 11.8 %
NEUTROPHILS # BLD AUTO: 2.6 10E9/L (ref 1.6–8.3)
NEUTROPHILS NFR BLD AUTO: 62.1 %
PLATELET # BLD AUTO: 195 10E9/L (ref 150–450)
POTASSIUM SERPL-SCNC: 4.1 MMOL/L (ref 3.4–5.3)
PROT SERPL-MCNC: 6.9 G/DL (ref 6.8–8.8)
RBC # BLD AUTO: 4.95 10E12/L (ref 4.4–5.9)
SODIUM SERPL-SCNC: 139 MMOL/L (ref 133–144)
WBC # BLD AUTO: 4.3 10E9/L (ref 4–11)

## 2019-01-04 PROCEDURE — 80053 COMPREHEN METABOLIC PANEL: CPT | Performed by: INTERNAL MEDICINE

## 2019-01-04 PROCEDURE — 99214 OFFICE O/P EST MOD 30 MIN: CPT | Performed by: INTERNAL MEDICINE

## 2019-01-04 PROCEDURE — G0463 HOSPITAL OUTPT CLINIC VISIT: HCPCS

## 2019-01-04 PROCEDURE — 36415 COLL VENOUS BLD VENIPUNCTURE: CPT

## 2019-01-04 PROCEDURE — 82378 CARCINOEMBRYONIC ANTIGEN: CPT | Performed by: INTERNAL MEDICINE

## 2019-01-04 PROCEDURE — 85025 COMPLETE CBC W/AUTO DIFF WBC: CPT | Performed by: INTERNAL MEDICINE

## 2019-01-04 RX ORDER — SILDENAFIL CITRATE 20 MG/1
20 TABLET ORAL DAILY PRN
Qty: 30 TABLET | Refills: 3 | Status: SHIPPED | OUTPATIENT
Start: 2019-01-04 | End: 2020-01-04

## 2019-01-04 ASSESSMENT — PAIN SCALES - GENERAL: PAINLEVEL: NO PAIN (0)

## 2019-01-04 ASSESSMENT — MIFFLIN-ST. JEOR: SCORE: 1783.57

## 2019-01-04 NOTE — LETTER
1/4/2019         RE: Louie Greco  [address] [address]        Dear Colleague,    Thank you for referring your patient, Louie Greco, to the [facility]. Please see a copy of my visit note below.        [facility]    Hematology/Oncology Established Patient Note      Today's Date: 1/04/2019    Reason for Follow-up: rectal cancer      HISTORY OF PRESENT ILLNESS: Louie Greco is a 58 year old male who presents with rectal cancer.  He started having rectal bleeding around beginning of 2016.  He underwent colonoscopy on 7/27/16, which showed a malignant tumor in the rectum involving half of the lumen with oozing, as well as three 4-mm polyps in the ascending and transverse colon.  Pathology showed moderately differentiated adenocarcinoma, ascending colon with sessile serrated adenoma, others were tubular adenomas.  Mismatch repair expression with normal protein expression.  Staging scans showed the rectal mass, indeterminate focus in the left liver thought to be cyst, and multiple bilateral renal cysts.  MRI showed a distal rectal mass measuring 2.7 x 2.4 cm extending to the most proximal region of the anal canal.  There are a few tiny subcentimeter perirectal fat lymph nodes.  He was staged clinically [id number] (stage IIIA).  He was seen by Dr. Lee at [facility], and started neoadjuvant chemoradiation with capecitabine on 8/8/16 and completed on 9/15/16.  He was then seen by Dr. Radford, colorectal surgery at [facility].  His post chemoradiation MRI showed improvement in the size of the tumor and response in the lymph nodes.  On 12/8/16, he underwent flexible sigmoidoscopy and there was no evidence of tumor.  There was only some anterior scarring in the lumen.  Multiple biopsies were taken, which showed no evidence of carcinoma.  At that point, Dr. Radford spoke with the patient's wife, that there appears to be a complete response  in the lumen, and that the patient would wish to placed on the watch and wait protocol.  The patient had expressed wishes not to have an APR, and it would not have been possible to grossly clear a margin for LAR.    He had port placed on 1/16/17.  It has been removed.    He completed FOLFOX x 8 cycles 1/16/17-5/9/17.      He was admitted 7/16/17-7/20/17 with sepsis, abdominal wall cellulitis.  He underwent surgery with laparoscopic abdominal exploration and appendectomy.  Pathology found sessile serrated adenoma without dysplasia or malignancy.        INTERIM HISTORY: Louie comes in for follow-up today.   He is doing well.  He underwent flex sig by Dr. Radford on 11/30/18 and there was no evidence of recurrence.  A hyperplastic polyp was removed.        REVIEW OF SYSTEMS:   14 point ROS was reviewed and is negative other than as noted above in HPI.       HOME MEDICATIONS:  Current Outpatient Medications   Medication Sig Dispense Refill     ASPIRIN PO Take by mouth as needed for moderate pain       dibucaine (NUPERCAINAL) 1 % OINT ointment 3 times daily as needed for moderate pain       diltiazem 2% in PLO cream, FV COMPOUNDED, 2% GEL Apply topically 2 times daily       ibuprofen 200 MG capsule Take 200-400 mg by mouth every 6 hours as needed 120 capsule 0     lidocaine (XYLOCAINE) 2 % topical gel Apply topically 2 times daily as needed for moderate pain 30 mL 3     lidocaine, Anorectal, 5 % CREA Externally apply 1 Application topically 3 times daily as needed 1 Tube 0     neomycin-polymyxin-hydrocortisone (CORTISPORIN) 3.5-44644-6 otic suspension Place 4 drops into both ears 4 times daily 10 mL 0     VITAMIN D, CHOLECALCIFEROL, PO Take 2,000 Units by mouth daily            ALLERGIES:  Allergies   Allergen Reactions     Ampicillin Diarrhea     Demerol [Meperidine]      Nausea          PAST MEDICAL HISTORY:  Past Medical History:   Diagnosis Date     Childhood asthma      Epididymitis, bilateral     18 years old      Inguinal hernia      Mumps      Nephrolithiasis     1990     Rectal cancer (H)     low rectal cancer     Shingles          PAST SURGICAL HISTORY:  Past Surgical History:   Procedure Laterality Date     COLONOSCOPY N/A 7/27/2016    Procedure: COMBINED COLONOSCOPY, SINGLE OR MULTIPLE BIOPSY/POLYPECTOMY BY BIOPSY;  Surgeon: Chelsea Thompson MD;  Location: [facility]     COLONOSCOPY N/A 9/13/2017    Procedure: COLONOSCOPY;;  Surgeon: Felicitas Radford MD;  [facility]     COLONOSCOPY N/A 12/13/2017    Procedure: COLONOSCOPY;;  Surgeon: Felicitas Radford MD;  [facility]     COMBINED CYSTOSCOPY, RETROGRADES, URETEROSCOPY, LASER HOLMIUM LITHOTRIPSY URETER(S), INSERT STENT Left 2/16/2018    Procedure: COMBINED CYSTOSCOPY, RETROGRADES, URETEROSCOPY, LASER HOLMIUM LITHOTRIPSY URETER(S), INSERT STENT;  Cysto, left ureteroscopy, holmium laser, retrogrades, stent placement;  Surgeon: Bola Worthy MD;  Location: [facility]     EXAM UNDER ANESTHESIA ANUS N/A 7/5/2017    Procedure: EXAM UNDER ANESTHESIA ANUS;  Examination Under Anesthesia, flex sigmoidoscopy with biopsies and formalin application;  Surgeon: Felicitas Radford MD;  [facility]     EXAM UNDER ANESTHESIA ANUS N/A 9/13/2017    Procedure: EXAM UNDER ANESTHESIA ANUS;  Examination Under Anesthesia Anus, Colonoscopy, application of formalin;  Surgeon: Felicitas Radford MD;  [facility]     EXAM UNDER ANESTHESIA ANUS N/A 12/13/2017    Procedure: EXAM UNDER ANESTHESIA ANUS;  Examination Under Anesthesia Anus, Colonoscopy;  Surgeon: Felicitas Radford MD;  [facility]     EXAM UNDER ANESTHESIA ANUS N/A 5/11/2018    Procedure: EXAM UNDER ANESTHESIA ANUS;  Examination Under Anesthesia Anus, Interoperative Flexible Sigmoidoscopy, Application of Formalin;  Surgeon: Felicitas Radford MD;  [facility]     EXAM UNDER ANESTHESIA ANUS N/A 7/20/2018    Procedure: EXAM UNDER ANESTHESIA ANUS;   Examination Under Anesthesia Anus, Flexible Sigmoidoscopy ;  Surgeon: Felicitas Radford MD;  [facility]     EXAM UNDER ANESTHESIA ANUS N/A 11/30/2018    Procedure: Examination Under Anesthesia, application of formalin to rectum, polypectomy;  Surgeon: Felicitas Radford MD;  [facility]     HC TOOTH EXTRACTION W/FORCEP       LAPAROSCOPIC APPENDECTOMY N/A 7/17/2017    Procedure: LAPAROSCOPIC APPENDECTOMY;  LAPAROSCOPIC APPENDECTOMY;  Surgeon: Bryson Ferguson MD;  [facility]     SIGMOIDOSCOPY FLEXIBLE N/A 12/8/2016    Procedure: SIGMOIDOSCOPY FLEXIBLE;  Surgeon: Felicitas Radford MD;  [facility]     SIGMOIDOSCOPY FLEXIBLE N/A 7/5/2017    Procedure: SIGMOIDOSCOPY FLEXIBLE;;  Surgeon: Felicitas Radford MD;  [facility]     SIGMOIDOSCOPY FLEXIBLE N/A 5/11/2018    Procedure: SIGMOIDOSCOPY FLEXIBLE;;  Surgeon: Felicitas Radford MD;  [facility]     SIGMOIDOSCOPY FLEXIBLE N/A 7/20/2018    Procedure: SIGMOIDOSCOPY FLEXIBLE;;  Surgeon: Felicitas Radford MD;  [facility]     SIGMOIDOSCOPY FLEXIBLE N/A 11/30/2018    Procedure: Flexible Sigmoidoscopy;  Surgeon: Felicitas Radford MD;  [facility]     TESTICLE SURGERY       VASCULAR SURGERY      Right chest port     VASECTOMY       VASECTOMY           SOCIAL HISTORY:  Social History     Socioeconomic History     Marital status:      Spouse name: Not on file     Number of children: Not on file     Years of education: Not on file     Highest education level: Not on file   Social Needs     Financial resource strain: Not on file     Food insecurity - worry: Not on file     Food insecurity - inability: Not on file     Transportation needs - medical: Not on file     Transportation needs - non-medical: Not on file   Occupational History     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 No   Social History Narrative    Dad  at age  78   from [place], COPD, & HTN    Mom alive in her 70's.     for 30 years    Two adult children. 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     He notes that he had a brother who passed away at a young age of 53 from a metastatic cancer, but unknown what type, and passed away within 2 months of diagnosis.  He denies other family history of cancer in the immediate family.  Louie works as a  at a [employer].      FAMILY HISTORY:  Family History   Problem Relation Age of Onset     Cancer Brother         primary of unknown origin     Other Cancer Brother      Chronic Obstructive Pulmonary Disease Father      Hypertension Father      Hyperlipidemia Father      Colon Polyps Mother         Number and type of polyps unknown     Family History Negative Brother         negative colonoscopy     Diabetes Maternal Grandfather      Hypertension Paternal Grandmother      Hyperlipidemia Paternal Grandmother      Stomach Cancer Other 63        maternal great grandfather     Glaucoma No family hx of      Macular Degeneration No family hx of          PHYSICAL EXAM:  Vital signs:  /81   Pulse 78   Temp 98.6  F (37  C) (Oral)   Ht 1.791 m (5' 10.5\")   Wt 94.9 kg (209 lb 4.8 oz)   SpO2 95%   BMI 29.61 kg/m      ECO  GENERAL/CONSTITUTIONAL: No acute distress.  EYES: No scleral icterus.  LYMPH: No anterior cervical, posterior cervical, or supraclavicular adenopathy.   RESPIRATORY: Clear to auscultation bilaterally. No crackles or wheezing.   CARDIOVASCULAR: Regular rate and rhythm without murmurs, " gallops, or rubs.  GASTROINTESTINAL: No tenderness. The patient has normal bowel sounds. No guarding.  No distention.  MUSCULOSKELETAL: Warm and well-perfused, no cyanosis, clubbing, or edema.  NEUROLOGIC: Alert, oriented, answers questions appropriately.  INTEGUMENTARY: No jaundice.  GAIT: Steady, does not use assistive device      LABS:  CBC RESULTS:   Recent Labs   Lab Test 01/04/19  1017   WBC 4.3   RBC 4.95   HGB 14.5   HCT 42.9   MCV 87   MCH 29.3   MCHC 33.8   RDW 13.5        Recent Labs   Lab Test 01/04/19  1017 07/20/18  0842    138   POTASSIUM 4.1 4.1   CHLORIDE 107 104   CO2 26 27   ANIONGAP 6 7   GLC 90 94   BUN 20 15   CR 0.75 0.83   FRANDY 8.8 9.0     Lab Results   Component Value Date    AST 18 01/04/2019     Lab Results   Component Value Date    ALT 36 01/04/2019     No results found for: BILICONJ   Lab Results   Component Value Date    BILITOTAL 1.0 01/04/2019     Lab Results   Component Value Date    ALBUMIN 3.6 01/04/2019     Lab Results   Component Value Date    PROTTOTAL 6.9 01/04/2019      Lab Results   Component Value Date    ALKPHOS 69 01/04/2019     Component      Latest Ref Rng & Units 6/9/2017 9/13/2017 1/22/2018 7/20/2018   CEA      0 - 2.5 ug/L 0.8 0.7 0.6 2.5         IMAGING:  MRI pelvis 12/28/18:  Stable posttreatment changes of the distal rectum without  evidence of local recurrence, corresponding to a tumor progression  grade of mrTRG-1. Decreased submucosal edema in the distal rectum most  likely represents post-radiation changes.        ASSESSMENT/PLAN:  Louie Greco is a 58 year old male with:    1) Rectal cancer: clinical stage [id number] (stage IIIA), s/p chemoradiation with Xeloda, and appears to have achieved complete response on imaging and biopsies.  He opted not to undergo surgery and expressed desire not to undergo APR, as he does not want a colostomy bag.  It was felt reasonable to go on the watch and wait protocol with the [facility].  He has completed  4 additional months (8 cycles) of chemotherapy with FOLFOX.  He will now go on surveillance, as per the watch and wait protocol.    We reviewed MRI pelvis from 12/28/18.  There are stable post-treatment changes of the distal rectum without evidence of local recurrence.        -he had flex sig with Dr. Radford on 11/30/18.  -next T3 MRI pelvis would be in July 2019 (every 6 months x 2 years).  Ordered.  -CT c/a/p annually for 5 years; next PET scan in July 2019 - ordered  -endoscopic surveillance with Dr. Radford as per protocol  -RTC in 6 months with labs/CEA and results of imaging    2) Genetics: He underwent genetic testing, which was negative.    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            4) Elevated bilirubin: mild.  Bilirubin is normal today.  -monitor for now    5) Liver cysts: seen on CT, stable  -monitor    6) Pulmonary nodules: stable sub-4 mm pulmonary nodules  -monitor on future scans    7) Kidney cyst: stable  -monitor on future scans.  He also had an MRI done, which showed the multiple cysts.      8) Appendix polyp: He is now s/p appendectomy.  Pathology found sessile serrated adenoma without dysplasia or malignancy.       I spent a total of 25 minutes with the patient, with over >50% of the time in counseling and/or coordination of care.       Aguead Manley MD  Hematology/Oncology  [facility]             Medical Assistant Note:  Louie Greco presents today for yes.    Patient seen by provider today: Yes: Dr Manley.   present during visit today: Not Applicable.    Concerns: No Concerns.    Procedure:  Lab draw site: left Hand Draw, Needle type: BF, Gauge: 21. Guaze and coban applied    Post Assessment:  Labs drawn without difficulty: Yes.    Discharge Plan:  Departure Mode: Ambulatory.    Face to Face Time: 5.    Rossana leahy, thank you for allowing me to participate in the care of your patient.         Sincerely,        Agueda Manley MD

## 2019-01-04 NOTE — PROGRESS NOTES
Medical Assistant Note:  Louie Greco presents today for yes.    Patient seen by provider today: Yes: Dr Manley.   present during visit today: Not Applicable.    Concerns: No Concerns.    Procedure:  Lab draw site: left Hand Draw, Needle type: BF, Gauge: 21. Sandra and coban applied    Post Assessment:  Labs drawn without difficulty: Yes.    Discharge Plan:  Departure Mode: Ambulatory.    Face to Face Time: 5.    Rossana Xie MA

## 2019-01-04 NOTE — PROGRESS NOTES
[facility]    Hematology/Oncology Established Patient Note      Today's Date: 1/04/2019    Reason for Follow-up: rectal cancer      HISTORY OF PRESENT ILLNESS: Louie Greco is a 58 year old male who presents with rectal cancer.  He started having rectal bleeding around beginning of 2016.  He underwent colonoscopy on 7/27/16, which showed a malignant tumor in the rectum involving half of the lumen with oozing, as well as three 4-mm polyps in the ascending and transverse colon.  Pathology showed moderately differentiated adenocarcinoma, ascending colon with sessile serrated adenoma, others were tubular adenomas.  Mismatch repair expression with normal protein expression.  Staging scans showed the rectal mass, indeterminate focus in the left liver thought to be cyst, and multiple bilateral renal cysts.  MRI showed a distal rectal mass measuring 2.7 x 2.4 cm extending to the most proximal region of the anal canal.  There are a few tiny subcentimeter perirectal fat lymph nodes.  He was staged clinically [id number] (stage IIIA).  He was seen by Dr. Lee at [facility], and started neoadjuvant chemoradiation with capecitabine on 8/8/16 and completed on 9/15/16.  He was then seen by Dr. Radford, colorectal surgery at [facility].  His post chemoradiation MRI showed improvement in the size of the tumor and response in the lymph nodes.  On 12/8/16, he underwent flexible sigmoidoscopy and there was no evidence of tumor.  There was only some anterior scarring in the lumen.  Multiple biopsies were taken, which showed no evidence of carcinoma.  At that point, Dr. Radford spoke with the patient's wife, that there appears to be a complete response in the lumen, and that the patient would wish to placed on the watch and wait protocol.  The patient had expressed wishes not to have an APR, and it would not have been possible to grossly clear a margin for LAR.    He had port placed  on 1/16/17.  It has been removed.    He completed FOLFOX x 8 cycles 1/16/17-5/9/17.      He was admitted 7/16/17-7/20/17 with sepsis, abdominal wall cellulitis.  He underwent surgery with laparoscopic abdominal exploration and appendectomy.  Pathology found sessile serrated adenoma without dysplasia or malignancy.        INTERIM HISTORY: Louie comes in for follow-up today.   He is doing well.  He underwent flex sig by Dr. Radford on 11/30/18 and there was no evidence of recurrence.  A hyperplastic polyp was removed.        REVIEW OF SYSTEMS:   14 point ROS was reviewed and is negative other than as noted above in HPI.       HOME MEDICATIONS:  Current Outpatient Medications   Medication Sig Dispense Refill     ASPIRIN PO Take by mouth as needed for moderate pain       dibucaine (NUPERCAINAL) 1 % OINT ointment 3 times daily as needed for moderate pain       diltiazem 2% in PLO cream, FV COMPOUNDED, 2% GEL Apply topically 2 times daily       ibuprofen 200 MG capsule Take 200-400 mg by mouth every 6 hours as needed 120 capsule 0     lidocaine (XYLOCAINE) 2 % topical gel Apply topically 2 times daily as needed for moderate pain 30 mL 3     lidocaine, Anorectal, 5 % CREA Externally apply 1 Application topically 3 times daily as needed 1 Tube 0     neomycin-polymyxin-hydrocortisone (CORTISPORIN) 3.5-38386-3 otic suspension Place 4 drops into both ears 4 times daily 10 mL 0     VITAMIN D, CHOLECALCIFEROL, PO Take 2,000 Units by mouth daily            ALLERGIES:  Allergies   Allergen Reactions     Ampicillin Diarrhea     Demerol [Meperidine]      Nausea          PAST MEDICAL HISTORY:  Past Medical History:   Diagnosis Date     Childhood asthma      Epididymitis, bilateral     18 years old     Inguinal hernia      Mumps      Nephrolithiasis     1990     Rectal cancer (H)     low rectal cancer     Shingles          PAST SURGICAL HISTORY:  Past Surgical History:   Procedure Laterality Date     COLONOSCOPY N/A 7/27/2016     Procedure: COMBINED COLONOSCOPY, SINGLE OR MULTIPLE BIOPSY/POLYPECTOMY BY BIOPSY;  Surgeon: Chelsea Thompson MD;  Location: [facility]     COLONOSCOPY N/A 9/13/2017    Procedure: COLONOSCOPY;;  Surgeon: Felicitas Radford MD;  [facility]     COLONOSCOPY N/A 12/13/2017    Procedure: COLONOSCOPY;;  Surgeon: Felicitas Radford MD;  [facility]     COMBINED CYSTOSCOPY, RETROGRADES, URETEROSCOPY, LASER HOLMIUM LITHOTRIPSY URETER(S), INSERT STENT Left 2/16/2018    Procedure: COMBINED CYSTOSCOPY, RETROGRADES, URETEROSCOPY, LASER HOLMIUM LITHOTRIPSY URETER(S), INSERT STENT;  Cysto, left ureteroscopy, holmium laser, retrogrades, stent placement;  Surgeon: Bola Worthy MD;  Location: [facility]     EXAM UNDER ANESTHESIA ANUS N/A 7/5/2017    Procedure: EXAM UNDER ANESTHESIA ANUS;  Examination Under Anesthesia, flex sigmoidoscopy with biopsies and formalin application;  Surgeon: Felicitas Radford MD;  [facility]     EXAM UNDER ANESTHESIA ANUS N/A 9/13/2017    Procedure: EXAM UNDER ANESTHESIA ANUS;  Examination Under Anesthesia Anus, Colonoscopy, application of formalin;  Surgeon: Felicitas Radford MD;  [facility]     EXAM UNDER ANESTHESIA ANUS N/A 12/13/2017    Procedure: EXAM UNDER ANESTHESIA ANUS;  Examination Under Anesthesia Anus, Colonoscopy;  Surgeon: Felicitas Radford MD;  [facility]     EXAM UNDER ANESTHESIA ANUS N/A 5/11/2018    Procedure: EXAM UNDER ANESTHESIA ANUS;  Examination Under Anesthesia Anus, Interoperative Flexible Sigmoidoscopy, Application of Formalin;  Surgeon: Felicitas Radford MD;  [facility]     EXAM UNDER ANESTHESIA ANUS N/A 7/20/2018    Procedure: EXAM UNDER ANESTHESIA ANUS;  Examination Under Anesthesia Anus, Flexible Sigmoidoscopy ;  Surgeon: Felicitas Radford MD;  [facility]     EXAM UNDER ANESTHESIA ANUS N/A 11/30/2018    Procedure: Examination Under Anesthesia, application of formalin to  rectum, polypectomy;  Surgeon: Felicitas Radford MD;  [facility]     HC TOOTH EXTRACTION W/FORCEP       LAPAROSCOPIC APPENDECTOMY N/A 7/17/2017    Procedure: LAPAROSCOPIC APPENDECTOMY;  LAPAROSCOPIC APPENDECTOMY;  Surgeon: Bryson Ferguson MD;  [facility]     SIGMOIDOSCOPY FLEXIBLE N/A 12/8/2016    Procedure: SIGMOIDOSCOPY FLEXIBLE;  Surgeon: Felicitas Radford MD;  [facility]     SIGMOIDOSCOPY FLEXIBLE N/A 7/5/2017    Procedure: SIGMOIDOSCOPY FLEXIBLE;;  Surgeon: Felicitas Radford MD;  [facility]     SIGMOIDOSCOPY FLEXIBLE N/A 5/11/2018    Procedure: SIGMOIDOSCOPY FLEXIBLE;;  Surgeon: Felicitas Radford MD;  [facility]     SIGMOIDOSCOPY FLEXIBLE N/A 7/20/2018    Procedure: SIGMOIDOSCOPY FLEXIBLE;;  Surgeon: Felicitas Radford MD;  [facility]     SIGMOIDOSCOPY FLEXIBLE N/A 11/30/2018    Procedure: Flexible Sigmoidoscopy;  Surgeon: Felicitas Radford MD;  [facility]     TESTICLE SURGERY       VASCULAR SURGERY      Right chest port     VASECTOMY       VASECTOMY           SOCIAL HISTORY:  Social History     Socioeconomic History     Marital status:      Spouse name: Not on file     Number of children: Not on file     Years of education: Not on file     Highest education level: Not on file   Social Needs     Financial resource strain: Not on file     Food insecurity - worry: Not on file     Food insecurity - inability: Not on file     Transportation needs - medical: Not on file     Transportation needs - non-medical: Not on file   Occupational History     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 No   Social History Narrative    Dad  at age  78   from [place], COPD, & HTN    Mom alive in her 70's.     for 30 years    Two adult children. 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     He notes that he had a brother who passed away at a young age of 53 from a metastatic cancer, but unknown what type, and passed away within 2 months of diagnosis.  He denies other family history of cancer in the immediate family.  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      FAMILY HISTORY:  Family History   Problem Relation Age of Onset     Cancer Brother         primary of unknown origin     Other Cancer Brother      Chronic Obstructive Pulmonary Disease Father      Hypertension Father      Hyperlipidemia Father      Colon Polyps Mother         Number and type of polyps unknown     Family History Negative Brother         negative colonoscopy     Diabetes Maternal Grandfather      Hypertension Paternal Grandmother      Hyperlipidemia Paternal Grandmother      Stomach Cancer Other 63        maternal great grandfather     Glaucoma No family hx of      Macular Degeneration No family hx of          PHYSICAL EXAM:  Vital signs:  /81   Pulse 78   Temp 98.6  F (37  C) (Oral)   Ht 1.791 m (5' 10.5\")   Wt 94.9 kg (209 lb 4.8 oz)   SpO2 95%   BMI 29.61 kg/m     ECO  GENERAL/CONSTITUTIONAL: No acute distress.  EYES: No scleral icterus.  LYMPH: No anterior cervical, posterior cervical, or supraclavicular adenopathy.   RESPIRATORY: Clear to auscultation bilaterally. No crackles or wheezing.   CARDIOVASCULAR: Regular rate and rhythm without murmurs, gallops, or rubs.  GASTROINTESTINAL: No tenderness. The patient has normal bowel sounds. No guarding.  No distention.  MUSCULOSKELETAL: Warm and well-perfused, no cyanosis, clubbing, or edema.  NEUROLOGIC: Alert, oriented, answers questions " appropriately.  INTEGUMENTARY: No jaundice.  GAIT: Steady, does not use assistive device      LABS:  CBC RESULTS:   Recent Labs   Lab Test 01/04/19  1017   WBC 4.3   RBC 4.95   HGB 14.5   HCT 42.9   MCV 87   MCH 29.3   MCHC 33.8   RDW 13.5        Recent Labs   Lab Test 01/04/19  1017 07/20/18  0842    138   POTASSIUM 4.1 4.1   CHLORIDE 107 104   CO2 26 27   ANIONGAP 6 7   GLC 90 94   BUN 20 15   CR 0.75 0.83   FRANDY 8.8 9.0     Lab Results   Component Value Date    AST 18 01/04/2019     Lab Results   Component Value Date    ALT 36 01/04/2019     No results found for: BILICONJ   Lab Results   Component Value Date    BILITOTAL 1.0 01/04/2019     Lab Results   Component Value Date    ALBUMIN 3.6 01/04/2019     Lab Results   Component Value Date    PROTTOTAL 6.9 01/04/2019      Lab Results   Component Value Date    ALKPHOS 69 01/04/2019     Component      Latest Ref Rng & Units 6/9/2017 9/13/2017 1/22/2018 7/20/2018   CEA      0 - 2.5 ug/L 0.8 0.7 0.6 2.5         IMAGING:  MRI pelvis 12/28/18:  Stable posttreatment changes of the distal rectum without  evidence of local recurrence, corresponding to a tumor progression  grade of mrTRG-1. Decreased submucosal edema in the distal rectum most  likely represents post-radiation changes.        ASSESSMENT/PLAN:  Louie Greco is a 58 year old male with:    1) Rectal cancer: clinical stage [id number] (stage IIIA), s/p chemoradiation with Xeloda, and appears to have achieved complete response on imaging and biopsies.  He opted not to undergo surgery and expressed desire not to undergo APR, as he does not want a colostomy bag.  It was felt reasonable [facility]  He has completed 4 additional months (8 cycles) of chemotherapy with FOLFOX.  He will now go on surveillance, as per the watch and wait protocol.    We reviewed MRI pelvis from 12/28/18.  There are stable post-treatment changes of the distal rectum without  evidence of local recurrence.        -he had flex sig with Dr. Radford on 11/30/18.  -next T3 MRI pelvis would be in July 2019 (every 6 months x 2 years).  Ordered.  -CT c/a/p annually for 5 years; next PET scan in July 2019 - ordered  -endoscopic surveillance with Dr. Radford as per protocol  -RTC in 6 months with labs/CEA and results of imaging    2) Genetics: He underwent genetic testing, which was negative.    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            4) Elevated bilirubin: mild.  Bilirubin is normal today.  -monitor for now    5) Liver cysts: seen on CT, stable  -monitor    6) Pulmonary nodules: stable sub-4 mm pulmonary nodules  -monitor on future scans    7) Kidney cyst: stable  -monitor on future scans.  He also had an MRI done, which showed the multiple cysts.      8) Appendix polyp: He is now s/p appendectomy.  Pathology found sessile serrated adenoma without dysplasia or malignancy.       I spent a total of 25 minutes with the patient, with over >50% of the time in counseling and/or coordination of care.       Agueda Manley MD  Hematology/Oncology  [facility]

## 2019-01-04 NOTE — PATIENT INSTRUCTIONS
schedule MRI pelvis and PET scan and labs in 6 months, a few days prior to the next appointment, Scheduled/ Tia  -return to clinic with Dr. Manley in 6 months, Scheduled/Tia      avs printed and  Given to patient

## 2019-01-07 ENCOUNTER — TELEPHONE (OUTPATIENT)
Dept: FAMILY MEDICINE | Facility: CLINIC | Age: 59
End: 2019-01-07

## 2019-01-07 ENCOUNTER — TELEPHONE (OUTPATIENT)
Dept: SURGERY | Facility: CLINIC | Age: 59
End: 2019-01-07

## 2019-01-07 DIAGNOSIS — C20 RECTAL CANCER (H): Primary | ICD-10-CM

## 2019-01-07 NOTE — TELEPHONE ENCOUNTER
Please do not close this encounter until this has been addressed.  (prior auth approved/denied, prescriber refusal to complete prior auth or medication changed/discontinued)    Prior Authorization needed on: Sildenafil 20 mg  Drug NDC: [address]-01     Insurance: [facility] / [employer]  Member ID: [id number]   Insurance phone #: [phone]    Pharmacy NPI: [id number]   Pharmacy Phone #: [phone]   Pharmacy Fax #: [phone]    Please let us know if the PA gets approved or denied or if medication is changed

## 2019-01-07 NOTE — TELEPHONE ENCOUNTER
Prior Authorization Retail Medication Request    Medication/Dose: Sildenafil  ICD code (if different than what is on RX):  N52.9  Previously Tried and Failed:  None  Rationale:  Patient stable on PRN medication    Insurance Name:  [facility]  Insurance ID:  [id number]      Pharmacy Information (if different than what is on RX)  Name:  [facility]  Phone:  [phone]

## 2019-01-07 NOTE — TELEPHONE ENCOUNTER
Patient is scheduled for surgery with Dr. Radford      Spoke or left message with: I left the patient a detailed message with surgery information listed below asked for a call back to confirm surgery date    Date of Surgery: 02/22/19    [facility]    H&P: Scheduled with pcp    Post op: NA    Additional imaging/appointments: NA    Surgery packet sent: will mail out closer to surgery date     Additional comments:  The patient is aware they need a pre-op at least a couple of weeks before surgery date. We went over the patient needing a  and someone to stay with them for 24 hours after the surgery. The patient was given my direct number for any questions [phone]

## 2019-01-10 DIAGNOSIS — C20 RECTAL CANCER (H): Primary | ICD-10-CM

## 2019-01-10 NOTE — TELEPHONE ENCOUNTER
Central Prior Authorization Team   Phone: [phone]      PA Initiation    Medication: Sildenafil  Insurance Company: [employer] - Phone [phone] Fax [phone]  Pharmacy Filling the Rx: [place], [place] - [address], [address]  Filling Pharmacy Phone: [phone]  Filling Pharmacy Fax:    Start Date: 1/10/2019

## 2019-01-16 NOTE — TELEPHONE ENCOUNTER
PRIOR AUTHORIZATION DENIED    Medication: Sildenafil    Denial Date: 1/9/2019    Denial Rational:  Per insurance, medication is excluded from patients benefit plan and will not be covered. Review and appeal are not available because of this exclusion.      Appeal Information:  N/A

## 2019-01-21 NOTE — TELEPHONE ENCOUNTER
Placed call to patient in regards to prior authorization denial of sildenafil. Informed patient that GoodRx is a good avenue as the insurance company will not cover the mediation as it is not a covered benefit. Patient will look into this as it is an affordable option for a medication that isn't covered by his insurance.    Ousmane Martinez, CMA on 1/21/2019 at 12:07 PM

## 2019-02-20 ENCOUNTER — OFFICE VISIT (OUTPATIENT)
Dept: FAMILY MEDICINE | Facility: CLINIC | Age: 59
End: 2019-02-20
Payer: COMMERCIAL

## 2019-02-20 VITALS
TEMPERATURE: 96 F | WEIGHT: 214.7 LBS | HEART RATE: 68 BPM | HEIGHT: 71 IN | OXYGEN SATURATION: 96 % | SYSTOLIC BLOOD PRESSURE: 116 MMHG | DIASTOLIC BLOOD PRESSURE: 76 MMHG | BODY MASS INDEX: 30.06 KG/M2

## 2019-02-20 DIAGNOSIS — K40.90 LEFT INGUINAL HERNIA: ICD-10-CM

## 2019-02-20 DIAGNOSIS — Z01.818 PREOP GENERAL PHYSICAL EXAM: Primary | ICD-10-CM

## 2019-02-20 PROCEDURE — 99213 OFFICE O/P EST LOW 20 MIN: CPT | Performed by: INTERNAL MEDICINE

## 2019-02-20 ASSESSMENT — MIFFLIN-ST. JEOR: SCORE: 1808.06

## 2019-02-20 NOTE — PROGRESS NOTES
[facility]  [address] [facility] [address]  [phone]  Dept: [phone]    PRE-OP EVALUATION:  Today's date: 2019    Louie Greco (: 1960) presents for pre-operative evaluation assessment as requested by Dr. Radford.  He requires evaluation and anesthesia risk assessment prior to undergoing surgery/procedure for treatment of Examination Under AnesthesiaFlexible Sigmoidoscopy.    Proposed Surgery/ Procedure: Examination Under AnesthesiaFlexible Sigmoidoscopy.  Date of Surgery/ Procedure: 2019  Time of Surgery/ Procedure: 8:40 AM  Hospital/Surgical Facility: [facility]   Fax number for surgical facility: [facility]   Primary Physician: Philippe Healy  Type of Anesthesia Anticipated: Monitor anesthesia care    Patient has a Health Care Directive or Living Will:  NO    1. NO - Do you have a history of heart attack, stroke, stent, bypass or surgery on an artery in the head, neck, heart or legs?  2. NO - Do you ever have any pain or discomfort in your chest?  3. NO - Do you have a history of  Heart Failure?  4. NO - Are you troubled by shortness of breath when: walking on the level, up a slight hill or at night?  5. NO - Do you currently have a cold, bronchitis or other respiratory infection?  6. NO - Do you have a cough, shortness of breath or wheezing?  7. NO - Do you sometimes get pains in the calves of your legs when you walk?  8. NO - Do you or anyone in your family have previous history of blood clots?  9. NO - Do you or does anyone in your family have a serious bleeding problem such as prolonged bleeding following surgeries or cuts?  10. NO - Have you ever had problems with anemia or been told to take iron pills?  11. NO - Have you had any abnormal blood loss such as black, tarry or bloody stools, or abnormal vaginal bleeding?  12. NO - Have you ever had a blood transfusion?  13. NO - Have you or any of your relatives ever had problems with  anesthesia?  14. NO - Do you have sleep apnea, excessive snoring or daytime drowsiness?  15. NO - Do you have any prosthetic heart valves?  16. NO - Do you have prosthetic joints?  17. NO - Is there any chance that you may be pregnant?      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  He feels well with the exception of intermittent pain that is mild related to a known left inguinal hernia.  This pain seems to be increasing frequency.  He denies nausea or vomiting.  He is able to perform 4 METS of physical activity without chest pain or dyspnea.  He did have his appendix removed by Dr. Ferguson.      MEDICAL HISTORY:     Patient Active Problem List    Diagnosis Date Noted     Kidney stone 02/16/2018     Priority: Medium     Elevated prostate specific antigen (PSA) 11/30/2017     Priority: Medium     Appendicitis 07/17/2017     Priority: Medium     Chemotherapy-induced neutropenia (H) 03/21/2017     Priority: Medium     Advance Care Planning 03/09/2017     Priority: Medium     Advance Care Planning 3/9/2017: Receipt of ACP document:  Received: invalid HCD document dated 12/8/2016.  Document not previously scanned. Validation form completed indicating invalid document. Copy sent to client with information and facilitation resources. Validation form sent to be scanned as notation of invalid document received.  Code Status needs to be updated to reflect choices. Confirmed/documented designated decision maker(s).  Added by Araceli Horne MSW Advance Care Planning Liaison with [employer].       Malignant neoplasm of rectum (H) 01/03/2017     Priority: Medium     Rectal bleeding 07/19/2016     Priority: Medium     Plantar fasciitis 11/04/2010     Priority: Medium     Pes anserinus tendinitis 02/08/2010     Priority:  Medium      Past Medical History:   Diagnosis Date     Childhood asthma      Epididymitis, bilateral     18 years old     Inguinal hernia      Mumps      Nephrolithiasis     1990     Rectal cancer (H)     low rectal cancer     Shingles      Past Surgical History:   Procedure Laterality Date     COLONOSCOPY N/A 7/27/2016    Procedure: COMBINED COLONOSCOPY, SINGLE OR MULTIPLE BIOPSY/POLYPECTOMY BY BIOPSY;  Surgeon: Chelsea Thompson MD;  Location: [facility]     COLONOSCOPY N/A 9/13/2017    Procedure: COLONOSCOPY;;  Surgeon: Felicitas Radford MD;  [facility]     COLONOSCOPY N/A 12/13/2017    Procedure: COLONOSCOPY;;  Surgeon: Felicitas Radford MD;  [facility]     COMBINED CYSTOSCOPY, RETROGRADES, URETEROSCOPY, LASER HOLMIUM LITHOTRIPSY URETER(S), INSERT STENT Left 2/16/2018    Procedure: COMBINED CYSTOSCOPY, RETROGRADES, URETEROSCOPY, LASER HOLMIUM LITHOTRIPSY URETER(S), INSERT STENT;  Cysto, left ureteroscopy, holmium laser, retrogrades, stent placement;  Surgeon: Bola Worthy MD;  Location: [facility]     EXAM UNDER ANESTHESIA ANUS N/A 7/5/2017    Procedure: EXAM UNDER ANESTHESIA ANUS;  Examination Under Anesthesia, flex sigmoidoscopy with biopsies and formalin application;  Surgeon: Felicitas Radford MD;  [facility]     EXAM UNDER ANESTHESIA ANUS N/A 9/13/2017    Procedure: EXAM UNDER ANESTHESIA ANUS;  Examination Under Anesthesia Anus, Colonoscopy, application of formalin;  Surgeon: Felicitas Radford MD;  [facility]     EXAM UNDER ANESTHESIA ANUS N/A 12/13/2017    Procedure: EXAM UNDER ANESTHESIA ANUS;  Examination Under Anesthesia Anus, Colonoscopy;  Surgeon: Felicitas Radford MD;  [facility]     EXAM UNDER ANESTHESIA ANUS N/A 5/11/2018    Procedure: EXAM UNDER ANESTHESIA ANUS;  Examination Under Anesthesia Anus, Interoperative Flexible Sigmoidoscopy, Application of Formalin;  Surgeon: Felicitas Radford MD;  [facility]      EXAM UNDER ANESTHESIA ANUS N/A 7/20/2018    Procedure: EXAM UNDER ANESTHESIA ANUS;  Examination Under Anesthesia Anus, Flexible Sigmoidoscopy ;  Surgeon: Felicitas Radford MD;  [facility]     EXAM UNDER ANESTHESIA ANUS N/A 11/30/2018    Procedure: Examination Under Anesthesia, application of formalin to rectum, polypectomy;  Surgeon: Felicitas Radford MD;  [facility]     HC TOOTH EXTRACTION W/FORCEP       LAPAROSCOPIC APPENDECTOMY N/A 7/17/2017    Procedure: LAPAROSCOPIC APPENDECTOMY;  LAPAROSCOPIC APPENDECTOMY;  Surgeon: Bryson Ferguson MD;  [facility]     SIGMOIDOSCOPY FLEXIBLE N/A 12/8/2016    Procedure: SIGMOIDOSCOPY FLEXIBLE;  Surgeon: Felicitas Radford MD;  [facility]     SIGMOIDOSCOPY FLEXIBLE N/A 7/5/2017    Procedure: SIGMOIDOSCOPY FLEXIBLE;;  Surgeon: Felicitas Radford MD;  [facility]     SIGMOIDOSCOPY FLEXIBLE N/A 5/11/2018    Procedure: SIGMOIDOSCOPY FLEXIBLE;;  Surgeon: Felicitas Radford MD;  [facility]     SIGMOIDOSCOPY FLEXIBLE N/A 7/20/2018    Procedure: SIGMOIDOSCOPY FLEXIBLE;;  Surgeon: Felicitas Radford MD;  [facility]     SIGMOIDOSCOPY FLEXIBLE N/A 11/30/2018    Procedure: Flexible Sigmoidoscopy;  Surgeon: Felicitas Radford MD;  [facility]     TESTICLE SURGERY       VASCULAR SURGERY      Right chest port     VASECTOMY       VASECTOMY       Current Outpatient Medications   Medication Sig Dispense Refill     ASPIRIN PO Take by mouth as needed for moderate pain       dibucaine (NUPERCAINAL) 1 % OINT ointment 3 times daily as needed for moderate pain       diltiazem 2% in PLO cream, FV COMPOUNDED, 2% GEL Apply topically 2 times daily       ibuprofen 200 MG capsule Take 200-400 mg by mouth every 6 hours as needed 120 capsule 0     lidocaine (XYLOCAINE) 2 % topical gel Apply topically 2 times daily as needed for moderate pain 30 mL 3     lidocaine, Anorectal, 5 % CREA Externally apply 1  "Application topically 3 times daily as needed 1 Tube 0     neomycin-polymyxin-hydrocortisone (CORTISPORIN) 3.5-62145-3 otic suspension Place 4 drops into both ears 4 times daily 10 mL 0     sildenafil (REVATIO) 20 MG tablet Take 1 tablet (20 mg) by mouth daily as needed 30 tablet 3     VITAMIN D, CHOLECALCIFEROL, PO Take 2,000 Units by mouth daily            Allergies   Allergen Reactions     Ampicillin Diarrhea     Demerol [Meperidine]      Nausea           Social History     Tobacco Use     Smoking status: Never Smoker     Smokeless tobacco: Never Used   Substance Use Topics     Alcohol use: Yes     Alcohol/week: 0.0 oz     Comment: 1 drink a week     History   Drug Use No       REVIEW OF SYSTEMS:   Constitutional, neuro, ENT, endocrine, pulmonary, cardiac, gastrointestinal, genitourinary, musculoskeletal, integument and psychiatric systems are negative, except as otherwise noted.    EXAM:   /76 (BP Location: Right arm, Cuff Size: Adult Large)   Pulse 68   Temp 96  F (35.6  C) (Oral)   Ht 1.791 m (5' 10.5\")   Wt 97.4 kg (214 lb 11.2 oz)   SpO2 96%   BMI 30.37 kg/m      GENERAL APPEARANCE: healthy, alert and no distress     EYES: EOMI,  PERRL     HENT: ear canals and TM's normal and nose and mouth without ulcers or lesions     NECK: no adenopathy, no asymmetry, masses, or scars and thyroid normal to palpation     RESP: lungs clear to auscultation - no rales, rhonchi or wheezes     CV: regular rates and rhythm, normal S1 S2, no S3 or S4 and no murmur, click or rub     ABDOMEN:  soft, nontender, no HSM or masses and bowel sounds normal     GU_male: Moderate to large size left inguinal hernia noted     MS: extremities normal- no gross deformities noted, no evidence of inflammation in joints, FROM in all extremities.     SKIN: no suspicious lesions or rashes     NEURO: Normal strength and tone, sensory exam grossly normal, mentation intact and speech normal     PSYCH: mentation appears normal. and affect " normal/bright     LYMPHATICS: No cervical adenopathy    DIAGNOSTICS:   No labs or EKG required for low risk surgery (cataract, skin procedure, breast biopsy, etc)    Recent Labs   Lab Test 01/04/19  1017 07/20/18  0842  07/10/17  0943  01/16/17  0800   HGB 14.5 15.0   < > 12.8*   < > 15.1    183   < > 170   < > 207   INR  --   --   --  1.02  --  0.92    138   < >  --    < >  --    POTASSIUM 4.1 4.1   < >  --    < >  --    CR 0.75 0.83   < >  --    < >  --     < > = values in this interval not displayed.        IMPRESSION:   Reason for surgery/procedure: Rectal cancer  Diagnosis/reason for consult: Preoperative evaluation    The proposed surgical procedure is considered LOW risk.    REVISED CARDIAC RISK INDEX  The patient has the following serious cardiovascular risks for perioperative complications such as (MI, PE, VFib and 3  AV Block):  No serious cardiac risks  INTERPRETATION: 0 risks: Class I (very low risk - 0.4% complication rate)    The patient has the following additional risks for perioperative complications:  No identified additional risks      ICD-10-CM    1. Preop general physical exam Z01.818    2. Left inguinal hernia K40.90 GENERAL SURG ADULT REFERRAL     He can proceed with his surveillance flexible sigmoidoscopy.  He should see a general surgeon regarding his left inguinal hernia.  This should be repaired.  Since he has a relationship with Dr. Ferguson from his appendectomy, he would like to see Dr. Ferguson to discuss repair of his left inguinal hernia.  RECOMMENDATIONS:         --Patient is on no chronic medications    APPROVAL GIVEN to proceed with proposed procedure, without further diagnostic evaluation       Signed Electronically by: Philippe Healy MD    Copy of this evaluation report is provided to requesting physician.    [place] Preop Guidelines    Revised Cardiac Risk Index

## 2019-02-21 ENCOUNTER — ANESTHESIA EVENT (OUTPATIENT)
Dept: SURGERY | Facility: AMBULATORY SURGERY CENTER | Age: 59
End: 2019-02-21

## 2019-02-21 RX ORDER — GABAPENTIN 300 MG/1
300 CAPSULE ORAL ONCE
Status: CANCELLED | OUTPATIENT
Start: 2019-02-21 | End: 2019-02-21

## 2019-02-22 ENCOUNTER — HOSPITAL ENCOUNTER (OUTPATIENT)
Facility: AMBULATORY SURGERY CENTER | Age: 59
End: 2019-02-22
Attending: COLON & RECTAL SURGERY
Payer: COMMERCIAL

## 2019-02-22 ENCOUNTER — ANESTHESIA (OUTPATIENT)
Dept: SURGERY | Facility: AMBULATORY SURGERY CENTER | Age: 59
End: 2019-02-22

## 2019-02-22 VITALS
RESPIRATION RATE: 14 BRPM | SYSTOLIC BLOOD PRESSURE: 116 MMHG | WEIGHT: 215 LBS | BODY MASS INDEX: 30.78 KG/M2 | DIASTOLIC BLOOD PRESSURE: 51 MMHG | OXYGEN SATURATION: 98 % | HEIGHT: 70 IN | TEMPERATURE: 97.5 F

## 2019-02-22 RX ORDER — ACETAMINOPHEN 325 MG/1
975 TABLET ORAL ONCE
Status: COMPLETED | OUTPATIENT
Start: 2019-02-22 | End: 2019-02-22

## 2019-02-22 RX ORDER — SODIUM CHLORIDE, SODIUM LACTATE, POTASSIUM CHLORIDE, CALCIUM CHLORIDE 600; 310; 30; 20 MG/100ML; MG/100ML; MG/100ML; MG/100ML
INJECTION, SOLUTION INTRAVENOUS CONTINUOUS
Status: DISCONTINUED | OUTPATIENT
Start: 2019-02-22 | End: 2019-02-23 | Stop reason: HOSPADM

## 2019-02-22 RX ORDER — SODIUM CHLORIDE, SODIUM LACTATE, POTASSIUM CHLORIDE, CALCIUM CHLORIDE 600; 310; 30; 20 MG/100ML; MG/100ML; MG/100ML; MG/100ML
INJECTION, SOLUTION INTRAVENOUS CONTINUOUS
Status: DISCONTINUED | OUTPATIENT
Start: 2019-02-22 | End: 2019-02-22 | Stop reason: HOSPADM

## 2019-02-22 RX ORDER — PROPOFOL 10 MG/ML
INJECTION, EMULSION INTRAVENOUS CONTINUOUS PRN
Status: DISCONTINUED | OUTPATIENT
Start: 2019-02-22 | End: 2019-02-22

## 2019-02-22 RX ORDER — NALOXONE HYDROCHLORIDE 0.4 MG/ML
.1-.4 INJECTION, SOLUTION INTRAMUSCULAR; INTRAVENOUS; SUBCUTANEOUS
Status: DISCONTINUED | OUTPATIENT
Start: 2019-02-22 | End: 2019-02-23 | Stop reason: HOSPADM

## 2019-02-22 RX ORDER — GLYCOPYRROLATE 0.2 MG/ML
INJECTION, SOLUTION INTRAMUSCULAR; INTRAVENOUS PRN
Status: DISCONTINUED | OUTPATIENT
Start: 2019-02-22 | End: 2019-02-22

## 2019-02-22 RX ORDER — ONDANSETRON 4 MG/1
4 TABLET, ORALLY DISINTEGRATING ORAL EVERY 30 MIN PRN
Status: DISCONTINUED | OUTPATIENT
Start: 2019-02-22 | End: 2019-02-23 | Stop reason: HOSPADM

## 2019-02-22 RX ORDER — ONDANSETRON 4 MG/1
4 TABLET, ORALLY DISINTEGRATING ORAL
Status: DISCONTINUED | OUTPATIENT
Start: 2019-02-22 | End: 2019-02-23 | Stop reason: HOSPADM

## 2019-02-22 RX ORDER — OXYCODONE HYDROCHLORIDE 5 MG/1
5 TABLET ORAL EVERY 4 HOURS PRN
Status: DISCONTINUED | OUTPATIENT
Start: 2019-02-22 | End: 2019-02-23 | Stop reason: HOSPADM

## 2019-02-22 RX ORDER — ONDANSETRON 2 MG/ML
4 INJECTION INTRAMUSCULAR; INTRAVENOUS EVERY 30 MIN PRN
Status: DISCONTINUED | OUTPATIENT
Start: 2019-02-22 | End: 2019-02-23 | Stop reason: HOSPADM

## 2019-02-22 RX ORDER — PROPOFOL 10 MG/ML
INJECTION, EMULSION INTRAVENOUS PRN
Status: DISCONTINUED | OUTPATIENT
Start: 2019-02-22 | End: 2019-02-22

## 2019-02-22 RX ORDER — ACETAMINOPHEN 325 MG/1
650 TABLET ORAL
Status: DISCONTINUED | OUTPATIENT
Start: 2019-02-22 | End: 2019-02-23 | Stop reason: HOSPADM

## 2019-02-22 RX ORDER — KETAMINE HYDROCHLORIDE 10 MG/ML
INJECTION, SOLUTION INTRAMUSCULAR; INTRAVENOUS PRN
Status: DISCONTINUED | OUTPATIENT
Start: 2019-02-22 | End: 2019-02-22

## 2019-02-22 RX ORDER — LIDOCAINE 40 MG/G
CREAM TOPICAL
Status: DISCONTINUED | OUTPATIENT
Start: 2019-02-22 | End: 2019-02-22 | Stop reason: HOSPADM

## 2019-02-22 RX ADMIN — GLYCOPYRROLATE 0.2 MG: 0.2 INJECTION, SOLUTION INTRAMUSCULAR; INTRAVENOUS at 08:25

## 2019-02-22 RX ADMIN — PROPOFOL 40 MG: 10 INJECTION, EMULSION INTRAVENOUS at 08:29

## 2019-02-22 RX ADMIN — PROPOFOL 150 MCG/KG/MIN: 10 INJECTION, EMULSION INTRAVENOUS at 08:23

## 2019-02-22 RX ADMIN — KETAMINE HYDROCHLORIDE 30 MG: 10 INJECTION, SOLUTION INTRAMUSCULAR; INTRAVENOUS at 08:26

## 2019-02-22 RX ADMIN — SODIUM CHLORIDE, SODIUM LACTATE, POTASSIUM CHLORIDE, CALCIUM CHLORIDE: 600; 310; 30; 20 INJECTION, SOLUTION INTRAVENOUS at 07:47

## 2019-02-22 RX ADMIN — ACETAMINOPHEN 975 MG: 325 TABLET ORAL at 07:47

## 2019-02-22 ASSESSMENT — LIFESTYLE VARIABLES: TOBACCO_USE: 0

## 2019-02-22 ASSESSMENT — MIFFLIN-ST. JEOR: SCORE: 1801.48

## 2019-02-22 NOTE — OP NOTE
SURGEON: Felicitas Radford MD      ASSISTANT: Theodore Richardson MD Surgery Resident     PREOPERATIVE DIAGNOSIS: Rectal cancer with watch and wait protocol. Need for surveillance. Radiation proctitis.      POSTOPERATIVE DIAGNOSIS: Rectal cancer with watch and wait protocol. Need for surveillance. Radiation proctitis.      PROCEDURE: Examination under anesthesia, flexible sigmoidoscopy, application of formalin to rectum.      INDICATIONS: Louie Greco is a 58 year old male who was found to have a rectal cancer and had a complete response to chemoradiotherapy. He is now on a watch and wait protocol and we are examining the area under anesthesia because of the friability and pain in the area. He also has some radiation proctitis that has responded to formalin in the past.  He does continue to have some urgency with bowel movements and frequency with his bowel habits as well as mucous.  The risks and benefits of surgery were thoroughly discussed with the patient and he agreed to proceed.      DESCRIPTION OF PROCEDURE: The patient was brought to the operating room, placed in left lateral decubitus. Deep sedation was induced with intravenous medicines with deep sedation and monitored anesthesia care. We prepped and draped the area in the usual fashion. We performed digital rectal examination and anoscopy which demonstrated a somewhat stenotic anal canal and an area of distal anterior scarring and some minimal radiation changes. There were no palpable abnormalities and the prostate by palpation was normal. There was no nodularity or induration. A flexible sigmoidoscopy with CO2 was then performed and the scope was advanced to the proximal descending colon without difficulty. There was diverticulosis and no signs of divertciulitis. There were no other polyps or other lesions. There were some moderate radiation changes and the scar was visible, but no signs of disease or additional lesions.  The radiation changes  treated with an application of 10% formalin using large swabs with several applications. At the end the case, all instruments and sponge counts were correct x2. Retroflexion was not performed. The patient was emerged from anesthesia and taken to postoperative anesthesia care unit in good condition.       SPECIMEN: None      ESTIMATED BLOOD LOSS: Minimal.       URINE OUTPUT: Not measured.       AROLDO NAIK MD   Colon and Rectal Surgery Staff  [facility]

## 2019-02-22 NOTE — BRIEF OP NOTE
[facility]    Brief Operative Note    Pre-operative diagnosis: Rectal Cancer  Post-operative diagnosis * No post-op diagnosis entered *  Procedure: Procedure(s):  Examination Under Anesthesia  Intraoperative Flexible Sigmoidoscopy, Application of Formalin to rectum  Surgeon: Surgeon(s) and Role:     * Felicitas Radford MD - Primary  Anesthesia: Monitor Anesthesia Care   Estimated blood loss: Minimal  Drains: None  Specimens: * No specimens in log *  Findings:   Anal stenosis and narrow anal canal.  Complications: None.  Implants: None.      Theodore Richardson MD  General Surgery PGY-1  Pager [phone]

## 2019-02-22 NOTE — DISCHARGE INSTRUCTIONS
Anorectal Surgery Instructions    What can I expect after anorectal surgery?  Most anorectal procedures are done as outpatient surgery, and you go home the same day as the procedure. A few surgical procedures will require that you stay in the hospital for about one to three days. No matter where the procedure is done or how long or short it takes, these recommendations will help you heal and feel more comfortable.    Medicines:  The anal area is very sensitive; you can expect to have some pain for up to 2-4 weeks after the procedure. Your doctor will give you a prescription for one or more pain medications.    Take naprosyn 500 mg twice a day OR ibuprofen 600 mg four times a day     Take this on a regular basis (not as needed) following your surgery.     The drugs are best taken with food.  Do not take if it causes stomach upset or if you have a history of ulcers or gastritis. You can stop the naprosyn (or ibuprofen) or reduce the dose when you are feeling better.    DO NOT use naprosyn, ibuprofen, or other similar agents (eg. Advil or Aleve) if you have inflammatory bowel disease (Ulcerative Colitis or Crohn's disease) or if your doctor as advised you against using these medications    Take acetominaphen (Tylenol) 650-1000 mg four times a day.     Take this on a regular basis (not as needed) following surgery for pain control.     Take the lower dose if you are >65 years old or have liver disease. The maximum dose of acetominaphen is 4000 mg a day. You can stop the acetaminophen or reduce the dose when you are feeling better.    It is important to realize that many narcotic pain relievers (including vicodin, percocet, tylenol #3) also have acetaminophen, and excessive doses of acetaminophen can be dangerous, so do not take these in addition to acetominaphen.  You may take narcotics that don't contain acetominaphen such as oxycodone.      Take oxycodone AS NEEDED in addition to the acetominaphen and naprosyn.       Because narcotics have side effects (including constipation), you should reduce your use of these medications as tolerated as your pain improves.    *In general, the best strategy is to take (if you are able to tolerate it) the tylenol and naprosen on a regular basis until your pain has largely gone away. You can take the narcotic pain medicine as needed in addition to the tylenol and ibuprofen. As your pain begins to lessen, you should cut back on your narcotic use while continuing to take your regular tylenol and naprosyn doses.      Refilling prescriptions. If you need additional pain medication, please call the triage nurse at [phone] during normal business hours (8 a.m. to 4 p.m., Monday though Friday) or have your pharmacy fax a refill request to [phone]. If you call after hours or on the weekends, the doctor on call may not know you personally and may not renew narcotic pain medication by phone. Call your primary care provider for all other medication refills.    Perineal care:  External gauze dressing can be removed the morning after surgery. If you have an adhesive dressing stuck to the incision, DO NOT remove this.   Tub baths:    If possible, take a tub bath immediately after each bowel movement.     Baths should be take at least 3 times daily for the first week to 10 days following your procedure. You should soak in the tub for 10 to 15 minutes each time with water as warm as you can tolerate.     Even after you go back to work, it is a good idea to sit in the tub in the morning, after returning from work, and again in the evening before bedtime.    Bleeding/Infection:    You can expect to have some bleeding after bowel movements, but it should stop soon after you wipe.     Use a wet cloth or perianal pad (Tucks or Preparation H pads) to gently wipe the area after each bowel movement.    Do not rub the anal area or use a lot of pressure.    Using a spray bottle filled with warm water helps  loosen any remaining stool. Blot gently with a soft dry cloth or tissue paper.    Infection around the anal opening is not very common. The anal area has excellent blood supply, which helps the area to heal. Bloody discharge after bowel movements is normal and may last 2 to 4 weeks after your surgery. However, if you bleed between bowel movements and cannot get it to stop, call the triage nurse immediately [phone].    Bowel function:  Take a fiber supplement such as Metamucil, which is over the counter. It is important to drink six to eight glasses of water or juice everyday when using fiber products.    If you do not have a bowel movement after 1-2 days:    Take Milk of Magnesia-2 tablespoons.       If there are no results, repeat this or add over the counter Miralax.      If you still do not have results, contact the clinic.     If there are no results, repeat this. Stop taking Milk of Magnesia or other laxatives if you begin to have diarrhea.    * Constipation will cause you to strain when you have a bowel movement. The hard stool will be difficult to pass, will increase pain and bleeding, and will slow down healing.  Try to avoid constipation and/or diarrhea as this can make the pain and bleeding worse.    * It is important to have regular bowel movements at least every other day and to keep your stool soft.  A high fiber diet, including at least four servings of fruits or vegetables daily, will help to keep your bowel movements regular and soft.    Activity:  After your procedure, there are no restrictions on your activity     except restrictions surrounding being on narcotics and in pain, such as no heavy machine operating or driving.     You may walk, climb stairs, ride in a car, and sit as tolerated.     It is helpful to avoid sitting in one position for long periods (2 or more hours).    After some surgeries, you may be told not to perform any lifting (more than 10 pounds) for several weeks after  surgery.    When to call:  When do I need to call the doctor or triage nurse?    If you experience any of the problems listed here, call our triage nurse during business hours ([phone]).     The nurse will help you with your problem or have the doctor call you.     After hours and on weekends, please call the main hospital number ([phone]) and ask for the colon and rectal surgery person on call.     Some is available to help you 24 hours a day, seven days a week.    Call for:   ? Fever greater than 101 degrees   ? Chills   ? Foul-smelling drainage   ? Nausea and vomiting   ? Diarrhea - greater than 3 water stools in 24 hours   ? Constipation - no bowel movement after 3 days   ? Severe bleeding that does not stop soon after a bowel movement   ? Problems with the incision, including increased pain, swelling, or redness      [facility]  Home Care Following Anesthesia  For 24 hours after surgery:  1. Get plenty of rest.  A responsible adult must stay with you for at least 24 hours after you leave the surgery center.  2. Do not drive or use heavy equipment.  If you have weakness or tingling, don't drive or use heavy equipment until this feeling goes away.   3. Do not drink alcohol.   4. Avoid strenuous or risky activities.  Ask for help when climbing stairs.  5. You may feel lightheaded.  IF so, sit for a few minutes before standing.  Have someone help you get up.   6. If you have nausea (feel sick to your stomach): Drink only clear liquids such as apple juice, ginger ale, broth or 7-Up.  Rest may also help.  Be sure to drink enough fluids.  Move to a regular diet as you feel able.   7. You may have a slight fever.  Call the doctor if your fever is over 100 F (37.7 C) (taken under the tongue) or lasts longer than 24 hours.  8. You may have a dry mouth, a sore throat, muscle aches or trouble sleeping. These should go away after 24 hours.  9. Do not make important or legal  decisions.               Tips for taking pain medications  To get the best pain relief possible, remember these points:    Take pain medications as directed, before pain becomes severe.    Pain medication can upset your stomach: taking it with food may help.    Constipation is a common side effect of pain medication. Drink plenty of  fluids.    Eat foods high in fiber. Take a stool softener if recommended by your doctor or pharmacist.    Do not drink alcohol, drive or operate machinery while taking pain medications.    Ask about other ways to control pain, such as with heat, ice or relaxation.    Tylenol/Acetaminophen Consumption  To help encourage the safe use of acetaminophen, the makers of TYLENOL  have lowered the maximum daily dose for single-ingredient Extra Strength TYLENOL  (acetaminophen) products sold in the [place] from 8 pills per day (4,000 mg) to 6 pills per day (3,000 mg). The dosing interval has also changed from 2 pills every 4-6 hours to 2 pills every 6 hours.    If you feel your pain relief is insufficient, you may take Tylenol/Acetaminophen in addition to your narcotic pain medication.     Be careful not to exceed 3,000 mg of Tylenol/Acetaminophen in a 24 hour period from all sources.    If you are taking extra strength Tylenol/acetaminophen (500 mg), the maximum dose is 6 tablets in 24 hours.    If you are taking regular strength acetaminophen (325 mg), the maximum dose is 9 tablets in 24 hours.    Call a doctor for any of the followin. Signs of infection (fever, growing tenderness at the surgery site, a large amount of drainage or bleeding, severe pain, foul-smelling drainage, redness, swelling).  2. It has been over 8 to 10 hours since surgery and you are still not able to urinate (pass water).  3. Headache for over 24 hours.    Your doctor is:       Dr. Felicitas Radford, Colon Rectal: [phone]               Or dial [phone] and ask for the resident on call for:  Colon  Rectal  For emergency care, call the:  [facility]:  [phone] (TTY for hearing impaired: [phone])

## 2019-02-22 NOTE — ANESTHESIA POSTPROCEDURE EVALUATION
Anesthesia POST Procedure Evaluation    Patient: Louie Greco   MRN:     [id number] Gender:   male   Age:    58 year old :      1960        Preoperative Diagnosis: Rectal Cancer   Procedure(s):  Examination Under Anesthesia  Intraoperative Flexible Sigmoidoscopy, Application of Formalin to rectum   Postop Comments: No value filed.       Anesthesia Type:  MAC    Reportable Event: NO     PAIN: Uncomplicated   Sign Out status: Comfortable, Well controlled pain     PONV: No PONV   Sign Out status:  No Nausea or Vomiting     Neuro/Psych: Uneventful perioperative course   Sign Out Status: Preoperative baseline; Age appropriate mentation     Airway/Resp.: Uneventful perioperative course   Sign Out Status: Non labored breathing, age appropriate RR; Resp. Status within EXPECTED Parameters     CV: Uneventful perioperative course   Sign Out status: Appropriate BP and perfusion indices; Appropriate HR/Rhythm     Disposition:   Sign Out in:  PACU  Disposition:  Phase II; Home  Recovery Course: Uneventful  Follow-Up: Not required           Last Anesthesia Record Vitals:  CRNA VITALS  2019 0815 - 2019 0915      2019             Pulse:  80    SpO2:  95 %    Resp Rate (set):  10          Last PACU/Preop Vitals:  Vitals:    19 0845 19 0900 19 0915   BP: 106/66 117/72 116/51   Resp: 14 14 14   Temp: 36.4  C (97.6  F)  36.4  C (97.5  F)   SpO2: 96% 100% 98%         Electronically Signed By: Robbie Cortez MD, 2019, 2:13 PM

## 2019-02-22 NOTE — ANESTHESIA CARE TRANSFER NOTE
Patient: Louie Gerco    Procedure(s):  Examination Under Anesthesia  Intraoperative Flexible Sigmoidoscopy, Application of Formalin to rectum    Diagnosis: Rectal Cancer  Diagnosis Additional Information: No value filed.    Anesthesia Type:   No value filed.     Note:  Airway :Room Air  Patient transferred to:Phase II  Handoff Report: Identifed the Patient, Identified the Reponsible Provider, Reviewed the pertinent medical history, Discussed the surgical course, Reviewed Intra-OP anesthesia mangement and issues during anesthesia, Set expectations for post-procedure period and Allowed opportunity for questions and acknowledgement of understanding      Vitals: (Last set prior to Anesthesia Care Transfer)    CRNA VITALS  2/22/2019 0815 - 2/22/2019 0850      2/22/2019             Pulse:  80    SpO2:  95 %    Resp Rate (set):  10                Electronically Signed By: QUINTON Bae CRNA  February 22, 2019  8:50 AM

## 2019-02-22 NOTE — ANESTHESIA PREPROCEDURE EVALUATION
Anesthesia Pre-Procedure Evaluation    Patient: Louie Greco   MRN:     [id number] Gender:   male   Age:    58 year old :      1960        Preoperative Diagnosis: Malignant Neoplasm of Rectum   Procedure(s):  Examination Under Anesthesia, Possible Biopsies  Flexible Sigmoidoscopy     Past Medical History:   Diagnosis Date     Childhood asthma      Epididymitis, bilateral     18 years old     Inguinal hernia      Mumps      Nephrolithiasis          Rectal cancer (H)     low rectal cancer     Shingles       Past Surgical History:   Procedure Laterality Date     COLONOSCOPY N/A 2016    Procedure: COMBINED COLONOSCOPY, SINGLE OR MULTIPLE BIOPSY/POLYPECTOMY BY BIOPSY;  Surgeon: Chelsea Thompson MD;  Location: [facility]     COLONOSCOPY N/A 2017    Procedure: COLONOSCOPY;;  Surgeon: Felicitas Radford MD;  [facility]     COLONOSCOPY N/A 2017    Procedure: COLONOSCOPY;;  Surgeon: Felicitas Radford MD;  [facility]     COMBINED CYSTOSCOPY, RETROGRADES, URETEROSCOPY, LASER HOLMIUM LITHOTRIPSY URETER(S), INSERT STENT Left 2018    Procedure: COMBINED CYSTOSCOPY, RETROGRADES, URETEROSCOPY, LASER HOLMIUM LITHOTRIPSY URETER(S), INSERT STENT;  Cysto, left ureteroscopy, holmium laser, retrogrades, stent placement;  Surgeon: Bola Worthy MD;  Location: [facility]     EXAM UNDER ANESTHESIA ANUS N/A 2017    Procedure: EXAM UNDER ANESTHESIA ANUS;  Examination Under Anesthesia, flex sigmoidoscopy with biopsies and formalin application;  Surgeon: Felicitas Radford MD;  [facility]     EXAM UNDER ANESTHESIA ANUS N/A 2017    Procedure: EXAM UNDER ANESTHESIA ANUS;  Examination Under Anesthesia Anus, Colonoscopy, application of formalin;  Surgeon: Felicitas Radford MD;  [facility]     EXAM UNDER ANESTHESIA ANUS N/A 2017    Procedure: EXAM UNDER ANESTHESIA ANUS;  Examination Under Anesthesia Anus, Colonoscopy;  Surgeon: Malina  Felicitas HIDALGO MD;  [facility]     EXAM UNDER ANESTHESIA ANUS N/A 5/11/2018    Procedure: EXAM UNDER ANESTHESIA ANUS;  Examination Under Anesthesia Anus, Interoperative Flexible Sigmoidoscopy, Application of Formalin;  Surgeon: Felicitas Radfrod MD;  [facility]     EXAM UNDER ANESTHESIA ANUS N/A 7/20/2018    Procedure: EXAM UNDER ANESTHESIA ANUS;  Examination Under Anesthesia Anus, Flexible Sigmoidoscopy ;  Surgeon: Felicitas Radford MD;  [facility]     EXAM UNDER ANESTHESIA ANUS N/A 11/30/2018    Procedure: Examination Under Anesthesia, application of formalin to rectum, polypectomy;  Surgeon: Felicitas Radford MD;  [facility]     HC TOOTH EXTRACTION W/FORCEP       LAPAROSCOPIC APPENDECTOMY N/A 7/17/2017    Procedure: LAPAROSCOPIC APPENDECTOMY;  LAPAROSCOPIC APPENDECTOMY;  Surgeon: Bryson Ferguson MD;  [facility]     SIGMOIDOSCOPY FLEXIBLE N/A 12/8/2016    Procedure: SIGMOIDOSCOPY FLEXIBLE;  Surgeon: Felicitas Radford MD;  [facility]     SIGMOIDOSCOPY FLEXIBLE N/A 7/5/2017    Procedure: SIGMOIDOSCOPY FLEXIBLE;;  Surgeon: Felicitas Radford MD;  [facility]     SIGMOIDOSCOPY FLEXIBLE N/A 5/11/2018    Procedure: SIGMOIDOSCOPY FLEXIBLE;;  Surgeon: Felicitas Radford MD;  [facility]     SIGMOIDOSCOPY FLEXIBLE N/A 7/20/2018    Procedure: SIGMOIDOSCOPY FLEXIBLE;;  Surgeon: Felicitas Radford MD;  [facility]     SIGMOIDOSCOPY FLEXIBLE N/A 11/30/2018    Procedure: Flexible Sigmoidoscopy;  Surgeon: Felicitas Radford MD;  [facility]     TESTICLE SURGERY       VASCULAR SURGERY      Right chest port     VASECTOMY       VASECTOMY            Anesthesia Evaluation     . Pt has had prior anesthetic.     No history of anesthetic complications          ROS/MED HX    ENT/Pulmonary:     (+)Intermittent asthma Treatment: Inhaler prn,  , . .   (-) tobacco use   Neurologic:  - neg neurologic ROS     Cardiovascular:  - neg  cardiovascular ROS   (+) ----. : . . . :. . Previous cardiac testing date:results:date: results:ECG reviewed date:2017 results:Sinus tachycardia, non specific T wave abnormaility date: results:          METS/Exercise Tolerance:  >4 METS   Hematologic:         Musculoskeletal:         GI/Hepatic:         Renal/Genitourinary:     (+) Nephrolithiasis ,       Endo:  - neg endo ROS       Psychiatric:  - neg psychiatric ROS       Infectious Disease:  - neg infectious disease ROS       Malignancy:   (+) Malignancy History of GI          Other:    (+) no H/O Chronic Pain,                       PHYSICAL EXAM:   Mental Status/Neuro:    Airway: Facies: Feasible  Mallampati: I  Mouth/Opening: Full  TM distance: > 6 cm  Neck ROM: Full   Respiratory: Auscultation: CTAB     Resp. Rate: Normal     Resp. Effort: Normal      CV: Rhythm: Regular  Rate: Age appropriate  Heart: Normal Sounds   Comments:      Dental: Normal                  Lab Results   Component Value Date    WBC 4.3 01/04/2019    HGB 14.5 01/04/2019    HCT 42.9 01/04/2019     01/04/2019    .0 (H) 07/17/2017    SED 34 (H) 07/17/2017     01/04/2019    POTASSIUM 4.1 01/04/2019    CHLORIDE 107 01/04/2019    CO2 26 01/04/2019    BUN 20 01/04/2019    CR 0.75 01/04/2019    GLC 90 01/04/2019    FRANDY 8.8 01/04/2019    MAG 2.0 07/17/2017    ALBUMIN 3.6 01/04/2019    PROTTOTAL 6.9 01/04/2019    ALT 36 01/04/2019    AST 18 01/04/2019    ALKPHOS 69 01/04/2019    BILITOTAL 1.0 01/04/2019    LIPASE 192 02/14/2018    PTT 31 07/10/2017    INR 1.02 07/10/2017       Preop Vitals  BP Readings from Last 3 Encounters:   02/20/19 116/76   01/04/19 116/81   11/30/18 130/79    Pulse Readings from Last 3 Encounters:   02/20/19 68   01/04/19 78   11/30/18 78      Resp Readings from Last 3 Encounters:   11/30/18 16   07/31/18 16   07/20/18 16    SpO2 Readings from Last 3 Encounters:   02/20/19 96%   01/04/19 95%   11/30/18 99%      Temp Readings from Last 1 Encounters:  "  02/20/19 35.6  C (96  F) (Oral)    Ht Readings from Last 1 Encounters:   02/20/19 1.791 m (5' 10.5\")      Wt Readings from Last 1 Encounters:   02/20/19 97.4 kg (214 lb 11.2 oz)    Estimated body mass index is 30.37 kg/m  as calculated from the following:    Height as of 2/20/19: 1.791 m (5' 10.5\").    Weight as of 2/20/19: 97.4 kg (214 lb 11.2 oz).     LDA:  Peripheral IV 02/16/18 (Active)   Number of days: 371       Peripheral IV 07/20/18 Right Hand (Active)   Number of days: 217       Peripheral IV 02/22/19 Left Hand (Active)   Site Assessment WDL 2/22/2019  7:54 AM   Line Status Infusing 2/22/2019  7:54 AM   Phlebitis Scale 0-->no symptoms 2/22/2019  7:54 AM   Number of days: 0       Ureteral Drain/Stent Left ureter 6 fr (Active)   Number of days: 371            Assessment:   ASA SCORE: 2    NPO Status: > 6 hours since completed Solid [employer]   Documentation: H&P complete; Preop Testing complete; Consents complete   Proceeding: Proceed without further delay  Tobacco Use:  NO Active use of Tobacco/UNKNOWN Tobacco use status     Plan:   Anes. Type:  MAC      Induction:  IV (Standard)   Airway: Native Airway   Access/Monitoring: PIV   Maintenance: Propofol; IV   Emergence: Procedure Site   Logistics: Same Day Surgery     Postop Pain/Sedation Strategy:  Standard-Options: Opioids PRN     PONV Management:  Adult Risk Factors:, Non-Smoker, Postop Opioids  Prevention: Propofol Infusion; Ondansetron     CONSENT: Direct conversation   Plan and risks discussed with: Patient                                Robbie Cortez MD  "

## 2019-03-29 ENCOUNTER — TELEPHONE (OUTPATIENT)
Dept: SURGERY | Facility: CLINIC | Age: 59
End: 2019-03-29

## 2019-05-02 ENCOUNTER — HOSPITAL ENCOUNTER (OUTPATIENT)
Facility: CLINIC | Age: 59
End: 2019-05-02
Attending: COLON & RECTAL SURGERY | Admitting: COLON & RECTAL SURGERY

## 2019-05-02 ENCOUNTER — HOSPITAL ENCOUNTER (OUTPATIENT)
Facility: CLINIC | Age: 59
End: 2019-05-02
Attending: COLON & RECTAL SURGERY | Admitting: COLON & RECTAL SURGERY
Payer: COMMERCIAL

## 2019-05-02 DIAGNOSIS — C20 RECTAL CANCER (H): Primary | ICD-10-CM

## 2019-05-07 ENCOUNTER — TELEPHONE (OUTPATIENT)
Dept: SURGERY | Facility: CLINIC | Age: 59
End: 2019-05-07

## 2019-05-07 NOTE — TELEPHONE ENCOUNTER
[facility] Call Center    Phone Message    May a detailed message be left on voicemail: yes    Reason for Call: Other: Pt had called to reschedule surgery with provider on 6/14. Writer had spoken to Lanie in surgery clinic, who stated they would return call to pt.  Pt had wanted to mention his preferred dates for rescheduling. He was hoping for 6/21 or any time that week, preferably in the early morning. Please call when available.    Action Taken: Message routed to:  [facility] ([facility] S[facility]

## 2019-05-14 ENCOUNTER — TELEPHONE (OUTPATIENT)
Dept: SURGERY | Facility: CLINIC | Age: 59
End: 2019-05-14

## 2019-05-14 DIAGNOSIS — C20 RECTAL CANCER (H): Primary | ICD-10-CM

## 2019-05-14 NOTE — TELEPHONE ENCOUNTER
Patient is scheduled for surgery with Dr. Duglas Tuttle      Spoke or left message with: Louie    Date of Surgery: 6/28/2019    Location: [facility]    H&P: Scheduled with PCP    Additional imaging/appointments: NA    Surgery packet: Will mail

## 2019-06-24 ENCOUNTER — OFFICE VISIT (OUTPATIENT)
Dept: FAMILY MEDICINE | Facility: CLINIC | Age: 59
End: 2019-06-24
Payer: COMMERCIAL

## 2019-06-24 VITALS
BODY MASS INDEX: 29.96 KG/M2 | DIASTOLIC BLOOD PRESSURE: 71 MMHG | TEMPERATURE: 97.8 F | HEART RATE: 82 BPM | OXYGEN SATURATION: 96 % | WEIGHT: 214 LBS | HEIGHT: 71 IN | SYSTOLIC BLOOD PRESSURE: 109 MMHG

## 2019-06-24 DIAGNOSIS — R97.20 ELEVATED PROSTATE SPECIFIC ANTIGEN (PSA): ICD-10-CM

## 2019-06-24 DIAGNOSIS — C20 MALIGNANT NEOPLASM OF RECTUM (H): ICD-10-CM

## 2019-06-24 DIAGNOSIS — Z01.818 PREOP GENERAL PHYSICAL EXAM: Primary | ICD-10-CM

## 2019-06-24 DIAGNOSIS — Z00.00 ROUTINE GENERAL MEDICAL EXAMINATION AT A HEALTH CARE FACILITY: ICD-10-CM

## 2019-06-24 LAB
ALBUMIN SERPL-MCNC: 4 G/DL (ref 3.4–5)
ALP SERPL-CCNC: 68 U/L (ref 40–150)
ALT SERPL W P-5'-P-CCNC: 30 U/L (ref 0–70)
ANION GAP SERPL CALCULATED.3IONS-SCNC: 8 MMOL/L (ref 3–14)
AST SERPL W P-5'-P-CCNC: 20 U/L (ref 0–45)
BILIRUB SERPL-MCNC: 1.6 MG/DL (ref 0.2–1.3)
BUN SERPL-MCNC: 17 MG/DL (ref 7–30)
CALCIUM SERPL-MCNC: 8.9 MG/DL (ref 8.5–10.1)
CEA SERPL-MCNC: 1.5 UG/L (ref 0–2.5)
CHLORIDE SERPL-SCNC: 106 MMOL/L (ref 94–109)
CHOLEST SERPL-MCNC: 161 MG/DL
CO2 SERPL-SCNC: 26 MMOL/L (ref 20–32)
CREAT SERPL-MCNC: 0.76 MG/DL (ref 0.66–1.25)
ERYTHROCYTE [DISTWIDTH] IN BLOOD BY AUTOMATED COUNT: 13.4 % (ref 10–15)
GFR SERPL CREATININE-BSD FRML MDRD: >90 ML/MIN/{1.73_M2}
GLUCOSE SERPL-MCNC: 89 MG/DL (ref 70–99)
HCT VFR BLD AUTO: 44.4 % (ref 40–53)
HDLC SERPL-MCNC: 41 MG/DL
HGB BLD-MCNC: 15.3 G/DL (ref 13.3–17.7)
LDLC SERPL CALC-MCNC: 96 MG/DL
MCH RBC QN AUTO: 30.3 PG (ref 26.5–33)
MCHC RBC AUTO-ENTMCNC: 34.5 G/DL (ref 31.5–36.5)
MCV RBC AUTO: 88 FL (ref 78–100)
NONHDLC SERPL-MCNC: 120 MG/DL
PLATELET # BLD AUTO: 186 10E9/L (ref 150–450)
POTASSIUM SERPL-SCNC: 4.4 MMOL/L (ref 3.4–5.3)
PROT SERPL-MCNC: 6.9 G/DL (ref 6.8–8.8)
PSA SERPL-ACNC: 5.61 UG/L (ref 0–4)
RBC # BLD AUTO: 5.05 10E12/L (ref 4.4–5.9)
SODIUM SERPL-SCNC: 140 MMOL/L (ref 133–144)
TRIGL SERPL-MCNC: 119 MG/DL
WBC # BLD AUTO: 4.7 10E9/L (ref 4–11)

## 2019-06-24 PROCEDURE — 36415 COLL VENOUS BLD VENIPUNCTURE: CPT | Performed by: INTERNAL MEDICINE

## 2019-06-24 PROCEDURE — 85027 COMPLETE CBC AUTOMATED: CPT | Performed by: INTERNAL MEDICINE

## 2019-06-24 PROCEDURE — 80053 COMPREHEN METABOLIC PANEL: CPT | Performed by: INTERNAL MEDICINE

## 2019-06-24 PROCEDURE — 80061 LIPID PANEL: CPT | Performed by: INTERNAL MEDICINE

## 2019-06-24 PROCEDURE — G0103 PSA SCREENING: HCPCS | Performed by: INTERNAL MEDICINE

## 2019-06-24 PROCEDURE — 99214 OFFICE O/P EST MOD 30 MIN: CPT | Performed by: INTERNAL MEDICINE

## 2019-06-24 PROCEDURE — 82378 CARCINOEMBRYONIC ANTIGEN: CPT | Performed by: INTERNAL MEDICINE

## 2019-06-24 ASSESSMENT — MIFFLIN-ST. JEOR: SCORE: 1799.89

## 2019-06-24 NOTE — PROGRESS NOTES
[facility]  [address] [facility] [address]  [phone]  Dept: [phone]    PRE-OP EVALUATION:  Today's date: 2019    Louie Greco (: 1960) presents for pre-operative evaluation assessment as requested by Dr. Radford, Felicitas HIDALGO MD.  He requires evaluation and anesthesia risk assessment prior to undergoing surgery/procedure for treatment of Rectal Cancer.    Proposed Surgery/ Procedure: Flexible Sigmoidoscopy, Examination Under Anesthesia  Date of Surgery/ Procedure: 2019  Time of Surgery/ Procedure: 7:15 AM  Hospital/Surgical Facility:  OR  Fax number for surgical facility:   Primary Physician: Philippe Healy  Type of Anesthesia Anticipated: Monitor Anesthesia Care    Patient has a Health Care Directive or Living Will:  YES FULL on file    Preop Questions 2019   Who is doing your surgery? Dr. Radford   What are you having done? Sigmoidoscopy   Date of Surgery/Procedure: 2019   Facility or Hospital where procedure/surgery will be performed: [facility]   1.  Do you have a history of Heart attack, stroke, stent, coronary bypass surgery, or other heart surgery? No   2.  Do you ever have any pain or discomfort in your chest? No   3.  Do you have a history of  Heart Failure? No   4.   Are you troubled by shortness of breath when:  walking on a level surface, or up a slight hill, or at night? No   5.  Do you currently have a cold, bronchitis or other respiratory infection? No   6.  Do you have a cough, shortness of breath, or wheezing? No   7.  Do you sometimes get pains in the calves of your legs when you walk? No   8. Do you or anyone in your family have previous history of blood clots? No   9.  Do you or does anyone in your family have a serious bleeding problem such as prolonged bleeding following surgeries or cuts? No   10. Have you ever had problems with anemia or been told to take iron pills? No   11. Have you had any  abnormal blood loss such as black, tarry or bloody stools? No   12. Have you ever had a blood transfusion? No   13. Have you or any of your relatives ever had problems with anesthesia? No   14. Do you have sleep apnea, excessive snoring or daytime drowsiness? No   15. Do you have any prosthetic heart valves? No   16. Do you have prosthetic joints? No         HPI:     HPI related to upcoming procedure: 59 year old man with rectal cancer who needs a surveillance flex sig under anesthesia.  He feels well and is without complaints.  He can perform 4 METS of physical activity without chest pain or dyspnea.            MEDICAL HISTORY:     Patient Active Problem List    Diagnosis Date Noted     Kidney stone 02/16/2018     Priority: Medium     Elevated prostate specific antigen (PSA) 11/30/2017     Priority: Medium     Appendicitis 07/17/2017     Priority: Medium     Chemotherapy-induced neutropenia (H) 03/21/2017     Priority: Medium     Advance Care Planning 03/09/2017     Priority: Medium     Advance Care Planning 3/9/2017: Receipt of ACP document:  Received: invalid HCD document dated 12/8/2016.  Document not previously scanned. Validation form completed indicating invalid document. Copy sent to client with information and facilitation resources. Validation form sent to be scanned as notation of invalid document received.  Code Status needs to be updated to reflect choices. Confirmed/documented designated decision maker(s).  Added by Araceli Horne MSW Advance Care Planning Liaison with [employer].       Malignant neoplasm of rectum (H) 01/03/2017     Priority: Medium     Rectal bleeding 07/19/2016     Priority: Medium     Plantar fasciitis 11/04/2010     Priority: Medium     Pes anserinus tendinitis 02/08/2010     Priority: Medium      Past Medical History:   Diagnosis Date     Childhood asthma      Epididymitis, bilateral     18 years old     Inguinal hernia      Mumps      Nephrolithiasis     1990     Rectal  cancer (H)     low rectal cancer     Shingles      Past Surgical History:   Procedure Laterality Date     COLONOSCOPY N/A 7/27/2016    Procedure: COMBINED COLONOSCOPY, SINGLE OR MULTIPLE BIOPSY/POLYPECTOMY BY BIOPSY;  Surgeon: Chelsea Thompson MD;  Location: [facility]     COLONOSCOPY N/A 9/13/2017    Procedure: COLONOSCOPY;;  Surgeon: Felicitas Radford MD;  [facility]     COLONOSCOPY N/A 12/13/2017    Procedure: COLONOSCOPY;;  Surgeon: Felicitas Radford MD;  [facility]     COMBINED CYSTOSCOPY, RETROGRADES, URETEROSCOPY, LASER HOLMIUM LITHOTRIPSY URETER(S), INSERT STENT Left 2/16/2018    Procedure: COMBINED CYSTOSCOPY, RETROGRADES, URETEROSCOPY, LASER HOLMIUM LITHOTRIPSY URETER(S), INSERT STENT;  Cysto, left ureteroscopy, holmium laser, retrogrades, stent placement;  Surgeon: Bola Worthy MD;  Location: [facility]     EXAM UNDER ANESTHESIA ANUS N/A 7/5/2017    Procedure: EXAM UNDER ANESTHESIA ANUS;  Examination Under Anesthesia, flex sigmoidoscopy with biopsies and formalin application;  Surgeon: Felicitas Radford MD;  [facility]     EXAM UNDER ANESTHESIA ANUS N/A 9/13/2017    Procedure: EXAM UNDER ANESTHESIA ANUS;  Examination Under Anesthesia Anus, Colonoscopy, application of formalin;  Surgeon: Felicitas Radford MD;  [facility]     EXAM UNDER ANESTHESIA ANUS N/A 12/13/2017    Procedure: EXAM UNDER ANESTHESIA ANUS;  Examination Under Anesthesia Anus, Colonoscopy;  Surgeon: Felicitas Rafdord MD;  [facility]     EXAM UNDER ANESTHESIA ANUS N/A 5/11/2018    Procedure: EXAM UNDER ANESTHESIA ANUS;  Examination Under Anesthesia Anus, Interoperative Flexible Sigmoidoscopy, Application of Formalin;  Surgeon: Felicitas Radford MD;  [facility]     EXAM UNDER ANESTHESIA ANUS N/A 7/20/2018    Procedure: EXAM UNDER ANESTHESIA ANUS;  Examination Under Anesthesia Anus, Flexible Sigmoidoscopy ;  Surgeon: Felicitas Radford MD;   [facility]     EXAM UNDER ANESTHESIA ANUS N/A 11/30/2018    Procedure: Examination Under Anesthesia, application of formalin to rectum, polypectomy;  Surgeon: Felicitas Radford MD;  [facility]     EXAM UNDER ANESTHESIA ANUS N/A 2/22/2019    Procedure: Examination Under Anesthesia;  Surgeon: Felicitas Radford MD;  [facility]     HC TOOTH EXTRACTION W/FORCEP       LAPAROSCOPIC APPENDECTOMY N/A 7/17/2017    Procedure: LAPAROSCOPIC APPENDECTOMY;  LAPAROSCOPIC APPENDECTOMY;  Surgeon: Bryson Ferguson MD;  [facility]     SIGMOIDOSCOPY FLEXIBLE N/A 12/8/2016    Procedure: SIGMOIDOSCOPY FLEXIBLE;  Surgeon: Felicitas Radford MD;  [facility]     SIGMOIDOSCOPY FLEXIBLE N/A 7/5/2017    Procedure: SIGMOIDOSCOPY FLEXIBLE;;  Surgeon: Felicitas Radford MD;  [facility]     SIGMOIDOSCOPY FLEXIBLE N/A 5/11/2018    Procedure: SIGMOIDOSCOPY FLEXIBLE;;  Surgeon: Felicitas Radford MD;  [facility]     SIGMOIDOSCOPY FLEXIBLE N/A 7/20/2018    Procedure: SIGMOIDOSCOPY FLEXIBLE;;  Surgeon: Felicitas Radford MD;  [facility]     SIGMOIDOSCOPY FLEXIBLE N/A 11/30/2018    Procedure: Flexible Sigmoidoscopy;  Surgeon: Felicitas Radford MD;  [facility]     SIGMOIDOSCOPY FLEXIBLE N/A 2/22/2019    Procedure: Intraoperative Flexible Sigmoidoscopy, Application of Formalin to rectum;  Surgeon: Felicitas Radford MD;  [facility]     TESTICLE SURGERY       VASCULAR SURGERY      Right chest port     VASECTOMY       VASECTOMY       Current Outpatient Medications   Medication Sig Dispense Refill     ASPIRIN PO Take by mouth as needed for moderate pain       dibucaine (NUPERCAINAL) 1 % OINT ointment 3 times daily as needed for moderate pain       diltiazem 2% in PLO cream, FV COMPOUNDED, 2% GEL Apply topically 2 times daily       ibuprofen 200 MG capsule Take 200-400 mg by mouth every 6 hours as needed 120 capsule 0     lidocaine (XYLOCAINE) 2  "% topical gel Apply topically 2 times daily as needed for moderate pain 30 mL 3     lidocaine, Anorectal, 5 % CREA Externally apply 1 Application topically 3 times daily as needed 1 Tube 0     neomycin-polymyxin-hydrocortisone (CORTISPORIN) 3.5-31886-7 otic suspension Place 4 drops into both ears 4 times daily 10 mL 0     sildenafil (REVATIO) 20 MG tablet Take 1 tablet (20 mg) by mouth daily as needed 30 tablet 3     VITAMIN D, CHOLECALCIFEROL, PO Take 2,000 Units by mouth daily            Allergies   Allergen Reactions     Ampicillin Diarrhea     Demerol [Meperidine]      Nausea        Social History     Tobacco Use     Smoking status: Never Smoker     Smokeless tobacco: Never Used   Substance Use Topics     Alcohol use: Yes     Alcohol/week: 0.0 oz     Comment: 1 drink a week     History   Drug Use No       REVIEW OF SYSTEMS:   Constitutional, neuro, ENT, endocrine, pulmonary, cardiac, gastrointestinal, genitourinary, musculoskeletal, integument and psychiatric systems are negative, except as otherwise noted.    EXAM:   /71 (BP Location: Right arm, Cuff Size: Adult Large)   Pulse 82   Temp 97.8  F (36.6  C) (Tympanic)   Ht 1.791 m (5' 10.5\")   Wt 97.1 kg (214 lb)   SpO2 96%   BMI 30.27 kg/m      GENERAL APPEARANCE: healthy, alert and no distress     EYES: EOMI,  PERRL     HENT: ear canals and TM's normal and nose and mouth without ulcers or lesions     NECK: no adenopathy, no asymmetry, masses, or scars and thyroid normal to palpation     RESP: lungs clear to auscultation - no rales, rhonchi or wheezes     CV: regular rates and rhythm, normal S1 S2, no S3 or S4 and no murmur, click or rub     ABDOMEN:  soft, nontender, no HSM or masses and bowel sounds normal     MS: extremities normal- no gross deformities noted, no evidence of inflammation in joints, FROM in all extremities.     SKIN: no suspicious lesions or rashes     NEURO: Normal strength and tone, sensory exam grossly normal, mentation intact " and speech normal     PSYCH: mentation appears normal. and affect normal/bright     LYMPHATICS: No cervical adenopathy    DIAGNOSTICS:   No labs or EKG required for low risk surgery (cataract, skin procedure, breast biopsy, etc)    Recent Labs   Lab Test 06/24/19  0828 01/04/19  1017  07/10/17  0943  01/16/17  0800   HGB 15.3 14.5   < > 12.8*   < > 15.1    195   < > 170   < > 207   INR  --   --   --  1.02  --  0.92    139   < >  --    < >  --    POTASSIUM 4.4 4.1   < >  --    < >  --    CR 0.76 0.75   < >  --    < >  --     < > = values in this interval not displayed.        IMPRESSION:   Reason for surgery/procedure: rectal cancer   Diagnosis/reason for consult: Preoperative evaluation     The proposed surgical procedure is considered LOW risk.    REVISED CARDIAC RISK INDEX  The patient has the following serious cardiovascular risks for perioperative complications such as (MI, PE, VFib and 3  AV Block):  No serious cardiac risks  INTERPRETATION: 0 risks: Class I (very low risk - 0.4% complication rate)    The patient has the following additional risks for perioperative complications:  No identified additional risks      ICD-10-CM    1. Preop general physical exam Z01.818    2. Malignant neoplasm of rectum (H) C20    3. Elevated prostate specific antigen (PSA) R97.20 UROLOGY ADULT REFERRAL     OK to proceed with surveillance flex sig  I think he should consult with urology regarding his PSA which is back up again, he has seen Dr. Worthy in the past.  I recommend that he return to Dr. Worthy to discuss further risk stratification of his elevated PSA level.       RECOMMENDATIONS:         --Patient is to take all scheduled medications on the day of surgery EXCEPT for modifications listed below.    APPROVAL GIVEN to proceed with proposed procedure, without further diagnostic evaluation       Signed Electronically by: Philippe Healy MD    Copy of this evaluation report is provided to requesting  physician.    [facility] Preop Guidelines    Revised Cardiac Risk Index

## 2019-06-24 NOTE — LETTER
[address] [facility]  [address]  [place]  [address]  Tel: [phone]    June 25, 2019    Louie Greco  [address] [address]        Dear Luna Greco,    The following letter pertains to your most recent diagnostic tests:     Good news! CEA looks stable.    Resulted Orders   CEA   Result Value Ref Range    CEA 1.5 0 - 2.5 ug/L      Comment:      Assay Method:  Chemiluminescence using Siemens Centaur XP   Lipid panel reflex to direct LDL Fasting   Result Value Ref Range    Cholesterol 161 <200 mg/dL    Triglycerides 119 <150 mg/dL    HDL Cholesterol 41 >39 mg/dL    LDL Cholesterol Calculated 96 <100 mg/dL      Comment:      Desirable:       <100 mg/dl    Non HDL Cholesterol 120 <130 mg/dL   Prostate spec antigen screen   Result Value Ref Range    PSA 5.61 (H) 0 - 4 ug/L      Comment:      Assay Method:  Chemiluminescence using Siemens Vista analyzer   Comprehensive metabolic panel   Result Value Ref Range    Sodium 140 133 - 144 mmol/L    Potassium 4.4 3.4 - 5.3 mmol/L    Chloride 106 94 - 109 mmol/L    Carbon Dioxide 26 20 - 32 mmol/L    Anion Gap 8 3 - 14 mmol/L    Glucose 89 70 - 99 mg/dL    Urea Nitrogen 17 7 - 30 mg/dL    Creatinine 0.76 0.66 - 1.25 mg/dL    GFR Estimate >90 >60 mL/min/[1.73_m2]      Comment:      Non  GFR Calc  Starting 12/18/2018, serum creatinine based estimated GFR (eGFR) will be   calculated using the Chronic Kidney Disease Epidemiology Collaboration   (CKD-EPI) equation.      GFR Estimate If Black >90 >60 mL/min/[1.73_m2]      Comment:       GFR Calc  Starting 12/18/2018, serum creatinine based estimated GFR (eGFR) will be   calculated using the Chronic Kidney Disease Epidemiology Collaboration   (CKD-EPI) equation.      Calcium 8.9 8.5 - 10.1 mg/dL    Bilirubin Total 1.6 (H) 0.2 - 1.3 mg/dL    Albumin 4.0 3.4 - 5.0 g/dL    Protein Total 6.9 6.8 - 8.8 g/dL    Alkaline Phosphatase 68 40 - 150 U/L    ALT 30 0 - 70 U/L    AST  20 0 - 45 U/L   CBC with platelets   Result Value Ref Range    WBC 4.7 4.0 - 11.0 10e9/L    RBC Count 5.05 4.4 - 5.9 10e12/L    Hemoglobin 15.3 13.3 - 17.7 g/dL    Hematocrit 44.4 40.0 - 53.0 %    MCV 88 78 - 100 fl    MCH 30.3 26.5 - 33.0 pg    MCHC 34.5 31.5 - 36.5 g/dL    RDW 13.4 10.0 - 15.0 %    Platelet Count 186 150 - 450 10e9/L       If you have any further questions or problems, please contact our office.      Sincerely,    Philippe Healy MD / kim

## 2019-06-25 NOTE — RESULT ENCOUNTER NOTE
The following letter pertains to your most recent diagnostic tests:    Good news! CEA looks stable.        Sincerely,    Dr. Healy

## 2019-06-26 ENCOUNTER — ANCILLARY PROCEDURE (OUTPATIENT)
Dept: MRI IMAGING | Facility: CLINIC | Age: 59
End: 2019-06-26
Attending: INTERNAL MEDICINE
Payer: COMMERCIAL

## 2019-06-26 ENCOUNTER — ANCILLARY PROCEDURE (OUTPATIENT)
Dept: PET IMAGING | Facility: CLINIC | Age: 59
End: 2019-06-26
Attending: INTERNAL MEDICINE
Payer: COMMERCIAL

## 2019-06-26 DIAGNOSIS — C20 MALIGNANT NEOPLASM OF RECTUM (H): ICD-10-CM

## 2019-06-26 LAB — GLUCOSE SERPL-MCNC: 80 MG/DL (ref 70–99)

## 2019-06-26 RX ORDER — GADOBUTROL 604.72 MG/ML
10 INJECTION INTRAVENOUS ONCE
Status: COMPLETED | OUTPATIENT
Start: 2019-06-26 | End: 2019-06-26

## 2019-06-26 RX ORDER — FUROSEMIDE 10 MG/ML
40 INJECTION INTRAMUSCULAR; INTRAVENOUS ONCE
Status: COMPLETED | OUTPATIENT
Start: 2019-06-26 | End: 2019-06-26

## 2019-06-26 RX ADMIN — GADOBUTROL 10 ML: 604.72 INJECTION INTRAVENOUS at 13:12

## 2019-06-26 RX ADMIN — FUROSEMIDE 40 MG: 10 INJECTION INTRAMUSCULAR; INTRAVENOUS at 08:45

## 2019-06-26 NOTE — DISCHARGE INSTRUCTIONS
MRI Contrast Discharge Instructions    The IV contrast you received today will pass out of your body in your  urine. This will happen in the next 24 hours. You will not feel this process.  Your urine will not change color.    Drink at least 4 extra glasses of water or juice today (unless your doctor  has restricted your fluids). This reduces the stress on your kidneys.  You may take your regular medicines.    If you are on dialysis: It is best to have dialysis today.    If you have a reaction: Most reactions happen right away. If you have  any new symptoms after leaving the hospital (such as hives or swelling),  call your hospital at the correct number below. Or call your family doctor.  If you have breathing distress or wheezing, call 911.    Special instructions: ***    I have read and understand the above information.    Signature:______________________________________ Date:___________    Staff:__________________________________________ Date:___________     Time:__________    [place] [facility] Departments:    ___[place]: [phone]  ___[facility]: [phone]  ___[place]: [phone] ___[facility]: [phone]  ___[facility]: [phone]  ___[facility]: [phone]  ___[phone]  ___[facility]: [phone]  ___[phone]

## 2019-06-27 ENCOUNTER — ANESTHESIA EVENT (OUTPATIENT)
Dept: SURGERY | Facility: AMBULATORY SURGERY CENTER | Age: 59
End: 2019-06-27

## 2019-06-28 ENCOUNTER — HOSPITAL ENCOUNTER (OUTPATIENT)
Facility: AMBULATORY SURGERY CENTER | Age: 59
End: 2019-06-28
Attending: COLON & RECTAL SURGERY
Payer: COMMERCIAL

## 2019-06-28 ENCOUNTER — ANESTHESIA (OUTPATIENT)
Dept: SURGERY | Facility: AMBULATORY SURGERY CENTER | Age: 59
End: 2019-06-28

## 2019-06-28 VITALS
RESPIRATION RATE: 14 BRPM | DIASTOLIC BLOOD PRESSURE: 64 MMHG | TEMPERATURE: 98.1 F | WEIGHT: 210 LBS | BODY MASS INDEX: 29.4 KG/M2 | SYSTOLIC BLOOD PRESSURE: 105 MMHG | OXYGEN SATURATION: 98 % | HEIGHT: 71 IN | HEART RATE: 74 BPM

## 2019-06-28 RX ORDER — ONDANSETRON 4 MG/1
4 TABLET, ORALLY DISINTEGRATING ORAL EVERY 30 MIN PRN
Status: DISCONTINUED | OUTPATIENT
Start: 2019-06-28 | End: 2019-06-29 | Stop reason: HOSPADM

## 2019-06-28 RX ORDER — LIDOCAINE 40 MG/G
CREAM TOPICAL
Status: DISCONTINUED | OUTPATIENT
Start: 2019-06-28 | End: 2019-06-28 | Stop reason: HOSPADM

## 2019-06-28 RX ORDER — PROPOFOL 10 MG/ML
INJECTION, EMULSION INTRAVENOUS PRN
Status: DISCONTINUED | OUTPATIENT
Start: 2019-06-28 | End: 2019-06-28

## 2019-06-28 RX ORDER — SODIUM CHLORIDE, SODIUM LACTATE, POTASSIUM CHLORIDE, CALCIUM CHLORIDE 600; 310; 30; 20 MG/100ML; MG/100ML; MG/100ML; MG/100ML
INJECTION, SOLUTION INTRAVENOUS CONTINUOUS
Status: DISCONTINUED | OUTPATIENT
Start: 2019-06-28 | End: 2019-06-29 | Stop reason: HOSPADM

## 2019-06-28 RX ORDER — FENTANYL CITRATE 50 UG/ML
25-50 INJECTION, SOLUTION INTRAMUSCULAR; INTRAVENOUS EVERY 5 MIN PRN
Status: DISCONTINUED | OUTPATIENT
Start: 2019-06-28 | End: 2019-06-28 | Stop reason: HOSPADM

## 2019-06-28 RX ORDER — ACETAMINOPHEN 325 MG/1
975 TABLET ORAL ONCE
Status: COMPLETED | OUTPATIENT
Start: 2019-06-28 | End: 2019-06-28

## 2019-06-28 RX ORDER — GABAPENTIN 300 MG/1
300 CAPSULE ORAL ONCE
Status: COMPLETED | OUTPATIENT
Start: 2019-06-28 | End: 2019-06-28

## 2019-06-28 RX ORDER — PROPOFOL 10 MG/ML
INJECTION, EMULSION INTRAVENOUS CONTINUOUS PRN
Status: DISCONTINUED | OUTPATIENT
Start: 2019-06-28 | End: 2019-06-28

## 2019-06-28 RX ORDER — NALOXONE HYDROCHLORIDE 0.4 MG/ML
.1-.4 INJECTION, SOLUTION INTRAMUSCULAR; INTRAVENOUS; SUBCUTANEOUS
Status: DISCONTINUED | OUTPATIENT
Start: 2019-06-28 | End: 2019-06-29 | Stop reason: HOSPADM

## 2019-06-28 RX ORDER — OXYCODONE HYDROCHLORIDE 5 MG/1
5 TABLET ORAL EVERY 4 HOURS PRN
Status: DISCONTINUED | OUTPATIENT
Start: 2019-06-28 | End: 2019-06-29 | Stop reason: HOSPADM

## 2019-06-28 RX ORDER — SODIUM CHLORIDE, SODIUM LACTATE, POTASSIUM CHLORIDE, CALCIUM CHLORIDE 600; 310; 30; 20 MG/100ML; MG/100ML; MG/100ML; MG/100ML
INJECTION, SOLUTION INTRAVENOUS CONTINUOUS
Status: DISCONTINUED | OUTPATIENT
Start: 2019-06-28 | End: 2019-06-28 | Stop reason: HOSPADM

## 2019-06-28 RX ORDER — KETOROLAC TROMETHAMINE 30 MG/ML
30 INJECTION, SOLUTION INTRAMUSCULAR; INTRAVENOUS EVERY 6 HOURS PRN
Status: DISCONTINUED | OUTPATIENT
Start: 2019-06-28 | End: 2019-06-29 | Stop reason: HOSPADM

## 2019-06-28 RX ORDER — ONDANSETRON 2 MG/ML
4 INJECTION INTRAMUSCULAR; INTRAVENOUS EVERY 30 MIN PRN
Status: DISCONTINUED | OUTPATIENT
Start: 2019-06-28 | End: 2019-06-29 | Stop reason: HOSPADM

## 2019-06-28 RX ORDER — KETAMINE HYDROCHLORIDE 10 MG/ML
INJECTION, SOLUTION INTRAMUSCULAR; INTRAVENOUS PRN
Status: DISCONTINUED | OUTPATIENT
Start: 2019-06-28 | End: 2019-06-28

## 2019-06-28 RX ADMIN — SODIUM CHLORIDE, SODIUM LACTATE, POTASSIUM CHLORIDE, CALCIUM CHLORIDE: 600; 310; 30; 20 INJECTION, SOLUTION INTRAVENOUS at 06:57

## 2019-06-28 RX ADMIN — ACETAMINOPHEN 975 MG: 325 TABLET ORAL at 06:40

## 2019-06-28 RX ADMIN — PROPOFOL 50 MG: 10 INJECTION, EMULSION INTRAVENOUS at 07:35

## 2019-06-28 RX ADMIN — PROPOFOL 150 MCG/KG/MIN: 10 INJECTION, EMULSION INTRAVENOUS at 07:35

## 2019-06-28 RX ADMIN — KETAMINE HYDROCHLORIDE 20 MG: 10 INJECTION, SOLUTION INTRAMUSCULAR; INTRAVENOUS at 07:38

## 2019-06-28 RX ADMIN — GABAPENTIN 300 MG: 300 CAPSULE ORAL at 06:40

## 2019-06-28 ASSESSMENT — MIFFLIN-ST. JEOR: SCORE: 1781.74

## 2019-06-28 ASSESSMENT — LIFESTYLE VARIABLES: TOBACCO_USE: 0

## 2019-06-28 NOTE — BRIEF OP NOTE
[facility]    Brief Operative Note    Pre-operative diagnosis: History of Rectal Cancer  Post-operative diagnosis Same  Procedure: Procedure(s):  Examination Under Anesthesia  Flexible Sigmoidoscopy  Surgeon: Surgeon(s) and Role:     * Felicitas Radford MD - Primary  Anesthesia: Monitor Anesthesia Care   Estimated blood loss: None  Drains: None  Specimens: * No specimens in log *  Findings:   Stenotic anal opening. Scar at the site of previous cancer. No other lesions or masses identified. No biopsies taken..  Complications: None.  Implants:  * No implants in log *

## 2019-06-28 NOTE — ANESTHESIA POSTPROCEDURE EVALUATION
Anesthesia POST Procedure Evaluation    Patient: Louie Greco   MRN:     [id number] Gender:   male   Age:    59 year old :      1960        Preoperative Diagnosis: History of Rectal Cancer   Procedure(s):  Examination Under Anesthesia  Flexible Sigmoidoscopy   Postop Comments: No value filed.       Anesthesia Type:  MAC  No value filed.    Reportable Event: NO     PAIN: Uncomplicated   Sign Out status: Comfortable, Well controlled pain     PONV: No PONV   Sign Out status:  No Nausea or Vomiting     Neuro/Psych: Uneventful perioperative course   Sign Out Status: Preoperative baseline; Age appropriate mentation     Airway/Resp.: Uneventful perioperative course   Sign Out Status: Non labored breathing, age appropriate RR; Resp. Status within EXPECTED Parameters     CV: Uneventful perioperative course   Sign Out status: Appropriate BP and perfusion indices; Appropriate HR/Rhythm     Disposition:   Sign Out in:  Phase II  Disposition:  Home  Recovery Course: Uneventful  Follow-Up: Not required           Last Anesthesia Record Vitals:  CRNA VITALS  2019 0720 - 2019 0820      2019             Pulse:  72    Ht Rate:  69    SpO2:  93 %    Resp Rate (set):  10          Last PACU Vitals:  Vitals Value Taken Time   BP     Temp     Pulse     Resp     SpO2     Temp src Available 2019  7:45 AM   NIBP 103/68 2019  7:45 AM   Pulse 72 2019  7:50 AM   SpO2 93 % 2019  7:50 AM   Resp     Temp     Ht Rate 69 2019  7:50 AM   Temp 2           Electronically Signed By: Yves Farris MD, 2019, 9:32 AM

## 2019-06-28 NOTE — ANESTHESIA PREPROCEDURE EVALUATION
Anesthesia Pre-Procedure Evaluation    Patient: Louie Greco   MRN:     [id number] Gender:   male   Age:    58 year old :      1960        Preoperative Diagnosis: Malignant Neoplasm of Rectum   Procedure(s):  Examination Under Anesthesia, Possible Biopsies  Flexible Sigmoidoscopy     Past Medical History:   Diagnosis Date     Childhood asthma      Epididymitis, bilateral     18 years old     Inguinal hernia      Mumps      Nephrolithiasis          Rectal cancer (H)     low rectal cancer     Shingles       Past Surgical History:   Procedure Laterality Date     COLONOSCOPY N/A 2016    Procedure: COMBINED COLONOSCOPY, SINGLE OR MULTIPLE BIOPSY/POLYPECTOMY BY BIOPSY;  Surgeon: Chelsea Thompson MD;  Location: [facility]     COLONOSCOPY N/A 2017    Procedure: COLONOSCOPY;;  Surgeon: Felicitas Radford MD;  [facility]     COLONOSCOPY N/A 2017    Procedure: COLONOSCOPY;;  Surgeon: Felicitas Radford MD;  [facility]     COMBINED CYSTOSCOPY, RETROGRADES, URETEROSCOPY, LASER HOLMIUM LITHOTRIPSY URETER(S), INSERT STENT Left 2018    Procedure: COMBINED CYSTOSCOPY, RETROGRADES, URETEROSCOPY, LASER HOLMIUM LITHOTRIPSY URETER(S), INSERT STENT;  Cysto, left ureteroscopy, holmium laser, retrogrades, stent placement;  Surgeon: Bola Worthy MD;  Location: [facility]     EXAM UNDER ANESTHESIA ANUS N/A 2017    Procedure: EXAM UNDER ANESTHESIA ANUS;  Examination Under Anesthesia, flex sigmoidoscopy with biopsies and formalin application;  Surgeon: Felicitas Radford MD;  [facility]     EXAM UNDER ANESTHESIA ANUS N/A 2017    Procedure: EXAM UNDER ANESTHESIA ANUS;  Examination Under Anesthesia Anus, Colonoscopy, application of formalin;  Surgeon: Felicitas Radford MD;  [facility]     EXAM UNDER ANESTHESIA ANUS N/A 2017    Procedure: EXAM UNDER ANESTHESIA ANUS;  Examination Under Anesthesia Anus, Colonoscopy;  Surgeon: Malina  Felicitas HIDALGO MD;  [facility]     EXAM UNDER ANESTHESIA ANUS N/A 5/11/2018    Procedure: EXAM UNDER ANESTHESIA ANUS;  Examination Under Anesthesia Anus, Interoperative Flexible Sigmoidoscopy, Application of Formalin;  Surgeon: Felicitas Radford MD;  [facility]     EXAM UNDER ANESTHESIA ANUS N/A 7/20/2018    Procedure: EXAM UNDER ANESTHESIA ANUS;  Examination Under Anesthesia Anus, Flexible Sigmoidoscopy ;  Surgeon: Felicitas Radford MD;  [facility]     EXAM UNDER ANESTHESIA ANUS N/A 11/30/2018    Procedure: Examination Under Anesthesia, application of formalin to rectum, polypectomy;  Surgeon: Felicitas Radford MD;  [facility]     EXAM UNDER ANESTHESIA ANUS N/A 2/22/2019    Procedure: Examination Under Anesthesia;  Surgeon: Felicitas Radford MD;  [facility]     HC TOOTH EXTRACTION W/FORCEP       LAPAROSCOPIC APPENDECTOMY N/A 7/17/2017    Procedure: LAPAROSCOPIC APPENDECTOMY;  LAPAROSCOPIC APPENDECTOMY;  Surgeon: Bryson Ferguson MD;  [facility]     SIGMOIDOSCOPY FLEXIBLE N/A 12/8/2016    Procedure: SIGMOIDOSCOPY FLEXIBLE;  Surgeon: Felicitas Radford MD;  [facility]     SIGMOIDOSCOPY FLEXIBLE N/A 7/5/2017    Procedure: SIGMOIDOSCOPY FLEXIBLE;;  Surgeon: Felicitas Radford MD;  [facility]     SIGMOIDOSCOPY FLEXIBLE N/A 5/11/2018    Procedure: SIGMOIDOSCOPY FLEXIBLE;;  Surgeon: Felicitas Radford MD;  [facility]     SIGMOIDOSCOPY FLEXIBLE N/A 7/20/2018    Procedure: SIGMOIDOSCOPY FLEXIBLE;;  Surgeon: Felicitas Radford MD;  [facility]     SIGMOIDOSCOPY FLEXIBLE N/A 11/30/2018    Procedure: Flexible Sigmoidoscopy;  Surgeon: Felicitas Radford MD;  [facility]     SIGMOIDOSCOPY FLEXIBLE N/A 2/22/2019    Procedure: Intraoperative Flexible Sigmoidoscopy, Application of Formalin to rectum;  Surgeon: Felicitas Radford MD;  [facility]     TESTICLE SURGERY       VASCULAR SURGERY       Right chest port     VASECTOMY       VASECTOMY            Anesthesia Evaluation     . Pt has had prior anesthetic.     No history of anesthetic complications          ROS/MED HX    ENT/Pulmonary:  - neg pulmonary ROS    (-) tobacco use   Neurologic:  - neg neurologic ROS     Cardiovascular:  - neg cardiovascular ROS   (+) ----. : . . . :. . Previous cardiac testing date:results:date: results:ECG reviewed date:2017 results:Sinus tachycardia, non specific T wave abnormaility date: results:          METS/Exercise Tolerance:  >4 METS   Hematologic:         Musculoskeletal:         GI/Hepatic:         Renal/Genitourinary:     (+) Nephrolithiasis ,       Endo:  - neg endo ROS       Psychiatric:  - neg psychiatric ROS       Infectious Disease:  - neg infectious disease ROS       Malignancy:   (+) Malignancy History of GI          Other:    (+) no H/O Chronic Pain,                       PHYSICAL EXAM:   Mental Status/Neuro:    Airway: Facies: Feasible  Mallampati: I  Mouth/Opening: Full  TM distance: > 6 cm  Neck ROM: Full   Respiratory: Auscultation: CTAB     Resp. Rate: Normal     Resp. Effort: Normal      CV: Rhythm: Regular  Rate: Age appropriate  Heart: Normal Sounds   Comments:      Dental:  Dental Comments: implant                Lab Results   Component Value Date    WBC 4.7 06/24/2019    HGB 15.3 06/24/2019    HCT 44.4 06/24/2019     06/24/2019    .0 (H) 07/17/2017    SED 34 (H) 07/17/2017     06/24/2019    POTASSIUM 4.4 06/24/2019    CHLORIDE 106 06/24/2019    CO2 26 06/24/2019    BUN 17 06/24/2019    CR 0.76 06/24/2019    GLC 80 06/26/2019    FRANDY 8.9 06/24/2019    MAG 2.0 07/17/2017    ALBUMIN 4.0 06/24/2019    PROTTOTAL 6.9 06/24/2019    ALT 30 06/24/2019    AST 20 06/24/2019    ALKPHOS 68 06/24/2019    BILITOTAL 1.6 (H) 06/24/2019    LIPASE 192 02/14/2018    PTT 31 07/10/2017    INR 1.02 07/10/2017       Preop Vitals  BP Readings from Last 3 Encounters:   06/28/19 108/75   06/24/19 109/71  "  02/22/19 116/51    Pulse Readings from Last 3 Encounters:   06/28/19 74   06/24/19 82   02/20/19 68      Resp Readings from Last 3 Encounters:   06/28/19 16   02/22/19 14   11/30/18 16    SpO2 Readings from Last 3 Encounters:   06/28/19 94%   06/24/19 96%   02/22/19 98%      Temp Readings from Last 1 Encounters:   06/28/19 36.9  C (98.5  F) (Oral)    Ht Readings from Last 1 Encounters:   06/28/19 1.791 m (5' 10.5\")      Wt Readings from Last 1 Encounters:   06/28/19 95.3 kg (210 lb)    Estimated body mass index is 29.71 kg/m  as calculated from the following:    Height as of an earlier encounter on 6/28/19: 1.791 m (5' 10.5\").    Weight as of an earlier encounter on 6/28/19: 95.3 kg (210 lb).     LDA:  Peripheral IV 02/16/18 (Active)   Number of days: 497       Peripheral IV 07/20/18 Right Hand (Active)   Number of days: 343       Peripheral IV 06/26/19 Right (Active)   Number of days: 2       Ureteral Drain/Stent Left ureter 6 fr (Active)   Number of days: 497            Assessment:   ASA SCORE: 2    NPO Status: > 6 hours since completed Solid Foods   Documentation: H&P complete; Preop Testing complete; Consents complete   Proceeding: Proceed without further delay  Tobacco Use:  NO Active use of Tobacco/UNKNOWN Tobacco use status     Plan:   Anes. Type:  MAC      Induction:  IV (Standard)   Airway: Native Airway   Access/Monitoring: PIV   Maintenance: Propofol; IV   Emergence: Recovery Site (PACU/ICU)   Logistics: Same Day Surgery     Postop Pain/Sedation Strategy:  Standard-Options: Opioids PRN     PONV Management:  Adult Risk Factors:, Non-Smoker, Postop Opioids  Prevention: Propofol Infusion; Ondansetron     CONSENT: Direct conversation   Plan and risks discussed with: Patient   Blood Products: Consent Deferred (Minimal Blood Loss)                             Yves Farris MD  "

## 2019-06-28 NOTE — DISCHARGE INSTRUCTIONS
[facility]  Home Care Following Anesthesia  For 24 hours after surgery:  1. Get plenty of rest.  A responsible adult must stay with you for at least 24 hours after you leave the surgery center.  2. Do not drive or use heavy equipment.  If you have weakness or tingling, don't drive or use heavy equipment until this feeling goes away.   3. Do not drink alcohol.   4. Avoid strenuous or risky activities.  Ask for help when climbing stairs.  5. You may feel lightheaded.  IF so, sit for a few minutes before standing.  Have someone help you get up.   6. If you have nausea (feel sick to your stomach): Drink only clear liquids such as apple juice, ginger ale, broth or 7-Up.  Rest may also help.  Be sure to drink enough fluids.  Move to a regular diet as you feel able.   7. You may have a slight fever.  Call the doctor if your fever is over 100 F (37.7 C) (taken under the tongue) or lasts longer than 24 hours.  8. You may have a dry mouth, a sore throat, muscle aches or trouble sleeping. These should go away after 24 hours.  9. Do not make important or legal decisions.               Tips for taking pain medications  To get the best pain relief possible, remember these points:    Take pain medications as directed, before pain becomes severe.    Pain medication can upset your stomach: taking it with food may help.    Constipation is a common side effect of pain medication. Drink plenty of  fluids.    Eat foods high in fiber. Take a stool softener if recommended by your doctor or pharmacist.    Do not drink alcohol, drive or operate machinery while taking pain medications.    Ask about other ways to control pain, such as with heat, ice or relaxation.    Tylenol/Acetaminophen Consumption  To help encourage the safe use of acetaminophen, the makers of TYLENOL  have lowered the maximum daily dose for single-ingredient Extra Strength TYLENOL  (acetaminophen) products sold in the [place] from 8  pills per day (4,000 mg) to 6 pills per day (3,000 mg). The dosing interval has also changed from 2 pills every 4-6 hours to 2 pills every 6 hours.    If you feel your pain relief is insufficient, you may take Tylenol/Acetaminophen in addition to your narcotic pain medication.     Be careful not to exceed 3,000 mg of Tylenol/Acetaminophen in a 24 hour period from all sources.    If you are taking extra strength Tylenol/acetaminophen (500 mg), the maximum dose is 6 tablets in 24 hours.    If you are taking regular strength acetaminophen (325 mg), the maximum dose is 9 tablets in 24 hours.    Call a doctor for any of the followin. Signs of infection (fever, growing tenderness at the surgery site, a large amount of drainage or bleeding, severe pain, foul-smelling drainage, redness, swelling).  2. It has been over 8 to 10 hours since surgery and you are still not able to urinate (pass water).  3. Headache for over 24 hours.  4. Numbness, tingling or weakness the day after surgery (if you had spinal anesthesia).  Your doctor is:       Dr. Felicitas Radford, Colon Rectal: [phone]               Or dial [phone] and ask for the resident on call for:  Colon Rectal  For emergency care, call the:  [place] [facility]:  [phone] (TTY for hearing impaired: [phone])            Anorectal Surgery Instructions    What can I expect after anorectal surgery?  Most anorectal procedures are done as outpatient surgery, and you go home the same day as the procedure. A few surgical procedures will require that you stay in the hospital for about one to three days. No matter where the procedure is done or how long or short it takes, these recommendations will help you heal and feel more comfortable.    Medicines:  The anal area is very sensitive; you can expect to have some pain for up to 2-4 weeks after the procedure. Your doctor will give you a prescription for one or more pain medications.    Take  naprosyn 500 mg twice a day OR ibuprofen 600 mg four times a day     Take this on a regular basis (not as needed) following your surgery.     The drugs are best taken with food.  Do not take if it causes stomach upset or if you have a history of ulcers or gastritis. You can stop the naprosyn (or ibuprofen) or reduce the dose when you are feeling better.    DO NOT use naprosyn, ibuprofen, or other similar agents (eg. Advil or Aleve) if you have inflammatory bowel disease (Ulcerative Colitis or Crohn's disease) or if your doctor as advised you against using these medications    Take acetominaphen (Tylenol) 650-1000 mg four times a day.     Take this on a regular basis (not as needed) following surgery for pain control.     Take the lower dose if you are >65 years old or have liver disease. The maximum dose of acetominaphen is 4000 mg a day. You can stop the acetaminophen or reduce the dose when you are feeling better.    It is important to realize that many narcotic pain relievers (including vicodin, percocet, tylenol #3) also have acetaminophen, and excessive doses of acetaminophen can be dangerous, so do not take these in addition to acetominaphen.  You may take narcotics that don't contain acetominaphen such as oxycodone.      Take oxycodone AS NEEDED in addition to the acetominaphen and naprosyn.      Because narcotics have side effects (including constipation), you should reduce your use of these medications as tolerated as your pain improves.    *In general, the best strategy is to take (if you are able to tolerate it) the tylenol and naprosen on a regular basis until your pain has largely gone away. You can take the narcotic pain medicine as needed in addition to the tylenol and ibuprofen. As your pain begins to lessen, you should cut back on your narcotic use while continuing to take your regular tylenol and naprosyn doses.      Refilling prescriptions. If you need additional pain medication, please call the  triage nurse at [phone] during normal business hours (8 a.m. to 4 p.m., Monday though Friday) or have your pharmacy fax a refill request to [phone]. If you call after hours or on the weekends, the doctor on call may not know you personally and may not renew narcotic pain medication by phone. Call your primary care provider for all other medication refills.    Perineal care:  External gauze dressing can be removed the morning after surgery. If you have an adhesive dressing stuck to the incision, DO NOT remove this.   Tub baths:    If possible, take a tub bath immediately after each bowel movement.     Baths should be take at least 3 times daily for the first week to 10 days following your procedure. You should soak in the tub for 10 to 15 minutes each time with water as warm as you can tolerate.     Even after you go back to work, it is a good idea to sit in the tub in the morning, after returning from work, and again in the evening before bedtime.    Bleeding/Infection:    You can expect to have some bleeding after bowel movements, but it should stop soon after you wipe.     Use a wet cloth or perianal pad (Tucks or Preparation H pads) to gently wipe the area after each bowel movement.    Do not rub the anal area or use a lot of pressure.    Using a spray bottle filled with warm water helps loosen any remaining stool. Blot gently with a soft dry cloth or tissue paper.    Infection around the anal opening is not very common. The anal area has excellent blood supply, which helps the area to heal. Bloody discharge after bowel movements is normal and may last 2 to 4 weeks after your surgery. However, if you bleed between bowel movements and cannot get it to stop, call the triage nurse immediately [phone].    Bowel function:  Take a fiber supplement such as Metamucil, which is over the counter. It is important to drink six to eight glasses of water or juice everyday when using fiber products.    If you do  not have a bowel movement after 1-2 days:    Take Milk of Magnesia-2 tablespoons.       If there are no results, repeat this or add over the counter Miralax.      If you still do not have results, contact the clinic.     If there are no results, repeat this. Stop taking Milk of Magnesia or other laxatives if you begin to have diarrhea.    * Constipation will cause you to strain when you have a bowel movement. The hard stool will be difficult to pass, will increase pain and bleeding, and will slow down healing.  Try to avoid constipation and/or diarrhea as this can make the pain and bleeding worse.    * It is important to have regular bowel movements at least every other day and to keep your stool soft.  A high fiber diet, including at least four servings of fruits or vegetables daily, will help to keep your bowel movements regular and soft.    Activity:  After your procedure, there are no restrictions on your activity     except restrictions surrounding being on narcotics and in pain, such as no heavy machine operating or driving.     You may walk, climb stairs, ride in a car, and sit as tolerated.     It is helpful to avoid sitting in one position for long periods (2 or more hours).    After some surgeries, you may be told not to perform any lifting (more than 10 pounds) for several weeks after surgery.    When to call:  When do I need to call the doctor or triage nurse?    If you experience any of the problems listed here, call our triage nurse during business hours ([phone]).     The nurse will help you with your problem or have the doctor call you.     After hours and on weekends, please call the main hospital number ([phone]) and ask for the colon and rectal surgery person on call.     Some is available to help you 24 hours a day, seven days a week.    Call for:   ? Fever greater than 101 degrees   ? Chills   ? Foul-smelling drainage   ? Nausea and vomiting   ? Diarrhea - greater than 3 water stools  in 24 hours   ? Constipation - no bowel movement after 3 days   ? Severe bleeding that does not stop soon after a bowel movement   ? Problems with the incision, including increased pain, swelling, or redness

## 2019-06-28 NOTE — ANESTHESIA CARE TRANSFER NOTE
Patient: Louie Greco    Procedure(s):  Examination Under Anesthesia  Flexible Sigmoidoscopy    Diagnosis: History of Rectal Cancer  Diagnosis Additional Information: No value filed.    Anesthesia Type:   No value filed.     Note:  Airway :Room Air  Patient transferred to:Phase II  Comments: Uneventful transport to Phase 2; IV patent; Pt responds appropriately to command; Pt comfortable; VSS: Report to RNHandoff Report: Identifed the Patient, Identified the Reponsible Provider, Reviewed the pertinent medical history, Discussed the surgical course, Reviewed Intra-OP anesthesia mangement and issues during anesthesia, Set expectations for post-procedure period and Allowed opportunity for questions and acknowledgement of understanding      Vitals: (Last set prior to Anesthesia Care Transfer)    CRNA VITALS  6/28/2019 0720 - 6/28/2019 0754      6/28/2019             Pulse:  72    Ht Rate:  69    SpO2:  93 %    Resp Rate (set):  10                Electronically Signed By: QUINTON DAVIS CRNA  June 28, 2019  7:54 AM

## 2019-06-29 NOTE — OP NOTE
SURGEON: Felicitas Radford MD      ASSISTANT: Kristy Mejía MD Surgery Resident     PREOPERATIVE DIAGNOSIS: Rectal cancer with watch and wait protocol. Need for surveillance. Radiation proctitis.      POSTOPERATIVE DIAGNOSIS: Rectal cancer with watch and wait protocol. Need for surveillance. No significant radiation proctitis.      PROCEDURE: Examination under anesthesia, flexible sigmoidoscopy.      INDICATIONS: Louie Greco is a 59 year old male who was found to have a rectal cancer and had a complete response to chemoradiotherapy. He is now on a watch and wait protocol and we are examining the area under anesthesia because of the friability and pain in the area. He also has some radiation proctitis that has responded to formalin in the past.  Formalin was applied in February at the last examination under anesthesia.  He does continue to have some urgency with bowel movements and frequency with his bowel habits as well as mucous.  The risks and benefits of surgery were thoroughly discussed with the patient and he agreed to proceed.      DESCRIPTION OF PROCEDURE: The patient was brought to the operating room, placed in left lateral decubitus. Deep sedation was induced with intravenous medicines with deep sedation and monitored anesthesia care. We prepped and draped the area in the usual fashion. We performed digital rectal examination and anoscopy which demonstrated a somewhat stenotic anal canal and an area of distal anterior scarring and some minimal radiation changes. There were no palpable abnormalities and the prostate by palpation was normal. There was no nodularity or induration. A flexible sigmoidoscopy with CO2 was then performed and the scope was advanced to the proximal descending colon without difficulty to 35 cm and stopped because of poor preparation proximal to this. There was diverticulosis and no signs of divertciulitis. There were no other polyps or other lesions. The radiation changes  were improved and the scar was visible, but no signs of disease or additional lesions.  At the end the case, all instruments and sponge counts were correct x2. Retroflexion was not performed. The patient was emerged from anesthesia and taken to postoperative anesthesia care unit in good condition.       SPECIMEN: None      ESTIMATED BLOOD LOSS: Minimal.       URINE OUTPUT: Not measured.       AROLDO NAIK MD   Colon and Rectal Surgery Staff  [facility]

## 2019-07-01 ENCOUNTER — TELEPHONE (OUTPATIENT)
Dept: SURGERY | Facility: CLINIC | Age: 59
End: 2019-07-01

## 2019-07-01 NOTE — TELEPHONE ENCOUNTER
Called patient to see how he is doing after his procedure with Dr. Radford. No answer, LM for patient with direct number for patient to call back.     Florence Joiner LPN

## 2019-07-05 ENCOUNTER — ONCOLOGY VISIT (OUTPATIENT)
Dept: ONCOLOGY | Facility: CLINIC | Age: 59
End: 2019-07-05
Attending: INTERNAL MEDICINE
Payer: COMMERCIAL

## 2019-07-05 VITALS
BODY MASS INDEX: 30.83 KG/M2 | DIASTOLIC BLOOD PRESSURE: 77 MMHG | HEART RATE: 75 BPM | OXYGEN SATURATION: 96 % | TEMPERATURE: 98.7 F | WEIGHT: 220.2 LBS | HEIGHT: 71 IN | SYSTOLIC BLOOD PRESSURE: 116 MMHG

## 2019-07-05 DIAGNOSIS — C20 MALIGNANT NEOPLASM OF RECTUM (H): Primary | ICD-10-CM

## 2019-07-05 PROCEDURE — G0463 HOSPITAL OUTPT CLINIC VISIT: HCPCS

## 2019-07-05 PROCEDURE — 99214 OFFICE O/P EST MOD 30 MIN: CPT | Performed by: INTERNAL MEDICINE

## 2019-07-05 ASSESSMENT — PAIN SCALES - GENERAL: PAINLEVEL: NO PAIN (0)

## 2019-07-05 ASSESSMENT — MIFFLIN-ST. JEOR: SCORE: 1828.01

## 2019-07-05 NOTE — LETTER
"    7/5/2019         RE: Louie Greco  [address] [address]        Dear Colleague,    Thank you for referring your patient, Louie Greco, to the [facility]. Please see a copy of my visit note below.    Oncology Rooming Note    July 5, 2019 10:26 AM   Louie Greco is a 59 year old male who presents for:    Chief Complaint   Patient presents with     Oncology Clinic Visit     Initial Vitals: /77   Pulse 75   Temp 98.7  F (37.1  C) (Oral)   Ht 1.791 m (5' 10.5\")   Wt 99.9 kg (220 lb 3.2 oz)   SpO2 96%   BMI 31.15 kg/m    Estimated body mass index is 31.15 kg/m  as calculated from the following:    Height as of this encounter: 1.791 m (5' 10.5\").    Weight as of this encounter: 99.9 kg (220 lb 3.2 oz). Body surface area is 2.23 meters squared.  No Pain (0) Comment: Data Unavailable   No LMP for male patient.  Allergies reviewed: Yes  Medications reviewed: Yes    Medications: Medication refills not needed today.  Pharmacy name entered into Influitive:    [facility], [place] - [address]  [facility], [place] - [address]    Clinical concerns: no      Tiffanie Noel, [facility]    Hematology/Oncology Established Patient Note      Today's Date: 7/05/2019    Reason for Follow-up: rectal cancer      HISTORY OF PRESENT ILLNESS: Louie Greco is a 59 year old male who presents with rectal cancer.  He started having rectal bleeding around beginning of 2016.  He underwent colonoscopy on 7/27/16, which showed a malignant tumor in the rectum involving half of the lumen with oozing, as well as three 4-mm polyps in the ascending and transverse colon.  Pathology showed moderately differentiated adenocarcinoma, ascending colon with sessile serrated adenoma, others were tubular adenomas.  Mismatch repair expression with normal protein expression.  Staging scans showed the rectal mass, " indeterminate focus in the left liver thought to be cyst, and multiple bilateral renal cysts.  MRI showed a distal rectal mass measuring 2.7 x 2.4 cm extending to the most proximal region of the anal canal.  There are a few tiny subcentimeter perirectal fat lymph nodes.  He was staged clinically [id number] (stage IIIA).  He was seen by Dr. Lee at [facility], and started neoadjuvant chemoradiation with capecitabine on 8/8/16 and completed on 9/15/16.  He was then seen by Dr. Radford, colorectal surgery at [facility].  His post chemoradiation MRI showed improvement in the size of the tumor and response in the lymph nodes.  On 12/8/16, he underwent flexible sigmoidoscopy and there was no evidence of tumor.  There was only some anterior scarring in the lumen.  Multiple biopsies were taken, which showed no evidence of carcinoma.  At that point, Dr. Radford spoke with the patient's wife, that there appears to be a complete response in the lumen, and that the patient would wish to placed on the watch and wait protocol.  The patient had expressed wishes not to have an APR, and it would not have been possible to grossly clear a margin for LAR.    He had port placed on 1/16/17.  It has been removed.    He completed FOLFOX x 8 cycles 1/16/17-5/9/17.      He was admitted 7/16/17-7/20/17 with sepsis, abdominal wall cellulitis.  He underwent surgery with laparoscopic abdominal exploration and appendectomy.  Pathology found sessile serrated adenoma without dysplasia or malignancy.        INTERIM HISTORY: Louie comes in for follow-up today.   He is feeling well overall.  He just had a flex sig done with Dr. Radford on 6/28/19, which went well.        REVIEW OF SYSTEMS:   14 point ROS was reviewed and is negative other than as noted above in HPI.       HOME MEDICATIONS:  Current Outpatient Medications   Medication Sig Dispense Refill     ASPIRIN PO Take by mouth as needed for moderate pain        dibucaine (NUPERCAINAL) 1 % OINT ointment 3 times daily as needed for moderate pain       diltiazem 2% in PLO cream, FV COMPOUNDED, 2% GEL Apply topically 2 times daily       ibuprofen 200 MG capsule Take 200-400 mg by mouth every 6 hours as needed 120 capsule 0     lidocaine (XYLOCAINE) 2 % topical gel Apply topically 2 times daily as needed for moderate pain 30 mL 3     lidocaine, Anorectal, 5 % CREA Externally apply 1 Application topically 3 times daily as needed 1 Tube 0     neomycin-polymyxin-hydrocortisone (CORTISPORIN) 3.5-02992-5 otic suspension Place 4 drops into both ears 4 times daily 10 mL 0     sildenafil (REVATIO) 20 MG tablet Take 1 tablet (20 mg) by mouth daily as needed 30 tablet 3     VITAMIN D, CHOLECALCIFEROL, PO Take 2,000 Units by mouth daily            ALLERGIES:  Allergies   Allergen Reactions     Ampicillin Diarrhea     Demerol [Meperidine]      Nausea          PAST MEDICAL HISTORY:  Past Medical History:   Diagnosis Date     Childhood asthma      Epididymitis, bilateral     18 years old     Inguinal hernia      Mumps      Nephrolithiasis     1990     Rectal cancer (H)     low rectal cancer     Shingles          PAST SURGICAL HISTORY:  Past Surgical History:   Procedure Laterality Date     COLONOSCOPY N/A 7/27/2016    Procedure: COMBINED COLONOSCOPY, SINGLE OR MULTIPLE BIOPSY/POLYPECTOMY BY BIOPSY;  Surgeon: Chelsea Thompson MD;  Location: [facility]     COLONOSCOPY N/A 9/13/2017    Procedure: COLONOSCOPY;;  Surgeon: Felicitas Radford MD;  [facility]     COLONOSCOPY N/A 12/13/2017    Procedure: COLONOSCOPY;;  Surgeon: Felicitas Radford MD;  [facility]     COMBINED CYSTOSCOPY, RETROGRADES, URETEROSCOPY, LASER HOLMIUM LITHOTRIPSY URETER(S), INSERT STENT Left 2/16/2018    Procedure: COMBINED CYSTOSCOPY, RETROGRADES, URETEROSCOPY, LASER HOLMIUM LITHOTRIPSY URETER(S), INSERT STENT;  Cysto, left ureteroscopy, holmium laser, retrogrades, stent placement;  Surgeon:  Bola Worthy MD;  Location: [facility]     EXAM UNDER ANESTHESIA ANUS N/A 7/5/2017    Procedure: EXAM UNDER ANESTHESIA ANUS;  Examination Under Anesthesia, flex sigmoidoscopy with biopsies and formalin application;  Surgeon: Felicitas Radford MD;  [facility]     EXAM UNDER ANESTHESIA ANUS N/A 9/13/2017    Procedure: EXAM UNDER ANESTHESIA ANUS;  Examination Under Anesthesia Anus, Colonoscopy, application of formalin;  Surgeon: Felicitas Radford MD;  [facility]     EXAM UNDER ANESTHESIA ANUS N/A 12/13/2017    Procedure: EXAM UNDER ANESTHESIA ANUS;  Examination Under Anesthesia Anus, Colonoscopy;  Surgeon: Felicitas Radford MD;  [facility]     EXAM UNDER ANESTHESIA ANUS N/A 5/11/2018    Procedure: EXAM UNDER ANESTHESIA ANUS;  Examination Under Anesthesia Anus, Interoperative Flexible Sigmoidoscopy, Application of Formalin;  Surgeon: Felicitas Radford MD;  [facility]     EXAM UNDER ANESTHESIA ANUS N/A 7/20/2018    Procedure: EXAM UNDER ANESTHESIA ANUS;  Examination Under Anesthesia Anus, Flexible Sigmoidoscopy ;  Surgeon: Felicitas Radford MD;  [facility]     EXAM UNDER ANESTHESIA ANUS N/A 11/30/2018    Procedure: Examination Under Anesthesia, application of formalin to rectum, polypectomy;  Surgeon: Felicitas Radford MD;  [facility]     EXAM UNDER ANESTHESIA ANUS N/A 2/22/2019    Procedure: Examination Under Anesthesia;  Surgeon: Felicitas Radford MD;  [facility]     EXAM UNDER ANESTHESIA ANUS N/A 6/28/2019    Procedure: Examination Under Anesthesia;  Surgeon: Felicitas Radford MD;  [facility]     HC TOOTH EXTRACTION W/FORCEP       LAPAROSCOPIC APPENDECTOMY N/A 7/17/2017    Procedure: LAPAROSCOPIC APPENDECTOMY;  LAPAROSCOPIC APPENDECTOMY;  Surgeon: Bryson Ferguson MD;  [facility]     SIGMOIDOSCOPY FLEXIBLE N/A 12/8/2016    Procedure: SIGMOIDOSCOPY FLEXIBLE;  Surgeon: Felicitas Radford  MD GWEN;  [facility]     SIGMOIDOSCOPY FLEXIBLE N/A 7/5/2017    Procedure: SIGMOIDOSCOPY FLEXIBLE;;  Surgeon: Felicitas Radford MD;  [facility]     SIGMOIDOSCOPY FLEXIBLE N/A 5/11/2018    Procedure: SIGMOIDOSCOPY FLEXIBLE;;  Surgeon: Felicitas Radford MD;  [facility]     SIGMOIDOSCOPY FLEXIBLE N/A 7/20/2018    Procedure: SIGMOIDOSCOPY FLEXIBLE;;  Surgeon: Felicitas Radford MD;  [facility]     SIGMOIDOSCOPY FLEXIBLE N/A 11/30/2018    Procedure: Flexible Sigmoidoscopy;  Surgeon: Felicitas Radford MD;  [facility]     SIGMOIDOSCOPY FLEXIBLE N/A 2/22/2019    Procedure: Intraoperative Flexible Sigmoidoscopy, Application of Formalin to rectum;  Surgeon: Felicitas Radford MD;  [facility]     SIGMOIDOSCOPY FLEXIBLE N/A 6/28/2019    Procedure: Flexible Sigmoidoscopy;  Surgeon: Felicitas Radford MD;  [facility]     TESTICLE SURGERY       VASCULAR SURGERY      Right chest port     VASECTOMY       VASECTOMY           SOCIAL HISTORY:  Social History     Socioeconomic History     Marital status:      Spouse name: Not on file     Number of children: Not on file     Years of education: Not on file     Highest education level: Not on file   Occupational History     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[employer]     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   Social Needs     Financial resource strain: Not on file     Food insecurity:     Worry: Not on file     Inability: Not on file     Transportation needs:     Medical: Not on file     Non-medical: Not on file   Tobacco Use     Smoking status: Never Smoker     Smokeless tobacco: Never Used   Substance and Sexual Activity     Alcohol use: Yes     Alcohol/week: 0.0 oz     Comment: 1 drink a week     Drug use: No     Sexual activity: Yes     Partners: Female     Birth control/protection: Male Surgical   Lifestyle     Physical activity:     Days per week: Not on file     Minutes per session: Not on file     Stress: Not on  "file   Relationships     Social connections:     Talks on phone: Not on file     Gets together: Not on file     Attends Congregational service: Not on file     Active member of club or organization: Not on file     Attends meetings of clubs or organizations: Not on file     Relationship status: Not on file     Intimate partner violence:     Fear of current or ex partner: Not on file     Emotionally abused: Not on file     Physically abused: Not on file     Forced sexual activity: Not on file   Other Topics Concern     Parent/sibling w/ CABG, MI or angioplasty before 65F 55M? No   Social History Narrative    Dad  at age  78   from [place], COPD, & HTN    Mom alive in her 70's.     for 30 years    Two adult children. 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     He notes that he had a brother who passed away at a young age of 53 from a metastatic cancer, but unknown what type, and passed away within 2 months of diagnosis.  He denies other family history of cancer in the immediate family.  Louie works as a  at a [employer].      FAMILY HISTORY:  Family History   Problem Relation Age of Onset     Cancer Brother         primary of unknown origin     Other Cancer Brother      Chronic Obstructive Pulmonary Disease Father      Hypertension Father      Hyperlipidemia Father      Colon Polyps Mother         Number and type of polyps unknown     Family History Negative Brother         negative colonoscopy     Diabetes Maternal Grandfather      Hypertension Paternal Grandmother      Hyperlipidemia Paternal Grandmother      Stomach Cancer Other 63        maternal great grandfather     Glaucoma No family hx of      Macular Degeneration No family hx of          PHYSICAL EXAM:  Vital signs:  /77   Pulse 75   Temp 98.7  F (37.1  C) (Oral)   Ht 1.791 m (5' 10.5\")   Wt 99.9 kg (220 lb 3.2 oz)   SpO2 96%   BMI 31.15 kg/m      ECOG: " 0  GENERAL/CONSTITUTIONAL: No acute distress.  EYES: No scleral icterus.  LYMPH: No anterior cervical, posterior cervical, or supraclavicular adenopathy.   RESPIRATORY: Clear to auscultation bilaterally. No crackles or wheezing.   CARDIOVASCULAR: Regular rate and rhythm without murmurs, gallops, or rubs.  GASTROINTESTINAL: No tenderness. The patient has normal bowel sounds. No guarding.  No distention.  MUSCULOSKELETAL: Warm and well-perfused, no cyanosis, clubbing, or edema.  NEUROLOGIC: Alert, oriented, answers questions appropriately.  INTEGUMENTARY: No jaundice.  GAIT: Steady, does not use assistive device      LABS:  CBC RESULTS:   Recent Labs   Lab Test 06/24/19  0828   WBC 4.7   RBC 5.05   HGB 15.3   HCT 44.4   MCV 88   MCH 30.3   MCHC 34.5   RDW 13.4        Recent Labs   Lab Test 06/26/19  0725 06/24/19  0828 01/04/19  1017   NA  --  140 139   POTASSIUM  --  4.4 4.1   CHLORIDE  --  106 107   CO2  --  26 26   ANIONGAP  --  8 6   GLC 80 89 90   BUN  --  17 20   CR  --  0.76 0.75   FRANDY  --  8.9 8.8     Lab Results   Component Value Date    AST 20 06/24/2019     Lab Results   Component Value Date    ALT 30 06/24/2019     No results found for: BILICONJ   Lab Results   Component Value Date    BILITOTAL 1.6 06/24/2019     Lab Results   Component Value Date    ALBUMIN 4.0 06/24/2019     Lab Results   Component Value Date    PROTTOTAL 6.9 06/24/2019      Lab Results   Component Value Date    ALKPHOS 68 06/24/2019       CEA 1.5      IMAGING:  PET-CT 6/26/19:  IMPRESSION: In this patient with a history of rectal cancer s/p  chemoradiotherapy:   1. No evidence of metastatic disease in the head and neck, chest,  abdomen, or pelvis.  2. Multiple bilateral hepatic and renal cysts, unchanged.     MRI pelvis 6/26/19:  There has been a complete response to treatment, with a corresponding  tumor regression grade of mrTRG-1.      ASSESSMENT/PLAN:  Louie Greco is a 59 year old male with:    1) Rectal cancer: clinical stage  [id number] (stage IIIA), s/p chemoradiation with Xeloda, and appears to have achieved complete response on imaging and biopsies.  He opted not to undergo surgery and expressed desire not to undergo APR, as he does not want a colostomy bag.  It was felt reasonable to go on the watch and wait protocol with the [facility].  He has completed 4 additional months (8 cycles) of chemotherapy with FOLFOX.  He will now go on surveillance, as per the watch and wait protocol.    We reviewed MRI pelvis from 6/26/19.  There has been a complete response to treatment.  PET-CT on 6/26/19 also reviewed with patient.  There is no evidence of metastatic disease on the scan.  There are multiple bilateral hepatic and renal cysts, unchanged.          -he had flex sig with Dr. Radford on 6/28/19  -T3 MRI pelvis would every 6 months x 2 years, which he has completed.  Patient is uncomfortable stopping MRI's for now.  We agreed to do them every 6 months until year 3.    -CT c/a/p annually for 5 years; next PET scan in July 2020 - will order at the next visit.  -endoscopic surveillance with Dr. Radford as per protocol  -RTC in 6 months with labs/CEA and results of imaging    2) Genetics: He underwent genetic testing, which was negative.    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            4) Elevated bilirubin: mild.  Bilirubin has been mildly elevated in the past.    -monitor for now    5) Liver cysts: seen on CT, stable  -monitor    6) Pulmonary nodules: stable sub-4 mm pulmonary nodules  -monitor on future scans    7) Kidney cyst: stable  -monitor on future scans.      8) Appendix polyp: He is now s/p appendectomy.  Pathology found sessile serrated adenoma without dysplasia or malignancy.     9) Elevated PSA: MRI showed enlarged prostate gland with BPH changes.  -he is going to see urology - he has seen Dr. Worthy in the past.      I spent a total of 25 minutes with the patient, with over >50% of the time in  counseling and/or coordination of care.       Agueda Manley MD  Hematology/Oncology  [facility]        Again, thank you for allowing me to participate in the care of your patient.        Sincerely,        Agueda Manley MD

## 2019-07-05 NOTE — PROGRESS NOTES
"Oncology Rooming Note    July 5, 2019 10:26 AM   Louie Greco is a 59 year old male who presents for:    Chief Complaint   Patient presents with     Oncology Clinic Visit     Initial Vitals: /77   Pulse 75   Temp 98.7  F (37.1  C) (Oral)   Ht 1.791 m (5' 10.5\")   Wt 99.9 kg (220 lb 3.2 oz)   SpO2 96%   BMI 31.15 kg/m   Estimated body mass index is 31.15 kg/m  as calculated from the following:    Height as of this encounter: 1.791 m (5' 10.5\").    Weight as of this encounter: 99.9 kg (220 lb 3.2 oz). Body surface area is 2.23 meters squared.  No Pain (0) Comment: Data Unavailable   No LMP for male patient.  Allergies reviewed: Yes  Medications reviewed: Yes    Medications: Medication refills not needed today.  Pharmacy name entered into EPIC:    [facility], [place] - [address], [address]  [facility] [place], [place] - [address]    Clinical concerns: no      Tiffanie Noel CMA            "

## 2019-07-05 NOTE — PROGRESS NOTES
[facility]    Hematology/Oncology Established Patient Note      Today's Date: 7/05/2019    Reason for Follow-up: rectal cancer      HISTORY OF PRESENT ILLNESS: Louie Greco is a 59 year old male who presents with rectal cancer.  He started having rectal bleeding around beginning of 2016.  He underwent colonoscopy on 7/27/16, which showed a malignant tumor in the rectum involving half of the lumen with oozing, as well as three 4-mm polyps in the ascending and transverse colon.  Pathology showed moderately differentiated adenocarcinoma, ascending colon with sessile serrated adenoma, others were tubular adenomas.  Mismatch repair expression with normal protein expression.  Staging scans showed the rectal mass, indeterminate focus in the left liver thought to be cyst, and multiple bilateral renal cysts.  MRI showed a distal rectal mass measuring 2.7 x 2.4 cm extending to the most proximal region of the anal canal.  There are a few tiny subcentimeter perirectal fat lymph nodes.  He was staged clinically [id number] (stage IIIA).  He was seen by Dr. Lee at [facility], and started neoadjuvant chemoradiation with capecitabine on 8/8/16 and completed on 9/15/16.  He was then seen by Dr. Radford, colorectal surgery at [facility].  His post chemoradiation MRI showed improvement in the size of the tumor and response in the lymph nodes.  On 12/8/16, he underwent flexible sigmoidoscopy and there was no evidence of tumor.  There was only some anterior scarring in the lumen.  Multiple biopsies were taken, which showed no evidence of carcinoma.  At that point, Dr. Radford spoke with the patient's wife, that there appears to be a complete response in the lumen, and that the patient would wish to placed on the watch and wait protocol.  The patient had expressed wishes not to have an APR, and it would not have been possible to grossly clear a margin for LAR.    He had port placed  on 1/16/17.  It has been removed.    He completed FOLFOX x 8 cycles 1/16/17-5/9/17.      He was admitted 7/16/17-7/20/17 with sepsis, abdominal wall cellulitis.  He underwent surgery with laparoscopic abdominal exploration and appendectomy.  Pathology found sessile serrated adenoma without dysplasia or malignancy.        INTERIM HISTORY: Louie comes in for follow-up today.   He is feeling well overall.  He just had a flex sig done with Dr. Radford on 6/28/19, which went well.        REVIEW OF SYSTEMS:   14 point ROS was reviewed and is negative other than as noted above in HPI.       HOME MEDICATIONS:  Current Outpatient Medications   Medication Sig Dispense Refill     ASPIRIN PO Take by mouth as needed for moderate pain       dibucaine (NUPERCAINAL) 1 % OINT ointment 3 times daily as needed for moderate pain       diltiazem 2% in PLO cream, FV COMPOUNDED, 2% GEL Apply topically 2 times daily       ibuprofen 200 MG capsule Take 200-400 mg by mouth every 6 hours as needed 120 capsule 0     lidocaine (XYLOCAINE) 2 % topical gel Apply topically 2 times daily as needed for moderate pain 30 mL 3     lidocaine, Anorectal, 5 % CREA Externally apply 1 Application topically 3 times daily as needed 1 Tube 0     neomycin-polymyxin-hydrocortisone (CORTISPORIN) 3.5-48952-0 otic suspension Place 4 drops into both ears 4 times daily 10 mL 0     sildenafil (REVATIO) 20 MG tablet Take 1 tablet (20 mg) by mouth daily as needed 30 tablet 3     VITAMIN D, CHOLECALCIFEROL, PO Take 2,000 Units by mouth daily            ALLERGIES:  Allergies   Allergen Reactions     Ampicillin Diarrhea     Demerol [Meperidine]      Nausea          PAST MEDICAL HISTORY:  Past Medical History:   Diagnosis Date     Childhood asthma      Epididymitis, bilateral     18 years old     Inguinal hernia      Mumps      Nephrolithiasis     1990     Rectal cancer (H)     low rectal cancer     Shingles          PAST SURGICAL HISTORY:  Past Surgical History:    Procedure Laterality Date     COLONOSCOPY N/A 7/27/2016    Procedure: COMBINED COLONOSCOPY, SINGLE OR MULTIPLE BIOPSY/POLYPECTOMY BY BIOPSY;  Surgeon: Chelsea Thompson MD;  Location: [facility]     COLONOSCOPY N/A 9/13/2017    Procedure: COLONOSCOPY;;  Surgeon: Felicitas Radford MD;  [facility]     COLONOSCOPY N/A 12/13/2017    Procedure: COLONOSCOPY;;  Surgeon: Felicitas Radford MD;  [facility]     COMBINED CYSTOSCOPY, RETROGRADES, URETEROSCOPY, LASER HOLMIUM LITHOTRIPSY URETER(S), INSERT STENT Left 2/16/2018    Procedure: COMBINED CYSTOSCOPY, RETROGRADES, URETEROSCOPY, LASER HOLMIUM LITHOTRIPSY URETER(S), INSERT STENT;  Cysto, left ureteroscopy, holmium laser, retrogrades, stent placement;  Surgeon: Bola Worthy MD;  Location: [facility]     EXAM UNDER ANESTHESIA ANUS N/A 7/5/2017    Procedure: EXAM UNDER ANESTHESIA ANUS;  Examination Under Anesthesia, flex sigmoidoscopy with biopsies and formalin application;  Surgeon: Felicitas Radford MD;  [facility]     EXAM UNDER ANESTHESIA ANUS N/A 9/13/2017    Procedure: EXAM UNDER ANESTHESIA ANUS;  Examination Under Anesthesia Anus, Colonoscopy, application of formalin;  Surgeon: Felicitas Radford MD;  [facility]     EXAM UNDER ANESTHESIA ANUS N/A 12/13/2017    Procedure: EXAM UNDER ANESTHESIA ANUS;  Examination Under Anesthesia Anus, Colonoscopy;  Surgeon: Felicitas Radford MD;  [facility]     EXAM UNDER ANESTHESIA ANUS N/A 5/11/2018    Procedure: EXAM UNDER ANESTHESIA ANUS;  Examination Under Anesthesia Anus, Interoperative Flexible Sigmoidoscopy, Application of Formalin;  Surgeon: Felicitas Radford MD;  [facility]     EXAM UNDER ANESTHESIA ANUS N/A 7/20/2018    Procedure: EXAM UNDER ANESTHESIA ANUS;  Examination Under Anesthesia Anus, Flexible Sigmoidoscopy ;  Surgeon: Felicitas Radford MD;  [facility]     EXAM UNDER ANESTHESIA ANUS N/A 11/30/2018     Procedure: Examination Under Anesthesia, application of formalin to rectum, polypectomy;  Surgeon: Felicitas Radford MD;  [facility]     EXAM UNDER ANESTHESIA ANUS N/A 2/22/2019    Procedure: Examination Under Anesthesia;  Surgeon: Felicitas Radford MD;  [facility]     EXAM UNDER ANESTHESIA ANUS N/A 6/28/2019    Procedure: Examination Under Anesthesia;  Surgeon: Felicitas Radford MD;  [facility]     HC TOOTH EXTRACTION W/FORCEP       LAPAROSCOPIC APPENDECTOMY N/A 7/17/2017    Procedure: LAPAROSCOPIC APPENDECTOMY;  LAPAROSCOPIC APPENDECTOMY;  Surgeon: Bryson Ferguson MD;  [facility]     SIGMOIDOSCOPY FLEXIBLE N/A 12/8/2016    Procedure: SIGMOIDOSCOPY FLEXIBLE;  Surgeon: Felicitas Radford MD;  [facility]     SIGMOIDOSCOPY FLEXIBLE N/A 7/5/2017    Procedure: SIGMOIDOSCOPY FLEXIBLE;;  Surgeon: Felicitas Radford MD;  [facility]     SIGMOIDOSCOPY FLEXIBLE N/A 5/11/2018    Procedure: SIGMOIDOSCOPY FLEXIBLE;;  Surgeon: Felicitas Radford MD;  [facility]     SIGMOIDOSCOPY FLEXIBLE N/A 7/20/2018    Procedure: SIGMOIDOSCOPY FLEXIBLE;;  Surgeon: Felicitas Radfodr MD;  [facility]     SIGMOIDOSCOPY FLEXIBLE N/A 11/30/2018    Procedure: Flexible Sigmoidoscopy;  Surgeon: Felicitas Radford MD;  [facility]     SIGMOIDOSCOPY FLEXIBLE N/A 2/22/2019    Procedure: Intraoperative Flexible Sigmoidoscopy, Application of Formalin to rectum;  Surgeon: Felicitas Radford MD;  [facility]     SIGMOIDOSCOPY FLEXIBLE N/A 6/28/2019    Procedure: Flexible Sigmoidoscopy;  Surgeon: Felicitas Radford MD;  [facility]     TESTICLE SURGERY       VASCULAR SURGERY      Right chest port     VASECTOMY       VASECTOMY           SOCIAL HISTORY:  Social History     Socioeconomic History     Marital status:      Spouse name: Not on file     Number of children: Not on file     Years of education: Not on file      Highest education level: Not on file   Occupational History     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[employer]     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   Social Needs     Financial resource strain: Not on file     Food insecurity:     Worry: Not on file     Inability: Not on file     Transportation needs:     Medical: Not on file     Non-medical: Not on file   Tobacco Use     Smoking status: Never Smoker     Smokeless tobacco: Never Used   Substance and Sexual Activity     Alcohol use: Yes     Alcohol/week: 0.0 oz     Comment: 1 drink a week     Drug use: No     Sexual activity: Yes     Partners: Female     Birth control/protection: Male Surgical   Lifestyle     Physical activity:     Days per week: Not on file     Minutes per session: Not on file     Stress: Not on file   Relationships     Social connections:     Talks on phone: Not on file     Gets together: Not on file     Attends Sikhism service: Not on file     Active member of club or organization: Not on file     Attends meetings of clubs or organizations: Not on file     Relationship status: Not on file     Intimate partner violence:     Fear of current or ex partner: Not on file     Emotionally abused: Not on file     Physically abused: Not on file     Forced sexual activity: Not on file   Other Topics Concern     Parent/sibling w/ CABG, MI or angioplasty before 65F 55M? No   Social History Narrative    Dad  at age  78   from [place], COPD, & HTN    Mom alive in her 70's.     for 30 years    Two adult children. 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     He notes that he had a brother who passed away at a young age of 53 from a metastatic cancer, but unknown what type, and passed away within 2 months of diagnosis.  He denies other family history of cancer in the immediate family.  Louie works as a  at a [employer] school.      FAMILY HISTORY:  Family History   Problem Relation Age of Onset     Cancer  "Brother         primary of unknown origin     Other Cancer Brother      Chronic Obstructive Pulmonary Disease Father      Hypertension Father      Hyperlipidemia Father      Colon Polyps Mother         Number and type of polyps unknown     Family History Negative Brother         negative colonoscopy     Diabetes Maternal Grandfather      Hypertension Paternal Grandmother      Hyperlipidemia Paternal Grandmother      Stomach Cancer Other 63        maternal great grandfather     Glaucoma No family hx of      Macular Degeneration No family hx of          PHYSICAL EXAM:  Vital signs:  /77   Pulse 75   Temp 98.7  F (37.1  C) (Oral)   Ht 1.791 m (5' 10.5\")   Wt 99.9 kg (220 lb 3.2 oz)   SpO2 96%   BMI 31.15 kg/m     ECO  GENERAL/CONSTITUTIONAL: No acute distress.  EYES: No scleral icterus.  LYMPH: No anterior cervical, posterior cervical, or supraclavicular adenopathy.   RESPIRATORY: Clear to auscultation bilaterally. No crackles or wheezing.   CARDIOVASCULAR: Regular rate and rhythm without murmurs, gallops, or rubs.  GASTROINTESTINAL: No tenderness. The patient has normal bowel sounds. No guarding.  No distention.  MUSCULOSKELETAL: Warm and well-perfused, no cyanosis, clubbing, or edema.  NEUROLOGIC: Alert, oriented, answers questions appropriately.  INTEGUMENTARY: No jaundice.  GAIT: Steady, does not use assistive device      LABS:  CBC RESULTS:   Recent Labs   Lab Test 19  0828   WBC 4.7   RBC 5.05   HGB 15.3   HCT 44.4   MCV 88   MCH 30.3   MCHC 34.5   RDW 13.4        Recent Labs   Lab Test 19  0725 19  0828 19  1017   NA  --  140 139   POTASSIUM  --  4.4 4.1   CHLORIDE  --  106 107   CO2  --  26 26   ANIONGAP  --  8 6   GLC 80 89 90   BUN  --  17 20   CR  --  0.76 0.75   FRANDY  --  8.9 8.8     Lab Results   Component Value Date    AST 2019     Lab Results   Component Value Date    ALT 2019     No results found for: MARIO   Lab Results   Component " Value Date    BILITOTAL 1.6 06/24/2019     Lab Results   Component Value Date    ALBUMIN 4.0 06/24/2019     Lab Results   Component Value Date    PROTTOTAL 6.9 06/24/2019      Lab Results   Component Value Date    ALKPHOS 68 06/24/2019       CEA 1.5      IMAGING:  PET-CT 6/26/19:  IMPRESSION: In this patient with a history of rectal cancer s/p  chemoradiotherapy:   1. No evidence of metastatic disease in the head and neck, chest,  abdomen, or pelvis.  2. Multiple bilateral hepatic and renal cysts, unchanged.     MRI pelvis 6/26/19:  There has been a complete response to treatment, with a corresponding  tumor regression grade of mrTRG-1.      ASSESSMENT/PLAN:  Louie Greco is a 59 year old male with:    1) Rectal cancer: clinical stage [id number] (stage IIIA), s/p chemoradiation with Xeloda, and appears to have achieved complete response on imaging and biopsies.  He opted not to undergo surgery and expressed desire not to undergo APR, as he does not want a colostomy bag.  It was felt reasonable to go on the watch and wait protocol with the [facility].  He has completed 4 additional months (8 cycles) of chemotherapy with FOLFOX.  He will now go on surveillance, as per the watch and wait protocol.    We reviewed MRI pelvis from 6/26/19.  There has been a complete response to treatment.  PET-CT on 6/26/19 also reviewed with patient.  There is no evidence of metastatic disease on the scan.  There are multiple bilateral hepatic and renal cysts, unchanged.          -he had flex sig with Dr. Radford on 6/28/19  -T3 MRI pelvis would every 6 months x 2 years, which he has completed.  Patient is uncomfortable stopping MRI's for now.  We agreed to do them every 6 months until year 3.    -CT c/a/p annually for 5 years; next PET scan in July 2020 - will order at the next visit.  -endoscopic surveillance with Dr. Radford as per protocol  -RTC in 6 months with labs/CEA and results of imaging    2) Genetics: He  underwent genetic testing, which was negative.    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            4) Elevated bilirubin: mild.  Bilirubin has been mildly elevated in the past.    -monitor for now    5) Liver cysts: seen on CT, stable  -monitor    6) Pulmonary nodules: stable sub-4 mm pulmonary nodules  -monitor on future scans    7) Kidney cyst: stable  -monitor on future scans.      8) Appendix polyp: He is now s/p appendectomy.  Pathology found sessile serrated adenoma without dysplasia or malignancy.     9) Elevated PSA: MRI showed enlarged prostate gland with BPH changes.  -he is going to see urology - he has seen Dr. Worthy in the past.      I spent a total of 25 minutes with the patient, with over >50% of the time in counseling and/or coordination of care.       Agueda Manley MD  Hematology/Oncology  [facility]

## 2019-07-09 ENCOUNTER — PATIENT OUTREACH (OUTPATIENT)
Dept: SURGERY | Facility: CLINIC | Age: 59
End: 2019-07-09

## 2019-07-09 DIAGNOSIS — Z85.048 HISTORY OF RECTAL CANCER: Primary | ICD-10-CM

## 2019-07-09 NOTE — PROGRESS NOTES
Called and discussed patient's watch and wait protocol with him. Orders placed for next flex sig procedure.

## 2019-07-11 ENCOUNTER — TELEPHONE (OUTPATIENT)
Dept: SURGERY | Facility: CLINIC | Age: 59
End: 2019-07-11

## 2019-07-12 ENCOUNTER — TELEPHONE (OUTPATIENT)
Dept: SURGERY | Facility: CLINIC | Age: 59
End: 2019-07-12

## 2019-07-12 NOTE — TELEPHONE ENCOUNTER
Patient is scheduled for surgery with Dr. Radford    Spoke with Louie    Date of Surgery: 10/18/2019    Location: [facility]    Informed patient they will need an adult  YES    H&P: Scheduled with PCP    Surgery packet: Will Mail and MyChart     Additional comments: patient wants to schedule next same procedure 6 months out on April 10, 2019. Also wants to know if he can get an Rx for Suprep sent to [facility]. Sent in-basket message to MARVIN Murcia about these questions. I told patient I would call him back next week to update him.

## 2019-07-24 ENCOUNTER — TELEPHONE (OUTPATIENT)
Dept: SURGERY | Facility: CLINIC | Age: 59
End: 2019-07-24

## 2019-07-24 DIAGNOSIS — C20 RECTAL CANCER (H): Primary | ICD-10-CM

## 2019-07-24 RX ORDER — SODIUM, POTASSIUM,MAG SULFATES 17.5-3.13G
1 SOLUTION, RECONSTITUTED, ORAL ORAL ONCE
Qty: 177 ML | Refills: 1 | Status: SHIPPED | OUTPATIENT
Start: 2019-07-24 | End: 2019-07-24

## 2019-07-24 NOTE — TELEPHONE ENCOUNTER
[facility] Call Center    Phone Message    May a detailed message be left on voicemail: yes    Reason for Call: Other: Suze calling to request clarification on Na Sulfate-K Sulfate-Mg Sulf (SUPREP BOWEL PREP KIT) solution. Please call her back to discuss.      Action Taken: Message routed to:  [facility]

## 2019-07-26 ENCOUNTER — MYC MEDICAL ADVICE (OUTPATIENT)
Dept: SURGERY | Facility: CLINIC | Age: 59
End: 2019-07-26

## 2019-10-09 ENCOUNTER — TELEPHONE (OUTPATIENT)
Dept: UROLOGY | Facility: CLINIC | Age: 59
End: 2019-10-09

## 2019-10-09 NOTE — TELEPHONE ENCOUNTER
MEDICAL RECORDS REQUEST   [facility]  Urology Clinic  [address] [place] [address]  PHONE: [phone]  Fax: [phone]        FUTURE VISIT INFORMATION                                                   DIMITRY Zimmerman: 1960 scheduled for future visit at [facility]    APPOINTMENT INFORMATION:    Date: 2019    Provider:  ADIA VELASCO    Reason for Visit/Diagnosis: ELEVATED PSA    REFERRAL INFORMATION:    Referring provider:  JAROD GAITAN    Specialty: MD     Referring providers clinic:      Clinic contact number:  [phone]    RECORDS REQUESTED FOR VISIT                                                     NOTES  STATUS/DETAILS   OFFICE NOTE from referring provider  yes   OFFICE NOTE from other specialist  yes   DISCHARGE SUMMARY from hospital  yes   DISCHARGE REPORT from the ER  no   OPERATIVE REPORT  yes   MEDICATION LIST  yes   PSA (LAB)  yes       PRE-VISIT CHECKLIST      Record collection complete Yes   Appointment appropriately scheduled           (right time/right provider) Yes   MyChart activation Yes   Questionnaire complete If no, please explain IN PROCESS     Completed by: Astrid Cerna

## 2019-10-14 ENCOUNTER — TELEPHONE (OUTPATIENT)
Dept: UROLOGY | Facility: CLINIC | Age: 59
End: 2019-10-14

## 2019-10-24 ENCOUNTER — OFFICE VISIT (OUTPATIENT)
Dept: FAMILY MEDICINE | Facility: CLINIC | Age: 59
End: 2019-10-24
Payer: COMMERCIAL

## 2019-10-24 VITALS
SYSTOLIC BLOOD PRESSURE: 122 MMHG | WEIGHT: 213 LBS | DIASTOLIC BLOOD PRESSURE: 76 MMHG | HEART RATE: 92 BPM | TEMPERATURE: 97 F | HEIGHT: 71 IN | OXYGEN SATURATION: 95 % | BODY MASS INDEX: 29.82 KG/M2

## 2019-10-24 DIAGNOSIS — Z01.818 PREOP GENERAL PHYSICAL EXAM: Primary | ICD-10-CM

## 2019-10-24 DIAGNOSIS — R97.20 ELEVATED PROSTATE SPECIFIC ANTIGEN (PSA): ICD-10-CM

## 2019-10-24 DIAGNOSIS — C20 MALIGNANT NEOPLASM OF RECTUM (H): ICD-10-CM

## 2019-10-24 PROCEDURE — 99213 OFFICE O/P EST LOW 20 MIN: CPT | Performed by: INTERNAL MEDICINE

## 2019-10-24 ASSESSMENT — MIFFLIN-ST. JEOR: SCORE: 1795.35

## 2019-10-24 NOTE — PROGRESS NOTES
[address] [address]  [phone]  Dept: [phone]    PRE-OP EVALUATION:  Today's date: 10/24/2019Felicitas Radford MD    Louie Greco (: 1960) presents for pre-operative evaluation assessment as requested by Felicitas Chatterjee MD.  He requires evaluation and anesthesia risk assessment prior to undergoing surgery/procedure for treatment of rectal cancer  .    Proposed Surgery/ Procedure:Examination Under Anesthesia, Flexible Sigmoidoscopy  Date of Surgery/ Procedure: 2019  Time of Surgery/ Procedure: 7:15am  Hospital/Surgical Facility:  OR  Fax number for surgical facility:   Primary Physician: Philippe Healy  Type of Anesthesia Anticipated: MAC    Patient has a Health Care Directive or Living Will:  NO    1. NO - Do you have a history of heart attack, stroke, stent, bypass or surgery on an artery in the head, neck, heart or legs?  2. NO - Do you ever have any pain or discomfort in your chest?  3. NO - Do you have a history of  Heart Failure?  4. NO - Are you troubled by shortness of breath when: walking on the level, up a slight hill or at night?  5. NO - Do you currently have a cold, bronchitis or other respiratory infection?  6. NO - Do you have a cough, shortness of breath or wheezing?  7. NO - Do you sometimes get pains in the calves of your legs when you walk?  8. NO - Do you or anyone in your family have previous history of blood clots?  9. NO - Do you or does anyone in your family have a serious bleeding problem such as prolonged bleeding following surgeries or cuts?  10. NO - Have you ever had problems with anemia or been told to take iron pills?  11. NO - Have you had any abnormal blood loss such as black, tarry or bloody stools, or abnormal vaginal bleeding?  12. NO - Have you ever had a blood transfusion?  13. NO - Have you or any of your relatives ever had problems with anesthesia?  14. NO - Do you have sleep apnea,  excessive snoring or daytime drowsiness?  15. NO - Do you have any prosthetic heart valves?  16. NO - Do you have prosthetic joints?  17. NO - Is there any chance that you may be pregnant?      HPI:     HPI related to upcoming procedure: 59 year old man with rectal cancer who needs a surveillance flex sig under anesthesia.  He feels well and is without complaints.  He can perform 4 METS of physical activity without chest pain or dyspnea.            MEDICAL HISTORY:     Patient Active Problem List    Diagnosis Date Noted     Kidney stone 02/16/2018     Priority: Medium     Elevated prostate specific antigen (PSA) 11/30/2017     Priority: Medium     Appendicitis 07/17/2017     Priority: Medium     Chemotherapy-induced neutropenia (H) 03/21/2017     Priority: Medium     Advance Care Planning 03/09/2017     Priority: Medium     Advance Care Planning 3/9/2017: Receipt of ACP document:  Received: invalid HCD document dated 12/8/2016.  Document not previously scanned. Validation form completed indicating invalid document. Copy sent to client with information and facilitation resources. Validation form sent to be scanned as notation of invalid document received.  Code Status needs to be updated to reflect choices. Confirmed/documented designated decision maker(s).  Added by Araceli Horne [facility] Advance Care Planning Liaison with [employer].       Malignant neoplasm of rectum (H) 01/03/2017     Priority: Medium     Rectal bleeding 07/19/2016     Priority: Medium     Plantar fasciitis 11/04/2010     Priority: Medium     Pes anserinus tendinitis 02/08/2010     Priority: Medium      Past Medical History:   Diagnosis Date     Childhood asthma      Epididymitis, bilateral     18 years old     Inguinal hernia      Mumps      Nephrolithiasis     1990     Rectal cancer (H)     low rectal cancer     Shingles      Past Surgical History:   Procedure Laterality Date     COLONOSCOPY N/A 7/27/2016    Procedure: COMBINED COLONOSCOPY,  SINGLE OR MULTIPLE BIOPSY/POLYPECTOMY BY BIOPSY;  Surgeon: Chelsea Thompson MD;  [facility]     COLONOSCOPY N/A 9/13/2017    Procedure: COLONOSCOPY;;  Surgeon: Felicitas Radford MD;  [facility]     COLONOSCOPY N/A 12/13/2017    Procedure: COLONOSCOPY;;  Surgeon: Felicitas Radford MD;  [facility]     COMBINED CYSTOSCOPY, RETROGRADES, URETEROSCOPY, LASER HOLMIUM LITHOTRIPSY URETER(S), INSERT STENT Left 2/16/2018    Procedure: COMBINED CYSTOSCOPY, RETROGRADES, URETEROSCOPY, LASER HOLMIUM LITHOTRIPSY URETER(S), INSERT STENT;  Cysto, left ureteroscopy, holmium laser, retrogrades, stent placement;  Surgeon: Bola Worthy MD;  [facility]     EXAM UNDER ANESTHESIA ANUS N/A 7/5/2017    Procedure: EXAM UNDER ANESTHESIA ANUS;  Examination Under Anesthesia, flex sigmoidoscopy with biopsies and formalin application;  Surgeon: Felicitas Radford MD;  [facility]     EXAM UNDER ANESTHESIA ANUS N/A 9/13/2017    Procedure: EXAM UNDER ANESTHESIA ANUS;  Examination Under Anesthesia Anus, Colonoscopy, application of formalin;  Surgeon: Felicitas Radford MD;  [facility]     EXAM UNDER ANESTHESIA ANUS N/A 12/13/2017    Procedure: EXAM UNDER ANESTHESIA ANUS;  Examination Under Anesthesia Anus, Colonoscopy;  Surgeon: Felicitas Radford MD;  [facility]     EXAM UNDER ANESTHESIA ANUS N/A 5/11/2018    Procedure: EXAM UNDER ANESTHESIA ANUS;  Examination Under Anesthesia Anus, Interoperative Flexible Sigmoidoscopy, Application of Formalin;  Surgeon: Felicitas Radford MD;  [facility]     EXAM UNDER ANESTHESIA ANUS N/A 7/20/2018    Procedure: EXAM UNDER ANESTHESIA ANUS;  Examination Under Anesthesia Anus, Flexible Sigmoidoscopy ;  Surgeon: Felicitas Radford MD;  [facility]     EXAM UNDER ANESTHESIA ANUS N/A 11/30/2018    Procedure: Examination Under Anesthesia, application of formalin to rectum, polypectomy;  Surgeon:  Felicitas Radford MD;  [facility]     EXAM UNDER ANESTHESIA ANUS N/A 2/22/2019    Procedure: Examination Under Anesthesia;  Surgeon: Felicitas Radford MD;  [facility]     EXAM UNDER ANESTHESIA ANUS N/A 6/28/2019    Procedure: Examination Under Anesthesia;  Surgeon: Felicitas Radford MD;  [facility]     HC TOOTH EXTRACTION W/FORCEP       LAPAROSCOPIC APPENDECTOMY N/A 7/17/2017    Procedure: LAPAROSCOPIC APPENDECTOMY;  LAPAROSCOPIC APPENDECTOMY;  Surgeon: Bryson Ferguson MD;  [facility]     SIGMOIDOSCOPY FLEXIBLE N/A 12/8/2016    Procedure: SIGMOIDOSCOPY FLEXIBLE;  Surgeon: Felicitas Radford MD;  [facility]     SIGMOIDOSCOPY FLEXIBLE N/A 7/5/2017    Procedure: SIGMOIDOSCOPY FLEXIBLE;;  Surgeon: Felicitas Radford MD;  [facility]     SIGMOIDOSCOPY FLEXIBLE N/A 5/11/2018    Procedure: SIGMOIDOSCOPY FLEXIBLE;;  Surgeon: Felicitas Radford MD;  [facility]     SIGMOIDOSCOPY FLEXIBLE N/A 7/20/2018    Procedure: SIGMOIDOSCOPY FLEXIBLE;;  Surgeon: Felicitas Radford MD;  [facility]     SIGMOIDOSCOPY FLEXIBLE N/A 11/30/2018    Procedure: Flexible Sigmoidoscopy;  Surgeon: Felicitas Radford MD;  [facility]     SIGMOIDOSCOPY FLEXIBLE N/A 2/22/2019    Procedure: Intraoperative Flexible Sigmoidoscopy, Application of Formalin to rectum;  Surgeon: Felicitas Radford MD;  [facility]     SIGMOIDOSCOPY FLEXIBLE N/A 6/28/2019    Procedure: Flexible Sigmoidoscopy;  Surgeon: Felicitas Radford MD;  [facility]     TESTICLE SURGERY       VASCULAR SURGERY      Right chest port     VASECTOMY       VASECTOMY       Current Outpatient Medications   Medication Sig Dispense Refill     ASPIRIN PO Take by mouth as needed for moderate pain       dibucaine (NUPERCAINAL) 1 % OINT ointment 3 times daily as needed for moderate pain       diltiazem 2% in PLO cream, FV COMPOUNDED, 2% GEL Apply topically 2 times daily    "    ibuprofen 200 MG capsule Take 200-400 mg by mouth every 6 hours as needed 120 capsule 0     lidocaine (XYLOCAINE) 2 % topical gel Apply topically 2 times daily as needed for moderate pain 30 mL 3     lidocaine, Anorectal, 5 % CREA Externally apply 1 Application topically 3 times daily as needed 1 Tube 0     sildenafil (REVATIO) 20 MG tablet Take 1 tablet (20 mg) by mouth daily as needed 30 tablet 3     VITAMIN D, CHOLECALCIFEROL, PO Take 2,000 Units by mouth daily        neomycin-polymyxin-hydrocortisone (CORTISPORIN) 3.5-38435-4 otic suspension Place 4 drops into both ears 4 times daily (Patient not taking: Reported on 10/24/2019) 10 mL 0     OTC products: None, except as noted above    Allergies   Allergen Reactions     Ampicillin Diarrhea     Demerol [Meperidine]      Nausea       Latex Allergy: NO    Social History     Tobacco Use     Smoking status: Never Smoker     Smokeless tobacco: Never Used   Substance Use Topics     Alcohol use: Yes     Alcohol/week: 0.0 standard drinks     Comment: 1 drink a week     History   Drug Use No       REVIEW OF SYSTEMS:   Constitutional, neuro, ENT, endocrine, pulmonary, cardiac, gastrointestinal, genitourinary (he has an appointment to see urologist re elevated PSA), musculoskeletal, integument and psychiatric systems are negative, except as otherwise noted.    EXAM:   /76 (BP Location: Right arm, Cuff Size: Adult Large)   Pulse 92   Temp 97  F (36.1  C) (Oral)   Ht 1.791 m (5' 10.5\")   Wt 96.6 kg (213 lb)   SpO2 95%   BMI 30.13 kg/m      GENERAL APPEARANCE: healthy, alert and no distress     EYES: EOMI,  PERRL     HENT: ear canals and TM's normal and nose and mouth without ulcers or lesions     NECK: no adenopathy, no asymmetry, masses, or scars and thyroid normal to palpation     RESP: lungs clear to auscultation - no rales, rhonchi or wheezes     CV: regular rates and rhythm, normal S1 S2, no S3 or S4 and no murmur, click or rub     ABDOMEN:  soft, " nontender, no HSM or masses and bowel sounds normal     MS: extremities normal- no gross deformities noted, no evidence of inflammation in joints, FROM in all extremities.     SKIN: no suspicious lesions or rashes     NEURO: Normal strength and tone, sensory exam grossly normal, mentation intact and speech normal     PSYCH: mentation appears normal. and affect normal/bright     LYMPHATICS: No cervical adenopathy    DIAGNOSTICS:       Recent Labs   Lab Test 06/24/19  0828 01/04/19  1017  07/10/17  0943  01/16/17  0800   HGB 15.3 14.5   < > 12.8*   < > 15.1    195   < > 170   < > 207   INR  --   --   --  1.02  --  0.92    139   < >  --    < >  --    POTASSIUM 4.4 4.1   < >  --    < >  --    CR 0.76 0.75   < >  --    < >  --     < > = values in this interval not displayed.        IMPRESSION:   Reason for surgery/procedure: Rectal cancer   Diagnosis/reason for consult: Preoperative evaluation     The proposed surgical procedure is considered LOW risk.    REVISED CARDIAC RISK INDEX  The patient has the following serious cardiovascular risks for perioperative complications such as (MI, PE, VFib and 3  AV Block):  No serious cardiac risks  INTERPRETATION: 0 risks: Class I (very low risk - 0.4% complication rate)    The patient has the following additional risks for perioperative complications:  No identified additional risks      ICD-10-CM    1. Preop general physical exam Z01.818        RECOMMENDATIONS:         --Patient is to take all scheduled medications on the day of surgery EXCEPT for modifications listed below.    APPROVAL GIVEN to proceed with proposed procedure, without further diagnostic evaluation       Signed Electronically by: Philippe Healy MD    Copy of this evaluation report is provided to requesting physician.    [facility] Preop Guidelines    Revised Cardiac Risk Index

## 2019-10-31 ENCOUNTER — ANESTHESIA EVENT (OUTPATIENT)
Dept: SURGERY | Facility: AMBULATORY SURGERY CENTER | Age: 59
End: 2019-10-31

## 2019-10-31 ASSESSMENT — LIFESTYLE VARIABLES: TOBACCO_USE: 0

## 2019-10-31 NOTE — ANESTHESIA PREPROCEDURE EVALUATION
Anesthesia Pre-Procedure Evaluation    Patient: Louie Greco   MRN:     [id number] Gender:   male   Age:    58 year old :      1960        Preoperative Diagnosis: Malignant Neoplasm of Rectum   Procedure(s):  Examination Under Anesthesia, Possible Biopsies  Flexible Sigmoidoscopy     Past Medical History:   Diagnosis Date     Childhood asthma      Epididymitis, bilateral     18 years old     Inguinal hernia      Mumps      Nephrolithiasis          Rectal cancer (H)     low rectal cancer     Shingles       Past Surgical History:   Procedure Laterality Date     COLONOSCOPY N/A 2016    Procedure: COMBINED COLONOSCOPY, SINGLE OR MULTIPLE BIOPSY/POLYPECTOMY BY BIOPSY;  Surgeon: Chelsea Thompson MD;  [facility]     COLONOSCOPY N/A 2017    Procedure: COLONOSCOPY;;  Surgeon: Felicitas Radford MD;  [facility]     COLONOSCOPY N/A 2017    Procedure: COLONOSCOPY;;  Surgeon: Felicitas Radford MD;  [facility]     COMBINED CYSTOSCOPY, RETROGRADES, URETEROSCOPY, LASER HOLMIUM LITHOTRIPSY URETER(S), INSERT STENT Left 2018    Procedure: COMBINED CYSTOSCOPY, RETROGRADES, URETEROSCOPY, LASER HOLMIUM LITHOTRIPSY URETER(S), INSERT STENT;  Cysto, left ureteroscopy, holmium laser, retrogrades, stent placement;  Surgeon: Bola Worthy MD;  [facility]     EXAM UNDER ANESTHESIA ANUS N/A 2017    Procedure: EXAM UNDER ANESTHESIA ANUS;  Examination Under Anesthesia, flex sigmoidoscopy with biopsies and formalin application;  Surgeon: Felicitas Radford MD;  [facility]     EXAM UNDER ANESTHESIA ANUS N/A 2017    Procedure: EXAM UNDER ANESTHESIA ANUS;  Examination Under Anesthesia Anus, Colonoscopy, application of formalin;  Surgeon: Felicitas Radford MD;  [facility]     EXAM UNDER ANESTHESIA ANUS N/A 2017    Procedure: EXAM UNDER ANESTHESIA ANUS;  Examination Under Anesthesia Anus, Colonoscopy;  Surgeon: Malina  Felicitas HIDALGO MD;  [facility]     EXAM UNDER ANESTHESIA ANUS N/A 5/11/2018    Procedure: EXAM UNDER ANESTHESIA ANUS;  Examination Under Anesthesia Anus, Interoperative Flexible Sigmoidoscopy, Application of Formalin;  Surgeon: Felicitas Radford MD;  [facility]     EXAM UNDER ANESTHESIA ANUS N/A 7/20/2018    Procedure: EXAM UNDER ANESTHESIA ANUS;  Examination Under Anesthesia Anus, Flexible Sigmoidoscopy ;  Surgeon: Felicitas Radford MD;  [facility]     EXAM UNDER ANESTHESIA ANUS N/A 11/30/2018    Procedure: Examination Under Anesthesia, application of formalin to rectum, polypectomy;  Surgeon: Felicitas Radford MD;  [facility]     EXAM UNDER ANESTHESIA ANUS N/A 2/22/2019    Procedure: Examination Under Anesthesia;  Surgeon: Felicitas Radford MD;  [facility]     EXAM UNDER ANESTHESIA ANUS N/A 6/28/2019    Procedure: Examination Under Anesthesia;  Surgeon: Felicitas Radford MD;  [facility]     HC TOOTH EXTRACTION W/FORCEP       LAPAROSCOPIC APPENDECTOMY N/A 7/17/2017    Procedure: LAPAROSCOPIC APPENDECTOMY;  LAPAROSCOPIC APPENDECTOMY;  Surgeon: Bryson Ferguson MD;  [facility]     SIGMOIDOSCOPY FLEXIBLE N/A 12/8/2016    Procedure: SIGMOIDOSCOPY FLEXIBLE;  Surgeon: Felicitas Radford MD;  [facility]     SIGMOIDOSCOPY FLEXIBLE N/A 7/5/2017    Procedure: SIGMOIDOSCOPY FLEXIBLE;;  Surgeon: Felicitas Radford MD;  [facility]     SIGMOIDOSCOPY FLEXIBLE N/A 5/11/2018    Procedure: SIGMOIDOSCOPY FLEXIBLE;;  Surgeon: Felicitas Radford MD;  [facility]     SIGMOIDOSCOPY FLEXIBLE N/A 7/20/2018    Procedure: SIGMOIDOSCOPY FLEXIBLE;;  Surgeon: Felicitas Radford MD;  [facility]     SIGMOIDOSCOPY FLEXIBLE N/A 11/30/2018    Procedure: Flexible Sigmoidoscopy;  Surgeon: Felicitas Radford MD;  [facility]     SIGMOIDOSCOPY FLEXIBLE N/A 2/22/2019    Procedure: Intraoperative Flexible  Sigmoidoscopy, Application of Formalin to rectum;  Surgeon: Felicitas Radford MD;  [facility]     SIGMOIDOSCOPY FLEXIBLE N/A 6/28/2019    Procedure: Flexible Sigmoidoscopy;  Surgeon: Felicitas Radford MD;  [facility]     TESTICLE SURGERY       VASCULAR SURGERY      Right chest port     VASECTOMY       VASECTOMY            Anesthesia Evaluation     . Pt has had prior anesthetic.     No history of anesthetic complications          ROS/MED HX    ENT/Pulmonary:  - neg pulmonary ROS    (-) tobacco use   Neurologic:  - neg neurologic ROS     Cardiovascular:  - neg cardiovascular ROS   (+) ----. : . . . :. . Previous cardiac testing date:results:date: results:ECG reviewed date:2017 results:Sinus tachycardia, non specific T wave abnormaility date: results:          METS/Exercise Tolerance:  >4 METS   Hematologic:         Musculoskeletal:         GI/Hepatic:         Renal/Genitourinary:     (+) Nephrolithiasis ,       Endo:  - neg endo ROS       Psychiatric:  - neg psychiatric ROS       Infectious Disease:  - neg infectious disease ROS       Malignancy:   (+) Malignancy History of GI          Other:    (+) no H/O Chronic Pain,                       PHYSICAL EXAM:   Mental Status/Neuro:    Airway: Facies: Feasible  Mallampati: I  Mouth/Opening: Full  TM distance: > 6 cm  Neck ROM: Full   Respiratory: Auscultation: CTAB     Resp. Rate: Normal     Resp. Effort: Normal      CV: Rhythm: Regular  Rate: Age appropriate  Heart: Normal Sounds   Comments:      Dental: Details                Lab Results   Component Value Date    WBC 4.7 06/24/2019    HGB 15.3 06/24/2019    HCT 44.4 06/24/2019     06/24/2019    .0 (H) 07/17/2017    SED 34 (H) 07/17/2017     06/24/2019    POTASSIUM 4.4 06/24/2019    CHLORIDE 106 06/24/2019    CO2 26 06/24/2019    BUN 17 06/24/2019    CR 0.76 06/24/2019    GLC 80 06/26/2019    FRANDY 8.9 06/24/2019    MAG 2.0 07/17/2017    ALBUMIN 4.0 06/24/2019    PROTTOTAL  "6.9 06/24/2019    ALT 30 06/24/2019    AST 20 06/24/2019    ALKPHOS 68 06/24/2019    BILITOTAL 1.6 (H) 06/24/2019    LIPASE 192 02/14/2018    PTT 31 07/10/2017    INR 1.02 07/10/2017       Preop Vitals  BP Readings from Last 3 Encounters:   10/24/19 122/76   07/05/19 116/77   06/28/19 105/64    Pulse Readings from Last 3 Encounters:   10/24/19 92   07/05/19 75   06/28/19 74      Resp Readings from Last 3 Encounters:   06/28/19 14   02/22/19 14   11/30/18 16    SpO2 Readings from Last 3 Encounters:   10/24/19 95%   07/05/19 96%   06/28/19 98%      Temp Readings from Last 1 Encounters:   10/24/19 36.1  C (97  F) (Oral)    Ht Readings from Last 1 Encounters:   10/24/19 1.791 m (5' 10.5\")      Wt Readings from Last 1 Encounters:   10/24/19 96.6 kg (213 lb)    Estimated body mass index is 30.13 kg/m  as calculated from the following:    Height as of 10/24/19: 1.791 m (5' 10.5\").    Weight as of 10/24/19: 96.6 kg (213 lb).     LDA:  Peripheral IV 02/16/18 (Active)   Number of days: 622       Peripheral IV 07/20/18 Right Hand (Active)   Number of days: 468       Peripheral IV 06/26/19 Right (Active)   Number of days: 127       Ureteral Drain/Stent Left ureter 6 fr (Active)   Number of days: 622            Assessment: Elective due to   ASA SCORE: 2           - H&P complete; Preop Testing complete; Consents complete  Smoking Status:  NO Active use of Tobacco/UNKNOWN Tobacco use status   NPO Status: > 6 hours since completed Solid Foods     Plan:   Anes. Type: (MAC)      Induction:  IV (Standard)   Airway: Native Airway   Access/Monitoring: PIV   Maintenance: Propofol; IV     Postop Plan:   Postop Pain: Opioids PRN  Postop Sedation/Airway: Recovery Site (PACU/ICU)  Disposition: Same Day Surgery     PONV Management:   Adult Risk Factors:, Non-Smoker, Postop Opioids   Prevention: Ondansetron     CONSENT: Direct conversation   Plan and risks discussed with: Patient   Blood Products: Consent Deferred (Minimal Blood Loss) "                             Yves Farris MD

## 2019-11-01 ENCOUNTER — ANESTHESIA (OUTPATIENT)
Dept: SURGERY | Facility: AMBULATORY SURGERY CENTER | Age: 59
End: 2019-11-01

## 2019-11-01 ENCOUNTER — HOSPITAL ENCOUNTER (OUTPATIENT)
Facility: AMBULATORY SURGERY CENTER | Age: 59
End: 2019-11-01
Attending: COLON & RECTAL SURGERY
Payer: COMMERCIAL

## 2019-11-01 VITALS
DIASTOLIC BLOOD PRESSURE: 71 MMHG | HEART RATE: 75 BPM | SYSTOLIC BLOOD PRESSURE: 105 MMHG | OXYGEN SATURATION: 94 % | BODY MASS INDEX: 29.12 KG/M2 | HEIGHT: 71 IN | WEIGHT: 208 LBS | RESPIRATION RATE: 16 BRPM | TEMPERATURE: 98.1 F

## 2019-11-01 RX ORDER — ONDANSETRON 2 MG/ML
4 INJECTION INTRAMUSCULAR; INTRAVENOUS EVERY 30 MIN PRN
Status: DISCONTINUED | OUTPATIENT
Start: 2019-11-01 | End: 2019-11-02 | Stop reason: HOSPADM

## 2019-11-01 RX ORDER — SODIUM CHLORIDE, SODIUM LACTATE, POTASSIUM CHLORIDE, CALCIUM CHLORIDE 600; 310; 30; 20 MG/100ML; MG/100ML; MG/100ML; MG/100ML
INJECTION, SOLUTION INTRAVENOUS CONTINUOUS PRN
Status: DISCONTINUED | OUTPATIENT
Start: 2019-11-01 | End: 2019-11-01

## 2019-11-01 RX ORDER — ACETAMINOPHEN 325 MG/1
975 TABLET ORAL ONCE
Status: COMPLETED | OUTPATIENT
Start: 2019-11-01 | End: 2019-11-01

## 2019-11-01 RX ORDER — GLYCOPYRROLATE 0.2 MG/ML
INJECTION, SOLUTION INTRAMUSCULAR; INTRAVENOUS PRN
Status: DISCONTINUED | OUTPATIENT
Start: 2019-11-01 | End: 2019-11-01

## 2019-11-01 RX ORDER — PROPOFOL 10 MG/ML
INJECTION, EMULSION INTRAVENOUS CONTINUOUS PRN
Status: DISCONTINUED | OUTPATIENT
Start: 2019-11-01 | End: 2019-11-01

## 2019-11-01 RX ORDER — KETAMINE HYDROCHLORIDE 10 MG/ML
INJECTION, SOLUTION INTRAMUSCULAR; INTRAVENOUS PRN
Status: DISCONTINUED | OUTPATIENT
Start: 2019-11-01 | End: 2019-11-01

## 2019-11-01 RX ORDER — PROPOFOL 10 MG/ML
INJECTION, EMULSION INTRAVENOUS PRN
Status: DISCONTINUED | OUTPATIENT
Start: 2019-11-01 | End: 2019-11-01

## 2019-11-01 RX ORDER — ONDANSETRON 4 MG/1
4 TABLET, ORALLY DISINTEGRATING ORAL EVERY 30 MIN PRN
Status: DISCONTINUED | OUTPATIENT
Start: 2019-11-01 | End: 2019-11-02 | Stop reason: HOSPADM

## 2019-11-01 RX ORDER — ONDANSETRON 2 MG/ML
INJECTION INTRAMUSCULAR; INTRAVENOUS PRN
Status: DISCONTINUED | OUTPATIENT
Start: 2019-11-01 | End: 2019-11-01

## 2019-11-01 RX ORDER — NALOXONE HYDROCHLORIDE 0.4 MG/ML
.1-.4 INJECTION, SOLUTION INTRAMUSCULAR; INTRAVENOUS; SUBCUTANEOUS
Status: DISCONTINUED | OUTPATIENT
Start: 2019-11-01 | End: 2019-11-02 | Stop reason: HOSPADM

## 2019-11-01 RX ORDER — LIDOCAINE 40 MG/G
CREAM TOPICAL
Status: DISCONTINUED | OUTPATIENT
Start: 2019-11-01 | End: 2019-11-01 | Stop reason: HOSPADM

## 2019-11-01 RX ORDER — LIDOCAINE HYDROCHLORIDE 20 MG/ML
INJECTION, SOLUTION INFILTRATION; PERINEURAL PRN
Status: DISCONTINUED | OUTPATIENT
Start: 2019-11-01 | End: 2019-11-01

## 2019-11-01 RX ORDER — SODIUM CHLORIDE, SODIUM LACTATE, POTASSIUM CHLORIDE, CALCIUM CHLORIDE 600; 310; 30; 20 MG/100ML; MG/100ML; MG/100ML; MG/100ML
INJECTION, SOLUTION INTRAVENOUS CONTINUOUS
Status: DISCONTINUED | OUTPATIENT
Start: 2019-11-01 | End: 2019-11-02 | Stop reason: HOSPADM

## 2019-11-01 RX ORDER — GABAPENTIN 300 MG/1
300 CAPSULE ORAL ONCE
Status: COMPLETED | OUTPATIENT
Start: 2019-11-01 | End: 2019-11-01

## 2019-11-01 RX ADMIN — GLYCOPYRROLATE 0.2 MG: 0.2 INJECTION, SOLUTION INTRAMUSCULAR; INTRAVENOUS at 07:35

## 2019-11-01 RX ADMIN — PROPOFOL 50 MG: 10 INJECTION, EMULSION INTRAVENOUS at 07:38

## 2019-11-01 RX ADMIN — LIDOCAINE HYDROCHLORIDE 100 MG: 20 INJECTION, SOLUTION INFILTRATION; PERINEURAL at 07:35

## 2019-11-01 RX ADMIN — ONDANSETRON 4 MG: 2 INJECTION INTRAMUSCULAR; INTRAVENOUS at 07:36

## 2019-11-01 RX ADMIN — KETAMINE HYDROCHLORIDE 20 MG: 10 INJECTION, SOLUTION INTRAMUSCULAR; INTRAVENOUS at 07:35

## 2019-11-01 RX ADMIN — PROPOFOL 100 MCG/KG/MIN: 10 INJECTION, EMULSION INTRAVENOUS at 07:35

## 2019-11-01 RX ADMIN — SODIUM CHLORIDE, SODIUM LACTATE, POTASSIUM CHLORIDE, CALCIUM CHLORIDE: 600; 310; 30; 20 INJECTION, SOLUTION INTRAVENOUS at 07:04

## 2019-11-01 RX ADMIN — GABAPENTIN 300 MG: 300 CAPSULE ORAL at 06:14

## 2019-11-01 RX ADMIN — ACETAMINOPHEN 975 MG: 325 TABLET ORAL at 06:14

## 2019-11-01 ASSESSMENT — MIFFLIN-ST. JEOR: SCORE: 1772.67

## 2019-11-01 NOTE — ANESTHESIA CARE TRANSFER NOTE
Patient: Louie Greco    Procedure(s):  Examination Under Anesthesia  Flexible Sigmoidoscopy    Diagnosis: History of Rectal Cancer  Diagnosis Additional Information: No value filed.    Anesthesia Type:   No value filed.     Note:  Airway :Room Air  Patient transferred to:Phase II  Comments: VSS and WNL, comfortable, no PONV, report to Cooper RN Handoff Report: Identifed the Patient, Identified the Reponsible Provider, Reviewed the pertinent medical history, Discussed the surgical course, Reviewed Intra-OP anesthesia mangement and issues during anesthesia, Set expectations for post-procedure period and Allowed opportunity for questions and acknowledgement of understanding      Vitals: (Last set prior to Anesthesia Care Transfer)    CRNA VITALS  11/1/2019 0720 - 11/1/2019 0758      11/1/2019             Pulse:  67    Ht Rate:  66    SpO2:  98 %    Resp Rate (set):  10                Electronically Signed By: QUINTON Daley CRNA  November 1, 2019  7:58 AM

## 2019-11-01 NOTE — ANESTHESIA POSTPROCEDURE EVALUATION
Anesthesia POST Procedure Evaluation    Patient: Louie Greco   MRN:     [id number] Gender:   male   Age:    59 year old :      1960        Preoperative Diagnosis: History of Rectal Cancer   Procedure(s):  Examination Under Anesthesia  Flexible Sigmoidoscopy   Postop Comments: No value filed.       Anesthesia Type:  MAC  No value filed.    Reportable Event: NO     PAIN: Uncomplicated   Sign Out status: Comfortable, Well controlled pain     PONV: No PONV   Sign Out status:  No Nausea or Vomiting     Neuro/Psych: Uneventful perioperative course   Sign Out Status: Preoperative baseline; Age appropriate mentation     Airway/Resp.: Uneventful perioperative course   Sign Out Status: Non labored breathing, age appropriate RR; Resp. Status within EXPECTED Parameters     CV: Uneventful perioperative course   Sign Out status: Appropriate BP and perfusion indices; Appropriate HR/Rhythm     Disposition:   Sign Out in:  Phase II  Disposition:  Home  Recovery Course: Uneventful  Follow-Up: Not required           Last Anesthesia Record Vitals:  CRNA VITALS  2019 0720 - 2019 0820      2019             Pulse:  67    Ht Rate:  66    SpO2:  98 %    Resp Rate (set):  10          Last PACU Vitals:  Vitals Value Taken Time   /63 2019  7:55 AM   Temp 36.9  C (98.4  F) 2019  7:55 AM   Pulse     Resp 16 2019  7:55 AM   SpO2 94 % 2019  7:55 AM   Temp src Available 2019  7:45 AM   NIBP 104/66 2019  7:46 AM   Pulse 67 2019  7:51 AM   SpO2 98 % 2019  7:51 AM   Resp     Temp     Ht Rate 66 2019  7:51 AM   Temp 2           Electronically Signed By: Yevs Farris MD, 2019, 9:17 AM

## 2019-11-01 NOTE — DISCHARGE INSTRUCTIONS
Anorectal Surgery Instructions    What can I expect after anorectal surgery?  Most anorectal procedures are done as outpatient surgery, and you go home the same day as the procedure. A few surgical procedures will require that you stay in the hospital for about one to three days. No matter where the procedure is done or how long or short it takes, these recommendations will help you heal and feel more comfortable.    Medicines:  The anal area is very sensitive; you can expect to have some pain for up to 2-4 weeks after the procedure. Your doctor will give you a prescription for one or more pain medications.    Take naprosyn 500 mg twice a day OR ibuprofen 600 mg four times a day     Take this on a regular basis (not as needed) following your surgery.     The drugs are best taken with food.  Do not take if it causes stomach upset or if you have a history of ulcers or gastritis. You can stop the naprosyn (or ibuprofen) or reduce the dose when you are feeling better.    DO NOT use naprosyn, ibuprofen, or other similar agents (eg. Advil or Aleve) if you have inflammatory bowel disease (Ulcerative Colitis or Crohn's disease) or if your doctor as advised you against using these medications    Take acetominaphen (Tylenol) 650-1000 mg four times a day.     Take this on a regular basis (not as needed) following surgery for pain control.     Take the lower dose if you are >65 years old or have liver disease. The maximum dose of acetominaphen is 4000 mg a day. You can stop the acetaminophen or reduce the dose when you are feeling better.    It is important to realize that many narcotic pain relievers (including vicodin, percocet, tylenol #3) also have acetaminophen, and excessive doses of acetaminophen can be dangerous, so do not take these in addition to acetominaphen.  You may take narcotics that don't contain acetominaphen such as oxycodone.      Take oxycodone AS NEEDED in addition to the acetominaphen and naprosyn.       Because narcotics have side effects (including constipation), you should reduce your use of these medications as tolerated as your pain improves.    *In general, the best strategy is to take (if you are able to tolerate it) the tylenol and naprosen on a regular basis until your pain has largely gone away. You can take the narcotic pain medicine as needed in addition to the tylenol and ibuprofen. As your pain begins to lessen, you should cut back on your narcotic use while continuing to take your regular tylenol and naprosyn doses.      Refilling prescriptions. If you need additional pain medication, please call the triage nurse at [phone] during normal business hours (8 a.m. to 4 p.m., Monday though Friday) or have your pharmacy fax a refill request to [phone]. If you call after hours or on the weekends, the doctor on call may not know you personally and may not renew narcotic pain medication by phone. Call your primary care provider for all other medication refills.    Perineal care:  External gauze dressing can be removed the morning after surgery. If you have an adhesive dressing stuck to the incision, DO NOT remove this.   Tub baths:    If possible, take a tub bath immediately after each bowel movement.     Baths should be take at least 3 times daily for the first week to 10 days following your procedure. You should soak in the tub for 10 to 15 minutes each time with water as warm as you can tolerate.     Even after you go back to work, it is a good idea to sit in the tub in the morning, after returning from work, and again in the evening before bedtime.    Bleeding/Infection:    You can expect to have some bleeding after bowel movements, but it should stop soon after you wipe.     Use a wet cloth or perianal pad (Tucks or Preparation H pads) to gently wipe the area after each bowel movement.    Do not rub the anal area or use a lot of pressure.    Using a spray bottle filled with warm water helps  loosen any remaining stool. Blot gently with a soft dry cloth or tissue paper.    Infection around the anal opening is not very common. The anal area has excellent blood supply, which helps the area to heal. Bloody discharge after bowel movements is normal and may last 2 to 4 weeks after your surgery. However, if you bleed between bowel movements and cannot get it to stop, call the triage nurse immediately [phone].    Bowel function:  Take a fiber supplement such as Metamucil, which is over the counter. It is important to drink six to eight glasses of water or juice everyday when using fiber products.    If you do not have a bowel movement after 1-2 days:    Take Milk of Magnesia-2 tablespoons.       If there are no results, repeat this or add over the counter Miralax.      If you still do not have results, contact the clinic.     If there are no results, repeat this. Stop taking Milk of Magnesia or other laxatives if you begin to have diarrhea.    * Constipation will cause you to strain when you have a bowel movement. The hard stool will be difficult to pass, will increase pain and bleeding, and will slow down healing.  Try to avoid constipation and/or diarrhea as this can make the pain and bleeding worse.    * It is important to have regular bowel movements at least every other day and to keep your stool soft.  A high fiber diet, including at least four servings of fruits or vegetables daily, will help to keep your bowel movements regular and soft.    Activity:  After your procedure, there are no restrictions on your activity     except restrictions surrounding being on narcotics and in pain, such as no heavy machine operating or driving.     You may walk, climb stairs, ride in a car, and sit as tolerated.     It is helpful to avoid sitting in one position for long periods (2 or more hours).    After some surgeries, you may be told not to perform any lifting (more than 10 pounds) for several weeks after  surgery.    When to call:  When do I need to call the doctor or triage nurse?    If you experience any of the problems listed here, call our triage nurse during business hours ([phone]).     The nurse will help you with your problem or have the doctor call you.     After hours and on weekends, please call the main hospital number ([phone]) and ask for the colon and rectal surgery person on call.     Some is available to help you 24 hours a day, seven days a week.    Call for:   ? Fever greater than 101 degrees   ? Chills   ? Foul-smelling drainage   ? Nausea and vomiting   ? Diarrhea - greater than 3 water stools in 24 hours   ? Constipation - no bowel movement after 3 days   ? Severe bleeding that does not stop soon after a bowel movement   ? Problems with the incision, including increased pain, swelling, or redness    [facility]  Home Care Following Anesthesia  For 24 hours after surgery:  1. Get plenty of rest.  A responsible adult must stay with you for at least 24 hours after you leave the surgery center.  2. Do not drive or use heavy equipment.  If you have weakness or tingling, don't drive or use heavy equipment until this feeling goes away.   3. Do not drink alcohol.   4. Avoid strenuous or risky activities.  Ask for help when climbing stairs.  5. You may feel lightheaded.  IF so, sit for a few minutes before standing.  Have someone help you get up.   6. If you have nausea (feel sick to your stomach): Drink only clear liquids such as apple juice, ginger ale, broth or 7-Up.  Rest may also help.  Be sure to drink enough fluids.  Move to a regular diet as you feel able.   7. You may have a slight fever.  Call the doctor if your fever is over 100 F (37.7 C) (taken under the tongue) or lasts longer than 24 hours.  8. You may have a dry mouth, a sore throat, muscle aches or trouble sleeping. These should go away after 24 hours.  9. Do not make important or legal  decisions.               Tips for taking pain medications  To get the best pain relief possible, remember these points:    Take pain medications as directed, before pain becomes severe.    Pain medication can upset your stomach: taking it with food may help.    Constipation is a common side effect of pain medication. Drink plenty of  fluids.    Eat foods high in fiber. Take a stool softener if recommended by your doctor or pharmacist.    Do not drink alcohol, drive or operate machinery while taking pain medications.    Ask about other ways to control pain, such as with heat, ice or relaxation.    Tylenol/Acetaminophen Consumption  To help encourage the safe use of acetaminophen, the makers of TYLENOL  have lowered the maximum daily dose for single-ingredient Extra Strength TYLENOL  (acetaminophen) products sold in the [place] from 8 pills per day (4,000 mg) to 6 pills per day (3,000 mg). The dosing interval has also changed from 2 pills every 4-6 hours to 2 pills every 6 hours.    If you feel your pain relief is insufficient, you may take Tylenol/Acetaminophen in addition to your narcotic pain medication.     Be careful not to exceed 3,000 mg of Tylenol/Acetaminophen in a 24 hour period from all sources.    If you are taking extra strength Tylenol/acetaminophen (500 mg), the maximum dose is 6 tablets in 24 hours.    If you are taking regular strength acetaminophen (325 mg), the maximum dose is 9 tablets in 24 hours.  975 mg of Tylenol given at 6:15 am next dose may be given after 12:15pm    Call a doctor for any of the followin. Signs of infection (fever, growing tenderness at the surgery site, a large amount of drainage or bleeding, severe pain, foul-smelling drainage, redness, swelling).  2. It has been over 8 to 10 hours since surgery and you are still not able to urinate (pass water).  3. Headache for over 24 hours.    Your doctor is:       Dr. Felicitas Radford, Colon Rectal: [phone]                Or dial [phone] and ask for the resident on call for:  Colon Rectal  For emergency care, call the:  [facility] [facility]:  [phone] (TTY for hearing impaired: [phone])

## 2019-11-10 NOTE — OP NOTE
SURGEON: Felicitas Radford MD       PREOPERATIVE DIAGNOSIS: Rectal cancer with watch and wait protocol. Need for surveillance.       POSTOPERATIVE DIAGNOSIS: Rectal cancer with watch and wait protocol. Need for surveillance. No significant radiation proctitis. Some evidence of anal stenosis.      PROCEDURE: Examination under anesthesia, flexible sigmoidoscopy.      INDICATIONS: Louie Greco is a 59 year old male who was found to have a rectal cancer and had a complete response to chemoradiotherapy. He is now on a watch and wait protocol and we are examining the area under anesthesia because of the friability and pain in the area and relative stenosis of the anal canal. He also has some radiation proctitis that has responded to formalin in the past.  Formalin was applied in 2/2019 but not 6/2019. He does continue to have some urgency with bowel movements and frequency with his bowel habits but overall improved from when he initially underwent treatment.  The risks and benefits of surgery were thoroughly discussed with the patient and he agreed to proceed.      DESCRIPTION OF PROCEDURE: The patient was brought to the operating room, placed in left lateral decubitus. Deep sedation was induced with intravenous medicines with deep sedation and monitored anesthesia care. We prepped and draped the area in the usual fashion. We performed digital rectal examination and anoscopy which demonstrated a somewhat stenotic anal canal and introduction of the small anoscope resulted in some mild tearing of the anal canal. There were no palpable abnormalities on digital rectal examination and the prostate by palpation was normal. There was no nodularity or induration. A flexible sigmoidoscopy with CO2 was then performed and the scope was advanced to the proximal descending colon without difficulty to 40 cm and stopped because of poor preparation proximal to this. There was diverticulosis and no signs of divertciulitis. There  were no other polyps or other lesions. The radiation changes were improved and the scar was visible, but no signs of disease or additional lesions.  At the end the case, all instruments and sponge counts were correct x2. Retroflexion was not performed. The patient was emerged from anesthesia and taken to postoperative anesthesia care unit in good condition.       SPECIMEN: None      ESTIMATED BLOOD LOSS: Minimal.       URINE OUTPUT: Not measured.       AROLDO NAIK MD   Colon and Rectal Surgery Staff  [facility]

## 2019-11-12 ENCOUNTER — PRE VISIT (OUTPATIENT)
Dept: UROLOGY | Facility: CLINIC | Age: 59
End: 2019-11-12

## 2019-11-12 NOTE — TELEPHONE ENCOUNTER
Reason for Visit: Consult    Diagnosis: elevated PSA    Orders/Procedures/Records: in system    Contact Patient: n/a    Rooming Requirements: normal      uYmiko Cox LPN  11/12/19  8:03 AM

## 2019-11-18 ENCOUNTER — OFFICE VISIT (OUTPATIENT)
Dept: UROLOGY | Facility: CLINIC | Age: 59
End: 2019-11-18
Payer: COMMERCIAL

## 2019-11-18 ENCOUNTER — PRE VISIT (OUTPATIENT)
Dept: UROLOGY | Facility: CLINIC | Age: 59
End: 2019-11-18

## 2019-11-18 VITALS
HEART RATE: 101 BPM | SYSTOLIC BLOOD PRESSURE: 136 MMHG | WEIGHT: 205 LBS | HEIGHT: 70 IN | DIASTOLIC BLOOD PRESSURE: 81 MMHG | BODY MASS INDEX: 29.35 KG/M2

## 2019-11-18 DIAGNOSIS — R97.20 ELEVATED PROSTATE SPECIFIC ANTIGEN (PSA): Primary | ICD-10-CM

## 2019-11-18 ASSESSMENT — PAIN SCALES - GENERAL: PAINLEVEL: NO PAIN (0)

## 2019-11-18 ASSESSMENT — MIFFLIN-ST. JEOR: SCORE: 1751.12

## 2019-11-18 NOTE — PATIENT INSTRUCTIONS
Set up telephone visit in February with PSA prior.        It was a pleasure meeting with you today.  Thank you for allowing me and my team the privilege of caring for you today.  YOU are the reason we are here, and I truly hope we provided you with the excellent service you deserve.  Please let us know if there is anything else we can do for you so that we can be sure you are leaving completely satisfied with your care experience.

## 2019-11-18 NOTE — LETTER
2019       RE: Louie Greco  [address] [address]     Dear Colleague,    Thank you for referring your patient, Louie Greco, to the [facility] AND [facility] PROST[facility] UROLOGIC CANCERS at [facility]. Please see a copy of my visit note below.    Service Date: 2019      REASON FOR VISIT TODAY:  Elevated PSA.      HISTORY OF PRESENT ILLNESS:  Mr. Greco is a 59-year-old gentleman who comes to see us for history of an elevated PSA.  The patient's PSA was most recently noted to be 5.61 on 2019.  Previous values including 3.74 on 2018, 3.78 on 2018, 5.0 on 10/19/2017 and 5.46 on 2017.  The patient has a history of rectal cancer for which he underwent chemo rads but ultimately did not undergo resection and is on surveillance for his disease.  The patient was diagnosed in 2016.  He has had no evidence of recurrence following his chemoradiation therapy.  The treatment did, however, result in some rectal stenosis and currently the patient requires the pediatric anoscope in order to undergo his surveillance examinations.  The patient has not had a prostate biopsy to date.  Per Dr. Radford's recent exam, digital rectal exam was purportedly normal.      PAST MEDICAL HISTORY:  Significant for history of kidney stones and erectile dysfunction, in addition to the rectal cancer.      PAST SURGICAL HISTORY:  Includes ureteroscopy and appendectomy.      MEDICATIONS:  Sildenafil as needed.      ALLERGIES:  Ampicillin, Demerol.      FAMILY HISTORY:  Significant for a brother who  of some form of metastatic cancer but the primary is unclear.      SOCIAL HISTORY:  Negative for tobacco, alcohol occasionally.      REVIEW OF SYSTEMS:  Negative for fevers, chills, sweats, nausea, vomiting, unexplained weight changes.      PHYSICAL EXAMINATION:  On exam today, the patient's blood pressure is 136/81, pulse is 101.  He is in no acute distress.         PSAs are as noted above.  The patient voided 200 cc with a 15 cc postvoid residual.      ASSESSMENT AND PLAN:  Over half of today's 45-minute visit was spent counseling the patient regarding his elevated PSA.  I suggested to Mr. Greco that his PSA, while mildly elevated, has fluctuated in the past and is at least perhaps mildly elevated but stable thus far.  We discussed that there can be multiple etiologies for an elevated PSA, and certainly, the patient's history of radiation to this region could cause some inflammation within the prostate as well.  The patient's pelvic MRIs which he has had to follow his rectal cancer do show some diffuse hypointensity in the peripheral zone, on my brief read, consistent with possible prostatitis which again could support the mildly elevated PSA.  We discussed options at this point including continued observation versus an MRI of the prostate versus moving forward with a transrectal ultrasound-guided biopsy.  I did  the patient that I was concerned about attempting a transrectal biopsy in him given the rectal stenosis as well as the concern about possibly causing a fistula in performing a biopsy across this post-radiated and purportedly friable tissue per Dr. Radford's note.  We discussed that it is possible transperineal biopsy would be required if the patient needed a prostate biopsy.  Given all these concerns, I suggested to the patient that we will want to be fairly confident that the biopsy is justified before considering to do so.  After a thorough discussion of the risks and benefits and alternatives, the patient wishes to simply repeat the PSA to make sure that it actually is persistently elevated or rising and does not come back down as it has done in the past.  Toward this end, we will recheck a PSA in the next couple of months, which will be several months from the patient's last sigmoidoscopy which was just on 11/01/2019.  Thus, we would not want to  recheck a PSA now in any case.  We will have a phone appointment to go over the results of the PSA in February.  If the PSA remains elevated or is further elevated, then certainly an MRI of the prostate would be the next step.  The patient is in agreement with the plan.         ADIA VELASCO MD             D: 2019   T: 2019   MT: eliana      Name:     NAKIA WEBER   MRN:      [id number]        Account:      [id number]   :      1960           Service Date: 2019      Document: [id number]

## 2019-11-19 NOTE — PROGRESS NOTES
Service Date: 2019      REASON FOR VISIT TODAY:  Elevated PSA.      HISTORY OF PRESENT ILLNESS:  Mr. Greco is a 59-year-old gentleman who comes to see us for history of an elevated PSA.  The patient's PSA was most recently noted to be 5.61 on 2019.  Previous values including 3.74 on 2018, 3.78 on 2018, 5.0 on 10/19/2017 and 5.46 on 2017.  The patient has a history of rectal cancer for which he underwent chemo rads but ultimately did not undergo resection and is on surveillance for his disease.  The patient was diagnosed in 2016.  He has had no evidence of recurrence following his chemoradiation therapy.  The treatment did, however, result in some rectal stenosis and currently the patient requires the pediatric anoscope in order to undergo his surveillance examinations.  The patient has not had a prostate biopsy to date.  Per Dr. Radford's recent exam, digital rectal exam was purportedly normal.      PAST MEDICAL HISTORY:  Significant for history of kidney stones and erectile dysfunction, in addition to the rectal cancer.      PAST SURGICAL HISTORY:  Includes ureteroscopy and appendectomy.      MEDICATIONS:  Sildenafil as needed.      ALLERGIES:  Ampicillin, Demerol.      FAMILY HISTORY:  Significant for a brother who  of some form of metastatic cancer but the primary is unclear.      SOCIAL HISTORY:  Negative for tobacco, alcohol occasionally.      REVIEW OF SYSTEMS:  Negative for fevers, chills, sweats, nausea, vomiting, unexplained weight changes.      PHYSICAL EXAMINATION:  On exam today, the patient's blood pressure is 136/81, pulse is 101.  He is in no acute distress.        PSAs are as noted above.  The patient voided 200 cc with a 15 cc postvoid residual.      ASSESSMENT AND PLAN:  Over half of today's 45-minute visit was spent counseling the patient regarding his elevated PSA.  I suggested to Mr. Greco that his PSA, while mildly elevated, has fluctuated in the past and is  at least perhaps mildly elevated but stable thus far.  We discussed that there can be multiple etiologies for an elevated PSA, and certainly, the patient's history of radiation to this region could cause some inflammation within the prostate as well.  The patient's pelvic MRIs which he has had to follow his rectal cancer do show some diffuse hypointensity in the peripheral zone, on my brief read, consistent with possible prostatitis which again could support the mildly elevated PSA.  We discussed options at this point including continued observation versus an MRI of the prostate versus moving forward with a transrectal ultrasound-guided biopsy.  I did  the patient that I was concerned about attempting a transrectal biopsy in him given the rectal stenosis as well as the concern about possibly causing a fistula in performing a biopsy across this post-radiated and purportedly friable tissue per Dr. Radford's note.  We discussed that it is possible transperineal biopsy would be required if the patient needed a prostate biopsy.  Given all these concerns, I suggested to the patient that we will want to be fairly confident that the biopsy is justified before considering to do so.  After a thorough discussion of the risks and benefits and alternatives, the patient wishes to simply repeat the PSA to make sure that it actually is persistently elevated or rising and does not come back down as it has done in the past.  Toward this end, we will recheck a PSA in the next couple of months, which will be several months from the patient's last sigmoidoscopy which was just on 11/01/2019.  Thus, we would not want to recheck a PSA now in any case.  We will have a phone appointment to go over the results of the PSA in February.  If the PSA remains elevated or is further elevated, then certainly an MRI of the prostate would be the next step.  The patient is in agreement with the plan.         ADIA VELASCO MD              D: 2019   T: 2019   MT: eliana      Name:     NAKIA WEBER   MRN:      [id number]        Account:      [id number]   :      1960           Service Date: 2019      Document: [id number]

## 2019-12-10 NOTE — LETTER
[facility]  [address] [facility]  [address]  [place]  [address]  Tel: [phone]    August 29, 2017    Louie Greco  [address] [address]        Dear Mr. Greco,    The following letter pertains to your most recent diagnostic tests:    Good news! Your kidney cysts that were noted incidentally on your recent CT scan are demonstrated to be non-cancerous and harmless cysts on MRI.  You don't have to worry about these.    If you have any further questions or problems, please contact our office.      Sincerely,    Philippe Healy MD          Enclosure: Lab Results  Results for orders placed or performed during the hospital encounter of 08/25/17   MR Abdomen w/o & w Contrast    Narrative    MR ABDOMEN WITHOUT AND WITH CONTRAST  8/25/2017 8:05 PM     HISTORY: Renal lesion noted on CT.    TECHNIQUE: Multisequence, multiplanar imaging of the abdomen was  performed without IV gadolinium contrast. A total of 20 mL Gadavist  gadolinium contrast was then administered intravenously. Additional  dynamic postcontrast T1 fat-sat sequences were performed.     COMPARISON: CT of the abdomen and pelvis performed 7/16/2017.    FINDINGS: A 4.4 cm exophytic lesion in the lower pole of the left  kidney demonstrates mild T1 hyperintensity, mild T2 hyperintensity,  and no definite enhancement on the post-gadolinium sequences. Findings  are consistent with a hemorrhagic or proteinaceous renal cyst. An  exophytic 2.9 cm lesion in the lower pole of the left kidney  anteriorly has similar signal and enhancement characteristics, and  also likely represents a hemorrhagic or proteinaceous renal cyst.  There are numerous simple-appearing cysts also seen in both kidneys,  with the largest an exophytic cyst in the lower pole of the left  kidney anteriorly measuring 5 cm. The kidneys are otherwise  unremarkable. No hydronephrosis.    No evidence for fatty infiltration of the liver. There are scattered  hepatic cysts noted, with  the largest in the lateral segment of the  left hepatic lobe anteriorly measuring 2.2 cm. The liver, spleen,  gallbladder, adrenal glands, and pancreas are otherwise unremarkable.  Visualized loops of small bowel and colon are of normal caliber. No  enlarged lymph nodes are identified in the abdomen.       Impression    IMPRESSION:   1. There are two left renal cysts with areas of T1 hyperintensity and  no associated enhancement, likely representing hemorrhagic or  proteinaceous renal cysts.  2. Multiple simple-appearing bilateral renal and hepatic cysts are  again noted.    AVIS EL MD          10-Dec-2019 23:34

## 2019-12-29 NOTE — DISCHARGE INSTRUCTIONS
Port Removal Discharge Instructions     After you go home:      Have an adult stay with you for the first 6 hours    You may resume your normal diet       For 24 hours - due to the sedation you received:    Relax and take it easy    Do NOT make any important or legal decisions    Do NOT drive or operate machines at home or at work    Do NOT drink alcohol    Care of Puncture Site:      Keep the dressings on your site clean & dry for 3 days. Change it only if it gets wet or dirty.    You may shower after the dressing comes off in 3 days    Do not remove the small white strips of tape, if present. Allow them to fall off on their own.     You may cover the wound with a bandaid after the dressing is removed if needed for comfort      Activity       Avoid heavy lifting (greater than 10 pounds) or the overuse of your shoulder for 3 days    Bleeding:      If you start bleeding from the incision site in your chest or have swelling in your neck, sit down and press on the site for 5-10 minutes.     If bleeding has not stopped after 10 minutes, call your provider.        Call 911 right away if you have heavy bleeding or bleeding that does not stop.      Medicines:      You may resume all medications    For minor pain, you may take Acetaminophen (Tylenol) or Ibuprofen (Advil)          Call the provider who ordered this procedure if:      You have swelling in your neck or over your port site    The incision area is red, swollen, hot or tender    You have chills or a fever greater than 101 F (38 C)    Any questions or concerns    Call  911 or go to the Emergency Room if you have:      Severe chest pain or trouble breathing    Bleeding that you cannot control    If you have questions call:          [facility] Radiology Dept @ [phone]    The provider who performed your procedure was ____Dr. Valladares_____________.   No

## 2020-01-04 ENCOUNTER — MYC REFILL (OUTPATIENT)
Dept: FAMILY MEDICINE | Facility: CLINIC | Age: 60
End: 2020-01-04

## 2020-01-04 DIAGNOSIS — N52.9 MALE ERECTILE DISORDER: ICD-10-CM

## 2020-01-06 RX ORDER — SILDENAFIL CITRATE 20 MG/1
20 TABLET ORAL DAILY PRN
Qty: 30 TABLET | Refills: 11 | Status: SHIPPED | OUTPATIENT
Start: 2020-01-06 | End: 2021-02-28

## 2020-01-06 NOTE — TELEPHONE ENCOUNTER
"sildenafil (REVATIO) 20 MG tablet    Last Written Prescription Date:  01/04/2019  Last Fill Quantity: 30,  # refills: 3   Last office visit: 10/24/2019 with prescribing provider:  [place]   Future Office Visit:   Next 5 appointments (look out 90 days)    Feb 17, 2020  9:10 AM CST  Return Visit with  LAB DRAW 1  [facility] ([facility]  [address]  [facility] [address]  [phone]   Feb 19, 2020  4:00 PM CST  Return Visit with Agueda Manley MD  [facility]  [address]  [facility] [id number] [phone]           Requested Prescriptions   Pending Prescriptions Disp Refills     sildenafil (REVATIO) 20 MG tablet 30 tablet 3     Sig: Take 1 tablet (20 mg) by mouth daily as needed       Erectile Dysfuction Protocol Passed - 1/4/2020 12:54 AM        Passed - Absence of nitrates on medication list        Passed - Absence of Alpha Blockers on Med list        Passed - Recent (12 mo) or future (30 days) visit within the authorizing provider's specialty     Patient has had an office visit with the authorizing provider or a provider within the authorizing providers department within the previous 12 mos or has a future within next 30 days. See \"Patient Info\" tab in inbasket, or \"Choose Columns\" in Meds & Orders section of the refill encounter.              Passed - Medication is active on med list        Passed - Patient is age 18 or older          "

## 2020-01-06 NOTE — TELEPHONE ENCOUNTER
Routing refill request to provider for review/approval because:  Pharm needs PRN reason added to sig.  Please authorize if appropriate.  Thanks,  Lubna Navarro RN

## 2020-02-03 ENCOUNTER — PRE VISIT (OUTPATIENT)
Dept: UROLOGY | Facility: CLINIC | Age: 60
End: 2020-02-03

## 2020-02-03 NOTE — TELEPHONE ENCOUNTER
Chief Complaint : PSA check     Records/Orders: PSA    Pt Contacted: called patient and left a message to please get PSA done 1 week before appointment and fax it clinic     At Rooming: phone appointment

## 2020-02-17 ENCOUNTER — HOSPITAL ENCOUNTER (OUTPATIENT)
Facility: CLINIC | Age: 60
Setting detail: SPECIMEN
Discharge: HOME | End: 2020-02-17
Attending: INTERNAL MEDICINE | Admitting: UROLOGY
Payer: COMMERCIAL

## 2020-02-17 ENCOUNTER — TELEPHONE (OUTPATIENT)
Dept: SURGERY | Facility: CLINIC | Age: 60
End: 2020-02-17

## 2020-02-17 ENCOUNTER — ANCILLARY PROCEDURE (OUTPATIENT)
Dept: MRI IMAGING | Facility: CLINIC | Age: 60
End: 2020-02-17
Attending: INTERNAL MEDICINE
Payer: COMMERCIAL

## 2020-02-17 ENCOUNTER — INFUSION THERAPY VISIT (OUTPATIENT)
Dept: INFUSION THERAPY | Facility: CLINIC | Age: 60
End: 2020-02-17
Attending: INTERNAL MEDICINE
Payer: COMMERCIAL

## 2020-02-17 DIAGNOSIS — R97.20 ELEVATED PROSTATE SPECIFIC ANTIGEN (PSA): ICD-10-CM

## 2020-02-17 DIAGNOSIS — C20 MALIGNANT NEOPLASM OF RECTUM (H): ICD-10-CM

## 2020-02-17 LAB
ALBUMIN SERPL-MCNC: 3.6 G/DL (ref 3.4–5)
ALP SERPL-CCNC: 60 U/L (ref 40–150)
ALT SERPL W P-5'-P-CCNC: 40 U/L (ref 0–70)
ANION GAP SERPL CALCULATED.3IONS-SCNC: 6 MMOL/L (ref 3–14)
AST SERPL W P-5'-P-CCNC: 34 U/L (ref 0–45)
BASOPHILS # BLD AUTO: 0 10E9/L (ref 0–0.2)
BASOPHILS NFR BLD AUTO: 0.6 %
BILIRUB SERPL-MCNC: 0.8 MG/DL (ref 0.2–1.3)
BUN SERPL-MCNC: 17 MG/DL (ref 7–30)
CALCIUM SERPL-MCNC: 8.6 MG/DL (ref 8.5–10.1)
CEA SERPL-MCNC: <0.5 UG/L (ref 0–2.5)
CHLORIDE SERPL-SCNC: 109 MMOL/L (ref 94–109)
CO2 SERPL-SCNC: 24 MMOL/L (ref 20–32)
CREAT SERPL-MCNC: 0.68 MG/DL (ref 0.66–1.25)
DIFFERENTIAL METHOD BLD: ABNORMAL
EOSINOPHIL # BLD AUTO: 0.2 10E9/L (ref 0–0.7)
EOSINOPHIL NFR BLD AUTO: 5.7 %
ERYTHROCYTE [DISTWIDTH] IN BLOOD BY AUTOMATED COUNT: 13.1 % (ref 10–15)
GFR SERPL CREATININE-BSD FRML MDRD: >90 ML/MIN/{1.73_M2}
GLUCOSE SERPL-MCNC: 95 MG/DL (ref 70–99)
HCT VFR BLD AUTO: 43.4 % (ref 40–53)
HGB BLD-MCNC: 14.5 G/DL (ref 13.3–17.7)
IMM GRANULOCYTES # BLD: 0 10E9/L (ref 0–0.4)
IMM GRANULOCYTES NFR BLD: 0.3 %
LYMPHOCYTES # BLD AUTO: 0.8 10E9/L (ref 0.8–5.3)
LYMPHOCYTES NFR BLD AUTO: 23.6 %
MCH RBC QN AUTO: 29.4 PG (ref 26.5–33)
MCHC RBC AUTO-ENTMCNC: 33.4 G/DL (ref 31.5–36.5)
MCV RBC AUTO: 88 FL (ref 78–100)
MONOCYTES # BLD AUTO: 0.4 10E9/L (ref 0–1.3)
MONOCYTES NFR BLD AUTO: 10.8 %
NEUTROPHILS # BLD AUTO: 2.1 10E9/L (ref 1.6–8.3)
NEUTROPHILS NFR BLD AUTO: 59 %
NRBC # BLD AUTO: 0 10*3/UL
NRBC BLD AUTO-RTO: 0 /100
PLATELET # BLD AUTO: 164 10E9/L (ref 150–450)
POTASSIUM SERPL-SCNC: 3.9 MMOL/L (ref 3.4–5.3)
PROT SERPL-MCNC: 6.4 G/DL (ref 6.8–8.8)
PSA SERPL-MCNC: 5.76 UG/L (ref 0–4)
RBC # BLD AUTO: 4.93 10E12/L (ref 4.4–5.9)
SODIUM SERPL-SCNC: 139 MMOL/L (ref 133–144)
WBC # BLD AUTO: 3.5 10E9/L (ref 4–11)

## 2020-02-17 PROCEDURE — 80053 COMPREHEN METABOLIC PANEL: CPT | Performed by: UROLOGY

## 2020-02-17 PROCEDURE — 82378 CARCINOEMBRYONIC ANTIGEN: CPT | Performed by: UROLOGY

## 2020-02-17 PROCEDURE — 36415 COLL VENOUS BLD VENIPUNCTURE: CPT

## 2020-02-17 PROCEDURE — 85025 COMPLETE CBC W/AUTO DIFF WBC: CPT | Performed by: UROLOGY

## 2020-02-17 PROCEDURE — 84153 ASSAY OF PSA TOTAL: CPT | Performed by: UROLOGY

## 2020-02-17 RX ORDER — GADOBUTROL 604.72 MG/ML
10 INJECTION INTRAVENOUS ONCE
Status: COMPLETED | OUTPATIENT
Start: 2020-02-17 | End: 2020-02-17

## 2020-02-17 RX ADMIN — GADOBUTROL 10 ML: 604.72 INJECTION INTRAVENOUS at 11:27

## 2020-02-17 NOTE — PROGRESS NOTES
Medical Assistant Note:  Louie Greco presents today for blood draw.    Patient seen by provider today: No.   present during visit today: Not Applicable.    Concerns: No Concerns.    Procedure:  Lab draw site: Left Hand, Needle type: bf, Gauge: 23.    Post Assessment:  Labs drawn without difficulty: Yes.    Discharge Plan:  Departure Mode: Ambulatory.    Face to Face Time: 5 min  .    Fabienne Mojica, CMA

## 2020-02-17 NOTE — DISCHARGE INSTRUCTIONS
MRI Contrast Discharge Instructions    The IV contrast you received today will pass out of your body in your  urine. This will happen in the next 24 hours. You will not feel this process.  Your urine will not change color.    Drink at least 4 extra glasses of water or juice today (unless your doctor  has restricted your fluids). This reduces the stress on your kidneys.  You may take your regular medicines.    If you are on dialysis: It is best to have dialysis today.    If you have a reaction: Most reactions happen right away. If you have  any new symptoms after leaving the hospital (such as hives or swelling),  call your hospital at the correct number below. Or call your family doctor.  If you have breathing distress or wheezing, call 911.    Special instructions: ***    I have read and understand the above information.    Signature:______________________________________ Date:___________    Staff:__________________________________________ Date:___________     Time:__________    [place] Radiology Departments:    ___[place]: [phone]  ___[facility]: [phone]  ___[place]: [phone] ___[facility]: [phone]  ___[facility]: [phone]  ___[facility]: [phone]  ___[phone]  ___[facility]: [phone]  ___[phone]

## 2020-02-17 NOTE — TELEPHONE ENCOUNTER
Pt walked in and spoke to the schedule care coordinator. Pt had a question regarding his upcoming procedure. He received a call stating that now the colonoscopy is no longer in the OR, but in clinic. Pt wants to know if he can reschedule and has questions regarding the prep.    Attempted to call patient. No answer. Left VM for pt to call me back.     Costa Singh LPN

## 2020-02-19 ENCOUNTER — PATIENT OUTREACH (OUTPATIENT)
Dept: ONCOLOGY | Facility: CLINIC | Age: 60
End: 2020-02-19

## 2020-02-19 NOTE — PROGRESS NOTES
Louie called back, he is aware of his imaging and lab results. He will reschedule his appointment to next Wednesday at 0400 PM to see Dr. Manley. Juani Mcgrath RN,BSN,OCN

## 2020-02-19 NOTE — PROGRESS NOTES
Called Louie regarding his exam today that needs to be rescheduled since his provider is out ill today. Louie can be seen by Dr. Manley next Wednesday 2/26/20 at 0330 PM.

## 2020-02-26 ENCOUNTER — VIRTUAL VISIT (OUTPATIENT)
Dept: UROLOGY | Facility: CLINIC | Age: 60
End: 2020-02-26
Payer: COMMERCIAL

## 2020-02-26 ENCOUNTER — ONCOLOGY VISIT (OUTPATIENT)
Dept: ONCOLOGY | Facility: CLINIC | Age: 60
End: 2020-02-26
Attending: INTERNAL MEDICINE
Payer: COMMERCIAL

## 2020-02-26 VITALS
DIASTOLIC BLOOD PRESSURE: 84 MMHG | HEART RATE: 83 BPM | BODY MASS INDEX: 28.76 KG/M2 | OXYGEN SATURATION: 100 % | RESPIRATION RATE: 18 BRPM | WEIGHT: 205.4 LBS | HEIGHT: 71 IN | SYSTOLIC BLOOD PRESSURE: 123 MMHG | TEMPERATURE: 97.7 F

## 2020-02-26 DIAGNOSIS — R97.20 ELEVATED PROSTATE SPECIFIC ANTIGEN (PSA): Primary | ICD-10-CM

## 2020-02-26 DIAGNOSIS — C20 MALIGNANT NEOPLASM OF RECTUM (H): Primary | ICD-10-CM

## 2020-02-26 PROCEDURE — 99214 OFFICE O/P EST MOD 30 MIN: CPT | Performed by: INTERNAL MEDICINE

## 2020-02-26 PROCEDURE — G0463 HOSPITAL OUTPT CLINIC VISIT: HCPCS

## 2020-02-26 ASSESSMENT — MIFFLIN-ST. JEOR: SCORE: 1760.88

## 2020-02-26 ASSESSMENT — PAIN SCALES - GENERAL: PAINLEVEL: NO PAIN (0)

## 2020-02-26 NOTE — PROGRESS NOTES
"Oncology Rooming Note    February 26, 2020 3:56 PM   Louie Greco is a 59 year old male who presents for:    Chief Complaint   Patient presents with     Oncology Clinic Visit     Malignant neoplasm of rectum (H)     Initial Vitals: /84 (BP Location: Left arm, Patient Position: Sitting, Cuff Size: Adult Large)   Pulse 83   Temp 97.7  F (36.5  C) (Oral)   Resp 18   Ht 1.791 m (5' 10.5\")   Wt 93.2 kg (205 lb 6.4 oz)   SpO2 100%   BMI 29.06 kg/m   Estimated body mass index is 29.06 kg/m  as calculated from the following:    Height as of this encounter: 1.791 m (5' 10.5\").    Weight as of this encounter: 93.2 kg (205 lb 6.4 oz). Body surface area is 2.15 meters squared.  No Pain (0) Comment: Data Unavailable   No LMP for male patient.  Allergies reviewed: Yes  Medications reviewed: Yes    Medications: Medication refills not needed today.  Pharmacy name entered into [facility]:    [facility], [place] - [address], [address]  [facility], [place] - [address] [facility]    Clinical concerns: no          Rossana Xie CMA            "

## 2020-02-26 NOTE — LETTER
2/26/2020         RE: Louie Greco  [address] [facility] [address]        Dear Colleague,    Thank you for referring your patient, Louie Greco, to the [facility]. Please see a copy of my visit note below.    [facility]    Hematology/Oncology Established Patient Note      Today's Date: 2/26/2020    Reason for Follow-up: rectal cancer      HISTORY OF PRESENT ILLNESS: Louie Greco is a 59 year old male who presents with rectal cancer.  He started having rectal bleeding around beginning of 2016.  He underwent colonoscopy on 7/27/16, which showed a malignant tumor in the rectum involving half of the lumen with oozing, as well as three 4-mm polyps in the ascending and transverse colon.  Pathology showed moderately differentiated adenocarcinoma, ascending colon with sessile serrated adenoma, others were tubular adenomas.  Mismatch repair expression with normal protein expression.  Staging scans showed the rectal mass, indeterminate focus in the left liver thought to be cyst, and multiple bilateral renal cysts.  MRI showed a distal rectal mass measuring 2.7 x 2.4 cm extending to the most proximal region of the anal canal.  There are a few tiny subcentimeter perirectal fat lymph nodes.  He was staged clinically [id number] (stage IIIA).  He was seen by Dr. Lee at [facility], and started neoadjuvant chemoradiation with capecitabine on 8/8/16 and completed on 9/15/16.  He was then seen by Dr. Radford, colorectal surgery at [facility].  His post chemoradiation MRI showed improvement in the size of the tumor and response in the lymph nodes.  On 12/8/16, he underwent flexible sigmoidoscopy and there was no evidence of tumor.  There was only some anterior scarring in the lumen.  Multiple biopsies were taken, which showed no evidence of carcinoma.  At that point, Dr. Radford spoke with the patient's wife, that there appears to be a complete response in the  lumen, and that the patient would wish to placed on the watch and wait protocol.  The patient had expressed wishes not to have an APR, and it would not have been possible to grossly clear a margin for LAR.    He had port placed on 1/16/17.  It has been removed.    He completed FOLFOX x 8 cycles 1/16/17-5/9/17.      He was admitted 7/16/17-7/20/17 with sepsis, abdominal wall cellulitis.  He underwent surgery with laparoscopic abdominal exploration and appendectomy.  Pathology found sessile serrated adenoma without dysplasia or malignancy.        INTERIM HISTORY: Louie comes in for follow-up today.   He is doing well.  He has been seeing urology for an elevated PSA.  It is being monitored.        REVIEW OF SYSTEMS:   14 point ROS was reviewed and is negative other than as noted above in HPI.       HOME MEDICATIONS:  Current Outpatient Medications   Medication Sig Dispense Refill     ASPIRIN PO Take by mouth as needed for moderate pain       dibucaine (NUPERCAINAL) 1 % OINT ointment 3 times daily as needed for moderate pain       diltiazem 2% in PLO cream, FV COMPOUNDED, 2% GEL Apply topically 2 times daily       ibuprofen 200 MG capsule Take 200-400 mg by mouth every 6 hours as needed 120 capsule 0     lidocaine (XYLOCAINE) 2 % topical gel Apply topically 2 times daily as needed for moderate pain 30 mL 3     lidocaine, Anorectal, 5 % CREA Externally apply 1 Application topically 3 times daily as needed 1 Tube 0     neomycin-polymyxin-hydrocortisone (CORTISPORIN) 3.5-27726-1 otic suspension Place 4 drops into both ears 4 times daily 10 mL 0     sildenafil (REVATIO) 20 MG tablet Take 1 tablet (20 mg) by mouth daily as needed 30 tablet 11     VITAMIN D, CHOLECALCIFEROL, PO Take 2,000 Units by mouth daily            ALLERGIES:  Allergies   Allergen Reactions     Ampicillin Diarrhea     Demerol [Meperidine]      Nausea          PAST MEDICAL HISTORY:  Past Medical History:   Diagnosis Date     Childhood asthma       Epididymitis, bilateral     18 years old     Inguinal hernia      Mumps      Nephrolithiasis     1990     Rectal cancer (H)     low rectal cancer     Shingles          PAST SURGICAL HISTORY:  Past Surgical History:   Procedure Laterality Date     COLONOSCOPY N/A 7/27/2016    Procedure: COMBINED COLONOSCOPY, SINGLE OR MULTIPLE BIOPSY/POLYPECTOMY BY BIOPSY;  Surgeon: Chelsea Thompson MD;  Location: [facility]     COLONOSCOPY N/A 9/13/2017    Procedure: COLONOSCOPY;;  Surgeon: Felicitas Radford MD;  [facility]     COLONOSCOPY N/A 12/13/2017    Procedure: COLONOSCOPY;;  Surgeon: Felicitas Radford MD;  [facility]     COMBINED CYSTOSCOPY, RETROGRADES, URETEROSCOPY, LASER HOLMIUM LITHOTRIPSY URETER(S), INSERT STENT Left 2/16/2018    Procedure: COMBINED CYSTOSCOPY, RETROGRADES, URETEROSCOPY, LASER HOLMIUM LITHOTRIPSY URETER(S), INSERT STENT;  Cysto, left ureteroscopy, holmium laser, retrogrades, stent placement;  Surgeon: Bola Worthy MD;  Location: [facility]     EXAM UNDER ANESTHESIA ANUS N/A 7/5/2017    Procedure: EXAM UNDER ANESTHESIA ANUS;  Examination Under Anesthesia, flex sigmoidoscopy with biopsies and formalin application;  Surgeon: Felicitas Radford MD;  [facility]     EXAM UNDER ANESTHESIA ANUS N/A 9/13/2017    Procedure: EXAM UNDER ANESTHESIA ANUS;  Examination Under Anesthesia Anus, Colonoscopy, application of formalin;  Surgeon: Felicitas Radford MD;  [facility]     EXAM UNDER ANESTHESIA ANUS N/A 12/13/2017    Procedure: EXAM UNDER ANESTHESIA ANUS;  Examination Under Anesthesia Anus, Colonoscopy;  Surgeon: Felicitas Radford MD;  [facility]     EXAM UNDER ANESTHESIA ANUS N/A 5/11/2018    Procedure: EXAM UNDER ANESTHESIA ANUS;  Examination Under Anesthesia Anus, Interoperative Flexible Sigmoidoscopy, Application of Formalin;  Surgeon: Felicitas Radford MD;  [facility]     EXAM UNDER ANESTHESIA ANUS N/A 7/20/2018     Procedure: EXAM UNDER ANESTHESIA ANUS;  Examination Under Anesthesia Anus, Flexible Sigmoidoscopy ;  Surgeon: Felicitas Radford MD;  [facility]     EXAM UNDER ANESTHESIA ANUS N/A 11/30/2018    Procedure: Examination Under Anesthesia, application of formalin to rectum, polypectomy;  Surgeon: Felicitas Radford MD;  [facility]     EXAM UNDER ANESTHESIA ANUS N/A 2/22/2019    Procedure: Examination Under Anesthesia;  Surgeon: Felicitas Radford MD;  [facility]     EXAM UNDER ANESTHESIA ANUS N/A 6/28/2019    Procedure: Examination Under Anesthesia;  Surgeon: Felicitas Radford MD;  [facility]     EXAM UNDER ANESTHESIA ANUS N/A 11/1/2019    Procedure: Examination Under Anesthesia;  Surgeon: Felicitas Radford MD;  [facility]     HC TOOTH EXTRACTION W/FORCEP       LAPAROSCOPIC APPENDECTOMY N/A 7/17/2017    Procedure: LAPAROSCOPIC APPENDECTOMY;  LAPAROSCOPIC APPENDECTOMY;  Surgeon: Bryson Ferguson MD;  [facility]     SIGMOIDOSCOPY FLEXIBLE N/A 12/8/2016    Procedure: SIGMOIDOSCOPY FLEXIBLE;  Surgeon: Felicitas Radford MD;  [facility]     SIGMOIDOSCOPY FLEXIBLE N/A 7/5/2017    Procedure: SIGMOIDOSCOPY FLEXIBLE;;  Surgeon: Felicitas Radford MD;  [facility]     SIGMOIDOSCOPY FLEXIBLE N/A 5/11/2018    Procedure: SIGMOIDOSCOPY FLEXIBLE;;  Surgeon: Felicitas Radford MD;  [facility]     SIGMOIDOSCOPY FLEXIBLE N/A 7/20/2018    Procedure: SIGMOIDOSCOPY FLEXIBLE;;  Surgeon: Felicitas Radford MD;  [facility]     SIGMOIDOSCOPY FLEXIBLE N/A 11/30/2018    Procedure: Flexible Sigmoidoscopy;  Surgeon: Felicitas Radford MD;  [facility]     SIGMOIDOSCOPY FLEXIBLE N/A 2/22/2019    Procedure: Intraoperative Flexible Sigmoidoscopy, Application of Formalin to rectum;  Surgeon: Felicitas Radford MD;  [facility]     SIGMOIDOSCOPY FLEXIBLE N/A 6/28/2019    Procedure: Flexible Sigmoidoscopy;   Surgeon: Felicitas Radford MD;  [facility]     SIGMOIDOSCOPY FLEXIBLE N/A 2019    Procedure: Flexible Sigmoidoscopy;  Surgeon: Felicitas Radford MD;  [facility]     TESTICLE SURGERY       VASCULAR SURGERY      Right chest port     VASECTOMY       VASECTOMY           SOCIAL HISTORY:  Social History     Socioeconomic History     Marital status:      Spouse name: Not on file     Number of children: Not on file     Years of education: Not on file     Highest education level: Not on file   Occupational History     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[employer]     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   Social Needs     Financial resource strain: Not on file     Food insecurity:     Worry: Not on file     Inability: Not on file     Transportation needs:     Medical: Not on file     Non-medical: Not on file   Tobacco Use     Smoking status: Never Smoker     Smokeless tobacco: Never Used   Substance and Sexual Activity     Alcohol use: Yes     Alcohol/week: 0.0 standard drinks     Comment: 1 drink a week     Drug use: No     Sexual activity: Yes     Partners: Female     Birth control/protection: Male Surgical   Lifestyle     Physical activity:     Days per week: Not on file     Minutes per session: Not on file     Stress: Not on file   Relationships     Social connections:     Talks on phone: Not on file     Gets together: Not on file     Attends Jain service: Not on file     Active member of club or organization: Not on file     Attends meetings of clubs or organizations: Not on file     Relationship status: Not on file     Intimate partner violence:     Fear of current or ex partner: Not on file     Emotionally abused: Not on file     Physically abused: Not on file     Forced sexual activity: Not on file   Other Topics Concern     Parent/sibling w/ CABG, MI or angioplasty before 65F 55M? No   Social History Narrative    Dad  at age  78   from [place], COPD, & HTN    Mom alive in her 70's.     for  "30 years    Two adult children. 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     He notes that he had a brother who passed away at a young age of 53 from a metastatic cancer, but unknown what type, and passed away within 2 months of diagnosis.  He denies other family history of cancer in the immediate family.  Louie works as a  at a [employer] school.      FAMILY HISTORY:  Family History   Problem Relation Age of Onset     Cancer Brother         primary of unknown origin     Other Cancer Brother      Chronic Obstructive Pulmonary Disease Father      Hypertension Father      Hyperlipidemia Father      Colon Polyps Mother         Number and type of polyps unknown     Family History Negative Brother         negative colonoscopy     Diabetes Maternal Grandfather      Hypertension Paternal Grandmother      Hyperlipidemia Paternal Grandmother      Stomach Cancer Other 63        maternal great grandfather     Glaucoma No family hx of      Macular Degeneration No family hx of          PHYSICAL EXAM:  Vital signs:  /84 (BP Location: Left arm, Patient Position: Sitting, Cuff Size: Adult Large)   Pulse 83   Temp 97.7  F (36.5  C) (Oral)   Resp 18   Ht 1.791 m (5' 10.5\")   Wt 93.2 kg (205 lb 6.4 oz)   SpO2 100%   BMI 29.06 kg/m      ECO  GENERAL/CONSTITUTIONAL: No acute distress.  EYES: No scleral icterus.  NEUROLOGIC: Alert, oriented, answers questions appropriately.  INTEGUMENTARY: No jaundice.  GAIT: Steady, does not use assistive device      LABS:  CBC RESULTS:   Recent Labs   Lab Test 20  0932   WBC 3.5*   RBC 4.93   HGB 14.5   HCT 43.4   MCV 88   MCH 29.4   MCHC 33.4   RDW 13.1        Recent Labs   Lab Test 20  0932 19  0725 19  0828     --  140   POTASSIUM 3.9  --  4.4   CHLORIDE 109  --  106   CO2 24  --  26   ANIONGAP 6  --  8   GLC 95 80 89   BUN 17  --  17   CR 0.68  --  0.76   FRANDY 8.6  --  8.9 "     Lab Results   Component Value Date    AST 34 02/17/2020     Lab Results   Component Value Date    ALT 40 02/17/2020     No results found for: BILICONJ   Lab Results   Component Value Date    BILITOTAL 0.8 02/17/2020     Lab Results   Component Value Date    ALBUMIN 3.6 02/17/2020     Lab Results   Component Value Date    PROTTOTAL 6.4 02/17/2020      Lab Results   Component Value Date    ALKPHOS 60 02/17/2020     CEA <0.5      IMAGING:  MRI pelvis 2/17/20:  There has been a complete response to treatment, with a corresponding  tumor regression grade of mrTRG-1.      ASSESSMENT/PLAN:  Louie Greco is a 59 year old male with:    1) Rectal cancer: clinical stage [id number] (stage IIIA), s/p chemoradiation with Xeloda, and appears to have achieved complete response on imaging and biopsies.  He opted not to undergo surgery and expressed desire not to undergo APR, as he does not want a colostomy bag.  It was felt reasonable to go on the watch and wait protocol with the [facility].  He has completed 4 additional months (8 cycles) of chemotherapy with FOLFOX.  He will now go on surveillance, as per the watch and wait protocol.    We reviewed MRI pelvis from 2/17/20.  There has been a complete response to treatment.          -he had flex sig with Dr. Radford on 11/1/19; next one is in April 2020  -T3 MRI pelvis would every 6 months x 2 years, which he has completed.  Patient is uncomfortable stopping MRI's for now.  We agreed to do them every 6 months until year 3.    -CT c/a/p annually for 5 years; next PET scan in in 6 months  -endoscopic surveillance with Dr. Radford as per protocol  -PET scan and MRI pelvis in 6 months  -RTC in 6 months with labs/CEA and results of imaging    2) Genetics: He underwent genetic testing, which was negative.    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            4) Elevated bilirubin: mild.  Bilirubin has been mildly elevated in the past.  It is normal this time.   "  -monitor for now    5) Liver cysts: seen on CT, stable  -monitor    6) Pulmonary nodules: stable sub-4 mm pulmonary nodules  -monitor on future scans    7) Kidney cyst: stable  -monitor on future scans.      8) Appendix polyp: He is now s/p appendectomy.  Pathology found sessile serrated adenoma without dysplasia or malignancy.     9) Elevated PSA: MRI showed enlarged prostate gland with BPH changes.  -he is seeing urology - Dr. Segura      I spent a total of 25 minutes with the patient, with over >50% of the time in counseling and/or coordination of care.       Agueda Manley MD  Hematology/Oncology  [facility]        Oncology Rooming Note    February 26, 2020 3:56 PM   Louie Greco is a 59 year old male who presents for:    Chief Complaint   Patient presents with     Oncology Clinic Visit     Malignant neoplasm of rectum (H)     Initial Vitals: /84 (BP Location: Left arm, Patient Position: Sitting, Cuff Size: Adult Large)   Pulse 83   Temp 97.7  F (36.5  C) (Oral)   Resp 18   Ht 1.791 m (5' 10.5\")   Wt 93.2 kg (205 lb 6.4 oz)   SpO2 100%   BMI 29.06 kg/m    Estimated body mass index is 29.06 kg/m  as calculated from the following:    Height as of this encounter: 1.791 m (5' 10.5\").    Weight as of this encounter: 93.2 kg (205 lb 6.4 oz). Body surface area is 2.15 meters squared.  No Pain (0) Comment: Data Unavailable   No LMP for male patient.  Allergies reviewed: Yes  Medications reviewed: Yes    Medications: Medication refills not needed today.  Pharmacy name entered into [facility]:    [facility], [place] - [address]  [facility], [place] - [address] [facility] [address]    Clinical concerns: no          Rossana Xie [facility]              Again, thank you for allowing me to participate in the care of your patient.        Sincerely,        Agueda Manley MD    "

## 2020-02-26 NOTE — PROGRESS NOTES
[facility]    Hematology/Oncology Established Patient Note      Today's Date: 2/26/2020    Reason for Follow-up: rectal cancer      HISTORY OF PRESENT ILLNESS: Louie Greco is a 59 year old male who presents with rectal cancer.  He started having rectal bleeding around beginning of 2016.  He underwent colonoscopy on 7/27/16, which showed a malignant tumor in the rectum involving half of the lumen with oozing, as well as three 4-mm polyps in the ascending and transverse colon.  Pathology showed moderately differentiated adenocarcinoma, ascending colon with sessile serrated adenoma, others were tubular adenomas.  Mismatch repair expression with normal protein expression.  Staging scans showed the rectal mass, indeterminate focus in the left liver thought to be cyst, and multiple bilateral renal cysts.  MRI showed a distal rectal mass measuring 2.7 x 2.4 cm extending to the most proximal region of the anal canal.  There are a few tiny subcentimeter perirectal fat lymph nodes.  He was staged clinically [id number] (stage IIIA).  He was seen by Dr. Lee at [facility], and started neoadjuvant chemoradiation with capecitabine on 8/8/16 and completed on 9/15/16.  He was then seen by Dr. Radford, colorectal surgery at [facility].  His post chemoradiation MRI showed improvement in the size of the tumor and response in the lymph nodes.  On 12/8/16, he underwent flexible sigmoidoscopy and there was no evidence of tumor.  There was only some anterior scarring in the lumen.  Multiple biopsies were taken, which showed no evidence of carcinoma.  At that point, Dr. Radford spoke with the patient's wife, that there appears to be a complete response in the lumen, and that the patient would wish to placed on the watch and wait protocol.  The patient had expressed wishes not to have an APR, and it would not have been possible to grossly clear a margin for LAR.    He had port placed  on 1/16/17.  It has been removed.    He completed FOLFOX x 8 cycles 1/16/17-5/9/17.      He was admitted 7/16/17-7/20/17 with sepsis, abdominal wall cellulitis.  He underwent surgery with laparoscopic abdominal exploration and appendectomy.  Pathology found sessile serrated adenoma without dysplasia or malignancy.        INTERIM HISTORY: Louie comes in for follow-up today.   He is doing well.  He has been seeing urology for an elevated PSA.  It is being monitored.        REVIEW OF SYSTEMS:   14 point ROS was reviewed and is negative other than as noted above in HPI.       HOME MEDICATIONS:  Current Outpatient Medications   Medication Sig Dispense Refill     ASPIRIN PO Take by mouth as needed for moderate pain       dibucaine (NUPERCAINAL) 1 % OINT ointment 3 times daily as needed for moderate pain       diltiazem 2% in PLO cream, FV COMPOUNDED, 2% GEL Apply topically 2 times daily       ibuprofen 200 MG capsule Take 200-400 mg by mouth every 6 hours as needed 120 capsule 0     lidocaine (XYLOCAINE) 2 % topical gel Apply topically 2 times daily as needed for moderate pain 30 mL 3     lidocaine, Anorectal, 5 % CREA Externally apply 1 Application topically 3 times daily as needed 1 Tube 0     neomycin-polymyxin-hydrocortisone (CORTISPORIN) 3.5-55268-3 otic suspension Place 4 drops into both ears 4 times daily 10 mL 0     sildenafil (REVATIO) 20 MG tablet Take 1 tablet (20 mg) by mouth daily as needed 30 tablet 11     VITAMIN D, CHOLECALCIFEROL, PO Take 2,000 Units by mouth daily            ALLERGIES:  Allergies   Allergen Reactions     Ampicillin Diarrhea     Demerol [Meperidine]      Nausea          PAST MEDICAL HISTORY:  Past Medical History:   Diagnosis Date     Childhood asthma      Epididymitis, bilateral     18 years old     Inguinal hernia      Mumps      Nephrolithiasis     1990     Rectal cancer (H)     low rectal cancer     Shingles          PAST SURGICAL HISTORY:  Past Surgical History:   Procedure  Laterality Date     COLONOSCOPY N/A 7/27/2016    Procedure: COMBINED COLONOSCOPY, SINGLE OR MULTIPLE BIOPSY/POLYPECTOMY BY BIOPSY;  Surgeon: Chelsea Thompson MD;  Location: [facility]     COLONOSCOPY N/A 9/13/2017    Procedure: COLONOSCOPY;;  Surgeon: Felicitas Radford MD;  [facility]     COLONOSCOPY N/A 12/13/2017    Procedure: COLONOSCOPY;;  Surgeon: Felicitas Radford MD;  [facility]     COMBINED CYSTOSCOPY, RETROGRADES, URETEROSCOPY, LASER HOLMIUM LITHOTRIPSY URETER(S), INSERT STENT Left 2/16/2018    Procedure: COMBINED CYSTOSCOPY, RETROGRADES, URETEROSCOPY, LASER HOLMIUM LITHOTRIPSY URETER(S), INSERT STENT;  Cysto, left ureteroscopy, holmium laser, retrogrades, stent placement;  Surgeon: Bola Worthy MD;  Location: [facility]     EXAM UNDER ANESTHESIA ANUS N/A 7/5/2017    Procedure: EXAM UNDER ANESTHESIA ANUS;  Examination Under Anesthesia, flex sigmoidoscopy with biopsies and formalin application;  Surgeon: Felicitas Radford MD;  [facility]     EXAM UNDER ANESTHESIA ANUS N/A 9/13/2017    Procedure: EXAM UNDER ANESTHESIA ANUS;  Examination Under Anesthesia Anus, Colonoscopy, application of formalin;  Surgeon: Felicitas Radford MD;  [facility]     EXAM UNDER ANESTHESIA ANUS N/A 12/13/2017    Procedure: EXAM UNDER ANESTHESIA ANUS;  Examination Under Anesthesia Anus, Colonoscopy;  Surgeon: Felicitas Radford MD;  [facility]     EXAM UNDER ANESTHESIA ANUS N/A 5/11/2018    Procedure: EXAM UNDER ANESTHESIA ANUS;  Examination Under Anesthesia Anus, Interoperative Flexible Sigmoidoscopy, Application of Formalin;  Surgeon: Felicitas Radford MD;  [facility]     EXAM UNDER ANESTHESIA ANUS N/A 7/20/2018    Procedure: EXAM UNDER ANESTHESIA ANUS;  Examination Under Anesthesia Anus, Flexible Sigmoidoscopy ;  Surgeon: Felicitas Radford MD;  [facility]     EXAM UNDER ANESTHESIA ANUS N/A 11/30/2018    Procedure:  Examination Under Anesthesia, application of formalin to rectum, polypectomy;  Surgeon: Felicitas Radford MD;  [facility]     EXAM UNDER ANESTHESIA ANUS N/A 2/22/2019    Procedure: Examination Under Anesthesia;  Surgeon: Felicitas Radford MD;  [facility]     EXAM UNDER ANESTHESIA ANUS N/A 6/28/2019    Procedure: Examination Under Anesthesia;  Surgeon: Felicitas Radford MD;  [facility]     EXAM UNDER ANESTHESIA ANUS N/A 11/1/2019    Procedure: Examination Under Anesthesia;  Surgeon: Felicitas Radford MD;  [facility]     HC TOOTH EXTRACTION W/FORCEP       LAPAROSCOPIC APPENDECTOMY N/A 7/17/2017    Procedure: LAPAROSCOPIC APPENDECTOMY;  LAPAROSCOPIC APPENDECTOMY;  Surgeon: Bryson Ferguson MD;  [facility]     SIGMOIDOSCOPY FLEXIBLE N/A 12/8/2016    Procedure: SIGMOIDOSCOPY FLEXIBLE;  Surgeon: Felicitas Radford MD;  [facility]     SIGMOIDOSCOPY FLEXIBLE N/A 7/5/2017    Procedure: SIGMOIDOSCOPY FLEXIBLE;;  Surgeon: Felicitas Radford MD;  [facility]     SIGMOIDOSCOPY FLEXIBLE N/A 5/11/2018    Procedure: SIGMOIDOSCOPY FLEXIBLE;;  Surgeon: Felicitas Radford MD;  [facility]     SIGMOIDOSCOPY FLEXIBLE N/A 7/20/2018    Procedure: SIGMOIDOSCOPY FLEXIBLE;;  Surgeon: Felicitas Radford MD;  [facility]     SIGMOIDOSCOPY FLEXIBLE N/A 11/30/2018    Procedure: Flexible Sigmoidoscopy;  Surgeon: Felicitas Radford MD;  [facility]     SIGMOIDOSCOPY FLEXIBLE N/A 2/22/2019    Procedure: Intraoperative Flexible Sigmoidoscopy, Application of Formalin to rectum;  Surgeon: Felicitas Radford MD;  [facility]     SIGMOIDOSCOPY FLEXIBLE N/A 6/28/2019    Procedure: Flexible Sigmoidoscopy;  Surgeon: Felicitas Radford MD;  [facility]     SIGMOIDOSCOPY FLEXIBLE N/A 11/1/2019    Procedure: Flexible Sigmoidoscopy;  Surgeon: Felicitas Radford MD;  [facility]     TESTICLE SURGERY        VASCULAR SURGERY      Right chest port     VASECTOMY       VASECTOMY           SOCIAL HISTORY:  Social History     Socioeconomic History     Marital status:      Spouse name: Not on file     Number of children: Not on file     Years of education: Not on file     Highest education level: Not on file   Occupational History     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[employer]     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   Social Needs     Financial resource strain: Not on file     Food insecurity:     Worry: Not on file     Inability: Not on file     Transportation needs:     Medical: Not on file     Non-medical: Not on file   Tobacco Use     Smoking status: Never Smoker     Smokeless tobacco: Never Used   Substance and Sexual Activity     Alcohol use: Yes     Alcohol/week: 0.0 standard drinks     Comment: 1 drink a week     Drug use: No     Sexual activity: Yes     Partners: Female     Birth control/protection: Male Surgical   Lifestyle     Physical activity:     Days per week: Not on file     Minutes per session: Not on file     Stress: Not on file   Relationships     Social connections:     Talks on phone: Not on file     Gets together: Not on file     Attends Quaker service: Not on file     Active member of club or organization: Not on file     Attends meetings of clubs or organizations: Not on file     Relationship status: Not on file     Intimate partner violence:     Fear of current or ex partner: Not on file     Emotionally abused: Not on file     Physically abused: Not on file     Forced sexual activity: Not on file   Other Topics Concern     Parent/sibling w/ CABG, MI or angioplasty before 65F 55M? No   Social History Narrative    Dad  at age  78   from [place], COPD, & HTN    Mom alive in her 70's.     for 30 years    Two adult children. 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     He notes that he had a brother who passed away at a young age of 53 from a metastatic  "cancer, but unknown what type, and passed away within 2 months of diagnosis.  He denies other family history of cancer in the immediate family.  Louie works as a  at a [employer].      FAMILY HISTORY:  Family History   Problem Relation Age of Onset     Cancer Brother         primary of unknown origin     Other Cancer Brother      Chronic Obstructive Pulmonary Disease Father      Hypertension Father      Hyperlipidemia Father      Colon Polyps Mother         Number and type of polyps unknown     Family History Negative Brother         negative colonoscopy     Diabetes Maternal Grandfather      Hypertension Paternal Grandmother      Hyperlipidemia Paternal Grandmother      Stomach Cancer Other 63        maternal great grandfather     Glaucoma No family hx of      Macular Degeneration No family hx of          PHYSICAL EXAM:  Vital signs:  /84 (BP Location: Left arm, Patient Position: Sitting, Cuff Size: Adult Large)   Pulse 83   Temp 97.7  F (36.5  C) (Oral)   Resp 18   Ht 1.791 m (5' 10.5\")   Wt 93.2 kg (205 lb 6.4 oz)   SpO2 100%   BMI 29.06 kg/m     ECO  GENERAL/CONSTITUTIONAL: No acute distress.  EYES: No scleral icterus.  NEUROLOGIC: Alert, oriented, answers questions appropriately.  INTEGUMENTARY: No jaundice.  GAIT: Steady, does not use assistive device      LABS:  CBC RESULTS:   Recent Labs   Lab Test 20  0932   WBC 3.5*   RBC 4.93   HGB 14.5   HCT 43.4   MCV 88   MCH 29.4   MCHC 33.4   RDW 13.1        Recent Labs   Lab Test 20  0932 19  0725 19  0828     --  140   POTASSIUM 3.9  --  4.4   CHLORIDE 109  --  106   CO2 24  --  26   ANIONGAP 6  --  8   GLC 95 80 89   BUN 17  --  17   CR 0.68  --  0.76   FRANDY 8.6  --  8.9     Lab Results   Component Value Date    AST 34 2020     Lab Results   Component Value Date    ALT 40 2020     No results found for: BILICONJ   Lab Results   Component Value Date    BILITOTAL 0.8 2020 "     Lab Results   Component Value Date    ALBUMIN 3.6 02/17/2020     Lab Results   Component Value Date    PROTTOTAL 6.4 02/17/2020      Lab Results   Component Value Date    ALKPHOS 60 02/17/2020     CEA <0.5      IMAGING:  MRI pelvis 2/17/20:  There has been a complete response to treatment, with a corresponding  tumor regression grade of mrTRG-1.      ASSESSMENT/PLAN:  Louie Greco is a 59 year old male with:    1) Rectal cancer: clinical stage [id number] (stage IIIA), s/p chemoradiation with Xeloda, and appears to have achieved complete response on imaging and biopsies.  He opted not to undergo surgery and expressed desire not to undergo APR, as he does not want a colostomy bag.  It was felt reasonable to go on the watch and wait protocol with the [facility].  He has completed 4 additional months (8 cycles) of chemotherapy with FOLFOX.  He will now go on surveillance, as per the watch and wait protocol.    We reviewed MRI pelvis from 2/17/20.  There has been a complete response to treatment.          -he had flex sig with Dr. Radford on 11/1/19; next one is in April 2020  -T3 MRI pelvis would every 6 months x 2 years, which he has completed.  Patient is uncomfortable stopping MRI's for now.  We agreed to do them every 6 months until year 3.    -CT c/a/p annually for 5 years; next PET scan in in 6 months  -endoscopic surveillance with Dr. Radford as per protocol  -PET scan and MRI pelvis in 6 months  -RTC in 6 months with labs/CEA and results of imaging    2) Genetics: He underwent genetic testing, which was negative.    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            4) Elevated bilirubin: mild.  Bilirubin has been mildly elevated in the past.  It is normal this time.    -monitor for now    5) Liver cysts: seen on CT, stable  -monitor    6) Pulmonary nodules: stable sub-4 mm pulmonary nodules  -monitor on future scans    7) Kidney cyst: stable  -monitor on future scans.      8) Appendix  polyp: He is now s/p appendectomy.  Pathology found sessile serrated adenoma without dysplasia or malignancy.     9) Elevated PSA: MRI showed enlarged prostate gland with BPH changes.  -he is seeing urology - Dr. Segura      I spent a total of 25 minutes with the patient, with over >50% of the time in counseling and/or coordination of care.       Agueda Manley MD  Hematology/Oncology  [facility]

## 2020-02-26 NOTE — PROGRESS NOTES
REASON FOR VISIT TODAY:  Elevated PSA.      HISTORY OF PRESENT ILLNESS:  Mr. Greco is a 59-year-old gentleman who comes to see us for history of an elevated PSA.  The patient's PSA was most recently noted to be 5.61 on 06/24/2019.  Previous values including 3.74 on 07/20/2018, 3.78 on 01/22/2018, 5.0 on 10/19/2017 and 5.46 on 09/13/2017.  The patient has a history of rectal cancer for which he underwent chemo rads but ultimately did not undergo resection and is on surveillance for his disease.  The patient was diagnosed in 2016.  He has had no evidence of recurrence following his chemoradiation therapy.  The treatment did, however, result in some rectal stenosis and currently the patient requires the pediatric anoscope in order to undergo his surveillance examinations.  The patient has not had a prostate biopsy to date.  Per Dr. Radford's recent exam, digital rectal exam was purportedly normal.  The patient recently underwent another pelvic MRI for rectal cancer recurrence and a PSA.    OBJECTIVE:     PSA from 2/17/20 is 5.76 ng/mL  MRI from 2/17/20 shows no obvious rectal CA reucrrence and no obvious prostate cancer suspicious lesions.    ASSESSMENT AND PLAN: Over half of today's 10-minute visit was spent counseling the patient regarding his PSA and paMRI.  PSA is stable but elevated.  We will check with radiology to see if we need a dedicated prostate MRI.  The patient will call us next week to find out if we need to re-image him.

## 2020-02-27 ENCOUNTER — TELEPHONE (OUTPATIENT)
Dept: ONCOLOGY | Facility: CLINIC | Age: 60
End: 2020-02-27

## 2020-02-27 NOTE — TELEPHONE ENCOUNTER
Left voicemail for patient requesting a return call regarding future scheduling of appointments. Patient is scheduled at [facility] on Aug. 13, 2020 for PET Scan at 9:00am followed by MRI at [facility] at 1:45pm

## 2020-03-10 ENCOUNTER — HEALTH MAINTENANCE LETTER (OUTPATIENT)
Age: 60
End: 2020-03-10

## 2020-04-10 ENCOUNTER — PATIENT OUTREACH (OUTPATIENT)
Dept: SURGERY | Facility: CLINIC | Age: 60
End: 2020-04-10

## 2020-04-10 NOTE — PROGRESS NOTES
Called patient after his Flexible Sigmoidoscopy was cancelled with Dr. Radford due to Covid-19. Requested a callback at his earliest convenience.

## 2020-04-10 NOTE — PROGRESS NOTES
Patient called back stating he wants to try doing a scope in clinic with a Peds scope. Patient is scheduled to see her for a flex on 4/29.

## 2020-04-28 ENCOUNTER — TELEPHONE (OUTPATIENT)
Dept: SURGERY | Facility: CLINIC | Age: 60
End: 2020-04-28

## 2020-04-28 NOTE — TELEPHONE ENCOUNTER
Called patient and moved him to 4:00 pm as our sterile processors for our flexible sigmoidoscope close at 5:00 pm. Confirmed with patient that he is willing to try in clinic. Patient stated he will prep at home. Talked through procedure with patient. Patient in agreement with plan.    Mauro Madrigal, EMT  Colon and Rectal Surgery

## 2020-04-29 ENCOUNTER — OFFICE VISIT (OUTPATIENT)
Dept: SURGERY | Facility: CLINIC | Age: 60
End: 2020-04-29
Payer: COMMERCIAL

## 2020-04-29 VITALS
SYSTOLIC BLOOD PRESSURE: 128 MMHG | DIASTOLIC BLOOD PRESSURE: 80 MMHG | BODY MASS INDEX: 29.47 KG/M2 | OXYGEN SATURATION: 98 % | HEART RATE: 87 BPM | HEIGHT: 70 IN

## 2020-04-29 DIAGNOSIS — C20 RECTAL CANCER (H): Primary | ICD-10-CM

## 2020-04-29 NOTE — PATIENT INSTRUCTIONS
Follow up:    Follow up per Watch and Wait Protocol:   Completed CRT: 9/15/16    Flexible sigmoidoscopy   ? Every 2 months for 6 months: Completed  ? Every 3 months for 3 years: Completed  ? Every 6 months for 5 years: Until 9/2021-DUE 4/2021   ? Every year for 10 years: Until 9/2026       3T MRI of pelvis  ? 6-8 weeks after CRT:  ? Every 4 months for 2 years: Completed  ? Every 6 months for 2 years: Until 9/2020-DUE 8/2020  ? Yearly for 2 years: Until 9/2022       CT CAP  ? Every year for 6-8 years: Until 9/2024-PET due 6/2020       Colonoscopy   ? Last 12/13/17- Due 10/2020   Please call with any questions or concerns regarding your clinic visit today.    It is a pleasure being involved in your health care.    Contacts post-consultation depending on your need:    Radiology Appointments [phone]    Schedule Clinic Appointments [phone] # 1   M-F 7:30 - 5 pm    MARVIN Calzada [phone]    Clinic Fax Number [phone]    Surgery Scheduling [phone]    My Chart is available 24 hours a day and is a secure way to access your records and communicate with your care team.  I strongly recommend signing up if you haven't already done so, if you are comfortable with computers.  If you would like to inquire about this or are having problems with My Chart access, you may call [phone] or go online at [email or web address].  Please allow at least 24 hours for a response and extra time on weekends and Holidays.

## 2020-04-29 NOTE — LETTER
2020       RE: Louie Greco  [address] [facility] [id number]     Dear Colleague,    Thank you for referring your patient, Louie Greco, to the [facility] COLON AND RECTAL SURGERY at [facility]. Please see a copy of my visit note below.    Colon and Rectal Surgery Follow-up Clinic Note    Referring provider:  Agueda Manley MD  [address] [place] [address]       RE: Louie Greco  : 1960  LOULOU: 2020      Review of history:     He started having rectal bleeding around beginning of .  He underwent colonoscopy on 16 with Dr. Chelsea Thompson, which showed a malignant tumor in the rectum involving half of the lumen with oozing, as well as three 4-mm polyps in the ascending and transverse colon.  Pathology showed moderately differentiated adenocarcinoma, ascending colon with sessile serrated adenoma, others were tubular adenomas.  Mismatch repair expression with normal protein expression.      Staging scans showed the rectal mass, indeterminate focus in the left liver thought to be cyst, and multiple bilateral renal cysts.  MRI showed a distal rectal mass measuring 2.7 x 2.4 cm extending to the most proximal region of the anal canal.  There are a few tiny subcentimeter perirectal fat lymph nodes.  He was staged clinically [id number] (stage IIIA).      He was seen by Dr. Lee at [facility], and started neoadjuvant chemoradiation with capecitabine on 16 and completed on 9/15/16.     His post chemoradiation MRI showed improvement in the size of the tumor and response in the lymph nodes.      On 16, he underwent flexible sigmoidoscopy and there was no evidence of tumor.  There was only some anterior scarring in the lumen.  Multiple biopsies were taken, which showed no evidence of carcinoma. He was felt to have a complete clinical response and opted to go on the Watch and Wait Protocol rather than have APR.    He subsequently underwent  consolidation FOLFOX X 8 cycles and completed this on 5/9/2017    He was admitted 7/16/17-7/20/17 with sepsis, abdominal wall cellulitis.  He underwent surgery with laparoscopic abdominal exploration and appendectomy.  Pathology found sessile serrated adenoma without dysplasia or malignancy.      Last flexible sigmoidoscopy 11/1/19 with me without evidence of recurrence    Last colonoscopy 12/13/17    Most recent imaging:    MR Pelvis 2/17/20:  IMPRESSION:   There has been a complete response to treatment, with a corresponding  tumor regression grade of mrTRG-1.    CT AP 2/14/2018:  IMPRESSION:  1. 0.5 cm distal left ureteral calculus at the ureterovesicular  junction, resulting in mild obstruction.  2. A few tiny nonobstructing bilateral renal calculi.    PET 6/26/19:  IMPRESSION: In this patient with a history of rectal cancer s/p  chemoradiotherapy:   1. No evidence of metastatic disease in the head and neck, chest,  abdomen, or pelvis.  2. Multiple bilateral hepatic and renal cysts, unchanged.      1/22/2018 15:57 7/20/2018 08:42 1/4/2019 10:17 6/24/2019 08:28 2/17/2020 09:32   CEA 0.6 2.5 1.7 1.5 <0.5     Interval history: Overall, the patient reports doing well.  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  This is his first time undergoing examination and flexible sigmoidoscopy in clinic.  He brings lidocaine with him today.  Patient does have discomfort with bowel movements that is more so on the left side in the anorectal canal.  He still has more frequent bowel movements ongoing.    PLEASE SEE NOTE BELOW FOR PHYSICAL EXAMINATION, REVIEW OF SYSTEMS, AND OTHER HISTORY.    Assessment/Plan: 59 year old male with rectal cancer status post total neoadjuvant chemoradiation with complete clinical response now on the Watch and Wait Protocol.  We were successfully able to complete his direct examination today and flexible sigmoidoscopy in clinic with the aid of lidocaine.  His watch and wait  protocol follow-up as outlined below.    Follow up per Watch and Wait Protocol:   Completed CRT: 9/15/16    Flexible sigmoidoscopy     Every 2 months for 6 months: Completed    Every 3 months for 3 years: Completed    Every 6 months for 5 years: Until 9/2021-DUE 11/2020 - see below, plan colonoscopy    Every year for 10 years: Until 9/2026      3T MRI of pelvis    6-8 weeks after CRT:    Every 4 months for 2 years: Completed    Every 6 months for 2 years: Until 9/2020- DUE 8/2020    Yearly for 2 years: Until 9/2022      CT CAP    Every year for 6-8 years: Until 9/2024- PET due 6/2020      Colonoscopy     Last 12/13/17- Due 12/2020 - combine with flexible sigmoidoscopy listed above      CEA at every clinic or endoscopic visit    Total time was 15 minutes, over 50% was spent counseling the patient.     PLEASE SEE NOTE BELOW FOR PHYSICAL EXAMINATION, REVIEW OF SYSTEMS, AND OTHER HISTORY.      Felicitas Radford MD  Colon and Rectal Surgery Staff  [facility]    -------------------------------------------------------------------------------------------------------------------    Medical history:  Past Medical History:   Diagnosis Date     Childhood asthma      Epididymitis, bilateral     18 years old     Inguinal hernia      Mumps      Nephrolithiasis     1990     Rectal cancer (H)     low rectal cancer     Shingles        Surgical history:  Past Surgical History:   Procedure Laterality Date     COLONOSCOPY N/A 7/27/2016    Procedure: COMBINED COLONOSCOPY, SINGLE OR MULTIPLE BIOPSY/POLYPECTOMY BY BIOPSY;  Surgeon: Chelsea Thompson MD;  Location: [facility]     COLONOSCOPY N/A 9/13/2017    Procedure: COLONOSCOPY;;  Surgeon: Felicitas Radford MD;  [facility]     COLONOSCOPY N/A 12/13/2017    Procedure: COLONOSCOPY;;  Surgeon: Felicitas Radford MD;  [facility]     COMBINED CYSTOSCOPY, RETROGRADES, URETEROSCOPY, LASER HOLMIUM LITHOTRIPSY URETER(S), INSERT STENT  Left 2/16/2018    Procedure: COMBINED CYSTOSCOPY, RETROGRADES, URETEROSCOPY, LASER HOLMIUM LITHOTRIPSY URETER(S), INSERT STENT;  Cysto, left ureteroscopy, holmium laser, retrogrades, stent placement;  Surgeon: Bola Worthy MD;  Location: [facility]     EXAM UNDER ANESTHESIA ANUS N/A 7/5/2017    Procedure: EXAM UNDER ANESTHESIA ANUS;  Examination Under Anesthesia, flex sigmoidoscopy with biopsies and formalin application;  Surgeon: Felicitas Radford MD;  [facility]     EXAM UNDER ANESTHESIA ANUS N/A 9/13/2017    Procedure: EXAM UNDER ANESTHESIA ANUS;  Examination Under Anesthesia Anus, Colonoscopy, application of formalin;  Surgeon: Felicitas Radford MD;  [facility]     EXAM UNDER ANESTHESIA ANUS N/A 12/13/2017    Procedure: EXAM UNDER ANESTHESIA ANUS;  Examination Under Anesthesia Anus, Colonoscopy;  Surgeon: Felicitas Radford MD;  [facility]     EXAM UNDER ANESTHESIA ANUS N/A 5/11/2018    Procedure: EXAM UNDER ANESTHESIA ANUS;  Examination Under Anesthesia Anus, Interoperative Flexible Sigmoidoscopy, Application of Formalin;  Surgeon: Felicitas Radford MD;  [facility]     EXAM UNDER ANESTHESIA ANUS N/A 7/20/2018    Procedure: EXAM UNDER ANESTHESIA ANUS;  Examination Under Anesthesia Anus, Flexible Sigmoidoscopy ;  Surgeon: Felicitas Radford MD;  [facility]     EXAM UNDER ANESTHESIA ANUS N/A 11/30/2018    Procedure: Examination Under Anesthesia, application of formalin to rectum, polypectomy;  Surgeon: Felicitas Radford MD;  [facility]     EXAM UNDER ANESTHESIA ANUS N/A 2/22/2019    Procedure: Examination Under Anesthesia;  Surgeon: Felicitas Radford MD;  [facility]     EXAM UNDER ANESTHESIA ANUS N/A 6/28/2019    Procedure: Examination Under Anesthesia;  Surgeon: Felicitas Radford MD;  [facility]     EXAM UNDER ANESTHESIA ANUS N/A 11/1/2019    Procedure: Examination Under Anesthesia;  Surgeon:  Felicitas Radford MD;  [facility]     HC TOOTH EXTRACTION W/FORCEP       LAPAROSCOPIC APPENDECTOMY N/A 7/17/2017    Procedure: LAPAROSCOPIC APPENDECTOMY;  LAPAROSCOPIC APPENDECTOMY;  Surgeon: Bryson Ferguson MD;  [facility]     SIGMOIDOSCOPY FLEXIBLE N/A 12/8/2016    Procedure: SIGMOIDOSCOPY FLEXIBLE;  Surgeon: Felicitas Radford MD;  [facility]     SIGMOIDOSCOPY FLEXIBLE N/A 7/5/2017    Procedure: SIGMOIDOSCOPY FLEXIBLE;;  Surgeon: Felicitas Radford MD;  [facility]     SIGMOIDOSCOPY FLEXIBLE N/A 5/11/2018    Procedure: SIGMOIDOSCOPY FLEXIBLE;;  Surgeon: Felicitas Radford MD;  [facility]     SIGMOIDOSCOPY FLEXIBLE N/A 7/20/2018    Procedure: SIGMOIDOSCOPY FLEXIBLE;;  Surgeon: Felicitas Radford MD;  [facility]     SIGMOIDOSCOPY FLEXIBLE N/A 11/30/2018    Procedure: Flexible Sigmoidoscopy;  Surgeon: Felicitas Radford MD;  [facility]     SIGMOIDOSCOPY FLEXIBLE N/A 2/22/2019    Procedure: Intraoperative Flexible Sigmoidoscopy, Application of Formalin to rectum;  Surgeon: Felicitas Radford MD;  [facility]     SIGMOIDOSCOPY FLEXIBLE N/A 6/28/2019    Procedure: Flexible Sigmoidoscopy;  Surgeon: Felicitas Radford MD;  [facility]     SIGMOIDOSCOPY FLEXIBLE N/A 11/1/2019    Procedure: Flexible Sigmoidoscopy;  Surgeon: Felicitas Radford MD;  [facility]     TESTICLE SURGERY       VASCULAR SURGERY      Right chest port     VASECTOMY       VASECTOMY         Problem list:    Patient Active Problem List    Diagnosis Date Noted     Kidney stone 02/16/2018     Priority: Medium     Elevated prostate specific antigen (PSA) 11/30/2017     Priority: Medium     Appendicitis 07/17/2017     Priority: Medium     Chemotherapy-induced neutropenia (H) 03/21/2017     Priority: Medium     Advance Care Planning 03/09/2017     Priority: Medium     Advance Care Planning 3/9/2017: Receipt of ACP document:  Received:  invalid HCD document dated 12/8/2016.  Document not previously scanned. Validation form completed indicating invalid document. Copy sent to client with information and facilitation resources. Validation form sent to be scanned as notation of invalid document received.  Code Status needs to be updated to reflect choices. Confirmed/documented designated decision maker(s).  Added by Araceli Horne MSW Advance Care Planning Liaison with [employer].       Malignant neoplasm of rectum (H) 01/03/2017     Priority: Medium     Rectal bleeding 07/19/2016     Priority: Medium     Plantar fasciitis 11/04/2010     Priority: Medium     Pes anserinus tendinitis 02/08/2010     Priority: Medium       Medications:  Current Outpatient Medications   Medication Sig Dispense Refill     ASPIRIN PO Take by mouth as needed for moderate pain       dibucaine (NUPERCAINAL) 1 % OINT ointment 3 times daily as needed for moderate pain       diltiazem 2% in PLO cream, FV COMPOUNDED, 2% GEL Apply topically 2 times daily       ibuprofen 200 MG capsule Take 200-400 mg by mouth every 6 hours as needed 120 capsule 0     lidocaine (XYLOCAINE) 2 % topical gel Apply topically 2 times daily as needed for moderate pain 30 mL 3     lidocaine, Anorectal, 5 % CREA Externally apply 1 Application topically 3 times daily as needed 1 Tube 0     neomycin-polymyxin-hydrocortisone (CORTISPORIN) 3.5-97151-4 otic suspension Place 4 drops into both ears 4 times daily 10 mL 0     sildenafil (REVATIO) 20 MG tablet Take 1 tablet (20 mg) by mouth daily as needed (Patient not taking: Reported on 4/29/2020) 30 tablet 11     VITAMIN D, CHOLECALCIFEROL, PO Take 2,000 Units by mouth daily          Allergies:  Allergies   Allergen Reactions     Ampicillin Diarrhea     Demerol [Meperidine]      Nausea        Family history:  Family History   Problem Relation Age of Onset     Cancer Brother         primary of unknown origin     Other Cancer Brother      Chronic Obstructive  Pulmonary Disease Father      Hypertension Father      Hyperlipidemia Father      Colon Polyps Mother         Number and type of polyps unknown     Family History Negative Brother         negative colonoscopy     Diabetes Maternal Grandfather      Hypertension Paternal Grandmother      Hyperlipidemia Paternal Grandmother      Stomach Cancer Other 63        maternal great grandfather     Glaucoma No family hx of      Macular Degeneration No family hx of        Social history:  Social History     Socioeconomic History     Marital status:      Spouse name: Not on file     Number of children: Not on file     Years of education: Not on file     Highest education level: Not on file   Occupational History     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[employer]     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   Social Needs     Financial resource strain: Not on file     Food insecurity     Worry: Not on file     Inability: Not on file     Transportation needs     Medical: Not on file     Non-medical: Not on file   Tobacco Use     Smoking status: Never Smoker     Smokeless tobacco: Never Used   Substance and Sexual Activity     Alcohol use: Yes     Alcohol/week: 0.0 standard drinks     Comment: 1 drink a week     Drug use: No     Sexual activity: Yes     Partners: Female     Birth control/protection: Male Surgical   Lifestyle     Physical activity     Days per week: Not on file     Minutes per session: Not on file     Stress: Not on file   Relationships     Social connections     Talks on phone: Not on file     Gets together: Not on file     Attends Orthodoxy service: Not on file     Active member of club or organization: Not on file     Attends meetings of clubs or organizations: Not on file     Relationship status: Not on file     Intimate partner violence     Fear of current or ex partner: Not on file     Emotionally abused: Not on file     Physically abused: Not on file     Forced sexual activity: Not on file   Other Topics Concern     Parent/sibling  "w/ CABG, MI or angioplasty before 65F 55M? No   Social History Narrative    Dad  at age  78   from [place], COPD, & HTN    Mom alive in her 70's.     for 30 years    Two adult children. 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         Nursing Notes:   Mauro Madrigal, EMT  2020  3:55 PM  Signed  Chief Complaint   Patient presents with     Procedure     Flexible Sigmoidoscopy       Vitals:    20 1551   BP: 128/80   BP Location: Left arm   Patient Position: Sitting   Cuff Size: Adult Large   Pulse: 87   SpO2: 98%   Height: 5' 10\"       Body mass index is 29.47 kg/m .      IVONNE Yee                         Physical Examination:  /80 (BP Location: Left arm, Patient Position: Sitting, Cuff Size: Adult Large)   Pulse 87   Ht 1.778 m (5' 10\")   SpO2 98%   BMI 29.47 kg/m    General: Pleasant, no acute distress     Prior to examination the patient had undergone a full bowel prep per his own preference as opposed to enemas.  I then applied lidocaine jelly externally and into the anal canal prior to doing any examination.  This was left in place for approximately 5 minutes.    Perianal external examination:  Perianal skin: Intact with no excoriation or lichenification.  Lesions: No evidence of an external lesion, nodularity, or induration in the perianal region.  Eversion of buttocks: There was evidence of an anal fissure. Details: directly posterior (also evident on anoscopy)  Skin tags or external hemorrhoids: Yes: right posterior anal skin tag.    Digital rectal examination: Was performed.   Sphincter tone: Good.  Palpable lesions: No.  Prostate: Normal.  Other: No discomfort on palpation of the levators bilaterally.    Anoscopy: Was performed.   Hemorrhoids: No significant internal hemorrhoids.  Lesions: No. There was evidence of an anal fissure directly posteriorly with introduction of the small white obturator " anoscope.      Procedures:  After informed consent was obtained and the patient had previously undergone a full bowel prep (per the patient's preference over enemas) and application of lidocaine jelly, a flexible sigmoidoscopy was performed.  The flexible scope was advanced to 35 cm without difficulty and then carefully withdrawn.  The scar from the previous tumor could be easily seen.  There is no evidence of a mass or any sign of recurrence.  There was minimal stigmata of radiation therapy.  The fissure could be seen on removal through the anal canal.  No retroflexion was performed.  The patient tolerated the procedure well.    Again, thank you for allowing me to participate in the care of your patient.      Sincerely,    Felicitas Radford MD

## 2020-04-29 NOTE — NURSING NOTE
"Chief Complaint   Patient presents with     Procedure     Flexible Sigmoidoscopy       Vitals:    04/29/20 1551   BP: 128/80   BP Location: Left arm   Patient Position: Sitting   Cuff Size: Adult Large   Pulse: 87   SpO2: 98%   Height: 5' 10\"       Body mass index is 29.47 kg/m .      Mauro Madrigal, EMT                      "

## 2020-04-29 NOTE — PROGRESS NOTES
Colon and Rectal Surgery Follow-up Clinic Note    Referring provider:  Agueda Manley MD  [address] [place] [address]       RE: Louie Greco  : 1960  LOULOU: 2020      Review of history:     He started having rectal bleeding around beginning of .  He underwent colonoscopy on 16 with Dr. Chelsea Thompson, which showed a malignant tumor in the rectum involving half of the lumen with oozing, as well as three 4-mm polyps in the ascending and transverse colon.  Pathology showed moderately differentiated adenocarcinoma, ascending colon with sessile serrated adenoma, others were tubular adenomas.  Mismatch repair expression with normal protein expression.      Staging scans showed the rectal mass, indeterminate focus in the left liver thought to be cyst, and multiple bilateral renal cysts.  MRI showed a distal rectal mass measuring 2.7 x 2.4 cm extending to the most proximal region of the anal canal.  There are a few tiny subcentimeter perirectal fat lymph nodes.  He was staged clinically [id number] (stage IIIA).      He was seen by Dr. Lee at [facility], and started neoadjuvant chemoradiation with capecitabine on 16 and completed on 9/15/16.     His post chemoradiation MRI showed improvement in the size of the tumor and response in the lymph nodes.      On 16, he underwent flexible sigmoidoscopy and there was no evidence of tumor.  There was only some anterior scarring in the lumen.  Multiple biopsies were taken, which showed no evidence of carcinoma. He was felt to have a complete clinical response and opted to go on the Watch and Wait Protocol rather than have APR.    He subsequently underwent consolidation FOLFOX X 8 cycles and completed this on 2017    He was admitted 17-17 with sepsis, abdominal wall cellulitis.  He underwent surgery with laparoscopic abdominal exploration and appendectomy.  Pathology found sessile serrated adenoma without dysplasia or  malignancy.      Last flexible sigmoidoscopy 11/1/19 with me without evidence of recurrence    Last colonoscopy 12/13/17    Most recent imaging:    MR Pelvis 2/17/20:  IMPRESSION:   There has been a complete response to treatment, with a corresponding  tumor regression grade of mrTRG-1.    CT AP 2/14/2018:  IMPRESSION:  1. 0.5 cm distal left ureteral calculus at the ureterovesicular  junction, resulting in mild obstruction.  2. A few tiny nonobstructing bilateral renal calculi.    PET 6/26/19:  IMPRESSION: In this patient with a history of rectal cancer s/p  chemoradiotherapy:   1. No evidence of metastatic disease in the head and neck, chest,  abdomen, or pelvis.  2. Multiple bilateral hepatic and renal cysts, unchanged.      1/22/2018 15:57 7/20/2018 08:42 1/4/2019 10:17 6/24/2019 08:28 2/17/2020 09:32   CEA 0.6 2.5 1.7 1.5 <0.5     Interval history: Overall, the patient reports doing well.  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  This is his first time undergoing examination and flexible sigmoidoscopy in clinic.  He brings lidocaine with him today.  Patient does have discomfort with bowel movements that is more so on the left side in the anorectal canal.  He still has more frequent bowel movements ongoing.    PLEASE SEE NOTE BELOW FOR PHYSICAL EXAMINATION, REVIEW OF SYSTEMS, AND OTHER HISTORY.    Assessment/Plan: 59 year old male with rectal cancer status post total neoadjuvant chemoradiation with complete clinical response now on the Watch and Wait Protocol.  We were successfully able to complete his direct examination today and flexible sigmoidoscopy in clinic with the aid of lidocaine.  His watch and wait protocol follow-up as outlined below.    Follow up per Watch and Wait Protocol:   Completed CRT: 9/15/16    Flexible sigmoidoscopy     Every 2 months for 6 months: Completed    Every 3 months for 3 years: Completed    Every 6 months for 5 years: Until 9/2021-DUE 11/2020 - see below, plan  colonoscopy    Every year for 10 years: Until 9/2026      3T MRI of pelvis    6-8 weeks after CRT:    Every 4 months for 2 years: Completed    Every 6 months for 2 years: Until 9/2020- DUE 8/2020    Yearly for 2 years: Until 9/2022      CT CAP    Every year for 6-8 years: Until 9/2024- PET due 6/2020      Colonoscopy     Last 12/13/17- Due 12/2020 - combine with flexible sigmoidoscopy listed above      CEA at every clinic or endoscopic visit    Total time was 15 minutes, over 50% was spent counseling the patient.     PLEASE SEE NOTE BELOW FOR PHYSICAL EXAMINATION, REVIEW OF SYSTEMS, AND OTHER HISTORY.      Felicitas Radford MD  Colon and Rectal Surgery Staff  [facility]    -------------------------------------------------------------------------------------------------------------------    Medical history:  Past Medical History:   Diagnosis Date     Childhood asthma      Epididymitis, bilateral     18 years old     Inguinal hernia      Mumps      Nephrolithiasis     1990     Rectal cancer (H)     low rectal cancer     Shingles        Surgical history:  Past Surgical History:   Procedure Laterality Date     COLONOSCOPY N/A 7/27/2016    Procedure: COMBINED COLONOSCOPY, SINGLE OR MULTIPLE BIOPSY/POLYPECTOMY BY BIOPSY;  Surgeon: Chelsea Thompson MD;  Location: [facility]     COLONOSCOPY N/A 9/13/2017    Procedure: COLONOSCOPY;;  Surgeon: Felicitas Radford MD;  [facility]     COLONOSCOPY N/A 12/13/2017    Procedure: COLONOSCOPY;;  Surgeon: Felicitas Radford MD;  [facility]     COMBINED CYSTOSCOPY, RETROGRADES, URETEROSCOPY, LASER HOLMIUM LITHOTRIPSY URETER(S), INSERT STENT Left 2/16/2018    Procedure: COMBINED CYSTOSCOPY, RETROGRADES, URETEROSCOPY, LASER HOLMIUM LITHOTRIPSY URETER(S), INSERT STENT;  Cysto, left ureteroscopy, holmium laser, retrogrades, stent placement;  Surgeon: Bola Worthy MD;  Location:  OR     EXAM UNDER ANESTHESIA ANUS  N/A 7/5/2017    Procedure: EXAM UNDER ANESTHESIA ANUS;  Examination Under Anesthesia, flex sigmoidoscopy with biopsies and formalin application;  Surgeon: Felicitas Radford MD;  [facility]     EXAM UNDER ANESTHESIA ANUS N/A 9/13/2017    Procedure: EXAM UNDER ANESTHESIA ANUS;  Examination Under Anesthesia Anus, Colonoscopy, application of formalin;  Surgeon: Felicitas Radford MD;  [facility]     EXAM UNDER ANESTHESIA ANUS N/A 12/13/2017    Procedure: EXAM UNDER ANESTHESIA ANUS;  Examination Under Anesthesia Anus, Colonoscopy;  Surgeon: Felicitas Radford MD;  [facility]     EXAM UNDER ANESTHESIA ANUS N/A 5/11/2018    Procedure: EXAM UNDER ANESTHESIA ANUS;  Examination Under Anesthesia Anus, Interoperative Flexible Sigmoidoscopy, Application of Formalin;  Surgeon: Felicitas Radford MD;  [facility]     EXAM UNDER ANESTHESIA ANUS N/A 7/20/2018    Procedure: EXAM UNDER ANESTHESIA ANUS;  Examination Under Anesthesia Anus, Flexible Sigmoidoscopy ;  Surgeon: Felicitas Radford MD;  [facility]     EXAM UNDER ANESTHESIA ANUS N/A 11/30/2018    Procedure: Examination Under Anesthesia, application of formalin to rectum, polypectomy;  Surgeon: Felicitas Radford MD;  [facility]     EXAM UNDER ANESTHESIA ANUS N/A 2/22/2019    Procedure: Examination Under Anesthesia;  Surgeon: Felicitas Radford MD;  [facility]     EXAM UNDER ANESTHESIA ANUS N/A 6/28/2019    Procedure: Examination Under Anesthesia;  Surgeon: Felicitas Radford MD;  [facility]     EXAM UNDER ANESTHESIA ANUS N/A 11/1/2019    Procedure: Examination Under Anesthesia;  Surgeon: Felicitas Radford MD;  [facility]     HC TOOTH EXTRACTION W/FORCEP       LAPAROSCOPIC APPENDECTOMY N/A 7/17/2017    Procedure: LAPAROSCOPIC APPENDECTOMY;  LAPAROSCOPIC APPENDECTOMY;  Surgeon: Bryson Ferguson MD;  [facility]     SIGMOIDOSCOPY FLEXIBLE N/A 12/8/2016     Procedure: SIGMOIDOSCOPY FLEXIBLE;  Surgeon: Felicitas Radford MD;  [facility]     SIGMOIDOSCOPY FLEXIBLE N/A 7/5/2017    Procedure: SIGMOIDOSCOPY FLEXIBLE;;  Surgeon: Felicitas Radford MD;  [facility]     SIGMOIDOSCOPY FLEXIBLE N/A 5/11/2018    Procedure: SIGMOIDOSCOPY FLEXIBLE;;  Surgeon: Felicitas Radford MD;  [facility]     SIGMOIDOSCOPY FLEXIBLE N/A 7/20/2018    Procedure: SIGMOIDOSCOPY FLEXIBLE;;  Surgeon: Felicitas Radford MD;  [facility]     SIGMOIDOSCOPY FLEXIBLE N/A 11/30/2018    Procedure: Flexible Sigmoidoscopy;  Surgeon: Felicitas Radford MD;  [facility]     SIGMOIDOSCOPY FLEXIBLE N/A 2/22/2019    Procedure: Intraoperative Flexible Sigmoidoscopy, Application of Formalin to rectum;  Surgeon: Felicitas Radford MD;  [facility]     SIGMOIDOSCOPY FLEXIBLE N/A 6/28/2019    Procedure: Flexible Sigmoidoscopy;  Surgeon: Felicitas Radford MD;  [facility]     SIGMOIDOSCOPY FLEXIBLE N/A 11/1/2019    Procedure: Flexible Sigmoidoscopy;  Surgeon: Felicitas Radford MD;  [facility]     TESTICLE SURGERY       VASCULAR SURGERY      Right chest port     VASECTOMY       VASECTOMY         Problem list:    Patient Active Problem List    Diagnosis Date Noted     Kidney stone 02/16/2018     Priority: Medium     Elevated prostate specific antigen (PSA) 11/30/2017     Priority: Medium     Appendicitis 07/17/2017     Priority: Medium     Chemotherapy-induced neutropenia (H) 03/21/2017     Priority: Medium     Advance Care Planning 03/09/2017     Priority: Medium     Advance Care Planning 3/9/2017: Receipt of ACP document:  Received: invalid HCD document dated 12/8/2016.  Document not previously scanned. Validation form completed indicating invalid document. Copy sent to client with information and facilitation resources. Validation form sent to be scanned as notation of invalid document received.  Code Status needs to be  updated to reflect choices. Confirmed/documented designated decision maker(s).  Added by Araceli Horne MSW Advance Care Planning Liaison with [employer].       Malignant neoplasm of rectum (H) 01/03/2017     Priority: Medium     Rectal bleeding 07/19/2016     Priority: Medium     Plantar fasciitis 11/04/2010     Priority: Medium     Pes anserinus tendinitis 02/08/2010     Priority: Medium       Medications:  Current Outpatient Medications   Medication Sig Dispense Refill     ASPIRIN PO Take by mouth as needed for moderate pain       dibucaine (NUPERCAINAL) 1 % OINT ointment 3 times daily as needed for moderate pain       diltiazem 2% in PLO cream, FV COMPOUNDED, 2% GEL Apply topically 2 times daily       ibuprofen 200 MG capsule Take 200-400 mg by mouth every 6 hours as needed 120 capsule 0     lidocaine (XYLOCAINE) 2 % topical gel Apply topically 2 times daily as needed for moderate pain 30 mL 3     lidocaine, Anorectal, 5 % CREA Externally apply 1 Application topically 3 times daily as needed 1 Tube 0     neomycin-polymyxin-hydrocortisone (CORTISPORIN) 3.5-15623-1 otic suspension Place 4 drops into both ears 4 times daily 10 mL 0     sildenafil (REVATIO) 20 MG tablet Take 1 tablet (20 mg) by mouth daily as needed (Patient not taking: Reported on 4/29/2020) 30 tablet 11     VITAMIN D, CHOLECALCIFEROL, PO Take 2,000 Units by mouth daily          Allergies:  Allergies   Allergen Reactions     Ampicillin Diarrhea     Demerol [Meperidine]      Nausea        Family history:  Family History   Problem Relation Age of Onset     Cancer Brother         primary of unknown origin     Other Cancer Brother      Chronic Obstructive Pulmonary Disease Father      Hypertension Father      Hyperlipidemia Father      Colon Polyps Mother         Number and type of polyps unknown     Family History Negative Brother         negative colonoscopy     Diabetes Maternal Grandfather      Hypertension Paternal Grandmother       Hyperlipidemia Paternal Grandmother      Stomach Cancer Other 63        maternal great grandfather     Glaucoma No family hx of      Macular Degeneration No family hx of        Social history:  Social History     Socioeconomic History     Marital status:      Spouse name: Not on file     Number of children: Not on file     Years of education: Not on file     Highest education level: Not on file   Occupational History     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   Social Needs     Financial resource strain: Not on file     Food insecurity     Worry: Not on file     Inability: Not on file     Transportation needs     Medical: Not on file     Non-medical: Not on file   Tobacco Use     Smoking status: Never Smoker     Smokeless tobacco: Never Used   Substance and Sexual Activity     Alcohol use: Yes     Alcohol/week: 0.0 standard drinks     Comment: 1 drink a week     Drug use: No     Sexual activity: Yes     Partners: Female     Birth control/protection: Male Surgical   Lifestyle     Physical activity     Days per week: Not on file     Minutes per session: Not on file     Stress: Not on file   Relationships     Social connections     Talks on phone: Not on file     Gets together: Not on file     Attends Baptism service: Not on file     Active member of club or organization: Not on file     Attends meetings of clubs or organizations: Not on file     Relationship status: Not on file     Intimate partner violence     Fear of current or ex partner: Not on file     Emotionally abused: Not on file     Physically abused: Not on file     Forced sexual activity: Not on file   Other Topics Concern     Parent/sibling w/ CABG, MI or angioplasty before 65F 55M? No   Social History Narrative    Dad  at age  78   from [place], COPD, & HTN    Mom alive in her 70's.     for 30 years    Two adult children. 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         Nursing Notes:   Mauro Madrigal, EMT  4/29/2020  3:55 PM  Signed  Chief Complaint   Patient presents with     Procedure     Flexible Sigmoidoscopy       Vitals:    04/29/20 1551   BP: 128/80   BP Location: Left arm   Patient Position: Sitting   Cuff Size: Adult Large   Pulse: 87   SpO2: 98%   Height: 5' 10\"       Body mass index is 29.47 kg/m .      Mauro Madrigal, IVONNE                         Physical Examination:  /80 (BP Location: Left arm, Patient Position: Sitting, Cuff Size: Adult Large)   Pulse 87   Ht 1.778 m (5' 10\")   SpO2 98%   BMI 29.47 kg/m    General: Pleasant, no acute distress     Prior to examination the patient had undergone a full bowel prep per his own preference as opposed to enemas.  I then applied lidocaine jelly externally and into the anal canal prior to doing any examination.  This was left in place for approximately 5 minutes.    Perianal external examination:  Perianal skin: Intact with no excoriation or lichenification.  Lesions: No evidence of an external lesion, nodularity, or induration in the perianal region.  Eversion of buttocks: There was evidence of an anal fissure. Details: directly posterior (also evident on anoscopy)  Skin tags or external hemorrhoids: Yes: right posterior anal skin tag.    Digital rectal examination: Was performed.   Sphincter tone: Good.  Palpable lesions: No.  Prostate: Normal.  Other: No discomfort on palpation of the levators bilaterally.    Anoscopy: Was performed.   Hemorrhoids: No significant internal hemorrhoids.  Lesions: No. There was evidence of an anal fissure directly posteriorly with introduction of the small white obturator anoscope.      Procedures:  After informed consent was obtained and the patient had previously undergone a full bowel prep (per the patient's preference over enemas) and application of lidocaine jelly, a flexible sigmoidoscopy was performed.  The flexible scope was advanced to 35 cm without " difficulty and then carefully withdrawn.  The scar from the previous tumor could be easily seen.  There is no evidence of a mass or any sign of recurrence.  There was minimal stigmata of radiation therapy.  The fissure could be seen on removal through the anal canal.  No retroflexion was performed.  The patient tolerated the procedure well.

## 2020-08-12 DIAGNOSIS — R97.20 ELEVATED PROSTATE SPECIFIC ANTIGEN (PSA): Primary | ICD-10-CM

## 2020-08-13 ENCOUNTER — ANCILLARY PROCEDURE (OUTPATIENT)
Dept: MRI IMAGING | Facility: CLINIC | Age: 60
End: 2020-08-13
Attending: INTERNAL MEDICINE
Payer: COMMERCIAL

## 2020-08-13 ENCOUNTER — ANCILLARY PROCEDURE (OUTPATIENT)
Dept: PET IMAGING | Facility: CLINIC | Age: 60
End: 2020-08-13
Attending: INTERNAL MEDICINE
Payer: COMMERCIAL

## 2020-08-13 DIAGNOSIS — C20 MALIGNANT NEOPLASM OF RECTUM (H): ICD-10-CM

## 2020-08-13 DIAGNOSIS — Z13.220 LIPID SCREENING: ICD-10-CM

## 2020-08-13 DIAGNOSIS — R97.20 ELEVATED PROSTATE SPECIFIC ANTIGEN (PSA): ICD-10-CM

## 2020-08-13 DIAGNOSIS — E55.9 VITAMIN D DEFICIENCY: ICD-10-CM

## 2020-08-13 LAB
CREAT BLD-MCNC: 0.8 MG/DL (ref 0.5–1.2)
GFR SERPL CREATININE-BSD FRML MDRD: >90 ML/MIN/{1.73_M2}
GFRB: NORMAL

## 2020-08-13 PROCEDURE — 85025 COMPLETE CBC W/AUTO DIFF WBC: CPT | Performed by: INTERNAL MEDICINE

## 2020-08-13 PROCEDURE — 82378 CARCINOEMBRYONIC ANTIGEN: CPT | Performed by: INTERNAL MEDICINE

## 2020-08-13 PROCEDURE — 80053 COMPREHEN METABOLIC PANEL: CPT | Performed by: INTERNAL MEDICINE

## 2020-08-13 PROCEDURE — 81539 ONCOLOGY PROSTATE PROB SCORE: CPT | Performed by: INTERNAL MEDICINE

## 2020-08-13 PROCEDURE — 80061 LIPID PANEL: CPT | Performed by: INTERNAL MEDICINE

## 2020-08-13 PROCEDURE — 82306 VITAMIN D 25 HYDROXY: CPT | Performed by: INTERNAL MEDICINE

## 2020-08-13 PROCEDURE — 84153 ASSAY OF PSA TOTAL: CPT | Performed by: INTERNAL MEDICINE

## 2020-08-13 RX ORDER — GADOBUTROL 604.72 MG/ML
10 INJECTION INTRAVENOUS ONCE
Status: COMPLETED | OUTPATIENT
Start: 2020-08-13 | End: 2020-08-13

## 2020-08-13 RX ORDER — FUROSEMIDE 10 MG/ML
40 INJECTION INTRAMUSCULAR; INTRAVENOUS ONCE
Status: COMPLETED | OUTPATIENT
Start: 2020-08-13 | End: 2020-08-13

## 2020-08-13 RX ADMIN — FUROSEMIDE 40 MG: 10 INJECTION INTRAMUSCULAR; INTRAVENOUS at 10:35

## 2020-08-13 RX ADMIN — GADOBUTROL 10 ML: 604.72 INJECTION INTRAVENOUS at 14:00

## 2020-08-13 NOTE — NURSING NOTE
Chief Complaint   Patient presents with     Blood Draw     Labs drawn via PIV by RN in lab.      Elvia Gibbs RN

## 2020-08-13 NOTE — LETTER
August 17, 2020      Louie Greco  [address] [place] [id number]        Dear ,    The following letter pertains to your most recent diagnostic tests:     Your vitamin D level is OK.     Your cholesterol is essentially stable and OK.     Your PSA is better.  Good news!       Resulted Orders   **Vitamin D Deficiency FUTURE anytime   Result Value Ref Range    Vitamin D Deficiency screening 42 20 - 75 ug/L      Comment:      Season, race, dietary intake, and treatment affect the concentration of   25-hydroxy-Vitamin D. Values may decrease during winter months and increase   during summer months. Values 20-29 ug/L may indicate Vitamin D insufficiency   and values <20 ug/L may indicate Vitamin D deficiency.  Vitamin D determination is routinely performed by an immunoassay specific for   25 hydroxyvitamin D3.  If an individual is on vitamin D2 (ergocalciferol)   supplementation, please specify 25 OH vitamin D2 and D3 level determination by   LCMSMS test VITD23.     PSA, tumor marker   Result Value Ref Range    PSA 4.13 (H) 0 - 4 ug/L      Comment:      Assay Method:  Chemiluminescence using Siemens Vista analyzer   Lipid panel reflex to direct LDL Fasting   Result Value Ref Range    Cholesterol 176 <200 mg/dL    Triglycerides 86 <150 mg/dL    HDL Cholesterol 50 >39 mg/dL    LDL Cholesterol Calculated 109 (H) <100 mg/dL      Comment:      Above desirable:  100-129 mg/dl  Borderline High:  130-159 mg/dL  High:             160-189 mg/dL  Very high:       >189 mg/dl      Non HDL Cholesterol 126 <130 mg/dL       If you have any questions or concerns, please call the clinic at the number listed above.       Sincerely,     Dr. Healy

## 2020-08-14 NOTE — RESULT ENCOUNTER NOTE
The following letter pertains to your most recent diagnostic tests:    Your vitamin D level is OK.    Your cholesterol is essentially stable and OK.    Your PSA is better.  Good news!         Sincerely,    Dr. Healy

## 2020-08-19 ENCOUNTER — VIRTUAL VISIT (OUTPATIENT)
Dept: ONCOLOGY | Facility: CLINIC | Age: 60
End: 2020-08-19
Attending: INTERNAL MEDICINE
Payer: COMMERCIAL

## 2020-08-19 DIAGNOSIS — C20 MALIGNANT NEOPLASM OF RECTUM (H): Primary | ICD-10-CM

## 2020-08-19 PROCEDURE — 99214 OFFICE O/P EST MOD 30 MIN: CPT | Mod: GT | Performed by: INTERNAL MEDICINE

## 2020-08-19 PROCEDURE — 40001009 ZZH VIDEO/TELEPHONE VISIT; NO CHARGE

## 2020-08-19 ASSESSMENT — PAIN SCALES - GENERAL: PAINLEVEL: NO PAIN (0)

## 2020-08-19 NOTE — LETTER
"    8/19/2020         RE: Louie Greco  [address] [facility] [address]        Dear Colleague,    Thank you for referring your patient, Louie Greco, to the [facility]. Please see a copy of my visit note below.    Louie Greco is a 60 year old male who is being evaluated via a billable video visit.      The patient has been notified of following:     \"This video visit will be conducted via a call between you and your physician/provider. We have found that certain health care needs can be provided without the need for an in-person physical exam.  This service lets us provide the care you need with a video conversation.  If a prescription is necessary we can send it directly to your pharmacy.  If lab work is needed we can place an order for that and you can then stop by our lab to have the test done at a later time.    Video visits are billed at different rates depending on your insurance coverage.  Please reach out to your insurance provider with any questions.    If during the course of the call the physician/provider feels a video visit is not appropriate, you will not be charged for this service.\"    Patient has given verbal consent for Video visit? Yes  How would you like to obtain your AVS? MyChart  If you are dropped from the video visit, the video invite should be resent to: Text to cell phone: [phone]  Will anyone else be joining your video visit? No       Video-Visit Details    Type of service:  Video Visit    Video Start Time: 3:59 pm  Video End Time: 4:08 pm    Originating Location (pt. Location): Home    Distant Location (provider location):  [facility]     Platform used for Video Visit: Cecil Zambrano MA      [facility]    Hematology/Oncology Established Patient Note      Today's Date: 8/19/2020    Reason for Follow-up: rectal cancer      HISTORY OF PRESENT ILLNESS: Louie Greco is a 60 year old male who presents with rectal cancer.  He " started having rectal bleeding around beginning of 2016.  He underwent colonoscopy on 7/27/16, which showed a malignant tumor in the rectum involving half of the lumen with oozing, as well as three 4-mm polyps in the ascending and transverse colon.  Pathology showed moderately differentiated adenocarcinoma, ascending colon with sessile serrated adenoma, others were tubular adenomas.  Mismatch repair expression with normal protein expression.  Staging scans showed the rectal mass, indeterminate focus in the left liver thought to be cyst, and multiple bilateral renal cysts.  MRI showed a distal rectal mass measuring 2.7 x 2.4 cm extending to the most proximal region of the anal canal.  There are a few tiny subcentimeter perirectal fat lymph nodes.  He was staged clinically [id number] (stage IIIA).  He was seen by Dr. Lee at [facility], and started neoadjuvant chemoradiation with capecitabine on 8/8/16 and completed on 9/15/16.  He was then seen by Dr. Radford, colorectal surgery at [facility].  His post chemoradiation MRI showed improvement in the size of the tumor and response in the lymph nodes.  On 12/8/16, he underwent flexible sigmoidoscopy and there was no evidence of tumor.  There was only some anterior scarring in the lumen.  Multiple biopsies were taken, which showed no evidence of carcinoma.  At that point, Dr. Radford spoke with the patient's wife, that there appears to be a complete response in the lumen, and that the patient would wish to placed on the watch and wait protocol.  The patient had expressed wishes not to have an APR, and it would not have been possible to grossly clear a margin for LAR.    He had port placed on 1/16/17.  It has been removed.    He completed FOLFOX x 8 cycles 1/16/17-5/9/17.      He was admitted 7/16/17-7/20/17 with sepsis, abdominal wall cellulitis.  He underwent surgery with laparoscopic abdominal exploration and appendectomy.  Pathology  found sessile serrated adenoma without dysplasia or malignancy.        INTERIM HISTORY: Louie says that he is doing well.  School will be starting soon, and his school will be doing a hybrid of online and in-person learning.  He is in training now, and will start on 8/31/20.  He recently had a flex sig in the office with Dr. Radford and there was no evidence of a mass or any sign of recurrence.        REVIEW OF SYSTEMS:   14 point ROS was reviewed and is negative other than as noted above in HPI.       HOME MEDICATIONS:  Current Outpatient Medications   Medication Sig Dispense Refill     ASPIRIN PO Take by mouth as needed for moderate pain       dibucaine (NUPERCAINAL) 1 % OINT ointment 3 times daily as needed for moderate pain       diltiazem 2% in PLO cream, FV COMPOUNDED, 2% GEL Apply topically 2 times daily       ibuprofen 200 MG capsule Take 200-400 mg by mouth every 6 hours as needed 120 capsule 0     lidocaine (XYLOCAINE) 2 % topical gel Apply topically 2 times daily as needed for moderate pain 30 mL 3     lidocaine, Anorectal, 5 % CREA Externally apply 1 Application topically 3 times daily as needed 1 Tube 0     neomycin-polymyxin-hydrocortisone (CORTISPORIN) 3.5-87815-3 otic suspension Place 4 drops into both ears 4 times daily 10 mL 0     sildenafil (REVATIO) 20 MG tablet Take 1 tablet (20 mg) by mouth daily as needed (Patient not taking: Reported on 4/29/2020) 30 tablet 11     VITAMIN D, CHOLECALCIFEROL, PO Take 2,000 Units by mouth daily            ALLERGIES:  Allergies   Allergen Reactions     Ampicillin Diarrhea     Demerol [Meperidine]      Nausea          PAST MEDICAL HISTORY:  Past Medical History:   Diagnosis Date     Childhood asthma      Epididymitis, bilateral     18 years old     Inguinal hernia      Mumps      Nephrolithiasis     1990     Rectal cancer (H)     low rectal cancer     Shingles          PAST SURGICAL HISTORY:  Past Surgical History:   Procedure Laterality Date     COLONOSCOPY  N/A 7/27/2016    Procedure: COMBINED COLONOSCOPY, SINGLE OR MULTIPLE BIOPSY/POLYPECTOMY BY BIOPSY;  Surgeon: Chelsea Thompson MD;  Location: [facility]     COLONOSCOPY N/A 9/13/2017    Procedure: COLONOSCOPY;;  Surgeon: Felicitas Radford MD;  [facility]     COLONOSCOPY N/A 12/13/2017    Procedure: COLONOSCOPY;;  Surgeon: Felicitas Radford MD;  [facility]     COMBINED CYSTOSCOPY, RETROGRADES, URETEROSCOPY, LASER HOLMIUM LITHOTRIPSY URETER(S), INSERT STENT Left 2/16/2018    Procedure: COMBINED CYSTOSCOPY, RETROGRADES, URETEROSCOPY, LASER HOLMIUM LITHOTRIPSY URETER(S), INSERT STENT;  Cysto, left ureteroscopy, holmium laser, retrogrades, stent placement;  Surgeon: Bola Worthy MD;  Location: [facility]     EXAM UNDER ANESTHESIA ANUS N/A 7/5/2017    Procedure: EXAM UNDER ANESTHESIA ANUS;  Examination Under Anesthesia, flex sigmoidoscopy with biopsies and formalin application;  Surgeon: Felicitas Radford MD;  [facility]     EXAM UNDER ANESTHESIA ANUS N/A 9/13/2017    Procedure: EXAM UNDER ANESTHESIA ANUS;  Examination Under Anesthesia Anus, Colonoscopy, application of formalin;  Surgeon: Felicitas Radford MD;  [facility]     EXAM UNDER ANESTHESIA ANUS N/A 12/13/2017    Procedure: EXAM UNDER ANESTHESIA ANUS;  Examination Under Anesthesia Anus, Colonoscopy;  Surgeon: Felicitas Radford MD;  [facility]     EXAM UNDER ANESTHESIA ANUS N/A 5/11/2018    Procedure: EXAM UNDER ANESTHESIA ANUS;  Examination Under Anesthesia Anus, Interoperative Flexible Sigmoidoscopy, Application of Formalin;  Surgeon: Felicitas Radford MD;  [facility]     EXAM UNDER ANESTHESIA ANUS N/A 7/20/2018    Procedure: EXAM UNDER ANESTHESIA ANUS;  Examination Under Anesthesia Anus, Flexible Sigmoidoscopy ;  Surgeon: Felicitas Radford MD;  [facility]     EXAM UNDER ANESTHESIA ANUS N/A 11/30/2018    Procedure: Examination Under Anesthesia, application  of formalin to rectum, polypectomy;  Surgeon: Felicitas Radford MD;  [facility]     EXAM UNDER ANESTHESIA ANUS N/A 2/22/2019    Procedure: Examination Under Anesthesia;  Surgeon: Felicitas Radford MD;  [facility]     EXAM UNDER ANESTHESIA ANUS N/A 6/28/2019    Procedure: Examination Under Anesthesia;  Surgeon: Felicitas Radford MD;  [facility]     EXAM UNDER ANESTHESIA ANUS N/A 11/1/2019    Procedure: Examination Under Anesthesia;  Surgeon: Felicitas Radford MD;  [facility]     HC TOOTH EXTRACTION W/FORCEP       LAPAROSCOPIC APPENDECTOMY N/A 7/17/2017    Procedure: LAPAROSCOPIC APPENDECTOMY;  LAPAROSCOPIC APPENDECTOMY;  Surgeon: Bryson Ferguson MD;  [facility]     SIGMOIDOSCOPY FLEXIBLE N/A 12/8/2016    Procedure: SIGMOIDOSCOPY FLEXIBLE;  Surgeon: Felicitas Radford MD;  [facility]     SIGMOIDOSCOPY FLEXIBLE N/A 7/5/2017    Procedure: SIGMOIDOSCOPY FLEXIBLE;;  Surgeon: Felicitas Radford MD;  [facility]     SIGMOIDOSCOPY FLEXIBLE N/A 5/11/2018    Procedure: SIGMOIDOSCOPY FLEXIBLE;;  Surgeon: Felicitas Radford MD;  [facility]     SIGMOIDOSCOPY FLEXIBLE N/A 7/20/2018    Procedure: SIGMOIDOSCOPY FLEXIBLE;;  Surgeon: Felicitas Radford MD;  [facility]     SIGMOIDOSCOPY FLEXIBLE N/A 11/30/2018    Procedure: Flexible Sigmoidoscopy;  Surgeon: Felicitas Radford MD;  [facility]     SIGMOIDOSCOPY FLEXIBLE N/A 2/22/2019    Procedure: Intraoperative Flexible Sigmoidoscopy, Application of Formalin to rectum;  Surgeon: Felicitas Radford MD;  [facility]     SIGMOIDOSCOPY FLEXIBLE N/A 6/28/2019    Procedure: Flexible Sigmoidoscopy;  Surgeon: Felicitas Radford MD;  [facility]     SIGMOIDOSCOPY FLEXIBLE N/A 11/1/2019    Procedure: Flexible Sigmoidoscopy;  Surgeon: Felicitas Radford MD;  [facility]     TESTICLE SURGERY       VASCULAR SURGERY      Right chest port      VASECTOMY       VASECTOMY           SOCIAL HISTORY:  Social History     Socioeconomic History     Marital status:      Spouse name: Not on file     Number of children: Not on file     Years of education: Not on file     Highest education level: Not on file   Occupational History     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[employer]     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   Social Needs     Financial resource strain: Not on file     Food insecurity     Worry: Not on file     Inability: Not on file     Transportation needs     Medical: Not on file     Non-medical: Not on file   Tobacco Use     Smoking status: Never Smoker     Smokeless tobacco: Never Used   Substance and Sexual Activity     Alcohol use: Yes     Alcohol/week: 0.0 standard drinks     Comment: 1 drink a week     Drug use: No     Sexual activity: Yes     Partners: Female     Birth control/protection: Male Surgical   Lifestyle     Physical activity     Days per week: Not on file     Minutes per session: Not on file     Stress: Not on file   Relationships     Social connections     Talks on phone: Not on file     Gets together: Not on file     Attends Restorationist service: Not on file     Active member of club or organization: Not on file     Attends meetings of clubs or organizations: Not on file     Relationship status: Not on file     Intimate partner violence     Fear of current or ex partner: Not on file     Emotionally abused: Not on file     Physically abused: Not on file     Forced sexual activity: Not on file   Other Topics Concern     Parent/sibling w/ CABG, MI or angioplasty before 65F 55M? No   Social History Narrative    Dad  at age  78   from [place], COPD, & HTN    Mom alive in her 70's.     for 30 years    Two adult children. 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     He notes that he had a brother who passed away at a young age of 53 from a metastatic cancer, but unknown what type, and passed away  within 2 months of diagnosis.  He denies other family history of cancer in the immediate family.  Louie works as a  at a [employer] school.      FAMILY HISTORY:  Family History   Problem Relation Age of Onset     Cancer Brother         primary of unknown origin     Other Cancer Brother      Chronic Obstructive Pulmonary Disease Father      Hypertension Father      Hyperlipidemia Father      Colon Polyps Mother         Number and type of polyps unknown     Family History Negative Brother         negative colonoscopy     Diabetes Maternal Grandfather      Hypertension Paternal Grandmother      Hyperlipidemia Paternal Grandmother      Stomach Cancer Other 63        maternal great grandfather     Glaucoma No family hx of      Macular Degeneration No family hx of          PHYSICAL EXAM:  Vital signs:  There were no vitals taken for this visit.   ECO  GENERAL/CONSTITUTIONAL: No acute distress. Healthy, alert.  EYES: No scleral icterus.  No redness or discharge.    RESPIRATORY: No audible wheeze, cough, or visible cyanosis.  No visible retractions or increased work of breathing.  Able to speak fully in complete sentences.  MUSCULOSKELETAL: Normal range of motion.  NEUROLOGIC: Alert, oriented, answers questions appropriately. No tremor. Mentation intact and speech normal  INTEGUMENTARY: No jaundice.  No obvious rash or skin lesions.  PSYCHIATRIC:  Mentation appears normal, affect normal/bright, judgement and insight intact, normal speech and appearance well-groomed.    The rest of a comprehensive physical exam is deferred due to public health emergency video visit restrictions.        LABS:  CBC RESULTS:   Recent Labs   Lab Test 20  1530   WBC 5.8   RBC 5.44   HGB 16.2   HCT 46.2   MCV 85   MCH 29.8   MCHC 35.1   RDW 13.1        Recent Labs   Lab Test 20  1530 20  0932    139   POTASSIUM 3.7 3.9   CHLORIDE 104 109   CO2 27 24   ANIONGAP 4 6   * 95   BUN 16 17   CR  0.83 0.68   FRANDY 9.1 8.6     Lab Results   Component Value Date    AST 18 08/13/2020     Lab Results   Component Value Date    ALT 34 08/13/2020     No results found for: BILICONJ   Lab Results   Component Value Date    BILITOTAL 1.4 08/13/2020     Lab Results   Component Value Date    ALBUMIN 4.2 08/13/2020     Lab Results   Component Value Date    PROTTOTAL 7.8 08/13/2020      Lab Results   Component Value Date    ALKPHOS 75 08/13/2020           IMAGING:  PET 8/13/20:  1. No evidence of locally recurrent disease. No abnormal  hypermetabolism in the rectum.     2. No evidence of metastatic disease.     3. Focal FDG uptake to the anus, nonspecific, and similar to prior,  and is most commonly seen with inflammation, such as inflamed  hemorrhoids. Consider direct visualization.     4. Focal FDG uptake to the low central prostate, favor contamination  from excreted urine in the prostatic urethra, versus less likely focal  prostatic lesion. Consider correlation with serum PSA.     MRI pelvis 8/13/20:  Continued complete response to treatment, corresponding to a tumor  regression grade of mrTRG-1. No suspicious lymphadenopathy.      ASSESSMENT/PLAN:  Louie Greco is a 60 year old male with:    1) Rectal cancer: clinical stage [id number] (stage IIIA), s/p chemoradiation with Xeloda, and appears to have achieved complete response on imaging and biopsies.  He opted not to undergo surgery and expressed desire not to undergo APR, as he does not want a colostomy bag.  It was felt reasonable to go on the watch and wait protocol with the [facility].  He has completed 4 additional months (8 cycles) of chemotherapy with FOLFOX.  He will now go on surveillance, as per the watch and wait protocol.    We reviewed MRI pelvis and PET scan from 8/13/20.  He remains in complete response to treatment.  There is no evidence of recurrence or metastatic disease.  He recently had a flex sig in the office with Dr. Radford in late  April 2020 and there was no evidence of a mass or any sign of recurrence.    Follow up per Watch and Wait Protocol:   Completed CRT: 9/15/16    Flexible sigmoidoscopy   ? Every 2 months for 6 months: Completed  ? Every 3 months for 3 years: Completed  ? Every 6 months for 5 years: Until 9/2021-DUE 11/2020 - see below, plan colonoscopy,per Dr. Radford  ? Every year for 10 years: Until 9/2026       3T MRI of pelvis  ? 6-8 weeks after CRT:  ? Every 4 months for 2 years: Completed  ? Every 6 months for 2 years: Completed  ? Yearly for 2 years: Until 9/2022 - next due in August 2021 - ordered       CT CAP  ? Every year for 6-8 years: Until 9/2024- next PET due August 2021 - ordered       Colonoscopy   ? Last 12/13/17- Due 12/2020 - combine with flexible sigmoidoscopy listed above       CEA at every clinic or endoscopic visit      -he will get colonoscopy with Dr. Radford in November 2020  -T3 MRI pelvis and PET scan in 1 year, which would be in August 2021.  He might do it earlier next summer to fit with his school schedule so he has time off.    -endoscopic surveillance with Dr. Radford as per protocol  -RTC in 1 year with labs/CEA and results of imaging    2) Genetics: He underwent genetic testing, which was negative.    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            4) Elevated bilirubin: mild.  Bilirubin has been mildly elevated in the past. It is stable.  -monitor for now    5) Liver cysts: seen on CT, stable  -monitor    6) Pulmonary nodules: stable sub-4 mm pulmonary nodules  -monitor on future scans    7) Kidney cyst: stable  -monitor on future scans.      8) Appendix polyp: He is now s/p appendectomy.  Pathology found sessile serrated adenoma without dysplasia or malignancy.     9) Elevated PSA: MRI showed enlarged prostate gland with BPH changes.  -he is seeing urology - Dr. Segura  -PSA is stable, trended down.  -he has 4K score done, which is pending      Agueda Manley  MD  Hematology/Oncology  [facility]            Again, thank you for allowing me to participate in the care of your patient.        Sincerely,        Agueda Manley MD

## 2020-08-19 NOTE — PROGRESS NOTES
"Louie Greco is a 60 year old male who is being evaluated via a billable video visit.      The patient has been notified of following:     \"This video visit will be conducted via a call between you and your physician/provider. We have found that certain health care needs can be provided without the need for an in-person physical exam.  This service lets us provide the care you need with a video conversation.  If a prescription is necessary we can send it directly to your pharmacy.  If lab work is needed we can place an order for that and you can then stop by our lab to have the test done at a later time.    Video visits are billed at different rates depending on your insurance coverage.  Please reach out to your insurance provider with any questions.    If during the course of the call the physician/provider feels a video visit is not appropriate, you will not be charged for this service.\"    Patient has given verbal consent for Video visit? Yes  How would you like to obtain your AVS? MyChart  If you are dropped from the video visit, the video invite should be resent to: Text to cell phone: [phone]  Will anyone else be joining your video visit? No       Video-Visit Details    Type of service:  Video Visit    Video Start Time: 3:59 pm  Video End Time: 4:08 pm    Originating Location (pt. Location): Home    Distant Location (provider location):  [facility]     Platform used for Video Visit: Cecil Zambrano MA      [facility]    Hematology/Oncology Established Patient Note      Today's Date: 8/19/2020    Reason for Follow-up: rectal cancer      HISTORY OF PRESENT ILLNESS: Louie Greco is a 60 year old male who presents with rectal cancer.  He started having rectal bleeding around beginning of 2016.  He underwent colonoscopy on 7/27/16, which showed a malignant tumor in the rectum involving half of the lumen with oozing, as well as three 4-mm polyps in the ascending and " transverse colon.  Pathology showed moderately differentiated adenocarcinoma, ascending colon with sessile serrated adenoma, others were tubular adenomas.  Mismatch repair expression with normal protein expression.  Staging scans showed the rectal mass, indeterminate focus in the left liver thought to be cyst, and multiple bilateral renal cysts.  MRI showed a distal rectal mass measuring 2.7 x 2.4 cm extending to the most proximal region of the anal canal.  There are a few tiny subcentimeter perirectal fat lymph nodes.  He was staged clinically [id number] (stage IIIA).  He was seen by Dr. Lee at [facility], and started neoadjuvant chemoradiation with capecitabine on 8/8/16 and completed on 9/15/16.  He was then seen by Dr. Radford, colorectal surgery at [facility].  His post chemoradiation MRI showed improvement in the size of the tumor and response in the lymph nodes.  On 12/8/16, he underwent flexible sigmoidoscopy and there was no evidence of tumor.  There was only some anterior scarring in the lumen.  Multiple biopsies were taken, which showed no evidence of carcinoma.  At that point, Dr. Radford spoke with the patient's wife, that there appears to be a complete response in the lumen, and that the patient would wish to placed on the watch and wait protocol.  The patient had expressed wishes not to have an APR, and it would not have been possible to grossly clear a margin for LAR.    He had port placed on 1/16/17.  It has been removed.    He completed FOLFOX x 8 cycles 1/16/17-5/9/17.      He was admitted 7/16/17-7/20/17 with sepsis, abdominal wall cellulitis.  He underwent surgery with laparoscopic abdominal exploration and appendectomy.  Pathology found sessile serrated adenoma without dysplasia or malignancy.        INTERIM HISTORY: Louie says that he is doing well.  School will be starting soon, and his school will be doing a hybrid of online and in-person learning.  He is in  training now, and will start on 8/31/20.  He recently had a flex sig in the office with Dr. Radford and there was no evidence of a mass or any sign of recurrence.        REVIEW OF SYSTEMS:   14 point ROS was reviewed and is negative other than as noted above in HPI.       HOME MEDICATIONS:  Current Outpatient Medications   Medication Sig Dispense Refill     ASPIRIN PO Take by mouth as needed for moderate pain       dibucaine (NUPERCAINAL) 1 % OINT ointment 3 times daily as needed for moderate pain       diltiazem 2% in PLO cream, FV COMPOUNDED, 2% GEL Apply topically 2 times daily       ibuprofen 200 MG capsule Take 200-400 mg by mouth every 6 hours as needed 120 capsule 0     lidocaine (XYLOCAINE) 2 % topical gel Apply topically 2 times daily as needed for moderate pain 30 mL 3     lidocaine, Anorectal, 5 % CREA Externally apply 1 Application topically 3 times daily as needed 1 Tube 0     neomycin-polymyxin-hydrocortisone (CORTISPORIN) 3.5-33592-9 otic suspension Place 4 drops into both ears 4 times daily 10 mL 0     sildenafil (REVATIO) 20 MG tablet Take 1 tablet (20 mg) by mouth daily as needed (Patient not taking: Reported on 4/29/2020) 30 tablet 11     VITAMIN D, CHOLECALCIFEROL, PO Take 2,000 Units by mouth daily            ALLERGIES:  Allergies   Allergen Reactions     Ampicillin Diarrhea     Demerol [Meperidine]      Nausea          PAST MEDICAL HISTORY:  Past Medical History:   Diagnosis Date     Childhood asthma      Epididymitis, bilateral     18 years old     Inguinal hernia      Mumps      Nephrolithiasis     1990     Rectal cancer (H)     low rectal cancer     Shingles          PAST SURGICAL HISTORY:  Past Surgical History:   Procedure Laterality Date     COLONOSCOPY N/A 7/27/2016    Procedure: COMBINED COLONOSCOPY, SINGLE OR MULTIPLE BIOPSY/POLYPECTOMY BY BIOPSY;  Surgeon: Chelsea Thompson MD;  Location:  GI     COLONOSCOPY N/A 9/13/2017    Procedure: COLONOSCOPY;;  Surgeon: Malina  Felicitas HIDALGO MD;  [facility]     COLONOSCOPY N/A 12/13/2017    Procedure: COLONOSCOPY;;  Surgeon: Felicitas Radford MD;  [facility]     COMBINED CYSTOSCOPY, RETROGRADES, URETEROSCOPY, LASER HOLMIUM LITHOTRIPSY URETER(S), INSERT STENT Left 2/16/2018    Procedure: COMBINED CYSTOSCOPY, RETROGRADES, URETEROSCOPY, LASER HOLMIUM LITHOTRIPSY URETER(S), INSERT STENT;  Cysto, left ureteroscopy, holmium laser, retrogrades, stent placement;  Surgeon: Bola Worthy MD;  Location: [facility] OR     EXAM UNDER ANESTHESIA ANUS N/A 7/5/2017    Procedure: EXAM UNDER ANESTHESIA ANUS;  Examination Under Anesthesia, flex sigmoidoscopy with biopsies and formalin application;  Surgeon: Felicitas Radford MD;  [facility]     EXAM UNDER ANESTHESIA ANUS N/A 9/13/2017    Procedure: EXAM UNDER ANESTHESIA ANUS;  Examination Under Anesthesia Anus, Colonoscopy, application of formalin;  Surgeon: Felicitas Radford MD;  [facility]     EXAM UNDER ANESTHESIA ANUS N/A 12/13/2017    Procedure: EXAM UNDER ANESTHESIA ANUS;  Examination Under Anesthesia Anus, Colonoscopy;  Surgeon: Felicitas Radford MD;  [facility]     EXAM UNDER ANESTHESIA ANUS N/A 5/11/2018    Procedure: EXAM UNDER ANESTHESIA ANUS;  Examination Under Anesthesia Anus, Interoperative Flexible Sigmoidoscopy, Application of Formalin;  Surgeon: Felicitas Radford MD;  [facility]     EXAM UNDER ANESTHESIA ANUS N/A 7/20/2018    Procedure: EXAM UNDER ANESTHESIA ANUS;  Examination Under Anesthesia Anus, Flexible Sigmoidoscopy ;  Surgeon: Felicitas Radford MD;  [facility]     EXAM UNDER ANESTHESIA ANUS N/A 11/30/2018    Procedure: Examination Under Anesthesia, application of formalin to rectum, polypectomy;  Surgeon: Felicitas Radford MD;  [facility]     EXAM UNDER ANESTHESIA ANUS N/A 2/22/2019    Procedure: Examination Under Anesthesia;  Surgeon: Felicitas Radford MD;  [facility]     EXAM UNDER ANESTHESIA ANUS N/A 6/28/2019    Procedure: Examination Under Anesthesia;  Surgeon: Felicitas Radford MD;  [facility]     EXAM UNDER ANESTHESIA ANUS N/A 11/1/2019    Procedure: Examination Under Anesthesia;  Surgeon: Felicitas Radford MD;  [facility]     HC TOOTH EXTRACTION W/FORCEP       LAPAROSCOPIC APPENDECTOMY N/A 7/17/2017    Procedure: LAPAROSCOPIC APPENDECTOMY;  LAPAROSCOPIC APPENDECTOMY;  Surgeon: Bryson Ferguson MD;  [facility]     SIGMOIDOSCOPY FLEXIBLE N/A 12/8/2016    Procedure: SIGMOIDOSCOPY FLEXIBLE;  Surgeon: Felicitas Radford MD;  [facility]     SIGMOIDOSCOPY FLEXIBLE N/A 7/5/2017    Procedure: SIGMOIDOSCOPY FLEXIBLE;;  Surgeon: Felicitas Radford MD;  [facility]     SIGMOIDOSCOPY FLEXIBLE N/A 5/11/2018    Procedure: SIGMOIDOSCOPY FLEXIBLE;;  Surgeon: Felicitas Radford MD;  [facility]     SIGMOIDOSCOPY FLEXIBLE N/A 7/20/2018    Procedure: SIGMOIDOSCOPY FLEXIBLE;;  Surgeon: Felicitas Radford MD;  [facility]     SIGMOIDOSCOPY FLEXIBLE N/A 11/30/2018    Procedure: Flexible Sigmoidoscopy;  Surgeon: Felicitas Radford MD;  [facility]     SIGMOIDOSCOPY FLEXIBLE N/A 2/22/2019    Procedure: Intraoperative Flexible Sigmoidoscopy, Application of Formalin to rectum;  Surgeon: Felicitas Radford MD;  [facility]     SIGMOIDOSCOPY FLEXIBLE N/A 6/28/2019    Procedure: Flexible Sigmoidoscopy;  Surgeon: Felicitas Radford MD;  [facility]     SIGMOIDOSCOPY FLEXIBLE N/A 11/1/2019    Procedure: Flexible Sigmoidoscopy;  Surgeon: Felicitas Radford MD;  [facility]     TESTICLE SURGERY       VASCULAR SURGERY      Right chest port     VASECTOMY       VASECTOMY           SOCIAL HISTORY:  Social History     Socioeconomic History     Marital status:      Spouse name: Not on file     Number of children: Not on file     Years of education: Not on file     Highest  education level: Not on file   Occupational History     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   Social Needs     Financial resource strain: Not on file     Food insecurity     Worry: Not on file     Inability: Not on file     Transportation needs     Medical: Not on file     Non-medical: Not on file   Tobacco Use     Smoking status: Never Smoker     Smokeless tobacco: Never Used   Substance and Sexual Activity     Alcohol use: Yes     Alcohol/week: 0.0 standard drinks     Comment: 1 drink a week     Drug use: No     Sexual activity: Yes     Partners: Female     Birth control/protection: Male Surgical   Lifestyle     Physical activity     Days per week: Not on file     Minutes per session: Not on file     Stress: Not on file   Relationships     Social connections     Talks on phone: Not on file     Gets together: Not on file     Attends Quaker service: Not on file     Active member of club or organization: Not on file     Attends meetings of clubs or organizations: Not on file     Relationship status: Not on file     Intimate partner violence     Fear of current or ex partner: Not on file     Emotionally abused: Not on file     Physically abused: Not on file     Forced sexual activity: Not on file   Other Topics Concern     Parent/sibling w/ CABG, MI or angioplasty before 65F 55M? No   Social History Narrative    Dad  at age  78   from [place], COPD, & HTN    Mom alive in her 70's.     for 30 years    Two adult children. 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     He notes that he had a brother who passed away at a young age of 53 from a metastatic cancer, but unknown what type, and passed away within 2 months of diagnosis.  He denies other family history of cancer in the immediate family.  Louie works as a  at a [employer] school.      FAMILY HISTORY:  Family History   Problem Relation Age of Onset     Cancer  Brother         primary of unknown origin     Other Cancer Brother      Chronic Obstructive Pulmonary Disease Father      Hypertension Father      Hyperlipidemia Father      Colon Polyps Mother         Number and type of polyps unknown     Family History Negative Brother         negative colonoscopy     Diabetes Maternal Grandfather      Hypertension Paternal Grandmother      Hyperlipidemia Paternal Grandmother      Stomach Cancer Other 63        maternal great grandfather     Glaucoma No family hx of      Macular Degeneration No family hx of          PHYSICAL EXAM:  Vital signs:  There were no vitals taken for this visit.   ECO  GENERAL/CONSTITUTIONAL: No acute distress. Healthy, alert.  EYES: No scleral icterus.  No redness or discharge.    RESPIRATORY: No audible wheeze, cough, or visible cyanosis.  No visible retractions or increased work of breathing.  Able to speak fully in complete sentences.  MUSCULOSKELETAL: Normal range of motion.  NEUROLOGIC: Alert, oriented, answers questions appropriately. No tremor. Mentation intact and speech normal  INTEGUMENTARY: No jaundice.  No obvious rash or skin lesions.  PSYCHIATRIC:  Mentation appears normal, affect normal/bright, judgement and insight intact, normal speech and appearance well-groomed.    The rest of a comprehensive physical exam is deferred due to public health emergency video visit restrictions.        LABS:  CBC RESULTS:   Recent Labs   Lab Test 20  1530   WBC 5.8   RBC 5.44   HGB 16.2   HCT 46.2   MCV 85   MCH 29.8   MCHC 35.1   RDW 13.1        Recent Labs   Lab Test 20  1530 20  0932    139   POTASSIUM 3.7 3.9   CHLORIDE 104 109   CO2 27 24   ANIONGAP 4 6   * 95   BUN 16 17   CR 0.83 0.68   FRANDY 9.1 8.6     Lab Results   Component Value Date    AST 18 2020     Lab Results   Component Value Date    ALT 34 2020     No results found for: BILICONJ   Lab Results   Component Value Date    BILITOTAL 1.4  08/13/2020     Lab Results   Component Value Date    ALBUMIN 4.2 08/13/2020     Lab Results   Component Value Date    PROTTOTAL 7.8 08/13/2020      Lab Results   Component Value Date    ALKPHOS 75 08/13/2020           IMAGING:  PET 8/13/20:  1. No evidence of locally recurrent disease. No abnormal  hypermetabolism in the rectum.     2. No evidence of metastatic disease.     3. Focal FDG uptake to the anus, nonspecific, and similar to prior,  and is most commonly seen with inflammation, such as inflamed  hemorrhoids. Consider direct visualization.     4. Focal FDG uptake to the low central prostate, favor contamination  from excreted urine in the prostatic urethra, versus less likely focal  prostatic lesion. Consider correlation with serum PSA.     MRI pelvis 8/13/20:  Continued complete response to treatment, corresponding to a tumor  regression grade of mrTRG-1. No suspicious lymphadenopathy.      ASSESSMENT/PLAN:  Louie Greco is a 60 year old male with:    1) Rectal cancer: clinical stage [id number] (stage IIIA), s/p chemoradiation with Xeloda, and appears to have achieved complete response on imaging and biopsies.  He opted not to undergo surgery and expressed desire not to undergo APR, as he does not want a colostomy bag.  It was felt reasonable to go on the watch and wait protocol with the [facility].  He has completed 4 additional months (8 cycles) of chemotherapy with FOLFOX.  He will now go on surveillance, as per the watch and wait protocol.    We reviewed MRI pelvis and PET scan from 8/13/20.  He remains in complete response to treatment.  There is no evidence of recurrence or metastatic disease.  He recently had a flex sig in the office with Dr. Radford in late April 2020 and there was no evidence of a mass or any sign of recurrence.    Follow up per Watch and Wait Protocol:   Completed CRT: 9/15/16    Flexible sigmoidoscopy   ? Every 2 months for 6 months: Completed  ? Every 3 months for 3  years: Completed  ? Every 6 months for 5 years: Until 9/2021-DUE 11/2020 - see below, plan colonoscopy,per Dr. Radford  ? Every year for 10 years: Until 9/2026       3T MRI of pelvis  ? 6-8 weeks after CRT:  ? Every 4 months for 2 years: Completed  ? Every 6 months for 2 years: Completed  ? Yearly for 2 years: Until 9/2022 - next due in August 2021 - ordered       CT CAP  ? Every year for 6-8 years: Until 9/2024- next PET due August 2021 - ordered       Colonoscopy   ? Last 12/13/17- Due 12/2020 - combine with flexible sigmoidoscopy listed above       CEA at every clinic or endoscopic visit      -he will get colonoscopy with Dr. Radford in November 2020  -T3 MRI pelvis and PET scan in 1 year, which would be in August 2021.  He might do it earlier next summer to fit with his school schedule so he has time off.    -endoscopic surveillance with Dr. Radford as per protocol  -RTC in 1 year with labs/CEA and results of imaging    2) Genetics: He underwent genetic testing, which was negative.    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            4) Elevated bilirubin: mild.  Bilirubin has been mildly elevated in the past. It is stable.  -monitor for now    5) Liver cysts: seen on CT, stable  -monitor    6) Pulmonary nodules: stable sub-4 mm pulmonary nodules  -monitor on future scans    7) Kidney cyst: stable  -monitor on future scans.      8) Appendix polyp: He is now s/p appendectomy.  Pathology found sessile serrated adenoma without dysplasia or malignancy.     9) Elevated PSA: MRI showed enlarged prostate gland with BPH changes.  -he is seeing urology - Dr. Segura  -PSA is stable, trended down.  -he has 4K score done, which is pending      Agueda Manley MD  Hematology/Oncology  [facility]

## 2020-08-19 NOTE — LETTER
"    8/19/2020         RE: Louie Greco  [address] [facility] [address]        Dear Colleague,    Thank you for referring your patient, Louie Greco, to the [facility]. Please see a copy of my visit note below.    Louie Greco is a 60 year old male who is being evaluated via a billable video visit.      The patient has been notified of following:     \"This video visit will be conducted via a call between you and your physician/provider. We have found that certain health care needs can be provided without the need for an in-person physical exam.  This service lets us provide the care you need with a video conversation.  If a prescription is necessary we can send it directly to your pharmacy.  If lab work is needed we can place an order for that and you can then stop by our lab to have the test done at a later time.    Video visits are billed at different rates depending on your insurance coverage.  Please reach out to your insurance provider with any questions.    If during the course of the call the physician/provider feels a video visit is not appropriate, you will not be charged for this service.\"    Patient has given verbal consent for Video visit? Yes  How would you like to obtain your AVS? MyChart  If you are dropped from the video visit, the video invite should be resent to: Text to cell phone: [phone]  Will anyone else be joining your video visit? No       Video-Visit Details    Type of service:  Video Visit    Video Start Time: 3:59 pm  Video End Time: 4:08 pm    Originating Location (pt. Location): Home    Distant Location (provider location):  [facility]     Platform used for Video Visit: Cecil Zambrano MA      [facility]    Hematology/Oncology Established Patient Note      Today's Date: 8/19/2020    Reason for Follow-up: rectal cancer      HISTORY OF PRESENT ILLNESS: Louie Greco is a 60 year old male who presents with rectal cancer.  He " started having rectal bleeding around beginning of 2016.  He underwent colonoscopy on 7/27/16, which showed a malignant tumor in the rectum involving half of the lumen with oozing, as well as three 4-mm polyps in the ascending and transverse colon.  Pathology showed moderately differentiated adenocarcinoma, ascending colon with sessile serrated adenoma, others were tubular adenomas.  Mismatch repair expression with normal protein expression.  Staging scans showed the rectal mass, indeterminate focus in the left liver thought to be cyst, and multiple bilateral renal cysts.  MRI showed a distal rectal mass measuring 2.7 x 2.4 cm extending to the most proximal region of the anal canal.  There are a few tiny subcentimeter perirectal fat lymph nodes.  He was staged clinically [id number] (stage IIIA).  He was seen by Dr. Lee at [facility], and started neoadjuvant chemoradiation with capecitabine on 8/8/16 and completed on 9/15/16.  He was then seen by Dr. Radford, colorectal surgery at [facility].  His post chemoradiation MRI showed improvement in the size of the tumor and response in the lymph nodes.  On 12/8/16, he underwent flexible sigmoidoscopy and there was no evidence of tumor.  There was only some anterior scarring in the lumen.  Multiple biopsies were taken, which showed no evidence of carcinoma.  At that point, Dr. Radford spoke with the patient's wife, that there appears to be a complete response in the lumen, and that the patient would wish to placed on the watch and wait protocol.  The patient had expressed wishes not to have an APR, and it would not have been possible to grossly clear a margin for LAR.    He had port placed on 1/16/17.  It has been removed.    He completed FOLFOX x 8 cycles 1/16/17-5/9/17.      He was admitted 7/16/17-7/20/17 with sepsis, abdominal wall cellulitis.  He underwent surgery with laparoscopic abdominal exploration and appendectomy.  Pathology  found sessile serrated adenoma without dysplasia or malignancy.        INTERIM HISTORY: Louie says that he is doing well.  School will be starting soon, and his school will be doing a hybrid of online and in-person learning.  He is in training now, and will start on 8/31/20.  He recently had a flex sig in the office with Dr. Radford and there was no evidence of a mass or any sign of recurrence.        REVIEW OF SYSTEMS:   14 point ROS was reviewed and is negative other than as noted above in HPI.       HOME MEDICATIONS:  Current Outpatient Medications   Medication Sig Dispense Refill     ASPIRIN PO Take by mouth as needed for moderate pain       dibucaine (NUPERCAINAL) 1 % OINT ointment 3 times daily as needed for moderate pain       diltiazem 2% in PLO cream, FV COMPOUNDED, 2% GEL Apply topically 2 times daily       ibuprofen 200 MG capsule Take 200-400 mg by mouth every 6 hours as needed 120 capsule 0     lidocaine (XYLOCAINE) 2 % topical gel Apply topically 2 times daily as needed for moderate pain 30 mL 3     lidocaine, Anorectal, 5 % CREA Externally apply 1 Application topically 3 times daily as needed 1 Tube 0     neomycin-polymyxin-hydrocortisone (CORTISPORIN) 3.5-66574-5 otic suspension Place 4 drops into both ears 4 times daily 10 mL 0     sildenafil (REVATIO) 20 MG tablet Take 1 tablet (20 mg) by mouth daily as needed (Patient not taking: Reported on 4/29/2020) 30 tablet 11     VITAMIN D, CHOLECALCIFEROL, PO Take 2,000 Units by mouth daily            ALLERGIES:  Allergies   Allergen Reactions     Ampicillin Diarrhea     Demerol [Meperidine]      Nausea          PAST MEDICAL HISTORY:  Past Medical History:   Diagnosis Date     Childhood asthma      Epididymitis, bilateral     18 years old     Inguinal hernia      Mumps      Nephrolithiasis     1990     Rectal cancer (H)     low rectal cancer     Shingles          PAST SURGICAL HISTORY:  Past Surgical History:   Procedure Laterality Date     COLONOSCOPY  N/A 7/27/2016    Procedure: COMBINED COLONOSCOPY, SINGLE OR MULTIPLE BIOPSY/POLYPECTOMY BY BIOPSY;  Surgeon: Chelsea Thompson MD;  Location: [facility]     COLONOSCOPY N/A 9/13/2017    Procedure: COLONOSCOPY;;  Surgeon: Felicitas Radford MD;  [facility]     COLONOSCOPY N/A 12/13/2017    Procedure: COLONOSCOPY;;  Surgeon: Felicitas Radford MD;  [facility]     COMBINED CYSTOSCOPY, RETROGRADES, URETEROSCOPY, LASER HOLMIUM LITHOTRIPSY URETER(S), INSERT STENT Left 2/16/2018    Procedure: COMBINED CYSTOSCOPY, RETROGRADES, URETEROSCOPY, LASER HOLMIUM LITHOTRIPSY URETER(S), INSERT STENT;  Cysto, left ureteroscopy, holmium laser, retrogrades, stent placement;  Surgeon: Bola Worthy MD;  Location: [facility]     EXAM UNDER ANESTHESIA ANUS N/A 7/5/2017    Procedure: EXAM UNDER ANESTHESIA ANUS;  Examination Under Anesthesia, flex sigmoidoscopy with biopsies and formalin application;  Surgeon: Felicitas Radford MD;  [facility]     EXAM UNDER ANESTHESIA ANUS N/A 9/13/2017    Procedure: EXAM UNDER ANESTHESIA ANUS;  Examination Under Anesthesia Anus, Colonoscopy, application of formalin;  Surgeon: Felicitas Radford MD;  [facility]     EXAM UNDER ANESTHESIA ANUS N/A 12/13/2017    Procedure: EXAM UNDER ANESTHESIA ANUS;  Examination Under Anesthesia Anus, Colonoscopy;  Surgeon: Felicitas Radford MD;  [facility]     EXAM UNDER ANESTHESIA ANUS N/A 5/11/2018    Procedure: EXAM UNDER ANESTHESIA ANUS;  Examination Under Anesthesia Anus, Interoperative Flexible Sigmoidoscopy, Application of Formalin;  Surgeon: Felicitas Radford MD;  [facility]     EXAM UNDER ANESTHESIA ANUS N/A 7/20/2018    Procedure: EXAM UNDER ANESTHESIA ANUS;  Examination Under Anesthesia Anus, Flexible Sigmoidoscopy ;  Surgeon: Felicitas Radford MD;  [facility]     EXAM UNDER ANESTHESIA ANUS N/A 11/30/2018    Procedure: Examination Under Anesthesia, application  of formalin to rectum, polypectomy;  Surgeon: Felicitas Radford MD;  [facility]     EXAM UNDER ANESTHESIA ANUS N/A 2/22/2019    Procedure: Examination Under Anesthesia;  Surgeon: Felicitas Radford MD;  [facility]     EXAM UNDER ANESTHESIA ANUS N/A 6/28/2019    Procedure: Examination Under Anesthesia;  Surgeon: Felicitas Radford MD;  [facility]     EXAM UNDER ANESTHESIA ANUS N/A 11/1/2019    Procedure: Examination Under Anesthesia;  Surgeon: Felicitas Radford MD;  [facility]     HC TOOTH EXTRACTION W/FORCEP       LAPAROSCOPIC APPENDECTOMY N/A 7/17/2017    Procedure: LAPAROSCOPIC APPENDECTOMY;  LAPAROSCOPIC APPENDECTOMY;  Surgeon: Bryson Ferguson MD;  [facility]     SIGMOIDOSCOPY FLEXIBLE N/A 12/8/2016    Procedure: SIGMOIDOSCOPY FLEXIBLE;  Surgeon: Felicitas Radford MD;  [facility]     SIGMOIDOSCOPY FLEXIBLE N/A 7/5/2017    Procedure: SIGMOIDOSCOPY FLEXIBLE;;  Surgeon: Felicitas Radford MD;  [facility]     SIGMOIDOSCOPY FLEXIBLE N/A 5/11/2018    Procedure: SIGMOIDOSCOPY FLEXIBLE;;  Surgeon: Felicitas Radford MD;  [facility]     SIGMOIDOSCOPY FLEXIBLE N/A 7/20/2018    Procedure: SIGMOIDOSCOPY FLEXIBLE;;  Surgeon: Felicitas Radford MD;  [facility]     SIGMOIDOSCOPY FLEXIBLE N/A 11/30/2018    Procedure: Flexible Sigmoidoscopy;  Surgeon: Felicitas Radford MD;  [facility]     SIGMOIDOSCOPY FLEXIBLE N/A 2/22/2019    Procedure: Intraoperative Flexible Sigmoidoscopy, Application of Formalin to rectum;  Surgeon: Felicitas Radford MD;  [facility]     SIGMOIDOSCOPY FLEXIBLE N/A 6/28/2019    Procedure: Flexible Sigmoidoscopy;  Surgeon: Felicitas Radford MD;  [facility]     SIGMOIDOSCOPY FLEXIBLE N/A 11/1/2019    Procedure: Flexible Sigmoidoscopy;  Surgeon: Felicitas Radford MD;  [facility]     TESTICLE SURGERY       VASCULAR SURGERY      Right chest port      VASECTOMY       VASECTOMY           SOCIAL HISTORY:  Social History     Socioeconomic History     Marital status:      Spouse name: Not on file     Number of children: Not on file     Years of education: Not on file     Highest education level: Not on file   Occupational History     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 [place]     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   Social Needs     Financial resource strain: Not on file     Food insecurity     Worry: Not on file     Inability: Not on file     Transportation needs     Medical: Not on file     Non-medical: Not on file   Tobacco Use     Smoking status: Never Smoker     Smokeless tobacco: Never Used   Substance and Sexual Activity     Alcohol use: Yes     Alcohol/week: 0.0 standard drinks     Comment: 1 drink a week     Drug use: No     Sexual activity: Yes     Partners: Female     Birth control/protection: Male Surgical   Lifestyle     Physical activity     Days per week: Not on file     Minutes per session: Not on file     Stress: Not on file   Relationships     Social connections     Talks on phone: Not on file     Gets together: Not on file     Attends Quaker service: Not on file     Active member of club or organization: Not on file     Attends meetings of clubs or organizations: Not on file     Relationship status: Not on file     Intimate partner violence     Fear of current or ex partner: Not on file     Emotionally abused: Not on file     Physically abused: Not on file     Forced sexual activity: Not on file   Other Topics Concern     Parent/sibling w/ CABG, MI or angioplasty before 65F 55M? No   Social History Narrative    Dad  at age  78   from [place], COPD, & HTN    Mom alive in her 70's.     for 30 years    Two adult children. 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     He notes that he had a brother who passed away at a young age of 53 from a metastatic cancer, but unknown what type, and passed away  within 2 months of diagnosis.  He denies other family history of cancer in the immediate family.  Louie works as a  at a [employer] school.      FAMILY HISTORY:  Family History   Problem Relation Age of Onset     Cancer Brother         primary of unknown origin     Other Cancer Brother      Chronic Obstructive Pulmonary Disease Father      Hypertension Father      Hyperlipidemia Father      Colon Polyps Mother         Number and type of polyps unknown     Family History Negative Brother         negative colonoscopy     Diabetes Maternal Grandfather      Hypertension Paternal Grandmother      Hyperlipidemia Paternal Grandmother      Stomach Cancer Other 63        maternal great grandfather     Glaucoma No family hx of      Macular Degeneration No family hx of          PHYSICAL EXAM:  Vital signs:  There were no vitals taken for this visit.   ECO  GENERAL/CONSTITUTIONAL: No acute distress. Healthy, alert.  EYES: No scleral icterus.  No redness or discharge.    RESPIRATORY: No audible wheeze, cough, or visible cyanosis.  No visible retractions or increased work of breathing.  Able to speak fully in complete sentences.  MUSCULOSKELETAL: Normal range of motion.  NEUROLOGIC: Alert, oriented, answers questions appropriately. No tremor. Mentation intact and speech normal  INTEGUMENTARY: No jaundice.  No obvious rash or skin lesions.  PSYCHIATRIC:  Mentation appears normal, affect normal/bright, judgement and insight intact, normal speech and appearance well-groomed.    The rest of a comprehensive physical exam is deferred due to public health emergency video visit restrictions.        LABS:  CBC RESULTS:   Recent Labs   Lab Test 20  1530   WBC 5.8   RBC 5.44   HGB 16.2   HCT 46.2   MCV 85   MCH 29.8   MCHC 35.1   RDW 13.1        Recent Labs   Lab Test 20  1530 20  0932    139   POTASSIUM 3.7 3.9   CHLORIDE 104 109   CO2 27 24   ANIONGAP 4 6   * 95   BUN 16 17   CR  0.83 0.68   FRANDY 9.1 8.6     Lab Results   Component Value Date    AST 18 08/13/2020     Lab Results   Component Value Date    ALT 34 08/13/2020     No results found for: BILICONJ   Lab Results   Component Value Date    BILITOTAL 1.4 08/13/2020     Lab Results   Component Value Date    ALBUMIN 4.2 08/13/2020     Lab Results   Component Value Date    PROTTOTAL 7.8 08/13/2020      Lab Results   Component Value Date    ALKPHOS 75 08/13/2020           IMAGING:  PET 8/13/20:  1. No evidence of locally recurrent disease. No abnormal  hypermetabolism in the rectum.     2. No evidence of metastatic disease.     3. Focal FDG uptake to the anus, nonspecific, and similar to prior,  and is most commonly seen with inflammation, such as inflamed  hemorrhoids. Consider direct visualization.     4. Focal FDG uptake to the low central prostate, favor contamination  from excreted urine in the prostatic urethra, versus less likely focal  prostatic lesion. Consider correlation with serum PSA.     MRI pelvis 8/13/20:  Continued complete response to treatment, corresponding to a tumor  regression grade of mrTRG-1. No suspicious lymphadenopathy.      ASSESSMENT/PLAN:  Louie Greco is a 60 year old male with:    1) Rectal cancer: clinical stage [id number] (stage IIIA), s/p chemoradiation with Xeloda, and appears to have achieved complete response on imaging and biopsies.  He opted not to undergo surgery and expressed desire not to undergo APR, as he does not want a colostomy bag.  It was felt reasonable to go on the watch and wait protocol with the [facility].  He has completed 4 additional months (8 cycles) of chemotherapy with FOLFOX.  He will now go on surveillance, as per the watch and wait protocol.    We reviewed MRI pelvis and PET scan from 8/13/20.  He remains in complete response to treatment.  There is no evidence of recurrence or metastatic disease.  He recently had a flex sig in the office with Dr. Radford in late  April 2020 and there was no evidence of a mass or any sign of recurrence.    Follow up per Watch and Wait Protocol:   Completed CRT: 9/15/16    Flexible sigmoidoscopy   ? Every 2 months for 6 months: Completed  ? Every 3 months for 3 years: Completed  ? Every 6 months for 5 years: Until 9/2021-DUE 11/2020 - see below, plan colonoscopy,per Dr. Radford  ? Every year for 10 years: Until 9/2026       3T MRI of pelvis  ? 6-8 weeks after CRT:  ? Every 4 months for 2 years: Completed  ? Every 6 months for 2 years: Completed  ? Yearly for 2 years: Until 9/2022 - next due in August 2021 - ordered       CT CAP  ? Every year for 6-8 years: Until 9/2024- next PET due August 2021 - ordered       Colonoscopy   ? Last 12/13/17- Due 12/2020 - combine with flexible sigmoidoscopy listed above       CEA at every clinic or endoscopic visit      -he will get colonoscopy with Dr. Radford in November 2020  -T3 MRI pelvis and PET scan in 1 year, which would be in August 2021.  He might do it earlier next summer to fit with his school schedule so he has time off.    -endoscopic surveillance with Dr. Radford as per protocol  -RTC in 1 year with labs/CEA and results of imaging    2) Genetics: He underwent genetic testing, which was negative.    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            4) Elevated bilirubin: mild.  Bilirubin has been mildly elevated in the past. It is stable.  -monitor for now    5) Liver cysts: seen on CT, stable  -monitor    6) Pulmonary nodules: stable sub-4 mm pulmonary nodules  -monitor on future scans    7) Kidney cyst: stable  -monitor on future scans.      8) Appendix polyp: He is now s/p appendectomy.  Pathology found sessile serrated adenoma without dysplasia or malignancy.     9) Elevated PSA: MRI showed enlarged prostate gland with BPH changes.  -he is seeing urology - Dr. Segura  -PSA is stable, trended down.  -he has 4K score done, which is pending      Agueda Manley  MD  Hematology/Oncology  [facility]            Again, thank you for allowing me to participate in the care of your patient.        Sincerely,        Agueda Manley MD

## 2020-08-20 ENCOUNTER — TELEPHONE (OUTPATIENT)
Dept: ONCOLOGY | Facility: CLINIC | Age: 60
End: 2020-08-20

## 2020-08-31 ENCOUNTER — TELEPHONE (OUTPATIENT)
Dept: GASTROENTEROLOGY | Facility: CLINIC | Age: 60
End: 2020-08-31

## 2020-08-31 DIAGNOSIS — C20 RECTAL CANCER (H): Primary | ICD-10-CM

## 2020-09-22 DIAGNOSIS — C20 RECTAL CANCER (H): Primary | ICD-10-CM

## 2020-09-22 NOTE — TELEPHONE ENCOUNTER
Pt says he only wants Dr. Radford to do [place] [facility]. He does not want sedation for his colonoscopy. He also said he thought he was due in October but his [employer] plan says Decemeber. Will confirm with Dr. Radford.

## 2020-09-22 NOTE — TELEPHONE ENCOUNTER
Discussed with Dr. Radford. Dr. Radford okay with doing colonoscopy herself. She stated that we can try to do the scope without sedation but he may need sedation. I told pt this. Told him he will here from [facility] to schedule his scope. He states he uses [facility] for his colonoscopies. Will make sure this is okay with Dr. Radford.

## 2020-09-22 NOTE — TELEPHONE ENCOUNTER
[facility] Call Center    Phone Message    May a detailed message be left on voicemail: yes     Reason for Call: Other: Pt is requesting a call back please to discuss with a nurse. Writer did connect Pt to endoscopy scheduling however Pt states he does not want to be seen in [place] and would like this done at the [facility]. Please advise. Thank you!     Action Taken: Message routed to:  Clinics [facility] Surgery Center ([facility]): Colon and Rectal     Travel Screening: Not Applicable

## 2020-09-23 DIAGNOSIS — Z12.11 ENCOUNTER FOR SCREENING COLONOSCOPY: Primary | ICD-10-CM

## 2020-09-23 RX ORDER — SODIUM, POTASSIUM,MAG SULFATES 17.5-3.13G
1 SOLUTION, RECONSTITUTED, ORAL ORAL 2 TIMES DAILY
Qty: 2 BOTTLE | Refills: 0 | Status: SHIPPED | OUTPATIENT
Start: 2020-09-23 | End: 2020-10-23

## 2020-10-02 ENCOUNTER — TELEPHONE (OUTPATIENT)
Dept: SURGERY | Facility: CLINIC | Age: 60
End: 2020-10-02

## 2020-10-03 DIAGNOSIS — Z11.59 ENCOUNTER FOR SCREENING FOR OTHER VIRAL DISEASES: Primary | ICD-10-CM

## 2020-10-13 ENCOUNTER — TELEPHONE (OUTPATIENT)
Dept: FAMILY MEDICINE | Facility: CLINIC | Age: 60
End: 2020-10-13

## 2020-10-13 NOTE — TELEPHONE ENCOUNTER
Reason for Call:  Same Day Appointment, Requested Provider:  Dr. Healy    PCP: Philippe Healy    Reason for visit: Pre-Op before 10/23    Duration of symptoms: N/A    Have you been treated for this in the past? N/A    Additional comments: pre-op, colonoscopy under general anesthesia, dos 10/23/2020, clinics and surgery center at the [facility], surgeon dr. Radford. Patient willing to see other providers ONLY if Dr. Healy has no work-in ability    Can we leave a detailed message on this number? YES    Phone number patient can be reached at: Cell number on file:    Telephone Information:   Mobile [phone]       Best Time: ANY    Call taken on 10/13/2020 at 11:04 AM by Shannon Ardon

## 2020-10-13 NOTE — TELEPHONE ENCOUNTER
"To PCP:     Are you willing to work Pt in for Pre-Op prior to \"colonoscopy under general anesthesia\" scheduled for 10/23?     Please advise when to fit in (your schedule appears booked solid/overbooked) or if Team provider preferred     Thank you,   Karina AVILA RN    "

## 2020-10-15 NOTE — TELEPHONE ENCOUNTER
Patient is scheduled for surgery with Dr. Radford    Spoke and Left a Message with: Louie    Date of Surgery: 10/23/2020    Location: [facility]    Informed patient they will need an adult  Yes    H&P: Scheduled with Gloria Hodges NP, at [facility] on 10/16/2020 at 11:00 am    COVID-19 Test scheduled on Tues 10/20/2020 at 8:45 am at [facility]    Surgery packet: sent via [employer] M[employer]     Additional comments:   *COVID-19 test added by me today since I do not see a COVID test scheduled for patient. I think he was going to try to get it at his clinic, but his clinic is not doing pre-procedure COVID PCR tests, and his appointment with them is more than 4 days before surgery, so it is too far out.     On 10/15/2020: left patient  msg to call back to confirm the COVID-19 test.

## 2020-10-16 ENCOUNTER — OFFICE VISIT (OUTPATIENT)
Dept: FAMILY MEDICINE | Facility: CLINIC | Age: 60
End: 2020-10-16
Payer: COMMERCIAL

## 2020-10-16 VITALS
SYSTOLIC BLOOD PRESSURE: 148 MMHG | TEMPERATURE: 97.2 F | HEART RATE: 80 BPM | DIASTOLIC BLOOD PRESSURE: 91 MMHG | BODY MASS INDEX: 30.06 KG/M2 | HEIGHT: 70 IN | WEIGHT: 210 LBS | OXYGEN SATURATION: 98 %

## 2020-10-16 DIAGNOSIS — Z01.818 PREOP GENERAL PHYSICAL EXAM: Primary | ICD-10-CM

## 2020-10-16 DIAGNOSIS — C20 RECTAL CANCER (H): ICD-10-CM

## 2020-10-16 LAB
4K BIOPSY HISTORY: ABNORMAL
4K DIGITAL RECTAL EXAM: ABNORMAL
4K PSA FREE: 0.47 NG/ML
4K PSA PERCENT FREE: 12 %
4K PSA TOTAL: 3.77 NG/ML
4K SCORE: 16 %
ALBUMIN SERPL-MCNC: 4.2 G/DL (ref 3.4–5)
ALP SERPL-CCNC: 75 U/L (ref 40–150)
ALT SERPL W P-5'-P-CCNC: 34 U/L (ref 0–70)
ANION GAP SERPL CALCULATED.3IONS-SCNC: 4 MMOL/L (ref 3–14)
AST SERPL W P-5'-P-CCNC: 18 U/L (ref 0–45)
BASOPHILS # BLD AUTO: 0 10E9/L (ref 0–0.2)
BASOPHILS NFR BLD AUTO: 0.5 %
BILIRUB SERPL-MCNC: 1.4 MG/DL (ref 0.2–1.3)
BUN SERPL-MCNC: 16 MG/DL (ref 7–30)
CALCIUM SERPL-MCNC: 9.1 MG/DL (ref 8.5–10.1)
CEA SERPL-MCNC: 1.2 UG/L (ref 0–2.5)
CHLORIDE SERPL-SCNC: 104 MMOL/L (ref 94–109)
CHOLEST SERPL-MCNC: 176 MG/DL
CO2 SERPL-SCNC: 27 MMOL/L (ref 20–32)
CREAT SERPL-MCNC: 0.83 MG/DL (ref 0.66–1.25)
DEPRECATED CALCIDIOL+CALCIFEROL SERPL-MC: 42 UG/L (ref 20–75)
DIFFERENTIAL METHOD BLD: ABNORMAL
EOSINOPHIL # BLD AUTO: 0.2 10E9/L (ref 0–0.7)
EOSINOPHIL NFR BLD AUTO: 4.2 %
ERYTHROCYTE [DISTWIDTH] IN BLOOD BY AUTOMATED COUNT: 13.1 % (ref 10–15)
GFR SERPL CREATININE-BSD FRML MDRD: >90 ML/MIN/{1.73_M2}
GLUCOSE SERPL-MCNC: 112 MG/DL (ref 70–99)
HCT VFR BLD AUTO: 46.2 % (ref 40–53)
HDLC SERPL-MCNC: 50 MG/DL
HGB BLD-MCNC: 16.2 G/DL (ref 13.3–17.7)
IMM GRANULOCYTES # BLD: 0 10E9/L (ref 0–0.4)
IMM GRANULOCYTES NFR BLD: 0.2 %
LDLC SERPL CALC-MCNC: 109 MG/DL
LYMPHOCYTES # BLD AUTO: 0.7 10E9/L (ref 0.8–5.3)
LYMPHOCYTES NFR BLD AUTO: 11.2 %
MCH RBC QN AUTO: 29.8 PG (ref 26.5–33)
MCHC RBC AUTO-ENTMCNC: 35.1 G/DL (ref 31.5–36.5)
MCV RBC AUTO: 85 FL (ref 78–100)
MONOCYTES # BLD AUTO: 0.5 10E9/L (ref 0–1.3)
MONOCYTES NFR BLD AUTO: 7.8 %
NEUTROPHILS # BLD AUTO: 4.4 10E9/L (ref 1.6–8.3)
NEUTROPHILS NFR BLD AUTO: 76.1 %
NONHDLC SERPL-MCNC: 126 MG/DL
NRBC # BLD AUTO: 0 10*3/UL
NRBC BLD AUTO-RTO: 0 /100
PLATELET # BLD AUTO: 198 10E9/L (ref 150–450)
POTASSIUM SERPL-SCNC: 3.7 MMOL/L (ref 3.4–5.3)
PROT SERPL-MCNC: 7.8 G/DL (ref 6.8–8.8)
PSA SERPL-MCNC: 4.13 UG/L (ref 0–4)
RBC # BLD AUTO: 5.44 10E12/L (ref 4.4–5.9)
SODIUM SERPL-SCNC: 136 MMOL/L (ref 133–144)
TRIGL SERPL-MCNC: 86 MG/DL
WBC # BLD AUTO: 5.8 10E9/L (ref 4–11)

## 2020-10-16 PROCEDURE — 99213 OFFICE O/P EST LOW 20 MIN: CPT | Performed by: NURSE PRACTITIONER

## 2020-10-16 ASSESSMENT — MIFFLIN-ST. JEOR: SCORE: 1768.8

## 2020-10-16 NOTE — PROGRESS NOTES
[place] [address] [facility] [address]  Phone: [phone]  Primary Provider: Philippe Healy  Pre-op Performing Provider: LANRE AWAN    PREOPERATIVE EVALUATION:  Today's date: 10/16/2020    Louie Greco is a 60 year old male who presents for a preoperative evaluation.    Surgical Information:  Surgery/Procedure: COLONOSCOPY  Surgery Location: [facility]  Surgeon: Felicitas Radford MD  Surgery Date: 10/23/2020  Time of Surgery: 8:00 AM  Where patient plans to recover: At home with family  Fax number for surgical facility: Note does not need to be faxed, will be available electronically in Epic.    Type of Anesthesia Anticipated: None     Subjective     HPI related to upcoming procedure: going for routine check of rectal cancer     Preop Questions 10/14/2020   1. Have you ever had a heart attack or stroke? No   2. Have you ever had surgery on your heart or blood vessels, such as a stent placement, a coronary artery bypass, or surgery on an artery in your head, neck, heart, or legs? No   3. Do you have chest pain with activity? No   4. Do you have a history of  heart failure? No   5. Do you currently have a cold, bronchitis or symptoms of other infection? No   6. Do you have a cough, shortness of breath, or wheezing? No   7. Do you or anyone in your family have previous history of blood clots? No   8. Do you or does anyone in your family have a serious bleeding problem such as prolonged bleeding following surgeries or cuts? No   9. Have you ever had problems with anemia or been told to take iron pills? No   10. Have you had any abnormal blood loss such as black, tarry or bloody stools? No   11. Have you ever had a blood transfusion? No   12. Are you willing to have a blood transfusion if it is medically needed before, during, or after your surgery? Yes   13. Have you or any of your relatives ever had problems with  anesthesia? No   14. Do you have sleep apnea, excessive snoring or daytime drowsiness? UNKNOWN - snores, some daytime drowsiness    15. Do you have any artifical heart valves or other implanted medical devices like a pacemaker, defibrillator, or continuous glucose monitor? No   16. Do you have artificial joints? No   17. Are you allergic to latex? No       Health Care Directive:  Patient does not have a Health Care Directive or Living Will: Discussed advance care planning with patient; however, patient declined at this time.    Preoperative Review of :  Not reviewed      Status of Chronic Conditions:  See problem list for active medical problems.  Problems all longstanding and stable, except as noted/documented.  See ROS for pertinent symptoms related to these conditions.      Review of Systems  Constitutional, neuro, ENT, endocrine, pulmonary, cardiac, gastrointestinal, genitourinary, musculoskeletal, integument and psychiatric systems are negative, except as otherwise noted.    Patient Active Problem List    Diagnosis Date Noted     Rectal cancer (H) 10/02/2020     Priority: Medium     Added automatically from request for surgery [id number]       Kidney stone 02/16/2018     Priority: Medium     Elevated prostate specific antigen (PSA) 11/30/2017     Priority: Medium     Appendicitis 07/17/2017     Priority: Medium     Chemotherapy-induced neutropenia (H) 03/21/2017     Priority: Medium     Advance Care Planning 03/09/2017     Priority: Medium     Advance Care Planning 3/9/2017: Receipt of ACP document:  Received: invalid HCD document dated 12/8/2016.  Document not previously scanned. Validation form completed indicating invalid document. Copy sent to client with information and facilitation resources. Validation form sent to be scanned as notation of invalid document received.  Code Status needs to be updated to reflect choices. Confirmed/documented designated decision maker(s).  Added by Araceli TREVIÑO  Advance Care Planning Liaison with [employer].       Malignant neoplasm of rectum (H) 01/03/2017     Priority: Medium     Rectal bleeding 07/19/2016     Priority: Medium     Plantar fasciitis 11/04/2010     Priority: Medium     Pes anserinus tendinitis 02/08/2010     Priority: Medium      Past Medical History:   Diagnosis Date     Childhood asthma      Epididymitis, bilateral     18 years old     Inguinal hernia      Mumps      Nephrolithiasis     1990     Rectal cancer (H)     low rectal cancer     Shingles      Past Surgical History:   Procedure Laterality Date     COLONOSCOPY N/A 7/27/2016    Procedure: COMBINED COLONOSCOPY, SINGLE OR MULTIPLE BIOPSY/POLYPECTOMY BY BIOPSY;  Surgeon: Chelsea Thompson MD;  Location: [facility]     COLONOSCOPY N/A 9/13/2017    Procedure: COLONOSCOPY;;  Surgeon: Felicitas Radford MD;  [facility]     COLONOSCOPY N/A 12/13/2017    Procedure: COLONOSCOPY;;  Surgeon: Felicitas Radford MD;  [facility]     COMBINED CYSTOSCOPY, RETROGRADES, URETEROSCOPY, LASER HOLMIUM LITHOTRIPSY URETER(S), INSERT STENT Left 2/16/2018    Procedure: COMBINED CYSTOSCOPY, RETROGRADES, URETEROSCOPY, LASER HOLMIUM LITHOTRIPSY URETER(S), INSERT STENT;  Cysto, left ureteroscopy, holmium laser, retrogrades, stent placement;  Surgeon: Bola Worthy MD;  Location: [facility]     EXAM UNDER ANESTHESIA ANUS N/A 7/5/2017    Procedure: EXAM UNDER ANESTHESIA ANUS;  Examination Under Anesthesia, flex sigmoidoscopy with biopsies and formalin application;  Surgeon: Felicitas Radford MD;  [facility]     EXAM UNDER ANESTHESIA ANUS N/A 9/13/2017    Procedure: EXAM UNDER ANESTHESIA ANUS;  Examination Under Anesthesia Anus, Colonoscopy, application of formalin;  Surgeon: Felicitas Radford MD;  [facility]     EXAM UNDER ANESTHESIA ANUS N/A 12/13/2017    Procedure: EXAM UNDER ANESTHESIA ANUS;  Examination Under Anesthesia Anus, Colonoscopy;  Surgeon:  Felicitas Radford MD;  [facility]     EXAM UNDER ANESTHESIA ANUS N/A 5/11/2018    Procedure: EXAM UNDER ANESTHESIA ANUS;  Examination Under Anesthesia Anus, Interoperative Flexible Sigmoidoscopy, Application of Formalin;  Surgeon: Felicitas Radford MD;  [facility]     EXAM UNDER ANESTHESIA ANUS N/A 7/20/2018    Procedure: EXAM UNDER ANESTHESIA ANUS;  Examination Under Anesthesia Anus, Flexible Sigmoidoscopy ;  Surgeon: Felicitas Radford MD;  [facility]     EXAM UNDER ANESTHESIA ANUS N/A 11/30/2018    Procedure: Examination Under Anesthesia, application of formalin to rectum, polypectomy;  Surgeon: Felicitas Radford MD;  [facility]     EXAM UNDER ANESTHESIA ANUS N/A 2/22/2019    Procedure: Examination Under Anesthesia;  Surgeon: Felicitas Radford MD;  [facility]     EXAM UNDER ANESTHESIA ANUS N/A 6/28/2019    Procedure: Examination Under Anesthesia;  Surgeon: Felicitas Radford MD;  [facility]     EXAM UNDER ANESTHESIA ANUS N/A 11/1/2019    Procedure: Examination Under Anesthesia;  Surgeon: Felicitas Radford MD;  [facility]     HC TOOTH EXTRACTION W/FORCEP       LAPAROSCOPIC APPENDECTOMY N/A 7/17/2017    Procedure: LAPAROSCOPIC APPENDECTOMY;  LAPAROSCOPIC APPENDECTOMY;  Surgeon: Bryson Ferguson MD;  [facility]     SIGMOIDOSCOPY FLEXIBLE N/A 12/8/2016    Procedure: SIGMOIDOSCOPY FLEXIBLE;  Surgeon: Felicitas Radford MD;  [facility]     SIGMOIDOSCOPY FLEXIBLE N/A 7/5/2017    Procedure: SIGMOIDOSCOPY FLEXIBLE;;  Surgeon: Felicitas Radford MD;  [facility]     SIGMOIDOSCOPY FLEXIBLE N/A 5/11/2018    Procedure: SIGMOIDOSCOPY FLEXIBLE;;  Surgeon: Felicitas Radford MD;  [facility]     SIGMOIDOSCOPY FLEXIBLE N/A 7/20/2018    Procedure: SIGMOIDOSCOPY FLEXIBLE;;  Surgeon: Felicitas Radford MD;  [facility]     SIGMOIDOSCOPY FLEXIBLE N/A 11/30/2018    Procedure: Flexible  Sigmoidoscopy;  Surgeon: Felicitas Radford MD;  [facility]     SIGMOIDOSCOPY FLEXIBLE N/A 2/22/2019    Procedure: Intraoperative Flexible Sigmoidoscopy, Application of Formalin to rectum;  Surgeon: Felicitas Radford MD;  [facility]     SIGMOIDOSCOPY FLEXIBLE N/A 6/28/2019    Procedure: Flexible Sigmoidoscopy;  Surgeon: Felicitas Radford MD;  [facility]     SIGMOIDOSCOPY FLEXIBLE N/A 11/1/2019    Procedure: Flexible Sigmoidoscopy;  Surgeon: Felicitas Radford MD;  [facility]     TESTICLE SURGERY       VASCULAR SURGERY      Right chest port     VASECTOMY       VASECTOMY       Current Outpatient Medications   Medication Sig Dispense Refill     Na Sulfate-K Sulfate-Mg Sulf (SUPREP BOWEL PREP KIT) solution Take 177 mLs (1 Bottle) by mouth 2 times daily Split dose 2 day Regimen: The evening before you procedure: dilute one bottle with water to a total volume of 16 oz. (up to fill line).  At 6 pm, drink the entire amount.  Drink 32 ounces of water over the next hour.     The morning of your procedure repeat both steps above using the second bottle.  Start five hours before you procedure and complete the prep at least three hours before you arrive. 2 Bottle 0     sildenafil (REVATIO) 20 MG tablet Take 1 tablet (20 mg) by mouth daily as needed (Patient not taking: Reported on 4/29/2020) 30 tablet 11       Allergies   Allergen Reactions     Ampicillin Diarrhea     Demerol [Meperidine]      Nausea         Social History     Tobacco Use     Smoking status: Never Smoker     Smokeless tobacco: Never Used   Substance Use Topics     Alcohol use: Yes     Alcohol/week: 0.0 standard drinks     Comment: 1 drink a week     Family History   Problem Relation Age of Onset     Cancer Brother         primary of unknown origin     Other Cancer Brother      Chronic Obstructive Pulmonary Disease Father      Hypertension Father      Hyperlipidemia Father      Colon Polyps Mother          "Number and type of polyps unknown     Family History Negative Brother         negative colonoscopy     Diabetes Maternal Grandfather      Hypertension Paternal Grandmother      Hyperlipidemia Paternal Grandmother      Stomach Cancer Other 63        maternal great grandfather     Glaucoma No family hx of      Macular Degeneration No family hx of      History   Drug Use No         Objective     BP (!) 148/91 (BP Location: Right arm, Patient Position: Sitting)   Pulse 80   Temp 97.2  F (36.2  C) (Tympanic)   Ht 1.778 m (5' 10\")   Wt 95.3 kg (210 lb)   SpO2 98%   BMI 30.13 kg/m      Physical Exam    GENERAL APPEARANCE: healthy, alert and no distress     EYES: EOMI,  PERRL     RESP: lungs clear to auscultation - no rales, rhonchi or wheezes     CV: regular rates and rhythm, normal S1 S2, no S3 or S4 and no murmur, click or rub     MS: extremities normal- no gross deformities noted, no evidence of inflammation in joints, FROM in all extremities.     SKIN: no suspicious lesions or rashes     NEURO: Normal strength and tone, sensory exam grossly normal, mentation intact and speech normal     PSYCH: mentation appears normal. and affect normal/bright    Recent Labs   Lab Test 08/13/20  1530 02/17/20  0932   HGB 16.2 14.5    164    139   POTASSIUM 3.7 3.9   CR 0.83 0.68        Diagnostics:  No labs were ordered during this visit.   No EKG required for low risk surgery (cataract, skin procedure, breast biopsy, etc).    Revised Cardiac Risk Index (RCRI):  The patient has the following serious cardiovascular risks for perioperative complications:   - No serious cardiac risks = 0 points     RCRI Interpretation: 0 points: Class I (very low risk - 0.4% complication rate)           Assessment & Plan   The proposed surgical procedure is considered LOW risk.      ICD-10-CM    1. Preop general physical exam  Z01.818    2. Rectal cancer (H)  C20      Possible Sleep Apnea: snores          Risks and Recommendations:  The " patient has the following additional risks and recommendations for perioperative complications:   - No identified additional risk factors other than previously addressed    Medication Instructions:  Patient is on no chronic medications    RECOMMENDATION:  APPROVAL GIVEN to proceed with proposed procedure, without further diagnostic evaluation.    Signed Electronically by: QUINTON Guardado CNP    Copy of this evaluation report is provided to requesting physician.    Preop [facility] Preop Guidelines    Revised Cardiac Risk Index

## 2020-10-21 DIAGNOSIS — Z11.59 ENCOUNTER FOR SCREENING FOR OTHER VIRAL DISEASES: ICD-10-CM

## 2020-10-21 LAB
SARS-COV-2 RNA SPEC QL NAA+PROBE: NORMAL
SPECIMEN SOURCE: NORMAL

## 2020-10-21 PROCEDURE — U0003 INFECTIOUS AGENT DETECTION BY NUCLEIC ACID (DNA OR RNA); SEVERE ACUTE RESPIRATORY SYNDROME CORONAVIRUS 2 (SARS-COV-2) (CORONAVIRUS DISEASE [COVID-19]), AMPLIFIED PROBE TECHNIQUE, MAKING USE OF HIGH THROUGHPUT TECHNOLOGIES AS DESCRIBED BY CMS-2020-01-R: HCPCS | Mod: 90 | Performed by: PATHOLOGY

## 2020-10-21 PROCEDURE — 99000 SPECIMEN HANDLING OFFICE-LAB: CPT | Performed by: PATHOLOGY

## 2020-10-22 ENCOUNTER — ANESTHESIA EVENT (OUTPATIENT)
Dept: SURGERY | Facility: AMBULATORY SURGERY CENTER | Age: 60
End: 2020-10-22

## 2020-10-22 LAB
LABORATORY COMMENT REPORT: NORMAL
SARS-COV-2 RNA SPEC QL NAA+PROBE: NEGATIVE
SPECIMEN SOURCE: NORMAL

## 2020-10-23 ENCOUNTER — ANESTHESIA (OUTPATIENT)
Dept: SURGERY | Facility: AMBULATORY SURGERY CENTER | Age: 60
End: 2020-10-23

## 2020-10-23 ENCOUNTER — HOSPITAL ENCOUNTER (OUTPATIENT)
Facility: AMBULATORY SURGERY CENTER | Age: 60
Discharge: HOME | End: 2020-10-23
Attending: COLON & RECTAL SURGERY | Admitting: COLON & RECTAL SURGERY
Payer: COMMERCIAL

## 2020-10-23 VITALS
BODY MASS INDEX: 28.56 KG/M2 | RESPIRATION RATE: 18 BRPM | OXYGEN SATURATION: 98 % | DIASTOLIC BLOOD PRESSURE: 72 MMHG | HEART RATE: 82 BPM | HEIGHT: 71 IN | WEIGHT: 204 LBS | SYSTOLIC BLOOD PRESSURE: 120 MMHG | TEMPERATURE: 97.8 F

## 2020-10-23 DIAGNOSIS — C20 RECTAL CANCER (H): Primary | ICD-10-CM

## 2020-10-23 PROCEDURE — 45378 DIAGNOSTIC COLONOSCOPY: CPT | Performed by: COLON & RECTAL SURGERY

## 2020-10-23 PROCEDURE — 45378 DIAGNOSTIC COLONOSCOPY: CPT

## 2020-10-23 RX ORDER — PROPOFOL 10 MG/ML
INJECTION, EMULSION INTRAVENOUS CONTINUOUS PRN
Status: DISCONTINUED | OUTPATIENT
Start: 2020-10-23 | End: 2020-10-23

## 2020-10-23 RX ORDER — NALOXONE HYDROCHLORIDE 0.4 MG/ML
.1-.4 INJECTION, SOLUTION INTRAMUSCULAR; INTRAVENOUS; SUBCUTANEOUS
Status: DISCONTINUED | OUTPATIENT
Start: 2020-10-23 | End: 2020-10-24 | Stop reason: HOSPADM

## 2020-10-23 RX ORDER — FENTANYL CITRATE 50 UG/ML
25-50 INJECTION, SOLUTION INTRAMUSCULAR; INTRAVENOUS
Status: DISCONTINUED | OUTPATIENT
Start: 2020-10-23 | End: 2020-10-24 | Stop reason: HOSPADM

## 2020-10-23 RX ORDER — ONDANSETRON 4 MG/1
4 TABLET, ORALLY DISINTEGRATING ORAL EVERY 30 MIN PRN
Status: DISCONTINUED | OUTPATIENT
Start: 2020-10-23 | End: 2020-10-24 | Stop reason: HOSPADM

## 2020-10-23 RX ORDER — PROPOFOL 10 MG/ML
INJECTION, EMULSION INTRAVENOUS PRN
Status: DISCONTINUED | OUTPATIENT
Start: 2020-10-23 | End: 2020-10-23

## 2020-10-23 RX ORDER — SODIUM CHLORIDE, SODIUM LACTATE, POTASSIUM CHLORIDE, CALCIUM CHLORIDE 600; 310; 30; 20 MG/100ML; MG/100ML; MG/100ML; MG/100ML
INJECTION, SOLUTION INTRAVENOUS CONTINUOUS
Status: DISCONTINUED | OUTPATIENT
Start: 2020-10-23 | End: 2020-10-24 | Stop reason: HOSPADM

## 2020-10-23 RX ORDER — HYDROMORPHONE HYDROCHLORIDE 1 MG/ML
.3-.5 INJECTION, SOLUTION INTRAMUSCULAR; INTRAVENOUS; SUBCUTANEOUS EVERY 10 MIN PRN
Status: DISCONTINUED | OUTPATIENT
Start: 2020-10-23 | End: 2020-10-24 | Stop reason: HOSPADM

## 2020-10-23 RX ORDER — FENTANYL CITRATE 50 UG/ML
INJECTION, SOLUTION INTRAMUSCULAR; INTRAVENOUS PRN
Status: DISCONTINUED | OUTPATIENT
Start: 2020-10-23 | End: 2020-10-23

## 2020-10-23 RX ORDER — LIDOCAINE 40 MG/G
CREAM TOPICAL
Status: DISCONTINUED | OUTPATIENT
Start: 2020-10-23 | End: 2020-10-24 | Stop reason: HOSPADM

## 2020-10-23 RX ORDER — ONDANSETRON 2 MG/ML
4 INJECTION INTRAMUSCULAR; INTRAVENOUS EVERY 30 MIN PRN
Status: DISCONTINUED | OUTPATIENT
Start: 2020-10-23 | End: 2020-10-24 | Stop reason: HOSPADM

## 2020-10-23 RX ORDER — LIDOCAINE HYDROCHLORIDE 20 MG/ML
INJECTION, SOLUTION INFILTRATION; PERINEURAL PRN
Status: DISCONTINUED | OUTPATIENT
Start: 2020-10-23 | End: 2020-10-23

## 2020-10-23 RX ORDER — ONDANSETRON 2 MG/ML
INJECTION INTRAMUSCULAR; INTRAVENOUS PRN
Status: DISCONTINUED | OUTPATIENT
Start: 2020-10-23 | End: 2020-10-23

## 2020-10-23 RX ORDER — OXYCODONE HYDROCHLORIDE 5 MG/1
5 TABLET ORAL EVERY 4 HOURS PRN
Status: DISCONTINUED | OUTPATIENT
Start: 2020-10-23 | End: 2020-10-24 | Stop reason: HOSPADM

## 2020-10-23 RX ADMIN — SODIUM CHLORIDE, SODIUM LACTATE, POTASSIUM CHLORIDE, CALCIUM CHLORIDE: 600; 310; 30; 20 INJECTION, SOLUTION INTRAVENOUS at 08:09

## 2020-10-23 RX ADMIN — FENTANYL CITRATE 25 MCG: 50 INJECTION, SOLUTION INTRAMUSCULAR; INTRAVENOUS at 08:23

## 2020-10-23 RX ADMIN — LIDOCAINE HYDROCHLORIDE 40 MG: 20 INJECTION, SOLUTION INFILTRATION; PERINEURAL at 08:14

## 2020-10-23 RX ADMIN — FENTANYL CITRATE 25 MCG: 50 INJECTION, SOLUTION INTRAMUSCULAR; INTRAVENOUS at 08:17

## 2020-10-23 RX ADMIN — ONDANSETRON 4 MG: 2 INJECTION INTRAMUSCULAR; INTRAVENOUS at 08:14

## 2020-10-23 RX ADMIN — FENTANYL CITRATE 50 MCG: 50 INJECTION, SOLUTION INTRAMUSCULAR; INTRAVENOUS at 08:13

## 2020-10-23 RX ADMIN — PROPOFOL 30 MG: 10 INJECTION, EMULSION INTRAVENOUS at 08:15

## 2020-10-23 RX ADMIN — PROPOFOL 100 MCG/KG/MIN: 10 INJECTION, EMULSION INTRAVENOUS at 08:14

## 2020-10-23 ASSESSMENT — MIFFLIN-ST. JEOR: SCORE: 1749.53

## 2020-10-23 NOTE — ANESTHESIA CARE TRANSFER NOTE
Patient: Louie Greco    Procedure(s):  COLONOSCOPY  Exam under anesthesia rectum    Diagnosis: Rectal cancer (H) [C20]  Diagnosis Additional Information: No value filed.    Anesthesia Type:   No value filed.     Note:  Airway :Room Air  Patient transferred to:Phase II  Comments: Uneventful transport phase 2 room air and paper face mask on  Report to RN  Exchanging well; color natl  Pt responds appropriately to command  IV patent  Lips/teeth/dentition as preop status  Pt comfortable  Questions answered  /63  HR 71  TAT 97.5  RR 16  Sat 97% room air  Handoff Report: Identifed the Patient, Identified the Reponsible Provider, Reviewed the pertinent medical history, Discussed the surgical course, Reviewed Intra-OP anesthesia mangement and issues during anesthesia, Set expectations for post-procedure period and Allowed opportunity for questions and acknowledgement of understanding      Vitals: (Last set prior to Anesthesia Care Transfer)    CRNA VITALS  10/23/2020 0814 - 10/23/2020 0848      10/23/2020             Pulse:  63    SpO2:  (!) 83 %    Resp Rate (set):  10                Electronically Signed By: QUINTON DAVIS CRNA  October 23, 2020  8:48 AM

## 2020-10-23 NOTE — OP NOTE
SURGEON: Felicitas Radford MD     ASSISTANT: None    PREOPERATIVE DIAGNOSIS: History of rectal cancer status post total neoadjuvant treatment on watch and wait protocol, due for surveillance colonoscopy and examination under anesthesia.     POSTOPERATIVE DIAGNOSIS: History of rectal cancer status post total neoadjuvant treatment on watch and wait protocol, due for surveillance colonoscopy and examination under anesthesia.    PROCEDURE: Examination under anesthesia and colonoscopy.    INDICATIONS: Louie Greco is a 60 year old male who was diagnosed with rectal cancer 7/2016 stages as [id number] (stage IIIA) and underwent chemoradiotherapy with complete response followed by FOLFOX on watch and wait protocol. He is due for both colonoscopy surveillance and examination under anesthesia of the region. The risks and benefits of surgery were thoroughly discussed with the patient and he agreed to proceed.     DESCRIPTION OF PROCEDURE: The patient was brought to the operating room, placed in left lateral decubitus position on the cart. Moderate sedation was induced with intravenous medicines and continuous pulse oxymetry monitoring with heart rate and frequent blood pressures. We performed digital rectal examination and anoscopy which demonstrated a somewhat stenotic anal canal and introduction of the small anoscope resulted in some mild tearing of the anal canal. There were no palpable abnormalities on digital rectal examination and the prostate by palpation was normal. There was no nodularity or induration. A colonoscopy with CO2 was then performed and the scope was advanced to the cecum with the appendiceal orifice and ileocecal valve easily identified.  There was diverticulosis and no signs of divertciulitis. There were no other polyps or other lesions. The radiation changes were improved and the scar was visible, but no signs of disease or additional lesions.  At the end the case, all instruments and sponge counts  were correct x2. Retroflexion was not performed. The patient was emerged from anesthesia and taken to postoperative anesthesia care unit in good condition.     SPECIMEN: None.       AROLDO NAIK MD   Colon and Rectal Surgery Staff  [facility]

## 2020-10-23 NOTE — DISCHARGE INSTRUCTIONS
[facility]  Home Care Following Anesthesia  For 24 hours after surgery:  1. Get plenty of rest.  A responsible adult must stay with you for at least 24 hours after you leave the surgery center.  2. Do not drive or use heavy equipment.  If you have weakness or tingling, don't drive or use heavy equipment until this feeling goes away.   3. Do not drink alcohol.   4. Avoid strenuous or risky activities.  Ask for help when climbing stairs.  5. You may feel lightheaded.  IF so, sit for a few minutes before standing.  Have someone help you get up.   6. If you have nausea (feel sick to your stomach): Drink only clear liquids such as apple juice, ginger ale, broth or 7-Up.  Rest may also help.  Be sure to drink enough fluids.  Move to a regular diet as you feel able.   7. You may have a slight fever.  Call the doctor if your fever is over 100 F (37.7 C) (taken under the tongue) or lasts longer than 24 hours.  8. You may have a dry mouth, a sore throat, muscle aches or trouble sleeping. These should go away after 24 hours.  9. Do not make important or legal decisions.               Tips for taking pain medications  To get the best pain relief possible, remember these points:    Take pain medications as directed, before pain becomes severe.    Pain medication can upset your stomach: taking it with food may help.    Constipation is a common side effect of pain medication. Drink plenty of  fluids.    Eat foods high in fiber. Take a stool softener if recommended by your doctor or pharmacist.    Do not drink alcohol, drive or operate machinery while taking pain medications.    Ask about other ways to control pain, such as with heat, ice or relaxation.    Tylenol/Acetaminophen Consumption  To help encourage the safe use of acetaminophen, the makers of TYLENOL  have lowered the maximum daily dose for single-ingredient Extra Strength TYLENOL  (acetaminophen) products sold in the [place] from 8  pills per day (4,000 mg) to 6 pills per day (3,000 mg). The dosing interval has also changed from 2 pills every 4-6 hours to 2 pills every 6 hours.    If you feel your pain relief is insufficient, you may take Tylenol/Acetaminophen in addition to your narcotic pain medication.     Be careful not to exceed 3,000 mg of Tylenol/Acetaminophen in a 24 hour period from all sources.    If you are taking extra strength Tylenol/acetaminophen (500 mg), the maximum dose is 6 tablets in 24 hours.    If you are taking regular strength acetaminophen (325 mg), the maximum dose is 9 tablets in 24 hours.    Call a doctor for any of the followin. Signs of infection (fever, growing tenderness at the surgery site, a large amount of drainage or bleeding, severe pain, foul-smelling drainage, redness, swelling).  2. It has been over 8 to 10 hours since surgery and you are still not able to urinate (pass water).  3. Headache for over 24 hours.  4. Numbness, tingling or weakness the day after surgery (if you had spinal anesthesia).  5. Signs of Covid-19 infection (temperature over 100 degrees, shortness of breath, cough, loss of taste/smell, generalized body aches, persistent headache, chills, sore throat, nausea/vomiting/diarrhea)  Your doctor is:       Dr. Felicitas Radford, Colon Rectal: [phone]               Or dial [phone] and ask for the resident on call for:  Colon Rectal  For emergency care, call the:  [facility]:  [phone] (TTY for hearing impaired: [phone])    When do I need to call the doctor or triage nurse?  If you experience any of the problems listed here, call our triage nurse during business hours ([phone]). The nurse will help you with your problem or have the doctor call you. After hours and on weekends, please call the main hospital number ([phone]) and ask for the colon and rectal surgery person on call. Some is available to help you 24 hours a day, seven days a  week.    Fever greater than 101 degrees    Chills    Foul-smelling drainage    Nausea and vomiting    Diarrhea - greater than 3 water stools in 24 hours    Constipation - no bowel movement after 3 days    Severe bleeding that does not stop soon after a bowel movement    Discharge Instructions after Colonoscopy or Sigmoidoscopy       Today you had a ____ Colonoscopy      Activity and Diet   You were given medicine for pain. You may be dizzy or sleepy.   For 24 hours:     Do not drive or use heavy equipment.     Do not make important decisions.     Do not drink any alcohol.   You may return to your normal diet and medicines.       Discomfort     Air was placed in your colon during the exam in order to see it. Walking helps to pass the air.     You may take Tylenol (acetaminophen) for pain unless your doctor has told you not to.   Do not take aspirin or ibuprofen (Advil, Motrin, or other anti-inflammatory   drugs) for _____ days.       Follow-up   ____ We took small tissue samples or polyps to study. Your doctor will call you with the results within two weeks.       When to call:       Call right away if you have:     Unusual pain in belly or chest pain not relieved with passing air.     More than 1 to 2 Tablespoons of bleeding from your rectum.     Fever above 100.6  F (37.5  C).       If you have severe pain, bleeding, or shortness of breath, go to an emergency room.       If you have questions, call:   Monday to Friday, 7 a.m. to 4:30 p.m.   Endoscopy: [phone] (We may have to call you back)       After hours   Hospital: [phone] (Ask for the GI fellow on call)

## 2020-10-23 NOTE — ANESTHESIA PREPROCEDURE EVALUATION
"Anesthesia Pre-Procedure Evaluation    Patient: Louie Greco   MRN:     [id number] Gender:   male   Age:    60 year old :      1960        Preoperative Diagnosis: Rectal cancer (H) [C20]   Procedure(s):  COLONOSCOPY  Exam under anesthesia rectum     LABS:  CBC:   Lab Results   Component Value Date    WBC 5.8 2020    WBC 3.5 (L) 2020    HGB 16.2 2020    HGB 14.5 2020    HCT 46.2 2020    HCT 43.4 2020     2020     2020     BMP:   Lab Results   Component Value Date     2020     2020    POTASSIUM 3.7 2020    POTASSIUM 3.9 2020    CHLORIDE 104 2020    CHLORIDE 109 2020    CO2 27 2020    CO2 24 2020    BUN 16 2020    BUN 17 2020    CR 0.83 2020    CR 0.68 2020     (H) 2020    GLC 95 2020     COAGS:   Lab Results   Component Value Date    PTT 31 07/10/2017    INR 1.02 07/10/2017     POC:   Lab Results   Component Value Date    BGM 60 (L) 2018     OTHER:   Lab Results   Component Value Date    LACT 0.8 2017    FRANDY 9.1 2020    MAG 2.0 2017    ALBUMIN 4.2 2020    PROTTOTAL 7.8 2020    ALT 34 2020    AST 18 2020    ALKPHOS 75 2020    BILITOTAL 1.4 (H) 2020    LIPASE 192 2018    .0 (H) 2017    SED 34 (H) 2017        Preop Vitals    BP Readings from Last 3 Encounters:   10/23/20 (!) 141/79   10/16/20 (!) 148/91   20 128/80    Pulse Readings from Last 3 Encounters:   10/23/20 82   10/16/20 80   20 87      Resp Readings from Last 3 Encounters:   10/23/20 18   20 18   19 16    SpO2 Readings from Last 3 Encounters:   10/23/20 96%   10/16/20 98%   20 98%      Temp Readings from Last 1 Encounters:   10/23/20 36.4  C (97.6  F) (Oral)    Ht Readings from Last 1 Encounters:   10/23/20 1.791 m (5' 10.5\")      Wt Readings from Last 1 Encounters:   10/23/20 " "92.5 kg (204 lb)    Estimated body mass index is 28.86 kg/m  as calculated from the following:    Height as of this encounter: 1.791 m (5' 10.5\").    Weight as of this encounter: 92.5 kg (204 lb).     LDA:  Peripheral IV 10/23/20 Right Hand (Active)   Site Assessment WDL 10/23/20 0652   Line Status Infusing 10/23/20 0652   Phlebitis Scale 0-->no symptoms 10/23/20 0652   Infiltration Scale 0 10/23/20 0652   Number of days: 0       Peripheral IV 02/16/18 (Active)   Number of days: 980       Peripheral IV 07/20/18 Right Hand (Active)   Number of days: 826       Peripheral IV 06/26/19 Right (Active)   Number of days: 485       Ureteral Drain/Stent Left ureter 6 fr (Active)   Number of days: 980        Past Medical History:   Diagnosis Date     Childhood asthma      Epididymitis, bilateral     18 years old     Inguinal hernia      Mumps      Nephrolithiasis     1990     Rectal cancer (H)     low rectal cancer     Shingles       Past Surgical History:   Procedure Laterality Date     COLONOSCOPY N/A 7/27/2016    Procedure: COMBINED COLONOSCOPY, SINGLE OR MULTIPLE BIOPSY/POLYPECTOMY BY BIOPSY;  Surgeon: Chelsea Thompson MD;  Location:  GI     COLONOSCOPY N/A 9/13/2017    Procedure: COLONOSCOPY;;  Surgeon: Felicitas Radford MD;  [facility]     COLONOSCOPY N/A 12/13/2017    Procedure: COLONOSCOPY;;  Surgeon: Felicitas Radford MD;  [facility]     COMBINED CYSTOSCOPY, RETROGRADES, URETEROSCOPY, LASER HOLMIUM LITHOTRIPSY URETER(S), INSERT STENT Left 2/16/2018    Procedure: COMBINED CYSTOSCOPY, RETROGRADES, URETEROSCOPY, LASER HOLMIUM LITHOTRIPSY URETER(S), INSERT STENT;  Cysto, left ureteroscopy, holmium laser, retrogrades, stent placement;  Surgeon: Bola Worthy MD;  Location:  OR     EXAM UNDER ANESTHESIA ANUS N/A 7/5/2017    Procedure: EXAM UNDER ANESTHESIA ANUS;  Examination Under Anesthesia, flex sigmoidoscopy with biopsies and formalin application;  Surgeon: " Felicitas Radford MD;  [facility]     EXAM UNDER ANESTHESIA ANUS N/A 9/13/2017    Procedure: EXAM UNDER ANESTHESIA ANUS;  Examination Under Anesthesia Anus, Colonoscopy, application of formalin;  Surgeon: Felicitas Radford MD;  [facility]     EXAM UNDER ANESTHESIA ANUS N/A 12/13/2017    Procedure: EXAM UNDER ANESTHESIA ANUS;  Examination Under Anesthesia Anus, Colonoscopy;  Surgeon: Felicitas Radford MD;  [facility]     EXAM UNDER ANESTHESIA ANUS N/A 5/11/2018    Procedure: EXAM UNDER ANESTHESIA ANUS;  Examination Under Anesthesia Anus, Interoperative Flexible Sigmoidoscopy, Application of Formalin;  Surgeon: Felicitas Radford MD;  [facility]     EXAM UNDER ANESTHESIA ANUS N/A 7/20/2018    Procedure: EXAM UNDER ANESTHESIA ANUS;  Examination Under Anesthesia Anus, Flexible Sigmoidoscopy ;  Surgeon: Felicitas Radford MD;  [facility]     EXAM UNDER ANESTHESIA ANUS N/A 11/30/2018    Procedure: Examination Under Anesthesia, application of formalin to rectum, polypectomy;  Surgeon: Felicitas Radford MD;  [facility]     EXAM UNDER ANESTHESIA ANUS N/A 2/22/2019    Procedure: Examination Under Anesthesia;  Surgeon: Felicitas Radford MD;  [facility]     EXAM UNDER ANESTHESIA ANUS N/A 6/28/2019    Procedure: Examination Under Anesthesia;  Surgeon: Felicitas Radford MD;  [facility]     EXAM UNDER ANESTHESIA ANUS N/A 11/1/2019    Procedure: Examination Under Anesthesia;  Surgeon: Felicitas Radford MD;  [facility]     HC TOOTH EXTRACTION W/FORCEP       LAPAROSCOPIC APPENDECTOMY N/A 7/17/2017    Procedure: LAPAROSCOPIC APPENDECTOMY;  LAPAROSCOPIC APPENDECTOMY;  Surgeon: Bryson Ferguson MD;  Location: [facility]     SIGMOIDOSCOPY FLEXIBLE N/A 12/8/2016    Procedure: SIGMOIDOSCOPY FLEXIBLE;  Surgeon: Felicitas Radford MD;  [facility]     SIGMOIDOSCOPY FLEXIBLE N/A 7/5/2017    Procedure: SIGMOIDOSCOPY  FLEXIBLE;;  Surgeon: Felicitas Radford MD;  [facility]     SIGMOIDOSCOPY FLEXIBLE N/A 5/11/2018    Procedure: SIGMOIDOSCOPY FLEXIBLE;;  Surgeon: Felicitas Radford MD;  [facility]     SIGMOIDOSCOPY FLEXIBLE N/A 7/20/2018    Procedure: SIGMOIDOSCOPY FLEXIBLE;;  Surgeon: Felicitas Radford MD;  [facility]     SIGMOIDOSCOPY FLEXIBLE N/A 11/30/2018    Procedure: Flexible Sigmoidoscopy;  Surgeon: Felicitas Radford MD;  [facility]     SIGMOIDOSCOPY FLEXIBLE N/A 2/22/2019    Procedure: Intraoperative Flexible Sigmoidoscopy, Application of Formalin to rectum;  Surgeon: Felicitas Radford MD;  [facility]     SIGMOIDOSCOPY FLEXIBLE N/A 6/28/2019    Procedure: Flexible Sigmoidoscopy;  Surgeon: Felicitas Radford MD;  [facility]     SIGMOIDOSCOPY FLEXIBLE N/A 11/1/2019    Procedure: Flexible Sigmoidoscopy;  Surgeon: Felicitas Radford MD;  [facility]     TESTICLE SURGERY       VASCULAR SURGERY      Right chest port     VASECTOMY       VASECTOMY        Allergies   Allergen Reactions     Ampicillin Diarrhea     Demerol [Meperidine]      Nausea         Anesthesia Evaluation     .             ROS/MED HX    ENT/Pulmonary:  - neg pulmonary ROS   (+)asthma , . .    Neurologic:  - neg neurologic ROS     Cardiovascular:  - neg cardiovascular ROS       METS/Exercise Tolerance:     Hematologic:  - neg hematologic  ROS       Musculoskeletal:  - neg musculoskeletal ROS       GI/Hepatic:  - neg GI/hepatic ROS       Renal/Genitourinary:  - ROS Renal section negative       Endo:  - neg endo ROS       Psychiatric:  - neg psychiatric ROS       Infectious Disease:  - neg infectious disease ROS       Malignancy:   (+) Malignancy History of Other  Other CA rectal Active status post Chemo and Surgery      - no malignancy   Other:    - neg other ROS                     PHYSICAL EXAM:   Mental Status/Neuro: A/A/O   Airway: Facies: Feasible  Mallampati:  I  Mouth/Opening: Full  TM distance: > 6 cm  Neck ROM: Full   Respiratory: Auscultation: CTAB     Resp. Rate: Normal     Resp. Effort: Normal      CV: Rhythm: Regular  Rate: Age appropriate  Heart: Normal Sounds  Edema: None   Comments:      Dental: Normal Dentition                Assessment:   ASA SCORE: 2    H&P: History and physical reviewed and following examination; no interval change.   Smoking Status:  Non-Smoker/Unknown   NPO Status: NPO Appropriate     Plan:   Anes. Type:  MAC   Pre-Medication: None   Induction:  N/a   Airway: Native Airway   Access/Monitoring: PIV   Maintenance: Propofol Sedation     Postop Plan:   Postop Pain: None  Postop Sedation/Airway: Not planned  Disposition: Outpatient     PONV Management:   Adult Risk Factors:, Non-Smoker   Prevention: Ondansetron, Propofol     CONSENT: Direct conversation   Plan and risks discussed with: Patient   Blood Products: Consent Deferred (Minimal Blood Loss)                   Brett Ritter MD

## 2020-10-23 NOTE — ANESTHESIA POSTPROCEDURE EVALUATION
Anesthesia POST Procedure Evaluation    Patient: Louie Greco   MRN:     [id number] Gender:   male   Age:    60 year old :      1960        Preoperative Diagnosis: Rectal cancer (H) [C20]   Procedure(s):  COLONOSCOPY  Exam under anesthesia rectum   Postop Comments: No value filed.     Anesthesia Type: No value filed.          Postop Pain Control: Uneventful            Sign Out: Well controlled pain   PONV: No   Neuro/Psych: Uneventful            Sign Out: Acceptable/Baseline neuro status   Airway/Respiratory: Uneventful            Sign Out: Acceptable/Baseline resp. status   CV/Hemodynamics: Uneventful            Sign Out: Acceptable CV status   Other NRE: NONE   DID A NON-ROUTINE EVENT OCCUR? No         Last Anesthesia Record Vitals:  CRNA VITALS  10/23/2020 0814 - 10/23/2020 0914      10/23/2020             Resp Rate (observed):  16          Last PACU Vitals:  Vitals Value Taken Time   /63 10/23/20 0845   Temp 36.4  C (97.5  F) 10/23/20 0845   Pulse     Resp 18 10/23/20 0845   SpO2 97 % 10/23/20 0845   Temp src     NIBP     Pulse 63 10/23/20 0844   SpO2 83 % 10/23/20 0844   Resp     Temp     Ht Rate     Temp 2           Electronically Signed By: Brett Ritter MD, 2020, 1:33 PM

## 2021-01-14 NOTE — PROGRESS NOTES
Patient presented to  asking for his portacath to be flushed after it was used in radiology for a CT scan and MRI.  Port flushed with normal saline and heparin per protocol.  Patient requested to stay accessed being that he has an infusion appt in the morning.     none

## 2021-02-28 ENCOUNTER — APPOINTMENT (OUTPATIENT)
Dept: CT IMAGING | Facility: CLINIC | Age: 61
End: 2021-02-28
Attending: EMERGENCY MEDICINE
Payer: COMMERCIAL

## 2021-02-28 ENCOUNTER — HOSPITAL ENCOUNTER (OUTPATIENT)
Facility: CLINIC | Age: 61
Setting detail: OBSERVATION
Discharge: HOME | End: 2021-03-01
Attending: EMERGENCY MEDICINE | Admitting: INTERNAL MEDICINE
Payer: COMMERCIAL

## 2021-02-28 DIAGNOSIS — N20.1 URETEROLITHIASIS: Primary | ICD-10-CM

## 2021-02-28 DIAGNOSIS — N23 RENAL COLIC: ICD-10-CM

## 2021-02-28 PROBLEM — T45.1X5A CHEMOTHERAPY-INDUCED NEUTROPENIA (H): Status: RESOLVED | Noted: 2017-03-21 | Resolved: 2021-02-28

## 2021-02-28 PROBLEM — N20.0 KIDNEY STONE: Status: RESOLVED | Noted: 2018-02-16 | Resolved: 2021-02-28

## 2021-02-28 PROBLEM — N28.1 RENAL CYST: Status: ACTIVE | Noted: 2021-02-28

## 2021-02-28 PROBLEM — K37 APPENDICITIS: Status: RESOLVED | Noted: 2017-07-17 | Resolved: 2021-02-28

## 2021-02-28 PROBLEM — D70.1 CHEMOTHERAPY-INDUCED NEUTROPENIA (H): Status: RESOLVED | Noted: 2017-03-21 | Resolved: 2021-02-28

## 2021-02-28 LAB
ALBUMIN SERPL-MCNC: 3.7 G/DL (ref 3.4–5)
ALBUMIN UR-MCNC: 30 MG/DL
ALP SERPL-CCNC: 58 U/L (ref 40–150)
ALT SERPL W P-5'-P-CCNC: 40 U/L (ref 0–70)
ANION GAP SERPL CALCULATED.3IONS-SCNC: 5 MMOL/L (ref 3–14)
APPEARANCE UR: ABNORMAL
AST SERPL W P-5'-P-CCNC: 26 U/L (ref 0–45)
BASOPHILS # BLD AUTO: 0 10E9/L (ref 0–0.2)
BASOPHILS NFR BLD AUTO: 0.3 %
BILIRUB SERPL-MCNC: 0.5 MG/DL (ref 0.2–1.3)
BILIRUB UR QL STRIP: NEGATIVE
BUN SERPL-MCNC: 19 MG/DL (ref 7–30)
CALCIUM SERPL-MCNC: 8.3 MG/DL (ref 8.5–10.1)
CHLORIDE SERPL-SCNC: 110 MMOL/L (ref 94–109)
CO2 SERPL-SCNC: 25 MMOL/L (ref 20–32)
COLOR UR AUTO: YELLOW
CREAT SERPL-MCNC: 1.1 MG/DL (ref 0.66–1.25)
DIFFERENTIAL METHOD BLD: ABNORMAL
EOSINOPHIL # BLD AUTO: 0.1 10E9/L (ref 0–0.7)
EOSINOPHIL NFR BLD AUTO: 1.3 %
ERYTHROCYTE [DISTWIDTH] IN BLOOD BY AUTOMATED COUNT: 12.5 % (ref 10–15)
GFR SERPL CREATININE-BSD FRML MDRD: 72 ML/MIN/{1.73_M2}
GLUCOSE SERPL-MCNC: 134 MG/DL (ref 70–99)
GLUCOSE UR STRIP-MCNC: NEGATIVE MG/DL
HCT VFR BLD AUTO: 42.5 % (ref 40–53)
HGB BLD-MCNC: 14 G/DL (ref 13.3–17.7)
HGB UR QL STRIP: ABNORMAL
IMM GRANULOCYTES # BLD: 0 10E9/L (ref 0–0.4)
IMM GRANULOCYTES NFR BLD: 0.3 %
KETONES UR STRIP-MCNC: NEGATIVE MG/DL
LABORATORY COMMENT REPORT: NORMAL
LEUKOCYTE ESTERASE UR QL STRIP: NEGATIVE
LIPASE SERPL-CCNC: 264 U/L (ref 73–393)
LYMPHOCYTES # BLD AUTO: 0.5 10E9/L (ref 0.8–5.3)
LYMPHOCYTES NFR BLD AUTO: 7.7 %
MCH RBC QN AUTO: 28.3 PG (ref 26.5–33)
MCHC RBC AUTO-ENTMCNC: 32.9 G/DL (ref 31.5–36.5)
MCV RBC AUTO: 86 FL (ref 78–100)
MONOCYTES # BLD AUTO: 0.4 10E9/L (ref 0–1.3)
MONOCYTES NFR BLD AUTO: 6.8 %
MUCOUS THREADS #/AREA URNS LPF: PRESENT /LPF
NEUTROPHILS # BLD AUTO: 5 10E9/L (ref 1.6–8.3)
NEUTROPHILS NFR BLD AUTO: 83.6 %
NITRATE UR QL: NEGATIVE
NRBC # BLD AUTO: 0 10*3/UL
NRBC BLD AUTO-RTO: 0 /100
PH UR STRIP: 5.5 PH (ref 5–7)
PLATELET # BLD AUTO: 181 10E9/L (ref 150–450)
POTASSIUM SERPL-SCNC: 4 MMOL/L (ref 3.4–5.3)
PROT SERPL-MCNC: 6.8 G/DL (ref 6.8–8.8)
RBC # BLD AUTO: 4.95 10E12/L (ref 4.4–5.9)
RBC #/AREA URNS AUTO: >182 /HPF (ref 0–2)
SARS-COV-2 RNA RESP QL NAA+PROBE: NEGATIVE
SODIUM SERPL-SCNC: 140 MMOL/L (ref 133–144)
SOURCE: ABNORMAL
SP GR UR STRIP: 1.02 (ref 1–1.03)
SPECIMEN SOURCE: NORMAL
SQUAMOUS #/AREA URNS AUTO: <1 /HPF (ref 0–1)
UROBILINOGEN UR STRIP-MCNC: 0 MG/DL (ref 0–2)
WBC # BLD AUTO: 6 10E9/L (ref 4–11)
WBC #/AREA URNS AUTO: 3 /HPF (ref 0–5)
YEAST #/AREA URNS HPF: ABNORMAL /HPF

## 2021-02-28 PROCEDURE — 96361 HYDRATE IV INFUSION ADD-ON: CPT

## 2021-02-28 PROCEDURE — 96375 TX/PRO/DX INJ NEW DRUG ADDON: CPT

## 2021-02-28 PROCEDURE — 87635 SARS-COV-2 COVID-19 AMP PRB: CPT | Performed by: EMERGENCY MEDICINE

## 2021-02-28 PROCEDURE — 96376 TX/PRO/DX INJ SAME DRUG ADON: CPT

## 2021-02-28 PROCEDURE — 250N000013 HC RX MED GY IP 250 OP 250 PS 637: Performed by: EMERGENCY MEDICINE

## 2021-02-28 PROCEDURE — G0378 HOSPITAL OBSERVATION PER HR: HCPCS

## 2021-02-28 PROCEDURE — 250N000013 HC RX MED GY IP 250 OP 250 PS 637: Performed by: INTERNAL MEDICINE

## 2021-02-28 PROCEDURE — 83690 ASSAY OF LIPASE: CPT | Performed by: EMERGENCY MEDICINE

## 2021-02-28 PROCEDURE — 250N000011 HC RX IP 250 OP 636: Performed by: INTERNAL MEDICINE

## 2021-02-28 PROCEDURE — 74176 CT ABD & PELVIS W/O CONTRAST: CPT

## 2021-02-28 PROCEDURE — C9803 HOPD COVID-19 SPEC COLLECT: HCPCS

## 2021-02-28 PROCEDURE — 81001 URINALYSIS AUTO W/SCOPE: CPT | Performed by: EMERGENCY MEDICINE

## 2021-02-28 PROCEDURE — 99219 PR INITIAL OBSERVATION CARE,LEVEL II: CPT | Performed by: INTERNAL MEDICINE

## 2021-02-28 PROCEDURE — 99285 EMERGENCY DEPT VISIT HI MDM: CPT | Mod: 25

## 2021-02-28 PROCEDURE — 258N000003 HC RX IP 258 OP 636: Performed by: EMERGENCY MEDICINE

## 2021-02-28 PROCEDURE — 96374 THER/PROPH/DIAG INJ IV PUSH: CPT

## 2021-02-28 PROCEDURE — 258N000003 HC RX IP 258 OP 636: Performed by: INTERNAL MEDICINE

## 2021-02-28 PROCEDURE — 85025 COMPLETE CBC W/AUTO DIFF WBC: CPT | Performed by: EMERGENCY MEDICINE

## 2021-02-28 PROCEDURE — 80053 COMPREHEN METABOLIC PANEL: CPT | Performed by: EMERGENCY MEDICINE

## 2021-02-28 PROCEDURE — 250N000011 HC RX IP 250 OP 636: Performed by: EMERGENCY MEDICINE

## 2021-02-28 RX ORDER — TAMSULOSIN HYDROCHLORIDE 0.4 MG/1
0.4 CAPSULE ORAL DAILY
Status: DISCONTINUED | OUTPATIENT
Start: 2021-03-01 | End: 2021-03-01 | Stop reason: HOSPADM

## 2021-02-28 RX ORDER — KETOROLAC TROMETHAMINE 15 MG/ML
15 INJECTION, SOLUTION INTRAMUSCULAR; INTRAVENOUS ONCE
Status: COMPLETED | OUTPATIENT
Start: 2021-02-28 | End: 2021-02-28

## 2021-02-28 RX ORDER — HYDROXYZINE HYDROCHLORIDE 25 MG/1
25 TABLET, FILM COATED ORAL EVERY 4 HOURS PRN
Status: DISCONTINUED | OUTPATIENT
Start: 2021-02-28 | End: 2021-03-01 | Stop reason: HOSPADM

## 2021-02-28 RX ORDER — ACETAMINOPHEN 325 MG/1
650 TABLET ORAL EVERY 4 HOURS PRN
Status: DISCONTINUED | OUTPATIENT
Start: 2021-02-28 | End: 2021-03-01 | Stop reason: HOSPADM

## 2021-02-28 RX ORDER — MORPHINE SULFATE 2 MG/ML
4-6 INJECTION, SOLUTION INTRAMUSCULAR; INTRAVENOUS
Status: DISCONTINUED | OUTPATIENT
Start: 2021-02-28 | End: 2021-03-01 | Stop reason: HOSPADM

## 2021-02-28 RX ORDER — NALOXONE HYDROCHLORIDE 0.4 MG/ML
0.4 INJECTION, SOLUTION INTRAMUSCULAR; INTRAVENOUS; SUBCUTANEOUS
Status: DISCONTINUED | OUTPATIENT
Start: 2021-02-28 | End: 2021-03-01 | Stop reason: HOSPADM

## 2021-02-28 RX ORDER — NALOXONE HYDROCHLORIDE 0.4 MG/ML
0.2 INJECTION, SOLUTION INTRAMUSCULAR; INTRAVENOUS; SUBCUTANEOUS
Status: DISCONTINUED | OUTPATIENT
Start: 2021-02-28 | End: 2021-03-01 | Stop reason: HOSPADM

## 2021-02-28 RX ORDER — TAMSULOSIN HYDROCHLORIDE 0.4 MG/1
0.4 CAPSULE ORAL ONCE
Status: COMPLETED | OUTPATIENT
Start: 2021-02-28 | End: 2021-02-28

## 2021-02-28 RX ORDER — ONDANSETRON 4 MG/1
4 TABLET, ORALLY DISINTEGRATING ORAL EVERY 6 HOURS PRN
Status: DISCONTINUED | OUTPATIENT
Start: 2021-02-28 | End: 2021-03-01 | Stop reason: HOSPADM

## 2021-02-28 RX ORDER — ONDANSETRON 2 MG/ML
4 INJECTION INTRAMUSCULAR; INTRAVENOUS ONCE
Status: COMPLETED | OUTPATIENT
Start: 2021-02-28 | End: 2021-02-28

## 2021-02-28 RX ORDER — ACETAMINOPHEN 650 MG/1
650 SUPPOSITORY RECTAL EVERY 4 HOURS PRN
Status: DISCONTINUED | OUTPATIENT
Start: 2021-02-28 | End: 2021-03-01 | Stop reason: HOSPADM

## 2021-02-28 RX ORDER — OXYCODONE HYDROCHLORIDE 5 MG/1
5-10 TABLET ORAL
Status: DISCONTINUED | OUTPATIENT
Start: 2021-02-28 | End: 2021-03-01 | Stop reason: HOSPADM

## 2021-02-28 RX ORDER — AMOXICILLIN 250 MG
2 CAPSULE ORAL 2 TIMES DAILY
Status: DISCONTINUED | OUTPATIENT
Start: 2021-02-28 | End: 2021-03-01 | Stop reason: HOSPADM

## 2021-02-28 RX ORDER — HYDROMORPHONE HYDROCHLORIDE 1 MG/ML
0.5 INJECTION, SOLUTION INTRAMUSCULAR; INTRAVENOUS; SUBCUTANEOUS
Status: DISCONTINUED | OUTPATIENT
Start: 2021-02-28 | End: 2021-02-28

## 2021-02-28 RX ORDER — HYDROXYZINE HYDROCHLORIDE 25 MG/1
50 TABLET, FILM COATED ORAL EVERY 4 HOURS PRN
Status: DISCONTINUED | OUTPATIENT
Start: 2021-02-28 | End: 2021-03-01 | Stop reason: HOSPADM

## 2021-02-28 RX ORDER — SODIUM CHLORIDE 9 MG/ML
INJECTION, SOLUTION INTRAVENOUS CONTINUOUS
Status: DISCONTINUED | OUTPATIENT
Start: 2021-02-28 | End: 2021-03-01 | Stop reason: HOSPADM

## 2021-02-28 RX ORDER — ONDANSETRON 2 MG/ML
4 INJECTION INTRAMUSCULAR; INTRAVENOUS EVERY 6 HOURS PRN
Status: DISCONTINUED | OUTPATIENT
Start: 2021-02-28 | End: 2021-03-01 | Stop reason: HOSPADM

## 2021-02-28 RX ADMIN — TAMSULOSIN HYDROCHLORIDE 0.4 MG: 0.4 CAPSULE ORAL at 06:41

## 2021-02-28 RX ADMIN — SODIUM CHLORIDE: 9 INJECTION, SOLUTION INTRAVENOUS at 19:03

## 2021-02-28 RX ADMIN — ACETAMINOPHEN 650 MG: 325 TABLET, FILM COATED ORAL at 11:59

## 2021-02-28 RX ADMIN — OXYCODONE HYDROCHLORIDE 5 MG: 5 TABLET ORAL at 15:18

## 2021-02-28 RX ADMIN — SODIUM CHLORIDE: 9 INJECTION, SOLUTION INTRAVENOUS at 11:07

## 2021-02-28 RX ADMIN — OXYCODONE HYDROCHLORIDE 5 MG: 5 TABLET ORAL at 18:51

## 2021-02-28 RX ADMIN — SODIUM CHLORIDE 1000 ML: 9 INJECTION, SOLUTION INTRAVENOUS at 06:41

## 2021-02-28 RX ADMIN — HYDROMORPHONE HYDROCHLORIDE 0.5 MG: 1 INJECTION, SOLUTION INTRAMUSCULAR; INTRAVENOUS; SUBCUTANEOUS at 05:59

## 2021-02-28 RX ADMIN — HYDROMORPHONE HYDROCHLORIDE 0.5 MG: 1 INJECTION, SOLUTION INTRAMUSCULAR; INTRAVENOUS; SUBCUTANEOUS at 05:24

## 2021-02-28 RX ADMIN — ACETAMINOPHEN 650 MG: 325 TABLET, FILM COATED ORAL at 17:05

## 2021-02-28 RX ADMIN — ONDANSETRON 4 MG: 2 INJECTION INTRAMUSCULAR; INTRAVENOUS at 05:23

## 2021-02-28 RX ADMIN — ONDANSETRON 4 MG: 2 INJECTION INTRAMUSCULAR; INTRAVENOUS at 12:02

## 2021-02-28 RX ADMIN — KETOROLAC TROMETHAMINE 15 MG: 15 INJECTION, SOLUTION INTRAMUSCULAR; INTRAVENOUS at 06:42

## 2021-02-28 RX ADMIN — OXYCODONE HYDROCHLORIDE 5 MG: 5 TABLET ORAL at 11:59

## 2021-02-28 SDOH — ECONOMIC STABILITY: TRANSPORTATION INSECURITY
IN THE PAST 12 MONTHS, HAS THE LACK OF TRANSPORTATION KEPT YOU FROM MEDICAL APPOINTMENTS OR FROM GETTING MEDICATIONS?: NOT ASKED

## 2021-02-28 SDOH — ECONOMIC STABILITY: INCOME INSECURITY: HOW HARD IS IT FOR YOU TO PAY FOR THE VERY BASICS LIKE FOOD, HOUSING, MEDICAL CARE, AND HEATING?: NOT ASKED

## 2021-02-28 SDOH — ECONOMIC STABILITY: FOOD INSECURITY: WITHIN THE PAST 12 MONTHS, YOU WORRIED THAT YOUR FOOD WOULD RUN OUT BEFORE YOU GOT MONEY TO BUY MORE.: NOT ASKED

## 2021-02-28 SDOH — ECONOMIC STABILITY: FOOD INSECURITY: WITHIN THE PAST 12 MONTHS, THE FOOD YOU BOUGHT JUST DIDN'T LAST AND YOU DIDN'T HAVE MONEY TO GET MORE.: NOT ASKED

## 2021-02-28 SDOH — ECONOMIC STABILITY: TRANSPORTATION INSECURITY
IN THE PAST 12 MONTHS, HAS LACK OF TRANSPORTATION KEPT YOU FROM MEETINGS, WORK, OR FROM GETTING THINGS NEEDED FOR DAILY LIVING?: NOT ASKED

## 2021-02-28 SDOH — HEALTH STABILITY: MENTAL HEALTH: HOW MANY STANDARD DRINKS CONTAINING ALCOHOL DO YOU HAVE ON A TYPICAL DAY?: NOT ASKED

## 2021-02-28 SDOH — HEALTH STABILITY: MENTAL HEALTH: HOW OFTEN DO YOU HAVE A DRINK CONTAINING ALCOHOL?: NOT ASKED

## 2021-02-28 SDOH — HEALTH STABILITY: MENTAL HEALTH: HOW OFTEN DO YOU HAVE 6 OR MORE DRINKS ON ONE OCCASION?: NOT ASKED

## 2021-02-28 ASSESSMENT — MIFFLIN-ST. JEOR: SCORE: 1768.8

## 2021-02-28 ASSESSMENT — ENCOUNTER SYMPTOMS
BLOOD IN STOOL: 0
DIARRHEA: 0
NAUSEA: 0
CHILLS: 0
ABDOMINAL PAIN: 1
DIAPHORESIS: 0
COUGH: 0
FEVER: 0
VOMITING: 0

## 2021-02-28 NOTE — PHARMACY-ADMISSION MEDICATION HISTORY
Pharmacy Medication History  Admission medication history interview status for the 2/28/2021  admission is complete. See EPIC admission navigator for prior to admission medications     Location of Interview: Phone  Medication history sources: Patient and Patient's family/friend (Spouse )    Significant changes made to the medication list:  Removed Revatio    In the past week, patient estimated taking medication this percent of the time: Not Applied   Additional medication history information:   Removed Revatio per Spouses report. Patient's spouse confirmed he does not use any prescription, OTC, or herbal medications.     Medication reconciliation completed by provider prior to medication history? No    Time spent in this activity: 5 minutes     Prior to Admission medications    Not on File       The information provided in this note is only as accurate as the sources available at the time of update(s)   Yareli Said   Student Pharmacist

## 2021-02-28 NOTE — PROGRESS NOTES
MD Notification    Notified Person: MD    Notified Person Name: Bola Benito    Notification Date/Time: 2/28/21 @ 3:26 PM    Notification Interaction: Page    Purpose of Notification: FYI, Patient states Dilaudid is not working well for pain management, can we get an order for Morphine?      Orders Received: Yes    Comments:

## 2021-02-28 NOTE — PROGRESS NOTES
"MD Notification    Notified Person: MD    Notified Person Name: Artie Judith (urology)    Notification Date/Time: 1140 2/28/21    Notification Interaction: telephone    Purpose of Notification: Dr. Wong called unit in response to consult placed. Per Dr. Wong, considering size of stone \"95% of these will pass on their own.\"     Orders Received: Encourage pt to eat what he can and to drink plenty of fluids. Encourage ambulation and movement. Continue to strain urine. If stone has not passed by tonight, make NPO at 0000 and he will be seen tomorrow.     Comments: Following conversation w/ Dr. Wong,  I paged Dr. Benito w/ the update.       "

## 2021-02-28 NOTE — PROGRESS NOTES
RECEIVING UNIT ED HANDOFF REVIEW    ED Nurse Handoff Report was reviewed by: Saskia Sun RN on February 28, 2021 at 10:26 AM

## 2021-02-28 NOTE — ED PROVIDER NOTES
"  History   Chief Complaint:  Abdominal Pain     HPI   Louie Greco is a 60 year old male, with a history of rectal cancer, nephrolithiasis, and inguinal hernia, who presents to the ED for evaluation of abdominal pain. The patient reports he had the onset of lower right quadrant abdominal pain beginning at 2330 last night. He thought it was indigestion at first and was belching, which then it did subside. However, shortly after it went away, the pain came back and was severe. He notes he had a normal bowel movement at 0200 this morning and did not see any blood or black hue. He has not tried any home mediations. The patient did receive his second COVID-19 shot three days ago. He has not had any fever, chills, diaphoresis, cough, nausea, vomiting, or any other acute symptoms.      Review of Systems   Constitutional: Negative for chills, diaphoresis and fever.   Respiratory: Negative for cough.    Gastrointestinal: Positive for abdominal pain. Negative for blood in stool, diarrhea, nausea and vomiting.   All other systems reviewed and are negative.    Allergies:  Ampicillin  Demerol    Medications:    Revatio    Past Medical History:    Epididymitis, bilateral  Inguinal hernia  Mumps  Nephrolithiasis  Rectal cancer  Shingles  Asthma  Chemotherapy-induced neutropenia  Pes anserinus tendinitis  Plantar fasciitis    Past Surgical History:    Left lithotripsy  Appendectomy  Tooth extraction  Vasectomy  Right chest port placement    Family History:    Cancer  COPD  Hypertension  Hyperlipidemia    Social History:  PCP: Philippe Healy  Presents to the ED alone    Physical Exam     Patient Vitals for the past 24 hrs:   BP Temp Pulse Resp SpO2 Height Weight   02/28/21 0745 125/83 -- 87 -- 94 % -- --   02/28/21 0600 133/86 -- 72 18 98 % -- --   02/28/21 0507 135/74 98.3  F (36.8  C) 79 22 95 % 1.778 m (5' 10\") 95.3 kg (210 lb)       Physical Exam  Constitutional:  Cooperative. Looks uncomfortable, rocking in bed, mildly " diaphoretic.  HENT:   Head:    Atraumatic.   Mouth/Throat:   Oropharynx is without erythema or exudate and mucous membranes are moist.   Eyes:    Conjunctivae normal and EOM are normal.      Pupils are equal, round, and reactive to light.   Neck:    Normal range of motion. Neck supple.   Cardiovascular:  Normal rate, regular rhythm, normal heart sounds and radial and dorsalis pedis pulses are 2+ and symmetric.    Pulmonary/Chest:  Effort normal and breath sounds normal.   Abdominal:   Soft. Bowel sounds are normal.      No splenomegaly or hepatomegaly. Protuberant non-tender abdomen. No rebound.   Musculoskeletal:  Normal range of motion. No edema and no tenderness.   Neurological:  Alert. Normal strength. No cranial nerve deficit. GCS 15  Skin:    Skin is warm and dry.   Psychiatric:   Normal mood and affect.     Emergency Department Course   Imaging:    CT Abd/pelvis with contrast:  1.  4 mm stone at the right ureteropelvic junction causing mild right hydronephrosis. There are multiple additional calcified intrarenal stones in both kidneys.     2.  Polycystic kidneys with cysts of varying densities. These could be evaluated further with renal mass protocol CT or MRI to exclude solid lesion.     Imaging independently reviewed and agree with radiologist interpretation.     Laboratory:  CBC: WNL (WBC 6.0, HGB 14.0, )    CMP: Cl 110 (H),  (H), Ca 8.3 (L), o/w WNL (Creatinine 1.10)    Lipase: 264    UA: Blood Large, Protein Albumin 30, RBC/ (H), Yeast Few, Mucous Present, o/w Negative    COVID-19 Virus PCR: Pending    Emergency Department Course:    Reviewed:  I reviewed the patient's nursing notes, vitals, past available medical records.     Assessments:  0509: I obtained history and examined the patient as noted above.     0700: I rechecked the patient and explained findings.       Consults:  0802: I discussed the patient with the admitting hospitalist, Dr. Benito.    Interventions:  0523:  Zofran 4 mg IV  0524: Dilaudid 0.5 mg IV  0559: Dilaudid 0.5 mg IV  0641: Flomax 0.4 mg PO  0641: NS 1L IV Bolus   0642: Toradol 15 mg IV    Disposition:  The patient was admitted to the hospital under the care of Dr. Benito.    Impression & Plan    Medical Decision Making:  The patient presented with unilateral flank and abdominal pain consistent with renal colic. CT confirms a ureteral stone. There is no fever or evidence of a urinary tract infection.   I considered other etiologies for these symptoms including AAA and pyelonephritis but these are unlikely given the otherwise normal CT scan and urinalysis. Upon recheck of the patient, he notes that he has had minimal relief in his pain after the above interventions and does not believe he can manage his symptoms at home. He knows in the past his pain has been unbearable and has had difficulty managing as an outpatient and has been admitted. He believes admission would be the best course for today and I think this is reasonable. The patient's case was discussed with the hospitalist service and they agreed to accept care of the patient. All questions were answered prior to admission.     Covid-19  Louie Greco was evaluated during a global COVID-19 pandemic, which necessitated consideration that the patient might be at risk for infection with the SARS-CoV-2 virus that causes COVID-19.   Applicable protocols for evaluation were followed during the patient's care.   COVID-19 was considered as part of the patient's evaluation. The plan for testing is:  a test was obtained during this visit.    Diagnosis:    ICD-10-CM    1. Renal colic  N23    2. Ureterolithiasis  N20.1        Scribe Disclosure:  Bladimir MILES, am serving as a scribe at 5:09 AM on 2/28/2021 to document services personally performed by Brice Loya MD at [facility] EMERGENCY DEPT based on my observations and the provider's statements to me.      Brice Loya MD  02/28/21  0850

## 2021-02-28 NOTE — ED NOTES
[facility]  ED Nurse Handoff Report    ED Chief complaint: Abdominal Pain      ED Diagnosis:   Final diagnoses:   Renal colic   Ureterolithiasis       Code Status: Full Code    Allergies:   Allergies   Allergen Reactions     Ampicillin Diarrhea     Demerol [Meperidine]      Nausea        Patient Story: Pt had onset of R sided flank pain that started at 2300 last night  Focused Assessment:  Alert and oriented.  Has had R sided abd pain since last night; decreased with IV pain meds.  Up independently.  Had some nausea, decreased with zofran.      Treatments and/or interventions provided: 1L bolus, zofran, dilaudid x 2, flomax   Patient's response to treatments and/or interventions:     To be done/followed up on inpatient unit:  See epic    Does this patient have any cognitive concerns?:     Activity level - Baseline/Home:  Independent  Activity Level - Current:   Independent    Patient's Preferred language: English   Needed?: No    Isolation: None  Infection: Not Applicable  Patient tested for COVID 19 prior to admission: YES  Bariatric?: No    Vital Signs:   Vitals:    02/28/21 0600 02/28/21 0745 02/28/21 0800 02/28/21 0900   BP: 133/86 125/83     Pulse: 72 87     Resp: 18      Temp:       SpO2: 98% 94% 92% 93%   Weight:       Height:           Cardiac Rhythm:     Was the PSS-3 completed:   Yes  What interventions are required if any?               Family Comments:   OBS brochure/video discussed/provided to patient/family: Yes              Name of person given brochure if not patient:               Relationship to patient:     For the majority of the shift this patient's behavior was Green.   Behavioral interventions performed were .    ED NURSE PHONE NUMBER: [id number]

## 2021-02-28 NOTE — PLAN OF CARE
COVID negative. A&Ox4. VSS on RA. R flank pain controlled w/ tylenol and oxy, IV dilaudid available. Nausea controlled w/ zofran. Kory reg diet. IVF infusing. Creat 1.1 (WNL). Continue to encourage hydration and ambulation. Urine strained, no stone or sediment noted. If no stone passes this evening, pt to be NPO at 0000 and urology to see in AM. Wife at bedside. Continue to monitor.

## 2021-02-28 NOTE — H&P
[facility]    History and Physical  Hospitalist       Date of Admission:  2/28/2021    Assessment & Plan   60 year old male with past medical hx of kidney stones, who presents with right flank pain and nausea with vomiting:    Summary:    Principal Problem:    Right 4 mm Kidney stone at [facility]    -- iv fluids, pain meds, strain urine, flomax   -- Urology consult (previous seen by Dr. Potter at [facility]) -- only 4 mm but has never spontaneously passed a stone in the past      Nausea and Vomiting -- related to stone   -- antiemetics    Active Problems:    Rectal Cancer, S/P Rad & Chemo 2017 -- no recurrence      Renal Cysts bilaterally -- seen on CT today       Plan: Admit to observation.  Will get EKG anticipating will need procedure to remove stone.      DVT Prophylaxis: Pneumatic Compression Devices  Code Status: Full Code    Disposition: Expected discharge in 1-2 days    Bola Up MD  Pager: [phone]  Cell Phone:  [phone]     Primary Care Physician   Philippe Healy    Chief Complaint   Right flank pain, nausea with vomiting    History is obtained from Patient    History of Present Illness   60 year old male, with a history of rectal cancer, nephrolithiasis, and inguinal hernia, who presents to the ED for evaluation of right flank pain. The patient reports he had the onset of lower right quadrant abdominal pain beginning at 2330 last night. He thought it was indigestion at first and was belching, which then it did subside. However, shortly after it went away, the pain came back and was severe. He notes he had a normal bowel movement at 0200 this morning and did not see any blood or black hue. He has not tried any home mediations.  Pain 10 out of 10, now down to 3 out of 10 with pain meds.  He did have nausea and vomited. The patient did receive his second COVID-19 shot three days ago. He has not had any fever, chills, diaphoresis, cough, nausea, vomiting, or any other acute  symptoms.    In ER, AVSS, WBC 6.0, Creat 1.10, glucose 134, UA with 3 WBC and >182 WBC's, nitrite negative.  Abd CT shows right 4 mm kidney stone at [facility] with mild hydronephrosis.  No flank pain on exam currently.     PAST MEDICAL HISTORY    Past Medical History:   Diagnosis Date     Childhood asthma      Elevated prostate specific antigen (PSA)     No biopsy done (had rectal cancer at the time)     Epididymitis, bilateral     18 years old     Inguinal hernia      Mumps      Nephrolithiasis     Several -- 1990 first, 2019 last     Rectal cancer (H) 2017    low rectal cancer, S/P Rad adn Chemo, no surg needed     Shingles         PAST SURGICAL HISTORY    Past Surgical History:   Procedure Laterality Date     COLONOSCOPY N/A 7/27/2016    Procedure: COMBINED COLONOSCOPY, SINGLE OR MULTIPLE BIOPSY/POLYPECTOMY BY BIOPSY;  Surgeon: Chelsea Thompson MD;  Location:  GI     COLONOSCOPY N/A 9/13/2017    Procedure: COLONOSCOPY;;  Surgeon: Felicitas Radford MD;  [facility]     COLONOSCOPY N/A 12/13/2017    Procedure: COLONOSCOPY;;  Surgeon: Felicitas Radford MD;  [facility]     COLONOSCOPY N/A 10/23/2020    Procedure: COLONOSCOPY;  Surgeon: Felicitas Radford MD;  [facility]: [facility]     COMBINED CYSTOSCOPY, RETROGRADES, URETEROSCOPY, LASER HOLMIUM LITHOTRIPSY URETER(S), INSERT STENT Left 2/16/2018    Procedure: COMBINED CYSTOSCOPY, RETROGRADES, URETEROSCOPY, LASER HOLMIUM LITHOTRIPSY URETER(S), INSERT STENT;  Cysto, left ureteroscopy, holmium laser, retrogrades, stent placement;  Surgeon: Bola Worthy MD;  Location:  OR     EXAM UNDER ANESTHESIA ANUS N/A 7/5/2017    Procedure: EXAM UNDER ANESTHESIA ANUS;  Examination Under Anesthesia, flex sigmoidoscopy with biopsies and formalin application;  Surgeon: Felicitas Radford MD;  [facility]     EXAM UNDER ANESTHESIA ANUS N/A 9/13/2017    Procedure: EXAM UNDER ANESTHESIA ANUS;  Examination Under Anesthesia  Anus, Colonoscopy, application of formalin;  Surgeon: Felicitas Radford MD;  [facility]     EXAM UNDER ANESTHESIA ANUS N/A 12/13/2017    Procedure: EXAM UNDER ANESTHESIA ANUS;  Examination Under Anesthesia Anus, Colonoscopy;  Surgeon: Felicitas Radford MD;  [facility]     EXAM UNDER ANESTHESIA ANUS N/A 5/11/2018    Procedure: EXAM UNDER ANESTHESIA ANUS;  Examination Under Anesthesia Anus, Interoperative Flexible Sigmoidoscopy, Application of Formalin;  Surgeon: Felicitas Radford MD;  [facility]     EXAM UNDER ANESTHESIA ANUS N/A 7/20/2018    Procedure: EXAM UNDER ANESTHESIA ANUS;  Examination Under Anesthesia Anus, Flexible Sigmoidoscopy ;  Surgeon: Felicitas Radford MD;  [facility]     EXAM UNDER ANESTHESIA ANUS N/A 11/30/2018    Procedure: Examination Under Anesthesia, application of formalin to rectum, polypectomy;  Surgeon: Felicitas Radford MD;  [facility]     EXAM UNDER ANESTHESIA ANUS N/A 2/22/2019    Procedure: Examination Under Anesthesia;  Surgeon: Felicitas Radford MD;  [facility]     EXAM UNDER ANESTHESIA ANUS N/A 6/28/2019    Procedure: Examination Under Anesthesia;  Surgeon: Felicitas Radford MD;  [facility]     EXAM UNDER ANESTHESIA ANUS N/A 11/1/2019    Procedure: Examination Under Anesthesia;  Surgeon: Felicitas Radford MD;  [facility]     EXAM UNDER ANESTHESIA RECTUM N/A 10/23/2020    Procedure: Exam under anesthesia rectum;  Surgeon: Felicitas Radford MD;  [facility]     HC TOOTH EXTRACTION W/FORCEP       LAPAROSCOPIC APPENDECTOMY N/A 7/17/2017    Procedure: LAPAROSCOPIC APPENDECTOMY;  LAPAROSCOPIC APPENDECTOMY;  Surgeon: Bryson Ferguson MD;  [facility]     SIGMOIDOSCOPY FLEXIBLE N/A 12/8/2016    Procedure: SIGMOIDOSCOPY FLEXIBLE;  Surgeon: Felicitas Radford MD;  [facility]     SIGMOIDOSCOPY FLEXIBLE N/A 7/5/2017    Procedure: SIGMOIDOSCOPY FLEXIBLE;;   Surgeon: Felicitas Radford MD;  [facility]     SIGMOIDOSCOPY FLEXIBLE N/A 5/11/2018    Procedure: SIGMOIDOSCOPY FLEXIBLE;;  Surgeon: Felicitas Radford MD;  [facility]     SIGMOIDOSCOPY FLEXIBLE N/A 7/20/2018    Procedure: SIGMOIDOSCOPY FLEXIBLE;;  Surgeon: Felicitas Radford MD;  [facility]     SIGMOIDOSCOPY FLEXIBLE N/A 11/30/2018    Procedure: Flexible Sigmoidoscopy;  Surgeon: Felicitas Radford MD;  [facility]     SIGMOIDOSCOPY FLEXIBLE N/A 2/22/2019    Procedure: Intraoperative Flexible Sigmoidoscopy, Application of Formalin to rectum;  Surgeon: Felicitas Radford MD;  [facility]     SIGMOIDOSCOPY FLEXIBLE N/A 6/28/2019    Procedure: Flexible Sigmoidoscopy;  Surgeon: Felicitas Radford MD;  [facility]     SIGMOIDOSCOPY FLEXIBLE N/A 11/1/2019    Procedure: Flexible Sigmoidoscopy;  Surgeon: Felicitas Radford MD;  [facility]     TESTICLE SURGERY       VASCULAR SURGERY      Right chest port     VASECTOMY       VASECTOMY          Prior to Admission Medications   None     Allergies   Allergies   Allergen Reactions     Ampicillin Diarrhea     Demerol [Meperidine]      Nausea        SOCIAL HISTORY    Social History     Social History Narrative    , 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)       Social History     Tobacco Use     Smoking status: Never Smoker     Smokeless tobacco: Never Used   Substance Use Topics     Alcohol use: Yes     Comment: < 1 drink a week     Drug use: No        FAMILY HISTORY    Family History   Problem Relation Age of Onset     Cancer Brother         primary of unknown origin     Other Cancer Brother      Chronic Obstructive Pulmonary Disease Father      Hypertension Father      Hyperlipidemia Father      Colon Polyps Mother         Number and type of polyps unknown     Family History Negative Brother         negative colonoscopy     Diabetes Maternal Grandfather      Hypertension  Paternal Grandmother      Hyperlipidemia Paternal Grandmother      Stomach Cancer Other 63        maternal great grandfather     Glaucoma No family hx of      Macular Degeneration No family hx of         Review of Systems   The 10 point Review of Systems is negative other than noted in the HPI or here.       PHYSICAL EXAM     Temp: 98.3  F (36.8  C)   BP: 125/83 Pulse: 87   Resp: 18 SpO2: 93 % O2 Device: None (Room air)    Vital Signs with Ranges  Temp:  [98.3  F (36.8  C)] 98.3  F (36.8  C)  Pulse:  [72-87] 87  Resp:  [18-22] 18  BP: (125-135)/(74-86) 125/83  SpO2:  [92 %-98 %] 93 %  210 lbs 0 oz    Constitutional: Awake, alert, cooperative, no apparent distress.  Eyes: Conjunctiva and pupils examined and normal.  HEENT: Moist mucous membranes, normal dentition.  Respiratory: Clear to auscultation bilaterally, no crackles or wheezing.  Cardiovascular: Regular rate and rhythm, normal S1 and S2, and no murmur noted, no carotid bruits.  No ankle edema.   GI: Soft, non-distended, non-tender, normal bowel sounds.  Currently no flank tenderness.   Lymph/Hematologic: No anterior cervical, supraclavicular or axillary adenopathy.  Skin: No rashes, no cyanosis.   Musculoskeletal: No joint swelling, erythema or tenderness.  Neurologic: Alert, Ox3, Cranial nerves 2-12 intact, no focal weakness or numbness  Psychiatric:  No obvious anxiety or depression.    Data   Data reviewed today:  I personally reviewed no EKG's today.  Recent Labs   Lab 02/28/21  0515   WBC 6.0   HGB 14.0   MCV 86         POTASSIUM 4.0   CHLORIDE 110*   CO2 25   BUN 19   CR 1.10   ANIONGAP 5   FRANDY 8.3*   *   ALBUMIN 3.7   PROTTOTAL 6.8   BILITOTAL 0.5   ALKPHOS 58   ALT 40   AST 26   LIPASE 264       Imaging:  Recent Results (from the past 24 hour(s))   CT Abdomen Pelvis w/o Contrast    Narrative    EXAM: CT ABDOMEN PELVIS W/O CONTRAST  LOCATION: [facility]  DATE/TIME: 2/28/2021 6:16 AM    INDICATION: RLQ abdominal  pain  COMPARISON: None.  TECHNIQUE: CT scan of the abdomen and pelvis was performed without IV contrast. Multiplanar reformats were obtained. Dose reduction techniques were used.  CONTRAST: None.    FINDINGS:   LOWER CHEST: Calcified left lower lobe granuloma. Bibasilar atelectasis.    HEPATOBILIARY: Simple fluid density benign liver cysts requiring no follow-up. Gallbladder is normal.    PANCREAS: Normal.    SPLEEN: Normal.    ADRENAL GLANDS: Normal.    KIDNEYS/BLADDER: Multiple bilateral renal cysts of varying density. Calcified intrarenal stones present bilaterally measuring up to 3 mm in the right midpole and up to 7 mm in the left upper pole. There is mild right hydronephrosis secondary to a 4 mm   stone at the right ureteropelvic junction on coronal image 52. The bladder is normal.    BOWEL: Postoperative change at the cecum. No mechanical bowel obstruction or free air.    LYMPH NODES: Normal.    VASCULATURE: Mild aortoiliac atherosclerotic calcification.    PELVIC ORGANS: Small to moderate fat-containing left inguinal hernia.    MUSCULOSKELETAL: Lumbar spine degenerative disc height loss. Small fat-containing umbilical hernia.      Impression    IMPRESSION:   1.  4 mm stone at the right ureteropelvic junction causing mild right hydronephrosis. There are multiple additional calcified intrarenal stones in both kidneys.    2.  Polycystic kidneys with cysts of varying densities. These could be evaluated further with renal mass protocol CT or MRI to exclude solid lesion.

## 2021-03-01 ENCOUNTER — ANESTHESIA (OUTPATIENT)
Dept: SURGERY | Facility: CLINIC | Age: 61
End: 2021-03-01
Payer: COMMERCIAL

## 2021-03-01 ENCOUNTER — APPOINTMENT (OUTPATIENT)
Dept: GENERAL RADIOLOGY | Facility: CLINIC | Age: 61
End: 2021-03-01
Attending: INTERNAL MEDICINE
Payer: COMMERCIAL

## 2021-03-01 ENCOUNTER — ANESTHESIA EVENT (OUTPATIENT)
Dept: SURGERY | Facility: CLINIC | Age: 61
End: 2021-03-01
Payer: COMMERCIAL

## 2021-03-01 VITALS
HEIGHT: 70 IN | HEART RATE: 87 BPM | WEIGHT: 210 LBS | BODY MASS INDEX: 30.06 KG/M2 | OXYGEN SATURATION: 96 % | SYSTOLIC BLOOD PRESSURE: 127 MMHG | DIASTOLIC BLOOD PRESSURE: 81 MMHG | TEMPERATURE: 95.5 F | RESPIRATION RATE: 10 BRPM

## 2021-03-01 PROCEDURE — 250N000011 HC RX IP 250 OP 636: Performed by: UROLOGY

## 2021-03-01 PROCEDURE — 250N000011 HC RX IP 250 OP 636: Performed by: NURSE ANESTHETIST, CERTIFIED REGISTERED

## 2021-03-01 PROCEDURE — 82365 CALCULUS SPECTROSCOPY: CPT | Performed by: UROLOGY

## 2021-03-01 PROCEDURE — 258N000003 HC RX IP 258 OP 636: Performed by: ANESTHESIOLOGY

## 2021-03-01 PROCEDURE — C1769 GUIDE WIRE: HCPCS | Performed by: UROLOGY

## 2021-03-01 PROCEDURE — 999N000179 XR SURGERY CARM FLUORO LESS THAN 5 MIN W STILLS: Mod: TC

## 2021-03-01 PROCEDURE — 258N000003 HC RX IP 258 OP 636: Performed by: INTERNAL MEDICINE

## 2021-03-01 PROCEDURE — 99204 OFFICE O/P NEW MOD 45 MIN: CPT | Mod: 25 | Performed by: PHYSICIAN ASSISTANT

## 2021-03-01 PROCEDURE — 250N000013 HC RX MED GY IP 250 OP 250 PS 637: Performed by: INTERNAL MEDICINE

## 2021-03-01 PROCEDURE — 99217 PR OBSERVATION CARE DISCHARGE: CPT | Performed by: PHYSICIAN ASSISTANT

## 2021-03-01 PROCEDURE — 360N000076 HC SURGERY LEVEL 3, PER MIN: Performed by: UROLOGY

## 2021-03-01 PROCEDURE — G0378 HOSPITAL OBSERVATION PER HR: HCPCS

## 2021-03-01 PROCEDURE — 250N000009 HC RX 250: Performed by: NURSE ANESTHETIST, CERTIFIED REGISTERED

## 2021-03-01 PROCEDURE — 258N000003 HC RX IP 258 OP 636: Performed by: NURSE ANESTHETIST, CERTIFIED REGISTERED

## 2021-03-01 PROCEDURE — 272N000001 HC OR GENERAL SUPPLY STERILE: Performed by: UROLOGY

## 2021-03-01 PROCEDURE — 370N000017 HC ANESTHESIA TECHNICAL FEE, PER MIN: Performed by: UROLOGY

## 2021-03-01 PROCEDURE — 250N000025 HC SEVOFLURANE, PER MIN: Performed by: UROLOGY

## 2021-03-01 PROCEDURE — 999N000141 HC STATISTIC PRE-PROCEDURE NURSING ASSESSMENT: Performed by: UROLOGY

## 2021-03-01 PROCEDURE — C2617 STENT, NON-COR, TEM W/O DEL: HCPCS | Performed by: UROLOGY

## 2021-03-01 PROCEDURE — 52351 CYSTOURETERO & OR PYELOSCOPE: CPT | Mod: RT | Performed by: UROLOGY

## 2021-03-01 PROCEDURE — 74420 UROGRAPHY RTRGR +-KUB: CPT | Mod: 26 | Performed by: UROLOGY

## 2021-03-01 PROCEDURE — 710N000009 HC RECOVERY PHASE 1, LEVEL 1, PER MIN: Performed by: UROLOGY

## 2021-03-01 PROCEDURE — C1758 CATHETER, URETERAL: HCPCS | Performed by: UROLOGY

## 2021-03-01 PROCEDURE — 52332 CYSTOSCOPY AND TREATMENT: CPT | Mod: RT | Performed by: UROLOGY

## 2021-03-01 DEVICE — STENT URETERAL POLARIS ULTRA 6FRX24CM M0061921320
Type: IMPLANTABLE DEVICE | Site: URETHRA | Status: NON-FUNCTIONAL
Removed: 2021-03-22

## 2021-03-01 RX ORDER — HYDROMORPHONE HYDROCHLORIDE 1 MG/ML
.3-.5 INJECTION, SOLUTION INTRAMUSCULAR; INTRAVENOUS; SUBCUTANEOUS EVERY 5 MIN PRN
Status: DISCONTINUED | OUTPATIENT
Start: 2021-03-01 | End: 2021-03-01

## 2021-03-01 RX ORDER — KETOROLAC TROMETHAMINE 30 MG/ML
INJECTION, SOLUTION INTRAMUSCULAR; INTRAVENOUS PRN
Status: DISCONTINUED | OUTPATIENT
Start: 2021-03-01 | End: 2021-03-01

## 2021-03-01 RX ORDER — NALOXONE HYDROCHLORIDE 0.4 MG/ML
0.2 INJECTION, SOLUTION INTRAMUSCULAR; INTRAVENOUS; SUBCUTANEOUS
Status: DISCONTINUED | OUTPATIENT
Start: 2021-03-01 | End: 2021-03-01

## 2021-03-01 RX ORDER — ONDANSETRON 2 MG/ML
4 INJECTION INTRAMUSCULAR; INTRAVENOUS EVERY 30 MIN PRN
Status: DISCONTINUED | OUTPATIENT
Start: 2021-03-01 | End: 2021-03-01

## 2021-03-01 RX ORDER — OXYBUTYNIN CHLORIDE 5 MG/1
5 TABLET, EXTENDED RELEASE ORAL DAILY
Qty: 30 TABLET | Refills: 0 | Status: ON HOLD | OUTPATIENT
Start: 2021-03-01 | End: 2021-03-22

## 2021-03-01 RX ORDER — TAMSULOSIN HYDROCHLORIDE 0.4 MG/1
0.4 CAPSULE ORAL DAILY
Qty: 30 CAPSULE | Refills: 0 | Status: ON HOLD | OUTPATIENT
Start: 2021-03-01 | End: 2021-03-22

## 2021-03-01 RX ORDER — NALOXONE HYDROCHLORIDE 0.4 MG/ML
0.4 INJECTION, SOLUTION INTRAMUSCULAR; INTRAVENOUS; SUBCUTANEOUS
Status: DISCONTINUED | OUTPATIENT
Start: 2021-03-01 | End: 2021-03-01

## 2021-03-01 RX ORDER — FENTANYL CITRATE 50 UG/ML
INJECTION, SOLUTION INTRAMUSCULAR; INTRAVENOUS PRN
Status: DISCONTINUED | OUTPATIENT
Start: 2021-03-01 | End: 2021-03-01

## 2021-03-01 RX ORDER — CEFAZOLIN SODIUM 2 G/100ML
2 INJECTION, SOLUTION INTRAVENOUS SEE ADMIN INSTRUCTIONS
Status: DISCONTINUED | OUTPATIENT
Start: 2021-03-01 | End: 2021-03-01 | Stop reason: HOSPADM

## 2021-03-01 RX ORDER — DEXAMETHASONE SODIUM PHOSPHATE 4 MG/ML
INJECTION, SOLUTION INTRA-ARTICULAR; INTRALESIONAL; INTRAMUSCULAR; INTRAVENOUS; SOFT TISSUE PRN
Status: DISCONTINUED | OUTPATIENT
Start: 2021-03-01 | End: 2021-03-01

## 2021-03-01 RX ORDER — ONDANSETRON 2 MG/ML
INJECTION INTRAMUSCULAR; INTRAVENOUS PRN
Status: DISCONTINUED | OUTPATIENT
Start: 2021-03-01 | End: 2021-03-01

## 2021-03-01 RX ORDER — FUROSEMIDE 10 MG/ML
INJECTION INTRAMUSCULAR; INTRAVENOUS PRN
Status: DISCONTINUED | OUTPATIENT
Start: 2021-03-01 | End: 2021-03-01

## 2021-03-01 RX ORDER — FENTANYL CITRATE 50 UG/ML
25-50 INJECTION, SOLUTION INTRAMUSCULAR; INTRAVENOUS
Status: DISCONTINUED | OUTPATIENT
Start: 2021-03-01 | End: 2021-03-01

## 2021-03-01 RX ORDER — LIDOCAINE HYDROCHLORIDE 20 MG/ML
INJECTION, SOLUTION INFILTRATION; PERINEURAL PRN
Status: DISCONTINUED | OUTPATIENT
Start: 2021-03-01 | End: 2021-03-01

## 2021-03-01 RX ORDER — OXYCODONE HYDROCHLORIDE 5 MG/1
5 TABLET ORAL EVERY 6 HOURS PRN
Qty: 9 TABLET | Refills: 0 | Status: SHIPPED | OUTPATIENT
Start: 2021-03-01 | End: 2021-03-24

## 2021-03-01 RX ORDER — CEFAZOLIN SODIUM 2 G/100ML
2 INJECTION, SOLUTION INTRAVENOUS
Status: DISCONTINUED | OUTPATIENT
Start: 2021-03-01 | End: 2021-03-01 | Stop reason: HOSPADM

## 2021-03-01 RX ORDER — SODIUM CHLORIDE, SODIUM LACTATE, POTASSIUM CHLORIDE, CALCIUM CHLORIDE 600; 310; 30; 20 MG/100ML; MG/100ML; MG/100ML; MG/100ML
INJECTION, SOLUTION INTRAVENOUS CONTINUOUS
Status: DISCONTINUED | OUTPATIENT
Start: 2021-03-01 | End: 2021-03-01 | Stop reason: HOSPADM

## 2021-03-01 RX ORDER — PROPOFOL 10 MG/ML
INJECTION, EMULSION INTRAVENOUS PRN
Status: DISCONTINUED | OUTPATIENT
Start: 2021-03-01 | End: 2021-03-01

## 2021-03-01 RX ORDER — PROPOFOL 10 MG/ML
INJECTION, EMULSION INTRAVENOUS CONTINUOUS PRN
Status: DISCONTINUED | OUTPATIENT
Start: 2021-03-01 | End: 2021-03-01

## 2021-03-01 RX ORDER — KETOROLAC TROMETHAMINE 10 MG/1
10 TABLET, FILM COATED ORAL EVERY 6 HOURS
Qty: 20 TABLET | Refills: 0 | Status: SHIPPED | OUTPATIENT
Start: 2021-03-01 | End: 2021-03-06

## 2021-03-01 RX ORDER — ONDANSETRON 4 MG/1
4 TABLET, ORALLY DISINTEGRATING ORAL EVERY 30 MIN PRN
Status: DISCONTINUED | OUTPATIENT
Start: 2021-03-01 | End: 2021-03-01

## 2021-03-01 RX ORDER — ACETAMINOPHEN 325 MG/1
650 TABLET ORAL EVERY 4 HOURS PRN
COMMUNITY
Start: 2021-03-01 | End: 2021-05-18

## 2021-03-01 RX ORDER — SODIUM CHLORIDE, SODIUM LACTATE, POTASSIUM CHLORIDE, CALCIUM CHLORIDE 600; 310; 30; 20 MG/100ML; MG/100ML; MG/100ML; MG/100ML
INJECTION, SOLUTION INTRAVENOUS CONTINUOUS
Status: DISCONTINUED | OUTPATIENT
Start: 2021-03-01 | End: 2021-03-01

## 2021-03-01 RX ADMIN — LIDOCAINE HYDROCHLORIDE 40 MG: 20 INJECTION, SOLUTION INFILTRATION; PERINEURAL at 11:50

## 2021-03-01 RX ADMIN — PROPOFOL 200 MG: 10 INJECTION, EMULSION INTRAVENOUS at 11:50

## 2021-03-01 RX ADMIN — TAMSULOSIN HYDROCHLORIDE 0.4 MG: 0.4 CAPSULE ORAL at 08:50

## 2021-03-01 RX ADMIN — ONDANSETRON 4 MG: 2 INJECTION INTRAMUSCULAR; INTRAVENOUS at 11:58

## 2021-03-01 RX ADMIN — SODIUM CHLORIDE, POTASSIUM CHLORIDE, SODIUM LACTATE AND CALCIUM CHLORIDE: 600; 310; 30; 20 INJECTION, SOLUTION INTRAVENOUS at 10:34

## 2021-03-01 RX ADMIN — SODIUM CHLORIDE: 9 INJECTION, SOLUTION INTRAVENOUS at 02:03

## 2021-03-01 RX ADMIN — CEFAZOLIN SODIUM 2 G: 2 INJECTION, SOLUTION INTRAVENOUS at 11:57

## 2021-03-01 RX ADMIN — FUROSEMIDE 20 MG: 10 INJECTION, SOLUTION INTRAVENOUS at 12:31

## 2021-03-01 RX ADMIN — PHENYLEPHRINE HYDROCHLORIDE 50 MCG: 10 INJECTION INTRAVENOUS at 12:08

## 2021-03-01 RX ADMIN — MIDAZOLAM 2 MG: 1 INJECTION INTRAMUSCULAR; INTRAVENOUS at 11:45

## 2021-03-01 RX ADMIN — OXYCODONE HYDROCHLORIDE 5 MG: 5 TABLET ORAL at 01:55

## 2021-03-01 RX ADMIN — OXYCODONE HYDROCHLORIDE 5 MG: 5 TABLET ORAL at 06:43

## 2021-03-01 RX ADMIN — DEXAMETHASONE SODIUM PHOSPHATE 4 MG: 4 INJECTION, SOLUTION INTRA-ARTICULAR; INTRALESIONAL; INTRAMUSCULAR; INTRAVENOUS; SOFT TISSUE at 11:58

## 2021-03-01 RX ADMIN — ROCURONIUM BROMIDE 50 MG: 10 INJECTION INTRAVENOUS at 11:50

## 2021-03-01 RX ADMIN — FENTANYL CITRATE 50 MCG: 50 INJECTION, SOLUTION INTRAMUSCULAR; INTRAVENOUS at 11:50

## 2021-03-01 RX ADMIN — SUGAMMADEX 200 MG: 100 INJECTION, SOLUTION INTRAVENOUS at 12:32

## 2021-03-01 RX ADMIN — PROPOFOL 30 MCG/KG/MIN: 10 INJECTION, EMULSION INTRAVENOUS at 12:02

## 2021-03-01 RX ADMIN — KETOROLAC TROMETHAMINE 30 MG: 30 INJECTION, SOLUTION INTRAMUSCULAR at 12:31

## 2021-03-01 NOTE — PROGRESS NOTES
Report given to preop RN. Anticipate pt to be taken down at 1000 for procedure.    10:27 AM addendum: pt taken to preop

## 2021-03-01 NOTE — ANESTHESIA PREPROCEDURE EVALUATION
Anesthesia Pre-Procedure Evaluation    Patient: Louie Greco   MRN: [id number] : 1960        Preoperative Diagnosis: Ureteral stone [N20.1]   Procedure : Procedure(s):  CYSTOURETEROSCOPY, WITH LITHOTRIPSY USING LASER AND URETERAL STENT INSERTION     Past Medical History:   Diagnosis Date     Childhood asthma      Elevated prostate specific antigen (PSA)     No biopsy done (had rectal cancer at the time)     Epididymitis, bilateral     18 years old     Inguinal hernia      Mumps      Nephrolithiasis     Several --  first, 2019 last     Rectal cancer (H) 2017    low rectal cancer, S/P Rad adn Chemo, no surg needed     Shingles       Past Surgical History:   Procedure Laterality Date     COLONOSCOPY N/A 2016    Procedure: COMBINED COLONOSCOPY, SINGLE OR MULTIPLE BIOPSY/POLYPECTOMY BY BIOPSY;  Surgeon: Chelsea Thompson MD;  Location:  GI     COLONOSCOPY N/A 2017    Procedure: COLONOSCOPY;;  Surgeon: Felicitas Radford MD;  [facility]     COLONOSCOPY N/A 2017    Procedure: COLONOSCOPY;;  Surgeon: Felicitas Radford MD;  [facility]     COLONOSCOPY N/A 10/23/2020    Procedure: COLONOSCOPY;  Surgeon: Felicitas Radford MD;  Location: [facility]     COMBINED CYSTOSCOPY, RETROGRADES, URETEROSCOPY, LASER HOLMIUM LITHOTRIPSY URETER(S), INSERT STENT Left 2018    Procedure: COMBINED CYSTOSCOPY, RETROGRADES, URETEROSCOPY, LASER HOLMIUM LITHOTRIPSY URETER(S), INSERT STENT;  Cysto, left ureteroscopy, holmium laser, retrogrades, stent placement;  Surgeon: Bola Worthy MD;  Location:  OR     EXAM UNDER ANESTHESIA ANUS N/A 2017    Procedure: EXAM UNDER ANESTHESIA ANUS;  Examination Under Anesthesia, flex sigmoidoscopy with biopsies and formalin application;  Surgeon: Felicitas Radford MD;  [facility]     EXAM UNDER ANESTHESIA ANUS N/A 2017    Procedure: EXAM UNDER ANESTHESIA ANUS;  Examination Under Anesthesia Anus,  Colonoscopy, application of formalin;  Surgeon: Felicitas Radford MD;  [facility]     EXAM UNDER ANESTHESIA ANUS N/A 12/13/2017    Procedure: EXAM UNDER ANESTHESIA ANUS;  Examination Under Anesthesia Anus, Colonoscopy;  Surgeon: Felicitas Radford MD;  [facility]     EXAM UNDER ANESTHESIA ANUS N/A 5/11/2018    Procedure: EXAM UNDER ANESTHESIA ANUS;  Examination Under Anesthesia Anus, Interoperative Flexible Sigmoidoscopy, Application of Formalin;  Surgeon: Felicitas Radford MD;  [facility]     EXAM UNDER ANESTHESIA ANUS N/A 7/20/2018    Procedure: EXAM UNDER ANESTHESIA ANUS;  Examination Under Anesthesia Anus, Flexible Sigmoidoscopy ;  Surgeon: Felicitas Radford MD;  [facility]     EXAM UNDER ANESTHESIA ANUS N/A 11/30/2018    Procedure: Examination Under Anesthesia, application of formalin to rectum, polypectomy;  Surgeon: Felicitas Radford MD;  [facility]     EXAM UNDER ANESTHESIA ANUS N/A 2/22/2019    Procedure: Examination Under Anesthesia;  Surgeon: Felicitas Radford MD;  [facility]     EXAM UNDER ANESTHESIA ANUS N/A 6/28/2019    Procedure: Examination Under Anesthesia;  Surgeon: Felicitas Radford MD;  [facility]     EXAM UNDER ANESTHESIA ANUS N/A 11/1/2019    Procedure: Examination Under Anesthesia;  Surgeon: Felicitas Radford MD;  [facility]     EXAM UNDER ANESTHESIA RECTUM N/A 10/23/2020    Procedure: Exam under anesthesia rectum;  Surgeon: Felicitas Radford MD;  [facility]     HC TOOTH EXTRACTION W/FORCEP       LAPAROSCOPIC APPENDECTOMY N/A 7/17/2017    Procedure: LAPAROSCOPIC APPENDECTOMY;  LAPAROSCOPIC APPENDECTOMY;  Surgeon: Bryson Ferguson MD;  [facility]     SIGMOIDOSCOPY FLEXIBLE N/A 12/8/2016    Procedure: SIGMOIDOSCOPY FLEXIBLE;  Surgeon: Felicitas Radford MD;  [facility]     SIGMOIDOSCOPY FLEXIBLE N/A 7/5/2017    Procedure: SIGMOIDOSCOPY FLEXIBLE;;   Surgeon: Felicitas Radford MD;  [facility]     SIGMOIDOSCOPY FLEXIBLE N/A 5/11/2018    Procedure: SIGMOIDOSCOPY FLEXIBLE;;  Surgeon: Felicitas Radford MD;  [facility]     SIGMOIDOSCOPY FLEXIBLE N/A 7/20/2018    Procedure: SIGMOIDOSCOPY FLEXIBLE;;  Surgeon: Felicitas Radford MD;  [facility]     SIGMOIDOSCOPY FLEXIBLE N/A 11/30/2018    Procedure: Flexible Sigmoidoscopy;  Surgeon: Felicitas Radford MD;  [facility]     SIGMOIDOSCOPY FLEXIBLE N/A 2/22/2019    Procedure: Intraoperative Flexible Sigmoidoscopy, Application of Formalin to rectum;  Surgeon: Felicitas Radford MD;  [facility]     SIGMOIDOSCOPY FLEXIBLE N/A 6/28/2019    Procedure: Flexible Sigmoidoscopy;  Surgeon: Felicitas Radford MD;  [facility]     SIGMOIDOSCOPY FLEXIBLE N/A 11/1/2019    Procedure: Flexible Sigmoidoscopy;  Surgeon: Felicitas Radford MD;  [facility]     TESTICLE SURGERY       VASCULAR SURGERY      Right chest port     VASECTOMY       VASECTOMY        Allergies   Allergen Reactions     Ampicillin Diarrhea     Demerol [Meperidine]      Nausea       Social History     Tobacco Use     Smoking status: Never Smoker     Smokeless tobacco: Never Used   Substance Use Topics     Alcohol use: Yes     Comment: < 1 drink a week      Wt Readings from Last 1 Encounters:   02/28/21 95.3 kg (210 lb)        Anesthesia Evaluation   Pt has had prior anesthetic.         ROS/MED HX  ENT/Pulmonary:     (+) asthma  (-) sleep apnea   Neurologic: Comment: HZ    (+) peripheral neuropathy, - chemo inducer - 4 extremities.  :     Cardiovascular:       METS/Exercise Tolerance: >4 METS    Hematologic:       Musculoskeletal:       GI/Hepatic:     (+) GERD, Symptomatic,     Renal/Genitourinary:     (+) renal disease, Nephrolithiasis ,     Endo:     (+) Obesity,     Psychiatric/Substance Use:       Infectious Disease:       Malignancy: Comment: rectal  (+) Malignancy, GI CA  Remission status post.        Other:            Physical Exam    Airway        Mallampati: II   TM distance: > 3 FB   Neck ROM: full   Mouth opening: > 3 cm    Respiratory Devices and Support         Dental       (+) caps      Cardiovascular          Rhythm and rate: regular     Pulmonary           breath sounds clear to auscultation           OUTSIDE LABS:  CBC:   Lab Results   Component Value Date    WBC 6.0 02/28/2021    WBC 5.8 08/13/2020    HGB 14.0 02/28/2021    HGB 16.2 08/13/2020    HCT 42.5 02/28/2021    HCT 46.2 08/13/2020     02/28/2021     08/13/2020     BMP:   Lab Results   Component Value Date     02/28/2021     08/13/2020    POTASSIUM 4.0 02/28/2021    POTASSIUM 3.7 08/13/2020    CHLORIDE 110 (H) 02/28/2021    CHLORIDE 104 08/13/2020    CO2 25 02/28/2021    CO2 27 08/13/2020    BUN 19 02/28/2021    BUN 16 08/13/2020    CR 1.10 02/28/2021    CR 0.83 08/13/2020     (H) 02/28/2021     (H) 08/13/2020     COAGS:   Lab Results   Component Value Date    PTT 31 07/10/2017    INR 1.02 07/10/2017     POC:   Lab Results   Component Value Date    BGM 60 (L) 07/25/2018     HEPATIC:   Lab Results   Component Value Date    ALBUMIN 3.7 02/28/2021    PROTTOTAL 6.8 02/28/2021    ALT 40 02/28/2021    AST 26 02/28/2021    ALKPHOS 58 02/28/2021    BILITOTAL 0.5 02/28/2021     OTHER:   Lab Results   Component Value Date    LACT 0.8 07/17/2017    FRANDY 8.3 (L) 02/28/2021    MAG 2.0 07/17/2017    LIPASE 264 02/28/2021    .0 (H) 07/17/2017    SED 34 (H) 07/17/2017       Anesthesia Plan    ASA Status:  2      Anesthesia Type: General.     - Airway: ETT         Techniques and Equipment:     - Airway: Video-Laryngoscope         Consents    Anesthesia Plan(s) and associated risks, benefits, and realistic alternatives discussed. Questions answered and patient/representative(s) expressed understanding.     - Discussed with:  Patient         Postoperative Care       PONV prophylaxis:  Ondansetron (or other 5HT-3), Background Propofol Infusion     Comments:                Marcella Delaney MD

## 2021-03-01 NOTE — PROGRESS NOTES
Observation goals:     -diagnostic tests and consults completed and resulted- Not met  -vital signs normal or at patient baseline- Met  Nurse to notify provider when observation goals have been met and patient is ready for discharge.

## 2021-03-01 NOTE — ANESTHESIA PROCEDURE NOTES
Airway   Date/Time: 3/1/2021 11:52 AM   Patient location during procedure: OR  Staff -   Anesthesiologist:  Marcella Delaney MD  CRNA: Mariposa De La Cruz  Performed By: CRNA    Consent for Airway   Urgency: elective    Indications and Patient Condition  Indications for airway management: angel-procedural  Induction type:intravenousMask difficulty assessment: 2 - vent by mask + OA or adjuvant +/- NMBA    Final Airway Details  Final airway type: endotracheal airway  Successful airway:Oral and ETT - single  Endotracheal Airway Details   ETT size (mm): 8.0  Cuffed: yes  Successful intubation technique: video laryngoscopy  Grade View of Cords: 1  Adjucts: stylet  Measured from: gums/teeth  Secured at (cm): 23  Secured with: pink tape  Bite block used: None    Post intubation assessment   Placement verified by: capnometry, equal breath sounds and chest rise   Number of attempts at approach: 1  Secured with:pink tape  Ease of procedure: easy  Dentition: Unchanged and Intact

## 2021-03-01 NOTE — DISCHARGE SUMMARY
[facility]    Discharge Summary  Hospitalist    Date of Admission:  2/28/2021  Date of Discharge:  3/1/2021  Discharging Provider: Calvin Razo  Date of Service (when I saw the patient): 03/01/21    Discharge Diagnoses   1. Right 4 mm Kidney stone at [facility]   2. Status post cystourethroscopy, basketing of stones, right JJ stent placement 3/1/2021  3. Nausea and Vomiting   4. Rectal Cancer, S/P Rad & Chemo 2017 -- no recurrence  5. Incidental renal Cysts bilaterally  6. Polycystic kidneys on imaging    History of Present Illness   Mr. Greco is a 60 year old male, with a history of rectal cancer, nephrolithiasis, and inguinal hernia, who presents to the ED for evaluation of right flank pain. The patient reports he had the onset of lower right quadrant abdominal pain beginning at 2330 last night. He thought it was indigestion at first and was belching, which then it did subside. However, shortly after it went away, the pain came back and was severe. He notes he had a normal bowel movement at 0200 this morning and did not see any blood or black hue. He has not tried any home mediations.  Pain initially 10/10, down to 3/10 with pain meds.  He did have nausea and vomited. The patient received his second COVID-19 shot three days ago. He has not had any fever, chills, diaphoresis, cough, nausea, vomiting, or any other acute symptoms.     In ER, AVSS, WBC 6.0, Creat 1.10, glucose 134, UA with 3 WBC and >182 WBC's, nitrite negative.  Abd CT shows right 4 mm kidney stone at [facility] with mild hydronephrosis.  He was registered to observation.    Hospital Course   Right 4 mm Kidney stone at [facility]   --Urology consulted (previous seen by Dr. Potter at [facility]) -- only 4 mm but has never spontaneously passed a stone in the past.  --Patient underwent cystourethroscopy, basketing of stones, right JJ stent placement 3/1/2021.  --Patient returned from procedure, feeling better, pain controlled.  Tolerating  diet.  --Flomax and Ditropan prescribed per urology  --Limited prescription for oxycodone and Toradol as prescribed per urology  --IV fluids provided while in hospital, patient to push p.o. fluids at home.  --Follow-up in 2 to 3 weeks for stent removal with urology, their clinic will arrange.     Nausea and Vomiting   -- related to stone, now resolved     Rectal Cancer, S/P Rad & Chemo 2017 -- no recurrence     Renal Cysts bilaterally -- seen on CT this admission.  Follow-up per PCP.     Polycystic kidneys with cysts of varying densities. These could be evaluated further with renal mass protocol CT or MRI to exclude solid lesion.  Defer to PCP in outpatient setting.      Calvin Razo PA-C    Significant Results and Procedures   Status post cystourethroscopy, basketing of stones, right JJ stent placement 3/1/2021 -Dr. Pat of urology.    Pending Results   These results will be followed up by urology.  Unresulted Labs Ordered in the Past 30 Days of this Admission     Date and Time Order Name Status Description    3/1/2021 1231 Stone analysis In process           Code Status   Full Code       Primary Care Physician   Philippe Healy    Physical Exam   Temp: 95.5  F (35.3  C) Temp src: Oral BP: 127/81 Pulse: 87   Resp: 10 SpO2: 96 % O2 Device: None (Room air) Oxygen Delivery: 3 LPM  Vitals:    02/28/21 0507   Weight: 95.3 kg (210 lb)     Vital Signs with Ranges  Temp:  [95.5  F (35.3  C)-99  F (37.2  C)] 95.5  F (35.3  C)  Pulse:  [73-96] 87  Resp:  [10-26] 10  BP: (115-140)/(64-87) 127/81  SpO2:  [95 %-99 %] 96 %  I/O last 3 completed shifts:  In: 2250.42 [P.O.:890; I.V.:1360.42]  Out: 1450 [Urine:1450]    Constitutional: Alert, oriented to person, place, situation.  Cooperative, sitting up in NAD.   Respiratory:  Lungs CTAB.  No crackles, wheezes, or labored breathing.  Cardiovascular:  Heart regular, no murmurs, no edema.  GI:  Abdomen soft, NT/ND and with normoactive BS  Skin/Integumen:  Warm, dry,  non-diaphoretic.  MSK: CMS x4 grossly intact.    Discharge Disposition   Discharged to home  Condition at discharge: Stable    Consultations This Hospital Stay   UROLOGY IP CONSULT    Discharge Orders      Patient care order    Please make an appt with Nephrology for recurrent kidney stones.   [facility]  [facility]    [address]  [place]  [address]  T:  [phone]    Standard recommendations on kidney stone prevention:  -These include maintaining fluid intake of 3 liters per day or more with a goal of making 2 or more liters of urine per day.  If alcoholic or caffeinated beverages are consumed, you need to drink water along with these beverages to maintain hydration.    -A few ounces of lemon juice concentrate a day diluted in water can help prevent stones (citrate is a stone inhibitor).    -Sodium influences calcium excretion in the urine. Limit intake of red meat, salt, and salty processed foods.    -Uric acid-high levels in the blood can lead to kidney stones and gout, especially if the urine pH is low.  Reduce her protein intake, especially red meats.  -Weight loss, if overweight, can reduce the recurrence of kidney stones.  -Diabetes-if diabetic, you are at a greater risk of having kidney stones.  -Maintain calcium intake in diet through continued consumption of dairy products etc.    -Below are some foods that you should avoid eating and drinking while on a low oxalate diet:    Spinach  Rhubarb  Beets  Strawberries  Nuts (almonds, peanuts, walnuts, hazelnuts, and pecans)  Chocolate  Tea (green, black, iced, or instant)  Coffee  Cola  Wheat bran  White corn grits  Wheat germ  Whole-wheat flour  Berries (blackberries, gooseberries, black raspberries)  [place] grapes  Red currants  Damson plums  Peels from abhishek, limes and oranges  Tangerines  Fruitcake or other desserts that contain the fruits listed above    Protein foods:  Baked beans  Peanut butter  Tofu  Sweet  potatoes  Vegetable soups that contain very few vegetables  Beans (wax, legume and soy)  Celery  Dark, leafy greens (swiss chard, endive, escarole, parsley)  Eggplant  Leeks  Summer squash    Beverages:  Draft beer     Reason for your hospital stay    Right 4 mm Kidney stone at [facility], status post cystoscopy, right ureteroscopy with basketing of stones, right JJ stent placement.     Follow-up and recommended labs and tests     Dr. Mohsen Pat's office will be in contact to arrange for Ureteroscopy in 2-3 weeks.    Urology  [facility]  Clinic Phone [phone]     Activity    Your activity upon discharge: activity as tolerated and no driving for today     Diet    Follow this diet upon discharge: Advance to a regular diet as tolerated     Discharge Medications   Current Discharge Medication List      START taking these medications    Details   acetaminophen (TYLENOL) 325 MG tablet Take 2 tablets (650 mg) by mouth every 4 hours as needed for mild pain  Qty:      Associated Diagnoses: Ureterolithiasis      ketorolac (TORADOL) 10 MG tablet Take 1 tablet (10 mg) by mouth every 6 hours for 5 days  Qty: 20 tablet, Refills: 0    Associated Diagnoses: Ureterolithiasis      oxybutynin ER (DITROPAN-XL) 5 MG 24 hr tablet Take 1 tablet (5 mg) by mouth daily Take daily until your stent is removed.  Qty: 30 tablet, Refills: 0    Associated Diagnoses: Ureterolithiasis      oxyCODONE (ROXICODONE) 5 MG tablet Take 1 tablet (5 mg) by mouth every 6 hours as needed for breakthrough pain  Qty: 9 tablet, Refills: 0    Associated Diagnoses: Ureterolithiasis      tamsulosin (FLOMAX) 0.4 MG capsule Take 1 capsule (0.4 mg) by mouth daily Take daily until your stent is removed.  Qty: 30 capsule, Refills: 0    Associated Diagnoses: Ureterolithiasis           Allergies   Allergies   Allergen Reactions     Ampicillin Diarrhea     Demerol [Meperidine]      Nausea      Data   Most Recent 3 CBC's:  Recent Labs   Lab Test  02/28/21  0515 08/13/20  1530 02/17/20  0932   WBC 6.0 5.8 3.5*   HGB 14.0 16.2 14.5   MCV 86 85 88    198 164      Most Recent 3 BMP's:  Recent Labs   Lab Test 02/28/21  0515 08/13/20  1530 02/17/20  0932    136 139   POTASSIUM 4.0 3.7 3.9   CHLORIDE 110* 104 109   CO2 25 27 24   BUN 19 16 17   CR 1.10 0.83 0.68   ANIONGAP 5 4 6   FRANDY 8.3* 9.1 8.6   * 112* 95     Most Recent 2 LFT's:  Recent Labs   Lab Test 02/28/21  0515 08/13/20  1530   AST 26 18   ALT 40 34   ALKPHOS 58 75   BILITOTAL 0.5 1.4*     Most Recent Cholesterol Panel:  Recent Labs   Lab Test 08/13/20  1530   CHOL 176   *   HDL 50   TRIG 86     Most Recent 6 Bacteria Isolates From Any Culture (See EPIC Reports for Culture Details):  Recent Labs   Lab Test 07/17/17  1249 07/16/17  2356 07/16/17  2310 07/16/17  2213   CULT No anaerobes isolated  No growth No growth No growth No growth     Most Recent TSH, T4 and A1c Labs:No lab results found.  Results for orders placed or performed during the hospital encounter of 02/28/21   CT Abdomen Pelvis w/o Contrast    Narrative    EXAM: CT ABDOMEN PELVIS W/O CONTRAST  LOCATION: [facility]  DATE/TIME: 2/28/2021 6:16 AM    INDICATION: RLQ abdominal pain  COMPARISON: None.  TECHNIQUE: CT scan of the abdomen and pelvis was performed without IV contrast. Multiplanar reformats were obtained. Dose reduction techniques were used.  CONTRAST: None.    FINDINGS:   LOWER CHEST: Calcified left lower lobe granuloma. Bibasilar atelectasis.    HEPATOBILIARY: Simple fluid density benign liver cysts requiring no follow-up. Gallbladder is normal.    PANCREAS: Normal.    SPLEEN: Normal.    ADRENAL GLANDS: Normal.    KIDNEYS/BLADDER: Multiple bilateral renal cysts of varying density. Calcified intrarenal stones present bilaterally measuring up to 3 mm in the right midpole and up to 7 mm in the left upper pole. There is mild right hydronephrosis secondary to a 4 mm   stone at the right  ureteropelvic junction on coronal image 52. The bladder is normal.    BOWEL: Postoperative change at the cecum. No mechanical bowel obstruction or free air.    LYMPH NODES: Normal.    VASCULATURE: Mild aortoiliac atherosclerotic calcification.    PELVIC ORGANS: Small to moderate fat-containing left inguinal hernia.    MUSCULOSKELETAL: Lumbar spine degenerative disc height loss. Small fat-containing umbilical hernia.      Impression    IMPRESSION:   1.  4 mm stone at the right ureteropelvic junction causing mild right hydronephrosis. There are multiple additional calcified intrarenal stones in both kidneys.    2.  Polycystic kidneys with cysts of varying densities. These could be evaluated further with renal mass protocol CT or MRI to exclude solid lesion.     XR Surgery MICHAELLE Fluoro L/T 5 Min w Stills    Narrative    This exam was marked as non-reportable because it will not be read by a   radiologist or a [place] non-radiologist provider.

## 2021-03-01 NOTE — PLAN OF CARE
Covid negative. AxOx4. VSS on RA. Regular diet. Independent in room. Pain in R flank, kidney stones. Encourage fluids and walking.Straining urine. PRN tylenol and oxy given throughout the night. Did dipika MORAN for orders for morphine incase pain gets too bad, patient stated dilaudid does not work well with him. Orders received. If patient does not pass stone, NPO @ midnight and urology consult in AM. Chronic loose stools d/t colorectal cancer, evelyn held this evening. NS running @ 125ml/hr. Continue to monitor.

## 2021-03-01 NOTE — OP NOTE
OPERATIVE REPORT  DATE OF SURGERY: 03/01/21  LOCATION OF SURGERY: [facility]  PREOPERATIVE DIAGNOSIS:  (N20.1) Right 4 mm Kidney stone at [facility]   (primary encounter diagnosis)  (N23) Renal colic  POSTOPERATIVE DIAGNOSIS: (N20.1) Right 4 mm Kidney stone at [facility]   (primary encounter diagnosis)  (N23) Renal colic    START TIME: 12:02 PM  END TIME: 12:30 PM  PROCEDURE PERFORMED:   1. Cystoscopy  2. RIGHT retrograde pyelogram  3. RIGHT ureteroscopy with basketing of stones  4. RIGHT JJ stent placement  5. <1hr physician fluoroscopy time    STAFF SURGEON: Mohsen Pat MD  ANESTHESIA: General.   ESTIMATED BLOOD LOSS: 2 mL.   DRAINS AND TUBES: RIGHT 6fr x 24cm ureteral stent, 18fr coude catheter  COMPLICATIONS: None.   DISPOSITION: PACU.   SPECIMENS OBTAINED:   ID Type Source Tests Collected by Time Destination   1 :  Calculus/Stone Ureter, Right STONE ANALYSIS Mohsen Pat MD 3/1/2021 12:31 PM      SIGNIFICANT FINDINGS: Cystoscopy with no evidence of stone in the bladder.  Right retrograde pyelogram with evidence of a very narrow distal ureter.  Right ureteroscopy with evidence of a stone fragment in the distal ureter to 3 cm from the UVJ which was basketed and removed.  Attempt at advancing the ureteroscope unsuccessful due to the very narrow distal ureter.  Ureteral stent placed.     HISTORY OF PRESENT ILLNESS: Louie Greco is a 60 year old man who presented with right flank pain with associated nausea and vomiting and was found to have a 4 mm right proximal ureteral stone.  He has previously required ureteroscopy for prior kidney stones with his last ureteroscopy in 2018.  He was counseled on treatment options and elected to proceed with the above surgery.  We discussed that there would be a 10% chance of requiring a staged ureteroscopy there was evidence of infected urine behind the stone or if his ureter was too narrow to safely accommodate the ureteroscope.    OPERATION PERFORMED:   Informed consent was obtained  and the patient was brought to the operating room where general anesthesia was induced. The patient was given appropriate preoperative antibiotics and positioned supine. The patient was then repositioned in dorsal lithotomy with all pressure points padded. We then performed a timeout, verifying the correct patient's site and procedure to be performed.    22 Argentine cystoscope was inserted atraumatically into the bladder.  Complete cystoscopy was performed with no evidence of stone in the bladder.  The right ureteral orifice was identified and cannulated with a 0.035 sensor wire which was advanced up to the renal pelvis under fluoroscopic guidance.  The cystoscope was removed.  A semirigid ureteroscope was assembled and inserted atraumatically into the bladder.  An Amplatz superstiff wire was used to cannulate the right ureteral orifice and the ureteroscope was advanced into the ureteral orifice utilizing a railroad technique.  A gentle retrograde pyelogram was performed through the ureteroscope with evidence of a very narrow distal ureter.  There was a small stone fragment which was encountered in the distal ureter approximately 2 cm from the UVJ.  This was basketed and removed.  This fragment was noted to only be about 1 mm in size.  The semirigid ureteroscope was reinserted in the bladder and attempt made to advance the ureteroscope up the ureter was unsuccessful with evidence of a very narrow segment of distal ureter.  Given concern to minimize any ureteral injury, decision made for ureteral stent placement and he will return for definitive ureteroscopy and approximately 2 to 3 weeks.  The ureteroscope was removed and the cystoscope bladder.  A 6 Argentine by 24 cm JJ lateral stent was advanced over the sensor wire with good curl noted in the renal pelvis fluoroscopically and in the bladder on direct vision.  The cystoscope was removed and a 18 Argentine coudé catheter was placed.  He was emerged from anesthesia and  taken to the PACU in stable condition.    Plan:  -Cloud may be removed when he is fully emerged from anesthesia  -Okay for discharge from urology standpoint  -Urology discharge instructions and medications completed  -My office will be in contact with him to arrange for Ureteroscopy in 2-3 weeks    Mohsen Pat MD   Urology  [facility]  Clinic Phone [phone]

## 2021-03-01 NOTE — PLAN OF CARE
Observation goals PRIOR TO DISCHARGE     Comments:     -Diagnostic tests and consults completed and resulted -Not met    -Vital signs normal or at patient baseline -Met    Nurse to notify provider when observation goals have been met and patient is ready for discharge.        Pt is A&Ox4,VSS on RA. R flank pain controlled with PRN  oxy, denies any nausea, up ind, amb to bathroom and voiding o.k, Urine strained, no stone or sediment noted. NPO since midnight,IVF Nacl cont@125.Urology consult pending.

## 2021-03-01 NOTE — DISCHARGE SUMMARY
Discharge instructions given, questions answered. Discharge medication verified. Belongs verified. Patient discharged home with wife as transport.

## 2021-03-01 NOTE — DISCHARGE INSTRUCTIONS
POSTOPERATIVE INSTRUCTIONS    Diagnosis-------------------------------   RIGHT ureteral stone  LEFT kidney stones    Procedure-------------------------------  Procedure(s) (LRB):  CYSTOURETEROSCOPY,  URETERAL STENT INSERTION, RIGHT RETROGRADE (Right)  Cystoscopy, retrogrades, insert stent ureter(s), combined (Right)  Cystoscopy, litholapaxy, combined (N/A)      Findings--------------------------------  Cystoscopy with no evidence of stone in the bladder.  Right retrograde pyelogram with evidence of a very narrow distal ureter.  Right ureteroscopy with evidence of a stone fragment in the distal ureter to 3 cm from the UVJ which was basketed and removed.  Attempt at advancing the ureteroscope unsuccessful due to the very narrow distal ureter.  Ureteral stent placed.    Home-going instructions-----------------         Activity Limitation:     - No driving or operating heavy machinery while on narcotic pain medication.     FOLLOW THESE INSTRUCTIONS AS INDICATED BELOW:  - Observe operative area for signs of excessive bleeding.  - You may shower.  - Increase fluid intake to promote clear urine.  - Resume usual diet as tolerated    What to expect while recovering-----------  - You may experience some intermittent bleeding that makes your urine pink or cherry colored. This is normal.  - However, if you are unable to urinate, passing large amount of clots, have chas blood in your urine, or have a temperature >101 degrees, call the urology nurse on call, or present to your nearest emergency department.  - You are encouraged to walk daily, and have no activity restrictions.   - A URETERAL STENT has been placed that allows urine to flow unobstructed from your kidney into your bladder.  The stent has a curl in the kidney and a curl in the bladder.  The curl in the bladder can cause some urgency and frequency of urination as well as some mild blood in the urine.  The curl in the kidney can cause some mild flank discomfort.  This  may be more noticeable when you urinate.  A URETERAL STENT is meant to be left in temporarily.  It must be removed or changed no later than 3 months after it's insertion.  If it's not removed it can result in stone overgrowth on the stent that can cause pain, infection, and can be very difficult to remove.      Discharge Medications/instructions:     - Flomax (tamsulosin) to be taken daily until stent is removed    - oxybutynin 5mg XL (Ditropan XL) to be taken daily until ureteral stent is removed    - Take Tylenol 1000mg every 6 hours for pain    - Take Toradol 10mg every 6 hours as needed for additional pain control    - Take Oxycodone 5mg every 4-6 hours only for break through pain    - Take Colace while taking Oxycodone to prevent constipation      Questions/concerns------------------------  [facility]: [phone]    Future appointments  You will be contacted to arrange follow up for Ureteroscopy in 2-3 weeks.     Mohsen Pat MD

## 2021-03-01 NOTE — CONSULTS
[facility] Consultation by [facility]    Louie Greco MRN# [id number]   Age: 60 year old YOB: 1960     Date of Admission:  2/28/2021    Reason for consult: Renal/ureteral calculus       Requesting ADRIANE/MD: Dr. Benito       Level of consult: Consult, follow and place orders           Assessment and Plan:   Assessment:   Renal colic 2/2 4mm right proximal stone  Nephrolithiasis, hx of Ca oxalate stones  Hx of elevated PSA, follows with Dr. Segura  Hx of rectal cancer  Renal cysts      Plan:   NPO  To OR later today for cysto, right ureteroscopy, with holium laser and stent. Will consider ESWL to the 7mm stone at a later date.  Working on timing.   Send home on Flomax 0.4mg x 14 days for stent discomfort. Will arrange outpatient stent removal in 7-10 days.    Nephrology referral as outpatient for metabolic analysis and prevention. Follow a low sodium and low oxalate diet.   Follow-up with Dr. Segura re: hx of elevated PSA and 4K results.  Msg sent to his team to follow-up with him.    Can discharge home later today if meeting post op milestones. Dr. Pat updated.     Azul Pedroza PA-C  [facility]  [phone]                 Chief Complaint:   N/v, pain     History is obtained from the patient and EMR.         History of Present Illness:     This patient is a 60 year old male admitted with right flank pain, n/v and noted to have a 4mm right proximal ureteral stone and renal stones up to 7mm. Has not been successful in the past at spontaneous passage. Required URS in 2018 (ca ox stone). Tolerates stent. Continues to require narcotics and some nausea. Last ate 11pm last night. Afeb. AVSS. UA neg for infection, COVID-19 neg.  Cr 1.1, Ca normal.               Past Medical History:     Past Medical History:   Diagnosis Date     Childhood asthma      Elevated prostate specific antigen (PSA)     No biopsy done (had rectal cancer at the time)     Epididymitis, bilateral     18 years old      Inguinal hernia      Mumps      Nephrolithiasis     Several -- 1990 first, 2019 last     Rectal cancer (H) 2017    low rectal cancer, S/P Rad adn Chemo, no surg needed     Shingles              Past Surgical History:     Past Surgical History:   Procedure Laterality Date     COLONOSCOPY N/A 7/27/2016    Procedure: COMBINED COLONOSCOPY, SINGLE OR MULTIPLE BIOPSY/POLYPECTOMY BY BIOPSY;  Surgeon: Chelsea Thompson MD;  Location: [facility]     COLONOSCOPY N/A 9/13/2017    Procedure: COLONOSCOPY;;  Surgeon: Felicitas Radford MD;  [facility]     COLONOSCOPY N/A 12/13/2017    Procedure: COLONOSCOPY;;  Surgeon: Felicitas Radford MD;  [facility]     COLONOSCOPY N/A 10/23/2020    Procedure: COLONOSCOPY;  Surgeon: Felicitas Radford MD;  [facility]     COMBINED CYSTOSCOPY, RETROGRADES, URETEROSCOPY, LASER HOLMIUM LITHOTRIPSY URETER(S), INSERT STENT Left 2/16/2018    Procedure: COMBINED CYSTOSCOPY, RETROGRADES, URETEROSCOPY, LASER HOLMIUM LITHOTRIPSY URETER(S), INSERT STENT;  Cysto, left ureteroscopy, holmium laser, retrogrades, stent placement;  Surgeon: Bola Worthy MD;  Location: [facility]     EXAM UNDER ANESTHESIA ANUS N/A 7/5/2017    Procedure: EXAM UNDER ANESTHESIA ANUS;  Examination Under Anesthesia, flex sigmoidoscopy with biopsies and formalin application;  Surgeon: Felicitas Radford MD;  [facility]     EXAM UNDER ANESTHESIA ANUS N/A 9/13/2017    Procedure: EXAM UNDER ANESTHESIA ANUS;  Examination Under Anesthesia Anus, Colonoscopy, application of formalin;  Surgeon: Felicitas Radford MD;  [facility]     EXAM UNDER ANESTHESIA ANUS N/A 12/13/2017    Procedure: EXAM UNDER ANESTHESIA ANUS;  Examination Under Anesthesia Anus, Colonoscopy;  Surgeon: Felicitas Radford MD;  [facility]     EXAM UNDER ANESTHESIA ANUS N/A 5/11/2018    Procedure: EXAM UNDER ANESTHESIA ANUS;  Examination Under Anesthesia Anus, Interoperative Flexible  Sigmoidoscopy, Application of Formalin;  Surgeon: Felicitas Radford MD;  [facility]     EXAM UNDER ANESTHESIA ANUS N/A 7/20/2018    Procedure: EXAM UNDER ANESTHESIA ANUS;  Examination Under Anesthesia Anus, Flexible Sigmoidoscopy ;  Surgeon: Felicitas Radford MD;  [facility]     EXAM UNDER ANESTHESIA ANUS N/A 11/30/2018    Procedure: Examination Under Anesthesia, application of formalin to rectum, polypectomy;  Surgeon: Felicitas Radford MD;  [facility]     EXAM UNDER ANESTHESIA ANUS N/A 2/22/2019    Procedure: Examination Under Anesthesia;  Surgeon: Felicitas Radford MD;  [facility]     EXAM UNDER ANESTHESIA ANUS N/A 6/28/2019    Procedure: Examination Under Anesthesia;  Surgeon: Felicitas Radford MD;  [facility]     EXAM UNDER ANESTHESIA ANUS N/A 11/1/2019    Procedure: Examination Under Anesthesia;  Surgeon: Felicitas Radford MD;  [facility]     EXAM UNDER ANESTHESIA RECTUM N/A 10/23/2020    Procedure: Exam under anesthesia rectum;  Surgeon: Felicitas Radford MD;  [facility]     HC TOOTH EXTRACTION W/FORCEP       LAPAROSCOPIC APPENDECTOMY N/A 7/17/2017    Procedure: LAPAROSCOPIC APPENDECTOMY;  LAPAROSCOPIC APPENDECTOMY;  Surgeon: Bryson Ferguson MD;  [facility]     SIGMOIDOSCOPY FLEXIBLE N/A 12/8/2016    Procedure: SIGMOIDOSCOPY FLEXIBLE;  Surgeon: Felicitas Radford MD;  [facility]     SIGMOIDOSCOPY FLEXIBLE N/A 7/5/2017    Procedure: SIGMOIDOSCOPY FLEXIBLE;;  Surgeon: Felicitas Radford MD;  [facility]     SIGMOIDOSCOPY FLEXIBLE N/A 5/11/2018    Procedure: SIGMOIDOSCOPY FLEXIBLE;;  Surgeon: Felicitas Radford MD;  [facility]     SIGMOIDOSCOPY FLEXIBLE N/A 7/20/2018    Procedure: SIGMOIDOSCOPY FLEXIBLE;;  Surgeon: Felicitas Radford MD;  [facility]     SIGMOIDOSCOPY FLEXIBLE N/A 11/30/2018    Procedure: Flexible Sigmoidoscopy;  Surgeon: Malina  Felicitas HIDALGO MD;  [facility]     SIGMOIDOSCOPY FLEXIBLE N/A 2/22/2019    Procedure: Intraoperative Flexible Sigmoidoscopy, Application of Formalin to rectum;  Surgeon: Felicitas Radford MD;  [facility]     SIGMOIDOSCOPY FLEXIBLE N/A 6/28/2019    Procedure: Flexible Sigmoidoscopy;  Surgeon: Felicitas Radford MD;  [facility]     SIGMOIDOSCOPY FLEXIBLE N/A 11/1/2019    Procedure: Flexible Sigmoidoscopy;  Surgeon: Felicitas Radford MD;  [facility]     TESTICLE SURGERY       VASCULAR SURGERY      Right chest port     VASECTOMY       VASECTOMY               Social History:     Social History     Tobacco Use     Smoking status: Never Smoker     Smokeless tobacco: Never Used   Substance Use Topics     Alcohol use: Yes     Comment: < 1 drink a week             Family History:     Family History   Problem Relation Age of Onset     Cancer Brother         primary of unknown origin     Other Cancer Brother      Chronic Obstructive Pulmonary Disease Father      Hypertension Father      Hyperlipidemia Father      Colon Polyps Mother         Number and type of polyps unknown     Family History Negative Brother         negative colonoscopy     Diabetes Maternal Grandfather      Hypertension Paternal Grandmother      Hyperlipidemia Paternal Grandmother      Stomach Cancer Other 63        maternal great grandfather     Glaucoma No family hx of      Macular Degeneration No family hx of      Family history reviewed and updated in EPIC and reviewed            Allergies:     Allergies   Allergen Reactions     Ampicillin Diarrhea     Demerol [Meperidine]      Nausea              Medications:     Current Facility-Administered Medications   Medication     acetaminophen (TYLENOL) Suppository 650 mg     acetaminophen (TYLENOL) tablet 650 mg     hydrOXYzine (ATARAX) tablet 25 mg    Or     hydrOXYzine (ATARAX) tablet 50 mg     magnesium hydroxide (MILK OF MAGNESIA) suspension 30 mL     melatonin  "tablet 1 mg     morphine (PF) injection 4-6 mg     naloxone (NARCAN) injection 0.2 mg    Or     naloxone (NARCAN) injection 0.4 mg    Or     naloxone (NARCAN) injection 0.2 mg    Or     naloxone (NARCAN) injection 0.4 mg     ondansetron (ZOFRAN-ODT) ODT tab 4 mg    Or     ondansetron (ZOFRAN) injection 4 mg     oxyCODONE (ROXICODONE) tablet 5-10 mg     senna-docusate (SENOKOT-S/PERICOLACE) 8.6-50 MG per tablet 2 tablet     sodium chloride 0.9% infusion     tamsulosin (FLOMAX) capsule 0.4 mg             Review of Systems:   A comprehensive review of systems was performed and found to be negative except as described in the HPI.    /72 (BP Location: Right arm)   Pulse 88   Temp 96.8  F (36  C) (Oral)   Resp 17   Ht 1.778 m (5' 10\")   Wt 95.3 kg (210 lb)   SpO2 96%   BMI 30.13 kg/m    PSYCH: NAD  EYES: EOMI  MOUTH: MMM  NECK: Supple, no notable adenopathy  RESP: Unlabored breathing  CARDIAC: No LE edema  SKIN: Warm, no rashes  ABD: soft, Nontender  NEURO: AAO x3           Data:     Lab Results   Component Value Date    WBC 6.0 02/28/2021    HGB 14.0 02/28/2021    HCT 42.5 02/28/2021    MCV 86 02/28/2021     02/28/2021     Lab Results   Component Value Date    CR 1.10 02/28/2021                      "

## 2021-03-01 NOTE — PLAN OF CARE
Returned to Observation unit today at 1400. A&Ox4. VSS on RA. Ambulating independently. Voiding adequately. Showered. Plan for discharge home today.

## 2021-03-01 NOTE — PROGRESS NOTES
Observation Goals  -Diagnostic tests and consults completed and resulted: Not met  -Vital signs normal or at patient baseline: Met

## 2021-03-01 NOTE — ANESTHESIA CARE TRANSFER NOTE
Patient: Louie Greco    Procedure(s):  CYSTOURETEROSCOPY,  URETERAL STENT INSERTION, RIGHT RETROGRADE  Cystoscopy, retrogrades, insert stent ureter(s), combined  Cystoscopy, litholapaxy, combined    Diagnosis: Ureteral stone [N20.1]  Diagnosis Additional Information: No value filed.    Anesthesia Type:   General     Note:    Oropharynx: oropharynx clear of all foreign objects and spontaneously breathing  Level of Consciousness: awake and drowsy  Oxygen Supplementation: face mask  Level of Supplemental Oxygen (L/min / FiO2): 6  Independent Airway: airway patency satisfactory and stable  Dentition: dentition unchanged  Vital Signs Stable: post-procedure vital signs reviewed and stable  Report to RN Given: handoff report given  Patient transferred to: PACU  Comments: Neuromuscular blockade reversed after TOF 4/4, spontaneous respirations, adequate tidal volumes, followed commands to voice, oropharynx suctioned with soft flexible catheter, extubated atraumatically, extubated with suction, airway patent after extubation.  Oxygen via facemask at 6 liters per minute to PACU. Oxygen tubing connected to wall O2 in PACU, SpO2, NiBP, and EKG monitors and alarms on and functioning, Graciela Hugger warmer connected to patient gown, report on patient's clinical status given to PACU RN, RN questions answered.     Handoff Report: Identifed the Patient, Identified the Reponsible Provider, Reviewed the pertinent medical history, Discussed the surgical course, Reviewed Intra-OP anesthesia mangement and issues during anesthesia, Set expectations for post-procedure period and Allowed opportunity for questions and acknowledgement of understanding      Vitals: (Last set prior to Anesthesia Care Transfer)  CRNA VITALS  3/1/2021 1212 - 3/1/2021 1247      3/1/2021             Pulse:  77    SpO2:  97 %    Resp Rate (set):  10        Electronically Signed By: Mariposa De La Cruz  March 1, 2021  12:47 PM

## 2021-03-02 ENCOUNTER — TELEPHONE (OUTPATIENT)
Dept: FAMILY MEDICINE | Facility: CLINIC | Age: 61
End: 2021-03-02

## 2021-03-02 LAB — COPATH REPORT: NORMAL

## 2021-03-02 NOTE — TELEPHONE ENCOUNTER
Chief Complaint: Renal Colic, Ureterolithiasis,  MON 01-MAR-2021  0 / 0    [phone] ([place])

## 2021-03-02 NOTE — TELEPHONE ENCOUNTER
"    ED / Discharge Outreach Protocol    Patient Contact    Attempt # 1    Was call answered?  Yes.  \"May I please speak with <Louie>\"  Is patient available?   Yes    ED/Discharge Protocol    \"Hi, my name is Elvia Perales RN, a registered nurse, and I am calling on behalf of Dr. Healy's office at [place].  I am calling to follow up and see how things are going for you after your recent visit.\"    \"I see that you were in the (ER/UC/IP) on 2/28-3/1.    How are you doing now that you are home?\" doing okay - last one was 3 years ago. 4th kidney stone     Is patient experiencing symptoms that may require a hospital visit?  No     Discharge Instructions    \"Let's review your discharge instructions.  What is/are the follow-up recommendations?  Pt. Response: spoke with surgeon - multiple stones found in both kidneys. They will only do 1 hemisphere at a time   Dr Pat see pt in summer   And see nephrology     Please make an appt with Nephrology for recurrent kidney stones.   [facility] [place] [address]  T:  [phone]  \"Were you instructed to make a follow-up appointment?\"  Pt. Response: No.       \"When you see the provider, I would recommend that you bring your discharge instructions with you.    Medications    \"How many new medications are you on since your hospitalization/ED visit?\"    APAP, Ketoralac, Flomax, Oxycodone, Oxybutynin   \"How many of your current medicines changed (dose, timing, name, etc.) while you were in the hospital/ED visit?\"   None   \"Do you have questions about your medications?\"   No  \"Were you newly diagnosed with heart failure, COPD, diabetes or did you have a heart attack?\"   No  For patients on insulin: \"Did you start on insulin in the hospital or did you have your insulin dose changed?\"   No  Post Discharge Medication Reconciliation Status: discharge medications reconciled, continue medications without change.    Was MTM " "referral placed (*Make sure to put transitions as reason for referral)?   No    Call Summary    \"Do you have any questions or concerns about your condition or care plan at the moment?\"    Yes asking about 4K score   Wants urology to advise on this     Patient was in ER 1x in the past year (assess appropriateness of ER visits.)      \"If you have questions or things don't continue to improve, we encourage you contact us through the main clinic number,  [phone])  .  Even if the clinic is not open, triage nurses are available 24/7 to help you.     We would like you to know that our clinic has extended hours (provide information).  We also have urgent care (provide details on closest location and hours/contact info)\"      \"Thank you for your time and take care!\"    Elvia SCHWARZ RN      "

## 2021-03-07 LAB
APPEARANCE STONE: NORMAL
COMPN STONE: NORMAL
NUMBER STONE: 1
SIZE STONE: NORMAL MM
WT STONE: 6 MG

## 2021-03-10 ENCOUNTER — PREP FOR PROCEDURE (OUTPATIENT)
Dept: UROLOGY | Facility: CLINIC | Age: 61
End: 2021-03-10

## 2021-03-10 DIAGNOSIS — N20.1 RIGHT URETERAL STONE: Primary | ICD-10-CM

## 2021-03-11 DIAGNOSIS — Z11.59 ENCOUNTER FOR SCREENING FOR OTHER VIRAL DISEASES: ICD-10-CM

## 2021-03-11 PROBLEM — N20.1 RIGHT URETERAL STONE: Status: ACTIVE | Noted: 2021-03-11

## 2021-03-19 DIAGNOSIS — Z11.59 ENCOUNTER FOR SCREENING FOR OTHER VIRAL DISEASES: ICD-10-CM

## 2021-03-19 LAB
SARS-COV-2 RNA RESP QL NAA+PROBE: NORMAL
SPECIMEN SOURCE: NORMAL

## 2021-03-19 PROCEDURE — U0005 INFEC AGEN DETEC AMPLI PROBE: HCPCS | Performed by: UROLOGY

## 2021-03-19 PROCEDURE — U0003 INFECTIOUS AGENT DETECTION BY NUCLEIC ACID (DNA OR RNA); SEVERE ACUTE RESPIRATORY SYNDROME CORONAVIRUS 2 (SARS-COV-2) (CORONAVIRUS DISEASE [COVID-19]), AMPLIFIED PROBE TECHNIQUE, MAKING USE OF HIGH THROUGHPUT TECHNOLOGIES AS DESCRIBED BY CMS-2020-01-R: HCPCS | Performed by: UROLOGY

## 2021-03-20 LAB
LABORATORY COMMENT REPORT: NORMAL
SARS-COV-2 RNA RESP QL NAA+PROBE: NEGATIVE
SPECIMEN SOURCE: NORMAL

## 2021-03-21 ENCOUNTER — ANESTHESIA EVENT (OUTPATIENT)
Dept: SURGERY | Facility: CLINIC | Age: 61
End: 2021-03-21
Payer: COMMERCIAL

## 2021-03-22 ENCOUNTER — ANESTHESIA (OUTPATIENT)
Dept: SURGERY | Facility: CLINIC | Age: 61
End: 2021-03-22
Payer: COMMERCIAL

## 2021-03-22 ENCOUNTER — HOSPITAL ENCOUNTER (OUTPATIENT)
Facility: CLINIC | Age: 61
Discharge: HOME | End: 2021-03-22
Attending: UROLOGY | Admitting: UROLOGY
Payer: COMMERCIAL

## 2021-03-22 ENCOUNTER — APPOINTMENT (OUTPATIENT)
Dept: GENERAL RADIOLOGY | Facility: CLINIC | Age: 61
End: 2021-03-22
Attending: UROLOGY
Payer: COMMERCIAL

## 2021-03-22 VITALS
BODY MASS INDEX: 30.74 KG/M2 | TEMPERATURE: 96.8 F | OXYGEN SATURATION: 95 % | WEIGHT: 219.6 LBS | DIASTOLIC BLOOD PRESSURE: 78 MMHG | RESPIRATION RATE: 15 BRPM | SYSTOLIC BLOOD PRESSURE: 114 MMHG | HEART RATE: 83 BPM | HEIGHT: 71 IN

## 2021-03-22 DIAGNOSIS — N20.1 RIGHT URETERAL STONE: Primary | ICD-10-CM

## 2021-03-22 DIAGNOSIS — N20.1 URETEROLITHIASIS: ICD-10-CM

## 2021-03-22 DIAGNOSIS — N20.0 RIGHT KIDNEY STONE: ICD-10-CM

## 2021-03-22 PROCEDURE — C1769 GUIDE WIRE: HCPCS | Performed by: UROLOGY

## 2021-03-22 PROCEDURE — 88300 SURGICAL PATH GROSS: CPT | Mod: TC | Performed by: UROLOGY

## 2021-03-22 PROCEDURE — C1894 INTRO/SHEATH, NON-LASER: HCPCS | Performed by: UROLOGY

## 2021-03-22 PROCEDURE — 710N000009 HC RECOVERY PHASE 1, LEVEL 1, PER MIN: Performed by: UROLOGY

## 2021-03-22 PROCEDURE — 999N000141 HC STATISTIC PRE-PROCEDURE NURSING ASSESSMENT: Performed by: UROLOGY

## 2021-03-22 PROCEDURE — 250N000009 HC RX 250: Performed by: UROLOGY

## 2021-03-22 PROCEDURE — 250N000011 HC RX IP 250 OP 636: Performed by: NURSE ANESTHETIST, CERTIFIED REGISTERED

## 2021-03-22 PROCEDURE — C2617 STENT, NON-COR, TEM W/O DEL: HCPCS | Performed by: UROLOGY

## 2021-03-22 PROCEDURE — 370N000017 HC ANESTHESIA TECHNICAL FEE, PER MIN: Performed by: UROLOGY

## 2021-03-22 PROCEDURE — 52356 CYSTO/URETERO W/LITHOTRIPSY: CPT | Mod: RT | Performed by: UROLOGY

## 2021-03-22 PROCEDURE — 258N000001 HC RX 258: Performed by: UROLOGY

## 2021-03-22 PROCEDURE — 74420 UROGRAPHY RTRGR +-KUB: CPT | Mod: 26 | Performed by: UROLOGY

## 2021-03-22 PROCEDURE — 82365 CALCULUS SPECTROSCOPY: CPT | Performed by: UROLOGY

## 2021-03-22 PROCEDURE — 88300 SURGICAL PATH GROSS: CPT | Mod: 26 | Performed by: PATHOLOGY

## 2021-03-22 PROCEDURE — 250N000025 HC SEVOFLURANE, PER MIN: Performed by: UROLOGY

## 2021-03-22 PROCEDURE — 272N000001 HC OR GENERAL SUPPLY STERILE: Performed by: UROLOGY

## 2021-03-22 PROCEDURE — 250N000009 HC RX 250: Performed by: NURSE ANESTHETIST, CERTIFIED REGISTERED

## 2021-03-22 PROCEDURE — 999N000179 XR SURGERY CARM FLUORO LESS THAN 5 MIN W STILLS

## 2021-03-22 PROCEDURE — 710N000012 HC RECOVERY PHASE 2, PER MINUTE: Performed by: UROLOGY

## 2021-03-22 PROCEDURE — 258N000003 HC RX IP 258 OP 636: Performed by: NURSE ANESTHETIST, CERTIFIED REGISTERED

## 2021-03-22 PROCEDURE — C1758 CATHETER, URETERAL: HCPCS | Performed by: UROLOGY

## 2021-03-22 PROCEDURE — 250N000011 HC RX IP 250 OP 636: Performed by: UROLOGY

## 2021-03-22 PROCEDURE — 360N000076 HC SURGERY LEVEL 3, PER MIN: Performed by: UROLOGY

## 2021-03-22 DEVICE — URETERAL STENT
Type: IMPLANTABLE DEVICE | Site: URETER | Status: NON-FUNCTIONAL
Brand: POLARIS™ ULTRA
Removed: 2021-06-23

## 2021-03-22 RX ORDER — CEFAZOLIN SODIUM 2 G/100ML
2 INJECTION, SOLUTION INTRAVENOUS SEE ADMIN INSTRUCTIONS
Status: DISCONTINUED | OUTPATIENT
Start: 2021-03-22 | End: 2021-03-22 | Stop reason: HOSPADM

## 2021-03-22 RX ORDER — LIDOCAINE HYDROCHLORIDE 20 MG/ML
INJECTION, SOLUTION INFILTRATION; PERINEURAL PRN
Status: DISCONTINUED | OUTPATIENT
Start: 2021-03-22 | End: 2021-03-22

## 2021-03-22 RX ORDER — MAGNESIUM HYDROXIDE 1200 MG/15ML
LIQUID ORAL PRN
Status: DISCONTINUED | OUTPATIENT
Start: 2021-03-22 | End: 2021-03-22 | Stop reason: HOSPADM

## 2021-03-22 RX ORDER — NALOXONE HYDROCHLORIDE 0.4 MG/ML
0.2 INJECTION, SOLUTION INTRAMUSCULAR; INTRAVENOUS; SUBCUTANEOUS
Status: DISCONTINUED | OUTPATIENT
Start: 2021-03-22 | End: 2021-03-22 | Stop reason: HOSPADM

## 2021-03-22 RX ORDER — SODIUM CHLORIDE, SODIUM LACTATE, POTASSIUM CHLORIDE, CALCIUM CHLORIDE 600; 310; 30; 20 MG/100ML; MG/100ML; MG/100ML; MG/100ML
INJECTION, SOLUTION INTRAVENOUS CONTINUOUS PRN
Status: DISCONTINUED | OUTPATIENT
Start: 2021-03-22 | End: 2021-03-22

## 2021-03-22 RX ORDER — CIPROFLOXACIN 500 MG/1
500 TABLET, FILM COATED ORAL ONCE
Qty: 1 TABLET | Refills: 0 | Status: SHIPPED | OUTPATIENT
Start: 2021-03-22 | End: 2021-03-22

## 2021-03-22 RX ORDER — CEFAZOLIN SODIUM 2 G/100ML
2 INJECTION, SOLUTION INTRAVENOUS
Status: DISCONTINUED | OUTPATIENT
Start: 2021-03-22 | End: 2021-03-22 | Stop reason: HOSPADM

## 2021-03-22 RX ORDER — HYDROMORPHONE HYDROCHLORIDE 1 MG/ML
.3-.5 INJECTION, SOLUTION INTRAMUSCULAR; INTRAVENOUS; SUBCUTANEOUS EVERY 10 MIN PRN
Status: DISCONTINUED | OUTPATIENT
Start: 2021-03-22 | End: 2021-03-22 | Stop reason: HOSPADM

## 2021-03-22 RX ORDER — FENTANYL CITRATE 50 UG/ML
25-50 INJECTION, SOLUTION INTRAMUSCULAR; INTRAVENOUS
Status: DISCONTINUED | OUTPATIENT
Start: 2021-03-22 | End: 2021-03-22 | Stop reason: HOSPADM

## 2021-03-22 RX ORDER — DEXAMETHASONE SODIUM PHOSPHATE 4 MG/ML
INJECTION, SOLUTION INTRA-ARTICULAR; INTRALESIONAL; INTRAMUSCULAR; INTRAVENOUS; SOFT TISSUE PRN
Status: DISCONTINUED | OUTPATIENT
Start: 2021-03-22 | End: 2021-03-22

## 2021-03-22 RX ORDER — ONDANSETRON 2 MG/ML
4 INJECTION INTRAMUSCULAR; INTRAVENOUS EVERY 30 MIN PRN
Status: DISCONTINUED | OUTPATIENT
Start: 2021-03-22 | End: 2021-03-22 | Stop reason: HOSPADM

## 2021-03-22 RX ORDER — TAMSULOSIN HYDROCHLORIDE 0.4 MG/1
0.4 CAPSULE ORAL DAILY
Qty: 10 CAPSULE | Refills: 0 | Status: SHIPPED | OUTPATIENT
Start: 2021-03-22 | End: 2021-05-18

## 2021-03-22 RX ORDER — KETOROLAC TROMETHAMINE 30 MG/ML
INJECTION, SOLUTION INTRAMUSCULAR; INTRAVENOUS PRN
Status: DISCONTINUED | OUTPATIENT
Start: 2021-03-22 | End: 2021-03-22

## 2021-03-22 RX ORDER — ONDANSETRON 2 MG/ML
INJECTION INTRAMUSCULAR; INTRAVENOUS PRN
Status: DISCONTINUED | OUTPATIENT
Start: 2021-03-22 | End: 2021-03-22

## 2021-03-22 RX ORDER — PROPOFOL 10 MG/ML
INJECTION, EMULSION INTRAVENOUS PRN
Status: DISCONTINUED | OUTPATIENT
Start: 2021-03-22 | End: 2021-03-22

## 2021-03-22 RX ORDER — NALOXONE HYDROCHLORIDE 0.4 MG/ML
0.4 INJECTION, SOLUTION INTRAMUSCULAR; INTRAVENOUS; SUBCUTANEOUS
Status: DISCONTINUED | OUTPATIENT
Start: 2021-03-22 | End: 2021-03-22 | Stop reason: HOSPADM

## 2021-03-22 RX ORDER — ONDANSETRON 4 MG/1
4 TABLET, ORALLY DISINTEGRATING ORAL EVERY 30 MIN PRN
Status: DISCONTINUED | OUTPATIENT
Start: 2021-03-22 | End: 2021-03-22 | Stop reason: HOSPADM

## 2021-03-22 RX ORDER — FUROSEMIDE 10 MG/ML
INJECTION INTRAMUSCULAR; INTRAVENOUS PRN
Status: DISCONTINUED | OUTPATIENT
Start: 2021-03-22 | End: 2021-03-22

## 2021-03-22 RX ORDER — FENTANYL CITRATE 50 UG/ML
INJECTION, SOLUTION INTRAMUSCULAR; INTRAVENOUS PRN
Status: DISCONTINUED | OUTPATIENT
Start: 2021-03-22 | End: 2021-03-22

## 2021-03-22 RX ORDER — SODIUM CHLORIDE, SODIUM LACTATE, POTASSIUM CHLORIDE, CALCIUM CHLORIDE 600; 310; 30; 20 MG/100ML; MG/100ML; MG/100ML; MG/100ML
INJECTION, SOLUTION INTRAVENOUS CONTINUOUS
Status: DISCONTINUED | OUTPATIENT
Start: 2021-03-22 | End: 2021-03-22 | Stop reason: HOSPADM

## 2021-03-22 RX ORDER — KETOROLAC TROMETHAMINE 10 MG/1
10 TABLET, FILM COATED ORAL EVERY 6 HOURS
Qty: 20 TABLET | Refills: 0 | Status: SHIPPED | OUTPATIENT
Start: 2021-03-22 | End: 2021-03-27

## 2021-03-22 RX ORDER — OXYBUTYNIN CHLORIDE 5 MG/1
5 TABLET, EXTENDED RELEASE ORAL DAILY
Qty: 10 TABLET | Refills: 0 | Status: SHIPPED | OUTPATIENT
Start: 2021-03-22 | End: 2021-05-18

## 2021-03-22 RX ADMIN — CEFAZOLIN SODIUM 2 G: 2 INJECTION, SOLUTION INTRAVENOUS at 09:15

## 2021-03-22 RX ADMIN — SODIUM CHLORIDE, POTASSIUM CHLORIDE, SODIUM LACTATE AND CALCIUM CHLORIDE: 600; 310; 30; 20 INJECTION, SOLUTION INTRAVENOUS at 09:04

## 2021-03-22 RX ADMIN — FENTANYL CITRATE 25 MCG: 50 INJECTION, SOLUTION INTRAMUSCULAR; INTRAVENOUS at 09:22

## 2021-03-22 RX ADMIN — FUROSEMIDE 20 MG: 10 INJECTION, SOLUTION INTRAVENOUS at 09:57

## 2021-03-22 RX ADMIN — FENTANYL CITRATE 25 MCG: 50 INJECTION, SOLUTION INTRAMUSCULAR; INTRAVENOUS at 09:11

## 2021-03-22 RX ADMIN — MIDAZOLAM 2 MG: 1 INJECTION INTRAMUSCULAR; INTRAVENOUS at 09:04

## 2021-03-22 RX ADMIN — KETOROLAC TROMETHAMINE 15 MG: 30 INJECTION, SOLUTION INTRAMUSCULAR at 09:57

## 2021-03-22 RX ADMIN — FENTANYL CITRATE 50 MCG: 50 INJECTION, SOLUTION INTRAMUSCULAR; INTRAVENOUS at 09:42

## 2021-03-22 RX ADMIN — ONDANSETRON 4 MG: 2 INJECTION INTRAMUSCULAR; INTRAVENOUS at 09:55

## 2021-03-22 RX ADMIN — DEXAMETHASONE SODIUM PHOSPHATE 4 MG: 4 INJECTION, SOLUTION INTRA-ARTICULAR; INTRALESIONAL; INTRAMUSCULAR; INTRAVENOUS; SOFT TISSUE at 09:26

## 2021-03-22 RX ADMIN — LIDOCAINE HYDROCHLORIDE 40 MG: 20 INJECTION, SOLUTION INFILTRATION; PERINEURAL at 09:11

## 2021-03-22 RX ADMIN — PROPOFOL 200 MG: 10 INJECTION, EMULSION INTRAVENOUS at 09:11

## 2021-03-22 ASSESSMENT — MIFFLIN-ST. JEOR: SCORE: 1820.29

## 2021-03-22 NOTE — DISCHARGE INSTRUCTIONS
POSTOPERATIVE INSTRUCTIONS    Diagnosis-------------------------------   RIGHT ureteral stone, right kidney stones    Procedure-------------------------------  Procedure(s) (LRB):  CYSTOSCOPY WITH RIGHT RETROGRADE PYELOGRAM, RIGHT URETEROSCOPY WITH LASER LITHOTRIPSY AND BASKET REMOVAL OF STONE, RIGHT URETERAL STENT PLACEMENT (Right)      Findings--------------------------------  Cystoscopy with removal of the previously placed ureteral stent with no identification of a stone within the bladder.  Right retrograde pyelogram with no evidence of stone in the ureter indicating that the stone had been pushed back up to the kidney.  Right ureteroscopy with identification of the previous ureteral stone which was fragmented with laser lithotripsy and removed.  Also removal of the 2 small renal stones.  Ureteral stent placed due to finding of edema of the ureter.    Home-going instructions-----------------         Activity Limitation:     - No driving or operating heavy machinery while on narcotic pain medication.     FOLLOW THESE INSTRUCTIONS AS INDICATED BELOW:  - Observe operative area for signs of excessive bleeding.  - You may shower.  - Increase fluid intake to promote clear urine.  - Resume usual diet as tolerated    What to expect while recovering-----------  - You may experience some intermittent bleeding that makes your urine pink or cherry colored. This is normal.  - However, if you are unable to urinate, passing large amount of clots, have chas blood in your urine, or have a temperature >101 degrees, call the urology nurse on call, or present to your nearest emergency department.  - You are encouraged to walk daily, and have no activity restrictions.   - A URETERAL STENT has been placed that allows urine to flow unobstructed from your kidney into your bladder.  The stent has a curl in the kidney and a curl in the bladder.  The curl in the bladder can cause some urgency and frequency of urination as well as some  mild blood in the urine.  The curl in the kidney can cause some mild flank discomfort.  This may be more noticeable when you urinate.  A URETERAL STENT is meant to be left in temporarily.  It must be removed or changed no later than 3 months after it's insertion.  If it's not removed it can result in stone overgrowth on the stent that can cause pain, infection, and can be very difficult to remove.      Discharge Medications/instructions:     - Flomax (tamsulosin) to be taken daily until stent is removed    - oxybutynin 5mg XL (Ditropan XL) to be taken daily until ureteral stent is removed    - Take Tylenol 1000mg every 6 hours for pain    - Take Toradol 10mg every 6 hours as needed for additional pain control    - Take Oxycodone 5mg every 4-6 hours only for break through pain    - Take Colace while taking Oxycodone to prevent constipation      Questions/concerns------------------------  [facility]: [phone]  [facility]: [phone]    Future appointments  You are scheduled for follow up on 4/7/21 for ureteral stent removal.     Mohsen Pat MD   Cystoscopy, Holmium Laser with Stent Placement Discharge Instructions    Holmium laser lithotripsy was used to break up your kidney stone(s). It is normal to have visible blood in the urine, burning, urgency and frequency following this procedure. These symptoms may last for a few days to weeks.     Diet:    To help pass stone fragments and clear the blood out of the urine, it is important to drink 6-8 glasses of fluids per day at home - at least 3-4 glasses should be water.       Return to the diet that you were on before the procedure, unless you are given specific diet instructions.    Activity:    Walk short distances and increase as your strength allows.    You may climb stairs.    Do not do strenuous exercise or heavy lifting until approved by surgeon.    Do not drive while taking narcotic pain medications.    Bathing:    You  may take a shower.          Stent information    During surgery, a stent was placed in the ureter.  The ureter is the tube that drains urine from the kidney to the bladder.  The stent is placed to dilate (open) the ureter so the stone fragments can pass easily through the ureter or to decrease ureteral swelling after surgery, or to relieve an obstruction. The stent is made of rubber. The upper end of the stent curls in the kidney while the lower end rests in the bladder.    While the stent is in place you may experience the following symptoms:    Blood and/or small blood clots in urine.    Bladder spasm (frequency and urgency of urination).    Discomfort or aching in the back or side where the stent is.    Burning or discomfort at the end of urine stream.    To decrease these symptoms you should:    Take pain medication as prescribed.    Drink plenty of fluids.    If you experience pain at the end of urination try not emptying your bladder completely.    If having discomfort in back or side, decrease activity.    Call your physician if these signs/symptoms are present:    Pain that is not relieved by a short rest or ordered pain medications.    Temperature at or above 101.0 F or chills.    Inability or difficulty urinating.     Excessive blood in urine.    Any questions or concerns.      Same Day Surgery Discharge Instructions for  Sedation and General Anesthesia       It's not unusual to feel dizzy, light-headed or faint for up to 24 hours after surgery or while taking pain medication.  If you have these symptoms: sit for a few minutes before standing and have someone assist you when you get up to walk or use the bathroom.      You should rest and relax for the next 24 hours. We recommend you make arrangements to have an adult stay with you for at least 24 hours after your discharge.  Avoid hazardous and strenuous activity.      DO NOT DRIVE any vehicle or operate mechanical equipment for 24 hours following the end  of your surgery.  Even though you may feel normal, your reactions may be affected by the medication you have received.      Do not drink alcoholic beverages for 24 hours following surgery.       Slowly progress to your regular diet as you feel able. It's not unusual to feel nauseated and/or vomit after receiving anesthesia.  If you develop these symptoms, drink clear liquids (apple juice, ginger ale, broth, 7-up, etc. ) until you feel better.  If your nausea and vomiting persists for 24 hours, please notify your surgeon.        All narcotic pain medications, along with inactivity and anesthesia, can cause constipation. Drinking plenty of liquids and increasing fiber intake will help.      For any questions of a medical nature, call your surgeon.      Do not make important decisions for 24 hours.      If you had general anesthesia, you may have a sore throat for a couple of days related to the breathing tube used during surgery.  You may use Cepacol lozenges to help with this discomfort.  If it worsens or if you develop a fever, contact your surgeon.       If you feel your pain is not well managed with the pain medications prescribed by your surgeon, please contact your surgeon's office to let them know so they can address your concerns.       CoVid 19 Information    We want to give you information regarding Covid. Please consult your primary care provider with any questions you might have.     Patient who have symptoms (cough, fever, or shortness of breath), need to isolate for 7 days from when symptoms started OR 72 hours after fever resolves (without fever reducing medications) AND improvement of respiratory symptoms (whichever is longer).      Isolate yourself at home (in own room/own bathroom if possible)    Do Not allow any visitors    Do Not go to work or school    Do Not go to Islam,  centers, shopping, or other public places.    Do Not shake hands.    Avoid close and intimate contact with others  (hugging, kissing).    Follow CDC recommendations for household cleaning of frequently touched services.     After the initial 7 days, continue to isolate yourself from household members as much as possible. To continue decrease the risk of community spread and exposure, you and any members of your household should limit activities in public for 14 days after starting home isolation.     You can reference the following CDC link for helpful home isolation/care tips:  https://www.cdc.gov/coronavirus/2019-ncov/downloads/10Things.pdf    Protect Others:    Cover Your Mouth and Nose with a mask, disposable tissue or wash cloth to avoid spreading germs to others.    Wash your hands and face frequently with soap and water    Call Your Primary Doctor If: Breathing difficulty develops or you become worse.    For more information about COVID19 and options for caring for yourself at home, please visit the CDC website at https://www.cdc.gov/coronavirus/2019-ncov/about/steps-when-sick.html  For more options for care at [facility], please visit our website at [email or web address]    Today you received  IV Toradol, an antiinflammatory medication similar to oral Ibuprofen.  You should not take other antiinflammatory medication, such as oral Toradol, Ibuprofen, Motrin, Advil, Aleve, Naprosyn, etc until 4 p.m.     **If you have questions or concerns about your procedure,   call Dr. Pat at [phone]**

## 2021-03-22 NOTE — ANESTHESIA POSTPROCEDURE EVALUATION
Patient: Louie Greco    Procedure(s):  CYSTOSCOPY WITH RIGHT RETROGRADE PYELOGRAM, RIGHT URETEROSCOPY WITH LASER LITHOTRIPSY AND BASKET REMOVAL OF STONE, RIGHT URETERAL STENT PLACEMENT    Diagnosis:Right ureteral stone [N20.1]  Diagnosis Additional Information: No value filed.    Anesthesia Type:  General    Note:     Postop Pain Control: Uneventful            Sign Out: Well controlled pain   PONV: No   Neuro/Psych: Uneventful            Sign Out: Acceptable/Baseline neuro status   Airway/Respiratory: Uneventful            Sign Out: Acceptable/Baseline resp. status   CV/Hemodynamics: Uneventful            Sign Out: Acceptable CV status   Other NRE: NONE   DID A NON-ROUTINE EVENT OCCUR? No         Last vitals:  Vitals:    03/22/21 1030 03/22/21 1045 03/22/21 1100   BP: 128/86 122/79 114/78   Pulse: 84 76 83   Resp: 16 12 15   Temp:  36  C (96.8  F)    SpO2: 96% 98% 95%       Last vitals prior to Anesthesia Care Transfer:  CRNA VITALS  3/22/2021 0936 - 3/22/2021 1036      3/22/2021             Pulse:  80    SpO2:  99 %    Resp Rate (set):  10          Electronically Signed By: Marcella Delaney MD  March 22, 2021  2:28 PM

## 2021-03-22 NOTE — OP NOTE
OPERATIVE REPORT  DATE OF SURGERY: 03/22/21  LOCATION OF SURGERY: [facility]  PREOPERATIVE DIAGNOSIS:  (N20.1) Right ureteral stone  (primary encounter diagnosis)  (N20.0) Right kidney stone  POSTOPERATIVE DIAGNOSIS: (N20.1) Right ureteral stone  (primary encounter diagnosis)  (N20.0) Right kidney stone     START TIME: 9:22 AM  END TIME: 10:00 AM  PROCEDURE PERFORMED:   1. Cystoscopy and RIGHT ureteral stent removal  2. RIGHT retrograde pyelogram  3. RIGHT ureteroscopy with laser lithotripsy  4. RIGHT ureteroscopy with basketing of stones  5. RIGHT JJ stent placement  6. <1hr physician fluoroscopy time      STAFF SURGEON: Mohsen Pat MD  ANESTHESIA: General.   ESTIMATED BLOOD LOSS: 2 mL.   DRAINS AND TUBES: RIGHT 6fr x 24cm ureteral stent, 18fr coude catheter  COMPLICATIONS: None.   DISPOSITION: PACU.   SPECIMENS OBTAINED:   ID Type Source Tests Collected by Time Destination   1 : RIGHT KIDNEY STONE Calculus/Stone Kidney, Right STONE ANALYSIS Mohsen Pat MD 3/22/2021  9:55 AM      SIGNIFICANT FINDINGS: Cystoscopy with removal of the previously placed ureteral stent with no identification of a stone within the bladder.  Right retrograde pyelogram with no evidence of stone in the ureter indicating that the stone had been pushed back up to the kidney.  Right ureteroscopy with identification of the previous ureteral stone which was fragmented with laser lithotripsy and removed.  Also removal of the 2 small renal stones.  Ureteral stent placed due to finding of edema of the ureter.     HISTORY OF PRESENT ILLNESS: Louie Greco is a 60 year old man who presented with 4mm ureteral stone on 3/1/21 who underwent Ureteroscopy with evidence a tight distal ureter and ureteral stent placed. He returns today for definitive stone management and removal of the additional right kidney stones.      OPERATION PERFORMED:   Informed consent was obtained and the patient was brought to the operating room where general anesthesia  was induced. The patient was given appropriate preoperative antibiotics and positioned supine. The patient was then repositioned in dorsal lithotomy with all pressure points padded. We then performed a timeout, verifying the correct patient's site and procedure to be performed.    A 22fr cystoscope was inserted atraumatically into the bladder. Complete cystoscopy was performed with no evidence of a stone within the bladder. The previously placed RIGHT ureteral stent was grasped and withdrawn to the meatus. The RIGHT stent was and cannulated with a 0.035 Sensor wire which was advanced up the RIGHT kidney under fluoroscopic guidance. The ureteral stent was removed. A dual lumen catheter was advanced up to the mid ureter and a gentle Retrograde Pyelogram was completed with no clear evidence of stone in the ureter. An Amplatz super-stiff wire was advanced up to the renal pelvis and the dual lumen was removed. A 11/13 46cm ureteral access sheath was advanced over the super-stiff wire under fluoroscopic guidance to the level of the proximal ureter and the wire and the inner sheath were removed. The flexible ureteroscope was assembled and advanced up the renal pelvis and complete pyeloscopy was performed. The small upper and mid pole kidney stones were basketed and removed. The prior ureteral stone was identified in the upper pole. The stone was fragmented with the laser fiber. All fragments >1mm were basketed and removed. The scope and the sheath were removed en bloc with complete visualization of the ureter. There was no evidence of ureteral injury, however there was significant edema and inflammation of the ureter at the site of prior stone impaction and in the distal ureter at the site of prior tightness. A 6fr x 24cm JJ stent was advanced over the Sensor wire with good curl noted in the renal pelvis and in the bladder fluoroscopically. A 18fr mcgowan was placed.   The patient was emerged from anesthesia and recovered in  the PACU.      Mohsen Pat MD   Urology  [facility]  Clinic Phone [phone]

## 2021-03-22 NOTE — ANESTHESIA CARE TRANSFER NOTE
Patient: Louie Greco    Procedure(s):  CYSTOSCOPY WITH RIGHT RETROGRADE PYELOGRAM, RIGHT URETEROSCOPY WITH LASER LITHOTRIPSY AND BASKET REMOVAL OF STONE, RIGHT URETERAL STENT PLACEMENT    Diagnosis: Right ureteral stone [N20.1]  Diagnosis Additional Information: No value filed.    Anesthesia Type:   General     Note:    Oropharynx: oropharynx clear of all foreign objects and spontaneously breathing  Level of Consciousness: awake and drowsy  Oxygen Supplementation: nasal cannula  Level of Supplemental Oxygen (L/min / FiO2): 3  Independent Airway: airway patency satisfactory and stable  Dentition: dentition unchanged  Vital Signs Stable: post-procedure vital signs reviewed and stable  Report to RN Given: handoff report given  Patient transferred to: Phase II    Handoff Report: Identifed the Patient, Identified the Reponsible Provider, Reviewed the pertinent medical history, Discussed the surgical course, Reviewed Intra-OP anesthesia mangement and issues during anesthesia, Set expectations for post-procedure period and Allowed opportunity for questions and acknowledgement of understanding      Vitals: (Last set prior to Anesthesia Care Transfer)  CRNA VITALS  3/22/2021 0936 - 3/22/2021 1011      3/22/2021             Pulse:  80    SpO2:  99 %    Resp Rate (set):  10        Electronically Signed By: QUINTON Clay CRNA  March 22, 2021  10:11 AM

## 2021-03-22 NOTE — ANESTHESIA PROCEDURE NOTES
Airway       Patient location during procedure: OR  Staff -        Anesthesiologist:  Marcella Delaney MD       CRNA: Edel Brownlee APRN CRNA       Performed By: CRNAIndications and Patient Condition       Indications for airway management: angel-procedural       Induction type:intravenous       Mask difficulty assessment: 0 - not attempted    Final Airway Details       Final airway type: supraglottic airway    Supraglottic Airway Details        Type: LMA       Brand: Ambu AuraGain       LMA size: 5    Post intubation assessment        Placement verified by: capnometry, equal breath sounds and chest rise        Number of attempts at approach: 1       Secured with: pink tape       Ease of procedure: easy       Dentition: Intact and Unchanged

## 2021-03-22 NOTE — ANESTHESIA PREPROCEDURE EVALUATION
Anesthesia Pre-Procedure Evaluation    Patient: Louie Greco   MRN: [id number] : 1960        Preoperative Diagnosis: Right ureteral stone [N20.1]   Procedure : Procedure(s):  CYSTOSCOPY WITH RIGHT RETROGRADE PYELOGRAM, RIGHT URETEROSCOPY WITH LASER LITHOTRIPSY AND BASKET REMOVAL OF STONE, RIGHT URETERAL STENT PLACEMENT     Past Medical History:   Diagnosis Date     Childhood asthma      Elevated prostate specific antigen (PSA)     No biopsy done (had rectal cancer at the time)     Epididymitis, bilateral     18 years old     Inguinal hernia      Mumps      Nephrolithiasis     Several --  first, 2019 last     Rectal cancer (H) 2017    low rectal cancer, S/P Rad adn Chemo, no surg needed     Shingles       Past Surgical History:   Procedure Laterality Date     COLONOSCOPY N/A 2016    Procedure: COMBINED COLONOSCOPY, SINGLE OR MULTIPLE BIOPSY/POLYPECTOMY BY BIOPSY;  Surgeon: Chelsea Thompson MD;  Location:  GI     COLONOSCOPY N/A 2017    Procedure: COLONOSCOPY;;  Surgeon: Felicitas Radford MD;  [facility]     COLONOSCOPY N/A 2017    Procedure: COLONOSCOPY;;  Surgeon: Felicitas Radford MD;  [facility]     COLONOSCOPY N/A 10/23/2020    Procedure: COLONOSCOPY;  Surgeon: Felicitas Radford MD;  Location: [facility]     COMBINED CYSTOSCOPY, RETROGRADES, URETEROSCOPY, LASER HOLMIUM LITHOTRIPSY URETER(S), INSERT STENT Left 2018    Procedure: COMBINED CYSTOSCOPY, RETROGRADES, URETEROSCOPY, LASER HOLMIUM LITHOTRIPSY URETER(S), INSERT STENT;  Cysto, left ureteroscopy, holmium laser, retrogrades, stent placement;  Surgeon: Bola Worthy MD;  Location:  OR     CYSTOSCOPY, LITHOLAPAXY, COMBINED N/A 3/1/2021    Procedure: Cystoscopy, litholapaxy, combined;  Surgeon: Mohsen Pat MD;  Location: [facility]     CYSTOSCOPY, RETROGRADES, INSERT STENT URETER(S), COMBINED Right 3/1/2021    Procedure: Cystoscopy, retrogrades, insert stent ureter(s),  combined;  Surgeon: Mohsen Pat MD;  Location: [facility]     EXAM UNDER ANESTHESIA ANUS N/A 7/5/2017    Procedure: EXAM UNDER ANESTHESIA ANUS;  Examination Under Anesthesia, flex sigmoidoscopy with biopsies and formalin application;  Surgeon: Felicitas Radford MD;  [facility]     EXAM UNDER ANESTHESIA ANUS N/A 9/13/2017    Procedure: EXAM UNDER ANESTHESIA ANUS;  Examination Under Anesthesia Anus, Colonoscopy, application of formalin;  Surgeon: Felicitas Radford MD;  [facility]     EXAM UNDER ANESTHESIA ANUS N/A 12/13/2017    Procedure: EXAM UNDER ANESTHESIA ANUS;  Examination Under Anesthesia Anus, Colonoscopy;  Surgeon: Felicitas Radford MD;  [facility]     EXAM UNDER ANESTHESIA ANUS N/A 5/11/2018    Procedure: EXAM UNDER ANESTHESIA ANUS;  Examination Under Anesthesia Anus, Interoperative Flexible Sigmoidoscopy, Application of Formalin;  Surgeon: Felicitas Radford MD;  [facility]     EXAM UNDER ANESTHESIA ANUS N/A 7/20/2018    Procedure: EXAM UNDER ANESTHESIA ANUS;  Examination Under Anesthesia Anus, Flexible Sigmoidoscopy ;  Surgeon: Felicitas Radford MD;  [facility]     EXAM UNDER ANESTHESIA ANUS N/A 11/30/2018    Procedure: Examination Under Anesthesia, application of formalin to rectum, polypectomy;  Surgeon: Felicitas Radford MD;  [facility]     EXAM UNDER ANESTHESIA ANUS N/A 2/22/2019    Procedure: Examination Under Anesthesia;  Surgeon: Felicitas Radford MD;  [facility]     EXAM UNDER ANESTHESIA ANUS N/A 6/28/2019    Procedure: Examination Under Anesthesia;  Surgeon: Felicitas Radford MD;  [facility]     EXAM UNDER ANESTHESIA ANUS N/A 11/1/2019    Procedure: Examination Under Anesthesia;  Surgeon: Felicitas Radford MD;  [facility]     EXAM UNDER ANESTHESIA RECTUM N/A 10/23/2020    Procedure: Exam under anesthesia rectum;  Surgeon: Felicitas Radford MD;  Location: [facility]      HC TOOTH EXTRACTION W/FORCEP       LAPAROSCOPIC APPENDECTOMY N/A 7/17/2017    Procedure: LAPAROSCOPIC APPENDECTOMY;  LAPAROSCOPIC APPENDECTOMY;  Surgeon: Bryson Ferguson MD;  Location:  OR     LASER HOLMIUM LITHOTRIPSY URETER(S), INSERT STENT, COMBINED Right 3/1/2021    Procedure: CYSTOURETEROSCOPY,  URETERAL STENT INSERTION, RIGHT RETROGRADE;  Surgeon: Mohsen Pat MD;  [facility]     SIGMOIDOSCOPY FLEXIBLE N/A 12/8/2016    Procedure: SIGMOIDOSCOPY FLEXIBLE;  Surgeon: Feilcitas Radford MD;  [facility]     SIGMOIDOSCOPY FLEXIBLE N/A 7/5/2017    Procedure: SIGMOIDOSCOPY FLEXIBLE;;  Surgeon: Felicitas Radford MD;  [facility]     SIGMOIDOSCOPY FLEXIBLE N/A 5/11/2018    Procedure: SIGMOIDOSCOPY FLEXIBLE;;  Surgeon: Felicitas Radford MD;  [facility]     SIGMOIDOSCOPY FLEXIBLE N/A 7/20/2018    Procedure: SIGMOIDOSCOPY FLEXIBLE;;  Surgeon: Felicitas Radford MD;  [facility]     SIGMOIDOSCOPY FLEXIBLE N/A 11/30/2018    Procedure: Flexible Sigmoidoscopy;  Surgeon: Felicitas Radford MD;  [facility]     SIGMOIDOSCOPY FLEXIBLE N/A 2/22/2019    Procedure: Intraoperative Flexible Sigmoidoscopy, Application of Formalin to rectum;  Surgeon: Felicitas Radford MD;  [facility]     SIGMOIDOSCOPY FLEXIBLE N/A 6/28/2019    Procedure: Flexible Sigmoidoscopy;  Surgeon: Felicitas Radford MD;  [facility]     SIGMOIDOSCOPY FLEXIBLE N/A 11/1/2019    Procedure: Flexible Sigmoidoscopy;  Surgeon: Felicitas Radford MD;  [facility]     TESTICLE SURGERY       VASCULAR SURGERY      Right chest port     VASECTOMY       VASECTOMY        Allergies   Allergen Reactions     Ampicillin Diarrhea     Demerol [Meperidine]      Nausea       Social History     Tobacco Use     Smoking status: Never Smoker     Smokeless tobacco: Never Used   Substance Use Topics     Alcohol use: Yes     Comment: < 1 drink a week      Wt Readings from Last 1  Encounters:   02/28/21 95.3 kg (210 lb)        Anesthesia Evaluation   Pt has had prior anesthetic.         ROS/MED HX  ENT/Pulmonary:     (+) asthma  (-) sleep apnea   Neurologic:     (+) peripheral neuropathy, - chemo 4 ext.     Cardiovascular:       METS/Exercise Tolerance: >4 METS    Hematologic:       Musculoskeletal:       GI/Hepatic: Comment: Denies GERD, no opioids    (-) GERD   Renal/Genitourinary:     (+) renal disease, Nephrolithiasis ,     Endo:     (+) Obesity,     Psychiatric/Substance Use:       Infectious Disease:       Malignancy:   (+) Malignancy, History of GI.GI CA status post Surgery, Chemo and Radiation.        Other:            Physical Exam    Airway        Mallampati: II   TM distance: > 3 FB   Neck ROM: full   Mouth opening: > 3 cm    Respiratory Devices and Support         Dental       (+) caps      Cardiovascular          Rhythm and rate: regular     Pulmonary           breath sounds clear to auscultation           OUTSIDE LABS:  CBC:   Lab Results   Component Value Date    WBC 6.0 02/28/2021    WBC 5.8 08/13/2020    HGB 14.0 02/28/2021    HGB 16.2 08/13/2020    HCT 42.5 02/28/2021    HCT 46.2 08/13/2020     02/28/2021     08/13/2020     BMP:   Lab Results   Component Value Date     02/28/2021     08/13/2020    POTASSIUM 4.0 02/28/2021    POTASSIUM 3.7 08/13/2020    CHLORIDE 110 (H) 02/28/2021    CHLORIDE 104 08/13/2020    CO2 25 02/28/2021    CO2 27 08/13/2020    BUN 19 02/28/2021    BUN 16 08/13/2020    CR 1.10 02/28/2021    CR 0.83 08/13/2020     (H) 02/28/2021     (H) 08/13/2020     COAGS:   Lab Results   Component Value Date    PTT 31 07/10/2017    INR 1.02 07/10/2017     POC:   Lab Results   Component Value Date    BGM 60 (L) 07/25/2018     HEPATIC:   Lab Results   Component Value Date    ALBUMIN 3.7 02/28/2021    PROTTOTAL 6.8 02/28/2021    ALT 40 02/28/2021    AST 26 02/28/2021    ALKPHOS 58 02/28/2021    BILITOTAL 0.5 02/28/2021     OTHER:    Lab Results   Component Value Date    LACT 0.8 07/17/2017    FRANDY 8.3 (L) 02/28/2021    MAG 2.0 07/17/2017    LIPASE 264 02/28/2021    .0 (H) 07/17/2017    SED 34 (H) 07/17/2017     Prior to Admission medications    Medication Sig Start Date End Date Taking? Authorizing Provider   acetaminophen (TYLENOL) 325 MG tablet Take 2 tablets (650 mg) by mouth every 4 hours as needed for mild pain 3/1/21  Yes Calvin Razo PA   oxybutynin ER (DITROPAN-XL) 5 MG 24 hr tablet Take 1 tablet (5 mg) by mouth daily Take daily until your stent is removed. 3/1/21 3/31/21 Yes Mohsen Pat MD   tamsulosin (FLOMAX) 0.4 MG capsule Take 1 capsule (0.4 mg) by mouth daily Take daily until your stent is removed. 3/1/21  Yes Mohsen Pat MD   oxyCODONE (ROXICODONE) 5 MG tablet Take 1 tablet (5 mg) by mouth every 6 hours as needed for breakthrough pain 3/1/21   Mohsen Pat MD     Current Facility-Administered Medications Ordered in Epic   Medication Dose Route Frequency Last Rate Last Admin     ceFAZolin (ANCEF) intermittent infusion 2 g in 100 mL dextrose PRE-MIX  2 g Intravenous Pre-Op/Pre-procedure x 1 dose         ceFAZolin (ANCEF) intermittent infusion 2 g in 100 mL dextrose PRE-MIX  2 g Intravenous See Admin Instructions         No current [facility]-ordered outpatient medications on file.       Recent Labs   Lab Test 11/23/16  1217   ABO O   RH  Pos     No results for input(s): HCG in the last 95654 hours.  Recent Results (from the past 744 hour(s))   CT Abdomen Pelvis w/o Contrast    Narrative    EXAM: CT ABDOMEN PELVIS W/O CONTRAST  LOCATION: [facility]  DATE/TIME: 2/28/2021 6:16 AM    INDICATION: RLQ abdominal pain  COMPARISON: None.  TECHNIQUE: CT scan of the abdomen and pelvis was performed without IV contrast. Multiplanar reformats were obtained. Dose reduction techniques were used.  CONTRAST: None.    FINDINGS:   LOWER CHEST: Calcified left lower lobe granuloma. Bibasilar  atelectasis.    HEPATOBILIARY: Simple fluid density benign liver cysts requiring no follow-up. Gallbladder is normal.    PANCREAS: Normal.    SPLEEN: Normal.    ADRENAL GLANDS: Normal.    KIDNEYS/BLADDER: Multiple bilateral renal cysts of varying density. Calcified intrarenal stones present bilaterally measuring up to 3 mm in the right midpole and up to 7 mm in the left upper pole. There is mild right hydronephrosis secondary to a 4 mm   stone at the right ureteropelvic junction on coronal image 52. The bladder is normal.    BOWEL: Postoperative change at the cecum. No mechanical bowel obstruction or free air.    LYMPH NODES: Normal.    VASCULATURE: Mild aortoiliac atherosclerotic calcification.    PELVIC ORGANS: Small to moderate fat-containing left inguinal hernia.    MUSCULOSKELETAL: Lumbar spine degenerative disc height loss. Small fat-containing umbilical hernia.      Impression    IMPRESSION:   1.  4 mm stone at the right ureteropelvic junction causing mild right hydronephrosis. There are multiple additional calcified intrarenal stones in both kidneys.    2.  Polycystic kidneys with cysts of varying densities. These could be evaluated further with renal mass protocol CT or MRI to exclude solid lesion.     XR Surgery MICHAELLE Fluoro L/T 5 Min w Stills    Narrative    This exam was marked as non-reportable because it will not be read by a   radiologist or a [place] non-radiologist provider.             Anesthesia Plan    ASA Status:  2      Anesthesia Type: General.     - Airway: LMA              Consents    Anesthesia Plan(s) and associated risks, benefits, and realistic alternatives discussed. Questions answered and patient/representative(s) expressed understanding.     - Discussed with:  Patient         Postoperative Care       PONV prophylaxis: Ondansetron (or other 5HT-3), Dexamethasone or Solumedrol, Background Propofol Infusion     Comments:                Marcella Delaney MD

## 2021-03-23 LAB — COPATH REPORT: NORMAL

## 2021-03-24 ENCOUNTER — OFFICE VISIT (OUTPATIENT)
Dept: FAMILY MEDICINE | Facility: CLINIC | Age: 61
End: 2021-03-24
Payer: COMMERCIAL

## 2021-03-24 VITALS
OXYGEN SATURATION: 97 % | BODY MASS INDEX: 31.78 KG/M2 | HEIGHT: 71 IN | TEMPERATURE: 97.1 F | WEIGHT: 227 LBS | HEART RATE: 74 BPM | SYSTOLIC BLOOD PRESSURE: 132 MMHG | DIASTOLIC BLOOD PRESSURE: 80 MMHG

## 2021-03-24 DIAGNOSIS — H92.02 OTALGIA, LEFT EAR: Primary | ICD-10-CM

## 2021-03-24 DIAGNOSIS — H91.92 DECREASED HEARING OF LEFT EAR: ICD-10-CM

## 2021-03-24 PROCEDURE — 99213 OFFICE O/P EST LOW 20 MIN: CPT | Performed by: FAMILY MEDICINE

## 2021-03-24 RX ORDER — OFLOXACIN 3 MG/ML
10 SOLUTION AURICULAR (OTIC) DAILY
Qty: 4 ML | Refills: 0 | Status: SHIPPED | OUTPATIENT
Start: 2021-03-24 | End: 2021-03-31

## 2021-03-24 ASSESSMENT — ENCOUNTER SYMPTOMS
LIGHT-HEADEDNESS: 1
SINUS PRESSURE: 0
DIZZINESS: 0

## 2021-03-24 ASSESSMENT — MIFFLIN-ST. JEOR: SCORE: 1853.86

## 2021-03-24 NOTE — PROGRESS NOTES
Assessment & Plan     Louie was seen today for ear problem.    Diagnoses and all orders for this visit:    Otalgia, left ear.  Differential was considered, including (but not limited to):  otitis externa, otitis media, and TM rupture.  -     ofloxacin (FLOXIN) 0.3 % otic solution; Place 10 drops Into the left ear daily for 7 days  -     OTOLARYNGOLOGY REFERRAL    Decreased hearing of left ear, starting 3 weeks ago  -     OTOLARYNGOLOGY REFERRAL      Patient prefers to avoid oral antibiotics (due to the risk for diarrhea), but he is in agreement with the following plan:    Discussed risks and benefits of treatment strategies.    Patient Instructions     Avoid use of Q-tips.    Floxin, as discussed.    Follow up with ENT in 2 weeks if continued ear or hearing concerns.    Follow-up sooner as needed for worsening symptoms.        Return if symptoms worsen or fail to improve over the next 2 weeks.    [place] MD  [facility] MEGAN Palma is a 60 year old who presents for the following health issues:    HPI     Acute Illness  Acute illness concerns: Intermittent left ear pain, post ear lavage this past Thursday.  Patient followed up with Audiology today, at which time he was advised to follow-up with primary care to receive treatment for an ear infection.  Left ear hearing has been decreased the past 3 weeks, with some drainage noted the past few days.  Onset/Duration: 3 weeks  Symptoms:  Fever: no  Chills/Sweats: no  Headache (location?): no  Sinus Pressure: no  Conjunctivitis:  no  Ear Pain: YES:  Left - Intermittent sharp pain (mild post Toradol earlier today), occasionally worse in the evening.  Rhinorrhea: no  Congestion: YES - Possible allergies, per patient.  Sore Throat: no  Cough: no  Wheeze: no  Decreased Appetite: no  Nausea: no  Vomiting: no  Diarrhea: no  Dysuria/Frequency/Hematuria.:  Mild (stable), post recent kidney stone and stent  Fatigue/Achiness:  "no  Sick/Strep Exposure: no  Therapies tried and outcome: None      Review of Systems   HENT: Positive for tinnitus (Left ear). Negative for sinus pressure.    Neurological: Positive for light-headedness (Occasionally due to meds). Negative for dizziness (No vertigo reported).          Objective    /80 (BP Location: Left arm, Patient Position: Chair, Cuff Size: Adult Large)   Pulse 74   Temp 97.1  F (36.2  C) (Temporal)   Ht 1.791 m (5' 10.5\")   Wt 103 kg (227 lb)   SpO2 97%   BMI 32.11 kg/m    Body mass index is 32.11 kg/m .  Physical Exam   GENERAL APPEARANCE:  Awake, alert, and in no acute distress.  PSYCHIATRIC:  Pleasant affect.  HEENT: Wears glasses.  Sclera anicteric.  No conjunctivitis.  PERRLA.  Extraocular movements are intact.  Left TM is obstructed from view, secondary to whitish discharge in the outer canal.  No evidence of bleeding.  Right TM and canal are within normal limits.  Not tender with palpation over the tragus and with retraction of the pinna bilaterally.  No obvious nasal congestion, but some postnasal drainage noted.  No significant erythema noted involving the posterior pharynx.  No edema or exudates of the oral mucosa or posterior pharynx.  Mucous membranes moist.  NECK:  Spontaneous full range of motion.  No thyromegaly or mass.  No lymphadenopathy.  HEART:  Normal S1, S2.  Regular rate and rhythm.  No murmurs, rubs, or gallops.  LUNGS:  No respiratory distress.  No wheezes, rales, or rhonchi.  EXTREMITIES:  Moves 4 extremities.     NEUROLOGIC:  No facial droop or acute neurologic deficits.    Disclaimer: This dictation was composed using a combination of keyboarding and voice recognition software.  As a result, there may be errors in the dictation that have gone undetected.  Please consider this when interpreting the information found in this chart.    "

## 2021-03-24 NOTE — PATIENT INSTRUCTIONS
Avoid use of Q-tips.    Floxin, as discussed.    Follow up with ENT in 2 weeks if continued ear or hearing concerns.    Follow-up sooner as needed for worsening symptoms.

## 2021-03-27 LAB
APPEARANCE STONE: NORMAL
COMPN STONE: NORMAL
NUMBER STONE: 3
SIZE STONE: NORMAL MM
WT STONE: 37 MG

## 2021-04-07 ENCOUNTER — OFFICE VISIT (OUTPATIENT)
Dept: UROLOGY | Facility: CLINIC | Age: 61
End: 2021-04-07
Payer: COMMERCIAL

## 2021-04-07 VITALS
HEART RATE: 85 BPM | BODY MASS INDEX: 30.06 KG/M2 | WEIGHT: 210 LBS | HEIGHT: 70 IN | DIASTOLIC BLOOD PRESSURE: 80 MMHG | SYSTOLIC BLOOD PRESSURE: 130 MMHG | OXYGEN SATURATION: 95 %

## 2021-04-07 DIAGNOSIS — N20.1 RIGHT URETERAL STONE: Primary | ICD-10-CM

## 2021-04-07 DIAGNOSIS — N20.0 KIDNEY STONE ON LEFT SIDE: ICD-10-CM

## 2021-04-07 DIAGNOSIS — Z46.6 ENCOUNTER FOR REMOVAL OF URETERAL STENT: ICD-10-CM

## 2021-04-07 PROCEDURE — 52310 CYSTOSCOPY AND TREATMENT: CPT | Performed by: UROLOGY

## 2021-04-07 RX ORDER — LIDOCAINE HYDROCHLORIDE 20 MG/ML
JELLY TOPICAL ONCE
Status: DISCONTINUED | OUTPATIENT
Start: 2021-04-07 | End: 2021-04-07 | Stop reason: HOSPADM

## 2021-04-07 ASSESSMENT — PAIN SCALES - GENERAL: PAINLEVEL: NO PAIN (0)

## 2021-04-07 ASSESSMENT — MIFFLIN-ST. JEOR: SCORE: 1768.8

## 2021-04-07 NOTE — PROGRESS NOTES
CYSTOSCOPY AND URETERAL STENT REMOVAL PROCEDURE NOTE:    Louie Greco is a 60 year old male  who presents with ureteral stent  for cystoscopy and ureteral stent removal.    Pt ID verified with patient: Yes     Procedure verified with patient: Yes     Procedure confirmed with physician and support staff: Yes     Consent form confirmed with physician and support staff.    Sign In  History and Physical Exam reviewed.  Informed Consent Discussed: Yes   Sign in Communication: Yes   Time Out:  Team Confirms the Correct Patient, Correct Procedure; Yes , Correct Site and Site Marking, Correct Position (if applicable).    Affirmation of Time Out: Yes   Sign Out:  Sign Out Discussion: Yes   Louie Greco is a 60 year old male with an indwelling ureteral stent in need of removal.    CYSTOSCOPY PROCEDURE:  After sterile preparation and draping of the patient,  a 17-Nepali flexible cystoscope was introduced via the urethra.  It was passed without difficulty into the bladder.  The urethra was open without evidence of stricture.  The ureteral orifices were orthotopic.  The double J stent was seen coming out the right side.  It was grasped with an alligator forceps and extracted intact without difficulty.  The patient tolerated the procedure well    A/P Successful stent removal  Prophylactic antibiotic ordered   Stone prevention counseling provided today    LEFT kidney stones  -We reviewed his CT imaging  - We discussed treatment options which include:  ---- Observation: We discussed that observation is a reasonable choice with follow-up for monitoring of stone growth.  He is very reluctant to do this given his prior episodes with renal colic  ---- ESWL: We discussed that given his renal cysts and multiple stones I would not recommend this treatment option  ---- URETEROSCOPY: we discussed that there would be 5-10% chance of requiring a staged procedure. We discussed the need for a ureteral stent.  -He would like to proceed with  ureteroscopy scheduling  -Order for surgery placed    Watch for any new onset fevers, signs of UTI.  May expect some pain after removal.  If this is severe, or last many hours, you may need to return for replacement of stent.    Mohsen Pat MD   Urology  [facility]

## 2021-04-07 NOTE — NURSING NOTE
Chief Complaint   Patient presents with     Right ureteral stone     Here for an in office cystoscopy for a stent removal     Prior to the start of the procedure and with procedural staff participation, I verbally confirmed the patient s identity using two indicators, relevant allergies, that the procedure was appropriate and matched the consent or emergent situation, and that the correct equipment/implants were available. Immediately prior to starting the procedure I conducted the Time Out with the procedural staff and re-confirmed the patient s name, procedure, and site/side. I have wiped the patient off with the povidone-Iodine solution, draped them,  used Lidocaine hydrochloride jelly, and instilled sterile water into the bladder. (The Joint Commission universal protocol was followed.)  Yes    Sedation (Moderate or Deep): Urojet    5mL 2% lidocaine hydrochloride Urojet instilled into urethra.    NDC# [address]-5  Lot #: [id number]  Expiration Date:  7-22    Romy Sanz

## 2021-04-07 NOTE — LETTER
4/7/2021       RE: Louie Greco  [address] [facility] [address]     Dear Colleague,    Thank you for referring your patient, Louie Greco, to the [facility] [place] at [facility]. Please see a copy of my visit note below.    CYSTOSCOPY AND URETERAL STENT REMOVAL PROCEDURE NOTE:    Louie Greco is a 60 year old male  who presents with ureteral stent  for cystoscopy and ureteral stent removal.    Pt ID verified with patient: Yes     Procedure verified with patient: Yes     Procedure confirmed with physician and support staff: Yes     Consent form confirmed with physician and support staff.    Sign In  History and Physical Exam reviewed.  Informed Consent Discussed: Yes   Sign in Communication: Yes   Time Out:  Team Confirms the Correct Patient, Correct Procedure; Yes , Correct Site and Site Marking, Correct Position (if applicable).    Affirmation of Time Out: Yes   Sign Out:  Sign Out Discussion: Yes   Louie Greco is a 60 year old male with an indwelling ureteral stent in need of removal.    CYSTOSCOPY PROCEDURE:  After sterile preparation and draping of the patient,  a 17-Chinese flexible cystoscope was introduced via the urethra.  It was passed without difficulty into the bladder.  The urethra was open without evidence of stricture.  The ureteral orifices were orthotopic.  The double J stent was seen coming out the right side.  It was grasped with an alligator forceps and extracted intact without difficulty.  The patient tolerated the procedure well    A/P Successful stent removal  Prophylactic antibiotic ordered   Stone prevention counseling provided today    LEFT kidney stones  -We reviewed his CT imaging  - We discussed treatment options which include:  ---- Observation: We discussed that observation is a reasonable choice with follow-up for monitoring of stone growth.  He is very reluctant to do this given his prior episodes with renal colic  ----  ESWL: We discussed that given his renal cysts and multiple stones I would not recommend this treatment option  ---- URETEROSCOPY: we discussed that there would be 5-10% chance of requiring a staged procedure. We discussed the need for a ureteral stent.  -He would like to proceed with ureteroscopy scheduling  -Order for surgery placed    Watch for any new onset fevers, signs of UTI.  May expect some pain after removal.  If this is severe, or last many hours, you may need to return for replacement of stent.    Mohsen Pat MD   Urology  [facility]

## 2021-04-07 NOTE — PATIENT INSTRUCTIONS
"AFTER YOUR CYSTOSCOPY  ?  ?  You have just completed a cystoscopy, or \"cysto\", which allowed your physician to learn more about your bladder (or to remove a stent placed after surgery). We suggest that you continue to avoid caffeine, fruit juice, and alcohol for the next 24 hours, however, you are encouraged to return to your normal activities.  ?  ?  A few things that are considered normal after your cystoscopy:  ?  * small amount of bleeding (or spotting) that clears within the next 24 hours  ?  * slight burning sensation with urination  ?  * sensation of needing to void (urinate) more frequently  ?  * the feeling of \"air\" in your urine  ?  * mild discomfort that is relieved with Tylenol    * bladder spasms  ?  ?  ?  Please contact our office promptly if you:  ?  * develop a fever above 101 degrees  ?  * are unable to urinate  ?  * develop bright red blood that does not stop  ?  * experience severe pain or swelling  ?  ?  ?  And of course, please contact our office with any concerns or questions [phone]  ?      AFTER YOUR CYSTOSCOPY        You have just completed a cystoscopy, or \"cysto\", which allowed your physician to learn more about your bladder (or to remove a stent placed after surgery). We suggest that you continue to avoid caffeine, fruit juice, and alcohol for the next 24 hours, however, you are encouraged to return to your normal activities.         A few things that are considered normal after your cystoscopy:     * Small amount of bleeding (or spotting) that clears within the next 24 hours     * Slight burning sensation with urination     * Sensation to of needing to avoid more frequently     * The feeling of \"air\" in your urine     * Mild discomfort that is relieved with Tylenol        Please contact our office promptly if you:     * Develop a fever above 101 degrees     * Are unable to urinate     * Develop bright red blood that does not stop     * Severe pain or swelling         Please contact " our office with any concerns or questions @[facility].

## 2021-04-21 ENCOUNTER — HOSPITAL LABORATORY (OUTPATIENT)
Dept: OTHER | Facility: CLINIC | Age: 61
End: 2021-04-21

## 2021-04-22 PROBLEM — N20.0 KIDNEY STONE ON LEFT SIDE: Status: ACTIVE | Noted: 2021-04-22

## 2021-04-24 ENCOUNTER — HEALTH MAINTENANCE LETTER (OUTPATIENT)
Age: 61
End: 2021-04-24

## 2021-04-27 LAB
BACTERIA SPEC CULT: ABNORMAL
BACTERIA SPEC CULT: ABNORMAL
Lab: ABNORMAL
SPECIMEN SOURCE: ABNORMAL

## 2021-05-07 DIAGNOSIS — Z11.59 ENCOUNTER FOR SCREENING FOR OTHER VIRAL DISEASES: ICD-10-CM

## 2021-05-17 DIAGNOSIS — Z11.59 ENCOUNTER FOR SCREENING FOR OTHER VIRAL DISEASES: ICD-10-CM

## 2021-05-17 LAB
LABORATORY COMMENT REPORT: NORMAL
SARS-COV-2 RNA RESP QL NAA+PROBE: NEGATIVE
SARS-COV-2 RNA RESP QL NAA+PROBE: NORMAL
SPECIMEN SOURCE: NORMAL
SPECIMEN SOURCE: NORMAL

## 2021-05-17 PROCEDURE — U0003 INFECTIOUS AGENT DETECTION BY NUCLEIC ACID (DNA OR RNA); SEVERE ACUTE RESPIRATORY SYNDROME CORONAVIRUS 2 (SARS-COV-2) (CORONAVIRUS DISEASE [COVID-19]), AMPLIFIED PROBE TECHNIQUE, MAKING USE OF HIGH THROUGHPUT TECHNOLOGIES AS DESCRIBED BY CMS-2020-01-R: HCPCS | Performed by: UROLOGY

## 2021-05-17 PROCEDURE — U0005 INFEC AGEN DETEC AMPLI PROBE: HCPCS | Performed by: UROLOGY

## 2021-05-18 ENCOUNTER — ANESTHESIA EVENT (OUTPATIENT)
Dept: SURGERY | Facility: CLINIC | Age: 61
End: 2021-05-18
Payer: COMMERCIAL

## 2021-05-18 ENCOUNTER — OFFICE VISIT (OUTPATIENT)
Dept: INTERNAL MEDICINE | Facility: CLINIC | Age: 61
End: 2021-05-18
Payer: COMMERCIAL

## 2021-05-18 VITALS
OXYGEN SATURATION: 97 % | DIASTOLIC BLOOD PRESSURE: 80 MMHG | TEMPERATURE: 98.3 F | HEIGHT: 71 IN | HEART RATE: 84 BPM | BODY MASS INDEX: 30.31 KG/M2 | WEIGHT: 216.5 LBS | RESPIRATION RATE: 18 BRPM | SYSTOLIC BLOOD PRESSURE: 124 MMHG

## 2021-05-18 DIAGNOSIS — N20.0 KIDNEY STONE ON LEFT SIDE: ICD-10-CM

## 2021-05-18 DIAGNOSIS — C20 MALIGNANT NEOPLASM OF RECTUM (H): ICD-10-CM

## 2021-05-18 DIAGNOSIS — Z01.818 PREOP GENERAL PHYSICAL EXAM: Primary | ICD-10-CM

## 2021-05-18 PROCEDURE — 99214 OFFICE O/P EST MOD 30 MIN: CPT | Performed by: PHYSICIAN ASSISTANT

## 2021-05-18 RX ORDER — CLOTRIMAZOLE 1 G/ML
SOLUTION TOPICAL
Status: ON HOLD | COMMUNITY
Start: 2021-04-22 | End: 2021-05-19

## 2021-05-18 RX ORDER — PREDNISONE 10 MG/1
TABLET ORAL
COMMUNITY
Start: 2021-04-07 | End: 2021-05-18

## 2021-05-18 ASSESSMENT — MIFFLIN-ST. JEOR: SCORE: 1806.23

## 2021-05-18 NOTE — PROGRESS NOTES
[address] [address]  Phone: [phone]  Primary Provider: Philippe Healy  Pre-op Performing Provider: EZRA POLANCO      PREOPERATIVE EVALUATION:  Today's date: 5/18/2021    Louie Greco is a 60 year old male who presents for a preoperative evaluation.    Surgical Information:  Surgery/Procedure: CYSTOSCOPY, LEFT RETROGRADE PYELOGRAM, LEFT URETEROSCOPY WITH LASER LITHOTRIPSY AND BASKET REMOVAL OF STONES, LEFT URETERAL STENT PLACEMENT  Surgery Location: [facility]  Surgeon: Dr Pat  Surgery Date: 05/19/2021  Time of Surgery: 730am  Where patient plans to recover: At home with family  Fax number for surgical facility: Note does not need to be faxed, will be available electronically in Epic.    Type of Anesthesia Anticipated: Choice    Assessment & Plan     The proposed surgical procedure is considered INTERMEDIATE risk.    Preop general physical exam      Kidney stone on left side      Rectal Cancer, S/P Rad & Chemo 2017 -- no recurrence             Risks and Recommendations:  The patient has the following additional risks and recommendations for perioperative complications:   - No identified additional risk factors other than previously addressed    Medication Instructions:  Patient is on no chronic medications    RECOMMENDATION:  APPROVAL GIVEN to proceed with proposed procedure, without further diagnostic evaluation.                      Subjective     HPI related to upcoming procedure: left kidney stone      Preop Questions 5/18/2021   1. Have you ever had a heart attack or stroke? No   2. Have you ever had surgery on your heart or blood vessels, such as a stent placement, a coronary artery bypass, or surgery on an artery in your head, neck, heart, or legs? No   3. Do you have chest pain with activity? No   4. Do you have a history of  heart failure? No   5. Do you currently have a cold, bronchitis or symptoms of other  infection? No   6. Do you have a cough, shortness of breath, or wheezing? No   7. Do you or anyone in your family have previous history of blood clots? No   8. Do you or does anyone in your family have a serious bleeding problem such as prolonged bleeding following surgeries or cuts? No   9. Have you ever had problems with anemia or been told to take iron pills? No   10. Have you had any abnormal blood loss such as black, tarry or bloody stools? No   11. Have you ever had a blood transfusion? No   12. Are you willing to have a blood transfusion if it is medically needed before, during, or after your surgery? Yes   13. Have you or any of your relatives ever had problems with anesthesia? No   14. Do you have sleep apnea, excessive snoring or daytime drowsiness? No   15. Do you have any artifical heart valves or other implanted medical devices like a pacemaker, defibrillator, or continuous glucose monitor? No   16. Do you have artificial joints? No   17. Are you allergic to latex? No     Health Care Directive:  Patient does not have a Health Care Directive or Living Will:     Preoperative Review of :   reviewed - hx of 3/21 for last stone      Status of Chronic Conditions:  See problem list for active medical problems.  Problems all longstanding and stable, except as noted/documented.  See ROS for pertinent symptoms related to these conditions.      Review of Systems  CONSTITUTIONAL: NEGATIVE for fever, chills, change in weight  INTEGUMENTARY/SKIN: NEGATIVE for worrisome rashes, moles or lesions  ENT/MOUTH: NEGATIVE for ear, mouth and throat problems  RESP: NEGATIVE for significant cough or SOB  CV: NEGATIVE for chest pain, palpitations or peripheral edema  GI: NEGATIVE for nausea, abdominal pain, heartburn, or change in bowel habits  MUSCULOSKELETAL: NEGATIVE for significant arthralgias or myalgia  ENDOCRINE: NEGATIVE for temperature intolerance, skin/hair changes  HEME/ALLERGY/IMMUNE: NEGATIVE for bleeding  problems  PSYCHIATRIC: NEGATIVE for changes in mood or affect  ROS otherwise negative    Patient Active Problem List    Diagnosis Date Noted     Kidney stone on left side 04/22/2021     Priority: Medium     Added automatically from request for surgery [id number]       Right ureteral stone 03/11/2021     Priority: Medium     Added automatically from request for surgery [id number]       Right 4 mm Kidney stone at [facility]  02/28/2021     Priority: Medium     Renal Cysts bilaterally 02/28/2021     Priority: Medium     Rectal cancer (H) 10/02/2020     Priority: Medium     Added automatically from request for surgery [id number]       Elevated prostate specific antigen (PSA) 11/30/2017     Priority: Medium     Advance Care Planning 03/09/2017     Priority: Medium     Advance Care Planning 3/9/2017: Receipt of ACP document:  Received: invalid HCD document dated 12/8/2016.  Document not previously scanned. Validation form completed indicating invalid document. Copy sent to client with information and facilitation resources. Validation form sent to be scanned as notation of invalid document received.  Code Status needs to be updated to reflect choices. Confirmed/documented designated decision maker(s).  Added by Araceli Horne MSW Advance Care Planning Liaison with [employer].       Rectal Cancer, S/P Rad & Chemo 2017 -- no recurrence 01/03/2017     Priority: Medium     Plantar fasciitis 11/04/2010     Priority: Medium     Pes anserinus tendinitis 02/08/2010     Priority: Medium      Past Medical History:   Diagnosis Date     Childhood asthma      Elevated prostate specific antigen (PSA)     No biopsy done (had rectal cancer at the time)     Epididymitis, bilateral     18 years old     Inguinal hernia      Mumps      Nephrolithiasis     Several -- 1990 first, 2019 last     Rectal cancer (H) 2017    low rectal cancer, S/P Rad adn Chemo, no surg needed     Shingles      Past Surgical History:   Procedure Laterality Date      COLONOSCOPY N/A 7/27/2016    Procedure: COMBINED COLONOSCOPY, SINGLE OR MULTIPLE BIOPSY/POLYPECTOMY BY BIOPSY;  Surgeon: Chelsea Thompson MD;  Location: [facility]     COLONOSCOPY N/A 9/13/2017    Procedure: COLONOSCOPY;;  Surgeon: Felicitas Radford MD;  [facility]     COLONOSCOPY N/A 12/13/2017    Procedure: COLONOSCOPY;;  Surgeon: Felicitas Radford MD;  [facility]     COLONOSCOPY N/A 10/23/2020    Procedure: COLONOSCOPY;  Surgeon: Felicitas Radford MD;  [facility]     COMBINED CYSTOSCOPY, RETROGRADES, URETEROSCOPY, LASER HOLMIUM LITHOTRIPSY URETER(S), INSERT STENT Left 2/16/2018    Procedure: COMBINED CYSTOSCOPY, RETROGRADES, URETEROSCOPY, LASER HOLMIUM LITHOTRIPSY URETER(S), INSERT STENT;  Cysto, left ureteroscopy, holmium laser, retrogrades, stent placement;  Surgeon: Bola Worthy MD;  Location: [facility]     COMBINED CYSTOSCOPY, RETROGRADES, URETEROSCOPY, LASER HOLMIUM LITHOTRIPSY URETER(S), INSERT STENT Right 3/22/2021    Procedure: CYSTOSCOPY WITH RIGHT RETROGRADE PYELOGRAM, RIGHT URETEROSCOPY WITH LASER LITHOTRIPSY AND BASKET REMOVAL OF STONE, RIGHT URETERAL STENT PLACEMENT;  Surgeon: Mohsen Pat MD;  Location: [facility]     CYSTOSCOPY, LITHOLAPAXY, COMBINED N/A 3/1/2021    Procedure: Cystoscopy, litholapaxy, combined;  Surgeon: Mohsen Pat MD;  [facility]: [facility]     CYSTOSCOPY, RETROGRADES, INSERT STENT URETER(S), COMBINED Right 3/1/2021    Procedure: Cystoscopy, retrogrades, insert stent ureter(s), combined;  Surgeon: Mohsen Pat MD;  Location: [facility]     EXAM UNDER ANESTHESIA ANUS N/A 7/5/2017    Procedure: EXAM UNDER ANESTHESIA ANUS;  Examination Under Anesthesia, flex sigmoidoscopy with biopsies and formalin application;  Surgeon: Felicitas Radford MD;  [facility]     EXAM UNDER ANESTHESIA ANUS N/A 9/13/2017    Procedure: EXAM UNDER ANESTHESIA ANUS;  Examination Under Anesthesia Anus, Colonoscopy, application of formalin;   Surgeon: Felicitas Radford MD;  [facility]     EXAM UNDER ANESTHESIA ANUS N/A 12/13/2017    Procedure: EXAM UNDER ANESTHESIA ANUS;  Examination Under Anesthesia Anus, Colonoscopy;  Surgeon: Felicitas Radford MD;  [facility]     EXAM UNDER ANESTHESIA ANUS N/A 5/11/2018    Procedure: EXAM UNDER ANESTHESIA ANUS;  Examination Under Anesthesia Anus, Interoperative Flexible Sigmoidoscopy, Application of Formalin;  Surgeon: Felicitas Radford MD;  [facility]     EXAM UNDER ANESTHESIA ANUS N/A 7/20/2018    Procedure: EXAM UNDER ANESTHESIA ANUS;  Examination Under Anesthesia Anus, Flexible Sigmoidoscopy ;  Surgeon: Felicitas Radford MD;  [facility]     EXAM UNDER ANESTHESIA ANUS N/A 11/30/2018    Procedure: Examination Under Anesthesia, application of formalin to rectum, polypectomy;  Surgeon: Felicitas Radford MD;  [facility]     EXAM UNDER ANESTHESIA ANUS N/A 2/22/2019    Procedure: Examination Under Anesthesia;  Surgeon: Felicitas Radford MD;  [facility]     EXAM UNDER ANESTHESIA ANUS N/A 6/28/2019    Procedure: Examination Under Anesthesia;  Surgeon: Felicitas Radford MD;  [facility]     EXAM UNDER ANESTHESIA ANUS N/A 11/1/2019    Procedure: Examination Under Anesthesia;  Surgeon: Felicitas Radford MD;  [facility]     EXAM UNDER ANESTHESIA RECTUM N/A 10/23/2020    Procedure: Exam under anesthesia rectum;  Surgeon: Felicitas Radford MD;  [facility]     HC TOOTH EXTRACTION W/FORCEP       LAPAROSCOPIC APPENDECTOMY N/A 7/17/2017    Procedure: LAPAROSCOPIC APPENDECTOMY;  LAPAROSCOPIC APPENDECTOMY;  Surgeon: Bryson Ferguson MD;  Location:  OR     LASER HOLMIUM LITHOTRIPSY URETER(S), INSERT STENT, COMBINED Right 3/1/2021    Procedure: CYSTOURETEROSCOPY,  URETERAL STENT INSERTION, RIGHT RETROGRADE;  Surgeon: Mohsen Pat MD;  [facility]     SIGMOIDOSCOPY FLEXIBLE N/A 12/8/2016    Procedure:  "SIGMOIDOSCOPY FLEXIBLE;  Surgeon: Felicitas Radford MD;  [facility]     SIGMOIDOSCOPY FLEXIBLE N/A 7/5/2017    Procedure: SIGMOIDOSCOPY FLEXIBLE;;  Surgeon: Felicitas Radford MD;  [facility]     SIGMOIDOSCOPY FLEXIBLE N/A 5/11/2018    Procedure: SIGMOIDOSCOPY FLEXIBLE;;  Surgeon: Felicitas Radford MD;  [facility]     SIGMOIDOSCOPY FLEXIBLE N/A 7/20/2018    Procedure: SIGMOIDOSCOPY FLEXIBLE;;  Surgeon: Felicitas aRdford MD;  [facility]     SIGMOIDOSCOPY FLEXIBLE N/A 11/30/2018    Procedure: Flexible Sigmoidoscopy;  Surgeon: Felicitas Radford MD;  [facility]     SIGMOIDOSCOPY FLEXIBLE N/A 2/22/2019    Procedure: Intraoperative Flexible Sigmoidoscopy, Application of Formalin to rectum;  Surgeon: Felicitas Radford MD;  [facility]     SIGMOIDOSCOPY FLEXIBLE N/A 6/28/2019    Procedure: Flexible Sigmoidoscopy;  Surgeon: Felicitas Radford MD;  [facility]     SIGMOIDOSCOPY FLEXIBLE N/A 11/1/2019    Procedure: Flexible Sigmoidoscopy;  Surgeon: Felicitas Radford MD;  [facility]     TESTICLE SURGERY       VASCULAR SURGERY      Right chest port     VASECTOMY       VASECTOMY       Current Outpatient Medications   Medication Sig Dispense Refill     clotrimazole (LOTRIMIN) 1 % external solution          Allergies   Allergen Reactions     Ampicillin Diarrhea     Demerol [Meperidine]      Nausea         Social History     Tobacco Use     Smoking status: Never Smoker     Smokeless tobacco: Never Used   Substance Use Topics     Alcohol use: Yes     Comment: < 1 drink a week       History   Drug Use No         Objective     /80   Pulse 84   Temp 98.3  F (36.8  C) (Tympanic)   Resp 18   Ht 1.791 m (5' 10.5\")   Wt 98.2 kg (216 lb 8 oz)   SpO2 97%   BMI 30.63 kg/m      Physical Exam  GENERAL APPEARANCE: healthy, alert and no distress  EYES: Eyes grossly normal to inspection, PERRL and conjunctivae and sclerae " normal  HENT: ear canals and TM's normal and nose and mouth without ulcers or lesions  RESP: lungs clear to auscultation - no rales, rhonchi or wheezes  CV: regular rate and rhythm, normal S1 S2, no S3 or S4 and no murmur, click or rub   ABDOMEN: soft, nontender, no HSM or masses and bowel sounds normal  MS: extremities normal- no gross deformities noted  SKIN: no suspicious lesions or rashes  NEURO: Normal strength and tone, sensory exam grossly normal, mentation intact and speech normal    Recent Labs   Lab Test 02/28/21  0515 08/13/20  1530   HGB 14.0 16.2    198    136   POTASSIUM 4.0 3.7   CR 1.10 0.83        Diagnostics:  No labs were ordered during this visit.   No EKG this visit, completed in the last 90 days.    Revised Cardiac Risk Index (RCRI):  The patient has the following serious cardiovascular risks for perioperative complications:   - No serious cardiac risks = 0 points     RCRI Interpretation: 0 points: Class I (very low risk - 0.4% complication rate)           Signed Electronically by: Bonnie Alexandra PA-C  Copy of this evaluation report is provided to requesting physician.

## 2021-05-18 NOTE — H&P (VIEW-ONLY)
[address] [address]  Phone: [phone]  Primary Provider: Philippe Healy  Pre-op Performing Provider: EZRA POLANCO      PREOPERATIVE EVALUATION:  Today's date: 5/18/2021    Louie Greco is a 60 year old male who presents for a preoperative evaluation.    Surgical Information:  Surgery/Procedure: CYSTOSCOPY, LEFT RETROGRADE PYELOGRAM, LEFT URETEROSCOPY WITH LASER LITHOTRIPSY AND BASKET REMOVAL OF STONES, LEFT URETERAL STENT PLACEMENT  Surgery Location: [facility]  Surgeon: Dr Pat  Surgery Date: 05/19/2021  Time of Surgery: 730am  Where patient plans to recover: At home with family  Fax number for surgical facility: Note does not need to be faxed, will be available electronically in Epic.    Type of Anesthesia Anticipated: Choice    Assessment & Plan     The proposed surgical procedure is considered INTERMEDIATE risk.    Preop general physical exam      Kidney stone on left side      Rectal Cancer, S/P Rad & Chemo 2017 -- no recurrence             Risks and Recommendations:  The patient has the following additional risks and recommendations for perioperative complications:   - No identified additional risk factors other than previously addressed    Medication Instructions:  Patient is on no chronic medications    RECOMMENDATION:  APPROVAL GIVEN to proceed with proposed procedure, without further diagnostic evaluation.                      Subjective     HPI related to upcoming procedure: left kidney stone      Preop Questions 5/18/2021   1. Have you ever had a heart attack or stroke? No   2. Have you ever had surgery on your heart or blood vessels, such as a stent placement, a coronary artery bypass, or surgery on an artery in your head, neck, heart, or legs? No   3. Do you have chest pain with activity? No   4. Do you have a history of  heart failure? No   5. Do you currently have a cold, bronchitis or symptoms of other  infection? No   6. Do you have a cough, shortness of breath, or wheezing? No   7. Do you or anyone in your family have previous history of blood clots? No   8. Do you or does anyone in your family have a serious bleeding problem such as prolonged bleeding following surgeries or cuts? No   9. Have you ever had problems with anemia or been told to take iron pills? No   10. Have you had any abnormal blood loss such as black, tarry or bloody stools? No   11. Have you ever had a blood transfusion? No   12. Are you willing to have a blood transfusion if it is medically needed before, during, or after your surgery? Yes   13. Have you or any of your relatives ever had problems with anesthesia? No   14. Do you have sleep apnea, excessive snoring or daytime drowsiness? No   15. Do you have any artifical heart valves or other implanted medical devices like a pacemaker, defibrillator, or continuous glucose monitor? No   16. Do you have artificial joints? No   17. Are you allergic to latex? No     Health Care Directive:  Patient does not have a Health Care Directive or Living Will:     Preoperative Review of :   reviewed - hx of 3/21 for last stone      Status of Chronic Conditions:  See problem list for active medical problems.  Problems all longstanding and stable, except as noted/documented.  See ROS for pertinent symptoms related to these conditions.      Review of Systems  CONSTITUTIONAL: NEGATIVE for fever, chills, change in weight  INTEGUMENTARY/SKIN: NEGATIVE for worrisome rashes, moles or lesions  ENT/MOUTH: NEGATIVE for ear, mouth and throat problems  RESP: NEGATIVE for significant cough or SOB  CV: NEGATIVE for chest pain, palpitations or peripheral edema  GI: NEGATIVE for nausea, abdominal pain, heartburn, or change in bowel habits  MUSCULOSKELETAL: NEGATIVE for significant arthralgias or myalgia  ENDOCRINE: NEGATIVE for temperature intolerance, skin/hair changes  HEME/ALLERGY/IMMUNE: NEGATIVE for bleeding  problems  PSYCHIATRIC: NEGATIVE for changes in mood or affect  ROS otherwise negative    Patient Active Problem List    Diagnosis Date Noted     Kidney stone on left side 04/22/2021     Priority: Medium     Added automatically from request for surgery [id number]       Right ureteral stone 03/11/2021     Priority: Medium     Added automatically from request for surgery [id number]       Right 4 mm Kidney stone at [facility]  02/28/2021     Priority: Medium     Renal Cysts bilaterally 02/28/2021     Priority: Medium     Rectal cancer (H) 10/02/2020     Priority: Medium     Added automatically from request for surgery [id number]       Elevated prostate specific antigen (PSA) 11/30/2017     Priority: Medium     Advance Care Planning 03/09/2017     Priority: Medium     Advance Care Planning 3/9/2017: Receipt of ACP document:  Received: invalid HCD document dated 12/8/2016.  Document not previously scanned. Validation form completed indicating invalid document. Copy sent to client with information and facilitation resources. Validation form sent to be scanned as notation of invalid document received.  Code Status needs to be updated to reflect choices. Confirmed/documented designated decision maker(s).  Added by Araceli Horne MSW Advance Care Planning Liaison with [employer].       Rectal Cancer, S/P Rad & Chemo 2017 -- no recurrence 01/03/2017     Priority: Medium     Plantar fasciitis 11/04/2010     Priority: Medium     Pes anserinus tendinitis 02/08/2010     Priority: Medium      Past Medical History:   Diagnosis Date     Childhood asthma      Elevated prostate specific antigen (PSA)     No biopsy done (had rectal cancer at the time)     Epididymitis, bilateral     18 years old     Inguinal hernia      Mumps      Nephrolithiasis     Several -- 1990 first, 2019 last     Rectal cancer (H) 2017    low rectal cancer, S/P Rad adn Chemo, no surg needed     Shingles      Past Surgical History:   Procedure Laterality Date      COLONOSCOPY N/A 7/27/2016    Procedure: COMBINED COLONOSCOPY, SINGLE OR MULTIPLE BIOPSY/POLYPECTOMY BY BIOPSY;  Surgeon: Chelsea Thompson MD;  [facility]: [facility]     COLONOSCOPY N/A 9/13/2017    Procedure: COLONOSCOPY;;  Surgeon: Felicitas Radford MD;  [facility]     COLONOSCOPY N/A 12/13/2017    Procedure: COLONOSCOPY;;  Surgeon: Felicitas Radford MD;  [facility]     COLONOSCOPY N/A 10/23/2020    Procedure: COLONOSCOPY;  Surgeon: Felicitas Radford MD;  Location: [facility]     COMBINED CYSTOSCOPY, RETROGRADES, URETEROSCOPY, LASER HOLMIUM LITHOTRIPSY URETER(S), INSERT STENT Left 2/16/2018    Procedure: COMBINED CYSTOSCOPY, RETROGRADES, URETEROSCOPY, LASER HOLMIUM LITHOTRIPSY URETER(S), INSERT STENT;  Cysto, left ureteroscopy, holmium laser, retrogrades, stent placement;  Surgeon: Bola Worthy MD;  [facility]: [facility]     COMBINED CYSTOSCOPY, RETROGRADES, URETEROSCOPY, LASER HOLMIUM LITHOTRIPSY URETER(S), INSERT STENT Right 3/22/2021    Procedure: CYSTOSCOPY WITH RIGHT RETROGRADE PYELOGRAM, RIGHT URETEROSCOPY WITH LASER LITHOTRIPSY AND BASKET REMOVAL OF STONE, RIGHT URETERAL STENT PLACEMENT;  Surgeon: Mohsen Pat MD;  [facility]     CYSTOSCOPY, LITHOLAPAXY, COMBINED N/A 3/1/2021    Procedure: Cystoscopy, litholapaxy, combined;  Surgeon: Mohsen Pat MD;  [facility]     CYSTOSCOPY, RETROGRADES, INSERT STENT URETER(S), COMBINED Right 3/1/2021    Procedure: Cystoscopy, retrogrades, insert stent ureter(s), combined;  Surgeon: Mohsen Pat MD;  [facility]     EXAM UNDER ANESTHESIA ANUS N/A 7/5/2017    Procedure: EXAM UNDER ANESTHESIA ANUS;  Examination Under Anesthesia, flex sigmoidoscopy with biopsies and formalin application;  Surgeon: Felicitas Radford MD;  [facility]     EXAM UNDER ANESTHESIA ANUS N/A 9/13/2017    Procedure: EXAM UNDER ANESTHESIA ANUS;  Examination Under Anesthesia Anus, Colonoscopy, application of formalin;   Surgeon: Felicitas Radford MD;  [facility]     EXAM UNDER ANESTHESIA ANUS N/A 12/13/2017    Procedure: EXAM UNDER ANESTHESIA ANUS;  Examination Under Anesthesia Anus, Colonoscopy;  Surgeon: Felicitas Radford MD;  [facility]     EXAM UNDER ANESTHESIA ANUS N/A 5/11/2018    Procedure: EXAM UNDER ANESTHESIA ANUS;  Examination Under Anesthesia Anus, Interoperative Flexible Sigmoidoscopy, Application of Formalin;  Surgeon: Felicitas Radford MD;  [facility]     EXAM UNDER ANESTHESIA ANUS N/A 7/20/2018    Procedure: EXAM UNDER ANESTHESIA ANUS;  Examination Under Anesthesia Anus, Flexible Sigmoidoscopy ;  Surgeon: Felicitas Radford MD;  [facility]     EXAM UNDER ANESTHESIA ANUS N/A 11/30/2018    Procedure: Examination Under Anesthesia, application of formalin to rectum, polypectomy;  Surgeon: Felicitas Radford MD;  [facility]     EXAM UNDER ANESTHESIA ANUS N/A 2/22/2019    Procedure: Examination Under Anesthesia;  Surgeon: Felicitas Radford MD;  [facility]     EXAM UNDER ANESTHESIA ANUS N/A 6/28/2019    Procedure: Examination Under Anesthesia;  Surgeon: Felicitas Radford MD;  [facility]     EXAM UNDER ANESTHESIA ANUS N/A 11/1/2019    Procedure: Examination Under Anesthesia;  Surgeon: Felicitas Radford MD;  [facility]     EXAM UNDER ANESTHESIA RECTUM N/A 10/23/2020    Procedure: Exam under anesthesia rectum;  Surgeon: Felicitas Radford MD;  Location: [facility]     HC TOOTH EXTRACTION W/FORCEP       LAPAROSCOPIC APPENDECTOMY N/A 7/17/2017    Procedure: LAPAROSCOPIC APPENDECTOMY;  LAPAROSCOPIC APPENDECTOMY;  Surgeon: Bryson Ferguson MD;  Location:  OR     LASER HOLMIUM LITHOTRIPSY URETER(S), INSERT STENT, COMBINED Right 3/1/2021    Procedure: CYSTOURETEROSCOPY,  URETERAL STENT INSERTION, RIGHT RETROGRADE;  Surgeon: Mohsen Pat MD;  [facility]     SIGMOIDOSCOPY FLEXIBLE N/A 12/8/2016    Procedure:  "SIGMOIDOSCOPY FLEXIBLE;  Surgeon: Felicitas Radford MD;  [facility]     SIGMOIDOSCOPY FLEXIBLE N/A 7/5/2017    Procedure: SIGMOIDOSCOPY FLEXIBLE;;  Surgeon: Felicitas Radford MD;  [facility]     SIGMOIDOSCOPY FLEXIBLE N/A 5/11/2018    Procedure: SIGMOIDOSCOPY FLEXIBLE;;  Surgeon: Felicitas Radford MD;  [facility]     SIGMOIDOSCOPY FLEXIBLE N/A 7/20/2018    Procedure: SIGMOIDOSCOPY FLEXIBLE;;  Surgeon: Felicitas Radford MD;  [facility]     SIGMOIDOSCOPY FLEXIBLE N/A 11/30/2018    Procedure: Flexible Sigmoidoscopy;  Surgeon: Felicitsa Radford MD;  [facility]     SIGMOIDOSCOPY FLEXIBLE N/A 2/22/2019    Procedure: Intraoperative Flexible Sigmoidoscopy, Application of Formalin to rectum;  Surgeon: Felicitas Radford MD;  [facility]     SIGMOIDOSCOPY FLEXIBLE N/A 6/28/2019    Procedure: Flexible Sigmoidoscopy;  Surgeon: Felicitas Radford MD;  [facility]     SIGMOIDOSCOPY FLEXIBLE N/A 11/1/2019    Procedure: Flexible Sigmoidoscopy;  Surgeon: Felicitas Radford MD;  [facility]     TESTICLE SURGERY       VASCULAR SURGERY      Right chest port     VASECTOMY       VASECTOMY       Current Outpatient Medications   Medication Sig Dispense Refill     clotrimazole (LOTRIMIN) 1 % external solution          Allergies   Allergen Reactions     Ampicillin Diarrhea     Demerol [Meperidine]      Nausea         Social History     Tobacco Use     Smoking status: Never Smoker     Smokeless tobacco: Never Used   Substance Use Topics     Alcohol use: Yes     Comment: < 1 drink a week       History   Drug Use No         Objective     /80   Pulse 84   Temp 98.3  F (36.8  C) (Tympanic)   Resp 18   Ht 1.791 m (5' 10.5\")   Wt 98.2 kg (216 lb 8 oz)   SpO2 97%   BMI 30.63 kg/m      Physical Exam  GENERAL APPEARANCE: healthy, alert and no distress  EYES: Eyes grossly normal to inspection, PERRL and conjunctivae and sclerae " normal  HENT: ear canals and TM's normal and nose and mouth without ulcers or lesions  RESP: lungs clear to auscultation - no rales, rhonchi or wheezes  CV: regular rate and rhythm, normal S1 S2, no S3 or S4 and no murmur, click or rub   ABDOMEN: soft, nontender, no HSM or masses and bowel sounds normal  MS: extremities normal- no gross deformities noted  SKIN: no suspicious lesions or rashes  NEURO: Normal strength and tone, sensory exam grossly normal, mentation intact and speech normal    Recent Labs   Lab Test 02/28/21  0515 08/13/20  1530   HGB 14.0 16.2    198    136   POTASSIUM 4.0 3.7   CR 1.10 0.83        Diagnostics:  No labs were ordered during this visit.   No EKG this visit, completed in the last 90 days.    Revised Cardiac Risk Index (RCRI):  The patient has the following serious cardiovascular risks for perioperative complications:   - No serious cardiac risks = 0 points     RCRI Interpretation: 0 points: Class I (very low risk - 0.4% complication rate)           Signed Electronically by: Bonnie Alexandra PA-C  Copy of this evaluation report is provided to requesting physician.

## 2021-05-18 NOTE — PATIENT INSTRUCTIONS

## 2021-05-19 ENCOUNTER — ANESTHESIA (OUTPATIENT)
Dept: SURGERY | Facility: CLINIC | Age: 61
End: 2021-05-19
Payer: COMMERCIAL

## 2021-05-19 ENCOUNTER — HOSPITAL ENCOUNTER (OUTPATIENT)
Facility: CLINIC | Age: 61
Discharge: HOME | End: 2021-05-19
Attending: UROLOGY | Admitting: UROLOGY
Payer: COMMERCIAL

## 2021-05-19 ENCOUNTER — APPOINTMENT (OUTPATIENT)
Dept: GENERAL RADIOLOGY | Facility: CLINIC | Age: 61
End: 2021-05-19
Attending: UROLOGY
Payer: COMMERCIAL

## 2021-05-19 VITALS
WEIGHT: 217.2 LBS | SYSTOLIC BLOOD PRESSURE: 144 MMHG | RESPIRATION RATE: 20 BRPM | HEIGHT: 71 IN | OXYGEN SATURATION: 98 % | DIASTOLIC BLOOD PRESSURE: 90 MMHG | TEMPERATURE: 97.5 F | BODY MASS INDEX: 30.41 KG/M2 | HEART RATE: 80 BPM

## 2021-05-19 DIAGNOSIS — N20.0 KIDNEY STONE ON LEFT SIDE: Primary | ICD-10-CM

## 2021-05-19 PROCEDURE — 258N000003 HC RX IP 258 OP 636: Performed by: NURSE ANESTHETIST, CERTIFIED REGISTERED

## 2021-05-19 PROCEDURE — 370N000017 HC ANESTHESIA TECHNICAL FEE, PER MIN: Performed by: UROLOGY

## 2021-05-19 PROCEDURE — C1758 CATHETER, URETERAL: HCPCS | Performed by: UROLOGY

## 2021-05-19 PROCEDURE — 52332 CYSTOSCOPY AND TREATMENT: CPT | Mod: LT | Performed by: UROLOGY

## 2021-05-19 PROCEDURE — 999N000141 HC STATISTIC PRE-PROCEDURE NURSING ASSESSMENT: Performed by: UROLOGY

## 2021-05-19 PROCEDURE — 999N000179 XR SURGERY CARM FLUORO LESS THAN 5 MIN W STILLS: Mod: TC

## 2021-05-19 PROCEDURE — 710N000009 HC RECOVERY PHASE 1, LEVEL 1, PER MIN: Performed by: UROLOGY

## 2021-05-19 PROCEDURE — 52351 CYSTOURETERO & OR PYELOSCOPE: CPT | Mod: LT | Performed by: UROLOGY

## 2021-05-19 PROCEDURE — 258N000003 HC RX IP 258 OP 636

## 2021-05-19 PROCEDURE — 250N000011 HC RX IP 250 OP 636

## 2021-05-19 PROCEDURE — 250N000011 HC RX IP 250 OP 636: Performed by: UROLOGY

## 2021-05-19 PROCEDURE — 710N000012 HC RECOVERY PHASE 2, PER MINUTE: Performed by: UROLOGY

## 2021-05-19 PROCEDURE — C1769 GUIDE WIRE: HCPCS | Performed by: UROLOGY

## 2021-05-19 PROCEDURE — 360N000076 HC SURGERY LEVEL 3, PER MIN: Performed by: UROLOGY

## 2021-05-19 PROCEDURE — 250N000011 HC RX IP 250 OP 636: Performed by: NURSE ANESTHETIST, CERTIFIED REGISTERED

## 2021-05-19 PROCEDURE — 74420 UROGRAPHY RTRGR +-KUB: CPT | Mod: 26 | Performed by: UROLOGY

## 2021-05-19 PROCEDURE — C1894 INTRO/SHEATH, NON-LASER: HCPCS | Performed by: UROLOGY

## 2021-05-19 PROCEDURE — 250N000025 HC SEVOFLURANE, PER MIN: Performed by: UROLOGY

## 2021-05-19 PROCEDURE — C2617 STENT, NON-COR, TEM W/O DEL: HCPCS | Performed by: UROLOGY

## 2021-05-19 PROCEDURE — 250N000009 HC RX 250

## 2021-05-19 PROCEDURE — 272N000001 HC OR GENERAL SUPPLY STERILE: Performed by: UROLOGY

## 2021-05-19 DEVICE — STENT URETERAL POLARIS ULTRA 6FRX24CM M0061921320: Type: IMPLANTABLE DEVICE | Site: URETHRA | Status: FUNCTIONAL

## 2021-05-19 RX ORDER — FENTANYL CITRATE 0.05 MG/ML
25-50 INJECTION, SOLUTION INTRAMUSCULAR; INTRAVENOUS
Status: DISCONTINUED | OUTPATIENT
Start: 2021-05-19 | End: 2021-05-19 | Stop reason: HOSPADM

## 2021-05-19 RX ORDER — NALOXONE HYDROCHLORIDE 0.4 MG/ML
0.4 INJECTION, SOLUTION INTRAMUSCULAR; INTRAVENOUS; SUBCUTANEOUS
Status: DISCONTINUED | OUTPATIENT
Start: 2021-05-19 | End: 2021-05-19 | Stop reason: HOSPADM

## 2021-05-19 RX ORDER — ONDANSETRON 4 MG/1
4 TABLET, ORALLY DISINTEGRATING ORAL EVERY 30 MIN PRN
Status: DISCONTINUED | OUTPATIENT
Start: 2021-05-19 | End: 2021-05-19 | Stop reason: HOSPADM

## 2021-05-19 RX ORDER — TAMSULOSIN HYDROCHLORIDE 0.4 MG/1
0.4 CAPSULE ORAL DAILY
Qty: 30 CAPSULE | Refills: 0 | Status: ON HOLD | OUTPATIENT
Start: 2021-05-19 | End: 2021-06-23

## 2021-05-19 RX ORDER — PROPOFOL 10 MG/ML
INJECTION, EMULSION INTRAVENOUS PRN
Status: DISCONTINUED | OUTPATIENT
Start: 2021-05-19 | End: 2021-05-19

## 2021-05-19 RX ORDER — KETOROLAC TROMETHAMINE 10 MG/1
10 TABLET, FILM COATED ORAL EVERY 6 HOURS
Qty: 20 TABLET | Refills: 0 | Status: SHIPPED | OUTPATIENT
Start: 2021-05-19 | End: 2021-05-24

## 2021-05-19 RX ORDER — SODIUM CHLORIDE, SODIUM LACTATE, POTASSIUM CHLORIDE, CALCIUM CHLORIDE 600; 310; 30; 20 MG/100ML; MG/100ML; MG/100ML; MG/100ML
INJECTION, SOLUTION INTRAVENOUS CONTINUOUS
Status: DISCONTINUED | OUTPATIENT
Start: 2021-05-19 | End: 2021-05-19 | Stop reason: HOSPADM

## 2021-05-19 RX ORDER — KETOROLAC TROMETHAMINE 30 MG/ML
INJECTION, SOLUTION INTRAMUSCULAR; INTRAVENOUS PRN
Status: DISCONTINUED | OUTPATIENT
Start: 2021-05-19 | End: 2021-05-19

## 2021-05-19 RX ORDER — OXYCODONE HYDROCHLORIDE 5 MG/1
5 TABLET ORAL EVERY 6 HOURS PRN
Qty: 9 TABLET | Refills: 0 | Status: ON HOLD | OUTPATIENT
Start: 2021-05-19 | End: 2021-06-23

## 2021-05-19 RX ORDER — DEXAMETHASONE SODIUM PHOSPHATE 4 MG/ML
INJECTION, SOLUTION INTRA-ARTICULAR; INTRALESIONAL; INTRAMUSCULAR; INTRAVENOUS; SOFT TISSUE PRN
Status: DISCONTINUED | OUTPATIENT
Start: 2021-05-19 | End: 2021-05-19

## 2021-05-19 RX ORDER — NALOXONE HYDROCHLORIDE 0.4 MG/ML
0.2 INJECTION, SOLUTION INTRAMUSCULAR; INTRAVENOUS; SUBCUTANEOUS
Status: DISCONTINUED | OUTPATIENT
Start: 2021-05-19 | End: 2021-05-19 | Stop reason: HOSPADM

## 2021-05-19 RX ORDER — ONDANSETRON 2 MG/ML
4 INJECTION INTRAMUSCULAR; INTRAVENOUS EVERY 30 MIN PRN
Status: DISCONTINUED | OUTPATIENT
Start: 2021-05-19 | End: 2021-05-19 | Stop reason: HOSPADM

## 2021-05-19 RX ORDER — ONDANSETRON 2 MG/ML
INJECTION INTRAMUSCULAR; INTRAVENOUS PRN
Status: DISCONTINUED | OUTPATIENT
Start: 2021-05-19 | End: 2021-05-19

## 2021-05-19 RX ORDER — FUROSEMIDE 10 MG/ML
INJECTION INTRAMUSCULAR; INTRAVENOUS PRN
Status: DISCONTINUED | OUTPATIENT
Start: 2021-05-19 | End: 2021-05-19

## 2021-05-19 RX ORDER — FENTANYL CITRATE 50 UG/ML
50 INJECTION, SOLUTION INTRAMUSCULAR; INTRAVENOUS
Status: DISCONTINUED | OUTPATIENT
Start: 2021-05-19 | End: 2021-05-19 | Stop reason: HOSPADM

## 2021-05-19 RX ORDER — HYDROMORPHONE HYDROCHLORIDE 1 MG/ML
.3-.5 INJECTION, SOLUTION INTRAMUSCULAR; INTRAVENOUS; SUBCUTANEOUS EVERY 10 MIN PRN
Status: DISCONTINUED | OUTPATIENT
Start: 2021-05-19 | End: 2021-05-19 | Stop reason: HOSPADM

## 2021-05-19 RX ORDER — OXYBUTYNIN CHLORIDE 5 MG/1
5 TABLET, EXTENDED RELEASE ORAL DAILY
Qty: 30 TABLET | Refills: 0 | Status: SHIPPED | OUTPATIENT
Start: 2021-05-19 | End: 2021-06-18

## 2021-05-19 RX ORDER — CEFAZOLIN SODIUM 2 G/100ML
2 INJECTION, SOLUTION INTRAVENOUS
Status: COMPLETED | OUTPATIENT
Start: 2021-05-19 | End: 2021-05-19

## 2021-05-19 RX ORDER — PROPOFOL 10 MG/ML
INJECTION, EMULSION INTRAVENOUS CONTINUOUS PRN
Status: DISCONTINUED | OUTPATIENT
Start: 2021-05-19 | End: 2021-05-19

## 2021-05-19 RX ORDER — SODIUM CHLORIDE, SODIUM LACTATE, POTASSIUM CHLORIDE, CALCIUM CHLORIDE 600; 310; 30; 20 MG/100ML; MG/100ML; MG/100ML; MG/100ML
INJECTION, SOLUTION INTRAVENOUS CONTINUOUS PRN
Status: DISCONTINUED | OUTPATIENT
Start: 2021-05-19 | End: 2021-05-19

## 2021-05-19 RX ORDER — LIDOCAINE HYDROCHLORIDE 20 MG/ML
INJECTION, SOLUTION INFILTRATION; PERINEURAL PRN
Status: DISCONTINUED | OUTPATIENT
Start: 2021-05-19 | End: 2021-05-19

## 2021-05-19 RX ORDER — IOPAMIDOL 612 MG/ML
INJECTION, SOLUTION INTRATHECAL PRN
Status: DISCONTINUED | OUTPATIENT
Start: 2021-05-19 | End: 2021-05-19 | Stop reason: HOSPADM

## 2021-05-19 RX ORDER — FENTANYL CITRATE 50 UG/ML
INJECTION, SOLUTION INTRAMUSCULAR; INTRAVENOUS PRN
Status: DISCONTINUED | OUTPATIENT
Start: 2021-05-19 | End: 2021-05-19

## 2021-05-19 RX ORDER — CEFAZOLIN SODIUM 2 G/100ML
2 INJECTION, SOLUTION INTRAVENOUS SEE ADMIN INSTRUCTIONS
Status: DISCONTINUED | OUTPATIENT
Start: 2021-05-19 | End: 2021-05-19 | Stop reason: HOSPADM

## 2021-05-19 RX ADMIN — FUROSEMIDE 20 MG: 10 INJECTION, SOLUTION INTRAVENOUS at 08:28

## 2021-05-19 RX ADMIN — FENTANYL CITRATE 100 MCG: 50 INJECTION, SOLUTION INTRAMUSCULAR; INTRAVENOUS at 07:41

## 2021-05-19 RX ADMIN — LIDOCAINE HYDROCHLORIDE 100 MG: 20 INJECTION, SOLUTION INFILTRATION; PERINEURAL at 07:41

## 2021-05-19 RX ADMIN — KETOROLAC TROMETHAMINE 15 MG: 30 INJECTION, SOLUTION INTRAMUSCULAR at 08:28

## 2021-05-19 RX ADMIN — CEFAZOLIN SODIUM 2 G: 2 INJECTION, SOLUTION INTRAVENOUS at 07:50

## 2021-05-19 RX ADMIN — PROPOFOL 200 MG: 10 INJECTION, EMULSION INTRAVENOUS at 07:41

## 2021-05-19 RX ADMIN — PHENYLEPHRINE HYDROCHLORIDE 100 MCG: 10 INJECTION INTRAVENOUS at 07:55

## 2021-05-19 RX ADMIN — PHENYLEPHRINE HYDROCHLORIDE 100 MCG: 10 INJECTION INTRAVENOUS at 08:22

## 2021-05-19 RX ADMIN — ONDANSETRON 4 MG: 2 INJECTION INTRAMUSCULAR; INTRAVENOUS at 07:57

## 2021-05-19 RX ADMIN — PROPOFOL 20 MCG/KG/MIN: 10 INJECTION, EMULSION INTRAVENOUS at 07:50

## 2021-05-19 RX ADMIN — SODIUM CHLORIDE, POTASSIUM CHLORIDE, SODIUM LACTATE AND CALCIUM CHLORIDE: 600; 310; 30; 20 INJECTION, SOLUTION INTRAVENOUS at 07:33

## 2021-05-19 RX ADMIN — SUCCINYLCHOLINE CHLORIDE 100 MG: 20 INJECTION, SOLUTION INTRAMUSCULAR; INTRAVENOUS; PARENTERAL at 07:42

## 2021-05-19 RX ADMIN — DEXAMETHASONE SODIUM PHOSPHATE 4 MG: 4 INJECTION, SOLUTION INTRA-ARTICULAR; INTRALESIONAL; INTRAMUSCULAR; INTRAVENOUS; SOFT TISSUE at 07:43

## 2021-05-19 RX ADMIN — MIDAZOLAM 2 MG: 1 INJECTION INTRAMUSCULAR; INTRAVENOUS at 07:33

## 2021-05-19 ASSESSMENT — MIFFLIN-ST. JEOR: SCORE: 1809.4

## 2021-05-19 ASSESSMENT — LIFESTYLE VARIABLES: TOBACCO_USE: 0

## 2021-05-19 ASSESSMENT — ENCOUNTER SYMPTOMS
DYSRHYTHMIAS: 0
SEIZURES: 0

## 2021-05-19 NOTE — ANESTHESIA PREPROCEDURE EVALUATION
Anesthesia Pre-Procedure Evaluation    Patient: Louie Greco   MRN: [id number] : 1960        Preoperative Diagnosis: Kidney stone on left side [N20.0]   Procedure : Procedure(s):  CYSTOSCOPY, LEFT RETROGRADE PYELOGRAM, LEFT URETEROSCOPY WITH LASER LITHOTRIPSY AND BASKET REMOVAL OF STONES, LEFT URETERAL STENT PLACEMENT     Past Medical History:   Diagnosis Date     Childhood asthma      Elevated prostate specific antigen (PSA)     No biopsy done (had rectal cancer at the time)     Epididymitis, bilateral     18 years old     Inguinal hernia      Mumps      Nephrolithiasis     Several --  first, 2019 last     Rectal cancer (H) 2017    low rectal cancer, S/P Rad adn Chemo, no surg needed     Shingles       Past Surgical History:   Procedure Laterality Date     COLONOSCOPY N/A 2016    Procedure: COMBINED COLONOSCOPY, SINGLE OR MULTIPLE BIOPSY/POLYPECTOMY BY BIOPSY;  Surgeon: Chelsea Thompson MD;  Location:  GI     COLONOSCOPY N/A 2017    Procedure: COLONOSCOPY;;  Surgeon: Felicitas Radford MD;  [facility]     COLONOSCOPY N/A 2017    Procedure: COLONOSCOPY;;  Surgeon: Felicitas Radford MD;  [facility]     COLONOSCOPY N/A 10/23/2020    Procedure: COLONOSCOPY;  Surgeon: Felicitas Radford MD;  Location: [facility]     COMBINED CYSTOSCOPY, RETROGRADES, URETEROSCOPY, LASER HOLMIUM LITHOTRIPSY URETER(S), INSERT STENT Left 2018    Procedure: COMBINED CYSTOSCOPY, RETROGRADES, URETEROSCOPY, LASER HOLMIUM LITHOTRIPSY URETER(S), INSERT STENT;  Cysto, left ureteroscopy, holmium laser, retrogrades, stent placement;  Surgeon: Bola Worthy MD;  Location:  OR     COMBINED CYSTOSCOPY, RETROGRADES, URETEROSCOPY, LASER HOLMIUM LITHOTRIPSY URETER(S), INSERT STENT Right 3/22/2021    Procedure: CYSTOSCOPY WITH RIGHT RETROGRADE PYELOGRAM, RIGHT URETEROSCOPY WITH LASER LITHOTRIPSY AND BASKET REMOVAL OF STONE, RIGHT URETERAL STENT PLACEMENT;  Surgeon:  Mohsen Pat MD;  [facility]     CYSTOSCOPY, LITHOLAPAXY, COMBINED N/A 3/1/2021    Procedure: Cystoscopy, litholapaxy, combined;  Surgeon: Mohsen Pat MD;  [facility]     CYSTOSCOPY, RETROGRADES, INSERT STENT URETER(S), COMBINED Right 3/1/2021    Procedure: Cystoscopy, retrogrades, insert stent ureter(s), combined;  Surgeon: Mohsen Pat MD;  [facility]     EXAM UNDER ANESTHESIA ANUS N/A 7/5/2017    Procedure: EXAM UNDER ANESTHESIA ANUS;  Examination Under Anesthesia, flex sigmoidoscopy with biopsies and formalin application;  Surgeon: Felicitas Radford MD;  [facility]     EXAM UNDER ANESTHESIA ANUS N/A 9/13/2017    Procedure: EXAM UNDER ANESTHESIA ANUS;  Examination Under Anesthesia Anus, Colonoscopy, application of formalin;  Surgeon: Felicitas Radford MD;  [facility]     EXAM UNDER ANESTHESIA ANUS N/A 12/13/2017    Procedure: EXAM UNDER ANESTHESIA ANUS;  Examination Under Anesthesia Anus, Colonoscopy;  Surgeon: Felicitas Radford MD;  [facility]     EXAM UNDER ANESTHESIA ANUS N/A 5/11/2018    Procedure: EXAM UNDER ANESTHESIA ANUS;  Examination Under Anesthesia Anus, Interoperative Flexible Sigmoidoscopy, Application of Formalin;  Surgeon: Felicitas Radford MD;  [facility]     EXAM UNDER ANESTHESIA ANUS N/A 7/20/2018    Procedure: EXAM UNDER ANESTHESIA ANUS;  Examination Under Anesthesia Anus, Flexible Sigmoidoscopy ;  Surgeon: Felicitas Radford MD;  [facility]     EXAM UNDER ANESTHESIA ANUS N/A 11/30/2018    Procedure: Examination Under Anesthesia, application of formalin to rectum, polypectomy;  Surgeon: Felicitas Radford MD;  [facility]     EXAM UNDER ANESTHESIA ANUS N/A 2/22/2019    Procedure: Examination Under Anesthesia;  Surgeon: Felicitas Radford MD;  [facility]     EXAM UNDER ANESTHESIA ANUS N/A 6/28/2019    Procedure: Examination Under Anesthesia;  Surgeon: Felicitas Radford  MD;  [facility]     EXAM UNDER ANESTHESIA ANUS N/A 11/1/2019    Procedure: Examination Under Anesthesia;  Surgeon: Felicitas Radford MD;  [facility]     EXAM UNDER ANESTHESIA RECTUM N/A 10/23/2020    Procedure: Exam under anesthesia rectum;  Surgeon: Felicitas Radford MD;  Location: [facility]     HC TOOTH EXTRACTION W/FORCEP       LAPAROSCOPIC APPENDECTOMY N/A 7/17/2017    Procedure: LAPAROSCOPIC APPENDECTOMY;  LAPAROSCOPIC APPENDECTOMY;  Surgeon: Bryson Ferguson MD;  [facility]     LASER HOLMIUM LITHOTRIPSY URETER(S), INSERT STENT, COMBINED Right 3/1/2021    Procedure: CYSTOURETEROSCOPY,  URETERAL STENT INSERTION, RIGHT RETROGRADE;  Surgeon: Mohsen Pat MD;  [facility]     SIGMOIDOSCOPY FLEXIBLE N/A 12/8/2016    Procedure: SIGMOIDOSCOPY FLEXIBLE;  Surgeon: Felicitas Radford MD;  [facility]     SIGMOIDOSCOPY FLEXIBLE N/A 7/5/2017    Procedure: SIGMOIDOSCOPY FLEXIBLE;;  Surgeon: Felicitas Radford MD;  [facility]     SIGMOIDOSCOPY FLEXIBLE N/A 5/11/2018    Procedure: SIGMOIDOSCOPY FLEXIBLE;;  Surgeon: Felicitas Radford MD;  [facility]     SIGMOIDOSCOPY FLEXIBLE N/A 7/20/2018    Procedure: SIGMOIDOSCOPY FLEXIBLE;;  Surgeon: Felicitas Radford MD;  [facility]     SIGMOIDOSCOPY FLEXIBLE N/A 11/30/2018    Procedure: Flexible Sigmoidoscopy;  Surgeon: Felicitas Radford MD;  [facility]     SIGMOIDOSCOPY FLEXIBLE N/A 2/22/2019    Procedure: Intraoperative Flexible Sigmoidoscopy, Application of Formalin to rectum;  Surgeon: Felicitas Radford MD;  [facility]     SIGMOIDOSCOPY FLEXIBLE N/A 6/28/2019    Procedure: Flexible Sigmoidoscopy;  Surgeon: Felicitas Radford MD;  [facility]     SIGMOIDOSCOPY FLEXIBLE N/A 11/1/2019    Procedure: Flexible Sigmoidoscopy;  Surgeon: Felicitas Radford MD;  [facility]     TESTICLE SURGERY       VASCULAR SURGERY      Right chest port     VASECTOMY        VASECTOMY        Allergies   Allergen Reactions     Ampicillin Diarrhea     Demerol [Meperidine]      Nausea       Social History     Tobacco Use     Smoking status: Never Smoker     Smokeless tobacco: Never Used   Substance Use Topics     Alcohol use: Yes     Comment: < 1 drink a week      Wt Readings from Last 1 Encounters:   05/18/21 98.2 kg (216 lb 8 oz)        Prior to Admission medications    Medication Sig Start Date End Date Taking? Authorizing Provider   clotrimazole (LOTRIMIN) 1 % external solution  4/22/21   Reported, Patient     Recent Labs   Lab Test 11/23/16  1217   ABO O   RH  Pos     Anesthesia Evaluation   Pt has had prior anesthetic. Type: General.    History of anesthetic complications   2 weeks of hiccups after GA with LMA recently - didn't have any with ETT (similar case o/w) 3 weeks prior.    ROS/MED HX  ENT/Pulmonary:     (+) allergic rhinitis, asthma (childhood)  (-) tobacco use and sleep apnea   Neurologic:     (+) peripheral neuropathy, - chemo 4 ext.  (-) no seizures and no CVA   Cardiovascular:     (+) -----Previous cardiac testing   Echo: Date: Results:    Stress Test: Date: Results:    ECG Reviewed: Date: 2017 Results:  Sinus tachy, LAD, no other significant abnormalities  Cath: Date: Results:   (-) angina, CAD, syncope, arrhythmias and angina   METS/Exercise Tolerance: >4 METS    Hematologic:       Musculoskeletal:       GI/Hepatic: Comment: Denies GERD, no opioids    (-) GERD and liver disease   Renal/Genitourinary:     (+) Nephrolithiasis ,  (-) renal disease   Endo:     (+) Obesity,  (-) Type II DM and thyroid disease   Psychiatric/Substance Use:       Infectious Disease:       Malignancy:   (+) Malignancy, History of GI.GI CA status post Surgery, Chemo and Radiation.        Other:            Physical Exam    Airway        Mallampati: II   TM distance: > 3 FB   Neck ROM: full   Mouth opening: > 3 cm    Respiratory Devices and Support         Dental       (+)  caps      Cardiovascular          Rhythm and rate: regular     Pulmonary           breath sounds clear to auscultation           OUTSIDE LABS:  CBC:   Lab Results   Component Value Date    WBC 6.0 02/28/2021    WBC 5.8 08/13/2020    HGB 14.0 02/28/2021    HGB 16.2 08/13/2020    HCT 42.5 02/28/2021    HCT 46.2 08/13/2020     02/28/2021     08/13/2020     BMP:   Lab Results   Component Value Date     02/28/2021     08/13/2020    POTASSIUM 4.0 02/28/2021    POTASSIUM 3.7 08/13/2020    CHLORIDE 110 (H) 02/28/2021    CHLORIDE 104 08/13/2020    CO2 25 02/28/2021    CO2 27 08/13/2020    BUN 19 02/28/2021    BUN 16 08/13/2020    CR 1.10 02/28/2021    CR 0.83 08/13/2020     (H) 02/28/2021     (H) 08/13/2020     COAGS:   Lab Results   Component Value Date    PTT 31 07/10/2017    INR 1.02 07/10/2017     POC:   Lab Results   Component Value Date    BGM 60 (L) 07/25/2018     HEPATIC:   Lab Results   Component Value Date    ALBUMIN 3.7 02/28/2021    PROTTOTAL 6.8 02/28/2021    ALT 40 02/28/2021    AST 26 02/28/2021    ALKPHOS 58 02/28/2021    BILITOTAL 0.5 02/28/2021     OTHER:   Lab Results   Component Value Date    LACT 0.8 07/17/2017    FRANDY 8.3 (L) 02/28/2021    MAG 2.0 07/17/2017    LIPASE 264 02/28/2021    .0 (H) 07/17/2017    SED 34 (H) 07/17/2017       Anesthesia Plan    ASA Status:  2   NPO Status:  NPO Appropriate    Anesthesia Type: General.     - Airway: ETT   Induction: Intravenous, Propofol.   Maintenance: Balanced.        Consents    Anesthesia Plan(s) and associated risks, benefits, and realistic alternatives discussed. Questions answered and patient/representative(s) expressed understanding.     - Discussed with:  Patient         Postoperative Care    Pain management: IV analgesics.   PONV prophylaxis: Ondansetron (or other 5HT-3), Dexamethasone or Solumedrol, Background Propofol Infusion     Comments:    ETT secondary to hx of 2-weeks of hiccups after LMA (vs ETT  case 3 weeks prior)             Brett Vasquez MD

## 2021-05-19 NOTE — ANESTHESIA PROCEDURE NOTES
Airway       Patient location during procedure: OR  Staff -        Anesthesiologist:  Brett Vasquez MD       CRNA: Eleanor Lozano APRN CRNA       Other Anesthesia Staff: Samanta Messer       Performed By: SRNA  Consent for Airway        Urgency: elective  Indications and Patient Condition       Indications for airway management: angel-procedural       Induction type:intravenous       Mask difficulty assessment: 0 - not attempted    Final Airway Details       Final airway type: endotracheal airway       Successful airway: ETT - single  Endotracheal Airway Details        ETT size (mm): 8.0       Cuffed: yes       Successful intubation technique: direct laryngoscopy       DL Blade Type: Carlin 2       Grade View of Cords: 1       Adjucts: stylet       Position: Right       Measured from: gums/teeth       Secured at (cm): 23    Post intubation assessment        Placement verified by: capnometry, equal breath sounds and chest rise        Number of attempts at approach: 1       Secured with: pink tape       Ease of procedure: easy       Dentition: Unchanged    Medication(s) Administered   Medication Administration Time: 5/19/2021 7:43 AM

## 2021-05-19 NOTE — OP NOTE
OPERATIVE REPORT  DATE OF SURGERY: 05/19/21  LOCATION OF SURGERY: [facility] OR  PREOPERATIVE DIAGNOSIS:  (N20.0) Kidney stone on left side  (primary encounter diagnosis)  POSTOPERATIVE DIAGNOSIS: (N20.0) Kidney stone on left side  (primary encounter diagnosis)     START TIME: 7:52 AM  END TIME: 8:28 AM  PROCEDURE PERFORMED:   1. Cystoscopy  2. LEFT retrograde pyelogram  3. LEFT ureteroscopy diagnostic  4. LEFT JJ stent placement  5. <1hr physician fluoroscopy time      STAFF SURGEON: Mohsen Pat MD  ANESTHESIA: General.   ESTIMATED BLOOD LOSS: 2 mL.   DRAINS AND TUBES: LEFT 6fr x 24cm ureteral stent, 18fr coude catheter  COMPLICATIONS: None.   DISPOSITION: PACU.   SPECIMENS OBTAINED: None  SIGNIFICANT FINDINGS: Cystoscopy with no evidence of stone in the bladder. LEFT Retrograde Pyelogram with evidence of a narrow distal and proximal segments of ureter. Diagnostic ureteroscopy with no evidence of stone in the ureter and no evidence of ureteral stricture, rather narrow segments in the distal and proximal ureter. Despite several maneuvers, I was unable to advance a ureteral access sheath or the flexible ureteroscope up the ureter. Given concern for causing ureteral injury with any further manipulation, decision made for ureteral stent placement and a staged Ureteroscopy.      HISTORY OF PRESENT ILLNESS: Louie Greco is a 60 year old man with bilateral kidney stones who initially presented with a 4 mm right ureteral stone on 3/1/2021 and underwent ureteroscopy with evidence of a narrow distal ureter and a right ureteral stent was placed.  He subsequently underwent staged ureteroscopy with stone removal on 3/22/2021.  He presents today for left-sided ureteroscopy to address his significant stone burden on the left side.  We again discussed the possible need for a staged approach.    OPERATION PERFORMED:   Informed consent was obtained and the patient was brought to the operating room where general anesthesia was  induced. The patient was given appropriate preoperative antibiotics and positioned supine. The patient was then repositioned in dorsal lithotomy with all pressure points padded. We then performed a timeout, verifying the correct patient's site and procedure to be performed.    A 22 Bahraini cystoscope was inserted atraumatically into the bladder.  Cystoscopy was performed with no evidence of stone in the bladder.  The left ureteral orifice was identified and cannulated with a 0.035 sensor wire which was advanced up the renal pelvis under fluoroscopic guidance and the cystoscope was removed.  The semirigid ureteroscope was assembled and inserted atraumatically into the bladder.  The left ureteral orifice was cannulated with a Amplatz superstiff wire and the ureteroscope was advanced using a railroad technique.  He was noted to have a narrow segment of the distal ureter however the semirigid ureteroscope was advanced to the mid and proximal ureter.  A gentle retrograde pyelogram was performed with evidence of another narrow segment of ureter in the proximal ureter.  The Amplatz Super Stiff wire was advanced renal pelvis and the semirigid ureteroscope was removed.  Attempt was made to advance the inner stylette of the 11 x 13 x 46 cm ureteral access sheath.  The 11 Bahraini inner stylette would only advance to the mid ureter.  The sensor wire was exchanged for a Glidewire.  Again attempt was made to pass the 11 Bahraini inner stylette and again this would not advance to the mid ureter.  The assembled 11-13 Bahraini ureteral access sheath would only advance to the very distal ureter before meeting significant resistance.  The inner stylette and the Super Stiff wire were removed and the flexible ureteroscope was advanced through the sheath into the distal ureter however was unable to advance beyond the mid to proximal ureter given another segment of narrowing.  Decision was made to place a ureteral stent and return for a staged  ureteroscopy to minimize the risk of ureteral injury.  The ureteroscope and the access sheath were removed en bloc.  The cystoscope was replaced in the bladder and a 6 Pashto by 24 cm JJ ureteral stent was advanced over the wire with good curl noted in the upper pole of the kidney fluoroscopically and in the bladder on direct vision.  The cystoscope was removed and an 18 Pashto coudé catheter was placed.  He received 20 mg of IV Lasix and 15 mg of IV Toradol.  He was emerged from anesthesia and taken to the recovery room in stable condition.    Mohsen Pat MD   Urology  [facility]  Clinic Phone [phone]

## 2021-05-19 NOTE — DISCHARGE INSTRUCTIONS
POSTOPERATIVE INSTRUCTIONS    Diagnosis-------------------------------   LEFT kidney stones    Procedure-------------------------------  Procedure(s) (LRB):  CYSTOSCOPY, LEFT RETROGRADE PYELOGRAM, LEFT DIAGNOSTIC, URETEROSCOPY, LEFT URETERAL STENT PLACEMENT (Left)      Findings--------------------------------  Cystoscopy with no evidence of stone in the bladder. LEFT Retrograde Pyelogram with evidence of a narrow distal and proximal segments of ureter. Diagnostic ureteroscopy with no evidence of stone in the ureter and no evidence of ureteral stricture, rather narrow segments in the distal and proximal ureter. Despite several maneuvers, I was unable to advance a ureteral access sheath or the flexible ureteroscope up the ureter. Given concern for causing ureteral injury with any further manipulation, decision made for ureteral stent placement and a staged Ureteroscopy.     Home-going instructions-----------------         Activity Limitation:     - No driving or operating heavy machinery while on narcotic pain medication.     FOLLOW THESE INSTRUCTIONS AS INDICATED BELOW:  - Observe operative area for signs of excessive bleeding.  - You may shower.  - Increase fluid intake to promote clear urine.  - Resume usual diet as tolerated    What to expect while recovering-----------  - You may experience some intermittent bleeding that makes your urine pink or cherry colored. This is normal.  - However, if you are unable to urinate, passing large amount of clots, have chas blood in your urine, or have a temperature >101 degrees, call the urology nurse on call, or present to your nearest emergency department.  - You are encouraged to walk daily, and have no activity restrictions.   - A URETERAL STENT has been placed that allows urine to flow unobstructed from your kidney into your bladder.  The stent has a curl in the kidney and a curl in the bladder.  The curl in the bladder can cause some urgency and frequency of urination  as well as some mild blood in the urine.  The curl in the kidney can cause some mild flank discomfort.  This may be more noticeable when you urinate.  A URETERAL STENT is meant to be left in temporarily.  It must be removed or changed no later than 3 months after it's insertion.  If it's not removed it can result in stone overgrowth on the stent that can cause pain, infection, and can be very difficult to remove.      Discharge Medications/instructions:     - Flomax (tamsulosin) to be taken daily until stent is removed    - Oxybutynin 5mg XL (Ditropan XL) to be taken daily until the ureteral stent is removed    - Take Tylenol 1000mg every 6 hours for pain    - Take Toradol 10mg every 6 hours as needed for additional pain control    - Take Oxycodone 5mg every 4-6 hours only for break through pain    - Take Colace while taking Oxycodone to prevent constipation      Questions/concerns------------------------  [facility]: [phone]    Future appointments  You will be contacted to schedule a return for Ureteroscopy and stone removal.     Mohsen Pat MD         Same Day Surgery Discharge Instructions for  Sedation and General Anesthesia       It's not unusual to feel dizzy, light-headed or faint for up to 24 hours after surgery or while taking pain medication.  If you have these symptoms: sit for a few minutes before standing and have someone assist you when you get up to walk or use the bathroom.      You should rest and relax for the next 24 hours. We recommend you make arrangements to have an adult stay with you for at least 24 hours after your discharge.  Avoid hazardous and strenuous activity.      DO NOT DRIVE any vehicle or operate mechanical equipment for 24 hours following the end of your surgery.  Even though you may feel normal, your reactions may be affected by the medication you have received.      Do not drink alcoholic beverages for 24 hours following surgery.       Slowly progress  to your regular diet as you feel able. It's not unusual to feel nauseated and/or vomit after receiving anesthesia.  If you develop these symptoms, drink clear liquids (apple juice, ginger ale, broth, 7-up, etc. ) until you feel better.  If your nausea and vomiting persists for 24 hours, please notify your surgeon.        All narcotic pain medications, along with inactivity and anesthesia, can cause constipation. Drinking plenty of liquids and increasing fiber intake will help.      For any questions of a medical nature, call your surgeon.      Do not make important decisions for 24 hours.      If you had general anesthesia, you may have a sore throat for a couple of days related to the breathing tube used during surgery.  You may use Cepacol lozenges to help with this discomfort.  If it worsens or if you develop a fever, contact your surgeon.       If you feel your pain is not well managed with the pain medications prescribed by your surgeon, please contact your surgeon's office to let them know so they can address your concerns.       CoVid 19 Information    We want to give you information regarding Covid. Please consult your primary care provider with any questions you might have.     Patient who have symptoms (cough, fever, or shortness of breath), need to isolate for 7 days from when symptoms started OR 72 hours after fever resolves (without fever reducing medications) AND improvement of respiratory symptoms (whichever is longer).      Isolate yourself at home (in own room/own bathroom if possible)    Do Not allow any visitors    Do Not go to work or school    Do Not go to Religious,  centers, shopping, or other public places.    Do Not shake hands.    Avoid close and intimate contact with others (hugging, kissing).    Follow CDC recommendations for household cleaning of frequently touched services.     After the initial 7 days, continue to isolate yourself from household members as much as possible. To  continue decrease the risk of community spread and exposure, you and any members of your household should limit activities in public for 14 days after starting home isolation.     You can reference the following CDC link for helpful home isolation/care tips:  https://www.cdc.gov/coronavirus/2019-ncov/downloads/10Things.pdf    Protect Others:    Cover Your Mouth and Nose with a mask, disposable tissue or wash cloth to avoid spreading germs to others.    Wash your hands and face frequently with soap and water    Call Your Primary Doctor If: Breathing difficulty develops or you become worse.    For more information about COVID19 and options for caring for yourself at home, please visit the CDC website at https://www.cdc.gov/coronavirus/2019-ncov/about/steps-when-sick.html  For more options for care at [facility], please visit our website at [email or web address]         Today you received Toradol, an antiinflammatory medication similar to Ibuprofen.  You should not take other antiinflammatory medication, such as Ibuprofen, Motrin, Advil, Aleve, Naprosyn, etc until 2:30pm.

## 2021-05-19 NOTE — ANESTHESIA CARE TRANSFER NOTE
Patient: Louie Greco    Procedure(s):  CYSTOSCOPY, LEFT RETROGRADE PYELOGRAM, LEFT DIAGNOSTIC, URETEROSCOPY, LEFT URETERAL STENT PLACEMENT    Diagnosis: Kidney stone on left side [N20.0]  Diagnosis Additional Information: No value filed.    Anesthesia Type:   General     Note:    Oropharynx: oropharynx clear of all foreign objects  Level of Consciousness: awake  Oxygen Supplementation: face mask  Level of Supplemental Oxygen (L/min / FiO2): 8  Independent Airway: airway patency satisfactory and stable  Dentition: dentition unchanged  Vital Signs Stable: post-procedure vital signs reviewed and stable  Report to RN Given: handoff report given  Patient transferred to: PACU    Handoff Report: Identifed the Patient, Identified the Reponsible Provider, Reviewed the pertinent medical history, Discussed the surgical course, Reviewed Intra-OP anesthesia mangement and issues during anesthesia, Set expectations for post-procedure period and Allowed opportunity for questions and acknowledgement of understanding      Vitals: (Last set prior to Anesthesia Care Transfer)  CRNA VITALS  5/19/2021 0808 - 5/19/2021 0843      5/19/2021             Pulse:  101    SpO2:  92 %    Resp Rate (set):  10        Electronically Signed By: QUINTON Contreras CRNA  May 19, 2021  8:43 AM

## 2021-05-20 NOTE — ANESTHESIA POSTPROCEDURE EVALUATION
Patient: Louie Greco    Procedure(s):  CYSTOSCOPY, LEFT RETROGRADE PYELOGRAM, LEFT DIAGNOSTIC, URETEROSCOPY, LEFT URETERAL STENT PLACEMENT    Diagnosis:Kidney stone on left side [N20.0]  Diagnosis Additional Information: No value filed.    Anesthesia Type:  General    Note:  Disposition: Outpatient   Postop Pain Control: Uneventful            Sign Out: Well controlled pain   PONV: No   Neuro/Psych: Uneventful            Sign Out: Acceptable/Baseline neuro status   Airway/Respiratory: Uneventful            Sign Out: Acceptable/Baseline resp. status   CV/Hemodynamics: Uneventful            Sign Out: Acceptable CV status   Other NRE: NONE   DID A NON-ROUTINE EVENT OCCUR? No    Event details/Postop Comments:  Pt doing well prior to discharge home.             Last vitals:  Vitals:    05/19/21 0930 05/19/21 0945 05/19/21 1000   BP: 123/81 122/83 (!) 144/90   Pulse: 73 87 80   Resp: 16 12 20   Temp:      SpO2: 98% 98% 98%       Last vitals prior to Anesthesia Care Transfer:  CRNA VITALS  5/19/2021 0808 - 5/19/2021 0908      5/19/2021             SpO2:  95 %          Electronically Signed By: Brett Vasquez MD  May 19, 2021  10:27 PM

## 2021-05-26 ENCOUNTER — PREP FOR PROCEDURE (OUTPATIENT)
Dept: UROLOGY | Facility: CLINIC | Age: 61
End: 2021-05-26

## 2021-05-26 DIAGNOSIS — N20.0 CALCULUS OF LEFT KIDNEY: Primary | ICD-10-CM

## 2021-05-28 PROBLEM — N20.0 CALCULUS OF LEFT KIDNEY: Status: ACTIVE | Noted: 2021-05-28

## 2021-05-30 DIAGNOSIS — Z11.59 ENCOUNTER FOR SCREENING FOR OTHER VIRAL DISEASES: ICD-10-CM

## 2021-06-14 ENCOUNTER — MYC MEDICAL ADVICE (OUTPATIENT)
Dept: FAMILY MEDICINE | Facility: CLINIC | Age: 61
End: 2021-06-14

## 2021-06-15 ENCOUNTER — HOSPITAL LABORATORY (OUTPATIENT)
Dept: OTHER | Facility: CLINIC | Age: 61
End: 2021-06-15

## 2021-06-15 NOTE — TELEPHONE ENCOUNTER
Please see patient's mychart:    Patient had procedure on 5/19/21 for kidney stones  Patient havign similar procedure on 6/23/21.    Does patient need another preop appointment?    Please reply back to patient or advise if triage follow up needed.    Wayne Cantu RN  [employer]

## 2021-06-15 NOTE — TELEPHONE ENCOUNTER
If I did the preop, I would be happy to addend the report and OK the surgery in June, but I did not actually do the preop, so I feel a little uncomfortable just rubber-stamping somebody else's work because I would be responsible if something happened during surgery.  He could reach out to the clinic where he had his preop last month and ask the provider who did the preop to addend their note, or we could see him for another preop (I realize I is hassle and I apologize) he could be seen F2F  in a virtual slot if needed

## 2021-06-17 ENCOUNTER — OFFICE VISIT (OUTPATIENT)
Dept: FAMILY MEDICINE | Facility: CLINIC | Age: 61
End: 2021-06-17
Payer: COMMERCIAL

## 2021-06-17 VITALS
TEMPERATURE: 96.2 F | HEIGHT: 71 IN | BODY MASS INDEX: 30.52 KG/M2 | OXYGEN SATURATION: 98 % | DIASTOLIC BLOOD PRESSURE: 92 MMHG | SYSTOLIC BLOOD PRESSURE: 147 MMHG | HEART RATE: 74 BPM | WEIGHT: 218 LBS

## 2021-06-17 DIAGNOSIS — C20 RECTAL CANCER (H): ICD-10-CM

## 2021-06-17 DIAGNOSIS — R97.20 ELEVATED PROSTATE SPECIFIC ANTIGEN (PSA): ICD-10-CM

## 2021-06-17 DIAGNOSIS — Z01.818 PREOPERATIVE EXAMINATION: Primary | ICD-10-CM

## 2021-06-17 DIAGNOSIS — N20.0 KIDNEY STONE ON LEFT SIDE: ICD-10-CM

## 2021-06-17 DIAGNOSIS — R73.01 IFG (IMPAIRED FASTING GLUCOSE): ICD-10-CM

## 2021-06-17 LAB — HBA1C MFR BLD: 5.4 % (ref 0–5.6)

## 2021-06-17 PROCEDURE — 36415 COLL VENOUS BLD VENIPUNCTURE: CPT | Performed by: INTERNAL MEDICINE

## 2021-06-17 PROCEDURE — 83036 HEMOGLOBIN GLYCOSYLATED A1C: CPT | Performed by: INTERNAL MEDICINE

## 2021-06-17 PROCEDURE — 93000 ELECTROCARDIOGRAM COMPLETE: CPT | Performed by: INTERNAL MEDICINE

## 2021-06-17 PROCEDURE — 84153 ASSAY OF PSA TOTAL: CPT | Performed by: INTERNAL MEDICINE

## 2021-06-17 PROCEDURE — 99214 OFFICE O/P EST MOD 30 MIN: CPT | Performed by: INTERNAL MEDICINE

## 2021-06-17 ASSESSMENT — MIFFLIN-ST. JEOR: SCORE: 1808.03

## 2021-06-17 NOTE — H&P (VIEW-ONLY)
[facility]  [address] [facility], [address]  [facility] [address]  Phone: [phone]  Primary Provider: Jarod Gaitan  Pre-op Performing Provider: JAROD GAITAN      PREOPERATIVE EVALUATION:  Today's date: 6/17/2021    Louie Greco is a 61 year old male who presents for a preoperative evaluation.    Surgical Information:  Surgery/Procedure: CYSTOSCOPY, LEFT URETERAL STENT REMOVAL, LEFT RETROGRADE PYELOGRAM, LEFT URETEROSCOPY WITH LASER LITHOTRIPSY AND BASKET REMOVAL OF STONES, POSSIBLE LEFT URETERAL STENT PLACEMENT  Surgery Location: [facility]  Surgeon: Mohsen Pat MD  Surgery Date: 06/23/2021  Time of Surgery: 7:30 AM  Where patient plans to recover: At home with family  Fax number for surgical facility: Note does not need to be faxed, will be available electronically in Epic.    Type of Anesthesia Anticipated: Choice    Assessment & Plan     The proposed surgical procedure is considered LOW risk.    Preoperative examination  Suitable surgical candidate for planned procedure  - EKG 12-lead complete w/read - Clinics    Kidney stone on left side      IFG (impaired fasting glucose)  Exclude uncontrolled diabetes as a potential cause of otitis externa given history of elevated blood sugar readings  - Hemoglobin A1c    Elevated prostate specific antigen (PSA)  Recheck PSA to trend, pending result, consider additional consultation with urology not regarding kidney stones but regarding management of elevated PSA when his kidney stone situation has stabilized  - PSA tumor marker    Rectal cancer (H)  Due to the pandemic, he may be overdue for his rectal cancer surveillance, he asked me to reach out to his colon and rectal surgeon to inquire about this           Risks and Recommendations:  The patient has the following additional risks and recommendations for perioperative complications:   - No identified additional risk factors other than previously  addressed    Medication Instructions:  Patient is to take all scheduled medications on the day of surgery    RECOMMENDATION:  APPROVAL GIVEN to proceed with proposed procedure, without further diagnostic evaluation.        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  He also has a history of an elevated PSA level.  Most recently, he has been struggling with kidney stones.  He had a recent unsuccessful attempt at removing kidney stones.  A reattempt is going to be made after a trial of tamsulosin and oxybutynin to relax the ureter according to the patient's report.  However, he is concerned that he may need an open kidney stone removal based on the fact that he was told that his ureter is narrowed.  This patient reports being able to perform 4 METS of physical activity without chest pain or dyspnea.     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  He has a history of elevated blood sugar readings.  Also, he had a 4K prostate blood test, but due to the pandemic, he has not heard any follow-up regarding the results.    Review of Systems  Constitutional, neuro, ENT, endocrine, pulmonary, cardiac, gastrointestinal, genitourinary, musculoskeletal, integument and psychiatric systems are negative, except as otherwise noted.    Patient Active Problem List    Diagnosis Date Noted     Calculus of left kidney 05/28/2021     Priority: Medium     Added automatically from request for surgery [id number]       Kidney stone on left side 04/22/2021     Priority: Medium     Added automatically from request for surgery [id number]       Right ureteral stone 03/11/2021     Priority: Medium     Added automatically from request for surgery [id number]       Right 4 mm Kidney stone at [facility]  02/28/2021      Priority: Medium     Renal Cysts bilaterally 02/28/2021     Priority: Medium     Rectal cancer (H) 10/02/2020     Priority: Medium     Added automatically from request for surgery [id number]       Elevated prostate specific antigen (PSA) 11/30/2017     Priority: Medium     Advance Care Planning 03/09/2017     Priority: Medium     Advance Care Planning 3/9/2017: Receipt of ACP document:  Received: invalid HCD document dated 12/8/2016.  Document not previously scanned. Validation form completed indicating invalid document. Copy sent to client with information and facilitation resources. Validation form sent to be scanned as notation of invalid document received.  Code Status needs to be updated to reflect choices. Confirmed/documented designated decision maker(s).  Added by Araceli Horne MSW Advance Care Planning Liaison with [employer].       Rectal Cancer, S/P Rad & Chemo 2017 -- no recurrence 01/03/2017     Priority: Medium     Plantar fasciitis 11/04/2010     Priority: Medium     Pes anserinus tendinitis 02/08/2010     Priority: Medium      Past Medical History:   Diagnosis Date     Childhood asthma      Elevated prostate specific antigen (PSA)     No biopsy done (had rectal cancer at the time)     Epididymitis, bilateral     18 years old     Inguinal hernia      Mumps      Nephrolithiasis     Several -- 1990 first, 2019 last     Rectal cancer (H) 2017    low rectal cancer, S/P Rad adn Chemo, no surg needed     Shingles      Past Surgical History:   Procedure Laterality Date     COLONOSCOPY N/A 7/27/2016    Procedure: COMBINED COLONOSCOPY, SINGLE OR MULTIPLE BIOPSY/POLYPECTOMY BY BIOPSY;  Surgeon: Chelsea Thompson MD;  Location:  GI     COLONOSCOPY N/A 9/13/2017    Procedure: COLONOSCOPY;;  Surgeon: Felicitas Radford MD;  [facility]     COLONOSCOPY N/A 12/13/2017    Procedure: COLONOSCOPY;;  Surgeon: Felicitas Radford MD;  [facility]     COLONOSCOPY N/A 10/23/2020     Procedure: COLONOSCOPY;  Surgeon: Felicitas Radford MD;  Location: [facility]     COMBINED CYSTOSCOPY, RETROGRADES, URETEROSCOPY, LASER HOLMIUM LITHOTRIPSY URETER(S), INSERT STENT Left 2/16/2018    Procedure: COMBINED CYSTOSCOPY, RETROGRADES, URETEROSCOPY, LASER HOLMIUM LITHOTRIPSY URETER(S), INSERT STENT;  Cysto, left ureteroscopy, holmium laser, retrogrades, stent placement;  Surgeon: Bola Worthy MD;  Location: [facility]     COMBINED CYSTOSCOPY, RETROGRADES, URETEROSCOPY, LASER HOLMIUM LITHOTRIPSY URETER(S), INSERT STENT Right 3/22/2021    Procedure: CYSTOSCOPY WITH RIGHT RETROGRADE PYELOGRAM, RIGHT URETEROSCOPY WITH LASER LITHOTRIPSY AND BASKET REMOVAL OF STONE, RIGHT URETERAL STENT PLACEMENT;  Surgeon: Mohsen Pat MD;  Location: [facility]     COMBINED CYSTOSCOPY, RETROGRADES, URETEROSCOPY, LASER HOLMIUM LITHOTRIPSY URETER(S), INSERT STENT Left 5/19/2021    Procedure: CYSTOSCOPY, LEFT RETROGRADE PYELOGRAM, LEFT DIAGNOSTIC, URETEROSCOPY, LEFT URETERAL STENT PLACEMENT;  Surgeon: Mohsen Pat MD;  [facility]     CYSTOSCOPY, LITHOLAPAXY, COMBINED N/A 3/1/2021    Procedure: Cystoscopy, litholapaxy, combined;  Surgeon: Mohsen Pat MD;  [facility]     CYSTOSCOPY, RETROGRADES, INSERT STENT URETER(S), COMBINED Right 3/1/2021    Procedure: Cystoscopy, retrogrades, insert stent ureter(s), combined;  Surgeon: Mohsen Pat MD;  [facility]     EXAM UNDER ANESTHESIA ANUS N/A 7/5/2017    Procedure: EXAM UNDER ANESTHESIA ANUS;  Examination Under Anesthesia, flex sigmoidoscopy with biopsies and formalin application;  Surgeon: Felicitas Radford MD;  [facility]     EXAM UNDER ANESTHESIA ANUS N/A 9/13/2017    Procedure: EXAM UNDER ANESTHESIA ANUS;  Examination Under Anesthesia Anus, Colonoscopy, application of formalin;  Surgeon: Felicitas Radford MD;  [facility]     EXAM UNDER ANESTHESIA ANUS N/A 12/13/2017    Procedure: EXAM UNDER ANESTHESIA ANUS;  Examination  Under Anesthesia Anus, Colonoscopy;  Surgeon: Felicitas Radford MD;  [facility]     EXAM UNDER ANESTHESIA ANUS N/A 5/11/2018    Procedure: EXAM UNDER ANESTHESIA ANUS;  Examination Under Anesthesia Anus, Interoperative Flexible Sigmoidoscopy, Application of Formalin;  Surgeon: Felicitas Radford MD;  [facility]     EXAM UNDER ANESTHESIA ANUS N/A 7/20/2018    Procedure: EXAM UNDER ANESTHESIA ANUS;  Examination Under Anesthesia Anus, Flexible Sigmoidoscopy ;  Surgeon: Felicitas Radford MD;  [facility]     EXAM UNDER ANESTHESIA ANUS N/A 11/30/2018    Procedure: Examination Under Anesthesia, application of formalin to rectum, polypectomy;  Surgeon: Felicitas Radford MD;  [facility]     EXAM UNDER ANESTHESIA ANUS N/A 2/22/2019    Procedure: Examination Under Anesthesia;  Surgeon: Felicitas Radford MD;  [facility]     EXAM UNDER ANESTHESIA ANUS N/A 6/28/2019    Procedure: Examination Under Anesthesia;  Surgeon: Felicitas Radford MD;  [facility]     EXAM UNDER ANESTHESIA ANUS N/A 11/1/2019    Procedure: Examination Under Anesthesia;  Surgeon: Felicitas Radford MD;  [facility]     EXAM UNDER ANESTHESIA RECTUM N/A 10/23/2020    Procedure: Exam under anesthesia rectum;  Surgeon: Felicitas Radford MD;  Location: [facility]     HC TOOTH EXTRACTION W/FORCEP       LAPAROSCOPIC APPENDECTOMY N/A 7/17/2017    Procedure: LAPAROSCOPIC APPENDECTOMY;  LAPAROSCOPIC APPENDECTOMY;  Surgeon: Bryson Ferguson MD;  Location:  OR     LASER HOLMIUM LITHOTRIPSY URETER(S), INSERT STENT, COMBINED Right 3/1/2021    Procedure: CYSTOURETEROSCOPY,  URETERAL STENT INSERTION, RIGHT RETROGRADE;  Surgeon: Mohsen Pat MD;  [facility]     SIGMOIDOSCOPY FLEXIBLE N/A 12/8/2016    Procedure: SIGMOIDOSCOPY FLEXIBLE;  Surgeon: Felicitas Radford MD;  [facility]     SIGMOIDOSCOPY FLEXIBLE N/A 7/5/2017    Procedure: SIGMOIDOSCOPY FLEXIBLE;;   Surgeon: Felicitas Radford MD;  [facility]     SIGMOIDOSCOPY FLEXIBLE N/A 5/11/2018    Procedure: SIGMOIDOSCOPY FLEXIBLE;;  Surgeon: Felicitas Radford MD;  [facility]     SIGMOIDOSCOPY FLEXIBLE N/A 7/20/2018    Procedure: SIGMOIDOSCOPY FLEXIBLE;;  Surgeon: Felicitas Radford MD;  [facility]     SIGMOIDOSCOPY FLEXIBLE N/A 11/30/2018    Procedure: Flexible Sigmoidoscopy;  Surgeon: Felicitas Radford MD;  [facility]     SIGMOIDOSCOPY FLEXIBLE N/A 2/22/2019    Procedure: Intraoperative Flexible Sigmoidoscopy, Application of Formalin to rectum;  Surgeon: Felicitas Radford MD;  [facility]     SIGMOIDOSCOPY FLEXIBLE N/A 6/28/2019    Procedure: Flexible Sigmoidoscopy;  Surgeon: Felicitas Radford MD;  [facility]     SIGMOIDOSCOPY FLEXIBLE N/A 11/1/2019    Procedure: Flexible Sigmoidoscopy;  Surgeon: Felicitas Radford MD;  [facility]     TESTICLE SURGERY       VASCULAR SURGERY      Right chest port     VASECTOMY       VASECTOMY       Current Outpatient Medications   Medication Sig Dispense Refill     oxybutynin ER (DITROPAN-XL) 5 MG 24 hr tablet Take 1 tablet (5 mg) by mouth daily Take daily until your stent is removed. 30 tablet 0     tamsulosin (FLOMAX) 0.4 MG capsule Take 1 capsule (0.4 mg) by mouth daily Take daily until your stent is removed. 30 capsule 0     oxyCODONE (ROXICODONE) 5 MG tablet Take 1 tablet (5 mg) by mouth every 6 hours as needed for breakthrough pain (Patient not taking: Reported on 6/17/2021) 9 tablet 0       Allergies   Allergen Reactions     Ampicillin Diarrhea     Demerol [Meperidine] Nausea        Social History     Tobacco Use     Smoking status: Never Smoker     Smokeless tobacco: Never Used   Substance Use Topics     Alcohol use: Yes     Comment: < 1 drink a week     Family History   Problem Relation Age of Onset     Cancer Brother         primary of unknown origin     Other Cancer Brother       "Chronic Obstructive Pulmonary Disease Father      Hypertension Father      Hyperlipidemia Father      Colon Polyps Mother         Number and type of polyps unknown     Family History Negative Brother         negative colonoscopy     Diabetes Maternal Grandfather      Hypertension Paternal Grandmother      Hyperlipidemia Paternal Grandmother      Stomach Cancer Other 63        maternal great grandfather     Glaucoma No family hx of      Macular Degeneration No family hx of      History   Drug Use No         Objective     BP (!) 147/92 (BP Location: Left arm, Patient Position: Chair, Cuff Size: Adult Regular)   Pulse 74   Temp 96.2  F (35.7  C) (Temporal)   Ht 1.791 m (5' 10.5\")   Wt 98.9 kg (218 lb)   SpO2 98%   BMI 30.84 kg/m      Physical Exam    GENERAL APPEARANCE: healthy, alert and no distress     EYES: EOMI,  PERRL     HENT: ear canals and TM's normal and nose and mouth without ulcers or lesions     NECK: no adenopathy, no asymmetry, masses, or scars and thyroid normal to palpation     RESP: lungs clear to auscultation - no rales, rhonchi or wheezes     CV: regular rates and rhythm, normal S1 S2, no S3 or S4 and no murmur, click or rub     ABDOMEN:  soft, nontender, no HSM or masses and bowel sounds normal     MS: extremities normal- no gross deformities noted, no evidence of inflammation in joints, FROM in all extremities.     SKIN: no suspicious lesions or rashes     NEURO: Normal strength and tone, sensory exam grossly normal, mentation intact and speech normal     PSYCH: mentation appears normal. and affect normal/bright     LYMPHATICS: No cervical adenopathy    Recent Labs   Lab Test 02/28/21  0515 08/13/20  1530   HGB 14.0 16.2    198    136   POTASSIUM 4.0 3.7   CR 1.10 0.83        PSA and A1c are pending    EKG shows sinus rhythm without acute ST segment changes    Revised Cardiac Risk Index (RCRI):  The patient has the following serious cardiovascular risks for perioperative " complications:   - No serious cardiac risks = 0 points     RCRI Interpretation: 0 points: Class I (very low risk - 0.4% complication rate)           Signed Electronically by: Philippe Healy MD  Copy of this evaluation report is provided to requesting physician.       musculoskeletal

## 2021-06-17 NOTE — PROGRESS NOTES
[facility]  [address] [facility], [address]  [facility] [address]  Phone: [phone]  Primary Provider: Jarod Gaitan  Pre-op Performing Provider: JAROD GAITAN      PREOPERATIVE EVALUATION:  Today's date: 6/17/2021    Louie Greco is a 61 year old male who presents for a preoperative evaluation.    Surgical Information:  Surgery/Procedure: CYSTOSCOPY, LEFT URETERAL STENT REMOVAL, LEFT RETROGRADE PYELOGRAM, LEFT URETEROSCOPY WITH LASER LITHOTRIPSY AND BASKET REMOVAL OF STONES, POSSIBLE LEFT URETERAL STENT PLACEMENT  Surgery Location: [facility]  Surgeon: Mohsen Pat MD  Surgery Date: 06/23/2021  Time of Surgery: 7:30 AM  Where patient plans to recover: At home with family  Fax number for surgical facility: Note does not need to be faxed, will be available electronically in Epic.    Type of Anesthesia Anticipated: Choice    Assessment & Plan     The proposed surgical procedure is considered LOW risk.    Preoperative examination  Suitable surgical candidate for planned procedure  - EKG 12-lead complete w/read - Clinics    Kidney stone on left side      IFG (impaired fasting glucose)  Exclude uncontrolled diabetes as a potential cause of otitis externa given history of elevated blood sugar readings  - Hemoglobin A1c    Elevated prostate specific antigen (PSA)  Recheck PSA to trend, pending result, consider additional consultation with urology not regarding kidney stones but regarding management of elevated PSA when his kidney stone situation has stabilized  - PSA tumor marker    Rectal cancer (H)  Due to the pandemic, he may be overdue for his rectal cancer surveillance, he asked me to reach out to his colon and rectal surgeon to inquire about this           Risks and Recommendations:  The patient has the following additional risks and recommendations for perioperative complications:   - No identified additional risk factors other than previously  addressed    Medication Instructions:  Patient is to take all scheduled medications on the day of surgery    RECOMMENDATION:  APPROVAL GIVEN to proceed with proposed procedure, without further diagnostic evaluation.        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  He also has a history of an elevated PSA level.  Most recently, he has been struggling with kidney stones.  He had a recent unsuccessful attempt at removing kidney stones.  A reattempt is going to be made after a trial of tamsulosin and oxybutynin to relax the ureter according to the patient's report.  However, he is concerned that he may need an open kidney stone removal based on the fact that he was told that his ureter is narrowed.  This patient reports being able to perform 4 METS of physical activity without chest pain or dyspnea.     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  He has a history of elevated blood sugar readings.  Also, he had a 4K prostate blood test, but due to the pandemic, he has not heard any follow-up regarding the results.    Review of Systems  Constitutional, neuro, ENT, endocrine, pulmonary, cardiac, gastrointestinal, genitourinary, musculoskeletal, integument and psychiatric systems are negative, except as otherwise noted.    Patient Active Problem List    Diagnosis Date Noted     Calculus of left kidney 05/28/2021     Priority: Medium     Added automatically from request for surgery [id number]       Kidney stone on left side 04/22/2021     Priority: Medium     Added automatically from request for surgery [id number]       Right ureteral stone 03/11/2021     Priority: Medium     Added automatically from request for surgery [id number]       Right 4 mm Kidney stone at [facility]  02/28/2021      Priority: Medium     Renal Cysts bilaterally 02/28/2021     Priority: Medium     Rectal cancer (H) 10/02/2020     Priority: Medium     Added automatically from request for surgery [id number]       Elevated prostate specific antigen (PSA) 11/30/2017     Priority: Medium     Advance Care Planning 03/09/2017     Priority: Medium     Advance Care Planning 3/9/2017: Receipt of ACP document:  Received: invalid HCD document dated 12/8/2016.  Document not previously scanned. Validation form completed indicating invalid document. Copy sent to client with information and facilitation resources. Validation form sent to be scanned as notation of invalid document received.  Code Status needs to be updated to reflect choices. Confirmed/documented designated decision maker(s).  Added by Araceli Horne MSW Advance Care Planning Liaison with [employer].       Rectal Cancer, S/P Rad & Chemo 2017 -- no recurrence 01/03/2017     Priority: Medium     Plantar fasciitis 11/04/2010     Priority: Medium     Pes anserinus tendinitis 02/08/2010     Priority: Medium      Past Medical History:   Diagnosis Date     Childhood asthma      Elevated prostate specific antigen (PSA)     No biopsy done (had rectal cancer at the time)     Epididymitis, bilateral     18 years old     Inguinal hernia      Mumps      Nephrolithiasis     Several -- 1990 first, 2019 last     Rectal cancer (H) 2017    low rectal cancer, S/P Rad adn Chemo, no surg needed     Shingles      Past Surgical History:   Procedure Laterality Date     COLONOSCOPY N/A 7/27/2016    Procedure: COMBINED COLONOSCOPY, SINGLE OR MULTIPLE BIOPSY/POLYPECTOMY BY BIOPSY;  Surgeon: Chelsea Thompson MD;  Location:  GI     COLONOSCOPY N/A 9/13/2017    Procedure: COLONOSCOPY;;  Surgeon: Felicitas Radford MD;  [facility]     COLONOSCOPY N/A 12/13/2017    Procedure: COLONOSCOPY;;  Surgeon: Felicitas Radford MD;  [facility]     COLONOSCOPY N/A 10/23/2020     Procedure: COLONOSCOPY;  Surgeon: Felicitas Radford MD;  Location: [facility]     COMBINED CYSTOSCOPY, RETROGRADES, URETEROSCOPY, LASER HOLMIUM LITHOTRIPSY URETER(S), INSERT STENT Left 2/16/2018    Procedure: COMBINED CYSTOSCOPY, RETROGRADES, URETEROSCOPY, LASER HOLMIUM LITHOTRIPSY URETER(S), INSERT STENT;  Cysto, left ureteroscopy, holmium laser, retrogrades, stent placement;  Surgeon: Bola Worthy MD;  Location: [facility]     COMBINED CYSTOSCOPY, RETROGRADES, URETEROSCOPY, LASER HOLMIUM LITHOTRIPSY URETER(S), INSERT STENT Right 3/22/2021    Procedure: CYSTOSCOPY WITH RIGHT RETROGRADE PYELOGRAM, RIGHT URETEROSCOPY WITH LASER LITHOTRIPSY AND BASKET REMOVAL OF STONE, RIGHT URETERAL STENT PLACEMENT;  Surgeon: Mohsen Pat MD;  Location: [facility]     COMBINED CYSTOSCOPY, RETROGRADES, URETEROSCOPY, LASER HOLMIUM LITHOTRIPSY URETER(S), INSERT STENT Left 5/19/2021    Procedure: CYSTOSCOPY, LEFT RETROGRADE PYELOGRAM, LEFT DIAGNOSTIC, URETEROSCOPY, LEFT URETERAL STENT PLACEMENT;  Surgeon: Mohsen Pat MD;  [facility]     CYSTOSCOPY, LITHOLAPAXY, COMBINED N/A 3/1/2021    Procedure: Cystoscopy, litholapaxy, combined;  Surgeon: Mohsen Pat MD;  [facility]     CYSTOSCOPY, RETROGRADES, INSERT STENT URETER(S), COMBINED Right 3/1/2021    Procedure: Cystoscopy, retrogrades, insert stent ureter(s), combined;  Surgeon: Mohsen Pat MD;  [facility]     EXAM UNDER ANESTHESIA ANUS N/A 7/5/2017    Procedure: EXAM UNDER ANESTHESIA ANUS;  Examination Under Anesthesia, flex sigmoidoscopy with biopsies and formalin application;  Surgeon: Felicitas Radford MD;  [facility]     EXAM UNDER ANESTHESIA ANUS N/A 9/13/2017    Procedure: EXAM UNDER ANESTHESIA ANUS;  Examination Under Anesthesia Anus, Colonoscopy, application of formalin;  Surgeon: Felicitas Radford MD;  [facility]     EXAM UNDER ANESTHESIA ANUS N/A 12/13/2017    Procedure: EXAM UNDER ANESTHESIA ANUS;  Examination  Under Anesthesia Anus, Colonoscopy;  Surgeon: Felicitas Radford MD;  [facility]     EXAM UNDER ANESTHESIA ANUS N/A 5/11/2018    Procedure: EXAM UNDER ANESTHESIA ANUS;  Examination Under Anesthesia Anus, Interoperative Flexible Sigmoidoscopy, Application of Formalin;  Surgeon: Felicitas Radford MD;  [facility]     EXAM UNDER ANESTHESIA ANUS N/A 7/20/2018    Procedure: EXAM UNDER ANESTHESIA ANUS;  Examination Under Anesthesia Anus, Flexible Sigmoidoscopy ;  Surgeon: Felicitas Radford MD;  [facility]     EXAM UNDER ANESTHESIA ANUS N/A 11/30/2018    Procedure: Examination Under Anesthesia, application of formalin to rectum, polypectomy;  Surgeon: Felicitas Radford MD;  [facility]     EXAM UNDER ANESTHESIA ANUS N/A 2/22/2019    Procedure: Examination Under Anesthesia;  Surgeon: Felicitas Radford MD;  [facility]     EXAM UNDER ANESTHESIA ANUS N/A 6/28/2019    Procedure: Examination Under Anesthesia;  Surgeon: Felicitas Radford MD;  [facility]     EXAM UNDER ANESTHESIA ANUS N/A 11/1/2019    Procedure: Examination Under Anesthesia;  Surgeon: Felicitas Radford MD;  [facility]     EXAM UNDER ANESTHESIA RECTUM N/A 10/23/2020    Procedure: Exam under anesthesia rectum;  Surgeon: Felicitas Radford MD;  Location[facility]     HC TOOTH EXTRACTION W/FORCEP       LAPAROSCOPIC APPENDECTOMY N/A 7/17/2017    Procedure: LAPAROSCOPIC APPENDECTOMY;  LAPAROSCOPIC APPENDECTOMY;  Surgeon: Bryson Ferguson MD;  Location:  OR     LASER HOLMIUM LITHOTRIPSY URETER(S), INSERT STENT, COMBINED Right 3/1/2021    Procedure: CYSTOURETEROSCOPY,  URETERAL STENT INSERTION, RIGHT RETROGRADE;  Surgeon: Mohsen Pat MD;  [facility]     SIGMOIDOSCOPY FLEXIBLE N/A 12/8/2016    Procedure: SIGMOIDOSCOPY FLEXIBLE;  Surgeon: Felicitas Radford MD;  [facility]     SIGMOIDOSCOPY FLEXIBLE N/A 7/5/2017    Procedure: SIGMOIDOSCOPY FLEXIBLE;;   Surgeon: Felicitas Radford MD;  [facility]     SIGMOIDOSCOPY FLEXIBLE N/A 5/11/2018    Procedure: SIGMOIDOSCOPY FLEXIBLE;;  Surgeon: Felicitas Radford MD;  [facility]     SIGMOIDOSCOPY FLEXIBLE N/A 7/20/2018    Procedure: SIGMOIDOSCOPY FLEXIBLE;;  Surgeon: Felicitas Radford MD;  [facility]     SIGMOIDOSCOPY FLEXIBLE N/A 11/30/2018    Procedure: Flexible Sigmoidoscopy;  Surgeon: Felicitas Radford MD;  [facility]     SIGMOIDOSCOPY FLEXIBLE N/A 2/22/2019    Procedure: Intraoperative Flexible Sigmoidoscopy, Application of Formalin to rectum;  Surgeon: Felicitas Radford MD;  [facility]     SIGMOIDOSCOPY FLEXIBLE N/A 6/28/2019    Procedure: Flexible Sigmoidoscopy;  Surgeon: Felicitas Radford MD;  [facility]     SIGMOIDOSCOPY FLEXIBLE N/A 11/1/2019    Procedure: Flexible Sigmoidoscopy;  Surgeon: Felicitas Radford MD;  [facility]     TESTICLE SURGERY       VASCULAR SURGERY      Right chest port     VASECTOMY       VASECTOMY       Current Outpatient Medications   Medication Sig Dispense Refill     oxybutynin ER (DITROPAN-XL) 5 MG 24 hr tablet Take 1 tablet (5 mg) by mouth daily Take daily until your stent is removed. 30 tablet 0     tamsulosin (FLOMAX) 0.4 MG capsule Take 1 capsule (0.4 mg) by mouth daily Take daily until your stent is removed. 30 capsule 0     oxyCODONE (ROXICODONE) 5 MG tablet Take 1 tablet (5 mg) by mouth every 6 hours as needed for breakthrough pain (Patient not taking: Reported on 6/17/2021) 9 tablet 0       Allergies   Allergen Reactions     Ampicillin Diarrhea     Demerol [Meperidine] Nausea        Social History     Tobacco Use     Smoking status: Never Smoker     Smokeless tobacco: Never Used   Substance Use Topics     Alcohol use: Yes     Comment: < 1 drink a week     Family History   Problem Relation Age of Onset     Cancer Brother         primary of unknown origin     Other Cancer Brother       "Chronic Obstructive Pulmonary Disease Father      Hypertension Father      Hyperlipidemia Father      Colon Polyps Mother         Number and type of polyps unknown     Family History Negative Brother         negative colonoscopy     Diabetes Maternal Grandfather      Hypertension Paternal Grandmother      Hyperlipidemia Paternal Grandmother      Stomach Cancer Other 63        maternal great grandfather     Glaucoma No family hx of      Macular Degeneration No family hx of      History   Drug Use No         Objective     BP (!) 147/92 (BP Location: Left arm, Patient Position: Chair, Cuff Size: Adult Regular)   Pulse 74   Temp 96.2  F (35.7  C) (Temporal)   Ht 1.791 m (5' 10.5\")   Wt 98.9 kg (218 lb)   SpO2 98%   BMI 30.84 kg/m      Physical Exam    GENERAL APPEARANCE: healthy, alert and no distress     EYES: EOMI,  PERRL     HENT: ear canals and TM's normal and nose and mouth without ulcers or lesions     NECK: no adenopathy, no asymmetry, masses, or scars and thyroid normal to palpation     RESP: lungs clear to auscultation - no rales, rhonchi or wheezes     CV: regular rates and rhythm, normal S1 S2, no S3 or S4 and no murmur, click or rub     ABDOMEN:  soft, nontender, no HSM or masses and bowel sounds normal     MS: extremities normal- no gross deformities noted, no evidence of inflammation in joints, FROM in all extremities.     SKIN: no suspicious lesions or rashes     NEURO: Normal strength and tone, sensory exam grossly normal, mentation intact and speech normal     PSYCH: mentation appears normal. and affect normal/bright     LYMPHATICS: No cervical adenopathy    Recent Labs   Lab Test 02/28/21  0515 08/13/20  1530   HGB 14.0 16.2    198    136   POTASSIUM 4.0 3.7   CR 1.10 0.83        PSA and A1c are pending    EKG shows sinus rhythm without acute ST segment changes    Revised Cardiac Risk Index (RCRI):  The patient has the following serious cardiovascular risks for perioperative " complications:   - No serious cardiac risks = 0 points     RCRI Interpretation: 0 points: Class I (very low risk - 0.4% complication rate)           Signed Electronically by: Philippe Healy MD  Copy of this evaluation report is provided to requesting physician.

## 2021-06-17 NOTE — LETTER
June 22, 2021      Louie Greco  [address] [address]        Dear ,          The following letter pertains to your most recent diagnostic tests:     The PSA blood test is slightly higher than last check 10 months ago.  However, it is lower than its peak in February 2020 when it was 5.76.  I doubt that this is an acutely dangerous prostate cancer.  However, I do think it needs additional follow-up.  As we discussed in clinic, when the dust settles from your kidney stone treatments, I think it would be a good idea to schedule a consultation with Dr. Pat to review the 4K test result obtained by Dr. Lynch and the trend of your PSA values.  Additional test such as a prostate MRI or biopsy might be indicated.         Sincerely,     Dr. Healy         Resulted Orders   Hemoglobin A1c   Result Value Ref Range    Hemoglobin A1C 5.4 0 - 5.6 %      Comment:      Normal <5.7% Prediabetes 5.7-6.4%  Diabetes 6.5% or higher - adopted from ADA   consensus guidelines.     PSA tumor marker   Result Value Ref Range    PSA 4.92 (H) 0 - 4 ug/L      Comment:      Assay Method:  Chemiluminescence using Siemens Vista analyzer

## 2021-06-18 ENCOUNTER — PATIENT OUTREACH (OUTPATIENT)
Dept: SURGERY | Facility: CLINIC | Age: 61
End: 2021-06-18

## 2021-06-18 NOTE — RESULT ENCOUNTER NOTE
The following letter pertains to your most recent diagnostic tests:    Good news!  This blood test shows no evidence of diabetes.  One less thing to worry about.        Sincerely,    Dr. Healy

## 2021-06-19 DIAGNOSIS — Z11.59 ENCOUNTER FOR SCREENING FOR OTHER VIRAL DISEASES: ICD-10-CM

## 2021-06-19 LAB
BACTERIA SPEC CULT: ABNORMAL
LABORATORY COMMENT REPORT: NORMAL
Lab: ABNORMAL
PSA SERPL-MCNC: 4.92 UG/L (ref 0–4)
SARS-COV-2 RNA RESP QL NAA+PROBE: NEGATIVE
SARS-COV-2 RNA RESP QL NAA+PROBE: NORMAL
SPECIMEN SOURCE: ABNORMAL
SPECIMEN SOURCE: NORMAL
SPECIMEN SOURCE: NORMAL

## 2021-06-19 PROCEDURE — U0005 INFEC AGEN DETEC AMPLI PROBE: HCPCS | Performed by: UROLOGY

## 2021-06-19 PROCEDURE — U0003 INFECTIOUS AGENT DETECTION BY NUCLEIC ACID (DNA OR RNA); SEVERE ACUTE RESPIRATORY SYNDROME CORONAVIRUS 2 (SARS-COV-2) (CORONAVIRUS DISEASE [COVID-19]), AMPLIFIED PROBE TECHNIQUE, MAKING USE OF HIGH THROUGHPUT TECHNOLOGIES AS DESCRIBED BY CMS-2020-01-R: HCPCS | Performed by: UROLOGY

## 2021-06-21 NOTE — RESULT ENCOUNTER NOTE
The following letter pertains to your most recent diagnostic tests:    The PSA blood test is slightly higher than last check 10 months ago.  However, it is lower than its peak in February 2020 when it was 5.76.  I doubt that this is an acutely dangerous prostate cancer.  However, I do think it needs additional follow-up.  As we discussed in clinic, when the dust settles from your kidney stone treatments, I think it would be a good idea to schedule a consultation with Dr. Pat to review the 4K test result obtained by Dr. Lynch and the trend of your PSA values.  Additional test such as a prostate MRI or biopsy might be indicated.        Sincerely,    Dr. Healy

## 2021-06-23 ENCOUNTER — ANESTHESIA (OUTPATIENT)
Dept: SURGERY | Facility: CLINIC | Age: 61
End: 2021-06-23
Payer: COMMERCIAL

## 2021-06-23 ENCOUNTER — HOSPITAL ENCOUNTER (OUTPATIENT)
Facility: CLINIC | Age: 61
Discharge: HOME | End: 2021-06-23
Attending: UROLOGY | Admitting: UROLOGY
Payer: COMMERCIAL

## 2021-06-23 ENCOUNTER — ANESTHESIA EVENT (OUTPATIENT)
Dept: SURGERY | Facility: CLINIC | Age: 61
End: 2021-06-23
Payer: COMMERCIAL

## 2021-06-23 ENCOUNTER — APPOINTMENT (OUTPATIENT)
Dept: GENERAL RADIOLOGY | Facility: CLINIC | Age: 61
End: 2021-06-23
Attending: UROLOGY
Payer: COMMERCIAL

## 2021-06-23 VITALS
BODY MASS INDEX: 31.78 KG/M2 | OXYGEN SATURATION: 97 % | DIASTOLIC BLOOD PRESSURE: 86 MMHG | HEART RATE: 76 BPM | TEMPERATURE: 96.6 F | HEIGHT: 70 IN | WEIGHT: 222 LBS | SYSTOLIC BLOOD PRESSURE: 129 MMHG | RESPIRATION RATE: 16 BRPM

## 2021-06-23 DIAGNOSIS — N20.0 KIDNEY STONE ON LEFT SIDE: Primary | ICD-10-CM

## 2021-06-23 PROCEDURE — 52356 CYSTO/URETERO W/LITHOTRIPSY: CPT | Mod: LT | Performed by: UROLOGY

## 2021-06-23 PROCEDURE — C1769 GUIDE WIRE: HCPCS | Performed by: UROLOGY

## 2021-06-23 PROCEDURE — 88300 SURGICAL PATH GROSS: CPT | Mod: TC | Performed by: UROLOGY

## 2021-06-23 PROCEDURE — 710N000012 HC RECOVERY PHASE 2, PER MINUTE: Performed by: UROLOGY

## 2021-06-23 PROCEDURE — 258N000003 HC RX IP 258 OP 636: Performed by: NURSE ANESTHETIST, CERTIFIED REGISTERED

## 2021-06-23 PROCEDURE — 250N000009 HC RX 250: Performed by: UROLOGY

## 2021-06-23 PROCEDURE — 250N000011 HC RX IP 250 OP 636: Performed by: NURSE ANESTHETIST, CERTIFIED REGISTERED

## 2021-06-23 PROCEDURE — 255N000002 HC RX 255 OP 636: Performed by: UROLOGY

## 2021-06-23 PROCEDURE — 250N000009 HC RX 250: Performed by: NURSE ANESTHETIST, CERTIFIED REGISTERED

## 2021-06-23 PROCEDURE — C2617 STENT, NON-COR, TEM W/O DEL: HCPCS | Performed by: UROLOGY

## 2021-06-23 PROCEDURE — 258N000001 HC RX 258: Performed by: UROLOGY

## 2021-06-23 PROCEDURE — 999N000179 XR SURGERY CARM FLUORO LESS THAN 5 MIN W STILLS: Mod: TC

## 2021-06-23 PROCEDURE — C1758 CATHETER, URETERAL: HCPCS | Performed by: UROLOGY

## 2021-06-23 PROCEDURE — C1894 INTRO/SHEATH, NON-LASER: HCPCS | Performed by: UROLOGY

## 2021-06-23 PROCEDURE — 250N000025 HC SEVOFLURANE, PER MIN: Performed by: UROLOGY

## 2021-06-23 PROCEDURE — 370N000017 HC ANESTHESIA TECHNICAL FEE, PER MIN: Performed by: UROLOGY

## 2021-06-23 PROCEDURE — 82365 CALCULUS SPECTROSCOPY: CPT | Performed by: UROLOGY

## 2021-06-23 PROCEDURE — 88300 SURGICAL PATH GROSS: CPT | Mod: 26 | Performed by: PATHOLOGY

## 2021-06-23 PROCEDURE — 272N000001 HC OR GENERAL SUPPLY STERILE: Performed by: UROLOGY

## 2021-06-23 PROCEDURE — 360N000076 HC SURGERY LEVEL 3, PER MIN: Performed by: UROLOGY

## 2021-06-23 PROCEDURE — 74420 UROGRAPHY RTRGR +-KUB: CPT | Mod: 26 | Performed by: UROLOGY

## 2021-06-23 PROCEDURE — 710N000009 HC RECOVERY PHASE 1, LEVEL 1, PER MIN: Performed by: UROLOGY

## 2021-06-23 PROCEDURE — 250N000011 HC RX IP 250 OP 636: Performed by: UROLOGY

## 2021-06-23 PROCEDURE — 999N000141 HC STATISTIC PRE-PROCEDURE NURSING ASSESSMENT: Performed by: UROLOGY

## 2021-06-23 DEVICE — URETERAL STENT
Type: IMPLANTABLE DEVICE | Site: URETHRA | Status: FUNCTIONAL
Brand: POLARIS™ ULTRA

## 2021-06-23 RX ORDER — FENTANYL CITRATE 50 UG/ML
25-50 INJECTION, SOLUTION INTRAMUSCULAR; INTRAVENOUS
Status: DISCONTINUED | OUTPATIENT
Start: 2021-06-23 | End: 2021-06-23 | Stop reason: HOSPADM

## 2021-06-23 RX ORDER — LIDOCAINE HYDROCHLORIDE 20 MG/ML
INJECTION, SOLUTION INFILTRATION; PERINEURAL PRN
Status: DISCONTINUED | OUTPATIENT
Start: 2021-06-23 | End: 2021-06-23

## 2021-06-23 RX ORDER — FUROSEMIDE 10 MG/ML
INJECTION INTRAMUSCULAR; INTRAVENOUS PRN
Status: DISCONTINUED | OUTPATIENT
Start: 2021-06-23 | End: 2021-06-23

## 2021-06-23 RX ORDER — SODIUM CHLORIDE, SODIUM LACTATE, POTASSIUM CHLORIDE, CALCIUM CHLORIDE 600; 310; 30; 20 MG/100ML; MG/100ML; MG/100ML; MG/100ML
INJECTION, SOLUTION INTRAVENOUS CONTINUOUS
Status: DISCONTINUED | OUTPATIENT
Start: 2021-06-23 | End: 2021-06-23 | Stop reason: HOSPADM

## 2021-06-23 RX ORDER — KETOROLAC TROMETHAMINE 10 MG/1
10 TABLET, FILM COATED ORAL EVERY 6 HOURS
Qty: 20 TABLET | Refills: 0 | Status: SHIPPED | OUTPATIENT
Start: 2021-06-23 | End: 2021-06-28

## 2021-06-23 RX ORDER — HYDROMORPHONE HYDROCHLORIDE 1 MG/ML
.3-.5 INJECTION, SOLUTION INTRAMUSCULAR; INTRAVENOUS; SUBCUTANEOUS EVERY 10 MIN PRN
Status: DISCONTINUED | OUTPATIENT
Start: 2021-06-23 | End: 2021-06-23 | Stop reason: HOSPADM

## 2021-06-23 RX ORDER — OXYCODONE HCL 5 MG/5 ML
5 SOLUTION, ORAL ORAL EVERY 4 HOURS PRN
Status: DISCONTINUED | OUTPATIENT
Start: 2021-06-23 | End: 2021-06-23 | Stop reason: HOSPADM

## 2021-06-23 RX ORDER — KETOROLAC TROMETHAMINE 10 MG/1
10 TABLET, FILM COATED ORAL EVERY 6 HOURS PRN
Qty: 20 TABLET | Refills: 0 | Status: SHIPPED | OUTPATIENT
Start: 2021-06-23 | End: 2021-07-20

## 2021-06-23 RX ORDER — DEXAMETHASONE SODIUM PHOSPHATE 4 MG/ML
INJECTION, SOLUTION INTRA-ARTICULAR; INTRALESIONAL; INTRAMUSCULAR; INTRAVENOUS; SOFT TISSUE PRN
Status: DISCONTINUED | OUTPATIENT
Start: 2021-06-23 | End: 2021-06-23

## 2021-06-23 RX ORDER — NALOXONE HYDROCHLORIDE 0.4 MG/ML
0.2 INJECTION, SOLUTION INTRAMUSCULAR; INTRAVENOUS; SUBCUTANEOUS
Status: DISCONTINUED | OUTPATIENT
Start: 2021-06-23 | End: 2021-06-23 | Stop reason: HOSPADM

## 2021-06-23 RX ORDER — HYDRALAZINE HYDROCHLORIDE 20 MG/ML
2.5-5 INJECTION INTRAMUSCULAR; INTRAVENOUS EVERY 10 MIN PRN
Status: DISCONTINUED | OUTPATIENT
Start: 2021-06-23 | End: 2021-06-23 | Stop reason: HOSPADM

## 2021-06-23 RX ORDER — CEFAZOLIN SODIUM 2 G/100ML
2 INJECTION, SOLUTION INTRAVENOUS SEE ADMIN INSTRUCTIONS
Status: DISCONTINUED | OUTPATIENT
Start: 2021-06-23 | End: 2021-06-23 | Stop reason: HOSPADM

## 2021-06-23 RX ORDER — MEPERIDINE HYDROCHLORIDE 25 MG/ML
12.5 INJECTION INTRAMUSCULAR; INTRAVENOUS; SUBCUTANEOUS
Status: DISCONTINUED | OUTPATIENT
Start: 2021-06-23 | End: 2021-06-23 | Stop reason: HOSPADM

## 2021-06-23 RX ORDER — ONDANSETRON 4 MG/1
4 TABLET, ORALLY DISINTEGRATING ORAL EVERY 30 MIN PRN
Status: DISCONTINUED | OUTPATIENT
Start: 2021-06-23 | End: 2021-06-23 | Stop reason: HOSPADM

## 2021-06-23 RX ORDER — ONDANSETRON 2 MG/ML
4 INJECTION INTRAMUSCULAR; INTRAVENOUS EVERY 30 MIN PRN
Status: DISCONTINUED | OUTPATIENT
Start: 2021-06-23 | End: 2021-06-23 | Stop reason: HOSPADM

## 2021-06-23 RX ORDER — NALOXONE HYDROCHLORIDE 0.4 MG/ML
0.4 INJECTION, SOLUTION INTRAMUSCULAR; INTRAVENOUS; SUBCUTANEOUS
Status: DISCONTINUED | OUTPATIENT
Start: 2021-06-23 | End: 2021-06-23 | Stop reason: HOSPADM

## 2021-06-23 RX ORDER — DEXAMETHASONE SODIUM PHOSPHATE 4 MG/ML
4 INJECTION, SOLUTION INTRA-ARTICULAR; INTRALESIONAL; INTRAMUSCULAR; INTRAVENOUS; SOFT TISSUE EVERY 10 MIN PRN
Status: DISCONTINUED | OUTPATIENT
Start: 2021-06-23 | End: 2021-06-23 | Stop reason: HOSPADM

## 2021-06-23 RX ORDER — CIPROFLOXACIN 500 MG/1
500 TABLET, FILM COATED ORAL ONCE
Qty: 1 TABLET | Refills: 0 | Status: SHIPPED | OUTPATIENT
Start: 2021-06-23 | End: 2021-06-23

## 2021-06-23 RX ORDER — LACTOBACILLUS RHAMNOSUS GG 10B CELL
1 CAPSULE ORAL DAILY
COMMUNITY
End: 2021-07-20

## 2021-06-23 RX ORDER — CEFAZOLIN SODIUM 2 G/100ML
2 INJECTION, SOLUTION INTRAVENOUS
Status: COMPLETED | OUTPATIENT
Start: 2021-06-23 | End: 2021-06-23

## 2021-06-23 RX ORDER — KETOROLAC TROMETHAMINE 10 MG/1
10 TABLET, FILM COATED ORAL EVERY 6 HOURS PRN
Status: ON HOLD | COMMUNITY
End: 2021-06-23

## 2021-06-23 RX ORDER — KETOROLAC TROMETHAMINE 30 MG/ML
INJECTION, SOLUTION INTRAMUSCULAR; INTRAVENOUS PRN
Status: DISCONTINUED | OUTPATIENT
Start: 2021-06-23 | End: 2021-06-23

## 2021-06-23 RX ORDER — PROPOFOL 10 MG/ML
INJECTION, EMULSION INTRAVENOUS PRN
Status: DISCONTINUED | OUTPATIENT
Start: 2021-06-23 | End: 2021-06-23

## 2021-06-23 RX ORDER — ONDANSETRON 2 MG/ML
INJECTION INTRAMUSCULAR; INTRAVENOUS PRN
Status: DISCONTINUED | OUTPATIENT
Start: 2021-06-23 | End: 2021-06-23

## 2021-06-23 RX ORDER — EPHEDRINE SULFATE 50 MG/ML
INJECTION, SOLUTION INTRAMUSCULAR; INTRAVENOUS; SUBCUTANEOUS PRN
Status: DISCONTINUED | OUTPATIENT
Start: 2021-06-23 | End: 2021-06-23

## 2021-06-23 RX ORDER — TAMSULOSIN HYDROCHLORIDE 0.4 MG/1
0.4 CAPSULE ORAL DAILY
Qty: 10 CAPSULE | Refills: 0 | Status: SHIPPED | OUTPATIENT
Start: 2021-06-23 | End: 2021-07-20

## 2021-06-23 RX ORDER — SODIUM CHLORIDE, SODIUM LACTATE, POTASSIUM CHLORIDE, CALCIUM CHLORIDE 600; 310; 30; 20 MG/100ML; MG/100ML; MG/100ML; MG/100ML
INJECTION, SOLUTION INTRAVENOUS CONTINUOUS PRN
Status: DISCONTINUED | OUTPATIENT
Start: 2021-06-23 | End: 2021-06-23

## 2021-06-23 RX ORDER — ALBUTEROL SULFATE 0.83 MG/ML
2.5 SOLUTION RESPIRATORY (INHALATION) EVERY 4 HOURS PRN
Status: DISCONTINUED | OUTPATIENT
Start: 2021-06-23 | End: 2021-06-23 | Stop reason: HOSPADM

## 2021-06-23 RX ORDER — OXYBUTYNIN CHLORIDE 5 MG/1
5 TABLET, EXTENDED RELEASE ORAL DAILY
Qty: 10 TABLET | Refills: 0 | Status: SHIPPED | OUTPATIENT
Start: 2021-06-23 | End: 2021-07-03

## 2021-06-23 RX ORDER — IOPAMIDOL 612 MG/ML
INJECTION, SOLUTION INTRAVASCULAR PRN
Status: DISCONTINUED | OUTPATIENT
Start: 2021-06-23 | End: 2021-06-23 | Stop reason: HOSPADM

## 2021-06-23 RX ORDER — LIDOCAINE 40 MG/G
CREAM TOPICAL
Status: DISCONTINUED | OUTPATIENT
Start: 2021-06-23 | End: 2021-06-23 | Stop reason: HOSPADM

## 2021-06-23 RX ORDER — METOPROLOL TARTRATE 1 MG/ML
1-2 INJECTION, SOLUTION INTRAVENOUS EVERY 5 MIN PRN
Status: DISCONTINUED | OUTPATIENT
Start: 2021-06-23 | End: 2021-06-23 | Stop reason: HOSPADM

## 2021-06-23 RX ORDER — FENTANYL CITRATE 50 UG/ML
INJECTION, SOLUTION INTRAMUSCULAR; INTRAVENOUS PRN
Status: DISCONTINUED | OUTPATIENT
Start: 2021-06-23 | End: 2021-06-23

## 2021-06-23 RX ORDER — MAGNESIUM HYDROXIDE 1200 MG/15ML
LIQUID ORAL PRN
Status: DISCONTINUED | OUTPATIENT
Start: 2021-06-23 | End: 2021-06-23 | Stop reason: HOSPADM

## 2021-06-23 RX ADMIN — PHENYLEPHRINE HYDROCHLORIDE 100 MCG: 10 INJECTION INTRAVENOUS at 07:49

## 2021-06-23 RX ADMIN — SUCCINYLCHOLINE CHLORIDE 100 MG: 20 INJECTION, SOLUTION INTRAMUSCULAR; INTRAVENOUS; PARENTERAL at 07:41

## 2021-06-23 RX ADMIN — DEXAMETHASONE SODIUM PHOSPHATE 4 MG: 4 INJECTION, SOLUTION INTRA-ARTICULAR; INTRALESIONAL; INTRAMUSCULAR; INTRAVENOUS; SOFT TISSUE at 07:52

## 2021-06-23 RX ADMIN — KETOROLAC TROMETHAMINE 15 MG: 30 INJECTION, SOLUTION INTRAMUSCULAR at 08:44

## 2021-06-23 RX ADMIN — FUROSEMIDE 10 MG: 10 INJECTION, SOLUTION INTRAVENOUS at 08:44

## 2021-06-23 RX ADMIN — MIDAZOLAM 2 MG: 1 INJECTION INTRAMUSCULAR; INTRAVENOUS at 07:33

## 2021-06-23 RX ADMIN — LIDOCAINE HYDROCHLORIDE 100 MG: 20 INJECTION, SOLUTION INFILTRATION; PERINEURAL at 07:40

## 2021-06-23 RX ADMIN — Medication 5 MG: at 07:49

## 2021-06-23 RX ADMIN — SODIUM CHLORIDE, POTASSIUM CHLORIDE, SODIUM LACTATE AND CALCIUM CHLORIDE: 600; 310; 30; 20 INJECTION, SOLUTION INTRAVENOUS at 08:47

## 2021-06-23 RX ADMIN — ONDANSETRON 4 MG: 2 INJECTION INTRAMUSCULAR; INTRAVENOUS at 07:52

## 2021-06-23 RX ADMIN — SODIUM CHLORIDE, POTASSIUM CHLORIDE, SODIUM LACTATE AND CALCIUM CHLORIDE: 600; 310; 30; 20 INJECTION, SOLUTION INTRAVENOUS at 07:33

## 2021-06-23 RX ADMIN — PHENYLEPHRINE HYDROCHLORIDE 200 MCG: 10 INJECTION INTRAVENOUS at 07:46

## 2021-06-23 RX ADMIN — FENTANYL CITRATE 100 MCG: 50 INJECTION, SOLUTION INTRAMUSCULAR; INTRAVENOUS at 07:40

## 2021-06-23 RX ADMIN — PROPOFOL 200 MG: 10 INJECTION, EMULSION INTRAVENOUS at 07:40

## 2021-06-23 RX ADMIN — CEFAZOLIN SODIUM 2 G: 2 INJECTION, SOLUTION INTRAVENOUS at 07:48

## 2021-06-23 ASSESSMENT — MIFFLIN-ST. JEOR: SCORE: 1818.24

## 2021-06-23 NOTE — ANESTHESIA CARE TRANSFER NOTE
Patient: Louie Greco    Procedure(s):  CYSTOSCOPY, LEFT URETERAL STENT REMOVAL, LEFT RETROGRADE PYELOGRAM, LEFT URETEROSCOPY WITH LASER LITHOTRIPSY AND BASKET REMOVAL OF STONES, LEFT URETERAL STENT PLACEMENT    Diagnosis: Calculus of left kidney [N20.0]  Diagnosis Additional Information: No value filed.    Anesthesia Type:   General     Note:    Oropharynx: oropharynx clear of all foreign objects and spontaneously breathing  Level of Consciousness: awake  Oxygen Supplementation: face mask  Level of Supplemental Oxygen (L/min / FiO2): 6  Independent Airway: airway patency satisfactory and stable  Dentition: dentition unchanged  Vital Signs Stable: post-procedure vital signs reviewed and stable  Report to RN Given: handoff report given  Patient transferred to: Phase II    Handoff Report: Identifed the Patient, Identified the Reponsible Provider, Reviewed the pertinent medical history, Discussed the surgical course, Reviewed Intra-OP anesthesia mangement and issues during anesthesia, Set expectations for post-procedure period and Allowed opportunity for questions and acknowledgement of understanding      Vitals: (Last set prior to Anesthesia Care Transfer)  CRNA VITALS  6/23/2021 0827 - 6/23/2021 0903      6/23/2021             Resp Rate (set):  10        Electronically Signed By: UQINTON Clay CRNA  June 23, 2021  9:03 AM

## 2021-06-23 NOTE — DISCHARGE INSTRUCTIONS
POSTOPERATIVE INSTRUCTIONS    Diagnosis-------------------------------   LEFT kidney stones    Procedure-------------------------------  Procedure(s) (LRB):  CYSTOSCOPY, LEFT URETERAL STENT REMOVAL, LEFT RETROGRADE PYELOGRAM, LEFT URETEROSCOPY WITH LASER LITHOTRIPSY AND BASKET REMOVAL OF STONES, LEFT URETERAL STENT PLACEMENT (Left)      Findings--------------------------------  Previously placed left ureteral stent removed.  Left retrograde pyelogram with evidence of the ureter.  Left ureteroscopy with laser lithotripsy and basket removal of mid and lower pole stone burden.  There was an area in the lower pole with a small calyx with some stone debris which was incised with the laser.  No evidence of ureteral injury.  New ureteral stent placed    Home-going instructions-----------------         Activity Limitation:     - No driving or operating heavy machinery while on narcotic pain medication.     FOLLOW THESE INSTRUCTIONS AS INDICATED BELOW:  - Observe operative area for signs of excessive bleeding.  - You may shower.  - Increase fluid intake to promote clear urine.  - Resume usual diet as tolerated    What to expect while recovering-----------  - You may experience some intermittent bleeding that makes your urine pink or cherry colored. This is normal.  - However, if you are unable to urinate, passing large amount of clots, have chas blood in your urine, or have a temperature >101 degrees, call the urology nurse on call, or present to your nearest emergency department.  - You are encouraged to walk daily, and have no activity restrictions.   - A URETERAL STENT has been placed that allows urine to flow unobstructed from your kidney into your bladder.  The stent has a curl in the kidney and a curl in the bladder.  The curl in the bladder can cause some urgency and frequency of urination as well as some mild blood in the urine.  The curl in the kidney can cause some mild flank discomfort.  This may be more  noticeable when you urinate.  A URETERAL STENT is meant to be left in temporarily.  It must be removed or changed no later than 3 months after it's insertion.  If it's not removed it can result in stone overgrowth on the stent that can cause pain, infection, and can be very difficult to remove.      Discharge Medications/instructions:     - Flomax (tamsulosin) to be taken daily until stent is removed    - Oxybutynin 5mg XL (Ditropan XL) to be taken daily until ureteral stent is removed    - Take Tylenol 1000mg every 6 hours for pain    - Take Toradol 10mg every 6 hours as needed for additional pain control      Questions/concerns------------------------  [facility]: [phone]    Future appointments  You are scheduled for follow up on 7/2/21 with Dr. Pat.    Mohsen Pat MD     Today you received Toradol, an antiinflammatory medication similar to Ibuprofen.  You should not take other antiinflammatory medication, such as Ibuprofen, Motrin, Advil, Aleve, Naprosyn, etc until 245pm.    Same Day Surgery Discharge Instructions for  Sedation and General Anesthesia       It's not unusual to feel dizzy, light-headed or faint for up to 24 hours after surgery or while taking pain medication.  If you have these symptoms: sit for a few minutes before standing and have someone assist you when you get up to walk or use the bathroom.      You should rest and relax for the next 24 hours. We recommend you make arrangements to have an adult stay with you for at least 24 hours after your discharge.  Avoid hazardous and strenuous activity.      DO NOT DRIVE any vehicle or operate mechanical equipment for 24 hours following the end of your surgery.  Even though you may feel normal, your reactions may be affected by the medication you have received.      Do not drink alcoholic beverages for 24 hours following surgery.       Slowly progress to your regular diet as you feel able. It's not unusual to feel nauseated  and/or vomit after receiving anesthesia.  If you develop these symptoms, drink clear liquids (apple juice, ginger ale, broth, 7-up, etc. ) until you feel better.  If your nausea and vomiting persists for 24 hours, please notify your surgeon.        All narcotic pain medications, along with inactivity and anesthesia, can cause constipation. Drinking plenty of liquids and increasing fiber intake will help.      For any questions of a medical nature, call your surgeon.      Do not make important decisions for 24 hours.      If you had general anesthesia, you may have a sore throat for a couple of days related to the breathing tube used during surgery.  You may use Cepacol lozenges to help with this discomfort.  If it worsens or if you develop a fever, contact your surgeon.       If you feel your pain is not well managed with the pain medications prescribed by your surgeon, please contact your surgeon's office to let them know so they can address your concerns.       CoVid 19 Information    We want to give you information regarding Covid. Please consult your primary care provider with any questions you might have.     Patient who have symptoms (cough, fever, or shortness of breath), need to isolate for 7 days from when symptoms started OR 72 hours after fever resolves (without fever reducing medications) AND improvement of respiratory symptoms (whichever is longer).      Isolate yourself at home (in own room/own bathroom if possible)    Do Not allow any visitors    Do Not go to work or school    Do Not go to [employer],  centers, shopping, or other public places.    Do Not shake hands.    Avoid close and intimate contact with others (hugging, kissing).    Follow CDC recommendations for household cleaning of frequently touched services.     After the initial 7 days, continue to isolate yourself from household members as much as possible. To continue decrease the risk of community spread and exposure, you and any  members of your household should limit activities in public for 14 days after starting home isolation.     You can reference the following CDC link for helpful home isolation/care tips:  https://www.cdc.gov/coronavirus/2019-ncov/downloads/10Things.pdf    Protect Others:    Cover Your Mouth and Nose with a mask, disposable tissue or wash cloth to avoid spreading germs to others.    Wash your hands and face frequently with soap and water    Call Your Primary Doctor If: Breathing difficulty develops or you become worse.    For more information about COVID19 and options for caring for yourself at home, please visit the CDC website at https://www.cdc.gov/coronavirus/2019-ncov/about/steps-when-sick.html  For more options for care at [facility], please visit our website at [email or web address]

## 2021-06-23 NOTE — ANESTHESIA PREPROCEDURE EVALUATION
Anesthesia Pre-Procedure Evaluation    Patient: Louie Greco   MRN: [id number] : 1960        Preoperative Diagnosis: Calculus of left kidney [N20.0]   Procedure : Procedure(s):  CYSTOSCOPY, LEFT URETERAL STENT REMOVAL, LEFT RETROGRADE PYELOGRAM, LEFT URETEROSCOPY WITH LASER LITHOTRIPSY AND BASKET REMOVAL OF STONES, POSSIBLE LEFT URETERAL STENT PLACEMENT     Past Medical History:   Diagnosis Date     Childhood asthma      Elevated prostate specific antigen (PSA)     No biopsy done (had rectal cancer at the time)     Epididymitis, bilateral     18 years old     Inguinal hernia      Mumps      Nephrolithiasis     Several --  first, 2019 last     Rectal cancer (H) 2017    low rectal cancer, S/P Rad adn Chemo, no surg needed     Shingles       Past Surgical History:   Procedure Laterality Date     COLONOSCOPY N/A 2016    Procedure: COMBINED COLONOSCOPY, SINGLE OR MULTIPLE BIOPSY/POLYPECTOMY BY BIOPSY;  Surgeon: Chelsea Thompson MD;  Location:  GI     COLONOSCOPY N/A 2017    Procedure: COLONOSCOPY;;  Surgeon: Felicitas Radford MD;  [facility]     COLONOSCOPY N/A 2017    Procedure: COLONOSCOPY;;  Surgeon: Felicitas Radford MD;  [facility]     COLONOSCOPY N/A 10/23/2020    Procedure: COLONOSCOPY;  Surgeon: Felicitas Radford MD;  Location: [facility] OR     COMBINED CYSTOSCOPY, RETROGRADES, URETEROSCOPY, LASER HOLMIUM LITHOTRIPSY URETER(S), INSERT STENT Left 2018    Procedure: COMBINED CYSTOSCOPY, RETROGRADES, URETEROSCOPY, LASER HOLMIUM LITHOTRIPSY URETER(S), INSERT STENT;  Cysto, left ureteroscopy, holmium laser, retrogrades, stent placement;  Surgeon: Bola Worthy MD;  Location:  OR     COMBINED CYSTOSCOPY, RETROGRADES, URETEROSCOPY, LASER HOLMIUM LITHOTRIPSY URETER(S), INSERT STENT Right 3/22/2021    Procedure: CYSTOSCOPY WITH RIGHT RETROGRADE PYELOGRAM, RIGHT URETEROSCOPY WITH LASER LITHOTRIPSY AND BASKET REMOVAL OF STONE, RIGHT  URETERAL STENT PLACEMENT;  Surgeon: Mohsen Pat MD;  Location: [facility]     COMBINED CYSTOSCOPY, RETROGRADES, URETEROSCOPY, LASER HOLMIUM LITHOTRIPSY URETER(S), INSERT STENT Left 5/19/2021    Procedure: CYSTOSCOPY, LEFT RETROGRADE PYELOGRAM, LEFT DIAGNOSTIC, URETEROSCOPY, LEFT URETERAL STENT PLACEMENT;  Surgeon: Mohsen Pat MD;  Location: [facility]     CYSTOSCOPY, LITHOLAPAXY, COMBINED N/A 3/1/2021    Procedure: Cystoscopy, litholapaxy, combined;  Surgeon: Mohsen Pat MD;  Location: [facility]     CYSTOSCOPY, RETROGRADES, INSERT STENT URETER(S), COMBINED Right 3/1/2021    Procedure: Cystoscopy, retrogrades, insert stent ureter(s), combined;  Surgeon: Mohsen Pat MD;  Location: [facility]     EXAM UNDER ANESTHESIA ANUS N/A 7/5/2017    Procedure: EXAM UNDER ANESTHESIA ANUS;  Examination Under Anesthesia, flex sigmoidoscopy with biopsies and formalin application;  Surgeon: Felicitas Radford MD;  [facility]     EXAM UNDER ANESTHESIA ANUS N/A 9/13/2017    Procedure: EXAM UNDER ANESTHESIA ANUS;  Examination Under Anesthesia Anus, Colonoscopy, application of formalin;  Surgeon: Felicitas Radford MD;  [facility]     EXAM UNDER ANESTHESIA ANUS N/A 12/13/2017    Procedure: EXAM UNDER ANESTHESIA ANUS;  Examination Under Anesthesia Anus, Colonoscopy;  Surgeon: Felicitas Radford MD;  [facility]     EXAM UNDER ANESTHESIA ANUS N/A 5/11/2018    Procedure: EXAM UNDER ANESTHESIA ANUS;  Examination Under Anesthesia Anus, Interoperative Flexible Sigmoidoscopy, Application of Formalin;  Surgeon: Felicitas Radford MD;  [facility]     EXAM UNDER ANESTHESIA ANUS N/A 7/20/2018    Procedure: EXAM UNDER ANESTHESIA ANUS;  Examination Under Anesthesia Anus, Flexible Sigmoidoscopy ;  Surgeon: Felicitas Radford MD;  [facility]     EXAM UNDER ANESTHESIA ANUS N/A 11/30/2018    Procedure: Examination Under Anesthesia, application of formalin to rectum, polypectomy;  Surgeon:  Felicitas Radford MD;  [facility]     EXAM UNDER ANESTHESIA ANUS N/A 2/22/2019    Procedure: Examination Under Anesthesia;  Surgeon: Felicitas Radford MD;  [facility]     EXAM UNDER ANESTHESIA ANUS N/A 6/28/2019    Procedure: Examination Under Anesthesia;  Surgeon: Felicitas Radford MD;  [facility]     EXAM UNDER ANESTHESIA ANUS N/A 11/1/2019    Procedure: Examination Under Anesthesia;  Surgeon: Felicitas Radford MD;  [facility]     EXAM UNDER ANESTHESIA RECTUM N/A 10/23/2020    Procedure: Exam under anesthesia rectum;  Surgeon: Felicitas Radford MD;  Location: [facility]     HC TOOTH EXTRACTION W/FORCEP       LAPAROSCOPIC APPENDECTOMY N/A 7/17/2017    Procedure: LAPAROSCOPIC APPENDECTOMY;  LAPAROSCOPIC APPENDECTOMY;  Surgeon: Bryson Ferguson MD;  Location[facility]     LASER HOLMIUM LITHOTRIPSY URETER(S), INSERT STENT, COMBINED Right 3/1/2021    Procedure: CYSTOURETEROSCOPY,  URETERAL STENT INSERTION, RIGHT RETROGRADE;  Surgeon: Mohsen Pat MD;  [facility]     SIGMOIDOSCOPY FLEXIBLE N/A 12/8/2016    Procedure: SIGMOIDOSCOPY FLEXIBLE;  Surgeon: Felicitas Radford MD;  [facility]     SIGMOIDOSCOPY FLEXIBLE N/A 7/5/2017    Procedure: SIGMOIDOSCOPY FLEXIBLE;;  Surgeon: Felicitas Radford MD;  [facility]     SIGMOIDOSCOPY FLEXIBLE N/A 5/11/2018    Procedure: SIGMOIDOSCOPY FLEXIBLE;;  Surgeon: Felicitas Radford MD;  [facility]     SIGMOIDOSCOPY FLEXIBLE N/A 7/20/2018    Procedure: SIGMOIDOSCOPY FLEXIBLE;;  Surgeon: Felicitas Radford MD;  [facility]     SIGMOIDOSCOPY FLEXIBLE N/A 11/30/2018    Procedure: Flexible Sigmoidoscopy;  Surgeon: Felicitas Radford MD;  [facility]     SIGMOIDOSCOPY FLEXIBLE N/A 2/22/2019    Procedure: Intraoperative Flexible Sigmoidoscopy, Application of Formalin to rectum;  Surgeon: Felicitas Radford MD;  [facility]     SIGMOIDOSCOPY FLEXIBLE N/A  6/28/2019    Procedure: Flexible Sigmoidoscopy;  Surgeon: Felicitas Radford MD;  [facility]     SIGMOIDOSCOPY FLEXIBLE N/A 11/1/2019    Procedure: Flexible Sigmoidoscopy;  Surgeon: Felicitas Radford MD;  [facility]     TESTICLE SURGERY       VASCULAR SURGERY      Right chest port     VASECTOMY       VASECTOMY        Allergies   Allergen Reactions     Ampicillin Diarrhea     Demerol [Meperidine] Nausea      Social History     Tobacco Use     Smoking status: Never Smoker     Smokeless tobacco: Never Used   Substance Use Topics     Alcohol use: Yes     Comment: < 1 drink a week      Wt Readings from Last 1 Encounters:   06/17/21 98.9 kg (218 lb)        Anesthesia Evaluation   Pt has had prior anesthetic.     No history of anesthetic complications       ROS/MED HX  ENT/Pulmonary:    (-) sleep apnea   Neurologic:       Cardiovascular:       METS/Exercise Tolerance:     Hematologic:       Musculoskeletal:       GI/Hepatic:    (-) GERD   Renal/Genitourinary:     (+) Nephrolithiasis ,     Endo:       Psychiatric/Substance Use:       Infectious Disease:       Malignancy:   (+) Malignancy, History of GI.    Other:               OUTSIDE LABS:  CBC:   Lab Results   Component Value Date    WBC 6.0 02/28/2021    WBC 5.8 08/13/2020    HGB 14.0 02/28/2021    HGB 16.2 08/13/2020    HCT 42.5 02/28/2021    HCT 46.2 08/13/2020     02/28/2021     08/13/2020     BMP:   Lab Results   Component Value Date     02/28/2021     08/13/2020    POTASSIUM 4.0 02/28/2021    POTASSIUM 3.7 08/13/2020    CHLORIDE 110 (H) 02/28/2021    CHLORIDE 104 08/13/2020    CO2 25 02/28/2021    CO2 27 08/13/2020    BUN 19 02/28/2021    BUN 16 08/13/2020    CR 1.10 02/28/2021    CR 0.83 08/13/2020     (H) 02/28/2021     (H) 08/13/2020     COAGS:   Lab Results   Component Value Date    PTT 31 07/10/2017    INR 1.02 07/10/2017     POC:   Lab Results   Component Value Date    BGM 60 (L) 07/25/2018      HEPATIC:   Lab Results   Component Value Date    ALBUMIN 3.7 02/28/2021    PROTTOTAL 6.8 02/28/2021    ALT 40 02/28/2021    AST 26 02/28/2021    ALKPHOS 58 02/28/2021    BILITOTAL 0.5 02/28/2021     OTHER:   Lab Results   Component Value Date    LACT 0.8 07/17/2017    A1C 5.4 06/17/2021    FRANDY 8.3 (L) 02/28/2021    MAG 2.0 07/17/2017    LIPASE 264 02/28/2021    .0 (H) 07/17/2017    SED 34 (H) 07/17/2017       Anesthesia Plan    ASA Status:  2   NPO Status:  NPO Appropriate    Anesthesia Type: General.     - Airway: LMA   Induction: Intravenous.   Maintenance: Balanced.        Consents    Anesthesia Plan(s) and associated risks, benefits, and realistic alternatives discussed. Questions answered and patient/representative(s) expressed understanding.     - Discussed with:  Patient         Postoperative Care       PONV prophylaxis: Ondansetron (or other 5HT-3), Dexamethasone or Solumedrol     Comments:                Belinda Selby MD, MD

## 2021-06-23 NOTE — ANESTHESIA POSTPROCEDURE EVALUATION
Patient: Louie Greco    Procedure(s):  CYSTOSCOPY, LEFT URETERAL STENT REMOVAL, LEFT RETROGRADE PYELOGRAM, LEFT URETEROSCOPY WITH LASER LITHOTRIPSY AND BASKET REMOVAL OF STONES, LEFT URETERAL STENT PLACEMENT    Diagnosis:Calculus of left kidney [N20.0]  Diagnosis Additional Information: No value filed.    Anesthesia Type:  General    Note:     Postop Pain Control: Uneventful            Sign Out: Well controlled pain   PONV: No   Neuro/Psych: Uneventful            Sign Out: Acceptable/Baseline neuro status   Airway/Respiratory: Uneventful            Sign Out: Acceptable/Baseline resp. status   CV/Hemodynamics: Uneventful            Sign Out: Acceptable CV status; No obvious hypovolemia; No obvious fluid overload   Other NRE: NONE   DID A NON-ROUTINE EVENT OCCUR? No           Last vitals:  Vitals:    06/23/21 0930 06/23/21 0945 06/23/21 1028   BP: 112/76 127/86 129/86   Pulse: 75 76    Resp: 16 8 16   Temp:  35.9  C (96.6  F)    SpO2: 95% 95% 97%       Last vitals prior to Anesthesia Care Transfer:  CRNA VITALS  6/23/2021 0827 - 6/23/2021 0927      6/23/2021             Resp Rate (set):  10          Electronically Signed By: Belinda Selby MD, MD  June 23, 2021  1:46 PM

## 2021-06-23 NOTE — ANESTHESIA PROCEDURE NOTES
Airway       Patient location during procedure: OR  Staff -        Anesthesiologist:  Belinda Selby MD       CRNA: Edith Messina APRN CRNA       Performed By: CRNA and anesthesiologist  Consent for Airway        Urgency: elective  Indications and Patient Condition       Indications for airway management: angel-procedural       Induction type:RSI       Mask difficulty assessment: 0 - not attempted    Final Airway Details       Final airway type: endotracheal airway       Successful airway: ETT - single and Oral  Endotracheal Airway Details        ETT size (mm): 7.5       Cuffed: yes       Successful intubation technique: direct laryngoscopy       DL Blade Type: Carlin 2       Grade View of Cords: 1       Adjucts: stylet       Position: Right       Measured from: gums/teeth       Secured at (cm): 22       Bite block used: Oral Airway    Post intubation assessment        Placement verified by: capnometry, equal breath sounds and chest rise        Number of attempts at approach: 1       Number of other approaches attempted: 0       Secured with: pink tape       Ease of procedure: easy       Dentition: Intact and Unchanged    Medication(s) Administered   Medication Administration Time: 6/23/2021 7:42 AM

## 2021-06-23 NOTE — OP NOTE
OPERATIVE REPORT  DATE OF SURGERY: 06/23/21  LOCATION OF SURGERY: [facility]  PREOPERATIVE DIAGNOSIS:  (N20.0) Kidney stone on left side  (primary encounter diagnosis)  POSTOPERATIVE DIAGNOSIS: (N20.0) Kidney stone on left side  (primary encounter diagnosis)     START TIME: 7:50 AM  END TIME: 8:49 AM    PROCEDURE PERFORMED:   1. Cystoscopy and LEFT ureteral stent removal  2. LEFT retrograde pyelogram  3. LEFT ureteroscopy with laser lithotripsy  4. LEFT ureteroscopy with basketing of stones  5. LEFT JJ stent placement  6. <1hr physician fluoroscopy time      STAFF SURGEON: Mohsen Pat MD  ANESTHESIA: General.   ESTIMATED BLOOD LOSS: 2 mL.   DRAINS AND TUBES: LEFT 6fr x 24cm ureteral stent, 16fr coude catheter  COMPLICATIONS: None.   DISPOSITION: PACU.   SPECIMENS OBTAINED:   ID Type Source Tests Collected by Time Destination   1 : LEFT KIDNEY STONES Calculus/Stone Kidney, Left STONE ANALYSIS Mohsen Pat MD 6/23/2021  8:43 AM      SIGNIFICANT FINDINGS: Previously placed left ureteral stent removed.  Left retrograde pyelogram with evidence of the ureter.  Left ureteroscopy with laser lithotripsy and basket removal of mid and lower pole stone burden.  There was an area in the lower pole with a small calyx with some stone debris which was incised with the laser.  No evidence of ureteral injury.  New ureteral stent placed     HISTORY OF PRESENT ILLNESS: Louie Greco is a 61 year old man with history of bilateral right ureteral stones.  He is status post removal of his right-sided stones in March.  On 5/19/2021 he presented for left ureteroscopy however was noted to have a tight distal ureter and a ureteral stent was placed.  He returns today for definitive stone management    OPERATION PERFORMED:   Informed consent was obtained and the patient was brought to the operating room where general anesthesia was induced. The patient was given appropriate preoperative antibiotics and positioned supine. The patient was  then repositioned in dorsal lithotomy with all pressure points padded. We then performed a timeout, verifying the correct patient's site and procedure to be performed.    A 22 Zimbabwean cystoscope was inserted atraumatically into the bladder.  The previously placed left ureteral stent was grasped withdrawn to the urethral meatus.  This was cannulated with a 0.035 sensor wire which was advanced into the renal pelvis under fluoroscopic guidance and the stent was removed.  A 10 Zimbabwean dual-lumen catheter was advanced over the wire to the distal ureter and a gentle retrograde pyelogram was performed with evidence of passive dilation from and.  An Amplatz superstiff wire was advanced up to the renal pelvis and the dual-lumen catheter was removed.  11-13 x 46 cm ureteral access sheath was advanced over the wire to the level of the UPJ under fluoroscopic guidance and the inner stylette and wire were moved.  Flexible ureteroscopy was then performed with complete visualization of the entire collecting system.  He was noted to have stones in the mid and lower poles of the kidney.  A few of the stones required laser lithotripsy.  All stone fragments greater than 1 to 2 mm were basketed and removed.  In the lower pole there was a calyx covered by a small flap of tissue which was incised with the laser and some stone debris was irrigated and removed.  The ureteroscope and the access sheath were removed en bloc with no evidence of ureteral injury.  The cystoscope was replaced in the bladder and a new 6 Zimbabwean by 24 cm JJ ureteral stent was advanced over the wire with good curl noted in the renal pelvis the cystoscope was removed.  A 16 Zimbabwean coudé catheter was placed with 10 cc in the balloon.  He received 10 mg IV Lasix and 15 mg IV Toradol.  He was emerged from anesthesia and taken to the PACU in stable condition.    Chart documentation with Dragon Voice recognition Software. Although reviewed after completion, some words and  grammatical errors may remain.     Mohsen Pat MD   Urology  [facility]  Clinic Phone [phone]

## 2021-06-23 NOTE — OR NURSING
Discharge instructions reviewed with wife and patient via phone per covid protocol. Printed form and take home meds sent with patient. Neither have any further questions

## 2021-06-24 LAB — COPATH REPORT: NORMAL

## 2021-06-28 ENCOUNTER — MYC MEDICAL ADVICE (OUTPATIENT)
Dept: FAMILY MEDICINE | Facility: CLINIC | Age: 61
End: 2021-06-28

## 2021-06-30 LAB
APPEARANCE STONE: NORMAL
COMPN STONE: NORMAL
NUMBER STONE: NORMAL
SIZE STONE: NORMAL MM
WT STONE: 132 MG

## 2021-07-02 ENCOUNTER — OFFICE VISIT (OUTPATIENT)
Dept: UROLOGY | Facility: CLINIC | Age: 61
End: 2021-07-02
Payer: COMMERCIAL

## 2021-07-02 VITALS
DIASTOLIC BLOOD PRESSURE: 78 MMHG | BODY MASS INDEX: 30.85 KG/M2 | OXYGEN SATURATION: 97 % | HEART RATE: 96 BPM | WEIGHT: 215 LBS | SYSTOLIC BLOOD PRESSURE: 142 MMHG

## 2021-07-02 DIAGNOSIS — Z46.6 ENCOUNTER FOR REMOVAL OF URETERAL STENT: ICD-10-CM

## 2021-07-02 DIAGNOSIS — N20.0 BILATERAL NEPHROLITHIASIS: ICD-10-CM

## 2021-07-02 DIAGNOSIS — R97.20 ELEVATED PROSTATE SPECIFIC ANTIGEN (PSA): Primary | ICD-10-CM

## 2021-07-02 PROCEDURE — 52310 CYSTOSCOPY AND TREATMENT: CPT | Performed by: UROLOGY

## 2021-07-02 ASSESSMENT — PAIN SCALES - GENERAL: PAINLEVEL: NO PAIN (0)

## 2021-07-02 NOTE — PROGRESS NOTES
CYSTOSCOPY AND URETERAL STENT REMOVAL PROCEDURE NOTE:    Louie Greco is a 61 year old male  who presents with ureteral stent for cystoscopy and ureteral stent removal.    Pt ID verified with patient: Yes     Procedure verified with patient: Yes     Procedure confirmed with physician and support staff: Yes     Consent form confirmed with physician and support staff.    Sign In  History and Physical Exam reviewed .  Informed Consent Discussed: Yes   Sign in Communication: Yes   Time Out:  Team Confirms the Correct Patient, Correct Procedure; Yes , Correct Site and Site Marking, Correct Position (if applicable).    Affirmation of Time Out: Yes   Sign Out:  Sign Out Discussion: Yes     Louie Greco is a 61 year old male with an indwelling ureteral stent in need of removal.    CYSTOSCOPY PROCEDURE:  After sterile preparation and draping of the patient,  a 17-Niuean flexible cystoscope was introduced via the urethra.  It was passed without difficulty into the bladder.  The urethra was open without evidence of stricture.  The ureteral orifices were orthotopic.  The double J stent was seen coming out the left side.  It was grasped with an alligator forceps and extracted intact without difficulty.  The patient tolerated the procedure well.    Stone Analysis:  Calculi composed primarily of   calcium oxalate monohydrate.     A/P Successful stent removal  Prophylactic antibiotic ordered   Stone prevention counseling provided today  -Given his recurrent bilateral stones, we discussed referral to nephrology in order placed for Dr. Gibson    Elevated PSA  -Reviewed his PSA history and trend as well as his 4K score.  We discussed the recommendation after consultation with radiology for completion of a dedicated prostate MRI  -Order for MRI placed  -Follow-up with me in early August to review the MRI results    Watch for any new onset fevers, signs of UTI.  May expect some pain after removal.  If this is severe, or last many  hours, you may need to return for replacement of stent.    Mohsen Pat MD   Urology  [facility]

## 2021-07-02 NOTE — LETTER
7/2/2021       RE: Louie Greco  [address]  [place] [address]     Dear Colleague,    Thank you for referring your patient, Louie Greco, to the [facility] [place] [facility]. Please see a copy of my visit note below.    CYSTOSCOPY AND URETERAL STENT REMOVAL PROCEDURE NOTE:    Louie Greco is a 61 year old male  who presents with ureteral stent for cystoscopy and ureteral stent removal.    Pt ID verified with patient: Yes     Procedure verified with patient: Yes     Procedure confirmed with physician and support staff: Yes     Consent form confirmed with physician and support staff.    Sign In  History and Physical Exam reviewed .  Informed Consent Discussed: Yes   Sign in Communication: Yes   Time Out:  Team Confirms the Correct Patient, Correct Procedure; Yes , Correct Site and Site Marking, Correct Position (if applicable).    Affirmation of Time Out: Yes   Sign Out:  Sign Out Discussion: Yes     Louie Greco is a 61 year old male with an indwelling ureteral stent in need of removal.    CYSTOSCOPY PROCEDURE:  After sterile preparation and draping of the patient,  a 17-Japanese flexible cystoscope was introduced via the urethra.  It was passed without difficulty into the bladder.  The urethra was open without evidence of stricture.  The ureteral orifices were orthotopic.  The double J stent was seen coming out the left side.  It was grasped with an alligator forceps and extracted intact without difficulty.  The patient tolerated the procedure well.    Stone Analysis:  Calculi composed primarily of   calcium oxalate monohydrate.     A/P Successful stent removal  Prophylactic antibiotic ordered   Stone prevention counseling provided today  -Given his recurrent bilateral stones, we discussed referral to nephrology in order placed for Dr. Gibson    Elevated PSA  -Reviewed his PSA history and trend as well as his 4K score.  We discussed the  recommendation after consultation with radiology for completion of a dedicated prostate MRI  -Order for MRI placed  -Follow-up with me in early August to review the MRI results    Watch for any new onset fevers, signs of UTI.  May expect some pain after removal.  If this is severe, or last many hours, you may need to return for replacement of stent.    Mohsen Pat MD   Urology  [facility]

## 2021-07-02 NOTE — PATIENT INSTRUCTIONS
"STONE PREVENTION RECOMMENDATIONS:  - Drink enough water to make 2.5-3L of urine daily  - Add some squeezed lemon to the water to increase the citrate in your urine  - Consume a normal amount of calcium   - Limit your oxalate consumption    [email or web address]     AFTER YOUR CYSTOSCOPY  ?  ?  You have just completed a cystoscopy, or \"cysto\", which allowed your physician to learn more about your bladder (or to remove a stent placed after surgery). We suggest that you continue to avoid caffeine, fruit juice, and alcohol for the next 24 hours, however, you are encouraged to return to your normal activities.  ?  ?  A few things that are considered normal after your cystoscopy:  ?  * small amount of bleeding (or spotting) that clears within the next 24 hours  ?  * slight burning sensation with urination  ?  * sensation of needing to void (urinate) more frequently  ?  * the feeling of \"air\" in your urine  ?  * mild discomfort that is relieved with Tylenol    * bladder spasms  ?  ?  ?  Please contact our office promptly if you:  ?  * develop a fever above 101 degrees  ?  * are unable to urinate  ?  * develop bright red blood that does not stop  ?  * experience severe pain or swelling  ?  ?  ?  And of course, please contact our office with any concerns or questions [phone]  ?      AFTER YOUR CYSTOSCOPY        You have just completed a cystoscopy, or \"cysto\", which allowed your physician to learn more about your bladder (or to remove a stent placed after surgery). We suggest that you continue to avoid caffeine, fruit juice, and alcohol for the next 24 hours, however, you are encouraged to return to your normal activities.         A few things that are considered normal after your cystoscopy:     * Small amount of bleeding (or spotting) that clears within the next 24 hours     * Slight burning sensation with urination     * Sensation to of needing to " "avoid more frequently     * The feeling of \"air\" in your urine     * Mild discomfort that is relieved with Tylenol        Please contact our office promptly if you:     * Develop a fever above 101 degrees     * Are unable to urinate     * Develop bright red blood that does not stop     * Severe pain or swelling         Please contact our office with any concerns or questions @[email or web address].  "

## 2021-07-05 ENCOUNTER — TELEPHONE (OUTPATIENT)
Dept: SURGERY | Facility: CLINIC | Age: 61
End: 2021-07-05

## 2021-07-05 DIAGNOSIS — C20 RECTAL CANCER (H): Primary | ICD-10-CM

## 2021-07-05 NOTE — TELEPHONE ENCOUNTER
[facility] Call Center    Phone Message    May a detailed message be left on voicemail: yes     Reason for Call: Other: Per pt would like if he can cancel the procedure on 07/07 with Dr. Duglas Tuttle due to him going through radiation . Please call pt back to discuss.      Action Taken: Message routed to:  [facility] Rec    Travel Screening: Not Applicable

## 2021-07-06 ENCOUNTER — MYC MEDICAL ADVICE (OUTPATIENT)
Dept: FAMILY MEDICINE | Facility: CLINIC | Age: 61
End: 2021-07-06

## 2021-07-06 ENCOUNTER — TELEPHONE (OUTPATIENT)
Dept: SURGERY | Facility: CLINIC | Age: 61
End: 2021-07-06

## 2021-07-06 DIAGNOSIS — Z11.59 ENCOUNTER FOR SCREENING FOR OTHER VIRAL DISEASES: ICD-10-CM

## 2021-07-06 NOTE — TELEPHONE ENCOUNTER
Tier 3 Case Request Received:  Patient Name: Louie Greco   MRN: [id number]   Case#: [id number]   Surgeon(s) and Role:      * Felicitas Radford MD - Primary   Date requested: * No dates entered *   Location: [facility] OR   Procedure(s):   SIGMOIDOSCOPY, FLEXIBLE (N/A)     Additional Instructions for the Case  Multi-surgeon case:  no  Time in minutes:  30  Anesthesia: MAC  Prep: 2 fleets  Pre-Op: PAC vs PCP  Labs: no  WOC: no  Special equipment: no  Special Instructions: no     msg left for patient to call back regarding scheduling. [employer] message sent to patient to reply or call regarding scheduling.    Patient called back, wants to schedule his procedure on 7/23/2021. I was able to get some things moved around to make that date work. Called patient back, completed the following scheduling:     Required: Yes, you will need a  18 years or older to drive you home from your procedure as well as stay with you for 24 hours after your procedure    Surgery: Flexible Sigmoidoscopy    Date: Friday July 23, 2021               Surgeon: Dr. Felicitas Radford    Time: You will receive a call 1-3 days prior to your surgery with your finalized surgery and arrival time.     Location: [facility] - [address]--[address]th [place] [address]  [phone]      Upcoming Appointments:     Pre-operative History & Physical appointment:   - clinic appointment with your primary care provider or a partner to be scheduled and completed by you within 30 days before your procedure date    Pre-operative COVID-19 Test:  Pre-procedure asymptomatic COVID PCR:  7/20/2021 at 1:00 PM                                                                      [facility]                                                                         [address]  "[id number]    Preparation: 2 Fleet Enemas (See \"Day of Surgery\")        "

## 2021-07-12 NOTE — ANESTHESIA PREPROCEDURE EVALUATION
Anesthesia Evaluation     .             ROS/MED HX    ENT/Pulmonary:     (+)Intermittent asthma Treatment: Inhaler prn,  , . .    Neurologic:  - neg neurologic ROS     Cardiovascular:  - neg cardiovascular ROS       METS/Exercise Tolerance:     Hematologic:  - neg hematologic  ROS       Musculoskeletal:  - neg musculoskeletal ROS       GI/Hepatic:  - neg GI/hepatic ROS       Renal/Genitourinary:  - ROS Renal section negative       Endo:  - neg endo ROS       Psychiatric:  - neg psychiatric ROS       Infectious Disease:  - neg infectious disease ROS       Malignancy:   (+) Malignancy History of GI          Other:                                    Anesthesia Plan      History & Physical Review  History and physical reviewed and following examination; no interval change.    ASA Status:  2 .    NPO Status:  > 8 hours    Plan for MAC with Intravenous induction. Reason for MAC:  Deep or markedly invasive procedure (G8)  PONV prophylaxis:  Ondansetron (or other 5HT-3)  I have examined the patient and reviewed the medical record  Plan MAC with routine monitors    Shubham Teague MD      Postoperative Care  Postoperative pain management:  IV analgesics.      Consents  Anesthetic plan, risks, benefits and alternatives discussed with:  Patient..                          .   [phone]

## 2021-07-20 ENCOUNTER — LAB (OUTPATIENT)
Dept: LAB | Facility: CLINIC | Age: 61
End: 2021-07-20
Payer: COMMERCIAL

## 2021-07-20 ENCOUNTER — OFFICE VISIT (OUTPATIENT)
Dept: FAMILY MEDICINE | Facility: CLINIC | Age: 61
End: 2021-07-20
Payer: COMMERCIAL

## 2021-07-20 VITALS
OXYGEN SATURATION: 97 % | HEIGHT: 70 IN | DIASTOLIC BLOOD PRESSURE: 79 MMHG | TEMPERATURE: 97.4 F | BODY MASS INDEX: 32.35 KG/M2 | WEIGHT: 226 LBS | SYSTOLIC BLOOD PRESSURE: 116 MMHG | HEART RATE: 80 BPM

## 2021-07-20 DIAGNOSIS — Z11.59 ENCOUNTER FOR SCREENING FOR OTHER VIRAL DISEASES: ICD-10-CM

## 2021-07-20 DIAGNOSIS — L01.00 IMPETIGO: ICD-10-CM

## 2021-07-20 DIAGNOSIS — R97.20 ELEVATED PROSTATE SPECIFIC ANTIGEN (PSA): ICD-10-CM

## 2021-07-20 DIAGNOSIS — C20 RECTAL CANCER (H): ICD-10-CM

## 2021-07-20 DIAGNOSIS — Z01.818 PRE-OP EXAM: Primary | ICD-10-CM

## 2021-07-20 PROCEDURE — U0003 INFECTIOUS AGENT DETECTION BY NUCLEIC ACID (DNA OR RNA); SEVERE ACUTE RESPIRATORY SYNDROME CORONAVIRUS 2 (SARS-COV-2) (CORONAVIRUS DISEASE [COVID-19]), AMPLIFIED PROBE TECHNIQUE, MAKING USE OF HIGH THROUGHPUT TECHNOLOGIES AS DESCRIBED BY CMS-2020-01-R: HCPCS

## 2021-07-20 PROCEDURE — 99214 OFFICE O/P EST MOD 30 MIN: CPT | Performed by: INTERNAL MEDICINE

## 2021-07-20 PROCEDURE — U0005 INFEC AGEN DETEC AMPLI PROBE: HCPCS

## 2021-07-20 RX ORDER — MUPIROCIN 20 MG/G
OINTMENT TOPICAL 3 TIMES DAILY
Qty: 30 G | Refills: 2 | Status: SHIPPED | OUTPATIENT
Start: 2021-07-20 | End: 2022-07-12

## 2021-07-20 ASSESSMENT — MIFFLIN-ST. JEOR: SCORE: 1836.38

## 2021-07-20 NOTE — H&P (VIEW-ONLY)
[address], [address]  [facility] [address]  Phone: [phone]  Primary Provider: Jarod Gaitan  Pre-op Performing Provider: JAROD GAITAN      PREOPERATIVE EVALUATION:  Today's date: 7/20/2021    Louie Greco is a 61 year old male who presents for a preoperative evaluation.    Surgical Information:  Surgery/Procedure: SIGMOIDOSCOPY, FLEXIBLE  Surgery Location: [facility] [place] [facility]  Surgeon: Felicitas Radford MD  Surgery Date: 7/23/2021  Time of Surgery: 7:15 AM  Where patient plans to recover: At home with family  Fax number for surgical facility: Note does not need to be faxed, will be available electronically in Epic.    Type of Anesthesia Anticipated: Local with MAC    Assessment & Plan     The proposed surgical procedure is considered LOW risk.    Pre-op exam  Suitable candidate for flexible sigmoidoscopy under anesthesia    Rectal cancer (H)  Continue follow-up with oncology and colorectal surgery as directed    Impetigo  Treat with topical Bactroban as below, if that fails to address symptoms after 1 to 2 weeks, consider systemic oral antibiotics.  He is to MyChart me if that is the case.  - mupirocin (BACTROBAN) 2 % external ointment; Apply topically 3 times daily    Elevated prostate specific antigen (PSA)  He plans to follow-up with Dr. Pat following a prostate MRI planned for later this week as well.  Also, he has a PET scan scheduled as well.               Medication Instructions:  Patient is to take all scheduled medications on the day of surgery    RECOMMENDATION:  APPROVAL GIVEN to proceed with proposed procedure, without further diagnostic evaluation.              34 minutes spent on the date of the encounter doing chart review, history and exam, documentation and further activities per the note        Subjective     HPI related to upcoming procedure: Pleasant 61-year-old  with a history of rectal cancer.  He requires surveillance flexible  sigmoidoscopy under general anesthesia for his comfort.  He needs a preop for that purpose.  He recently had a preop for nephrolithiasis removal.  He feels unchanged.  He was treated for otitis externa of fungal etiology by Dr. Richardson.  He has developed a mildly tender sore on his right upper lip over the last week or so.  The sore gets irritated when he shaves.  The sores described as having a honey crust or what he describes as a lymphatic drainage.  He denies fevers or chills.  He continues to be able to perform 4 METS of physical activity without chest pain or shortness of breath.      Preop Questions 7/20/2021   1. Have you ever had a heart attack or stroke? No   2. Have you ever had surgery on your heart or blood vessels, such as a stent placement, a coronary artery bypass, or surgery on an artery in your head, neck, heart, or legs? No   3. Do you have chest pain with activity? No   4. Do you have a history of  heart failure? No   5. Do you currently have a cold, bronchitis or symptoms of other infection? No   6. Do you have a cough, shortness of breath, or wheezing? No   7. Do you or anyone in your family have previous history of blood clots? No   8. Do you or does anyone in your family have a serious bleeding problem such as prolonged bleeding following surgeries or cuts? No   9. Have you ever had problems with anemia or been told to take iron pills? No   10. Have you had any abnormal blood loss such as black, tarry or bloody stools? No   11. Have you ever had a blood transfusion? No   12. Are you willing to have a blood transfusion if it is medically needed before, during, or after your surgery? Yes   13. Have you or any of your relatives ever had problems with anesthesia? No   14. Do you have sleep apnea, excessive snoring or daytime drowsiness? No   15. Do you have any artifical heart valves or other implanted medical devices like a pacemaker, defibrillator, or continuous glucose monitor? No   16. Do you  have artificial joints? No   17. Are you allergic to latex? No         Review of Systems  Constitutional, neuro, ENT, endocrine, pulmonary, cardiac, gastrointestinal, genitourinary, musculoskeletal, integument and psychiatric systems are negative, except as otherwise noted.    Patient Active Problem List    Diagnosis Date Noted     Calculus of left kidney 05/28/2021     Priority: Medium     Added automatically from request for surgery [id number]       Kidney stone on left side 04/22/2021     Priority: Medium     Added automatically from request for surgery [id number]       Right ureteral stone 03/11/2021     Priority: Medium     Added automatically from request for surgery [id number]       Right 4 mm Kidney stone at [facility]  02/28/2021     Priority: Medium     Renal Cysts bilaterally 02/28/2021     Priority: Medium     Rectal cancer (H) 10/02/2020     Priority: Medium     Added automatically from request for surgery [id number]       Elevated prostate specific antigen (PSA) 11/30/2017     Priority: Medium     Advance Care Planning 03/09/2017     Priority: Medium     Advance Care Planning 3/9/2017: Receipt of ACP document:  Received: invalid HCD document dated 12/8/2016.  Document not previously scanned. Validation form completed indicating invalid document. Copy sent to client with information and facilitation resources. Validation form sent to be scanned as notation of invalid document received.  Code Status needs to be updated to reflect choices. Confirmed/documented designated decision maker(s).  Added by Araceli Horne MSW Advance Care Planning Liaison with [employer].       Rectal Cancer, S/P Rad & Chemo 2017 -- no recurrence 01/03/2017     Priority: Medium     Plantar fasciitis 11/04/2010     Priority: Medium     Pes anserinus tendinitis 02/08/2010     Priority: Medium      Past Medical History:   Diagnosis Date     Childhood asthma      Elevated prostate specific antigen (PSA)     No biopsy done (had rectal cancer at  the time)     Epididymitis, bilateral     18 years old     Inguinal hernia      Mumps      Nephrolithiasis     Several -- 1990 first, 2019 last     Rectal cancer (H) 2017    low rectal cancer, S/P Rad adn Chemo, no surg needed     Shingles      Past Surgical History:   Procedure Laterality Date     COLONOSCOPY N/A 7/27/2016    Procedure: COMBINED COLONOSCOPY, SINGLE OR MULTIPLE BIOPSY/POLYPECTOMY BY BIOPSY;  Surgeon: Chelsea Thompson MD;  Location: [facility]     COLONOSCOPY N/A 9/13/2017    Procedure: COLONOSCOPY;;  Surgeon: Felicitas Radford MD;  [facility]     COLONOSCOPY N/A 12/13/2017    Procedure: COLONOSCOPY;;  Surgeon: Felicitas Radford MD;  [facility]     COLONOSCOPY N/A 10/23/2020    Procedure: COLONOSCOPY;  Surgeon: Felicitas Radford MD;  Location: [facility]     COMBINED CYSTOSCOPY, RETROGRADES, URETEROSCOPY, LASER HOLMIUM LITHOTRIPSY URETER(S), INSERT STENT Left 2/16/2018    Procedure: COMBINED CYSTOSCOPY, RETROGRADES, URETEROSCOPY, LASER HOLMIUM LITHOTRIPSY URETER(S), INSERT STENT;  Cysto, left ureteroscopy, holmium laser, retrogrades, stent placement;  Surgeon: Bola Worthy MD;  Location: [facility]     COMBINED CYSTOSCOPY, RETROGRADES, URETEROSCOPY, LASER HOLMIUM LITHOTRIPSY URETER(S), INSERT STENT Right 3/22/2021    Procedure: CYSTOSCOPY WITH RIGHT RETROGRADE PYELOGRAM, RIGHT URETEROSCOPY WITH LASER LITHOTRIPSY AND BASKET REMOVAL OF STONE, RIGHT URETERAL STENT PLACEMENT;  Surgeon: Mohsen Pat MD;  Location: [facility]     COMBINED CYSTOSCOPY, RETROGRADES, URETEROSCOPY, LASER HOLMIUM LITHOTRIPSY URETER(S), INSERT STENT Left 5/19/2021    Procedure: CYSTOSCOPY, LEFT RETROGRADE PYELOGRAM, LEFT DIAGNOSTIC, URETEROSCOPY, LEFT URETERAL STENT PLACEMENT;  Surgeon: Mohsen Pat MD;  [facility]: [facility]     COMBINED CYSTOSCOPY, RETROGRADES, URETEROSCOPY, LASER HOLMIUM LITHOTRIPSY URETER(S), INSERT STENT Left 6/23/2021    Procedure: CYSTOSCOPY, LEFT URETERAL STENT  REMOVAL, LEFT RETROGRADE PYELOGRAM, LEFT URETEROSCOPY WITH LASER LITHOTRIPSY AND BASKET REMOVAL OF STONES, LEFT URETERAL STENT PLACEMENT;  Surgeon: Mohsen Pat MD;  [facility]     CYSTOSCOPY, LITHOLAPAXY, COMBINED N/A 3/1/2021    Procedure: Cystoscopy, litholapaxy, combined;  Surgeon: Mohsen Pat MD;  [facility]     CYSTOSCOPY, RETROGRADES, INSERT STENT URETER(S), COMBINED Right 3/1/2021    Procedure: Cystoscopy, retrogrades, insert stent ureter(s), combined;  Surgeon: Mohsen Pat MD;  [facility]: [facility]     EXAM UNDER ANESTHESIA ANUS N/A 7/5/2017    Procedure: EXAM UNDER ANESTHESIA ANUS;  Examination Under Anesthesia, flex sigmoidoscopy with biopsies and formalin application;  Surgeon: Felicitas Radford MD;  [facility]     EXAM UNDER ANESTHESIA ANUS N/A 9/13/2017    Procedure: EXAM UNDER ANESTHESIA ANUS;  Examination Under Anesthesia Anus, Colonoscopy, application of formalin;  Surgeon: Felicitas Radford MD;  [facility]     EXAM UNDER ANESTHESIA ANUS N/A 12/13/2017    Procedure: EXAM UNDER ANESTHESIA ANUS;  Examination Under Anesthesia Anus, Colonoscopy;  Surgeon: Felicitas Radford MD;  [facility]     EXAM UNDER ANESTHESIA ANUS N/A 5/11/2018    Procedure: EXAM UNDER ANESTHESIA ANUS;  Examination Under Anesthesia Anus, Interoperative Flexible Sigmoidoscopy, Application of Formalin;  Surgeon: Felicitas Radford MD;  [facility]     EXAM UNDER ANESTHESIA ANUS N/A 7/20/2018    Procedure: EXAM UNDER ANESTHESIA ANUS;  Examination Under Anesthesia Anus, Flexible Sigmoidoscopy ;  Surgeon: Felicitas Radford MD;  [facility]     EXAM UNDER ANESTHESIA ANUS N/A 11/30/2018    Procedure: Examination Under Anesthesia, application of formalin to rectum, polypectomy;  Surgeon: Felicitas Radford MD;  [facility]     EXAM UNDER ANESTHESIA ANUS N/A 2/22/2019    Procedure: Examination Under Anesthesia;  Surgeon: Felicitas Radford  MD;  [facility]     EXAM UNDER ANESTHESIA ANUS N/A 6/28/2019    Procedure: Examination Under Anesthesia;  Surgeon: Felicitas Radford MD;  [facility]     EXAM UNDER ANESTHESIA ANUS N/A 11/1/2019    Procedure: Examination Under Anesthesia;  Surgeon: Felicitas Radford MD;  [facility]     EXAM UNDER ANESTHESIA RECTUM N/A 10/23/2020    Procedure: Exam under anesthesia rectum;  Surgeon: Felicitas Radford MD;  Location: [facility] OR     HC TOOTH EXTRACTION W/FORCEP       LAPAROSCOPIC APPENDECTOMY N/A 7/17/2017    Procedure: LAPAROSCOPIC APPENDECTOMY;  LAPAROSCOPIC APPENDECTOMY;  Surgeon: Bryson Ferguson MD;  [facility]     LASER HOLMIUM LITHOTRIPSY URETER(S), INSERT STENT, COMBINED Right 3/1/2021    Procedure: CYSTOURETEROSCOPY,  URETERAL STENT INSERTION, RIGHT RETROGRADE;  Surgeon: Mohsen Pat MD;  [facility]     SIGMOIDOSCOPY FLEXIBLE N/A 12/8/2016    Procedure: SIGMOIDOSCOPY FLEXIBLE;  Surgeon: Felicitas Radford MD;  [facility]     SIGMOIDOSCOPY FLEXIBLE N/A 7/5/2017    Procedure: SIGMOIDOSCOPY FLEXIBLE;;  Surgeon: Felicitas Radford MD;  [facility]     SIGMOIDOSCOPY FLEXIBLE N/A 5/11/2018    Procedure: SIGMOIDOSCOPY FLEXIBLE;;  Surgeon: Felicitas Radford MD;  [facility]     SIGMOIDOSCOPY FLEXIBLE N/A 7/20/2018    Procedure: SIGMOIDOSCOPY FLEXIBLE;;  Surgeon: Felicitas Radford MD;  [facility]     SIGMOIDOSCOPY FLEXIBLE N/A 11/30/2018    Procedure: Flexible Sigmoidoscopy;  Surgeon: Felicitas Radford MD;  [facility]     SIGMOIDOSCOPY FLEXIBLE N/A 2/22/2019    Procedure: Intraoperative Flexible Sigmoidoscopy, Application of Formalin to rectum;  Surgeon: Felicitas Radford MD;  [facility]     SIGMOIDOSCOPY FLEXIBLE N/A 6/28/2019    Procedure: Flexible Sigmoidoscopy;  Surgeon: Felicitas Radford MD;  [facility]     SIGMOIDOSCOPY FLEXIBLE N/A 11/1/2019    Procedure: Flexible  "Sigmoidoscopy;  Surgeon: Felicitas Radford MD;  [facility]     TESTICLE SURGERY       VASCULAR SURGERY      Right chest port     VASECTOMY       VASECTOMY       Current Outpatient Medications   Medication Sig Dispense Refill     Probiotic Product (PROBIOTIC PO)          Allergies   Allergen Reactions     Ampicillin Diarrhea     Demerol [Meperidine] Nausea        Social History     Tobacco Use     Smoking status: Never Smoker     Smokeless tobacco: Never Used   Substance Use Topics     Alcohol use: Yes     Comment: < 1 drink a week     Family History   Problem Relation Age of Onset     Cancer Brother         primary of unknown origin     Other Cancer Brother      Chronic Obstructive Pulmonary Disease Father      Hypertension Father      Hyperlipidemia Father      Colon Polyps Mother         Number and type of polyps unknown     Family History Negative Brother         negative colonoscopy     Diabetes Maternal Grandfather      Hypertension Paternal Grandmother      Hyperlipidemia Paternal Grandmother      Stomach Cancer Other 63        maternal great grandfather     Glaucoma No family hx of      Macular Degeneration No family hx of      History   Drug Use No         Objective     /79 (BP Location: Right arm, Cuff Size: Adult Large)   Pulse 80   Temp 97.4  F (36.3  C) (Temporal)   Ht 1.778 m (5' 10\")   Wt 102.5 kg (226 lb)   SpO2 97%   BMI 32.43 kg/m      Physical Exam    GENERAL APPEARANCE: healthy, alert and no distress     EYES: EOMI,  PERRL     HENT: ear canals and TM's normal and nose and mouth without ulcers or lesions     NECK: no adenopathy, no asymmetry, masses, or scars and thyroid normal to palpation     RESP: lungs clear to auscultation - no rales, rhonchi or wheezes     CV: regular rates and rhythm, normal S1 S2, no S3 or S4 and no murmur, click or rub     ABDOMEN:  soft, nontender, no HSM or masses and bowel sounds normal     MS: extremities normal- no gross deformities noted, no " evidence of inflammation in joints, FROM in all extremities.     SKIN: There is a papule on the right upper lip with a honey crust overlying it consistent with impetigo     NEURO: Normal strength and tone, sensory exam grossly normal, mentation intact and speech normal     PSYCH: mentation appears normal. and affect normal/bright     LYMPHATICS: No cervical adenopathy    Recent Labs   Lab Test 06/17/21  1831 02/28/21  0515 08/13/20  1530   HGB  --  14.0 16.2   PLT  --  181 198   NA  --  140 136   POTASSIUM  --  4.0 3.7   CR  --  1.10 0.83   A1C 5.4  --   --         Diagnostics:  June 17 EKG shows sinus rhythm with a rate of 69 without acute ST segment changes      Revised Cardiac Risk Index (RCRI):  The patient has the following serious cardiovascular risks for perioperative complications:   - No serious cardiac risks = 0 points     RCRI Interpretation: 0 points: Class I (very low risk - 0.4% complication rate)           Signed Electronically by: Philippe Healy MD  Copy of this evaluation report is provided to requesting physician.

## 2021-07-20 NOTE — PROGRESS NOTES
[address], [address]  [facility] [address]  Phone: [phone]  Primary Provider: Jarod Gaitan  Pre-op Performing Provider: JAROD GAITAN      PREOPERATIVE EVALUATION:  Today's date: 7/20/2021    Louie Greco is a 61 year old male who presents for a preoperative evaluation.    Surgical Information:  Surgery/Procedure: SIGMOIDOSCOPY, FLEXIBLE  Surgery Location: [facility] [place] [facility]  Surgeon: Felicitas Radford MD  Surgery Date: 7/23/2021  Time of Surgery: 7:15 AM  Where patient plans to recover: At home with family  Fax number for surgical facility: Note does not need to be faxed, will be available electronically in Epic.    Type of Anesthesia Anticipated: Local with MAC    Assessment & Plan     The proposed surgical procedure is considered LOW risk.    Pre-op exam  Suitable candidate for flexible sigmoidoscopy under anesthesia    Rectal cancer (H)  Continue follow-up with oncology and colorectal surgery as directed    Impetigo  Treat with topical Bactroban as below, if that fails to address symptoms after 1 to 2 weeks, consider systemic oral antibiotics.  He is to MyChart me if that is the case.  - mupirocin (BACTROBAN) 2 % external ointment; Apply topically 3 times daily    Elevated prostate specific antigen (PSA)  He plans to follow-up with Dr. Pat following a prostate MRI planned for later this week as well.  Also, he has a PET scan scheduled as well.               Medication Instructions:  Patient is to take all scheduled medications on the day of surgery    RECOMMENDATION:  APPROVAL GIVEN to proceed with proposed procedure, without further diagnostic evaluation.              34 minutes spent on the date of the encounter doing chart review, history and exam, documentation and further activities per the note        Subjective     HPI related to upcoming procedure: Pleasant 61-year-old  with a history of rectal cancer.  He requires surveillance flexible  sigmoidoscopy under general anesthesia for his comfort.  He needs a preop for that purpose.  He recently had a preop for nephrolithiasis removal.  He feels unchanged.  He was treated for otitis externa of fungal etiology by Dr. Richardson.  He has developed a mildly tender sore on his right upper lip over the last week or so.  The sore gets irritated when he shaves.  The sores described as having a honey crust or what he describes as a lymphatic drainage.  He denies fevers or chills.  He continues to be able to perform 4 METS of physical activity without chest pain or shortness of breath.      Preop Questions 7/20/2021   1. Have you ever had a heart attack or stroke? No   2. Have you ever had surgery on your heart or blood vessels, such as a stent placement, a coronary artery bypass, or surgery on an artery in your head, neck, heart, or legs? No   3. Do you have chest pain with activity? No   4. Do you have a history of  heart failure? No   5. Do you currently have a cold, bronchitis or symptoms of other infection? No   6. Do you have a cough, shortness of breath, or wheezing? No   7. Do you or anyone in your family have previous history of blood clots? No   8. Do you or does anyone in your family have a serious bleeding problem such as prolonged bleeding following surgeries or cuts? No   9. Have you ever had problems with anemia or been told to take iron pills? No   10. Have you had any abnormal blood loss such as black, tarry or bloody stools? No   11. Have you ever had a blood transfusion? No   12. Are you willing to have a blood transfusion if it is medically needed before, during, or after your surgery? Yes   13. Have you or any of your relatives ever had problems with anesthesia? No   14. Do you have sleep apnea, excessive snoring or daytime drowsiness? No   15. Do you have any artifical heart valves or other implanted medical devices like a pacemaker, defibrillator, or continuous glucose monitor? No   16. Do you  have artificial joints? No   17. Are you allergic to latex? No         Review of Systems  Constitutional, neuro, ENT, endocrine, pulmonary, cardiac, gastrointestinal, genitourinary, musculoskeletal, integument and psychiatric systems are negative, except as otherwise noted.    Patient Active Problem List    Diagnosis Date Noted     Calculus of left kidney 05/28/2021     Priority: Medium     Added automatically from request for surgery [id number]       Kidney stone on left side 04/22/2021     Priority: Medium     Added automatically from request for surgery [id number]       Right ureteral stone 03/11/2021     Priority: Medium     Added automatically from request for surgery [id number]       Right 4 mm Kidney stone at [facility]  02/28/2021     Priority: Medium     Renal Cysts bilaterally 02/28/2021     Priority: Medium     Rectal cancer (H) 10/02/2020     Priority: Medium     Added automatically from request for surgery [id number]       Elevated prostate specific antigen (PSA) 11/30/2017     Priority: Medium     Advance Care Planning 03/09/2017     Priority: Medium     Advance Care Planning 3/9/2017: Receipt of ACP document:  Received: invalid HCD document dated 12/8/2016.  Document not previously scanned. Validation form completed indicating invalid document. Copy sent to client with information and facilitation resources. Validation form sent to be scanned as notation of invalid document received.  Code Status needs to be updated to reflect choices. Confirmed/documented designated decision maker(s).  Added by Araceli Horne MSW Advance Care Planning Liaison with [employer].       Rectal Cancer, S/P Rad & Chemo 2017 -- no recurrence 01/03/2017     Priority: Medium     Plantar fasciitis 11/04/2010     Priority: Medium     Pes anserinus tendinitis 02/08/2010     Priority: Medium      Past Medical History:   Diagnosis Date     Childhood asthma      Elevated prostate specific antigen (PSA)     No biopsy done (had rectal cancer at  the time)     Epididymitis, bilateral     18 years old     Inguinal hernia      Mumps      Nephrolithiasis     Several -- 1990 first, 2019 last     Rectal cancer (H) 2017    low rectal cancer, S/P Rad adn Chemo, no surg needed     Shingles      Past Surgical History:   Procedure Laterality Date     COLONOSCOPY N/A 7/27/2016    Procedure: COMBINED COLONOSCOPY, SINGLE OR MULTIPLE BIOPSY/POLYPECTOMY BY BIOPSY;  Surgeon: Chelsea Thompson MD;  [facility]     COLONOSCOPY N/A 9/13/2017    Procedure: COLONOSCOPY;;  Surgeon: Felicitas Radford MD;  [facility]     COLONOSCOPY N/A 12/13/2017    Procedure: COLONOSCOPY;;  Surgeon: Felicitas Radford MD;  [facility]     COLONOSCOPY N/A 10/23/2020    Procedure: COLONOSCOPY;  Surgeon: Felicitas Radford MD;  Location: [facility]     COMBINED CYSTOSCOPY, RETROGRADES, URETEROSCOPY, LASER HOLMIUM LITHOTRIPSY URETER(S), INSERT STENT Left 2/16/2018    Procedure: COMBINED CYSTOSCOPY, RETROGRADES, URETEROSCOPY, LASER HOLMIUM LITHOTRIPSY URETER(S), INSERT STENT;  Cysto, left ureteroscopy, holmium laser, retrogrades, stent placement;  Surgeon: Bola Worthy MD;  [facility]     COMBINED CYSTOSCOPY, RETROGRADES, URETEROSCOPY, LASER HOLMIUM LITHOTRIPSY URETER(S), INSERT STENT Right 3/22/2021    Procedure: CYSTOSCOPY WITH RIGHT RETROGRADE PYELOGRAM, RIGHT URETEROSCOPY WITH LASER LITHOTRIPSY AND BASKET REMOVAL OF STONE, RIGHT URETERAL STENT PLACEMENT;  Surgeon: Mohsen Pat MD;  [facility]     COMBINED CYSTOSCOPY, RETROGRADES, URETEROSCOPY, LASER HOLMIUM LITHOTRIPSY URETER(S), INSERT STENT Left 5/19/2021    Procedure: CYSTOSCOPY, LEFT RETROGRADE PYELOGRAM, LEFT DIAGNOSTIC, URETEROSCOPY, LEFT URETERAL STENT PLACEMENT;  Surgeon: Mohsen Pat MD;  [facility]     COMBINED CYSTOSCOPY, RETROGRADES, URETEROSCOPY, LASER HOLMIUM LITHOTRIPSY URETER(S), INSERT STENT Left 6/23/2021    Procedure: CYSTOSCOPY, LEFT URETERAL STENT  REMOVAL, LEFT RETROGRADE PYELOGRAM, LEFT URETEROSCOPY WITH LASER LITHOTRIPSY AND BASKET REMOVAL OF STONES, LEFT URETERAL STENT PLACEMENT;  Surgeon: Mohsen Pat MD;  [facility]     CYSTOSCOPY, LITHOLAPAXY, COMBINED N/A 3/1/2021    Procedure: Cystoscopy, litholapaxy, combined;  Surgeon: Mohsen Pat MD;  [facility]     CYSTOSCOPY, RETROGRADES, INSERT STENT URETER(S), COMBINED Right 3/1/2021    Procedure: Cystoscopy, retrogrades, insert stent ureter(s), combined;  Surgeon: Mohsen Pat MD;  [facility]     EXAM UNDER ANESTHESIA ANUS N/A 7/5/2017    Procedure: EXAM UNDER ANESTHESIA ANUS;  Examination Under Anesthesia, flex sigmoidoscopy with biopsies and formalin application;  Surgeon: Felicitas Radford MD;  [facility]     EXAM UNDER ANESTHESIA ANUS N/A 9/13/2017    Procedure: EXAM UNDER ANESTHESIA ANUS;  Examination Under Anesthesia Anus, Colonoscopy, application of formalin;  Surgeon: Felicitas Radford MD;  [facility]     EXAM UNDER ANESTHESIA ANUS N/A 12/13/2017    Procedure: EXAM UNDER ANESTHESIA ANUS;  Examination Under Anesthesia Anus, Colonoscopy;  Surgeon: Felicitas Radford MD;  [facility]     EXAM UNDER ANESTHESIA ANUS N/A 5/11/2018    Procedure: EXAM UNDER ANESTHESIA ANUS;  Examination Under Anesthesia Anus, Interoperative Flexible Sigmoidoscopy, Application of Formalin;  Surgeon: Felicitas Radford MD;  [facility]     EXAM UNDER ANESTHESIA ANUS N/A 7/20/2018    Procedure: EXAM UNDER ANESTHESIA ANUS;  Examination Under Anesthesia Anus, Flexible Sigmoidoscopy ;  Surgeon: Felicitas Radford MD;  [facility]     EXAM UNDER ANESTHESIA ANUS N/A 11/30/2018    Procedure: Examination Under Anesthesia, application of formalin to rectum, polypectomy;  Surgeon: Felicitas Radford MD;  [facility]     EXAM UNDER ANESTHESIA ANUS N/A 2/22/2019    Procedure: Examination Under Anesthesia;  Surgeon: Felicitas Radford  MD;  [facility]     EXAM UNDER ANESTHESIA ANUS N/A 6/28/2019    Procedure: Examination Under Anesthesia;  Surgeon: Felicitas Radford MD;  [facility]     EXAM UNDER ANESTHESIA ANUS N/A 11/1/2019    Procedure: Examination Under Anesthesia;  Surgeon: Felicitas Radford MD;  [facility]     EXAM UNDER ANESTHESIA RECTUM N/A 10/23/2020    Procedure: Exam under anesthesia rectum;  Surgeon: Felicitas Radford MD;  Location: [facility]     HC TOOTH EXTRACTION W/FORCEP       LAPAROSCOPIC APPENDECTOMY N/A 7/17/2017    Procedure: LAPAROSCOPIC APPENDECTOMY;  LAPAROSCOPIC APPENDECTOMY;  Surgeon: Bryson Ferguson MD;  [facility]     LASER HOLMIUM LITHOTRIPSY URETER(S), INSERT STENT, COMBINED Right 3/1/2021    Procedure: CYSTOURETEROSCOPY,  URETERAL STENT INSERTION, RIGHT RETROGRADE;  Surgeon: Mohsen Pat MD;  [facility]     SIGMOIDOSCOPY FLEXIBLE N/A 12/8/2016    Procedure: SIGMOIDOSCOPY FLEXIBLE;  Surgeon: Felicitas Radford MD;  [facility]     SIGMOIDOSCOPY FLEXIBLE N/A 7/5/2017    Procedure: SIGMOIDOSCOPY FLEXIBLE;;  Surgeon: Felicitas Radford MD;  [facility]     SIGMOIDOSCOPY FLEXIBLE N/A 5/11/2018    Procedure: SIGMOIDOSCOPY FLEXIBLE;;  Surgeon: Felicitas Radford MD;  [facility]     SIGMOIDOSCOPY FLEXIBLE N/A 7/20/2018    Procedure: SIGMOIDOSCOPY FLEXIBLE;;  Surgeon: Felicitas Radford MD;  [facility]     SIGMOIDOSCOPY FLEXIBLE N/A 11/30/2018    Procedure: Flexible Sigmoidoscopy;  Surgeon: Felicitas Radford MD;  [facility]     SIGMOIDOSCOPY FLEXIBLE N/A 2/22/2019    Procedure: Intraoperative Flexible Sigmoidoscopy, Application of Formalin to rectum;  Surgeon: Felicitas Radford MD;  [facility]     SIGMOIDOSCOPY FLEXIBLE N/A 6/28/2019    Procedure: Flexible Sigmoidoscopy;  Surgeon: Felicitas Radford MD;  [facility]     SIGMOIDOSCOPY FLEXIBLE N/A 11/1/2019    Procedure: Flexible  "Sigmoidoscopy;  Surgeon: Felicitas Radford MD;  [facility]     TESTICLE SURGERY       VASCULAR SURGERY      Right chest port     VASECTOMY       VASECTOMY       Current Outpatient Medications   Medication Sig Dispense Refill     Probiotic Product (PROBIOTIC PO)          Allergies   Allergen Reactions     Ampicillin Diarrhea     Demerol [Meperidine] Nausea        Social History     Tobacco Use     Smoking status: Never Smoker     Smokeless tobacco: Never Used   Substance Use Topics     Alcohol use: Yes     Comment: < 1 drink a week     Family History   Problem Relation Age of Onset     Cancer Brother         primary of unknown origin     Other Cancer Brother      Chronic Obstructive Pulmonary Disease Father      Hypertension Father      Hyperlipidemia Father      Colon Polyps Mother         Number and type of polyps unknown     Family History Negative Brother         negative colonoscopy     Diabetes Maternal Grandfather      Hypertension Paternal Grandmother      Hyperlipidemia Paternal Grandmother      Stomach Cancer Other 63        maternal great grandfather     Glaucoma No family hx of      Macular Degeneration No family hx of      History   Drug Use No         Objective     /79 (BP Location: Right arm, Cuff Size: Adult Large)   Pulse 80   Temp 97.4  F (36.3  C) (Temporal)   Ht 1.778 m (5' 10\")   Wt 102.5 kg (226 lb)   SpO2 97%   BMI 32.43 kg/m      Physical Exam    GENERAL APPEARANCE: healthy, alert and no distress     EYES: EOMI,  PERRL     HENT: ear canals and TM's normal and nose and mouth without ulcers or lesions     NECK: no adenopathy, no asymmetry, masses, or scars and thyroid normal to palpation     RESP: lungs clear to auscultation - no rales, rhonchi or wheezes     CV: regular rates and rhythm, normal S1 S2, no S3 or S4 and no murmur, click or rub     ABDOMEN:  soft, nontender, no HSM or masses and bowel sounds normal     MS: extremities normal- no gross deformities noted, no " evidence of inflammation in joints, FROM in all extremities.     SKIN: There is a papule on the right upper lip with a honey crust overlying it consistent with impetigo     NEURO: Normal strength and tone, sensory exam grossly normal, mentation intact and speech normal     PSYCH: mentation appears normal. and affect normal/bright     LYMPHATICS: No cervical adenopathy    Recent Labs   Lab Test 06/17/21  1831 02/28/21  0515 08/13/20  1530   HGB  --  14.0 16.2   PLT  --  181 198   NA  --  140 136   POTASSIUM  --  4.0 3.7   CR  --  1.10 0.83   A1C 5.4  --   --         Diagnostics:  June 17 EKG shows sinus rhythm with a rate of 69 without acute ST segment changes      Revised Cardiac Risk Index (RCRI):  The patient has the following serious cardiovascular risks for perioperative complications:   - No serious cardiac risks = 0 points     RCRI Interpretation: 0 points: Class I (very low risk - 0.4% complication rate)           Signed Electronically by: Philippe Healy MD  Copy of this evaluation report is provided to requesting physician.

## 2021-07-21 LAB — SARS-COV-2 RNA RESP QL NAA+PROBE: NEGATIVE

## 2021-07-22 ENCOUNTER — ANESTHESIA EVENT (OUTPATIENT)
Dept: SURGERY | Facility: AMBULATORY SURGERY CENTER | Age: 61
End: 2021-07-22
Payer: COMMERCIAL

## 2021-07-22 NOTE — ANESTHESIA PREPROCEDURE EVALUATION
Anesthesia Pre-Procedure Evaluation    Patient: Louie Greco   MRN: [id number] : 1960        Preoperative Diagnosis: Rectal cancer (H) [C20]   Procedure : Procedure(s):  SIGMOIDOSCOPY, FLEXIBLE     Past Medical History:   Diagnosis Date     Childhood asthma      Elevated prostate specific antigen (PSA)     No biopsy done (had rectal cancer at the time)     Epididymitis, bilateral     18 years old     Inguinal hernia      Mumps      Nephrolithiasis     Several --  first, 2019 last     Rectal cancer (H) 2017    low rectal cancer, S/P Rad adn Chemo, no surg needed     Shingles       Past Surgical History:   Procedure Laterality Date     COLONOSCOPY N/A 2016    Procedure: COMBINED COLONOSCOPY, SINGLE OR MULTIPLE BIOPSY/POLYPECTOMY BY BIOPSY;  Surgeon: Chelsea Thompson MD;  [facility]     COLONOSCOPY N/A 2017    Procedure: COLONOSCOPY;;  Surgeon: Felicitas Radford MD;  [facility]     COLONOSCOPY N/A 2017    Procedure: COLONOSCOPY;;  Surgeon: Felicitas Radford MD;  [facility]     COLONOSCOPY N/A 10/23/2020    Procedure: COLONOSCOPY;  Surgeon: Felicitas Radford MD;  [facility]: [facility]     COMBINED CYSTOSCOPY, RETROGRADES, URETEROSCOPY, LASER HOLMIUM LITHOTRIPSY URETER(S), INSERT STENT Left 2018    Procedure: COMBINED CYSTOSCOPY, RETROGRADES, URETEROSCOPY, LASER HOLMIUM LITHOTRIPSY URETER(S), INSERT STENT;  Cysto, left ureteroscopy, holmium laser, retrogrades, stent placement;  Surgeon: Bola Worthy MD;  [facility]     COMBINED CYSTOSCOPY, RETROGRADES, URETEROSCOPY, LASER HOLMIUM LITHOTRIPSY URETER(S), INSERT STENT Right 3/22/2021    Procedure: CYSTOSCOPY WITH RIGHT RETROGRADE PYELOGRAM, RIGHT URETEROSCOPY WITH LASER LITHOTRIPSY AND BASKET REMOVAL OF STONE, RIGHT URETERAL STENT PLACEMENT;  Surgeon: Mohsen Pat MD;  [facility]     COMBINED CYSTOSCOPY, RETROGRADES, URETEROSCOPY, LASER HOLMIUM LITHOTRIPSY URETER(S),  INSERT STENT Left 5/19/2021    Procedure: CYSTOSCOPY, LEFT RETROGRADE PYELOGRAM, LEFT DIAGNOSTIC, URETEROSCOPY, LEFT URETERAL STENT PLACEMENT;  Surgeon: Mohsen Pat MD;  [facility]     COMBINED CYSTOSCOPY, RETROGRADES, URETEROSCOPY, LASER HOLMIUM LITHOTRIPSY URETER(S), INSERT STENT Left 6/23/2021    Procedure: CYSTOSCOPY, LEFT URETERAL STENT REMOVAL, LEFT RETROGRADE PYELOGRAM, LEFT URETEROSCOPY WITH LASER LITHOTRIPSY AND BASKET REMOVAL OF STONES, LEFT URETERAL STENT PLACEMENT;  Surgeon: Mohsen Pat MD;  [facility]     CYSTOSCOPY, LITHOLAPAXY, COMBINED N/A 3/1/2021    Procedure: Cystoscopy, litholapaxy, combined;  Surgeon: Mohsen Pat MD;  [facility]     CYSTOSCOPY, RETROGRADES, INSERT STENT URETER(S), COMBINED Right 3/1/2021    Procedure: Cystoscopy, retrogrades, insert stent ureter(s), combined;  Surgeon: Mohsen Pat MD;  [facility]     EXAM UNDER ANESTHESIA ANUS N/A 7/5/2017    Procedure: EXAM UNDER ANESTHESIA ANUS;  Examination Under Anesthesia, flex sigmoidoscopy with biopsies and formalin application;  Surgeon: Felicitas Radford MD;  [facility]     EXAM UNDER ANESTHESIA ANUS N/A 9/13/2017    Procedure: EXAM UNDER ANESTHESIA ANUS;  Examination Under Anesthesia Anus, Colonoscopy, application of formalin;  Surgeon: Felicitas Radford MD;  [facility]     EXAM UNDER ANESTHESIA ANUS N/A 12/13/2017    Procedure: EXAM UNDER ANESTHESIA ANUS;  Examination Under Anesthesia Anus, Colonoscopy;  Surgeon: Felicitas Radford MD;  [facility]     EXAM UNDER ANESTHESIA ANUS N/A 5/11/2018    Procedure: EXAM UNDER ANESTHESIA ANUS;  Examination Under Anesthesia Anus, Interoperative Flexible Sigmoidoscopy, Application of Formalin;  Surgeon: Felicitas Radford MD;  [facility]     EXAM UNDER ANESTHESIA ANUS N/A 7/20/2018    Procedure: EXAM UNDER ANESTHESIA ANUS;  Examination Under Anesthesia Anus, Flexible Sigmoidoscopy ;  Surgeon: Malina  Felicitas HIDALGO MD;  [facility]     EXAM UNDER ANESTHESIA ANUS N/A 11/30/2018    Procedure: Examination Under Anesthesia, application of formalin to rectum, polypectomy;  Surgeon: Felicitas Radford MD;  [facility]     EXAM UNDER ANESTHESIA ANUS N/A 2/22/2019    Procedure: Examination Under Anesthesia;  Surgeon: Felicitas Radford MD;  [facility]     EXAM UNDER ANESTHESIA ANUS N/A 6/28/2019    Procedure: Examination Under Anesthesia;  Surgeon: Felicitas Radford MD;  [facility]     EXAM UNDER ANESTHESIA ANUS N/A 11/1/2019    Procedure: Examination Under Anesthesia;  Surgeon: Felicitas Radford MD;  [facility]     EXAM UNDER ANESTHESIA RECTUM N/A 10/23/2020    Procedure: Exam under anesthesia rectum;  Surgeon: Felicitas Radford MD;  [facility]     HC TOOTH EXTRACTION W/FORCEP       LAPAROSCOPIC APPENDECTOMY N/A 7/17/2017    Procedure: LAPAROSCOPIC APPENDECTOMY;  LAPAROSCOPIC APPENDECTOMY;  Surgeon: Bryson Ferguson MD;  [facility]     LASER HOLMIUM LITHOTRIPSY URETER(S), INSERT STENT, COMBINED Right 3/1/2021    Procedure: CYSTOURETEROSCOPY,  URETERAL STENT INSERTION, RIGHT RETROGRADE;  Surgeon: Mohsen Pat MD;  [facility]     SIGMOIDOSCOPY FLEXIBLE N/A 12/8/2016    Procedure: SIGMOIDOSCOPY FLEXIBLE;  Surgeon: Felicitas Radford MD;  [facility]     SIGMOIDOSCOPY FLEXIBLE N/A 7/5/2017    Procedure: SIGMOIDOSCOPY FLEXIBLE;;  Surgeon: Felicitas Radford MD;  [facility]     SIGMOIDOSCOPY FLEXIBLE N/A 5/11/2018    Procedure: SIGMOIDOSCOPY FLEXIBLE;;  Surgeon: Felicitas Radford MD;  [facility]     SIGMOIDOSCOPY FLEXIBLE N/A 7/20/2018    Procedure: SIGMOIDOSCOPY FLEXIBLE;;  Surgeon: Felicitas Radford MD;  [facility]     SIGMOIDOSCOPY FLEXIBLE N/A 11/30/2018    Procedure: Flexible Sigmoidoscopy;  Surgeon: Felicitas Radford MD;  [facility]     SIGMOIDOSCOPY FLEXIBLE N/A  2/22/2019    Procedure: Intraoperative Flexible Sigmoidoscopy, Application of Formalin to rectum;  Surgeon: Felicitas Radford MD;  [facility]     SIGMOIDOSCOPY FLEXIBLE N/A 6/28/2019    Procedure: Flexible Sigmoidoscopy;  Surgeon: Felicitas Radford MD;  [facility]     SIGMOIDOSCOPY FLEXIBLE N/A 11/1/2019    Procedure: Flexible Sigmoidoscopy;  Surgeon: Felicitas Radford MD;  [facility]     TESTICLE SURGERY       VASCULAR SURGERY      Right chest port     VASECTOMY       VASECTOMY        Allergies   Allergen Reactions     Ampicillin Diarrhea     Demerol [Meperidine] Nausea      Social History     Tobacco Use     Smoking status: Never Smoker     Smokeless tobacco: Never Used   Substance Use Topics     Alcohol use: Yes     Comment: < 1 drink a week      Wt Readings from Last 1 Encounters:   07/20/21 102.5 kg (226 lb)           Physical Exam    Airway        Mallampati: II   TM distance: > 3 FB   Neck ROM: full   Mouth opening: > 3 cm    Respiratory Devices and Support         Dental       (+) caps      Cardiovascular   cardiovascular exam normal          Pulmonary   pulmonary exam normal            Other findings: Placement Date: 06/23/21; Placement Time: 0742 (created via procedure documentation); Mask Ventilation: 0; Induction Type: RSI; Ease of Intubation: Easy; Technique: Direct laryngoscopy; ETT Type: Single, Oral; Tube Size: 7.5 mm; DL Blade Size: Carlin 2; Grade View: 1; Adjucts: Stylet; Placement Person: Physician, CRNA; Attempts: 1; Depth: 22 cm    OUTSIDE LABS:  CBC:   Lab Results   Component Value Date    WBC 6.0 02/28/2021    WBC 5.8 08/13/2020    HGB 14.0 02/28/2021    HGB 16.2 08/13/2020    HCT 42.5 02/28/2021    HCT 46.2 08/13/2020     02/28/2021     08/13/2020     BMP:   Lab Results   Component Value Date     02/28/2021     08/13/2020    POTASSIUM 4.0 02/28/2021    POTASSIUM 3.7 08/13/2020    CHLORIDE 110 (H) 02/28/2021    CHLORIDE 104  08/13/2020    CO2 25 02/28/2021    CO2 27 08/13/2020    BUN 19 02/28/2021    BUN 16 08/13/2020    CR 1.10 02/28/2021    CR 0.83 08/13/2020     (H) 02/28/2021     (H) 08/13/2020     COAGS:   Lab Results   Component Value Date    PTT 31 07/10/2017    INR 1.02 07/10/2017     POC:   Lab Results   Component Value Date    BGM 60 (L) 07/25/2018     HEPATIC:   Lab Results   Component Value Date    ALBUMIN 3.7 02/28/2021    PROTTOTAL 6.8 02/28/2021    ALT 40 02/28/2021    AST 26 02/28/2021    ALKPHOS 58 02/28/2021    BILITOTAL 0.5 02/28/2021     OTHER:   Lab Results   Component Value Date    LACT 0.8 07/17/2017    A1C 5.4 06/17/2021    FRANDY 8.3 (L) 02/28/2021    MAG 2.0 07/17/2017    LIPASE 264 02/28/2021    .0 (H) 07/17/2017    SED 34 (H) 07/17/2017       Anesthesia Plan    ASA Status:  2   NPO Status:  NPO Appropriate    Anesthesia Type: MAC.     - Reason for MAC: straight local not clinically adequate   Induction: Intravenous, Propofol.   Maintenance: Balanced.        Consents    Anesthesia Plan(s) and associated risks, benefits, and realistic alternatives discussed. Questions answered and patient/representative(s) expressed understanding.     - Discussed with:  Patient      - Extended Intubation/Ventilatory Support Discussed: No.      - Patient is DNR/DNI Status: No    Use of blood products discussed: No .     Postoperative Care    Pain management: IV analgesics, Oral pain medications, Multi-modal analgesia.   PONV prophylaxis: Dexamethasone or Solumedrol, Ondansetron (or other 5HT-3)     Comments:                Fausto Stovall MD

## 2021-07-23 ENCOUNTER — HOSPITAL ENCOUNTER (OUTPATIENT)
Facility: AMBULATORY SURGERY CENTER | Age: 61
End: 2021-07-23
Attending: COLON & RECTAL SURGERY
Payer: COMMERCIAL

## 2021-07-23 ENCOUNTER — ANESTHESIA (OUTPATIENT)
Dept: SURGERY | Facility: AMBULATORY SURGERY CENTER | Age: 61
End: 2021-07-23
Payer: COMMERCIAL

## 2021-07-23 VITALS
WEIGHT: 222 LBS | HEART RATE: 73 BPM | OXYGEN SATURATION: 94 % | TEMPERATURE: 97.1 F | SYSTOLIC BLOOD PRESSURE: 113 MMHG | RESPIRATION RATE: 17 BRPM | HEIGHT: 70 IN | BODY MASS INDEX: 31.78 KG/M2 | DIASTOLIC BLOOD PRESSURE: 71 MMHG

## 2021-07-23 DIAGNOSIS — C20 RECTAL CANCER (H): ICD-10-CM

## 2021-07-23 PROCEDURE — G8918 PT W/O PREOP ORDER IV AB PRO: HCPCS

## 2021-07-23 PROCEDURE — 45331 SIGMOIDOSCOPY AND BIOPSY: CPT | Performed by: COLON & RECTAL SURGERY

## 2021-07-23 PROCEDURE — 45331 SIGMOIDOSCOPY AND BIOPSY: CPT

## 2021-07-23 PROCEDURE — 45330 DIAGNOSTIC SIGMOIDOSCOPY: CPT

## 2021-07-23 PROCEDURE — G8907 PT DOC NO EVENTS ON DISCHARG: HCPCS

## 2021-07-23 PROCEDURE — 88305 TISSUE EXAM BY PATHOLOGIST: CPT | Performed by: PATHOLOGY

## 2021-07-23 RX ORDER — LIDOCAINE 40 MG/G
CREAM TOPICAL
Status: DISCONTINUED | OUTPATIENT
Start: 2021-07-23 | End: 2021-07-24 | Stop reason: HOSPADM

## 2021-07-23 RX ORDER — LIDOCAINE HYDROCHLORIDE 20 MG/ML
INJECTION, SOLUTION INFILTRATION; PERINEURAL PRN
Status: DISCONTINUED | OUTPATIENT
Start: 2021-07-23 | End: 2021-07-23

## 2021-07-23 RX ORDER — ONDANSETRON 2 MG/ML
4 INJECTION INTRAMUSCULAR; INTRAVENOUS ONCE
Status: DISCONTINUED | OUTPATIENT
Start: 2021-07-23 | End: 2021-07-24 | Stop reason: HOSPADM

## 2021-07-23 RX ORDER — SODIUM CHLORIDE, SODIUM LACTATE, POTASSIUM CHLORIDE, CALCIUM CHLORIDE 600; 310; 30; 20 MG/100ML; MG/100ML; MG/100ML; MG/100ML
500 INJECTION, SOLUTION INTRAVENOUS CONTINUOUS
Status: DISCONTINUED | OUTPATIENT
Start: 2021-07-23 | End: 2021-07-24 | Stop reason: HOSPADM

## 2021-07-23 RX ORDER — PROPOFOL 10 MG/ML
INJECTION, EMULSION INTRAVENOUS CONTINUOUS PRN
Status: DISCONTINUED | OUTPATIENT
Start: 2021-07-23 | End: 2021-07-23

## 2021-07-23 RX ORDER — ONDANSETRON 2 MG/ML
INJECTION INTRAMUSCULAR; INTRAVENOUS PRN
Status: DISCONTINUED | OUTPATIENT
Start: 2021-07-23 | End: 2021-07-23

## 2021-07-23 RX ORDER — FENTANYL CITRATE 50 UG/ML
INJECTION, SOLUTION INTRAMUSCULAR; INTRAVENOUS PRN
Status: DISCONTINUED | OUTPATIENT
Start: 2021-07-23 | End: 2021-07-23

## 2021-07-23 RX ADMIN — PROPOFOL 200 MCG/KG/MIN: 10 INJECTION, EMULSION INTRAVENOUS at 07:28

## 2021-07-23 RX ADMIN — ONDANSETRON 4 MG: 2 INJECTION INTRAMUSCULAR; INTRAVENOUS at 07:28

## 2021-07-23 RX ADMIN — FENTANYL CITRATE 50 MCG: 50 INJECTION, SOLUTION INTRAMUSCULAR; INTRAVENOUS at 07:31

## 2021-07-23 RX ADMIN — LIDOCAINE HYDROCHLORIDE 60 MG: 20 INJECTION, SOLUTION INFILTRATION; PERINEURAL at 07:28

## 2021-07-23 RX ADMIN — SODIUM CHLORIDE, SODIUM LACTATE, POTASSIUM CHLORIDE, CALCIUM CHLORIDE 500 ML: 600; 310; 30; 20 INJECTION, SOLUTION INTRAVENOUS at 06:33

## 2021-07-23 RX ADMIN — FENTANYL CITRATE 50 MCG: 50 INJECTION, SOLUTION INTRAMUSCULAR; INTRAVENOUS at 07:27

## 2021-07-23 ASSESSMENT — MIFFLIN-ST. JEOR: SCORE: 1818.24

## 2021-07-23 NOTE — ANESTHESIA CARE TRANSFER NOTE
Patient: Louie Greco    Procedure(s):  SIGMOIDOSCOPY, FLEXIBLE    Diagnosis: Rectal cancer (H) [C20]  Diagnosis Additional Information: No value filed.    Anesthesia Type:   MAC     Note:    Oropharynx: oropharynx clear of all foreign objects  Level of Consciousness: drowsy  Oxygen Supplementation: room air    Independent Airway: airway patency satisfactory and stable  Dentition: dentition unchanged  Vital Signs Stable: post-procedure vital signs reviewed and stable  Report to RN Given: handoff report given  Patient transferred to: Phase II    Handoff Report: Identifed the Patient, Identified the Reponsible Provider, Reviewed the pertinent medical history, Discussed the surgical course, Reviewed Intra-OP anesthesia mangement and issues during anesthesia, Set expectations for post-procedure period and Allowed opportunity for questions and acknowledgement of understanding      Vitals:  Vitals Value Taken Time   BP     Temp     Pulse     Resp     SpO2         Electronically Signed By: QUINTON Arnold CRNA  July 23, 2021  7:50 AM

## 2021-07-23 NOTE — ANESTHESIA POSTPROCEDURE EVALUATION
Patient: Louie Greco    Procedure(s):  SIGMOIDOSCOPY, FLEXIBLE    Diagnosis:Rectal cancer (H) [C20]  Diagnosis Additional Information: No value filed.    Anesthesia Type:  MAC    Note:  Disposition: Outpatient   Postop Pain Control: Uneventful            Sign Out: Well controlled pain   PONV:    Neuro/Psych: Uneventful            Sign Out: Acceptable/Baseline neuro status   Airway/Respiratory: Uneventful            Sign Out: Acceptable/Baseline resp. status   CV/Hemodynamics: Uneventful            Sign Out: Acceptable CV status; No obvious hypovolemia; No obvious fluid overload   Other NRE:    DID A NON-ROUTINE EVENT OCCUR?            Last vitals:  Vitals Value Taken Time   /71 07/23/21 0830   Temp 36.2  C (97.1  F) 07/23/21 0830   Pulse     Resp 17 07/23/21 0830   SpO2 94 % 07/23/21 0830       Electronically Signed By: Fausto Stovall MD  July 23, 2021  9:26 AM

## 2021-07-23 NOTE — DISCHARGE INSTRUCTIONS
Discharge Instructions after Colonoscopy or Sigmoidoscopy       Today you had a Sigmoidoscopy       Activity and Diet   You were given medicine for pain. You may be dizzy or sleepy.   For 24 hours:     Do not drive or use heavy equipment.     Do not make important decisions.     Do not drink any alcohol.   You may return to your normal diet and medicines.       Discomfort     Air was placed in your colon during the exam in order to see it. Walking helps to pass the air.     You may take Tylenol (acetaminophen) for pain unless your doctor has told you not to.         When to call:       Call right away if you have:     Unusual pain in belly or chest pain not relieved with passing air.     More than 1 to 2 Tablespoons of bleeding from your rectum.     Fever above 100.6  F (37.5  C).       If you have severe pain, bleeding, or shortness of breath, go to an emergency room.       If you have questions, call:   Monday to Friday, 7 a.m. to 4:30 p.m.   Endoscopy: [phone] (We may have to call you back)       After hours   Hospital: [phone] (Ask for the GI fellow on call)   [facility]  Home Care Following Anesthesia  For 24 hours after surgery:  1. Get plenty of rest.  A responsible adult must stay with you for at least 24 hours after you leave the surgery center.  2. Do not drive or use heavy equipment.  If you have weakness or tingling, don't drive or use heavy equipment until this feeling goes away.   3. Do not drink alcohol.   4. Avoid strenuous or risky activities.  Ask for help when climbing stairs.  5. You may feel lightheaded.  IF so, sit for a few minutes before standing.  Have someone help you get up.   6. If you have nausea (feel sick to your stomach): Drink only clear liquids such as apple juice, ginger ale, broth or 7-Up.  Rest may also help.  Be sure to drink enough fluids.  Move to a regular diet as you feel able.   7. You may have a slight fever.  Call the doctor  if your fever is over 100 F (37.7 C) (taken under the tongue) or lasts longer than 24 hours.  8. You may have a dry mouth, a sore throat, muscle aches or trouble sleeping. These should go away after 24 hours.  9. Do not make important or legal decisions.   10. It is recommended to avoid smoking.               Tips for taking pain medications  To get the best pain relief possible, remember these points:    Take pain medications as directed, before pain becomes severe.    Pain medication can upset your stomach: taking it with food may help.    Constipation is a common side effect of pain medication. Drink plenty of  fluids.    Eat foods high in fiber. Take a stool softener if recommended by your doctor or pharmacist.    Do not drink alcohol, drive or operate machinery while taking pain medications.    Ask about other ways to control pain, such as with heat, ice or relaxation.    Tylenol/Acetaminophen Consumption  To help encourage the safe use of acetaminophen, the makers of TYLENOL  have lowered the maximum daily dose for single-ingredient Extra Strength TYLENOL  (acetaminophen) products sold in the [place] from 8 pills per day (4,000 mg) to 6 pills per day (3,000 mg). The dosing interval has also changed from 2 pills every 4-6 hours to 2 pills every 6 hours.    If you feel your pain relief is insufficient, you may take Tylenol/Acetaminophen in addition to your narcotic pain medication.     Be careful not to exceed 3,000 mg of Tylenol/Acetaminophen in a 24 hour period from all sources.    If you are taking extra strength Tylenol/acetaminophen (500 mg), the maximum dose is 6 tablets in 24 hours.    If you are taking regular strength acetaminophen (325 mg), the maximum dose is 9 tablets in 24 hours.    Call a doctor for any of the followin. Signs of infection (fever, growing tenderness at the surgery site, a large amount of drainage or bleeding, severe pain, foul-smelling drainage, redness, swelling).  2. It has been  over 8 to 10 hours since surgery and you are still not able to urinate (pass water).  3. Headache for over 24 hours.  4. Numbness, tingling or weakness the day after surgery (if you had spinal anesthesia).  5. Signs of Covid-19 infection (temperature over 100 degrees, shortness of breath, cough, loss of taste/smell, generalized body aches, persistent headache, chills, sore throat, nausea/vomiting/diarrhea)  Your doctor is:       Dr. Felicitas Radford, Colon Rectal: [phone]               Or dial [phone] and ask for the resident on call for:  Colon Rectal  For emergency care, call the:  [facility]:  [phone] (TTY for hearing impaired: [phone])

## 2021-07-26 LAB
PATH REPORT.COMMENTS IMP SPEC: NORMAL
PATH REPORT.COMMENTS IMP SPEC: NORMAL
PATH REPORT.FINAL DX SPEC: NORMAL
PATH REPORT.GROSS SPEC: NORMAL
PATH REPORT.RELEVANT HX SPEC: NORMAL
PHOTO IMAGE: NORMAL

## 2021-07-28 NOTE — OP NOTE
SURGEON: Felicitas Radford MD      ASSISTANT: None     PREOPERATIVE DIAGNOSIS: History of rectal cancer status post total neoadjuvant treatment on watch and wait protocol, due for surveillance colonoscopy and examination under anesthesia.      POSTOPERATIVE DIAGNOSIS: History of rectal cancer status post total neoadjuvant treatment on watch and wait protocol, due for surveillance colonoscopy and examination under anesthesia.     PROCEDURE: Examination under anesthesia and flexible sigmoidoscopy.     INDICATIONS: Louie Greco is a 60 year old male who was diagnosed with rectal cancer 7/2016 stages as [id number] (stage IIIA) and underwent chemoradiotherapy with complete response followed by FOLFOX on watch and wait protocol. He is due for both colonoscopy surveillance and examination under anesthesia of the region. The risks and benefits of surgery were thoroughly discussed with the patient and he agreed to proceed.     DESCRIPTION OF PROCEDURE: The patient was brought to the operating room, placed in left lateral decubitus position on the cart. Moderate sedation was induced with intravenous medicines and continuous pulse oxymetry monitoring with heart rate and frequent blood pressures. We performed digital rectal examination and anoscopy which demonstrated a somewhat stenotic anal canal and introduction of the small anoscope resulted in some mild tearing of the anal canal. There were no palpable abnormalities on digital rectal examination and the prostate by palpation was normal. There was no nodularity or induration. A flexible sigmoidoscopy with CO2 using a pediatric colonoscope was then performed and the scope was advanced to the descending colon at 50 cm without difficulty.  There was diverticulosis and no signs of divertciulitis. There was a small flat polyp in the rectosigmoid removed with biopsy forceps and no other polyps or other lesions. The radiation changes were improved and the scar was visible, but  no signs of disease or additional lesions.  At the end the case, all instruments and sponge counts were correct x2. There was a fissure posteriorly likely from the limited examination performed. Retroflexion was not performed. The patient was emerged from anesthesia and taken to postoperative anesthesia care unit in good condition.      SPECIMEN: Rectosigmoid polyp.         AROLDO NAIK MD   Colon and Rectal Surgery Staff  [facility]

## 2021-07-29 ENCOUNTER — ANCILLARY PROCEDURE (OUTPATIENT)
Dept: PET IMAGING | Facility: CLINIC | Age: 61
End: 2021-07-29
Attending: INTERNAL MEDICINE
Payer: COMMERCIAL

## 2021-07-29 ENCOUNTER — ANCILLARY PROCEDURE (OUTPATIENT)
Dept: MRI IMAGING | Facility: CLINIC | Age: 61
End: 2021-07-29
Attending: UROLOGY
Payer: COMMERCIAL

## 2021-07-29 ENCOUNTER — LAB (OUTPATIENT)
Dept: LAB | Facility: CLINIC | Age: 61
End: 2021-07-29
Payer: COMMERCIAL

## 2021-07-29 DIAGNOSIS — C20 MALIGNANT NEOPLASM OF RECTUM (H): ICD-10-CM

## 2021-07-29 DIAGNOSIS — R97.20 ELEVATED PROSTATE SPECIFIC ANTIGEN (PSA): ICD-10-CM

## 2021-07-29 LAB
ALBUMIN SERPL-MCNC: 4.2 G/DL (ref 3.4–5)
ALP SERPL-CCNC: 79 U/L (ref 40–150)
ALT SERPL W P-5'-P-CCNC: 31 U/L (ref 0–70)
ANION GAP SERPL CALCULATED.3IONS-SCNC: 7 MMOL/L (ref 3–14)
AST SERPL W P-5'-P-CCNC: 19 U/L (ref 0–45)
BASOPHILS # BLD AUTO: 0.1 10E3/UL (ref 0–0.2)
BASOPHILS NFR BLD AUTO: 1 %
BILIRUB SERPL-MCNC: 0.8 MG/DL (ref 0.2–1.3)
BUN SERPL-MCNC: 17 MG/DL (ref 7–30)
CALCIUM SERPL-MCNC: 9.3 MG/DL (ref 8.5–10.1)
CEA SERPL-MCNC: 1.4 UG/L (ref 0–2.5)
CHLORIDE BLD-SCNC: 107 MMOL/L (ref 94–109)
CO2 SERPL-SCNC: 26 MMOL/L (ref 20–32)
CREAT SERPL-MCNC: 0.84 MG/DL (ref 0.66–1.25)
EOSINOPHIL # BLD AUTO: 0.2 10E3/UL (ref 0–0.7)
EOSINOPHIL NFR BLD AUTO: 3 %
ERYTHROCYTE [DISTWIDTH] IN BLOOD BY AUTOMATED COUNT: 12.7 % (ref 10–15)
GFR SERPL CREATININE-BSD FRML MDRD: >90 ML/MIN/1.73M2
GLUCOSE BLD-MCNC: 97 MG/DL (ref 70–99)
GLUCOSE SERPL-MCNC: 104 MG/DL (ref 70–99)
HCT VFR BLD AUTO: 46.9 % (ref 40–53)
HGB BLD-MCNC: 15.9 G/DL (ref 13.3–17.7)
IMM GRANULOCYTES # BLD: 0 10E3/UL
IMM GRANULOCYTES NFR BLD: 0 %
LYMPHOCYTES # BLD AUTO: 0.6 10E3/UL (ref 0.8–5.3)
LYMPHOCYTES NFR BLD AUTO: 11 %
MCH RBC QN AUTO: 28.8 PG (ref 26.5–33)
MCHC RBC AUTO-ENTMCNC: 33.9 G/DL (ref 31.5–36.5)
MCV RBC AUTO: 85 FL (ref 78–100)
MONOCYTES # BLD AUTO: 0.7 10E3/UL (ref 0–1.3)
MONOCYTES NFR BLD AUTO: 12 %
NEUTROPHILS # BLD AUTO: 4.2 10E3/UL (ref 1.6–8.3)
NEUTROPHILS NFR BLD AUTO: 73 %
NRBC # BLD AUTO: 0 10E3/UL
NRBC BLD AUTO-RTO: 0 /100
PLATELET # BLD AUTO: 236 10E3/UL (ref 150–450)
POTASSIUM BLD-SCNC: 4 MMOL/L (ref 3.4–5.3)
PROT SERPL-MCNC: 7.5 G/DL (ref 6.8–8.8)
RBC # BLD AUTO: 5.53 10E6/UL (ref 4.4–5.9)
SODIUM SERPL-SCNC: 140 MMOL/L (ref 133–144)
WBC # BLD AUTO: 5.7 10E3/UL (ref 4–11)

## 2021-07-29 PROCEDURE — 85025 COMPLETE CBC W/AUTO DIFF WBC: CPT | Performed by: PATHOLOGY

## 2021-07-29 PROCEDURE — 36415 COLL VENOUS BLD VENIPUNCTURE: CPT | Performed by: PATHOLOGY

## 2021-07-29 PROCEDURE — 82378 CARCINOEMBRYONIC ANTIGEN: CPT | Mod: 90 | Performed by: PATHOLOGY

## 2021-07-29 PROCEDURE — A9585 GADOBUTROL INJECTION: HCPCS | Performed by: RADIOLOGY

## 2021-07-29 PROCEDURE — 72197 MRI PELVIS W/O & W/DYE: CPT | Performed by: RADIOLOGY

## 2021-07-29 PROCEDURE — 80053 COMPREHEN METABOLIC PANEL: CPT | Performed by: PATHOLOGY

## 2021-07-29 RX ORDER — FUROSEMIDE 10 MG/ML
40 INJECTION INTRAMUSCULAR; INTRAVENOUS ONCE
Status: COMPLETED | OUTPATIENT
Start: 2021-07-29 | End: 2021-07-29

## 2021-07-29 RX ORDER — GADOBUTROL 604.72 MG/ML
10 INJECTION INTRAVENOUS ONCE
Status: COMPLETED | OUTPATIENT
Start: 2021-07-29 | End: 2021-07-29

## 2021-07-29 RX ORDER — IOPAMIDOL 755 MG/ML
50-125 INJECTION, SOLUTION INTRAVASCULAR ONCE
Status: COMPLETED | OUTPATIENT
Start: 2021-07-29 | End: 2021-07-29

## 2021-07-29 RX ADMIN — GADOBUTROL 10 ML: 604.72 INJECTION INTRAVENOUS at 14:40

## 2021-07-29 RX ADMIN — FUROSEMIDE 40 MG: 10 INJECTION INTRAMUSCULAR; INTRAVENOUS at 09:45

## 2021-07-29 RX ADMIN — IOPAMIDOL 125 ML: 755 INJECTION, SOLUTION INTRAVASCULAR at 09:46

## 2021-07-29 NOTE — DISCHARGE INSTRUCTIONS
MRI Contrast Discharge Instructions    The IV contrast you received today will pass out of your body in your  urine. This will happen in the next 24 hours. You will not feel this process.  Your urine will not change color.    Drink at least 4 extra glasses of water or juice today (unless your doctor  has restricted your fluids). This reduces the stress on your kidneys.  You may take your regular medicines.    If you are on dialysis: It is best to have dialysis today.    If you have a reaction: Most reactions happen right away. If you have  any new symptoms after leaving the hospital (such as hives or swelling),  call your hospital at the correct number below. Or call your family doctor.  If you have breathing distress or wheezing, call 911.    Special instructions: ***    I have read and understand the above information.    Signature:______________________________________ Date:___________    Staff:__________________________________________ Date:___________     Time:__________    [place] [facility] Departments:    ___[place]: [phone]  ___[facility]: [phone]  ___[place]: [phone] ___[facility]: [phone]  ___[facility]: [phone]  ___[facility]: [phone]  ___[phone]  ___[facility]: [phone]  ___[phone]

## 2021-07-29 NOTE — DISCHARGE INSTRUCTIONS

## 2021-08-03 ENCOUNTER — VIRTUAL VISIT (OUTPATIENT)
Dept: UROLOGY | Facility: CLINIC | Age: 61
End: 2021-08-03
Payer: COMMERCIAL

## 2021-08-03 DIAGNOSIS — R97.20 ELEVATED PROSTATE SPECIFIC ANTIGEN (PSA): Primary | ICD-10-CM

## 2021-08-03 DIAGNOSIS — N20.0 BILATERAL NEPHROLITHIASIS: ICD-10-CM

## 2021-08-03 PROCEDURE — 99214 OFFICE O/P EST MOD 30 MIN: CPT | Mod: 95 | Performed by: UROLOGY

## 2021-08-03 PROCEDURE — 87070 CULTURE OTHR SPECIMN AEROBIC: CPT | Mod: ORL | Performed by: OTOLARYNGOLOGY

## 2021-08-03 NOTE — PROGRESS NOTES
[facility]   CHIEF COMPLAINT   It was my pleasure to see Louie Greco who is a 61 year old male for follow-up of Elevated PSA, bilateral kidney stones.      HPI   Louie Greco is a very pleasant 61 year old male with prior history of bilateral kidney stones status post staged bilateral ureteroscopy, history of rectal cancer, history of elevated PSA    TODAY 8/3/21:  Follow-up today to discuss his dedicated prostate MRI  He is scheduled to follow-up with nephrology in October for kidney stone prevention counseling and work-up    PHYSICAL EXAM  Patient is a 61 year old  male   Vitals: There were no vitals taken for this visit.  There is no height or weight on file to calculate BMI.  General Appearance Adult:   Alert, no acute distress, oriented  HENT: throat/mouth:normal, good dentition  Lungs: no respiratory distress, or pursed lip breathing  Heart: No obvious jugular venous distension present  Abdomen: non - distended  Musculoskeltal: extremities normal, no peripheral edema  Skin: no suspicious lesions or rashes  Neuro: Alert, oriented, speech and mentation normal  Psych: affect and mood normal    Component PSA   Latest Ref Rng & Units 0 - 4 ug/L   9/13/2017 5.46 (H)   10/19/2017 5.00 (H)   1/22/2018 3.78   7/20/2018 3.74   6/24/2019 5.61 (H)   2/17/2020 5.76 (H)   8/13/2020 4.13 (H)   6/17/2021 4.92 (H)     4K Score: 16    Stone Analysis:  Calculi composed primarily of   calcium oxalate monohydrate.     IMAGING:  All pertinent imaging reviewed:    All imaging studies reviewed by me.  I personally reviewed these imaging films.  A formal report from radiology will follow.    MRI PROSTATE 7/29/21:  FINDINGS:  Size: 34 grams  Hemorrhage: Absent  Peripheral zone: Heterogeneous on T2-weighted images. Regions of  mildly decreased signal on ADC or DWI which are best characterized as  PI-RADS 2 without highly suspicious lesion.  Transition zone: Enlarged with BPH changes. Transition zone nodules  which are circumscribed or  mostly encapsulated without diffusion  restriction.  PI-RADS 2.  No highly suspicious nodules.     Neurovascular bundles: No suspicion of involvement by malignancy  Seminal vesicles: Not involved by tumor.  Lymph nodes: No adenopathy.  Bones: No suspicious lesions.  Other pelvic organs: Moderate fat-containing left inguinal hernia.                                                                      IMPRESSION:  1. Based on the most suspicious abnormality, this exam is  characterized as PIRADS 2 - Clinically significant cancer is unlikely  to be present.?  2. Mild prostamegaly with BPH changes.  3. No suspicious adenopathy or evidence of pelvic metastases.    ASSESSMENT and PLAN  61-year-old man with history of rectal cancer, bilateral kidney stones, and elevated PSA    Elevated PSA  -Again reviewed his PSA history and trend  -We discussed that his PSA overall has demonstrated relative stability with some fluctuation, but no rising trend  -Reviewed his prior 4K score which was 16  -Reviewed his recent prostate MRI and I reviewed the images personally.  I agree with radiologist interpretation.  We discussed the PI-RADS scoring system and that there is no concerning PI-RADS 3 or greater lesions  -As such, I recommend continued observation at this time  -Plan for follow-up in 1 year with repeat PSA    Bilateral nephrolithiasis  -She is scheduled for evaluation with nephrology in October      Time spent: 20 minutes spent on the date of the encounter doing chart review, history and exam, documentation and further activities as noted above.    Mohsen Pat MD   Urology  [facility]  [facility] Phone: [phone]  [facility] Phone: [phone]      Louie Greco  who is being evaluated via a billable video visit.      How would you like to obtain your AVS? MyChart  If the video visit is dropped, the invitation should be resent by: Text to cell  phone: [phone]  Will anyone else be joining your video visit? No    Video-Visit Details    Type of service:  Video Visit    Video Start Time: 9:15 am    Video End Time:9:27 aM    Originating Location (pt. Location): Home    Distant Location (provider location):  [facility]     Platform used for Video Visit: SergioWell

## 2021-08-04 ENCOUNTER — LAB REQUISITION (OUTPATIENT)
Dept: LAB | Facility: CLINIC | Age: 61
End: 2021-08-04
Payer: COMMERCIAL

## 2021-08-04 DIAGNOSIS — H92.11 OTORRHEA, RIGHT EAR: ICD-10-CM

## 2021-08-07 LAB
BACTERIA SPEC CULT: ABNORMAL
BACTERIA SPEC CULT: ABNORMAL

## 2021-08-13 ENCOUNTER — ONCOLOGY VISIT (OUTPATIENT)
Dept: ONCOLOGY | Facility: CLINIC | Age: 61
End: 2021-08-13
Attending: INTERNAL MEDICINE
Payer: COMMERCIAL

## 2021-08-13 VITALS
DIASTOLIC BLOOD PRESSURE: 85 MMHG | BODY MASS INDEX: 33.01 KG/M2 | HEART RATE: 80 BPM | TEMPERATURE: 98.6 F | HEIGHT: 70 IN | OXYGEN SATURATION: 96 % | WEIGHT: 230.6 LBS | SYSTOLIC BLOOD PRESSURE: 138 MMHG | RESPIRATION RATE: 16 BRPM

## 2021-08-13 DIAGNOSIS — C20 MALIGNANT NEOPLASM OF RECTUM (H): Primary | ICD-10-CM

## 2021-08-13 PROCEDURE — G0463 HOSPITAL OUTPT CLINIC VISIT: HCPCS

## 2021-08-13 PROCEDURE — 99214 OFFICE O/P EST MOD 30 MIN: CPT | Performed by: INTERNAL MEDICINE

## 2021-08-13 ASSESSMENT — MIFFLIN-ST. JEOR: SCORE: 1857.24

## 2021-08-13 ASSESSMENT — PAIN SCALES - GENERAL: PAINLEVEL: NO PAIN (0)

## 2021-08-13 NOTE — LETTER
8/13/2021         RE: Louie Greco  [address]  [facility] [address]        Dear Colleague,    Thank you for referring your patient, Louie Greco, to the [facility]. Please see a copy of my visit note below.    [facility]    Hematology/Oncology Established Patient Note      Today's Date: 8/13/2021    Reason for Follow-up: rectal cancer      HISTORY OF PRESENT ILLNESS: Louie Greco is a 61 year old male who presents with rectal cancer.  He started having rectal bleeding around beginning of 2016.  He underwent colonoscopy on 7/27/16, which showed a malignant tumor in the rectum involving half of the lumen with oozing, as well as three 4-mm polyps in the ascending and transverse colon.  Pathology showed moderately differentiated adenocarcinoma, ascending colon with sessile serrated adenoma, others were tubular adenomas.  Mismatch repair expression with normal protein expression.  Staging scans showed the rectal mass, indeterminate focus in the left liver thought to be cyst, and multiple bilateral renal cysts.  MRI showed a distal rectal mass measuring 2.7 x 2.4 cm extending to the most proximal region of the anal canal.  There are a few tiny subcentimeter perirectal fat lymph nodes.  He was staged clinically [id number] (stage IIIA).  He was seen by Dr. Lee at [facility], and started neoadjuvant chemoradiation with capecitabine on 8/8/16 and completed on 9/15/16.  He was then seen by Dr. Radford, colorectal surgery at [facility].  His post chemoradiation MRI showed improvement in the size of the tumor and response in the lymph nodes.  On 12/8/16, he underwent flexible sigmoidoscopy and there was no evidence of tumor.  There was only some anterior scarring in the lumen.  Multiple biopsies were taken, which showed no evidence of carcinoma.  At that point, Dr. Radford spoke with the patient's wife, that there appears to be a complete  response in the lumen, and that the patient would wish to placed on the watch and wait protocol.  The patient had expressed wishes not to have an APR, and it would not have been possible to grossly clear a margin for LAR.    He had port placed on 1/16/17.  It has been removed.    He completed FOLFOX x 8 cycles 1/16/17-5/9/17.      He was admitted 7/16/17-7/20/17 with sepsis, abdominal wall cellulitis.  He underwent surgery with laparoscopic abdominal exploration and appendectomy.  Pathology found sessile serrated adenoma without dysplasia or malignancy.        INTERIM HISTORY: Louie is doing well.  He had a flex sig in July 2021 and hyperplastic polyp was found and removed.  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  He feels nervous because masks are optional, not required.          REVIEW OF SYSTEMS:   14 point ROS was reviewed and is negative other than as noted above in HPI.       HOME MEDICATIONS:  Current Outpatient Medications   Medication Sig Dispense Refill     mupirocin (BACTROBAN) 2 % external ointment Apply topically 3 times daily 30 g 2     Probiotic Product (PROBIOTIC PO)            ALLERGIES:  Allergies   Allergen Reactions     Ampicillin Diarrhea     Demerol [Meperidine] Nausea         PAST MEDICAL HISTORY:  Past Medical History:   Diagnosis Date     Childhood asthma      Elevated prostate specific antigen (PSA)     No biopsy done (had rectal cancer at the time)     Epididymitis, bilateral     18 years old     Inguinal hernia      Mumps      Nephrolithiasis     Several -- 1990 first, 2019 last     Rectal cancer (H) 2017    low rectal cancer, S/P Rad adn Chemo, no surg needed     Shingles          PAST SURGICAL HISTORY:  Past Surgical History:   Procedure Laterality Date     COLONOSCOPY N/A 7/27/2016    Procedure: COMBINED COLONOSCOPY, SINGLE OR MULTIPLE BIOPSY/POLYPECTOMY BY BIOPSY;  Surgeon: Chelsea Thompson MD;  Location:  GI      COLONOSCOPY N/A 9/13/2017    Procedure: COLONOSCOPY;;  Surgeon: Felicitas Radford MD;  [facility]     COLONOSCOPY N/A 12/13/2017    Procedure: COLONOSCOPY;;  Surgeon: Felicitas Radford MD;  [facility]     COLONOSCOPY N/A 10/23/2020    Procedure: COLONOSCOPY;  Surgeon: Felicitas Radford MD;  [facility]     COMBINED CYSTOSCOPY, RETROGRADES, URETEROSCOPY, LASER HOLMIUM LITHOTRIPSY URETER(S), INSERT STENT Left 2/16/2018    Procedure: COMBINED CYSTOSCOPY, RETROGRADES, URETEROSCOPY, LASER HOLMIUM LITHOTRIPSY URETER(S), INSERT STENT;  Cysto, left ureteroscopy, holmium laser, retrogrades, stent placement;  Surgeon: Bola Worthy MD;  Location: [facility]     COMBINED CYSTOSCOPY, RETROGRADES, URETEROSCOPY, LASER HOLMIUM LITHOTRIPSY URETER(S), INSERT STENT Right 3/22/2021    Procedure: CYSTOSCOPY WITH RIGHT RETROGRADE PYELOGRAM, RIGHT URETEROSCOPY WITH LASER LITHOTRIPSY AND BASKET REMOVAL OF STONE, RIGHT URETERAL STENT PLACEMENT;  Surgeon: Mohsen Pat MD;  [facility]     COMBINED CYSTOSCOPY, RETROGRADES, URETEROSCOPY, LASER HOLMIUM LITHOTRIPSY URETER(S), INSERT STENT Left 5/19/2021    Procedure: CYSTOSCOPY, LEFT RETROGRADE PYELOGRAM, LEFT DIAGNOSTIC, URETEROSCOPY, LEFT URETERAL STENT PLACEMENT;  Surgeon: Mohsen Pat MD;  [facility]     COMBINED CYSTOSCOPY, RETROGRADES, URETEROSCOPY, LASER HOLMIUM LITHOTRIPSY URETER(S), INSERT STENT Left 6/23/2021    Procedure: CYSTOSCOPY, LEFT URETERAL STENT REMOVAL, LEFT RETROGRADE PYELOGRAM, LEFT URETEROSCOPY WITH LASER LITHOTRIPSY AND BASKET REMOVAL OF STONES, LEFT URETERAL STENT PLACEMENT;  Surgeon: Mohsen Pat MD;  [facility]     CYSTOSCOPY, LITHOLAPAXY, COMBINED N/A 3/1/2021    Procedure: Cystoscopy, litholapaxy, combined;  Surgeon: Mohsen Pat MD;  [facility]     CYSTOSCOPY, RETROGRADES, INSERT STENT URETER(S), COMBINED Right 3/1/2021    Procedure: Cystoscopy, retrogrades, insert stent ureter(s),  combined;  Surgeon: Mohsen Pat MD;  [facility]     EXAM UNDER ANESTHESIA ANUS N/A 7/5/2017    Procedure: EXAM UNDER ANESTHESIA ANUS;  Examination Under Anesthesia, flex sigmoidoscopy with biopsies and formalin application;  Surgeon: Felicitas Radford MD;  [facility]     EXAM UNDER ANESTHESIA ANUS N/A 9/13/2017    Procedure: EXAM UNDER ANESTHESIA ANUS;  Examination Under Anesthesia Anus, Colonoscopy, application of formalin;  Surgeon: Felicitas Radford MD;  [facility]     EXAM UNDER ANESTHESIA ANUS N/A 12/13/2017    Procedure: EXAM UNDER ANESTHESIA ANUS;  Examination Under Anesthesia Anus, Colonoscopy;  Surgeon: Felicitas Radford MD;  [facility]     EXAM UNDER ANESTHESIA ANUS N/A 5/11/2018    Procedure: EXAM UNDER ANESTHESIA ANUS;  Examination Under Anesthesia Anus, Interoperative Flexible Sigmoidoscopy, Application of Formalin;  Surgeon: Felicitas Radford MD;  [facility]     EXAM UNDER ANESTHESIA ANUS N/A 7/20/2018    Procedure: EXAM UNDER ANESTHESIA ANUS;  Examination Under Anesthesia Anus, Flexible Sigmoidoscopy ;  Surgeon: Felicitas Radford MD;  [facility]     EXAM UNDER ANESTHESIA ANUS N/A 11/30/2018    Procedure: Examination Under Anesthesia, application of formalin to rectum, polypectomy;  Surgeon: Felicitas Radford MD;  [facility]     EXAM UNDER ANESTHESIA ANUS N/A 2/22/2019    Procedure: Examination Under Anesthesia;  Surgeon: Felicitas Radford MD;  [facility]     EXAM UNDER ANESTHESIA ANUS N/A 6/28/2019    Procedure: Examination Under Anesthesia;  Surgeon: Felicitas Radford MD;  [facility]     EXAM UNDER ANESTHESIA ANUS N/A 11/1/2019    Procedure: Examination Under Anesthesia;  Surgeon: Felicitas Radford MD;  [facility]     EXAM UNDER ANESTHESIA RECTUM N/A 10/23/2020    Procedure: Exam under anesthesia rectum;  Surgeon: Felicitas Radford MD;  [facility]      HC TOOTH EXTRACTION W/FORCEP       LAPAROSCOPIC APPENDECTOMY N/A 7/17/2017    Procedure: LAPAROSCOPIC APPENDECTOMY;  LAPAROSCOPIC APPENDECTOMY;  Surgeon: Bryson Ferguson MD;  [facility]     LASER HOLMIUM LITHOTRIPSY URETER(S), INSERT STENT, COMBINED Right 3/1/2021    Procedure: CYSTOURETEROSCOPY,  URETERAL STENT INSERTION, RIGHT RETROGRADE;  Surgeon: Mohsen Pat MD;  [facility]     SIGMOIDOSCOPY FLEXIBLE N/A 12/8/2016    Procedure: SIGMOIDOSCOPY FLEXIBLE;  Surgeon: Felicitas Radford MD;  [facility]     SIGMOIDOSCOPY FLEXIBLE N/A 7/5/2017    Procedure: SIGMOIDOSCOPY FLEXIBLE;;  Surgeon: Felicitas Radford MD;  [facility]     SIGMOIDOSCOPY FLEXIBLE N/A 5/11/2018    Procedure: SIGMOIDOSCOPY FLEXIBLE;;  Surgeon: Felicitas Radford MD;  [facility]     SIGMOIDOSCOPY FLEXIBLE N/A 7/20/2018    Procedure: SIGMOIDOSCOPY FLEXIBLE;;  Surgeon: Felicitas Radford MD;  [facility]     SIGMOIDOSCOPY FLEXIBLE N/A 11/30/2018    Procedure: Flexible Sigmoidoscopy;  Surgeon: Felicitas Radford MD;  [facility]     SIGMOIDOSCOPY FLEXIBLE N/A 2/22/2019    Procedure: Intraoperative Flexible Sigmoidoscopy, Application of Formalin to rectum;  Surgeon: Felicitas Radford MD;  [facility]     SIGMOIDOSCOPY FLEXIBLE N/A 6/28/2019    Procedure: Flexible Sigmoidoscopy;  Surgeon: Felicitas Radford MD;  [facility]     SIGMOIDOSCOPY FLEXIBLE N/A 11/1/2019    Procedure: Flexible Sigmoidoscopy;  Surgeon: Felicitas Radford MD;  [facility]     SIGMOIDOSCOPY FLEXIBLE N/A 7/23/2021    Procedure: SIGMOIDOSCOPY, FLEXIBLE;  Surgeon: Felicitas Radford MD;  [facility]     TESTICLE SURGERY       VASCULAR SURGERY      Right chest port     VASECTOMY       VASECTOMY           SOCIAL HISTORY:  Social History     Socioeconomic History     Marital status:      Spouse name: Not on file     Number of children: 2     Years of  education: Not on file     Highest education level: Not on file   Occupational History     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)      Social Determinants of Health     Financial Resource Strain:      Difficulty of Paying Living Expenses:    Food Insecurity:      Worried About Running Out of Food in the Last Year:      Ran Out of Food in the Last Year:    Transportation Needs:      Lack of Transportation (Medical):      Lack of Transportation (Non-Medical):    Physical Activity:      Days of Exercise per Week:      Minutes of Exercise per Session:    Stress:      Feeling of Stress :    Social Connections:      Frequency of Communication with Friends and Family:      Frequency of Social Gatherings with Friends and Family:      Attends [employer] Services:      Active Member of Clubs or Organizations:      Attends Club or Organization Meetings:      Marital Status:    Intimate Partner Violence:      Fear of Current or Ex-Partner:      Emotionally Abused:      Physically Abused:      Sexually Abused:      He notes that he had a brother who passed away at a young age of 53 from a metastatic cancer, but unknown what type, and passed away within 2 months of diagnosis.  He denies other family history of cancer in the immediate family.  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      FAMILY HISTORY:  Family History   Problem Relation Age of Onset     Cancer Brother         primary of unknown origin     Other Cancer Brother      Chronic Obstructive  "Pulmonary Disease Father      Hypertension Father      Hyperlipidemia Father      Colon Polyps Mother         Number and type of polyps unknown     Family History Negative Brother         negative colonoscopy     Diabetes Maternal Grandfather      Hypertension Paternal Grandmother      Hyperlipidemia Paternal Grandmother      Stomach Cancer Other 63        maternal great grandfather     Glaucoma No family hx of      Macular Degeneration No family hx of          PHYSICAL EXAM:  Vital signs:  /85   Pulse 80   Temp 98.6  F (37  C)   Resp 16   Ht 1.778 m (5' 10\")   Wt 104.6 kg (230 lb 9.6 oz)   SpO2 96%   BMI 33.09 kg/m     ECO  GENERAL/CONSTITUTIONAL: No acute distress.  EYES: No scleral icterus.  NEUROLOGIC: Alert, oriented, answers questions appropriately.  INTEGUMENTARY: No jaundice.  GAIT: Steady, does not use assistive device          LABS:  CBC RESULTS: Recent Labs   Lab Test 21  1332   WBC 5.7   RBC 5.53   HGB 15.9   HCT 46.9   MCV 85   MCH 28.8   MCHC 33.9   RDW 12.7        Recent Labs   Lab Test 21  1330 21  0840 21  0515     --  140   POTASSIUM 4.0  --  4.0   CHLORIDE 107  --  110*   CO2 26  --  25   ANIONGAP 7  --  5   GLC 97 104* 134*   BUN 17  --  19   CR 0.84  --  1.10   FRANDY 9.3  --  8.3*     Lab Results   Component Value Date    AST 19 2021    AST 26 2021     Lab Results   Component Value Date    ALT 31 2021    ALT 40 2021     No results found for: BILICONJ   Lab Results   Component Value Date    BILITOTAL 0.8 2021    BILITOTAL 0.5 2021     Lab Results   Component Value Date    ALBUMIN 4.2 2021    ALBUMIN 3.7 2021     Lab Results   Component Value Date    PROTTOTAL 7.5 2021    PROTTOTAL 6.8 2021      Lab Results   Component Value Date    ALKPHOS 79 2021    ALKPHOS 58 2021       Component      Latest Ref Rng & Units 2019   CEA      0.0 - 2.5 ug/L " 1.7 1.5 <0.5 1.2     Component      Latest Ref Rng & Units 7/29/2021   CEA      0.0 - 2.5 ug/L 1.4       IMAGING:  PET 7/29/21:  IMPRESSION: In this patient with history of rectal carcinoma status  post chemoradiation there is continued complete response:   1. No evidence for local or metastatic recurrent disease.  2. Redemonstration of focal uptake at the anus, nonspecific, may  represent inflammation and/or hemorrhoids.  3. Incidental findings as noted in the body of the report.     MRI pelvis/prostate 7/29/21:  IMPRESSION:  1. Based on the most suspicious abnormality, this exam is  characterized as PIRADS 2 - Clinically significant cancer is unlikely  to be present.?  2. Mild prostamegaly with BPH changes.  3. No suspicious adenopathy or evidence of pelvic metastases.      ASSESSMENT/PLAN:  Louie Greco is a 61 year old male with:    1) Rectal cancer: clinical stage [id number] (stage IIIA), s/p chemoradiation with Xeloda, and appears to have achieved complete response on imaging and biopsies.  He opted not to undergo surgery and expressed desire not to undergo APR, as he does not want a colostomy bag.  It was felt reasonable to go on the watch and wait protocol with the [facility].  He has completed 4 additional months (8 cycles) of chemotherapy with FOLFOX.  He will now go on surveillance, as per the watch and wait protocol.    We reviewed MRI pelvis and PET scan from 7/29/21.  He remains in complete response to treatment.  There is no evidence of recurrence or metastatic disease.  He had flex sig with Dr. Radford in July 2021, which found a rectal polyp.  Pathology showed a hyperplastic polyp.      Follow up per Watch and Wait Protocol:   Completed CRT: 9/15/16    Flexible sigmoidoscopy   ? Every 2 months for 6 months: Completed  ? Every 3 months for 3 years: Completed  ? Every 6 months for 5 years: Until 9/2021-completed  ? Every year for 10 years: Until 9/2026       3T MRI of pelvis  ? 6-8 weeks  after CRT:  ? Every 4 months for 2 years: Completed  ? Every 6 months for 2 years: Completed  ? Yearly for 2 years: Until 9/2022 - next due in August 2022 - ordered       CT CAP  ? Every year for 6-8 years: Until 9/2024- next PET due August 2022 - ordered       Colonoscopy   ? Last 12/13/17- last done in October 2020       CEA at every clinic or endoscopic visit      -T3 MRI pelvis and PET scan in 1 year, which would be in August 2022 - ordered  -endoscopic surveillance with Dr. Radford as per protocol  -I will send message out to Dr. Radford regarding his next follow-up and flex sig.    -RTC in 1 year with labs/CEA and results of imaging    2) Genetics: He underwent genetic testing, which was negative.    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            4) Elevated bilirubin: mild.  Bilirubin has been mildly elevated in the past. It is stable and normal this time.   -monitor for now    5) Liver cysts: seen on CT, stable  -monitor    6) Pulmonary nodules: stable sub-4 mm pulmonary nodules  -monitor on future scans    7) Kidney cyst: stable  -monitor on future scans.      8) Appendix polyp: He is now s/p appendectomy.  Pathology found sessile serrated adenoma without dysplasia or malignancy.     9) Elevated PSA:   -on observation  -he is seeing urology - Dr. Bi Manley MD  Hematology/Oncology  [facility]      Total time spent on day of visit, including review of tests, obtaining/reviewing separately obtained history, ordering medications/tests/procedures, communicating with PCP/consultants, and documenting in electronic medical record: 30 minutes      Oncology Rooming Note    August 13, 2021 8:08 AM   Louie Greco is a 61 year old male who presents for:    Chief Complaint   Patient presents with     Oncology Clinic Visit     Initial Vitals: There were no vitals taken for this visit. Estimated body mass index is 31.85 kg/m  as calculated from the following:     "Height as of 7/23/21: 1.778 m (5' 10\").    Weight as of 7/23/21: 100.7 kg (222 lb). There is no height or weight on file to calculate BSA.  Data Unavailable Comment: Data Unavailable   No LMP for male patient.  Allergies reviewed: Yes  Medications reviewed: Yes    Medications: Medication refills not needed today.  Pharmacy name entered into [facility]:    [facility], [place] - [address]  [facility], [place] - [address] [facility]    Clinical concerns:  doctor was notified.      Rachna Zambrano MA                Again, thank you for allowing me to participate in the care of your patient.        Sincerely,        Agueda Manley MD    "

## 2021-08-13 NOTE — PROGRESS NOTES
"Oncology Rooming Note    August 13, 2021 8:08 AM   Louie Greco is a 61 year old male who presents for:    Chief Complaint   Patient presents with     Oncology Clinic Visit     Initial Vitals: There were no vitals taken for this visit. Estimated body mass index is 31.85 kg/m  as calculated from the following:    Height as of 7/23/21: 1.778 m (5' 10\").    Weight as of 7/23/21: 100.7 kg (222 lb). There is no height or weight on file to calculate BSA.  Data Unavailable Comment: Data Unavailable   No LMP for male patient.  Allergies reviewed: Yes  Medications reviewed: Yes    Medications: Medication refills not needed today.  Pharmacy name entered into [facility]:    [facility], [place] - [address]  [facility], [place] - [address] [facility]    Clinical concerns:  doctor was notified.      Rachna Zambrano MA            "

## 2021-08-13 NOTE — PROGRESS NOTES
[facility]    Hematology/Oncology Established Patient Note      Today's Date: 8/13/2021    Reason for Follow-up: rectal cancer      HISTORY OF PRESENT ILLNESS: Louie Greco is a 61 year old male who presents with rectal cancer.  He started having rectal bleeding around beginning of 2016.  He underwent colonoscopy on 7/27/16, which showed a malignant tumor in the rectum involving half of the lumen with oozing, as well as three 4-mm polyps in the ascending and transverse colon.  Pathology showed moderately differentiated adenocarcinoma, ascending colon with sessile serrated adenoma, others were tubular adenomas.  Mismatch repair expression with normal protein expression.  Staging scans showed the rectal mass, indeterminate focus in the left liver thought to be cyst, and multiple bilateral renal cysts.  MRI showed a distal rectal mass measuring 2.7 x 2.4 cm extending to the most proximal region of the anal canal.  There are a few tiny subcentimeter perirectal fat lymph nodes.  He was staged clinically [id number] (stage IIIA).  He was seen by Dr. Lee at [facility], and started neoadjuvant chemoradiation with capecitabine on 8/8/16 and completed on 9/15/16.  He was then seen by Dr. Radford, colorectal surgery at [facility].  His post chemoradiation MRI showed improvement in the size of the tumor and response in the lymph nodes.  On 12/8/16, he underwent flexible sigmoidoscopy and there was no evidence of tumor.  There was only some anterior scarring in the lumen.  Multiple biopsies were taken, which showed no evidence of carcinoma.  At that point, Dr. Radford spoke with the patient's wife, that there appears to be a complete response in the lumen, and that the patient would wish to placed on the watch and wait protocol.  The patient had expressed wishes not to have an APR, and it would not have been possible to grossly clear a margin for LAR.    He had port placed  on 1/16/17.  It has been removed.    He completed FOLFOX x 8 cycles 1/16/17-5/9/17.      He was admitted 7/16/17-7/20/17 with sepsis, abdominal wall cellulitis.  He underwent surgery with laparoscopic abdominal exploration and appendectomy.  Pathology found sessile serrated adenoma without dysplasia or malignancy.        INTERIM HISTORY: Louie is doing well.  He had a flex sig in July 2021 and hyperplastic polyp was found and removed.  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  He feels nervous because masks are optional, not required.          REVIEW OF SYSTEMS:   14 point ROS was reviewed and is negative other than as noted above in HPI.       HOME MEDICATIONS:  Current Outpatient Medications   Medication Sig Dispense Refill     mupirocin (BACTROBAN) 2 % external ointment Apply topically 3 times daily 30 g 2     Probiotic Product (PROBIOTIC PO)            ALLERGIES:  Allergies   Allergen Reactions     Ampicillin Diarrhea     Demerol [Meperidine] Nausea         PAST MEDICAL HISTORY:  Past Medical History:   Diagnosis Date     Childhood asthma      Elevated prostate specific antigen (PSA)     No biopsy done (had rectal cancer at the time)     Epididymitis, bilateral     18 years old     Inguinal hernia      Mumps      Nephrolithiasis     Several -- 1990 first, 2019 last     Rectal cancer (H) 2017    low rectal cancer, S/P Rad adn Chemo, no surg needed     Shingles          PAST SURGICAL HISTORY:  Past Surgical History:   Procedure Laterality Date     COLONOSCOPY N/A 7/27/2016    Procedure: COMBINED COLONOSCOPY, SINGLE OR MULTIPLE BIOPSY/POLYPECTOMY BY BIOPSY;  Surgeon: Chelsea Thompson MD;  Location:  GI     COLONOSCOPY N/A 9/13/2017    Procedure: COLONOSCOPY;;  Surgeon: Felicitas Radford MD;  [facility]     COLONOSCOPY N/A 12/13/2017    Procedure: COLONOSCOPY;;  Surgeon: Felicitas Radford MD;  [facility]     COLONOSCOPY N/A  10/23/2020    Procedure: COLONOSCOPY;  Surgeon: Felicitas Radford MD;  [facility]     COMBINED CYSTOSCOPY, RETROGRADES, URETEROSCOPY, LASER HOLMIUM LITHOTRIPSY URETER(S), INSERT STENT Left 2/16/2018    Procedure: COMBINED CYSTOSCOPY, RETROGRADES, URETEROSCOPY, LASER HOLMIUM LITHOTRIPSY URETER(S), INSERT STENT;  Cysto, left ureteroscopy, holmium laser, retrogrades, stent placement;  Surgeon: Bola Worthy MD;  Location: [facility]     COMBINED CYSTOSCOPY, RETROGRADES, URETEROSCOPY, LASER HOLMIUM LITHOTRIPSY URETER(S), INSERT STENT Right 3/22/2021    Procedure: CYSTOSCOPY WITH RIGHT RETROGRADE PYELOGRAM, RIGHT URETEROSCOPY WITH LASER LITHOTRIPSY AND BASKET REMOVAL OF STONE, RIGHT URETERAL STENT PLACEMENT;  Surgeon: Mohsen Pat MD;  Location: [facility]     COMBINED CYSTOSCOPY, RETROGRADES, URETEROSCOPY, LASER HOLMIUM LITHOTRIPSY URETER(S), INSERT STENT Left 5/19/2021    Procedure: CYSTOSCOPY, LEFT RETROGRADE PYELOGRAM, LEFT DIAGNOSTIC, URETEROSCOPY, LEFT URETERAL STENT PLACEMENT;  Surgeon: Mohsen Pat MD;  [facility]     COMBINED CYSTOSCOPY, RETROGRADES, URETEROSCOPY, LASER HOLMIUM LITHOTRIPSY URETER(S), INSERT STENT Left 6/23/2021    Procedure: CYSTOSCOPY, LEFT URETERAL STENT REMOVAL, LEFT RETROGRADE PYELOGRAM, LEFT URETEROSCOPY WITH LASER LITHOTRIPSY AND BASKET REMOVAL OF STONES, LEFT URETERAL STENT PLACEMENT;  Surgeon: Mohsen Pat MD;  [facility]     CYSTOSCOPY, LITHOLAPAXY, COMBINED N/A 3/1/2021    Procedure: Cystoscopy, litholapaxy, combined;  Surgeon: Mohsen Pat MD;  [facility]     CYSTOSCOPY, RETROGRADES, INSERT STENT URETER(S), COMBINED Right 3/1/2021    Procedure: Cystoscopy, retrogrades, insert stent ureter(s), combined;  Surgeon: Mohsen Pat MD;  [facility]     EXAM UNDER ANESTHESIA ANUS N/A 7/5/2017    Procedure: EXAM UNDER ANESTHESIA ANUS;  Examination Under Anesthesia, flex sigmoidoscopy with biopsies and formalin application;  Surgeon:  Felicitas Radford MD;  [facility]     EXAM UNDER ANESTHESIA ANUS N/A 9/13/2017    Procedure: EXAM UNDER ANESTHESIA ANUS;  Examination Under Anesthesia Anus, Colonoscopy, application of formalin;  Surgeon: Felicitas Radford MD;  [facility]     EXAM UNDER ANESTHESIA ANUS N/A 12/13/2017    Procedure: EXAM UNDER ANESTHESIA ANUS;  Examination Under Anesthesia Anus, Colonoscopy;  Surgeon: Felicitas Radford MD;  [facility]     EXAM UNDER ANESTHESIA ANUS N/A 5/11/2018    Procedure: EXAM UNDER ANESTHESIA ANUS;  Examination Under Anesthesia Anus, Interoperative Flexible Sigmoidoscopy, Application of Formalin;  Surgeon: Felicitas Radford MD;  [facility]     EXAM UNDER ANESTHESIA ANUS N/A 7/20/2018    Procedure: EXAM UNDER ANESTHESIA ANUS;  Examination Under Anesthesia Anus, Flexible Sigmoidoscopy ;  Surgeon: Felicitas Radford MD;  [facility]     EXAM UNDER ANESTHESIA ANUS N/A 11/30/2018    Procedure: Examination Under Anesthesia, application of formalin to rectum, polypectomy;  Surgeon: Felicitas Radford MD;  [facility]     EXAM UNDER ANESTHESIA ANUS N/A 2/22/2019    Procedure: Examination Under Anesthesia;  Surgeon: Felicitas Radford MD;  [facility]     EXAM UNDER ANESTHESIA ANUS N/A 6/28/2019    Procedure: Examination Under Anesthesia;  Surgeon: Felicitas Radford MD;  [facility]     EXAM UNDER ANESTHESIA ANUS N/A 11/1/2019    Procedure: Examination Under Anesthesia;  Surgeon: Felicitas Radford MD;  [facility]     EXAM UNDER ANESTHESIA RECTUM N/A 10/23/2020    Procedure: Exam under anesthesia rectum;  Surgeon: Felicitas Radford MD;  [facility]     HC TOOTH EXTRACTION W/FORCEP       LAPAROSCOPIC APPENDECTOMY N/A 7/17/2017    Procedure: LAPAROSCOPIC APPENDECTOMY;  LAPAROSCOPIC APPENDECTOMY;  Surgeon: Bryson Ferguson MD;  [facility]     LASER HOLMIUM LITHOTRIPSY URETER(S), INSERT  STENT, COMBINED Right 3/1/2021    Procedure: CYSTOURETEROSCOPY,  URETERAL STENT INSERTION, RIGHT RETROGRADE;  Surgeon: Mohsen Pat MD;  [facility]     SIGMOIDOSCOPY FLEXIBLE N/A 12/8/2016    Procedure: SIGMOIDOSCOPY FLEXIBLE;  Surgeon: Felicitas Radford MD;  [facility]     SIGMOIDOSCOPY FLEXIBLE N/A 7/5/2017    Procedure: SIGMOIDOSCOPY FLEXIBLE;;  Surgeon: Felicitas Radford MD;  [facility]     SIGMOIDOSCOPY FLEXIBLE N/A 5/11/2018    Procedure: SIGMOIDOSCOPY FLEXIBLE;;  Surgeon: Felicitas Radford MD;  [facility]     SIGMOIDOSCOPY FLEXIBLE N/A 7/20/2018    Procedure: SIGMOIDOSCOPY FLEXIBLE;;  Surgeon: Felicitas Radford MD;  [facility]     SIGMOIDOSCOPY FLEXIBLE N/A 11/30/2018    Procedure: Flexible Sigmoidoscopy;  Surgeon: Felicitas Radford MD;  [facility]     SIGMOIDOSCOPY FLEXIBLE N/A 2/22/2019    Procedure: Intraoperative Flexible Sigmoidoscopy, Application of Formalin to rectum;  Surgeon: Felicitas Radford MD;  [facility]     SIGMOIDOSCOPY FLEXIBLE N/A 6/28/2019    Procedure: Flexible Sigmoidoscopy;  Surgeon: Felicitas Radford MD;  [facility]     SIGMOIDOSCOPY FLEXIBLE N/A 11/1/2019    Procedure: Flexible Sigmoidoscopy;  Surgeon: Felicitas Radford MD;  [facility]     SIGMOIDOSCOPY FLEXIBLE N/A 7/23/2021    Procedure: SIGMOIDOSCOPY, FLEXIBLE;  Surgeon: Felicitas Radford MD;  [facility]     TESTICLE SURGERY       VASCULAR SURGERY      Right chest port     VASECTOMY       VASECTOMY           SOCIAL HISTORY:  Social History     Socioeconomic History     Marital status:      Spouse name: Not on file     Number of children: 2     Years of education: Not on file     Highest education level: Not on file   Occupational History     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)      Social Determinants of Health     Financial Resource Strain:      Difficulty of Paying Living Expenses:    Food Insecurity:      Worried About Running Out of Food in the Last Year:      Ran Out of Food in the Last Year:    Transportation Needs:      Lack of Transportation (Medical):      Lack of Transportation (Non-Medical):    Physical Activity:      Days of Exercise per Week:      Minutes of Exercise per Session:    Stress:      Feeling of Stress :    Social Connections:      Frequency of Communication with Friends and Family:      Frequency of Social Gatherings with Friends and Family:      Attends Congregational Services:      Active Member of Clubs or Organizations:      Attends Club or Organization Meetings:      Marital Status:    Intimate Partner Violence:      Fear of Current or Ex-Partner:      Emotionally Abused:      Physically Abused:      Sexually Abused:      He notes that he had a brother who passed away at a young age of 53 from a metastatic cancer, but unknown what type, and passed away within 2 months of diagnosis.  He denies other family history of cancer in the immediate family.  Louie works as a  at a [employer] school.      FAMILY HISTORY:  Family History   Problem Relation Age of Onset     Cancer Brother         primary of unknown origin     Other Cancer Brother      Chronic Obstructive Pulmonary Disease Father      Hypertension Father      Hyperlipidemia Father      Colon Polyps Mother         Number and type of polyps unknown     Family History Negative Brother         negative colonoscopy     Diabetes Maternal  "Grandfather      Hypertension Paternal Grandmother      Hyperlipidemia Paternal Grandmother      Stomach Cancer Other 63        maternal great grandfather     Glaucoma No family hx of      Macular Degeneration No family hx of          PHYSICAL EXAM:  Vital signs:  /85   Pulse 80   Temp 98.6  F (37  C)   Resp 16   Ht 1.778 m (5' 10\")   Wt 104.6 kg (230 lb 9.6 oz)   SpO2 96%   BMI 33.09 kg/m     ECO  GENERAL/CONSTITUTIONAL: No acute distress.  EYES: No scleral icterus.  NEUROLOGIC: Alert, oriented, answers questions appropriately.  INTEGUMENTARY: No jaundice.  GAIT: Steady, does not use assistive device          LABS:  CBC RESULTS: Recent Labs   Lab Test 21  1332   WBC 5.7   RBC 5.53   HGB 15.9   HCT 46.9   MCV 85   MCH 28.8   MCHC 33.9   RDW 12.7        Recent Labs   Lab Test 21  1330 21  0840 21  0515     --  140   POTASSIUM 4.0  --  4.0   CHLORIDE 107  --  110*   CO2 26  --  25   ANIONGAP 7  --  5   GLC 97 104* 134*   BUN 17  --  19   CR 0.84  --  1.10   FRANDY 9.3  --  8.3*     Lab Results   Component Value Date    AST 19 2021    AST 26 2021     Lab Results   Component Value Date    ALT 31 2021    ALT 40 2021     No results found for: BILICONJ   Lab Results   Component Value Date    BILITOTAL 0.8 2021    BILITOTAL 0.5 2021     Lab Results   Component Value Date    ALBUMIN 4.2 2021    ALBUMIN 3.7 2021     Lab Results   Component Value Date    PROTTOTAL 7.5 2021    PROTTOTAL 6.8 2021      Lab Results   Component Value Date    ALKPHOS 79 2021    ALKPHOS 58 2021       Component      Latest Ref Rng & Units 2019   CEA      0.0 - 2.5 ug/L 1.7 1.5 <0.5 1.2     Component      Latest Ref Rng & Units 2021   CEA      0.0 - 2.5 ug/L 1.4       IMAGING:  PET 21:  IMPRESSION: In this patient with history of rectal carcinoma status  post chemoradiation there is " continued complete response:   1. No evidence for local or metastatic recurrent disease.  2. Redemonstration of focal uptake at the anus, nonspecific, may  represent inflammation and/or hemorrhoids.  3. Incidental findings as noted in the body of the report.     MRI pelvis/prostate 7/29/21:  IMPRESSION:  1. Based on the most suspicious abnormality, this exam is  characterized as PIRADS 2 - Clinically significant cancer is unlikely  to be present.?  2. Mild prostamegaly with BPH changes.  3. No suspicious adenopathy or evidence of pelvic metastases.      ASSESSMENT/PLAN:  Louie Greco is a 61 year old male with:    1) Rectal cancer: clinical stage [id number] (stage IIIA), s/p chemoradiation with Xeloda, and appears to have achieved complete response on imaging and biopsies.  He opted not to undergo surgery and expressed desire not to undergo APR, as he does not want a colostomy bag.  It was felt reasonable to go on the watch and wait protocol with the [facility].  He has completed 4 additional months (8 cycles) of chemotherapy with FOLFOX.  He will now go on surveillance, as per the watch and wait protocol.    We reviewed MRI pelvis and PET scan from 7/29/21.  He remains in complete response to treatment.  There is no evidence of recurrence or metastatic disease.  He had flex sig with Dr. Radford in July 2021, which found a rectal polyp.  Pathology showed a hyperplastic polyp.      Follow up per Watch and Wait Protocol:   Completed CRT: 9/15/16    Flexible sigmoidoscopy   ? Every 2 months for 6 months: Completed  ? Every 3 months for 3 years: Completed  ? Every 6 months for 5 years: Until 9/2021-completed  ? Every year for 10 years: Until 9/2026       3T MRI of pelvis  ? 6-8 weeks after CRT:  ? Every 4 months for 2 years: Completed  ? Every 6 months for 2 years: Completed  ? Yearly for 2 years: Until 9/2022 - next due in August 2022 - ordered       CT CAP  ? Every year for 6-8 years: Until 9/2024- next  PET due August 2022 - ordered       Colonoscopy   ? Last 12/13/17- last done in October 2020       CEA at every clinic or endoscopic visit      -T3 MRI pelvis and PET scan in 1 year, which would be in August 2022 - ordered  -endoscopic surveillance with Dr. Radford as per protocol  -I will send message out to Dr. Radford regarding his next follow-up and flex sig.    -RTC in 1 year with labs/CEA and results of imaging    2) Genetics: He underwent genetic testing, which was negative.    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            4) Elevated bilirubin: mild.  Bilirubin has been mildly elevated in the past. It is stable and normal this time.   -monitor for now    5) Liver cysts: seen on CT, stable  -monitor    6) Pulmonary nodules: stable sub-4 mm pulmonary nodules  -monitor on future scans    7) Kidney cyst: stable  -monitor on future scans.      8) Appendix polyp: He is now s/p appendectomy.  Pathology found sessile serrated adenoma without dysplasia or malignancy.     9) Elevated PSA:   -on observation  -he is seeing urology - Dr. Bi Manley MD  Hematology/Oncology  [facility]      Total time spent on day of visit, including review of tests, obtaining/reviewing separately obtained history, ordering medications/tests/procedures, communicating with PCP/consultants, and documenting in electronic medical record: 30 minutes

## 2021-10-03 ENCOUNTER — HEALTH MAINTENANCE LETTER (OUTPATIENT)
Age: 61
End: 2021-10-03

## 2021-10-11 ENCOUNTER — LAB REQUISITION (OUTPATIENT)
Dept: LAB | Facility: CLINIC | Age: 61
End: 2021-10-11
Payer: COMMERCIAL

## 2021-10-11 PROCEDURE — 87102 FUNGUS ISOLATION CULTURE: CPT | Mod: ORL | Performed by: OTOLARYNGOLOGY

## 2021-10-11 PROCEDURE — 87077 CULTURE AEROBIC IDENTIFY: CPT | Mod: ORL | Performed by: OTOLARYNGOLOGY

## 2021-10-14 LAB
BACTERIA SPEC CULT: ABNORMAL
BACTERIA SPEC CULT: ABNORMAL

## 2021-10-26 ENCOUNTER — VIRTUAL VISIT (OUTPATIENT)
Dept: NEPHROLOGY | Facility: CLINIC | Age: 61
End: 2021-10-26
Attending: UROLOGY
Payer: COMMERCIAL

## 2021-10-26 DIAGNOSIS — N20.0 RECURRENT KIDNEY STONES: ICD-10-CM

## 2021-10-26 DIAGNOSIS — N20.0 BILATERAL NEPHROLITHIASIS: ICD-10-CM

## 2021-10-26 PROBLEM — N20.1 RIGHT URETERAL STONE: Status: RESOLVED | Noted: 2021-03-11 | Resolved: 2021-10-26

## 2021-10-26 PROBLEM — N20.1 URETEROLITHIASIS: Status: RESOLVED | Noted: 2021-02-28 | Resolved: 2021-10-26

## 2021-10-26 PROCEDURE — 99203 OFFICE O/P NEW LOW 30 MIN: CPT | Mod: TEL | Performed by: INTERNAL MEDICINE

## 2021-10-26 NOTE — PROGRESS NOTES
[facility]  10/26/2021     Louie Greco MRN:[id number] YOB: 1960  Primary care provider: Philippe Healy  Requesting physician: Dr Mohsen Pat    ASSESSMENT AND RECOMMENDATIONS:   Louie Greco is a 61 year old male presenting for nephrolithiasis.  # Recurrent kidney stones: stone type calcium oxalate  Bilateral staged URS/litholtripsy spring 2021  Stone free bilaterally as of spring 2021.    - discussed high fluid intake, low salt diet, consistent calcium.  - discussed that organic foods can still be high in salt    # history of rectal cancer (XRT and CTX) - with frequent stool but not large volume  Known issue that I take into account for other medical decisions, no current exacerbations or new concerns.     24 hour urine - litholink - ordered - ideally would get 24 hour urine x2 back to back but given profession, this would be difficult so will start with one urine collection.    Return in about 3 months (around 1/26/2022).       Edith Clark MD  [facility]  Department of Medicine  Division of Renal Disease and Hypertension  [facility]  myairmail  Vocera Web Console        REASON FOR CONSULT: nephrolithiasis    HISTORY OF PRESENT ILLNESS:  Louie Greco is a 61 year old with recurrent kidney stones, history of rectal cancer (XRT and CTX)  2 kidney stone attacks in the last 3 years. Also had kidney stone attack at age 40.  Bilateral kidney stones  Staged bilateral URS March (right), May and June (left) 2021 - all stones >1-2 mm were basketed and removed.    Stone analysis June 2021, March 2021, Feb 2018 all calcium oxalate    Louie Greco's diet consists of 3 meals per day.  Because of cancer history, Louie has switched to a healthy organic diet though in further discussion, has not been paying close attention to sodium. Tries to get more greens/salads. Eats mixed greens and is trying to have little to no spinach.  2- 3 cups coffee  per day.    1st meal yogurt with blueberries, Stepan bread toast - cinnamon raisin.  2nd meal tater tot casserole, frozen veggies, cream of chicken soup  Walnuts/pecans with cheese sticks  Organic mozzarella   3rd meal sometimes big salad.    Dietary changes include avoiding almonds and spinach.    Because of radiation damage (history rectal cancer), will have increased stool output with fiber so does not eat fiber during the day. Has small BM ~8 times per day. Does not have a lot of large watery stools.    Calcium intake is yogurt with breakfast and cheese with lunch. Sometimes cheese with dinner.  Does not drink milk.    Has not had 24 hour urine test ordered.    PAST MEDICAL HISTORY:  Reviewed  Past Medical History:   Diagnosis Date     Childhood asthma      Elevated prostate specific antigen (PSA)     No biopsy done (had rectal cancer at the time)     Epididymitis, bilateral     18 years old     Inguinal hernia      Mumps      Nephrolithiasis     Several -- 1990 first, 2019 last     Rectal cancer (H) 2017    low rectal cancer, S/P Rad adn Chemo, no surg needed     Shingles        PSH:  Reviewed  Past Surgical History:   Procedure Laterality Date     COLONOSCOPY N/A 7/27/2016    Procedure: COMBINED COLONOSCOPY, SINGLE OR MULTIPLE BIOPSY/POLYPECTOMY BY BIOPSY;  Surgeon: Chelsea Thompson MD;  Location: [facility]     COLONOSCOPY N/A 9/13/2017    Procedure: COLONOSCOPY;;  Surgeon: Felicitas Radford MD;  [facility]     COLONOSCOPY N/A 12/13/2017    Procedure: COLONOSCOPY;;  Surgeon: Felicitas Radford MD;  [facility]     COLONOSCOPY N/A 10/23/2020    Procedure: COLONOSCOPY;  Surgeon: Felicitas Radford MD;  [facility]     COMBINED CYSTOSCOPY, RETROGRADES, URETEROSCOPY, LASER HOLMIUM LITHOTRIPSY URETER(S), INSERT STENT Left 2/16/2018    Procedure: COMBINED CYSTOSCOPY, RETROGRADES, URETEROSCOPY, LASER HOLMIUM LITHOTRIPSY URETER(S), INSERT STENT;  Cysto, left ureteroscopy,  holmium laser, retrogrades, stent placement;  Surgeon: Bola Worthy MD;  Location: [facility]     COMBINED CYSTOSCOPY, RETROGRADES, URETEROSCOPY, LASER HOLMIUM LITHOTRIPSY URETER(S), INSERT STENT Right 3/22/2021    Procedure: CYSTOSCOPY WITH RIGHT RETROGRADE PYELOGRAM, RIGHT URETEROSCOPY WITH LASER LITHOTRIPSY AND BASKET REMOVAL OF STONE, RIGHT URETERAL STENT PLACEMENT;  Surgeon: Mohsen Pat MD;  [facility]: [facility]     COMBINED CYSTOSCOPY, RETROGRADES, URETEROSCOPY, LASER HOLMIUM LITHOTRIPSY URETER(S), INSERT STENT Left 5/19/2021    Procedure: CYSTOSCOPY, LEFT RETROGRADE PYELOGRAM, LEFT DIAGNOSTIC, URETEROSCOPY, LEFT URETERAL STENT PLACEMENT;  Surgeon: Mohsen Pat MD;  [facility]     COMBINED CYSTOSCOPY, RETROGRADES, URETEROSCOPY, LASER HOLMIUM LITHOTRIPSY URETER(S), INSERT STENT Left 6/23/2021    Procedure: CYSTOSCOPY, LEFT URETERAL STENT REMOVAL, LEFT RETROGRADE PYELOGRAM, LEFT URETEROSCOPY WITH LASER LITHOTRIPSY AND BASKET REMOVAL OF STONES, LEFT URETERAL STENT PLACEMENT;  Surgeon: Mohsen Pat MD;  [facility]     CYSTOSCOPY, LITHOLAPAXY, COMBINED N/A 3/1/2021    Procedure: Cystoscopy, litholapaxy, combined;  Surgeon: Mohsen Pat MD;  [facility]     CYSTOSCOPY, RETROGRADES, INSERT STENT URETER(S), COMBINED Right 3/1/2021    Procedure: Cystoscopy, retrogrades, insert stent ureter(s), combined;  Surgeon: Mohsen Pat MD;  [facility]     EXAM UNDER ANESTHESIA ANUS N/A 7/5/2017    Procedure: EXAM UNDER ANESTHESIA ANUS;  Examination Under Anesthesia, flex sigmoidoscopy with biopsies and formalin application;  Surgeon: Felicitas Radford MD;  [facility]     EXAM UNDER ANESTHESIA ANUS N/A 9/13/2017    Procedure: EXAM UNDER ANESTHESIA ANUS;  Examination Under Anesthesia Anus, Colonoscopy, application of formalin;  Surgeon: Felicitas Radford MD;  [facility]     EXAM UNDER ANESTHESIA ANUS N/A 12/13/2017    Procedure: EXAM UNDER ANESTHESIA ANUS;   Examination Under Anesthesia Anus, Colonoscopy;  Surgeon: Felicitas Radford MD;  [facility]     EXAM UNDER ANESTHESIA ANUS N/A 5/11/2018    Procedure: EXAM UNDER ANESTHESIA ANUS;  Examination Under Anesthesia Anus, Interoperative Flexible Sigmoidoscopy, Application of Formalin;  Surgeon: Felicitas Radford MD;  [facility]     EXAM UNDER ANESTHESIA ANUS N/A 7/20/2018    Procedure: EXAM UNDER ANESTHESIA ANUS;  Examination Under Anesthesia Anus, Flexible Sigmoidoscopy ;  Surgeon: Felicitas Radford MD;  [facility]     EXAM UNDER ANESTHESIA ANUS N/A 11/30/2018    Procedure: Examination Under Anesthesia, application of formalin to rectum, polypectomy;  Surgeon: Felicitas Radford MD;  [facility]     EXAM UNDER ANESTHESIA ANUS N/A 2/22/2019    Procedure: Examination Under Anesthesia;  Surgeon: Felicitas Radford MD;  [facility]     EXAM UNDER ANESTHESIA ANUS N/A 6/28/2019    Procedure: Examination Under Anesthesia;  Surgeon: Felicitas Radford MD;  [facility]     EXAM UNDER ANESTHESIA ANUS N/A 11/1/2019    Procedure: Examination Under Anesthesia;  Surgeon: Felicitas Radford MD;  [facility]     EXAM UNDER ANESTHESIA RECTUM N/A 10/23/2020    Procedure: Exam under anesthesia rectum;  Surgeon: Felicitas Radford MD;  [facility]     HC TOOTH EXTRACTION W/FORCEP       LAPAROSCOPIC APPENDECTOMY N/A 7/17/2017    Procedure: LAPAROSCOPIC APPENDECTOMY;  LAPAROSCOPIC APPENDECTOMY;  Surgeon: Bryson Ferguson MD;  Location:  OR     LASER HOLMIUM LITHOTRIPSY URETER(S), INSERT STENT, COMBINED Right 3/1/2021    Procedure: CYSTOURETEROSCOPY,  URETERAL STENT INSERTION, RIGHT RETROGRADE;  Surgeon: Mohsen Pat MD;  [facility]     SIGMOIDOSCOPY FLEXIBLE N/A 12/8/2016    Procedure: SIGMOIDOSCOPY FLEXIBLE;  Surgeon: Felicitas Radford MD;  [facility]     SIGMOIDOSCOPY FLEXIBLE N/A 7/5/2017    Procedure: SIGMOIDOSCOPY  FLEXIBLE;;  Surgeon: Felicitas Radford MD;  [facility]     SIGMOIDOSCOPY FLEXIBLE N/A 5/11/2018    Procedure: SIGMOIDOSCOPY FLEXIBLE;;  Surgeon: Felicitas Radford MD;  [facility]     SIGMOIDOSCOPY FLEXIBLE N/A 7/20/2018    Procedure: SIGMOIDOSCOPY FLEXIBLE;;  Surgeon: Felicitas Radford MD;  [facility]     SIGMOIDOSCOPY FLEXIBLE N/A 11/30/2018    Procedure: Flexible Sigmoidoscopy;  Surgeon: Felicitas Radford MD;  [facility]     SIGMOIDOSCOPY FLEXIBLE N/A 2/22/2019    Procedure: Intraoperative Flexible Sigmoidoscopy, Application of Formalin to rectum;  Surgeon: Felicitas Radford MD;  [facility]     SIGMOIDOSCOPY FLEXIBLE N/A 6/28/2019    Procedure: Flexible Sigmoidoscopy;  Surgeon: Felicitas Radford MD;  [facility]     SIGMOIDOSCOPY FLEXIBLE N/A 11/1/2019    Procedure: Flexible Sigmoidoscopy;  Surgeon: Felicitas Radford MD;  [facility]     SIGMOIDOSCOPY FLEXIBLE N/A 7/23/2021    Procedure: SIGMOIDOSCOPY, FLEXIBLE;  Surgeon: Felicitas Radford MD;  [facility]     TESTICLE SURGERY       VASCULAR SURGERY      Right chest port     VASECTOMY       VASECTOMY          MEDICATIONS:  Current Outpatient Medications   Medication Sig Dispense Refill     Probiotic Product (PROBIOTIC PO)        mupirocin (BACTROBAN) 2 % external ointment Apply topically 3 times daily (Patient not taking: Reported on 10/26/2021) 30 g 2        ALLERGIES:    Allergies   Allergen Reactions     Ampicillin Diarrhea     Demerol [Meperidine] Nausea       REVIEW OF SYSTEMS:  A 10 point review of systems was negative except as noted above.    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)      Social Determinants of Health     Financial Resource Strain:      Difficulty of Paying Living Expenses:    Food Insecurity:      Worried About Running Out of Food in the Last Year:      Ran Out of Food in the Last Year:    Transportation Needs:      Lack of Transportation (Medical):      Lack of Transportation (Non-Medical):    Physical Activity:      Days of Exercise per Week:      Minutes of Exercise per Session:    Stress:      Feeling of Stress :    Social Connections:      Frequency of Communication with Friends and Family:      Frequency of Social Gatherings with Friends and Family:      Attends Synagogue Services:      Active Member of Clubs or Organizations:      Attends Club or Organization Meetings:      Marital Status:    Intimate Partner Violence:      Fear of Current or Ex-Partner:      Emotionally Abused:      Physically Abused:      Sexually Abused:        FAMILY MEDICAL HISTORY:   Family History   Problem Relation Age of Onset     Cancer Brother         primary of unknown origin     Other Cancer Brother      Chronic Obstructive Pulmonary Disease Father      Hypertension Father      Hyperlipidemia Father      Colon Polyps Mother         Number and type of polyps unknown     Family History Negative Brother         negative colonoscopy     Diabetes Maternal Grandfather      Hypertension Paternal Grandmother      Hyperlipidemia Paternal Grandmother      Stomach Cancer Other 63        maternal great grandfather     Glaucoma No family  hx of      Macular Degeneration No family hx of        PHYSICAL EXAM:   GENERAL APPEARANCE: alert and no distress  RESP: normal work of breathing, no conversational dyspnea  NEURO: mentation intact and speech normal    LABS:   I reviewed:  Electrolytes/Renal -   Recent Labs   Lab Test 07/29/21  1330 07/29/21  0840 02/28/21  0515 08/13/20  1530 08/13/20  1530 07/18/17  0630 07/17/17  0705     --  140  --  136   < >  --    POTASSIUM 4.0  --  4.0  --  3.7   < >  --    CHLORIDE 107  --  110*  --  104   < >  --    CO2 26  --  25  --  27   < >  --    BUN 17  --  19  --  16   < >  --    CR 0.84  --  1.10  --  0.83   < > 0.78   GLC 97 104* 134*   < > 112*   < >  --    FRANDY 9.3  --  8.3*  --  9.1   < >  --    MAG  --   --   --   --   --   --  2.0    < > = values in this interval not displayed.       CBC -   Recent Labs   Lab Test 07/29/21  1332 02/28/21  0515 08/13/20  1530   WBC 5.7 6.0 5.8   HGB 15.9 14.0 16.2    181 198       LFTs -   Recent Labs   Lab Test 07/29/21  1330 02/28/21  0515 08/13/20  1530   ALKPHOS 79 58 75   BILITOTAL 0.8 0.5 1.4*   ALT 31 40 34   AST 19 26 18   PROTTOTAL 7.5 6.8 7.8   ALBUMIN 4.2 3.7 4.2       Coags -   Recent Labs   Lab Test 07/10/17  0943 01/16/17  0800 11/23/16  1217   INR 1.02 0.92 1.11   PTT 31 31  --        Iron Panel - No lab results found.    Endocrine -   Recent Labs   Lab Test 06/17/21  1831   A1C 5.4       IMAGING:  reviewed    Edith Clark MD         Louie is a 61 year old who is being evaluated via a billable telephone visit.  Start 3:50 pm  End 4:34 PM   Total time 44 minutes

## 2021-10-26 NOTE — LETTER
10/26/2021       RE: Louie Greco  [address] [facility] [address]     Dear Colleague,    Thank you for referring your patient, Louie Greco, to the [facility] NEPHROLOGY CLINIC [place] at [facility]. Please see a copy of my visit note below.    [facility] Nephrology Comprehensive Stone Clinic  10/26/2021     Louie Greco MRN:[id number] YOB: 1960  Primary care provider: Philippe Healy  Requesting physician: Dr Mohsen Pat    ASSESSMENT AND RECOMMENDATIONS:   Louie Greco is a 61 year old male presenting for nephrolithiasis.  # Recurrent kidney stones: stone type calcium oxalate  Bilateral staged URS/litholtripsy spring 2021  Stone free bilaterally as of spring 2021.    - discussed high fluid intake, low salt diet, consistent calcium.  - discussed that organic foods can still be high in salt    # history of rectal cancer (XRT and CTX) - with frequent stool but not large volume  Known issue that I take into account for other medical decisions, no current exacerbations or new concerns.     24 hour urine - litholink - ordered - ideally would get 24 hour urine x2 back to back but given profession, this would be difficult so will start with one urine collection.    Return in about 3 months (around 1/26/2022).       Edith Clark MD  [facility]  Department of Medicine  Division of Renal Disease and Hypertension  [facility]  [employer]        REASON FOR CONSULT: nephrolithiasis    HISTORY OF PRESENT ILLNESS:  Louie Greco is a 61 year old with recurrent kidney stones, history of rectal cancer (XRT and CTX)  2 kidney stone attacks in the last 3 years. Also had kidney stone attack at age 40.  Bilateral kidney stones  Staged bilateral URS March (right), May and June (left) 2021 - all stones >1-2 mm were basketed and removed.    Stone analysis June 2021, March 2021, Feb 2018 all calcium oxalate    Louie GATES  Angus's diet consists of 3 meals per day.  Because of cancer history, Louie has switched to a healthy organic diet though in further discussion, has not been paying close attention to sodium. Tries to get more greens/salads. Eats mixed greens and is trying to have little to no spinach.  2- 3 cups coffee per day.    1st meal yogurt with blueberries, Stepan bread toast - cinnamon raisin.  2nd meal tater tot casserole, frozen veggies, cream of chicken soup  Walnuts/pecans with cheese sticks  Organic mozzarella   3rd meal sometimes big salad.    Dietary changes include avoiding almonds and spinach.    Because of radiation damage (history rectal cancer), will have increased stool output with fiber so does not eat fiber during the day. Has small BM ~8 times per day. Does not have a lot of large watery stools.    Calcium intake is yogurt with breakfast and cheese with lunch. Sometimes cheese with dinner.  Does not drink milk.    Has not had 24 hour urine test ordered.    PAST MEDICAL HISTORY:  Reviewed  Past Medical History:   Diagnosis Date     Childhood asthma      Elevated prostate specific antigen (PSA)     No biopsy done (had rectal cancer at the time)     Epididymitis, bilateral     18 years old     Inguinal hernia      Mumps      Nephrolithiasis     Several -- 1990 first, 2019 last     Rectal cancer (H) 2017    low rectal cancer, S/P Rad adn Chemo, no surg needed     Shingles        PSH:  Reviewed  Past Surgical History:   Procedure Laterality Date     COLONOSCOPY N/A 7/27/2016    Procedure: COMBINED COLONOSCOPY, SINGLE OR MULTIPLE BIOPSY/POLYPECTOMY BY BIOPSY;  Surgeon: Chelsea Thompson MD;  [facility]: [facility]     COLONOSCOPY N/A 9/13/2017    Procedure: COLONOSCOPY;;  Surgeon: Felicitas Radford MD;  [facility]     COLONOSCOPY N/A 12/13/2017    Procedure: COLONOSCOPY;;  Surgeon: Felicitas Radford MD;  [facility]     COLONOSCOPY N/A 10/23/2020    Procedure: COLONOSCOPY;  Surgeon:  Felicitas Radford MD;  [facility]     COMBINED CYSTOSCOPY, RETROGRADES, URETEROSCOPY, LASER HOLMIUM LITHOTRIPSY URETER(S), INSERT STENT Left 2/16/2018    Procedure: COMBINED CYSTOSCOPY, RETROGRADES, URETEROSCOPY, LASER HOLMIUM LITHOTRIPSY URETER(S), INSERT STENT;  Cysto, left ureteroscopy, holmium laser, retrogrades, stent placement;  Surgeon: Bola Worthy MD;  [facility]     COMBINED CYSTOSCOPY, RETROGRADES, URETEROSCOPY, LASER HOLMIUM LITHOTRIPSY URETER(S), INSERT STENT Right 3/22/2021    Procedure: CYSTOSCOPY WITH RIGHT RETROGRADE PYELOGRAM, RIGHT URETEROSCOPY WITH LASER LITHOTRIPSY AND BASKET REMOVAL OF STONE, RIGHT URETERAL STENT PLACEMENT;  Surgeon: Mohsen Pat MD;  [facility]     COMBINED CYSTOSCOPY, RETROGRADES, URETEROSCOPY, LASER HOLMIUM LITHOTRIPSY URETER(S), INSERT STENT Left 5/19/2021    Procedure: CYSTOSCOPY, LEFT RETROGRADE PYELOGRAM, LEFT DIAGNOSTIC, URETEROSCOPY, LEFT URETERAL STENT PLACEMENT;  Surgeon: Mohsen Pat MD;  [facility]     COMBINED CYSTOSCOPY, RETROGRADES, URETEROSCOPY, LASER HOLMIUM LITHOTRIPSY URETER(S), INSERT STENT Left 6/23/2021    Procedure: CYSTOSCOPY, LEFT URETERAL STENT REMOVAL, LEFT RETROGRADE PYELOGRAM, LEFT URETEROSCOPY WITH LASER LITHOTRIPSY AND BASKET REMOVAL OF STONES, LEFT URETERAL STENT PLACEMENT;  Surgeon: Mohsen Pat MD;  [facility]     CYSTOSCOPY, LITHOLAPAXY, COMBINED N/A 3/1/2021    Procedure: Cystoscopy, litholapaxy, combined;  Surgeon: Mohsen Pat MD;  [facility]     CYSTOSCOPY, RETROGRADES, INSERT STENT URETER(S), COMBINED Right 3/1/2021    Procedure: Cystoscopy, retrogrades, insert stent ureter(s), combined;  Surgeon: Mohsen Pat MD;  [facility]     EXAM UNDER ANESTHESIA ANUS N/A 7/5/2017    Procedure: EXAM UNDER ANESTHESIA ANUS;  Examination Under Anesthesia, flex sigmoidoscopy with biopsies and formalin application;  Surgeon: Felicitas Radford MD;  Location:  OR      EXAM UNDER ANESTHESIA ANUS N/A 9/13/2017    Procedure: EXAM UNDER ANESTHESIA ANUS;  Examination Under Anesthesia Anus, Colonoscopy, application of formalin;  Surgeon: Felicitas Radford MD;  [facility]     EXAM UNDER ANESTHESIA ANUS N/A 12/13/2017    Procedure: EXAM UNDER ANESTHESIA ANUS;  Examination Under Anesthesia Anus, Colonoscopy;  Surgeon: Felicitas Radford MD;  [facility]     EXAM UNDER ANESTHESIA ANUS N/A 5/11/2018    Procedure: EXAM UNDER ANESTHESIA ANUS;  Examination Under Anesthesia Anus, Interoperative Flexible Sigmoidoscopy, Application of Formalin;  Surgeon: Felicitas Radford MD;  [facility]     EXAM UNDER ANESTHESIA ANUS N/A 7/20/2018    Procedure: EXAM UNDER ANESTHESIA ANUS;  Examination Under Anesthesia Anus, Flexible Sigmoidoscopy ;  Surgeon: Felicitas Radford MD;  [facility]     EXAM UNDER ANESTHESIA ANUS N/A 11/30/2018    Procedure: Examination Under Anesthesia, application of formalin to rectum, polypectomy;  Surgeon: Felicitas Radford MD;  [facility]     EXAM UNDER ANESTHESIA ANUS N/A 2/22/2019    Procedure: Examination Under Anesthesia;  Surgeon: Felicitas Radford MD;  [facility]     EXAM UNDER ANESTHESIA ANUS N/A 6/28/2019    Procedure: Examination Under Anesthesia;  Surgeon: Felicitas Radford MD;  [facility]     EXAM UNDER ANESTHESIA ANUS N/A 11/1/2019    Procedure: Examination Under Anesthesia;  Surgeon: Felicitas Radford MD;  [facility]     EXAM UNDER ANESTHESIA RECTUM N/A 10/23/2020    Procedure: Exam under anesthesia rectum;  Surgeon: Felicitas Radford MD;  [facility]     HC TOOTH EXTRACTION W/FORCEP       LAPAROSCOPIC APPENDECTOMY N/A 7/17/2017    Procedure: LAPAROSCOPIC APPENDECTOMY;  LAPAROSCOPIC APPENDECTOMY;  Surgeon: Bryson Ferguson MD;  [facility]     LASER HOLMIUM LITHOTRIPSY URETER(S), INSERT STENT, COMBINED Right 3/1/2021    Procedure:  CYSTOURETEROSCOPY,  URETERAL STENT INSERTION, RIGHT RETROGRADE;  Surgeon: Mohsen Pat MD;  [facility]     SIGMOIDOSCOPY FLEXIBLE N/A 12/8/2016    Procedure: SIGMOIDOSCOPY FLEXIBLE;  Surgeon: Felicitas Radford MD;  [facility]     SIGMOIDOSCOPY FLEXIBLE N/A 7/5/2017    Procedure: SIGMOIDOSCOPY FLEXIBLE;;  Surgeon: Felicitas Radford MD;  [facility]     SIGMOIDOSCOPY FLEXIBLE N/A 5/11/2018    Procedure: SIGMOIDOSCOPY FLEXIBLE;;  Surgeon: Felicitas Radford MD;  [facility]     SIGMOIDOSCOPY FLEXIBLE N/A 7/20/2018    Procedure: SIGMOIDOSCOPY FLEXIBLE;;  Surgeon: Felicitas Radford MD;  [facility]     SIGMOIDOSCOPY FLEXIBLE N/A 11/30/2018    Procedure: Flexible Sigmoidoscopy;  Surgeon: Felicitas Radford MD;  [facility]     SIGMOIDOSCOPY FLEXIBLE N/A 2/22/2019    Procedure: Intraoperative Flexible Sigmoidoscopy, Application of Formalin to rectum;  Surgeon: Felicitas Radford MD;  [facility]     SIGMOIDOSCOPY FLEXIBLE N/A 6/28/2019    Procedure: Flexible Sigmoidoscopy;  Surgeon: Felicitas Radford MD;  [facility]     SIGMOIDOSCOPY FLEXIBLE N/A 11/1/2019    Procedure: Flexible Sigmoidoscopy;  Surgeon: Felicitas Radford MD;  [facility]     SIGMOIDOSCOPY FLEXIBLE N/A 7/23/2021    Procedure: SIGMOIDOSCOPY, FLEXIBLE;  Surgeon: Felicitas Radford MD;  [facility]     TESTICLE SURGERY       VASCULAR SURGERY      Right chest port     VASECTOMY       VASECTOMY          MEDICATIONS:  Current Outpatient Medications   Medication Sig Dispense Refill     Probiotic Product (PROBIOTIC PO)        mupirocin (BACTROBAN) 2 % external ointment Apply topically 3 times daily (Patient not taking: Reported on 10/26/2021) 30 g 2        ALLERGIES:    Allergies   Allergen Reactions     Ampicillin Diarrhea     Demerol [Meperidine] Nausea       REVIEW OF SYSTEMS:  A 10 point review of systems was negative except as noted  above.    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)      Social Determinants of Health     Financial Resource Strain:      Difficulty of Paying Living Expenses:    Food Insecurity:      Worried About Running Out of Food in the Last Year:      Ran Out of Food in the Last Year:    Transportation Needs:      Lack of Transportation (Medical):      Lack of Transportation (Non-Medical):    Physical Activity:      Days of Exercise per Week:      Minutes of Exercise per Session:    Stress:      Feeling of Stress :    Social Connections:      Frequency of Communication with Friends and Family:      Frequency of Social Gatherings with Friends and Family:      Attends Congregation Services:      Active Member of Clubs or Organizations:      Attends Club or Organization Meetings:      Marital Status:    Intimate Partner Violence:      Fear of Current or Ex-Partner:      Emotionally Abused:      Physically Abused:      Sexually Abused:        FAMILY MEDICAL HISTORY:   Family History   Problem Relation Age of Onset     Cancer Brother         primary of unknown origin     Other Cancer Brother      Chronic Obstructive Pulmonary Disease Father      Hypertension Father      Hyperlipidemia Father      Colon  Polyps Mother         Number and type of polyps unknown     Family History Negative Brother         negative colonoscopy     Diabetes Maternal Grandfather      Hypertension Paternal Grandmother      Hyperlipidemia Paternal Grandmother      Stomach Cancer Other 63        maternal great grandfather     Glaucoma No family hx of      Macular Degeneration No family hx of        PHYSICAL EXAM:   GENERAL APPEARANCE: alert and no distress  RESP: normal work of breathing, no conversational dyspnea  NEURO: mentation intact and speech normal    LABS:   I reviewed:  Electrolytes/Renal -   Recent Labs   Lab Test 07/29/21  1330 07/29/21  0840 02/28/21  0515 08/13/20  1530 08/13/20  1530 07/18/17  0630 07/17/17  0705     --  140  --  136   < >  --    POTASSIUM 4.0  --  4.0  --  3.7   < >  --    CHLORIDE 107  --  110*  --  104   < >  --    CO2 26  --  25  --  27   < >  --    BUN 17  --  19  --  16   < >  --    CR 0.84  --  1.10  --  0.83   < > 0.78   GLC 97 104* 134*   < > 112*   < >  --    FRANDY 9.3  --  8.3*  --  9.1   < >  --    MAG  --   --   --   --   --   --  2.0    < > = values in this interval not displayed.       CBC -   Recent Labs   Lab Test 07/29/21  1332 02/28/21  0515 08/13/20  1530   WBC 5.7 6.0 5.8   HGB 15.9 14.0 16.2    181 198       LFTs -   Recent Labs   Lab Test 07/29/21  1330 02/28/21  0515 08/13/20  1530   ALKPHOS 79 58 75   BILITOTAL 0.8 0.5 1.4*   ALT 31 40 34   AST 19 26 18   PROTTOTAL 7.5 6.8 7.8   ALBUMIN 4.2 3.7 4.2       Coags -   Recent Labs   Lab Test 07/10/17  0943 01/16/17  0800 11/23/16  1217   INR 1.02 0.92 1.11   PTT 31 31  --        Iron Panel - No lab results found.    Endocrine -   Recent Labs   Lab Test 06/17/21  1831   A1C 5.4       IMAGING:  reviewed    Edith Clark MD         Louie is a 61 year old who is being evaluated via a billable telephone visit.  Start 3:50 pm  End 4:34 PM   Total time 44 minutes

## 2021-10-26 NOTE — PATIENT INSTRUCTIONS
Dear Louie Greco    Your were seen in the [facility] Stone [facility].  Basic advice to avoid kidney stones  - Drink 100 ounces of fluid daily (urine volume should be 80 ounces per day)  - low sodium diet (less than 2400 mg sodium per day- 500-700 mg per meal)  - minimize processed foods  - three servings daily of food/beverage high in calcium.  Please have one high calcium food three times a day with meals - can be either 8 oz milk, 6 oz yogurt or 2 oz low sodium cheese or 8 oz calcium fortified OJ/dairy alternative)   - unsweetened flax milk is the preferred dairy alternative  ---- Total should be at least 1000 mg calcium a day from food  - Protein (meat) in moderation    Higher fruit and vegetable intake preferred  Some good options include: (higher alkali fruits and vegetables)  -apples, apricots, oranges (the pith contains oxalate so be mindful of portions), peaches, pears, raisins, strawberries, carrots, cauliflower, eggplant, lettuce, potatoes, tomatoes, and zucchini    Today we discussed  - monitor sodium intake - goal is less than 2 grams sodium per day.    - will order 24 hour urine litholink    Non-Dairy Calcium Sources:    Fortified Coconut, Rice, Flax, Oat milk  (avoid almond milk)    Canned sardines, canned pink salmon with bones  - look for low sodium options    Fortified cereals  Oatmeal    Broccoli, peas, chinese cabbage/bok kyara, Kale, mustard greens, pistachio nuts, mung beans, red kidney beans     Please send me a [employer] message when you complete the litholink (24 hour urine) - this will serve as a prompt to me to tell you about the results which I usually get 2-3 weeks after you complete and mail back the sample.    Return in about 3 months (around 1/26/2022).     It was a pleasure meeting with you today. Thank you for allowing me and my team the privilege of caring for you today. Please let us know if there is anything else we can do for you.    Take care!  Edith  Amber MORAN  Department of Medicine  Division of Renal Diseases and Hypertension  [facility]    Email: [email or web address]  You may reach a nurse by calling the urology clinic at [phone]

## 2021-11-09 LAB — BACTERIA SPEC CULT: ABNORMAL

## 2022-01-17 NOTE — PROGRESS NOTES
Infusion Nursing Note:  Louie Greco presents today for IVFs pump disconnect.    Patient seen by provider today: No   present during visit today: Not Applicable.    Note: N/A.    Intravenous Access:  Implanted Port already accessed flushed with 20ccs NS with good blood return    Treatment Conditions:  Not Applicable.      Post Infusion Assessment:  Patient tolerated infusion without incident.  Blood return noted pre and post infusion.  Site patent and intact, free from redness, edema or discomfort.  No evidence of extravasations.  Access discontinued per protocol.    Discharge Plan:   AVS to patient via MYCHART.  Patient discharged in stable condition accompanied by: self.  Departure Mode: Ambulatory.    Emerson Hensley RN                        
No

## 2022-03-15 ENCOUNTER — TELEPHONE (OUTPATIENT)
Dept: FAMILY MEDICINE | Facility: CLINIC | Age: 62
End: 2022-03-15
Payer: COMMERCIAL

## 2022-03-15 ENCOUNTER — OFFICE VISIT (OUTPATIENT)
Dept: FAMILY MEDICINE | Facility: CLINIC | Age: 62
End: 2022-03-15
Payer: COMMERCIAL

## 2022-03-15 VITALS
DIASTOLIC BLOOD PRESSURE: 82 MMHG | WEIGHT: 216 LBS | HEART RATE: 84 BPM | SYSTOLIC BLOOD PRESSURE: 131 MMHG | RESPIRATION RATE: 16 BRPM | TEMPERATURE: 97.2 F | OXYGEN SATURATION: 96 % | HEIGHT: 70 IN | BODY MASS INDEX: 30.92 KG/M2

## 2022-03-15 DIAGNOSIS — C20 MALIGNANT NEOPLASM OF RECTUM (H): ICD-10-CM

## 2022-03-15 DIAGNOSIS — H62.40 FUNGAL OTITIS EXTERNA: Primary | ICD-10-CM

## 2022-03-15 DIAGNOSIS — G62.0 DRUG-INDUCED POLYNEUROPATHY (H): ICD-10-CM

## 2022-03-15 DIAGNOSIS — B36.9 FUNGAL OTITIS EXTERNA: Primary | ICD-10-CM

## 2022-03-15 PROCEDURE — 99214 OFFICE O/P EST MOD 30 MIN: CPT | Performed by: INTERNAL MEDICINE

## 2022-03-15 RX ORDER — OFLOXACIN 3 MG/ML
5 SOLUTION AURICULAR (OTIC) DAILY
Qty: 10 ML | Refills: 3 | Status: SHIPPED | OUTPATIENT
Start: 2022-03-15 | End: 2022-07-12

## 2022-03-15 RX ORDER — CLOTRIMAZOLE 1 G/ML
SOLUTION TOPICAL 2 TIMES DAILY
Qty: 29.57 ML | Refills: 3 | Status: SHIPPED | OUTPATIENT
Start: 2022-03-15 | End: 2022-07-12

## 2022-03-15 NOTE — TELEPHONE ENCOUNTER
Reason for Call:  Same Day Appointment, Requested Provider:  PCP    PCP: Philippe Healy    Reason for visit: Ear pain     Duration of symptoms: a few days     Have you been treated for this in the past? Yes    Additional comments: Patient is looking for an appointment at 430pm of later. He requests to see someone in person due to the ear pain. 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 YES    Phone number patient can be reached at: Home number on file [phone] (home)    Best Time: Anytime     Call taken on 3/15/2022 at 6:49 AM by Kaylynn Miller

## 2022-03-15 NOTE — PROGRESS NOTES
Assessment & Plan     Fungal otitis externa  Unfortunately, it seems that he has recurrent fungal otitis externa.  Again, under direct visualization I did my best to remove the fungal material that I could see in the external auditory canal.  I think he should restart his topical treatments as he did during his prior flareup last summer.  I also asked him to get an appointment with otolaryngology when he can to have the ear cleaned out more thoroughly under microscopic visualization.  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  However the week after next week he does have a spring break and will try to schedule an appointment with Dr. Richardson during that week.  - clotrimazole (LOTRIMIN) 1 % external solution; Apply topically 2 times daily Profile Rx: patient will contact pharmacy when needed  - ofloxacin (FLOXIN) 0.3 % otic solution; Place 5 drops Into the left ear daily  - Otolaryngology Referral; Future    Malignant neoplasm of rectum (H)  This has been stable, he follows up with Dr. Manley.  He plans to see me for preventive exam shortly before he is due for a flexible sigmoidoscopy for surveillance.  He believes that this will be this summer.    Drug-induced polyneuropathy (H)  The symptoms have been stable.  Unfortunately, his previous cancer treatment is probably the risk factor that makes him more susceptible to fungal otitis.          37 minutes spent on the date of the encounter doing chart review, history and exam, documentation and further activities per the note        Return in about 10 days (around 3/25/2022) for ENT consult .  Patient instructed to return to clinic or contact us sooner if symptoms worsen or new symptoms develop.     Philippe Healy MD  [facility] MEGAN Palma is a 61 year old who presents for the following health issues     History of Present Illness       Reason for visit:  Earache  Symptom onset:  1-3 days ago    He eats 2-3  "servings of fruits and vegetables daily.He consumes 0 sweetened beverage(s) daily.He exercises with enough effort to increase his heart rate 30 to 60 minutes per day.  He exercises with enough effort to increase his heart rate 7 days per week.   He is taking medications regularly.     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  Last summer, he suffered with fungal otitis media.  He was followed by Dr. Richardson from ENT.  However, I cannot find any of her letters in [facility] today.  He was treated with topical Chlortrimazole as well as topical ofloxacin.  He also had several ear cleanings in her clinic.  Several days ago he developed recurrent symptoms in his left ear.          Review of Systems         Objective    /82 (BP Location: Left arm, Patient Position: Sitting, Cuff Size: Adult Regular)   Pulse 84   Temp 97.2  F (36.2  C) (Temporal)   Resp 16   Ht 1.778 m (5' 10\")   Wt 98 kg (216 lb)   SpO2 96%   BMI 30.99 kg/m    Body mass index is 30.99 kg/m .  Physical Exam   General: This is a healthy-appearing man in no acute distress.  HEENT: The right tympanic membrane is normal, the right external auditory canal is normal.  The left external auditory canal has visible fungal elements aligning the external auditory canal, the tympanic membrane looks normal.  Under direct visualization, I did my best to remove as much of the fungal material as I could without causing discomfort.  This improved his hearing.                "

## 2022-03-15 NOTE — TELEPHONE ENCOUNTER
Your last two visits are virtual but open.  Please advise if urgent care or your preference.     Samanta Atwood MA

## 2022-03-15 NOTE — TELEPHONE ENCOUNTER
LVM for patient that I scheduled him at 430 today per Dr. Healy's ok and asked him to call back if that does not work for him.   Samanta Atwood MA

## 2022-03-30 ENCOUNTER — TRANSFERRED RECORDS (OUTPATIENT)
Dept: HEALTH INFORMATION MANAGEMENT | Facility: CLINIC | Age: 62
End: 2022-03-30

## 2022-03-30 ENCOUNTER — LAB REQUISITION (OUTPATIENT)
Dept: LAB | Facility: CLINIC | Age: 62
End: 2022-03-30
Payer: COMMERCIAL

## 2022-03-30 DIAGNOSIS — H60.399 OTHER INFECTIVE OTITIS EXTERNA, UNSPECIFIED EAR: ICD-10-CM

## 2022-03-30 PROCEDURE — 87107 FUNGI IDENTIFICATION MOLD: CPT | Mod: ORL | Performed by: OTOLARYNGOLOGY

## 2022-04-05 LAB — BACTERIA SPEC CULT: ABNORMAL

## 2022-04-12 ENCOUNTER — TELEPHONE (OUTPATIENT)
Dept: UROLOGY | Facility: CLINIC | Age: 62
End: 2022-04-12
Payer: COMMERCIAL

## 2022-04-12 NOTE — TELEPHONE ENCOUNTER
[facility] Call Center    Phone Message    May a detailed message be left on voicemail: yes     Reason for Call: The patient called and scheduled next available in-person appointment with Dr. Pat for new scrotal mass. Patient says he just noticed it the other day. He states he has a history of colon cancer and is very concerned. Writer scheduled for 4/28/22, and added to wait list. 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 He says he can come pretty much any day after 3:00pm. He says the care team can add him to an end of day appointment then leave a voicemail message. Please advise. Thank you.    Action Taken: Message routed to:  Adult Clinics: Urology p [phone]    Travel Screening: Not Applicable

## 2022-04-13 NOTE — TELEPHONE ENCOUNTER
Called patient and moved appointment to 245 on 4/19.    Ana Luisa Carl LPN on 4/13/2022 at 3:04 PM

## 2022-04-19 ENCOUNTER — LAB (OUTPATIENT)
Dept: LAB | Facility: CLINIC | Age: 62
End: 2022-04-19

## 2022-04-19 ENCOUNTER — OFFICE VISIT (OUTPATIENT)
Dept: UROLOGY | Facility: CLINIC | Age: 62
End: 2022-04-19
Payer: COMMERCIAL

## 2022-04-19 DIAGNOSIS — N50.89 SCROTAL MASS: Primary | ICD-10-CM

## 2022-04-19 DIAGNOSIS — R97.20 ELEVATED PROSTATE SPECIFIC ANTIGEN (PSA): ICD-10-CM

## 2022-04-19 LAB — PSA SERPL-MCNC: 5.23 UG/L (ref 0–4)

## 2022-04-19 PROCEDURE — 99214 OFFICE O/P EST MOD 30 MIN: CPT | Performed by: UROLOGY

## 2022-04-19 PROCEDURE — 84153 ASSAY OF PSA TOTAL: CPT

## 2022-04-19 PROCEDURE — 36415 COLL VENOUS BLD VENIPUNCTURE: CPT

## 2022-04-19 NOTE — PROGRESS NOTES
"[facility]   CHIEF COMPLAINT   It was my pleasure to see Louie Greco who is a 61 year old male for follow-up of Elevated PSA, bilateral kidney stones.      HPI   Louie Grceo is a very pleasant 61 year old male with prior history of bilateral kidney stones status post staged bilateral ureteroscopy, history of rectal cancer, history of elevated PSA    8/3/21:  Follow-up today to discuss his dedicated prostate MRI  He is scheduled to follow-up with nephrology in October for kidney stone prevention counseling and work-up    TODAY 4/19/2022:  Noted a \"mass\" in the midline at the bottom of the scrotum   This is non-tender  Given his history of malignancy he is concerned and would like to ensure that this is nothing serious  He is also about due for PSA check    PHYSICAL EXAM  Patient is a 61 year old  male   Vitals: There were no vitals taken for this visit.  There is no height or weight on file to calculate BMI.  General Appearance Adult:   Alert, no acute distress, oriented  HENT: throat/mouth:normal, good dentition  Lungs: no respiratory distress, or pursed lip breathing  Heart: No obvious jugular venous distension present  Abdomen: non - distended  Musculoskeltal: extremities normal, no peripheral edema  Skin: no suspicious lesions or rashes  Neuro: Alert, oriented, speech and mentation normal  Psych: affect and mood normal  : Normal phallus, normal testes bilaterally with no palpable testicular masses, normal epididymis bilaterally with slightly more prominent epididymis on the right which seems to be the location of the possible scrotal mass    Component PSA   Latest Ref Rng & Units 0 - 4 ug/L   9/13/2017 5.46 (H)   10/19/2017 5.00 (H)   1/22/2018 3.78   7/20/2018 3.74   6/24/2019 5.61 (H)   2/17/2020 5.76 (H)   8/13/2020 4.13 (H)   6/17/2021 4.92 (H)     4K Score: 16    Stone Analysis:  Calculi composed primarily of   calcium oxalate monohydrate.     IMAGING:  All pertinent imaging reviewed:    All imaging " studies reviewed by me.  I personally reviewed these imaging films.  A formal report from radiology will follow.    MRI PROSTATE 7/29/21:  FINDINGS:  Size: 34 grams  Hemorrhage: Absent  Peripheral zone: Heterogeneous on T2-weighted images. Regions of  mildly decreased signal on ADC or DWI which are best characterized as  PI-RADS 2 without highly suspicious lesion.  Transition zone: Enlarged with BPH changes. Transition zone nodules  which are circumscribed or mostly encapsulated without diffusion  restriction.  PI-RADS 2.  No highly suspicious nodules.     Neurovascular bundles: No suspicion of involvement by malignancy  Seminal vesicles: Not involved by tumor.  Lymph nodes: No adenopathy.  Bones: No suspicious lesions.  Other pelvic organs: Moderate fat-containing left inguinal hernia.                                                                      IMPRESSION:  1. Based on the most suspicious abnormality, this exam is  characterized as PIRADS 2 - Clinically significant cancer is unlikely  to be present.?  2. Mild prostamegaly with BPH changes.  3. No suspicious adenopathy or evidence of pelvic metastases.    ASSESSMENT and PLAN  61-year-old man with history of rectal cancer, bilateral kidney stones, and elevated PSA.  Now with possible scrotal mass    Possible scrotal mass  - We discussed that based on my exam I am fairly confident that this questionable scrotal mass represents his normal epididymis  - We discussed the option for confirmatory ultrasound and he would like to proceed with this  - Order for scrotal ultrasound placed  - I will send the results via [employer]    Elevated PSA  -Again reviewed his PSA history and trend  -We discussed that his PSA overall has demonstrated relative stability with some fluctuation, but no rising trend  -Reviewed his prior 4K score which was 16  -Reviewed his recent prostate MRI and I reviewed the images personally.  I agree with radiologist interpretation.  We discussed  the PI-RADS scoring system and that there is no concerning PI-RADS 3 or greater lesions  -He is about due for annual PSA monitoring and order for this was placed today  - I will send this result via [email or web address]    Bilateral nephrolithiasis  -he is following with nephrology      Time spent: 20 minutes spent on the date of the encounter doing chart review, history and exam, documentation and further activities as noted above.    Mohsen Pat MD   Urology  [facility]  [facility] Phone: [phone]  [facility] Phone: [phone]

## 2022-04-19 NOTE — NURSING NOTE
Louie Greco's goals for this visit include:   Chief Complaint   Patient presents with     Consult     Mass in scrotum        He requests these members of his care team be copied on today's visit information:     PCP: Philippe Healy    Referring Provider:  F=Referred Self  No address on file    There were no vitals taken for this visit.    Do you need any medication refills at today's visit?     Ana Luisa Carl LPN on 4/19/2022 at 2:51 PM

## 2022-04-21 ENCOUNTER — ANCILLARY PROCEDURE (OUTPATIENT)
Dept: ULTRASOUND IMAGING | Facility: CLINIC | Age: 62
End: 2022-04-21
Attending: UROLOGY
Payer: COMMERCIAL

## 2022-04-21 DIAGNOSIS — N50.89 SCROTAL MASS: ICD-10-CM

## 2022-04-21 PROCEDURE — 76870 US EXAM SCROTUM: CPT

## 2022-04-21 PROCEDURE — 93976 VASCULAR STUDY: CPT

## 2022-05-02 DIAGNOSIS — C20 RECTAL CANCER (H): Primary | ICD-10-CM

## 2022-05-03 ENCOUNTER — TELEPHONE (OUTPATIENT)
Dept: SURGERY | Facility: CLINIC | Age: 62
End: 2022-05-03
Payer: COMMERCIAL

## 2022-05-05 NOTE — TELEPHONE ENCOUNTER
"Case Request received to schedule:    Patient Name: Louie Greco   MRN: [id number]   Case#: [id number]   Surgeon(s) and Role:      * Felicitas Radford MD - Primary   Date requested: * No dates entered *   Location: [facility] OR   Procedure(s):   SIGMOIDOSCOPY, FLEXIBLE (N/A)     Additional Instructions for the Case  Please schedule patient to follow with Dr. Radford on 5/13/2022 at the [facility]. Thank-you!  Multi-surgeon case:  no  Time in minutes:  30  Anesthesia: MAC  Prep: 2 fleets  Pre-Op: PAC vs PCP2  Labs: no  WOC: no  Special equipment: no  Special Instructions: due in July    On 5/23/2022:  Left [facility] for patient to call back regarding scheduling. Sent the following artaculous message to patient:    \"Aravind Palma,    My name is Trixie, and I am one of the schedulers for Dr. Felicitas Radford. I hope you are well!     It appears that you are due for a Flexible Sigmoidoscopy with Dr. Radford sometime in July.    Her July OR dates are:    - Friday July 1    - Friday July 8    - Friday July 22     Please let me know if any of the above dates work for you, and we can work on getting it scheduled, along with related appointments.     You may either reply to this message or call me directly at [phone].      Trixie Rousseau   Marcie-op Coordinator  [place]  Direct Phone: [phone]\"    "

## 2022-05-15 ENCOUNTER — HEALTH MAINTENANCE LETTER (OUTPATIENT)
Age: 62
End: 2022-05-15

## 2022-06-10 DIAGNOSIS — C20 RECTAL CANCER (H): Primary | ICD-10-CM

## 2022-06-10 RX ORDER — SODIUM, POTASSIUM,MAG SULFATES 17.5-3.13G
1 SOLUTION, RECONSTITUTED, ORAL ORAL SEE ADMIN INSTRUCTIONS
Qty: 354 ML | Refills: 0 | Status: SHIPPED | OUTPATIENT
Start: 2022-06-10 | End: 2022-07-12

## 2022-06-10 NOTE — TELEPHONE ENCOUNTER
"Spoke with patient via phone, completed the following scheduling, then sent Surgery Packet to patient via [employer] including related scheduling information and prep instructions:     Required: Yes, you will need a  18 years or older to drive you home from your procedure as well as stay with you for 24 hours after your procedure    Surgery: Flexible Sigmoidoscopy    Date: Friday July 1, 2022  Surgeon: Dr. Felicitas Radford    Time: You will receive a call 1-3 days prior to your surgery with your finalized surgery and arrival time.     Location: [facility] [address]--[address] [place] [address]  [phone]      Upcoming Appointments and Tests:     Pre-operative Physical appointment:   - Clinic appointment to be scheduled and completed by you with Dr. Philippe Healy, or a partner in clinic, within 30 days before your surgery date.    Pre-operative COVID-19 Home Antigen Test--Instructions:  1. Take a COVID Home Antigen Test 1-2 days before surgery  2. Use your phone to take a photo of the results  3. Show that photo to the admitting nurse on surgery date    Preparation: SUPREP (see \"Day Before Surgery\")    Trixie Spence  Marcie-op Coordinator  [place]  Direct Phone: [phone]    "

## 2022-06-20 ENCOUNTER — TELEPHONE (OUTPATIENT)
Dept: SURGERY | Facility: CLINIC | Age: 62
End: 2022-06-20

## 2022-06-21 NOTE — TELEPHONE ENCOUNTER
FUTURE VISIT INFORMATION      SURGERY INFORMATION:    Date: 22    [facility]    Surgeon:  Felicitas Radford MD    Anesthesia Type:  MAC    Procedure: SIGMOIDOSCOPY, FLEXIBLE    RECORDS REQUESTED FROM:        Primary Care Provider: Philippe Healy MD  - [facility]    Most recent EKG+ Tracin21

## 2022-06-23 ENCOUNTER — VIRTUAL VISIT (OUTPATIENT)
Dept: SURGERY | Facility: CLINIC | Age: 62
End: 2022-06-23
Payer: COMMERCIAL

## 2022-06-23 ENCOUNTER — ANESTHESIA EVENT (OUTPATIENT)
Dept: SURGERY | Facility: AMBULATORY SURGERY CENTER | Age: 62
End: 2022-06-23
Payer: COMMERCIAL

## 2022-06-23 ENCOUNTER — PRE VISIT (OUTPATIENT)
Dept: SURGERY | Facility: CLINIC | Age: 62
End: 2022-06-23
Payer: COMMERCIAL

## 2022-06-23 DIAGNOSIS — Z01.818 PREOP EXAMINATION: Primary | ICD-10-CM

## 2022-06-23 PROCEDURE — 99203 OFFICE O/P NEW LOW 30 MIN: CPT | Mod: 95 | Performed by: PHYSICIAN ASSISTANT

## 2022-06-23 ASSESSMENT — LIFESTYLE VARIABLES: TOBACCO_USE: 0

## 2022-06-23 ASSESSMENT — ENCOUNTER SYMPTOMS: SEIZURES: 0

## 2022-06-23 ASSESSMENT — PAIN SCALES - GENERAL: PAINLEVEL: NO PAIN (0)

## 2022-06-23 NOTE — PROGRESS NOTES
Louie is a 62 year old who is being evaluated via a billable video visit.      How would you like to obtain your AVS? MyChart  If the video visit is dropped, the invitation should be resent by: Text to cell phone: [phone]     HPI         Review of Systems         Objective    Vitals - Patient Reported  Pain Score: No Pain (0)        Physical Exam     .  ..

## 2022-06-23 NOTE — PATIENT INSTRUCTIONS
Preparing for Your Surgery      Name:  Louie Greco   MRN:  [id number]   :  1960   Today's Date:  2022         Arriving for surgery:  Surgery date:  22  Arrival time:  7:45 am    Restrictions due to COVID 19:    Effective 2022  1 visitor may accompany patient and wait in the surgery waiting room  All visitors must wear a mask and social distance      parking is available for anyone with mobility limitations or disabilities. (Monday- Friday 7 am- 5 pm)    Please come to:    [facility]  [address] [place] [id number]    Please check in on the 5th floor at the Ambulatory Surgery Center       What can I eat or drink?    -  You may eat and drink normally until 8 hours before surgery. (Until 1:15 am)  -  You may have clear liquids up to 4 hours before surgery. (Until 5:15 am)  Examples of clear liquids:  Water  Clear broth  Juices (apple, white grape, white cranberry  and cider) without pulp  Noncarbonated, powder based beverages  (lemonade and Roderick-Aid)  Sodas (Sprite, 7-Up, ginger ale and seltzer)  Coffee or tea (without milk or cream)  Gatorade    --No alcohol for at least 24 hours before surgery    Which medicines can I take?    Hold Aspirin for 7 days before surgery.   Hold Multivitamins for 7 days before surgery.  Hold Supplements for 7 days before surgery.  Hold Ibuprofen (Advil, Motrin) for 1 day before surgery--unless otherwise directed by surgeon.  Hold Naproxen (Aleve) for 4 days before surgery.      -  DO NOT take the following medications the day of surgery:  Probiotic    -  PLEASE TAKE the following medications the day of surgery   Acetaminophen (Tylenol) if needed    Do 2 Fleets enemas prior to arrival the day of surgery.    How do I prepare myself?  - Please take 2 showers before surgery using Scrubcare or Hibiclens soap.    Use this soap only from the neck to your toes.     Leave the soap on your skin for one minute--then rinse  thoroughly.      You may use your own shampoo and conditioner; no other hair products.   - Please remove all jewelry and body piercings.  - No lotions, deodorants or fragrance.  - Bring your ID and insurance card.    -If you have a Deep Brain Stimulator, a Spinal Cord Stimulator or any implanted Neuro Device you must bring the remote to the Surgery Center         ALL PATIENTS ARE REQUIRED TO HAVE A RESPONSIBLE ADULT TO DRIVE AND BE IN ATTENDANCE WITH THEM FOR 24 HOURS FOLLOWING SURGERY       Questions or Concerns:    -For questions regarding the day of surgery please contact the Ambulatory Surgery Center at [phone].    -If you have health changes between today and your surgery please contact your surgeon.     - For questions after surgery please contact your surgeon's office     For questions after surgery contact your surgeon

## 2022-06-23 NOTE — H&P
Pre-Operative H & P     CC:  Preoperative exam to assess for increased cardiopulmonary risk while undergoing surgery and anesthesia.    Date of Encounter: 6/23/2022  Primary Care Physician:  Philippe Healy     Reason for Visit: Rectal cancer (H)    HPI  Louie Greco is a 62 year old male who presents for pre-operative H & P in preparation for  Procedure Information     Case: [id number] Date/Time: 07/01/22 0915    Procedure: SIGMOIDOSCOPY, FLEXIBLE (N/A Anus)    Anesthesia type: Monitor Anesthesia Care    Diagnosis: Rectal cancer (H) [C20]    Pre-op diagnosis: Rectal cancer (H) [C20]    Location: [place] [address] / [facility]    Providers: Felicitas Radford MD          Patient is being evaluated for comorbid conditions of B/L kidney stones, neuropathy.    Mr. Greco has a history of rectal adenocarcinoma, diagnosed in 2016. He is s/p neoadjuvant chemoradiation with capecitabine on 8/8/16 and completed on 9/15/16. He completed FOLFOX x 8 cycles 1/16/17-5/9/17.  He now presents for the above surveillance procedure.     History was obtained from patient & chart review.    Hx of abnormal bleeding or anti-platelet use: denies      Past Medical History  Past Medical History:   Diagnosis Date     Childhood asthma      Elevated prostate specific antigen (PSA)     No biopsy done (had rectal cancer at the time)     Epididymitis, bilateral     18 years old     Inguinal hernia      Kidney stone on left side 04/22/2021    Added automatically from request for surgery [id number]     Mumps      Nephrolithiasis     Several -- 1990 first,recurrence 2019, 2021     Rectal cancer (H) 2017    low rectal cancer, S/P Rad adn Chemo, no surg needed     Right 4 mm Kidney stone at [facility]  02/28/2021     Right ureteral stone 03/11/2021    Added automatically from request for surgery [id number]     Fannie        Past Surgical History  Past Surgical History:   Procedure Laterality Date     COLONOSCOPY  N/A 7/27/2016    Procedure: COMBINED COLONOSCOPY, SINGLE OR MULTIPLE BIOPSY/POLYPECTOMY BY BIOPSY;  Surgeon: Chelsea Thompson MD;  [facility]     COLONOSCOPY N/A 9/13/2017    Procedure: COLONOSCOPY;;  Surgeon: Felicitas Radford MD;  [facility]     COLONOSCOPY N/A 12/13/2017    Procedure: COLONOSCOPY;;  Surgeon: Felicitas Radford MD;  [facility]     COLONOSCOPY N/A 10/23/2020    Procedure: COLONOSCOPY;  Surgeon: Felicitas Radford MD;  [facility]     COMBINED CYSTOSCOPY, RETROGRADES, URETEROSCOPY, LASER HOLMIUM LITHOTRIPSY URETER(S), INSERT STENT Left 2/16/2018    Procedure: COMBINED CYSTOSCOPY, RETROGRADES, URETEROSCOPY, LASER HOLMIUM LITHOTRIPSY URETER(S), INSERT STENT;  Cysto, left ureteroscopy, holmium laser, retrogrades, stent placement;  Surgeon: Bola Worthy MD;  [facility]     COMBINED CYSTOSCOPY, RETROGRADES, URETEROSCOPY, LASER HOLMIUM LITHOTRIPSY URETER(S), INSERT STENT Right 3/22/2021    Procedure: CYSTOSCOPY WITH RIGHT RETROGRADE PYELOGRAM, RIGHT URETEROSCOPY WITH LASER LITHOTRIPSY AND BASKET REMOVAL OF STONE, RIGHT URETERAL STENT PLACEMENT;  Surgeon: Mohsen Pat MD;  [facility]     COMBINED CYSTOSCOPY, RETROGRADES, URETEROSCOPY, LASER HOLMIUM LITHOTRIPSY URETER(S), INSERT STENT Left 5/19/2021    Procedure: CYSTOSCOPY, LEFT RETROGRADE PYELOGRAM, LEFT DIAGNOSTIC, URETEROSCOPY, LEFT URETERAL STENT PLACEMENT;  Surgeon: Mohsen Pat MD;  [facility]     COMBINED CYSTOSCOPY, RETROGRADES, URETEROSCOPY, LASER HOLMIUM LITHOTRIPSY URETER(S), INSERT STENT Left 6/23/2021    Procedure: CYSTOSCOPY, LEFT URETERAL STENT REMOVAL, LEFT RETROGRADE PYELOGRAM, LEFT URETEROSCOPY WITH LASER LITHOTRIPSY AND BASKET REMOVAL OF STONES, LEFT URETERAL STENT PLACEMENT;  Surgeon: Mohsen Pat MD;  [facility]     CYSTOSCOPY, LITHOLAPAXY, COMBINED N/A 3/1/2021    Procedure: Cystoscopy, litholapaxy, combined;  Surgeon: Mohsen Pat MD;   [facility]     CYSTOSCOPY, RETROGRADES, INSERT STENT URETER(S), COMBINED Right 3/1/2021    Procedure: Cystoscopy, retrogrades, insert stent ureter(s), combined;  Surgeon: Mohsen Pat MD;  [facility]     EXAM UNDER ANESTHESIA ANUS N/A 7/5/2017    Procedure: EXAM UNDER ANESTHESIA ANUS;  Examination Under Anesthesia, flex sigmoidoscopy with biopsies and formalin application;  Surgeon: Felicitas Radford MD;  [facility]     EXAM UNDER ANESTHESIA ANUS N/A 9/13/2017    Procedure: EXAM UNDER ANESTHESIA ANUS;  Examination Under Anesthesia Anus, Colonoscopy, application of formalin;  Surgeon: Felicitas Radford MD;  [facility]     EXAM UNDER ANESTHESIA ANUS N/A 12/13/2017    Procedure: EXAM UNDER ANESTHESIA ANUS;  Examination Under Anesthesia Anus, Colonoscopy;  Surgeon: Felicitas Radford MD;  [facility]     EXAM UNDER ANESTHESIA ANUS N/A 5/11/2018    Procedure: EXAM UNDER ANESTHESIA ANUS;  Examination Under Anesthesia Anus, Interoperative Flexible Sigmoidoscopy, Application of Formalin;  Surgeon: Felicitas Radford MD;  [facility]     EXAM UNDER ANESTHESIA ANUS N/A 7/20/2018    Procedure: EXAM UNDER ANESTHESIA ANUS;  Examination Under Anesthesia Anus, Flexible Sigmoidoscopy ;  Surgeon: Felicitas Radford MD;  [facility]     EXAM UNDER ANESTHESIA ANUS N/A 11/30/2018    Procedure: Examination Under Anesthesia, application of formalin to rectum, polypectomy;  Surgeon: Felicitas Radford MD;  [facility]     EXAM UNDER ANESTHESIA ANUS N/A 2/22/2019    Procedure: Examination Under Anesthesia;  Surgeon: Felicitas Radford MD;  [facility]     EXAM UNDER ANESTHESIA ANUS N/A 6/28/2019    Procedure: Examination Under Anesthesia;  Surgeon: Felicitas Radford MD;  [facility]     EXAM UNDER ANESTHESIA ANUS N/A 11/1/2019    Procedure: Examination Under Anesthesia;  Surgeon: Felicitas Radford MD;  [facility]      EXAM UNDER ANESTHESIA RECTUM N/A 10/23/2020    Procedure: Exam under anesthesia rectum;  Surgeon: Felicitas Radford MD;  [facility]     HC TOOTH EXTRACTION W/FORCEP       LAPAROSCOPIC APPENDECTOMY N/A 7/17/2017    Procedure: LAPAROSCOPIC APPENDECTOMY;  LAPAROSCOPIC APPENDECTOMY;  Surgeon: Bryson Ferguson MD;  [facility]     LASER HOLMIUM LITHOTRIPSY URETER(S), INSERT STENT, COMBINED Right 3/1/2021    Procedure: CYSTOURETEROSCOPY,  URETERAL STENT INSERTION, RIGHT RETROGRADE;  Surgeon: Mohsen Pat MD;  [facility]     SIGMOIDOSCOPY FLEXIBLE N/A 12/8/2016    Procedure: SIGMOIDOSCOPY FLEXIBLE;  Surgeon: Felicitas Radford MD;  [facility]     SIGMOIDOSCOPY FLEXIBLE N/A 7/5/2017    Procedure: SIGMOIDOSCOPY FLEXIBLE;;  Surgeon: Felicitas Radford MD;  [facility]     SIGMOIDOSCOPY FLEXIBLE N/A 5/11/2018    Procedure: SIGMOIDOSCOPY FLEXIBLE;;  Surgeon: Felicitas Radford MD;  [facility]     SIGMOIDOSCOPY FLEXIBLE N/A 7/20/2018    Procedure: SIGMOIDOSCOPY FLEXIBLE;;  Surgeon: Felicitas Radford MD;  [facility]     SIGMOIDOSCOPY FLEXIBLE N/A 11/30/2018    Procedure: Flexible Sigmoidoscopy;  Surgeon: Felicitas Radford MD;  [facility]     SIGMOIDOSCOPY FLEXIBLE N/A 2/22/2019    Procedure: Intraoperative Flexible Sigmoidoscopy, Application of Formalin to rectum;  Surgeon: Felicitas Radford MD;  [facility]     SIGMOIDOSCOPY FLEXIBLE N/A 6/28/2019    Procedure: Flexible Sigmoidoscopy;  Surgeon: Felicitas Radford MD;  [facility]     SIGMOIDOSCOPY FLEXIBLE N/A 11/1/2019    Procedure: Flexible Sigmoidoscopy;  Surgeon: Felicitas Radford MD;  [facility]     SIGMOIDOSCOPY FLEXIBLE N/A 7/23/2021    Procedure: SIGMOIDOSCOPY, FLEXIBLE;  Surgeon: Felicitas Radford MD;  [facility]     TESTICLE SURGERY       VASCULAR SURGERY      Right chest port     VASECTOMY       VASECTOMY         Prior  to Admission Medications  Current Outpatient Medications   Medication Sig Dispense Refill     Probiotic Product (PROBIOTIC PO) Take by mouth every morning       clotrimazole (LOTRIMIN) 1 % external solution Apply topically 2 times daily Profile Rx: patient will contact pharmacy when needed (Patient not taking: Reported on 4/19/2022) 29.57 mL 3     mupirocin (BACTROBAN) 2 % external ointment Apply topically 3 times daily (Patient not taking: No sig reported) 30 g 2     Na Sulfate-K Sulfate-Mg Sulf (SUPREP BOWEL PREP KIT) solution Take 177 mLs (1 Bottle) by mouth See Admin Instructions Split dose 2 day Regimen: The evening before you procedure: dilute one bottle with water to a total volume of 16 oz. (up to fill line).  At 6 pm, drink the entire amount.  Drink 32 ounces of water over the next hour. The morning of your procedure repeat both steps above using the second bottle.  Start five hours before you procedure and complete the prep at least three hours before you arrive. 354 mL 0     ofloxacin (FLOXIN) 0.3 % otic solution Place 5 drops Into the left ear daily (Patient not taking: Reported on 4/19/2022) 10 mL 3     Simethicone 125 MG TABS Take 1 tablet after finishing second half of Suprep with half a glass of water. 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)      Social Determinants of Health     Financial Resource Strain: Not on file   Food Insecurity: Not on file   Transportation Needs: Not on file   Physical Activity: Not on file   Stress: Not on file   Social Connections: Not on file   Intimate Partner Violence: Not on file   Housing Stability: Not on file       Family History  Family History   Problem Relation Age of Onset     Colon Polyps Mother         Number and type of polyps unknown     Chronic Obstructive Pulmonary Disease Father      Hypertension Father      Hyperlipidemia Father      Cancer Brother         primary of unknown origin     Other Cancer Brother      Family History Negative Brother         negative colonoscopy     Diabetes Maternal Grandfather      Hypertension Paternal Grandmother      Hyperlipidemia Paternal Grandmother      Stomach Cancer Other 63        maternal great grandfather     Glaucoma No family hx of      Macular Degeneration No family hx of      Anesthesia Reaction No family hx of      Deep Vein Thrombosis (DVT) No family hx of        Review of Systems  The complete review of systems is negative other than noted in the HPI or here.   Anesthesia Evaluation   Pt has had prior anesthetic.     No history of anesthetic complications       ROS/MED HX  ENT/Pulmonary:     (+) allergic rhinitis,  (-) tobacco use, asthma and sleep apnea   Neurologic:     (+) peripheral neuropathy,  (-) no seizures and no CVA   Cardiovascular:       METS/Exercise Tolerance: 4 - Raking leaves, gardening    Hematologic:  - neg hematologic  ROS  (-) history of blood clots and history of blood transfusion   Musculoskeletal:  - neg musculoskeletal ROS     GI/Hepatic: Comment: Hx rectal cancer 2016   (-) GERD and liver disease   Renal/Genitourinary:     (+)  Nephrolithiasis ,  (-) renal disease   Endo:  - neg endo ROS  (-) Type II DM   Psychiatric/Substance Use:  - neg psychiatric ROS     Infectious Disease:  - neg infectious disease ROS     Malignancy:   (+) Malignancy, History of GI.GI CA  Remission status post Chemo and Radiation.        Other:  - neg other ROS          Virtual visit -  No vitals were obtained    Physical Exam  Constitutional: Awake, alert, cooperative, no apparent distress, and appears stated age.  HENT: Normocephalic  Respiratory: non labored breathing   Neurologic: Awake, alert, oriented to name, place and time.   Neuropsychiatric: Calm, cooperative. Normal affect.      Prior Labs/Diagnostic Studies   All labs and imaging personally reviewed       The patient's records and results personally reviewed by this provider.     PET and CT 7/29/2021  IMPRESSION: In this patient with history of rectal carcinoma status  post chemoradiation there is continued complete response:   1. No evidence for local or metastatic recurrent disease.  2. Redemonstration of focal uptake at the anus, nonspecific, may  represent inflammation and/or hemorrhoids.  3. Incidental findings as noted in the body of the report.    ** Patient states he will perform home COVID test on 6/29/22.    Assessment      Louie Greco is a 62 year old male seen as a PAC referral for risk assessment and optimization for anesthesia.    Plan/Recommendations  Pt will be optimized for the proposed procedure.  See below for details on the assessment, risk, and preoperative recommendations    NEUROLOGY  - No history of TIA, CVA or seizure    -Post Op delirium risk factors:  No risk identified    ENT  - No current airway concerns.  Will need to be reassessed day of surgery.  Mallampati: Unable to assess  TM: > 3    CARDIAC  - No history of CAD, Hypertension and Afib  - METS (Metabolic Equivalents)  Patient performs 4 or more METS exercise without symptoms            Total Score: 0      RCRI-Very low  "risk: Class 1 0.4% complication rate            Total Score: 0        PULMONARY  BENNETT Low Risk            Total Score: 2    BENNETT: Over 50 ys old    BENNETT: Male      - Denies asthma or inhaler use  - Tobacco History      History   Smoking Status     Never Smoker   Smokeless Tobacco     Never Used       GI  - Denies GERD   - Rectal cancer 2016, s/p chemoradiation    PONV Low Risk  Total Score: 1           1 AN PONV: Patient is not a current smoker        /RENAL  - B/L kidney stones    ENDOCRINE    - BMI: Estimated body mass index is 30.99 kg/m  as calculated from the following:    Height as of 3/15/22: 1.778 m (5' 10\").    Weight as of 3/15/22: 98 kg (216 lb).  Obesity (BMI >30)  - No history of Diabetes Mellitus    HEME  VTE Low Risk 0.5%            Total Score: 3    VTE: Greater than 59 yrs old    VTE: Male      - No history of abnormal bleeding or antiplatelet use.      MSK  Patient is NOT Frail            Total Score: 0          The patient is optimized for their procedure. AVS with information on surgery time/arrival time, meds and NPO status given by nursing staff. No further diagnostic testing indicated.    Please refer to the physical examination documented by the anesthesiologist in the anesthesia record on the day of surgery.    Final plan per anesthesiologist on day of surgery.    Phone-Visit Details    Type of service:  Phone Visit  * Visit switched to phone due to difficulty connecting via [employer] despite repeated attempts.    Patient verbally consented to video service today: YES    Video Start Time: 1315  Phone End Time (time video stopped): 1336    Originating Location (pt. Location): Home    Distant Location (provider location):  [facility] PREOPERATIVE ASSESSMENT [facility]     Mode of Communication:  Video Conference via [employer]  On the day of service:     Prep time: 12 minutes  Visit time: 21 minutes  Documentation time: 10 minutes  ------------------------------------------  Total time: 43 " minutes      Aleyda Hernandez PA-C  Preoperative Assessment Center  [facility]  Clinic and Surgery Center  Phone: [phone]  Fax: [phone]

## 2022-06-24 ENCOUNTER — TELEPHONE (OUTPATIENT)
Dept: OPHTHALMOLOGY | Facility: CLINIC | Age: 62
End: 2022-06-24

## 2022-06-24 NOTE — TELEPHONE ENCOUNTER
Spoke to pt at 1315    Bothersome floaters since 2016 when had posterior vitreous detachment    Maybe more floater in recent months    No new flashing   No vision changes    Scheduled exam with Dr. Smalls on Monday    Pt aware of date/time/location/duration/hospital based clinic    Jan Carranza RN 1:18 PM 06/24/22            [facility]    Phone Message    May a detailed message be left on voicemail: yes     Reason for Call: Symptoms or Concerns     If patient has red-flag symptoms, warm transfer to triage line    Current symptom or concern: pt was last seen by Dr. Smalls in 2016 and is still experiencing floaters that are sometimes painful and typically get worse later in the day.     Symptoms have been present for:  6 years(s)    Has patient previously been seen for this? Yes    By : Dr. Smalls    Date: 2016    Are there any new or worsening symptoms? Yes: Pt says that he believes the floaters have gotten worse but for sure have not gotten any better or gone away at all.    Per protocols, please call pt to discuss options for being seen    Thank you       Action Taken: Message routed to:  [facility] ([facility]): eye    Travel Screening: Not Applicable

## 2022-06-27 ENCOUNTER — OFFICE VISIT (OUTPATIENT)
Dept: OPHTHALMOLOGY | Facility: CLINIC | Age: 62
End: 2022-06-27
Attending: OPHTHALMOLOGY
Payer: COMMERCIAL

## 2022-06-27 DIAGNOSIS — H43.812 PVD (POSTERIOR VITREOUS DETACHMENT), LEFT EYE: ICD-10-CM

## 2022-06-27 DIAGNOSIS — H40.003 GLAUCOMA SUSPECT OF BOTH EYES: Primary | ICD-10-CM

## 2022-06-27 PROCEDURE — 92133 CPTRZD OPH DX IMG PST SGM ON: CPT | Performed by: OPHTHALMOLOGY

## 2022-06-27 PROCEDURE — 92015 DETERMINE REFRACTIVE STATE: CPT

## 2022-06-27 PROCEDURE — 99204 OFFICE O/P NEW MOD 45 MIN: CPT | Performed by: OPHTHALMOLOGY

## 2022-06-27 PROCEDURE — G0463 HOSPITAL OUTPT CLINIC VISIT: HCPCS | Mod: 25

## 2022-06-27 ASSESSMENT — REFRACTION_MANIFEST
OD_SPHERE: -4.00
OD_CYLINDER: +0.50
OS_SPHERE: -2.00
OS_CYLINDER: +0.50
OD_SPHERE: -2.25
OD_CYLINDER: +0.50
OS_AXIS: 127
OS_AXIS: 127
OS_SPHERE: -3.75
OS_CYLINDER: +0.50
OD_AXIS: 180
OD_AXIS: 180

## 2022-06-27 ASSESSMENT — VISUAL ACUITY
CORRECTION_TYPE: GLASSES
OD_CC: 20/20
OS_PH_CC+: -1
OS_CC: 20/30
OS_CC+: -2
METHOD: SNELLEN - LINEAR
OS_PH_CC: 20/20

## 2022-06-27 ASSESSMENT — CONF VISUAL FIELD
OS_NORMAL: 1
OD_NORMAL: 1
METHOD: COUNTING FINGERS

## 2022-06-27 ASSESSMENT — EXTERNAL EXAM - LEFT EYE: OS_EXAM: NORMAL

## 2022-06-27 ASSESSMENT — TONOMETRY
IOP_METHOD: TONOPEN
OD_IOP_MMHG: 17
OS_IOP_MMHG: 19

## 2022-06-27 ASSESSMENT — REFRACTION_WEARINGRX
OS_AXIS: 098
OS_SPHERE: -4.25
OD_SPHERE: -4.00
SPECS_TYPE: SVL
OD_CYLINDER: +0.25
OD_AXIS: 025
OS_CYLINDER: +0.50

## 2022-06-27 ASSESSMENT — CUP TO DISC RATIO
OS_RATIO: 0.4
OD_RATIO: 0.45

## 2022-06-27 ASSESSMENT — EXTERNAL EXAM - RIGHT EYE: OD_EXAM: NORMAL

## 2022-06-27 ASSESSMENT — SLIT LAMP EXAM - LIDS
COMMENTS: NORMAL
COMMENTS: NORMAL

## 2022-06-27 NOTE — NURSING NOTE
Chief Complaints and History of Present Illnesses   Patient presents with     COMPREHENSIVE EYE EXAM     Chief Complaint(s) and History of Present Illness(es)     COMPREHENSIVE EYE EXAM     Laterality: both eyes    Associated symptoms: eye pain (left eye) and floaters.  Negative for dryness and flashes    Treatments tried: no treatments    Pain scale: 2/10              Comments     He states that about 6 years ago he started noticing floaters.  He is now bothered by floaters in each eye, worse in his left eye.  His vision seems fuzzy in each eye due to the floaters.  His left eye has intermittent pain like FB sensation.      Bessie Huertas, COT 2:58 PM  June 27, 2022

## 2022-06-27 NOTE — Clinical Note
Gretchen this patient has a large symptomatic floater left eye and an Epiretinal membrane.  Can you do a Pars plana vitrectomy/MP  thanks  penelope

## 2022-06-27 NOTE — PROGRESS NOTES
I have confirmed the patient's and reviewed Past Medical History, Past Surgical History, Social History, Family History, Problem List, Medication List and agree with Tech note.     CC: floaters left eye      HPI: for several days, question if related to chamo for rectal cancer     Assessment/plan:   1.  Posterior vitreous detachment (PVD)  left eye in 2016 and Epiretinal membrane    - symptomatic, refer to vr for Pars plana vitrectomy/mp              -     2. Subjective visual disturbance right eye    - retinal nerve fiber layer both eyes today normal   - Vitreomacular traction right eye         RTC refer to Gretchen Francois MD for PPV/MP OS      Yara Rock MD PhD.  Professor & Chair

## 2022-07-01 ENCOUNTER — HOSPITAL ENCOUNTER (OUTPATIENT)
Facility: AMBULATORY SURGERY CENTER | Age: 62
Discharge: HOME | End: 2022-07-01
Attending: COLON & RECTAL SURGERY
Payer: COMMERCIAL

## 2022-07-01 ENCOUNTER — ANESTHESIA (OUTPATIENT)
Dept: SURGERY | Facility: AMBULATORY SURGERY CENTER | Age: 62
End: 2022-07-01
Payer: COMMERCIAL

## 2022-07-01 VITALS
HEART RATE: 85 BPM | HEIGHT: 70 IN | OXYGEN SATURATION: 98 % | DIASTOLIC BLOOD PRESSURE: 67 MMHG | WEIGHT: 210 LBS | TEMPERATURE: 97.5 F | BODY MASS INDEX: 30.06 KG/M2 | SYSTOLIC BLOOD PRESSURE: 112 MMHG | RESPIRATION RATE: 14 BRPM

## 2022-07-01 PROCEDURE — 45330 DIAGNOSTIC SIGMOIDOSCOPY: CPT | Performed by: COLON & RECTAL SURGERY

## 2022-07-01 PROCEDURE — 45330 DIAGNOSTIC SIGMOIDOSCOPY: CPT

## 2022-07-01 RX ORDER — ONDANSETRON 2 MG/ML
4 INJECTION INTRAMUSCULAR; INTRAVENOUS ONCE
Status: DISCONTINUED | OUTPATIENT
Start: 2022-07-01 | End: 2022-07-02 | Stop reason: HOSPADM

## 2022-07-01 RX ORDER — PROPOFOL 10 MG/ML
INJECTION, EMULSION INTRAVENOUS CONTINUOUS PRN
Status: DISCONTINUED | OUTPATIENT
Start: 2022-07-01 | End: 2022-07-01

## 2022-07-01 RX ORDER — ACETAMINOPHEN 325 MG/1
975 TABLET ORAL ONCE
Status: DISCONTINUED | OUTPATIENT
Start: 2022-07-01 | End: 2022-07-02 | Stop reason: HOSPADM

## 2022-07-01 RX ORDER — LIDOCAINE HYDROCHLORIDE 20 MG/ML
INJECTION, SOLUTION INFILTRATION; PERINEURAL PRN
Status: DISCONTINUED | OUTPATIENT
Start: 2022-07-01 | End: 2022-07-01

## 2022-07-01 RX ORDER — PROPOFOL 10 MG/ML
INJECTION, EMULSION INTRAVENOUS PRN
Status: DISCONTINUED | OUTPATIENT
Start: 2022-07-01 | End: 2022-07-01

## 2022-07-01 RX ADMIN — PROPOFOL 50 MG: 10 INJECTION, EMULSION INTRAVENOUS at 09:35

## 2022-07-01 RX ADMIN — LIDOCAINE HYDROCHLORIDE 60 MG: 20 INJECTION, SOLUTION INFILTRATION; PERINEURAL at 09:35

## 2022-07-01 RX ADMIN — PROPOFOL 200 MCG/KG/MIN: 10 INJECTION, EMULSION INTRAVENOUS at 09:35

## 2022-07-01 ASSESSMENT — ENCOUNTER SYMPTOMS: SEIZURES: 0

## 2022-07-01 ASSESSMENT — LIFESTYLE VARIABLES: TOBACCO_USE: 0

## 2022-07-01 NOTE — DISCHARGE INSTRUCTIONS
Discharge Instructions after Sigmoidoscopy       Today you had a  Sigmoidoscopy       Activity and Diet   You were given medicine for pain. You may be dizzy or sleepy.   For 24 hours:    Do not drive or use heavy equipment.    Do not make important decisions.    Do not drink any alcohol.   You may return to your normal diet and medicines.       Discomfort    Air was placed in your colon during the exam in order to see it. Walking helps to pass the air.    You may take Tylenol (acetaminophen) for pain unless your doctor has told you not to.   Do not take aspirin or ibuprofen (Advil, Motrin, or other anti-inflammatory   drugs) for _____ days.       Follow-up   ____ We took small tissue samples or polyps to study. Your doctor will call you with the results within two weeks.       When to call:       Call right away if you have:    Unusual pain in belly or chest pain not relieved with passing air.    More than 1 to 2 Tablespoons of bleeding from your rectum.    Fever above 100.6  F (37.5  C).       If you have severe pain, bleeding, or shortness of breath, go to an emergency room.       If you have questions, call:   Monday to Friday, 7 a.m. to 4:30 p.m.   Endoscopy: [phone] (We may have to call you back)       After hours   Hospital: [phone] (Ask for the GI fellow on call)

## 2022-07-01 NOTE — ANESTHESIA POSTPROCEDURE EVALUATION
Patient: Louie Greco    Procedure: Procedure(s):  SIGMOIDOSCOPY, FLEXIBLE, EUA       Anesthesia Type:  MAC    Note:  Disposition: Outpatient   Postop Pain Control: Uneventful            Sign Out: Well controlled pain   PONV: No   Neuro/Psych: Uneventful            Sign Out: Acceptable/Baseline neuro status   Airway/Respiratory: Uneventful            Sign Out: Acceptable/Baseline resp. status   CV/Hemodynamics: Uneventful            Sign Out: Acceptable CV status; No obvious hypovolemia; No obvious fluid overload   Other NRE: NONE   DID A NON-ROUTINE EVENT OCCUR? No           Last vitals:  Vitals Value Taken Time   /67 07/01/22 1010   Temp 36.4  C (97.5  F) 07/01/22 1010   Pulse     Resp 14 07/01/22 1010   SpO2 98 % 07/01/22 1010       Electronically Signed By: Lonny Garcia MD  July 1, 2022  10:37 AM

## 2022-07-01 NOTE — OP NOTE
SURGEON: Felciitas Radford MD      ASSISTANT: None     PREOPERATIVE DIAGNOSIS: History of rectal cancer status post total neoadjuvant treatment on watch and wait protocol, due for examination.     POSTOPERATIVE DIAGNOSIS: History of rectal cancer status post total neoadjuvant treatment on watch and wait protocol, due for examination     PROCEDURE: Examination under anesthesia and flexible sigmoidoscopy.     INDICATIONS: Louie Greco is a 62 year old male who was diagnosed with rectal cancer 7/2016 stages as [id number] (stage IIIA) and underwent chemoradiotherapy with complete response followed by FOLFOX on watch and wait protocol. He is due for both examination of the region and flexible sigmoidoscopy. The risks and benefits of surgery were thoroughly discussed with the patient and he agreed to proceed.     DESCRIPTION OF PROCEDURE: The patient was brought to the operating room, placed in left lateral decubitus position on the cart. Moderate sedation was induced with intravenous medicines and continuous pulse oxymetry monitoring with heart rate and frequent blood pressures. We performed digital rectal examination and anoscopy which demonstrated a stenotic anal canal and introduction of the small anoscope resulted in some mild tearing of the anal canal. There were no palpable abnormalities on digital rectal examination and the prostate by palpation was normal. There was no nodularity or induration.  A flexible sigmoidoscopy with CO2 using a pediatric colonoscope was then performed and the scope was advanced to the descending colon at 50 cm without difficulty.  There was diverticulosis and no signs of divertciulitis.  There were very minimal radiation changes which were substantially improved and the scar was visible, and no signs of disease or additional lesions.  At the end the case, all instruments and sponge counts were correct x2. There was a fissure posteriorly likely from the limited examination performed.  Retroflexion was not performed. The patient was emerged from anesthesia and taken to postoperative anesthesia care unit in good condition.      SPECIMEN: None.         AROLDO NAIK MD   Colon and Rectal Surgery Staff  [facility]

## 2022-07-01 NOTE — ANESTHESIA PREPROCEDURE EVALUATION
Pre-Operative H & P     CC:  Preoperative exam to assess for increased cardiopulmonary risk while undergoing surgery and anesthesia.    Date of Encounter: 6/23/2022  Primary Care Physician:  Philippe Healy     Reason for Visit: Rectal cancer (H)    HPI  Louie Greco is a 62 year old male who presents for pre-operative H & P in preparation for  Procedure Information     Case: [id number] Anesthesia Start Date/Time: 07/01/22 0932    Procedure: SIGMOIDOSCOPY, FLEXIBLE (N/A Anus)    Anesthesia type: Monitor Anesthesia Care    Diagnosis: Rectal cancer (H) [C20]    Pre-op diagnosis: Rectal cancer (H) [C20]    Location: [place] [address] / [facility]    Providers: Felicitas Radford MD          Patient is being evaluated for comorbid conditions of B/L kidney stones, neuropathy.    Mr. Greco has a history of rectal adenocarcinoma, diagnosed in 2016. He is s/p neoadjuvant chemoradiation with capecitabine on 8/8/16 and completed on 9/15/16. He completed FOLFOX x 8 cycles 1/16/17-5/9/17.  He now presents for the above surveillance procedure.     History was obtained from patient & chart review.    Hx of abnormal bleeding or anti-platelet use: denies      Past Medical History  Past Medical History:   Diagnosis Date     Childhood asthma      Elevated prostate specific antigen (PSA)     No biopsy done (had rectal cancer at the time)     Epididymitis, bilateral     18 years old     Inguinal hernia      Kidney stone on left side 04/22/2021    Added automatically from request for surgery [id number]     Mumps      Nephrolithiasis     Several -- 1990 first,recurrence 2019, 2021     Rectal cancer (H) 2017    low rectal cancer, S/P Rad adn Chemo, no surg needed     Right 4 mm Kidney stone at [facility]  02/28/2021     Right ureteral stone 03/11/2021    Added automatically from request for surgery [id number]     Fannie        Past Surgical History  Past Surgical History:   Procedure Laterality Date      COLONOSCOPY N/A 7/27/2016    Procedure: COMBINED COLONOSCOPY, SINGLE OR MULTIPLE BIOPSY/POLYPECTOMY BY BIOPSY;  Surgeon: Chelesa Thompson MD;  [facility]     COLONOSCOPY N/A 9/13/2017    Procedure: COLONOSCOPY;;  Surgeon: Felicitas Radford MD;  [facility]     COLONOSCOPY N/A 12/13/2017    Procedure: COLONOSCOPY;;  Surgeon: Felicitas Radford MD;  [facility]     COLONOSCOPY N/A 10/23/2020    Procedure: COLONOSCOPY;  Surgeon: Felicitas Radford MD;  [facility]     COMBINED CYSTOSCOPY, RETROGRADES, URETEROSCOPY, LASER HOLMIUM LITHOTRIPSY URETER(S), INSERT STENT Left 2/16/2018    Procedure: COMBINED CYSTOSCOPY, RETROGRADES, URETEROSCOPY, LASER HOLMIUM LITHOTRIPSY URETER(S), INSERT STENT;  Cysto, left ureteroscopy, holmium laser, retrogrades, stent placement;  Surgeon: Bola Worthy MD;  [facility]     COMBINED CYSTOSCOPY, RETROGRADES, URETEROSCOPY, LASER HOLMIUM LITHOTRIPSY URETER(S), INSERT STENT Right 3/22/2021    Procedure: CYSTOSCOPY WITH RIGHT RETROGRADE PYELOGRAM, RIGHT URETEROSCOPY WITH LASER LITHOTRIPSY AND BASKET REMOVAL OF STONE, RIGHT URETERAL STENT PLACEMENT;  Surgeon: Mohsen Pat MD;  [facility]     COMBINED CYSTOSCOPY, RETROGRADES, URETEROSCOPY, LASER HOLMIUM LITHOTRIPSY URETER(S), INSERT STENT Left 5/19/2021    Procedure: CYSTOSCOPY, LEFT RETROGRADE PYELOGRAM, LEFT DIAGNOSTIC, URETEROSCOPY, LEFT URETERAL STENT PLACEMENT;  Surgeon: Mohsen Pat MD;  [facility]     COMBINED CYSTOSCOPY, RETROGRADES, URETEROSCOPY, LASER HOLMIUM LITHOTRIPSY URETER(S), INSERT STENT Left 6/23/2021    Procedure: CYSTOSCOPY, LEFT URETERAL STENT REMOVAL, LEFT RETROGRADE PYELOGRAM, LEFT URETEROSCOPY WITH LASER LITHOTRIPSY AND BASKET REMOVAL OF STONES, LEFT URETERAL STENT PLACEMENT;  Surgeon: Mohsen Pat MD;  [facility]     CYSTOSCOPY, LITHOLAPAXY, COMBINED N/A 3/1/2021    Procedure: Cystoscopy, litholapaxy, combined;  Surgeon:  Mohsen Pat MD;  [facility]     CYSTOSCOPY, RETROGRADES, INSERT STENT URETER(S), COMBINED Right 3/1/2021    Procedure: Cystoscopy, retrogrades, insert stent ureter(s), combined;  Surgeon: Mohsen Pat MD;  [facility]     EXAM UNDER ANESTHESIA ANUS N/A 7/5/2017    Procedure: EXAM UNDER ANESTHESIA ANUS;  Examination Under Anesthesia, flex sigmoidoscopy with biopsies and formalin application;  Surgeon: Felicitas Radford MD;  [facility]     EXAM UNDER ANESTHESIA ANUS N/A 9/13/2017    Procedure: EXAM UNDER ANESTHESIA ANUS;  Examination Under Anesthesia Anus, Colonoscopy, application of formalin;  Surgeon: Felicitas Radford MD;  [facility]     EXAM UNDER ANESTHESIA ANUS N/A 12/13/2017    Procedure: EXAM UNDER ANESTHESIA ANUS;  Examination Under Anesthesia Anus, Colonoscopy;  Surgeon: Felicitas Radford MD;  [facility]     EXAM UNDER ANESTHESIA ANUS N/A 5/11/2018    Procedure: EXAM UNDER ANESTHESIA ANUS;  Examination Under Anesthesia Anus, Interoperative Flexible Sigmoidoscopy, Application of Formalin;  Surgeon: Felicitas Radford MD;  [facility]     EXAM UNDER ANESTHESIA ANUS N/A 7/20/2018    Procedure: EXAM UNDER ANESTHESIA ANUS;  Examination Under Anesthesia Anus, Flexible Sigmoidoscopy ;  Surgeon: Feilcitas Radford MD;  [facility]     EXAM UNDER ANESTHESIA ANUS N/A 11/30/2018    Procedure: Examination Under Anesthesia, application of formalin to rectum, polypectomy;  Surgeon: Felicitas Radford MD;  [facility]     EXAM UNDER ANESTHESIA ANUS N/A 2/22/2019    Procedure: Examination Under Anesthesia;  Surgeon: Felicitas Radford MD;  [facility]     EXAM UNDER ANESTHESIA ANUS N/A 6/28/2019    Procedure: Examination Under Anesthesia;  Surgeon: Felicitas Radford MD;  [facility]     EXAM UNDER ANESTHESIA ANUS N/A 11/1/2019    Procedure: Examination Under Anesthesia;  Surgeon: Felicitas Radford  MD;  [facility]     EXAM UNDER ANESTHESIA RECTUM N/A 10/23/2020    Procedure: Exam under anesthesia rectum;  Surgeon: Felicitas Radford MD;  [facility]     HC TOOTH EXTRACTION W/FORCEP       LAPAROSCOPIC APPENDECTOMY N/A 7/17/2017    Procedure: LAPAROSCOPIC APPENDECTOMY;  LAPAROSCOPIC APPENDECTOMY;  Surgeon: Bryson Ferguson MD;  [facility]     LASER HOLMIUM LITHOTRIPSY URETER(S), INSERT STENT, COMBINED Right 3/1/2021    Procedure: CYSTOURETEROSCOPY,  URETERAL STENT INSERTION, RIGHT RETROGRADE;  Surgeon: Mohsen Pat MD;  [facility]     SIGMOIDOSCOPY FLEXIBLE N/A 12/8/2016    Procedure: SIGMOIDOSCOPY FLEXIBLE;  Surgeon: Felicitas Radford MD;  [facility]     SIGMOIDOSCOPY FLEXIBLE N/A 7/5/2017    Procedure: SIGMOIDOSCOPY FLEXIBLE;;  Surgeon: Felicitas Radford MD;  [facility]     SIGMOIDOSCOPY FLEXIBLE N/A 5/11/2018    Procedure: SIGMOIDOSCOPY FLEXIBLE;;  Surgeon: Felicitas Radford MD;  [facility]     SIGMOIDOSCOPY FLEXIBLE N/A 7/20/2018    Procedure: SIGMOIDOSCOPY FLEXIBLE;;  Surgeon: Felicitas Radford MD;  [facility]     SIGMOIDOSCOPY FLEXIBLE N/A 11/30/2018    Procedure: Flexible Sigmoidoscopy;  Surgeon: Felicitas Radford MD;  [facility]     SIGMOIDOSCOPY FLEXIBLE N/A 2/22/2019    Procedure: Intraoperative Flexible Sigmoidoscopy, Application of Formalin to rectum;  Surgeon: Felicitas Radford MD;  [facility]     SIGMOIDOSCOPY FLEXIBLE N/A 6/28/2019    Procedure: Flexible Sigmoidoscopy;  Surgeon: Felicitas Radford MD;  [facility]     SIGMOIDOSCOPY FLEXIBLE N/A 11/1/2019    Procedure: Flexible Sigmoidoscopy;  Surgeon: Felicitas Radford MD;  [facility]     SIGMOIDOSCOPY FLEXIBLE N/A 7/23/2021    Procedure: SIGMOIDOSCOPY, FLEXIBLE;  Surgeon: Felicitas Radford MD;  [facility]     TESTICLE SURGERY       VASCULAR SURGERY      Right chest port     VASECTOMY        VASECTOMY         Prior to Admission Medications  Current Outpatient Medications   Medication Sig Dispense Refill     clotrimazole (LOTRIMIN) 1 % external solution Apply topically 2 times daily Profile Rx: patient will contact pharmacy when needed (Patient not taking: Reported on 4/19/2022) 29.57 mL 3     mupirocin (BACTROBAN) 2 % external ointment Apply topically 3 times daily (Patient not taking: No sig reported) 30 g 2     Na Sulfate-K Sulfate-Mg Sulf (SUPREP BOWEL PREP KIT) solution Take 177 mLs (1 Bottle) by mouth See Admin Instructions Split dose 2 day Regimen: The evening before you procedure: dilute one bottle with water to a total volume of 16 oz. (up to fill line).  At 6 pm, drink the entire amount.  Drink 32 ounces of water over the next hour. The morning of your procedure repeat both steps above using the second bottle.  Start five hours before you procedure and complete the prep at least three hours before you arrive. 354 mL 0     ofloxacin (FLOXIN) 0.3 % otic solution Place 5 drops Into the left ear daily (Patient not taking: Reported on 4/19/2022) 10 mL 3     Probiotic Product (PROBIOTIC PO) Take by mouth every morning       Simethicone 125 MG TABS Take 1 tablet after finishing second half of Suprep with half a glass of water. 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)      Social Determinants of Health     Financial Resource Strain: Not on file   Food Insecurity: Not on file   Transportation Needs: Not on file   Physical Activity: Not on file   Stress: Not on file   Social Connections: Not on file   Intimate Partner Violence: Not on file   Housing Stability: Not on file       Family History  Family History   Problem Relation Age of Onset     Colon Polyps Mother         Number and type of polyps unknown     Chronic Obstructive Pulmonary Disease Father      Hypertension Father      Hyperlipidemia Father      Diabetes Maternal Grandfather      Macular Degeneration Paternal Grandmother      Hypertension Paternal Grandmother      Hyperlipidemia Paternal Grandmother      Cancer Brother         primary of unknown origin     Other Cancer Brother      Family History Negative Brother         negative colonoscopy     Stomach Cancer Other 63        maternal great grandfather     Glaucoma No family hx of      Anesthesia Reaction No family hx of      Deep Vein Thrombosis (DVT) No family hx of        Review of Systems  The complete review of systems is negative other than noted in the HPI or here.   Anesthesia Evaluation   Pt has had prior anesthetic.     No history of anesthetic complications       ROS/MED HX  ENT/Pulmonary:     (+) allergic rhinitis,  (-) tobacco use, asthma and sleep apnea   Neurologic:     (+) peripheral neuropathy,  (-) no seizures and no CVA   Cardiovascular:    (-) murmur   METS/Exercise Tolerance: 4 - Raking leaves, gardening    Hematologic:  - neg hematologic  ROS  (-) history of blood clots and history of blood transfusion   Musculoskeletal:  - neg musculoskeletal ROS     GI/Hepatic: Comment: Hx rectal cancer 2016   (-) GERD and liver  disease   Renal/Genitourinary:     (+) Nephrolithiasis ,  (-) renal disease   Endo:  - neg endo ROS  (-) Type II DM   Psychiatric/Substance Use:  - neg psychiatric ROS     Infectious Disease:  - neg infectious disease ROS     Malignancy:   (+) Malignancy, History of GI.GI CA  Remission status post Chemo and Radiation.        Other:  - neg other ROS          Virtual visit -  No vitals were obtained    Physical Exam  Constitutional: Awake, alert, cooperative, no apparent distress, and appears stated age.  HENT: Normocephalic  Respiratory: non labored breathing   Neurologic: Awake, alert, oriented to name, place and time.   Neuropsychiatric: Calm, cooperative. Normal affect.      Prior Labs/Diagnostic Studies   All labs and imaging personally reviewed       The patient's records and results personally reviewed by this provider.     PET and CT 7/29/2021  IMPRESSION: In this patient with history of rectal carcinoma status  post chemoradiation there is continued complete response:   1. No evidence for local or metastatic recurrent disease.  2. Redemonstration of focal uptake at the anus, nonspecific, may  represent inflammation and/or hemorrhoids.  3. Incidental findings as noted in the body of the report.    ** Patient states he will perform home COVID test on 6/29/22.    Assessment      Louie Greco is a 62 year old male seen as a PAC referral for risk assessment and optimization for anesthesia.    Plan/Recommendations  Pt will be optimized for the proposed procedure.  See below for details on the assessment, risk, and preoperative recommendations    NEUROLOGY  - No history of TIA, CVA or seizure    -Post Op delirium risk factors:  No risk identified    ENT  - No current airway concerns.  Will need to be reassessed day of surgery.  Mallampati: Unable to assess  TM: > 3    CARDIAC  - No history of CAD, Hypertension and Afib  - METS (Metabolic Equivalents)  Patient performs 4 or more METS exercise without symptoms          "   Total Score: 0      RCRI-Very low risk: Class 1 0.4% complication rate            Total Score: 0        PULMONARY  BENNETT Low Risk            Total Score: 2    BENNETT: Over 50 ys old    BENNETT: Male      - Denies asthma or inhaler use  - Tobacco History      History   Smoking Status     Never Smoker   Smokeless Tobacco     Never Used       GI  - Denies GERD   - Rectal cancer 2016, s/p chemoradiation    PONV Low Risk  Total Score: 1           1 AN PONV: Patient is not a current smoker        /RENAL  - B/L kidney stones    ENDOCRINE    - BMI: Estimated body mass index is 30.13 kg/m  as calculated from the following:    Height as of an earlier encounter on 7/1/22: 1.778 m (5' 10\").    Weight as of an earlier encounter on 7/1/22: 95.3 kg (210 lb).  Obesity (BMI >30)  - No history of Diabetes Mellitus    HEME  VTE Low Risk 0.5%            Total Score: 3    VTE: Greater than 59 yrs old    VTE: Male      - No history of abnormal bleeding or antiplatelet use.      MSK  Patient is NOT Frail            Total Score: 0          The patient is optimized for their procedure. AVS with information on surgery time/arrival time, meds and NPO status given by nursing staff. No further diagnostic testing indicated.    Please refer to the physical examination documented by the anesthesiologist in the anesthesia record on the day of surgery.    Final plan per anesthesiologist on day of surgery.    Phone-Visit Details    Type of service:  Phone Visit  * Visit switched to phone due to difficulty connecting via [employer] despite repeated attempts.    Patient verbally consented to video service today: YES    Video Start Time: 1315  Phone End Time (time video stopped): 1336    Originating Location (pt. Location): Home    Distant Location (provider location):  [facility] PREOPERATIVE [facility]     Mode of Communication:  Video Conference via [employer]  On the day of service:     Prep time: 12 minutes  Visit time: 21 minutes  Documentation time: " 10 minutes  ------------------------------------------  Total time: 43 minutes      Aleyda Hernandez PA-C  Preoperative Assessment Center  [facility]  Clinic and [facility] Center  Phone: [phone]  Fax: [phone]    Physical Exam    Airway        Mallampati: II   TM distance: > 3 FB   Neck ROM: full   Mouth opening: > 3 cm    Respiratory Devices and Support         Dental  no notable dental history         Cardiovascular          Rhythm and rate: regular and normal (-) no murmur    Pulmonary   pulmonary exam normal        breath sounds clear to auscultation             Anesthesia Plan    ASA Status:  2   NPO Status:  NPO Appropriate    Anesthesia Type: MAC.     - Reason for MAC: immobility needed   Induction: Intravenous, Propofol.   Maintenance: TIVA.        Consents    Anesthesia Plan(s) and associated risks, benefits, and realistic alternatives discussed. Questions answered and patient/representative(s) expressed understanding.    - Discussed:     - Discussed with:  Patient      - Extended Intubation/Ventilatory Support Discussed: No.      - Patient is DNR/DNI Status: No    Use of blood products discussed: No .     Postoperative Care    Pain management: IV analgesics.   PONV prophylaxis: Ondansetron (or other 5HT-3), Background Propofol Infusion     Comments:    Other Comments: Discussed plan for IV anesthetic with native airway. Discussed potential need for conversion to general anesthetic with airway management and potential for recall.         H&P reviewed: Unable to attach H&P to encounter due to EHR limitations. H&P Update: appropriate H&P reviewed, patient examined. No interval changes since H&P (within 30 days).

## 2022-07-01 NOTE — ANESTHESIA CARE TRANSFER NOTE
Patient: Louie Greco    Procedure: Procedure(s):  SIGMOIDOSCOPY, FLEXIBLE, EUA       Diagnosis: Rectal cancer (H) [C20]  Diagnosis Additional Information: No value filed.    Anesthesia Type:   MAC     Note:    Oropharynx: oropharynx clear of all foreign objects and spontaneously breathing  Level of Consciousness: drowsy  Oxygen Supplementation: room air    Independent Airway: airway patency satisfactory and stable  Dentition: dentition unchanged  Vital Signs Stable: post-procedure vital signs reviewed and stable  Report to RN Given: handoff report given  Patient transferred to: Phase II    Handoff Report: Identifed the Patient, Identified the Reponsible Provider, Reviewed the pertinent medical history, Discussed the surgical course, Reviewed Intra-OP anesthesia mangement and issues during anesthesia, Set expectations for post-procedure period and Allowed opportunity for questions and acknowledgement of understanding      Vitals:  Vitals Value Taken Time   BP     Temp     Pulse     Resp     SpO2         Electronically Signed By: QUINTON Burrows CRNA  July 1, 2022  9:55 AM

## 2022-07-12 ENCOUNTER — OFFICE VISIT (OUTPATIENT)
Dept: OPHTHALMOLOGY | Facility: CLINIC | Age: 62
End: 2022-07-12
Attending: OPHTHALMOLOGY
Payer: COMMERCIAL

## 2022-07-12 DIAGNOSIS — H35.372 EPIRETINAL MEMBRANE, LEFT EYE: Primary | ICD-10-CM

## 2022-07-12 PROCEDURE — 92134 CPTRZ OPH DX IMG PST SGM RTA: CPT | Performed by: OPHTHALMOLOGY

## 2022-07-12 PROCEDURE — 92134 CPTRZ OPH DX IMG PST SGM RTA: CPT | Mod: 26 | Performed by: STUDENT IN AN ORGANIZED HEALTH CARE EDUCATION/TRAINING PROGRAM

## 2022-07-12 PROCEDURE — 99207 FUNDUS PHOTOS OU (BOTH EYES): CPT | Mod: 26 | Performed by: STUDENT IN AN ORGANIZED HEALTH CARE EDUCATION/TRAINING PROGRAM

## 2022-07-12 PROCEDURE — 92250 FUNDUS PHOTOGRAPHY W/I&R: CPT | Performed by: OPHTHALMOLOGY

## 2022-07-12 PROCEDURE — 92014 COMPRE OPH EXAM EST PT 1/>: CPT | Mod: GC | Performed by: STUDENT IN AN ORGANIZED HEALTH CARE EDUCATION/TRAINING PROGRAM

## 2022-07-12 PROCEDURE — G0463 HOSPITAL OUTPT CLINIC VISIT: HCPCS | Mod: 25

## 2022-07-12 ASSESSMENT — CUP TO DISC RATIO
OD_RATIO: 0.65
OS_RATIO: 0.65

## 2022-07-12 ASSESSMENT — TONOMETRY
IOP_METHOD: TONOPEN
OD_IOP_MMHG: 17
OS_IOP_MMHG: 18

## 2022-07-12 ASSESSMENT — VISUAL ACUITY
OD_CC: 20/20
CORRECTION_TYPE: GLASSES
METHOD: SNELLEN - LINEAR
OS_CC: 20/25

## 2022-07-12 ASSESSMENT — CONF VISUAL FIELD
METHOD: COUNTING FINGERS
OS_NORMAL: 1
OD_NORMAL: 1

## 2022-07-12 ASSESSMENT — EXTERNAL EXAM - RIGHT EYE: OD_EXAM: NORMAL

## 2022-07-12 ASSESSMENT — EXTERNAL EXAM - LEFT EYE: OS_EXAM: NORMAL

## 2022-07-12 ASSESSMENT — SLIT LAMP EXAM - LIDS
COMMENTS: NORMAL
COMMENTS: NORMAL

## 2022-07-12 NOTE — PROGRESS NOTES
CC -   Blurry vision, left eye    INTERVAL HISTORY - Initial visit with, + FBS    PMH -   Louie Greco is a 62 year old male here for evaluation of epiretinal membrane of the left eye. He developed symptoms after a PVD 5 years ago. At the time he was undergoing chemo for stage 3 colorectal cancer. He is thankfully doing well from this cancer and has routine screening soon.     Over the past 6 months he has noticed worsening vision. He feels like there is a floater that frequently moves past his pupil so he frequently can't see. He does not notice a lot of distortion.    He was referred by Dr. Smalls.       PAST MEDICAL HISTORY:  Colorectal cancer, s/p chemo -- in remission    PAST OCULAR SURGERY:  No LASIK  No other surgery      RETINAL IMAGING:    OCT Macula 07/12/22  OD - retina normal, PHF attached  OS - mild ERM, PVD      ASSESSMENT & PLAN    # ERM, left eye   - minimal distortion on Amsler testing compared to right eye   - VA good    - no breaks on    - recommend monitoring   - recheck 6 months to verifyt stability    # Visually significant Floater, left eye   - could consider PPV vs YAG        # PVD OS, Syneresis OD   - advised Signs and symptoms of  RD 7/2022      # OAG suspect OS > OD   - pt is myopic, discs are large, IOP 17/18 today   - recommend RNFL screening and observation for now   - no FHx of glaucoma      # NS OU   - mild        # MORIS   - advises artificial tears      Recheck 6 months    Ranulfo Falcon MD  Retina Fellow, PGY5    ATTESTATION     Attending Physician Attestation:      Complete documentation of historical and exam elements from today's encounter can be found in the full encounter summary report (not reduplicated in this progress note).  I personally obtained the chief complaint(s) and history of present illness.  I confirmed and edited as necessary the review of systems, past medical/surgical history, family history, social history, and examination findings as documented by  others; and I examined the patient myself.  I personally reviewed the relevant tests, images, and reports as documented above.  I personally reviewed the ophthalmic test(s) associated with this encounter, agree with the interpretation(s) as documented by the resident/fellow, and have edited the corresponding report(s) as necessary.   I formulated and edited as necessary the assessment and plan and discussed the findings and management plan with the patient and family    Gretchen Francois MD, PhD  , Vitreoretinal Surgery  Department of Ophthalmology  [facility]

## 2022-07-12 NOTE — NURSING NOTE
Chief Complaints and History of Present Illnesses   Patient presents with     Floaters Both Eyes     Chief Complaint(s) and History of Present Illness(es)     Floaters Both Eyes     Laterality: both eyes    Course: stable    Associated symptoms: blurred vision and photophobia.  Negative for flashes    Pain scale: 2/10 (LE)              Comments     Follow up for floaters in per Dr. Smalls who examined pt on 6/27/2022. Has noted that floaters might be more bothersome. Intermittent 'pain' in BE. Currently a 2/10 on pain scale in LE. Pt states VA seems to get worse as the day progresses. Surgical consult for LE.  DONATO Cuba 8:50 AM 07/12/2022

## 2022-08-01 ENCOUNTER — ANCILLARY PROCEDURE (OUTPATIENT)
Dept: MRI IMAGING | Facility: CLINIC | Age: 62
End: 2022-08-01
Attending: INTERNAL MEDICINE
Payer: COMMERCIAL

## 2022-08-01 ENCOUNTER — ANCILLARY PROCEDURE (OUTPATIENT)
Dept: PET IMAGING | Facility: CLINIC | Age: 62
End: 2022-08-01
Attending: INTERNAL MEDICINE
Payer: COMMERCIAL

## 2022-08-01 ENCOUNTER — APPOINTMENT (OUTPATIENT)
Dept: LAB | Facility: CLINIC | Age: 62
End: 2022-08-01
Payer: COMMERCIAL

## 2022-08-01 DIAGNOSIS — C20 MALIGNANT NEOPLASM OF RECTUM (H): ICD-10-CM

## 2022-08-01 LAB
CREAT BLD-MCNC: 0.7 MG/DL (ref 0.5–1.2)
GFR SERPL CREATININE-BSD FRML MDRD: >90 ML/MIN/{1.73_M2}

## 2022-08-01 PROCEDURE — 72197 MRI PELVIS W/O & W/DYE: CPT | Performed by: RADIOLOGY

## 2022-08-01 PROCEDURE — A9585 GADOBUTROL INJECTION: HCPCS | Performed by: RADIOLOGY

## 2022-08-01 RX ORDER — FUROSEMIDE 10 MG/ML
40 INJECTION INTRAMUSCULAR; INTRAVENOUS ONCE
Status: COMPLETED | OUTPATIENT
Start: 2022-08-01 | End: 2022-08-01

## 2022-08-01 RX ORDER — IOPAMIDOL 755 MG/ML
50-125 INJECTION, SOLUTION INTRAVASCULAR ONCE
Status: COMPLETED | OUTPATIENT
Start: 2022-08-01 | End: 2022-08-01

## 2022-08-01 RX ORDER — GADOBUTROL 604.72 MG/ML
10 INJECTION INTRAVENOUS ONCE
Status: COMPLETED | OUTPATIENT
Start: 2022-08-01 | End: 2022-08-01

## 2022-08-01 RX ADMIN — FUROSEMIDE 40 MG: 10 INJECTION INTRAMUSCULAR; INTRAVENOUS at 10:11

## 2022-08-01 RX ADMIN — GADOBUTROL 10 ML: 604.72 INJECTION INTRAVENOUS at 13:37

## 2022-08-01 RX ADMIN — IOPAMIDOL 125 ML: 755 INJECTION, SOLUTION INTRAVASCULAR at 10:11

## 2022-08-01 NOTE — DISCHARGE INSTRUCTIONS
MRI Contrast Discharge Instructions    The IV contrast you received today will pass out of your body in your  urine. This will happen in the next 24 hours. You will not feel this process.  Your urine will not change color.    Drink at least 4 extra glasses of water or juice today (unless your doctor  has restricted your fluids). This reduces the stress on your kidneys.  You may take your regular medicines.    If you are on dialysis: It is best to have dialysis today.    If you have a reaction: Most reactions happen right away. If you have  any new symptoms after leaving the hospital (such as hives or swelling),  call your hospital at the correct number below. Or call your family doctor.  If you have breathing distress or wheezing, call 911.    Special instructions: ***    I have read and understand the above information.    Signature:______________________________________ Date:___________    Staff:__________________________________________ Date:___________     Time:__________    [facility] Departments:    ___[place]: [phone]  ___[facility]: [phone]  ___[place]: [phone] ___[facility]: [phone]  ___[facility]: [phone]  ___[facility]: [phone]  ___[phone]  ___[facility]: [phone]  ___[phone]

## 2022-08-01 NOTE — DISCHARGE INSTRUCTIONS

## 2022-08-08 ENCOUNTER — PATIENT OUTREACH (OUTPATIENT)
Dept: SURGERY | Facility: CLINIC | Age: 62
End: 2022-08-08

## 2022-08-08 DIAGNOSIS — C20 RECTAL CANCER (H): Primary | ICD-10-CM

## 2022-08-08 NOTE — PROGRESS NOTES
Dr. Radford reviewed recent images and flex. All free of recurrent disease.     Plan: MRI/PET/Flex due in one year     Follow up per Watch and Wait Protocol:   Completed CRT: 5/9/2017       Flexible sigmoidoscopy     Every 2 months for 6 months: Completed     Every 3 months for 3 years: Completed     Every 6 months for 5 years: Completed     Every year for 10 years: Until 5/2027- Due 7/2023        3T MRI of pelvis     6-8 weeks after CRT:     Every 4 months for 2 years: Completed     Every 6 months for 2 years: Completed     Yearly for 2 years: till 9/2023- Due 8/2023         CT CAP (PET)     Every year for 6-8 years: Until 5/2025- Due 8/2023         Colonoscopy     Last 10/2020: Due 10/2025

## 2022-08-09 ENCOUNTER — ONCOLOGY VISIT (OUTPATIENT)
Dept: ONCOLOGY | Facility: CLINIC | Age: 62
End: 2022-08-09
Attending: INTERNAL MEDICINE
Payer: COMMERCIAL

## 2022-08-09 DIAGNOSIS — C20 MALIGNANT NEOPLASM OF RECTUM (H): Primary | ICD-10-CM

## 2022-08-09 DIAGNOSIS — R97.20 ELEVATED PROSTATE SPECIFIC ANTIGEN (PSA): ICD-10-CM

## 2022-08-09 PROCEDURE — G0463 HOSPITAL OUTPT CLINIC VISIT: HCPCS

## 2022-08-09 PROCEDURE — 99215 OFFICE O/P EST HI 40 MIN: CPT | Performed by: INTERNAL MEDICINE

## 2022-08-09 NOTE — PROGRESS NOTES
[facility]    Hematology/Oncology Established Patient Note      Today's Date: Aug 9, 2022     Reason for Follow-up: rectal cancer      HISTORY OF PRESENT ILLNESS: Louie Greco is a 61 year old male who presents with rectal cancer.  He started having rectal bleeding around beginning of 2016.  He underwent colonoscopy on 7/27/16, which showed a malignant tumor in the rectum involving half of the lumen with oozing, as well as three 4-mm polyps in the ascending and transverse colon.  Pathology showed moderately differentiated adenocarcinoma, ascending colon with sessile serrated adenoma, others were tubular adenomas.  Mismatch repair expression with normal protein expression.  Staging scans showed the rectal mass, indeterminate focus in the left liver thought to be cyst, and multiple bilateral renal cysts.  MRI showed a distal rectal mass measuring 2.7 x 2.4 cm extending to the most proximal region of the anal canal.  There are a few tiny subcentimeter perirectal fat lymph nodes.  He was staged clinically [id number] (stage IIIA).  He was seen by Dr. Lee at [facility], and started neoadjuvant chemoradiation with capecitabine on 8/8/16 and completed on 9/15/16.  He was then seen by Dr. Radford, colorectal surgery at [facility].  His post chemoradiation MRI showed improvement in the size of the tumor and response in the lymph nodes.  On 12/8/16, he underwent flexible sigmoidoscopy and there was no evidence of tumor.  There was only some anterior scarring in the lumen.  Multiple biopsies were taken, which showed no evidence of carcinoma.  At that point, Dr. Radford spoke with the patient's wife, that there appears to be a complete response in the lumen, and that the patient would wish to placed on the watch and wait protocol.  The patient had expressed wishes not to have an APR, and it would not have been possible to grossly clear a margin for LAR.    He had port  placed on 1/16/17.  It has been removed.    He completed FOLFOX x 8 cycles 1/16/17-5/9/17.      He was admitted 7/16/17-7/20/17 with sepsis, abdominal wall cellulitis.  He underwent surgery with laparoscopic abdominal exploration and appendectomy.  Pathology found sessile serrated adenoma without dysplasia or malignancy.        INTERIM HISTORY: Louie is doing well.      He was previously followed by my colleague - Dr. Agueda Manley. He is transferring care to me now that Dr. Manley has left our practice. He had a flex sig in July 2022 and hyperplastic polyp was found and removed.   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  He feels nervous because masks are optional, not required.        REVIEW OF SYSTEMS:   14 point ROS was reviewed and is negative other than as noted above in HPI.       HOME MEDICATIONS:  Current Outpatient Medications   Medication Sig Dispense Refill     Probiotic Product (PROBIOTIC PO) Take by mouth as needed           ALLERGIES:  Allergies   Allergen Reactions     Ampicillin Diarrhea     Demerol [Meperidine] Nausea         PAST MEDICAL HISTORY:  Past Medical History:   Diagnosis Date     Childhood asthma      Elevated prostate specific antigen (PSA)     No biopsy done (had rectal cancer at the time)     Epididymitis, bilateral     18 years old     Inguinal hernia      Kidney stone on left side 04/22/2021    Added automatically from request for surgery [id number]     Mumps      Nephrolithiasis     Several -- 1990 first,recurrence 2019, 2021     Rectal cancer (H) 2017    low rectal cancer, S/P Rad adn Chemo, no surg needed     Right 4 mm Kidney stone at [facility]  02/28/2021     Right ureteral stone 03/11/2021    Added automatically from request for surgery [id number]     Fannie          PAST SURGICAL HISTORY:  Past Surgical History:   Procedure Laterality Date     COLONOSCOPY N/A 7/27/2016    Procedure: COMBINED COLONOSCOPY, SINGLE OR MULTIPLE BIOPSY/POLYPECTOMY BY BIOPSY;  Surgeon: Chelsea Thompson MD;   Location: [facility]     COLONOSCOPY N/A 9/13/2017    Procedure: COLONOSCOPY;;  Surgeon: Felicitas Radford MD;  [facility]     COLONOSCOPY N/A 12/13/2017    Procedure: COLONOSCOPY;;  Surgeon: Felicitas Radford MD;  [facility]     COLONOSCOPY N/A 10/23/2020    Procedure: COLONOSCOPY;  Surgeon: Felicitas Radford MD;  Location[facility]     COMBINED CYSTOSCOPY, RETROGRADES, URETEROSCOPY, LASER HOLMIUM LITHOTRIPSY URETER(S), INSERT STENT Left 2/16/2018    Procedure: COMBINED CYSTOSCOPY, RETROGRADES, URETEROSCOPY, LASER HOLMIUM LITHOTRIPSY URETER(S), INSERT STENT;  Cysto, left ureteroscopy, holmium laser, retrogrades, stent placement;  Surgeon: Bola Worthy MD;  Location: [facility]     COMBINED CYSTOSCOPY, RETROGRADES, URETEROSCOPY, LASER HOLMIUM LITHOTRIPSY URETER(S), INSERT STENT Right 3/22/2021    Procedure: CYSTOSCOPY WITH RIGHT RETROGRADE PYELOGRAM, RIGHT URETEROSCOPY WITH LASER LITHOTRIPSY AND BASKET REMOVAL OF STONE, RIGHT URETERAL STENT PLACEMENT;  Surgeon: Mohsen Pat MD;  [facility]: [facility]     COMBINED CYSTOSCOPY, RETROGRADES, URETEROSCOPY, LASER HOLMIUM LITHOTRIPSY URETER(S), INSERT STENT Left 5/19/2021    Procedure: CYSTOSCOPY, LEFT RETROGRADE PYELOGRAM, LEFT DIAGNOSTIC, URETEROSCOPY, LEFT URETERAL STENT PLACEMENT;  Surgeon: Mohsen Pat MD;  [facility]     COMBINED CYSTOSCOPY, RETROGRADES, URETEROSCOPY, LASER HOLMIUM LITHOTRIPSY URETER(S), INSERT STENT Left 6/23/2021    Procedure: CYSTOSCOPY, LEFT URETERAL STENT REMOVAL, LEFT RETROGRADE PYELOGRAM, LEFT URETEROSCOPY WITH LASER LITHOTRIPSY AND BASKET REMOVAL OF STONES, LEFT URETERAL STENT PLACEMENT;  Surgeon: Mohsen Pat MD;  [facility]     CYSTOSCOPY, LITHOLAPAXY, COMBINED N/A 3/1/2021    Procedure: Cystoscopy, litholapaxy, combined;  Surgeon: Mohsen Pat MD;  [facility]     CYSTOSCOPY, RETROGRADES, INSERT STENT URETER(S), COMBINED Right 3/1/2021    Procedure: Cystoscopy, retrogrades,  insert stent ureter(s), combined;  Surgeon: Mohsen Pat MD;  [facility]     EXAM UNDER ANESTHESIA ANUS N/A 7/5/2017    Procedure: EXAM UNDER ANESTHESIA ANUS;  Examination Under Anesthesia, flex sigmoidoscopy with biopsies and formalin application;  Surgeon: Felicitas Radford MD;  [facility]     EXAM UNDER ANESTHESIA ANUS N/A 9/13/2017    Procedure: EXAM UNDER ANESTHESIA ANUS;  Examination Under Anesthesia Anus, Colonoscopy, application of formalin;  Surgeon: Felicitas Radford MD;  [facility]     EXAM UNDER ANESTHESIA ANUS N/A 12/13/2017    Procedure: EXAM UNDER ANESTHESIA ANUS;  Examination Under Anesthesia Anus, Colonoscopy;  Surgeon: Felicitas Radford MD;  [facility]     EXAM UNDER ANESTHESIA ANUS N/A 5/11/2018    Procedure: EXAM UNDER ANESTHESIA ANUS;  Examination Under Anesthesia Anus, Interoperative Flexible Sigmoidoscopy, Application of Formalin;  Surgeon: Felicitas Radford MD;  [facility]     EXAM UNDER ANESTHESIA ANUS N/A 7/20/2018    Procedure: EXAM UNDER ANESTHESIA ANUS;  Examination Under Anesthesia Anus, Flexible Sigmoidoscopy ;  Surgeon: Felicitas Radford MD;  [facility]     EXAM UNDER ANESTHESIA ANUS N/A 11/30/2018    Procedure: Examination Under Anesthesia, application of formalin to rectum, polypectomy;  Surgeon: Felicitas Radford MD;  [facility]     EXAM UNDER ANESTHESIA ANUS N/A 2/22/2019    Procedure: Examination Under Anesthesia;  Surgeon: Felicitas Radford MD;  [facility]     EXAM UNDER ANESTHESIA ANUS N/A 6/28/2019    Procedure: Examination Under Anesthesia;  Surgeon: Felicitas Radford MD;  [facility]     EXAM UNDER ANESTHESIA ANUS N/A 11/1/2019    Procedure: Examination Under Anesthesia;  Surgeon: Felicitas Radford MD;  [facility]     EXAM UNDER ANESTHESIA RECTUM N/A 10/23/2020    Procedure: Exam under anesthesia rectum;  Surgeon: Felicitas Radford MD;   Location: [facility] OR     HC TOOTH EXTRACTION W/FORCEP       LAPAROSCOPIC APPENDECTOMY N/A 7/17/2017    Procedure: LAPAROSCOPIC APPENDECTOMY;  LAPAROSCOPIC APPENDECTOMY;  Surgeon: Bryson Ferguson MD;  Location:  OR     LASER HOLMIUM LITHOTRIPSY URETER(S), INSERT STENT, COMBINED Right 3/1/2021    Procedure: CYSTOURETEROSCOPY,  URETERAL STENT INSERTION, RIGHT RETROGRADE;  Surgeon: Mohsen Pat MD;  [facility]     SIGMOIDOSCOPY FLEXIBLE N/A 12/8/2016    Procedure: SIGMOIDOSCOPY FLEXIBLE;  Surgeon: Felicitas Radford MD;  [facility]     SIGMOIDOSCOPY FLEXIBLE N/A 7/5/2017    Procedure: SIGMOIDOSCOPY FLEXIBLE;;  Surgeon: Felicitas Radford MD;  [facility]     SIGMOIDOSCOPY FLEXIBLE N/A 5/11/2018    Procedure: SIGMOIDOSCOPY FLEXIBLE;;  Surgeon: Felicitas Radford MD;  [facility]     SIGMOIDOSCOPY FLEXIBLE N/A 7/20/2018    Procedure: SIGMOIDOSCOPY FLEXIBLE;;  Surgeon: Felicitas Radford MD;  [facility]     SIGMOIDOSCOPY FLEXIBLE N/A 11/30/2018    Procedure: Flexible Sigmoidoscopy;  Surgeon: Felicitas Radford MD;  [facility]     SIGMOIDOSCOPY FLEXIBLE N/A 2/22/2019    Procedure: Intraoperative Flexible Sigmoidoscopy, Application of Formalin to rectum;  Surgeon: Felicitas Radford MD;  [facility]     SIGMOIDOSCOPY FLEXIBLE N/A 6/28/2019    Procedure: Flexible Sigmoidoscopy;  Surgeon: Felicitas Radford MD;  [facility]     SIGMOIDOSCOPY FLEXIBLE N/A 11/1/2019    Procedure: Flexible Sigmoidoscopy;  Surgeon: Felicitas Radford MD;  [facility]     SIGMOIDOSCOPY FLEXIBLE N/A 7/23/2021    Procedure: SIGMOIDOSCOPY, FLEXIBLE;  Surgeon: Felicitas Radford MD;  [facility]     SIGMOIDOSCOPY FLEXIBLE N/A 7/1/2022    Procedure: SIGMOIDOSCOPY, FLEXIBLE, EUA;  Surgeon: Felicitas Radford MD;  Location: [facility] OR     TESTICLE SURGERY       VASCULAR SURGERY      Right chest port     VASECTOMY       VASECTOMY            SOCIAL HISTORY:  Social History     Socioeconomic History     Marital status:      Spouse name: Not on file     Number of children: 2     Years of education: Not on file     Highest education level: Not on file   Occupational History     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)      Social Determinants of Health     Financial Resource Strain: Not on file   Food Insecurity: Not on file   Transportation Needs: Not on file   Physical Activity: Not on file   Stress: Not on file   Social Connections: Not on file   Intimate Partner Violence: Not on file   Housing Stability: Not on file     He notes that he had a brother who passed away at a young age of 53 from a metastatic cancer, but unknown what type, and passed away within 2 months of diagnosis.  He denies other family history of cancer in the immediate family.  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      FAMILY HISTORY:  Family History   Problem Relation Age of Onset     Colon Polyps Mother         Number and type of polyps unknown     Chronic Obstructive Pulmonary Disease Father      Hypertension Father      Hyperlipidemia Father      Diabetes Maternal Grandfather      Macular Degeneration Paternal Grandmother      Hypertension Paternal Grandmother      Hyperlipidemia Paternal Grandmother      Cancer Brother         primary of unknown origin     Other Cancer Brother      Family History Negative Brother         negative colonoscopy     Stomach Cancer Other 63        maternal  great grandfather     Glaucoma No family hx of      Anesthesia Reaction No family hx of      Deep Vein Thrombosis (DVT) No family hx of          PHYSICAL EXAM:  Vital signs:  There were no vitals taken for this visit.   ECO  GENERAL/CONSTITUTIONAL: No acute distress.  EYES: No scleral icterus.  NEUROLOGIC: Alert, oriented, answers questions appropriately.  INTEGUMENTARY: No jaundice.  GAIT: Steady, does not use assistive device          LABS:  Recent Labs   Lab Test 22  1403 21  1330 21  0840 21  0515 20  1530 20  0932    140  --  140 136 139   POTASSIUM 3.6 4.0  --  4.0 3.7 3.9   CHLORIDE 106 107  --  110* 104 109   CO2 25 26  --  25 27 24   ANIONGAP 7 7  --  5 4 6   BUN 16 17  --  19 16 17   CR 0.74 0.84  --  1.10 0.83 0.68   * 97 104* 134* 112* 95   FRANDY 8.8 9.3  --  8.3* 9.1 8.6     Recent Labs   Lab Test 17  0705   MAG 2.0     Recent Labs   Lab Test 22  1403 21  1332 21  0515 20  1530 20  0932   WBC 10.8 5.7 6.0 5.8 3.5*   HGB 15.2 15.9 14.0 16.2 14.5    236 181 198 164   MCV 85 85 86 85 88   NEUTROPHIL 85 73 83.6 76.1 59.0     Recent Labs   Lab Test 22  1403 21  1330 21  0515   BILITOTAL 0.8 0.8 0.5   ALKPHOS 74 79 58   ALT 35 31 40   AST 21 19 26   ALBUMIN 4.0 4.2 3.7     No results found for: TSH  Recent Labs   Lab Test 21  1331 20  1530 20  0932 19  0828 19  1017   CEA 1.4 1.2 <0.5 1.5 1.7     Results for orders placed or performed in visit on 22   MR Pelvis (Intrapelvic Organs) wo&w Contrast    Narrative    EXAMINATION: MR PELVIS (INTRAPELVIC ORGANS) W/O & W CONTRAST,  2022 1:47 PM     COMPARISON: 2011, 2020, 2016, 2016    TECHNIQUE:  Images were acquired without and with intravenous contrast  through the pelvis. The following MR images were acquired without  contrast: multiplanar T1, multiplanar T2, axial diffusion-weighted and  axial  "apparent diffusion coefficient. T1-weighted images with fat  saturation were acquired at the following intervals relative to  intravenous contrast administration: pre-contrast, immediate post  contrast, 1 minute, 3 minutes and delayed.  Contrast dose: 10.0mL  Gadavist    HISTORY: Rectal cancer once and wait protocol    FINDINGS:    Images are degraded by patient motion artifact.    LOCATION/SIZE:  Complete treatment response without recurrence of mass in the left  anterior-lateral rectum stable in appearance compared to 7/29/2021 MRI  with thin T2 hypointense signal intensity at the site of prior tumor  (for example series 6 images 25-32).    TREATMENT RESPONSE:   Approximately 0% of the current mass demonstrates tumour signal  intensity, with the remainder appearing to represent fibrosis.  This  corresponds to a tumour response grade of mrTRG-1.     EXTENT:  The tumor does not clearly involve the anal sphincters or levator ani  muscle.     CIRCUMFERENTIAL RESECTION MARGIN (CRM):  In terms of the resection margin, there is not involvement of the  mesorectal fascia.     LYMPH NODES:  No suspicious lymph nodes.    VEINS:  There is no evidence of extramural venous extension.     MISCELLANEOUS FINDINGS:  Unchanged appearance of the prostate with area of susceptibility  artifact along the left peripheral zone. Fat-containing direct left  inguinal hernia, increased since prior.      Impression    IMPRESSION:   1. Stable appearance of the rectum with complete response to treatment  and a corresponding tumor regression grade of mrTRG-1.   2. No lymphadenopathy or other evidence of metastasis/recurrent disease.    ----------------------------------------------------------------------  ----------------------------------------------------------------------    *Based on the article: \"Magnetic resonance imaging-detected tumor  response for locally advanced rectal cancer predicts survival  outcomes: [facility] experience.\" published " in the Journal of Clinical  Oncology Oct 1, 2011.    MRI assessment of tumor regression grade (mrTRG):  mrTRG-1 was identified as the absence of any tumor signal.   mrTRG-2 was identified as small amounts of residual tumor visible but  with a predominant fibrotic low signal intensity.   mrTRG-3 was identified as mixed areas of low signal fibrosis and  intermediate signal intensity present but without predominance of  tumor.   mrTRG-4 was identified by predominantly tumor signal intensity remains  with minimal fibrotic low signal intensity.   mrTRG-5 was identified as no fibrosis evident, tumor signal visible  only.            I have personally reviewed the examination and initial interpretation  and I agree with the findings.    JESSICA MAURICE,          SYSTEM ID:  [id number]     Narrative & Impression   Combined Report of:    PET and CT on  8/1/2022 11:23 AM :     1. PET of the neck, chest, abdomen, and pelvis.  2. PET CT Fusion for Attenuation Correction and Anatomical  Localization:    3. Diagnostic CT scan of the chest, abdomen, and pelvis with  intravenous contrast for interpretation.  4. 3D MIP and PET-CT fused images were processed on an independent  workstation and archived to PACS and reviewed by a radiologist.     Technique:     1. PET: The patient received 12.74 mCi of F-18-FDG; the serum glucose  was 95 prior to administration, body weight was 95.3 kg. Images were  evaluated in the axial, sagittal, and coronal planes as well as the  rotational whole body MIP. Images were acquired from the Vertex to the  Feet.     UPTAKE WAS MEASURED AT 60 MINUTES.      BACKGROUND:  Liver SUV max= 3.97,   Aorta Blood SUV Max: 3.21.      2. CT: Volumetric acquisition for clinical interpretation of the  chest, abdomen, and pelvis acquired at 3 mm sections  after the  uneventful administration of intravenous contrast. The chest, abdomen,  and pelvis were evaluated at 5 mm sections in bone, soft tissue, and  lung windows.        The patient received 125 cc of Isovue 370 intravenously for the  examination and 125cc, Breeza Oral Contrast.  --     3. 3D MIP and PET-CT fused images were processed on an independent  workstation and archived to PACS and reviewed by a radiologist.     INDICATION: Malignant neoplasm of rectum (H)     ADDITIONAL INFORMATION OBTAINED FROM EMR: Status post chemoradiation  2016     COMPARISON: PET scan 7/29/2021.     FINDINGS:      HEAD/NECK:  There is no  suspicious FDG uptake in the neck.   Stenotic right subclavian vein between the clavicle and first rib,  unchanged.  Asymmetrically enlarged right thyroid lobe, unchanged from prior.  The paranasal sinuses are clear. The mastoid air cells are clear.   The mucosal pharyngeal space, the , prevertebral and carotid  spaces are within normal limits.   No masses, mass effect or pathologically enlarged lymph nodes are  evident.         CHEST:  There is no suspicious FDG uptake in the chest.   Calcified granuloma in the left lung base is unchanged. No suspicious  pulmonary nodules. The heart is normal in size. No pericardial  effusion.  There are no pathologically enlarged mediastinal, hilar or axillary  lymph nodes.  There are no suspicious lung nodules or evidence for infection.    Dependent atelectasis throughout the lungs.     There is no significant pericardial or pleural effusions.     ABDOMEN AND PELVIS:  Continued mild FDG activity near the anus, now with SUV max of 4.5,  previously 5.6.  There is no new FDG uptake in the abdomen or pelvis.  Fat-containing umbilical hernia. Left greater than right  fat-containing inguinal hernias. Bilateral scrotal varicoceles  present.  Numerous unchanged cystic lesions in the liver and bilateral kidneys.  Postsurgical changes of appendectomy. Small hiatal hernia is present.  There are no suspicious hepatic lesions. There is no splenomegaly or  evidence for splenic or pancreatic mass lesion.  There are no suspicious  adrenal mass lesions or opaque gallbladder  calculi. There is symmetric nephrographic renal phase without  hydronephrosis.  There is no evidence for diverticulitis, bowel obstruction or free  fluid.     LOWER EXTREMITIES:   No abnormal masses or hypermetabolic lesions.     BONES:   There are no suspicious lytic or blastic osseous lesions.  There is no  abnormal FDG uptake in the skeleton.  Mild degenerative change, predominantly in the cervical spine, lumbar  spine, and bilateral hips. Mild dextroscoliosis of lumbar spine.                                                                         IMPRESSION: In this patient with history of rectal carcinoma, status  post chemoradiation:  1. There is no evidence of local or metastatic recurrence.  2. Redemonstration of focal uptake near the anus, unchanged from  prior, nonspecific but may represent a inflammation and/or  hemorrhoids.      I have personally reviewed the examination and initial interpretation  and I agree with the findings.     WOLFGANG KABA MD         SYSTEM ID:  [id number]         ASSESSMENT/PLAN:  Louie Greco is a 62 year old  male with:    1) Rectal cancer: clinical stage [id number] (stage IIIA), s/p chemoradiation with Xeloda, and appears to have achieved complete response on imaging and biopsies.  He opted not to undergo surgery and expressed desire not to undergo APR, as he does not want a colostomy bag.  It was felt reasonable to go on the watch and wait protocol with the [facility].  He has completed 4 additional months (8 cycles) of chemotherapy with FOLFOX.  He will now go on surveillance, as per the watch and wait protocol.    We reviewed MRI pelvis and PET scan from 8/1/22. I have reviewed actual images from his recent scans and there is nothing concerning for recurrent metastatic disease or recurrence. He had flex sig with Dr. Radford in July 2022, which found a small flat polyp in the rectosigmoid region.  The radiation  changes were improved and the scar was visible, but no signs of disease or additional lesions were noted.  There was diverticulosis and no signs of divertciulitis.       He has achieved complete response.  There is no evidence of recurrence or metastatic disease.      Follow up per Watch and Wait Protocol:   Completed CRT: 9/15/16    Flexible sigmoidoscopy   ? Every 2 months for 6 months: Completed  ? Every 3 months for 3 years: Completed  ? Every 6 months for 5 years: Until 8/2022-completed  ? Every year for 10 years: Until 9/2026       3T MRI of pelvis  ? 6-8 weeks after CRT:  ? Every 4 months for 2 years: Completed  ? Every 6 months for 2 years: Completed  ? Yearly for 2 years: Until 9/2022 - completed       CT CAP  ? Every year for 6-8 years: Until 9/2024- next PET due August 2023 - ordered       Colonoscopy   ? Last 12/13/17- last done in October 2020       CEA at every clinic or endoscopic visit      -T3 MRI pelvis and PET scan in 1 year, which would be in August 2023 - ordered  -endoscopic surveillance with Dr. Radford as per protocol  -RTC in 1 year with labs/CEA and results of imaging    2) Genetics: He underwent genetic testing, which was negative.    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            4) Elevated bilirubin: mild.  Bilirubin has been mildly elevated in the past. It is stable and normal this time.   -monitor for now    5) Liver cysts: seen on CT, stable  -monitor    6) Pulmonary nodules: stable sub-4 mm pulmonary nodules  -monitor on future scans    7) Kidney cyst: stable  -monitor on future scans.      8) Appendix polyp: He is now s/p appendectomy.  Pathology found sessile serrated adenoma without dysplasia or malignancy.     9) Elevated PSA:   -on observation  -he is seeing urology - Dr. Pat    45 minutes spent on the date of the encounter doing chart review, history and exam, documentation and further activities as noted above     Remi Mcguire    Hematologist and Medical  Oncologist  [facility] [place]

## 2022-08-09 NOTE — LETTER
8/9/2022         RE: Louie Greco  [address] [facility] [id number]        Dear Colleague,    Thank you for referring your patient, Louie Greco, to the [facility]. Please see a copy of my visit note below.    [facility]    Hematology/Oncology Established Patient Note      Today's Date: Aug 9, 2022     Reason for Follow-up: rectal cancer      HISTORY OF PRESENT ILLNESS: Louie Greco is a 61 year old male who presents with rectal cancer.  He started having rectal bleeding around beginning of 2016.  He underwent colonoscopy on 7/27/16, which showed a malignant tumor in the rectum involving half of the lumen with oozing, as well as three 4-mm polyps in the ascending and transverse colon.  Pathology showed moderately differentiated adenocarcinoma, ascending colon with sessile serrated adenoma, others were tubular adenomas.  Mismatch repair expression with normal protein expression.  Staging scans showed the rectal mass, indeterminate focus in the left liver thought to be cyst, and multiple bilateral renal cysts.  MRI showed a distal rectal mass measuring 2.7 x 2.4 cm extending to the most proximal region of the anal canal.  There are a few tiny subcentimeter perirectal fat lymph nodes.  He was staged clinically [id number] (stage IIIA).  He was seen by Dr. Lee at [facility], and started neoadjuvant chemoradiation with capecitabine on 8/8/16 and completed on 9/15/16.  He was then seen by Dr. Radford, colorectal surgery at [facility].  His post chemoradiation MRI showed improvement in the size of the tumor and response in the lymph nodes.  On 12/8/16, he underwent flexible sigmoidoscopy and there was no evidence of tumor.  There was only some anterior scarring in the lumen.  Multiple biopsies were taken, which showed no evidence of carcinoma.  At that point, Dr. Radford spoke with the patient's wife, that there appears to be a  complete response in the lumen, and that the patient would wish to placed on the watch and wait protocol.  The patient had expressed wishes not to have an APR, and it would not have been possible to grossly clear a margin for LAR.    He had port placed on 1/16/17.  It has been removed.    He completed FOLFOX x 8 cycles 1/16/17-5/9/17.      He was admitted 7/16/17-7/20/17 with sepsis, abdominal wall cellulitis.  He underwent surgery with laparoscopic abdominal exploration and appendectomy.  Pathology found sessile serrated adenoma without dysplasia or malignancy.        INTERIM HISTORY: Louie is doing well.      He was previously followed by my colleague - Dr. Agueda Manley. He is transferring care to me now that Dr. Manley has left our practice. He had a flex sig in July 2022 and hyperplastic polyp was found and removed.   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  He feels nervous because masks are optional, not required.        REVIEW OF SYSTEMS:   14 point ROS was reviewed and is negative other than as noted above in HPI.       HOME MEDICATIONS:  Current Outpatient Medications   Medication Sig Dispense Refill     Probiotic Product (PROBIOTIC PO) Take by mouth as needed           ALLERGIES:  Allergies   Allergen Reactions     Ampicillin Diarrhea     Demerol [Meperidine] Nausea         PAST MEDICAL HISTORY:  Past Medical History:   Diagnosis Date     Childhood asthma      Elevated prostate specific antigen (PSA)     No biopsy done (had rectal cancer at the time)     Epididymitis, bilateral     18 years old     Inguinal hernia      Kidney stone on left side 04/22/2021    Added automatically from request for surgery [id number]     Mumps      Nephrolithiasis     Several -- 1990 first,recurrence 2019, 2021     Rectal cancer (H) 2017    low rectal cancer, S/P Rad adn Chemo, no surg needed     Right 4 mm Kidney stone at [facility]  02/28/2021     Right ureteral stone 03/11/2021    Added automatically from request for surgery [id number]      Shingles          PAST SURGICAL HISTORY:  Past Surgical History:   Procedure Laterality Date     COLONOSCOPY N/A 7/27/2016    Procedure: COMBINED COLONOSCOPY, SINGLE OR MULTIPLE BIOPSY/POLYPECTOMY BY BIOPSY;  Surgeon: Chelsea Thompson MD;  [facility]     COLONOSCOPY N/A 9/13/2017    Procedure: COLONOSCOPY;;  Surgeon: Felicitas Radford MD;  [facility]     COLONOSCOPY N/A 12/13/2017    Procedure: COLONOSCOPY;;  Surgeon: Felicitas Radford MD;  [facility]     COLONOSCOPY N/A 10/23/2020    Procedure: COLONOSCOPY;  Surgeon: Felicitas Radford MD;  [facility]     COMBINED CYSTOSCOPY, RETROGRADES, URETEROSCOPY, LASER HOLMIUM LITHOTRIPSY URETER(S), INSERT STENT Left 2/16/2018    Procedure: COMBINED CYSTOSCOPY, RETROGRADES, URETEROSCOPY, LASER HOLMIUM LITHOTRIPSY URETER(S), INSERT STENT;  Cysto, left ureteroscopy, holmium laser, retrogrades, stent placement;  Surgeon: Bola Worthy MD;  [facility]     COMBINED CYSTOSCOPY, RETROGRADES, URETEROSCOPY, LASER HOLMIUM LITHOTRIPSY URETER(S), INSERT STENT Right 3/22/2021    Procedure: CYSTOSCOPY WITH RIGHT RETROGRADE PYELOGRAM, RIGHT URETEROSCOPY WITH LASER LITHOTRIPSY AND BASKET REMOVAL OF STONE, RIGHT URETERAL STENT PLACEMENT;  Surgeon: Mohsen Pat MD;  [facility]     COMBINED CYSTOSCOPY, RETROGRADES, URETEROSCOPY, LASER HOLMIUM LITHOTRIPSY URETER(S), INSERT STENT Left 5/19/2021    Procedure: CYSTOSCOPY, LEFT RETROGRADE PYELOGRAM, LEFT DIAGNOSTIC, URETEROSCOPY, LEFT URETERAL STENT PLACEMENT;  Surgeon: Mohsen Pat MD;  [facility]     COMBINED CYSTOSCOPY, RETROGRADES, URETEROSCOPY, LASER HOLMIUM LITHOTRIPSY URETER(S), INSERT STENT Left 6/23/2021    Procedure: CYSTOSCOPY, LEFT URETERAL STENT REMOVAL, LEFT RETROGRADE PYELOGRAM, LEFT URETEROSCOPY WITH LASER LITHOTRIPSY AND BASKET REMOVAL OF STONES, LEFT URETERAL STENT PLACEMENT;  Surgeon: Mohsen Pat MD;  [facility]     CYSTOSCOPY,  LITHOLAPAXY, COMBINED N/A 3/1/2021    Procedure: Cystoscopy, litholapaxy, combined;  Surgeon: Mohsen Pat MD;  [facility]     CYSTOSCOPY, RETROGRADES, INSERT STENT URETER(S), COMBINED Right 3/1/2021    Procedure: Cystoscopy, retrogrades, insert stent ureter(s), combined;  Surgeon: Mohsen Pat MD;  [facility]     EXAM UNDER ANESTHESIA ANUS N/A 7/5/2017    Procedure: EXAM UNDER ANESTHESIA ANUS;  Examination Under Anesthesia, flex sigmoidoscopy with biopsies and formalin application;  Surgeon: Felicitas Radford MD;  [facility]     EXAM UNDER ANESTHESIA ANUS N/A 9/13/2017    Procedure: EXAM UNDER ANESTHESIA ANUS;  Examination Under Anesthesia Anus, Colonoscopy, application of formalin;  Surgeon: Felicitas Radford MD;  [facility]     EXAM UNDER ANESTHESIA ANUS N/A 12/13/2017    Procedure: EXAM UNDER ANESTHESIA ANUS;  Examination Under Anesthesia Anus, Colonoscopy;  Surgeon: Felicitas Radford MD;  [facility]     EXAM UNDER ANESTHESIA ANUS N/A 5/11/2018    Procedure: EXAM UNDER ANESTHESIA ANUS;  Examination Under Anesthesia Anus, Interoperative Flexible Sigmoidoscopy, Application of Formalin;  Surgeon: Felicitas Radford MD;  [facility]     EXAM UNDER ANESTHESIA ANUS N/A 7/20/2018    Procedure: EXAM UNDER ANESTHESIA ANUS;  Examination Under Anesthesia Anus, Flexible Sigmoidoscopy ;  Surgeon: Felicitas Radford MD;  [facility]     EXAM UNDER ANESTHESIA ANUS N/A 11/30/2018    Procedure: Examination Under Anesthesia, application of formalin to rectum, polypectomy;  Surgeon: Felicitas Radford MD;  [facility]     EXAM UNDER ANESTHESIA ANUS N/A 2/22/2019    Procedure: Examination Under Anesthesia;  Surgeon: Felicitas Radford MD;  [facility]     EXAM UNDER ANESTHESIA ANUS N/A 6/28/2019    Procedure: Examination Under Anesthesia;  Surgeon: Felicitas Radford MD;  [facility]     EXAM UNDER ANESTHESIA ANUS  N/A 11/1/2019    Procedure: Examination Under Anesthesia;  Surgeon: Felicitas Radford MD;  [facility]     EXAM UNDER ANESTHESIA RECTUM N/A 10/23/2020    Procedure: Exam under anesthesia rectum;  Surgeon: Felicitas Radford MD;  [facility]     HC TOOTH EXTRACTION W/FORCEP       LAPAROSCOPIC APPENDECTOMY N/A 7/17/2017    Procedure: LAPAROSCOPIC APPENDECTOMY;  LAPAROSCOPIC APPENDECTOMY;  Surgeon: Bryson Ferguson MD;  [facility]     LASER HOLMIUM LITHOTRIPSY URETER(S), INSERT STENT, COMBINED Right 3/1/2021    Procedure: CYSTOURETEROSCOPY,  URETERAL STENT INSERTION, RIGHT RETROGRADE;  Surgeon: Mohsen Pat MD;  [facility]     SIGMOIDOSCOPY FLEXIBLE N/A 12/8/2016    Procedure: SIGMOIDOSCOPY FLEXIBLE;  Surgeon: Felicitas Radford MD;  [facility]     SIGMOIDOSCOPY FLEXIBLE N/A 7/5/2017    Procedure: SIGMOIDOSCOPY FLEXIBLE;;  Surgeon: Felicitas Radford MD;  [facility]     SIGMOIDOSCOPY FLEXIBLE N/A 5/11/2018    Procedure: SIGMOIDOSCOPY FLEXIBLE;;  Surgeon: Felicitas Radford MD;  [facility]     SIGMOIDOSCOPY FLEXIBLE N/A 7/20/2018    Procedure: SIGMOIDOSCOPY FLEXIBLE;;  Surgeon: Felicitas Radford MD;  [facility]     SIGMOIDOSCOPY FLEXIBLE N/A 11/30/2018    Procedure: Flexible Sigmoidoscopy;  Surgeon: Felicitas Radford MD;  [facility]     SIGMOIDOSCOPY FLEXIBLE N/A 2/22/2019    Procedure: Intraoperative Flexible Sigmoidoscopy, Application of Formalin to rectum;  Surgeon: Felicitas Radford MD;  [facility]     SIGMOIDOSCOPY FLEXIBLE N/A 6/28/2019    Procedure: Flexible Sigmoidoscopy;  Surgeon: Felicitas Radford MD;  [facility]     SIGMOIDOSCOPY FLEXIBLE N/A 11/1/2019    Procedure: Flexible Sigmoidoscopy;  Surgeon: Felicitas Radford MD;  [facility]     SIGMOIDOSCOPY FLEXIBLE N/A 7/23/2021    Procedure: SIGMOIDOSCOPY, FLEXIBLE;  Surgeon: Felicitas Radford MD;  [facility]     SIGMOIDOSCOPY FLEXIBLE N/A 7/1/2022    Procedure: SIGMOIDOSCOPY, FLEXIBLE, EUA;  Surgeon: Felicitas Radford MD;  [facility]     TESTICLE SURGERY       VASCULAR SURGERY      Right chest port     VASECTOMY       VASECTOMY           SOCIAL HISTORY:  Social History     Socioeconomic History     Marital status:      Spouse name: Not on file     Number of children: 2     Years of education: Not on file     Highest education level: Not on file   Occupational History     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)      Social Determinants of Health     Financial Resource Strain: Not on file   Food Insecurity: Not on file   Transportation Needs: Not on file   Physical Activity: Not on file   Stress: Not on file   Social Connections: Not on file   Intimate Partner Violence: Not on file   Housing Stability: Not on file     He notes that he had a brother who passed away at a young age of 53 from a metastatic cancer, but unknown what type, and passed away within 2 months of diagnosis.  He denies other family history of cancer in the immediate family.  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      FAMILY HISTORY:  Family History   Problem Relation Age of Onset     Colon Polyps Mother         Number and type of polyps unknown     Chronic Obstructive Pulmonary Disease Father      Hypertension Father      Hyperlipidemia Father      Diabetes Maternal Grandfather      Macular Degeneration Paternal Grandmother      Hypertension  Paternal Grandmother      Hyperlipidemia Paternal Grandmother      Cancer Brother         primary of unknown origin     Other Cancer Brother      Family History Negative Brother         negative colonoscopy     Stomach Cancer Other 63        maternal great grandfather     Glaucoma No family hx of      Anesthesia Reaction No family hx of      Deep Vein Thrombosis (DVT) No family hx of          PHYSICAL EXAM:  Vital signs:  There were no vitals taken for this visit.   ECO  GENERAL/CONSTITUTIONAL: No acute distress.  EYES: No scleral icterus.  NEUROLOGIC: Alert, oriented, answers questions appropriately.  INTEGUMENTARY: No jaundice.  GAIT: Steady, does not use assistive device          LABS:  Recent Labs   Lab Test 22  1403 21  1330 21  0840 21  0515 20  1530 20  0932    140  --  140 136 139   POTASSIUM 3.6 4.0  --  4.0 3.7 3.9   CHLORIDE 106 107  --  110* 104 109   CO2 25 26  --  25 27 24   ANIONGAP 7 7  --  5 4 6   BUN 16 17  --  19 16 17   CR 0.74 0.84  --  1.10 0.83 0.68   * 97 104* 134* 112* 95   FRANDY 8.8 9.3  --  8.3* 9.1 8.6     Recent Labs   Lab Test 17  0705   MAG 2.0     Recent Labs   Lab Test 22  1403 21  1332 21  0515 20  1530 20  0932   WBC 10.8 5.7 6.0 5.8 3.5*   HGB 15.2 15.9 14.0 16.2 14.5    236 181 198 164   MCV 85 85 86 85 88   NEUTROPHIL 85 73 83.6 76.1 59.0     Recent Labs   Lab Test 22  1403 21  1330 21  0515   BILITOTAL 0.8 0.8 0.5   ALKPHOS 74 79 58   ALT 35 31 40   AST 21 19 26   ALBUMIN 4.0 4.2 3.7     No results found for: TSH  Recent Labs   Lab Test 21  1331 20  1530 20  0932 19  0828 19  1017   CEA 1.4 1.2 <0.5 1.5 1.7     Results for orders placed or performed in visit on 22   MR Pelvis (Intrapelvic Organs) wo&w Contrast    Narrative    EXAMINATION: MR PELVIS (INTRAPELVIC ORGANS) W/O & W CONTRAST,  2022 1:47 PM     COMPARISON: 2011,  8/13/2020, 11/1/2016, 7/27/2016    TECHNIQUE:  Images were acquired without and with intravenous contrast  through the pelvis. The following MR images were acquired without  contrast: multiplanar T1, multiplanar T2, axial diffusion-weighted and  axial apparent diffusion coefficient. T1-weighted images with fat  saturation were acquired at the following intervals relative to  intravenous contrast administration: pre-contrast, immediate post  contrast, 1 minute, 3 minutes and delayed.  Contrast dose: 10.0mL  Gadavist    HISTORY: Rectal cancer once and wait protocol    FINDINGS:    Images are degraded by patient motion artifact.    LOCATION/SIZE:  Complete treatment response without recurrence of mass in the left  anterior-lateral rectum stable in appearance compared to 7/29/2021 MRI  with thin T2 hypointense signal intensity at the site of prior tumor  (for example series 6 images 25-32).    TREATMENT RESPONSE:   Approximately 0% of the current mass demonstrates tumour signal  intensity, with the remainder appearing to represent fibrosis.  This  corresponds to a tumour response grade of mrTRG-1.     EXTENT:  The tumor does not clearly involve the anal sphincters or levator ani  muscle.     CIRCUMFERENTIAL RESECTION MARGIN (CRM):  In terms of the resection margin, there is not involvement of the  mesorectal fascia.     LYMPH NODES:  No suspicious lymph nodes.    VEINS:  There is no evidence of extramural venous extension.     MISCELLANEOUS FINDINGS:  Unchanged appearance of the prostate with area of susceptibility  artifact along the left peripheral zone. Fat-containing direct left  inguinal hernia, increased since prior.      Impression    IMPRESSION:   1. Stable appearance of the rectum with complete response to treatment  and a corresponding tumor regression grade of mrTRG-1.   2. No lymphadenopathy or other evidence of metastasis/recurrent  "disease.    ----------------------------------------------------------------------  ----------------------------------------------------------------------    *Based on the article: \"Magnetic resonance imaging-detected tumor  response for locally advanced rectal cancer predicts survival  outcomes: [facility] experience.\" published in the Journal of Clinical  Oncology Oct 1, 2011.    MRI assessment of tumor regression grade (mrTRG):  mrTRG-1 was identified as the absence of any tumor signal.   mrTRG-2 was identified as small amounts of residual tumor visible but  with a predominant fibrotic low signal intensity.   mrTRG-3 was identified as mixed areas of low signal fibrosis and  intermediate signal intensity present but without predominance of  tumor.   mrTRG-4 was identified by predominantly tumor signal intensity remains  with minimal fibrotic low signal intensity.   mrTRG-5 was identified as no fibrosis evident, tumor signal visible  only.            I have personally reviewed the examination and initial interpretation  and I agree with the findings.    JESSICA MAURICE DO         SYSTEM ID:  [id number]     Narrative & Impression   Combined Report of:    PET and CT on  8/1/2022 11:23 AM :     1. PET of the neck, chest, abdomen, and pelvis.  2. PET CT Fusion for Attenuation Correction and Anatomical  Localization:    3. Diagnostic CT scan of the chest, abdomen, and pelvis with  intravenous contrast for interpretation.  4. 3D MIP and PET-CT fused images were processed on an independent  workstation and archived to PACS and reviewed by a radiologist.     Technique:     1. PET: The patient received 12.74 mCi of F-18-FDG; the serum glucose  was 95 prior to administration, body weight was 95.3 kg. Images were  evaluated in the axial, sagittal, and coronal planes as well as the  rotational whole body MIP. Images were acquired from the Vertex to the  Feet.     UPTAKE WAS MEASURED AT 60 MINUTES.      BACKGROUND:  Liver SUV max= " 3.97,   Aorta Blood SUV Max: 3.21.      2. CT: Volumetric acquisition for clinical interpretation of the  chest, abdomen, and pelvis acquired at 3 mm sections  after the  uneventful administration of intravenous contrast. The chest, abdomen,  and pelvis were evaluated at 5 mm sections in bone, soft tissue, and  lung windows.       The patient received 125 cc of Isovue 370 intravenously for the  examination and 125cc, Breeza Oral Contrast.  --     3. 3D MIP and PET-CT fused images were processed on an independent  workstation and archived to PACS and reviewed by a radiologist.     INDICATION: Malignant neoplasm of rectum (H)     ADDITIONAL INFORMATION OBTAINED FROM EMR: Status post chemoradiation  2016     COMPARISON: PET scan 7/29/2021.     FINDINGS:      HEAD/NECK:  There is no  suspicious FDG uptake in the neck.   Stenotic right subclavian vein between the clavicle and first rib,  unchanged.  Asymmetrically enlarged right thyroid lobe, unchanged from prior.  The paranasal sinuses are clear. The mastoid air cells are clear.   The mucosal pharyngeal space, the , prevertebral and carotid  spaces are within normal limits.   No masses, mass effect or pathologically enlarged lymph nodes are  evident.         CHEST:  There is no suspicious FDG uptake in the chest.   Calcified granuloma in the left lung base is unchanged. No suspicious  pulmonary nodules. The heart is normal in size. No pericardial  effusion.  There are no pathologically enlarged mediastinal, hilar or axillary  lymph nodes.  There are no suspicious lung nodules or evidence for infection.    Dependent atelectasis throughout the lungs.     There is no significant pericardial or pleural effusions.     ABDOMEN AND PELVIS:  Continued mild FDG activity near the anus, now with SUV max of 4.5,  previously 5.6.  There is no new FDG uptake in the abdomen or pelvis.  Fat-containing umbilical hernia. Left greater than right  fat-containing inguinal hernias.  Bilateral scrotal varicoceles  present.  Numerous unchanged cystic lesions in the liver and bilateral kidneys.  Postsurgical changes of appendectomy. Small hiatal hernia is present.  There are no suspicious hepatic lesions. There is no splenomegaly or  evidence for splenic or pancreatic mass lesion.  There are no suspicious adrenal mass lesions or opaque gallbladder  calculi. There is symmetric nephrographic renal phase without  hydronephrosis.  There is no evidence for diverticulitis, bowel obstruction or free  fluid.     LOWER EXTREMITIES:   No abnormal masses or hypermetabolic lesions.     BONES:   There are no suspicious lytic or blastic osseous lesions.  There is no  abnormal FDG uptake in the skeleton.  Mild degenerative change, predominantly in the cervical spine, lumbar  spine, and bilateral hips. Mild dextroscoliosis of lumbar spine.                                                                         IMPRESSION: In this patient with history of rectal carcinoma, status  post chemoradiation:  1. There is no evidence of local or metastatic recurrence.  2. Redemonstration of focal uptake near the anus, unchanged from  prior, nonspecific but may represent a inflammation and/or  hemorrhoids.      I have personally reviewed the examination and initial interpretation  and I agree with the findings.     WOLFGANG KABA MD         SYSTEM ID:  [id number]         ASSESSMENT/PLAN:  Louie Greco is a 62 year old  male with:    1) Rectal cancer: clinical stage [id number] (stage IIIA), s/p chemoradiation with Xeloda, and appears to have achieved complete response on imaging and biopsies.  He opted not to undergo surgery and expressed desire not to undergo APR, as he does not want a colostomy bag.  It was felt reasonable to go on the watch and wait protocol with the [facility].  He has completed 4 additional months (8 cycles) of chemotherapy with FOLFOX.  He will now go on surveillance, as per the watch and wait  protocol.    We reviewed MRI pelvis and PET scan from 8/1/22. I have reviewed actual images from his recent scans and there is nothing concerning for recurrent metastatic disease or recurrence. He had flex sig with Dr. Radford in July 2022, which found a small flat polyp in the rectosigmoid region.  The radiation changes were improved and the scar was visible, but no signs of disease or additional lesions were noted.  There was diverticulosis and no signs of divertciulitis.       He has achieved complete response.  There is no evidence of recurrence or metastatic disease.      Follow up per Watch and Wait Protocol:   Completed CRT: 9/15/16    Flexible sigmoidoscopy   ? Every 2 months for 6 months: Completed  ? Every 3 months for 3 years: Completed  ? Every 6 months for 5 years: Until 8/2022-completed  ? Every year for 10 years: Until 9/2026       3T MRI of pelvis  ? 6-8 weeks after CRT:  ? Every 4 months for 2 years: Completed  ? Every 6 months for 2 years: Completed  ? Yearly for 2 years: Until 9/2022 - completed       CT CAP  ? Every year for 6-8 years: Until 9/2024- next PET due August 2023 - ordered       Colonoscopy   ? Last 12/13/17- last done in October 2020       CEA at every clinic or endoscopic visit      -T3 MRI pelvis and PET scan in 1 year, which would be in August 2023 - ordered  -endoscopic surveillance with Dr. Radford as per protocol  -RTC in 1 year with labs/CEA and results of imaging    2) Genetics: He underwent genetic testing, which was negative.    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            4) Elevated bilirubin: mild.  Bilirubin has been mildly elevated in the past. It is stable and normal this time.   -monitor for now    5) Liver cysts: seen on CT, stable  -monitor    6) Pulmonary nodules: stable sub-4 mm pulmonary nodules  -monitor on future scans    7) Kidney cyst: stable  -monitor on future scans.      8) Appendix polyp: He is now s/p appendectomy.  Pathology found  sessile serrated adenoma without dysplasia or malignancy.     9) Elevated PSA:   -on observation  -he is seeing urology - Dr. Pat    45 minutes spent on the date of the encounter doing chart review, history and exam, documentation and further activities as noted above     Remi Mcguire    Hematologist and Medical Oncologist  [facility]

## 2022-08-11 VITALS
BODY MASS INDEX: 31.58 KG/M2 | HEART RATE: 80 BPM | SYSTOLIC BLOOD PRESSURE: 126 MMHG | RESPIRATION RATE: 18 BRPM | DIASTOLIC BLOOD PRESSURE: 80 MMHG | OXYGEN SATURATION: 96 % | WEIGHT: 220.1 LBS | TEMPERATURE: 97.6 F

## 2022-08-11 ASSESSMENT — PAIN SCALES - GENERAL: PAINLEVEL: NO PAIN (0)

## 2022-08-11 NOTE — NURSING NOTE
"Oncology Rooming Note    August 11, 2022 9:42 AM   Louie Greco is a 62 year old male who presents for:    Chief Complaint   Patient presents with     Oncology Clinic Visit     Rectal cancer (H)     Initial Vitals: /80 (BP Location: Right arm, Patient Position: Sitting, Cuff Size: Adult Large)   Pulse 80   Temp 97.6  F (36.4  C) (Oral)   Resp 18   Wt 99.8 kg (220 lb 1.6 oz)   SpO2 96%   BMI 31.58 kg/m   Estimated body mass index is 31.58 kg/m  as calculated from the following:    Height as of 7/1/22: 1.778 m (5' 10\").    Weight as of this encounter: 99.8 kg (220 lb 1.6 oz). Body surface area is 2.22 meters squared.  No Pain (0) Comment: Data Unavailable   No LMP for male patient.  Allergies reviewed: Yes  Medications reviewed: Yes    Medications: Medication refills not needed today.  Pharmacy name entered into [facility]:    [facility] [place], [place] - [address], [address]  [facility] [place] - [address] [facility]    Clinical concerns: None.        Marge Sterling LPN August 11, 2022 9:43 AM              "

## 2022-09-11 ENCOUNTER — HEALTH MAINTENANCE LETTER (OUTPATIENT)
Age: 62
End: 2022-09-11

## 2022-10-20 ENCOUNTER — OFFICE VISIT (OUTPATIENT)
Dept: OPHTHALMOLOGY | Facility: CLINIC | Age: 62
End: 2022-10-20
Attending: OPHTHALMOLOGY
Payer: COMMERCIAL

## 2022-10-20 DIAGNOSIS — H43.313 VITREOUS STRANDS OF BOTH EYES: ICD-10-CM

## 2022-10-20 DIAGNOSIS — H35.372 EPIRETINAL MEMBRANE, LEFT EYE: ICD-10-CM

## 2022-10-20 DIAGNOSIS — H43.812 PVD (POSTERIOR VITREOUS DETACHMENT), LEFT EYE: Primary | ICD-10-CM

## 2022-10-20 PROCEDURE — 67031 LASER SURGERY EYE STRANDS: CPT | Performed by: OPHTHALMOLOGY

## 2022-10-20 PROCEDURE — G0463 HOSPITAL OUTPT CLINIC VISIT: HCPCS | Mod: 25

## 2022-10-20 ASSESSMENT — REFRACTION_WEARINGRX
OD_CYLINDER: +0.25
OS_AXIS: 098
OS_CYLINDER: +0.50
OS_SPHERE: -4.25
OD_AXIS: 025
SPECS_TYPE: SVL
OD_SPHERE: -4.00

## 2022-10-20 ASSESSMENT — SLIT LAMP EXAM - LIDS
COMMENTS: NORMAL
COMMENTS: NORMAL

## 2022-10-20 ASSESSMENT — CONF VISUAL FIELD
OS_SUPERIOR_NASAL_RESTRICTION: 0
OS_NORMAL: 1
METHOD: COUNTING FINGERS
OS_SUPERIOR_TEMPORAL_RESTRICTION: 0
OD_INFERIOR_NASAL_RESTRICTION: 0
OS_INFERIOR_TEMPORAL_RESTRICTION: 0
OD_NORMAL: 1
OD_INFERIOR_TEMPORAL_RESTRICTION: 0
OD_SUPERIOR_NASAL_RESTRICTION: 0
OD_SUPERIOR_TEMPORAL_RESTRICTION: 0
OS_INFERIOR_NASAL_RESTRICTION: 0

## 2022-10-20 ASSESSMENT — TONOMETRY
OD_IOP_MMHG: 15
IOP_METHOD: TONOPEN
OS_IOP_MMHG: 14

## 2022-10-20 ASSESSMENT — CUP TO DISC RATIO
OS_RATIO: 0.65
OD_RATIO: 0.65

## 2022-10-20 ASSESSMENT — EXTERNAL EXAM - RIGHT EYE: OD_EXAM: NORMAL

## 2022-10-20 ASSESSMENT — VISUAL ACUITY
OD_CC+: -1
CORRECTION_TYPE: GLASSES
OS_CC+: -1
METHOD: SNELLEN - LINEAR
OS_CC: 20/20
OD_CC: 20/20

## 2022-10-20 ASSESSMENT — EXTERNAL EXAM - LEFT EYE: OS_EXAM: NORMAL

## 2022-10-20 NOTE — NURSING NOTE
Chief Complaints and History of Present Illnesses   Patient presents with     Posterior Vitreous Detachment Evaluation     Chief Complaint(s) and History of Present Illness(es)     Posterior Vitreous Detachment Evaluation            Laterality: both eyes    Course: stable    Associated symptoms: floaters, photophobia and eye pain          Comments    Here for PVD evaluation in both eyes and possible in office laser left eye. Vision is stable - floaters interferes with vision in both eyes. He notes intermittent pain that usually worsens in the evening. Denies using lubricating drops.    Rinku Johnson COT 8:34 AM October 20, 2022

## 2022-10-20 NOTE — PROGRESS NOTES
Chief Complaint(s) and History of Present Illness(es)     Posterior Vitreous Detachment Evaluation            Laterality: both eyes    Course: stable    Associated symptoms: floaters, photophobia and eye pain          Comments    Here for PVD evaluation in both eyes and possible in office laser left   eye. Vision is stable - floaters interferes with vision in both eyes. He   notes intermittent pain that usually worsens in the evening. Denies using   lubricating drops.    Rinku Johnson COT 8:34 AM October 20, 2022                Review of systems for the eyes was negative other than the pertinent positives/negatives listed in the HPI.      Assessment & Plan      Louie Greco is a 62 year old male with the following diagnoses:   1. PVD (posterior vitreous detachment), left eye    2. Vitreous strands of both eyes    3. Epiretinal membrane, left eye         Referred by Dr. 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   Return precautions reviewed     Patient disposition:   Return in about 4 weeks (around 11/17/2022) for DFE LEFT.           Attending Physician Attestation:  Complete documentation of historical and exam elements from today's encounter can be found in the full encounter summary report (not reduplicated in this progress note).  I personally obtained the chief complaint(s) and history of present illness.  I confirmed and edited as necessary the review of systems, past medical/surgical history, family history, social history, and examination findings as documented by others; and I examined the patient myself.  I personally reviewed the relevant tests, images, and reports as documented above.  I formulated and edited as necessary the assessment and plan and discussed the findings and management plan with the  patient and family. . - Wang Lester MD

## 2023-05-04 ENCOUNTER — TELEPHONE (OUTPATIENT)
Dept: ONCOLOGY | Facility: CLINIC | Age: 63
End: 2023-05-04
Payer: COMMERCIAL

## 2023-05-04 NOTE — TELEPHONE ENCOUNTER
Left voicemail message for patient requesting a return call regarding scheduling PET Scan, MRI and lab prior to return visit with Dr Mcguire.  All due August 2023.      ----- Message from Kalee Lu RN sent at 5/1/2023  9:45 AM CDT -----  Regarding: FW: check out orders from 8/9/22  Hi Ladies    Would someone please contact pt to set up?    Thanks  Kalee   ----- Message -----  From: Kalee Lu RN  Sent: 9/26/2022   9:25 AM CDT  To: Remi Mcguire MD; Kalee Lu RN  Subject: RE: check out orders from 8/9/22                 It was deferred until April. Will watch for labs, scan and appt       ----- Message -----  From: Remi Mcguire MD  Sent: 9/24/2022   2:54 PM CDT  To: Kalee Lu RN  Subject: check out orders from 8/9/22                     Hi Kalee,    I had entered check out orders on 8/9/22 for Louie to have follow up in a year at [place]. I am not sure if this was transferred and has been placed on hold as we do not schedule a year out or missed.     Can you have this on your radar?    Remi

## 2023-05-17 ENCOUNTER — HOSPITAL ENCOUNTER (EMERGENCY)
Facility: CLINIC | Age: 63
Discharge: HOME | End: 2023-05-18
Attending: FAMILY MEDICINE | Admitting: FAMILY MEDICINE
Payer: COMMERCIAL

## 2023-05-17 ENCOUNTER — TELEPHONE (OUTPATIENT)
Dept: OPHTHALMOLOGY | Facility: CLINIC | Age: 63
End: 2023-05-17
Payer: COMMERCIAL

## 2023-05-17 VITALS
BODY MASS INDEX: 30.71 KG/M2 | RESPIRATION RATE: 16 BRPM | HEART RATE: 87 BPM | OXYGEN SATURATION: 95 % | WEIGHT: 214 LBS | DIASTOLIC BLOOD PRESSURE: 77 MMHG | TEMPERATURE: 98.8 F | SYSTOLIC BLOOD PRESSURE: 130 MMHG

## 2023-05-17 DIAGNOSIS — H43.10 VITREOUS HEMORRHAGE, UNSPECIFIED LATERALITY (H): ICD-10-CM

## 2023-05-17 PROCEDURE — 76512 OPH US DX B-SCAN: CPT | Mod: RT | Performed by: FAMILY MEDICINE

## 2023-05-17 PROCEDURE — 99284 EMERGENCY DEPT VISIT MOD MDM: CPT | Mod: 25 | Performed by: FAMILY MEDICINE

## 2023-05-17 PROCEDURE — 99284 EMERGENCY DEPT VISIT MOD MDM: CPT | Performed by: FAMILY MEDICINE

## 2023-05-17 ASSESSMENT — VISUAL ACUITY
OU: 20/25
OD: 20/25;WITH CORRECTIVE LENSES
OS: 20/15;WITH CORRECTIVE LENSES

## 2023-05-17 ASSESSMENT — ACTIVITIES OF DAILY LIVING (ADL): ADLS_ACUITY_SCORE: 35

## 2023-05-18 ENCOUNTER — TELEPHONE (OUTPATIENT)
Dept: OPHTHALMOLOGY | Facility: CLINIC | Age: 63
End: 2023-05-18
Payer: COMMERCIAL

## 2023-05-18 ENCOUNTER — OFFICE VISIT (OUTPATIENT)
Dept: OPHTHALMOLOGY | Facility: CLINIC | Age: 63
End: 2023-05-18
Attending: OPHTHALMOLOGY
Payer: COMMERCIAL

## 2023-05-18 DIAGNOSIS — H43.811 PVD (POSTERIOR VITREOUS DETACHMENT), RIGHT: Primary | ICD-10-CM

## 2023-05-18 DIAGNOSIS — H33.311 RETINAL TEAR OF RIGHT EYE: ICD-10-CM

## 2023-05-18 PROCEDURE — 99214 OFFICE O/P EST MOD 30 MIN: CPT | Mod: 25 | Performed by: OPHTHALMOLOGY

## 2023-05-18 PROCEDURE — 99207 FUNDUS PHOTOS OU (BOTH EYES): CPT | Mod: 26 | Performed by: OPHTHALMOLOGY

## 2023-05-18 PROCEDURE — 92134 CPTRZ OPH DX IMG PST SGM RTA: CPT | Performed by: OPHTHALMOLOGY

## 2023-05-18 PROCEDURE — 92250 FUNDUS PHOTOGRAPHY W/I&R: CPT | Performed by: OPHTHALMOLOGY

## 2023-05-18 PROCEDURE — G0463 HOSPITAL OUTPT CLINIC VISIT: HCPCS | Mod: 25 | Performed by: OPHTHALMOLOGY

## 2023-05-18 PROCEDURE — 67145 PROPH RTA DTCHMNT PC: CPT | Mod: RT | Performed by: OPHTHALMOLOGY

## 2023-05-18 ASSESSMENT — REFRACTION_WEARINGRX
OS_AXIS: 127
OD_AXIS: 180
OD_SPHERE: -4.00
OS_CYLINDER: +0.50
OD_CYLINDER: +0.50
OD_CYLINDER: +0.50
OS_CYLINDER: +0.50
OD_AXIS: 180
OD_SPHERE: -2.25
OS_SPHERE: -2.00
OS_SPHERE: -3.75
OS_AXIS: 127

## 2023-05-18 ASSESSMENT — CONF VISUAL FIELD
OD_SUPERIOR_NASAL_RESTRICTION: 0
OD_SUPERIOR_TEMPORAL_RESTRICTION: 0
OD_INFERIOR_TEMPORAL_RESTRICTION: 0
OS_INFERIOR_NASAL_RESTRICTION: 0
OD_INFERIOR_NASAL_RESTRICTION: 0
OS_SUPERIOR_NASAL_RESTRICTION: 0
METHOD: COUNTING FINGERS
OS_SUPERIOR_TEMPORAL_RESTRICTION: 0
OS_INFERIOR_TEMPORAL_RESTRICTION: 0
OD_NORMAL: 1
OS_NORMAL: 1

## 2023-05-18 ASSESSMENT — TONOMETRY
OS_IOP_MMHG: 17
OD_IOP_MMHG: 19
IOP_METHOD: TONOPEN

## 2023-05-18 ASSESSMENT — VISUAL ACUITY
OS_CC: 20/20
CORRECTION_TYPE: GLASSES
OD_CC: 20/20
METHOD: SNELLEN - LINEAR
OD_CC+: -1

## 2023-05-18 ASSESSMENT — CUP TO DISC RATIO
OD_RATIO: 0.65
OS_RATIO: 0.65

## 2023-05-18 ASSESSMENT — EXTERNAL EXAM - LEFT EYE: OS_EXAM: NORMAL

## 2023-05-18 ASSESSMENT — SLIT LAMP EXAM - LIDS
COMMENTS: NORMAL
COMMENTS: NORMAL

## 2023-05-18 ASSESSMENT — EXTERNAL EXAM - RIGHT EYE: OD_EXAM: NORMAL

## 2023-05-18 NOTE — NURSING NOTE
Chief Complaints and History of Present Illnesses   Patient presents with     Retinal Evaluation     Follow up from ED yesterday Hemorrhagic PVD Right eye     Chief Complaint(s) and History of Present Illness(es)     Retinal Evaluation            Laterality: right eye    Comments: Follow up from ED yesterday Hemorrhagic PVD Right eye          Comments    Change in Vision RE yesterday afternoon. Pt seeing black cobwebs, floaters and halos nasally in RE yesterday.  No eye pain today. No new redness, pt notes redness from allergies. No dryness.    CRISTAL Link May 18, 2023 1:31 PM

## 2023-05-18 NOTE — TELEPHONE ENCOUNTER
Pt seen ED last night by on call eye provider and to f/u today in retina--hemorrhagic PVD likely    Tentatively scheduled at 1300 today with Dr. Ornelas    [facility] to reach out to confirm scheduling this morning    Jan Carranza RN 6:48 AM 05/18/23

## 2023-05-18 NOTE — ED TRIAGE NOTES
Triage Assessment     Row Name 05/17/23 2132       Triage Assessment (Adult)    Airway WDL WDL       Respiratory WDL    Respiratory WDL WDL       Skin Circulation/Temperature WDL    Skin Circulation/Temperature WDL WDL       Cardiac WDL    Cardiac WDL WDL       Peripheral/Neurovascular WDL    Peripheral Neurovascular WDL WDL       Cognitive/Neuro/Behavioral WDL    Cognitive/Neuro/Behavioral WDL WDL

## 2023-05-18 NOTE — DISCHARGE INSTRUCTIONS
Discharge to home with plans to follow-up with the ophthalmology department tomorrow as discussed.

## 2023-05-18 NOTE — PROGRESS NOTES
CC -   Blurry vision right eye     INTERVAL HISTORY - Initial visit with me   Developed many floaters and now a veil has fallen over his vision right eye. He reports had a halo inferior right eye. Evaluated last night by Dr. Phelan at [facility] emergency room and asked patient to follow up today at the retina clinic    Initial PMH - Louie Greco is a 62 year old male here for evaluation of epiretinal membrane of the left eye. He developed symptoms after a PVD 5 years ago.   In 2017 chemo for stage 3 colorectal cancer. He is thankfully doing well from this cancer and has routine screening soon.     PAST MEDICAL HISTORY: Colorectal cancer, s/p chemo -- in remission    RETINAL IMAGIN23   OCT Macula   OD - retina normal, PHF detached and vitreous cells  OS - mild ERM, Posterior vitreous detachment (PVD)  optos 23 consistent with exam       ASSESSMENT & PLAN    # hemorrhagic Posterior vitreous detachment (PVD) right eye   #small peripheral tears right eye  Small peripheral tear SN and temporally near the ora  Recommend laser retinopexy  Risks, benefits and alternatives discussed with patient and agreed to proceed.  No complications   HOB elevated  Retina detachment precautions were discussed with the patient (presence or increased in flashes, floaters or a curtain in the visual field) and was asked to return if any of the those occur   Follow up in one week    # ERM, left eye   - minimal distortion on Amsler testing compared to right eye   - VA good    - no breaks on    - recommend monitoring   - recheck 6 months to verifyt stability    # Visually significant Floater, left eye   - could consider PPV vs YAG    # PVD OS, Syneresis OD   - advised Signs and symptoms of  RD 2022    # OAG suspect OS > OD   - pt is myopic, discs are large, IOP 17/18 today   - recommend RNFL screening and observation for now   - no FHx of glaucoma   - followed by Dr. Rodriguez    # NS OU- mild to mod  # MORIS - advises artificial  tears    PLAN:   Laser retinopexy 05/18/23   Retina detachment precautions were discussed with the patient (presence or increased in flashes, floaters or a curtain in the visual field) and was asked to return if any of the those occur   Follow up in one week- post-laser    ~~~~~~~~~~~~~~~~~~~~~~~~~~~~~~~~~~   Complete documentation of historical and exam elements from today's encounter can be found in the full encounter summary report (not reduplicated in this progress note).  I personally obtained the chief complaint(s) and history of present illness.  I confirmed and edited as necessary the review of systems, past medical/surgical history, family history, social history, and examination findings as documented by others; and I examined the patient myself.  I personally reviewed the relevant tests, images, and reports as documented above.  I formulated and edited as necessary the assessment and plan and discussed the findings and management plan with the patient and family and No resident or fellow assisted with the procedures performed.  I performed the procedures myself.    Yanique Ornelas MD  Professor of Ophthalmology  Vitreo-Retinal surgeon   Department of Ophthalmology and Visual Neurosciences   [facility]  Phone: [phone]   Fax: [phone]

## 2023-05-18 NOTE — TELEPHONE ENCOUNTER
Called and spoke to Louie     Confirmed appointment for today - Louie knew the location to the clinic as he has seen Dr. Lester / Dr. LEO Craig Communication Facilitator on 5/18/2023 at 8:10 AM

## 2023-05-18 NOTE — PROGRESS NOTES
Received phone call from this patient who states that his right eye a few hours ago developed many floaters and now a veil has fallen over his vision. He denies any flashes. Instructed patient to come into the [facility] emergency room for evaluation immediately. [facility] understanding and stated he would come over immediately.

## 2023-05-18 NOTE — CONSULTS
OPHTHALMOLOGY CONSULT NOTE  05/18/23    Patient: Louie Greco    HISTORY OF PRESENTING ILLNESS:     Louie Greco is a 62 year old male with POH of ERM OS, PVD OS, OAG suspect who presented on 5/17/23 with acute onset floaters and blurry vision in left eye.    His vision was at baseline with bothersome floaters, unsure which eye, stable since his last visit with Dr. Lester 10/2022 for YAG vitreolysis OS which did not reduce floater in left eye until mid-afternoon 5/17/23 when he developed many new floaters and a haze across his entire visual field in the right eye only. Denies pain/photophobia. Vision changes have been stable since onset. No scotomas or black curtains. No flashes.     10+ review of systems were otherwise negative except for that which has been stated above.      OCULAR/MEDICAL/SURGICAL HISTORIES:     Past Ocular History:   Refractive error: myopia  Prior eye surgery/laser: YAG vitreolysis OS 10/20/2022  CTL wearer: no    Past Medical History:    Past Medical History:   Diagnosis Date     Childhood asthma      Elevated prostate specific antigen (PSA)     No biopsy done (had rectal cancer at the time)     Epididymitis, bilateral     18 years old     Inguinal hernia      Kidney stone on left side 04/22/2021    Added automatically from request for surgery [id number]     Mumps      Nephrolithiasis     Several -- 1990 first,recurrence 2019, 2021     Rectal cancer (H) 2017    low rectal cancer, S/P Rad adn Chemo, no surg needed     Right 4 mm Kidney stone at [facility]  02/28/2021     Right ureteral stone 03/11/2021    Added automatically from request for surgery [id number]     Fannie        Past Surgical History:   Procedure Laterality Date     COLONOSCOPY N/A 7/27/2016    Procedure: COMBINED COLONOSCOPY, SINGLE OR MULTIPLE BIOPSY/POLYPECTOMY BY BIOPSY;  Surgeon: Chelsea Thompson MD;  Location:  GI     COLONOSCOPY N/A 9/13/2017    Procedure: COLONOSCOPY;;  Surgeon: Felicitas Radford MD;  Location:   OR     COLONOSCOPY N/A 12/13/2017    Procedure: COLONOSCOPY;;  Surgeon: Felicitas Radford MD;  [facility]     COLONOSCOPY N/A 10/23/2020    Procedure: COLONOSCOPY;  Surgeon: Felicitas Radford MD;  Location: [facility] OR     COMBINED CYSTOSCOPY, RETROGRADES, URETEROSCOPY, LASER HOLMIUM LITHOTRIPSY URETER(S), INSERT STENT Left 2/16/2018    Procedure: COMBINED CYSTOSCOPY, RETROGRADES, URETEROSCOPY, LASER HOLMIUM LITHOTRIPSY URETER(S), INSERT STENT;  Cysto, left ureteroscopy, holmium laser, retrogrades, stent placement;  Surgeon: Bola Worthy MD;  Location: [facility] OR     COMBINED CYSTOSCOPY, RETROGRADES, URETEROSCOPY, LASER HOLMIUM LITHOTRIPSY URETER(S), INSERT STENT Right 3/22/2021    Procedure: CYSTOSCOPY WITH RIGHT RETROGRADE PYELOGRAM, RIGHT URETEROSCOPY WITH LASER LITHOTRIPSY AND BASKET REMOVAL OF STONE, RIGHT URETERAL STENT PLACEMENT;  Surgeon: Mohsen Pat MD;  Location: [facility]     COMBINED CYSTOSCOPY, RETROGRADES, URETEROSCOPY, LASER HOLMIUM LITHOTRIPSY URETER(S), INSERT STENT Left 5/19/2021    Procedure: CYSTOSCOPY, LEFT RETROGRADE PYELOGRAM, LEFT DIAGNOSTIC, URETEROSCOPY, LEFT URETERAL STENT PLACEMENT;  Surgeon: Mohsen Pta MD;  Location: [facility]     COMBINED CYSTOSCOPY, RETROGRADES, URETEROSCOPY, LASER HOLMIUM LITHOTRIPSY URETER(S), INSERT STENT Left 6/23/2021    Procedure: CYSTOSCOPY, LEFT URETERAL STENT REMOVAL, LEFT RETROGRADE PYELOGRAM, LEFT URETEROSCOPY WITH LASER LITHOTRIPSY AND BASKET REMOVAL OF STONES, LEFT URETERAL STENT PLACEMENT;  Surgeon: Mohsen Pat MD;  [facility]     CYSTOSCOPY, LITHOLAPAXY, COMBINED N/A 3/1/2021    Procedure: Cystoscopy, litholapaxy, combined;  Surgeon: Mohsen Pat MD;  [facility]     CYSTOSCOPY, RETROGRADES, INSERT STENT URETER(S), COMBINED Right 3/1/2021    Procedure: Cystoscopy, retrogrades, insert stent ureter(s), combined;  Surgeon: Mohsen Pat MD;  [facility]     EXAM UNDER ANESTHESIA ANUS N/A 7/5/2017     Procedure: EXAM UNDER ANESTHESIA ANUS;  Examination Under Anesthesia, flex sigmoidoscopy with biopsies and formalin application;  Surgeon: Felicitas Radford MD;  [facility]     EXAM UNDER ANESTHESIA ANUS N/A 9/13/2017    Procedure: EXAM UNDER ANESTHESIA ANUS;  Examination Under Anesthesia Anus, Colonoscopy, application of formalin;  Surgeon: Felicitas Radford MD;  [facility]     EXAM UNDER ANESTHESIA ANUS N/A 12/13/2017    Procedure: EXAM UNDER ANESTHESIA ANUS;  Examination Under Anesthesia Anus, Colonoscopy;  Surgeon: Felicitas Radford MD;  [facility]     EXAM UNDER ANESTHESIA ANUS N/A 5/11/2018    Procedure: EXAM UNDER ANESTHESIA ANUS;  Examination Under Anesthesia Anus, Interoperative Flexible Sigmoidoscopy, Application of Formalin;  Surgeon: Felicitas Radford MD;  [facility]     EXAM UNDER ANESTHESIA ANUS N/A 7/20/2018    Procedure: EXAM UNDER ANESTHESIA ANUS;  Examination Under Anesthesia Anus, Flexible Sigmoidoscopy ;  Surgeon: Felicitas Radford MD;  [facility]     EXAM UNDER ANESTHESIA ANUS N/A 11/30/2018    Procedure: Examination Under Anesthesia, application of formalin to rectum, polypectomy;  Surgeon: Felicitas Radford MD;  [facility]     EXAM UNDER ANESTHESIA ANUS N/A 2/22/2019    Procedure: Examination Under Anesthesia;  Surgeon: Felicitas Radford MD;  [facility]     EXAM UNDER ANESTHESIA ANUS N/A 6/28/2019    Procedure: Examination Under Anesthesia;  Surgeon: Felicitas Radford MD;  [facility]     EXAM UNDER ANESTHESIA ANUS N/A 11/1/2019    Procedure: Examination Under Anesthesia;  Surgeon: Felicitas Radford MD;  [facility]     EXAM UNDER ANESTHESIA RECTUM N/A 10/23/2020    Procedure: Exam under anesthesia rectum;  Surgeon: Felicitas Radford MD;  [facility]: [facility]     HC TOOTH EXTRACTION W/FORCEP       LAPAROSCOPIC APPENDECTOMY N/A 7/17/2017    Procedure: LAPAROSCOPIC  APPENDECTOMY;  LAPAROSCOPIC APPENDECTOMY;  Surgeon: Bryson Ferguson MD;  Location:  OR     LASER HOLMIUM LITHOTRIPSY URETER(S), INSERT STENT, COMBINED Right 3/1/2021    Procedure: CYSTOURETEROSCOPY,  URETERAL STENT INSERTION, RIGHT RETROGRADE;  Surgeon: Mohsen Pat MD;  [facility]     SIGMOIDOSCOPY FLEXIBLE N/A 12/8/2016    Procedure: SIGMOIDOSCOPY FLEXIBLE;  Surgeon: Felicitas Radford MD;  [facility]     SIGMOIDOSCOPY FLEXIBLE N/A 7/5/2017    Procedure: SIGMOIDOSCOPY FLEXIBLE;;  Surgeon: Felicitas Radford MD;  [facility]     SIGMOIDOSCOPY FLEXIBLE N/A 5/11/2018    Procedure: SIGMOIDOSCOPY FLEXIBLE;;  Surgeon: Felicitas Radford MD;  [facility]     SIGMOIDOSCOPY FLEXIBLE N/A 7/20/2018    Procedure: SIGMOIDOSCOPY FLEXIBLE;;  Surgeon: Felicitas Radford MD;  [facility]     SIGMOIDOSCOPY FLEXIBLE N/A 11/30/2018    Procedure: Flexible Sigmoidoscopy;  Surgeon: Felicitas Radford MD;  [facility]     SIGMOIDOSCOPY FLEXIBLE N/A 2/22/2019    Procedure: Intraoperative Flexible Sigmoidoscopy, Application of Formalin to rectum;  Surgeon: Felicitas Radford MD;  [facility]     SIGMOIDOSCOPY FLEXIBLE N/A 6/28/2019    Procedure: Flexible Sigmoidoscopy;  Surgeon: Felicitas Radford MD;  [facility]     SIGMOIDOSCOPY FLEXIBLE N/A 11/1/2019    Procedure: Flexible Sigmoidoscopy;  Surgeon: Felicitas Radford MD;  [facility]     SIGMOIDOSCOPY FLEXIBLE N/A 7/23/2021    Procedure: SIGMOIDOSCOPY, FLEXIBLE;  Surgeon: Felicitas Radford MD;  [facility]     SIGMOIDOSCOPY FLEXIBLE N/A 7/1/2022    Procedure: SIGMOIDOSCOPY, FLEXIBLE, EUA;  Surgeon: Felicitas Radford MD;  [facility]: [facility] OR     TESTICLE SURGERY       VASCULAR SURGERY      Right chest port     VASECTOMY       VASECTOMY         Family History:  Denies AMD/glaucoma    Social History:  Has three children without eye conditions and two  grandchildren; 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; 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 alcohol/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    EXAMINATION:     Base Eye Exam     Visual Acuity (Snellen - Linear)       Right Left    Near sc 20/20-1 20/20          Tonometry (Tonopen, 12:28 AM)       Right Left    Pressure 17 19          Pupils       Pupils Dark Light Shape React APD    Right PERRL 4 2 Round Brisk None    Left PERRL 4 2 Round Brisk           Visual Fields       Left Right     Full Full          Extraocular Movement       Right Left     Full, Ortho Full, Ortho          Neuro/Psych     Oriented x3: Yes    Mood/Affect: Normal          Dilation     Both eyes: 1% Cyclopentolate/1% Tropicamide/2.5% Phenylephrine @ 12:29 AM            Additional Tests     Color       Right Left    Ishihara 11/11 11/11            Slit Lamp and Fundus Exam     External Exam       Right Left    External Normal Normal          Slit Lamp Exam       Right Left    Lids/Lashes Normal Normal    Conjunctiva/Sclera White and quiet White and quiet    Cornea Clear Clear    Anterior Chamber Deep and quiet Deep and quiet    Iris Dilated Dilated    Lens Tr NS Tr NS    Anterior Vitreous new ford ring; amteroir vit with RBC PVD, large ford; dense anterior syneresis          Fundus Exam       Right Left    Disc two disc hemorrhages Normal    C/D Ratio 0.65 0.65    Macula Flat, FLR Normal    Vessels Normal Normal    Periphery Normal, no RT/RD, possible area of nasal suspicion? inferior and temporal lattice; cobblestones, attached                Labs/Studies/Imaging Performed  05/18/23 BSUS w/o obvious RD/tear/flap; ofrd ring visible     ASSESSMENT/PLAN:     Louie YAJAIRA Greco is a 62 year old male with POH of ERM OS, PVD OS, OAG suspect who presented on 5/17/23 with acute onset floaters and blurry vision in left eye.    # Hemorrhagic Posterior Vitreous Detachment, Right Eye  - Onset 5/17/23 afternoon with haze and floaters; no curtain or flashes or scotoma  - Exam with VA 20/20-2, no APD; IOP WNL; CVF full; anterior vitreous RBC,  new ford ring, new disc hemorrhages, no RT/RD on  or B-scan  - Presentation consistent with hemorrhage PVD but these are often associated with retinal tears or detachments so will have patient follow up closely in retina clinic tomorrow. Messaged  to call and arrange follow up in retina clinic tomorrow.  - Discussed retinal detachment return precautions.    It is our pleasure to participate in this patient's care and treatment. Please contact us with any further questions or concerns.    Michael Phelan MD  Resident Physician - PGY3  Department of Ophthalmology   [facility]

## 2023-05-18 NOTE — ED PROVIDER NOTES
"ED Provider Note  [facility]      History     Chief Complaint   Patient presents with     Eye Problem     States he is seeing black cobwebs and hazy curtain, reports has had floaters in the past, his eye doctor is concerned for a detached retina, denies any unusual eye pain, vision decreased, \"like looking through gray gauze\"     HPI  Louie Greco is a 62 year old male with history of left eye PVD and epiretinal membrane and vitreous strands of both eyes as well as suspected glaucoma of both eyes presenting to the ED with visual disturbance.     Past Medical History  Past Medical History:   Diagnosis Date     Childhood asthma      Elevated prostate specific antigen (PSA)     No biopsy done (had rectal cancer at the time)     Epididymitis, bilateral     18 years old     Inguinal hernia      Kidney stone on left side 04/22/2021    Added automatically from request for surgery [id number]     Mumps      Nephrolithiasis     Several -- 1990 first,recurrence 2019, 2021     Rectal cancer (H) 2017    low rectal cancer, S/P Rad adn Chemo, no surg needed     Right 4 mm Kidney stone at [facility]  02/28/2021     Right ureteral stone 03/11/2021    Added automatically from request for surgery [id number]     Fannie      Past Surgical History:   Procedure Laterality Date     COLONOSCOPY N/A 7/27/2016    Procedure: COMBINED COLONOSCOPY, SINGLE OR MULTIPLE BIOPSY/POLYPECTOMY BY BIOPSY;  Surgeon: Chelsea Thompson MD;  Location:  GI     COLONOSCOPY N/A 9/13/2017    Procedure: COLONOSCOPY;;  Surgeon: Felicitas Radford MD;  [facility]     COLONOSCOPY N/A 12/13/2017    Procedure: COLONOSCOPY;;  Surgeon: Felicitas Radford MD;  [facility]     COLONOSCOPY N/A 10/23/2020    Procedure: COLONOSCOPY;  Surgeon: Felicitas Radford MD;  [facility]     COMBINED CYSTOSCOPY, RETROGRADES, URETEROSCOPY, LASER HOLMIUM LITHOTRIPSY URETER(S), INSERT STENT Left 2/16/2018    Procedure: " COMBINED CYSTOSCOPY, RETROGRADES, URETEROSCOPY, LASER HOLMIUM LITHOTRIPSY URETER(S), INSERT STENT;  Cysto, left ureteroscopy, holmium laser, retrogrades, stent placement;  Surgeon: Bola Worthy MD;  Location: [facility]     COMBINED CYSTOSCOPY, RETROGRADES, URETEROSCOPY, LASER HOLMIUM LITHOTRIPSY URETER(S), INSERT STENT Right 3/22/2021    Procedure: CYSTOSCOPY WITH RIGHT RETROGRADE PYELOGRAM, RIGHT URETEROSCOPY WITH LASER LITHOTRIPSY AND BASKET REMOVAL OF STONE, RIGHT URETERAL STENT PLACEMENT;  Surgeon: Mohsen Pat MD;  Location[facility]     COMBINED CYSTOSCOPY, RETROGRADES, URETEROSCOPY, LASER HOLMIUM LITHOTRIPSY URETER(S), INSERT STENT Left 5/19/2021    Procedure: CYSTOSCOPY, LEFT RETROGRADE PYELOGRAM, LEFT DIAGNOSTIC, URETEROSCOPY, LEFT URETERAL STENT PLACEMENT;  Surgeon: Mohsen Pat MD;  [facility]     COMBINED CYSTOSCOPY, RETROGRADES, URETEROSCOPY, LASER HOLMIUM LITHOTRIPSY URETER(S), INSERT STENT Left 6/23/2021    Procedure: CYSTOSCOPY, LEFT URETERAL STENT REMOVAL, LEFT RETROGRADE PYELOGRAM, LEFT URETEROSCOPY WITH LASER LITHOTRIPSY AND BASKET REMOVAL OF STONES, LEFT URETERAL STENT PLACEMENT;  Surgeon: Mohsen Pat MD;  [facility]     CYSTOSCOPY, LITHOLAPAXY, COMBINED N/A 3/1/2021    Procedure: Cystoscopy, litholapaxy, combined;  Surgeon: Mohsen Pat MD;  [facility]     CYSTOSCOPY, RETROGRADES, INSERT STENT URETER(S), COMBINED Right 3/1/2021    Procedure: Cystoscopy, retrogrades, insert stent ureter(s), combined;  Surgeon: Mohsen Pat MD;  [facility]     EXAM UNDER ANESTHESIA ANUS N/A 7/5/2017    Procedure: EXAM UNDER ANESTHESIA ANUS;  Examination Under Anesthesia, flex sigmoidoscopy with biopsies and formalin application;  Surgeon: Felicitas Radford MD;  [facility]     EXAM UNDER ANESTHESIA ANUS N/A 9/13/2017    Procedure: EXAM UNDER ANESTHESIA ANUS;  Examination Under Anesthesia Anus, Colonoscopy, application of formalin;  Surgeon: Malina  Felicitas HIDALGO MD;  [facility]     EXAM UNDER ANESTHESIA ANUS N/A 12/13/2017    Procedure: EXAM UNDER ANESTHESIA ANUS;  Examination Under Anesthesia Anus, Colonoscopy;  Surgeon: Felicitas Radford MD;  [facility]     EXAM UNDER ANESTHESIA ANUS N/A 5/11/2018    Procedure: EXAM UNDER ANESTHESIA ANUS;  Examination Under Anesthesia Anus, Interoperative Flexible Sigmoidoscopy, Application of Formalin;  Surgeon: Felicitas Radford MD;  [facility]     EXAM UNDER ANESTHESIA ANUS N/A 7/20/2018    Procedure: EXAM UNDER ANESTHESIA ANUS;  Examination Under Anesthesia Anus, Flexible Sigmoidoscopy ;  Surgeon: Felicitas Radford MD;  [facility]     EXAM UNDER ANESTHESIA ANUS N/A 11/30/2018    Procedure: Examination Under Anesthesia, application of formalin to rectum, polypectomy;  Surgeon: Felicitas Radford MD;  [facility]     EXAM UNDER ANESTHESIA ANUS N/A 2/22/2019    Procedure: Examination Under Anesthesia;  Surgeon: Felicitas Radford MD;  [facility]     EXAM UNDER ANESTHESIA ANUS N/A 6/28/2019    Procedure: Examination Under Anesthesia;  Surgeon: Felicitas Radford MD;  [facility]     EXAM UNDER ANESTHESIA ANUS N/A 11/1/2019    Procedure: Examination Under Anesthesia;  Surgeon: Felicitas Radford MD;  [facility]     EXAM UNDER ANESTHESIA RECTUM N/A 10/23/2020    Procedure: Exam under anesthesia rectum;  Surgeon: Felicitas Radford MD;  [facility]     HC TOOTH EXTRACTION W/FORCEP       LAPAROSCOPIC APPENDECTOMY N/A 7/17/2017    Procedure: LAPAROSCOPIC APPENDECTOMY;  LAPAROSCOPIC APPENDECTOMY;  Surgeon: Bryson Ferguson MD;  Location:  OR     LASER HOLMIUM LITHOTRIPSY URETER(S), INSERT STENT, COMBINED Right 3/1/2021    Procedure: CYSTOURETEROSCOPY,  URETERAL STENT INSERTION, RIGHT RETROGRADE;  Surgeon: Mohsen Pat MD;  Location:  OR     SIGMOIDOSCOPY FLEXIBLE N/A 12/8/2016    Procedure: SIGMOIDOSCOPY FLEXIBLE;   Surgeon: Felicitas Radford MD;  [facility]     SIGMOIDOSCOPY FLEXIBLE N/A 7/5/2017    Procedure: SIGMOIDOSCOPY FLEXIBLE;;  Surgeon: Felicitas Radford MD;  [facility]     SIGMOIDOSCOPY FLEXIBLE N/A 5/11/2018    Procedure: SIGMOIDOSCOPY FLEXIBLE;;  Surgeon: Felicitas Radford MD;  [facility]     SIGMOIDOSCOPY FLEXIBLE N/A 7/20/2018    Procedure: SIGMOIDOSCOPY FLEXIBLE;;  Surgeon: Felicitas Radford MD;  [facility]     SIGMOIDOSCOPY FLEXIBLE N/A 11/30/2018    Procedure: Flexible Sigmoidoscopy;  Surgeon: Felicitas Radford MD;  [facility]     SIGMOIDOSCOPY FLEXIBLE N/A 2/22/2019    Procedure: Intraoperative Flexible Sigmoidoscopy, Application of Formalin to rectum;  Surgeon: Felicitas Radford MD;  [facility]     SIGMOIDOSCOPY FLEXIBLE N/A 6/28/2019    Procedure: Flexible Sigmoidoscopy;  Surgeon: Felicitas Radford MD;  [facility]     SIGMOIDOSCOPY FLEXIBLE N/A 11/1/2019    Procedure: Flexible Sigmoidoscopy;  Surgeon: Felicitas Radford MD;  [facility]     SIGMOIDOSCOPY FLEXIBLE N/A 7/23/2021    Procedure: SIGMOIDOSCOPY, FLEXIBLE;  Surgeon: Felicitas Radford MD;  [facility]     SIGMOIDOSCOPY FLEXIBLE N/A 7/1/2022    Procedure: SIGMOIDOSCOPY, FLEXIBLE, EUA;  Surgeon: Felicitas Radford MD;  [facility]     TESTICLE SURGERY       VASCULAR SURGERY      Right chest port     VASECTOMY       VASECTOMY       Probiotic Product (PROBIOTIC PO)      Allergies   Allergen Reactions     Ampicillin Diarrhea     Demerol [Meperidine] Nausea     Family History  Family History   Problem Relation Age of Onset     Colon Polyps Mother         Number and type of polyps unknown     Chronic Obstructive Pulmonary Disease Father      Hypertension Father      Hyperlipidemia Father      Diabetes Maternal Grandfather      Macular Degeneration Paternal Grandmother      Hypertension Paternal Grandmother       Hyperlipidemia Paternal Grandmother      Cancer Brother         primary of unknown origin     Other Cancer Brother      Family History Negative Brother         negative colonoscopy     Stomach Cancer Other 63        maternal great grandfather     Glaucoma No family hx of      Anesthesia Reaction No family hx of      Deep Vein Thrombosis (DVT) No family hx of      Social History   Social History     Tobacco Use     Smoking status: Never     Smokeless tobacco: Never   Substance Use Topics     Alcohol use: Yes     Comment: < 1 drink a week     Drug use: No         A medically appropriate review of systems was performed with pertinent positives and negatives noted in the HPI, and all other systems negative.    Physical Exam   BP: 130/77  Pulse: 87  Temp: 98.8  F (37.1  C)  Resp: 16  Weight: 97.1 kg (214 lb)  SpO2: 95 %  Physical Exam  Constitutional:       General: He is not in acute distress.     Appearance: Normal appearance. He is not toxic-appearing.   HENT:      Head: Atraumatic.   Eyes:      General: No scleral icterus.     Conjunctiva/sclera: Conjunctivae normal.      Comments: Extensive eye examination performed by ophthalmology including slit lamp and ultrasound patient documentation in ophthalmology note.   Cardiovascular:      Rate and Rhythm: Normal rate.      Heart sounds: Normal heart sounds.   Pulmonary:      Effort: Pulmonary effort is normal. No respiratory distress.      Breath sounds: Normal breath sounds.   Abdominal:      Palpations: Abdomen is soft.      Tenderness: There is no abdominal tenderness.   Musculoskeletal:         General: No deformity.      Cervical back: Neck supple.   Skin:     General: Skin is warm.   Neurological:      General: No focal deficit present.      Mental Status: He is alert and oriented to person, place, and time.      Cranial Nerves: No cranial nerve deficit.      Sensory: No sensory deficit.      Motor: No weakness.      Coordination: Coordination normal.           ED  Course, Procedures, & Data      Procedures               Medications - No data to display  Labs Ordered and Resulted from Time of ED Arrival to Time of ED Departure - No data to display  No orders to display          Critical care was not performed.     Medical Decision Making  The patient's presentation was of moderate complexity (an acute illness with systemic symptoms).    The patient's evaluation involved:  discussion of management or test interpretation with another health professional (Patient was discussed at length with ophthalmology who arrived in the emergency room face-to-face had further discussion with ophthalmology of treatment and evaluation.)    The patient's management necessitated moderate risk (prescription drug management including medications given in the ED).      Assessment & Plan        I have reviewed the nursing notes. I have reviewed the findings, diagnosis, plan and need for follow up with the patient.      Final diagnoses:   Vitreous hemorrhage, unspecified laterality (H)       Jey Rosales  [facility] EMERGENCY DEPARTMENT  5/17/2023     Jey Rosales MD  05/23/23 [id number]

## 2023-05-25 ENCOUNTER — OFFICE VISIT (OUTPATIENT)
Dept: OPHTHALMOLOGY | Facility: CLINIC | Age: 63
End: 2023-05-25
Attending: OPHTHALMOLOGY
Payer: COMMERCIAL

## 2023-05-25 DIAGNOSIS — H43.811 PVD (POSTERIOR VITREOUS DETACHMENT), RIGHT: Primary | ICD-10-CM

## 2023-05-25 DIAGNOSIS — H33.311 RETINAL TEAR OF RIGHT EYE: ICD-10-CM

## 2023-05-25 PROCEDURE — 92014 COMPRE OPH EXAM EST PT 1/>: CPT | Mod: GC | Performed by: OPHTHALMOLOGY

## 2023-05-25 PROCEDURE — G0463 HOSPITAL OUTPT CLINIC VISIT: HCPCS | Performed by: OPHTHALMOLOGY

## 2023-05-25 ASSESSMENT — VISUAL ACUITY
CORRECTION_TYPE: GLASSES
OD_PH_CC: 20/20
METHOD: SNELLEN - LINEAR
OD_CC: 20/30
OD_PH_CC+: -3
OD_CC+: -2
OS_CC: 20/20

## 2023-05-25 ASSESSMENT — REFRACTION_WEARINGRX
OD_SPHERE: -2.25
OS_CYLINDER: +0.50
OD_SPHERE: -4.00
OS_SPHERE: -2.00
OD_CYLINDER: +0.50
OS_AXIS: 127
OS_CYLINDER: +0.50
OD_AXIS: 180
SPECS_TYPE: SVL
OS_SPHERE: -3.75
OD_CYLINDER: +0.50
OS_AXIS: 127
OD_AXIS: 180

## 2023-05-25 ASSESSMENT — CONF VISUAL FIELD
OS_NORMAL: 1
OD_SUPERIOR_NASAL_RESTRICTION: 0
OD_INFERIOR_TEMPORAL_RESTRICTION: 0
METHOD: COUNTING FINGERS
OS_SUPERIOR_TEMPORAL_RESTRICTION: 0
OD_SUPERIOR_TEMPORAL_RESTRICTION: 0
OS_INFERIOR_TEMPORAL_RESTRICTION: 0
OS_SUPERIOR_NASAL_RESTRICTION: 0
OD_NORMAL: 1
OD_INFERIOR_NASAL_RESTRICTION: 0
OS_INFERIOR_NASAL_RESTRICTION: 0

## 2023-05-25 ASSESSMENT — TONOMETRY
OS_IOP_MMHG: 23
IOP_METHOD: TONOPEN
OD_IOP_MMHG: 17
OS_IOP_MMHG: 20
IOP_METHOD: TONOPEN

## 2023-05-25 ASSESSMENT — CUP TO DISC RATIO: OD_RATIO: 0.65

## 2023-05-25 ASSESSMENT — EXTERNAL EXAM - LEFT EYE: OS_EXAM: NORMAL

## 2023-05-25 ASSESSMENT — EXTERNAL EXAM - RIGHT EYE: OD_EXAM: NORMAL

## 2023-05-25 ASSESSMENT — SLIT LAMP EXAM - LIDS
COMMENTS: NORMAL
COMMENTS: NORMAL

## 2023-05-25 NOTE — NURSING NOTE
"Chief Complaints and History of Present Illnesses   Patient presents with     Follow Up     1 week hemorrhagic Posterior vitreous detachment (PVD) right eye      Chief Complaint(s) and History of Present Illness(es)     Follow Up            Laterality: right eye    Associated symptoms: floaters.  Negative for eye pain and flashes    Treatments tried: no treatments    Pain scale: 0/10    Comments: 1 week hemorrhagic Posterior vitreous detachment (PVD) right eye           Comments    Pt states vision has been stable since last week.    Light sensitivity post-laser has been improving throughout the week.   Seeing a \"clumpy black dot\" floater moving across right eye. No flashes of light  No eye pain today. Not using any eyedrops.    Yash Haro 3:49 PM May 25, 2023                      "

## 2023-05-25 NOTE — PROGRESS NOTES
"CC -  Status post laser retinopexy right eye      INTERVAL HISTORY - Pt states vision has been stable since last week.    Light sensitivity post-laser has been improving throughout the week.   Seeing a \"clumpy black dot\" floater moving across right eye. No flashes of light  No eye pain today. Not using any eyedrops.    Initial PMH - Louie GATES Greco is a 62 year old male here for follow up laser retinopexy and Posterior vitreous detachment (PVD).  History of epiretinal membrane of the left eye. He developed symptoms after a PVD 5 years ago.   In 2017 chemo for stage 3 colorectal cancer. He is thankfully doing well from this cancer and has routine screening soon.     PAST MEDICAL HISTORY: Colorectal cancer, s/p chemo -- in remission    RETINAL IMAGIN23   OCT Macula   OD - retina normal, PHF detached and vitreous cells  OS - mild ERM, Posterior vitreous detachment (PVD)  optos 23 consistent with exam       ASSESSMENT & PLAN    # hemorrhagic Posterior vitreous detachment (PVD) right eye   # Small peripheral tear SN and temporally near the ora s/p laser pexy 23  Retina attached  Good laser retinopexy  Doing well  PLAN:   HOB elevated  Retina detachment precautions were discussed with the patient (presence or increased in flashes, floaters or a curtain in the visual field) and was asked to return if any of the those occur     # ERM, left eye   - minimal distortion on Amsler testing compared to right eye   - VA good    - no breaks on    - recommend monitoring   - recheck 6 months to verify stability    # Visually significant Floater, left eye   - could consider PPV vs YAG    # PVD OS,    - advised Signs and symptoms of  RD 23    # 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 Rodriguez    # NS OU- mild to mod  # MORIS - advises artificial tears    PLAN:3 weeks DFE OD only    Ayad" MD Kamini MPH  Vitreoretinal Fellow PGY-5  [facility]     ~~~~~~~~~~~~~~~~~~~~~~~~~~~~~~~~~~   Complete documentation of historical and exam elements from today's encounter can be found in the full encounter summary report (not reduplicated in this progress note).  I personally obtained the chief complaint(s) and history of present illness.  I confirmed and edited as necessary the review of systems, past medical/surgical history, family history, social history, and examination findings as documented by others; and I examined the patient myself.  I personally reviewed the relevant tests, images, and reports as documented above.  I formulated and edited as necessary the assessment and plan and discussed the findings and management plan with the patient and family    Yanique Ornelas MD  Professor of Ophthalmology  Vitreo-Retinal surgeon   Department of Ophthalmology and Visual Neurosciences   [facility]  Phone: [phone]   Fax: [phone]

## 2023-06-03 ENCOUNTER — HEALTH MAINTENANCE LETTER (OUTPATIENT)
Age: 63
End: 2023-06-03

## 2023-06-07 ENCOUNTER — TELEPHONE (OUTPATIENT)
Dept: SURGERY | Facility: CLINIC | Age: 63
End: 2023-06-07
Payer: COMMERCIAL

## 2023-06-07 DIAGNOSIS — C20 MALIGNANT NEOPLASM OF RECTUM (H): Primary | ICD-10-CM

## 2023-06-07 NOTE — TELEPHONE ENCOUNTER
Received voicemail from patient on 6/7 regarding scheduling surgery with Dr. Radford.    Patient called asking to have his surgery with Edy scheduled between now and July 21st, as patient  sees the oncologist on July 27th and wants it done prrior to then.      Call router to the appropriate team.   __    Tonja Roegrs, on 6/7/2023  P: [phone]

## 2023-06-08 DIAGNOSIS — C20 RECTAL CANCER (H): Primary | ICD-10-CM

## 2023-06-09 NOTE — TELEPHONE ENCOUNTER
"On 2023 at 12:48 PM:  Spoke with patient, he was hoping to schedule his Flexible Sigmoidoscopy before , but Dr. Felicitas Radford's soonest available date is 2023. Patient chose this date.     Patient reports that he cannot do Fleets Enemas due to chemo/radiation damage - he must do an oral prep. Elsi RN, sent special instructions for an oral prep for patient's Flexible Sigmoidoscopy.     Patient states that Dr. Felicitas Radford uses a Pediatric Scope for him because of the chemo/radiation damage. Elsi, RN, concurred. I spoke with Radha in [facility] OR scheduling, she added this equipment request to patient's case.    Spoke with patient via phone, completed the following scheduling, then sent Procedure Packet to patient via weipass including related scheduling information and prep instructions:     Required: Yes, you will need a  18 years or older to drive you home from your procedure as well as stay with you for 24 hours after your procedure    Procedure: Flexible Sigmoidoscopy    Date: 2023      Surgeon: Dr. Felicitas Radford    Time: You will receive a call 1-3 days prior to your surgery with your finalized surgery and arrival time.     Location: [facility] - [address]--[address]th [place] [address]  [phone]      Important Phone Numbers:      [phone]     Services: [phone]    Upcoming Related Appointments:     2023  9:45 AM  (Arrive by 9:30 AM)  PAC EVALUATION with Abigail Ortiz PA-C  [facility] ([facility] ) [phone]    VIDEO VISIT     Preparation: MiraLAX/Gatorade - (see \"Day Before Surgery\")    - Please obtain medications and ingredients in advance    Trixie Spence  Marcie-op Coordinator  [facility]  Direct Phone: [phone]  "

## 2023-06-12 NOTE — TELEPHONE ENCOUNTER
FUTURE VISIT INFORMATION      SURGERY INFORMATION:    Date: 23    [facility]    Surgeon:  Felicitas Radford MD    Anesthesia Type:  MAC    Procedure: SIGMOIDOSCOPY, FLEXIBLE (PEDIATRIC SCOPE NEEDED)    RECORDS REQUESTED FROM:       Primary Care Provider: Philippe Healy MD- [facility]    Most recent EKG+ Tracin21

## 2023-06-15 ENCOUNTER — OFFICE VISIT (OUTPATIENT)
Dept: OPHTHALMOLOGY | Facility: CLINIC | Age: 63
End: 2023-06-15
Attending: OPHTHALMOLOGY
Payer: COMMERCIAL

## 2023-06-15 DIAGNOSIS — H43.811 PVD (POSTERIOR VITREOUS DETACHMENT), RIGHT: ICD-10-CM

## 2023-06-15 DIAGNOSIS — H33.311 RETINAL TEAR OF RIGHT EYE: Primary | ICD-10-CM

## 2023-06-15 PROCEDURE — G0463 HOSPITAL OUTPT CLINIC VISIT: HCPCS | Performed by: OPHTHALMOLOGY

## 2023-06-15 PROCEDURE — 99213 OFFICE O/P EST LOW 20 MIN: CPT | Performed by: OPHTHALMOLOGY

## 2023-06-15 ASSESSMENT — VISUAL ACUITY
OD_CC: 20/20
OS_CC: 20/20
OD_CC+: -2
CORRECTION_TYPE: GLASSES
OS_CC+: -2
METHOD: SNELLEN - LINEAR

## 2023-06-15 ASSESSMENT — CONF VISUAL FIELD
OS_SUPERIOR_TEMPORAL_RESTRICTION: 0
OD_SUPERIOR_NASAL_RESTRICTION: 0
OD_SUPERIOR_TEMPORAL_RESTRICTION: 0
OS_INFERIOR_TEMPORAL_RESTRICTION: 0
OS_NORMAL: 1
OD_INFERIOR_NASAL_RESTRICTION: 0
OS_INFERIOR_NASAL_RESTRICTION: 0
METHOD: COUNTING FINGERS
OS_SUPERIOR_NASAL_RESTRICTION: 0
OD_NORMAL: 1
OD_INFERIOR_TEMPORAL_RESTRICTION: 0

## 2023-06-15 ASSESSMENT — EXTERNAL EXAM - RIGHT EYE: OD_EXAM: NORMAL

## 2023-06-15 ASSESSMENT — TONOMETRY
IOP_METHOD: TONOPEN
OD_IOP_MMHG: 20
OS_IOP_MMHG: 21

## 2023-06-15 ASSESSMENT — REFRACTION_WEARINGRX
OS_AXIS: 127
OD_SPHERE: -4.00
OS_AXIS: 127
OS_CYLINDER: +0.50
OS_SPHERE: -3.75
OD_AXIS: 180
OD_CYLINDER: +0.50
OS_SPHERE: -2.00
OD_CYLINDER: +0.50
OD_SPHERE: -2.25
OD_AXIS: 180
OS_CYLINDER: +0.50

## 2023-06-15 ASSESSMENT — CUP TO DISC RATIO: OD_RATIO: 0.65

## 2023-06-15 ASSESSMENT — EXTERNAL EXAM - LEFT EYE: OS_EXAM: NORMAL

## 2023-06-15 ASSESSMENT — SLIT LAMP EXAM - LIDS
COMMENTS: NORMAL
COMMENTS: NORMAL

## 2023-06-15 NOTE — PROGRESS NOTES
"CC -  Status post laser retinopexy right eye      INTERVAL HISTORY - Pt states vision has been stable since last week. Still has a floater in the right eye \"like an amoeba moving\". No flashes of light  No eye pain today. Not using any eyedrops.    Initial PMH - Louie Greco is a 63 year old male here for follow up laser retinopexy and Posterior vitreous detachment (PVD).  History of epiretinal membrane of the left eye. He developed symptoms after a PVD 5 years ago.   In 2017 chemo for stage 3 colorectal cancer. He is thankfully doing well from this cancer and has routine screening soon.     PAST MEDICAL HISTORY: Colorectal cancer, s/p chemo -- in remission    RETINAL IMAGIN23   OCT Macula   OD - retina normal, PHF detached and vitreous cells  OS - mild ERM, Posterior vitreous detachment (PVD)  optos 23 consistent with exam     ASSESSMENT & PLAN    # hemorrhagic Posterior vitreous detachment (PVD) right eye   # Small peripheral tear SN and temporally near the ora s/p laser pexy 23 right eye   Retina attached  Good laser retinopexy    Patient still bother by floater  Observe  Consider in the future laser vs Pars plana vitrectomy (PPV)     # ERM, left eye   - minimal distortion on Amsler testing compared to right eye   - VA good    - no breaks on    - recommend monitoring   - recheck 6 months to verify stability    # Visually significant Floater, left eye   - could consider PPV vs YAG    # PVD OS,    - advised Signs and symptoms of  RD 23    # 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 Rodriguez    # NS OU- mild to mod  # MORIS - advises artificial tears    PLAN: Retina detachment precautions were discussed with the patient (presence or increased in flashes, floaters or a curtain in the visual field) and was asked to return if any of the those occur   Follow up in 4 " months    ~~~~~~~~~~~~~~~~~~~~~~~~~~~~~~~~~~   Complete documentation of historical and exam elements from today's encounter can be found in the full encounter summary report (not reduplicated in this progress note).  I personally obtained the chief complaint(s) and history of present illness.  I confirmed and edited as necessary the review of systems, past medical/surgical history, family history, social history, and examination findings as documented by others; and I examined the patient myself.  I personally reviewed the relevant tests, images, and reports as documented above.  I formulated and edited as necessary the assessment and plan and discussed the findings and management plan with the patient and family    Yanique Ornelas MD  Professor of Ophthalmology  Vitreo-Retinal surgeon   Department of Ophthalmology and Visual Neurosciences   [facility]  Phone: [phone]   Fax: [phone]

## 2023-06-15 NOTE — NURSING NOTE
"Pt states a large ameba like structure is still present , moving around in the central vision of right eye . It was smaller in size a few weeks ago but is larger now.   Is unsure if starting to see similar in the left eye now, or if is just increase in floaters.   Denies double vision. Denies flashes/glares/halos. Will see some \"shadowing\" in bright lit settings.   Occasional eye irritation, both eyes. Is not using any eye drops at this time.       CHICO Gonzalez OA, Tracey 15, 2023   "

## 2023-06-19 NOTE — PROGRESS NOTES
Interventional Radiology Intra-procedural Nursing Note    Patient Name: Louie Greco  Medical Record Number: [id number]  Today's Date: July 10, 2017    Start Time: 1024  End of procedure time: 1038  Procedure: Right Port Removal  Report given to: [facility]  Time pt departs:  1045  : n/a    Other Notes: Patient brought to IR suite 1 via cart from care suites. Patient alert & oriented. VSS, normal sinus rhythm. Patient prepped and draped with 2% chlorhexidine. Right port removed without any issues. Versed 1 mg and fentanyl 50 mcg given. Patient tolerated procedure well. Dressing clean, dry and intact. Taken back to care suites via cart.    Divina Kim RN     Post-Care Instructions: I reviewed with the patient in detail post-care instructions. Patient is to keep the biopsy site dry overnight, and then apply bacitracin twice daily until healed. Patient may apply hydrogen peroxide soaks to remove any crusting. Size Of Lesion In Cm (Required): 0.5 Anesthesia Type: 1% lidocaine with epinephrine Detail Level: Detailed Notification Instructions: Patient will be notified of pathology results. However, patient instructed to call the office if not contacted within 2 weeks. Medical Necessity Clause: This procedure was medically necessary because the lesion that was treated was: Anesthesia Volume In Cc: 1 Wound Care: Petrolatum Bill 38643 For Specimen Handling/Conveyance To Laboratory?: no Consent was obtained from the patient. The risks and benefits to therapy were discussed in detail. Specifically, the risks of infection, scarring, bleeding, prolonged wound healing, incomplete removal, allergy to anesthesia, nerve injury and recurrence were addressed. Prior to the procedure, the treatment site was clearly identified and confirmed by the patient. All components of Universal Protocol/PAUSE Rule completed. Depth Of Shave: dermis Billing Type: Third-Party Bill Medical Necessity Information: It is in your best interest to select a reason for this procedure from the list below. All of these items fulfill various CMS LCD requirements except the new and changing color options. Hemostasis: Electrocautery Was A Bandage Applied: Yes Biopsy Method: Personna blade X Size Of Lesion In Cm (Optional): 0

## 2023-06-26 ENCOUNTER — OFFICE VISIT (OUTPATIENT)
Dept: FAMILY MEDICINE | Facility: CLINIC | Age: 63
End: 2023-06-26
Payer: COMMERCIAL

## 2023-06-26 VITALS
OXYGEN SATURATION: 93 % | DIASTOLIC BLOOD PRESSURE: 73 MMHG | SYSTOLIC BLOOD PRESSURE: 118 MMHG | HEART RATE: 91 BPM | HEIGHT: 70 IN | TEMPERATURE: 98 F | BODY MASS INDEX: 31.75 KG/M2 | WEIGHT: 221.8 LBS | RESPIRATION RATE: 16 BRPM

## 2023-06-26 DIAGNOSIS — R97.20 ELEVATED PROSTATE SPECIFIC ANTIGEN (PSA): ICD-10-CM

## 2023-06-26 DIAGNOSIS — N52.9 MALE ERECTILE DISORDER: ICD-10-CM

## 2023-06-26 DIAGNOSIS — L98.9 SKIN LESION: ICD-10-CM

## 2023-06-26 DIAGNOSIS — C20 MALIGNANT NEOPLASM OF RECTUM (H): ICD-10-CM

## 2023-06-26 DIAGNOSIS — Z00.00 ROUTINE GENERAL MEDICAL EXAMINATION AT A HEALTH CARE FACILITY: Primary | ICD-10-CM

## 2023-06-26 LAB
BASOPHILS # BLD AUTO: 0.1 10E3/UL (ref 0–0.2)
BASOPHILS NFR BLD AUTO: 1 %
EOSINOPHIL # BLD AUTO: 0.4 10E3/UL (ref 0–0.7)
EOSINOPHIL NFR BLD AUTO: 6 %
ERYTHROCYTE [DISTWIDTH] IN BLOOD BY AUTOMATED COUNT: 12.6 % (ref 10–15)
HCT VFR BLD AUTO: 44.7 % (ref 40–53)
HGB BLD-MCNC: 14.7 G/DL (ref 13.3–17.7)
IMM GRANULOCYTES # BLD: 0 10E3/UL
IMM GRANULOCYTES NFR BLD: 0 %
LYMPHOCYTES # BLD AUTO: 1 10E3/UL (ref 0.8–5.3)
LYMPHOCYTES NFR BLD AUTO: 17 %
MCH RBC QN AUTO: 29 PG (ref 26.5–33)
MCHC RBC AUTO-ENTMCNC: 32.9 G/DL (ref 31.5–36.5)
MCV RBC AUTO: 88 FL (ref 78–100)
MONOCYTES # BLD AUTO: 0.7 10E3/UL (ref 0–1.3)
MONOCYTES NFR BLD AUTO: 11 %
NEUTROPHILS # BLD AUTO: 4.1 10E3/UL (ref 1.6–8.3)
NEUTROPHILS NFR BLD AUTO: 66 %
PLATELET # BLD AUTO: 216 10E3/UL (ref 150–450)
RBC # BLD AUTO: 5.07 10E6/UL (ref 4.4–5.9)
WBC # BLD AUTO: 6.2 10E3/UL (ref 4–11)

## 2023-06-26 PROCEDURE — 84153 ASSAY OF PSA TOTAL: CPT | Performed by: INTERNAL MEDICINE

## 2023-06-26 PROCEDURE — 36415 COLL VENOUS BLD VENIPUNCTURE: CPT | Performed by: INTERNAL MEDICINE

## 2023-06-26 PROCEDURE — 99213 OFFICE O/P EST LOW 20 MIN: CPT | Mod: 25 | Performed by: INTERNAL MEDICINE

## 2023-06-26 PROCEDURE — 85025 COMPLETE CBC W/AUTO DIFF WBC: CPT | Performed by: INTERNAL MEDICINE

## 2023-06-26 PROCEDURE — 99396 PREV VISIT EST AGE 40-64: CPT | Performed by: INTERNAL MEDICINE

## 2023-06-26 PROCEDURE — 83735 ASSAY OF MAGNESIUM: CPT | Performed by: INTERNAL MEDICINE

## 2023-06-26 PROCEDURE — 82378 CARCINOEMBRYONIC ANTIGEN: CPT | Performed by: INTERNAL MEDICINE

## 2023-06-26 PROCEDURE — 80053 COMPREHEN METABOLIC PANEL: CPT | Performed by: INTERNAL MEDICINE

## 2023-06-26 PROCEDURE — 80061 LIPID PANEL: CPT | Performed by: INTERNAL MEDICINE

## 2023-06-26 RX ORDER — SILDENAFIL 100 MG/1
100 TABLET, FILM COATED ORAL DAILY PRN
Qty: 30 TABLET | Refills: 11 | Status: SHIPPED | OUTPATIENT
Start: 2023-06-26

## 2023-06-26 ASSESSMENT — ENCOUNTER SYMPTOMS
ARTHRALGIAS: 0
HEADACHES: 0
PALPITATIONS: 0
MYALGIAS: 0
DYSURIA: 0
HEMATOCHEZIA: 0
SHORTNESS OF BREATH: 0
ABDOMINAL PAIN: 0
DIARRHEA: 0
PARESTHESIAS: 0
HEARTBURN: 0
CONSTIPATION: 0
JOINT SWELLING: 0
FEVER: 0
SORE THROAT: 0
EYE PAIN: 0
NERVOUS/ANXIOUS: 0
NAUSEA: 0
COUGH: 0
CHILLS: 0
FREQUENCY: 0
DIZZINESS: 0
HEMATURIA: 0
WEAKNESS: 0

## 2023-06-26 ASSESSMENT — PAIN SCALES - GENERAL: PAINLEVEL: NO PAIN (0)

## 2023-06-26 NOTE — PROGRESS NOTES
SUBJECTIVE:   CC: Louie is an 63 year old who presents for preventative health visit.        No data to display              Healthy Habits:    Getting at least 3 servings of Calcium per day:  Yes    Bi-annual eye exam:  Yes    Dental care twice a year:  Yes    Sleep apnea or symptoms of sleep apnea:  None    Diet:  Other    Frequency of exercise:  2-3 days/week    Duration of exercise:  15-30 minutes    Taking medications regularly:  Yes    PHQ-2 Total Score:    Additional concerns today:  Yes      Erections are a problem  Libido and relationship is OK  No spontaneous erection or good erections with masturbation  He attributes problem to radiation therapy   He tried 2 X 20 mg sildenafil tablets and they did not help    Also sore on upper lip present for over one year        Have you ever done Advance Care Planning? (For example, a Health Directive, POLST, or a discussion with a medical provider or your loved ones about your wishes): No, advance care planning information given to patient to review.  Patient plans to discuss their wishes with loved ones or provider.      Social History     Tobacco Use     Smoking status: Never     Smokeless tobacco: Never   Substance Use Topics     Alcohol use: Yes     Comment: < 1 drink a week             6/26/2023     2:42 PM   Alcohol Use   Prescreen: >3 drinks/day or >7 drinks/week? No       Last PSA:   PSA   Date Value Ref Range Status   06/17/2021 4.92 (H) 0 - 4 ug/L Final     Comment:     Assay Method:  Chemiluminescence using Siemens Vista analyzer     PSA Tumor Marker   Date Value Ref Range Status   04/19/2022 5.23 (H) 0.00 - 4.00 ug/L Final       Reviewed orders with patient. Reviewed health maintenance and updated orders accordingly - Yes  Patient Active Problem List   Diagnosis     Pes anserinus tendinitis     Plantar fasciitis     Rectal Cancer, S/P Rad & Chemo 2017 -- no recurrence     Advance Care Planning     Elevated prostate specific antigen (PSA)     Rectal cancer  (H)     Renal Cysts bilaterally     Recurrent kidney stones     Drug-induced polyneuropathy (H)     Past Surgical History:   Procedure Laterality Date     COLONOSCOPY N/A 7/27/2016    Procedure: COMBINED COLONOSCOPY, SINGLE OR MULTIPLE BIOPSY/POLYPECTOMY BY BIOPSY;  Surgeon: Chelsea Thompson MD;  [facility]     COLONOSCOPY N/A 9/13/2017    Procedure: COLONOSCOPY;;  Surgeon: Felicitas Radford MD;  [facility]     COLONOSCOPY N/A 12/13/2017    Procedure: COLONOSCOPY;;  Surgeon: Felicitas Radford MD;  [facility]     COLONOSCOPY N/A 10/23/2020    Procedure: COLONOSCOPY;  Surgeon: Felicitas Radford MD;  [facility]     COMBINED CYSTOSCOPY, RETROGRADES, URETEROSCOPY, LASER HOLMIUM LITHOTRIPSY URETER(S), INSERT STENT Left 2/16/2018    Procedure: COMBINED CYSTOSCOPY, RETROGRADES, URETEROSCOPY, LASER HOLMIUM LITHOTRIPSY URETER(S), INSERT STENT;  Cysto, left ureteroscopy, holmium laser, retrogrades, stent placement;  Surgeon: Bola Worthy MD;  [facility]     COMBINED CYSTOSCOPY, RETROGRADES, URETEROSCOPY, LASER HOLMIUM LITHOTRIPSY URETER(S), INSERT STENT Right 3/22/2021    Procedure: CYSTOSCOPY WITH RIGHT RETROGRADE PYELOGRAM, RIGHT URETEROSCOPY WITH LASER LITHOTRIPSY AND BASKET REMOVAL OF STONE, RIGHT URETERAL STENT PLACEMENT;  Surgeon: Mohsen Pat MD;  [facility]     COMBINED CYSTOSCOPY, RETROGRADES, URETEROSCOPY, LASER HOLMIUM LITHOTRIPSY URETER(S), INSERT STENT Left 5/19/2021    Procedure: CYSTOSCOPY, LEFT RETROGRADE PYELOGRAM, LEFT DIAGNOSTIC, URETEROSCOPY, LEFT URETERAL STENT PLACEMENT;  Surgeon: Mohsen Pat MD;  [facility]     COMBINED CYSTOSCOPY, RETROGRADES, URETEROSCOPY, LASER HOLMIUM LITHOTRIPSY URETER(S), INSERT STENT Left 6/23/2021    Procedure: CYSTOSCOPY, LEFT URETERAL STENT REMOVAL, LEFT RETROGRADE PYELOGRAM, LEFT URETEROSCOPY WITH LASER LITHOTRIPSY AND BASKET REMOVAL OF STONES, LEFT URETERAL STENT PLACEMENT;  Surgeon:  Mohsen Pat MD;  [facility]     CYSTOSCOPY, LITHOLAPAXY, COMBINED N/A 3/1/2021    Procedure: Cystoscopy, litholapaxy, combined;  Surgeon: Mohsen Pat MD;  [facility]     CYSTOSCOPY, RETROGRADES, INSERT STENT URETER(S), COMBINED Right 3/1/2021    Procedure: Cystoscopy, retrogrades, insert stent ureter(s), combined;  Surgeon: Mohsen Pat MD;  [facility]     EXAM UNDER ANESTHESIA ANUS N/A 7/5/2017    Procedure: EXAM UNDER ANESTHESIA ANUS;  Examination Under Anesthesia, flex sigmoidoscopy with biopsies and formalin application;  Surgeon: Felicitas Radford MD;  [facility]     EXAM UNDER ANESTHESIA ANUS N/A 9/13/2017    Procedure: EXAM UNDER ANESTHESIA ANUS;  Examination Under Anesthesia Anus, Colonoscopy, application of formalin;  Surgeon: Felicitas Radford MD;  [facility]     EXAM UNDER ANESTHESIA ANUS N/A 12/13/2017    Procedure: EXAM UNDER ANESTHESIA ANUS;  Examination Under Anesthesia Anus, Colonoscopy;  Surgeon: Felicitas Radford MD;  [facility]     EXAM UNDER ANESTHESIA ANUS N/A 5/11/2018    Procedure: EXAM UNDER ANESTHESIA ANUS;  Examination Under Anesthesia Anus, Interoperative Flexible Sigmoidoscopy, Application of Formalin;  Surgeon: Felicitas Radford MD;  [facility]     EXAM UNDER ANESTHESIA ANUS N/A 7/20/2018    Procedure: EXAM UNDER ANESTHESIA ANUS;  Examination Under Anesthesia Anus, Flexible Sigmoidoscopy ;  Surgeon: Felicitas Radford MD;  [facility]     EXAM UNDER ANESTHESIA ANUS N/A 11/30/2018    Procedure: Examination Under Anesthesia, application of formalin to rectum, polypectomy;  Surgeon: Felicitas Radford MD;  [facility]     EXAM UNDER ANESTHESIA ANUS N/A 2/22/2019    Procedure: Examination Under Anesthesia;  Surgeon: Felicitas Radford MD;  [facility]     EXAM UNDER ANESTHESIA ANUS N/A 6/28/2019    Procedure: Examination Under Anesthesia;  Surgeon: Felicitas Radford  MD;  [facility]     EXAM UNDER ANESTHESIA ANUS N/A 11/1/2019    Procedure: Examination Under Anesthesia;  Surgeon: Felicitas Radford MD;  [facility]     EXAM UNDER ANESTHESIA RECTUM N/A 10/23/2020    Procedure: Exam under anesthesia rectum;  Surgeon: Felicitas Radford MD;  [facility]     HC TOOTH EXTRACTION W/FORCEP       LAPAROSCOPIC APPENDECTOMY N/A 7/17/2017    Procedure: LAPAROSCOPIC APPENDECTOMY;  LAPAROSCOPIC APPENDECTOMY;  Surgeon: Bryson Ferguson MD;  [facility]     LASER HOLMIUM LITHOTRIPSY URETER(S), INSERT STENT, COMBINED Right 3/1/2021    Procedure: CYSTOURETEROSCOPY,  URETERAL STENT INSERTION, RIGHT RETROGRADE;  Surgeon: Mohsen Pat MD;  [facility]     SIGMOIDOSCOPY FLEXIBLE N/A 12/8/2016    Procedure: SIGMOIDOSCOPY FLEXIBLE;  Surgeon: Felicitas Radford MD;  [facility]     SIGMOIDOSCOPY FLEXIBLE N/A 7/5/2017    Procedure: SIGMOIDOSCOPY FLEXIBLE;;  Surgeon: Felicitas Radford MD;  [facility]     SIGMOIDOSCOPY FLEXIBLE N/A 5/11/2018    Procedure: SIGMOIDOSCOPY FLEXIBLE;;  Surgeon: Felicitas Radford MD;  [facility]     SIGMOIDOSCOPY FLEXIBLE N/A 7/20/2018    Procedure: SIGMOIDOSCOPY FLEXIBLE;;  Surgeon: Felicitas Radford MD;  [facility]     SIGMOIDOSCOPY FLEXIBLE N/A 11/30/2018    Procedure: Flexible Sigmoidoscopy;  Surgeon: Felicitas Radford MD;  [facility]     SIGMOIDOSCOPY FLEXIBLE N/A 2/22/2019    Procedure: Intraoperative Flexible Sigmoidoscopy, Application of Formalin to rectum;  Surgeon: Felicitas Radford MD;  [facility]     SIGMOIDOSCOPY FLEXIBLE N/A 6/28/2019    Procedure: Flexible Sigmoidoscopy;  Surgeon: Felicitas Radford MD;  [facility]     SIGMOIDOSCOPY FLEXIBLE N/A 11/1/2019    Procedure: Flexible Sigmoidoscopy;  Surgeon: Felicitas Radford MD;  [facility]     SIGMOIDOSCOPY FLEXIBLE N/A 7/23/2021    Procedure: SIGMOIDOSCOPY, FLEXIBLE;   Surgeon: Felicitas Radford MD;  [facility]     SIGMOIDOSCOPY FLEXIBLE N/A 7/1/2022    Procedure: SIGMOIDOSCOPY, FLEXIBLE, EUA;  Surgeon: Felicitas Radford MD;  [facility]     TESTICLE SURGERY       VASCULAR SURGERY      Right chest port     VASECTOMY       VASECTOMY         Social History     Tobacco Use     Smoking status: Never     Smokeless tobacco: Never   Substance Use Topics     Alcohol use: Yes     Comment: < 1 drink a week     Family History   Problem Relation Age of Onset     Colon Polyps Mother         Number and type of polyps unknown     Chronic Obstructive Pulmonary Disease Father      Hypertension Father      Hyperlipidemia Father      Diabetes Maternal Grandfather      Macular Degeneration Paternal Grandmother      Hypertension Paternal Grandmother      Hyperlipidemia Paternal Grandmother      Cancer Brother         primary of unknown origin     Other Cancer Brother      Family History Negative Brother         negative colonoscopy     Stomach Cancer Other 63        maternal great grandfather     Glaucoma No family hx of      Anesthesia Reaction No family hx of      Deep Vein Thrombosis (DVT) No family hx of          Current Outpatient Medications   Medication Sig Dispense Refill     Probiotic Product (PROBIOTIC PO) Take by mouth as needed       Allergies   Allergen Reactions     Ampicillin Diarrhea     Demerol [Meperidine] Nausea       Reviewed and updated as needed this visit by clinical staff                  Reviewed and updated as needed this visit by Provider                 Past Medical History:   Diagnosis Date     Childhood asthma      Elevated prostate specific antigen (PSA)     No biopsy done (had rectal cancer at the time)     Epididymitis, bilateral     18 years old     Inguinal hernia      Kidney stone on left side 04/22/2021    Added automatically from request for surgery [id number]     Mumps      Nephrolithiasis     Several -- 1990 first,recurrence 2019,  2021     Rectal cancer (H) 2017    low rectal cancer, S/P Rad adn Chemo, no surg needed     Right 4 mm Kidney stone at [facility]  02/28/2021     Right ureteral stone 03/11/2021    Added automatically from request for surgery [id number]     Fannie       Past Surgical History:   Procedure Laterality Date     COLONOSCOPY N/A 7/27/2016    Procedure: COMBINED COLONOSCOPY, SINGLE OR MULTIPLE BIOPSY/POLYPECTOMY BY BIOPSY;  Surgeon: Chelsea Thompson MD;  [facility]     COLONOSCOPY N/A 9/13/2017    Procedure: COLONOSCOPY;;  Surgeon: Felicitas Radford MD;  [facility]     COLONOSCOPY N/A 12/13/2017    Procedure: COLONOSCOPY;;  Surgeon: Felicitas Radford MD;  [facility]     COLONOSCOPY N/A 10/23/2020    Procedure: COLONOSCOPY;  Surgeon: Felicitas Radford MD;  [facility]     COMBINED CYSTOSCOPY, RETROGRADES, URETEROSCOPY, LASER HOLMIUM LITHOTRIPSY URETER(S), INSERT STENT Left 2/16/2018    Procedure: COMBINED CYSTOSCOPY, RETROGRADES, URETEROSCOPY, LASER HOLMIUM LITHOTRIPSY URETER(S), INSERT STENT;  Cysto, left ureteroscopy, holmium laser, retrogrades, stent placement;  Surgeon: Bola Worthy MD;  [facility]     COMBINED CYSTOSCOPY, RETROGRADES, URETEROSCOPY, LASER HOLMIUM LITHOTRIPSY URETER(S), INSERT STENT Right 3/22/2021    Procedure: CYSTOSCOPY WITH RIGHT RETROGRADE PYELOGRAM, RIGHT URETEROSCOPY WITH LASER LITHOTRIPSY AND BASKET REMOVAL OF STONE, RIGHT URETERAL STENT PLACEMENT;  Surgeon: Mohsen Pat MD;  [facility]     COMBINED CYSTOSCOPY, RETROGRADES, URETEROSCOPY, LASER HOLMIUM LITHOTRIPSY URETER(S), INSERT STENT Left 5/19/2021    Procedure: CYSTOSCOPY, LEFT RETROGRADE PYELOGRAM, LEFT DIAGNOSTIC, URETEROSCOPY, LEFT URETERAL STENT PLACEMENT;  Surgeon: Mohsen Pat MD;  [facility]     COMBINED CYSTOSCOPY, RETROGRADES, URETEROSCOPY, LASER HOLMIUM LITHOTRIPSY URETER(S), INSERT STENT Left 6/23/2021    Procedure: CYSTOSCOPY, LEFT URETERAL STENT  REMOVAL, LEFT RETROGRADE PYELOGRAM, LEFT URETEROSCOPY WITH LASER LITHOTRIPSY AND BASKET REMOVAL OF STONES, LEFT URETERAL STENT PLACEMENT;  Surgeon: Mohsen Pat MD;  [facility]     CYSTOSCOPY, LITHOLAPAXY, COMBINED N/A 3/1/2021    Procedure: Cystoscopy, litholapaxy, combined;  Surgeon: Mohsen Pat MD;  [facility]     CYSTOSCOPY, RETROGRADES, INSERT STENT URETER(S), COMBINED Right 3/1/2021    Procedure: Cystoscopy, retrogrades, insert stent ureter(s), combined;  Surgeon: Mohsen Pat MD;  [facility]     EXAM UNDER ANESTHESIA ANUS N/A 7/5/2017    Procedure: EXAM UNDER ANESTHESIA ANUS;  Examination Under Anesthesia, flex sigmoidoscopy with biopsies and formalin application;  Surgeon: Felicitas Radford MD;  [facility]     EXAM UNDER ANESTHESIA ANUS N/A 9/13/2017    Procedure: EXAM UNDER ANESTHESIA ANUS;  Examination Under Anesthesia Anus, Colonoscopy, application of formalin;  Surgeon: Felicitas Radford MD;  [facility]     EXAM UNDER ANESTHESIA ANUS N/A 12/13/2017    Procedure: EXAM UNDER ANESTHESIA ANUS;  Examination Under Anesthesia Anus, Colonoscopy;  Surgeon: Felicitas Radford MD;  [facility]     EXAM UNDER ANESTHESIA ANUS N/A 5/11/2018    Procedure: EXAM UNDER ANESTHESIA ANUS;  Examination Under Anesthesia Anus, Interoperative Flexible Sigmoidoscopy, Application of Formalin;  Surgeon: Felicitas Radford MD;  [facility]     EXAM UNDER ANESTHESIA ANUS N/A 7/20/2018    Procedure: EXAM UNDER ANESTHESIA ANUS;  Examination Under Anesthesia Anus, Flexible Sigmoidoscopy ;  Surgeon: Felicitas Radford MD;  [facility]     EXAM UNDER ANESTHESIA ANUS N/A 11/30/2018    Procedure: Examination Under Anesthesia, application of formalin to rectum, polypectomy;  Surgeon: Felicitas Radford MD;  [facility]     EXAM UNDER ANESTHESIA ANUS N/A 2/22/2019    Procedure: Examination Under Anesthesia;  Surgeon: Felicitas Radford  MD;  [facility]     EXAM UNDER ANESTHESIA ANUS N/A 6/28/2019    Procedure: Examination Under Anesthesia;  Surgeon: Felicitas Radford MD;  [facility]     EXAM UNDER ANESTHESIA ANUS N/A 11/1/2019    Procedure: Examination Under Anesthesia;  Surgeon: Felicitas Radford MD;  [facility]     EXAM UNDER ANESTHESIA RECTUM N/A 10/23/2020    Procedure: Exam under anesthesia rectum;  Surgeon: Felicitas Radford MD;  [facility]     HC TOOTH EXTRACTION W/FORCEP       LAPAROSCOPIC APPENDECTOMY N/A 7/17/2017    Procedure: LAPAROSCOPIC APPENDECTOMY;  LAPAROSCOPIC APPENDECTOMY;  Surgeon: Bryson Ferguson MD;  [facility]     LASER HOLMIUM LITHOTRIPSY URETER(S), INSERT STENT, COMBINED Right 3/1/2021    Procedure: CYSTOURETEROSCOPY,  URETERAL STENT INSERTION, RIGHT RETROGRADE;  Surgeon: Mohsen Pat MD;  [facility]     SIGMOIDOSCOPY FLEXIBLE N/A 12/8/2016    Procedure: SIGMOIDOSCOPY FLEXIBLE;  Surgeon: Felicitas Radford MD;  [facility]     SIGMOIDOSCOPY FLEXIBLE N/A 7/5/2017    Procedure: SIGMOIDOSCOPY FLEXIBLE;;  Surgeon: Felicitas Radford MD;  [facility]     SIGMOIDOSCOPY FLEXIBLE N/A 5/11/2018    Procedure: SIGMOIDOSCOPY FLEXIBLE;;  Surgeon: Felicitas Radford MD;  [facility]     SIGMOIDOSCOPY FLEXIBLE N/A 7/20/2018    Procedure: SIGMOIDOSCOPY FLEXIBLE;;  Surgeon: Felicitas Radford MD;  [facility]     SIGMOIDOSCOPY FLEXIBLE N/A 11/30/2018    Procedure: Flexible Sigmoidoscopy;  Surgeon: Felicitas Radford MD;  [facility]     SIGMOIDOSCOPY FLEXIBLE N/A 2/22/2019    Procedure: Intraoperative Flexible Sigmoidoscopy, Application of Formalin to rectum;  Surgeon: Felicitas Radford MD;  [facility]     SIGMOIDOSCOPY FLEXIBLE N/A 6/28/2019    Procedure: Flexible Sigmoidoscopy;  Surgeon: Felicitas Radford MD;  [facility]     SIGMOIDOSCOPY FLEXIBLE N/A 11/1/2019    Procedure: Flexible  Sigmoidoscopy;  Surgeon: Felicitas Radford MD;  [facility]     SIGMOIDOSCOPY FLEXIBLE N/A 7/23/2021    Procedure: SIGMOIDOSCOPY, FLEXIBLE;  Surgeon: Felicitas Radford MD;  [facility]     SIGMOIDOSCOPY FLEXIBLE N/A 7/1/2022    Procedure: SIGMOIDOSCOPY, FLEXIBLE, EUA;  Surgeon: Felicitas Radford MD;  Location: [facility]     TESTICLE SURGERY       VASCULAR SURGERY      Right chest port     VASECTOMY       VASECTOMY         Review of Systems   Constitutional: Negative for chills and fever.   HENT: Negative for congestion, ear pain, hearing loss and sore throat.    Eyes: Positive for visual disturbance. Negative for pain.   Respiratory: Negative for cough and shortness of breath.    Cardiovascular: Negative for chest pain, palpitations and peripheral edema.   Gastrointestinal: Negative for abdominal pain, constipation, diarrhea, heartburn, hematochezia and nausea.   Genitourinary: Positive for impotence. Negative for dysuria, frequency, genital sores, hematuria, penile discharge and urgency.   Musculoskeletal: Negative for arthralgias, joint swelling and myalgias.   Skin: Negative for rash.   Neurological: Negative for dizziness, weakness, headaches and paresthesias.   Psychiatric/Behavioral: Negative for mood changes. The patient is not nervous/anxious.          OBJECTIVE:   There were no vitals taken for this visit.    Physical Exam  GENERAL: healthy, alert and no distress  EYES: Eyes grossly normal to inspection, PERRL and conjunctivae and sclerae normal  HENT: ear canals and TM's normal, nose and mouth without ulcers or lesions  NECK: no adenopathy, no asymmetry, masses, or scars and thyroid normal to palpation  RESP: lungs clear to auscultation - no rales, rhonchi or wheezes  CV: regular rate and rhythm, normal S1 S2, no S3 or S4, no murmur, click or rub, no peripheral edema and peripheral pulses strong  ABDOMEN: soft, nontender, no hepatosplenomegaly, no masses and bowel sounds  normal  MS: no gross musculoskeletal defects noted, no edema  SKIN:  Verrucous papule on right upper lip   NEURO: Normal strength and tone, mentation intact and speech normal  PSYCH: mentation appears normal, affect normal/bright        ASSESSMENT/PLAN:       ICD-10-CM    1. Routine general medical examination at a health care facility  Z00.00 Lipid panel reflex to direct LDL Fasting     Magnesium     Lipid panel reflex to direct LDL Fasting     Magnesium      2. Malignant neoplasm of rectum (H)  C20 CBC with platelets and differential     Comprehensive metabolic panel (BMP + Alb, Alk Phos, ALT, AST, Total. Bili, TP)     CEA      3. Male erectile disorder  N52.9 sildenafil (VIAGRA) 100 MG tablet      4. Elevated prostate specific antigen (PSA)  R97.20 PSA tumor marker     PSA tumor marker      5. Skin lesion  L98.9 Adult Dermatology Referral      Continue follow up colon and rectal surgery and oncology re rectal cancer  Try 100 mg dose of viagra, if that does not help enough, see urology to discuss other options  Recheck PSA  Needs to see dermatology, I am concerned for SCC or BCC, discussed concern with patient, referred to [facility] dermatology     Patient has been advised of split billing requirements and indicates understanding: Yes      COUNSELING:   Reviewed preventive health counseling, as reflected in patient instructions  Special attention given to:        Regular exercise       Healthy diet/nutrition       Immunizations    Vaccines are up to date             Consider Hep C screening for all patients one time for ages 18-79 years       HIV screeninx in teen years, 1x in adult years, and at intervals if high risk       Colorectal cancer screening; will have sigmoidoscopy this summer       Prostate cancer screening; PSA today        Consider lung cancer screening for ages 55-80 years (77 for Medicare) and 20 pack-year smoking history ; never smoker       BMI:   Estimated body mass index is 30.71 kg/m  as  "calculated from the following:    Height as of 7/1/22: 1.778 m (5' 10\").    Weight as of 5/17/23: 97.1 kg (214 lb).   Weight management plan: Discussed healthy diet and exercise guidelines      He reports that he has never smoked. He has never used smokeless tobacco.        Philippe Healy MD  [facility]  "

## 2023-06-27 LAB
ALBUMIN SERPL BCG-MCNC: 4.4 G/DL (ref 3.5–5.2)
ALP SERPL-CCNC: 67 U/L (ref 40–129)
ALT SERPL W P-5'-P-CCNC: 28 U/L (ref 0–70)
ANION GAP SERPL CALCULATED.3IONS-SCNC: 10 MMOL/L (ref 7–15)
AST SERPL W P-5'-P-CCNC: 26 U/L (ref 0–45)
BILIRUB SERPL-MCNC: 0.6 MG/DL
BUN SERPL-MCNC: 22.9 MG/DL (ref 8–23)
CALCIUM SERPL-MCNC: 9.2 MG/DL (ref 8.8–10.2)
CEA SERPL-MCNC: 1.8 NG/ML
CHLORIDE SERPL-SCNC: 105 MMOL/L (ref 98–107)
CHOLEST SERPL-MCNC: 192 MG/DL
CREAT SERPL-MCNC: 0.85 MG/DL (ref 0.67–1.17)
DEPRECATED HCO3 PLAS-SCNC: 25 MMOL/L (ref 22–29)
GFR SERPL CREATININE-BSD FRML MDRD: >90 ML/MIN/1.73M2
GLUCOSE SERPL-MCNC: 92 MG/DL (ref 70–99)
HDLC SERPL-MCNC: 39 MG/DL
LDLC SERPL CALC-MCNC: 113 MG/DL
MAGNESIUM SERPL-MCNC: 2.2 MG/DL (ref 1.7–2.3)
NONHDLC SERPL-MCNC: 153 MG/DL
POTASSIUM SERPL-SCNC: 4.4 MMOL/L (ref 3.4–5.3)
PROT SERPL-MCNC: 6.8 G/DL (ref 6.4–8.3)
PSA SERPL DL<=0.01 NG/ML-MCNC: 3.62 NG/ML (ref 0–4.5)
SODIUM SERPL-SCNC: 140 MMOL/L (ref 136–145)
TRIGL SERPL-MCNC: 202 MG/DL

## 2023-06-27 NOTE — RESULT ENCOUNTER NOTE
The following letter pertains to your most recent diagnostic tests:    -The cholesterol is slightly worse than last check.   See discussion below regarding my recommendations.    -Your prostate specific antigen (PSA) test result returned back in the normal range.  This is good news..      Bottom line:  Based on your current cholesterol levels and your other risk factors, I estimate a 10.8% statistical chance for you developing significant blood vessel blockage over the next 10 years.  We call this statistic a 10-year atherosclerotic coronary vascular disease (ASCVD) risk score.    Anyone with an ASCVD risk score greater than 7.5% is considered to have elevated risk for significant blood vessel blockage.    Since your ASCVD risk score is higher than 7.5%, we want to make sure we are doing everything possible to reduce your chances of developing significant blockage.      We believe that statin medications such as atorvastatin (Lipitor) have protective effects in the blood vessels that extend beyond their ability to simply lower blood cholesterol numbers.  We know statin medications prevent the accumulation of blockage in blood vessels and we also know that statin medication stabilize and shrink existing blood vessel blockages.     Current expert guidelines recommend starting statin medication therapy for individuals with ASCVD risk scores greater than 7.5%.  Therefore, I would recommend that you consider taking atorvastatin (Lipitor).     You should be informed that a very small minority of people who take atorvastatin can develop muscle pain and weakness as a side effect from that drug.  If you experience those side effects, then stop the drug and contact us.      If you start the medication, you should have a follow up fasting cholesterol panel after you have been taking the medication for 1-2 months.  You can schedule a lab appointment for that purpose.      Please contact us if you would like to get that  medication started so we can send a prescritpion and arrange for appropriate follow up.       I would certainly understand if you have questions about this.  You are certainly more than welcome to schedule a virtual visit (telephone or video visit) with me to discuss this matter further and get all your questions answered if you have them.      Taking statins certainly does not get you off the hook for living a healthy lifestyle.  Even if you decide to start the atorvastatin (Lipitor), it would still be important to eat a diet low in saturated fats and high in fiber to control cholesterol and blood vessel disease risk.  Increase servings of fruits and vegetables!  Physical activity is also a very important way to modulate blood vessel disease risk.  A  good target to shoot for is 150 minutes (two and one-half hours) of moderate intensity physical activity per week.          Sincerely,    Dr. Healy    The 10-year ASCVD risk score (Debra JIMENES, et al., 2019) is: 10.8%    Values used to calculate the score:      Age: 63 years      Sex: Male      Is Non- : No      Diabetic: No      Tobacco smoker: No      Systolic Blood Pressure: 118 mmHg      Is BP treated: No      HDL Cholesterol: 39 mg/dL      Total Cholesterol: 192 mg/dL

## 2023-06-29 ENCOUNTER — OFFICE VISIT (OUTPATIENT)
Dept: DERMATOLOGY | Facility: CLINIC | Age: 63
End: 2023-06-29
Payer: COMMERCIAL

## 2023-06-29 ENCOUNTER — MYC MEDICAL ADVICE (OUTPATIENT)
Dept: FAMILY MEDICINE | Facility: CLINIC | Age: 63
End: 2023-06-29

## 2023-06-29 DIAGNOSIS — D49.2 NEOPLASM OF UNSPECIFIED BEHAVIOR OF BONE, SOFT TISSUE, AND SKIN: Primary | ICD-10-CM

## 2023-06-29 DIAGNOSIS — D23.9 DERMATOFIBROMA: ICD-10-CM

## 2023-06-29 DIAGNOSIS — E78.5 HYPERLIPIDEMIA LDL GOAL <70: Primary | ICD-10-CM

## 2023-06-29 DIAGNOSIS — M67.449 DIGITAL MUCOUS CYST: ICD-10-CM

## 2023-06-29 PROCEDURE — 11102 TANGNTL BX SKIN SINGLE LES: CPT | Mod: GC | Performed by: DERMATOLOGY

## 2023-06-29 PROCEDURE — 88305 TISSUE EXAM BY PATHOLOGIST: CPT | Mod: 26 | Performed by: DERMATOLOGY

## 2023-06-29 PROCEDURE — 88305 TISSUE EXAM BY PATHOLOGIST: CPT | Mod: TC | Performed by: DERMATOLOGY

## 2023-06-29 PROCEDURE — 99203 OFFICE O/P NEW LOW 30 MIN: CPT | Mod: 25 | Performed by: DERMATOLOGY

## 2023-06-29 RX ORDER — ATORVASTATIN CALCIUM 40 MG/1
20 TABLET, FILM COATED ORAL DAILY
Qty: 90 TABLET | Refills: 3 | Status: SHIPPED | OUTPATIENT
Start: 2023-06-29

## 2023-06-29 ASSESSMENT — PAIN SCALES - GENERAL: PAINLEVEL: NO PAIN (0)

## 2023-06-29 NOTE — LETTER
6/29/2023       RE: Louie Greco  [address]  [facility] [address]     Dear Colleague,    Thank you for referring your patient, Louie Greco, to the [facility] DERMATOLOGY [facility]IC [place] [facility]. Please see a copy of my visit note below.    [facility] Dermatology Note  Encounter Date: Jun 29, 2023  Office Visit     Dermatology Problem List:    # Neoplasm of uncertain behavior, R upper cutaneous lip  - ddx: r/o BCC vs SCC, s/p shave bx 06/29/23     # Digital mucous cyst, L 5th DIP of hand.   - referral to ortho hand surgery for excision 06/29/23     # Congenital nevus, L medial mid back. S/p shave bx 7/9/18  # Dilated pore of [place], mid upper back. S/p shave bx 7/19/18    ____________________________________________    Assessment & Plan:   # Neoplasm of uncertain behavior, R upper cutaneous lip.  Suspect NMSC (BCC>SCC). Recommended shave bx today.   - Shave biopsy performed today (see procedure note(s) below)     # Digital mucous cyst, L 5th DIP of hand.   Patient states that he previously drained this cyst with expression of gelatinous material. However, it returned and he is interested in excision. We discussed that the latter treatment modality would be preferred over I&D today given that this was previously ineffective and excision would be a more definitive treatment option.   - referral to ortho hand surgery for excision 06/29/23     # Dermatofibroma, L medial shin  Reassured patient regarding the benign nature of this finding.   - continue to monitor    Procedures Performed:   - Shave biopsy procedure note, location(s): R upper cutaneous lip. After discussion of benefits and risks including but not limited to bleeding, infection, scar, incomplete removal, recurrence, and non-diagnostic biopsy, verbal consent and photographs were obtained. The area was cleaned with isopropyl alcohol. 0.5mL of 1% lidocaine with epinephrine was  "injected to obtain adequate anesthesia of lesion(s). Shave biopsy at site(s) performed. Hemostasis was achieved with aluminium chloride. Petrolatum ointment and a sterile dressing were applied. The patient tolerated the procedure   and no complications were noted. The patient was provided with verbal and written post care instructions.     Follow-up: pending path results    Staff: Dr. Khris Bennett MD PGY-2  Dermatology Resident  Pager: [phone]  I was present for key portions of the procedure. Saskia Pinedo MD  I, Saskia Pinedo MD, saw this patient with the resident and agree with the resident s findings and plan of care as documented in the resident s note.      ____________________________________________    CC: Derm Problem (Skin lesion on R upper lip and growth on L hand.)    HPI:  Mr. Louie Greco is a(n) 63 year old male who presents today as a new patient for evaluation of a lesion of concern. Last seen by Margarita Brock in 7/9/18 where benign biopsies were obtained. Today, the patient notes the following:  - he has a lesion of concern on the R upper cutaneous lip that has been present for the past 2 years  - it occasionally bleeds and scabs over  - also points out a \"cyst\" on the R dorsal 5th digit of hand near the DIP that has been present for several years. He lanced it about 1 year ago and expressed some gelatinous material. However, it recurred  - no personal hx of skin cancer  - also points out a brown papule on the L shin that has been present since high school. Unchanging since then.     Patient is otherwise feeling well, without additional skin concerns.    Labs Reviewed:  Dermpath from 2018 as noted in the derm problem list    Physical Exam:  Vitals: There were no vitals taken for this visit.  SKIN: Focused examination of the face, L hand and L shin was performed. FBSE was offered, but the patient declined today  - on the R upper cutaneous lip there is an " approximately 1 cm pink scaly papule with overlying crust and dilated telangiectatic blood vessels on the periphery  - on the L dorsal 5th digit of the hand overlying the DIP there is a soft pink subcutaneous nodule that is non-tender to palpation  - on the L mid medial shin there is an approximately 1 cm brown papule that is centrally sclerotic with a starburst pattern on dermoscopy  - No other lesions of concern on areas examined.                         Medications:  Current Outpatient Medications   Medication    Probiotic Product (PROBIOTIC PO)    sildenafil (VIAGRA) 100 MG tablet     No current facility-administered medications for this visit.      Past Medical History:   Patient Active Problem List   Diagnosis    Pes anserinus tendinitis    Plantar fasciitis    Rectal Cancer, S/P Rad & Chemo 2017 -- no recurrence    Advance Care Planning    Elevated prostate specific antigen (PSA)    Rectal cancer (H)    Renal Cysts bilaterally    Recurrent kidney stones    Drug-induced polyneuropathy (H)     Past Medical History:   Diagnosis Date    Childhood asthma     Elevated prostate specific antigen (PSA)     No biopsy done (had rectal cancer at the time)    Epididymitis, bilateral     18 years old    Inguinal hernia     Kidney stone on left side 04/22/2021    Added automatically from request for surgery [id number]    Mumps     Nephrolithiasis     Several -- 1990 first,recurrence 2019, 2021    Rectal cancer (H) 2017    low rectal cancer, S/P Rad adn Chemo, no surg needed    Right 4 mm Kidney stone at [facility]  02/28/2021    Right ureteral stone 03/11/2021    Added automatically from request for surgery [id number]    Fannie        CC Philippe Healy MD  [address]  [place] [address] on close of this encounter.

## 2023-06-29 NOTE — NURSING NOTE
Dermatology Rooming Note    Louie Greco's goals for this visit include:   Chief Complaint   Patient presents with     Derm Problem     Skin lesion on R upper lip and growth on L hand.     Kristy Devlin

## 2023-06-29 NOTE — PROGRESS NOTES
[facility] Dermatology Note  Encounter Date: Jun 29, 2023  Office Visit     Dermatology Problem List:    # Neoplasm of uncertain behavior, R upper cutaneous lip  - ddx: r/o BCC vs SCC, s/p shave bx 06/29/23     # Digital mucous cyst, L 5th DIP of hand.   - referral to ortho hand surgery for excision 06/29/23     # Congenital nevus, L medial mid back. S/p shave bx 7/9/18  # Dilated pore of [place], mid upper back. S/p shave bx 7/19/18    ____________________________________________    Assessment & Plan:   # Neoplasm of uncertain behavior, R upper cutaneous lip.  Suspect NMSC (BCC>SCC). Recommended shave bx today.   - Shave biopsy performed today (see procedure note(s) below)     # Digital mucous cyst, L 5th DIP of hand.   Patient states that he previously drained this cyst with expression of gelatinous material. However, it returned and he is interested in excision. We discussed that the latter treatment modality would be preferred over I&D today given that this was previously ineffective and excision would be a more definitive treatment option.   - referral to ortho hand surgery for excision 06/29/23     # Dermatofibroma, L medial shin  Reassured patient regarding the benign nature of this finding.   - continue to monitor    Procedures Performed:   - Shave biopsy procedure note, location(s): R upper cutaneous lip. After discussion of benefits and risks including but not limited to bleeding, infection, scar, incomplete removal, recurrence, and non-diagnostic biopsy, verbal consent and photographs were obtained. The area was cleaned with isopropyl alcohol. 0.5mL of 1% lidocaine with epinephrine was injected to obtain adequate anesthesia of lesion(s). Shave biopsy at site(s) performed. Hemostasis was achieved with aluminium chloride. Petrolatum ointment and a sterile dressing were applied. The patient tolerated the procedure   and no complications were noted. The patient was provided with verbal and  "written post care instructions.     Follow-up: pending path results    Staff: Dr. Khris Bennett MD PGY-2  Dermatology Resident  Pager: [phone]  I was present for key portions of the procedure. Saskia Pinedo MD  I, Saskia Pinedo MD, saw this patient with the resident and agree with the resident s findings and plan of care as documented in the resident s note.      ____________________________________________    CC: Derm Problem (Skin lesion on R upper lip and growth on L hand.)    HPI:  Mr. Louie Greco is a(n) 63 year old male who presents today as a new patient for evaluation of a lesion of concern. Last seen by Margarita Brock in 7/9/18 where benign biopsies were obtained. Today, the patient notes the following:  - he has a lesion of concern on the R upper cutaneous lip that has been present for the past 2 years  - it occasionally bleeds and scabs over  - also points out a \"cyst\" on the R dorsal 5th digit of hand near the DIP that has been present for several years. He lanced it about 1 year ago and expressed some gelatinous material. However, it recurred  - no personal hx of skin cancer  - also points out a brown papule on the L shin that has been present since high school. Unchanging since then.     Patient is otherwise feeling well, without additional skin concerns.    Labs Reviewed:  Dermpath from 2018 as noted in the derm problem list    Physical Exam:  Vitals: There were no vitals taken for this visit.  SKIN: Focused examination of the face, L hand and L shin was performed. FBSE was offered, but the patient declined today  - on the R upper cutaneous lip there is an approximately 1 cm pink scaly papule with overlying crust and dilated telangiectatic blood vessels on the periphery  - on the L dorsal 5th digit of the hand overlying the DIP there is a soft pink subcutaneous nodule that is non-tender to palpation  - on the L mid medial shin there is an approximately 1 cm brown " papule that is centrally sclerotic with a starburst pattern on dermoscopy  - No other lesions of concern on areas examined.                         Medications:  Current Outpatient Medications   Medication     Probiotic Product (PROBIOTIC PO)     sildenafil (VIAGRA) 100 MG tablet     No current facility-administered medications for this visit.      Past Medical History:   Patient Active Problem List   Diagnosis     Pes anserinus tendinitis     Plantar fasciitis     Rectal Cancer, S/P Rad & Chemo 2017 -- no recurrence     Advance Care Planning     Elevated prostate specific antigen (PSA)     Rectal cancer (H)     Renal Cysts bilaterally     Recurrent kidney stones     Drug-induced polyneuropathy (H)     Past Medical History:   Diagnosis Date     Childhood asthma      Elevated prostate specific antigen (PSA)     No biopsy done (had rectal cancer at the time)     Epididymitis, bilateral     18 years old     Inguinal hernia      Kidney stone on left side 04/22/2021    Added automatically from request for surgery [id number]     Mumps      Nephrolithiasis     Several -- 1990 first,recurrence 2019, 2021     Rectal cancer (H) 2017    low rectal cancer, S/P Rad adn Chemo, no surg needed     Right 4 mm Kidney stone at [facility]  02/28/2021     Right ureteral stone 03/11/2021    Added automatically from request for surgery [id number]     Fannie Healy MD  [address]  [place] [address] on close of this encounter.

## 2023-06-29 NOTE — PATIENT INSTRUCTIONS
Wound Care After a Biopsy    What is a skin biopsy?  A skin biopsy allows the doctor to examine a very small piece of tissue under the microscope to determine the diagnosis and the best treatment for the skin condition. A local anesthetic (numbing medicine) is injected with a very small needle into the skin area to be tested. A small piece of skin is taken from the area. Sometimes a suture (stitch) is used.     What are the risks of a skin biopsy?  I will experience scar, bleeding, swelling, pain, crusting and redness. I may experience incomplete removal or recurrence. Risks of this procedure are excessive bleeding, bruising, infection, nerve damage, numbness, thick (hypertrophic or keloidal) scar and non-diagnostic biopsy.    How should I care for my wound for the first 24 hours?  Keep the wound dry and covered for 24 hours  If it bleeds, hold direct pressure on the area for 15 minutes. If bleeding does not stop, call us or go to the emergency room  Avoid strenuous exercise the first 1-2 days or as your doctor instructs you    How should I care for the wound after 24 hours?  After 24 hours, remove the bandage  You may bathe or shower as normal  If you had a scalp biopsy, you can shampoo as usual and can use shower water to clean the biopsy site daily  Clean the wound once a day with gentle soap and water  Do not scrub, be gentle  Apply white petroleum/Vaseline after cleaning the wound with a cotton swab or a clean finger, and keep the site covered with a Bandaid /bandage. Bandages are not necessary with a scalp biopsy  If you are unable to cover the site with a Bandaid /bandage, re-apply ointment 2-3 times a day to keep the site moist. Moisture will help with healing  Avoid strenuous activity for first 1-2 days  Avoid lakes, rivers, pools, and oceans until the stitches are removed or the site is healed    How do I clean my wound?  Wash hands thoroughly with soap or use hand  before all wound care  Clean  the wound with gentle soap and water  Apply white petroleum/Vaseline  to wound after it is clean  Replace the Bandaid /bandage to keep the wound covered for the first few days or as instructed by your doctor  If you had a scalp biopsy, warm shower water to the area on a daily basis should suffice    What should I use to clean my wound?   Cotton-tipped applicators (Qtips )  White petroleum jelly (Vaseline ). Use a clean new container and use Q-tips to apply.  Bandaids  as needed  Gentle soap     How should I care for my wound long term?  Do not get your wound dirty  Keep up with wound care for one week or until the area is healed.  If you have stitches, stitches need to be removed in  days. You may return to our clinic for this or you may have it done locally at your doctor s office.  A small scab will form and fall off by itself when the area is completely healed. The area will be red and will become pink in color as it heals. Sun protection is very important for how your scar will turn out. Sunscreen with an SPF 30 or greater is recommended once the area is healed.  You should have some soreness but it should be mild and slowly go away over several days. Talk to your doctor about using tylenol for pain,    When should I call my doctor?  If you have increased:   Pain or swelling  Pus or drainage (clear or slightly yellow drainage is ok)  Temperature over 100F  Spreading redness or warmth around wound    When will I hear about my results?  The biopsy results can take 2 weeks to come back.  Your results will automatically release to [employer] before your provider has even reviewed them.  The clinic will call you with the results, send you a [employer] message, or have you schedule a follow-up clinic or phone time to discuss the results.  Contact our clinics if you do not hear from us in 2 weeks.    Who should I call with questions?  [facility]: [phone]  [facility],  [place]: [phone]  For urgent needs outside of business hours call the [facility] at [phone] and ask for the dermatology resident on call

## 2023-06-29 NOTE — TELEPHONE ENCOUNTER
See below message from patient, agreeable to trying statin medication    Elvia SCHWARZ, Triage RN  [facility]

## 2023-06-29 NOTE — NURSING NOTE
Lidocaine-epinephrine 1-1:660043 % injection   1mL once for one use, starting 6/29/2023 ending 6/29/2023,  2mL disp, R-0, injection  Injected by Dr. Bennett

## 2023-07-02 LAB
PATH REPORT.COMMENTS IMP SPEC: ABNORMAL
PATH REPORT.COMMENTS IMP SPEC: ABNORMAL
PATH REPORT.COMMENTS IMP SPEC: YES
PATH REPORT.FINAL DX SPEC: ABNORMAL
PATH REPORT.GROSS SPEC: ABNORMAL
PATH REPORT.MICROSCOPIC SPEC OTHER STN: ABNORMAL
PATH REPORT.RELEVANT HX SPEC: ABNORMAL

## 2023-07-05 DIAGNOSIS — C44.310 BCC (BASAL CELL CARCINOMA), FACE: Primary | ICD-10-CM

## 2023-07-06 NOTE — TELEPHONE ENCOUNTER
DIAGNOSIS: Asssess for excision of suspected digital mucous cyst of L 5th DIP of hand   APPOINTMENT DATE: 07/18/2023   NOTES STATUS DETAILS   OFFICE NOTE from referring provider Internal 06/29/2023 - Ranulfo Bennett MD - [facility]   MEDICATION LIST Internal    PHOTOGRAPHS Media Tab Left Pinky - 06/29/2023   CT SCAN Internal Pet Scans - Whole Body

## 2023-07-11 ENCOUNTER — TELEPHONE (OUTPATIENT)
Dept: DERMATOLOGY | Facility: CLINIC | Age: 63
End: 2023-07-11
Payer: COMMERCIAL

## 2023-07-11 DIAGNOSIS — M67.442 MUCOUS CYST OF DIGIT OF LEFT HAND: Primary | ICD-10-CM

## 2023-07-11 NOTE — TELEPHONE ENCOUNTER
Lvm my chart msg for patient  the following:    Appointment type: Return  Provider:   Return date: December 2023.  Specialty phone number: [phone]

## 2023-07-12 ENCOUNTER — VIRTUAL VISIT (OUTPATIENT)
Dept: SURGERY | Facility: CLINIC | Age: 63
End: 2023-07-12
Payer: COMMERCIAL

## 2023-07-12 ENCOUNTER — ANESTHESIA EVENT (OUTPATIENT)
Dept: SURGERY | Facility: AMBULATORY SURGERY CENTER | Age: 63
End: 2023-07-12

## 2023-07-12 ENCOUNTER — PRE VISIT (OUTPATIENT)
Dept: SURGERY | Facility: CLINIC | Age: 63
End: 2023-07-12

## 2023-07-12 DIAGNOSIS — Z01.818 PRE-OP EVALUATION: Primary | ICD-10-CM

## 2023-07-12 DIAGNOSIS — C20 RECTAL CANCER (H): ICD-10-CM

## 2023-07-12 PROCEDURE — 99203 OFFICE O/P NEW LOW 30 MIN: CPT | Mod: VID | Performed by: PHYSICIAN ASSISTANT

## 2023-07-12 ASSESSMENT — ENCOUNTER SYMPTOMS
ORTHOPNEA: 0
SEIZURES: 0

## 2023-07-12 ASSESSMENT — LIFESTYLE VARIABLES: TOBACCO_USE: 0

## 2023-07-12 NOTE — PROGRESS NOTES
Louie is a 63 year old who is being evaluated via a billable video visit.      How would you like to obtain your AVS? Ksenia Alvarez   Louie is a 63 year old, presenting for the following health issues:  Pre-Op Exam (/)    HPI           Review of Systems       Physical Exam     CHRISTIANO Cuadra LPN

## 2023-07-12 NOTE — H&P
Pre-Operative H & P     CC:  Preoperative exam to assess for increased cardiopulmonary risk while undergoing surgery and anesthesia.    Date of Encounter: 7/12/2023  Primary Care Physician:  Philippe Healy     Reason for visit:   Encounter Diagnoses   Name Primary?     Pre-op evaluation Yes     Rectal cancer (H)        ALHAJI Greco is a 63 year old male who presents for pre-operative H & P in preparation for  Procedure Information     Case: [id number] Date/Time: 07/28/23 0715    Procedure: SIGMOIDOSCOPY, FLEXIBLE (PEDIATRIC SCOPE NEEDED) (Anus)    Anesthesia type: MAC    Diagnosis: Rectal cancer (H) [C20]    Pre-op diagnosis: Rectal cancer (H) [C20]    Location: [place] [address] / [facility]    Providers: Felicitas Radford MD          Patient is being evaluated for comorbid conditions of hyperlipidemia, drug induced polyneuropathy, plantar fasciitis, obesity, inguinal hernia, and nephrolithiasis.     He has a history of rectal cancer that was treated with chemoradiation. He is now part of the watch and wait protocol and is due for his annual flexible sigmoidoscopy. He has been scheduled for the procedure as above.     History is obtained from the patient and chart review    Hx of abnormal bleeding or anti-platelet use: None      Past Medical History  Past Medical History:   Diagnosis Date     Childhood asthma      Elevated prostate specific antigen (PSA)     No biopsy done (had rectal cancer at the time)     Epididymitis, bilateral     18 years old     Inguinal hernia      Kidney stone on left side 04/22/2021    Added automatically from request for surgery [id number]     Mumps      Nephrolithiasis     Several -- 1990 first,recurrence 2019, 2021     Rectal cancer (H) 2017    low rectal cancer, S/P Rad adn Chemo, no surg needed     Right 4 mm Kidney stone at [facility]  02/28/2021     Right ureteral stone 03/11/2021    Added automatically from request for surgery  [id number]     [facility]        Past Surgical History  Past Surgical History:   Procedure Laterality Date     COLONOSCOPY N/A 7/27/2016    Procedure: COMBINED COLONOSCOPY, SINGLE OR MULTIPLE BIOPSY/POLYPECTOMY BY BIOPSY;  Surgeon: Chelsea Thompson MD;  [facility]     COLONOSCOPY N/A 9/13/2017    Procedure: COLONOSCOPY;;  Surgeon: Felicitas Radford MD;  [facility]     COLONOSCOPY N/A 12/13/2017    Procedure: COLONOSCOPY;;  Surgeon: Felicitas Radford MD;  [facility]     COLONOSCOPY N/A 10/23/2020    Procedure: COLONOSCOPY;  Surgeon: Felicitas Radford MD;  [facility]     COMBINED CYSTOSCOPY, RETROGRADES, URETEROSCOPY, LASER HOLMIUM LITHOTRIPSY URETER(S), INSERT STENT Left 2/16/2018    Procedure: COMBINED CYSTOSCOPY, RETROGRADES, URETEROSCOPY, LASER HOLMIUM LITHOTRIPSY URETER(S), INSERT STENT;  Cysto, left ureteroscopy, holmium laser, retrogrades, stent placement;  Surgeon: Bola Worthy MD;  [facility]     COMBINED CYSTOSCOPY, RETROGRADES, URETEROSCOPY, LASER HOLMIUM LITHOTRIPSY URETER(S), INSERT STENT Right 3/22/2021    Procedure: CYSTOSCOPY WITH RIGHT RETROGRADE PYELOGRAM, RIGHT URETEROSCOPY WITH LASER LITHOTRIPSY AND BASKET REMOVAL OF STONE, RIGHT URETERAL STENT PLACEMENT;  Surgeon: Mohsen Pat MD;  [facility]     COMBINED CYSTOSCOPY, RETROGRADES, URETEROSCOPY, LASER HOLMIUM LITHOTRIPSY URETER(S), INSERT STENT Left 5/19/2021    Procedure: CYSTOSCOPY, LEFT RETROGRADE PYELOGRAM, LEFT DIAGNOSTIC, URETEROSCOPY, LEFT URETERAL STENT PLACEMENT;  Surgeon: Mohsen Pat MD;  [facility]     COMBINED CYSTOSCOPY, RETROGRADES, URETEROSCOPY, LASER HOLMIUM LITHOTRIPSY URETER(S), INSERT STENT Left 6/23/2021    Procedure: CYSTOSCOPY, LEFT URETERAL STENT REMOVAL, LEFT RETROGRADE PYELOGRAM, LEFT URETEROSCOPY WITH LASER LITHOTRIPSY AND BASKET REMOVAL OF STONES, LEFT URETERAL STENT PLACEMENT;  Surgeon: Mohsen Pat MD;  Location:  OR      CYSTOSCOPY, LITHOLAPAXY, COMBINED N/A 3/1/2021    Procedure: Cystoscopy, litholapaxy, combined;  Surgeon: Mohsen Pat MD;  [facility]     CYSTOSCOPY, RETROGRADES, INSERT STENT URETER(S), COMBINED Right 3/1/2021    Procedure: Cystoscopy, retrogrades, insert stent ureter(s), combined;  Surgeon: Mohsen Pat MD;  [facility]     EXAM UNDER ANESTHESIA ANUS N/A 7/5/2017    Procedure: EXAM UNDER ANESTHESIA ANUS;  Examination Under Anesthesia, flex sigmoidoscopy with biopsies and formalin application;  Surgeon: Felicitas Radford MD;  [facility]     EXAM UNDER ANESTHESIA ANUS N/A 9/13/2017    Procedure: EXAM UNDER ANESTHESIA ANUS;  Examination Under Anesthesia Anus, Colonoscopy, application of formalin;  Surgeon: Felicitas Radford MD;  [facility]     EXAM UNDER ANESTHESIA ANUS N/A 12/13/2017    Procedure: EXAM UNDER ANESTHESIA ANUS;  Examination Under Anesthesia Anus, Colonoscopy;  Surgeon: Felicitas Radford MD;  [facility]     EXAM UNDER ANESTHESIA ANUS N/A 5/11/2018    Procedure: EXAM UNDER ANESTHESIA ANUS;  Examination Under Anesthesia Anus, Interoperative Flexible Sigmoidoscopy, Application of Formalin;  Surgeon: Felicitas Radford MD;  [facility]     EXAM UNDER ANESTHESIA ANUS N/A 7/20/2018    Procedure: EXAM UNDER ANESTHESIA ANUS;  Examination Under Anesthesia Anus, Flexible Sigmoidoscopy ;  Surgeon: Felicitas Radford MD;  [facility]     EXAM UNDER ANESTHESIA ANUS N/A 11/30/2018    Procedure: Examination Under Anesthesia, application of formalin to rectum, polypectomy;  Surgeon: Felicitas Radford MD;  [facility]     EXAM UNDER ANESTHESIA ANUS N/A 2/22/2019    Procedure: Examination Under Anesthesia;  Surgeon: Felicitas Radford MD;  [facility]     EXAM UNDER ANESTHESIA ANUS N/A 6/28/2019    Procedure: Examination Under Anesthesia;  Surgeon: Felicitas Radford MD;  [facility]     EXAM UNDER  ANESTHESIA ANUS N/A 11/1/2019    Procedure: Examination Under Anesthesia;  Surgeon: Felicitas Radford MD;  [facility]     EXAM UNDER ANESTHESIA RECTUM N/A 10/23/2020    Procedure: Exam under anesthesia rectum;  Surgeon: Felicitas Radford MD;  [facility]     HC TOOTH EXTRACTION W/FORCEP       LAPAROSCOPIC APPENDECTOMY N/A 7/17/2017    Procedure: LAPAROSCOPIC APPENDECTOMY;  LAPAROSCOPIC APPENDECTOMY;  Surgeon: Bryson Ferugson MD;  [facility]     LASER HOLMIUM LITHOTRIPSY URETER(S), INSERT STENT, COMBINED Right 3/1/2021    Procedure: CYSTOURETEROSCOPY,  URETERAL STENT INSERTION, RIGHT RETROGRADE;  Surgeon: Mohsen Pat MD;  [facility]     SIGMOIDOSCOPY FLEXIBLE N/A 12/8/2016    Procedure: SIGMOIDOSCOPY FLEXIBLE;  Surgeon: Felicitas Radford MD;  [facility]     SIGMOIDOSCOPY FLEXIBLE N/A 7/5/2017    Procedure: SIGMOIDOSCOPY FLEXIBLE;;  Surgeon: Felicitas Radford MD;  [facility]     SIGMOIDOSCOPY FLEXIBLE N/A 5/11/2018    Procedure: SIGMOIDOSCOPY FLEXIBLE;;  Surgeon: Felicitas Radford MD;  [facility]     SIGMOIDOSCOPY FLEXIBLE N/A 7/20/2018    Procedure: SIGMOIDOSCOPY FLEXIBLE;;  Surgeon: Felicitas Radford MD;  [facility]     SIGMOIDOSCOPY FLEXIBLE N/A 11/30/2018    Procedure: Flexible Sigmoidoscopy;  Surgeon: Felicitas Radford MD;  [facility]     SIGMOIDOSCOPY FLEXIBLE N/A 2/22/2019    Procedure: Intraoperative Flexible Sigmoidoscopy, Application of Formalin to rectum;  Surgeon: Felicitas Radford MD;  [facility]     SIGMOIDOSCOPY FLEXIBLE N/A 6/28/2019    Procedure: Flexible Sigmoidoscopy;  Surgeon: Felicitas Radford MD;  [facility]     SIGMOIDOSCOPY FLEXIBLE N/A 11/1/2019    Procedure: Flexible Sigmoidoscopy;  Surgeon: Felicitas Radford MD;  [facility]     SIGMOIDOSCOPY FLEXIBLE N/A 7/23/2021    Procedure: SIGMOIDOSCOPY, FLEXIBLE;  Surgeon: Felicitas Radford  MD;  [facility]     SIGMOIDOSCOPY FLEXIBLE N/A 7/1/2022    Procedure: SIGMOIDOSCOPY, FLEXIBLE, EUA;  Surgeon: Felicitas Radford MD;  Location: [facility]     TESTICLE SURGERY       VASCULAR SURGERY      Right chest port     VASECTOMY       VASECTOMY         Prior to Admission Medications  Current Outpatient Medications   Medication Sig Dispense Refill     atorvastatin (LIPITOR) 40 MG tablet Take 0.5 tablets (20 mg) by mouth daily (Patient taking differently: Take 20 mg by mouth every evening) 90 tablet 3     Probiotic Product (PROBIOTIC PO) Take 1 capsule by mouth as needed       sildenafil (VIAGRA) 100 MG tablet Take 1 tablet (100 mg) by mouth daily as needed 30 tablet 11       Allergies  Allergies   Allergen Reactions     Ampicillin Diarrhea     Demerol [Meperidine] Nausea       Social History  Social History     Socioeconomic History     Marital status:      Spouse name: Not on file     Number of children: 2     Years of education: Not on file     Highest education level: Not on file   Occupational History     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)      Social Determinants of Health     Financial Resource Strain: Not on file   Food Insecurity: Not on file   Transportation Needs: Not on file   Physical Activity: Not on file   Stress: Not on file   Social Connections: Not on file   Intimate Partner Violence: Not on file   Housing Stability: Not on file       Family History  Family History   Problem Relation Age of  Onset     Colon Polyps Mother         Number and type of polyps unknown     Chronic Obstructive Pulmonary Disease Father      Hypertension Father      Hyperlipidemia Father      Diabetes Maternal Grandfather      Macular Degeneration Paternal Grandmother      Hypertension Paternal Grandmother      Hyperlipidemia Paternal Grandmother      Cancer Brother         primary of unknown origin     Other Cancer Brother      Family History Negative Brother         negative colonoscopy     Stomach Cancer Other 63        maternal great grandfather     Glaucoma No family hx of      Anesthesia Reaction No family hx of      Deep Vein Thrombosis (DVT) No family hx of        Review of Systems  The complete review of systems is negative other than noted in the HPI or here.   Anesthesia Evaluation   Pt has had prior anesthetic.     No history of anesthetic complications       ROS/MED HX  ENT/Pulmonary:     (+) asthma (childhood)  (-) tobacco use and recent URI   Neurologic:     (+) peripheral neuropathy, - drug-induced, feet and hands.  (-) no seizures and no CVA   Cardiovascular:     (+) Dyslipidemia -----Previous cardiac testing   Echo: Date: Results:    Stress Test: Date: Results:    ECG Reviewed: Date: 6/17/2021 Results:  Sinus rhythm  Cath: Date: Results:   (-) hypertension, SANTIAGO, orthopnea/PND and irregular heartbeat/palpitations   METS/Exercise Tolerance: 4 - Raking leaves, gardening    Hematologic:  - neg hematologic  ROS     Musculoskeletal: Comment: Right knee pain  Herniated disc low back  (+) arthritis (thumb),     GI/Hepatic: Comment: Inguinal hernia, left   (-) GERD and liver disease   Renal/Genitourinary:     (+) Nephrolithiasis ,     Endo:     (+) Obesity,  (-) Type II DM and thyroid disease   Psychiatric/Substance Use:  - neg psychiatric ROS     Infectious Disease:  - neg infectious disease ROS     Malignancy:   (+) Malignancy, History of GI and Skin.GI CA  Remission status post Chemo and Radiation.  Skin CA Active  status post.        Other:  - neg other ROS          Virtual visit -  No vitals were obtained    Physical Exam  Constitutional: Awake, alert, cooperative, no apparent distress, and appears stated age.  Eyes: Pupils equal  HENT: Normocephalic  Respiratory: non labored breathing   Neurologic: Awake, alert, oriented to name, place and time.   Neuropsychiatric: Calm, cooperative. Normal affect.      Prior Labs/Diagnostic Studies   All labs and imaging personally reviewed     EKG/ stress test - if available please see in ROS above   No results found.    The patient's records and results personally reviewed by this provider.     Outside records reviewed from: Care Everywhere      Assessment  Louie Greco is a 63 year old male seen as a PAC referral for risk assessment and optimization for anesthesia.    Plan/Recommendations  Pt will be optimized for the proposed procedure.  See below for details on the assessment, risk, and preoperative recommendations    NEUROLOGY  - No history of TIA, CVA or seizure  - Drug-inducted polyneuropathy affecting bilateral hands and feet  -Post Op delirium risk factors:  No risk identified    ENT  - No current airway concerns.  Will need to be reassessed day of surgery.  Mallampati: Unable to assess  TM: Unable to assess    CARDIAC  - No history of CAD, Hypertension and Afib  - Hyperlipidemia  Recently started on Atorvastatin  - METS (Metabolic Equivalents)  Patient performs 4 or more METS exercise without symptoms            Total Score: 0      RCRI-Very low risk: Class 1 0.4% complication rate            Total Score: 0        PULMONARY    BENNETT Low Risk            Total Score: 2    BENNETT: Over 50 ys old    BENNETT: Male      - Asthma  History of childhood asthma. No inhalers. No current respiratory concerns.   - Tobacco History    History   Smoking Status     Never   Smokeless Tobacco     Never       GI  - Left inguinal hernia, not bothersome per patient  PONV Low Risk  Total Score: 1           1  "AN PONV: Patient is not a current smoker        /RENAL  - Baseline Creatinine  WNL  - History of nephrolithiasis no current concerns    ENDOCRINE  - BMI: Estimated body mass index is 31.82 kg/m  as calculated from the following:    Height as of 6/26/23: 1.778 m (5' 10\").    Weight as of 6/26/23: 100.6 kg (221 lb 12.8 oz).  Class 3 Obesity (BMI > 40)  - No history of Diabetes Mellitus    HEME/ONC  - History of rectal cancer s/p chemoradiation therapy. Now in watch/wait program. Flex sig as scheduled above.  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 Planning for excision in the near future.     VTE High Risk 3%            Total Score: 8    VTE: Greater than 59 yrs old    VTE: Male    VTE: Current cancer      - No history of abnormal bleeding or antiplatelet use.      MSK  - Herniated disc, low back  - Arthritis involving thumb  - Right knee pain, possibly secondary to arthritis  Recommend consideration for careful positioning to limit patient discomfort.    Different anesthesia methods/types have been discussed with the patient, but they are aware that the final plan will be decided by the assigned anesthesia provider on the date of service.    The patient is optimized for their procedure. AVS with information on surgery time/arrival time, meds and NPO status given by nursing staff. No further diagnostic testing indicated.    Please refer to the physical examination documented by the anesthesiologist in the anesthesia record on the day of surgery.    Video-Visit Details    Type of service:  Video Visit    Provider received verbal consent for a Video Visit from the patient? Yes     Originating Location (pt. Location): Home  Distant Location (provider location):  Off-site  Mode of Communication:  Video Conference via [employer]  On the day of service:     Prep time: 14 minutes  Visit time: 12 minutes  Documentation time: 10 minutes  ------------------------------------------  Total time: 36 " minutes      Abigail Ortiz PA-C  Preoperative Assessment Center  [facility]  Clinic and Surgery Center  Phone: [phone]  Fax: [phone]

## 2023-07-12 NOTE — PATIENT INSTRUCTIONS
Preparing for Your Surgery      Name:  Louie Greco   MRN:  [id number]   :  1960   Today's Date:  2023         Arriving for surgery:  Surgery date:  23  Arrival time:  5:45 am    Restrictions due to COVID 19:    Please maintain social distance.  Masking is optional        parking is available for anyone with mobility limitations or disabilities. (Monday- Friday 7 am- 5 pm)    Please come to:    [facility]  [address] [place] [id number]    Check in on the 5th floor, Ambulatory Surgery Center.    What can I eat or drink?    -  Follow your surgeon's instructions for bowel prep  -  You may have clear liquids until 2 hours before arrival time  (Until 3:45 am)    Examples of clear liquids:  Water  Clear broth  Juices (apple, white grape, white cranberry  and cider) without pulp  Noncarbonated, powder based beverages  (lemonade and Roderick-Aid)  Sodas (Sprite, 7-Up, ginger ale and seltzer)  Coffee or tea (without milk or cream)  Gatorade    --No alcohol or cannabis products for at least 24 hours before surgery    Which medicines can I take?    Hold Aspirin for 7 days before surgery.   Hold Multivitamins for 7 days before surgery.  Hold Supplements for 7 days before surgery.  Hold Ibuprofen (Advil, Motrin) for 1 day before surgery--unless otherwise directed by surgeon.  Hold Naproxen (Aleve) for 4 days before surgery.    -  DO NOT take the following medications the day of surgery:  Probiotic  Sildenafil (Viagra) - stop 1 day before procedure    -  PLEASE TAKE the following medications the night before or the day of surgery   Atorvastatin (Lipitor)    How do I prepare myself?  - Please take 2 showers (one the night prior to surgery and one the morning of surgery) using Scrubcare or Hibiclens soap.    Use this soap only from the neck to your toes.     Leave the soap on your skin for one minute--then rinse thoroughly.      You may use your own shampoo and conditioner;  no other hair products.   - Please remove all jewelry and body piercings.  - No lotions, deodorants or fragrance.  - No makeup or fingernail polish.   - Bring your ID and insurance card.        ALL PATIENTS ARE REQUIRED TO HAVE A RESPONSIBLE ADULT TO DRIVE AND BE IN ATTENDANCE WITH THEM FOR 24 HOURS FOLLOWING SURGERY         Questions or Concerns:    -For questions regarding the day of surgery please contact the Ambulatory Surgery Center at [phone].    -If you have health changes between today and your surgery please contact your surgeon.     For questions after surgery please call your surgeons office.

## 2023-07-18 ENCOUNTER — OFFICE VISIT (OUTPATIENT)
Dept: ORTHOPEDICS | Facility: CLINIC | Age: 63
End: 2023-07-18
Payer: COMMERCIAL

## 2023-07-18 ENCOUNTER — PRE VISIT (OUTPATIENT)
Dept: ORTHOPEDICS | Facility: CLINIC | Age: 63
End: 2023-07-18

## 2023-07-18 ENCOUNTER — ANCILLARY PROCEDURE (OUTPATIENT)
Dept: GENERAL RADIOLOGY | Facility: CLINIC | Age: 63
End: 2023-07-18
Attending: STUDENT IN AN ORGANIZED HEALTH CARE EDUCATION/TRAINING PROGRAM
Payer: COMMERCIAL

## 2023-07-18 DIAGNOSIS — M67.449 DIGITAL MUCOUS CYST: ICD-10-CM

## 2023-07-18 DIAGNOSIS — M67.442 MUCOUS CYST OF DIGIT OF LEFT HAND: ICD-10-CM

## 2023-07-18 PROCEDURE — 73140 X-RAY EXAM OF FINGER(S): CPT | Mod: LT | Performed by: RADIOLOGY

## 2023-07-18 PROCEDURE — 99204 OFFICE O/P NEW MOD 45 MIN: CPT | Performed by: STUDENT IN AN ORGANIZED HEALTH CARE EDUCATION/TRAINING PROGRAM

## 2023-07-18 NOTE — LETTER
7/18/2023         RE: Louie Greco  [address]  [facility] [address]        Dear Colleague,    Thank you for referring your patient, Louie Greco, to the [facility] [place]. Please see a copy of my visit note below.    Ortho Hand    HPI: 63 year old RHD NS  p/w left small finger mass.  Patient has noticed this for quite some time.  It has relapsed and remitted.  At one point, patient had lanced it, but of course it recurred.  It is not very painful, but it does bother him.    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 Options for treatment include observation versus surgery.  Reiterated that patient should not be attempting to reynold these cysts, as they can get infected.  Patient understands.  -If the cyst is bothersome, causing discomfort, my recommendation is surgical excision.  Patient will think about timing and call us when ready for scheduling.  Explained that if the mass gets very large, can introduce nail plate deformities.  Also, it could rupture and become superinfected.  We also discussed that if the mucous cyst gets very large, may attenuate the skin to the point of need for excision and additional reconstruction using adjacent tissue transfer.  In either case, it is also not  unreasonable to observe for now.  -A total of 45 minutes was devoted to review of chart, direct face-to-face patient counseling and documentation during this encounter, exclusive of any procedure performed.    Nitin Heard MD, PhD

## 2023-07-18 NOTE — NURSING NOTE
Reason For Visit:   Chief Complaint   Patient presents with     Consult     Suspected mucous cyst of left 5th DIP       Primary MD: Philippe Healy  Ref. MD: self    Age: 63 year old    ?  No      There were no vitals taken for this visit.      Pain Assessment  Patient Currently in Pain: No    Hand Dominance Evaluation  Hand Dominance: Right          QuickDASH Assessment       No data to display                   Current Outpatient Medications   Medication Sig Dispense Refill     atorvastatin (LIPITOR) 40 MG tablet Take 0.5 tablets (20 mg) by mouth daily (Patient taking differently: Take 20 mg by mouth every evening) 90 tablet 3     Probiotic Product (PROBIOTIC PO) Take 1 capsule by mouth as needed       sildenafil (VIAGRA) 100 MG tablet Take 1 tablet (100 mg) by mouth daily as needed 30 tablet 11       Allergies   Allergen Reactions     Ampicillin Diarrhea     Demerol [Meperidine] Nausea       Myriam Dooley MA

## 2023-07-20 NOTE — PROGRESS NOTES
Ortho Hand    HPI: 63 year old RHD NS  p/w left small finger mass.  Patient has noticed this for quite some time.  It has relapsed and remitted.  At one point, patient had lanced it, but of course it recurred.  It is not very painful, but it does bother him.    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 Options for treatment include observation versus surgery.  Reiterated that patient should not be attempting to reynold these cysts, as they can get infected.  Patient understands.  -If the cyst is bothersome, causing discomfort, my recommendation is surgical excision.  Patient will think about timing and call us when ready for scheduling.  Explained that if the mass gets very large, can introduce nail plate deformities.  Also, it could rupture and become superinfected.  We also discussed that if the mucous cyst gets very large, may attenuate the skin to the point of need for excision and additional reconstruction using adjacent tissue transfer.  In either case, it is also not unreasonable to observe for now.  -A total of 45 minutes was devoted to review of chart, direct face-to-face patient counseling and documentation during this encounter, exclusive of any procedure performed.    Nitin Heard MD, PhD

## 2023-07-21 ENCOUNTER — ANCILLARY PROCEDURE (OUTPATIENT)
Dept: MRI IMAGING | Facility: CLINIC | Age: 63
End: 2023-07-21
Attending: COLON & RECTAL SURGERY
Payer: COMMERCIAL

## 2023-07-21 ENCOUNTER — ANCILLARY PROCEDURE (OUTPATIENT)
Dept: PET IMAGING | Facility: CLINIC | Age: 63
End: 2023-07-21
Attending: COLON & RECTAL SURGERY
Payer: COMMERCIAL

## 2023-07-21 DIAGNOSIS — C20 RECTAL CANCER (H): ICD-10-CM

## 2023-07-21 LAB — GLUCOSE SERPL-MCNC: 100 MG/DL (ref 70–99)

## 2023-07-21 PROCEDURE — 74183 MRI ABD W/O CNTR FLWD CNTR: CPT | Performed by: RADIOLOGY

## 2023-07-21 PROCEDURE — A9585 GADOBUTROL INJECTION: HCPCS | Mod: JZ | Performed by: RADIOLOGY

## 2023-07-21 RX ORDER — FUROSEMIDE 10 MG/ML
40 INJECTION INTRAMUSCULAR; INTRAVENOUS ONCE
Status: COMPLETED | OUTPATIENT
Start: 2023-07-21 | End: 2023-07-21

## 2023-07-21 RX ORDER — IOPAMIDOL 755 MG/ML
50-125 INJECTION, SOLUTION INTRAVASCULAR ONCE
Status: COMPLETED | OUTPATIENT
Start: 2023-07-21 | End: 2023-07-21

## 2023-07-21 RX ORDER — GADOBUTROL 604.72 MG/ML
10 INJECTION INTRAVENOUS ONCE
Status: COMPLETED | OUTPATIENT
Start: 2023-07-21 | End: 2023-07-21

## 2023-07-21 RX ADMIN — GADOBUTROL 10 ML: 604.72 INJECTION INTRAVENOUS at 15:05

## 2023-07-21 RX ADMIN — FUROSEMIDE 40 MG: 10 INJECTION INTRAMUSCULAR; INTRAVENOUS at 10:44

## 2023-07-21 RX ADMIN — IOPAMIDOL 125 ML: 755 INJECTION, SOLUTION INTRAVASCULAR at 10:45

## 2023-07-21 NOTE — DISCHARGE INSTRUCTIONS

## 2023-07-21 NOTE — DISCHARGE INSTRUCTIONS
MRI Contrast Discharge Instructions    The IV contrast you received today will pass out of your body in your  urine. This will happen in the next 24 hours. You will not feel this process.  Your urine will not change color.    Drink at least 4 extra glasses of water or juice today (unless your doctor  has restricted your fluids). This reduces the stress on your kidneys.  You may take your regular medicines.    If you are on dialysis: It is best to have dialysis today.    If you have a reaction: Most reactions happen right away. If you have  any new symptoms after leaving the hospital (such as hives or swelling),  call your hospital at the correct number below. Or call your family doctor.  If you have breathing distress or wheezing, call 911.    Special instructions: ***    I have read and understand the above information.    Signature:______________________________________ Date:___________    Staff:__________________________________________ Date:___________     Time:__________    [place] [facility] Departments:    ___[place]: [phone]  ___[facility]: [phone]  ___[place]: [phone] ___[facility]: [phone]  ___[facility]: [phone]  ___[facility]: [phone]  ___[phone]  ___[facility]: [phone]  ___[phone]

## 2023-07-25 ENCOUNTER — OFFICE VISIT (OUTPATIENT)
Dept: DERMATOLOGY | Facility: CLINIC | Age: 63
End: 2023-07-25
Payer: COMMERCIAL

## 2023-07-25 VITALS — HEART RATE: 77 BPM | SYSTOLIC BLOOD PRESSURE: 129 MMHG | DIASTOLIC BLOOD PRESSURE: 75 MMHG | OXYGEN SATURATION: 95 %

## 2023-07-25 DIAGNOSIS — C44.01 BASAL CELL CARCINOMA (BCC) OF SKIN OF RIGHT UPPER LIP: Primary | ICD-10-CM

## 2023-07-25 DIAGNOSIS — L82.1 SEBORRHEIC KERATOSES: ICD-10-CM

## 2023-07-25 DIAGNOSIS — L81.4 LENTIGO: ICD-10-CM

## 2023-07-25 DIAGNOSIS — D18.01 ANGIOMA OF SKIN: ICD-10-CM

## 2023-07-25 DIAGNOSIS — D23.9 DERMAL NEVUS: ICD-10-CM

## 2023-07-25 PROCEDURE — 17311 MOHS 1 STAGE H/N/HF/G: CPT | Performed by: DERMATOLOGY

## 2023-07-25 PROCEDURE — 17312 MOHS ADDL STAGE: CPT | Performed by: DERMATOLOGY

## 2023-07-25 PROCEDURE — 99213 OFFICE O/P EST LOW 20 MIN: CPT | Mod: 25 | Performed by: DERMATOLOGY

## 2023-07-25 PROCEDURE — 40654 RPR LIP FTH>1HALF VER HT/CPX: CPT | Mod: 51 | Performed by: DERMATOLOGY

## 2023-07-25 ASSESSMENT — PAIN SCALES - GENERAL: PAINLEVEL: NO PAIN (0)

## 2023-07-25 NOTE — PROGRESS NOTES
Louie Greco , a 63 year old year old male patient, I was asked to see by Dr. Pinedo for basal cell carcinoma on right upper cut lip.  Patient has no other skin complaints today.  Remainder of the HPI, Meds, PMH, Allergies, FH, and SH was reviewed in chart.      Past Medical History:   Diagnosis Date    Childhood asthma     Elevated prostate specific antigen (PSA)     No biopsy done (had rectal cancer at the time)    Epididymitis, bilateral     18 years old    Inguinal hernia     Kidney stone on left side 04/22/2021    Added automatically from request for surgery [id number]    Mumps     Nephrolithiasis     Several -- 1990 first,recurrence 2019, 2021    Rectal cancer (H) 2017    low rectal cancer, S/P Rad adn Chemo, no surg needed    Right 4 mm Kidney stone at [facility]  02/28/2021    Right ureteral stone 03/11/2021    Added automatically from request for surgery [id number]    Fannie        Past Surgical History:   Procedure Laterality Date    COLONOSCOPY N/A 7/27/2016    Procedure: COMBINED COLONOSCOPY, SINGLE OR MULTIPLE BIOPSY/POLYPECTOMY BY BIOPSY;  Surgeon: Chelsea Thompson MD;  Location:  GI    COLONOSCOPY N/A 9/13/2017    Procedure: COLONOSCOPY;;  Surgeon: Felicitas Radford MD;  [facility]    COLONOSCOPY N/A 12/13/2017    Procedure: COLONOSCOPY;;  Surgeon: Felicitas Radford MD;  [facility]    COLONOSCOPY N/A 10/23/2020    Procedure: COLONOSCOPY;  Surgeon: Felicitas Radford MD;  [place] [facility] [place]    COMBINED CYSTOSCOPY, RETROGRADES, URETEROSCOPY, LASER HOLMIUM LITHOTRIPSY URETER(S), INSERT STENT Left 2/16/2018    Procedure: COMBINED CYSTOSCOPY, RETROGRADES, URETEROSCOPY, LASER HOLMIUM LITHOTRIPSY URETER(S), INSERT STENT;  Cysto, left ureteroscopy, holmium laser, retrogrades, stent placement;  Surgeon: Bola Worthy MD;  Location:  OR    COMBINED CYSTOSCOPY, RETROGRADES, URETEROSCOPY, LASER HOLMIUM LITHOTRIPSY URETER(S), INSERT STENT Right 3/22/2021     Procedure: CYSTOSCOPY WITH RIGHT RETROGRADE PYELOGRAM, RIGHT URETEROSCOPY WITH LASER LITHOTRIPSY AND BASKET REMOVAL OF STONE, RIGHT URETERAL STENT PLACEMENT;  Surgeon: Mohsen Pat MD;  Location[facility]    COMBINED CYSTOSCOPY, RETROGRADES, URETEROSCOPY, LASER HOLMIUM LITHOTRIPSY URETER(S), INSERT STENT Left 5/19/2021    Procedure: CYSTOSCOPY, LEFT RETROGRADE PYELOGRAM, LEFT DIAGNOSTIC, URETEROSCOPY, LEFT URETERAL STENT PLACEMENT;  Surgeon: Mohsen Pat MD;  Location[facility]    COMBINED CYSTOSCOPY, RETROGRADES, URETEROSCOPY, LASER HOLMIUM LITHOTRIPSY URETER(S), INSERT STENT Left 6/23/2021    Procedure: CYSTOSCOPY, LEFT URETERAL STENT REMOVAL, LEFT RETROGRADE PYELOGRAM, LEFT URETEROSCOPY WITH LASER LITHOTRIPSY AND BASKET REMOVAL OF STONES, LEFT URETERAL STENT PLACEMENT;  Surgeon: Mohsen Pat MD;  [facility]    CYSTOSCOPY, LITHOLAPAXY, COMBINED N/A 3/1/2021    Procedure: Cystoscopy, litholapaxy, combined;  Surgeon: Mohsen Pat MD;  [facility]    CYSTOSCOPY, RETROGRADES, INSERT STENT URETER(S), COMBINED Right 3/1/2021    Procedure: Cystoscopy, retrogrades, insert stent ureter(s), combined;  Surgeon: Mohsen Pat MD;  [facility]    EXAM UNDER ANESTHESIA ANUS N/A 7/5/2017    Procedure: EXAM UNDER ANESTHESIA ANUS;  Examination Under Anesthesia, flex sigmoidoscopy with biopsies and formalin application;  Surgeon: Felicitas Radford MD;  [facility]    EXAM UNDER ANESTHESIA ANUS N/A 9/13/2017    Procedure: EXAM UNDER ANESTHESIA ANUS;  Examination Under Anesthesia Anus, Colonoscopy, application of formalin;  Surgeon: Felicitas Radford MD;  [facility]    EXAM UNDER ANESTHESIA ANUS N/A 12/13/2017    Procedure: EXAM UNDER ANESTHESIA ANUS;  Examination Under Anesthesia Anus, Colonoscopy;  Surgeon: Felicitas Radford MD;  [facility]    EXAM UNDER ANESTHESIA ANUS N/A 5/11/2018    Procedure: EXAM UNDER ANESTHESIA ANUS;  Examination Under Anesthesia Anus,  Interoperative Flexible Sigmoidoscopy, Application of Formalin;  Surgeon: Felicitas Radford MD;  [facility]    EXAM UNDER ANESTHESIA ANUS N/A 7/20/2018    Procedure: EXAM UNDER ANESTHESIA ANUS;  Examination Under Anesthesia Anus, Flexible Sigmoidoscopy ;  Surgeon: Felicitas Radford MD;  [facility]    EXAM UNDER ANESTHESIA ANUS N/A 11/30/2018    Procedure: Examination Under Anesthesia, application of formalin to rectum, polypectomy;  Surgeon: Felicitas Radford MD;  [facility]    EXAM UNDER ANESTHESIA ANUS N/A 2/22/2019    Procedure: Examination Under Anesthesia;  Surgeon: Felicitas Radford MD;  [facility]    EXAM UNDER ANESTHESIA ANUS N/A 6/28/2019    Procedure: Examination Under Anesthesia;  Surgeon: Felicitas Radford MD;  [facility]    EXAM UNDER ANESTHESIA ANUS N/A 11/1/2019    Procedure: Examination Under Anesthesia;  Surgeon: Felicitas Radford MD;  [facility]    EXAM UNDER ANESTHESIA RECTUM N/A 10/23/2020    Procedure: Exam under anesthesia rectum;  Surgeon: Felicitas Radford MD;  Location: [facility] OR    HC TOOTH EXTRACTION W/FORCEP      LAPAROSCOPIC APPENDECTOMY N/A 7/17/2017    Procedure: LAPAROSCOPIC APPENDECTOMY;  LAPAROSCOPIC APPENDECTOMY;  Surgeon: Bryson Ferguson MD;  [facility]    LASER HOLMIUM LITHOTRIPSY URETER(S), INSERT STENT, COMBINED Right 3/1/2021    Procedure: CYSTOURETEROSCOPY,  URETERAL STENT INSERTION, RIGHT RETROGRADE;  Surgeon: Mohsen Pat MD;  [facility]    SIGMOIDOSCOPY FLEXIBLE N/A 12/8/2016    Procedure: SIGMOIDOSCOPY FLEXIBLE;  Surgeon: Felicitas Radford MD;  [facility]    SIGMOIDOSCOPY FLEXIBLE N/A 7/5/2017    Procedure: SIGMOIDOSCOPY FLEXIBLE;;  Surgeon: Felicitas Radford MD;  [facility]    SIGMOIDOSCOPY FLEXIBLE N/A 5/11/2018    Procedure: SIGMOIDOSCOPY FLEXIBLE;;  Surgeon: Felicitas Radford MD;  [facility]    SIGMOIDOSCOPY FLEXIBLE N/A  7/20/2018    Procedure: SIGMOIDOSCOPY FLEXIBLE;;  Surgeon: Felicitas Radford MD;  [facility]    SIGMOIDOSCOPY FLEXIBLE N/A 11/30/2018    Procedure: Flexible Sigmoidoscopy;  Surgeon: Felicitas Radford MD;  [facility]    SIGMOIDOSCOPY FLEXIBLE N/A 2/22/2019    Procedure: Intraoperative Flexible Sigmoidoscopy, Application of Formalin to rectum;  Surgeon: Felicitas Radford MD;  [facility]    SIGMOIDOSCOPY FLEXIBLE N/A 6/28/2019    Procedure: Flexible Sigmoidoscopy;  Surgeon: Felicitas Radford MD;  [facility]    SIGMOIDOSCOPY FLEXIBLE N/A 11/1/2019    Procedure: Flexible Sigmoidoscopy;  Surgeon: Felicitas Radford MD;  [facility]    SIGMOIDOSCOPY FLEXIBLE N/A 7/23/2021    Procedure: SIGMOIDOSCOPY, FLEXIBLE;  Surgeon: Felicitas Radford MD;  Location: [facility] OR    SIGMOIDOSCOPY FLEXIBLE N/A 7/1/2022    Procedure: SIGMOIDOSCOPY, FLEXIBLE, EUA;  Surgeon: Felicitas Radford MD;  Location: [facility] OR    TESTICLE SURGERY      VASCULAR SURGERY      Right chest port    VASECTOMY      VASECTOMY          Family History   Problem Relation Age of Onset    Colon Polyps Mother         Number and type of polyps unknown    Chronic Obstructive Pulmonary Disease Father     Hypertension Father     Hyperlipidemia Father     Diabetes Maternal Grandfather     Macular Degeneration Paternal Grandmother     Hypertension Paternal Grandmother     Hyperlipidemia Paternal Grandmother     Cancer Brother         primary of unknown origin    Other Cancer Brother     Family History Negative Brother         negative colonoscopy    Stomach Cancer Other 63        maternal great grandfather    Glaucoma No family hx of     Anesthesia Reaction No family hx of     Deep Vein Thrombosis (DVT) No family hx of        Social History     Socioeconomic History    Marital status:      Spouse name: Not on file    Number of children: 2    Years of education: Not on file    Highest  education level: Not on file   Occupational History    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)      Social Determinants of Health     Financial Resource Strain: Not on file   Food Insecurity: Not on file   Transportation Needs: Not on file   Physical Activity: Not on file   Stress: Not on file   Social Connections: Not on file   Intimate Partner Violence: Not on file   Housing Stability: Not on file       Outpatient Encounter Medications as of 7/25/2023   Medication Sig Dispense Refill    atorvastatin (LIPITOR) 40 MG tablet Take 0.5 tablets (20 mg) by mouth daily (Patient taking differently: Take 20 mg by mouth every evening) 90 tablet 3    Probiotic Product (PROBIOTIC PO) Take 1 capsule by mouth as needed      sildenafil (VIAGRA) 100 MG tablet Take 1 tablet (100 mg) by mouth daily as needed 30 tablet 11     No facility-administered encounter medications on file as of 7/25/2023.             Review Of Systems  Skin: As above  Eyes: negative  Ears/Nose/Throat: negative  Respiratory: No shortness of breath, dyspnea on exertion, cough, or hemoptysis  Cardiovascular: negative  Gastrointestinal: negative  Genitourinary: negative  Musculoskeletal: negative  Neurologic: negative  Psychiatric: negative  Hematologic/Lymphatic/Immunologic: negative  Endocrine: negative      O:   NAD, WDWN, Alert & Oriented, Mood & Affect wnl, Vitals stable   Here today alone   /75   Pulse 77   SpO2 95%    General appearance jesús ii   Vitals stable   Alert,  oriented and in no acute distress   R upper cut lip 1cm pink pearly papule   Stuck on papules and brown macules on face and trunk   Red papules on trunk  Flesh colored papules on face         Eyes: Conjunctivae/lids:Normal     ENT: Lips, buccal mucosa, tongue: normal    MSK:Normal    Cardiovascular: peripheral edema none    Pulm: Breathing Normal    Neuro/Psych: Orientation:Normal; Mood/Affect:Normal      A/P:  1. Seborrheic keratosis, lentigo, angioma, dermal nevus  2. Basal cell carcinoma  r upper cut lip   It was a pleasure speaking to Louie Greco today.  Previous clinic  notes and pertinent laboratory tests were reviewed prior to Louie Greco's visit.  Signs and Symptoms of skin cancer discussed with patient.  Patient encouraged to perform monthly skin exams.  UV precautions reviewed with patient.  Risks of non-melanoma skin cancer discussed with patient   Return to clinic 3 months  PROCEDURE NOTE  R upper cut lip basal cell carcinoma   MOHS:   Location    The rationale for Mohs surgery was discussed with the patient and consent was obtained.  The risks and benefits as well as alternatives to therapy were discussed, in detail.  Specifically, the risks of infection, scarring, bleeding, prolonged wound healing, incomplete removal, allergy to anesthesia, nerve injury and recurrence were addressed.  Indication for Mohs was Location. Prior to the procedure, the treatment site was clearly identified and, if available, confirmed with previous photos and confirmed by the patient   All components of the Universal Protocol/PAUSE rule were completed.  The Mohs surgeon operated in two distinct and integrated capacities as the surgeon and pathologist.      The area was prepped with Betasept.  A rim of normal appearing skin was marked circumferentially around the lesion.  The area was infiltrated with local anesthesia.  The tumor was first debulked to remove all clinically apparent tumor.  An incision following the standard  Mohs approach was done and the specimen was oriented,mapped and placed in 2 block(s).  Each specimen was then chromacoded and processed in the Mohs laboratory using standard Mohs technique and submitted for frozen section histology.  Frozen section analysis showed  residual tumor but CLEAR MARGINS.    1st stage:Orthokeratosis of epidermis with a proliferation of nests of basaloid cells, with peripheral palisading and a haphazard arrangement in the center extending into the dermis, forming nodules.  The tumor cells have hyperchromatic nuclei. Poor cytoplasm and intercellular bridging.      The tumor was excised using standard Mohs technique in 2 stages(s).  CLEAR MARGINS OBTAINED and Final defect size was 2 x 1.5 cm.     We discussed the options for wound management in full with the patient including risks/benefits/ possible outcomes.    .  REPAIR COMPLEX: Because of the tightness of the surrounding skin and Because of the size and full thickness nature of the defect, Because of the tightness of the surrounding skin, To maintain form and function, In order to avoid distortion, and Because of the proximity to the vermilion, a complex closure was planned. After LE anesthesia and prep, Burow's triangles were excised in the relaxed skin tension lines through the mucosal lip on inferior margin, . The wound edges were widely undermined greater than width of the defect on both sides by dissection in the subcutaneous plane until adequate tissue mobility was obtained. Hemostasis was obtained. The wound edges were closed in a layered fashion using Vicryl and Fast Absorbing Plain Gut sutures. Postoperative length was 5 cm.   EBL minimal; complications none; wound care routine.  The patient was discharged in good condition and will return in one week for wound evaluation.

## 2023-07-25 NOTE — PATIENT INSTRUCTIONS
Sutured Wound Care Instructions  For Lips or Chin       No strenuous activity for 48 hours. Resume moderate activity in 48 hours. No heavy exercising until you are seen for follow up in one week.     Take Tylenol as needed for discomfort.                         Do not drink alcoholic beverages for 48 hours.     Keep the pressure bandage in place for 24 hours. If the bandage becomes blood tinged or loose, reinforce it with gauze and tape.     Remove bandage in 24 hours     NO STRAWS or puckering motion  for 2 weeks    Leave the flat bandage in place until your follow up appointment.    Keep the bandage dry. Wash around it carefully.    If the tape becomes soiled or starts to come off, reinforce it with additional paper tape.    Do not smoke for 3 weeks; smoking is detrimental to wound healing.    It is normal to have swelling and bruising around the surgical site. The bruising will fade in approximately 10-14 days. Elevate the area to reduce swelling.    Try to keep your lips / chin as immobile as possible. Refrain from laughing, smiling and yawning for 3 weeks.    Eat soft foods for the first 24 hours and take small bites of food for the entire three weeks.    When brushing your teeth, you should use a child s toothbrush or use mouth wash to prevent stretching of surgery site.    Numbness, itchiness and sensitivity to temperature changes can occur after surgery and may take up to 18 months to normalize.      POSSIBLE COMPLICATIONS      BLEEDING:    Leave the bandage in place.  Use tightly rolled up gauze or a cloth to apply direct pressure over the bandage for 20   minutes.  Reapply pressure for an additional 20 minutes if necessary  Call the office or go to the nearest emergency room if pressure fails to stop the bleeding.  Use additional gauze and tape to maintain pressure once the bleeding has stopped.        PAIN:    Post operative pain should slowly get better, never worse.  A severe increase in pain may  indicate a problem. Call the office if this occurs.        [facility]: [phone]    [facility]: [phone]    In case of emergency phone:Dr Villegas [phone]

## 2023-07-25 NOTE — LETTER
7/25/2023         RE: Louie Greco  [address] [facility] [address]        Dear Colleague,    Thank you for referring your patient, Louie Greco, to the [facility]. Please see a copy of my visit note below.    Surgical Office Location :   [facility]  [address] [place] [address]      Louie Greco , a 63 year old year old male patient, I was asked to see by Dr. Pinedo for basal cell carcinoma on right upper cut lip.  Patient has no other skin complaints today.  Remainder of the HPI, Meds, PMH, Allergies, FH, and SH was reviewed in chart.      Past Medical History:   Diagnosis Date     Childhood asthma      Elevated prostate specific antigen (PSA)     No biopsy done (had rectal cancer at the time)     Epididymitis, bilateral     18 years old     Inguinal hernia      Kidney stone on left side 04/22/2021    Added automatically from request for surgery [id number]     Mumps      Nephrolithiasis     Several -- 1990 first,recurrence 2019, 2021     Rectal cancer (H) 2017    low rectal cancer, S/P Rad adn Chemo, no surg needed     Right 4 mm Kidney stone at [facility]  02/28/2021     Right ureteral stone 03/11/2021    Added automatically from request for surgery [id number]     Fannie        Past Surgical History:   Procedure Laterality Date     COLONOSCOPY N/A 7/27/2016    Procedure: COMBINED COLONOSCOPY, SINGLE OR MULTIPLE BIOPSY/POLYPECTOMY BY BIOPSY;  Surgeon: Chelsea Thompson MD;  Location:  GI     COLONOSCOPY N/A 9/13/2017    Procedure: COLONOSCOPY;;  Surgeon: Felicitas Radford MD;  [facility]     COLONOSCOPY N/A 12/13/2017    Procedure: COLONOSCOPY;;  Surgeon: Felicitas Radford MD;  [facility]     COLONOSCOPY N/A 10/23/2020    Procedure: COLONOSCOPY;  Surgeon: Felicitas Radford MD;  Location: [facility]     COMBINED CYSTOSCOPY, RETROGRADES, URETEROSCOPY, LASER HOLMIUM LITHOTRIPSY URETER(S), INSERT STENT Left 2/16/2018    Procedure:  COMBINED CYSTOSCOPY, RETROGRADES, URETEROSCOPY, LASER HOLMIUM LITHOTRIPSY URETER(S), INSERT STENT;  Cysto, left ureteroscopy, holmium laser, retrogrades, stent placement;  Surgeon: Bola Worthy MD;  Location: [facility]     COMBINED CYSTOSCOPY, RETROGRADES, URETEROSCOPY, LASER HOLMIUM LITHOTRIPSY URETER(S), INSERT STENT Right 3/22/2021    Procedure: CYSTOSCOPY WITH RIGHT RETROGRADE PYELOGRAM, RIGHT URETEROSCOPY WITH LASER LITHOTRIPSY AND BASKET REMOVAL OF STONE, RIGHT URETERAL STENT PLACEMENT;  Surgeon: Mohsen Pat MD;  Location[facility]     COMBINED CYSTOSCOPY, RETROGRADES, URETEROSCOPY, LASER HOLMIUM LITHOTRIPSY URETER(S), INSERT STENT Left 5/19/2021    Procedure: CYSTOSCOPY, LEFT RETROGRADE PYELOGRAM, LEFT DIAGNOSTIC, URETEROSCOPY, LEFT URETERAL STENT PLACEMENT;  Surgeon: Mohsen Pat MD;  Location[facility]     COMBINED CYSTOSCOPY, RETROGRADES, URETEROSCOPY, LASER HOLMIUM LITHOTRIPSY URETER(S), INSERT STENT Left 6/23/2021    Procedure: CYSTOSCOPY, LEFT URETERAL STENT REMOVAL, LEFT RETROGRADE PYELOGRAM, LEFT URETEROSCOPY WITH LASER LITHOTRIPSY AND BASKET REMOVAL OF STONES, LEFT URETERAL STENT PLACEMENT;  Surgeon: Mohsen Pat MD;  [facility]     CYSTOSCOPY, LITHOLAPAXY, COMBINED N/A 3/1/2021    Procedure: Cystoscopy, litholapaxy, combined;  Surgeon: Mohsen Pat MD;  [facility]     CYSTOSCOPY, RETROGRADES, INSERT STENT URETER(S), COMBINED Right 3/1/2021    Procedure: Cystoscopy, retrogrades, insert stent ureter(s), combined;  Surgeon: Mohsen Pat MD;  [facility]     EXAM UNDER ANESTHESIA ANUS N/A 7/5/2017    Procedure: EXAM UNDER ANESTHESIA ANUS;  Examination Under Anesthesia, flex sigmoidoscopy with biopsies and formalin application;  Surgeon: Felicitas Radford MD;  [facility]     EXAM UNDER ANESTHESIA ANUS N/A 9/13/2017    Procedure: EXAM UNDER ANESTHESIA ANUS;  Examination Under Anesthesia Anus, Colonoscopy, application of formalin;  Surgeon: Malina  Felicitas HIDALGO MD;  [facility]     EXAM UNDER ANESTHESIA ANUS N/A 12/13/2017    Procedure: EXAM UNDER ANESTHESIA ANUS;  Examination Under Anesthesia Anus, Colonoscopy;  Surgeon: Felicitas Radford MD;  [facility]     EXAM UNDER ANESTHESIA ANUS N/A 5/11/2018    Procedure: EXAM UNDER ANESTHESIA ANUS;  Examination Under Anesthesia Anus, Interoperative Flexible Sigmoidoscopy, Application of Formalin;  Surgeon: Felicitas Radford MD;  [facility]     EXAM UNDER ANESTHESIA ANUS N/A 7/20/2018    Procedure: EXAM UNDER ANESTHESIA ANUS;  Examination Under Anesthesia Anus, Flexible Sigmoidoscopy ;  Surgeon: Felicitas Radford MD;  [facility]     EXAM UNDER ANESTHESIA ANUS N/A 11/30/2018    Procedure: Examination Under Anesthesia, application of formalin to rectum, polypectomy;  Surgeon: Felicitas Radford MD;  [facility]     EXAM UNDER ANESTHESIA ANUS N/A 2/22/2019    Procedure: Examination Under Anesthesia;  Surgeon: Felicitas Radford MD;  [facility]     EXAM UNDER ANESTHESIA ANUS N/A 6/28/2019    Procedure: Examination Under Anesthesia;  Surgeon: Felicitas Radford MD;  [facility]     EXAM UNDER ANESTHESIA ANUS N/A 11/1/2019    Procedure: Examination Under Anesthesia;  Surgeon: Felicitas Radford MD;  [facility]     EXAM UNDER ANESTHESIA RECTUM N/A 10/23/2020    Procedure: Exam under anesthesia rectum;  Surgeon: Felicitas Radford MD;  [facility]: [facility]     HC TOOTH EXTRACTION W/FORCEP       LAPAROSCOPIC APPENDECTOMY N/A 7/17/2017    Procedure: LAPAROSCOPIC APPENDECTOMY;  LAPAROSCOPIC APPENDECTOMY;  Surgeon: Bryson Ferguson MD;  Location:  OR     LASER HOLMIUM LITHOTRIPSY URETER(S), INSERT STENT, COMBINED Right 3/1/2021    Procedure: CYSTOURETEROSCOPY,  URETERAL STENT INSERTION, RIGHT RETROGRADE;  Surgeon: Mohsen Pat MD;  Location:  OR     SIGMOIDOSCOPY FLEXIBLE N/A 12/8/2016    Procedure: SIGMOIDOSCOPY FLEXIBLE;   Surgeon: Felicitas Radford MD;  [facility]     SIGMOIDOSCOPY FLEXIBLE N/A 7/5/2017    Procedure: SIGMOIDOSCOPY FLEXIBLE;;  Surgeon: Felicitas Radford MD;  [facility]     SIGMOIDOSCOPY FLEXIBLE N/A 5/11/2018    Procedure: SIGMOIDOSCOPY FLEXIBLE;;  Surgeon: Felicitas Radford MD;  [facility]     SIGMOIDOSCOPY FLEXIBLE N/A 7/20/2018    Procedure: SIGMOIDOSCOPY FLEXIBLE;;  Surgeon: Felicitas Radford MD;  [facility]     SIGMOIDOSCOPY FLEXIBLE N/A 11/30/2018    Procedure: Flexible Sigmoidoscopy;  Surgeon: Felicitas Radford MD;  [facility]     SIGMOIDOSCOPY FLEXIBLE N/A 2/22/2019    Procedure: Intraoperative Flexible Sigmoidoscopy, Application of Formalin to rectum;  Surgeon: Felicitas Radford MD;  [facility]     SIGMOIDOSCOPY FLEXIBLE N/A 6/28/2019    Procedure: Flexible Sigmoidoscopy;  Surgeon: Felicitas Radford MD;  [facility]     SIGMOIDOSCOPY FLEXIBLE N/A 11/1/2019    Procedure: Flexible Sigmoidoscopy;  Surgeon: Felicitas Radford MD;  [facility]     SIGMOIDOSCOPY FLEXIBLE N/A 7/23/2021    Procedure: SIGMOIDOSCOPY, FLEXIBLE;  Surgeon: Felicitas Radford MD;  [facility]     SIGMOIDOSCOPY FLEXIBLE N/A 7/1/2022    Procedure: SIGMOIDOSCOPY, FLEXIBLE, EUA;  Surgeon: Felicitas Radford MD;  Location: [facility] OR     TESTICLE SURGERY       VASCULAR SURGERY      Right chest port     VASECTOMY       VASECTOMY          Family History   Problem Relation Age of Onset     Colon Polyps Mother         Number and type of polyps unknown     Chronic Obstructive Pulmonary Disease Father      Hypertension Father      Hyperlipidemia Father      Diabetes Maternal Grandfather      Macular Degeneration Paternal Grandmother      Hypertension Paternal Grandmother      Hyperlipidemia Paternal Grandmother      Cancer Brother         primary of unknown origin     Other Cancer Brother      Family History Negative Brother          negative colonoscopy     Stomach Cancer Other 63        maternal great grandfather     Glaucoma No family hx of      Anesthesia Reaction No family hx of      Deep Vein Thrombosis (DVT) No family hx of        Social History     Socioeconomic History     Marital status:      Spouse name: Not on file     Number of children: 2     Years of education: Not on file     Highest education level: Not on file   Occupational History     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)      Social Determinants of Health     Financial Resource Strain: Not on file   Food Insecurity: Not on file   Transportation Needs: Not on file   Physical Activity: Not on file   Stress: Not on file   Social Connections: Not on file   Intimate Partner Violence: Not on file   Housing Stability: Not on file       Outpatient Encounter Medications as of 7/25/2023   Medication Sig Dispense Refill     atorvastatin (LIPITOR) 40 MG tablet Take 0.5 tablets (20 mg) by mouth daily (Patient taking differently: Take 20 mg by mouth every evening) 90 tablet 3     Probiotic Product (PROBIOTIC PO) Take 1 capsule by mouth as needed       sildenafil (VIAGRA) 100 MG tablet Take 1 tablet (100 mg) by mouth daily as needed 30 tablet 11     No facility-administered encounter medications on file as of 7/25/2023.             Review Of Systems  Skin: As above  Eyes: negative  Ears/Nose/Throat: negative  Respiratory: No shortness of breath, dyspnea on exertion, cough, or  hemoptysis  Cardiovascular: negative  Gastrointestinal: negative  Genitourinary: negative  Musculoskeletal: negative  Neurologic: negative  Psychiatric: negative  Hematologic/Lymphatic/Immunologic: negative  Endocrine: negative      O:   NAD, WDWN, Alert & Oriented, Mood & Affect wnl, Vitals stable   Here today alone   /75   Pulse 77   SpO2 95%    General appearance jesús ii   Vitals stable   Alert, oriented and in no acute distress   R upper cut lip 1cm pink pearly papule   Stuck on papules and brown macules on face and trunk   Red papules on trunk  Flesh colored papules on face         Eyes: Conjunctivae/lids:Normal     ENT: Lips, buccal mucosa, tongue: normal    MSK:Normal    Cardiovascular: peripheral edema none    Pulm: Breathing Normal    Neuro/Psych: Orientation:Normal; Mood/Affect:Normal      A/P:  1. Seborrheic keratosis, lentigo, angioma, dermal nevus  2. Basal cell carcinoma  r upper cut lip   It was a pleasure speaking to Louie Greco today.  Previous clinic  notes and pertinent laboratory tests were reviewed prior to Louie Greco's visit.  Signs and Symptoms of skin cancer discussed with patient.  Patient encouraged to perform monthly skin exams.  UV precautions reviewed with patient.  Risks of non-melanoma skin cancer discussed with patient   Return to clinic 3 months  PROCEDURE NOTE  R upper cut lip basal cell carcinoma   MOHS:   Location    The rationale for Mohs surgery was discussed with the patient and consent was obtained.  The risks and benefits as well as alternatives to therapy were discussed, in detail.  Specifically, the risks of infection, scarring, bleeding, prolonged wound healing, incomplete removal, allergy to anesthesia, nerve injury and recurrence were addressed.  Indication for Mohs was Location. Prior to the procedure, the treatment site was clearly identified and, if available, confirmed with previous photos and confirmed by the patient   All components of the Universal  Protocol/PAUSE rule were completed.  The Mohs surgeon operated in two distinct and integrated capacities as the surgeon and pathologist.      The area was prepped with Betasept.  A rim of normal appearing skin was marked circumferentially around the lesion.  The area was infiltrated with local anesthesia.  The tumor was first debulked to remove all clinically apparent tumor.  An incision following the standard Mohs approach was done and the specimen was oriented,mapped and placed in 2 block(s).  Each specimen was then chromacoded and processed in the Mohs laboratory using standard Mohs technique and submitted for frozen section histology.  Frozen section analysis showed  residual tumor but CLEAR MARGINS.    1st stage:Orthokeratosis of epidermis with a proliferation of nests of basaloid cells, with peripheral palisading and a haphazard arrangement in the center extending into the dermis, forming nodules.  The tumor cells have hyperchromatic nuclei. Poor cytoplasm and intercellular bridging.      The tumor was excised using standard Mohs technique in 2 stages(s).  CLEAR MARGINS OBTAINED and Final defect size was 2 x 1.5 cm.     We discussed the options for wound management in full with the patient including risks/benefits/ possible outcomes.    .  REPAIR COMPLEX: Because of the tightness of the surrounding skin and Because of the size and full thickness nature of the defect, Because of the tightness of the surrounding skin, To maintain form and function, In order to avoid distortion, and Because of the proximity to the vermilion, a complex closure was planned. After LE anesthesia and prep, Burow's triangles were excised in the relaxed skin tension lines through the mucosal lip on inferior margin, . The wound edges were widely undermined greater than width of the defect on both sides by dissection in the subcutaneous plane until adequate tissue mobility was obtained. Hemostasis was obtained. The wound edges were closed  in a layered fashion using Vicryl and Fast Absorbing Plain Gut sutures. Postoperative length was 5 cm.   EBL minimal; complications none; wound care routine.  The patient was discharged in good condition and will return in one week for wound evaluation.        Again, thank you for allowing me to participate in the care of your patient.        Sincerely,        Ranulfo Villegas MD

## 2023-07-27 ENCOUNTER — ONCOLOGY VISIT (OUTPATIENT)
Dept: ONCOLOGY | Facility: CLINIC | Age: 63
End: 2023-07-27
Attending: INTERNAL MEDICINE
Payer: COMMERCIAL

## 2023-07-27 VITALS
HEART RATE: 75 BPM | DIASTOLIC BLOOD PRESSURE: 76 MMHG | SYSTOLIC BLOOD PRESSURE: 121 MMHG | WEIGHT: 219 LBS | TEMPERATURE: 97.6 F | OXYGEN SATURATION: 95 % | BODY MASS INDEX: 31.42 KG/M2 | RESPIRATION RATE: 20 BRPM

## 2023-07-27 DIAGNOSIS — C20 MALIGNANT NEOPLASM OF RECTUM (H): Primary | ICD-10-CM

## 2023-07-27 PROCEDURE — 99215 OFFICE O/P EST HI 40 MIN: CPT | Performed by: INTERNAL MEDICINE

## 2023-07-27 PROCEDURE — G0463 HOSPITAL OUTPT CLINIC VISIT: HCPCS | Performed by: INTERNAL MEDICINE

## 2023-07-27 ASSESSMENT — PAIN SCALES - GENERAL: PAINLEVEL: NO PAIN (0)

## 2023-07-27 NOTE — PROGRESS NOTES
[facility]    Hematology/Oncology Established Patient Note      Today's Date: Jul 27, 2023     Reason for Follow-up: rectal cancer      HISTORY OF PRESENT ILLNESS: Louie Greco is a 61 year old male who presents with rectal cancer.  He started having rectal bleeding around beginning of 2016.  He underwent colonoscopy on 7/27/16, which showed a malignant tumor in the rectum involving half of the lumen with oozing, as well as three 4-mm polyps in the ascending and transverse colon.  Pathology showed moderately differentiated adenocarcinoma, ascending colon with sessile serrated adenoma, others were tubular adenomas.  Mismatch repair expression with normal protein expression.  Staging scans showed the rectal mass, indeterminate focus in the left liver thought to be cyst, and multiple bilateral renal cysts.  MRI showed a distal rectal mass measuring 2.7 x 2.4 cm extending to the most proximal region of the anal canal.  There are a few tiny subcentimeter perirectal fat lymph nodes.  He was staged clinically [id number] (stage IIIA).  He was seen by Dr. Lee at [facility], and started neoadjuvant chemoradiation with capecitabine on 8/8/16 and completed on 9/15/16.  He was then seen by Dr. Radford, colorectal surgery at [facility].  His post chemoradiation MRI showed improvement in the size of the tumor and response in the lymph nodes.  On 12/8/16, he underwent flexible sigmoidoscopy and there was no evidence of tumor.  There was only some anterior scarring in the lumen.  Multiple biopsies were taken, which showed no evidence of carcinoma.  At that point, Dr. Radford spoke with the patient's wife, that there appears to be a complete response in the lumen, and that the patient would wish to placed on the watch and wait protocol.  The patient had expressed wishes not to have an APR, and it would not have been possible to grossly clear a margin for LAR.    He had port  placed on 1/16/17.  It has been removed.    He completed FOLFOX x 8 cycles 1/16/17-5/9/17.      He was admitted 7/16/17-7/20/17 with sepsis, abdominal wall cellulitis.  He underwent surgery with laparoscopic abdominal exploration and appendectomy.  Pathology found sessile serrated adenoma without dysplasia or malignancy.        INTERIM HISTORY: Louie is doing well.      He was previously followed by my colleague - Dr. Agueda Manley. He has transferred care to me now that Dr. Manley has left our practice.  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      He had a basal cell carcinoma removed from left upper lip. His wife is undergoing treatment for uterine cancer with Dr. Cleaning.     REVIEW OF SYSTEMS:   14 point ROS was reviewed and is negative other than as noted above in HPI.       HOME MEDICATIONS:   Current Outpatient Medications   Medication Sig    atorvastatin (LIPITOR) 40 MG tablet Take 0.5 tablets (20 mg) by mouth daily (Patient taking differently: Take 20 mg by mouth every evening)    Probiotic Product (PROBIOTIC PO) Take 1 capsule by mouth as needed    sildenafil (VIAGRA) 100 MG tablet Take 1 tablet (100 mg) by mouth daily as needed     No current facility-administered medications for this visit.        ALLERGIES:  Allergies   Allergen Reactions    Ampicillin Diarrhea    Demerol [Meperidine] Nausea         PAST MEDICAL HISTORY:  Past Medical History:   Diagnosis Date    Childhood asthma     Elevated prostate specific antigen (PSA)     No biopsy done (had rectal cancer at the time)    Epididymitis, bilateral     18 years old    Inguinal hernia     Kidney stone on left side 04/22/2021    Added automatically from request for surgery [id number]    Mumps     Nephrolithiasis     Several -- 1990 first,recurrence 2019, 2021    Rectal cancer (H) 2017    low rectal cancer, S/P Rad adn Chemo, no surg needed    Right 4 mm Kidney stone at [facility]  02/28/2021    Right ureteral stone 03/11/2021    Added automatically from request for  surgery [id number]    [facility]          PAST SURGICAL HISTORY:  Past Surgical History:   Procedure Laterality Date    COLONOSCOPY N/A 7/27/2016    Procedure: COMBINED COLONOSCOPY, SINGLE OR MULTIPLE BIOPSY/POLYPECTOMY BY BIOPSY;  Surgeon: Chelsea Thompson MD;  [facility]: [facility]    COLONOSCOPY N/A 9/13/2017    Procedure: COLONOSCOPY;;  Surgeon: Felicitas Radford MD;  [facility]    COLONOSCOPY N/A 12/13/2017    Procedure: COLONOSCOPY;;  Surgeon: Felicitas Radford MD;  [facility]    COLONOSCOPY N/A 10/23/2020    Procedure: COLONOSCOPY;  Surgeon: Felicitas Radford MD;  [facility]    COMBINED CYSTOSCOPY, RETROGRADES, URETEROSCOPY, LASER HOLMIUM LITHOTRIPSY URETER(S), INSERT STENT Left 2/16/2018    Procedure: COMBINED CYSTOSCOPY, RETROGRADES, URETEROSCOPY, LASER HOLMIUM LITHOTRIPSY URETER(S), INSERT STENT;  Cysto, left ureteroscopy, holmium laser, retrogrades, stent placement;  Surgeon: Bola Worthy MD;  [facility]    COMBINED CYSTOSCOPY, RETROGRADES, URETEROSCOPY, LASER HOLMIUM LITHOTRIPSY URETER(S), INSERT STENT Right 3/22/2021    Procedure: CYSTOSCOPY WITH RIGHT RETROGRADE PYELOGRAM, RIGHT URETEROSCOPY WITH LASER LITHOTRIPSY AND BASKET REMOVAL OF STONE, RIGHT URETERAL STENT PLACEMENT;  Surgeon: Mohsen Pat MD;  [facility]    COMBINED CYSTOSCOPY, RETROGRADES, URETEROSCOPY, LASER HOLMIUM LITHOTRIPSY URETER(S), INSERT STENT Left 5/19/2021    Procedure: CYSTOSCOPY, LEFT RETROGRADE PYELOGRAM, LEFT DIAGNOSTIC, URETEROSCOPY, LEFT URETERAL STENT PLACEMENT;  Surgeon: Mohsen Pat MD;  [facility]    COMBINED CYSTOSCOPY, RETROGRADES, URETEROSCOPY, LASER HOLMIUM LITHOTRIPSY URETER(S), INSERT STENT Left 6/23/2021    Procedure: CYSTOSCOPY, LEFT URETERAL STENT REMOVAL, LEFT RETROGRADE PYELOGRAM, LEFT URETEROSCOPY WITH LASER LITHOTRIPSY AND BASKET REMOVAL OF STONES, LEFT URETERAL STENT PLACEMENT;  Surgeon: Mohsen Pat MD;  Location:  OR     CYSTOSCOPY, LITHOLAPAXY, COMBINED N/A 3/1/2021    Procedure: Cystoscopy, litholapaxy, combined;  Surgeon: Mohsen Pat MD;  [facility]    CYSTOSCOPY, RETROGRADES, INSERT STENT URETER(S), COMBINED Right 3/1/2021    Procedure: Cystoscopy, retrogrades, insert stent ureter(s), combined;  Surgeon: Mohsen Pat MD;  [facility]    EXAM UNDER ANESTHESIA ANUS N/A 7/5/2017    Procedure: EXAM UNDER ANESTHESIA ANUS;  Examination Under Anesthesia, flex sigmoidoscopy with biopsies and formalin application;  Surgeon: Felicitas Radford MD;  [facility]    EXAM UNDER ANESTHESIA ANUS N/A 9/13/2017    Procedure: EXAM UNDER ANESTHESIA ANUS;  Examination Under Anesthesia Anus, Colonoscopy, application of formalin;  Surgeon: Felicitas Radford MD;  [facility]    EXAM UNDER ANESTHESIA ANUS N/A 12/13/2017    Procedure: EXAM UNDER ANESTHESIA ANUS;  Examination Under Anesthesia Anus, Colonoscopy;  Surgeon: Felicitas Radford MD;  [facility]    EXAM UNDER ANESTHESIA ANUS N/A 5/11/2018    Procedure: EXAM UNDER ANESTHESIA ANUS;  Examination Under Anesthesia Anus, Interoperative Flexible Sigmoidoscopy, Application of Formalin;  Surgeon: Felicitas Radford MD;  [facility]    EXAM UNDER ANESTHESIA ANUS N/A 7/20/2018    Procedure: EXAM UNDER ANESTHESIA ANUS;  Examination Under Anesthesia Anus, Flexible Sigmoidoscopy ;  Surgeon: Felicitas Rafdord MD;  [facility]    EXAM UNDER ANESTHESIA ANUS N/A 11/30/2018    Procedure: Examination Under Anesthesia, application of formalin to rectum, polypectomy;  Surgeon: Felicitas Radford MD;  [facility]    EXAM UNDER ANESTHESIA ANUS N/A 2/22/2019    Procedure: Examination Under Anesthesia;  Surgeon: Felicitas Radford MD;  [facility]    EXAM UNDER ANESTHESIA ANUS N/A 6/28/2019    Procedure: Examination Under Anesthesia;  Surgeon: Felicitas Radford MD;  [facility]    EXAM UNDER ANESTHESIA ANUS  N/A 11/1/2019    Procedure: Examination Under Anesthesia;  Surgeon: Felicitas Radford MD;  [facility]    EXAM UNDER ANESTHESIA RECTUM N/A 10/23/2020    Procedure: Exam under anesthesia rectum;  Surgeon: Felicitas Radford MD;  [facility]    HC TOOTH EXTRACTION W/FORCEP      LAPAROSCOPIC APPENDECTOMY N/A 7/17/2017    Procedure: LAPAROSCOPIC APPENDECTOMY;  LAPAROSCOPIC APPENDECTOMY;  Surgeon: Bryson Ferguson MD;  [facility]    LASER HOLMIUM LITHOTRIPSY URETER(S), INSERT STENT, COMBINED Right 3/1/2021    Procedure: CYSTOURETEROSCOPY,  URETERAL STENT INSERTION, RIGHT RETROGRADE;  Surgeon: Mohsen Pat MD;  [facility]    SIGMOIDOSCOPY FLEXIBLE N/A 12/8/2016    Procedure: SIGMOIDOSCOPY FLEXIBLE;  Surgeon: Felicitas Radford MD;  [facility]    SIGMOIDOSCOPY FLEXIBLE N/A 7/5/2017    Procedure: SIGMOIDOSCOPY FLEXIBLE;;  Surgeon: Felicitas Radford MD;  [facility]    SIGMOIDOSCOPY FLEXIBLE N/A 5/11/2018    Procedure: SIGMOIDOSCOPY FLEXIBLE;;  Surgeon: Felicitas Radford MD;  [facility]    SIGMOIDOSCOPY FLEXIBLE N/A 7/20/2018    Procedure: SIGMOIDOSCOPY FLEXIBLE;;  Surgeon: Felicitas Radford MD;  [facility]    SIGMOIDOSCOPY FLEXIBLE N/A 11/30/2018    Procedure: Flexible Sigmoidoscopy;  Surgeon: Felicitas Radford MD;  [facility]    SIGMOIDOSCOPY FLEXIBLE N/A 2/22/2019    Procedure: Intraoperative Flexible Sigmoidoscopy, Application of Formalin to rectum;  Surgeon: Felicitas Radford MD;  [facility]    SIGMOIDOSCOPY FLEXIBLE N/A 6/28/2019    Procedure: Flexible Sigmoidoscopy;  Surgeon: Felicitas Radford MD;  [facility]    SIGMOIDOSCOPY FLEXIBLE N/A 11/1/2019    Procedure: Flexible Sigmoidoscopy;  Surgeon: Felicitas Radford MD;  [facility]    SIGMOIDOSCOPY FLEXIBLE N/A 7/23/2021    Procedure: SIGMOIDOSCOPY, FLEXIBLE;  Surgeon: Felicitas Radford MD;  [facility]     SIGMOIDOSCOPY FLEXIBLE N/A 7/1/2022    Procedure: SIGMOIDOSCOPY, FLEXIBLE, EUA;  Surgeon: Felicitas Radford MD;  [facility]    TESTICLE SURGERY      VASCULAR SURGERY      Right chest port    VASECTOMY      VASECTOMY           SOCIAL HISTORY:  Social History     Socioeconomic History    Marital status:      Spouse name: Not on file    Number of children: 2    Years of education: Not on file    Highest education level: Not on file   Occupational History    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)      Social Determinants of Health     Financial Resource Strain: Not on file   Food Insecurity: Not on file   Transportation Needs: Not on file   Physical Activity: Not on file   Stress: Not on file   Social Connections: Not on file   Intimate Partner Violence: Not on file   Housing Stability: Not on file     He notes that he had a brother who passed away at a young age of 53 from a metastatic cancer, but unknown what type, and passed away within 2 months of diagnosis.  He denies other family history of cancer in the immediate family.  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      FAMILY HISTORY:  Family History   Problem Relation Age of Onset    Colon Polyps Mother         Number and type of polyps unknown    Chronic Obstructive Pulmonary Disease Father     Hypertension Father     Hyperlipidemia Father     Diabetes Maternal Grandfather     Macular Degeneration Paternal Grandmother     Hypertension Paternal  Grandmother     Hyperlipidemia Paternal Grandmother     Cancer Brother         primary of unknown origin    Other Cancer Brother     Family History Negative Brother         negative colonoscopy    Stomach Cancer Other 63        maternal great grandfather    Glaucoma No family hx of     Anesthesia Reaction No family hx of     Deep Vein Thrombosis (DVT) No family hx of          PHYSICAL EXAM:  Vital signs:  /76 (BP Location: Right arm, Patient Position: Sitting, Cuff Size: Adult Regular)   Pulse 75   Temp 97.6  F (36.4  C) (Oral)   Resp 20   Wt 99.3 kg (219 lb)   BMI 31.42 kg/m     ECO  GENERAL/CONSTITUTIONAL: No acute distress.  EYES: No scleral icterus.  NEUROLOGIC: Alert, oriented, answers questions appropriately.  INTEGUMENTARY: No jaundice.  GAIT: Steady, does not use assistive device          LABS:  Recent Labs   Lab Test 23  0940 23  1551 22  1403 21  1330 21  0840 21  0515 20  1530   NA  --  140 138 140  --  140 136   POTASSIUM  --  4.4 3.6 4.0  --  4.0 3.7   CHLORIDE  --  105 106 107  --  110* 104   CO2  --  25 25 26  --  25 27   ANIONGAP  --  10 7 7  --  5 4   BUN  --  22.9 16 17  --  19 16   CR  --  0.85 0.74 0.84  --  1.10 0.83   * 92 104* 97 104* 134* 112*   FRANDY  --  9.2 8.8 9.3  --  8.3* 9.1     Recent Labs   Lab Test 23  1551 17  0705   MAG 2.2 2.0     Recent Labs   Lab Test 23  1551 22  1403 21  1332 21  0515 20  1530   WBC 6.2 10.8 5.7 6.0 5.8   HGB 14.7 15.2 15.9 14.0 16.2    211 236 181 198   MCV 88 85 85 86 85   NEUTROPHIL 66 85 73 83.6 76.1     Recent Labs   Lab Test 23  1551 22  1403 21  1330   BILITOTAL 0.6 0.8 0.8   ALKPHOS 67 74 79   ALT 28 35 31   AST 26 21 19   ALBUMIN 4.4 4.0 4.2     No results found for: TSH  Recent Labs   Lab Test 21  1331 20  1530 20  0932 19  0828 19  1017   CEA 1.4 1.2 <0.5 1.5 1.7      Latest Reference Range &  Units 08/01/22 14:03 06/26/23 15:51   CEA ng/mL 1.8 1.8       Recent Labs   Lab Test 06/26/23  1551 04/19/22  1519 06/17/21  1831 08/13/20  1530 02/17/20  0932   PSA 3.62 5.23* 4.92* 4.13* 5.76*          Results for orders placed or performed in visit on 07/21/23   MR Rectum w/o & w Contrast    Narrative    EXAMINATION: MR RECTUM W/O & W CONTRAST, 7/21/2023 3:15 PM     COMPARISON: MRI 8/1/2022 and 7/27/2016    TECHNIQUE:  Images were acquired without and with intravenous contrast  through the pelvis. The following MR images were acquired without  contrast: multiplanar T1, multiplanar T2, axial diffusion-weighted and  axial apparent diffusion coefficient. T1-weighted images with fat  saturation were acquired at the following intervals relative to  intravenous contrast administration: pre-contrast, immediate post  contrast, 1 minute, 3 minutes and delayed.  Contrast dose: 10.0mL  Gadavist 1mg glucagon    HISTORY: History rectal cancer.    FINDINGS:    LOCATION/SIZE:  On prior exam dated 7/27/2016 there was a tumor identified in the  lower rectum. Previously this tumor measured 4.8 cm, and currently  there is no residual tumor present. Unchanged hypointense T2 signal  with associated smooth thin enhancement at the prior tumor site,  consistent with fibrosis.    TREATMENT RESPONSE:   Approximately 0% of the current mass demonstrates tumour signal  intensity, with the remainder appearing to represent fibrosis.  This  corresponds to a tumour response grade of 1.     EXTENT:  The tumor does not clearly involve the anal sphincters or levator ani  muscle.     CIRCUMFERENTIAL RESECTION MARGIN (CRM):  In terms of the resection margin, there is no involvement of the  mesorectal fascia.     LYMPH NODES:  No suspicious lymphadenopathy.    VEINS:  There is not evidence of extramural venous extension.    MISCELLANEOUS FINDINGS:  Bilateral fat-containing inguinal hernias, greater on the left.  Unchanged appearance of the prostate  with small focus of  susceptibility artifact in the left peripheral zone.      Impression    IMPRESSION:   1. There is continued complete response to treatment, with a  corresponding tumor regression grade of mrTRG-1.   2. No suspicious lymphadenopathy.     I have personally reviewed the examination and initial interpretation  and I agree with the findings.    CASA BARKER MD         SYSTEM ID:  [id number]     Narrative & Impression   Combined Report of: PET and CT on 7/21/2023 11:43 AM:     1. PET of the neck, chest, abdomen, and pelvis.  2. PET CT Fusion for Attenuation Correction and Anatomical  Localization.  3. Diagnostic CT of the chest, abdomen and pelvis with intravenous  contrast obtained for diagnostic interpretation.  4. 3D MIP and PET-CT fused images were processed on an independent  workstation and archived to PACS and reviewed by a radiologist.     Technique:     1. PET: The patient received 15.04 mCi of F-18-FDG. The serum glucose  was 100 md/dL prior to administration. Body weight was 100.6 kg.  Images were evaluated in the axial, sagittal, and coronal planes as  well as the rotational whole body MIP. Images were acquired from the  cranial vertex to the feet.     UPTAKE WAS MEASURED AT 60 MINUTES.      BACKGROUND: Liver SUV max = 3.28, Aorta Blood SUV Max = 2.38.      2. Diagnostic CT: CT with IV contrast. Volumetric acquisition for  clinical interpretation of the chest, abdomen, and pelvis acquired at  3 mm sections. The chest, abdomen, and pelvis were evaluated at 5 mm  sections in bone, soft tissue, and lung windows.     Contrast and Medications:  IV contrast: 125 mL of Isovue 370 intravenously.  PO contrast: Oral Breeza  Additional Medications: IV Lasix 40 mg     3. 3D MIP and PET-CT fused images were processed on an independent  workstation and archived to PACS and reviewed by a radiologist.     INDICATION: hx of rectal cancer on watch and wait protocol; Rectal  cancer (H).     ADDITIONAL  INFORMATION OBTAINED FROM EMR:  History rectal cancer treated with chemoradiation now on watch and  wait protocol with upcoming annual flexible sigmoidoscopy.     COMPARISON: PET/CT 8/1/2022     FINDINGS:      HEAD/NECK:  There is no suspicious FDG uptake in the neck.     Paranasal sinuses are unremarkable. Mastoid air cells are clear. No  appreciable abnormal intracranial FDG metabolism or any appreciable  intracranial abnormal enhancement.     CHEST:  There is no suspicious FDG uptake in the chest.     Comparison is normal in appearance. Heart size is within normal  limits. No pericardial effusion. Diameters of the main pulmonary  artery and thoracic aorta are not enlarged. No substantial coronary  artery or aortic atherosclerotic calcification. No enlarged or  hypermetabolic mediastinal, axillary, or hilar lymph nodes. Calcified  left hilar lymph node. Trace hiatal hernia.     The central tracheobronchial tree is clear. Unchanged moderately-sized  left lower lobe posterior calcified granuloma. No pneumothorax,  pleural effusion, focal consolidation or suspicious pulmonary  nodule/mass. Mild dependent atelectasis.     ABDOMEN AND PELVIS:  There is no suspicious FDG uptake in the abdomen or pelvis.     Unchanged fluid-attenuating scattered nonavid hepatic cysts. Normal  appearance of the gallbladder. Pancreas unremarkable. Spleen is within  normal limits. Numerous similar simple bilateral renal cortical cysts.  No hydronephrosis. Normal symmetric bilateral renal cortical  enhancement. Decompressed bladder. Normal caliber of the small and  large bowel with prior appendectomy. No focal adrenal nodule/mass. No  significant pelvic fluid. Prostatomegaly (4.9 cm transverse axial  dimension), with mild scattered internal calcification. Pelvic  phleboliths. Small fat-containing umbilical hernia. Mild aortoiliac  atherosclerotic calcification.      Post-Lasix evaluation demonstrates no suspicious pelvic  lymphadenopathy or  pelvic disease.     LOWER EXTREMITIES:   There is no suspicious FDG uptake in the visualized lower extremities.     BONES:   There is no suspicious FDG uptake in the skeleton. There are no  suspicious lytic or blastic osseous lesions. Degenerative changes  visualized spine and appendicular joints, mild scattered periarticular  degenerative uptake.                                                                      IMPRESSION:   In this patient with history of rectal cancer:     1. No evidence of recurrent or metastatic disease.     2. Prostatomegaly with background level diffuse prostatic uptake and  prostatic calcification, which can be seen with a degree of chronic prostatitis.      I have personally reviewed the examination and initial interpretation  and I agree with the findings.     MARIBEL JOSE MD         SYSTEM ID:  [id number]           ASSESSMENT/PLAN:  Louie Greco is a 63 year old  male with:    1) Rectal cancer: clinical stage [id number] (stage IIIA), s/p chemoradiation with Xeloda, and appears to have achieved complete response on imaging and biopsies.  He opted not to undergo surgery and expressed desire not to undergo APR, as he does not want a colostomy bag.  It was felt reasonable to go on the watch and wait protocol with the [facility].  He has completed 4 additional months (8 cycles) of chemotherapy with FOLFOX.  He will now go on surveillance, as per the watch and wait protocol.    I have reviewed all of the labs done prior to this clinic visit.  Labs are all completely normal including electrolytes, renal function, hepatic panel, complete blood count and differential.  He has abnormal lipid panel with elevated LDL, low HDL. He has been started on 20 mg daily dose of Lipitor.     We reviewed MRI pelvis and PET scan from 7/21/23. I have reviewed actual images from his recent scans and there is nothing concerning for recurrent metastatic disease or recurrence. He has flex sig with   Malina tomorrow.         He has achieved complete response.  There is no evidence of recurrence or metastatic disease.      Follow up per Watch and Wait Protocol:   Completed CRT: 9/15/16  Flexible sigmoidoscopy   Every 2 months for 6 months: Completed  Every 3 months for 3 years: Completed  Every 6 months for 5 years: Until 8/2022-completed  Every year for 10 years: Until 9/2026     3T MRI of pelvis  6-8 weeks after CRT:  Every 4 months for 2 years: Completed  Every 6 months for 2 years: Completed  Yearly for 2 years: Until 9/2022 - completed     CT CAP  Every year for 6-8 years: Until 9/2024- next PET due August 2023 - ordered     Colonoscopy   Last 12/13/17- last done in October 2020     CEA at every clinic or endoscopic visit      -T3 MRI pelvis and PET scan in 1 year, which would be in August 2023 - ordered  -endoscopic surveillance with Dr. Radford as per protocol  -RTC in 1 year with labs/CEA and results of imaging    2) Genetics: He underwent genetic testing, which was negative.    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            4) Elevated bilirubin: mild.  Bilirubin has been mildly elevated in the past. It is stable and normal this time.   -monitor for now    5) Liver cysts: seen on CT, stable  -monitor    6) Pulmonary nodules: stable sub-4 mm pulmonary nodules  -monitor on future scans    7) Kidney cyst: stable  -monitor on future scans.      8) Appendix polyp: He is now s/p appendectomy.  Pathology found sessile serrated adenoma without dysplasia or malignancy.     9) Elevated PSA:   -on observation; PSA normal at this visit. Was elevated in the past likely from prostatitis  -he is seeing urology - Dr. Pat    I will see him in year with labs and scans prior to visit. It would be last visit in Medical Oncology for his colon cancer. He is over years out from treatment completion.       45 minutes spent on the date of the encounter doing chart review, history and exam, documentation and  further activities as noted above     Remi Mcguire    Hematologist and Medical Oncologist  [facility]

## 2023-07-27 NOTE — PROGRESS NOTES
"Oncology Rooming Note    July 27, 2023 10:13 AM   Louie Greco is a 63 year old male who presents for:    Chief Complaint   Patient presents with    Oncology Clinic Visit     Rectal Cancer, S/P Rad & Chemo 2017 -- no recurrence       Initial Vitals: /76 (BP Location: Right arm, Patient Position: Sitting, Cuff Size: Adult Regular)   Pulse 75   Temp 97.6  F (36.4  C) (Oral)   Resp 20   Wt 99.3 kg (219 lb)   SpO2 95%   BMI 31.42 kg/m   Estimated body mass index is 31.42 kg/m  as calculated from the following:    Height as of 6/26/23: 1.778 m (5' 10\").    Weight as of this encounter: 99.3 kg (219 lb). Body surface area is 2.21 meters squared.  No Pain (0) Comment: Data Unavailable   No LMP for male patient.  Allergies reviewed: Yes  Medications reviewed: Yes    Medications: Medication refills not needed today.  Pharmacy name entered into [facility]:    [facility], [place] - [address], [address]  [facility], [place] - [address] [facility]    Clinical concerns: no      Rossana Xie CMA              " MVC

## 2023-07-27 NOTE — LETTER
7/27/2023         RE: Louie Greco  [address]  [facility] [address]        Dear Colleague,    Thank you for referring your patient, Louie Greco, to the [facility]. Please see a copy of my visit note below.    [facility]    Hematology/Oncology Established Patient Note      Today's Date: Jul 27, 2023     Reason for Follow-up: rectal cancer      HISTORY OF PRESENT ILLNESS: Louie Greco is a 61 year old male who presents with rectal cancer.  He started having rectal bleeding around beginning of 2016.  He underwent colonoscopy on 7/27/16, which showed a malignant tumor in the rectum involving half of the lumen with oozing, as well as three 4-mm polyps in the ascending and transverse colon.  Pathology showed moderately differentiated adenocarcinoma, ascending colon with sessile serrated adenoma, others were tubular adenomas.  Mismatch repair expression with normal protein expression.  Staging scans showed the rectal mass, indeterminate focus in the left liver thought to be cyst, and multiple bilateral renal cysts.  MRI showed a distal rectal mass measuring 2.7 x 2.4 cm extending to the most proximal region of the anal canal.  There are a few tiny subcentimeter perirectal fat lymph nodes.  He was staged clinically [id number] (stage IIIA).  He was seen by Dr. Lee at [facility], and started neoadjuvant chemoradiation with capecitabine on 8/8/16 and completed on 9/15/16.  He was then seen by Dr. Radford, colorectal surgery at [facility].  His post chemoradiation MRI showed improvement in the size of the tumor and response in the lymph nodes.  On 12/8/16, he underwent flexible sigmoidoscopy and there was no evidence of tumor.  There was only some anterior scarring in the lumen.  Multiple biopsies were taken, which showed no evidence of carcinoma.  At that point, Dr. Radford spoke with the patient's wife, that there appears to be a  complete response in the lumen, and that the patient would wish to placed on the watch and wait protocol.  The patient had expressed wishes not to have an APR, and it would not have been possible to grossly clear a margin for LAR.    He had port placed on 1/16/17.  It has been removed.    He completed FOLFOX x 8 cycles 1/16/17-5/9/17.      He was admitted 7/16/17-7/20/17 with sepsis, abdominal wall cellulitis.  He underwent surgery with laparoscopic abdominal exploration and appendectomy.  Pathology found sessile serrated adenoma without dysplasia or malignancy.        INTERIM HISTORY: Louie is doing well.      He was previously followed by my colleague - Dr. Agueda Manley. He has transferred care to me now that Dr. Manley has left our practice.  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      He had a basal cell carcinoma removed from left upper lip. His wife is undergoing treatment for uterine cancer with Dr. Cleaning.     REVIEW OF SYSTEMS:   14 point ROS was reviewed and is negative other than as noted above in HPI.       HOME MEDICATIONS:   Current Outpatient Medications   Medication Sig     atorvastatin (LIPITOR) 40 MG tablet Take 0.5 tablets (20 mg) by mouth daily (Patient taking differently: Take 20 mg by mouth every evening)     Probiotic Product (PROBIOTIC PO) Take 1 capsule by mouth as needed     sildenafil (VIAGRA) 100 MG tablet Take 1 tablet (100 mg) by mouth daily as needed     No current facility-administered medications for this visit.        ALLERGIES:  Allergies   Allergen Reactions     Ampicillin Diarrhea     Demerol [Meperidine] Nausea         PAST MEDICAL HISTORY:  Past Medical History:   Diagnosis Date     Childhood asthma      Elevated prostate specific antigen (PSA)     No biopsy done (had rectal cancer at the time)     Epididymitis, bilateral     18 years old     Inguinal hernia      Kidney stone on left side 04/22/2021    Added automatically from request for surgery [id number]     Mumps       Nephrolithiasis     Several -- 1990 first,recurrence 2019, 2021     Rectal cancer (H) 2017    low rectal cancer, S/P Rad adn Chemo, no surg needed     Right 4 mm Kidney stone at [facility]  02/28/2021     Right ureteral stone 03/11/2021    Added automatically from request for surgery [id number]     Fannie          PAST SURGICAL HISTORY:  Past Surgical History:   Procedure Laterality Date     COLONOSCOPY N/A 7/27/2016    Procedure: COMBINED COLONOSCOPY, SINGLE OR MULTIPLE BIOPSY/POLYPECTOMY BY BIOPSY;  Surgeon: Chelsea Thompson MD;  [facility]     COLONOSCOPY N/A 9/13/2017    Procedure: COLONOSCOPY;;  Surgeon: Felicitas Radford MD;  [facility]     COLONOSCOPY N/A 12/13/2017    Procedure: COLONOSCOPY;;  Surgeon: Felicitas Radford MD;  [facility]     COLONOSCOPY N/A 10/23/2020    Procedure: COLONOSCOPY;  Surgeon: Felicitas Radford MD;  [facility]     COMBINED CYSTOSCOPY, RETROGRADES, URETEROSCOPY, LASER HOLMIUM LITHOTRIPSY URETER(S), INSERT STENT Left 2/16/2018    Procedure: COMBINED CYSTOSCOPY, RETROGRADES, URETEROSCOPY, LASER HOLMIUM LITHOTRIPSY URETER(S), INSERT STENT;  Cysto, left ureteroscopy, holmium laser, retrogrades, stent placement;  Surgeon: Bola Worthy MD;  [facility]     COMBINED CYSTOSCOPY, RETROGRADES, URETEROSCOPY, LASER HOLMIUM LITHOTRIPSY URETER(S), INSERT STENT Right 3/22/2021    Procedure: CYSTOSCOPY WITH RIGHT RETROGRADE PYELOGRAM, RIGHT URETEROSCOPY WITH LASER LITHOTRIPSY AND BASKET REMOVAL OF STONE, RIGHT URETERAL STENT PLACEMENT;  Surgeon: Mohsen Pat MD;  [facility]     COMBINED CYSTOSCOPY, RETROGRADES, URETEROSCOPY, LASER HOLMIUM LITHOTRIPSY URETER(S), INSERT STENT Left 5/19/2021    Procedure: CYSTOSCOPY, LEFT RETROGRADE PYELOGRAM, LEFT DIAGNOSTIC, URETEROSCOPY, LEFT URETERAL STENT PLACEMENT;  Surgeon: Mohsen Pat MD;  [facility]     COMBINED CYSTOSCOPY, RETROGRADES, URETEROSCOPY, LASER HOLMIUM LITHOTRIPSY  URETER(S), INSERT STENT Left 6/23/2021    Procedure: CYSTOSCOPY, LEFT URETERAL STENT REMOVAL, LEFT RETROGRADE PYELOGRAM, LEFT URETEROSCOPY WITH LASER LITHOTRIPSY AND BASKET REMOVAL OF STONES, LEFT URETERAL STENT PLACEMENT;  Surgeon: Mohsen Pat MD;  [facility]     CYSTOSCOPY, LITHOLAPAXY, COMBINED N/A 3/1/2021    Procedure: Cystoscopy, litholapaxy, combined;  Surgeon: Mohsen Pat MD;  [facility]     CYSTOSCOPY, RETROGRADES, INSERT STENT URETER(S), COMBINED Right 3/1/2021    Procedure: Cystoscopy, retrogrades, insert stent ureter(s), combined;  Surgeon: Mohsen Pat MD;  [facility]     EXAM UNDER ANESTHESIA ANUS N/A 7/5/2017    Procedure: EXAM UNDER ANESTHESIA ANUS;  Examination Under Anesthesia, flex sigmoidoscopy with biopsies and formalin application;  Surgeon: Felicitas Radford MD;  [facility]     EXAM UNDER ANESTHESIA ANUS N/A 9/13/2017    Procedure: EXAM UNDER ANESTHESIA ANUS;  Examination Under Anesthesia Anus, Colonoscopy, application of formalin;  Surgeon: Felicitas Radford MD;  [facility]     EXAM UNDER ANESTHESIA ANUS N/A 12/13/2017    Procedure: EXAM UNDER ANESTHESIA ANUS;  Examination Under Anesthesia Anus, Colonoscopy;  Surgeon: Felicitas Radford MD;  [facility]     EXAM UNDER ANESTHESIA ANUS N/A 5/11/2018    Procedure: EXAM UNDER ANESTHESIA ANUS;  Examination Under Anesthesia Anus, Interoperative Flexible Sigmoidoscopy, Application of Formalin;  Surgeon: Felicitas Radford MD;  [facility]     EXAM UNDER ANESTHESIA ANUS N/A 7/20/2018    Procedure: EXAM UNDER ANESTHESIA ANUS;  Examination Under Anesthesia Anus, Flexible Sigmoidoscopy ;  Surgeon: Felicitas Radford MD;  [facility]     EXAM UNDER ANESTHESIA ANUS N/A 11/30/2018    Procedure: Examination Under Anesthesia, application of formalin to rectum, polypectomy;  Surgeon: Felicitas Radford MD;  [facility]     EXAM UNDER ANESTHESIA ANUS N/A  2/22/2019    Procedure: Examination Under Anesthesia;  Surgeon: Felicitas Radford MD;  [facility]     EXAM UNDER ANESTHESIA ANUS N/A 6/28/2019    Procedure: Examination Under Anesthesia;  Surgeon: Felicitas Radford MD;  [facility]     EXAM UNDER ANESTHESIA ANUS N/A 11/1/2019    Procedure: Examination Under Anesthesia;  Surgeon: Felicitas Radford MD;  [facility]     EXAM UNDER ANESTHESIA RECTUM N/A 10/23/2020    Procedure: Exam under anesthesia rectum;  Surgeon: Felicitas Radford MD;  [facility]     HC TOOTH EXTRACTION W/FORCEP       LAPAROSCOPIC APPENDECTOMY N/A 7/17/2017    Procedure: LAPAROSCOPIC APPENDECTOMY;  LAPAROSCOPIC APPENDECTOMY;  Surgeon: Bryson Ferguson MD;  [facility]     LASER HOLMIUM LITHOTRIPSY URETER(S), INSERT STENT, COMBINED Right 3/1/2021    Procedure: CYSTOURETEROSCOPY,  URETERAL STENT INSERTION, RIGHT RETROGRADE;  Surgeon: Mohsen Pat MD;  [facility]     SIGMOIDOSCOPY FLEXIBLE N/A 12/8/2016    Procedure: SIGMOIDOSCOPY FLEXIBLE;  Surgeon: Felicitas Radford MD;  [facility]     SIGMOIDOSCOPY FLEXIBLE N/A 7/5/2017    Procedure: SIGMOIDOSCOPY FLEXIBLE;;  Surgeon: Felicitas Radford MD;  [facility]     SIGMOIDOSCOPY FLEXIBLE N/A 5/11/2018    Procedure: SIGMOIDOSCOPY FLEXIBLE;;  Surgeon: Felicitas Radford MD;  [facility]     SIGMOIDOSCOPY FLEXIBLE N/A 7/20/2018    Procedure: SIGMOIDOSCOPY FLEXIBLE;;  Surgeon: Felicitas Radford MD;  [facility]     SIGMOIDOSCOPY FLEXIBLE N/A 11/30/2018    Procedure: Flexible Sigmoidoscopy;  Surgeon: Felicitas Radford MD;  [facility]     SIGMOIDOSCOPY FLEXIBLE N/A 2/22/2019    Procedure: Intraoperative Flexible Sigmoidoscopy, Application of Formalin to rectum;  Surgeon: Felicitas Radford MD;  [facility]     SIGMOIDOSCOPY FLEXIBLE N/A 6/28/2019    Procedure: Flexible Sigmoidoscopy;  Surgeon: Felicitas Radford  MD;  [facility]     SIGMOIDOSCOPY FLEXIBLE N/A 11/1/2019    Procedure: Flexible Sigmoidoscopy;  Surgeon: Felicitas Radford MD;  [facility]     SIGMOIDOSCOPY FLEXIBLE N/A 7/23/2021    Procedure: SIGMOIDOSCOPY, FLEXIBLE;  Surgeon: Felicitas Radford MD;  [facility]     SIGMOIDOSCOPY FLEXIBLE N/A 7/1/2022    Procedure: SIGMOIDOSCOPY, FLEXIBLE, EUA;  Surgeon: Felicitas Radford MD;  [facility]     TESTICLE SURGERY       VASCULAR SURGERY      Right chest port     VASECTOMY       VASECTOMY           SOCIAL HISTORY:  Social History     Socioeconomic History     Marital status:      Spouse name: Not on file     Number of children: 2     Years of education: Not on file     Highest education level: Not on file   Occupational History     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)      Social Determinants of Health     Financial Resource Strain: Not on file   Food Insecurity: Not on file   Transportation Needs: Not on file   Physical Activity: Not on file   Stress: Not on file   Social Connections: Not on file   Intimate Partner Violence: Not on file   Housing Stability: Not on file     He notes that he had a brother who passed away at a young age of 53 from a metastatic cancer, but unknown what type, and passed away within 2 months of diagnosis.  He denies other family history of cancer in the immediate family.  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      FAMILY  HISTORY:  Family History   Problem Relation Age of Onset     Colon Polyps Mother         Number and type of polyps unknown     Chronic Obstructive Pulmonary Disease Father      Hypertension Father      Hyperlipidemia Father      Diabetes Maternal Grandfather      Macular Degeneration Paternal Grandmother      Hypertension Paternal Grandmother      Hyperlipidemia Paternal Grandmother      Cancer Brother         primary of unknown origin     Other Cancer Brother      Family History Negative Brother         negative colonoscopy     Stomach Cancer Other 63        maternal great grandfather     Glaucoma No family hx of      Anesthesia Reaction No family hx of      Deep Vein Thrombosis (DVT) No family hx of          PHYSICAL EXAM:  Vital signs:  /76 (BP Location: Right arm, Patient Position: Sitting, Cuff Size: Adult Regular)   Pulse 75   Temp 97.6  F (36.4  C) (Oral)   Resp 20   Wt 99.3 kg (219 lb)   BMI 31.42 kg/m     ECO  GENERAL/CONSTITUTIONAL: No acute distress.  EYES: No scleral icterus.  NEUROLOGIC: Alert, oriented, answers questions appropriately.  INTEGUMENTARY: No jaundice.  GAIT: Steady, does not use assistive device          LABS:  Recent Labs   Lab Test 23  0940 23  1551 22  1403 21  1330 21  0840 21  0515 20  1530   NA  --  140 138 140  --  140 136   POTASSIUM  --  4.4 3.6 4.0  --  4.0 3.7   CHLORIDE  --  105 106 107  --  110* 104   CO2  --  25 25 26  --  25 27   ANIONGAP  --  10 7 7  --  5 4   BUN  --  22.9 16 17  --  19 16   CR  --  0.85 0.74 0.84  --  1.10 0.83   * 92 104* 97 104* 134* 112*   FRANDY  --  9.2 8.8 9.3  --  8.3* 9.1     Recent Labs   Lab Test 23  1551 17  0705   MAG 2.2 2.0     Recent Labs   Lab Test 23  1551 22  1403 21  1332 21  0515 20  1530   WBC 6.2 10.8 5.7 6.0 5.8   HGB 14.7 15.2 15.9 14.0 16.2    211 236 181 198   MCV 88 85 85 86 85   NEUTROPHIL 66 85 73 83.6 76.1     Recent  Labs   Lab Test 06/26/23  1551 08/01/22  1403 07/29/21  1330   BILITOTAL 0.6 0.8 0.8   ALKPHOS 67 74 79   ALT 28 35 31   AST 26 21 19   ALBUMIN 4.4 4.0 4.2     No results found for: TSH  Recent Labs   Lab Test 07/29/21  1331 08/13/20  1530 02/17/20  0932 06/24/19  0828 01/04/19  1017   CEA 1.4 1.2 <0.5 1.5 1.7      Latest Reference Range & Units 08/01/22 14:03 06/26/23 15:51   CEA ng/mL 1.8 1.8       Recent Labs   Lab Test 06/26/23  1551 04/19/22  1519 06/17/21  1831 08/13/20  1530 02/17/20  0932   PSA 3.62 5.23* 4.92* 4.13* 5.76*          Results for orders placed or performed in visit on 07/21/23   MR Rectum w/o & w Contrast    Narrative    EXAMINATION: MR RECTUM W/O & W CONTRAST, 7/21/2023 3:15 PM     COMPARISON: MRI 8/1/2022 and 7/27/2016    TECHNIQUE:  Images were acquired without and with intravenous contrast  through the pelvis. The following MR images were acquired without  contrast: multiplanar T1, multiplanar T2, axial diffusion-weighted and  axial apparent diffusion coefficient. T1-weighted images with fat  saturation were acquired at the following intervals relative to  intravenous contrast administration: pre-contrast, immediate post  contrast, 1 minute, 3 minutes and delayed.  Contrast dose: 10.0mL  Gadavist 1mg glucagon    HISTORY: History rectal cancer.    FINDINGS:    LOCATION/SIZE:  On prior exam dated 7/27/2016 there was a tumor identified in the  lower rectum. Previously this tumor measured 4.8 cm, and currently  there is no residual tumor present. Unchanged hypointense T2 signal  with associated smooth thin enhancement at the prior tumor site,  consistent with fibrosis.    TREATMENT RESPONSE:   Approximately 0% of the current mass demonstrates tumour signal  intensity, with the remainder appearing to represent fibrosis.  This  corresponds to a tumour response grade of 1.     EXTENT:  The tumor does not clearly involve the anal sphincters or levator ani  muscle.     CIRCUMFERENTIAL RESECTION  MARGIN (CRM):  In terms of the resection margin, there is no involvement of the  mesorectal fascia.     LYMPH NODES:  No suspicious lymphadenopathy.    VEINS:  There is not evidence of extramural venous extension.    MISCELLANEOUS FINDINGS:  Bilateral fat-containing inguinal hernias, greater on the left.  Unchanged appearance of the prostate with small focus of  susceptibility artifact in the left peripheral zone.      Impression    IMPRESSION:   1. There is continued complete response to treatment, with a  corresponding tumor regression grade of mrTRG-1.   2. No suspicious lymphadenopathy.     I have personally reviewed the examination and initial interpretation  and I agree with the findings.    CASA BARKER MD         SYSTEM ID:  [id number]     Narrative & Impression   Combined Report of: PET and CT on 7/21/2023 11:43 AM:     1. PET of the neck, chest, abdomen, and pelvis.  2. PET CT Fusion for Attenuation Correction and Anatomical  Localization.  3. Diagnostic CT of the chest, abdomen and pelvis with intravenous  contrast obtained for diagnostic interpretation.  4. 3D MIP and PET-CT fused images were processed on an independent  workstation and archived to PACS and reviewed by a radiologist.     Technique:     1. PET: The patient received 15.04 mCi of F-18-FDG. The serum glucose  was 100 md/dL prior to administration. Body weight was 100.6 kg.  Images were evaluated in the axial, sagittal, and coronal planes as  well as the rotational whole body MIP. Images were acquired from the  cranial vertex to the feet.     UPTAKE WAS MEASURED AT 60 MINUTES.      BACKGROUND: Liver SUV max = 3.28, Aorta Blood SUV Max = 2.38.      2. Diagnostic CT: CT with IV contrast. Volumetric acquisition for  clinical interpretation of the chest, abdomen, and pelvis acquired at  3 mm sections. The chest, abdomen, and pelvis were evaluated at 5 mm  sections in bone, soft tissue, and lung windows.     Contrast and Medications:  IV  contrast: 125 mL of Isovue 370 intravenously.  PO contrast: Oral Breeza  Additional Medications: IV Lasix 40 mg     3. 3D MIP and PET-CT fused images were processed on an independent  workstation and archived to PACS and reviewed by a radiologist.     INDICATION: hx of rectal cancer on watch and wait protocol; Rectal  cancer (H).     ADDITIONAL INFORMATION OBTAINED FROM EMR:  History rectal cancer treated with chemoradiation now on watch and  wait protocol with upcoming annual flexible sigmoidoscopy.     COMPARISON: PET/CT 8/1/2022     FINDINGS:      HEAD/NECK:  There is no suspicious FDG uptake in the neck.     Paranasal sinuses are unremarkable. Mastoid air cells are clear. No  appreciable abnormal intracranial FDG metabolism or any appreciable  intracranial abnormal enhancement.     CHEST:  There is no suspicious FDG uptake in the chest.     Comparison is normal in appearance. Heart size is within normal  limits. No pericardial effusion. Diameters of the main pulmonary  artery and thoracic aorta are not enlarged. No substantial coronary  artery or aortic atherosclerotic calcification. No enlarged or  hypermetabolic mediastinal, axillary, or hilar lymph nodes. Calcified  left hilar lymph node. Trace hiatal hernia.     The central tracheobronchial tree is clear. Unchanged moderately-sized  left lower lobe posterior calcified granuloma. No pneumothorax,  pleural effusion, focal consolidation or suspicious pulmonary  nodule/mass. Mild dependent atelectasis.     ABDOMEN AND PELVIS:  There is no suspicious FDG uptake in the abdomen or pelvis.     Unchanged fluid-attenuating scattered nonavid hepatic cysts. Normal  appearance of the gallbladder. Pancreas unremarkable. Spleen is within  normal limits. Numerous similar simple bilateral renal cortical cysts.  No hydronephrosis. Normal symmetric bilateral renal cortical  enhancement. Decompressed bladder. Normal caliber of the small and  large bowel with prior appendectomy.  No focal adrenal nodule/mass. No  significant pelvic fluid. Prostatomegaly (4.9 cm transverse axial  dimension), with mild scattered internal calcification. Pelvic  phleboliths. Small fat-containing umbilical hernia. Mild aortoiliac  atherosclerotic calcification.      Post-Lasix evaluation demonstrates no suspicious pelvic  lymphadenopathy or pelvic disease.     LOWER EXTREMITIES:   There is no suspicious FDG uptake in the visualized lower extremities.     BONES:   There is no suspicious FDG uptake in the skeleton. There are no  suspicious lytic or blastic osseous lesions. Degenerative changes  visualized spine and appendicular joints, mild scattered periarticular  degenerative uptake.                                                                      IMPRESSION:   In this patient with history of rectal cancer:     1. No evidence of recurrent or metastatic disease.     2. Prostatomegaly with background level diffuse prostatic uptake and  prostatic calcification, which can be seen with a degree of chronic prostatitis.      I have personally reviewed the examination and initial interpretation  and I agree with the findings.     MARIBEL JOSE MD         SYSTEM ID:  [id number]           ASSESSMENT/PLAN:  Louie Greco is a 63 year old  male with:    1) Rectal cancer: clinical stage [id number] (stage IIIA), s/p chemoradiation with Xeloda, and appears to have achieved complete response on imaging and biopsies.  He opted not to undergo surgery and expressed desire not to undergo APR, as he does not want a colostomy bag.  It was felt reasonable to go on the watch and wait protocol with the [facility].  He has completed 4 additional months (8 cycles) of chemotherapy with FOLFOX.  He will now go on surveillance, as per the watch and wait protocol.    I have reviewed all of the labs done prior to this clinic visit.  Labs are all completely normal including electrolytes, renal function, hepatic panel, complete  blood count and differential.  He has abnormal lipid panel with elevated LDL, low HDL. He has been started on 20 mg daily dose of Lipitor.     We reviewed MRI pelvis and PET scan from 7/21/23. I have reviewed actual images from his recent scans and there is nothing concerning for recurrent metastatic disease or recurrence. He has flex sig with Dr. Radford tomorrow.         He has achieved complete response.  There is no evidence of recurrence or metastatic disease.      Follow up per Watch and Wait Protocol:   Completed CRT: 9/15/16  Flexible sigmoidoscopy   Every 2 months for 6 months: Completed  Every 3 months for 3 years: Completed  Every 6 months for 5 years: Until 8/2022-completed  Every year for 10 years: Until 9/2026     3T MRI of pelvis  6-8 weeks after CRT:  Every 4 months for 2 years: Completed  Every 6 months for 2 years: Completed  Yearly for 2 years: Until 9/2022 - completed     CT CAP  Every year for 6-8 years: Until 9/2024- next PET due August 2023 - ordered     Colonoscopy   Last 12/13/17- last done in October 2020     CEA at every clinic or endoscopic visit      -T3 MRI pelvis and PET scan in 1 year, which would be in August 2023 - ordered  -endoscopic surveillance with Dr. Radford as per protocol  -RTC in 1 year with labs/CEA and results of imaging    2) Genetics: He underwent genetic testing, which was negative.    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            4) Elevated bilirubin: mild.  Bilirubin has been mildly elevated in the past. It is stable and normal this time.   -monitor for now    5) Liver cysts: seen on CT, stable  -monitor    6) Pulmonary nodules: stable sub-4 mm pulmonary nodules  -monitor on future scans    7) Kidney cyst: stable  -monitor on future scans.      8) Appendix polyp: He is now s/p appendectomy.  Pathology found sessile serrated adenoma without dysplasia or malignancy.     9) Elevated PSA:   -on observation; PSA normal at this visit. Was elevated in the  "past likely from prostatitis  -he is seeing urology - Dr. Pat    I will see him in year with labs and scans prior to visit. It would be last visit in Medical Oncology for his colon cancer. He is over years out from treatment completion.       45 minutes spent on the date of the encounter doing chart review, history and exam, documentation and further activities as noted above     Remi Mcguire    Hematologist and Medical Oncologist  [facility]         Oncology Rooming Note    July 27, 2023 10:13 AM   Louie Greco is a 63 year old male who presents for:    Chief Complaint   Patient presents with     Oncology Clinic Visit     Rectal Cancer, S/P Rad & Chemo 2017 -- no recurrence       Initial Vitals: /76 (BP Location: Right arm, Patient Position: Sitting, Cuff Size: Adult Regular)   Pulse 75   Temp 97.6  F (36.4  C) (Oral)   Resp 20   Wt 99.3 kg (219 lb)   SpO2 95%   BMI 31.42 kg/m   Estimated body mass index is 31.42 kg/m  as calculated from the following:    Height as of 6/26/23: 1.778 m (5' 10\").    Weight as of this encounter: 99.3 kg (219 lb). Body surface area is 2.21 meters squared.  No Pain (0) Comment: Data Unavailable   No LMP for male patient.  Allergies reviewed: Yes  Medications reviewed: Yes    Medications: Medication refills not needed today.  Pharmacy name entered into [facility]:    [facility], [place] - [address], [address]  [facility], [place] - [address] [facility] [address]    Clinical concerns: no      Rossana Xei CMA                Again, thank you for allowing me to participate in the care of your patient.        Sincerely,        Remi Mcguire MD  "

## 2023-07-28 ENCOUNTER — ANESTHESIA (OUTPATIENT)
Dept: ANESTHESIOLOGY | Facility: AMBULATORY SURGERY CENTER | Age: 63
End: 2023-07-28

## 2023-07-28 ENCOUNTER — HOSPITAL ENCOUNTER (OUTPATIENT)
Facility: AMBULATORY SURGERY CENTER | Age: 63
Discharge: HOME | End: 2023-07-28
Attending: COLON & RECTAL SURGERY | Admitting: COLON & RECTAL SURGERY
Payer: COMMERCIAL

## 2023-07-28 VITALS
RESPIRATION RATE: 15 BRPM | HEIGHT: 70 IN | BODY MASS INDEX: 31.35 KG/M2 | DIASTOLIC BLOOD PRESSURE: 65 MMHG | WEIGHT: 219 LBS | OXYGEN SATURATION: 93 % | SYSTOLIC BLOOD PRESSURE: 118 MMHG | TEMPERATURE: 97 F | HEART RATE: 70 BPM

## 2023-07-28 PROCEDURE — 45330 DIAGNOSTIC SIGMOIDOSCOPY: CPT | Mod: 79,33

## 2023-07-28 PROCEDURE — 45330 DIAGNOSTIC SIGMOIDOSCOPY: CPT | Mod: 79 | Performed by: COLON & RECTAL SURGERY

## 2023-07-28 RX ORDER — SODIUM CHLORIDE, SODIUM LACTATE, POTASSIUM CHLORIDE, CALCIUM CHLORIDE 600; 310; 30; 20 MG/100ML; MG/100ML; MG/100ML; MG/100ML
INJECTION, SOLUTION INTRAVENOUS CONTINUOUS
Status: DISCONTINUED | OUTPATIENT
Start: 2023-07-28 | End: 2023-07-29 | Stop reason: HOSPADM

## 2023-07-28 RX ORDER — HYDROMORPHONE HYDROCHLORIDE 1 MG/ML
0.2 INJECTION, SOLUTION INTRAMUSCULAR; INTRAVENOUS; SUBCUTANEOUS EVERY 5 MIN PRN
Status: DISCONTINUED | OUTPATIENT
Start: 2023-07-28 | End: 2023-07-29 | Stop reason: HOSPADM

## 2023-07-28 RX ORDER — PROPOFOL 10 MG/ML
INJECTION, EMULSION INTRAVENOUS CONTINUOUS PRN
Status: DISCONTINUED | OUTPATIENT
Start: 2023-07-28 | End: 2023-07-28

## 2023-07-28 RX ORDER — ALBUTEROL SULFATE 0.83 MG/ML
2.5 SOLUTION RESPIRATORY (INHALATION) EVERY 4 HOURS PRN
Status: DISCONTINUED | OUTPATIENT
Start: 2023-07-28 | End: 2023-07-29 | Stop reason: HOSPADM

## 2023-07-28 RX ORDER — LABETALOL HYDROCHLORIDE 5 MG/ML
10 INJECTION, SOLUTION INTRAVENOUS
Status: DISCONTINUED | OUTPATIENT
Start: 2023-07-28 | End: 2023-07-29 | Stop reason: HOSPADM

## 2023-07-28 RX ORDER — FENTANYL CITRATE 50 UG/ML
50 INJECTION, SOLUTION INTRAMUSCULAR; INTRAVENOUS EVERY 5 MIN PRN
Status: DISCONTINUED | OUTPATIENT
Start: 2023-07-28 | End: 2023-07-29 | Stop reason: HOSPADM

## 2023-07-28 RX ORDER — MEPERIDINE HYDROCHLORIDE 25 MG/ML
12.5 INJECTION INTRAMUSCULAR; INTRAVENOUS; SUBCUTANEOUS EVERY 5 MIN PRN
Status: DISCONTINUED | OUTPATIENT
Start: 2023-07-28 | End: 2023-07-29 | Stop reason: HOSPADM

## 2023-07-28 RX ORDER — LIDOCAINE HYDROCHLORIDE 20 MG/ML
INJECTION, SOLUTION INFILTRATION; PERINEURAL PRN
Status: DISCONTINUED | OUTPATIENT
Start: 2023-07-28 | End: 2023-07-28

## 2023-07-28 RX ORDER — ACETAMINOPHEN 325 MG/1
975 TABLET ORAL ONCE
Status: DISCONTINUED | OUTPATIENT
Start: 2023-07-28 | End: 2023-07-29 | Stop reason: HOSPADM

## 2023-07-28 RX ORDER — HALOPERIDOL 5 MG/ML
1 INJECTION INTRAMUSCULAR
Status: DISCONTINUED | OUTPATIENT
Start: 2023-07-28 | End: 2023-07-29 | Stop reason: HOSPADM

## 2023-07-28 RX ORDER — ONDANSETRON 2 MG/ML
INJECTION INTRAMUSCULAR; INTRAVENOUS PRN
Status: DISCONTINUED | OUTPATIENT
Start: 2023-07-28 | End: 2023-07-28

## 2023-07-28 RX ORDER — KETOROLAC TROMETHAMINE 30 MG/ML
15 INJECTION, SOLUTION INTRAMUSCULAR; INTRAVENOUS
Status: DISCONTINUED | OUTPATIENT
Start: 2023-07-28 | End: 2023-07-29 | Stop reason: HOSPADM

## 2023-07-28 RX ORDER — FENTANYL CITRATE 50 UG/ML
25 INJECTION, SOLUTION INTRAMUSCULAR; INTRAVENOUS
Status: DISCONTINUED | OUTPATIENT
Start: 2023-07-28 | End: 2023-07-29 | Stop reason: HOSPADM

## 2023-07-28 RX ORDER — ONDANSETRON 2 MG/ML
4 INJECTION INTRAMUSCULAR; INTRAVENOUS EVERY 30 MIN PRN
Status: DISCONTINUED | OUTPATIENT
Start: 2023-07-28 | End: 2023-07-29 | Stop reason: HOSPADM

## 2023-07-28 RX ORDER — LIDOCAINE 40 MG/G
CREAM TOPICAL
Status: DISCONTINUED | OUTPATIENT
Start: 2023-07-28 | End: 2023-07-29 | Stop reason: HOSPADM

## 2023-07-28 RX ORDER — ONDANSETRON 4 MG/1
4 TABLET, ORALLY DISINTEGRATING ORAL EVERY 30 MIN PRN
Status: DISCONTINUED | OUTPATIENT
Start: 2023-07-28 | End: 2023-07-29 | Stop reason: HOSPADM

## 2023-07-28 RX ORDER — PROPOFOL 10 MG/ML
INJECTION, EMULSION INTRAVENOUS PRN
Status: DISCONTINUED | OUTPATIENT
Start: 2023-07-28 | End: 2023-07-28

## 2023-07-28 RX ORDER — OXYCODONE HYDROCHLORIDE 5 MG/1
5 TABLET ORAL
Status: DISCONTINUED | OUTPATIENT
Start: 2023-07-28 | End: 2023-07-29 | Stop reason: HOSPADM

## 2023-07-28 RX ORDER — FENTANYL CITRATE 50 UG/ML
25 INJECTION, SOLUTION INTRAMUSCULAR; INTRAVENOUS EVERY 5 MIN PRN
Status: DISCONTINUED | OUTPATIENT
Start: 2023-07-28 | End: 2023-07-29 | Stop reason: HOSPADM

## 2023-07-28 RX ORDER — HYDROXYZINE HYDROCHLORIDE 25 MG/1
25 TABLET, FILM COATED ORAL EVERY 6 HOURS PRN
Status: DISCONTINUED | OUTPATIENT
Start: 2023-07-28 | End: 2023-07-29 | Stop reason: HOSPADM

## 2023-07-28 RX ORDER — LORAZEPAM 2 MG/ML
.5-1 INJECTION INTRAMUSCULAR
Status: DISCONTINUED | OUTPATIENT
Start: 2023-07-28 | End: 2023-07-29 | Stop reason: HOSPADM

## 2023-07-28 RX ORDER — DEXAMETHASONE SODIUM PHOSPHATE 10 MG/ML
4 INJECTION, SOLUTION INTRAMUSCULAR; INTRAVENOUS
Status: DISCONTINUED | OUTPATIENT
Start: 2023-07-28 | End: 2023-07-29 | Stop reason: HOSPADM

## 2023-07-28 RX ORDER — HYDROMORPHONE HYDROCHLORIDE 1 MG/ML
0.4 INJECTION, SOLUTION INTRAMUSCULAR; INTRAVENOUS; SUBCUTANEOUS EVERY 5 MIN PRN
Status: DISCONTINUED | OUTPATIENT
Start: 2023-07-28 | End: 2023-07-29 | Stop reason: HOSPADM

## 2023-07-28 RX ORDER — HYDRALAZINE HYDROCHLORIDE 20 MG/ML
2.5-5 INJECTION INTRAMUSCULAR; INTRAVENOUS EVERY 10 MIN PRN
Status: DISCONTINUED | OUTPATIENT
Start: 2023-07-28 | End: 2023-07-29 | Stop reason: HOSPADM

## 2023-07-28 RX ORDER — OXYCODONE HYDROCHLORIDE 5 MG/1
10 TABLET ORAL
Status: DISCONTINUED | OUTPATIENT
Start: 2023-07-28 | End: 2023-07-29 | Stop reason: HOSPADM

## 2023-07-28 RX ORDER — DIMENHYDRINATE 50 MG/ML
25 INJECTION, SOLUTION INTRAMUSCULAR; INTRAVENOUS
Status: DISCONTINUED | OUTPATIENT
Start: 2023-07-28 | End: 2023-07-29 | Stop reason: HOSPADM

## 2023-07-28 RX ADMIN — LIDOCAINE HYDROCHLORIDE 60 MG: 20 INJECTION, SOLUTION INFILTRATION; PERINEURAL at 07:53

## 2023-07-28 RX ADMIN — SODIUM CHLORIDE, SODIUM LACTATE, POTASSIUM CHLORIDE, CALCIUM CHLORIDE: 600; 310; 30; 20 INJECTION, SOLUTION INTRAVENOUS at 07:47

## 2023-07-28 RX ADMIN — ONDANSETRON 4 MG: 2 INJECTION INTRAMUSCULAR; INTRAVENOUS at 07:51

## 2023-07-28 RX ADMIN — PROPOFOL 200 MCG/KG/MIN: 10 INJECTION, EMULSION INTRAVENOUS at 07:52

## 2023-07-28 RX ADMIN — PROPOFOL 100 MG: 10 INJECTION, EMULSION INTRAVENOUS at 07:54

## 2023-07-28 ASSESSMENT — LIFESTYLE VARIABLES: TOBACCO_USE: 0

## 2023-07-28 ASSESSMENT — ENCOUNTER SYMPTOMS
SEIZURES: 0
ORTHOPNEA: 0

## 2023-07-28 NOTE — ANESTHESIA CARE TRANSFER NOTE
Patient: Louie Greco    Procedure: Procedure(s):  SIGMOIDOSCOPY, FLEXIBLE (PEDIATRIC SCOPE NEEDED)       Diagnosis: Rectal cancer (H) [C20]  Diagnosis Additional Information: No value filed.    Anesthesia Type:   MAC     Note:    Oropharynx: oropharynx clear of all foreign objects and spontaneously breathing  Level of Consciousness: awake  Oxygen Supplementation: room air    Independent Airway: airway patency satisfactory and stable  Dentition: dentition unchanged  Vital Signs Stable: post-procedure vital signs reviewed and stable  Report to RN Given: handoff report given  Patient transferred to: Phase II    Handoff Report: Identifed the Patient, Identified the Reponsible Provider, Reviewed the pertinent medical history, Discussed the surgical course, Reviewed Intra-OP anesthesia mangement and issues during anesthesia, Set expectations for post-procedure period and Allowed opportunity for questions and acknowledgement of understanding      Vitals:  Vitals Value Taken Time   BP     Temp     Pulse     Resp     SpO2         Electronically Signed By: QUINTON Paul CRNA  July 28, 2023  8:06 AM

## 2023-07-28 NOTE — OP NOTE
SURGEON: Felicitas Radford MD      ASSISTANT: None     PREOPERATIVE DIAGNOSIS: History of rectal cancer status post total neoadjuvant treatment on watch and wait protocol, due for examination.     POSTOPERATIVE DIAGNOSIS: History of rectal cancer status post total neoadjuvant treatment on watch and wait protocol, due for examination     PROCEDURE: Examination under anesthesia and flexible sigmoidoscopy.     INDICATIONS: Louie Greco is a 62 year old male who was diagnosed with rectal cancer 7/2016 stages as [id number] (stage IIIA) and underwent chemoradiotherapy with complete response followed by FOLFOX on watch and wait protocol. He is due for both examination of the region and flexible sigmoidoscopy. The risks and benefits of surgery were thoroughly discussed with the patient and he agreed to proceed.     DESCRIPTION OF PROCEDURE: The patient was brought to the operating room, placed in left lateral decubitus position on the cart. Moderate sedation was induced with intravenous medicines and continuous pulse oxymetry monitoring with heart rate and frequent blood pressures. We performed digital rectal examination and anoscopy which demonstrated a stenotic anal canal and introduction of the small anoscope resulted in some mild tearing of the anal canal. There were no palpable abnormalities on digital rectal examination and the prostate by palpation was normal. There was no nodularity or induration.  A flexible sigmoidoscopy with CO2 using a pediatric colonoscope was then performed and the scope was advanced to the descending colon at 50 cm without difficulty.  There was diverticulosis and no signs of divertciulitis.  There were very minimal radiation changes which were substantially improved and the scar was visible, and no signs of disease or additional lesions.  At the end the case, all instruments and sponge counts were correct x2. There was a fissure posteriorly likely from the limited examination performed.  Retroflexion was not performed. The patient was emerged from anesthesia and taken to postoperative anesthesia care unit in good condition.      SPECIMEN: None.         AROLDO NAIK MD   Colon and Rectal Surgery Staff  [facility]

## 2023-07-28 NOTE — ANESTHESIA POSTPROCEDURE EVALUATION
Patient: Louie Greco    Procedure: Procedure(s):  SIGMOIDOSCOPY, FLEXIBLE (PEDIATRIC SCOPE NEEDED)       Anesthesia Type:  MAC    Note:  Disposition: Outpatient   Postop Pain Control: Uneventful            Sign Out: Well controlled pain   PONV: No   Neuro/Psych: Uneventful            Sign Out: Acceptable/Baseline neuro status   Airway/Respiratory: Uneventful            Sign Out: Acceptable/Baseline resp. status   CV/Hemodynamics: Uneventful            Sign Out: Acceptable CV status; No obvious hypovolemia; No obvious fluid overload   Other NRE: NONE   DID A NON-ROUTINE EVENT OCCUR? No           Last vitals:  Vitals Value Taken Time   /65 07/28/23 0830   Temp 36.1  C (97  F) 07/28/23 0830   Pulse 70 07/28/23 0830   Resp 15 07/28/23 0830   SpO2 93 % 07/28/23 0830       Electronically Signed By: Lonny De Paz MD  July 28, 2023  11:25 AM

## 2023-07-28 NOTE — DISCHARGE INSTRUCTIONS
Flexible sigmoidoscopy Discharge Instructions  You had a flexible sigmoidoscopy without biopsies.  Please see the home care following anesthesia instructions.  Sigmoidoscopy: What to Expect at Home  Your Recovery     A sigmoidoscopy lets your doctor look inside the lower part of your large intestine. This is also called the colon. The doctor uses a lighted tube called a sigmoidoscope (or scope).  This test allows the doctor to look for small growths (called polyps), cancer, bleeding, hemorrhoids, or other problems. The doctor also may have used the scope to remove polyps. Or they may have used it to take tissue samples that need to be tested.  You shouldn't have any pain after the procedure. But it is normal to pass gas. You may have mild discomfort from having gas.  If your doctor removed polyps, you will likely need to schedule a colonoscopy to look at the whole colon.  This care sheet gives you a general idea about how long it will take for you to recover. But each person recovers at a different pace. Follow the steps below to get better as quickly as possible.  How can you care for yourself at home?  Activity    Most people are able to return to work right away unless they have had a sedative during the procedure.     You may need someone to drive you home if you have had a sedative. In most cases, you can drive yourself home.   Diet    You can eat your normal diet.     Be sure to drink plenty of liquids to replace those you have lost during the preparation for the procedure.   Exercise    You can return to normal exercise right away.   Medicine    Your doctor will tell you if and when you can restart your medicines. You also will be given instructions about taking any new medicines.     If you stopped taking aspirin or some other blood thinner, your doctor will tell you when to start taking it again.   Follow-up care is a key part of your treatment and safety. Be sure to make and go to all appointments, and  "call your doctor if you are having problems. It's also a good idea to know your test results and keep a list of the medicines you take.  When should you call for help?   Call your doctor now or seek immediate medical care if:    You have new or worse belly pain.     You have blood in your stools.   Watch closely for changes in your health, and be sure to contact your doctor if you have any problems.  Where can you learn more?  Go to https://www.[employer].net/patiented  Enter X882 in the search box to learn more about \"Sigmoidoscopy: What to Expect at Home.\"  Current as of: March 22, 2023               Content Version: 13.7    1841-5258 [employer].   Care instructions adapted under license by your healthcare professional. If you have questions about a medical condition or this instruction, always ask your healthcare professional. [employer] disclaims any warranty or liability for your use of this information.        [facility]  Home Care Following Anesthesia  For 24 hours after surgery:  Get plenty of rest.  A responsible adult must stay with you for at least 24 hours after you leave the surgery center.  Do not drive or use heavy equipment.  If you have weakness or tingling, don't drive or use heavy equipment until this feeling goes away.   Do not drink alcohol.   Avoid strenuous or risky activities.  Ask for help when climbing stairs.  You may feel lightheaded.  IF so, sit for a few minutes before standing.  Have someone help you get up.   If you have nausea (feel sick to your stomach): Drink only clear liquids such as apple juice, ginger ale, broth or 7-Up.  Rest may also help.  Be sure to drink enough fluids.  Move to a regular diet as you feel able.   You may have a slight fever.  Call the doctor if your fever is over 100 F (37.7 C) (taken under the tongue) or lasts longer than 24 hours.  You may have a dry mouth, a sore throat, muscle aches or " trouble sleeping. These should go away after 24 hours.  Do not make important or legal decisions.   It is recommended to avoid smoking.               Tips for taking pain medications  To get the best pain relief possible, remember these points:  Take pain medications as directed, before pain becomes severe.  Pain medication can upset your stomach: taking it with food may help.  Constipation is a common side effect of pain medication. Drink plenty of  fluids.  Eat foods high in fiber. Take a stool softener if recommended by your doctor or pharmacist.  Do not drink alcohol, drive or operate machinery while taking pain medications.  Ask about other ways to control pain, such as with heat, ice or relaxation.    Tylenol/Acetaminophen Consumption    If you feel your pain relief is insufficient, you may take Tylenol/Acetaminophen in addition to your narcotic pain medication.   Be careful not to exceed 4,000 mg of Tylenol/Acetaminophen in a 24 hour period from all sources.  If you are taking extra strength Tylenol/acetaminophen (500 mg), the maximum dose is 8 tablets in 24 hours.  If you are taking regular strength acetaminophen (325 mg), the maximum dose is 12 tablets in 24 hours.    Call a doctor for any of the following:  Signs of infection (fever, growing tenderness at the surgery site, a large amount of drainage or bleeding, severe pain, foul-smelling drainage, redness, swelling).  It has been over 8 to 10 hours since surgery and you are still not able to urinate (pass water).  Headache for over 24 hours.  Numbness, tingling or weakness the day after surgery (if you had spinal anesthesia).  Signs of Covid-19 infection (temperature over 100 degrees, shortness of breath, cough, loss of taste/smell, generalized body aches, persistent headache, chills, sore throat, nausea/vomiting/diarrhea)  Your doctor is:       Dr. Felicitas Radford, Colon Rectal: [phone]               Or dial [phone] and ask for the  resident on call for:  Colon Rectal  For emergency care, call the:  [facility]:  [phone] (TTY for hearing impaired: [phone])

## 2023-07-28 NOTE — ANESTHESIA PREPROCEDURE EVALUATION
Pre-Operative H & P     CC:  Preoperative exam to assess for increased cardiopulmonary risk while undergoing surgery and anesthesia.    Date of Encounter: 7/12/2023  Primary Care Physician:  Philippe Healy     Reason for visit:   No diagnosis found.      HPI  Louie Greco is a 63 year old male who presents for pre-operative H & P in preparation for  Procedure Information       Case: [id number] Date/Time: 07/28/23 0715    Procedure: SIGMOIDOSCOPY, FLEXIBLE (PEDIATRIC SCOPE NEEDED) (Anus)    Anesthesia type: MAC    Diagnosis: Rectal cancer (H) [C20]    Pre-op diagnosis: Rectal cancer (H) [C20]    Location: [place] [address] / [facility]    Providers: Felicitas Radford MD            Patient is being evaluated for comorbid conditions of hyperlipidemia, drug induced polyneuropathy, plantar fasciitis, obesity, inguinal hernia, and nephrolithiasis.     He has a history of rectal cancer that was treated with chemoradiation. He is now part of the watch and wait protocol and is due for his annual flexible sigmoidoscopy. He has been scheduled for the procedure as above.     History is obtained from the patient and chart review    Hx of abnormal bleeding or anti-platelet use: None      Past Medical History  Past Medical History:   Diagnosis Date    Childhood asthma     Elevated prostate specific antigen (PSA)     No biopsy done (had rectal cancer at the time)    Epididymitis, bilateral     18 years old    Inguinal hernia     Kidney stone on left side 04/22/2021    Added automatically from request for surgery [id number]    Mumps     Nephrolithiasis     Several -- 1990 first,recurrence 2019, 2021    Rectal cancer (H) 2017    low rectal cancer, S/P Rad adn Chemo, no surg needed    Right 4 mm Kidney stone at [facility]  02/28/2021    Right ureteral stone 03/11/2021    Added automatically from request for surgery [id number]    Fannie        Past Surgical History  Past Surgical History:    Procedure Laterality Date    COLONOSCOPY N/A 7/27/2016    Procedure: COMBINED COLONOSCOPY, SINGLE OR MULTIPLE BIOPSY/POLYPECTOMY BY BIOPSY;  Surgeon: Chelsea Thompson MD;  [facility]    COLONOSCOPY N/A 9/13/2017    Procedure: COLONOSCOPY;;  Surgeon: Felicitas Radford MD;  [facility]    COLONOSCOPY N/A 12/13/2017    Procedure: COLONOSCOPY;;  Surgeon: Felicitas Radford MD;  [facility]    COLONOSCOPY N/A 10/23/2020    Procedure: COLONOSCOPY;  Surgeon: Felicitas Radford MD;  [facility]    COMBINED CYSTOSCOPY, RETROGRADES, URETEROSCOPY, LASER HOLMIUM LITHOTRIPSY URETER(S), INSERT STENT Left 2/16/2018    Procedure: COMBINED CYSTOSCOPY, RETROGRADES, URETEROSCOPY, LASER HOLMIUM LITHOTRIPSY URETER(S), INSERT STENT;  Cysto, left ureteroscopy, holmium laser, retrogrades, stent placement;  Surgeon: Bola Worthy MD;  [facility]    COMBINED CYSTOSCOPY, RETROGRADES, URETEROSCOPY, LASER HOLMIUM LITHOTRIPSY URETER(S), INSERT STENT Right 3/22/2021    Procedure: CYSTOSCOPY WITH RIGHT RETROGRADE PYELOGRAM, RIGHT URETEROSCOPY WITH LASER LITHOTRIPSY AND BASKET REMOVAL OF STONE, RIGHT URETERAL STENT PLACEMENT;  Surgeon: Mohsen Pat MD;  [facility]    COMBINED CYSTOSCOPY, RETROGRADES, URETEROSCOPY, LASER HOLMIUM LITHOTRIPSY URETER(S), INSERT STENT Left 5/19/2021    Procedure: CYSTOSCOPY, LEFT RETROGRADE PYELOGRAM, LEFT DIAGNOSTIC, URETEROSCOPY, LEFT URETERAL STENT PLACEMENT;  Surgeon: Mohsen Pat MD;  [facility]    COMBINED CYSTOSCOPY, RETROGRADES, URETEROSCOPY, LASER HOLMIUM LITHOTRIPSY URETER(S), INSERT STENT Left 6/23/2021    Procedure: CYSTOSCOPY, LEFT URETERAL STENT REMOVAL, LEFT RETROGRADE PYELOGRAM, LEFT URETEROSCOPY WITH LASER LITHOTRIPSY AND BASKET REMOVAL OF STONES, LEFT URETERAL STENT PLACEMENT;  Surgeon: Mohsen Pat MD;  [facility]    CYSTOSCOPY, LITHOLAPAXY, COMBINED N/A 3/1/2021    Procedure: Cystoscopy, litholapaxy,  combined;  Surgeon: Mohsen Pat MD;  [facility]    CYSTOSCOPY, RETROGRADES, INSERT STENT URETER(S), COMBINED Right 3/1/2021    Procedure: Cystoscopy, retrogrades, insert stent ureter(s), combined;  Surgeon: Mohsen Pat MD;  [facility]    EXAM UNDER ANESTHESIA ANUS N/A 7/5/2017    Procedure: EXAM UNDER ANESTHESIA ANUS;  Examination Under Anesthesia, flex sigmoidoscopy with biopsies and formalin application;  Surgeon: Felicitas Radford MD;  [facility]    EXAM UNDER ANESTHESIA ANUS N/A 9/13/2017    Procedure: EXAM UNDER ANESTHESIA ANUS;  Examination Under Anesthesia Anus, Colonoscopy, application of formalin;  Surgeon: Felicitas Radford MD;  [facility]    EXAM UNDER ANESTHESIA ANUS N/A 12/13/2017    Procedure: EXAM UNDER ANESTHESIA ANUS;  Examination Under Anesthesia Anus, Colonoscopy;  Surgeon: Felicitas Radford MD;  [facility]    EXAM UNDER ANESTHESIA ANUS N/A 5/11/2018    Procedure: EXAM UNDER ANESTHESIA ANUS;  Examination Under Anesthesia Anus, Interoperative Flexible Sigmoidoscopy, Application of Formalin;  Surgeon: Felicitas Radford MD;  [facility]    EXAM UNDER ANESTHESIA ANUS N/A 7/20/2018    Procedure: EXAM UNDER ANESTHESIA ANUS;  Examination Under Anesthesia Anus, Flexible Sigmoidoscopy ;  Surgeon: Felicitas Radford MD;  [facility]    EXAM UNDER ANESTHESIA ANUS N/A 11/30/2018    Procedure: Examination Under Anesthesia, application of formalin to rectum, polypectomy;  Surgeon: Felicitas Radford MD;  [facility]    EXAM UNDER ANESTHESIA ANUS N/A 2/22/2019    Procedure: Examination Under Anesthesia;  Surgeon: Felicitas Radford MD;  [facility]    EXAM UNDER ANESTHESIA ANUS N/A 6/28/2019    Procedure: Examination Under Anesthesia;  Surgeon: Felicitas Radford MD;  [facility]    EXAM UNDER ANESTHESIA ANUS N/A 11/1/2019    Procedure: Examination Under Anesthesia;  Surgeon: Malina  Felicitas HIDALGO MD;  [facility]    EXAM UNDER ANESTHESIA RECTUM N/A 10/23/2020    Procedure: Exam under anesthesia rectum;  Surgeon: Felicitas Radford MD;  [facility]    HC TOOTH EXTRACTION W/FORCEP      LAPAROSCOPIC APPENDECTOMY N/A 7/17/2017    Procedure: LAPAROSCOPIC APPENDECTOMY;  LAPAROSCOPIC APPENDECTOMY;  Surgeon: Bryson Ferguson MD;  [facility]    LASER HOLMIUM LITHOTRIPSY URETER(S), INSERT STENT, COMBINED Right 3/1/2021    Procedure: CYSTOURETEROSCOPY,  URETERAL STENT INSERTION, RIGHT RETROGRADE;  Surgeon: Mohsen Pat MD;  [facility]    SIGMOIDOSCOPY FLEXIBLE N/A 12/8/2016    Procedure: SIGMOIDOSCOPY FLEXIBLE;  Surgeon: Felicitas Radford MD;  [facility]    SIGMOIDOSCOPY FLEXIBLE N/A 7/5/2017    Procedure: SIGMOIDOSCOPY FLEXIBLE;;  Surgeon: Felicitas Radford MD;  [facility]    SIGMOIDOSCOPY FLEXIBLE N/A 5/11/2018    Procedure: SIGMOIDOSCOPY FLEXIBLE;;  Surgeon: Felicitas Radford MD;  [facility]    SIGMOIDOSCOPY FLEXIBLE N/A 7/20/2018    Procedure: SIGMOIDOSCOPY FLEXIBLE;;  Surgeon: Felicitas Radford MD;  [facility]    SIGMOIDOSCOPY FLEXIBLE N/A 11/30/2018    Procedure: Flexible Sigmoidoscopy;  Surgeon: Felicitas Radford MD;  [facility]    SIGMOIDOSCOPY FLEXIBLE N/A 2/22/2019    Procedure: Intraoperative Flexible Sigmoidoscopy, Application of Formalin to rectum;  Surgeon: Felicitas Radford MD;  [facility]    SIGMOIDOSCOPY FLEXIBLE N/A 6/28/2019    Procedure: Flexible Sigmoidoscopy;  Surgeon: Felicitas Radford MD;  [facility]    SIGMOIDOSCOPY FLEXIBLE N/A 11/1/2019    Procedure: Flexible Sigmoidoscopy;  Surgeon: Felicitas Radford MD;  [facility]    SIGMOIDOSCOPY FLEXIBLE N/A 7/23/2021    Procedure: SIGMOIDOSCOPY, FLEXIBLE;  Surgeon: Felicitas Radfodr MD;  [facility]    SIGMOIDOSCOPY FLEXIBLE N/A 7/1/2022    Procedure: SIGMOIDOSCOPY, FLEXIBLE, EUA;   Surgeon: Felicitas Radford MD;  [facility]    TESTICLE SURGERY      VASCULAR SURGERY      Right chest port    VASECTOMY      VASECTOMY         Prior to Admission Medications  Current Outpatient Medications   Medication Sig Dispense Refill    atorvastatin (LIPITOR) 40 MG tablet Take 0.5 tablets (20 mg) by mouth daily (Patient taking differently: Take 20 mg by mouth every evening) 90 tablet 3    Probiotic Product (PROBIOTIC PO) Take 1 capsule by mouth as needed      sildenafil (VIAGRA) 100 MG tablet Take 1 tablet (100 mg) by mouth daily as needed 30 tablet 11       Allergies  Allergies   Allergen Reactions    Ampicillin Diarrhea    Demerol [Meperidine] Nausea       Social History  Social History     Socioeconomic History    Marital status:      Spouse name: Not on file    Number of children: 2    Years of education: Not on file    Highest education level: Not on file   Occupational History    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)      Social Determinants of Health     Financial Resource Strain: Not on file   Food Insecurity: Not on file   Transportation Needs: Not on file   Physical Activity: Not on file   Stress: Not on file   Social Connections: Not on file   Intimate Partner Violence: Not on file   Housing Stability: Not on file       Family History  Family History   Problem Relation Age of Onset    Colon Polyps Mother         Number and type of polyps unknown    Chronic Obstructive Pulmonary Disease Father      Hypertension Father     Hyperlipidemia Father     Diabetes Maternal Grandfather     Macular Degeneration Paternal Grandmother     Hypertension Paternal Grandmother     Hyperlipidemia Paternal Grandmother     Cancer Brother         primary of unknown origin    Other Cancer Brother     Family History Negative Brother         negative colonoscopy    Stomach Cancer Other 63        maternal great grandfather    Glaucoma No family hx of     Anesthesia Reaction No family hx of     Deep Vein Thrombosis (DVT) No family hx of        Review of Systems  The complete review of systems is negative other than noted in the HPI or here.   Anesthesia Evaluation   Pt has had prior anesthetic.     No history of anesthetic complications       ROS/MED HX  ENT/Pulmonary:     (+)                     asthma (childhood)               (-) tobacco use and recent URI   Neurologic:     (+)    peripheral neuropathy, - drug-induced, feet and hands.                        (-) no seizures and no CVA   Cardiovascular:     (+) Dyslipidemia - -   -  - -                                 Previous cardiac testing   Echo: Date: Results:    Stress Test:  Date: Results:    ECG Reviewed:  Date: 6/17/2021 Results:  Sinus rhythm  Cath:  Date: Results:   (-) hypertension, SANTIAGO, orthopnea/PND and irregular heartbeat/palpitations   METS/Exercise Tolerance: 4 - Raking leaves, gardening    Hematologic:  - neg hematologic  ROS     Musculoskeletal: Comment: Right knee pain  Herniated disc low back  (+)  arthritis (thumb),             GI/Hepatic: Comment: Inguinal hernia, left   (-) GERD and liver disease   Renal/Genitourinary:     (+)       Nephrolithiasis ,       Endo:     (+)               Obesity,    (-) Type II DM and thyroid disease   Psychiatric/Substance Use:  - neg psychiatric ROS     Infectious Disease:  - neg infectious disease ROS     Malignancy:   (+) Malignancy, History of GI and Skin.GI CA  Remission status post Chemo and Radiation.  Skin CA Active status  post.      Other:  - neg other ROS          Virtual visit -  No vitals were obtained    Physical Exam  Constitutional: Awake, alert, cooperative, no apparent distress, and appears stated age.  Eyes: Pupils equal  HENT: Normocephalic  Respiratory: non labored breathing   Neurologic: Awake, alert, oriented to name, place and time.   Neuropsychiatric: Calm, cooperative. Normal affect.      Prior Labs/Diagnostic Studies   All labs and imaging personally reviewed     EKG/ stress test - if available please see in ROS above   No results found.    The patient's records and results personally reviewed by this provider.     Outside records reviewed from: Care Everywhere      Assessment  Louie Greco is a 63 year old male seen as a PAC referral for risk assessment and optimization for anesthesia.    Plan/Recommendations  Pt will be optimized for the proposed procedure.  See below for details on the assessment, risk, and preoperative recommendations    NEUROLOGY  - No history of TIA, CVA or seizure  - Drug-inducted polyneuropathy affecting bilateral hands and feet  -Post Op delirium risk factors:  No risk identified    ENT  - No current airway concerns.  Will need to be reassessed day of surgery.  Mallampati: Unable to assess  TM: Unable to assess    CARDIAC  - No history of CAD, Hypertension and Afib  - Hyperlipidemia  Recently started on Atorvastatin  - METS (Metabolic Equivalents)  Patient performs 4 or more METS exercise without symptoms            Total Score: 0      RCRI-Very low risk: Class 1 0.4% complication rate            Total Score: 0        PULMONARY    BENNETT Low Risk            Total Score: 2    BENNETT: Over 50 ys old    BENNETT: Male      - Asthma  History of childhood asthma. No inhalers. No current respiratory concerns.   - Tobacco History    History   Smoking Status    Never   Smokeless Tobacco    Never       GI  - Left inguinal hernia, not bothersome per patient  PONV Low Risk  Total Score: 1           1 AN PONV:  "Patient is not a current smoker        /RENAL  - Baseline Creatinine  WNL  - History of nephrolithiasis no current concerns    ENDOCRINE  - BMI: Estimated body mass index is 31.42 kg/m  as calculated from the following:    Height as of this encounter: 1.778 m (5' 10\").    Weight as of this encounter: 99.3 kg (219 lb).  Class 3 Obesity (BMI > 40)  - No history of Diabetes Mellitus    HEME/ONC  - History of rectal cancer s/p chemoradiation therapy. Now in watch/wait program. Flex sig as scheduled above.  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 Planning for excision in the near future.     VTE High Risk 3%            Total Score: 8    VTE: Greater than 59 yrs old    VTE: Male    VTE: Current cancer      - No history of abnormal bleeding or antiplatelet use.      MSK  - Herniated disc, low back  - Arthritis involving thumb  - Right knee pain, possibly secondary to arthritis  Recommend consideration for careful positioning to limit patient discomfort.    Different anesthesia methods/types have been discussed with the patient, but they are aware that the final plan will be decided by the assigned anesthesia provider on the date of service.    The patient is optimized for their procedure. AVS with information on surgery time/arrival time, meds and NPO status given by nursing staff. No further diagnostic testing indicated.    Please refer to the physical examination documented by the anesthesiologist in the anesthesia record on the day of surgery.    Video-Visit Details    Type of service:  Video Visit    Provider received verbal consent for a Video Visit from the patient? Yes     Originating Location (pt. Location): Home  Distant Location (provider location):  Off-site  Mode of Communication:  Video Conference via [employer]  On the day of service:     Prep time: 14 minutes  Visit time: 12 minutes  Documentation time: 10 minutes  ------------------------------------------  Total time: 36 " minutes      Abigail Ortiz PA-C  Preoperative Assessment Center  [facility]  [facility]  Phone: [phone]  Fax: [phone]  Physical Exam    Airway      Comment: Limited by bandage on lip    Mallampati: III   TM distance: > 3 FB   Neck ROM: full   Mouth opening: < 3 cm    Respiratory Devices and Support         Dental           Cardiovascular   cardiovascular exam normal          Pulmonary   pulmonary exam normal                Anesthesia Plan    ASA Status:  3    NPO Status:  NPO Appropriate    Anesthesia Type: MAC.   Induction: Intravenous, Propofol.   Maintenance: TIVA.        Consents    Anesthesia Plan(s) and associated risks, benefits, and realistic alternatives discussed. Questions answered and patient/representative(s) expressed understanding.     - Discussed:     - Discussed with:  Patient            Postoperative Care       PONV prophylaxis: Background Propofol Infusion     Comments:

## 2023-08-02 ENCOUNTER — ALLIED HEALTH/NURSE VISIT (OUTPATIENT)
Dept: DERMATOLOGY | Facility: CLINIC | Age: 63
End: 2023-08-02
Payer: COMMERCIAL

## 2023-08-02 DIAGNOSIS — Z48.01 DRESSING CHANGE OR REMOVAL, SURGICAL WOUND: Primary | ICD-10-CM

## 2023-08-02 PROCEDURE — 99207 PR NO CHARGE NURSE ONLY: CPT | Performed by: STUDENT IN AN ORGANIZED HEALTH CARE EDUCATION/TRAINING PROGRAM

## 2023-08-02 NOTE — PATIENT INSTRUCTIONS
WOUND CARE INSTRUCTIONS  for  ONE WEEK AFTER SURGERY          Leave flat bandage on your skin for one week after today s bandage change.  In one week when you remove the bandage, you may resume your regular skin care routine, including washing with mild soap and water, applying moisturizer, make-up and sunscreen.    If there are any open or bleeding areas at the incision/graft site you should begin to cover the area with a bandage daily as follows:    Clean and dry the area with plain tap water using a Q-tip or sterile gauze pad.  Apply Polysporin or Bacitracin ointment to the open area.  Cover the wound with a band-aid or a sterile non-stick gauze pad and micropore paper tape.         SIGNS OF INFECTION  - If you notice any of these signs of infection, call your doctor right away: expanding redness around the wound.  - Yellow or greenish-colored pus or cloudy wound drainage.    - Red streaking spreading from the wound.  - Increased swelling, tenderness, or pain around the wound.   - Fever.    Please remember that yellow and clear drainage from a wound can be normal and related to normal wound healing.  Isolated drainage from a wound without a combination of the above features does not indicate infection.       *Once the bandages are removed, the scar will be red and firm (especially in the lip/chin area). This is normal and will fade in time. It might take 6-12 months for this to happen.     *Massaging the area will help the scar soften and fade quicker. Begin to massage the area one month after the bandages have been removed. To massage apply pressure directly and firmly over the scar with the fingertips and move in a circular motion. Massage the area for a few minutes several times a day. Continue to massage the site for several months.    *Approximately 6-8 weeks after surgery it is not uncommon to see the formation of  tender pimple-like  bump along the scar. This is normal. As the scar continues to mature and  the stitches underneath the skin begin to dissolve, this might occur. Do not pick or squeeze, this will resolve on it s own. Should one break open producing a small amount of drainage, apply Polysporin or Bacitracin ointment a few times a day until the wound is completely healed.    *Numbness in the surgical area is expected. It might take 12-18 months for the feeling to return to normal. During this time sensations of itchiness, tingling and occasional sharp pains might be noted. These feelings are normal and will subside once the nerves have completely healed.         IN CASE OF EMERGENCY: Dr Villegas [phone]     If you were seen in [place] call: [phone]    If you were seen in [place] call: [phone]

## 2023-08-02 NOTE — PROGRESS NOTES
Louieezio Greco comes into clinic today at the request of Dr. Villegas Ordering Provider for Wound Check Action taken: bandage changed .    This service provided today was under the supervising provider of the day Dr. Almazan, who was available if needed.    Please see providers note on 7/27/23 for orders  Patient returned to clinic for post surgery 1 week follow up bandage change. Patient has no complaints, denies pain.  Bandage removed from R lip. Area cleansed with wound cleanser. Site is healing with no signs of infection, wound edges approximating well.   Reapplied new steri strips and paper tape.     Advised to watch for signs and symptons of infection; spreading redness, increasing pain, drainage, odor, fever.   Call or report promptly to clinic if any of these symptoms are present.    Wound care post op instructions given and discussed for 14 day bandage removal and continued incision/scar care.    Patient verbalized understanding.     Thank you,  Adriane HEADLEY RN  Dermatology   [phone]

## 2023-10-25 ENCOUNTER — OFFICE VISIT (OUTPATIENT)
Dept: DERMATOLOGY | Facility: CLINIC | Age: 63
End: 2023-10-25
Payer: COMMERCIAL

## 2023-10-25 DIAGNOSIS — Z85.828 HISTORY OF SKIN CANCER: Primary | ICD-10-CM

## 2023-10-25 PROCEDURE — 99213 OFFICE O/P EST LOW 20 MIN: CPT | Performed by: DERMATOLOGY

## 2023-10-25 NOTE — LETTER
10/25/2023         RE: Louie Greco  [address] [facility] [address]        Dear Colleague,    Thank you for referring your patient, Louie Greco, to the [facility]. Please see a copy of my visit note below.    Louie Greco is an extremely pleasant 63 year old year old male patient here today for wound check r lip healing well slightly bumpy.  Patient has no other skin complaints today.  Remainder of the HPI, Meds, PMH, Allergies, FH, and SH was reviewed in chart.      Past Medical History:   Diagnosis Date     Childhood asthma      Elevated prostate specific antigen (PSA)     No biopsy done (had rectal cancer at the time)     Epididymitis, bilateral     18 years old     Inguinal hernia      Kidney stone on left side 04/22/2021    Added automatically from request for surgery [id number]     Mumps      Nephrolithiasis     Several -- 1990 first,recurrence 2019, 2021     Rectal cancer (H) 2017    low rectal cancer, S/P Rad adn Chemo, no surg needed     Right 4 mm Kidney stone at [facility]  02/28/2021     Right ureteral stone 03/11/2021    Added automatically from request for surgery [id number]     Fannie        Past Surgical History:   Procedure Laterality Date     COLONOSCOPY N/A 7/27/2016    Procedure: COMBINED COLONOSCOPY, SINGLE OR MULTIPLE BIOPSY/POLYPECTOMY BY BIOPSY;  Surgeon: Chelsea Thompson MD;  Location:  GI     COLONOSCOPY N/A 9/13/2017    Procedure: COLONOSCOPY;;  Surgeon: Felicitas Radford MD;  [facility]     COLONOSCOPY N/A 12/13/2017    Procedure: COLONOSCOPY;;  Surgeon: Felicitas Radford MD;  [facility]     COLONOSCOPY N/A 10/23/2020    Procedure: COLONOSCOPY;  Surgeon: Felicitas Radford MD;  [facility]     COMBINED CYSTOSCOPY, RETROGRADES, URETEROSCOPY, LASER HOLMIUM LITHOTRIPSY URETER(S), INSERT STENT Left 2/16/2018    Procedure: COMBINED CYSTOSCOPY, RETROGRADES, URETEROSCOPY, LASER HOLMIUM LITHOTRIPSY URETER(S), INSERT STENT;   Cysto, left ureteroscopy, holmium laser, retrogrades, stent placement;  Surgeon: Bola Worthy MD;  [facility]     COMBINED CYSTOSCOPY, RETROGRADES, URETEROSCOPY, LASER HOLMIUM LITHOTRIPSY URETER(S), INSERT STENT Right 3/22/2021    Procedure: CYSTOSCOPY WITH RIGHT RETROGRADE PYELOGRAM, RIGHT URETEROSCOPY WITH LASER LITHOTRIPSY AND BASKET REMOVAL OF STONE, RIGHT URETERAL STENT PLACEMENT;  Surgeon: Mohsen Pat MD;  [facility]     COMBINED CYSTOSCOPY, RETROGRADES, URETEROSCOPY, LASER HOLMIUM LITHOTRIPSY URETER(S), INSERT STENT Left 5/19/2021    Procedure: CYSTOSCOPY, LEFT RETROGRADE PYELOGRAM, LEFT DIAGNOSTIC, URETEROSCOPY, LEFT URETERAL STENT PLACEMENT;  Surgeon: Mohsen Pat MD;  [facility]     COMBINED CYSTOSCOPY, RETROGRADES, URETEROSCOPY, LASER HOLMIUM LITHOTRIPSY URETER(S), INSERT STENT Left 6/23/2021    Procedure: CYSTOSCOPY, LEFT URETERAL STENT REMOVAL, LEFT RETROGRADE PYELOGRAM, LEFT URETEROSCOPY WITH LASER LITHOTRIPSY AND BASKET REMOVAL OF STONES, LEFT URETERAL STENT PLACEMENT;  Surgeon: Mohsen Pat MD;  [facility]     CYSTOSCOPY, LITHOLAPAXY, COMBINED N/A 3/1/2021    Procedure: Cystoscopy, litholapaxy, combined;  Surgeon: Mohsen Pat MD;  [facility]     CYSTOSCOPY, RETROGRADES, INSERT STENT URETER(S), COMBINED Right 3/1/2021    Procedure: Cystoscopy, retrogrades, insert stent ureter(s), combined;  Surgeon: Mohsen Pat MD;  [facility]     EXAM UNDER ANESTHESIA ANUS N/A 7/5/2017    Procedure: EXAM UNDER ANESTHESIA ANUS;  Examination Under Anesthesia, flex sigmoidoscopy with biopsies and formalin application;  Surgeon: Felicitas Radford MD;  [facility]     EXAM UNDER ANESTHESIA ANUS N/A 9/13/2017    Procedure: EXAM UNDER ANESTHESIA ANUS;  Examination Under Anesthesia Anus, Colonoscopy, application of formalin;  Surgeon: Felicitas Radford MD;  [facility]     EXAM UNDER ANESTHESIA ANUS N/A 12/13/2017    Procedure: EXAM  UNDER ANESTHESIA ANUS;  Examination Under Anesthesia Anus, Colonoscopy;  Surgeon: Felicitas Radford MD;  [facility]     EXAM UNDER ANESTHESIA ANUS N/A 5/11/2018    Procedure: EXAM UNDER ANESTHESIA ANUS;  Examination Under Anesthesia Anus, Interoperative Flexible Sigmoidoscopy, Application of Formalin;  Surgeon: Felicitas Radford MD;  [facility]     EXAM UNDER ANESTHESIA ANUS N/A 7/20/2018    Procedure: EXAM UNDER ANESTHESIA ANUS;  Examination Under Anesthesia Anus, Flexible Sigmoidoscopy ;  Surgeon: Felicitas Radford MD;  [facility]     EXAM UNDER ANESTHESIA ANUS N/A 11/30/2018    Procedure: Examination Under Anesthesia, application of formalin to rectum, polypectomy;  Surgeon: Felicitas Radford MD;  [facility]     EXAM UNDER ANESTHESIA ANUS N/A 2/22/2019    Procedure: Examination Under Anesthesia;  Surgeon: Felicitas Radford MD;  [facility]     EXAM UNDER ANESTHESIA ANUS N/A 6/28/2019    Procedure: Examination Under Anesthesia;  Surgeon: Feliictas Radford MD;  [facility]     EXAM UNDER ANESTHESIA ANUS N/A 11/1/2019    Procedure: Examination Under Anesthesia;  Surgeon: Felicitas Radford MD;  [facility]     EXAM UNDER ANESTHESIA RECTUM N/A 10/23/2020    Procedure: Exam under anesthesia rectum;  Surgeon: Felicitas Radford MD;  [facility]     HC TOOTH EXTRACTION W/FORCEP       LAPAROSCOPIC APPENDECTOMY N/A 7/17/2017    Procedure: LAPAROSCOPIC APPENDECTOMY;  LAPAROSCOPIC APPENDECTOMY;  Surgeon: Byrson Ferguson MD;  Location:  OR     LASER HOLMIUM LITHOTRIPSY URETER(S), INSERT STENT, COMBINED Right 3/1/2021    Procedure: CYSTOURETEROSCOPY,  URETERAL STENT INSERTION, RIGHT RETROGRADE;  Surgeon: Mohsen Pat MD;  [facility]     SIGMOIDOSCOPY FLEXIBLE N/A 12/8/2016    Procedure: SIGMOIDOSCOPY FLEXIBLE;  Surgeon: Felicitas Radford MD;  [facility]     SIGMOIDOSCOPY FLEXIBLE N/A 7/5/2017     Procedure: SIGMOIDOSCOPY FLEXIBLE;;  Surgeon: Felicitas Radford MD;  [facility]     SIGMOIDOSCOPY FLEXIBLE N/A 5/11/2018    Procedure: SIGMOIDOSCOPY FLEXIBLE;;  Surgeon: Felicitas Radford MD;  [facility]     SIGMOIDOSCOPY FLEXIBLE N/A 7/20/2018    Procedure: SIGMOIDOSCOPY FLEXIBLE;;  Surgeon: Felicitas Radford MD;  [facility]     SIGMOIDOSCOPY FLEXIBLE N/A 11/30/2018    Procedure: Flexible Sigmoidoscopy;  Surgeon: Felicitas Radford MD;  [facility]     SIGMOIDOSCOPY FLEXIBLE N/A 2/22/2019    Procedure: Intraoperative Flexible Sigmoidoscopy, Application of Formalin to rectum;  Surgeon: Felicitas Radford MD;  [facility]     SIGMOIDOSCOPY FLEXIBLE N/A 6/28/2019    Procedure: Flexible Sigmoidoscopy;  Surgeon: Felicitas Radford MD;  [facility]     SIGMOIDOSCOPY FLEXIBLE N/A 11/1/2019    Procedure: Flexible Sigmoidoscopy;  Surgeon: Felicitas Radford MD;  [facility]     SIGMOIDOSCOPY FLEXIBLE N/A 7/23/2021    Procedure: SIGMOIDOSCOPY, FLEXIBLE;  Surgeon: Felicitas Radford MD;  [facility]     SIGMOIDOSCOPY FLEXIBLE N/A 7/1/2022    Procedure: SIGMOIDOSCOPY, FLEXIBLE, EUA;  Surgeon: Felicitas Radford MD;  [facility]     SIGMOIDOSCOPY FLEXIBLE N/A 7/28/2023    Procedure: SIGMOIDOSCOPY, FLEXIBLE (PEDIATRIC SCOPE NEEDED);  Surgeon: Felicitas Radford MD;  [facility]     TESTICLE SURGERY       VASCULAR SURGERY      Right chest port     VASECTOMY       VASECTOMY          Family History   Problem Relation Age of Onset     Colon Polyps Mother         Number and type of polyps unknown     Chronic Obstructive Pulmonary Disease Father      Hypertension Father      Hyperlipidemia Father      Diabetes Maternal Grandfather      Macular Degeneration Paternal Grandmother      Hypertension Paternal Grandmother      Hyperlipidemia Paternal Grandmother      Cancer Brother         primary of unknown  origin     Other Cancer Brother      Family History Negative Brother         negative colonoscopy     Stomach Cancer Other 63        maternal great grandfather     Glaucoma No family hx of      Anesthesia Reaction No family hx of      Deep Vein Thrombosis (DVT) No family hx of        Social History     Socioeconomic History     Marital status:      Spouse name: Not on file     Number of children: 2     Years of education: Not on file     Highest education level: Not on file   Occupational History     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)      Social Determinants of Health     Financial Resource Strain: Not on file   Food Insecurity: Not on file   Transportation Needs: Not on file   Physical Activity: Not on file   Stress: Not on file   Social Connections: Not on file   Interpersonal Safety: Not on file   Housing Stability: Not on file       Outpatient Encounter Medications as of 10/25/2023   Medication Sig Dispense Refill     atorvastatin (LIPITOR) 40 MG tablet Take 0.5 tablets (20 mg) by mouth daily (Patient taking differently: Take 20 mg by mouth every evening) 90 tablet 3     Probiotic Product (PROBIOTIC PO) Take 1 capsule by mouth as needed       sildenafil (VIAGRA) 100 MG tablet Take 1 tablet (100 mg) by mouth daily as needed 30 tablet 11     No facility-administered encounter medications on file as of 10/25/2023.             O:   NAD, WDWN, Alert & Oriented, Mood & Affect wnl, Vitals stable   General appearance  normal   Vitals stable   Alert, oriented and in no acute distress     R upper lip well healed with some bumps      Eyes: Conjunctivae/lids:Normal     ENT: Lips, buccal mucosa, tongue: normal    MSK:Normal    Cardiovascular: peripheral edema none    Pulm: Breathing Normal    Neuro/Psych: Orientation:Alert and Orientedx3 ; Mood/Affect:normal       A/P:  Hx of non-melanoma skin cancer healing well  Abrasion and ndyag discussed with patient   Will cont home massage  Return to clinic 3 months  It was a pleasure speaking to Louie Greco today.      Again, thank you for allowing me to participate in the care of your patient.        Sincerely,        Ranulfo Villegas MD

## 2023-10-25 NOTE — PROGRESS NOTES
Louie Greco is an extremely pleasant 63 year old year old male patient here today for wound check r lip healing well slightly bumpy.  Patient has no other skin complaints today.  Remainder of the HPI, Meds, PMH, Allergies, FH, and SH was reviewed in chart.      Past Medical History:   Diagnosis Date    Childhood asthma     Elevated prostate specific antigen (PSA)     No biopsy done (had rectal cancer at the time)    Epididymitis, bilateral     18 years old    Inguinal hernia     Kidney stone on left side 04/22/2021    Added automatically from request for surgery [id number]    Mumps     Nephrolithiasis     Several -- 1990 first,recurrence 2019, 2021    Rectal cancer (H) 2017    low rectal cancer, S/P Rad adn Chemo, no surg needed    Right 4 mm Kidney stone at [facility]  02/28/2021    Right ureteral stone 03/11/2021    Added automatically from request for surgery [id number]    Fannie        Past Surgical History:   Procedure Laterality Date    COLONOSCOPY N/A 7/27/2016    Procedure: COMBINED COLONOSCOPY, SINGLE OR MULTIPLE BIOPSY/POLYPECTOMY BY BIOPSY;  Surgeon: Chelsea Thompson MD;  Location:  GI    COLONOSCOPY N/A 9/13/2017    Procedure: COLONOSCOPY;;  Surgeon: Felicitas Radford MD;  [facility]    COLONOSCOPY N/A 12/13/2017    Procedure: COLONOSCOPY;;  Surgeon: Felicitas Radford MD;  [facility]    COLONOSCOPY N/A 10/23/2020    Procedure: COLONOSCOPY;  Surgeon: Felicitas Radford MD;  Location: [facility]    COMBINED CYSTOSCOPY, RETROGRADES, URETEROSCOPY, LASER HOLMIUM LITHOTRIPSY URETER(S), INSERT STENT Left 2/16/2018    Procedure: COMBINED CYSTOSCOPY, RETROGRADES, URETEROSCOPY, LASER HOLMIUM LITHOTRIPSY URETER(S), INSERT STENT;  Cysto, left ureteroscopy, holmium laser, retrogrades, stent placement;  Surgeon: Bola Worthy MD;  Location:  OR    COMBINED CYSTOSCOPY, RETROGRADES, URETEROSCOPY, LASER HOLMIUM LITHOTRIPSY URETER(S), INSERT STENT Right 3/22/2021     Procedure: CYSTOSCOPY WITH RIGHT RETROGRADE PYELOGRAM, RIGHT URETEROSCOPY WITH LASER LITHOTRIPSY AND BASKET REMOVAL OF STONE, RIGHT URETERAL STENT PLACEMENT;  Surgeon: Mohsen Pat MD;  [facility]    COMBINED CYSTOSCOPY, RETROGRADES, URETEROSCOPY, LASER HOLMIUM LITHOTRIPSY URETER(S), INSERT STENT Left 5/19/2021    Procedure: CYSTOSCOPY, LEFT RETROGRADE PYELOGRAM, LEFT DIAGNOSTIC, URETEROSCOPY, LEFT URETERAL STENT PLACEMENT;  Surgeon: Mohsen Pat MD;  [facility]    COMBINED CYSTOSCOPY, RETROGRADES, URETEROSCOPY, LASER HOLMIUM LITHOTRIPSY URETER(S), INSERT STENT Left 6/23/2021    Procedure: CYSTOSCOPY, LEFT URETERAL STENT REMOVAL, LEFT RETROGRADE PYELOGRAM, LEFT URETEROSCOPY WITH LASER LITHOTRIPSY AND BASKET REMOVAL OF STONES, LEFT URETERAL STENT PLACEMENT;  Surgeon: Mohsen Pat MD;  [facility]    CYSTOSCOPY, LITHOLAPAXY, COMBINED N/A 3/1/2021    Procedure: Cystoscopy, litholapaxy, combined;  Surgeon: Mohsen Pat MD;  [facility]    CYSTOSCOPY, RETROGRADES, INSERT STENT URETER(S), COMBINED Right 3/1/2021    Procedure: Cystoscopy, retrogrades, insert stent ureter(s), combined;  Surgeon: Mohsen Pat MD;  [facility]    EXAM UNDER ANESTHESIA ANUS N/A 7/5/2017    Procedure: EXAM UNDER ANESTHESIA ANUS;  Examination Under Anesthesia, flex sigmoidoscopy with biopsies and formalin application;  Surgeon: Felicitas Radford MD;  [facility]    EXAM UNDER ANESTHESIA ANUS N/A 9/13/2017    Procedure: EXAM UNDER ANESTHESIA ANUS;  Examination Under Anesthesia Anus, Colonoscopy, application of formalin;  Surgeon: Felicitas Radford MD;  [facility]    EXAM UNDER ANESTHESIA ANUS N/A 12/13/2017    Procedure: EXAM UNDER ANESTHESIA ANUS;  Examination Under Anesthesia Anus, Colonoscopy;  Surgeon: Felicitas Radford MD;  [facility]    EXAM UNDER ANESTHESIA ANUS N/A 5/11/2018    Procedure: EXAM UNDER ANESTHESIA ANUS;  Examination Under Anesthesia Anus,  Interoperative Flexible Sigmoidoscopy, Application of Formalin;  Surgeon: Felicitas Radford MD;  [facility]    EXAM UNDER ANESTHESIA ANUS N/A 7/20/2018    Procedure: EXAM UNDER ANESTHESIA ANUS;  Examination Under Anesthesia Anus, Flexible Sigmoidoscopy ;  Surgeon: Felicitas Radford MD;  [facility]    EXAM UNDER ANESTHESIA ANUS N/A 11/30/2018    Procedure: Examination Under Anesthesia, application of formalin to rectum, polypectomy;  Surgeon: Felicitas Radford MD;  [facility]    EXAM UNDER ANESTHESIA ANUS N/A 2/22/2019    Procedure: Examination Under Anesthesia;  Surgeon: Felicitas Radford MD;  [facility]    EXAM UNDER ANESTHESIA ANUS N/A 6/28/2019    Procedure: Examination Under Anesthesia;  Surgeon: Felicitas Radford MD;  [facility]    EXAM UNDER ANESTHESIA ANUS N/A 11/1/2019    Procedure: Examination Under Anesthesia;  Surgeon: Felicitas Radford MD;  [facility]    EXAM UNDER ANESTHESIA RECTUM N/A 10/23/2020    Procedure: Exam under anesthesia rectum;  Surgeon: Felicitas Radford MD;  [facility]    HC TOOTH EXTRACTION W/FORCEP      LAPAROSCOPIC APPENDECTOMY N/A 7/17/2017    Procedure: LAPAROSCOPIC APPENDECTOMY;  LAPAROSCOPIC APPENDECTOMY;  Surgeon: Bryson Ferguson MD;  [facility]    LASER HOLMIUM LITHOTRIPSY URETER(S), INSERT STENT, COMBINED Right 3/1/2021    Procedure: CYSTOURETEROSCOPY,  URETERAL STENT INSERTION, RIGHT RETROGRADE;  Surgeon: Mohsen Pat MD;  [facility]    SIGMOIDOSCOPY FLEXIBLE N/A 12/8/2016    Procedure: SIGMOIDOSCOPY FLEXIBLE;  Surgeon: Felicitas Radford MD;  [facility]    SIGMOIDOSCOPY FLEXIBLE N/A 7/5/2017    Procedure: SIGMOIDOSCOPY FLEXIBLE;;  Surgeon: Felicitas Radford MD;  [facility]    SIGMOIDOSCOPY FLEXIBLE N/A 5/11/2018    Procedure: SIGMOIDOSCOPY FLEXIBLE;;  Surgeon: Felicitas Radford MD;  [facility]    SIGMOIDOSCOPY FLEXIBLE N/A  7/20/2018    Procedure: SIGMOIDOSCOPY FLEXIBLE;;  Surgeon: Felicitas Radford MD;  [facility]    SIGMOIDOSCOPY FLEXIBLE N/A 11/30/2018    Procedure: Flexible Sigmoidoscopy;  Surgeon: Felicitas Radford MD;  [facility]    SIGMOIDOSCOPY FLEXIBLE N/A 2/22/2019    Procedure: Intraoperative Flexible Sigmoidoscopy, Application of Formalin to rectum;  Surgeon: Felicitas Radford MD;  [facility]    SIGMOIDOSCOPY FLEXIBLE N/A 6/28/2019    Procedure: Flexible Sigmoidoscopy;  Surgeon: Felicitas Radford MD;  [facility]    SIGMOIDOSCOPY FLEXIBLE N/A 11/1/2019    Procedure: Flexible Sigmoidoscopy;  Surgeon: eFlicitas Radford MD;  [facility]    SIGMOIDOSCOPY FLEXIBLE N/A 7/23/2021    Procedure: SIGMOIDOSCOPY, FLEXIBLE;  Surgeon: Felicitas Radford MD;  [facility]    SIGMOIDOSCOPY FLEXIBLE N/A 7/1/2022    Procedure: SIGMOIDOSCOPY, FLEXIBLE, EUA;  Surgeon: Felicitas Radford MD;  [facility]    SIGMOIDOSCOPY FLEXIBLE N/A 7/28/2023    Procedure: SIGMOIDOSCOPY, FLEXIBLE (PEDIATRIC SCOPE NEEDED);  Surgeon: Felicitas Radford MD;  [facility]: [facility]    TESTICLE SURGERY      VASCULAR SURGERY      Right chest port    VASECTOMY      VASECTOMY          Family History   Problem Relation Age of Onset    Colon Polyps Mother         Number and type of polyps unknown    Chronic Obstructive Pulmonary Disease Father     Hypertension Father     Hyperlipidemia Father     Diabetes Maternal Grandfather     Macular Degeneration Paternal Grandmother     Hypertension Paternal Grandmother     Hyperlipidemia Paternal Grandmother     Cancer Brother         primary of unknown origin    Other Cancer Brother     Family History Negative Brother         negative colonoscopy    Stomach Cancer Other 63        maternal great grandfather    Glaucoma No family hx of     Anesthesia Reaction No family hx of     Deep Vein Thrombosis (DVT) No family hx of        Social  History     Socioeconomic History    Marital status:      Spouse name: Not on file    Number of children: 2    Years of education: Not on file    Highest education level: Not on file   Occupational History    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)      Social Determinants of Health     Financial Resource Strain: Not on file   Food Insecurity: Not on file   Transportation Needs: Not on file   Physical Activity: Not on file   Stress: Not on file   Social Connections: Not on file   Interpersonal Safety: Not on file   Housing Stability: Not on file       Outpatient Encounter Medications as of 10/25/2023   Medication Sig Dispense Refill    atorvastatin (LIPITOR) 40 MG tablet Take 0.5 tablets (20 mg) by mouth daily (Patient taking differently: Take 20 mg by mouth every evening) 90 tablet 3    Probiotic Product (PROBIOTIC PO) Take 1 capsule by mouth as needed      sildenafil (VIAGRA) 100 MG tablet Take 1 tablet (100 mg) by mouth daily as needed 30 tablet 11     No facility-administered encounter medications on file as of 10/25/2023.             O:   NAD, WDWN, Alert & Oriented, Mood & Affect wnl, Vitals stable   General appearance normal   Vitals stable   Alert, oriented and in no acute distress     R upper lip well healed with some bumps      Eyes: Conjunctivae/lids:Normal     ENT: Lips, buccal mucosa, tongue: normal    MSK:Normal    Cardiovascular: peripheral edema none    Pulm: Breathing Normal    Neuro/Psych: Orientation:Alert and  Orientedx3 ; Mood/Affect:normal       A/P:  Hx of non-melanoma skin cancer healing well  Abrasion and ndyag discussed with patient   Will cont home massage  Return to clinic 3 months  It was a pleasure speaking to Louie Greco today.

## 2023-11-09 ENCOUNTER — PATIENT OUTREACH (OUTPATIENT)
Dept: GASTROENTEROLOGY | Facility: CLINIC | Age: 63
End: 2023-11-09
Payer: COMMERCIAL

## 2024-01-06 ENCOUNTER — LAB (OUTPATIENT)
Dept: LAB | Facility: CLINIC | Age: 64
End: 2024-01-06
Payer: COMMERCIAL

## 2024-01-06 DIAGNOSIS — E78.5 HYPERLIPIDEMIA LDL GOAL <70: ICD-10-CM

## 2024-01-06 LAB
CHOLEST SERPL-MCNC: 115 MG/DL
FASTING STATUS PATIENT QL REPORTED: YES
HDLC SERPL-MCNC: 37 MG/DL
LDLC SERPL CALC-MCNC: 66 MG/DL
NONHDLC SERPL-MCNC: 78 MG/DL
TRIGL SERPL-MCNC: 60 MG/DL

## 2024-01-06 PROCEDURE — 80061 LIPID PANEL: CPT

## 2024-01-06 PROCEDURE — 36415 COLL VENOUS BLD VENIPUNCTURE: CPT

## 2024-01-08 NOTE — RESULT ENCOUNTER NOTE
The following letter pertains to your most recent diagnostic tests:    Nice improvement in cholesterol.  Please keep taking the atorvastatin (Lipitor) as you are.        Sincerely,    Dr. Healy

## 2024-04-03 ENCOUNTER — TELEPHONE (OUTPATIENT)
Dept: SURGERY | Facility: CLINIC | Age: 64
End: 2024-04-03
Payer: COMMERCIAL

## 2024-04-03 DIAGNOSIS — C20 RECTAL CANCER (H): Primary | ICD-10-CM

## 2024-04-03 NOTE — TELEPHONE ENCOUNTER
Patient is scheduled for a surgery/procedure with Dr. Felicitas Radford     Spoke with: Louie    Scheduled DOS Fri 7/5/2024    [facility]    Informed patient they will need an adult  YES    Pre op with Provider (return patient of Dr. Felicitas Radford)    H&P: Scheduled with PAC Video Visit    Surgery/Proceedure packet: will send via [employer] only once DOS is confirmed     Trixie Spence  Marcie-op Coordinator  [facility]  Direct Phone: [phone]

## 2024-04-03 NOTE — TELEPHONE ENCOUNTER
FUTURE VISIT INFORMATION      SURGERY INFORMATION:  Date: TBD- colon surg    RECORDS REQUESTED FROM:       Primary Care Provider:   Philippe Healy MD- [facility]     Most recent EKG+ [id number]

## 2024-04-04 DIAGNOSIS — C20 RECTAL CANCER (H): Primary | ICD-10-CM

## 2024-04-04 RX ORDER — SODIUM, POTASSIUM,MAG SULFATES 17.5-3.13G
1 SOLUTION, RECONSTITUTED, ORAL ORAL SEE ADMIN INSTRUCTIONS
Qty: 354 ML | Refills: 0 | Status: SHIPPED | OUTPATIENT
Start: 2024-04-04

## 2024-04-13 ENCOUNTER — MYC MEDICAL ADVICE (OUTPATIENT)
Dept: FAMILY MEDICINE | Facility: CLINIC | Age: 64
End: 2024-04-13
Payer: COMMERCIAL

## 2024-04-13 DIAGNOSIS — R97.20 ELEVATED PROSTATE SPECIFIC ANTIGEN (PSA): Primary | ICD-10-CM

## 2024-04-13 DIAGNOSIS — E78.5 HYPERLIPIDEMIA LDL GOAL <70: ICD-10-CM

## 2024-06-11 ENCOUNTER — TELEPHONE (OUTPATIENT)
Dept: FAMILY MEDICINE | Facility: CLINIC | Age: 64
End: 2024-06-11
Payer: COMMERCIAL

## 2024-06-11 NOTE — TELEPHONE ENCOUNTER
Reason for Call:  Appointment Request    Patient requesting this type of appt:  appointment sooner then December.     Requested provider: Philippe Healy    Reason patient unable to be scheduled: Not within requested timeframe    When does patient want to be seen/preferred time: 3-7 days    Comments: Having hip pain for the last week. Had colon cancer in the  past and is concern of the symptoms..     Could we send this information to you in [employer] or would you prefer to receive a phone call?:   Patient would like to be contacted via [employer]    Call taken on 6/11/2024 at 12:12 PM by Neisha Green

## 2024-06-11 NOTE — TELEPHONE ENCOUNTER
Scheduled pt    Next 5 appointments (look out 90 days)      Jun 13, 2024 11:30 AM  (Arrive by 11:10 AM)  Provider Visit with Philippe Healy MD  [facility] ([facility] ) [address] [facility], [address]  [facility] [place] [address]  [phone]          Rodney Beebe CMA on 6/11/2024 at 1:40 PM     Doxepin Counseling:  Patient advised that the medication is sedating and not to drive a car after taking this medication. Patient informed of potential adverse effects including but not limited to dry mouth, urinary retention, and blurry vision.  The patient verbalized understanding of the proper use and possible adverse effects of doxepin.  All of the patient's questions and concerns were addressed.

## 2024-06-13 ENCOUNTER — ANCILLARY PROCEDURE (OUTPATIENT)
Dept: GENERAL RADIOLOGY | Facility: CLINIC | Age: 64
End: 2024-06-13
Attending: INTERNAL MEDICINE
Payer: COMMERCIAL

## 2024-06-13 ENCOUNTER — OFFICE VISIT (OUTPATIENT)
Dept: FAMILY MEDICINE | Facility: CLINIC | Age: 64
End: 2024-06-13
Payer: COMMERCIAL

## 2024-06-13 VITALS
WEIGHT: 227.9 LBS | SYSTOLIC BLOOD PRESSURE: 123 MMHG | OXYGEN SATURATION: 94 % | BODY MASS INDEX: 32.63 KG/M2 | HEART RATE: 91 BPM | TEMPERATURE: 99.5 F | HEIGHT: 70 IN | RESPIRATION RATE: 18 BRPM | DIASTOLIC BLOOD PRESSURE: 87 MMHG

## 2024-06-13 DIAGNOSIS — R97.20 ELEVATED PROSTATE SPECIFIC ANTIGEN (PSA): ICD-10-CM

## 2024-06-13 DIAGNOSIS — E78.5 HYPERLIPIDEMIA LDL GOAL <70: ICD-10-CM

## 2024-06-13 DIAGNOSIS — M25.551 HIP PAIN, RIGHT: ICD-10-CM

## 2024-06-13 DIAGNOSIS — Z00.00 ROUTINE GENERAL MEDICAL EXAMINATION AT A HEALTH CARE FACILITY: Primary | ICD-10-CM

## 2024-06-13 DIAGNOSIS — Z11.4 SCREENING FOR HIV (HUMAN IMMUNODEFICIENCY VIRUS): ICD-10-CM

## 2024-06-13 DIAGNOSIS — Z12.5 SCREENING FOR PROSTATE CANCER: ICD-10-CM

## 2024-06-13 DIAGNOSIS — C20 RECTAL CANCER (H): ICD-10-CM

## 2024-06-13 LAB
ERYTHROCYTE [DISTWIDTH] IN BLOOD BY AUTOMATED COUNT: 13.1 % (ref 10–15)
HBA1C MFR BLD: 5.4 % (ref 0–5.6)
HCT VFR BLD AUTO: 46.8 % (ref 40–53)
HGB BLD-MCNC: 15.1 G/DL (ref 13.3–17.7)
MCH RBC QN AUTO: 28.5 PG (ref 26.5–33)
MCHC RBC AUTO-ENTMCNC: 32.3 G/DL (ref 31.5–36.5)
MCV RBC AUTO: 88 FL (ref 78–100)
PLATELET # BLD AUTO: 203 10E3/UL (ref 150–450)
RBC # BLD AUTO: 5.3 10E6/UL (ref 4.4–5.9)
WBC # BLD AUTO: 6.1 10E3/UL (ref 4–11)

## 2024-06-13 PROCEDURE — 2894A PSA TUMOR MARKER: CPT | Performed by: INTERNAL MEDICINE

## 2024-06-13 PROCEDURE — 99396 PREV VISIT EST AGE 40-64: CPT | Performed by: INTERNAL MEDICINE

## 2024-06-13 PROCEDURE — G0103 PSA SCREENING: HCPCS | Performed by: INTERNAL MEDICINE

## 2024-06-13 PROCEDURE — 80061 LIPID PANEL: CPT | Performed by: INTERNAL MEDICINE

## 2024-06-13 PROCEDURE — 83036 HEMOGLOBIN GLYCOSYLATED A1C: CPT | Performed by: INTERNAL MEDICINE

## 2024-06-13 PROCEDURE — 85027 COMPLETE CBC AUTOMATED: CPT | Performed by: INTERNAL MEDICINE

## 2024-06-13 PROCEDURE — 73502 X-RAY EXAM HIP UNI 2-3 VIEWS: CPT | Mod: TC | Performed by: RADIOLOGY

## 2024-06-13 PROCEDURE — 87389 HIV-1 AG W/HIV-1&-2 AB AG IA: CPT | Performed by: INTERNAL MEDICINE

## 2024-06-13 PROCEDURE — 36415 COLL VENOUS BLD VENIPUNCTURE: CPT | Performed by: INTERNAL MEDICINE

## 2024-06-13 PROCEDURE — 80053 COMPREHEN METABOLIC PANEL: CPT | Performed by: INTERNAL MEDICINE

## 2024-06-13 SDOH — HEALTH STABILITY: PHYSICAL HEALTH: ON AVERAGE, HOW MANY DAYS PER WEEK DO YOU ENGAGE IN MODERATE TO STRENUOUS EXERCISE (LIKE A BRISK WALK)?: 5 DAYS

## 2024-06-13 SDOH — HEALTH STABILITY: PHYSICAL HEALTH: ON AVERAGE, HOW MANY MINUTES DO YOU ENGAGE IN EXERCISE AT THIS LEVEL?: 40 MIN

## 2024-06-13 ASSESSMENT — SOCIAL DETERMINANTS OF HEALTH (SDOH): HOW OFTEN DO YOU GET TOGETHER WITH FRIENDS OR RELATIVES?: MORE THAN THREE TIMES A WEEK

## 2024-06-13 ASSESSMENT — PAIN SCALES - GENERAL: PAINLEVEL: NO PAIN (0)

## 2024-06-13 NOTE — PROGRESS NOTES
"Preventive Care Visit  [facility]  Philippe Healy MD, Internal Medicine  Jun 13, 2024      Assessment & Plan     Routine general medical examination at a health care facility    - Lipid panel reflex to direct LDL Fasting; Future  - Comprehensive metabolic panel; Future  - CBC with platelets; Future  - HEMOGLOBIN A1C; Future  - Comprehensive metabolic panel  - CBC with platelets  - HEMOGLOBIN A1C    Rectal cancer (H)  Continue follow up with colorectal surgery as directed for surveillance     Screening for prostate cancer    - PROSTATE SPEC ANTIGEN SCREEN; Future  - PROSTATE SPEC ANTIGEN SCREEN    Screening for HIV (human immunodeficiency virus)    - HIV Antigen Antibody Combo; Future  - HIV Antigen Antibody Combo    Hip pain, right  Check x-ray  Consider PT if pain persistent and x-ray not diagnostic   - XR Hip Right 2-3 Views; Future    Elevated prostate specific antigen (PSA)    - PSA tumor marker    Hyperlipidemia LDL goal <70    - Lipid panel reflex to direct LDL Fasting            BMI  Estimated body mass index is 32.7 kg/m  as calculated from the following:    Height as of this encounter: 1.778 m (5' 10\").    Weight as of this encounter: 103.4 kg (227 lb 14.4 oz).   Weight management plan: Discussed healthy diet and exercise guidelines    Counseling  Appropriate preventive services were discussed with this patient, including applicable screening as appropriate for fall prevention, nutrition, physical activity, Tobacco-use cessation, weight loss and cognition.  Checklist reviewing preventive services available has been given to the patient.  Reviewed patient's diet, addressing concerns and/or questions.       FUTURE APPOINTMENTS:       - Follow-up for annual visit or as needed    Kim Palma is a 64 year old, presenting for the following:  Physical and Musculoskeletal Problem (Hip , right , onset 2 weeks, score 4)        6/13/2024    11:27 AM   Additional Questions   [email or web address]       [facility] Directive  Patient does not have a Health Care Directive or Living Will: Discussed advance care planning with patient; information given to patient to review.    HPI        Hip pain for 1-2 wks, no trauma   Walking differently due to plantar fasciitis   Right lower back , buttock and lateral hip  Mild and intermittent         6/13/2024   General Health   How would you rate your overall physical health? Good   Feel stress (tense, anxious, or unable to sleep) Very much   (!) STRESS CONCERN      6/13/2024   Nutrition   Three or more servings of calcium each day? Yes   Diet: Regular (no restrictions)   How many servings of fruit and vegetables per day? (!) 2-3   How many sweetened beverages each day? 0-1         6/13/2024   Exercise   Days per week of moderate/strenous exercise 5 days   Average minutes spent exercising at this level 40 min         6/13/2024   Social Factors   Frequency of gathering with friends or relatives More than three times a week   Worry food won't last until get money to buy more No   Food not last or not have enough money for food? No   Do you have housing?  Yes   Are you worried about losing your housing? No   Lack of transportation? No   Unable to get utilities (heat,electricity)? No         6/13/2024   Fall Risk   Fallen 2 or more times in the past year? No   Trouble with walking or balance? Yes           6/13/2024   Dental   Dentist two times every year? Yes         6/13/2024   TB Screening   Were you born outside of the [place]? No         Today's PHQ-2 Score:       6/13/2024     8:25 AM   PHQ-2 ( 1999 [employer])   Q1: Little interest or pleasure in doing things 1   Q2: Feeling down, depressed or hopeless 1   PHQ-2 Score 2   Q1: Little interest or pleasure in doing things Several days   Q2: Feeling down, depressed or hopeless Several days   PHQ-2 Score 2           6/13/2024   Substance Use   Alcohol more than 3/day or more than 7/wk No   Do you use any other substances  recreationally? No     Social History     Tobacco Use    Smoking status: Never     Passive exposure: Never    Smokeless tobacco: Never   Substance Use Topics    Alcohol use: Not Currently     Comment: 1 drink every 6 month    Drug use: No             6/13/2024   One time HIV Screening   Previous HIV test? I don't know         6/13/2024   STI Screening   New sexual partner(s) since last STI/HIV test? No   Last PSA:   PSA   Date Value Ref Range Status   06/17/2021 4.92 (H) 0 - 4 ug/L Final     Comment:     Assay Method:  Chemiluminescence using Siemens Vista analyzer     PSA Tumor Marker   Date Value Ref Range Status   06/26/2023 3.62 0.00 - 4.50 ng/mL Final   04/19/2022 5.23 (H) 0.00 - 4.00 ug/L Final     ASCVD Risk   The ASCVD Risk score (Debra JIMENES, et al., 2019) failed to calculate for the following reasons:    The valid total cholesterol range is 130 to 320 mg/dL           Reviewed and updated as needed this visit by Provider                    Past Medical History:   Diagnosis Date    Arthritis     Hands    Childhood asthma     Elevated prostate specific antigen (PSA)     No biopsy done (had rectal cancer at the time)    Epididymitis, bilateral     18 years old    Inguinal hernia     Kidney stone on left side 04/22/2021    Added automatically from request for surgery [id number]    Mumps     Nephrolithiasis     Several -- 1990 first,recurrence 2019, 2021    Rectal cancer (H) 2017    low rectal cancer, S/P Rad adn Chemo, no surg needed    Right 4 mm Kidney stone at [facility]  02/28/2021    Right ureteral stone 03/11/2021    Added automatically from request for surgery [id number]    Fannie      Past Surgical History:   Procedure Laterality Date    BIOPSY  ongoing    cancer    COLONOSCOPY N/A 07/27/2016    Procedure: COMBINED COLONOSCOPY, SINGLE OR MULTIPLE BIOPSY/POLYPECTOMY BY BIOPSY;  Surgeon: Chelsea Thompson MD;  Location:  GI    COLONOSCOPY N/A 09/13/2017    Procedure: COLONOSCOPY;;  Surgeon:  Felicitas Radford MD;  [facility]    COLONOSCOPY N/A 12/13/2017    Procedure: COLONOSCOPY;;  Surgeon: Felicitas Radford MD;  [facility]    COLONOSCOPY N/A 10/23/2020    Procedure: COLONOSCOPY;  Surgeon: Felicitas Radford MD;  Location: [facility]    COMBINED CYSTOSCOPY, RETROGRADES, URETEROSCOPY, LASER HOLMIUM LITHOTRIPSY URETER(S), INSERT STENT Left 02/16/2018    Procedure: COMBINED CYSTOSCOPY, RETROGRADES, URETEROSCOPY, LASER HOLMIUM LITHOTRIPSY URETER(S), INSERT STENT;  Cysto, left ureteroscopy, holmium laser, retrogrades, stent placement;  Surgeon: Bola Worthy MD;  [facility]    COMBINED CYSTOSCOPY, RETROGRADES, URETEROSCOPY, LASER HOLMIUM LITHOTRIPSY URETER(S), INSERT STENT Right 03/22/2021    Procedure: CYSTOSCOPY WITH RIGHT RETROGRADE PYELOGRAM, RIGHT URETEROSCOPY WITH LASER LITHOTRIPSY AND BASKET REMOVAL OF STONE, RIGHT URETERAL STENT PLACEMENT;  Surgeon: Mohsen Pat MD;  [facility]    COMBINED CYSTOSCOPY, RETROGRADES, URETEROSCOPY, LASER HOLMIUM LITHOTRIPSY URETER(S), INSERT STENT Left 05/19/2021    Procedure: CYSTOSCOPY, LEFT RETROGRADE PYELOGRAM, LEFT DIAGNOSTIC, URETEROSCOPY, LEFT URETERAL STENT PLACEMENT;  Surgeon: Mohsen Pat MD;  [facility]    COMBINED CYSTOSCOPY, RETROGRADES, URETEROSCOPY, LASER HOLMIUM LITHOTRIPSY URETER(S), INSERT STENT Left 06/23/2021    Procedure: CYSTOSCOPY, LEFT URETERAL STENT REMOVAL, LEFT RETROGRADE PYELOGRAM, LEFT URETEROSCOPY WITH LASER LITHOTRIPSY AND BASKET REMOVAL OF STONES, LEFT URETERAL STENT PLACEMENT;  Surgeon: Mohsen Pat MD;  [facility]    CYSTOSCOPY, LITHOLAPAXY, COMBINED N/A 03/01/2021    Procedure: Cystoscopy, litholapaxy, combined;  Surgeon: Mohsen Pat MD;  [facility]    CYSTOSCOPY, RETROGRADES, INSERT STENT URETER(S), COMBINED Right 03/01/2021    Procedure: Cystoscopy, retrogrades, insert stent ureter(s), combined;  Surgeon: Mohsen Pat MD;  [facility]     EXAM UNDER ANESTHESIA ANUS N/A 07/05/2017    Procedure: EXAM UNDER ANESTHESIA ANUS;  Examination Under Anesthesia, flex sigmoidoscopy with biopsies and formalin application;  Surgeon: Felicitas Radford MD;  [facility]    EXAM UNDER ANESTHESIA ANUS N/A 09/13/2017    Procedure: EXAM UNDER ANESTHESIA ANUS;  Examination Under Anesthesia Anus, Colonoscopy, application of formalin;  Surgeon: Felicitas Radford MD;  [facility]    EXAM UNDER ANESTHESIA ANUS N/A 12/13/2017    Procedure: EXAM UNDER ANESTHESIA ANUS;  Examination Under Anesthesia Anus, Colonoscopy;  Surgeon: Felicitas Radfodr MD;  [facility]    EXAM UNDER ANESTHESIA ANUS N/A 05/11/2018    Procedure: EXAM UNDER ANESTHESIA ANUS;  Examination Under Anesthesia Anus, Interoperative Flexible Sigmoidoscopy, Application of Formalin;  Surgeon: Felicitas Radford MD;  [facility]    EXAM UNDER ANESTHESIA ANUS N/A 07/20/2018    Procedure: EXAM UNDER ANESTHESIA ANUS;  Examination Under Anesthesia Anus, Flexible Sigmoidoscopy ;  Surgeon: Felicitas Radford MD;  [facility]    EXAM UNDER ANESTHESIA ANUS N/A 11/30/2018    Procedure: Examination Under Anesthesia, application of formalin to rectum, polypectomy;  Surgeon: Felicitas Radford MD;  [facility]    EXAM UNDER ANESTHESIA ANUS N/A 02/22/2019    Procedure: Examination Under Anesthesia;  Surgeon: Felicitas Radford MD;  [facility]    EXAM UNDER ANESTHESIA ANUS N/A 06/28/2019    Procedure: Examination Under Anesthesia;  Surgeon: Felicitas Radford MD;  [facility]    EXAM UNDER ANESTHESIA ANUS N/A 11/01/2019    Procedure: Examination Under Anesthesia;  Surgeon: Felicitas Radford MD;  [facility]    EXAM UNDER ANESTHESIA RECTUM N/A 10/23/2020    Procedure: Exam under anesthesia rectum;  Surgeon: Felicitas Radford MD;  Location: [facility]    HC TOOTH EXTRACTION W/FORCEP      LAPAROSCOPIC APPENDECTOMY N/A  07/17/2017    Procedure: LAPAROSCOPIC APPENDECTOMY;  LAPAROSCOPIC APPENDECTOMY;  Surgeon: Bryson Ferguson MD;  [facility]    LASER HOLMIUM LITHOTRIPSY URETER(S), INSERT STENT, COMBINED Right 03/01/2021    Procedure: CYSTOURETEROSCOPY,  URETERAL STENT INSERTION, RIGHT RETROGRADE;  Surgeon: Mohsen Pat MD;  [facility]    SIGMOIDOSCOPY FLEXIBLE N/A 12/08/2016    Procedure: SIGMOIDOSCOPY FLEXIBLE;  Surgeon: Felicitas Radford MD;  [facility]    SIGMOIDOSCOPY FLEXIBLE N/A 07/05/2017    Procedure: SIGMOIDOSCOPY FLEXIBLE;;  Surgeon: Felicitas Radford MD;  [facility]    SIGMOIDOSCOPY FLEXIBLE N/A 05/11/2018    Procedure: SIGMOIDOSCOPY FLEXIBLE;;  Surgeon: Felicitas Radford MD;  [facility]    SIGMOIDOSCOPY FLEXIBLE N/A 07/20/2018    Procedure: SIGMOIDOSCOPY FLEXIBLE;;  Surgeon: Felicitas Radford MD;  [facility]    SIGMOIDOSCOPY FLEXIBLE N/A 11/30/2018    Procedure: Flexible Sigmoidoscopy;  Surgeon: Felicitas Radford MD;  [facility]    SIGMOIDOSCOPY FLEXIBLE N/A 02/22/2019    Procedure: Intraoperative Flexible Sigmoidoscopy, Application of Formalin to rectum;  Surgeon: Felicitas Radford MD;  [facility]    SIGMOIDOSCOPY FLEXIBLE N/A 06/28/2019    Procedure: Flexible Sigmoidoscopy;  Surgeon: Felicitas Radford MD;  [facility]    SIGMOIDOSCOPY FLEXIBLE N/A 11/01/2019    Procedure: Flexible Sigmoidoscopy;  Surgeon: Felicitas Radford MD;  [facility]    SIGMOIDOSCOPY FLEXIBLE N/A 07/23/2021    Procedure: SIGMOIDOSCOPY, FLEXIBLE;  Surgeon: Felicitas Radford MD;  [facility]    SIGMOIDOSCOPY FLEXIBLE N/A 07/01/2022    Procedure: SIGMOIDOSCOPY, FLEXIBLE, EUA;  Surgeon: Felicitas Radford MD;  [facility]    SIGMOIDOSCOPY FLEXIBLE N/A 07/28/2023    Procedure: SIGMOIDOSCOPY, FLEXIBLE (PEDIATRIC SCOPE NEEDED);  Surgeon: Felicitas Radford MD;  Location: [facility]    TESTICLE  SURGERY      VASCULAR SURGERY      Right chest port    VASECTOMY      VASECTOMY       BP Readings from Last 3 Encounters:   06/13/24 123/87   07/28/23 118/65   07/27/23 121/76    Wt Readings from Last 3 Encounters:   06/13/24 103.4 kg (227 lb 14.4 oz)   07/28/23 99.3 kg (219 lb)   07/27/23 99.3 kg (219 lb)                  Patient Active Problem List   Diagnosis    Pes anserinus tendinitis    Plantar fasciitis    Rectal Cancer, S/P Rad & Chemo 2017 -- no recurrence    Advance Care Planning    Elevated prostate specific antigen (PSA)    Rectal cancer (H)    Renal Cysts bilaterally    Recurrent kidney stones    Drug-induced polyneuropathy (H24)     Past Surgical History:   Procedure Laterality Date    BIOPSY  ongoing    cancer    COLONOSCOPY N/A 07/27/2016    Procedure: COMBINED COLONOSCOPY, SINGLE OR MULTIPLE BIOPSY/POLYPECTOMY BY BIOPSY;  Surgeon: Chelsea Thompson MD;  Location:  GI    COLONOSCOPY N/A 09/13/2017    Procedure: COLONOSCOPY;;  Surgeon: Felicitas Radford MD;  [facility]    COLONOSCOPY N/A 12/13/2017    Procedure: COLONOSCOPY;;  Surgeon: Felicitas Radford MD;  [facility]    COLONOSCOPY N/A 10/23/2020    Procedure: COLONOSCOPY;  Surgeon: Felicitas Radford MD;  Location: [facility]    COMBINED CYSTOSCOPY, RETROGRADES, URETEROSCOPY, LASER HOLMIUM LITHOTRIPSY URETER(S), INSERT STENT Left 02/16/2018    Procedure: COMBINED CYSTOSCOPY, RETROGRADES, URETEROSCOPY, LASER HOLMIUM LITHOTRIPSY URETER(S), INSERT STENT;  Cysto, left ureteroscopy, holmium laser, retrogrades, stent placement;  Surgeon: Bola Worthy MD;  Location:  OR    COMBINED CYSTOSCOPY, RETROGRADES, URETEROSCOPY, LASER HOLMIUM LITHOTRIPSY URETER(S), INSERT STENT Right 03/22/2021    Procedure: CYSTOSCOPY WITH RIGHT RETROGRADE PYELOGRAM, RIGHT URETEROSCOPY WITH LASER LITHOTRIPSY AND BASKET REMOVAL OF STONE, RIGHT URETERAL STENT PLACEMENT;  Surgeon: Mohsen Pat MD;  Location:  OR     COMBINED CYSTOSCOPY, RETROGRADES, URETEROSCOPY, LASER HOLMIUM LITHOTRIPSY URETER(S), INSERT STENT Left 05/19/2021    Procedure: CYSTOSCOPY, LEFT RETROGRADE PYELOGRAM, LEFT DIAGNOSTIC, URETEROSCOPY, LEFT URETERAL STENT PLACEMENT;  Surgeon: Mohsen Pat MD;  [facility]    COMBINED CYSTOSCOPY, RETROGRADES, URETEROSCOPY, LASER HOLMIUM LITHOTRIPSY URETER(S), INSERT STENT Left 06/23/2021    Procedure: CYSTOSCOPY, LEFT URETERAL STENT REMOVAL, LEFT RETROGRADE PYELOGRAM, LEFT URETEROSCOPY WITH LASER LITHOTRIPSY AND BASKET REMOVAL OF STONES, LEFT URETERAL STENT PLACEMENT;  Surgeon: Mohsen Pat MD;  [facility]    CYSTOSCOPY, LITHOLAPAXY, COMBINED N/A 03/01/2021    Procedure: Cystoscopy, litholapaxy, combined;  Surgeon: Mohsen Pat MD;  [facility]    CYSTOSCOPY, RETROGRADES, INSERT STENT URETER(S), COMBINED Right 03/01/2021    Procedure: Cystoscopy, retrogrades, insert stent ureter(s), combined;  Surgeon: Mohsen Pat MD;  [facility]    EXAM UNDER ANESTHESIA ANUS N/A 07/05/2017    Procedure: EXAM UNDER ANESTHESIA ANUS;  Examination Under Anesthesia, flex sigmoidoscopy with biopsies and formalin application;  Surgeon: Felicitas Radford MD;  [facility]    EXAM UNDER ANESTHESIA ANUS N/A 09/13/2017    Procedure: EXAM UNDER ANESTHESIA ANUS;  Examination Under Anesthesia Anus, Colonoscopy, application of formalin;  Surgeon: Felicitas Radford MD;  [facility]    EXAM UNDER ANESTHESIA ANUS N/A 12/13/2017    Procedure: EXAM UNDER ANESTHESIA ANUS;  Examination Under Anesthesia Anus, Colonoscopy;  Surgeon: Felicitas Radford MD;  [facility]    EXAM UNDER ANESTHESIA ANUS N/A 05/11/2018    Procedure: EXAM UNDER ANESTHESIA ANUS;  Examination Under Anesthesia Anus, Interoperative Flexible Sigmoidoscopy, Application of Formalin;  Surgeon: Felicitas Radford MD;  [facility]    EXAM UNDER ANESTHESIA ANUS N/A 07/20/2018    Procedure: EXAM UNDER ANESTHESIA ANUS;   Examination Under Anesthesia Anus, Flexible Sigmoidoscopy ;  Surgeon: Felicitas Radford MD;  [facility]    EXAM UNDER ANESTHESIA ANUS N/A 11/30/2018    Procedure: Examination Under Anesthesia, application of formalin to rectum, polypectomy;  Surgeon: Felicitas Radford MD;  [facility]    EXAM UNDER ANESTHESIA ANUS N/A 02/22/2019    Procedure: Examination Under Anesthesia;  Surgeon: Felicitas Radford MD;  [facility]    EXAM UNDER ANESTHESIA ANUS N/A 06/28/2019    Procedure: Examination Under Anesthesia;  Surgeon: Felicitas Radford MD;  [facility]    EXAM UNDER ANESTHESIA ANUS N/A 11/01/2019    Procedure: Examination Under Anesthesia;  Surgeon: Felicitas Radford MD;  [facility]    EXAM UNDER ANESTHESIA RECTUM N/A 10/23/2020    Procedure: Exam under anesthesia rectum;  Surgeon: Felicitas Radford MD;  [facility]    HC TOOTH EXTRACTION W/FORCEP      LAPAROSCOPIC APPENDECTOMY N/A 07/17/2017    Procedure: LAPAROSCOPIC APPENDECTOMY;  LAPAROSCOPIC APPENDECTOMY;  Surgeon: Bryson Ferguson MD;  [facility]    LASER HOLMIUM LITHOTRIPSY URETER(S), INSERT STENT, COMBINED Right 03/01/2021    Procedure: CYSTOURETEROSCOPY,  URETERAL STENT INSERTION, RIGHT RETROGRADE;  Surgeon: Mohsen Pat MD;  [facility]    SIGMOIDOSCOPY FLEXIBLE N/A 12/08/2016    Procedure: SIGMOIDOSCOPY FLEXIBLE;  Surgeon: Felicitas Radford MD;  [facility]    SIGMOIDOSCOPY FLEXIBLE N/A 07/05/2017    Procedure: SIGMOIDOSCOPY FLEXIBLE;;  Surgeon: Felicitas Radford MD;  [facility]    SIGMOIDOSCOPY FLEXIBLE N/A 05/11/2018    Procedure: SIGMOIDOSCOPY FLEXIBLE;;  Surgeon: Felicitas Radford MD;  [facility]    SIGMOIDOSCOPY FLEXIBLE N/A 07/20/2018    Procedure: SIGMOIDOSCOPY FLEXIBLE;;  Surgeon: Felicitas Radford MD;  [facility]    SIGMOIDOSCOPY FLEXIBLE N/A 11/30/2018    Procedure: Flexible Sigmoidoscopy;  Surgeon:  Felicitas Radford MD;  [facility]    SIGMOIDOSCOPY FLEXIBLE N/A 02/22/2019    Procedure: Intraoperative Flexible Sigmoidoscopy, Application of Formalin to rectum;  Surgeon: Felicitas Radford MD;  [facility]    SIGMOIDOSCOPY FLEXIBLE N/A 06/28/2019    Procedure: Flexible Sigmoidoscopy;  Surgeon: Felicitas Radford MD;  [facility]    SIGMOIDOSCOPY FLEXIBLE N/A 11/01/2019    Procedure: Flexible Sigmoidoscopy;  Surgeon: Felicitas Radford MD;  [facility]    SIGMOIDOSCOPY FLEXIBLE N/A 07/23/2021    Procedure: SIGMOIDOSCOPY, FLEXIBLE;  Surgeon: Felicitas Radford MD;  [facility]    SIGMOIDOSCOPY FLEXIBLE N/A 07/01/2022    Procedure: SIGMOIDOSCOPY, FLEXIBLE, EUA;  Surgeon: Felicitas Radford MD;  [facility]: [facility]    SIGMOIDOSCOPY FLEXIBLE N/A 07/28/2023    Procedure: SIGMOIDOSCOPY, FLEXIBLE (PEDIATRIC SCOPE NEEDED);  Surgeon: Felicitas Radford MD;  Location: [facility]    TESTICLE SURGERY      VASCULAR SURGERY      Right chest port    VASECTOMY      VASECTOMY         Social History     Tobacco Use    Smoking status: Never     Passive exposure: Never    Smokeless tobacco: Never   Substance Use Topics    Alcohol use: Not Currently     Comment: 1 drink every 6 month     Family History   Problem Relation Age of Onset    Colon Polyps Mother         Number and type of polyps unknown    Chronic Obstructive Pulmonary Disease Father     Hypertension Father     Hyperlipidemia Father     Obesity Father     Diabetes Maternal Grandfather     Macular Degeneration Paternal Grandmother     Hypertension Paternal Grandmother     Hyperlipidemia Paternal Grandmother     Cancer Brother         primary of unknown origin    Other Cancer Brother     Family History Negative Brother         negative colonoscopy    Stomach Cancer Other 63        maternal great grandfather    Diabetes Other     Glaucoma No family hx of     Anesthesia Reaction No family hx of     Deep  "Vein Thrombosis (DVT) No family hx of          Current Outpatient Medications   Medication Sig Dispense Refill    atorvastatin (LIPITOR) 40 MG tablet Take 0.5 tablets (20 mg) by mouth daily (Patient taking differently: Take 20 mg by mouth every evening) 90 tablet 3    Na Sulfate-K Sulfate-Mg Sulf (SUPREP BOWEL PREP KIT) solution Take 177 mLs (1 Bottle) by mouth See Admin Instructions Split dose 2 day Regimen: The evening before you procedure: dilute one bottle with water to a total volume of 16 oz. (up to fill line).  At 6 pm, drink the entire amount.  Drink 32 ounces of water over the next hour. The morning of your procedure repeat both steps above using the second bottle.  Start five hours before you procedure and complete the prep at least three hours before you arrive. 354 mL 0    Probiotic Product (PROBIOTIC PO) Take 1 capsule by mouth as needed      sildenafil (VIAGRA) 100 MG tablet Take 1 tablet (100 mg) by mouth daily as needed 30 tablet 11     Allergies   Allergen Reactions    Ampicillin Diarrhea    Demerol [Meperidine] Nausea            Objective    Exam  /87 (BP Location: Left arm)   Pulse 91   Temp 99.5  F (37.5  C) (Temporal)   Resp 18   Ht 1.778 m (5' 10\")   Wt 103.4 kg (227 lb 14.4 oz)   SpO2 94%   BMI 32.70 kg/m     Estimated body mass index is 32.7 kg/m  as calculated from the following:    Height as of this encounter: 1.778 m (5' 10\").    Weight as of this encounter: 103.4 kg (227 lb 14.4 oz).    Physical Exam  GENERAL: alert and no distress  EYES: Eyes grossly normal to inspection, PERRL and conjunctivae and sclerae normal  HENT: ear canals and TM's normal, nose and mouth without ulcers or lesions  NECK: no adenopathy, no asymmetry, masses, or scars  RESP: lungs clear to auscultation - no rales, rhonchi or wheezes  CV: regular rate and rhythm, normal S1 S2, no S3 or S4, no murmur, click or rub, no peripheral edema  ABDOMEN: soft, nontender, no hepatosplenomegaly, no masses and bowel " sounds normal  MS: no gross musculoskeletal defects noted, no edema; no pain with passive ROM right hip joint, no midline lumbar spine tenderness   SKIN: no suspicious lesions or rashes  NEURO: Normal strength and tone, mentation intact and speech normal  PSYCH: mentation appears normal, affect normal/bright        Signed Electronically by: Philippe Healy MD

## 2024-06-13 NOTE — PATIENT INSTRUCTIONS
"Patient Education   Preventive Care Advice   This is general advice we often give to help people stay healthy. Your care team may have specific advice just for you. Please talk to your care team about your own preventive care needs.  Lifestyle  Exercise at least 150 minutes each week (30 minutes a day, 5 days a week).  Do muscle strengthening activities 2 days a week. These help control your weight and prevent disease.  No smoking.  Wear sunscreen to prevent skin cancer.  Have your home tested for radon every 2 to 5 years. Radon is a colorless, odorless gas that can harm your lungs. To learn more, go to www.health.[facility].mn.us and search for \"Radon in Homes.\"  Keep guns unloaded and locked up in a safe place like a safe or gun vault, or, use a gun lock and hide the keys. Always lock away bullets separately. To learn more, visit [employer].[place].gov and search for \"safe gun storage.\"  Nutrition  Eat 5 or more servings of fruits and vegetables each day.  Try wheat bread, brown rice and whole grain pasta (instead of white bread, rice, and pasta).  Get enough calcium and vitamin D. Check the label on foods and aim for 100% of the RDA (recommended daily allowance).  Regular exams  Have a dental exam and cleaning every 6 months.  See your health care team every year to talk about:  Any changes in your health.  Any medicines your care team has prescribed.  Preventive care, family planning, and ways to prevent chronic diseases.  Shots (vaccines)   HPV shots (up to age 26), if you've never had them before.  Hepatitis B shots (up to age 59), if you've never had them before.  COVID-19 shot: Get this shot when it's due.  Flu shot: Get a flu shot every year.  Tetanus shot: Get a tetanus shot every 10 years.  Pneumococcal, hepatitis A, and RSV shots: Ask your care team if you need these based on your risk.  Shingles shot (for age 50 and up).  General health tests  Diabetes screening:  Starting at age 35, Get screened for diabetes at least " every 3 years.  If you are younger than age 35, ask your care team if you should be screened for diabetes.  Cholesterol test: At age 39, start having a cholesterol test every 5 years, or more often if advised.  Bone density scan (DEXA): At age 50, ask your care team if you should have this scan for osteoporosis (brittle bones).  Hepatitis C: Get tested at least once in your life.  Abdominal aortic aneurysm screening: Talk to your doctor about having this screening if you:  Have ever smoked; and  Are biologically male; and  Are between the ages of 65 and 75.  STIs (sexually transmitted infections)  Before age 24: Ask your care team if you should be screened for STIs.  After age 24: Get screened for STIs if you're at risk. You are at risk for STIs (including HIV) if:  You are sexually active with more than one person.  You don't use condoms every time.  You or a partner was diagnosed with a sexually transmitted infection.  If you are at risk for HIV, ask about PrEP medicine to prevent HIV.  Get tested for HIV at least once in your life, whether you are at risk for HIV or not.  Cancer screening tests  Cervical cancer screening: If you have a cervix, begin getting regular cervical cancer screening tests at age 21. Most people who have regular screenings with normal results can stop after age 65. Talk about this with your provider.  Breast cancer scan (mammogram): If you've ever had breasts, begin having regular mammograms starting at age 40. This is a scan to check for breast cancer.  Colon cancer screening: It is important to start screening for colon cancer at age 45.  Have a colonoscopy test every 10 years (or more often if you're at risk) Or, ask your provider about stool tests like a FIT test every year or Cologuard test every 3 years.  To learn more about your testing options, visit: www.[employer]/487461.pdf.  For help making a decision, visit: [employer]/hh71096.  Prostate cancer screening test: If you have a  prostate and are age 55 to 69, ask your provider if you would benefit from a yearly prostate cancer screening test.  Lung cancer screening: If you are a current or former smoker age 50 to 80, ask your care team if ongoing lung cancer screenings are right for you.  For informational purposes only. Not to replace the advice of your health care provider. Copyright   2023 [facility]. All rights reserved. Clinically reviewed by the  [facility] [facility]. [employer] [id number] - REV 04/24.  Preventing Falls: Care Instructions  Injuries and health problems such as trouble walking or poor eyesight can increase your risk of falling. So can some medicines. But there are things you can do to help prevent falls. You can exercise to get stronger. You can also arrange your home to make it safer.    Talk to your doctor about the medicines you take. Ask if any of them increase the risk of falls and whether they can be changed or stopped.   Try to exercise regularly. It can help improve your strength and balance. This can help lower your risk of falling.     Practice fall safety and prevention.    Wear low-heeled shoes that fit well and give your feet good support. Talk to your doctor if you have foot problems that make this hard.  Carry a cellphone or wear a medical alert device that you can use to call for help.  Use stepladders instead of chairs to reach high objects. Don't climb if you're at risk for falls. Ask for help, if needed.  Wear the correct eyeglasses, if you need them.    Make your home safer.    Remove rugs, cords, clutter, and furniture from walkways.  Keep your house well lit. Use night-lights in hallways and bathrooms.  Install and use sturdy handrails on stairways.  Wear nonskid footwear, even inside. Don't walk barefoot or in socks without shoes.    Be safe outside.    Use handrails, curb cuts, and ramps whenever possible.  Keep your hands free by using a shoulder bag or backpack.  Try  "to walk in well-lit areas. Watch out for uneven ground, changes in pavement, and debris.  Be careful in the winter. Walk on the grass or gravel when sidewalks are slippery. Use de-icer on steps and walkways. Add non-slip devices to shoes.    Put grab bars and nonskid mats in your shower or tub and near the toilet. Try to use a shower chair or bath bench when bathing.   Get into a tub or shower by putting in your weaker leg first. Get out with your strong side first. Have a phone or medical alert device in the bathroom with you.   Where can you learn more?  Go to https://www.[employer].net/patiented  Enter G117 in the search box to learn more about \"Preventing Falls: Care Instructions.\"  Current as of: July 17, 2023               Content Version: 14.0    2113-3582 [employer].   Care instructions adapted under license by your healthcare professional. If you have questions about a medical condition or this instruction, always ask your healthcare professional. [employer] disclaims any warranty or liability for your use of this information.      Learning About Stress  What is stress?     Stress is your body's response to a hard situation. Your body can have a physical, emotional, or mental response. Stress is a fact of life for most people, and it affects everyone differently. What causes stress for you may not be stressful for someone else.  A lot of things can cause stress. You may feel stress when you go on a job interview, take a test, or run a race. This kind of short-term stress is normal and even useful. It can help you if you need to work hard or react quickly. For example, stress can help you finish an important job on time.  Long-term stress is caused by ongoing stressful situations or events. Examples of long-term stress include long-term health problems, ongoing problems at work, or conflicts in your family. Long-term stress can harm your health.  How does stress affect your " health?  When you are stressed, your body responds as though you are in danger. It makes hormones that speed up your heart, make you breathe faster, and give you a burst of energy. This is called the fight-or-flight stress response. If the stress is over quickly, your body goes back to normal and no harm is done.  But if stress happens too often or lasts too long, it can have bad effects. Long-term stress can make you more likely to get sick, and it can make symptoms of some diseases worse. If you tense up when you are stressed, you may develop neck, shoulder, or low back pain. Stress is linked to high blood pressure and heart disease.  Stress also harms your emotional health. It can make you hartman, tense, or depressed. Your relationships may suffer, and you may not do well at work or school.  What can you do to manage stress?  You can try these things to help manage stress:   Do something active. Exercise or activity can help reduce stress. Walking is a great way to get started. Even everyday activities such as housecleaning or yard work can help.  Try yoga or felicia chi. These techniques combine exercise and meditation. You may need some training at first to learn them.  Do something you enjoy. For example, listen to music or go to a movie. Practice your hobby or do volunteer work.  Meditate. This can help you relax, because you are not worrying about what happened before or what may happen in the future.  Do guided imagery. Imagine yourself in any setting that helps you feel calm. You can use online videos, books, or a teacher to guide you.  Do breathing exercises. For example:  From a standing position, bend forward from the waist with your knees slightly bent. Let your arms dangle close to the floor.  Breathe in slowly and deeply as you return to a standing position. Roll up slowly and lift your head last.  Hold your breath for just a few seconds in the standing position.  Breathe out slowly and bend forward from  "the waist.  Let your feelings out. Talk, laugh, cry, and express anger when you need to. Talking with supportive friends or family, a counselor, or a talia leader about your feelings is a healthy way to relieve stress. Avoid discussing your feelings with people who make you feel worse.  Write. It may help to write about things that are bothering you. This helps you find out how much stress you feel and what is causing it. When you know this, you can find better ways to cope.  What can you do to prevent stress?  You might try some of these things to help prevent stress:  Manage your time. This helps you find time to do the things you want and need to do.  Get enough sleep. Your body recovers from the stresses of the day while you are sleeping.  Get support. Your family, friends, and community can make a difference in how you experience stress.  Limit your news feed. Avoid or limit time on social media or news that may make you feel stressed.  Do something active. Exercise or activity can help reduce stress. Walking is a great way to get started.  Where can you learn more?  Go to https://www.[employer].net/patiented  Enter N032 in the search box to learn more about \"Learning About Stress.\"  Current as of: October 24, 2023               Content Version: 14.0    4039-7795 [employer].   Care instructions adapted under license by your healthcare professional. If you have questions about a medical condition or this instruction, always ask your healthcare professional. [employer] disclaims any warranty or liability for your use of this information.         "

## 2024-06-14 LAB
ALBUMIN SERPL BCG-MCNC: 4.4 G/DL (ref 3.5–5.2)
ALP SERPL-CCNC: 73 U/L (ref 40–150)
ALT SERPL W P-5'-P-CCNC: 26 U/L (ref 0–70)
ANION GAP SERPL CALCULATED.3IONS-SCNC: 11 MMOL/L (ref 7–15)
AST SERPL W P-5'-P-CCNC: 29 U/L (ref 0–45)
BILIRUB SERPL-MCNC: 0.8 MG/DL
BUN SERPL-MCNC: 19.9 MG/DL (ref 8–23)
CALCIUM SERPL-MCNC: 9.1 MG/DL (ref 8.8–10.2)
CHLORIDE SERPL-SCNC: 103 MMOL/L (ref 98–107)
CHOLEST SERPL-MCNC: 121 MG/DL
CREAT SERPL-MCNC: 0.77 MG/DL (ref 0.67–1.17)
DEPRECATED HCO3 PLAS-SCNC: 23 MMOL/L (ref 22–29)
EGFRCR SERPLBLD CKD-EPI 2021: >90 ML/MIN/1.73M2
FASTING STATUS PATIENT QL REPORTED: NO
FASTING STATUS PATIENT QL REPORTED: NO
GLUCOSE SERPL-MCNC: 88 MG/DL (ref 70–99)
HDLC SERPL-MCNC: 41 MG/DL
HIV 1+2 AB+HIV1 P24 AG SERPL QL IA: NONREACTIVE
LDLC SERPL CALC-MCNC: 63 MG/DL
NONHDLC SERPL-MCNC: 80 MG/DL
POTASSIUM SERPL-SCNC: 4.3 MMOL/L (ref 3.4–5.3)
PROT SERPL-MCNC: 6.9 G/DL (ref 6.4–8.3)
PSA SERPL DL<=0.01 NG/ML-MCNC: 3.15 NG/ML (ref 0–4.5)
PSA SERPL DL<=0.01 NG/ML-MCNC: 3.15 NG/ML (ref 0–4.5)
SODIUM SERPL-SCNC: 137 MMOL/L (ref 135–145)
TRIGL SERPL-MCNC: 87 MG/DL

## 2024-06-14 NOTE — RESULT ENCOUNTER NOTE
The following letter pertains to your most recent diagnostic tests:    Good news! The x-ray does NOT show any cancerous problems in the bones of your pelvis, hip or lower back.    The not as good news is that there are mild degenerative ('wear and tear' type) changes in your hip joint, sacroiliac joint and lower back.      If the pain is persistent, you could try doing some physical therapy to strengthen the muscles of your lower back, core and the muscles that support your hip joint to see if that improves your pain.       Please reply if you would like me to send that referral.        Sincerely,    Dr. Healy

## 2024-06-14 NOTE — RESULT ENCOUNTER NOTE
The following letter pertains to your most recent diagnostic tests:    -Your cholesterol panel looks healthy.     -Liver and gallbladder tests are normal for you. (ALT,AST, Alk phos, bilirubin), kidney function is normal for you (Creatinine, GFR), Sodium is normal, Potassium is normal for you, Calcium is normal for you, Glucose (blood sugar) is normal for you.      -Your prostate specific antigen (PSA) test result returned normal.     -Your HIV test is negative.     -Your hemoglobin A1c test which averages your blood sugars over the last 3 months returned normal.  No evidence for diabetes or prediabetes.       -Your complete blood counts including your hemoglobin returned normal for you.           Bottom line:  Overall, these results look really healthy and at goal for you.        Follow up:  Let us know if you need more help with your back and hip.  Otherwise, we should recheck these tests in one year.        Sincerely,    Dr. Healy

## 2024-06-17 PROBLEM — Z71.89 ADVANCED DIRECTIVES, COUNSELING/DISCUSSION: Status: RESOLVED | Noted: 2017-03-09 | Resolved: 2024-06-17

## 2024-06-19 DIAGNOSIS — E78.5 HYPERLIPIDEMIA LDL GOAL <100: Primary | ICD-10-CM

## 2024-06-19 RX ORDER — ATORVASTATIN CALCIUM 20 MG/1
20 TABLET, FILM COATED ORAL DAILY
Qty: 90 TABLET | Refills: 3 | Status: SHIPPED | OUTPATIENT
Start: 2024-06-19

## 2024-06-21 ENCOUNTER — PRE VISIT (OUTPATIENT)
Dept: SURGERY | Facility: CLINIC | Age: 64
End: 2024-06-21

## 2024-06-21 ENCOUNTER — ANESTHESIA EVENT (OUTPATIENT)
Dept: SURGERY | Facility: AMBULATORY SURGERY CENTER | Age: 64
End: 2024-06-21
Payer: COMMERCIAL

## 2024-06-21 ENCOUNTER — VIRTUAL VISIT (OUTPATIENT)
Dept: SURGERY | Facility: CLINIC | Age: 64
End: 2024-06-21
Payer: COMMERCIAL

## 2024-06-21 VITALS — WEIGHT: 227 LBS | BODY MASS INDEX: 32.5 KG/M2 | HEIGHT: 70 IN

## 2024-06-21 DIAGNOSIS — Z01.818 PREOP EXAMINATION: Primary | ICD-10-CM

## 2024-06-21 DIAGNOSIS — C20 RECTAL CANCER (H): ICD-10-CM

## 2024-06-21 PROCEDURE — 99213 OFFICE O/P EST LOW 20 MIN: CPT | Mod: 95 | Performed by: CLINICAL NURSE SPECIALIST

## 2024-06-21 ASSESSMENT — LIFESTYLE VARIABLES: TOBACCO_USE: 0

## 2024-06-21 ASSESSMENT — ENCOUNTER SYMPTOMS
SEIZURES: 0
ORTHOPNEA: 0

## 2024-06-21 NOTE — PATIENT INSTRUCTIONS
Preparing for Your Surgery      Name:  Louie Greco   MRN:  [id number]   :  1960   Today's Date:  2024         Arriving for surgery:  Surgery date:  24  Arrival time:  6.55AM    Restrictions due to COVID 19:    Please maintain social distance.  Masking is optional.      parking is available for anyone with mobility limitations or disabilities. (Monday- Friday 7 am- 5 pm)    Please come to:    [facility]  [address] [place] [id number]    Please check in on the 5th floor at the [facility]ulatory Surgery Center.      What can I eat or drink?    -  Please follow bowel prep instructions by Dr. Radford from [facility].   -  You may have clear liquids until 2 hours prior to arrival  time. (Until 4.55AM)    Examples of clear liquids:  Water  Clear broth  Juices (apple, white grape, white cranberry  and cider) without pulp  Noncarbonated, powder based beverages  (lemonade and Roderick-Aid)  Sodas (Sprite, 7-Up, ginger ale and seltzer)  Coffee or tea (without milk or cream)  Gatorade      Which medicines can I take?    Hold Aspirin for 7 days before surgery.   Hold Multivitamins for 7 days before surgery.  Hold Supplements for 7 days before surgery. (Collagen)  Hold Ibuprofen (Advil, Motrin) for 1 day before surgery--unless otherwise directed by surgeon.  Hold Naproxen (Aleve) for 4 days before surgery.    No alcohol or cannabis products for 24 hours prior to procedure.    Hold Sildenafil (Viagra) for 1 day before procedure.      -  DO NOT take the following medications the day of surgery:  Collagen. Viagra.    -  PLEASE TAKE the following medications the day of surgery:   None    How do I prepare myself?  - Please take 2 showers (one the night prior to surgery and one the morning of surgery) using Scrubcare or Hibiclens soap.    Use this soap only from the neck to your toes.     Leave the soap on your skin for one minute--then rinse thoroughly.      You may use your own  shampoo and conditioner. No other hair products.   - Please remove all jewelry and body piercings.  - No lotions, deodorants or fragrance.  - No makeup or fingernail polish.   - Bring your ID and insurance card.    -If you have a Deep Brain Stimulator, a Spinal Cord Stimulator, or any implanted Neuro Device, you must bring the remote to the Surgery Center.         ALL PATIENTS ARE REQUIRED TO HAVE A RESPONSIBLE ADULT TO DRIVE AND BE IN ATTENDANCE WITH THEM FOR 24 HOURS FOLLOWING SURGERY.     Covid testing policy as of 12/06/2022  Your surgeon will notify and schedule you for a COVID test if one is needed before surgery--please direct any questions or COVID symptoms to your surgeon      Questions or Concerns:    -For questions regarding the day of surgery, please contact the Ambulatory Surgery Center at [phone].    -If you have health changes between today and your surgery, please contact your surgeon.     - For questions after surgery, please contact your surgeon's office.

## 2024-06-21 NOTE — PROGRESS NOTES
Louie is a 64 year old who is being evaluated via a billable video visit.    Kristin Alvarez   Louie is a 64 year old, presenting for the following health issues:  Pre-Op Exam (/)        CHRISTIANO Cuadra LPN

## 2024-06-21 NOTE — H&P
Pre-Operative H & P     CC:  Preoperative exam to assess for increased cardiopulmonary risk while undergoing surgery and anesthesia.    Date of Encounter: 6/21/2024  Primary Care Physician:  Philippe Healy     Reason for visit:   Encounter Diagnoses   Name Primary?    Preop examination Yes    Rectal cancer (H)        HPI  Louie Greco is a 64 year old male who presents for pre-operative H & P in preparation for  Procedure Information       Case: [id number] Date/Time: 07/05/24 0825    Procedure: SIGMOIDOSCOPY, FLEXIBLE (Anus)    Anesthesia type: MAC    Diagnosis: Rectal cancer (H) [C20]    Pre-op diagnosis: Rectal cancer (H) [C20]    Location: [place] [id number] / [facility]    Providers: Felicitas Radford MD          History is obtained from the patient and chart review    Patient who is followed by Dr. Felicitas Radford, with history of rectal cancer status post chemoradiation.  He is now followed using the watch and wait protocol with annual flexible sigmoidoscopy.  He is scheduled for procedure above    His history is otherwise significant for hyperlipidemia, drug induced polyneuropathy, plantar fasciitis, obesity, inguinal hernia, and nephrolithiasis.        Hx of abnormal bleeding or anti-platelet use: Denies      Past Medical History  Past Medical History:   Diagnosis Date    Arthritis     Hands    Childhood asthma     Elevated prostate specific antigen (PSA)     No biopsy done (had rectal cancer at the time)    Epididymitis, bilateral     18 years old    Inguinal hernia     Kidney stone on left side 04/22/2021    Added automatically from request for surgery [id number]    Mumps     Nephrolithiasis     Several -- 1990 first,recurrence 2019, 2021    Rectal cancer (H) 2017    low rectal cancer, S/P Rad adn Chemo, no surg needed    Right 4 mm Kidney stone at [facility]  02/28/2021    Right ureteral stone 03/11/2021    Added automatically from request for surgery  [id number]    [facility]        Past Surgical History  Past Surgical History:   Procedure Laterality Date    BIOPSY  ongoing    cancer    COLONOSCOPY N/A 07/27/2016    Procedure: COMBINED COLONOSCOPY, SINGLE OR MULTIPLE BIOPSY/POLYPECTOMY BY BIOPSY;  Surgeon: Chelsea Thompson MD;  [facility]    COLONOSCOPY N/A 09/13/2017    Procedure: COLONOSCOPY;;  Surgeon: Felicitas Radford MD;  [facility]    COLONOSCOPY N/A 12/13/2017    Procedure: COLONOSCOPY;;  Surgeon: Felicitas Radford MD;  [facility]    COLONOSCOPY N/A 10/23/2020    Procedure: COLONOSCOPY;  Surgeon: Felicitas Radford MD;  [facility]    COMBINED CYSTOSCOPY, RETROGRADES, URETEROSCOPY, LASER HOLMIUM LITHOTRIPSY URETER(S), INSERT STENT Left 02/16/2018    Procedure: COMBINED CYSTOSCOPY, RETROGRADES, URETEROSCOPY, LASER HOLMIUM LITHOTRIPSY URETER(S), INSERT STENT;  Cysto, left ureteroscopy, holmium laser, retrogrades, stent placement;  Surgeon: Bola Worthy MD;  [facility]    COMBINED CYSTOSCOPY, RETROGRADES, URETEROSCOPY, LASER HOLMIUM LITHOTRIPSY URETER(S), INSERT STENT Right 03/22/2021    Procedure: CYSTOSCOPY WITH RIGHT RETROGRADE PYELOGRAM, RIGHT URETEROSCOPY WITH LASER LITHOTRIPSY AND BASKET REMOVAL OF STONE, RIGHT URETERAL STENT PLACEMENT;  Surgeon: Mohsen Pat MD;  [facility]    COMBINED CYSTOSCOPY, RETROGRADES, URETEROSCOPY, LASER HOLMIUM LITHOTRIPSY URETER(S), INSERT STENT Left 05/19/2021    Procedure: CYSTOSCOPY, LEFT RETROGRADE PYELOGRAM, LEFT DIAGNOSTIC, URETEROSCOPY, LEFT URETERAL STENT PLACEMENT;  Surgeon: Mohsen Pat MD;  [facility]    COMBINED CYSTOSCOPY, RETROGRADES, URETEROSCOPY, LASER HOLMIUM LITHOTRIPSY URETER(S), INSERT STENT Left 06/23/2021    Procedure: CYSTOSCOPY, LEFT URETERAL STENT REMOVAL, LEFT RETROGRADE PYELOGRAM, LEFT URETEROSCOPY WITH LASER LITHOTRIPSY AND BASKET REMOVAL OF STONES, LEFT URETERAL STENT PLACEMENT;  Surgeon: Mohsen Pat MD;   [facility]    CYSTOSCOPY, LITHOLAPAXY, COMBINED N/A 03/01/2021    Procedure: Cystoscopy, litholapaxy, combined;  Surgeon: Mohsen Pat MD;  [facility]    CYSTOSCOPY, RETROGRADES, INSERT STENT URETER(S), COMBINED Right 03/01/2021    Procedure: Cystoscopy, retrogrades, insert stent ureter(s), combined;  Surgeon: Mohsen Pat MD;  [facility]    EXAM UNDER ANESTHESIA ANUS N/A 07/05/2017    Procedure: EXAM UNDER ANESTHESIA ANUS;  Examination Under Anesthesia, flex sigmoidoscopy with biopsies and formalin application;  Surgeon: Felicitas Radford MD;  [facility]    EXAM UNDER ANESTHESIA ANUS N/A 09/13/2017    Procedure: EXAM UNDER ANESTHESIA ANUS;  Examination Under Anesthesia Anus, Colonoscopy, application of formalin;  Surgeon: Felicitas Radford MD;  [facility]    EXAM UNDER ANESTHESIA ANUS N/A 12/13/2017    Procedure: EXAM UNDER ANESTHESIA ANUS;  Examination Under Anesthesia Anus, Colonoscopy;  Surgeon: Felicitas Radford MD;  [facility]    EXAM UNDER ANESTHESIA ANUS N/A 05/11/2018    Procedure: EXAM UNDER ANESTHESIA ANUS;  Examination Under Anesthesia Anus, Interoperative Flexible Sigmoidoscopy, Application of Formalin;  Surgeon: Felicitas Radford MD;  [facility]    EXAM UNDER ANESTHESIA ANUS N/A 07/20/2018    Procedure: EXAM UNDER ANESTHESIA ANUS;  Examination Under Anesthesia Anus, Flexible Sigmoidoscopy ;  Surgeon: Felicitas Radford MD;  [facility]    EXAM UNDER ANESTHESIA ANUS N/A 11/30/2018    Procedure: Examination Under Anesthesia, application of formalin to rectum, polypectomy;  Surgeon: Felicitas Radford MD;  [facility]    EXAM UNDER ANESTHESIA ANUS N/A 02/22/2019    Procedure: Examination Under Anesthesia;  Surgeon: Felicitas Radford MD;  [facility]    EXAM UNDER ANESTHESIA ANUS N/A 06/28/2019    Procedure: Examination Under Anesthesia;  Surgeon: Felicitas Radford MD;  [facility]     EXAM UNDER ANESTHESIA ANUS N/A 11/01/2019    Procedure: Examination Under Anesthesia;  Surgeon: Felicitas Radford MD;  [facility]    EXAM UNDER ANESTHESIA RECTUM N/A 10/23/2020    Procedure: Exam under anesthesia rectum;  Surgeon: Felicitas Radford MD;  [facility]    HC TOOTH EXTRACTION W/FORCEP      LAPAROSCOPIC APPENDECTOMY N/A 07/17/2017    Procedure: LAPAROSCOPIC APPENDECTOMY;  LAPAROSCOPIC APPENDECTOMY;  Surgeon: Bryson Ferguson MD;  [facility]    LASER HOLMIUM LITHOTRIPSY URETER(S), INSERT STENT, COMBINED Right 03/01/2021    Procedure: CYSTOURETEROSCOPY,  URETERAL STENT INSERTION, RIGHT RETROGRADE;  Surgeon: Mohsen Pat MD;  [facility]    SIGMOIDOSCOPY FLEXIBLE N/A 12/08/2016    Procedure: SIGMOIDOSCOPY FLEXIBLE;  Surgeon: Felicitas Radfodr MD;  [facility]    SIGMOIDOSCOPY FLEXIBLE N/A 07/05/2017    Procedure: SIGMOIDOSCOPY FLEXIBLE;;  Surgeon: Felicitas Radford MD;  [facility]    SIGMOIDOSCOPY FLEXIBLE N/A 05/11/2018    Procedure: SIGMOIDOSCOPY FLEXIBLE;;  Surgeon: Felicitas Radford MD;  [facility]    SIGMOIDOSCOPY FLEXIBLE N/A 07/20/2018    Procedure: SIGMOIDOSCOPY FLEXIBLE;;  Surgeon: Felicitas Radford MD;  [facility]    SIGMOIDOSCOPY FLEXIBLE N/A 11/30/2018    Procedure: Flexible Sigmoidoscopy;  Surgeon: Felicitas Radford MD;  [facility]    SIGMOIDOSCOPY FLEXIBLE N/A 02/22/2019    Procedure: Intraoperative Flexible Sigmoidoscopy, Application of Formalin to rectum;  Surgeon: Felicitas Radford MD;  [facility]    SIGMOIDOSCOPY FLEXIBLE N/A 06/28/2019    Procedure: Flexible Sigmoidoscopy;  Surgeon: Felicitas Radford MD;  [facility]    SIGMOIDOSCOPY FLEXIBLE N/A 11/01/2019    Procedure: Flexible Sigmoidoscopy;  Surgeon: Felicitas Radford MD;  [facility]    SIGMOIDOSCOPY FLEXIBLE N/A 07/23/2021    Procedure: SIGMOIDOSCOPY, FLEXIBLE;  Surgeon: Malina  Felicitas HIDALGO MD;  [facility]    SIGMOIDOSCOPY FLEXIBLE N/A 07/01/2022    Procedure: SIGMOIDOSCOPY, FLEXIBLE, EUA;  Surgeon: Felicitas Radford MD;  [facility]    SIGMOIDOSCOPY FLEXIBLE N/A 07/28/2023    Procedure: SIGMOIDOSCOPY, FLEXIBLE (PEDIATRIC SCOPE NEEDED);  Surgeon: Felicitas Radford MD;  [facility]    TESTICLE SURGERY      VASCULAR SURGERY      Right chest port    VASECTOMY         Prior to Admission Medications  Current Outpatient Medications   Medication Sig Dispense Refill    atorvastatin (LIPITOR) 20 MG tablet TAKE ONE TABLET BY MOUTH ONCE DAILY (Patient taking differently: Take 20 mg by mouth every evening) 90 tablet 3    Collagen-Vitamin C-Biotin (COLLAGEN 1500/C PO) Take by mouth every morning      sildenafil (VIAGRA) 100 MG tablet Take 1 tablet (100 mg) by mouth daily as needed 30 tablet 11    atorvastatin (LIPITOR) 40 MG tablet Take 0.5 tablets (20 mg) by mouth daily (Patient taking differently: Take 20 mg by mouth every evening) 90 tablet 3    Na Sulfate-K Sulfate-Mg Sulf (SUPREP BOWEL PREP KIT) solution Take 177 mLs (1 Bottle) by mouth See Admin Instructions Split dose 2 day Regimen: The evening before you procedure: dilute one bottle with water to a total volume of 16 oz. (up to fill line).  At 6 pm, drink the entire amount.  Drink 32 ounces of water over the next hour. The morning of your procedure repeat both steps above using the second bottle.  Start five hours before you procedure and complete the prep at least three hours before you arrive. 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)      Social Determinants of Health     Financial Resource Strain: Low Risk  (6/13/2024)    Financial Resource Strain     Within the past 12 months, have you or your family members you live with been unable to get utilities (heat, electricity) when it was really needed?: No   Food Insecurity: Low Risk  (6/13/2024)    Food Insecurity     Within the past 12 months, did you worry that your food would run out before you got money to buy more?: No     Within the past 12 months, did the food you bought just not last and you didn t have money to get more?: No   Transportation Needs: Low Risk  (6/13/2024)    Transportation Needs     Within the past 12 months, has lack of transportation kept you from medical appointments, getting your medicines, non-medical meetings or appointments, work, or from getting things that you need?: No   Physical Activity: Sufficiently Active (6/13/2024)    Exercise Vital Sign     Days of Exercise per Week: 5 days     Minutes of Exercise per Session: 40 min   Stress: Stress Concern Present (6/13/2024)    [employer] - Occupational Stress Questionnaire     Feeling of Stress : Very much   Social Connections: Unknown (6/13/2024)    Social Connection and Isolation Panel [NHANES]     Frequency of Communication with Friends and Family: Not on file     Frequency of Social Gatherings with Friends and Family: More than three times a week     Attends Gnosticism Services: Not on file     Active Member of Clubs or  Organizations: Not on file     Attends Club or Organization Meetings: Not on file     Marital Status: Not on file   Interpersonal Safety: Low Risk  (6/13/2024)    Interpersonal Safety     Do you feel physically and emotionally safe where you currently live?: Yes     Within the past 12 months, have you been hit, slapped, kicked or otherwise physically hurt by someone?: No     Within the past 12 months, have you been humiliated or emotionally abused in other ways by your partner or ex-partner?: No   Housing Stability: Low Risk  (6/13/2024)    Housing Stability     Do you have housing? : Yes     Are you worried about losing your housing?: No       Family History  Family History   Problem Relation Age of Onset    Colon Polyps Mother         Number and type of polyps unknown    Chronic Obstructive Pulmonary Disease Father     Hypertension Father     Hyperlipidemia Father     Obesity Father     Diabetes Maternal Grandfather     Macular Degeneration Paternal Grandmother     Hypertension Paternal Grandmother     Hyperlipidemia Paternal Grandmother     Cancer Brother         primary of unknown origin    Other Cancer Brother     Family History Negative Brother         negative colonoscopy    Stomach Cancer Other 63        maternal great grandfather    Diabetes Other     Glaucoma No family hx of     Anesthesia Reaction No family hx of     Deep Vein Thrombosis (DVT) No family hx of        Review of Systems  The complete review of systems is negative other than noted in the HPI or here.   Anesthesia Evaluation   Pt has had prior anesthetic. Type: General and MAC.    No history of anesthetic complications       ROS/MED HX  ENT/Pulmonary:     (+)                      asthma (childhood)               (-) tobacco use and recent URI   Neurologic:     (+)    peripheral neuropathy, - drug-induced, feet and hands.                        (-) no seizures and no CVA   Cardiovascular:     (+) Dyslipidemia - -   -  - -                                  Previous cardiac testing   Echo: Date: Results:    Stress Test:  Date: Results:    ECG Reviewed:  Date: 6/17/2021 Results:  Sinus rhythm  Cath:  Date: Results:   (-) hypertension, SANTIAGO, orthopnea/PND and irregular heartbeat/palpitations   METS/Exercise Tolerance: >4 METS    Hematologic:  - neg hematologic  ROS  (-) history of blood transfusion   Musculoskeletal: Comment: Right knee pain  Herniated disc low back  (+)  arthritis (thumb),             GI/Hepatic: Comment: Inguinal hernia, left   (-) GERD and liver disease   Renal/Genitourinary:     (+) renal disease,      Nephrolithiasis ,       Endo:     (+)               Obesity,    (-) Type II DM and thyroid disease   Psychiatric/Substance Use:  - neg psychiatric ROS     Infectious Disease:  - neg infectious disease ROS     Malignancy:   (+) Malignancy, History of GI and Skin.GI CA  Remission status post Chemo and Radiation.  Skin CA Remission status post Surgery.      Other:  - neg other ROS          Virtual visit -  No vitals were obtained    Physical Exam  Constitutional: Awake, alert, no apparent distress, and appears stated age.  HENT: Normocephalic  Respiratory: non labored breathing; no cough   Neurologic: Oriented to name, place and time.   Neuropsychiatric: Calm, cooperative. Normal affect.      Prior Labs/Diagnostic Studies   All labs and imaging personally reviewed   Lab Results   Component Value Date    WBC 6.1 06/13/2024    WBC 6.0 02/28/2021     Lab Results   Component Value Date    RBC 5.30 06/13/2024    RBC 4.95 02/28/2021     Lab Results   Component Value Date    HGB 15.1 06/13/2024    HGB 14.0 02/28/2021     Lab Results   Component Value Date    HCT 46.8 06/13/2024    HCT 42.5 02/28/2021     Lab Results   Component Value Date    MCV 88 06/13/2024    MCV 86 02/28/2021     Lab Results   Component Value Date    MCH 28.5 06/13/2024    MCH 28.3 02/28/2021     Lab Results   Component Value Date    MCHC 32.3 06/13/2024    MCHC 32.9 02/28/2021      Lab Results   Component Value Date    RDW 13.1 06/13/2024    RDW 12.5 02/28/2021     Lab Results   Component Value Date     06/13/2024     02/28/2021     Last Comprehensive Metabolic Panel:  Sodium   Date Value Ref Range Status   06/13/2024 137 135 - 145 mmol/L Final     Comment:     Reference intervals for this test were updated on 09/26/2023 to more accurately reflect our healthy population. There may be differences in the flagging of prior results with similar values performed with this method. Interpretation of those prior results can be made in the context of the updated reference intervals.    02/28/2021 140 133 - 144 mmol/L Final     Potassium   Date Value Ref Range Status   06/13/2024 4.3 3.4 - 5.3 mmol/L Final   08/01/2022 3.6 3.4 - 5.3 mmol/L Final   02/28/2021 4.0 3.4 - 5.3 mmol/L Final     Chloride   Date Value Ref Range Status   06/13/2024 103 98 - 107 mmol/L Final   08/01/2022 106 94 - 109 mmol/L Final   02/28/2021 110 (H) 94 - 109 mmol/L Final     Carbon Dioxide   Date Value Ref Range Status   02/28/2021 25 20 - 32 mmol/L Final     Carbon Dioxide (CO2)   Date Value Ref Range Status   06/13/2024 23 22 - 29 mmol/L Final   08/01/2022 25 20 - 32 mmol/L Final     Anion Gap   Date Value Ref Range Status   06/13/2024 11 7 - 15 mmol/L Final   08/01/2022 7 3 - 14 mmol/L Final   02/28/2021 5 3 - 14 mmol/L Final     Glucose   Date Value Ref Range Status   06/13/2024 88 70 - 99 mg/dL Final   07/21/2023 100 (A) 70 - 99 mg/dL Final     Urea Nitrogen   Date Value Ref Range Status   06/13/2024 19.9 8.0 - 23.0 mg/dL Final   08/01/2022 16 7 - 30 mg/dL Final   02/28/2021 19 7 - 30 mg/dL Final     Creatinine   Date Value Ref Range Status   06/13/2024 0.77 0.67 - 1.17 mg/dL Final   02/28/2021 1.10 0.66 - 1.25 mg/dL Final     GFR Estimate   Date Value Ref Range Status   06/13/2024 >90 >60 mL/min/1.73m2 Final     Comment:     eGFR calculated using 2021 CKD-EPI equation.   02/28/2021 72 >60 mL/min/[1.73_m2]  Final     Comment:     Non  GFR Calc  Starting 2018, serum creatinine based estimated GFR (eGFR) will be   calculated using the Chronic Kidney Disease Epidemiology Collaboration   (CKD-EPI) equation.       Calcium   Date Value Ref Range Status   2024 9.1 8.8 - 10.2 mg/dL Final   2021 8.3 (L) 8.5 - 10.1 mg/dL Final     Bilirubin Total   Date Value Ref Range Status   2024 0.8 <=1.2 mg/dL Final   2021 0.5 0.2 - 1.3 mg/dL Final     Alkaline Phosphatase   Date Value Ref Range Status   2024 73 40 - 150 U/L Final   2021 58 40 - 150 U/L Final     ALT   Date Value Ref Range Status   2024 26 0 - 70 U/L Final     Comment:     Reference intervals for this test were updated on 2023 to more accurately reflect our healthy population. There may be differences in the flagging of prior results with similar values performed with this method. Interpretation of those prior results can be made in the context of the updated reference intervals.     2021 40 0 - 70 U/L Final     AST   Date Value Ref Range Status   2024 29 0 - 45 U/L Final     Comment:     Reference intervals for this test were updated on 2023 to more accurately reflect our healthy population. There may be differences in the flagging of prior results with similar values performed with this method. Interpretation of those prior results can be made in the context of the updated reference intervals.   2021 26 0 - 45 U/L Final       EK Sinus rhythm    The patient's records and results personally reviewed by this provider.     Outside records reviewed from: Care Everywhere      Assessment    Louie Greco is a 64 year old male seen as a PAC referral for risk assessment and optimization for anesthesia.    Plan/Recommendations  Pt will be optimized for the proposed procedure.  See below for details on the assessment, risk, and preoperative recommendations    NEUROLOGY  - No history of  "TIA, CVA or seizure  Drug-induced polyneuropathy affecting bilateral hands and feet  -Post Op delirium risk factors:  No risk identified    ENT  - No current airway concerns.  Will need to be reassessed day of surgery.  Mallampati: Unable to assess  TM: Unable to assess    Veneers involving upper and lower front teeth    Multiple anesthesia records available    CARDIAC  HLD.  Atorvastatin at at bedtime.  No other cardiac history, symptoms, or meds. Good activity tolerance, walking daily, gardening without exertional symptoms  - METS (Metabolic Equivalents)>4  Patient performs 4 or more METS exercise without symptoms             Total Score: 0      RCRI-Very low risk: Class 1 0.4% complication rate             Total Score: 0        PULMONARY  History of childhood asthma. No inhalers. No current respiratory concerns.  Denies cough or shortness of breath  BENNETT Low Risk             Total Score: 2    BENNETT: Over 50 ys old    BENNETT: Male        - Tobacco History    History   Smoking Status    Never   Smokeless Tobacco    Never       GI: Denies GERD  PONV Low Risk  Total Score: 1           1 AN PONV: Patient is not a current smoker        /RENAL  - Baseline Creatinine  0.77  History of nephrolithiasis: no recurrence    ENDOCRINE    - BMI: Estimated body mass index is 32.57 kg/m  as calculated from the following:    Height as of this encounter: 1.778 m (5' 10\").    Weight as of this encounter: 103 kg (227 lb).  Obesity (BMI >30)  - No history of Diabetes Mellitus  A1C 5.4    HEME  VTE High Risk 3%             Total Score: 8    VTE: Greater than 59 yrs old    VTE: Male    VTE: Current cancer      Denies personal or family history of blood clots  Denies history of blood transfusion     MSK  Patient is NOT Frail             Total Score: 0      - Herniated disc, low back  - Arthritis involving thumb  - Right knee pain, possibly secondary to arthritis      Different anesthesia methods/types have been discussed with the patient, but " they are aware that the final plan will be decided by the assigned anesthesia provider on the date of service.    The patient is optimized for their procedure. AVS with information on surgery time/arrival time, meds and NPO status given by nursing staff. No further diagnostic testing indicated.    Please refer to the physical examination documented by the anesthesiologist in the anesthesia record on the day of surgery.    Video-Visit Details    Type of service:  Video Visit    Provider received verbal consent for a Video Visit from the patient? Yes     Originating Location (pt. Location): Home    Distant Location (provider location):  Off-site  Mode of Communication:  Video Conference via [employer]  On the day of service:     Prep time: 10 minutes  Visit time: 6 minutes  Documentation time: 13 minutes  ------------------------------------------  Total time: 29 minutes      QUINTON Kelley CNS  Preoperative Assessment Center  [facility]  Phone: [phone]  Fax: [phone]

## 2024-07-03 ASSESSMENT — ENCOUNTER SYMPTOMS
ORTHOPNEA: 0
SEIZURES: 0

## 2024-07-03 ASSESSMENT — LIFESTYLE VARIABLES: TOBACCO_USE: 0

## 2024-07-03 NOTE — ANESTHESIA PREPROCEDURE EVALUATION
Anesthesia Pre-Procedure Evaluation    Patient: Louie Greco   MRN: [id number] : 1960        Procedure : Procedure(s):  SIGMOIDOSCOPY, FLEXIBLE          Past Medical History:   Diagnosis Date    Arthritis     Hands    Childhood asthma     Elevated prostate specific antigen (PSA)     No biopsy done (had rectal cancer at the time)    Epididymitis, bilateral     18 years old    Inguinal hernia     Kidney stone on left side 2021    Added automatically from request for surgery [id number]    Mumps     Nephrolithiasis     Several -- 1990 first,recurrence ,     Rectal cancer (H) 2017    low rectal cancer, S/P Rad adn Chemo, no surg needed    Right 4 mm Kidney stone at [facility]  2021    Right ureteral stone 2021    Added automatically from request for surgery [id number]    Fannie       Past Surgical History:   Procedure Laterality Date    BIOPSY  ongoing    cancer    COLONOSCOPY N/A 2016    Procedure: COMBINED COLONOSCOPY, SINGLE OR MULTIPLE BIOPSY/POLYPECTOMY BY BIOPSY;  Surgeon: Chelsea Thompson MD;  Location:  GI    COLONOSCOPY N/A 2017    Procedure: COLONOSCOPY;;  Surgeon: Felicitas Radford MD;  [facility]    COLONOSCOPY N/A 2017    Procedure: COLONOSCOPY;;  Surgeon: Felicitas Radford MD;  [facility]    COLONOSCOPY N/A 10/23/2020    Procedure: COLONOSCOPY;  Surgeon: Felicitas Radford MD;  Location: [facility] OR    COMBINED CYSTOSCOPY, RETROGRADES, URETEROSCOPY, LASER HOLMIUM LITHOTRIPSY URETER(S), INSERT STENT Left 2018    Procedure: COMBINED CYSTOSCOPY, RETROGRADES, URETEROSCOPY, LASER HOLMIUM LITHOTRIPSY URETER(S), INSERT STENT;  Cysto, left ureteroscopy, holmium laser, retrogrades, stent placement;  Surgeon: Bola Worthy MD;  Location:  OR    COMBINED CYSTOSCOPY, RETROGRADES, URETEROSCOPY, LASER HOLMIUM LITHOTRIPSY URETER(S), INSERT STENT Right 2021    Procedure: CYSTOSCOPY WITH RIGHT RETROGRADE PYELOGRAM,  RIGHT URETEROSCOPY WITH LASER LITHOTRIPSY AND BASKET REMOVAL OF STONE, RIGHT URETERAL STENT PLACEMENT;  Surgeon: Mohsen Pat MD;  Location: [facility]    COMBINED CYSTOSCOPY, RETROGRADES, URETEROSCOPY, LASER HOLMIUM LITHOTRIPSY URETER(S), INSERT STENT Left 05/19/2021    Procedure: CYSTOSCOPY, LEFT RETROGRADE PYELOGRAM, LEFT DIAGNOSTIC, URETEROSCOPY, LEFT URETERAL STENT PLACEMENT;  Surgeon: Mohsen Pat MD;  Location: [facility]    COMBINED CYSTOSCOPY, RETROGRADES, URETEROSCOPY, LASER HOLMIUM LITHOTRIPSY URETER(S), INSERT STENT Left 06/23/2021    Procedure: CYSTOSCOPY, LEFT URETERAL STENT REMOVAL, LEFT RETROGRADE PYELOGRAM, LEFT URETEROSCOPY WITH LASER LITHOTRIPSY AND BASKET REMOVAL OF STONES, LEFT URETERAL STENT PLACEMENT;  Surgeon: Mohsen Pat MD;  Location: [facility] OR    CYSTOSCOPY, LITHOLAPAXY, COMBINED N/A 03/01/2021    Procedure: Cystoscopy, litholapaxy, combined;  Surgeon: Mohsen Pat MD;  Location: [facility]    CYSTOSCOPY, RETROGRADES, INSERT STENT URETER(S), COMBINED Right 03/01/2021    Procedure: Cystoscopy, retrogrades, insert stent ureter(s), combined;  Surgeon: Mohsen Pat MD;  Location: [facility]    EXAM UNDER ANESTHESIA ANUS N/A 07/05/2017    Procedure: EXAM UNDER ANESTHESIA ANUS;  Examination Under Anesthesia, flex sigmoidoscopy with biopsies and formalin application;  Surgeon: Felicitas Radford MD;  [facility]    EXAM UNDER ANESTHESIA ANUS N/A 09/13/2017    Procedure: EXAM UNDER ANESTHESIA ANUS;  Examination Under Anesthesia Anus, Colonoscopy, application of formalin;  Surgeon: Felicitas Radford MD;  [facility]    EXAM UNDER ANESTHESIA ANUS N/A 12/13/2017    Procedure: EXAM UNDER ANESTHESIA ANUS;  Examination Under Anesthesia Anus, Colonoscopy;  Surgeon: Felicitas Radford MD;  [facility]    EXAM UNDER ANESTHESIA ANUS N/A 05/11/2018    Procedure: EXAM UNDER ANESTHESIA ANUS;  Examination Under Anesthesia Anus, Interoperative Flexible Sigmoidoscopy, Application of  Formalin;  Surgeon: Felicitas Radford MD;  [facility]    EXAM UNDER ANESTHESIA ANUS N/A 07/20/2018    Procedure: EXAM UNDER ANESTHESIA ANUS;  Examination Under Anesthesia Anus, Flexible Sigmoidoscopy ;  Surgeon: Felicitas Radford MD;  [facility]    EXAM UNDER ANESTHESIA ANUS N/A 11/30/2018    Procedure: Examination Under Anesthesia, application of formalin to rectum, polypectomy;  Surgeon: Felicitas Radford MD;  [facility]    EXAM UNDER ANESTHESIA ANUS N/A 02/22/2019    Procedure: Examination Under Anesthesia;  Surgeon: Felicitas Radford MD;  [facility]    EXAM UNDER ANESTHESIA ANUS N/A 06/28/2019    Procedure: Examination Under Anesthesia;  Surgeon: Felicitas Radford MD;  [facility]    EXAM UNDER ANESTHESIA ANUS N/A 11/01/2019    Procedure: Examination Under Anesthesia;  Surgeon: Felicitas Radford MD;  [facility]    EXAM UNDER ANESTHESIA RECTUM N/A 10/23/2020    Procedure: Exam under anesthesia rectum;  Surgeon: Felicitas Radford MD;  Location: [facility] OR    HC TOOTH EXTRACTION W/FORCEP      LAPAROSCOPIC APPENDECTOMY N/A 07/17/2017    Procedure: LAPAROSCOPIC APPENDECTOMY;  LAPAROSCOPIC APPENDECTOMY;  Surgeon: Bryson Ferguson MD;  [facility]    LASER HOLMIUM LITHOTRIPSY URETER(S), INSERT STENT, COMBINED Right 03/01/2021    Procedure: CYSTOURETEROSCOPY,  URETERAL STENT INSERTION, RIGHT RETROGRADE;  Surgeon: Mohsen Pat MD;  [facility]    SIGMOIDOSCOPY FLEXIBLE N/A 12/08/2016    Procedure: SIGMOIDOSCOPY FLEXIBLE;  Surgeon: Felicitas Radford MD;  [facility]    SIGMOIDOSCOPY FLEXIBLE N/A 07/05/2017    Procedure: SIGMOIDOSCOPY FLEXIBLE;;  Surgeon: Felicitas Radford MD;  [facility]    SIGMOIDOSCOPY FLEXIBLE N/A 05/11/2018    Procedure: SIGMOIDOSCOPY FLEXIBLE;;  Surgeon: Felicitas Radford MD;  [facility]    SIGMOIDOSCOPY FLEXIBLE N/A 07/20/2018    Procedure: SIGMOIDOSCOPY  FLEXIBLE;;  Surgeon: Felicitas Radford MD;  [facility]    SIGMOIDOSCOPY FLEXIBLE N/A 11/30/2018    Procedure: Flexible Sigmoidoscopy;  Surgeon: Felicitas Radford MD;  [facility]    SIGMOIDOSCOPY FLEXIBLE N/A 02/22/2019    Procedure: Intraoperative Flexible Sigmoidoscopy, Application of Formalin to rectum;  Surgeon: Felicitas Radford MD;  [facility]    SIGMOIDOSCOPY FLEXIBLE N/A 06/28/2019    Procedure: Flexible Sigmoidoscopy;  Surgeon: Felicitas Radford MD;  [facility]    SIGMOIDOSCOPY FLEXIBLE N/A 11/01/2019    Procedure: Flexible Sigmoidoscopy;  Surgeon: Felicitas Radford MD;  [facility]    SIGMOIDOSCOPY FLEXIBLE N/A 07/23/2021    Procedure: SIGMOIDOSCOPY, FLEXIBLE;  Surgeon: Felicitas Radford MD;  Location: [facility] OR    SIGMOIDOSCOPY FLEXIBLE N/A 07/01/2022    Procedure: SIGMOIDOSCOPY, FLEXIBLE, EUA;  Surgeon: Felicitas Radford MD;  Location: [facility] OR    SIGMOIDOSCOPY FLEXIBLE N/A 07/28/2023    Procedure: SIGMOIDOSCOPY, FLEXIBLE (PEDIATRIC SCOPE NEEDED);  Surgeon: Felicitas Radford MD;  Location: [facility] OR    TESTICLE SURGERY      VASCULAR SURGERY      Right chest port    VASECTOMY        Allergies   Allergen Reactions    Ampicillin Diarrhea    Demerol [Meperidine] Nausea      Social History     Tobacco Use    Smoking status: Never     Passive exposure: Never    Smokeless tobacco: Never   Substance Use Topics    Alcohol use: Not Currently     Comment: 1 drink a week      Wt Readings from Last 1 Encounters:   06/21/24 103 kg (227 lb)        Anesthesia Evaluation   Pt has had prior anesthetic. Type: General and MAC.    No history of anesthetic complications       ROS/MED HX  ENT/Pulmonary:     (+)                      asthma (childhood)               (-) tobacco use and recent URI   Neurologic:     (+)    peripheral neuropathy, - drug-induced, feet and hands.                        (-) no seizures and no CVA    Cardiovascular:     (+) Dyslipidemia - -   -  - -                                 Previous cardiac testing   Echo: Date: Results:    Stress Test:  Date: Results:    ECG Reviewed:  Date: 6/17/2021 Results:  Sinus rhythm  Cath:  Date: Results:   (-) hypertension, SANTIAGO, orthopnea/PND and irregular heartbeat/palpitations   METS/Exercise Tolerance: >4 METS    Hematologic:  - neg hematologic  ROS  (-) history of blood transfusion   Musculoskeletal: Comment: Right knee pain  Herniated disc low back  (+)  arthritis (thumb),             GI/Hepatic: Comment: Inguinal hernia, left   (-) GERD and liver disease   Renal/Genitourinary:     (+) renal disease,      Nephrolithiasis ,       Endo:     (+)               Obesity,    (-) Type II DM and thyroid disease   Psychiatric/Substance Use:  - neg psychiatric ROS     Infectious Disease:  - neg infectious disease ROS     Malignancy:   (+) Malignancy, History of GI and Skin.GI CA  Remission status post Chemo and Radiation.  Skin CA Remission status post Surgery.      Other:  - neg other ROS          Physical Exam    Airway        Mallampati: II   TM distance: > 3 FB   Neck ROM: full   Mouth opening: > 3 cm    Respiratory Devices and Support         Dental       (+) caps      Cardiovascular   cardiovascular exam normal          Pulmonary   pulmonary exam normal            Other findings: Placement Date: 06/23/21; Placement Time: 0742 (created via procedure documentation); Mask Ventilation: 0; Induction Type: RSI; Ease of Intubation: Easy; Technique: Direct laryngoscopy; ETT Type: Single, Oral; Tube Size: 7.5 mm; DL Blade Size: Carlin 2; Grade View: 1; Adjucts: Stylet; Placement Person: Physician, CRNA; Attempts: 1; Depth: 22 cm    OUTSIDE LABS:  CBC:   Lab Results   Component Value Date    WBC 6.1 06/13/2024    WBC 6.2 06/26/2023    HGB 15.1 06/13/2024    HGB 14.7 06/26/2023    HCT 46.8 06/13/2024    HCT 44.7 06/26/2023     06/13/2024     06/26/2023     BMP:   Lab  Results   Component Value Date     06/13/2024     06/26/2023    POTASSIUM 4.3 06/13/2024    POTASSIUM 4.4 06/26/2023    CHLORIDE 103 06/13/2024    CHLORIDE 105 06/26/2023    CO2 23 06/13/2024    CO2 25 06/26/2023    BUN 19.9 06/13/2024    BUN 22.9 06/26/2023    CR 0.77 06/13/2024    CR 0.85 06/26/2023    GLC 88 06/13/2024     (A) 07/21/2023     COAGS:   Lab Results   Component Value Date    PTT 31 07/10/2017    INR 1.02 07/10/2017     POC:   Lab Results   Component Value Date    BGM 60 (L) 07/25/2018     HEPATIC:   Lab Results   Component Value Date    ALBUMIN 4.4 06/13/2024    PROTTOTAL 6.9 06/13/2024    ALT 26 06/13/2024    AST 29 06/13/2024    ALKPHOS 73 06/13/2024    BILITOTAL 0.8 06/13/2024     OTHER:   Lab Results   Component Value Date    LACT 0.8 07/17/2017    A1C 5.4 06/13/2024    FRANDY 9.1 06/13/2024    MAG 2.2 06/26/2023    LIPASE 264 02/28/2021    .0 (H) 07/17/2017    SED 34 (H) 07/17/2017       Anesthesia Plan    ASA Status:  2    NPO Status:  NPO Appropriate    Anesthesia Type: MAC.     - Reason for MAC: straight local not clinically adequate   Induction: Intravenous, Propofol.   Maintenance: TIVA.        Consents    Anesthesia Plan(s) and associated risks, benefits, and realistic alternatives discussed. Questions answered and patient/representative(s) expressed understanding.     - Discussed: Risks, Benefits and Alternatives for BOTH SEDATION and the PROCEDURE were discussed     - Discussed with:  Patient      - Extended Intubation/Ventilatory Support Discussed: No.      - Patient is DNR/DNI Status: No     Use of blood products discussed: No .     Postoperative Care    Pain management: IV analgesics, Oral pain medications, Multi-modal analgesia.   PONV prophylaxis: Dexamethasone or Solumedrol, Ondansetron (or other 5HT-3), Background Propofol Infusion     Comments:               Fausto Stovall MD    I have reviewed the pertinent notes and labs in the chart from the past 30 days  "and (re)examined the patient.  Any updates or changes from those notes are reflected in this note.              # Obesity: Estimated body mass index is 32.57 kg/m  as calculated from the following:    Height as of 6/21/24: 1.778 m (5' 10\").    Weight as of 6/21/24: 103 kg (227 lb).      "

## 2024-07-05 ENCOUNTER — ANESTHESIA (OUTPATIENT)
Dept: SURGERY | Facility: AMBULATORY SURGERY CENTER | Age: 64
End: 2024-07-05
Payer: COMMERCIAL

## 2024-07-05 ENCOUNTER — HOSPITAL ENCOUNTER (OUTPATIENT)
Facility: AMBULATORY SURGERY CENTER | Age: 64
Discharge: HOME | End: 2024-07-05
Attending: COLON & RECTAL SURGERY
Payer: COMMERCIAL

## 2024-07-05 VITALS
BODY MASS INDEX: 30.78 KG/M2 | OXYGEN SATURATION: 96 % | SYSTOLIC BLOOD PRESSURE: 117 MMHG | HEART RATE: 57 BPM | TEMPERATURE: 97 F | DIASTOLIC BLOOD PRESSURE: 76 MMHG | WEIGHT: 215 LBS | RESPIRATION RATE: 20 BRPM | HEIGHT: 70 IN

## 2024-07-05 DIAGNOSIS — C20 MALIGNANT NEOPLASM OF RECTUM (H): Primary | ICD-10-CM

## 2024-07-05 PROCEDURE — 45330 DIAGNOSTIC SIGMOIDOSCOPY: CPT | Performed by: COLON & RECTAL SURGERY

## 2024-07-05 PROCEDURE — 45330 DIAGNOSTIC SIGMOIDOSCOPY: CPT | Performed by: STUDENT IN AN ORGANIZED HEALTH CARE EDUCATION/TRAINING PROGRAM

## 2024-07-05 PROCEDURE — 45330 DIAGNOSTIC SIGMOIDOSCOPY: CPT | Performed by: NURSE ANESTHETIST, CERTIFIED REGISTERED

## 2024-07-05 RX ORDER — HYDROMORPHONE HYDROCHLORIDE 1 MG/ML
0.4 INJECTION, SOLUTION INTRAMUSCULAR; INTRAVENOUS; SUBCUTANEOUS EVERY 5 MIN PRN
Status: DISCONTINUED | OUTPATIENT
Start: 2024-07-05 | End: 2024-07-06 | Stop reason: HOSPADM

## 2024-07-05 RX ORDER — DEXAMETHASONE SODIUM PHOSPHATE 10 MG/ML
4 INJECTION, SOLUTION INTRAMUSCULAR; INTRAVENOUS
Status: DISCONTINUED | OUTPATIENT
Start: 2024-07-05 | End: 2024-07-06 | Stop reason: HOSPADM

## 2024-07-05 RX ORDER — ONDANSETRON 4 MG/1
4 TABLET, ORALLY DISINTEGRATING ORAL EVERY 30 MIN PRN
Status: DISCONTINUED | OUTPATIENT
Start: 2024-07-05 | End: 2024-07-06 | Stop reason: HOSPADM

## 2024-07-05 RX ORDER — LIDOCAINE HYDROCHLORIDE 20 MG/ML
INJECTION, SOLUTION INFILTRATION; PERINEURAL PRN
Status: DISCONTINUED | OUTPATIENT
Start: 2024-07-05 | End: 2024-07-05

## 2024-07-05 RX ORDER — ACETAMINOPHEN 325 MG/1
975 TABLET ORAL ONCE
Status: DISCONTINUED | OUTPATIENT
Start: 2024-07-05 | End: 2024-07-06 | Stop reason: HOSPADM

## 2024-07-05 RX ORDER — LIDOCAINE 40 MG/G
CREAM TOPICAL
Status: DISCONTINUED | OUTPATIENT
Start: 2024-07-05 | End: 2024-07-06 | Stop reason: HOSPADM

## 2024-07-05 RX ORDER — ONDANSETRON 2 MG/ML
4 INJECTION INTRAMUSCULAR; INTRAVENOUS ONCE
Status: DISCONTINUED | OUTPATIENT
Start: 2024-07-05 | End: 2024-07-06 | Stop reason: HOSPADM

## 2024-07-05 RX ORDER — FENTANYL CITRATE 50 UG/ML
25 INJECTION, SOLUTION INTRAMUSCULAR; INTRAVENOUS EVERY 5 MIN PRN
Status: DISCONTINUED | OUTPATIENT
Start: 2024-07-05 | End: 2024-07-06 | Stop reason: HOSPADM

## 2024-07-05 RX ORDER — OXYCODONE HYDROCHLORIDE 5 MG/1
10 TABLET ORAL
Status: DISCONTINUED | OUTPATIENT
Start: 2024-07-05 | End: 2024-07-06 | Stop reason: HOSPADM

## 2024-07-05 RX ORDER — SODIUM CHLORIDE, SODIUM LACTATE, POTASSIUM CHLORIDE, CALCIUM CHLORIDE 600; 310; 30; 20 MG/100ML; MG/100ML; MG/100ML; MG/100ML
INJECTION, SOLUTION INTRAVENOUS CONTINUOUS
Status: DISCONTINUED | OUTPATIENT
Start: 2024-07-05 | End: 2024-07-06 | Stop reason: HOSPADM

## 2024-07-05 RX ORDER — ONDANSETRON 2 MG/ML
4 INJECTION INTRAMUSCULAR; INTRAVENOUS EVERY 30 MIN PRN
Status: DISCONTINUED | OUTPATIENT
Start: 2024-07-05 | End: 2024-07-06 | Stop reason: HOSPADM

## 2024-07-05 RX ORDER — HYDROMORPHONE HYDROCHLORIDE 1 MG/ML
0.2 INJECTION, SOLUTION INTRAMUSCULAR; INTRAVENOUS; SUBCUTANEOUS EVERY 5 MIN PRN
Status: DISCONTINUED | OUTPATIENT
Start: 2024-07-05 | End: 2024-07-06 | Stop reason: HOSPADM

## 2024-07-05 RX ORDER — FENTANYL CITRATE 50 UG/ML
50 INJECTION, SOLUTION INTRAMUSCULAR; INTRAVENOUS EVERY 5 MIN PRN
Status: DISCONTINUED | OUTPATIENT
Start: 2024-07-05 | End: 2024-07-06 | Stop reason: HOSPADM

## 2024-07-05 RX ORDER — PROPOFOL 10 MG/ML
INJECTION, EMULSION INTRAVENOUS CONTINUOUS PRN
Status: DISCONTINUED | OUTPATIENT
Start: 2024-07-05 | End: 2024-07-05

## 2024-07-05 RX ORDER — OXYCODONE HYDROCHLORIDE 5 MG/1
5 TABLET ORAL
Status: DISCONTINUED | OUTPATIENT
Start: 2024-07-05 | End: 2024-07-06 | Stop reason: HOSPADM

## 2024-07-05 RX ORDER — NALOXONE HYDROCHLORIDE 0.4 MG/ML
0.1 INJECTION, SOLUTION INTRAMUSCULAR; INTRAVENOUS; SUBCUTANEOUS
Status: DISCONTINUED | OUTPATIENT
Start: 2024-07-05 | End: 2024-07-06 | Stop reason: HOSPADM

## 2024-07-05 RX ADMIN — PROPOFOL 300 MCG/KG/MIN: 10 INJECTION, EMULSION INTRAVENOUS at 09:12

## 2024-07-05 RX ADMIN — SODIUM CHLORIDE, SODIUM LACTATE, POTASSIUM CHLORIDE, CALCIUM CHLORIDE: 600; 310; 30; 20 INJECTION, SOLUTION INTRAVENOUS at 07:36

## 2024-07-05 RX ADMIN — LIDOCAINE HYDROCHLORIDE 100 MG: 20 INJECTION, SOLUTION INFILTRATION; PERINEURAL at 09:10

## 2024-07-05 NOTE — ANESTHESIA POSTPROCEDURE EVALUATION
Patient: Louie Greco    Procedure: Procedure(s):  SIGMOIDOSCOPY, FLEXIBLE; Exam under anesthesia       Anesthesia Type:  MAC    Note:  Disposition: Outpatient   Postop Pain Control: Uneventful            Sign Out: Well controlled pain   PONV: No   Neuro/Psych: Uneventful            Sign Out: Acceptable/Baseline neuro status   Airway/Respiratory: Uneventful            Sign Out: Acceptable/Baseline resp. status   CV/Hemodynamics: Uneventful            Sign Out: Acceptable CV status; No obvious hypovolemia; No obvious fluid overload   Other NRE: NONE   DID A NON-ROUTINE EVENT OCCUR? No           Last vitals:  Vitals Value Taken Time   /76 07/05/24 0945   Temp 36.1  C (97  F) 07/05/24 0945   Pulse 57 07/05/24 0945   Resp 20 07/05/24 0945   SpO2 96 % 07/05/24 0945       Electronically Signed By: Dez Kunz MD  July 5, 2024  10:13 AM

## 2024-07-05 NOTE — ANESTHESIA CARE TRANSFER NOTE
Patient: Louie Greco    Procedure: Procedure(s):  SIGMOIDOSCOPY, FLEXIBLE; Exam under anesthesia       Diagnosis: Rectal cancer (H) [C20]  Diagnosis Additional Information: No value filed.    Anesthesia Type:   MAC     Note:      Level of Consciousness: awake  Oxygen Supplementation: face mask    Independent Airway: airway patency satisfactory and stable  Dentition: dentition unchanged  Vital Signs Stable: post-procedure vital signs reviewed and stable  Report to RN Given: handoff report given  Patient transferred to: Phase II    Handoff Report: Identifed the Patient, Identified the Reponsible Provider, Reviewed the pertinent medical history, Discussed the surgical course, Reviewed Intra-OP anesthesia mangement and issues during anesthesia, Set expectations for post-procedure period and Allowed opportunity for questions and acknowledgement of understanding      Vitals:  Vitals Value Taken Time   /76 07/05/24 0945   Temp 36.1  C (97  F) 07/05/24 0945   Pulse 57 07/05/24 0945   Resp 20 07/05/24 0945   SpO2 96 % 07/05/24 0945       Electronically Signed By: Dez Kunz MD  July 5, 2024  10:13 AM

## 2024-07-05 NOTE — OP NOTE
SURGEON: Felicitas Radford MD      ASSISTANT: None     PREOPERATIVE DIAGNOSIS: History of rectal cancer status post total neoadjuvant treatment on watch and wait protocol, due for examination.     POSTOPERATIVE DIAGNOSIS: History of rectal cancer status post total neoadjuvant treatment on watch and wait protocol, due for examination     PROCEDURE: Examination under anesthesia and flexible sigmoidoscopy.     INDICATIONS: Louie Greco is a 64 year old male who was diagnosed with rectal cancer 7/2016 stages as [id number] (stage IIIA) and underwent chemoradiotherapy with complete response followed by FOLFOX on watch and wait protocol. He is due for both examination of the region and flexible sigmoidoscopy. The risks and benefits of surgery were thoroughly discussed with the patient and he agreed to proceed.     DESCRIPTION OF PROCEDURE: The patient was brought to the operating room, placed in left lateral decubitus position on the cart. Moderate sedation was induced with intravenous medicines and continuous pulse oxymetry monitoring with heart rate and frequent blood pressures. We performed digital rectal examination and anoscopy which demonstrated a stenotic anal canal and introduction of the small anoscope resulted in some mild tearing of the anal canal. There were no palpable abnormalities on digital rectal examination and the prostate by palpation was normal. There was no nodularity or induration.  A flexible sigmoidoscopy with CO2 using a pediatric colonoscope was then performed and the scope was advanced to the descending colon at 50 cm without difficulty.  There was diverticulosis and no signs of divertciulitis.  There were very minimal radiation changes which were substantially improved and the scar was visible, and no signs of disease or additional lesions.  At the end the case, all instruments and sponge counts were correct x2. Previously, there was a fissure posteriorly likely from the limited examination  performed and today there was no fissure. Retroflexion was not performed. The patient was emerged from anesthesia and taken to postoperative anesthesia care unit in good condition.      SPECIMEN: None.         AROLDO NAIK MD   Colon and Rectal Surgery Staff  [facility]

## 2024-07-05 NOTE — DISCHARGE INSTRUCTIONS
Discharge Instructions after Colonoscopy  or Sigmoidoscopy     Today you had a ____ Colonoscopy _X___ Sigmoidoscopy     Activity and Diet  You were given medicine for pain. You may be dizzy or sleepy.  For 24 hours:   Do not drive or use heavy equipment.   Do not make important decisions.   Do not drink any alcohol.  You may return to your normal diet and medicines.     Discomfort   Air was placed in your colon during the exam in order to see it. Walking helps to pass the air.   You may take Tylenol (acetaminophen) for pain unless your doctor has told you not to.  Do not take aspirin or ibuprofen (Advil, Motrin, or other anti-inflammatory  drugs) for _____ days.     Follow-up  ____ We took small tissue samples or polyps to study. Your doctor will call you with the results  within two weeks.     When to call:     Call right away if you have:   Unusual pain in belly or chest pain not relieved with passing air.   More than 1 to 2 Tablespoons of bleeding from your rectum.   Fever above 100.6  F (37.5  C).     If you have severe pain, bleeding, or shortness of breath, go to an emergency room.     If you have questions, call:  Monday to Friday, 8 a.m. to 4:30 p.m.  Central Scheduling Department: [phone]     After hours  Hospital: [phone] (Ask for the Colorectal Surgery fellow on call)  [facility]  Home Care Following Anesthesia  For 24 hours after surgery:  Get plenty of rest.  A responsible adult must stay with you for at least 24 hours after you leave the surgery center.  Do not drive or use heavy equipment.  If you have weakness or tingling, don't drive or use heavy equipment until this feeling goes away.   Do not drink alcohol.   Avoid strenuous or risky activities.  Ask for help when climbing stairs.  You may feel lightheaded.  IF so, sit for a few minutes before standing.  Have someone help you get up.   If you have nausea (feel sick to your stomach): Drink only clear  liquids such as apple juice, ginger ale, broth or 7-Up.  Rest may also help.  Be sure to drink enough fluids.  Move to a regular diet as you feel able.   You may have a slight fever.  Call the doctor if your fever is over 100 F (37.7 C) (taken under the tongue) or lasts longer than 24 hours.  You may have a dry mouth, a sore throat, muscle aches or trouble sleeping. These should go away after 24 hours.  Do not make important or legal decisions.   It is recommended to avoid smoking.               Tips for taking pain medications  To get the best pain relief possible, remember these points:  Take pain medications as directed, before pain becomes severe.  Pain medication can upset your stomach: taking it with food may help.  Constipation is a common side effect of pain medication. Drink plenty of  fluids.  Eat foods high in fiber. Take a stool softener if recommended by your doctor or pharmacist.  Do not drink alcohol, drive or operate machinery while taking pain medications.  Ask about other ways to control pain, such as with heat, ice or relaxation.    Tylenol/Acetaminophen Consumption    If you feel your pain relief is insufficient, you may take Tylenol/Acetaminophen in addition to your narcotic pain medication.   Be careful not to exceed 4,000 mg of Tylenol/Acetaminophen in a 24 hour period from all sources.  If you are taking extra strength Tylenol/acetaminophen (500 mg), the maximum dose is 8 tablets in 24 hours.  If you are taking regular strength acetaminophen (325 mg), the maximum dose is 12 tablets in 24 hours.    Call a doctor for any of the following:  Signs of infection (fever, growing tenderness at the surgery site, a large amount of drainage or bleeding, severe pain, foul-smelling drainage, redness, swelling).  It has been over 8 to 10 hours since surgery and you are still not able to urinate (pass water).  Headache for over 24 hours.  Numbness, tingling or weakness the day after surgery (if you had  spinal anesthesia).  Signs of Covid-19 infection (temperature over 100 degrees, shortness of breath, cough, loss of taste/smell, generalized body aches, persistent headache, chills, sore throat, nausea/vomiting/diarrhea)  Your doctor is:       Dr. Felicitas Radford, Colon Rectal: [phone]               Or dial [phone] and ask for the resident on call for:  Colon Rectal  For emergency care, call the:  [facility]:  [phone] (TTY for hearing impaired: [phone])

## 2024-07-11 ENCOUNTER — ANCILLARY PROCEDURE (OUTPATIENT)
Dept: MRI IMAGING | Facility: CLINIC | Age: 64
End: 2024-07-11
Attending: INTERNAL MEDICINE
Payer: COMMERCIAL

## 2024-07-11 ENCOUNTER — ANCILLARY PROCEDURE (OUTPATIENT)
Dept: PET IMAGING | Facility: CLINIC | Age: 64
End: 2024-07-11
Attending: INTERNAL MEDICINE
Payer: COMMERCIAL

## 2024-07-11 ENCOUNTER — LAB (OUTPATIENT)
Dept: LAB | Facility: CLINIC | Age: 64
End: 2024-07-11
Attending: INTERNAL MEDICINE
Payer: COMMERCIAL

## 2024-07-11 DIAGNOSIS — C20 MALIGNANT NEOPLASM OF RECTUM (H): ICD-10-CM

## 2024-07-11 LAB
CEA SERPL-MCNC: 2 NG/ML
GLUCOSE SERPL-MCNC: 86 MG/DL (ref 70–99)

## 2024-07-11 PROCEDURE — 82378 CARCINOEMBRYONIC ANTIGEN: CPT

## 2024-07-11 PROCEDURE — 74183 MRI ABD W/O CNTR FLWD CNTR: CPT | Mod: GC | Performed by: STUDENT IN AN ORGANIZED HEALTH CARE EDUCATION/TRAINING PROGRAM

## 2024-07-11 PROCEDURE — 36591 DRAW BLOOD OFF VENOUS DEVICE: CPT

## 2024-07-11 PROCEDURE — A9585 GADOBUTROL INJECTION: HCPCS | Mod: JZ | Performed by: STUDENT IN AN ORGANIZED HEALTH CARE EDUCATION/TRAINING PROGRAM

## 2024-07-11 RX ORDER — GADOBUTROL 604.72 MG/ML
10 INJECTION INTRAVENOUS ONCE
Status: COMPLETED | OUTPATIENT
Start: 2024-07-11 | End: 2024-07-11

## 2024-07-11 RX ORDER — IOPAMIDOL 755 MG/ML
50-135 INJECTION, SOLUTION INTRAVASCULAR ONCE
Status: COMPLETED | OUTPATIENT
Start: 2024-07-11 | End: 2024-07-11

## 2024-07-11 RX ORDER — FUROSEMIDE 10 MG/ML
40 INJECTION INTRAMUSCULAR; INTRAVENOUS ONCE
Status: COMPLETED | OUTPATIENT
Start: 2024-07-11 | End: 2024-07-11

## 2024-07-11 RX ORDER — FLUDEOXYGLUCOSE F 18 200 MCI/ML
1-18 INJECTION, SOLUTION INTRAVENOUS ONCE
Status: COMPLETED | OUTPATIENT
Start: 2024-07-11 | End: 2024-07-11

## 2024-07-11 RX ADMIN — IOPAMIDOL 125 ML: 755 INJECTION, SOLUTION INTRAVASCULAR at 10:28

## 2024-07-11 RX ADMIN — FLUDEOXYGLUCOSE F 18 16.8 MILLICURIE: 200 INJECTION, SOLUTION INTRAVENOUS at 09:33

## 2024-07-11 RX ADMIN — GADOBUTROL 10 ML: 604.72 INJECTION INTRAVENOUS at 15:27

## 2024-07-11 RX ADMIN — FUROSEMIDE 40 MG: 10 INJECTION INTRAMUSCULAR; INTRAVENOUS at 10:29

## 2024-07-11 NOTE — DISCHARGE INSTRUCTIONS

## 2024-07-11 NOTE — NURSING NOTE
Chief Complaint   Patient presents with    Labs Only     Blood drawn off PIV that was already in place and left in for MRI.        Patient only wanted CEA done today, states that other labs for Shayla were done 6/13 and they should not be done again.    GAYATHRI Rogers LPN

## 2024-07-25 ENCOUNTER — ONCOLOGY VISIT (OUTPATIENT)
Dept: ONCOLOGY | Facility: CLINIC | Age: 64
End: 2024-07-25
Attending: INTERNAL MEDICINE
Payer: COMMERCIAL

## 2024-07-25 DIAGNOSIS — R97.20 ELEVATED PROSTATE SPECIFIC ANTIGEN (PSA): ICD-10-CM

## 2024-07-25 DIAGNOSIS — C20 MALIGNANT NEOPLASM OF RECTUM (H): Primary | ICD-10-CM

## 2024-07-25 PROCEDURE — 99215 OFFICE O/P EST HI 40 MIN: CPT | Performed by: INTERNAL MEDICINE

## 2024-07-25 PROCEDURE — 99211 OFF/OP EST MAY X REQ PHY/QHP: CPT | Performed by: INTERNAL MEDICINE

## 2024-07-25 NOTE — LETTER
7/25/2024      Louie Greco  [address] [place] [address]      Dear Colleague,    Thank you for referring your patient, Louie Greco, to the [facility]. Please see a copy of my visit note below.    [facility]    Hematology/Oncology Established Patient Note      Today's Date: Jul 25, 2024     Reason for Follow-up: rectal cancer      HISTORY OF PRESENT ILLNESS: Louie Greco is a 61 year old male who presents with rectal cancer.  He started having rectal bleeding around beginning of 2016.  He underwent colonoscopy on 7/27/16, which showed a malignant tumor in the rectum involving half of the lumen with oozing, as well as three 4-mm polyps in the ascending and transverse colon.  Pathology showed moderately differentiated adenocarcinoma, ascending colon with sessile serrated adenoma, others were tubular adenomas.  Mismatch repair expression with normal protein expression.  Staging scans showed the rectal mass, indeterminate focus in the left liver thought to be cyst, and multiple bilateral renal cysts.  MRI showed a distal rectal mass measuring 2.7 x 2.4 cm extending to the most proximal region of the anal canal.  There are a few tiny subcentimeter perirectal fat lymph nodes.  He was staged clinically [id number] (stage IIIA).  He was seen by Dr. Lee at [facility], and started neoadjuvant chemoradiation with capecitabine on 8/8/16 and completed on 9/15/16.  He was then seen by Dr. Radford, colorectal surgery at [facility].  His post chemoradiation MRI showed improvement in the size of the tumor and response in the lymph nodes.  On 12/8/16, he underwent flexible sigmoidoscopy and there was no evidence of tumor.  There was only some anterior scarring in the lumen.  Multiple biopsies were taken, which showed no evidence of carcinoma.  At that point, Dr. Radford spoke with the patient's wife, that there appears to be a complete response  in the lumen, and that the patient would wish to placed on the watch and wait protocol.  The patient had expressed wishes not to have an APR, and it would not have been possible to grossly clear a margin for LAR.    He had port placed on 1/16/17.  It has been removed.    He completed FOLFOX x 8 cycles 1/16/17-5/9/17.      He was admitted 7/16/17-7/20/17 with sepsis, abdominal wall cellulitis.  He underwent surgery with laparoscopic abdominal exploration and appendectomy.  Pathology found sessile serrated adenoma without dysplasia or malignancy.        INTERIM HISTORY: Louie is doing well.      He was previously followed by my colleague - Dr. Agueda Manley. He has transferred care to me now that Dr. Manley has left our practice.  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      He had a basal cell carcinoma removed from left upper lip. His wife is undergoing treatment for uterine cancer with Dr. Cleaning.     REVIEW OF SYSTEMS:   14 point ROS was reviewed and is negative other than as noted above in HPI.       HOME MEDICATIONS:   Current Outpatient Medications   Medication Sig Dispense Refill     atorvastatin (LIPITOR) 20 MG tablet TAKE ONE TABLET BY MOUTH ONCE DAILY (Patient taking differently: Take 20 mg by mouth every evening) 90 tablet 3     atorvastatin (LIPITOR) 40 MG tablet Take 0.5 tablets (20 mg) by mouth daily (Patient taking differently: Take 20 mg by mouth every evening) 90 tablet 3     Collagen-Vitamin C-Biotin (COLLAGEN 1500/C PO) Take by mouth every morning       Na Sulfate-K Sulfate-Mg Sulf (SUPREP BOWEL PREP KIT) solution Take 177 mLs (1 Bottle) by mouth See Admin Instructions Split dose 2 day Regimen: The evening before you procedure: dilute one bottle with water to a total volume of 16 oz. (up to fill line).  At 6 pm, drink the entire amount.  Drink 32 ounces of water over the next hour. The morning of your procedure repeat both steps above using the second bottle.  Start five hours before you procedure  and complete the prep at least three hours before you arrive. 354 mL 0     sildenafil (VIAGRA) 100 MG tablet Take 1 tablet (100 mg) by mouth daily as needed 30 tablet 11     No current facility-administered medications for this visit.        ALLERGIES:  Allergies   Allergen Reactions     Ampicillin Diarrhea     Demerol [Meperidine] Nausea         PAST MEDICAL HISTORY:  Past Medical History:   Diagnosis Date     Arthritis     Hands     Childhood asthma      Elevated prostate specific antigen (PSA)     No biopsy done (had rectal cancer at the time)     Epididymitis, bilateral     18 years old     Inguinal hernia      Kidney stone on left side 04/22/2021    Added automatically from request for surgery [id number]     Mumps      Nephrolithiasis     Several -- 1990 first,recurrence 2019, 2021     Rectal cancer (H) 2017    low rectal cancer, S/P Rad adn Chemo, no surg needed     Right 4 mm Kidney stone at [facility]  02/28/2021     Right ureteral stone 03/11/2021    Added automatically from request for surgery [id number]     Shingles          PAST SURGICAL HISTORY:  Past Surgical History:   Procedure Laterality Date     BIOPSY  ongoing    cancer     COLONOSCOPY N/A 07/27/2016    Procedure: COMBINED COLONOSCOPY, SINGLE OR MULTIPLE BIOPSY/POLYPECTOMY BY BIOPSY;  Surgeon: Chelsea Thompson MD;  Location: [facility]     COLONOSCOPY N/A 09/13/2017    Procedure: COLONOSCOPY;;  Surgeon: Felicitas Radford MD;  [facility]     COLONOSCOPY N/A 12/13/2017    Procedure: COLONOSCOPY;;  Surgeon: Felicitas Radford MD;  [facility]     COLONOSCOPY N/A 10/23/2020    Procedure: COLONOSCOPY;  Surgeon: Felicitas Radford MD;  [facility]     COMBINED CYSTOSCOPY, RETROGRADES, URETEROSCOPY, LASER HOLMIUM LITHOTRIPSY URETER(S), INSERT STENT Left 02/16/2018    Procedure: COMBINED CYSTOSCOPY, RETROGRADES, URETEROSCOPY, LASER HOLMIUM LITHOTRIPSY URETER(S), INSERT STENT;  Cysto, left ureteroscopy, holmium laser,  retrogrades, stent placement;  Surgeon: Bola Worthy MD;  [facility]     COMBINED CYSTOSCOPY, RETROGRADES, URETEROSCOPY, LASER HOLMIUM LITHOTRIPSY URETER(S), INSERT STENT Right 03/22/2021    Procedure: CYSTOSCOPY WITH RIGHT RETROGRADE PYELOGRAM, RIGHT URETEROSCOPY WITH LASER LITHOTRIPSY AND BASKET REMOVAL OF STONE, RIGHT URETERAL STENT PLACEMENT;  Surgeon: Mohsen Pat MD;  [facility]     COMBINED CYSTOSCOPY, RETROGRADES, URETEROSCOPY, LASER HOLMIUM LITHOTRIPSY URETER(S), INSERT STENT Left 05/19/2021    Procedure: CYSTOSCOPY, LEFT RETROGRADE PYELOGRAM, LEFT DIAGNOSTIC, URETEROSCOPY, LEFT URETERAL STENT PLACEMENT;  Surgeon: Mohsen Pat MD;  [facility]     COMBINED CYSTOSCOPY, RETROGRADES, URETEROSCOPY, LASER HOLMIUM LITHOTRIPSY URETER(S), INSERT STENT Left 06/23/2021    Procedure: CYSTOSCOPY, LEFT URETERAL STENT REMOVAL, LEFT RETROGRADE PYELOGRAM, LEFT URETEROSCOPY WITH LASER LITHOTRIPSY AND BASKET REMOVAL OF STONES, LEFT URETERAL STENT PLACEMENT;  Surgeon: Mohsen Pat MD;  [facility]     CYSTOSCOPY, LITHOLAPAXY, COMBINED N/A 03/01/2021    Procedure: Cystoscopy, litholapaxy, combined;  Surgeon: Mohsen Pat MD;  [facility]     CYSTOSCOPY, RETROGRADES, INSERT STENT URETER(S), COMBINED Right 03/01/2021    Procedure: Cystoscopy, retrogrades, insert stent ureter(s), combined;  Surgeon: Mohsen Pat MD;  [facility]     EXAM UNDER ANESTHESIA ANUS N/A 07/05/2017    Procedure: EXAM UNDER ANESTHESIA ANUS;  Examination Under Anesthesia, flex sigmoidoscopy with biopsies and formalin application;  Surgeon: Felicitas Radford MD;  [facility]     EXAM UNDER ANESTHESIA ANUS N/A 09/13/2017    Procedure: EXAM UNDER ANESTHESIA ANUS;  Examination Under Anesthesia Anus, Colonoscopy, application of formalin;  Surgeon: Felicitas Radford MD;  [facility]     EXAM UNDER ANESTHESIA ANUS N/A 12/13/2017    Procedure: EXAM UNDER ANESTHESIA ANUS;   Examination Under Anesthesia Anus, Colonoscopy;  Surgeon: Felicitas Radford MD;  [facility]     EXAM UNDER ANESTHESIA ANUS N/A 05/11/2018    Procedure: EXAM UNDER ANESTHESIA ANUS;  Examination Under Anesthesia Anus, Interoperative Flexible Sigmoidoscopy, Application of Formalin;  Surgeon: Felicitas Radford MD;  [facility]     EXAM UNDER ANESTHESIA ANUS N/A 07/20/2018    Procedure: EXAM UNDER ANESTHESIA ANUS;  Examination Under Anesthesia Anus, Flexible Sigmoidoscopy ;  Surgeon: Felicitas Radford MD;  [facility]     EXAM UNDER ANESTHESIA ANUS N/A 11/30/2018    Procedure: Examination Under Anesthesia, application of formalin to rectum, polypectomy;  Surgeon: Felicitas Radford MD;  [facility]     EXAM UNDER ANESTHESIA ANUS N/A 02/22/2019    Procedure: Examination Under Anesthesia;  Surgeon: Felicitas Radford MD;  [facility]     EXAM UNDER ANESTHESIA ANUS N/A 06/28/2019    Procedure: Examination Under Anesthesia;  Surgeon: Felicitas Radford MD;  [facility]     EXAM UNDER ANESTHESIA ANUS N/A 11/01/2019    Procedure: Examination Under Anesthesia;  Surgeon: Felicitas Radford MD;  [facility]     EXAM UNDER ANESTHESIA RECTUM N/A 10/23/2020    Procedure: Exam under anesthesia rectum;  Surgeon: Felicitas Radford MD;  [facility]     HC TOOTH EXTRACTION W/FORCEP       LAPAROSCOPIC APPENDECTOMY N/A 07/17/2017    Procedure: LAPAROSCOPIC APPENDECTOMY;  LAPAROSCOPIC APPENDECTOMY;  Surgeon: Bryson Ferguson MD;  Location:  OR     LASER HOLMIUM LITHOTRIPSY URETER(S), INSERT STENT, COMBINED Right 03/01/2021    Procedure: CYSTOURETEROSCOPY,  URETERAL STENT INSERTION, RIGHT RETROGRADE;  Surgeon: Mohsen Pat MD;  [facility]     SIGMOIDOSCOPY FLEXIBLE N/A 12/08/2016    Procedure: SIGMOIDOSCOPY FLEXIBLE;  Surgeon: Felicitas Radford MD;  [facility]     SIGMOIDOSCOPY FLEXIBLE N/A 07/05/2017    Procedure:  SIGMOIDOSCOPY FLEXIBLE;;  Surgeon: Felicitas Radford MD;  [facility]     SIGMOIDOSCOPY FLEXIBLE N/A 05/11/2018    Procedure: SIGMOIDOSCOPY FLEXIBLE;;  Surgeon: Felicitas Radford MD;  [facility]     SIGMOIDOSCOPY FLEXIBLE N/A 07/20/2018    Procedure: SIGMOIDOSCOPY FLEXIBLE;;  Surgeon: Felicitas Radford MD;  [facility]     SIGMOIDOSCOPY FLEXIBLE N/A 11/30/2018    Procedure: Flexible Sigmoidoscopy;  Surgeon: Felicitas Radford MD;  [facility]     SIGMOIDOSCOPY FLEXIBLE N/A 02/22/2019    Procedure: Intraoperative Flexible Sigmoidoscopy, Application of Formalin to rectum;  Surgeon: Felicitas Radford MD;  [facility]     SIGMOIDOSCOPY FLEXIBLE N/A 06/28/2019    Procedure: Flexible Sigmoidoscopy;  Surgeon: Felicitas Radford MD;  [facility]     SIGMOIDOSCOPY FLEXIBLE N/A 11/01/2019    Procedure: Flexible Sigmoidoscopy;  Surgeon: Felicitas Radford MD;  [facility]     SIGMOIDOSCOPY FLEXIBLE N/A 07/23/2021    Procedure: SIGMOIDOSCOPY, FLEXIBLE;  Surgeon: Felicitas Radford MD;  [facility]     SIGMOIDOSCOPY FLEXIBLE N/A 07/01/2022    Procedure: SIGMOIDOSCOPY, FLEXIBLE, EUA;  Surgeon: Felicitas Radford MD;  [facility]     SIGMOIDOSCOPY FLEXIBLE N/A 07/28/2023    Procedure: SIGMOIDOSCOPY, FLEXIBLE (PEDIATRIC SCOPE NEEDED);  Surgeon: Felicitas Radford MD;  [facility]     SIGMOIDOSCOPY FLEXIBLE N/A 7/5/2024    Procedure: SIGMOIDOSCOPY, FLEXIBLE; Exam under anesthesia;  Surgeon: Felicitas Radford MD;  [facility]     TESTICLE SURGERY       VASCULAR SURGERY      Right chest port     VASECTOMY           SOCIAL HISTORY:  Social History     Socioeconomic History     Marital status:      Spouse name: Not on file     Number of children: 2     Years of education: Not on file     Highest education level: Not on file   Occupational History     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)      Social Determinants of Health     Financial Resource Strain: Low Risk  (6/13/2024)    Financial Resource Strain      Within the past 12 months, have you or your family members you live with been unable to get utilities (heat, electricity) when it was really needed?: No   Food Insecurity: Low Risk  (6/13/2024)    Food Insecurity      Within the past 12 months, did you worry that your food would run out before you got money to buy more?: No      Within the past 12 months, did the food you bought just not last and you didn t have money to get more?: No   Transportation Needs: Low Risk  (6/13/2024)    Transportation Needs      Within the past 12 months, has lack of transportation kept you from medical appointments, getting your medicines, non-medical meetings or appointments, work, or from getting things that you need?: No   Physical Activity: Sufficiently Active (6/13/2024)    Exercise Vital Sign      Days of Exercise per Week: 5 days      Minutes of Exercise per Session: 40 min   Stress: Stress Concern Present (6/13/2024)    [employer] - Occupational Stress Questionnaire      Feeling of Stress : Very much   Social Connections: Unknown (6/13/2024)    Social Connection and Isolation Panel [NHANES]      Frequency of Communication with Friends and Family: Not on file      Frequency of Social Gatherings with Friends and Family: More than three times a week      Attends [employer] Services: Not on  file      Active Member of Clubs or Organizations: Not on file      Attends Club or Organization Meetings: Not on file      Marital Status: Not on file   Interpersonal Safety: Low Risk  (2024)    Interpersonal Safety      Do you feel physically and emotionally safe where you currently live?: Yes      Within the past 12 months, have you been hit, slapped, kicked or otherwise physically hurt by someone?: No      Within the past 12 months, have you been humiliated or emotionally abused in other ways by your partner or ex-partner?: No   Housing Stability: Low Risk  (2024)    Housing Stability      Do you have housing? : Yes      Are you worried about losing your housing?: No     He notes that he had a brother who passed away at a young age of 53 from a metastatic cancer, but unknown what type, and passed away within 2 months of diagnosis.  He denies other family history of cancer in the immediate family.  Louie works as a  at a [email or web address] school.      FAMILY HISTORY:  Family History   Problem Relation Age of Onset     Colon Polyps Mother         Number and type of polyps unknown     Chronic Obstructive Pulmonary Disease Father      Hypertension Father      Hyperlipidemia Father      Obesity Father      Diabetes Maternal Grandfather      Macular Degeneration Paternal Grandmother      Hypertension Paternal Grandmother      Hyperlipidemia Paternal Grandmother      Cancer Brother         primary of unknown origin     Other Cancer Brother      Family History Negative Brother         negative colonoscopy     Stomach Cancer Other 63        maternal great grandfather     Diabetes Other      Glaucoma No family hx of      Anesthesia Reaction No family hx of      Deep Vein Thrombosis (DVT) No family hx of          PHYSICAL EXAM:  Vital signs:  There were no vitals taken for this visit.   ECO  GENERAL/CONSTITUTIONAL: No acute distress.  EYES: No scleral icterus.  NEUROLOGIC: Alert, oriented, answers  "questions appropriately.  INTEGUMENTARY: No jaundice.  GAIT: Steady, does not use assistive device          LABS:    Recent Labs   Lab Test 07/11/24  0925 06/13/24  1342 07/21/23  0940 06/26/23  1551 08/01/22  1403 07/29/21  1330 07/29/21  0840 02/28/21  0515   NA  --  137  --  140 138 140  --  140   POTASSIUM  --  4.3  --  4.4 3.6 4.0  --  4.0   CHLORIDE  --  103  --  105 106 107  --  110*   CO2  --  23  --  25 25 26  --  25   ANIONGAP  --  11  --  10 7 7  --  5   BUN  --  19.9  --  22.9 16 17  --  19   CR  --  0.77  --  0.85 0.74 0.84  --  1.10   GLC 86 88 100* 92 104* 97   < > 134*   FRANDY  --  9.1  --  9.2 8.8 9.3  --  8.3*    < > = values in this interval not displayed.     Recent Labs   Lab Test 06/26/23  1551 07/17/17  0705   MAG 2.2 2.0     Recent Labs   Lab Test 06/13/24  1342 06/26/23  1551 08/01/22  1403 07/29/21  1332 02/28/21  0515 08/13/20  1530   WBC 6.1 6.2 10.8 5.7 6.0 5.8   HGB 15.1 14.7 15.2 15.9 14.0 16.2    216 211 236 181 198   MCV 88 88 85 85 86 85   NEUTROPHIL  --  66 85 73 83.6 76.1     Recent Labs   Lab Test 06/13/24  1342 06/26/23  1551 08/01/22  1403   BILITOTAL 0.8 0.6 0.8   ALKPHOS 73 67 74   ALT 26 28 35   AST 29 26 21   ALBUMIN 4.4 4.4 4.0     No results found for: \"TSH\"  Recent Labs   Lab Test 07/29/21  1331 08/13/20  1530 02/17/20  0932 06/24/19  0828 01/04/19  1017   CEA 1.4 1.2 <0.5 1.5 1.7     Results for orders placed or performed in visit on 07/11/24   MR Rectum wo & w Contrast    Narrative    EXAMINATION: MR RECTUM W/O & W CONTRAST, 7/11/2024 3:28 PM     COMPARISON: MRI dated 7/21/2023, 7/27/2016    TECHNIQUE:  Images were acquired without and with intravenous contrast  through the pelvis. The following MR images were acquired without  contrast: multiplanar T1, multiplanar T2, axial diffusion-weighted and  axial apparent diffusion coefficient. T1-weighted images with fat  saturation were acquired at the following intervals relative to  intravenous contrast administration: " "pre-contrast, immediate post  contrast, 1 minute, 3 minutes and delayed.  Contrast dose: 10 mL  Gadavist    HISTORY: Rectal cancer: clinical stage [id number] (stage IIIA), s/p  chemoradiation with Xeloda in Sep 2016; 8 cycles of adjuvant FOLFOX  Jan-May 2017; Malignant neoplasm of rectum (H)    FINDINGS:    LOCATION/SIZE:  On prior exam dated 7/27/2016 there was a tumor identified in the  lower rectum. Previously this tumor measured 4.8 cm, and currently  there is no residual tumor present.     TREATMENT RESPONSE:   Approximately 0% of the current mass demonstrates tumour signal  intensity, with the remainder appearing to represent fibrosis.  This  corresponds to a tumour response grade of mrTRG-1 .     EXTENT:  The tumor does not clearly involve the anal sphincters or levator ani  muscle. As    CIRCUMFERENTIAL RESECTION MARGIN (CRM):  In terms of the resection margin, there is no involvement of the  mesorectal fascia.    LYMPH NODES:  No suspicious lymphadenopathy.     VEINS:  There is no evidence of extramural venous extension.    MISCELLANEOUS FINDINGS:  Left greater than right bilateral fat-containing inguinal hernias. The  prostate is not enlarged with unchanged susceptibility artifact in the  left peripheral zone.      Impression    IMPRESSION:   1. There has been continued complete response to treatment, with a  corresponding tumor regression grade of mrTRG-1 .   2. No suspicious lymphadenopathy.    ----------------------------------------------------------------------  ----------------------------------------------------------------------    *Based on the article: \"Magnetic resonance imaging-detected tumor  response for locally advanced rectal cancer predicts survival  outcomes: [facility] experience.\" published in the Journal of Clinical  Oncology Oct 1, 2011.    MRI assessment of tumor regression grade (mrTRG):  mrTRG-1 was identified as the absence of any tumor signal.   mrTRG-2 was identified as small amounts " of residual tumor visible but  with a predominant fibrotic low signal intensity.   mrTRG-3 was identified as mixed areas of low signal fibrosis and  intermediate signal intensity present but without predominance of  tumor.   mrTRG-4 was identified by predominantly tumor signal intensity remains  with minimal fibrotic low signal intensity.   mrTRG-5 was identified as no fibrosis evident, tumor signal visible  only.            I have personally reviewed the examination and initial interpretation  and I agree with the findings.    DESMOND ALCALA DO         SYSTEM ID:  [id number]     Narrative & Impression   Combined Report of: PET and CT on 7/11/2024 11:45 AM:      1. PET of the neck, chest, abdomen, and pelvis.  2. PET CT Fusion for Attenuation Correction and Anatomical  Localization.  3. Diagnostic CT of the chest, abdomen and pelvis with intravenous  contrast obtained for diagnostic interpretation.  4. 3D MIP and PET-CT fused images were processed on an independent  workstation and archived to PACS and reviewed by a radiologist.     Technique:     1. PET: The patient received 16.80 mCi of F-18-FDG. The serum glucose  was 86 mg/dL prior to administration. Body weight was 97.5 kg. Images  were evaluated in the axial, sagittal, and coronal planes as well as  the rotational whole body MIP. Images were acquired from cranial  vertex to feet.     UPTAKE WAS MEASURED AT 60 MINUTES.      2. CT: Volumetric acquisition for clinical interpretation of the  chest, abdomen, and pelvis acquired at 3 mm sections. The chest,  abdomen, and pelvis were evaluated at 5 mm sections in bone, soft  tissue, and lung windows.     Contrast and Medications:  IV contrast: 125 mL of Isovue 370 intravenously.  PO contrast: 16 ounces of water   Additional Medications: None.     3. 3D MIP and PET-CT fused images were processed on an independent  workstation and archived to PACS and reviewed by a radiologist.     INDICATION: Rectal cancer: clinical stage  [id number] (stage IIIA), s/p  chemoradiation with Xeloda in Sep 2016; 8 cycles of adjuvant FOLFOX  Jan-May 2017; Malignant neoplasm of rectum (H).     ADDITIONAL INFORMATION OBTAINED FROM EMR: History of rectal cancer  treated with chemoradiation now on surveillance protocol.     COMPARISON: PET CT 7/21/2023, 8/1/2022. Same day MRI pelvis.      FINDINGS:      BACKGROUND: Liver SUV max = 4.2, Aorta Blood SUV max = 3.2.         HEAD/NECK:     Pharyngeal mucosal spaces: Nasopharynx and palatine tonsils are within  normal limits. Tongue base is normal. Oral tongue and buccal mucosa of  the oral cavity is normal. Oropharynx, hypopharynx and larynx are  within normal limits.     Sinonasal region: Paranasal sinuses are clear. No mass within the  nasal cavity.     Lymph nodes: No FDG avid or abnormally enlarged lymph nodes.     Salivary glands: The major salivary glands are within normal limits.      Thyroid gland: The thyroid gland is within normal limits.      Vascular structures: The major vasculature of the neck are patent.     Brain: No abnormal FDG avid lesion or abnormal enhancement.      Orbital cavities: No abnormal FDG avid lesion or abnormal enhancement.        CHEST:     Lymph nodes: No abnormally enlarged lymph nodes. Mild uptake  associating partially calcified left subhilar node which is stable in  size and appearance, benign. No other FDG avid nodes.      Lungs: The central tracheobronchial tree is clear. No acute  consolidation.  No pleural effusion or pneumothorax. Stable mild  dependent atelectasis. Lfet lower lobe granuloma.      Heart and great vessels: Heart size is within normal limits. Trace  pericardial effusion. The thoracic aorta and main pulmonary artery are  within normal limits. The esophagus is unremarkable. Small hiatal  hernia.      ABDOMEN AND PELVIS:     Liver: No FDG avid lesion. Multiple non avid benign hepatic cysts, the  largest is 2.7 x 2.2 cm in the right lobe of the liver, stable  from  prior. No intrahepatic or extrahepatic biliary ductal dilatation.  Gallbladder is within normal limits.      Pancreas: Mildly atrophic appearance of the pancreas. No pancreatic  ductal dilatation.      Spleen: No FDG avid lesion.     Adrenal glands: No FDG avid foci.     Kidneys: No FDG avid lesion. Scattered bilateral renal cysts the  largest measuring 5.4 x 5.7 cm in the inferior pole the right kidney.  No hydronephrosis. The urinary bladder is decompressed.      Enlarged prostate measuring approximately 4.9 x 4.1 cm with scattered  coarse calcifications. Pelvic phleboliths.     Gastrointestinal system: Normal caliber of the small and large bowel.  Appendectomy. Small fat-containing umbilical hernia. Small  fat-containing left inguinal hernia.     Lymph nodes: No FDG avid or abnormally enlarged lymph nodes.     Vascular structures: Normal caliber of the abdominal aorta. Scattered  infrarenal abdominal aortic atherosclerotic calcifications.     No free air, free fluid, or fluid collection.      EXTREMITIES:      No abnormal FDG uptake in the visualized extremities.     BONES AND SOFT TISSUES:      No abnormal FDG uptake in the skeleton. No abnormal lytic or blastic  osseous lesions. Multilevel degenerative changes of the spine and  bilateral hips.     SPINAL CANAL:      No evident canal compromise. No abnormal FDG avid lesion.                                                                         IMPRESSION:   In this 65 yo with history of rectal cancer:     No evidence of recurrent or metastatic disease.     I have personally reviewed the examination and initial interpretation  and I agree with the findings.     DI SO MD         SYSTEM ID:  [id number]         Recent Labs   Lab Test 06/13/24  1342 06/26/23  1551 04/19/22  1519 06/17/21  1831   PSA 3.15  3.15 3.62 5.23* 4.92*      ASSESSMENT/PLAN:  Louie Greco is a 64 year old  male with:    1) Rectal cancer: clinical stage [id number] (stage IIIA), s/p  chemoradiation with Xeloda, and appears to have achieved complete response on imaging and biopsies.  He opted not to undergo surgery and expressed desire not to undergo APR, as he does not want a colostomy bag.  It was felt reasonable to go on the watch and wait protocol with the [facility].  He has completed 4 additional months (8 cycles) of chemotherapy with FOLFOX.  He will now go on surveillance, as per the watch and wait protocol.    I have reviewed all of the labs done prior to this clinic visit.  Labs are all completely normal including electrolytes, renal function, hepatic panel, complete blood count and differential.  He has abnormal lipid panel with elevated LDL, low HDL. He has been started on 20 mg daily dose of Lipitor.     We reviewed MRI pelvis and PET scan from 7/21/23. I have reviewed actual images from his recent scans and there is nothing concerning for recurrent metastatic disease or recurrence. He has flex sig with Dr. Radford tomorrow.         He has achieved complete response.  There is no evidence of recurrence or metastatic disease.      Follow up per Watch and Wait Protocol:   Completed CRT: 9/15/16  Flexible sigmoidoscopy   Every 2 months for 6 months: Completed  Every 3 months for 3 years: Completed  Every 6 months for 5 years: Until 8/2022-completed  Every year for 10 years: Until 9/2026 (negative on 7/5/24)     3T MRI of pelvis  6-8 weeks after CRT:  Every 4 months for 2 years: Completed  Every 6 months for 2 years: Completed  Yearly for 2 years: Until 9/2022 - completed  MR Rectum Negative on 7/11/24     CT CAP  Every year for 6-8 years: Until 9/2024- PET July 2024 completed and negative     Colonoscopy   Last 12/13/17- last done in October 2020 7/5/24  -flexible sigmoidoscopy     CEA at every clinic or endoscopic visit      -T3 MRI pelvis and PET scan negative July 2024  -endoscopic surveillance with Dr. Radford as per protocol  - He has been disease free more  than 7 yrs out from treatment completion. I would not get any further surveillance scans.     2) Genetics: He underwent genetic testing, which was negative.    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            4) Elevated bilirubin: mild.  Bilirubin has been mildly elevated in the past. It is stable and normal this time.   -monitor for now    5) Liver cysts: seen on CT, stable  -monitor    6) Pulmonary nodules: stable sub-4 mm pulmonary nodules  -monitor on future scans    7) Kidney cyst: stable  -monitor on future scans.      8) Appendix polyp: He is now s/p appendectomy.  Pathology found sessile serrated adenoma without dysplasia or malignancy.     9) Elevated PSA:   -on observation; PSA normal at this visit. Was elevated in the past likely from prostatitis  -he is seeing urology - Dr. Pat    I will see him in year with labs and scans prior to visit. It would be last visit in Medical Oncology for his colon cancer. He is over years out from treatment completion.       45 minutes spent on the date of the encounter doing chart review, history and exam, documentation and further activities as noted above     Remi Mcguire    Hematologist and Medical Oncologist  [facility]         Again, thank you for allowing me to participate in the care of your patient.        Sincerely,        Remi Mcguire MD

## 2024-07-25 NOTE — PROGRESS NOTES
[facility]    Hematology/Oncology Established Patient Note      Today's Date: Jul 25, 2024     Reason for Follow-up: rectal cancer      HISTORY OF PRESENT ILLNESS: Louie Greco is a 61 year old male who presents with rectal cancer.  He started having rectal bleeding around beginning of 2016.  He underwent colonoscopy on 7/27/16, which showed a malignant tumor in the rectum involving half of the lumen with oozing, as well as three 4-mm polyps in the ascending and transverse colon.  Pathology showed moderately differentiated adenocarcinoma, ascending colon with sessile serrated adenoma, others were tubular adenomas.  Mismatch repair expression with normal protein expression.  Staging scans showed the rectal mass, indeterminate focus in the left liver thought to be cyst, and multiple bilateral renal cysts.  MRI showed a distal rectal mass measuring 2.7 x 2.4 cm extending to the most proximal region of the anal canal.  There are a few tiny subcentimeter perirectal fat lymph nodes.  He was staged clinically [id number] (stage IIIA).  He was seen by Dr. Lee at [facility], and started neoadjuvant chemoradiation with capecitabine on 8/8/16 and completed on 9/15/16.  He was then seen by Dr. Radfrod, colorectal surgery at [facility].  His post chemoradiation MRI showed improvement in the size of the tumor and response in the lymph nodes.  On 12/8/16, he underwent flexible sigmoidoscopy and there was no evidence of tumor.  There was only some anterior scarring in the lumen.  Multiple biopsies were taken, which showed no evidence of carcinoma.  At that point, Dr. Radford spoke with the patient's wife, that there appears to be a complete response in the lumen, and that the patient would wish to placed on the watch and wait protocol.  The patient had expressed wishes not to have an APR, and it would not have been possible to grossly clear a margin for LAR.    He had port  placed on 1/16/17.  It has been removed.    He completed FOLFOX x 8 cycles 1/16/17-5/9/17.      He was admitted 7/16/17-7/20/17 with sepsis, abdominal wall cellulitis.  He underwent surgery with laparoscopic abdominal exploration and appendectomy.  Pathology found sessile serrated adenoma without dysplasia or malignancy.        INTERIM HISTORY: Louie is doing well.      He was previously followed by my colleague - Dr. Agueda Manley. He has transferred care to me now that Dr. Manley has left our practice.  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      He had a basal cell carcinoma removed from left upper lip. His wife is undergoing treatment for uterine cancer with Dr. Cleaning.     REVIEW OF SYSTEMS:   14 point ROS was reviewed and is negative other than as noted above in HPI.       HOME MEDICATIONS:   Current Outpatient Medications   Medication Sig Dispense Refill    atorvastatin (LIPITOR) 20 MG tablet TAKE ONE TABLET BY MOUTH ONCE DAILY (Patient taking differently: Take 20 mg by mouth every evening) 90 tablet 3    atorvastatin (LIPITOR) 40 MG tablet Take 0.5 tablets (20 mg) by mouth daily (Patient taking differently: Take 20 mg by mouth every evening) 90 tablet 3    Collagen-Vitamin C-Biotin (COLLAGEN 1500/C PO) Take by mouth every morning      Na Sulfate-K Sulfate-Mg Sulf (SUPREP BOWEL PREP KIT) solution Take 177 mLs (1 Bottle) by mouth See Admin Instructions Split dose 2 day Regimen: The evening before you procedure: dilute one bottle with water to a total volume of 16 oz. (up to fill line).  At 6 pm, drink the entire amount.  Drink 32 ounces of water over the next hour. The morning of your procedure repeat both steps above using the second bottle.  Start five hours before you procedure and complete the prep at least three hours before you arrive. 354 mL 0    sildenafil (VIAGRA) 100 MG tablet Take 1 tablet (100 mg) by mouth daily as needed 30 tablet 11     No current facility-administered medications for this visit.         ALLERGIES:  Allergies   Allergen Reactions    Ampicillin Diarrhea    Demerol [Meperidine] Nausea         PAST MEDICAL HISTORY:  Past Medical History:   Diagnosis Date    Arthritis     Hands    Childhood asthma     Elevated prostate specific antigen (PSA)     No biopsy done (had rectal cancer at the time)    Epididymitis, bilateral     18 years old    Inguinal hernia     Kidney stone on left side 04/22/2021    Added automatically from request for surgery [id number]    Mumps     Nephrolithiasis     Several -- 1990 first,recurrence 2019, 2021    Rectal cancer (H) 2017    low rectal cancer, S/P Rad adn Chemo, no surg needed    Right 4 mm Kidney stone at [facility]  02/28/2021    Right ureteral stone 03/11/2021    Added automatically from request for surgery [id number]    Shingles          PAST SURGICAL HISTORY:  Past Surgical History:   Procedure Laterality Date    BIOPSY  ongoing    cancer    COLONOSCOPY N/A 07/27/2016    Procedure: COMBINED COLONOSCOPY, SINGLE OR MULTIPLE BIOPSY/POLYPECTOMY BY BIOPSY;  Surgeon: Chelsea Thompson MD;  Location:  GI    COLONOSCOPY N/A 09/13/2017    Procedure: COLONOSCOPY;;  Surgeon: Felicitas Radford MD;  [facility]    COLONOSCOPY N/A 12/13/2017    Procedure: COLONOSCOPY;;  Surgeon: Felicitas Radford MD;  [facility]    COLONOSCOPY N/A 10/23/2020    Procedure: COLONOSCOPY;  Surgeon: Felicitas Radford MD;  Location: [facility] OR    COMBINED CYSTOSCOPY, RETROGRADES, URETEROSCOPY, LASER HOLMIUM LITHOTRIPSY URETER(S), INSERT STENT Left 02/16/2018    Procedure: COMBINED CYSTOSCOPY, RETROGRADES, URETEROSCOPY, LASER HOLMIUM LITHOTRIPSY URETER(S), INSERT STENT;  Cysto, left ureteroscopy, holmium laser, retrogrades, stent placement;  Surgeon: Bola Worthy MD;  Location:  OR    COMBINED CYSTOSCOPY, RETROGRADES, URETEROSCOPY, LASER HOLMIUM LITHOTRIPSY URETER(S), INSERT STENT Right 03/22/2021    Procedure: CYSTOSCOPY WITH RIGHT RETROGRADE PYELOGRAM,  RIGHT URETEROSCOPY WITH LASER LITHOTRIPSY AND BASKET REMOVAL OF STONE, RIGHT URETERAL STENT PLACEMENT;  Surgeon: Mohsen Pat MD;  Location: [facility] OR    COMBINED CYSTOSCOPY, RETROGRADES, URETEROSCOPY, LASER HOLMIUM LITHOTRIPSY URETER(S), INSERT STENT Left 05/19/2021    Procedure: CYSTOSCOPY, LEFT RETROGRADE PYELOGRAM, LEFT DIAGNOSTIC, URETEROSCOPY, LEFT URETERAL STENT PLACEMENT;  Surgeon: Mohsen Pat MD;  Location: [facility]    COMBINED CYSTOSCOPY, RETROGRADES, URETEROSCOPY, LASER HOLMIUM LITHOTRIPSY URETER(S), INSERT STENT Left 06/23/2021    Procedure: CYSTOSCOPY, LEFT URETERAL STENT REMOVAL, LEFT RETROGRADE PYELOGRAM, LEFT URETEROSCOPY WITH LASER LITHOTRIPSY AND BASKET REMOVAL OF STONES, LEFT URETERAL STENT PLACEMENT;  Surgeon: Mohsen Pat MD;  Location: [facility]    CYSTOSCOPY, LITHOLAPAXY, COMBINED N/A 03/01/2021    Procedure: Cystoscopy, litholapaxy, combined;  Surgeon: Mohsen Pat MD;  Location: [facility]    CYSTOSCOPY, RETROGRADES, INSERT STENT URETER(S), COMBINED Right 03/01/2021    Procedure: Cystoscopy, retrogrades, insert stent ureter(s), combined;  Surgeon: Mohsen Pat MD;  Location: [facility]    EXAM UNDER ANESTHESIA ANUS N/A 07/05/2017    Procedure: EXAM UNDER ANESTHESIA ANUS;  Examination Under Anesthesia, flex sigmoidoscopy with biopsies and formalin application;  Surgeon: Felicitas Radford MD;  [facility]    EXAM UNDER ANESTHESIA ANUS N/A 09/13/2017    Procedure: EXAM UNDER ANESTHESIA ANUS;  Examination Under Anesthesia Anus, Colonoscopy, application of formalin;  Surgeon: Felicitas Radford MD;  [facility]    EXAM UNDER ANESTHESIA ANUS N/A 12/13/2017    Procedure: EXAM UNDER ANESTHESIA ANUS;  Examination Under Anesthesia Anus, Colonoscopy;  Surgeon: Felicitas Radford MD;  [facility]    EXAM UNDER ANESTHESIA ANUS N/A 05/11/2018    Procedure: EXAM UNDER ANESTHESIA ANUS;  Examination Under Anesthesia Anus, Interoperative Flexible Sigmoidoscopy, Application of  Formalin;  Surgeon: Felicitas Radford MD;  [facility]    EXAM UNDER ANESTHESIA ANUS N/A 07/20/2018    Procedure: EXAM UNDER ANESTHESIA ANUS;  Examination Under Anesthesia Anus, Flexible Sigmoidoscopy ;  Surgeon: Felicitas Radford MD;  [facility]    EXAM UNDER ANESTHESIA ANUS N/A 11/30/2018    Procedure: Examination Under Anesthesia, application of formalin to rectum, polypectomy;  Surgeon: Felicitas Radford MD;  [facility]    EXAM UNDER ANESTHESIA ANUS N/A 02/22/2019    Procedure: Examination Under Anesthesia;  Surgeon: Felicitas Radford MD;  [facility]    EXAM UNDER ANESTHESIA ANUS N/A 06/28/2019    Procedure: Examination Under Anesthesia;  Surgeon: Felicitas Radford MD;  [facility]    EXAM UNDER ANESTHESIA ANUS N/A 11/01/2019    Procedure: Examination Under Anesthesia;  Surgeon: Felicitas Radford MD;  [facility]    EXAM UNDER ANESTHESIA RECTUM N/A 10/23/2020    Procedure: Exam under anesthesia rectum;  Surgeon: Felicitas Radford MD;  Location: [facility] OR    HC TOOTH EXTRACTION W/FORCEP      LAPAROSCOPIC APPENDECTOMY N/A 07/17/2017    Procedure: LAPAROSCOPIC APPENDECTOMY;  LAPAROSCOPIC APPENDECTOMY;  Surgeon: Bryson Ferguson MD;  [facility]    LASER HOLMIUM LITHOTRIPSY URETER(S), INSERT STENT, COMBINED Right 03/01/2021    Procedure: CYSTOURETEROSCOPY,  URETERAL STENT INSERTION, RIGHT RETROGRADE;  Surgeon: Mohsen Pat MD;  [facility]    SIGMOIDOSCOPY FLEXIBLE N/A 12/08/2016    Procedure: SIGMOIDOSCOPY FLEXIBLE;  Surgeon: Felicitas Radford MD;  [facility]    SIGMOIDOSCOPY FLEXIBLE N/A 07/05/2017    Procedure: SIGMOIDOSCOPY FLEXIBLE;;  Surgeon: Felicitas Radford MD;  [facility]    SIGMOIDOSCOPY FLEXIBLE N/A 05/11/2018    Procedure: SIGMOIDOSCOPY FLEXIBLE;;  Surgeon: Felicitas Radford MD;  [facility]    SIGMOIDOSCOPY FLEXIBLE N/A 07/20/2018    Procedure: SIGMOIDOSCOPY  FLEXIBLE;;  Surgeon: Felicitas Radford MD;  [facility]    SIGMOIDOSCOPY FLEXIBLE N/A 11/30/2018    Procedure: Flexible Sigmoidoscopy;  Surgeon: Felicitas Radford MD;  [facility]    SIGMOIDOSCOPY FLEXIBLE N/A 02/22/2019    Procedure: Intraoperative Flexible Sigmoidoscopy, Application of Formalin to rectum;  Surgeon: Felicitas Radford MD;  [facility]    SIGMOIDOSCOPY FLEXIBLE N/A 06/28/2019    Procedure: Flexible Sigmoidoscopy;  Surgeon: Felicitas Radford MD;  [facility]    SIGMOIDOSCOPY FLEXIBLE N/A 11/01/2019    Procedure: Flexible Sigmoidoscopy;  Surgeon: Felicitas Radford MD;  [facility]    SIGMOIDOSCOPY FLEXIBLE N/A 07/23/2021    Procedure: SIGMOIDOSCOPY, FLEXIBLE;  Surgeon: Felicitas Radford MD;  Location: [facility] OR    SIGMOIDOSCOPY FLEXIBLE N/A 07/01/2022    Procedure: SIGMOIDOSCOPY, FLEXIBLE, EUA;  Surgeon: Felicitas Radford MD;  Location: [facility] OR    SIGMOIDOSCOPY FLEXIBLE N/A 07/28/2023    Procedure: SIGMOIDOSCOPY, FLEXIBLE (PEDIATRIC SCOPE NEEDED);  Surgeon: Felicitas Radford MD;  Location: [facility] OR    SIGMOIDOSCOPY FLEXIBLE N/A 7/5/2024    Procedure: SIGMOIDOSCOPY, FLEXIBLE; Exam under anesthesia;  Surgeon: Felicitas Radford MD;  Location: [facility] OR    TESTICLE SURGERY      VASCULAR SURGERY      Right chest port    VASECTOMY           SOCIAL HISTORY:  Social History     Socioeconomic History    Marital status:      Spouse name: Not on file    Number of children: 2    Years of education: Not on file    Highest education level: Not on file   Occupational History    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)      Social Determinants of Health     Financial Resource Strain: Low Risk  (6/13/2024)    Financial Resource Strain     Within the past 12 months, have you or your family members you live with been unable to get utilities (heat, electricity) when it was really needed?: No   Food Insecurity: Low Risk  (6/13/2024)    Food Insecurity     Within the past 12 months, did you worry that your food would run out before you got money to buy more?: No     Within the past 12 months, did the food you bought just not last and you didn t have money to get more?: No   Transportation Needs: Low Risk  (6/13/2024)    Transportation Needs     Within the past 12 months, has lack of transportation kept you from medical appointments, getting your medicines, non-medical meetings or appointments, work, or from getting things that you need?: No   Physical Activity: Sufficiently Active (6/13/2024)    Exercise Vital Sign     Days of Exercise per Week: 5 days     Minutes of Exercise per Session: 40 min   Stress: Stress Concern Present (6/13/2024)    [place] [employer] - Occupational Stress Questionnaire     Feeling of Stress : Very much   Social Connections: Unknown (6/13/2024)    Social Connection and Isolation Panel [NHANES]     Frequency of Communication with Friends and Family: Not on file     Frequency of Social Gatherings with Friends and Family: More than three times a week     Attends Mormon Services: Not on file     Active Member of Clubs or Organizations: Not on file     Attends Club or Organization Meetings: Not on file     Marital Status: Not on file   Interpersonal Safety: Low Risk  (6/13/2024)    Interpersonal Safety     Do you feel physically and emotionally safe where you currently live?: Yes     Within the past 12  months, have you been hit, slapped, kicked or otherwise physically hurt by someone?: No     Within the past 12 months, have you been humiliated or emotionally abused in other ways by your partner or ex-partner?: No   Housing Stability: Low Risk  (2024)    Housing Stability     Do you have housing? : Yes     Are you worried about losing your housing?: No     He notes that he had a brother who passed away at a young age of 53 from a metastatic cancer, but unknown what type, and passed away within 2 months of diagnosis.  He denies other family history of cancer in the immediate family.  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      FAMILY HISTORY:  Family History   Problem Relation Age of Onset    Colon Polyps Mother         Number and type of polyps unknown    Chronic Obstructive Pulmonary Disease Father     Hypertension Father     Hyperlipidemia Father     Obesity Father     Diabetes Maternal Grandfather     Macular Degeneration Paternal Grandmother     Hypertension Paternal Grandmother     Hyperlipidemia Paternal Grandmother     Cancer Brother         primary of unknown origin    Other Cancer Brother     Family History Negative Brother         negative colonoscopy    Stomach Cancer Other 63        maternal great grandfather    Diabetes Other     Glaucoma No family hx of     Anesthesia Reaction No family hx of     Deep Vein Thrombosis (DVT) No family hx of          PHYSICAL EXAM:  Vital signs:  There were no vitals taken for this visit.   ECO  GENERAL/CONSTITUTIONAL: No acute distress.  EYES: No scleral icterus.  NEUROLOGIC: Alert, oriented, answers questions appropriately.  INTEGUMENTARY: No jaundice.  GAIT: Steady, does not use assistive device          LABS:    Recent Labs   Lab Test 24  0925 24  1342 23  0940 23  1551 22  1403 21  1330 21  0840 21  0515   NA  --  137  --  140 138 140  --  140   POTASSIUM  --  4.3  --  4.4 3.6 4.0  --  4.0  "  CHLORIDE  --  103  --  105 106 107  --  110*   CO2  --  23  --  25 25 26  --  25   ANIONGAP  --  11  --  10 7 7  --  5   BUN  --  19.9  --  22.9 16 17  --  19   CR  --  0.77  --  0.85 0.74 0.84  --  1.10   GLC 86 88 100* 92 104* 97   < > 134*   FRANDY  --  9.1  --  9.2 8.8 9.3  --  8.3*    < > = values in this interval not displayed.     Recent Labs   Lab Test 06/26/23  1551 07/17/17  0705   MAG 2.2 2.0     Recent Labs   Lab Test 06/13/24  1342 06/26/23  1551 08/01/22  1403 07/29/21  1332 02/28/21  0515 08/13/20  1530   WBC 6.1 6.2 10.8 5.7 6.0 5.8   HGB 15.1 14.7 15.2 15.9 14.0 16.2    216 211 236 181 198   MCV 88 88 85 85 86 85   NEUTROPHIL  --  66 85 73 83.6 76.1     Recent Labs   Lab Test 06/13/24  1342 06/26/23  1551 08/01/22  1403   BILITOTAL 0.8 0.6 0.8   ALKPHOS 73 67 74   ALT 26 28 35   AST 29 26 21   ALBUMIN 4.4 4.4 4.0     No results found for: \"TSH\"  Recent Labs   Lab Test 07/29/21  1331 08/13/20  1530 02/17/20  0932 06/24/19  0828 01/04/19  1017   CEA 1.4 1.2 <0.5 1.5 1.7     Results for orders placed or performed in visit on 07/11/24   MR Rectum wo & w Contrast    Narrative    EXAMINATION: MR RECTUM W/O & W CONTRAST, 7/11/2024 3:28 PM     COMPARISON: MRI dated 7/21/2023, 7/27/2016    TECHNIQUE:  Images were acquired without and with intravenous contrast  through the pelvis. The following MR images were acquired without  contrast: multiplanar T1, multiplanar T2, axial diffusion-weighted and  axial apparent diffusion coefficient. T1-weighted images with fat  saturation were acquired at the following intervals relative to  intravenous contrast administration: pre-contrast, immediate post  contrast, 1 minute, 3 minutes and delayed.  Contrast dose: 10 mL  Gadavist    HISTORY: Rectal cancer: clinical stage [id number] (stage IIIA), s/p  chemoradiation with Xeloda in Sep 2016; 8 cycles of adjuvant FOLFOX  Jan-May 2017; Malignant neoplasm of rectum (H)    FINDINGS:    LOCATION/SIZE:  On prior exam dated " "7/27/2016 there was a tumor identified in the  lower rectum. Previously this tumor measured 4.8 cm, and currently  there is no residual tumor present.     TREATMENT RESPONSE:   Approximately 0% of the current mass demonstrates tumour signal  intensity, with the remainder appearing to represent fibrosis.  This  corresponds to a tumour response grade of mrTRG-1 .     EXTENT:  The tumor does not clearly involve the anal sphincters or levator ani  muscle. As    CIRCUMFERENTIAL RESECTION MARGIN (CRM):  In terms of the resection margin, there is no involvement of the  mesorectal fascia.    LYMPH NODES:  No suspicious lymphadenopathy.     VEINS:  There is no evidence of extramural venous extension.    MISCELLANEOUS FINDINGS:  Left greater than right bilateral fat-containing inguinal hernias. The  prostate is not enlarged with unchanged susceptibility artifact in the  left peripheral zone.      Impression    IMPRESSION:   1. There has been continued complete response to treatment, with a  corresponding tumor regression grade of mrTRG-1 .   2. No suspicious lymphadenopathy.    ----------------------------------------------------------------------  ----------------------------------------------------------------------    *Based on the article: \"Magnetic resonance imaging-detected tumor  response for locally advanced rectal cancer predicts survival  outcomes: [facility].\" published in the Journal of Clinical  Oncology Oct 1, 2011.    MRI assessment of tumor regression grade (mrTRG):  mrTRG-1 was identified as the absence of any tumor signal.   mrTRG-2 was identified as small amounts of residual tumor visible but  with a predominant fibrotic low signal intensity.   mrTRG-3 was identified as mixed areas of low signal fibrosis and  intermediate signal intensity present but without predominance of  tumor.   mrTRG-4 was identified by predominantly tumor signal intensity remains  with minimal fibrotic low signal intensity. "   mrTRG-5 was identified as no fibrosis evident, tumor signal visible  only.            I have personally reviewed the examination and initial interpretation  and I agree with the findings.    DESMOND DO CARI         SYSTEM ID:  [id number]     Narrative & Impression   Combined Report of: PET and CT on 7/11/2024 11:45 AM:      1. PET of the neck, chest, abdomen, and pelvis.  2. PET CT Fusion for Attenuation Correction and Anatomical  Localization.  3. Diagnostic CT of the chest, abdomen and pelvis with intravenous  contrast obtained for diagnostic interpretation.  4. 3D MIP and PET-CT fused images were processed on an independent  workstation and archived to PACS and reviewed by a radiologist.     Technique:     1. PET: The patient received 16.80 mCi of F-18-FDG. The serum glucose  was 86 mg/dL prior to administration. Body weight was 97.5 kg. Images  were evaluated in the axial, sagittal, and coronal planes as well as  the rotational whole body MIP. Images were acquired from cranial  vertex to feet.     UPTAKE WAS MEASURED AT 60 MINUTES.      2. CT: Volumetric acquisition for clinical interpretation of the  chest, abdomen, and pelvis acquired at 3 mm sections. The chest,  abdomen, and pelvis were evaluated at 5 mm sections in bone, soft  tissue, and lung windows.     Contrast and Medications:  IV contrast: 125 mL of Isovue 370 intravenously.  PO contrast: 16 ounces of water   Additional Medications: None.     3. 3D MIP and PET-CT fused images were processed on an independent  workstation and archived to PACS and reviewed by a radiologist.     INDICATION: Rectal cancer: clinical stage [id number] (stage IIIA), s/p  chemoradiation with Xeloda in Sep 2016; 8 cycles of adjuvant FOLFOX  Jan-May 2017; Malignant neoplasm of rectum (H).     ADDITIONAL INFORMATION OBTAINED FROM EMR: History of rectal cancer  treated with chemoradiation now on surveillance protocol.     COMPARISON: PET CT 7/21/2023, 8/1/2022. Same day MRI pelvis.       FINDINGS:      BACKGROUND: Liver SUV max = 4.2, Aorta Blood SUV max = 3.2.         HEAD/NECK:     Pharyngeal mucosal spaces: Nasopharynx and palatine tonsils are within  normal limits. Tongue base is normal. Oral tongue and buccal mucosa of  the oral cavity is normal. Oropharynx, hypopharynx and larynx are  within normal limits.     Sinonasal region: Paranasal sinuses are clear. No mass within the  nasal cavity.     Lymph nodes: No FDG avid or abnormally enlarged lymph nodes.     Salivary glands: The major salivary glands are within normal limits.      Thyroid gland: The thyroid gland is within normal limits.      Vascular structures: The major vasculature of the neck are patent.     Brain: No abnormal FDG avid lesion or abnormal enhancement.      Orbital cavities: No abnormal FDG avid lesion or abnormal enhancement.        CHEST:     Lymph nodes: No abnormally enlarged lymph nodes. Mild uptake  associating partially calcified left subhilar node which is stable in  size and appearance, benign. No other FDG avid nodes.      Lungs: The central tracheobronchial tree is clear. No acute  consolidation.  No pleural effusion or pneumothorax. Stable mild  dependent atelectasis. Lfet lower lobe granuloma.      Heart and great vessels: Heart size is within normal limits. Trace  pericardial effusion. The thoracic aorta and main pulmonary artery are  within normal limits. The esophagus is unremarkable. Small hiatal  hernia.      ABDOMEN AND PELVIS:     Liver: No FDG avid lesion. Multiple non avid benign hepatic cysts, the  largest is 2.7 x 2.2 cm in the right lobe of the liver, stable from  prior. No intrahepatic or extrahepatic biliary ductal dilatation.  Gallbladder is within normal limits.      Pancreas: Mildly atrophic appearance of the pancreas. No pancreatic  ductal dilatation.      Spleen: No FDG avid lesion.     Adrenal glands: No FDG avid foci.     Kidneys: No FDG avid lesion. Scattered bilateral renal cysts  the  largest measuring 5.4 x 5.7 cm in the inferior pole the right kidney.  No hydronephrosis. The urinary bladder is decompressed.      Enlarged prostate measuring approximately 4.9 x 4.1 cm with scattered  coarse calcifications. Pelvic phleboliths.     Gastrointestinal system: Normal caliber of the small and large bowel.  Appendectomy. Small fat-containing umbilical hernia. Small  fat-containing left inguinal hernia.     Lymph nodes: No FDG avid or abnormally enlarged lymph nodes.     Vascular structures: Normal caliber of the abdominal aorta. Scattered  infrarenal abdominal aortic atherosclerotic calcifications.     No free air, free fluid, or fluid collection.      EXTREMITIES:      No abnormal FDG uptake in the visualized extremities.     BONES AND SOFT TISSUES:      No abnormal FDG uptake in the skeleton. No abnormal lytic or blastic  osseous lesions. Multilevel degenerative changes of the spine and  bilateral hips.     SPINAL CANAL:      No evident canal compromise. No abnormal FDG avid lesion.                                                                         IMPRESSION:   In this 65 yo with history of rectal cancer:     No evidence of recurrent or metastatic disease.     I have personally reviewed the examination and initial interpretation  and I agree with the findings.     DI SO MD         SYSTEM ID:  [id number]         Recent Labs   Lab Test 06/13/24  1342 06/26/23  1551 04/19/22  1519 06/17/21  1831   PSA 3.15  3.15 3.62 5.23* 4.92*      ASSESSMENT/PLAN:  Louie Greco is a 64 year old  male with:    1) Rectal cancer: clinical stage [id number] (stage IIIA), s/p chemoradiation with Xeloda, and appears to have achieved complete response on imaging and biopsies.  He opted not to undergo surgery and expressed desire not to undergo APR, as he does not want a colostomy bag.  It was felt reasonable to go on the watch and wait protocol with the [facility].  He has completed 4 additional months  (8 cycles) of chemotherapy with FOLFOX.  He will now go on surveillance, as per the watch and wait protocol.    I have reviewed all of the labs done prior to this clinic visit.  Labs are all completely normal including electrolytes, renal function, hepatic panel, complete blood count and differential.  He has abnormal lipid panel with elevated LDL, low HDL. He has been started on 20 mg daily dose of Lipitor.     We reviewed MRI pelvis and PET scan from 7/21/23. I have reviewed actual images from his recent scans and there is nothing concerning for recurrent metastatic disease or recurrence. He has flex sig with Dr. Radford tomorrow.         He has achieved complete response.  There is no evidence of recurrence or metastatic disease.      Follow up per Watch and Wait Protocol:   Completed CRT: 9/15/16  Flexible sigmoidoscopy   Every 2 months for 6 months: Completed  Every 3 months for 3 years: Completed  Every 6 months for 5 years: Until 8/2022-completed  Every year for 10 years: Until 9/2026 (negative on 7/5/24)     3T MRI of pelvis  6-8 weeks after CRT:  Every 4 months for 2 years: Completed  Every 6 months for 2 years: Completed  Yearly for 2 years: Until 9/2022 - completed  MR Rectum Negative on 7/11/24     CT CAP  Every year for 6-8 years: Until 9/2024- PET July 2024 completed and negative     Colonoscopy   Last 12/13/17- last done in October 2020 7/5/24  -flexible sigmoidoscopy     CEA at every clinic or endoscopic visit      -T3 MRI pelvis and PET scan negative July 2024  -endoscopic surveillance with Dr. Radford as per protocol  - He has been disease free more than 7 yrs out from treatment completion. I would not get any further surveillance scans.     2) Genetics: He underwent genetic testing, which was negative.    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            4) Elevated bilirubin: mild.  Bilirubin has been mildly elevated in the past. It is stable and normal this time.   -monitor for  now    5) Liver cysts: seen on CT, stable  -monitor    6) Pulmonary nodules: stable sub-4 mm pulmonary nodules  -monitor on future scans    7) Kidney cyst: stable  -monitor on future scans.      8) Appendix polyp: He is now s/p appendectomy.  Pathology found sessile serrated adenoma without dysplasia or malignancy.     9) Elevated PSA:   -on observation; PSA normal at this visit. Was elevated in the past likely from prostatitis  -he is seeing urology - Dr. Pat    I will see him in year with labs and scans prior to visit. It would be last visit in Medical Oncology for his colon cancer. He is over years out from treatment completion.       45 minutes spent on the date of the encounter doing chart review, history and exam, documentation and further activities as noted above     Remi Mcguire    Hematologist and Medical Oncologist  [facility]

## 2024-10-03 NOTE — ED AVS SNAPSHOT
Emergency Department    [address] [place] [id number]    Phone:  [phone]    Fax:  [phone]                                       Louie Greco   MRN: [id number]    Department:   Emergency Department   Date of Visit:  2/14/2018           After Visit Summary Signature Page     I have received my discharge instructions, and my questions have been answered. I have discussed any challenges I see with this plan with the nurse or doctor.    ..........................................................................................................................................  Patient/Patient Representative Signature      ..........................................................................................................................................  Patient Representative Print Name and Relationship to Patient    ..................................................               ................................................  Date                                            Time    ..........................................................................................................................................  Reviewed by Signature/Title    ...................................................              ..............................................  Date                                                            Time           no

## 2025-03-24 ENCOUNTER — OFFICE VISIT (OUTPATIENT)
Dept: FAMILY MEDICINE | Facility: CLINIC | Age: 65
End: 2025-03-24
Payer: COMMERCIAL

## 2025-03-24 VITALS
DIASTOLIC BLOOD PRESSURE: 75 MMHG | BODY MASS INDEX: 29.78 KG/M2 | RESPIRATION RATE: 18 BRPM | SYSTOLIC BLOOD PRESSURE: 109 MMHG | WEIGHT: 208 LBS | HEART RATE: 74 BPM | TEMPERATURE: 97.9 F | OXYGEN SATURATION: 96 % | HEIGHT: 70 IN

## 2025-03-24 DIAGNOSIS — M72.2 PLANTAR FASCIITIS: ICD-10-CM

## 2025-03-24 DIAGNOSIS — M25.571 PAIN IN JOINT, ANKLE AND FOOT, RIGHT: ICD-10-CM

## 2025-03-24 DIAGNOSIS — E78.5 HYPERLIPIDEMIA LDL GOAL <100: Primary | ICD-10-CM

## 2025-03-24 DIAGNOSIS — F43.21 GRIEF: ICD-10-CM

## 2025-03-24 LAB
CHOLEST SERPL-MCNC: 147 MG/DL
FASTING STATUS PATIENT QL REPORTED: YES
HDLC SERPL-MCNC: 45 MG/DL
LDLC SERPL CALC-MCNC: 93 MG/DL
NONHDLC SERPL-MCNC: 102 MG/DL
TRIGL SERPL-MCNC: 47 MG/DL

## 2025-03-24 PROCEDURE — 36415 COLL VENOUS BLD VENIPUNCTURE: CPT | Performed by: INTERNAL MEDICINE

## 2025-03-24 PROCEDURE — 80061 LIPID PANEL: CPT | Performed by: INTERNAL MEDICINE

## 2025-03-24 PROCEDURE — 99213 OFFICE O/P EST LOW 20 MIN: CPT | Performed by: INTERNAL MEDICINE

## 2025-03-24 ASSESSMENT — PAIN SCALES - GENERAL: PAINLEVEL_OUTOF10: MILD PAIN (2)

## 2025-03-24 NOTE — PROGRESS NOTES
Assessment & Plan     Hyperlipidemia LDL goal <100  Recheck lipid panel off of statin medication, reevaluate need for statin therapy for primary prevention pending findings  I did my best to address his concerns about statin therapy  - Lipid panel reflex to direct LDL Fasting; Future  - Lipid panel reflex to direct LDL Fasting    Pain in joint, ankle and foot, right  Tendinitis seems like a reasonable working diagnosis  Symptoms seem to be improving  Offered physical therapy, but he declined    Plantar fasciitis  Discussed conservative strategies for managing plantar fasciitis pain    Grief  Provided support, he declined referral for counseling          FUTURE APPOINTMENTS:       - Follow-up for annual visit or as needed      Total time 26 minutes including face-to-face care and documentation  Kim Palma is a 64 year old, presenting for the following health issues:  Follow Up and Musculoskeletal Problem        3/24/2025     8:25 AM   Additional Questions   Roomed by Mary     History of Present Illness       Reason for visit:  Pain in feet  Symptom onset:  More than a month  Symptoms include:  Pain in feet - plantar fasciitis & tendonitis  Symptom intensity:  Moderate  Symptom progression:  Improving  Had these symptoms before:  Yes  Has tried/received treatment for these symptoms:  Yes  Previous treatment was successful:  No  What makes it worse:  Walking and standing on hard surfaces  What makes it better:  Resting feet He is missing 3 dose(s) of medications per week.            He scheduled this appointment because he wished to discuss foot pain as well as hyperlipidemia management  Unfortunately, his wife passed several weeks ago  He was seen by an orthopedist regarding left foot plantar pain which was diagnosed as plantar fasciitis and right medial ankle pain that was diagnosed as tendinitis  He was prescribed a brace but the brace seem to make his tendinitis and his right ankle feel worse so he  "stopped using it  His symptoms seem to be improving although they are long-lasting which is frustrating for him  He has a long history of intermittent plantar fasciitis which 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  Physical Exam   General: This is a well-appearing man in no acute distress  Foot/ankle: No joint effusion, no focal bony tenderness, normal gait, squeeze test is negative, anterior posterior drawer test are negative            Signed Electronically by: Philippe Healy MD    "

## 2025-03-24 NOTE — RESULT ENCOUNTER NOTE
The following letter pertains to your most recent diagnostic tests:    Despite stopping the atorvastatin (Lipitor) the cholesterol remains at your goal of that less than 100.   For now, I don't think you need to rush back onto atorvastatin (Lipitor).  Lets recheck when you return in June.  Congratulation on this very good result.  Again, I am sorry for your loss.  I look forward to seeing you in June.          Sincerely,    Dr. Healy

## 2025-04-29 ENCOUNTER — MYC MEDICAL ADVICE (OUTPATIENT)
Dept: FAMILY MEDICINE | Facility: CLINIC | Age: 65
End: 2025-04-29
Payer: COMMERCIAL

## 2025-04-30 ENCOUNTER — OFFICE VISIT (OUTPATIENT)
Dept: FAMILY MEDICINE | Facility: CLINIC | Age: 65
End: 2025-04-30
Payer: COMMERCIAL

## 2025-04-30 VITALS
BODY MASS INDEX: 29.06 KG/M2 | OXYGEN SATURATION: 97 % | HEART RATE: 89 BPM | WEIGHT: 203 LBS | RESPIRATION RATE: 18 BRPM | DIASTOLIC BLOOD PRESSURE: 84 MMHG | TEMPERATURE: 97.5 F | HEIGHT: 70 IN | SYSTOLIC BLOOD PRESSURE: 130 MMHG

## 2025-04-30 DIAGNOSIS — H60.391 INFECTIVE OTITIS EXTERNA, RIGHT: Primary | ICD-10-CM

## 2025-04-30 DIAGNOSIS — L30.9 ECZEMA, UNSPECIFIED TYPE: ICD-10-CM

## 2025-04-30 PROCEDURE — 99213 OFFICE O/P EST LOW 20 MIN: CPT | Performed by: INTERNAL MEDICINE

## 2025-04-30 RX ORDER — FLUOCINONIDE 0.5 MG/G
CREAM TOPICAL 2 TIMES DAILY
Qty: 30 G | Refills: 11 | Status: SHIPPED | OUTPATIENT
Start: 2025-04-30

## 2025-04-30 RX ORDER — CIPROFLOXACIN AND DEXAMETHASONE 3; 1 MG/ML; MG/ML
4 SUSPENSION/ DROPS AURICULAR (OTIC) 2 TIMES DAILY
Qty: 7.5 ML | Refills: 0 | Status: SHIPPED | OUTPATIENT
Start: 2025-04-30

## 2025-04-30 ASSESSMENT — PAIN SCALES - GENERAL: PAINLEVEL_OUTOF10: NO PAIN (0)

## 2025-04-30 NOTE — PROGRESS NOTES
"  Assessment & Plan     Infective otitis externa, right  Treat right ear external otitis with below drops, call if symptoms persist beyond 1 to 2 weeks despite drops, consider ENT consult if that is the case as he has had severe fungal otitis in the past  - ciprofloxacin-dexAMETHasone (CIPRODEX) 0.3-0.1 % otic suspension; Place 4 drops into the right ear 2 times daily.    Eczema, unspecified type  He has severe eczema surrounding his bilateral ears, recommended topical steroid as below  - fluocinonide (LIDEX) 0.05 % external cream; Apply topically 2 times daily.        Follow-up     Follow-up as scheduled June 12, return sooner or call sooner if above symptoms do not improve with above treatments  Kim Palma is a 64 year old, presenting for the following health issues:  Ear Problem        4/30/2025     1:04 PM   Additional Questions   Roomed by Jessica MARTINEZ MA     History of Present Illness       Reason for visit:  Earache  Symptom onset:  1-3 days ago   He is taking medications regularly.            Several day history of trouble hearing out of right ear with associated itchiness and pain  Also, skin rash around both ears with flaking skin and scale          Objective    /84 (BP Location: Left arm, Patient Position: Sitting, Cuff Size: Adult Regular)   Pulse 89   Temp 97.5  F (36.4  C) (Oral)   Resp 18   Ht 1.778 m (5' 10\")   Wt 92.1 kg (203 lb)   SpO2 97%   BMI 29.13 kg/m    Body mass index is 29.13 kg/m .  Physical Exam   General: Well-appearing man in no acute distress.  HEENT: There is a yellow exudate in the right external auditory canal, the tympanic membrane appears normal, left tympanic membranes and external canal are normal, there are scaly erythematous skin plaques surrounding both ears.  The nasal exam is normal, the sinuses are not tender.  The posterior pharynx appears normal, the neck is supple without adenopathy.            Signed Electronically by: Philippe Healy MD    "

## 2025-05-14 ENCOUNTER — PATIENT OUTREACH (OUTPATIENT)
Dept: CARE COORDINATION | Facility: CLINIC | Age: 65
End: 2025-05-14
Payer: COMMERCIAL

## 2025-05-28 ENCOUNTER — PATIENT OUTREACH (OUTPATIENT)
Dept: CARE COORDINATION | Facility: CLINIC | Age: 65
End: 2025-05-28
Payer: COMMERCIAL

## 2025-07-19 ENCOUNTER — HEALTH MAINTENANCE LETTER (OUTPATIENT)
Age: 65
End: 2025-07-19

## 2025-08-14 ENCOUNTER — PATIENT OUTREACH (OUTPATIENT)
Dept: GASTROENTEROLOGY | Facility: CLINIC | Age: 65
End: 2025-08-14
Payer: COMMERCIAL

## 2025-08-14 DIAGNOSIS — Z12.11 SPECIAL SCREENING FOR MALIGNANT NEOPLASMS, COLON: Primary | ICD-10-CM

## 2025-08-20 DIAGNOSIS — C20 RECTAL CANCER (H): Primary | ICD-10-CM

## 2025-08-20 RX ORDER — SODIUM, POTASSIUM,MAG SULFATES 17.5-3.13G
SOLUTION, RECONSTITUTED, ORAL ORAL
Qty: 354 ML | Refills: 0 | Status: SHIPPED | OUTPATIENT
Start: 2025-08-20

## 2025-08-21 ENCOUNTER — TELEPHONE (OUTPATIENT)
Dept: SURGERY | Facility: CLINIC | Age: 65
End: 2025-08-21
Payer: COMMERCIAL

## 2025-08-31 DIAGNOSIS — N52.9 MALE ERECTILE DISORDER: Primary | ICD-10-CM

## 2025-09-03 RX ORDER — SILDENAFIL 100 MG/1
100 TABLET, FILM COATED ORAL DAILY
Qty: 30 TABLET | Refills: 11 | Status: SHIPPED | OUTPATIENT
Start: 2025-09-03

## 2025-09-19 ENCOUNTER — PRE VISIT (OUTPATIENT)
Dept: SURGERY | Facility: CLINIC | Age: 65
End: 2025-09-19

## (undated) DEVICE — ENDO TUBING CO2 SMARTCAP STERILE DISP 100145CO2EXT

## (undated) DEVICE — WIPE PREMOIST CLEANSING WASHCLOTHS 7988

## (undated) DEVICE — PACK CYSTOSCOPY SBA15CYFSI

## (undated) DEVICE — DRSG GAUZE 4X4" TRAY 6939

## (undated) DEVICE — SYR 30ML LL W/O NDL 302832

## (undated) DEVICE — KIT CONNECTOR FOR OLYMPUS ENDOSCOPES DEFENDO 100310

## (undated) DEVICE — PANTIES MESH LG/XLG 2PK 706M2

## (undated) DEVICE — TUBING SUCTION 12"X1/4" N612

## (undated) DEVICE — STRAP KNEE/BODY 31143004

## (undated) DEVICE — SOL WATER IRRIG 1000ML BOTTLE 2F7114

## (undated) DEVICE — PACK RECTAL KIT CUSTOM ASC

## (undated) DEVICE — ENDO SUCTION TRAP POLYP 4 CHAMBER H334

## (undated) DEVICE — GLOVE PROTEXIS POWDER FREE SMT 6.5  2D72PT65X

## (undated) DEVICE — SUCTION MANIFOLD NEPTUNE 2 SYS 1 PORT 702-025-000

## (undated) DEVICE — TAPE DURAPORE 3" SILK 1538-3

## (undated) DEVICE — CATH URETERAL DUAL LUMEN 10FRX54CM M0064051000

## (undated) DEVICE — SYR 10ML PREFILLED 0.9% NACL INJ NOT STERILE 306547

## (undated) DEVICE — GLOVE PROTEXIS W/NEU-THERA 7.5  2D73TE75

## (undated) DEVICE — WIRE GUIDE AMPLATZ SUPER STIFF 0.035"X145CM 46-524

## (undated) DEVICE — CATH URETERAL OPEN END 6FR AXXCESS

## (undated) DEVICE — PAD CHUX UNDERPAD 30X30"

## (undated) DEVICE — SOL NACL 0.9% IRRIG 3000ML BAG 2B7477

## (undated) DEVICE — STRAP UNIVERSAL POSITIONING 2-PIECE 4X47X76" 91-287

## (undated) DEVICE — MARKER SKIN DOUBLE TIP W/FLEXI-RULER W/LABELS

## (undated) DEVICE — CATH TRAY FOLEY COUDE SURESTEP 16FR W/URNE MTR STLK A304716A

## (undated) DEVICE — GLOVE PROTEXIS MICRO 7.5  2D73PM75

## (undated) DEVICE — BAG URINARY DRAIN 2000ML LF 154002

## (undated) DEVICE — GLOVE PROTEXIS W/NEU-THERA 6.5  2D73TE65

## (undated) DEVICE — GUIDEWIRE SENSOR DUAL FLEX STR 0.035"X150CM M0066703080

## (undated) DEVICE — LINEN TOWEL PACK X5 5464

## (undated) DEVICE — LASER FIBER HOLMIUM FLEXIVA 200UM M0068403910 840-391

## (undated) DEVICE — TUBING SUCTION 10'X3/16" N510

## (undated) DEVICE — SHEATH URETERAL ACCESS NAVIGATOR HD 11/13FRX46CM M0062502230

## (undated) DEVICE — SYR 50ML LL W/O NDL 309653

## (undated) DEVICE — JELLY LUBRICATING SURGILUBE 2OZ TUBE

## (undated) DEVICE — ENDO CAP AND TUBING STERILE FOR ENDOGATOR  100130

## (undated) DEVICE — CATH URETERAL FLEX TIP TIGERTAIL 06FRX70CM 139006

## (undated) DEVICE — BASKET NITINOL TIPLESS HALO  1.5FRX120CM 554120

## (undated) DEVICE — CATH FOLEY COUDE 18FR 5ML LATEX

## (undated) DEVICE — PAD CHUX UNDERPAD 23X24" 7136

## (undated) DEVICE — GOWN ISOLATION YELLOW XL NOT STERILE 3111PG

## (undated) DEVICE — KIT ENDO FIRST STEP DISINFECTANT 200ML W/POUCH EP-4

## (undated) DEVICE — ENDO SNARE POLYPECTOMY OVAL 15MM LOOP SD-240U-15

## (undated) DEVICE — ENDO TROCAR OPTICAL 05MM VERSAPORT PLUS W/FIX CAN ONB5STF

## (undated) DEVICE — DECANTER VIAL 2006S

## (undated) DEVICE — ENDO CONNECTOR ENDOGATOR AUX WATER JET FOR OLYMPUS SCOPE

## (undated) DEVICE — GOWN FLUID RESISTANT LEVEL 3 YELLOW

## (undated) DEVICE — SYR 50ML SLIP TIP W/O NDL 309654

## (undated) DEVICE — SPECIMEN CONTAINER URINE 90ML STERILE 75.1435.002

## (undated) DEVICE — ENDO SEAL BX PORT BPS-A

## (undated) DEVICE — SWAB PROCTO 16" 2/PK 32-046

## (undated) DEVICE — ESU HOLDER LAP INST DISP PURPLE LONG 330MM H-PRO-330

## (undated) DEVICE — STPL ENDO RELOAD GIA 45X3.5MM ROTIC 030455

## (undated) DEVICE — SOL WATER IRRIG 500ML BOTTLE 2F7113

## (undated) DEVICE — RAD RX ISOVUE 300 (50ML) 61% IOPAMIDOL CHARGE PER ML

## (undated) DEVICE — SHEATH URETERAL ACCESS NAVIGATOR 11/13FRX46CM M0062502040

## (undated) DEVICE — SOL NACL 0.9% INJ 1000ML BAG 2B1324X

## (undated) DEVICE — AMPLATZ SUPER STIFF

## (undated) DEVICE — SYR 30ML SLIP TIP W/O NDL 302833

## (undated) DEVICE — PAD CHUX UNDERPAD 30X36" P3036C

## (undated) DEVICE — ESU CORD MONOPOLAR 10'  E0510

## (undated) DEVICE — Device

## (undated) DEVICE — SUCTION CANISTER MEDIVAC LINER 3000ML W/LID 65651-530

## (undated) DEVICE — SU VICRYL 4-0 PS-2 18" UND J496H

## (undated) DEVICE — GUIDEWIRE ZIPWIRE STR STIFF .035"X150CM M006630222B0

## (undated) DEVICE — KIT ENDO TURNOVER/PROCEDURE CARRY-ON 101822

## (undated) DEVICE — PACK LAP CHOLE SLC15LCFSD

## (undated) DEVICE — ENDO TROCAR BLUNT 100MM W/THRD ANCHOR BLUNTPORT BPT12STS

## (undated) DEVICE — TUBING SUCTION MEDI-VAC 1/4"X20' N620A

## (undated) DEVICE — STPL ENDO RELOAD 45X2.5MM ROTIC 030454

## (undated) DEVICE — ENDO CANNULA 05MM VERSAONE UNIVERSAL UNVCA5STF

## (undated) DEVICE — ESU GROUND PAD UNIVERSAL W/O CORD

## (undated) DEVICE — GLOVE BIOGEL PI MICRO SZ 6.5 48565

## (undated) DEVICE — STPL ENDO HANDLE GIA ULTRA UNIVERSAL STD EGIAUSTND

## (undated) DEVICE — BLADE CLIPPER 4406

## (undated) DEVICE — SOL ADH LIQUID BENZOIN SWAB 0.6ML C1544

## (undated) DEVICE — DRSG BANDAID 1X3" FABRIC LATEX FREE

## (undated) DEVICE — SU VICRYL 0 UR-6 27" J603H

## (undated) DEVICE — ENDO ADAPTER CHECK-FLO DISP 27550-CKU

## (undated) DEVICE — SUCTION IRR STRYKERFLOW II W/TIP 250-070-520

## (undated) DEVICE — ENDO TUBING INSUFFLATOR CO2 EFFICIENT 710324

## (undated) DEVICE — PREP CHLORAPREP 26ML TINTED ORANGE  260815

## (undated) DEVICE — GLOVE PROTEXIS BLUE W/NEU-THERA 7.5  2D73EB75

## (undated) DEVICE — GUIDEWIRE URO SOLO FLEX STR 0.035"X150CM HW35FS

## (undated) RX ORDER — ONDANSETRON 2 MG/ML
INJECTION INTRAMUSCULAR; INTRAVENOUS
Status: DISPENSED
Start: 2019-11-01

## (undated) RX ORDER — PROPOFOL 10 MG/ML
INJECTION, EMULSION INTRAVENOUS
Status: DISPENSED
Start: 2017-09-13

## (undated) RX ORDER — KETAMINE HYDROCHLORIDE 10 MG/ML
INJECTION, SOLUTION INTRAMUSCULAR; INTRAVENOUS
Status: DISPENSED
Start: 2017-12-13

## (undated) RX ORDER — PROPOFOL 10 MG/ML
INJECTION, EMULSION INTRAVENOUS
Status: DISPENSED
Start: 2017-07-05

## (undated) RX ORDER — KETAMINE HCL IN 0.9 % NACL 50 MG/5 ML
SYRINGE (ML) INTRAVENOUS
Status: DISPENSED
Start: 2019-06-28

## (undated) RX ORDER — PROPOFOL 10 MG/ML
INJECTION, EMULSION INTRAVENOUS
Status: DISPENSED
Start: 2017-12-13

## (undated) RX ORDER — PROPOFOL 10 MG/ML
INJECTION, EMULSION INTRAVENOUS
Status: DISPENSED
Start: 2018-02-16

## (undated) RX ORDER — SIMETHICONE 40MG/0.6ML
SUSPENSION, DROPS(FINAL DOSAGE FORM)(ML) ORAL
Status: DISPENSED
Start: 2021-07-23

## (undated) RX ORDER — PROPOFOL 10 MG/ML
INJECTION, EMULSION INTRAVENOUS
Status: DISPENSED
Start: 2021-06-23

## (undated) RX ORDER — BUPIVACAINE HYDROCHLORIDE 2.5 MG/ML
INJECTION, SOLUTION EPIDURAL; INFILTRATION; INTRACAUDAL
Status: DISPENSED
Start: 2021-07-23

## (undated) RX ORDER — ACETAMINOPHEN 325 MG/1
TABLET ORAL
Status: DISPENSED
Start: 2018-11-30

## (undated) RX ORDER — ACETAMINOPHEN 325 MG/1
TABLET ORAL
Status: DISPENSED
Start: 2018-05-11

## (undated) RX ORDER — BUPIVACAINE HYDROCHLORIDE 2.5 MG/ML
INJECTION, SOLUTION EPIDURAL; INFILTRATION; INTRACAUDAL
Status: DISPENSED
Start: 2019-02-22

## (undated) RX ORDER — FUROSEMIDE 10 MG/ML
INJECTION INTRAMUSCULAR; INTRAVENOUS
Status: DISPENSED
Start: 2021-06-23

## (undated) RX ORDER — LIDOCAINE HYDROCHLORIDE 20 MG/ML
INJECTION, SOLUTION EPIDURAL; INFILTRATION; INTRACAUDAL; PERINEURAL
Status: DISPENSED
Start: 2021-03-22

## (undated) RX ORDER — GLYCOPYRROLATE 0.2 MG/ML
INJECTION, SOLUTION INTRAMUSCULAR; INTRAVENOUS
Status: DISPENSED
Start: 2017-07-17

## (undated) RX ORDER — DEXAMETHASONE SODIUM PHOSPHATE 4 MG/ML
INJECTION, SOLUTION INTRA-ARTICULAR; INTRALESIONAL; INTRAMUSCULAR; INTRAVENOUS; SOFT TISSUE
Status: DISPENSED
Start: 2021-06-23

## (undated) RX ORDER — ACETAMINOPHEN 325 MG/1
TABLET ORAL
Status: DISPENSED
Start: 2019-06-28

## (undated) RX ORDER — ONDANSETRON 2 MG/ML
INJECTION INTRAMUSCULAR; INTRAVENOUS
Status: DISPENSED
Start: 2017-07-17

## (undated) RX ORDER — LIDOCAINE HYDROCHLORIDE 20 MG/ML
INJECTION, SOLUTION EPIDURAL; INFILTRATION; INTRACAUDAL; PERINEURAL
Status: DISPENSED
Start: 2021-03-01

## (undated) RX ORDER — PROPOFOL 10 MG/ML
INJECTION, EMULSION INTRAVENOUS
Status: DISPENSED
Start: 2021-03-22

## (undated) RX ORDER — PROPOFOL 10 MG/ML
INJECTION, EMULSION INTRAVENOUS
Status: DISPENSED
Start: 2019-02-22

## (undated) RX ORDER — BUPIVACAINE HYDROCHLORIDE 2.5 MG/ML
INJECTION, SOLUTION EPIDURAL; INFILTRATION; INTRACAUDAL
Status: DISPENSED
Start: 2017-12-13

## (undated) RX ORDER — FENTANYL CITRATE 50 UG/ML
INJECTION, SOLUTION INTRAMUSCULAR; INTRAVENOUS
Status: DISPENSED
Start: 2021-06-23

## (undated) RX ORDER — LIDOCAINE HYDROCHLORIDE 20 MG/ML
INJECTION, SOLUTION EPIDURAL; INFILTRATION; INTRACAUDAL; PERINEURAL
Status: DISPENSED
Start: 2022-07-01

## (undated) RX ORDER — LIDOCAINE HYDROCHLORIDE 20 MG/ML
INJECTION, SOLUTION EPIDURAL; INFILTRATION; INTRACAUDAL; PERINEURAL
Status: DISPENSED
Start: 2018-05-11

## (undated) RX ORDER — EPINEPHRINE 1 MG/ML
INJECTION, SOLUTION INTRAMUSCULAR; SUBCUTANEOUS
Status: DISPENSED
Start: 2023-07-28

## (undated) RX ORDER — PROPOFOL 10 MG/ML
INJECTION, EMULSION INTRAVENOUS
Status: DISPENSED
Start: 2017-07-17

## (undated) RX ORDER — GABAPENTIN 300 MG/1
CAPSULE ORAL
Status: DISPENSED
Start: 2018-11-30

## (undated) RX ORDER — PROPOFOL 10 MG/ML
INJECTION, EMULSION INTRAVENOUS
Status: DISPENSED
Start: 2023-07-28

## (undated) RX ORDER — KETAMINE HYDROCHLORIDE 10 MG/ML
INJECTION INTRAMUSCULAR; INTRAVENOUS
Status: DISPENSED
Start: 2018-05-11

## (undated) RX ORDER — PROPOFOL 10 MG/ML
INJECTION, EMULSION INTRAVENOUS
Status: DISPENSED
Start: 2019-11-01

## (undated) RX ORDER — ACETAMINOPHEN 325 MG/1
TABLET ORAL
Status: DISPENSED
Start: 2019-11-01

## (undated) RX ORDER — BUPIVACAINE HYDROCHLORIDE 5 MG/ML
INJECTION, SOLUTION EPIDURAL; INTRACAUDAL
Status: DISPENSED
Start: 2023-07-28

## (undated) RX ORDER — DEXAMETHASONE SODIUM PHOSPHATE 4 MG/ML
INJECTION, SOLUTION INTRA-ARTICULAR; INTRALESIONAL; INTRAMUSCULAR; INTRAVENOUS; SOFT TISSUE
Status: DISPENSED
Start: 2021-05-19

## (undated) RX ORDER — BUPIVACAINE HYDROCHLORIDE AND EPINEPHRINE 2.5; 5 MG/ML; UG/ML
INJECTION, SOLUTION EPIDURAL; INFILTRATION; INTRACAUDAL; PERINEURAL
Status: DISPENSED
Start: 2017-07-17

## (undated) RX ORDER — ACETAMINOPHEN 325 MG/1
TABLET ORAL
Status: DISPENSED
Start: 2017-09-13

## (undated) RX ORDER — CEFAZOLIN SODIUM 2 G/100ML
INJECTION, SOLUTION INTRAVENOUS
Status: DISPENSED
Start: 2021-03-22

## (undated) RX ORDER — PROPOFOL 10 MG/ML
INJECTION, EMULSION INTRAVENOUS
Status: DISPENSED
Start: 2021-05-19

## (undated) RX ORDER — DEXAMETHASONE SODIUM PHOSPHATE 4 MG/ML
INJECTION, SOLUTION INTRA-ARTICULAR; INTRALESIONAL; INTRAMUSCULAR; INTRAVENOUS; SOFT TISSUE
Status: DISPENSED
Start: 2021-03-22

## (undated) RX ORDER — LIDOCAINE HYDROCHLORIDE 10 MG/ML
INJECTION, SOLUTION INFILTRATION; PERINEURAL
Status: DISPENSED
Start: 2017-07-17

## (undated) RX ORDER — PROPOFOL 10 MG/ML
INJECTION, EMULSION INTRAVENOUS
Status: DISPENSED
Start: 2018-05-11

## (undated) RX ORDER — KETAMINE HYDROCHLORIDE 10 MG/ML
INJECTION, SOLUTION INTRAMUSCULAR; INTRAVENOUS
Status: DISPENSED
Start: 2017-07-05

## (undated) RX ORDER — FENTANYL CITRATE 50 UG/ML
INJECTION, SOLUTION INTRAMUSCULAR; INTRAVENOUS
Status: DISPENSED
Start: 2017-12-13

## (undated) RX ORDER — GLYCOPYRROLATE 0.2 MG/ML
INJECTION INTRAMUSCULAR; INTRAVENOUS
Status: DISPENSED
Start: 2019-11-01

## (undated) RX ORDER — PROPOFOL 10 MG/ML
INJECTION, EMULSION INTRAVENOUS
Status: DISPENSED
Start: 2021-07-23

## (undated) RX ORDER — CEFAZOLIN SODIUM 1 G/3ML
INJECTION, POWDER, FOR SOLUTION INTRAMUSCULAR; INTRAVENOUS
Status: DISPENSED
Start: 2023-07-28

## (undated) RX ORDER — LIDOCAINE HYDROCHLORIDE 10 MG/ML
INJECTION, SOLUTION EPIDURAL; INFILTRATION; INTRACAUDAL; PERINEURAL
Status: DISPENSED
Start: 2021-07-23

## (undated) RX ORDER — PROPOFOL 10 MG/ML
INJECTION, EMULSION INTRAVENOUS
Status: DISPENSED
Start: 2019-06-28

## (undated) RX ORDER — PROPOFOL 10 MG/ML
INJECTION, EMULSION INTRAVENOUS
Status: DISPENSED
Start: 2021-03-01

## (undated) RX ORDER — PROPOFOL 10 MG/ML
INJECTION, EMULSION INTRAVENOUS
Status: DISPENSED
Start: 2018-11-30

## (undated) RX ORDER — GLYCOPYRROLATE 0.2 MG/ML
INJECTION INTRAMUSCULAR; INTRAVENOUS
Status: DISPENSED
Start: 2017-09-13

## (undated) RX ORDER — LIDOCAINE HYDROCHLORIDE 20 MG/ML
INJECTION, SOLUTION EPIDURAL; INFILTRATION; INTRACAUDAL; PERINEURAL
Status: DISPENSED
Start: 2021-05-19

## (undated) RX ORDER — ONDANSETRON 2 MG/ML
INJECTION INTRAMUSCULAR; INTRAVENOUS
Status: DISPENSED
Start: 2017-07-05

## (undated) RX ORDER — GABAPENTIN 300 MG/1
CAPSULE ORAL
Status: DISPENSED
Start: 2018-07-20

## (undated) RX ORDER — LIDOCAINE HYDROCHLORIDE 20 MG/ML
INJECTION, SOLUTION EPIDURAL; INFILTRATION; INTRACAUDAL; PERINEURAL
Status: DISPENSED
Start: 2021-06-23

## (undated) RX ORDER — GLYCOPYRROLATE 0.2 MG/ML
INJECTION INTRAMUSCULAR; INTRAVENOUS
Status: DISPENSED
Start: 2019-02-22

## (undated) RX ORDER — GLYCOPYRROLATE 0.2 MG/ML
INJECTION INTRAMUSCULAR; INTRAVENOUS
Status: DISPENSED
Start: 2018-07-20

## (undated) RX ORDER — LIDOCAINE HYDROCHLORIDE 10 MG/ML
INJECTION, SOLUTION EPIDURAL; INFILTRATION; INTRACAUDAL; PERINEURAL
Status: DISPENSED
Start: 2019-02-22

## (undated) RX ORDER — FENTANYL CITRATE 50 UG/ML
INJECTION, SOLUTION INTRAMUSCULAR; INTRAVENOUS
Status: DISPENSED
Start: 2017-07-05

## (undated) RX ORDER — KETOROLAC TROMETHAMINE 30 MG/ML
INJECTION, SOLUTION INTRAMUSCULAR; INTRAVENOUS
Status: DISPENSED
Start: 2021-06-23

## (undated) RX ORDER — GLYCOPYRROLATE 0.2 MG/ML
INJECTION INTRAMUSCULAR; INTRAVENOUS
Status: DISPENSED
Start: 2017-12-13

## (undated) RX ORDER — ACETAMINOPHEN 325 MG/1
TABLET ORAL
Status: DISPENSED
Start: 2019-02-22

## (undated) RX ORDER — LIDOCAINE HYDROCHLORIDE 20 MG/ML
INJECTION, SOLUTION EPIDURAL; INFILTRATION; INTRACAUDAL; PERINEURAL
Status: DISPENSED
Start: 2021-07-23

## (undated) RX ORDER — KETAMINE HYDROCHLORIDE 10 MG/ML
INJECTION INTRAMUSCULAR; INTRAVENOUS
Status: DISPENSED
Start: 2018-07-20

## (undated) RX ORDER — ONDANSETRON 2 MG/ML
INJECTION INTRAMUSCULAR; INTRAVENOUS
Status: DISPENSED
Start: 2023-07-28

## (undated) RX ORDER — DEXAMETHASONE SODIUM PHOSPHATE 4 MG/ML
INJECTION, SOLUTION INTRA-ARTICULAR; INTRALESIONAL; INTRAMUSCULAR; INTRAVENOUS; SOFT TISSUE
Status: DISPENSED
Start: 2021-03-01

## (undated) RX ORDER — FENTANYL CITRATE 50 UG/ML
INJECTION, SOLUTION INTRAMUSCULAR; INTRAVENOUS
Status: DISPENSED
Start: 2017-07-17

## (undated) RX ORDER — GABAPENTIN 300 MG/1
CAPSULE ORAL
Status: DISPENSED
Start: 2017-12-13

## (undated) RX ORDER — KETAMINE HYDROCHLORIDE 10 MG/ML
INJECTION, SOLUTION INTRAMUSCULAR; INTRAVENOUS
Status: DISPENSED
Start: 2017-09-13

## (undated) RX ORDER — SIMETHICONE 40MG/0.6ML
SUSPENSION, DROPS(FINAL DOSAGE FORM)(ML) ORAL
Status: DISPENSED
Start: 2018-11-30

## (undated) RX ORDER — CLINDAMYCIN PHOSPHATE 900 MG/50ML
INJECTION, SOLUTION INTRAVENOUS
Status: DISPENSED
Start: 2017-07-10

## (undated) RX ORDER — PROPOFOL 10 MG/ML
INJECTION, EMULSION INTRAVENOUS
Status: DISPENSED
Start: 2022-07-01

## (undated) RX ORDER — BUPIVACAINE HYDROCHLORIDE 5 MG/ML
INJECTION, SOLUTION EPIDURAL; INTRACAUDAL
Status: DISPENSED
Start: 2018-05-11

## (undated) RX ORDER — ACETAMINOPHEN 500 MG
TABLET ORAL
Status: DISPENSED
Start: 2017-07-17

## (undated) RX ORDER — FENTANYL CITRATE 50 UG/ML
INJECTION, SOLUTION INTRAMUSCULAR; INTRAVENOUS
Status: DISPENSED
Start: 2020-10-23

## (undated) RX ORDER — ONDANSETRON 2 MG/ML
INJECTION INTRAMUSCULAR; INTRAVENOUS
Status: DISPENSED
Start: 2019-06-28

## (undated) RX ORDER — KETOROLAC TROMETHAMINE 30 MG/ML
INJECTION, SOLUTION INTRAMUSCULAR; INTRAVENOUS
Status: DISPENSED
Start: 2018-11-30

## (undated) RX ORDER — ONDANSETRON 2 MG/ML
INJECTION INTRAMUSCULAR; INTRAVENOUS
Status: DISPENSED
Start: 2021-03-01

## (undated) RX ORDER — ONDANSETRON 2 MG/ML
INJECTION INTRAMUSCULAR; INTRAVENOUS
Status: DISPENSED
Start: 2021-03-22

## (undated) RX ORDER — LIDOCAINE HYDROCHLORIDE 20 MG/ML
INJECTION, SOLUTION EPIDURAL; INFILTRATION; INTRACAUDAL; PERINEURAL
Status: DISPENSED
Start: 2017-12-13

## (undated) RX ORDER — ONDANSETRON 2 MG/ML
INJECTION INTRAMUSCULAR; INTRAVENOUS
Status: DISPENSED
Start: 2021-05-19

## (undated) RX ORDER — FUROSEMIDE 10 MG/ML
INJECTION INTRAMUSCULAR; INTRAVENOUS
Status: DISPENSED
Start: 2021-05-19

## (undated) RX ORDER — GABAPENTIN 300 MG/1
CAPSULE ORAL
Status: DISPENSED
Start: 2019-11-01

## (undated) RX ORDER — FENTANYL CITRATE 50 UG/ML
INJECTION, SOLUTION INTRAMUSCULAR; INTRAVENOUS
Status: DISPENSED
Start: 2021-03-01

## (undated) RX ORDER — GABAPENTIN 300 MG/1
CAPSULE ORAL
Status: DISPENSED
Start: 2017-09-13

## (undated) RX ORDER — LIDOCAINE HYDROCHLORIDE 10 MG/ML
INJECTION, SOLUTION EPIDURAL; INFILTRATION; INTRACAUDAL; PERINEURAL
Status: DISPENSED
Start: 2018-07-20

## (undated) RX ORDER — FENTANYL CITRATE 50 UG/ML
INJECTION, SOLUTION INTRAMUSCULAR; INTRAVENOUS
Status: DISPENSED
Start: 2021-07-23

## (undated) RX ORDER — ONDANSETRON 2 MG/ML
INJECTION INTRAMUSCULAR; INTRAVENOUS
Status: DISPENSED
Start: 2021-06-23

## (undated) RX ORDER — LIDOCAINE HYDROCHLORIDE 10 MG/ML
INJECTION, SOLUTION EPIDURAL; INFILTRATION; INTRACAUDAL; PERINEURAL
Status: DISPENSED
Start: 2023-07-28

## (undated) RX ORDER — LIDOCAINE HYDROCHLORIDE 20 MG/ML
INJECTION, SOLUTION EPIDURAL; INFILTRATION; INTRACAUDAL; PERINEURAL
Status: DISPENSED
Start: 2019-11-01

## (undated) RX ORDER — BUPIVACAINE HYDROCHLORIDE AND EPINEPHRINE 2.5; 5 MG/ML; UG/ML
INJECTION, SOLUTION EPIDURAL; INFILTRATION; INTRACAUDAL; PERINEURAL
Status: DISPENSED
Start: 2017-07-05

## (undated) RX ORDER — PROPOFOL 10 MG/ML
INJECTION, EMULSION INTRAVENOUS
Status: DISPENSED
Start: 2024-07-05

## (undated) RX ORDER — GABAPENTIN 300 MG/1
CAPSULE ORAL
Status: DISPENSED
Start: 2019-06-28

## (undated) RX ORDER — DEXAMETHASONE SODIUM PHOSPHATE 4 MG/ML
INJECTION, SOLUTION INTRA-ARTICULAR; INTRALESIONAL; INTRAMUSCULAR; INTRAVENOUS; SOFT TISSUE
Status: DISPENSED
Start: 2018-11-30

## (undated) RX ORDER — LIDOCAINE HYDROCHLORIDE 20 MG/ML
INJECTION, SOLUTION EPIDURAL; INFILTRATION; INTRACAUDAL; PERINEURAL
Status: DISPENSED
Start: 2017-07-17

## (undated) RX ORDER — LIDOCAINE/PRILOCAINE 2.5 %-2.5%
CREAM (GRAM) TOPICAL
Status: DISPENSED
Start: 2017-07-05

## (undated) RX ORDER — LIDOCAINE HYDROCHLORIDE 10 MG/ML
INJECTION, SOLUTION EPIDURAL; INFILTRATION; INTRACAUDAL; PERINEURAL
Status: DISPENSED
Start: 2018-05-11

## (undated) RX ORDER — GLYCOPYRROLATE 0.2 MG/ML
INJECTION INTRAMUSCULAR; INTRAVENOUS
Status: DISPENSED
Start: 2018-05-11

## (undated) RX ORDER — EPINEPHRINE 1 MG/ML
INJECTION, SOLUTION, CONCENTRATE INTRAVENOUS
Status: DISPENSED
Start: 2017-12-13

## (undated) RX ORDER — LIDOCAINE HYDROCHLORIDE 20 MG/ML
INJECTION, SOLUTION EPIDURAL; INFILTRATION; INTRACAUDAL; PERINEURAL
Status: DISPENSED
Start: 2017-09-13

## (undated) RX ORDER — GABAPENTIN 300 MG/1
CAPSULE ORAL
Status: DISPENSED
Start: 2018-05-11

## (undated) RX ORDER — FENTANYL CITRATE 50 UG/ML
INJECTION, SOLUTION INTRAMUSCULAR; INTRAVENOUS
Status: DISPENSED
Start: 2021-03-22

## (undated) RX ORDER — ACETAMINOPHEN 325 MG/1
TABLET ORAL
Status: DISPENSED
Start: 2018-07-20

## (undated) RX ORDER — KETAMINE HYDROCHLORIDE 10 MG/ML
INJECTION INTRAMUSCULAR; INTRAVENOUS
Status: DISPENSED
Start: 2018-11-30

## (undated) RX ORDER — ONDANSETRON 2 MG/ML
INJECTION INTRAMUSCULAR; INTRAVENOUS
Status: DISPENSED
Start: 2021-07-23

## (undated) RX ORDER — FENTANYL CITRATE 50 UG/ML
INJECTION, SOLUTION INTRAMUSCULAR; INTRAVENOUS
Status: DISPENSED
Start: 2018-02-16

## (undated) RX ORDER — LIDOCAINE HYDROCHLORIDE 20 MG/ML
INJECTION, SOLUTION EPIDURAL; INFILTRATION; INTRACAUDAL; PERINEURAL
Status: DISPENSED
Start: 2018-11-30

## (undated) RX ORDER — SIMETHICONE 40MG/0.6ML
SUSPENSION, DROPS(FINAL DOSAGE FORM)(ML) ORAL
Status: DISPENSED
Start: 2020-10-23

## (undated) RX ORDER — PROPOFOL 10 MG/ML
INJECTION, EMULSION INTRAVENOUS
Status: DISPENSED
Start: 2018-07-20

## (undated) RX ORDER — ONDANSETRON 2 MG/ML
INJECTION INTRAMUSCULAR; INTRAVENOUS
Status: DISPENSED
Start: 2018-11-30

## (undated) RX ORDER — KETAMINE HYDROCHLORIDE 10 MG/ML
INJECTION INTRAMUSCULAR; INTRAVENOUS
Status: DISPENSED
Start: 2019-02-22

## (undated) RX ORDER — ONDANSETRON 2 MG/ML
INJECTION INTRAMUSCULAR; INTRAVENOUS
Status: DISPENSED
Start: 2018-05-11

## (undated) RX ORDER — DEXAMETHASONE SODIUM PHOSPHATE 4 MG/ML
INJECTION, SOLUTION INTRA-ARTICULAR; INTRALESIONAL; INTRAMUSCULAR; INTRAVENOUS; SOFT TISSUE
Status: DISPENSED
Start: 2017-07-17

## (undated) RX ORDER — FENTANYL CITRATE 50 UG/ML
INJECTION, SOLUTION INTRAMUSCULAR; INTRAVENOUS
Status: DISPENSED
Start: 2021-05-19

## (undated) RX ORDER — ACETAMINOPHEN 325 MG/1
TABLET ORAL
Status: DISPENSED
Start: 2017-12-13

## (undated) RX ORDER — KETAMINE HCL IN 0.9 % NACL 50 MG/5 ML
SYRINGE (ML) INTRAVENOUS
Status: DISPENSED
Start: 2019-11-01

## (undated) RX ORDER — BUPIVACAINE HYDROCHLORIDE 2.5 MG/ML
INJECTION, SOLUTION INFILTRATION; PERINEURAL
Status: DISPENSED
Start: 2018-11-30

## (undated) RX ORDER — LIDOCAINE HYDROCHLORIDE 10 MG/ML
INJECTION, SOLUTION EPIDURAL; INFILTRATION; INTRACAUDAL; PERINEURAL
Status: DISPENSED
Start: 2017-12-13

## (undated) RX ORDER — ACETAMINOPHEN 325 MG/1
TABLET ORAL
Status: DISPENSED
Start: 2017-07-05

## (undated) RX ORDER — ONDANSETRON 2 MG/ML
INJECTION INTRAMUSCULAR; INTRAVENOUS
Status: DISPENSED
Start: 2019-02-22

## (undated) RX ORDER — EPINEPHRINE 1 MG/ML
INJECTION, SOLUTION INTRAMUSCULAR; SUBCUTANEOUS
Status: DISPENSED
Start: 2021-07-23

## (undated) RX ORDER — HEPARIN SODIUM (PORCINE) LOCK FLUSH IV SOLN 100 UNIT/ML 100 UNIT/ML
SOLUTION INTRAVENOUS
Status: DISPENSED
Start: 2023-07-21

## (undated) RX ORDER — CEFAZOLIN SODIUM 1 G/3ML
INJECTION, POWDER, FOR SOLUTION INTRAMUSCULAR; INTRAVENOUS
Status: DISPENSED
Start: 2021-06-23